# Patient Record
Sex: MALE | Race: ASIAN
[De-identification: names, ages, dates, MRNs, and addresses within clinical notes are randomized per-mention and may not be internally consistent; named-entity substitution may affect disease eponyms.]

---

## 2017-07-11 ENCOUNTER — HOSPITAL ENCOUNTER (INPATIENT)
Dept: HOSPITAL 72 - EMR | Age: 72
LOS: 3 days | Discharge: NURSING, REHAB OR LONG-TERM CARE | DRG: 689 | End: 2017-07-14
Payer: MEDICARE

## 2017-07-11 VITALS — SYSTOLIC BLOOD PRESSURE: 132 MMHG | DIASTOLIC BLOOD PRESSURE: 87 MMHG

## 2017-07-11 VITALS — SYSTOLIC BLOOD PRESSURE: 129 MMHG | DIASTOLIC BLOOD PRESSURE: 74 MMHG

## 2017-07-11 VITALS — DIASTOLIC BLOOD PRESSURE: 62 MMHG | SYSTOLIC BLOOD PRESSURE: 121 MMHG

## 2017-07-11 VITALS
BODY MASS INDEX: 23.05 KG/M2 | HEIGHT: 64 IN | DIASTOLIC BLOOD PRESSURE: 77 MMHG | WEIGHT: 135 LBS | SYSTOLIC BLOOD PRESSURE: 126 MMHG

## 2017-07-11 VITALS — SYSTOLIC BLOOD PRESSURE: 107 MMHG | DIASTOLIC BLOOD PRESSURE: 68 MMHG

## 2017-07-11 VITALS — SYSTOLIC BLOOD PRESSURE: 99 MMHG | DIASTOLIC BLOOD PRESSURE: 66 MMHG

## 2017-07-11 VITALS — DIASTOLIC BLOOD PRESSURE: 96 MMHG | SYSTOLIC BLOOD PRESSURE: 142 MMHG

## 2017-07-11 DIAGNOSIS — E87.0: ICD-10-CM

## 2017-07-11 DIAGNOSIS — G30.9: ICD-10-CM

## 2017-07-11 DIAGNOSIS — R62.7: ICD-10-CM

## 2017-07-11 DIAGNOSIS — E87.6: ICD-10-CM

## 2017-07-11 DIAGNOSIS — M81.0: ICD-10-CM

## 2017-07-11 DIAGNOSIS — I12.9: ICD-10-CM

## 2017-07-11 DIAGNOSIS — G93.89: ICD-10-CM

## 2017-07-11 DIAGNOSIS — I25.10: ICD-10-CM

## 2017-07-11 DIAGNOSIS — Z86.73: ICD-10-CM

## 2017-07-11 DIAGNOSIS — F02.80: ICD-10-CM

## 2017-07-11 DIAGNOSIS — N18.3: ICD-10-CM

## 2017-07-11 DIAGNOSIS — N39.0: Primary | ICD-10-CM

## 2017-07-11 DIAGNOSIS — N40.0: ICD-10-CM

## 2017-07-11 DIAGNOSIS — R13.10: ICD-10-CM

## 2017-07-11 DIAGNOSIS — G93.41: ICD-10-CM

## 2017-07-11 DIAGNOSIS — E83.52: ICD-10-CM

## 2017-07-11 LAB
ALBUMIN/GLOB SERPL: 0.7 {RATIO} (ref 1–2.7)
ALBUMIN/GLOB SERPL: 0.8 {RATIO} (ref 1–2.7)
ALT SERPL-CCNC: 21 U/L (ref 3–41)
ALT SERPL-CCNC: 24 U/L (ref 3–41)
ANION GAP SERPL CALC-SCNC: 13 MMOL/L (ref 5–15)
ANION GAP SERPL CALC-SCNC: 14 MMOL/L (ref 5–15)
APAP SERPL-MCNC: < 10 UG/ML (ref 10–30)
APPEARANCE UR: (no result)
APTT BLD: 25 SEC (ref 23–33)
AST SERPL-CCNC: 11 U/L (ref 5–40)
AST SERPL-CCNC: 18 U/L (ref 5–40)
BACTERIA #/AREA URNS HPF: (no result) /HPF
BASOPHILS NFR BLD AUTO: 0.9 % (ref 0–2)
CALCIUM SERPL-MCNC: 11 MG/DL (ref 8.6–10.2)
CALCIUM SERPL-MCNC: 11.1 MG/DL (ref 8.6–10.2)
CHLORIDE SERPL-SCNC: 121 MEQ/L (ref 98–107)
CHLORIDE SERPL-SCNC: 124 MEQ/L (ref 98–107)
CO2 SERPL-SCNC: 24 MEQ/L (ref 20–30)
CO2 SERPL-SCNC: 25 MEQ/L (ref 20–30)
CORTISOL: 25.7 UG/DL
CREAT SERPL-MCNC: 1.5 MG/DL (ref 0.7–1.2)
CREAT SERPL-MCNC: 1.6 MG/DL (ref 0.7–1.2)
EOSINOPHIL NFR BLD AUTO: 2.2 % (ref 0–3)
ERYTHROCYTE [DISTWIDTH] IN BLOOD BY AUTOMATED COUNT: 13.6 % (ref 11.6–14.8)
ETHANOL SERPL-MCNC: < 10 MG/DL
GLOBULIN SER-MCNC: 4.3 G/DL
GLOBULIN SER-MCNC: 4.7 G/DL
HEMOLYSIS: 5
HEMOLYSIS: 81
INR PPP: 1 (ref 0.9–1.1)
KETONES UR QL STRIP: NEGATIVE
LEUKOCYTE ESTERASE UR QL STRIP: (no result)
LIPASE SERPL-CCNC: 94 U/L (ref ?–60)
LYMPHOCYTES NFR BLD AUTO: 30.2 % (ref 20–45)
MAGNESIUM SERPL-MCNC: 2.1 MG/DL (ref 1.7–2.5)
MCH RBC QN AUTO: 29 PG (ref 27–31)
MCHC RBC AUTO-ENTMCNC: 30.4 G/DL (ref 32–36)
MCV RBC AUTO: 95 FL (ref 80–99)
MONOCYTES NFR BLD AUTO: 6.8 % (ref 1–10)
MUCOUS THREADS #/AREA URNS LPF: (no result) /LPF
NEUTROPHILS NFR BLD AUTO: 59.9 % (ref 45–75)
NITRITE UR QL STRIP: POSITIVE
PH UR STRIP: 5 [PH] (ref 4.5–8)
PHOSPHATE SERPL-MCNC: 3.9 MG/DL (ref 2.5–4.8)
PLATELET # BLD: 212 K/UL (ref 150–450)
PMV BLD AUTO: 10.7 FL (ref 6.5–10.1)
POTASSIUM SERPL-SCNC: 4.7 MEQ/L (ref 3.4–4.9)
POTASSIUM SERPL-SCNC: 5.5 MEQ/L (ref 3.4–4.9)
PROT SERPL-MCNC: 7.8 G/DL (ref 6.6–8.7)
PROT SERPL-MCNC: 8.3 G/DL (ref 6.6–8.7)
PROT UR QL STRIP: (no result)
PROTHROMBIN TIME: 10 SEC (ref 9.3–11.5)
RBC # BLD AUTO: 5.43 M/UL (ref 4.7–6.1)
RBC #/AREA URNS HPF: (no result) /HPF (ref 0–0)
SODIUM SERPL-SCNC: 159 MEQ/L (ref 135–145)
SODIUM SERPL-SCNC: 162 MEQ/L (ref 135–145)
SP GR UR STRIP: 1.02 (ref 1–1.03)
SQUAMOUS #/AREA URNS LPF: (no result) /LPF
TROPONIN I SERPL-MCNC: < 0.3 NG/ML (ref ?–0.3)
URATE SERPL-MCNC: 11.1 MG/DL (ref 3–7.5)
UROBILINOGEN UR-MCNC: NORMAL MG/DL (ref 0–1)
WBC # BLD AUTO: 11 K/UL (ref 4.8–10.8)
WBC #/AREA URNS HPF: (no result) /HPF (ref 0–0)

## 2017-07-11 PROCEDURE — 74000: CPT

## 2017-07-11 PROCEDURE — 93005 ELECTROCARDIOGRAM TRACING: CPT

## 2017-07-11 PROCEDURE — 84443 ASSAY THYROID STIM HORMONE: CPT

## 2017-07-11 PROCEDURE — 87181 SC STD AGAR DILUTION PER AGT: CPT

## 2017-07-11 PROCEDURE — 84484 ASSAY OF TROPONIN QUANT: CPT

## 2017-07-11 PROCEDURE — 83735 ASSAY OF MAGNESIUM: CPT

## 2017-07-11 PROCEDURE — 83036 HEMOGLOBIN GLYCOSYLATED A1C: CPT

## 2017-07-11 PROCEDURE — 71010: CPT

## 2017-07-11 PROCEDURE — 70450 CT HEAD/BRAIN W/O DYE: CPT

## 2017-07-11 PROCEDURE — 80061 LIPID PANEL: CPT

## 2017-07-11 PROCEDURE — 82550 ASSAY OF CK (CPK): CPT

## 2017-07-11 PROCEDURE — 82533 TOTAL CORTISOL: CPT

## 2017-07-11 PROCEDURE — 84550 ASSAY OF BLOOD/URIC ACID: CPT

## 2017-07-11 PROCEDURE — 85025 COMPLETE CBC W/AUTO DIFF WBC: CPT

## 2017-07-11 PROCEDURE — 83880 ASSAY OF NATRIURETIC PEPTIDE: CPT

## 2017-07-11 PROCEDURE — 84439 ASSAY OF FREE THYROXINE: CPT

## 2017-07-11 PROCEDURE — 87081 CULTURE SCREEN ONLY: CPT

## 2017-07-11 PROCEDURE — 76775 US EXAM ABDO BACK WALL LIM: CPT

## 2017-07-11 PROCEDURE — 81003 URINALYSIS AUTO W/O SCOPE: CPT

## 2017-07-11 PROCEDURE — 83690 ASSAY OF LIPASE: CPT

## 2017-07-11 PROCEDURE — 82977 ASSAY OF GGT: CPT

## 2017-07-11 PROCEDURE — 84100 ASSAY OF PHOSPHORUS: CPT

## 2017-07-11 PROCEDURE — 87040 BLOOD CULTURE FOR BACTERIA: CPT

## 2017-07-11 PROCEDURE — 80329 ANALGESICS NON-OPIOID 1 OR 2: CPT

## 2017-07-11 PROCEDURE — 87086 URINE CULTURE/COLONY COUNT: CPT

## 2017-07-11 PROCEDURE — 85730 THROMBOPLASTIN TIME PARTIAL: CPT

## 2017-07-11 PROCEDURE — 83930 ASSAY OF BLOOD OSMOLALITY: CPT

## 2017-07-11 PROCEDURE — 84481 FREE ASSAY (FT-3): CPT

## 2017-07-11 PROCEDURE — 74230 X-RAY XM SWLNG FUNCJ C+: CPT

## 2017-07-11 PROCEDURE — 83935 ASSAY OF URINE OSMOLALITY: CPT

## 2017-07-11 PROCEDURE — 82962 GLUCOSE BLOOD TEST: CPT

## 2017-07-11 PROCEDURE — 85610 PROTHROMBIN TIME: CPT

## 2017-07-11 PROCEDURE — 36415 COLL VENOUS BLD VENIPUNCTURE: CPT

## 2017-07-11 PROCEDURE — 86140 C-REACTIVE PROTEIN: CPT

## 2017-07-11 PROCEDURE — 80300: CPT

## 2017-07-11 PROCEDURE — 80053 COMPREHEN METABOLIC PANEL: CPT

## 2017-07-11 RX ADMIN — HEPARIN SODIUM SCH UNITS: 5000 INJECTION INTRAVENOUS; SUBCUTANEOUS at 20:35

## 2017-07-11 NOTE — CONSULTATION
History of Present Illness


General


Date patient seen:  Jul 11, 2017


Chief Complaint:  General Complaint


Referring physician:  dr Hearn


Reason for Consultation:  INpatient management





Present Illness


HPI


72 year old patient wtih hx of CAD, CVA, Dementia  presented with decreased 

oral intake.  He is unable to give any history . Pt was diagnosed to have 

sepsis and UTI and admitted for further evaluation.


Allergies:  


Coded Allergies:  


     No Known Allergies (Unverified , 8/20/12)





Medication History


Scheduled


Alendronate Sodium* (Fosamax*), 70 MG ORAL ONCE A WEEK, (Reported)


Amlodipine Besylate (Norvasc), 5 MG ORAL DAILY, (Reported)


Aspirin* (Aspir 81*), 81 MG ORAL DAILY, (Reported)


Atorvastatin Calcium* (Atorvastatin Calcium*), 10 MG ORAL BEDTIME, (Reported)


Cranberry Fruit Concentrate (Cranberry), 450 MG PO BID, (Reported)


Docusate Sodium* (Colace*), 200 MG ORAL DAILY, (Reported)


Donepezil Hcl* (Donepezil Hcl*), 5 MG ORAL DAILY, (Reported)


Lisinopril (Lisinopril*), 5 MG ORAL DAILY, (Reported)


Multivitamin (Multi Vitamin Daily), 1 TAB ORAL DAILY, (Reported)


Tamsulosin HCl (Flomax), 0.4 MG ORAL DAILY, (Reported)





Scheduled PRN


Acetaminophen (Tylenol), 650 MG ORAL Q6H PRN for Fever/Headache/Mild Pain, (

Reported)


Guaifenesin (Kesha-Tussin), 15 ML PO EVERY 8 HOURS PRN for For Cough, (Reported)


Nitroglycerin (Nitroglycerin), 0.4 MG SL AS NEEDED PRN for For Pain, (Reported)





Miscellaneous Medications


Calcium Carbonate/Vitamin D3 (Calcium 500+D Tablet Chew), 1 EACH PO, (Reported)





Discontinued Medications


Ascorbic Acid* (Ascorbic Acid*), 500 MG ORAL DAILY, (Reported)


   Discontinued Reason: MD discontinued med


Hydrocodone Bit/Acetaminophen 5-325* (Norco 5-325*), Unknown Dose ORAL Q4H PRN 

for For Pain, (Reported)


   Discontinued Reason: MD discontinued med





Patient History


Healthcare decision maker





Resuscitation status





Advanced Directive on File








Past Medical/Surgical History


Past Medical/Surgical History:  


(1) Alzheimer's dementia


(2) HTN (hypertension)





Review of Systems


Constitutional:  Reports: no symptoms


All Other Systems:  negative except mentioned in HPI





Physical Exam


General Appearance:  WD/WN, no apparent distress


Lines, tubes and drains:  peripheral


HEENT:  normocephalic, atraumatic


Neck:  non-tender, normal alignment


Respiratory/Chest:  chest wall non-tender, lungs clear


Cardiovascular/Chest:  normal peripheral pulses


Abdomen:  normal bowel sounds, non tender


Genitourinary/Rectal:  normal genital exam


Extremities:  normal range of motion


Skin Exam:  normal pigmentation





Last 24 Hour Vital Signs








  Date Time  Temp Pulse Resp B/P Pulse Ox O2 Delivery O2 Flow Rate FiO2


 


7/11/17 13:06 95.9 81 18 99/66 100 Room Air  


 


7/11/17 10:56 96.5 79 16 129/74 99 Room Air  


 


7/11/17 10:45  79 16 129/74 99 Room Air  


 


7/11/17 09:57 96.5 89 14 121/62 99 Room Air  


 


7/11/17 08:27 97.7 86 16 126/77 98 Room Air  


 


7/11/17 08:18 96.3 85 18 129/76 97 Room Air  











Laboratory Tests








Test


  7/11/17


08:45 7/11/17


09:50 7/11/17


10:46 7/11/17


12:45


 


White Blood Count


  11.0 K/UL


(4.8-10.8)  H 


  


  


 


 


Red Blood Count


  5.43 M/UL


(4.70-6.10) 


  


  


 


 


Hemoglobin


  15.7 G/DL


(14.2-18.0) 


  


  


 


 


Hematocrit


  51.7 %


(42.0-52.0) 


  


  


 


 


Mean Corpuscular Volume 95 FL (80-99)     


 


Mean Corpuscular Hemoglobin


  29.0 PG


(27.0-31.0) 


  


  


 


 


Mean Corpuscular Hemoglobin


Concent 30.4 G/DL


(32.0-36.0)  L 


  


  


 


 


Red Cell Distribution Width


  13.6 %


(11.6-14.8) 


  


  


 


 


Platelet Count


  212 K/UL


(150-450) 


  


  


 


 


Mean Platelet Volume


  10.7 FL


(6.5-10.1)  H 


  


  


 


 


Neutrophils (%) (Auto)


  59.9 %


(45.0-75.0) 


  


  


 


 


Lymphocytes (%) (Auto)


  30.2 %


(20.0-45.0) 


  


  


 


 


Monocytes (%) (Auto)


  6.8 %


(1.0-10.0) 


  


  


 


 


Eosinophils (%) (Auto)


  2.2 %


(0.0-3.0) 


  


  


 


 


Basophils (%) (Auto)


  0.9 %


(0.0-2.0) 


  


  


 


 


Prothrombin Time


  10.0 SEC


(9.30-11.50) 


  


  


 


 


Prothromb Time International


Ratio 1.0 (0.9-1.1)  


  


  


  


 


 


Activated Partial


Thromboplast Time 25 SEC (23-33)


  


  


  


 


 


Sodium Level


  159 mEQ/L


(135-145)  H 


  


  162 mEQ/L


(135-145)  *H


 


Potassium Level


  5.5 mEQ/L


(3.4-4.9)  H 


  


  4.7 mEQ/L


(3.4-4.9)


 


Chloride Level


  121 mEQ/L


()  H 


  


  124 mEQ/L


()  H


 


Carbon Dioxide Level


  25 mEQ/L


(20-30) 


  


  24 mEQ/L


(20-30)


 


Anion Gap 13 (5-15)     14 (5-15)  


 


Blood Urea Nitrogen


  53 mg/dL


(7-23)  H 


  


  50 mg/dL


(7-23)  H


 


Creatinine


  1.6 mg/dL


(0.7-1.2)  H 


  


  1.5 mg/dL


(0.7-1.2)  H


 


Estimat Glomerular Filtration


Rate  mL/min (>60)  


  


  


   mL/min (>60)  


 


 


Glucose Level


  120 mg/dL


()  H 


  


  124 mg/dL


()  H


 


Calcium Level


  11.0 mg/dL


(8.6-10.2)  H 


  


  11.1 mg/dL


(8.6-10.2)  H


 


Total Bilirubin


  0.4 mg/dL


(0.0-1.2) 


  


  0.2 mg/dL


(0.0-1.2)


 


Aspartate Amino Transf


(AST/SGOT) 18 U/L (5-40)  


  


  


  11 U/L (5-40)  


 


 


Alanine Aminotransferase


(ALT/SGPT) 24 U/L (3-41)  


  


  


  21 U/L (3-41)  


 


 


Alkaline Phosphatase


  124 U/L


() 


  


  120 U/L


()


 


Total Creatine Kinase


  66 U/L


() 


  


  57 U/L


()


 


Troponin I


  < 0.30 ng/mL


(<=0.30) 


  


  


 


 


Pro-B-Type Natriuretic Peptide


  39 pg/mL


(0-125) 


  


  


 


 


Total Protein


  8.3 g/dL


(6.6-8.7) 


  


  7.8 g/dL


(6.6-8.7)


 


Albumin


  3.6 g/dL


(3.5-5.2) 


  


  3.5 g/dL


(3.5-5.2)


 


Globulin 4.7 g/dL     4.3 g/dL  


 


Albumin/Globulin Ratio


  0.7 (1.0-2.7)


L 


  


  0.8 (1.0-2.7)


L


 


Lipase


  94 U/L (< 60)


H 


  


  


 


 


Salicylates Level


  < 1 mg/dL


(10-30)  L 


  


  


 


 


Acetaminophen Level


  < 10 ug/mL


(10-30)  L 


  


  


 


 


Serum Alcohol < 10 mg/dL     


 


Urine Color  Pale yellow    


 


Urine Appearance  Turbid    


 


Urine pH  5 (4.5-8.0)    


 


Urine Specific Gravity


  


  1.020


(1.005-1.035) 


  


 


 


Urine Protein


  


  3+ (NEGATIVE)


H 


  


 


 


Urine Glucose (UA)


  


  Negative


(NEGATIVE) 


  


 


 


Urine Ketones


  


  Negative


(NEGATIVE) 


  


 


 


Urine Occult Blood


  


  4+ (NEGATIVE)


H 


  


 


 


Urine Nitrite


  


  Positive


(NEGATIVE)  H 


  


 


 


Urine Bilirubin


  


  Negative


(NEGATIVE) 


  


 


 


Urine Urobilinogen


  


  Normal MG/DL


(0.0-1.0) 


  


 


 


Urine Leukocyte Esterase


  


  3+ (NEGATIVE)


H 


  


 


 


Urine RBC


  


  15-20 /HPF (0


- 0)  H 


  


 


 


Urine WBC


  


  Tntc /HPF (0 -


0)  H 


  


 


 


Urine Squamous Epithelial


Cells 


  Occasional


/LPF 


  


 


 


Urine Bacteria


  


  Moderate /HPF


(NONE)  H 


  


 


 


Urine Mucus


  


  Few /LPF


(NONE/OCC)  H 


  


 


 


Urine Opiates Screen


  


  Negative


(NEGATIVE) 


  


 


 


Urine Barbiturates Screen


  


  Negative


(NEGATIVE) 


  


 


 


Phencyclidine (PCP) Screen


  


  Negative


(NEGATIVE) 


  


 


 


Urine Amphetamines Screen


  


  Negative


(NEGATIVE) 


  


 


 


Urine Benzodiazepines Screen


  


  Negative


(NEGATIVE) 


  


 


 


Urine Cocaine Screen


  


  Negative


(NEGATIVE) 


  


 


 


Urine Marijuana (THC) Screen


  


  Negative


(NEGATIVE) 


  


 


 


Plasma/Serum Osmolality   Pending   


 


Free Triiodothyronine   Pending   


 


Uric Acid


  


  


  


  11.1 mg/dL


(3.0-7.5)  H


 


Phosphorus Level


  


  


  


  3.9 mg/dL


(2.5-4.8)


 


Magnesium Level


  


  


  


  2.1 mg/dL


(1.7-2.5)


 


Free Thyroxine


  


  


  


  1.40 ng/dL


(0.86-1.85)


 


Cortisol    25.7 ug/dL  








Height (Feet):  5


Height (Inches):  4.00


Weight (Pounds):  135


Medications





Current Medications








 Medications


  (Trade)  Dose


 Ordered  Sig/Miky


 Route


 PRN Reason  Start Time


 Stop Time Status Last Admin


Dose Admin


 


 Acetaminophen


  (Tylenol)  650 mg  Q4H  PRN


 ORAL


 fever>100.5  7/11/17 11:00


 8/10/17 10:59   


 


 


 Al Hydroxide/Mg


 Hydroxide


  (Mylanta II)  30 ml  Q6H  PRN


 ORAL


 dyspepsia  7/11/17 11:00


 8/10/17 10:59   


 


 


 Amlodipine


 Besylate


  (Norvasc)  5 mg  DAILY


 ORAL


   7/12/17 09:00


 8/11/17 08:59   


 


 


 Clonidine HCl


  (Catapres)  0.1 mg  Q4H  PRN


 ORAL


 SBP>160  7/11/17 11:00


 8/10/17 10:59   


 


 


 Dextrose


  (Dextrose 50%)    STAT  PRN


 IV


 Hypoglycemia  7/11/17 11:00


 8/10/17 10:59   


 


 


 Donepezil HCl


  (Aricept)  5 mg  DAILY


 ORAL


   7/12/17 09:00


 8/11/17 08:59   


 


 


 Heparin Sodium


  (Porcine)


  (Heparin 5000


 units/ml)  5,000 units  EVERY 12  HOURS


 SUBQ


   7/11/17 21:00


 8/10/17 20:59   


 


 


 Lorazepam


  (Ativan 2mg/ml


 1ml)  0.5 mg  Q4H  PRN


 IV


 For Anxiety  7/11/17 11:00


 7/18/17 10:59   


 


 


 Morphine Sulfate


  (Morphine


 Sulfate)  1 mg  Q4H  PRN


 IVP


 For Pain  7/11/17 11:00


 7/18/17 10:59   


 


 


 Ondansetron HCl


  (Zofran)  4 mg  Q6H  PRN


 IVP


 Nausea & Vomiting  7/11/17 11:00


 8/10/17 10:59   


 


 


 Polyethylene


 Glycol


  (Miralax)  17 gm  HSPRN  PRN


 ORAL


 Constipation  7/11/17 21:00


 8/10/17 20:59   


 


 


 Tamsulosin HCl


  (Flomax)  0.4 mg  DAILY


 ORAL


   7/12/17 09:00


 8/11/17 08:59   


 


 


 Zolpidem Tartrate


  (Ambien)  5 mg  HSPRN  PRN


 ORAL


 Insomnia  7/11/17 21:00


 8/10/17 20:59   


 











Assessment/Plan


Problem List:  


(1) Sepsis


ICD Codes:  A41.9 - Sepsis, unspecified organism


SNOMED:  79374370


(2) UTI (urinary tract infection)


ICD Codes:  N39.0 - Urinary tract infection, site not specified


SNOMED:  16099379


(3) Acute encephalopathy


ICD Codes:  G93.40 - Encephalopathy, unspecified


SNOMED:  3344150


(4) HTN (hypertension)


ICD Codes:  I10 - Essential (primary) hypertension


SNOMED:  72997469


(5) BPH (benign prostatic hyperplasia)


ICD Codes:  N40.0 - Benign prostatic hyperplasia without lower urinary tract 

symptoms


SNOMED:  139928719, 312074192


(6) Alzheimer's dementia


ICD Codes:  G30.9 - Alzheimer's disease, unspecified


SNOMED:  11040489


Assessment/Plan


IV fulids


pan culture


broad spectrum antibiotics


swallow study


check labs


renal studies


dvt prophylaxis











YA VALDERRAMA Jul 11, 2017 13:52

## 2017-07-11 NOTE — EMERGENCY ROOM REPORT
History of Present Illness


General


Chief Complaint:  General Complaint


Source:  Medical Record, EMS





Present Illness


HPI


Patient presents with decreased oral intake.  He is unable to give any history 

to us.  He stays at a skilled nursing facility.  Uncertain how long this has 

been going on.  EMS denies that there is any history of fevers.





The patient's had a stroke in the past.  According to the medical record he has 

dysphagia.





Admitted 5/21-5/25


DX


1.        Acute encephalopathy 2/2 to sepsis


2.        UTI (resolved)


3.        HAP vs bronchitis (resolved)


4.        Hypertension.


5.        Advanced Dementia.


6.        Benign prostatic hypertrophy.


7.        History of falls.


8.        Hx of Ventriculomegaly, possible hydrocephalus.


9.        Dehydration.


10.      Dyslipidemia


11.      Osteoporosis 


12.      Hypophosphatemia 


13.      Chronic kidney disease 2


Allergies:  


Coded Allergies:  


     No Known Allergies (Unverified , 8/20/12)





Patient History


Limited by:  medical condition


Past Medical History:  see triage record, old chart reviewed


Social History Narrative


snf


Reviewed Nursing Documentation:  PMH: Agreed, PSxH: Agreed





Nursing Documentation-PMH


Hx Cardiac Problems:  Yes


Hx Hypertension:  Yes


Hx Pacemaker:  No


Hx Asthma:  No


Hx COPD:  No


Hx Diabetes:  Yes


Hx Cancer:  No


Hx Gastrointestinal Problems:  Yes - DYSPHAGIA


Hx Dialysis:  No


Hx Neurological Problems:  Yes


Hx Cerebrovascular Accident:  Yes - dysphagia


Hx Dementia:  Yes


Hx Alzheimer's Disease:  Yes


Hx Seizures:  No


Hx Weakness:  Yes





Review of Systems


All Other Systems:  limited





Physical Exam





Vital Signs








  Date Time  Temp Pulse Resp B/P Pulse Ox O2 Delivery O2 Flow Rate FiO2


 


7/11/17 08:18 96.3 85 18 129/76 97 Room Air  








Sp02 EP Interpretation:  reviewed, normal


General Appearance:  no apparent distress, cachetic, thin, Chronically Ill


Head:  normocephalic


Eyes:  bilateral eye PERRL, bilateral eye normal inspection


ENT:  dry mucus membranes


Neck:  supple


Respiratory:  lungs clear, normal breath sounds


Cardiovascular #1:  regular rate, rhythm


Cardiovascular #2:  2+ radial (R)


Gastrointestinal:  normal inspection, normal bowel sounds, non tender, no mass, 

non-distended, other - slaps away examiner examining abdomen


Musculoskeletal:  back normal, gait/station normal, normal range of motion


Neurologic:  responsive, motor weakness


Psychiatric:  depressed affect


Skin:  normal inspection, warm/dry





Medical Decision Making


Diagnostic Impression:  


 Primary Impression:  


 Failure to thrive


 Qualified Codes:  R62.7 - Adult failure to thrive


 Additional Impressions:  


 Hypernatremia


 Hyperkalemia


 Renal insufficiency


ER Course


Patient presents with decreased by mouth intake.  There several different 

possible etiologies for this.  There is a history of dysphagia.  Also need to 

exclude cardiac cause including heart attack.  In addition there could be 

occult infection.  Finally needed to be psychiatric issues of depression.  

Evaluation will be with EKG, laboratory, CT of the head, chest x-ray and 

abdominal films.  The patient will receive IV hydration.





EKG - no acute changes.  CXR - chronic disease.  Labs with hypernatremia, 

hyperkalemia, renal insufficiency. 





Potassium treated.





Complicated patient.  Needs further treatment in hospital.





Admit med, Dr. Cancino.





Laboratory Tests








Test


  7/11/17


08:45 7/11/17


09:50 7/11/17


10:46 7/11/17


12:45


 


White Blood Count


  11.0 K/UL


(4.8-10.8)  H 


  


  


 


 


Red Blood Count


  5.43 M/UL


(4.70-6.10) 


  


  


 


 


Hemoglobin


  15.7 G/DL


(14.2-18.0) 


  


  


 


 


Hematocrit


  51.7 %


(42.0-52.0) 


  


  


 


 


Mean Corpuscular Volume 95 FL (80-99)     


 


Mean Corpuscular Hemoglobin


  29.0 PG


(27.0-31.0) 


  


  


 


 


Mean Corpuscular Hemoglobin


Concent 30.4 G/DL


(32.0-36.0)  L 


  


  


 


 


Red Cell Distribution Width


  13.6 %


(11.6-14.8) 


  


  


 


 


Platelet Count


  212 K/UL


(150-450) 


  


  


 


 


Mean Platelet Volume


  10.7 FL


(6.5-10.1)  H 


  


  


 


 


Neutrophils (%) (Auto)


  59.9 %


(45.0-75.0) 


  


  


 


 


Lymphocytes (%) (Auto)


  30.2 %


(20.0-45.0) 


  


  


 


 


Monocytes (%) (Auto)


  6.8 %


(1.0-10.0) 


  


  


 


 


Eosinophils (%) (Auto)


  2.2 %


(0.0-3.0) 


  


  


 


 


Basophils (%) (Auto)


  0.9 %


(0.0-2.0) 


  


  


 


 


Prothrombin Time


  10.0 SEC


(9.30-11.50) 


  


  


 


 


Prothrombin Time INR 1.0 (0.9-1.1)     


 


PTT


  25 SEC (23-33)


  


  


  


 


 


Sodium Level


  159 mEQ/L


(135-145)  H 


  


  162 mEQ/L


(135-145)  *H


 


Potassium Level


  5.5 mEQ/L


(3.4-4.9)  H 


  


  4.7 mEQ/L


(3.4-4.9)


 


Chloride Level


  121 mEQ/L


()  H 


  


  124 mEQ/L


()  H


 


Carbon Dioxide Level


  25 mEQ/L


(20-30) 


  


  24 mEQ/L


(20-30)


 


Anion Gap 13 (5-15)     14 (5-15)  


 


Blood Urea Nitrogen


  53 mg/dL


(7-23)  H 


  


  50 mg/dL


(7-23)  H


 


Creatinine


  1.6 mg/dL


(0.7-1.2)  H 


  


  1.5 mg/dL


(0.7-1.2)  H


 


Estimate Glomerular


Filtration Rate  mL/min (>60)  


  


  


   mL/min (>60)  


 


 


Glucose Level


  120 mg/dL


()  H 


  


  124 mg/dL


()  H


 


Calcium Level


  11.0 mg/dL


(8.6-10.2)  H 


  


  11.1 mg/dL


(8.6-10.2)  H


 


Total Bilirubin


  0.4 mg/dL


(0.0-1.2) 


  


  0.2 mg/dL


(0.0-1.2)


 


Aspartate Amino Transferase


(AST) 18 U/L (5-40)  


  


  


  11 U/L (5-40)  


 


 


Alanine Aminotransferase (ALT) 24 U/L (3-41)     21 U/L (3-41)  


 


Alkaline Phosphatase


  124 U/L


() 


  


  120 U/L


()


 


Total Creatine Kinase


  66 U/L


() 


  


  57 U/L


()


 


Troponin I


  < 0.30 ng/mL


(<=0.30) 


  


  


 


 


Pro-B-Type Natriuretic Peptide


  39 pg/mL


(0-125) 


  


  


 


 


Total Protein


  8.3 g/dL


(6.6-8.7) 


  


  7.8 g/dL


(6.6-8.7)


 


Albumin


  3.6 g/dL


(3.5-5.2) 


  


  3.5 g/dL


(3.5-5.2)


 


Globulin 4.7 g/dL     4.3 g/dL  


 


Albumin/Globulin Ratio


  0.7 (1.0-2.7)


L 


  


  0.8 (1.0-2.7)


L


 


Lipase


  94 U/L (< 60)


H 


  


  


 


 


Salicylates Level


  < 1 mg/dL


(10-30)  L 


  


  


 


 


Acetaminophen Level


  < 10 ug/mL


(10-30)  L 


  


  


 


 


Serum Alcohol < 10 mg/dL     


 


Urine Color  Pale yellow    


 


Urine Appearance  Turbid    


 


Urine pH  5 (4.5-8.0)    


 


Urine Specific Gravity


  


  1.020


(1.005-1.035) 


  


 


 


Urine Protein


  


  3+ (NEGATIVE)


H 


  


 


 


Urine Glucose (UA)


  


  Negative


(NEGATIVE) 


  


 


 


Urine Ketones


  


  Negative


(NEGATIVE) 


  


 


 


Urine Occult Blood


  


  4+ (NEGATIVE)


H 


  


 


 


Urine Nitrite


  


  Positive


(NEGATIVE)  H 


  


 


 


Urine Bilirubin


  


  Negative


(NEGATIVE) 


  


 


 


Urine Urobilinogen


  


  Normal MG/DL


(0.0-1.0) 


  


 


 


Urine Leukocyte Esterase


  


  3+ (NEGATIVE)


H 


  


 


 


Urine RBC


  


  15-20 /HPF (0


- 0)  H 


  


 


 


Urine WBC


  


  Tntc /HPF (0 -


0)  H 


  


 


 


Urine Squamous Epithelial


Cells 


  Occasional


/LPF 


  


 


 


Urine Bacteria


  


  Moderate /HPF


(NONE)  H 


  


 


 


Urine Mucus


  


  Few /LPF


(NONE/OCC)  H 


  


 


 


Urine Opiates Screen


  


  Negative


(NEGATIVE) 


  


 


 


Urine Barbiturates Screen


  


  Negative


(NEGATIVE) 


  


 


 


Phencyclidine (PCP) Screen


  


  Negative


(NEGATIVE) 


  


 


 


Urine Amphetamines Screen


  


  Negative


(NEGATIVE) 


  


 


 


Urine Benzodiazepines Screen


  


  Negative


(NEGATIVE) 


  


 


 


Urine Cocaine Screen


  


  Negative


(NEGATIVE) 


  


 


 


Urine Marijuana (THC) Screen


  


  Negative


(NEGATIVE) 


  


 


 


Urine Osmolality   Pending   


 


Plasma/Serum Osmolality   Pending   


 


Free Triiodothyronine   Pending   


 


Uric Acid


  


  


  


  11.1 mg/dL


(3.0-7.5)  H


 


Phosphorus Level


  


  


  


  3.9 mg/dL


(2.5-4.8)


 


Magnesium Level


  


  


  


  2.1 mg/dL


(1.7-2.5)


 


Free Thyroxine


  


  


  


  1.40 ng/dL


(0.86-1.85)


 


Cortisol    25.7 ug/dL  








EKG Diagnostic Results


Rate:  normal


Rhythm:  NSR


ST Segments:  no acute changes





Rhythm Strip Diag. Results


EP Interpretation:  yes


Rhythm:  NSR, no PVC's, no ectopy





Chest X-Ray Diagnostic Results


Chest X-Ray Diagnostic Results :  


   Chest X-Ray Ordered:  Yes


   # of Views/Limited/Complete:  1 View


   Indication:  Other


   EP Interpretation:  Yes


   Interpretation:  no consolidation, no effusion, no pneumothorax, other - L 

shoulder and old scarring


   Impression:  No acute disease


   Interpreting ER Provider:  Electronically signed by Heath Toure MD





Other X-Ray Diagnostic Results


Other X-Ray Diagnostic Results :  


   X-Ray ordered:  abd


   # of Views/Limited Vs Complete:  1 View


   Indication:  Other


   EP Interpretation:  Yes


   Interpretation:  nonspecific bowel gas, no sbo, other - no masses


   Interpreting ER Provider:  Electronically signed by Heath Toure MD





CT/MRI/US Diagnostic Results


CT/MRI/US Diagnostic Results :  


   Imaging Test Ordered:  head


   Impression


large ventricles





Last Vital Signs








  Date Time  Temp Pulse Resp B/P Pulse Ox O2 Delivery O2 Flow Rate FiO2


 


7/11/17 16:01 97.2 58 18 142/96 96 Room Air  








Status:  improved


Disposition:  ADMITTED AS INPATIENT


Condition:  Serious











Heath Toure M.D. Jul 11, 2017 08:33

## 2017-07-11 NOTE — HISTORY AND PHYSICAL REPORT
DATE OF ADMISSION:  07/11/2017



CHIEF COMPLAINT:  The patient is a 72-year-old  male, who

presents with chief complaint of altered mental status.



HISTORY OF PRESENT ILLNESS:  The patient is a resident of [facility].  According to staff at [facility], the patient has had decreased oral intake.  The patient himself

is unable to contribute much of the history and physical.  Much of the

history and physical is obtained from the patient's chart.



The patient presented to [facility].  He was found to have

urinary tract infection.  The patient is admitted for altered mental

status, failure to thrive, and urinary tract infection.



PAST MEDICAL HISTORY:  Significant for,



1. Hypertension.

2. Benign prostatic hypertrophy.

3. Osteoporosis.

4. Ventriculomegaly.

5. Alzheimer's dementia.



PAST SURGICAL HISTORY:  Significant for right shoulder surgery.



CURRENT MEDICATIONS:  From [facility],



1. Aspirin 81 mg one tablet p.o. daily.

2. Norvasc 5 mg one tablet p.o. daily.

3. Aricept 5 mg one tablet p.o. at bedtime.

4. Lisinopril 5 mg one tablet p.o. daily.

5. Nitroglycerin 0.4 mg sublingual p.r.n.

6. Flomax 0.4 mg p.o. daily.



ALLERGIES:  No known drug allergies.



SOCIAL HISTORY:  The patient is resident of [facility].  The patient has a grown child, Dianelys Rodriguez.



REVIEW OF SYSTEMS:  Unable to assess, secondary to patient's mental

status.



PHYSICAL EXAMINATION:

VITAL SIGNS:  Temperature 97.7 degrees, respirations 16, pulse 86,

and blood pressure is 126/77.

GENERAL:  The patient is well-developed, well-nourished, thin

appearing,  male, in no apparent distress.

HEENT:  Eyes, pupils are equal and responsive to light and

accommodation.  Extraocular movements are intact.

NECK:  Supple without lymphadenopathy.

CHEST:  Lungs are clear to auscultation bilaterally without wheezes

or rales.

CARDIOVASCULAR:  Regular rhythm and rate.  S1 and S2 are normal

without murmurs, rubs, or gallops.

ABDOMEN:  Soft, nontender, and nondistended.  Positive bowel sounds.

No hepatosplenomegaly.  Currently, no rebound or guarding noted.

EXTREMITIES:  No clubbing, cyanosis, or edema.

RECTAL:  Refused.

GENITAL:  Refused.

NEUROLOGIC:  Cranial nerves II through XII are grossly intact without

focal deficits.



LABORATORY STUDIES:  WBC 11.0, hemoglobin 15.5, hematocrit 51.7, and

platelets 212,000.  Sodium 159, potassium 5.5, chloride 121, CO2 25, BUN

53, creatinine 1.6, and glucose 120.  Urinalysis showed 3+ protein, 4+

occult blood, positive nitrites, and WBC too numerous to count.



ASSESSMENT:  This is a 72-year-old  male.



1. Failure to thrive.

2. Altered mental status.

3. Urinary tract infection.

4. Hypernatremia.

5. Hypertension.

6. Benign prostatic hypertrophy.

7. Osteoporosis.

8. Alzheimer's dementia.

9. Ventriculomegaly.



TREATMENT:

1. Failure to thrive.  This may be secondary to urinary tract infection.

The patient is currently receiving intravenous fluids.

2. Altered mental status.  This is probably secondary to urinary tract

infection below.

3. Urinary tract infection.  A urine culture is pending.  The patient

has been started empirically on ceftriaxone.  The urine culture and

sensitivity is pending at this time.

4. Hypernatremia.  This may be secondary to dehydration.  The patient is

currently receiving D5 W.  We will follow BMP carefully daily.

5. Hypertension.  The patient is currently hypotensive.  We will hold

antihypertensive medications as above.

6. Benign prostatic hypertrophy.  Continue Flomax as above.

7. Osteoporosis.

8. Alzheimer's dementia.  Continue Namenda as above.

9. Ventriculomegaly.  This may be secondary to normal pressure

hydrocephalus.









  ______________________________________________

  Benito Hearn M.D.





DR:  TONIA

D:  07/11/2017 18:18

T:  07/11/2017 19:52

JOB#:  [id number]

CC:

## 2017-07-11 NOTE — DIAGNOSTIC IMAGING REPORT
Indication: Altered level of consciousness



Technique: Contiguous 5 mm thick transaxial imaging of the head obtained in a

[employer] Sensation 64 slice CT scanner.  Soft tissue and bone windows generated.



Total Dose length Product (DLP):  1376 mGycm



CT Dose Index Volume (CTDIvol):   70.38 mGy



Comparison: 4/8/14



Findings: The ventricles are disproportionately large compared to the degree of

atrophy present which is mild. Findings appear unchanged from the last study. There

is no mass effect or edema identified. There is no midline shift or evidence of

acute intracranial hemorrhage. Osseous structures appear unremarkable. The 

paranasal

sinuses and mastoids are clear.



Impression:



Disproportionate ventriculomegaly. Possibility of normal pressure hydrocephalus may

be considered. Recommend clinical workup.



No interval change







The CT scanner at [facility] is accredited by the [employer] College 

of

Radiology and the scans are performed using dose optimization techniques as

appropriate to a performed exam including Automatic Exposure control.

## 2017-07-11 NOTE — DIAGNOSTIC IMAGING REPORT
Indication: Abdominal pain



Comparison:  None



Single view of the abdomen obtained



Findings: Bowel gas pattern is nonspecific. No mass, ectopic calcifications, or

abnormal gas collections are identified. The bones are osteopenic. There is

narrowing of intervertebral discs and accompanying endplate osteophyte 

formation.

Hypertrophied facet joints also demonstrated.



Impression: No acute findings

## 2017-07-11 NOTE — DIAGNOSTIC IMAGING REPORT
Indication: Dyspnea



Comparison:  5/20/15



A single view chest radiograph was obtained.



Findings:



Lungs are clear. Bones are osteopenic. Heart size is normal. There is a right

shoulder prosthesis.



Impression:



No acute cardiopulmonary disease

## 2017-07-12 VITALS — DIASTOLIC BLOOD PRESSURE: 68 MMHG | SYSTOLIC BLOOD PRESSURE: 109 MMHG

## 2017-07-12 VITALS — SYSTOLIC BLOOD PRESSURE: 144 MMHG | DIASTOLIC BLOOD PRESSURE: 86 MMHG

## 2017-07-12 VITALS — DIASTOLIC BLOOD PRESSURE: 82 MMHG | SYSTOLIC BLOOD PRESSURE: 142 MMHG

## 2017-07-12 VITALS — DIASTOLIC BLOOD PRESSURE: 98 MMHG | SYSTOLIC BLOOD PRESSURE: 147 MMHG

## 2017-07-12 VITALS — DIASTOLIC BLOOD PRESSURE: 74 MMHG | SYSTOLIC BLOOD PRESSURE: 103 MMHG

## 2017-07-12 LAB
ALBUMIN/GLOB SERPL: 0.8 {RATIO} (ref 1–2.7)
ALT SERPL-CCNC: 17 U/L (ref 3–41)
ANION GAP SERPL CALC-SCNC: 11 MMOL/L (ref 5–15)
AST SERPL-CCNC: 12 U/L (ref 5–40)
BASOPHILS NFR BLD AUTO: 0.9 % (ref 0–2)
CALCIUM SERPL-MCNC: 10.2 MG/DL (ref 8.6–10.2)
CHLORIDE SERPL-SCNC: 117 MEQ/L (ref 98–107)
CHOLEST SERPL-MCNC: 169 MG/DL (ref ?–200)
CHOLEST/HDLC SERPL: 3.8 {RATIO} (ref 3.3–4.4)
CO2 SERPL-SCNC: 27 MEQ/L (ref 20–30)
CREAT SERPL-MCNC: 1.4 MG/DL (ref 0.7–1.2)
EOSINOPHIL NFR BLD AUTO: 2.6 % (ref 0–3)
ERYTHROCYTE [DISTWIDTH] IN BLOOD BY AUTOMATED COUNT: 13.3 % (ref 11.6–14.8)
FREE TRIIODOTHYRONINE: 2.1 PG/ML (ref 2–4.4)
GLOBULIN SER-MCNC: 4 G/DL
HBA1C MFR BLD: 6.2 % (ref ?–6)
HEMOLYSIS: 3
LDLC SERPL ELPH-MCNC: 80 MG/DL (ref 60–99)
LYMPHOCYTES NFR BLD AUTO: 34.4 % (ref 20–45)
MCH RBC QN AUTO: 29.9 PG (ref 27–31)
MCHC RBC AUTO-ENTMCNC: 31.6 G/DL (ref 32–36)
MCV RBC AUTO: 95 FL (ref 80–99)
MONOCYTES NFR BLD AUTO: 7.9 % (ref 1–10)
NEUTROPHILS NFR BLD AUTO: 54.2 % (ref 45–75)
PLATELET # BLD: 185 K/UL (ref 150–450)
PMV BLD AUTO: 10.5 FL (ref 6.5–10.1)
POTASSIUM SERPL-SCNC: 4.2 MEQ/L (ref 3.4–4.9)
PROT SERPL-MCNC: 7.4 G/DL (ref 6.6–8.7)
RBC # BLD AUTO: 4.79 M/UL (ref 4.7–6.1)
SODIUM SERPL-SCNC: 155 MEQ/L (ref 135–145)
TSH SERPL-ACNC: 1.7 UIU/ML (ref 0.3–4.5)
WBC # BLD AUTO: 9.6 K/UL (ref 4.8–10.8)

## 2017-07-12 RX ADMIN — SODIUM CHLORIDE SCH MLS/HR: 0.9 INJECTION INTRAVENOUS at 11:50

## 2017-07-12 RX ADMIN — PANTOPRAZOLE SODIUM SCH MG: 40 INJECTION, POWDER, FOR SOLUTION INTRAVENOUS at 21:01

## 2017-07-12 RX ADMIN — TAMSULOSIN HYDROCHLORIDE SCH MG: 0.4 CAPSULE ORAL at 10:04

## 2017-07-12 RX ADMIN — HEPARIN SODIUM SCH UNITS: 5000 INJECTION INTRAVENOUS; SUBCUTANEOUS at 10:06

## 2017-07-12 RX ADMIN — HEPARIN SODIUM SCH UNITS: 5000 INJECTION INTRAVENOUS; SUBCUTANEOUS at 21:02

## 2017-07-12 NOTE — DIAGNOSTIC IMAGING REPORT
Indication: Abnormal renal function tests



Technique: Grayscale and duplex images of the kidneys, retroperitoneum, and bladder

were obtained.



Comparison:None



Findings: Right kidney measures 8.9 cm in length. Left kidney measures 9.4 cm in

length. Both kidneys demonstrate normal echogenicity. No hydronephrosis.  There 

are

bilateral renal cysts.. Normal inferior vena cava. Bladder is slightly 

distended,

contains debris. Prostate volume calculated at 20 mL



Impression: Somewhat distended bladder containing debris. This is nonspecific, 

could

indicate cystitis, among other possibilities



Negative for hydronephrosis



Incidental finding bilateral renal cysts

## 2017-07-12 NOTE — INTERNAL MED PROGRESS NOTE
Subjective


Date of Service:  Jul 12, 2017


Physician Name


Benito Webster


Attending Physician


Andrei Cancino MD





Current Medications








 Medications


  (Trade)  Dose


 Ordered  Sig/Miky


 Route


 PRN Reason  Start Time


 Stop Time Status Last Admin


Dose Admin


 


 Acetaminophen


  (Tylenol)  650 mg  Q4H  PRN


 ORAL


 fever>100.5  7/11/17 11:00


 8/10/17 10:59   


 


 


 Amlodipine


 Besylate


  (Norvasc)  5 mg  DAILY


 ORAL


   7/12/17 09:00


 8/11/17 08:59  7/12/17 10:05


 


 


 Aspirin


  (Ecotrin)  81 mg  DAILY


 ORAL


   7/13/17 09:00


 8/12/17 08:59 UNV  


 


 


 Atorvastatin


 Calcium


  (Lipitor)  10 mg  BEDTIME


 ORAL


   7/12/17 21:00


 8/11/17 20:59 UNV  


 


 


 Ceftriaxone


 Sodium/Dextrose


  (Rocephin/D5W)  55 ml @ 


 110 mls/hr  Q24H


 IVPB


   7/12/17 11:00


 7/19/17 10:59  7/12/17 11:50


 


 


 Clonidine HCl 0.1


 mg  0.1 mg  Q4H  PRN


 ORAL


 SBP>160  7/11/17 11:00


 8/10/17 10:59   


 


 


 Dextrose  1,000 ml @ 


 100 mls/hr  Q10H


 IV


   7/11/17 15:00


 8/10/17 14:59  7/12/17 11:49


 


 


 Dextrose


  (Dextrose 50%)    STAT  PRN


 IV


 Hypoglycemia  7/11/17 11:00


 8/10/17 10:59   


 


 


 Docusate Sodium


  (Colace)  200 mg  DAILY


 ORAL


   7/13/17 09:00


 8/12/17 08:59 UNV  


 


 


 Donepezil HCl


  (Aricept)  5 mg  QHS


 ORAL


   7/13/17 21:00


 8/12/17 20:59 UNV  


 


 


 Heparin Sodium


  (Porcine)


  (Heparin 5000


 units/ml)  5,000 units  EVERY 12  HOURS


 SUBQ


   7/11/17 21:00


 8/10/17 20:59  7/12/17 10:06


 


 


 Lorazepam


  (Ativan 2mg/ml


 1ml)  0.5 mg  Q4H  PRN


 IV


 For Anxiety  7/11/17 11:00


 7/18/17 10:59   


 


 


 Morphine Sulfate


  (Morphine


 Sulfate)  1 mg  Q4H  PRN


 IVP


 For Pain  7/11/17 11:00


 7/18/17 10:59   


 


 


 Ondansetron HCl


  (Zofran)  4 mg  Q6H  PRN


 IVP


 Nausea & Vomiting  7/11/17 11:00


 8/10/17 10:59   


 


 


 Pantoprazole


  (Protonix)  40 mg  EVERY 12  HOURS


 IVP


   7/12/17 21:00


 8/11/17 20:59 UNV  


 


 


 Polyethylene


 Glycol


  (Miralax)  17 gm  HSPRN  PRN


 ORAL


 Constipation  7/11/17 21:00


 8/10/17 20:59   


 


 


 Tamsulosin HCl


  (Flomax)  0.4 mg  DAILY


 ORAL


   7/12/17 09:00


 8/11/17 08:59  7/12/17 10:04


 


 


 Zolpidem Tartrate


  (Ambien)  5 mg  HSPRN  PRN


 ORAL


 Insomnia  7/11/17 21:00


 8/10/17 20:59   


 








Allergies:  


Coded Allergies:  


     No Known Allergies (Unverified , 8/20/12)


ROS Limited/Unobtainable:  Yes


Subjective


71 YO  M admitted with Failure to thrive and altered mental status.  Now UTI.  

Cover for Int Med-Dr Cancino.





Objective





Last Vital Signs








  Date Time  Temp Pulse Resp B/P Pulse Ox O2 Delivery O2 Flow Rate FiO2


 


7/12/17 12:00 96.4 63 18 147/98 98 Room Air  








General Appearance:  WD/WN, no apparent distress, alert


EENT:  PERRL/EOMI, normal ENT inspection


Neck:  non-tender, normal alignment, supple


Cardiovascular:  normal peripheral pulses, normal rate, regular rhythm, no 

gallop/murmur, no JVD


Respiratory/Chest:  chest wall non-tender, lungs clear, normal breath sounds, 

no respiratory distress, no accessory muscle use


Abdomen:  normal bowel sounds, non tender, soft, no organomegaly, no mass


Extremities:  normal range of motion


Neurologic:  CNs II-XII grossly normal


Skin:  normal pigmentation, warm/dry





Laboratory Tests








Test


  7/12/17


05:10


 


White Blood Count


  9.6 K/UL


(4.8-10.8)


 


Red Blood Count


  4.79 M/UL


(4.70-6.10)


 


Hemoglobin


  14.3 G/DL


(14.2-18.0)


 


Hematocrit


  45.3 %


(42.0-52.0)


 


Mean Corpuscular Volume 95 FL (80-99)  


 


Mean Corpuscular Hemoglobin


  29.9 PG


(27.0-31.0)


 


Mean Corpuscular Hemoglobin


Concent 31.6 G/DL


(32.0-36.0)  L


 


Red Cell Distribution Width


  13.3 %


(11.6-14.8)


 


Platelet Count


  185 K/UL


(150-450)


 


Mean Platelet Volume


  10.5 FL


(6.5-10.1)  H


 


Neutrophils (%) (Auto)


  54.2 %


(45.0-75.0)


 


Lymphocytes (%) (Auto)


  34.4 %


(20.0-45.0)


 


Monocytes (%) (Auto)


  7.9 %


(1.0-10.0)


 


Eosinophils (%) (Auto)


  2.6 %


(0.0-3.0)


 


Basophils (%) (Auto)


  0.9 %


(0.0-2.0)


 


Sodium Level


  155 mEQ/L


(135-145)  H


 


Potassium Level


  4.2 mEQ/L


(3.4-4.9)


 


Chloride Level


  117 mEQ/L


()  H


 


Carbon Dioxide Level


  27 mEQ/L


(20-30)


 


Anion Gap 11 (5-15)  


 


Blood Urea Nitrogen


  40 mg/dL


(7-23)  H


 


Creatinine


  1.4 mg/dL


(0.7-1.2)  H


 


Estimat Glomerular Filtration


Rate  mL/min (>60)  


 


 


Glucose Level


  140 mg/dL


()  H


 


Hemoglobin A1c


  6.2 % (< 6.0)


H


 


Uric Acid


  10.7 mg/dL


(3.0-7.5)  H


 


Calcium Level


  10.2 mg/dL


(8.6-10.2)


 


Total Bilirubin


  0.3 mg/dL


(0.0-1.2)


 


Aspartate Amino Transf


(AST/SGOT) 12 U/L (5-40)  


 


 


Alanine Aminotransferase


(ALT/SGPT) 17 U/L (3-41)  


 


 


Alkaline Phosphatase


  110 U/L


()


 


Total Protein


  7.4 g/dL


(6.6-8.7)


 


Albumin


  3.4 g/dL


(3.5-5.2)  L


 


Globulin 4.0 g/dL  


 


Albumin/Globulin Ratio


  0.8 (1.0-2.7)


L


 


Triglycerides Level


  221 mg/dL (<


150)  H


 


Cholesterol Level


  169 mg/dL (<


200)


 


LDL Cholesterol


  80 mg/dL


(60-99)


 


HDL Cholesterol


  45 mg/dL (>


60)


 


Cholesterol/HDL Ratio 3.8 (3.3-4.4)  


 


Thyroid Stimulating Hormone


(TSH) 1.700 uIU/mL


(0.300-4.500)











Microbiology








 Date/Time


Source Procedure


Growth Status


 


 


 7/11/17 09:50


Urine,Clean Catch Urine Culture - Preliminary


Gram Positive Cocci Resulted


 


 7/11/17 09:52


Rectum VRE Culture - Preliminary Resulted

















Intake and Output  


 


 7/11/17 7/12/17





 19:00 07:00


 


Intake Total 1055 ml 1200 ml


 


Output Total 20 ml 


 


Balance 1035 ml 1200 ml


 


  


 


Intake IV Total 1055 ml 1200 ml


 


Output Urine Total 20 ml 


 


# Voids 1 3











Assessment/Plan


Problem List:  


(1) HTN (hypertension)


Assessment & Plan:  Hold antihypertensive meds due to hypotension





(2) BPH (benign prostatic hyperplasia)


Assessment & Plan:  Continue flomax





(3) Osteoporosis


(4) Cerebral ventriculomegaly


(5) Alzheimer's dementia


(6) Failure to thrive


(7) Altered mental state


(8) UTI (urinary tract infection)


Assessment & Plan:  Gram pos cocci.  Await ID and Sensitivity.  Continue 

ceftriaxone for now.





(9) Hypernatremia


Assessment & Plan:  see nephrology note.  Cont IV fluids per nephrology





Status:  unchanged











BENITO WEBSTER Jul 12, 2017 13:57

## 2017-07-12 NOTE — PULMONOLOGY PROGRESS NOTE
Assessment/Plan


Problems:  


(1) Sepsis


(2) Acute encephalopathy


(3) Alzheimer's dementia


(4) HTN (hypertension)


(5) Renal insufficiency


(6) Severe sepsis


Assessment/Plan


IV fluids


IV antibiotics


swallow study


check cultures


dvt prophylaxis





Subjective


ROS Limited/Unobtainable:  Yes


Interval Events:  confused


Allergies:  


Coded Allergies:  


     No Known Allergies (Unverified , 8/20/12)





Objective





Last 24 Hour Vital Signs








  Date Time  Temp Pulse Resp B/P Pulse Ox O2 Delivery O2 Flow Rate FiO2


 


7/12/17 16:00 97.0 74 18 109/68 97 Room Air  


 


7/12/17 12:00 96.4 63 18 147/98 98 Room Air  


 


7/12/17 10:05  62  143/87    


 


7/12/17 08:22 97.0 68 18 142/82 96 Room Air  


 


7/12/17 04:00 98.2 74 18 144/86 99 Room Air  


 


7/11/17 23:29 96.8 69 18 132/87 94 Room Air  


 


7/11/17 20:00  76 16 107/68 97 Room Air  

















Intake and Output  


 


 7/11/17 7/12/17





 19:00 07:00


 


Intake Total 1055 ml 1200 ml


 


Output Total 20 ml 


 


Balance 1035 ml 1200 ml


 


  


 


Intake IV Total 1055 ml 1200 ml


 


Output Urine Total 20 ml 


 


# Voids 1 3








General Appearance:  cachetic


HEENT:  normocephalic, atraumatic


Respiratory/Chest:  chest wall non-tender, lungs clear


Cardiovascular:  normal peripheral pulses, normal rate


Abdomen:  normal bowel sounds, soft, non tender


Genitourinary:  normal external genitalia


Skin:  no rash


Neurologic/Psychiatric:  CNs II-XII grossly normal





Microbiology








 Date/Time


Source Procedure


Growth Status


 


 


 7/11/17 09:50


Urine,Clean Catch Urine Culture - Preliminary


Gram Positive Cocci Resulted


 


 7/11/17 09:52


Rectum VRE Culture - Preliminary Resulted








Laboratory Tests


7/12/17 05:10: 


White Blood Count 9.6, Red Blood Count 4.79, Hemoglobin 14.3, Hematocrit 45.3, 

Mean Corpuscular Volume 95, Mean Corpuscular Hemoglobin 29.9, Mean Corpuscular 

Hemoglobin Concent 31.6L, Red Cell Distribution Width 13.3, Platelet Count 185, 

Mean Platelet Volume 10.5H, Neutrophils (%) (Auto) 54.2, Lymphocytes (%) (Auto) 

34.4, Monocytes (%) (Auto) 7.9, Eosinophils (%) (Auto) 2.6, Basophils (%) (Auto

) 0.9, Sodium Level 155H, Potassium Level 4.2, Chloride Level 117H, Carbon 

Dioxide Level 27, Anion Gap 11, Blood Urea Nitrogen 40H, Creatinine 1.4H, 

Estimat Glomerular Filtration Rate , Glucose Level 140H, Hemoglobin A1c 6.2H, 

Uric Acid 10.7H, Calcium Level 10.2, Total Bilirubin 0.3, Aspartate Amino 

Transf (AST/SGOT) 12, Alanine Aminotransferase (ALT/SGPT) 17, Alkaline 

Phosphatase 110, Total Protein 7.4, Albumin 3.4L, Globulin 4.0, Albumin/

Globulin Ratio 0.8L, Triglycerides Level 221H, Cholesterol Level 169, LDL 

Cholesterol 80, HDL Cholesterol 45, Cholesterol/HDL Ratio 3.8, Thyroid 

Stimulating Hormone (TSH) 1.700





Current Medications








 Medications


  (Trade)  Dose


 Ordered  Sig/Miky


 Route


 PRN Reason  Start Time


 Stop Time Status Last Admin


Dose Admin


 


 Acetaminophen


  (Tylenol)  650 mg  Q4H  PRN


 ORAL


 fever>100.5  7/11/17 11:00


 8/10/17 10:59   


 


 


 Amlodipine


 Besylate


  (Norvasc)  5 mg  DAILY


 ORAL


   7/12/17 09:00


 8/11/17 08:59  7/12/17 10:05


 


 


 Aspirin


  (Ecotrin)  81 mg  DAILY


 ORAL


   7/13/17 09:00


 8/12/17 08:59   


 


 


 Atorvastatin


 Calcium


  (Lipitor)  10 mg  BEDTIME


 ORAL


   7/12/17 21:00


 8/11/17 20:59   


 


 


 Ceftriaxone


 Sodium/Dextrose


  (Rocephin/D5W)  55 ml @ 


 110 mls/hr  Q24H


 IVPB


   7/12/17 11:00


 7/19/17 10:59  7/12/17 11:50


 


 


 Clonidine HCl 0.1


 mg  0.1 mg  Q4H  PRN


 ORAL


 SBP>160  7/11/17 11:00


 8/10/17 10:59   


 


 


 Dextrose  1,000 ml @ 


 100 mls/hr  Q10H


 IV


   7/11/17 15:00


 8/10/17 14:59  7/12/17 11:49


 


 


 Dextrose


  (Dextrose 50%)    STAT  PRN


 IV


 Hypoglycemia  7/11/17 11:00


 8/10/17 10:59   


 


 


 Docusate Sodium


  (Colace)  200 mg  DAILY


 ORAL


   7/13/17 09:00


 8/12/17 08:59   


 


 


 Donepezil HCl


  (Aricept)  5 mg  QHS


 ORAL


   7/13/17 21:00


 8/12/17 20:59   


 


 


 Heparin Sodium


  (Porcine)


  (Heparin 5000


 units/ml)  5,000 units  EVERY 12  HOURS


 SUBQ


   7/11/17 21:00


 8/10/17 20:59  7/12/17 10:06


 


 


 Lorazepam


  (Ativan 2mg/ml


 1ml)  0.5 mg  Q4H  PRN


 IV


 For Anxiety  7/11/17 11:00


 7/18/17 10:59   


 


 


 Morphine Sulfate


  (Morphine


 Sulfate)  1 mg  Q4H  PRN


 IVP


 For Pain  7/11/17 11:00


 7/18/17 10:59   


 


 


 Ondansetron HCl


  (Zofran)  4 mg  Q6H  PRN


 IVP


 Nausea & Vomiting  7/11/17 11:00


 8/10/17 10:59   


 


 


 Pantoprazole


  (Protonix)  40 mg  EVERY 12  HOURS


 IVP


   7/12/17 21:00


 8/11/17 20:59   


 


 


 Polyethylene


 Glycol


  (Miralax)  17 gm  HSPRN  PRN


 ORAL


 Constipation  7/11/17 21:00


 8/10/17 20:59   


 


 


 Tamsulosin HCl


  (Flomax)  0.4 mg  DAILY


 ORAL


   7/12/17 09:00


 8/11/17 08:59  7/12/17 10:04


 


 


 Zolpidem Tartrate


  (Ambien)  5 mg  HSPRN  PRN


 ORAL


 Insomnia  7/11/17 21:00


 8/10/17 20:59   


 

















YA VALDERRAMA Jul 12, 2017 18:55

## 2017-07-12 NOTE — CONSULTATION
Consult Note


Consult Note





asked to evaluate for renal failure and electrolyte imbalances-





Patient presents with decreased oral intake.  He is unable to give any history 

to us.  He says skilled nursing facility.  Uncertain how long this has been 

going on.  EMS denies that there is any history of fevers.


The patient's had a stroke in the past.  According to the medical record he has 

dysphagia.


snf resident








Hx Cardiac Problems:  Yes


Hx Hypertension:  Yes


Hx Diabetes:  Yes


Hx Gastrointestinal Problems:  Yes - DYSPHAGIA


Hx Neurological Problems:  Yes


Hx Cerebrovascular Accident:  Yes - dysphagia


Hx Dementia:  Yes


Hx Alzheimer's Disease:  Yes


Hx Weakness:  Yes





Patient examined 


data reviewed


Assessment/Plan





- DEHYDRATION : Leading to:





Renal Failure-


HyperCalcemia


HyperNatremia


HyperUrecemia





- UTI


-CVA old


- DEMENTIA / OBS


-DM


-HTN





Plan;





Hydrate-


monitor lytes and renal parameters


Per consultants


Per orders











LASHANDA RODRIGUES Jul 12, 2017 12:59

## 2017-07-12 NOTE — CARDIOLOGY REPORT
--------------- APPROVED REPORT --------------





EKG Measurement

Heart [id number]

CA [id number]

[id number]

[id number]

[id number]





Normal sinus rhythm

Normal ECG

## 2017-07-13 VITALS — DIASTOLIC BLOOD PRESSURE: 75 MMHG | SYSTOLIC BLOOD PRESSURE: 116 MMHG

## 2017-07-13 VITALS — DIASTOLIC BLOOD PRESSURE: 69 MMHG | SYSTOLIC BLOOD PRESSURE: 121 MMHG

## 2017-07-13 VITALS — DIASTOLIC BLOOD PRESSURE: 77 MMHG | SYSTOLIC BLOOD PRESSURE: 112 MMHG

## 2017-07-13 VITALS — SYSTOLIC BLOOD PRESSURE: 126 MMHG | DIASTOLIC BLOOD PRESSURE: 75 MMHG

## 2017-07-13 VITALS — DIASTOLIC BLOOD PRESSURE: 60 MMHG | SYSTOLIC BLOOD PRESSURE: 100 MMHG

## 2017-07-13 VITALS — SYSTOLIC BLOOD PRESSURE: 115 MMHG | DIASTOLIC BLOOD PRESSURE: 76 MMHG

## 2017-07-13 LAB
ALBUMIN/GLOB SERPL: 0.8 {RATIO} (ref 1–2.7)
ALT SERPL-CCNC: 13 U/L (ref 3–41)
ANION GAP SERPL CALC-SCNC: 11 MMOL/L (ref 5–15)
AST SERPL-CCNC: 10 U/L (ref 5–40)
BASOPHILS NFR BLD AUTO: 0.7 % (ref 0–2)
CALCIUM SERPL-MCNC: 8.7 MG/DL (ref 8.6–10.2)
CHLORIDE SERPL-SCNC: 107 MEQ/L (ref 98–107)
CO2 SERPL-SCNC: 24 MEQ/L (ref 20–30)
CREAT SERPL-MCNC: 1.3 MG/DL (ref 0.7–1.2)
CRP SERPL-MCNC: < 0.3 MG/DL (ref ?–0.5)
EOSINOPHIL NFR BLD AUTO: 2.8 % (ref 0–3)
ERYTHROCYTE [DISTWIDTH] IN BLOOD BY AUTOMATED COUNT: 13.1 % (ref 11.6–14.8)
GLOBULIN SER-MCNC: 3.4 G/DL
HEMOLYSIS: 4
LYMPHOCYTES NFR BLD AUTO: 33.9 % (ref 20–45)
MAGNESIUM SERPL-MCNC: 1.7 MG/DL (ref 1.7–2.5)
MCH RBC QN AUTO: 29.3 PG (ref 27–31)
MCHC RBC AUTO-ENTMCNC: 31.5 G/DL (ref 32–36)
MCV RBC AUTO: 93 FL (ref 80–99)
MONOCYTES NFR BLD AUTO: 6.6 % (ref 1–10)
NEUTROPHILS NFR BLD AUTO: 55.9 % (ref 45–75)
OSMOLALITY SERUM LC: 344 MOSMOL/KG (ref 280–301)
PHOSPHATE SERPL-MCNC: 3.2 MG/DL (ref 2.5–4.8)
PLATELET # BLD: 169 K/UL (ref 150–450)
PMV BLD AUTO: 11.5 FL (ref 6.5–10.1)
POTASSIUM SERPL-SCNC: 3.5 MEQ/L (ref 3.4–4.9)
PROT SERPL-MCNC: 6.4 G/DL (ref 6.6–8.7)
RBC # BLD AUTO: 4.25 M/UL (ref 4.7–6.1)
SODIUM SERPL-SCNC: 142 MEQ/L (ref 135–145)
URATE SERPL-MCNC: 9.1 MG/DL (ref 3–7.5)
WBC # BLD AUTO: 9.5 K/UL (ref 4.8–10.8)

## 2017-07-13 RX ADMIN — ASPIRIN SCH MG: 81 TABLET, DELAYED RELEASE ORAL at 08:22

## 2017-07-13 RX ADMIN — HEPARIN SODIUM SCH UNITS: 5000 INJECTION INTRAVENOUS; SUBCUTANEOUS at 20:13

## 2017-07-13 RX ADMIN — DOCUSATE SODIUM SCH MG: 100 CAPSULE, LIQUID FILLED ORAL at 08:23

## 2017-07-13 RX ADMIN — TAMSULOSIN HYDROCHLORIDE SCH MG: 0.4 CAPSULE ORAL at 08:22

## 2017-07-13 RX ADMIN — CEPHALEXIN SCH MG: 500 TABLET ORAL at 20:12

## 2017-07-13 RX ADMIN — PANTOPRAZOLE SODIUM SCH MG: 40 INJECTION, POWDER, FOR SOLUTION INTRAVENOUS at 20:11

## 2017-07-13 RX ADMIN — HEPARIN SODIUM SCH UNITS: 5000 INJECTION INTRAVENOUS; SUBCUTANEOUS at 08:28

## 2017-07-13 RX ADMIN — PANTOPRAZOLE SODIUM SCH MG: 40 INJECTION, POWDER, FOR SOLUTION INTRAVENOUS at 08:23

## 2017-07-13 RX ADMIN — SODIUM CHLORIDE SCH MLS/HR: 0.9 INJECTION INTRAVENOUS at 10:47

## 2017-07-13 NOTE — DIAGNOSTIC IMAGING REPORT
Indications: Abnormal renal function tests



Technique: Transabdominal real-time grayscale and duplex Doppler imaging of the

kidneys, retroperitoneum, and urinary bladder was performed



Findings:



Comparison: 7/11/17



Right kidney measures 9.4 cm in length. Normal contour, echotexture, cortical

thickness. Contains 1 cm circumscribed anechoic focus interpolar cortex, small

linear echogenic focus in lower pole cortical that sinus junction. No additional

focal lesions, hydronephrosis, or obvious perinephric abnormalities.



Left kidney measures 10.9 cm in length. Normal contour, echotexture, cortical

thickness. Contains several circumscribed anechoic cortical foci up to 1.5 cm. No

stones, other focal lesions, hydronephrosis, or obvious perinephric abnormalities.



The intrahepatic portion of inferior vena cava is patent and normal caliber.



The urinary bladder is mildly distended without obvious abnormality. Prostate volume

33 cc..



IMPRESSION:



Bilateral renal cortical cysts



Small non-shadowing stone, vascular calcification, or other nonspecific 

specular

reflector lower pole right kidney



Otherwise sonographically unremarkable kidneys



No significant change from 2 days prior

## 2017-07-13 NOTE — INFECTIOUS DISEASES PROG NOTE
Assessment/Plan


Assessment/Plan


A:





The patient is a 72-year-old male w





UTI 


  UCx : CoNS 











AP  ALOC


HTN


Dementia








PLAN:








change Ab Rx to Keflex d # 1 / 7 


Monitor blood culture


Monitor BMP


Monitor CBC


Nephro is following





Subjective


Constitutional:  Denies: anorexia, chills, drenching sweats, fatigue, fever, no 

symptoms, other


Allergies:  


Coded Allergies:  


     No Known Allergies (Unverified , 8/20/12)





Objective


Vital Signs





Last 24 Hour Vital Signs








  Date Time  Temp Pulse Resp B/P Pulse Ox O2 Delivery O2 Flow Rate FiO2


 


7/13/17 15:33 98.0 60 20 115/76 97 Room Air  


 


7/13/17 11:16 98.4 63 20 100/60 99 Room Air  


 


7/13/17 08:23  66  126/75    


 


7/13/17 08:20 98.0 66 20 126/75 96 Room Air  


 


7/13/17 04:29 97.0 59 21 112/77 99 Room Air  


 


7/13/17 00:07 97.1 70 20 121/69 97 Room Air  


 


7/12/17 20:00 96.1 72 21 103/74 97 Room Air  








Height (Feet):  5


Height (Inches):  4.00


Weight (Pounds):  135


HEENT:  mucous membranes moist


Respiratory/Chest:  no respiratory distress


Cardiovascular:  normal rate


Abdomen:  normal bowel sounds





Microbiology








 Date/Time


Source Procedure


Growth Status


 


 


 7/11/17 09:52


Nasal Nares MRSA Culture - Final


NO METHICILLIN RESISTANT STAPH AUREUS... Complete


 


 7/11/17 09:50


Urine,Clean Catch Urine Culture - Final


Staphylococcus Sp Coag Neg Complete


 


 7/11/17 09:52


Rectum VRE Culture - Final


NO VANCOMYCIN RESISTANT ENTEROCOCCUS ... Complete











Laboratory Tests








Test


  7/13/17


05:35


 


White Blood Count


  9.5 K/UL


(4.8-10.8)


 


Red Blood Count


  4.25 M/UL


(4.70-6.10)  L


 


Hemoglobin


  12.5 G/DL


(14.2-18.0)  L


 


Hematocrit


  39.5 %


(42.0-52.0)  L


 


Mean Corpuscular Volume 93 FL (80-99)  


 


Mean Corpuscular Hemoglobin


  29.3 PG


(27.0-31.0)


 


Mean Corpuscular Hemoglobin


Concent 31.5 G/DL


(32.0-36.0)  L


 


Red Cell Distribution Width


  13.1 %


(11.6-14.8)


 


Platelet Count


  169 K/UL


(150-450)


 


Mean Platelet Volume


  11.5 FL


(6.5-10.1)  H


 


Neutrophils (%) (Auto)


  55.9 %


(45.0-75.0)


 


Lymphocytes (%) (Auto)


  33.9 %


(20.0-45.0)


 


Monocytes (%) (Auto)


  6.6 %


(1.0-10.0)


 


Eosinophils (%) (Auto)


  2.8 %


(0.0-3.0)


 


Basophils (%) (Auto)


  0.7 %


(0.0-2.0)


 


Sodium Level


  142 mEQ/L


(135-145)


 


Potassium Level


  3.5 mEQ/L


(3.4-4.9)


 


Chloride Level


  107 mEQ/L


()


 


Carbon Dioxide Level


  24 mEQ/L


(20-30)


 


Anion Gap 11 (5-15)  


 


Blood Urea Nitrogen


  25 mg/dL


(7-23)  H


 


Creatinine


  1.3 mg/dL


(0.7-1.2)  H


 


Estimat Glomerular Filtration


Rate  mL/min (>60)  


 


 


Glucose Level


  121 mg/dL


()  H


 


Uric Acid


  9.1 mg/dL


(3.0-7.5)  H


 


Calcium Level


  8.7 mg/dL


(8.6-10.2)


 


Phosphorus Level


  3.2 mg/dL


(2.5-4.8)


 


Magnesium Level


  1.7 mg/dL


(1.7-2.5)


 


Total Bilirubin


  0.3 mg/dL


(0.0-1.2)


 


Gamma Glutamyl Transpeptidase 37 U/L (8-61)  


 


Aspartate Amino Transf


(AST/SGOT) 10 U/L (5-40)  


 


 


Alanine Aminotransferase


(ALT/SGPT) 13 U/L (3-41)  


 


 


Alkaline Phosphatase


  89 U/L


()


 


Total Creatine Kinase


  62 U/L


()


 


C-Reactive Protein,


Quantitative < 0.3 mg/dL (<


0.5)


 


Pro-B-Type Natriuretic Peptide


  25 pg/mL


(0-125)


 


Total Protein


  6.4 g/dL


(6.6-8.7)  L


 


Albumin


  3.0 g/dL


(3.5-5.2)  L


 


Globulin 3.4 g/dL  


 


Albumin/Globulin Ratio


  0.8 (1.0-2.7)


L











Current Medications








 Medications


  (Trade)  Dose


 Ordered  Sig/Miky


 Route


 PRN Reason  Start Time


 Stop Time Status Last Admin


Dose Admin


 


 Acetaminophen


  (Tylenol)  650 mg  Q4H  PRN


 ORAL


 fever>100.5  7/11/17 11:00


 8/10/17 10:59   


 


 


 Amlodipine


 Besylate


  (Norvasc)  5 mg  DAILY


 ORAL


   7/12/17 09:00


 8/11/17 08:59  7/13/17 08:23


 


 


 Aspirin


  (Ecotrin)  81 mg  DAILY


 ORAL


   7/13/17 09:00


 8/12/17 08:59  7/13/17 08:22


 


 


 Atorvastatin


 Calcium


  (Lipitor)  10 mg  BEDTIME


 ORAL


   7/12/17 21:00


 8/11/17 20:59  7/12/17 21:01


 


 


 Ceftriaxone


 Sodium/Dextrose


  (Rocephin/D5W)  55 ml @ 


 110 mls/hr  Q24H


 IVPB


   7/12/17 11:00


 7/19/17 10:59  7/13/17 10:47


 


 


 Clonidine HCl 0.1


 mg  0.1 mg  Q4H  PRN


 ORAL


 SBP>160  7/11/17 11:00


 8/10/17 10:59   


 


 


 Dextrose  1,000 ml @ 


 100 mls/hr  Q10H


 IV


   7/11/17 15:00


 8/10/17 14:59  7/13/17 16:43


 


 


 Dextrose


  (Dextrose 50%)    STAT  PRN


 IV


 Hypoglycemia  7/11/17 11:00


 8/10/17 10:59   


 


 


 Docusate Sodium


 200 mg  200 mg  DAILY


 ORAL


   7/13/17 09:00


 8/12/17 08:59  7/13/17 08:23


 


 


 Donepezil HCl


  (Aricept)  5 mg  QHS


 ORAL


   7/13/17 21:00


 8/12/17 20:59   


 


 


 Heparin Sodium


  (Porcine)


  (Heparin 5000


 units/ml)  5,000 units  EVERY 12  HOURS


 SUBQ


   7/11/17 21:00


 8/10/17 20:59  7/13/17 08:28


 


 


 Linezolid


  (Zyvox)  300 ml @ 


 300 mls/hr  Q12HR


 IVPB


   7/13/17 09:00


 7/20/17 08:59  7/13/17 08:22


 


 


 Lorazepam


  (Ativan 2mg/ml


 1ml)  0.5 mg  Q4H  PRN


 IV


 For Anxiety  7/11/17 11:00


 7/18/17 10:59   


 


 


 Morphine Sulfate


  (Morphine


 Sulfate)  1 mg  Q4H  PRN


 IVP


 For Pain  7/11/17 11:00


 7/18/17 10:59   


 


 


 Ondansetron HCl


  (Zofran)  4 mg  Q6H  PRN


 IVP


 Nausea & Vomiting  7/11/17 11:00


 8/10/17 10:59   


 


 


 Pantoprazole


  (Protonix)  40 mg  EVERY 12  HOURS


 IVP


   7/12/17 21:00


 8/11/17 20:59  7/13/17 08:23


 


 


 Polyethylene


 Glycol


  (Miralax)  17 gm  HSPRN  PRN


 ORAL


 Constipation  7/11/17 21:00


 8/10/17 20:59   


 


 


 Tamsulosin HCl


  (Flomax)  0.4 mg  DAILY


 ORAL


   7/12/17 09:00


 8/11/17 08:59  7/13/17 08:22


 


 


 Zolpidem Tartrate


  (Ambien)  5 mg  HSPRN  PRN


 ORAL


 Insomnia  7/11/17 21:00


 8/10/17 20:59   


 

















PHANI BHATIA M.D. Jul 13, 2017 18:25

## 2017-07-13 NOTE — PHYSICIAN QUERY
*****PLEASE COMPLETE DOCUMENT BEFORE SIGNING*****





Dear Dr. Schroeder_________________________   Date: __7/13/2017______________



/CDS Name: __Nadir Fairbanks, MD_____    / CDS Phone #__[phone]_______
____



Exercise your independent professional judgment when responding to query. 
Question asked do not imply a particular answer is desired/expected.





Clinical Documentation States:



"Altered Mental Status. This is probably secondary to urinary tract infection 
below." documented in Dr. Hearn's History & Physical



Please indicate the nature and chronicity of the condition below:



   [X] Metabolic Encephalopathy

   [] Toxic Encephalopathy

   [] Toxic - Metabolic Encephalopathy

   [] Progressive Encephalopathy

   [] Encephalopathy, Other ______________________

   [] Other: ____________________________________

   [] Not Applicable



Severity

   [] Acute

            [] Chronic

            [X] Acute on Chronic

            [] Unable to determine





Condition Present on Admission:       [X] Yes                      [] No       
      []Clinically Undeterminable



Please also document in your Progress Notes and/or Discharge Summary and 
indicate if the condition was present on admission.



______________________________

Benito Hearn M.D. Date & Time
[facility]

## 2017-07-13 NOTE — GENERAL PROGRESS NOTE
Assessment/Plan


Status:  stable - from renal stand


Assessment/Plan





- DEHYDRATION : Leading to:





Renal Failure-


HyperCalcemia


HyperNatremia


HyperUrecemia





- UTI


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


- DEMENTIA / OBS


-DM


-HTN





Plan;





Hydrate-


monitor lytes and renal parameters


Per consultants


Per orders





Subjective


ROS Limited/Unobtainable:  No


Allergies:  


Coded Allergies:  


     No Known Allergies (Unverified , 8/20/12)





Objective





Last 24 Hour Vital Signs








  Date Time  Temp Pulse Resp B/P Pulse Ox O2 Delivery O2 Flow Rate FiO2


 


7/13/17 08:23  66  126/75    


 


7/13/17 08:20 98.0 66 20 126/75 96 Room Air  


 


7/13/17 04:29 97.0 59 21 112/77 99 Room Air  


 


7/13/17 00:07 97.1 70 20 121/69 97 Room Air  


 


7/12/17 20:00 96.1 72 21 103/74 97 Room Air  


 


7/12/17 16:00 97.0 74 18 109/68 97 Room Air  


 


7/12/17 12:00 96.4 63 18 147/98 98 Room Air  


 


7/12/17 10:05  62  143/87    

















Intake and Output  


 


 7/12/17 7/13/17





 19:00 07:00


 


Intake Total 1005 ml 1100 ml


 


Output Total  600 ml


 


Balance 1005 ml 500 ml


 


  


 


Intake IV Total 1005 ml 1100 ml


 


Output Urine Total  600 ml


 


# Voids  2








Laboratory Tests


7/13/17 05:35: 


White Blood Count 9.5, Red Blood Count 4.25L, Hemoglobin 12.5L, Hematocrit 39.5L

, Mean Corpuscular Volume 93, Mean Corpuscular Hemoglobin 29.3, Mean 

Corpuscular Hemoglobin Concent 31.5L, Red Cell Distribution Width 13.1, 

Platelet Count 169, Mean Platelet Volume 11.5H, Neutrophils (%) (Auto) 55.9, 

Lymphocytes (%) (Auto) 33.9, Monocytes (%) (Auto) 6.6, Eosinophils (%) (Auto) 

2.8, Basophils (%) (Auto) 0.7, Sodium Level 142, Potassium Level 3.5, Chloride 

Level 107, Carbon Dioxide Level 24, Anion Gap 11, Blood Urea Nitrogen 25H, 

Creatinine 1.3H, Estimat Glomerular Filtration Rate , Glucose Level 121H, Uric 

Acid 9.1H, Calcium Level 8.7, Phosphorus Level 3.2, Magnesium Level 1.7, Total 

Bilirubin 0.3, Gamma Glutamyl Transpeptidase 37, Aspartate Amino Transf (AST/

SGOT) 10, Alanine Aminotransferase (ALT/SGPT) 13, Alkaline Phosphatase 89, 

Total Creatine Kinase 62, C-Reactive Protein, Quantitative < 0.3, Pro-B-Type 

Natriuretic Peptide 25, Total Protein 6.4L, Albumin 3.0L, Globulin 3.4, Albumin/

Globulin Ratio 0.8L


Height (Feet):  5


Height (Inches):  4.00


Weight (Pounds):  135


General Appearance:  no apparent distress


Cardiovascular:  normal rate


Abdomen:  soft


Objective


PE not changed











LASHANDA RODRIGUES Jul 13, 2017 09:18

## 2017-07-13 NOTE — PULMONOLOGY PROGRESS NOTE
Assessment/Plan


Problems:  


(1) Sepsis


(2) Acute encephalopathy


(3) Alzheimer's dementia


(4) HTN (hypertension)


(5) Renal insufficiency


(6) Severe sepsis


Assessment/Plan


IV fluids


IV antibiotics


swallow study


check cultures


dvt prophylaxis


swallow study noted


check labs in am





Subjective


Constitutional:  Reports: no symptoms


HEENT:  Repors: no symptoms


Respiratory:  Reports: no symptoms


Allergies:  


Coded Allergies:  


     No Known Allergies (Unverified , 8/20/12)





Objective





Last 24 Hour Vital Signs








  Date Time  Temp Pulse Resp B/P Pulse Ox O2 Delivery O2 Flow Rate FiO2


 


7/13/17 15:33 98.0 60 20 115/76 97 Room Air  


 


7/13/17 11:16 98.4 63 20 100/60 99 Room Air  


 


7/13/17 08:23  66  126/75    


 


7/13/17 08:20 98.0 66 20 126/75 96 Room Air  


 


7/13/17 04:29 97.0 59 21 112/77 99 Room Air  


 


7/13/17 00:07 97.1 70 20 121/69 97 Room Air  


 


7/12/17 20:00 96.1 72 21 103/74 97 Room Air  

















Intake and Output  


 


 7/12/17 7/13/17





 19:00 07:00


 


Intake Total 1005 ml 1100 ml


 


Output Total  600 ml


 


Balance 1005 ml 500 ml


 


  


 


IV Total 1005 ml 1100 ml


 


Output Urine Total  600 ml


 


# Voids  2








General Appearance:  cachetic


HEENT:  normocephalic, atraumatic


Respiratory/Chest:  chest wall non-tender, lungs clear


Cardiovascular:  normal peripheral pulses, normal rate


Abdomen:  normal bowel sounds, soft, non tender


Genitourinary:  normal external genitalia


Extremities:  no cyanosis


Skin:  no rash


Neurologic/Psychiatric:  CNs II-XII grossly normal, no motor/sensory deficits





Microbiology








 Date/Time


Source Procedure


Growth Status


 


 


 7/11/17 09:52


Nasal Nares MRSA Culture - Final


NO METHICILLIN RESISTANT STAPH AUREUS... Complete


 


 7/11/17 09:50


Urine,Clean Catch Urine Culture - Final


Staphylococcus Sp Coag Neg Complete


 


 7/11/17 09:52


Rectum VRE Culture - Final


NO VANCOMYCIN RESISTANT ENTEROCOCCUS ... Complete








Laboratory Tests


7/13/17 05:35: 


White Blood Count 9.5, Red Blood Count 4.25L, Hemoglobin 12.5L, Hematocrit 39.5L

, Mean Corpuscular Volume 93, Mean Corpuscular Hemoglobin 29.3, Mean 

Corpuscular Hemoglobin Concent 31.5L, Red Cell Distribution Width 13.1, 

Platelet Count 169, Mean Platelet Volume 11.5H, Neutrophils (%) (Auto) 55.9, 

Lymphocytes (%) (Auto) 33.9, Monocytes (%) (Auto) 6.6, Eosinophils (%) (Auto) 

2.8, Basophils (%) (Auto) 0.7, Sodium Level 142, Potassium Level 3.5, Chloride 

Level 107, Carbon Dioxide Level 24, Anion Gap 11, Blood Urea Nitrogen 25H, 

Creatinine 1.3H, Estimat Glomerular Filtration Rate , Glucose Level 121H, Uric 

Acid 9.1H, Calcium Level 8.7, Phosphorus Level 3.2, Magnesium Level 1.7, Total 

Bilirubin 0.3, Gamma Glutamyl Transpeptidase 37, Aspartate Amino Transf (AST/

SGOT) 10, Alanine Aminotransferase (ALT/SGPT) 13, Alkaline Phosphatase 89, 

Total Creatine Kinase 62, C-Reactive Protein, Quantitative < 0.3, Pro-B-Type 

Natriuretic Peptide 25, Total Protein 6.4L, Albumin 3.0L, Globulin 3.4, Albumin/

Globulin Ratio 0.8L





Current Medications








 Medications


  (Trade)  Dose


 Ordered  Sig/Miky


 Route


 PRN Reason  Start Time


 Stop Time Status Last Admin


Dose Admin


 


 Acetaminophen


  (Tylenol)  650 mg  Q4H  PRN


 ORAL


 fever>100.5  7/11/17 11:00


 8/10/17 10:59   


 


 


 Amlodipine


 Besylate


  (Norvasc)  5 mg  DAILY


 ORAL


   7/12/17 09:00


 8/11/17 08:59  7/13/17 08:23


 


 


 Aspirin


  (Ecotrin)  81 mg  DAILY


 ORAL


   7/13/17 09:00


 8/12/17 08:59  7/13/17 08:22


 


 


 Atorvastatin


 Calcium


  (Lipitor)  10 mg  BEDTIME


 ORAL


   7/12/17 21:00


 8/11/17 20:59  7/12/17 21:01


 


 


 Cephalexin


  (Keflex)  500 mg  FOUR TIMES A  DAY


 ORAL


   7/13/17 21:00


 7/20/17 20:59 UNV  


 


 


 Clonidine HCl 0.1


 mg  0.1 mg  Q4H  PRN


 ORAL


 SBP>160  7/11/17 11:00


 8/10/17 10:59   


 


 


 Dextrose


  (D5W 1000ml)  1,000 ml @ 


 100 mls/hr  Q10H


 IV


   7/11/17 15:00


 8/10/17 14:59  7/13/17 16:43


 


 


 Dextrose


  (Dextrose 50%)    STAT  PRN


 IV


 Hypoglycemia  7/11/17 11:00


 8/10/17 10:59   


 


 


 Docusate Sodium


  (Colace)  200 mg  DAILY


 ORAL


   7/13/17 09:00


 8/12/17 08:59  7/13/17 08:23


 


 


 Donepezil HCl


  (Aricept)  5 mg  QHS


 ORAL


   7/13/17 21:00


 8/12/17 20:59   


 


 


 Heparin Sodium


  (Porcine)


  (Heparin 5000


 units/ml)  5,000 units  EVERY 12  HOURS


 SUBQ


   7/11/17 21:00


 8/10/17 20:59  7/13/17 08:28


 


 


 Lorazepam


  (Ativan 2mg/ml


 1ml)  0.5 mg  Q4H  PRN


 IV


 For Anxiety  7/11/17 11:00


 7/18/17 10:59   


 


 


 Morphine Sulfate


  (Morphine


 Sulfate)  1 mg  Q4H  PRN


 IVP


 For Pain  7/11/17 11:00


 7/18/17 10:59   


 


 


 Ondansetron HCl


  (Zofran)  4 mg  Q6H  PRN


 IVP


 Nausea & Vomiting  7/11/17 11:00


 8/10/17 10:59   


 


 


 Pantoprazole


  (Protonix)  40 mg  EVERY 12  HOURS


 IVP


   7/12/17 21:00


 8/11/17 20:59  7/13/17 08:23


 


 


 Polyethylene


 Glycol


  (Miralax)  17 gm  HSPRN  PRN


 ORAL


 Constipation  7/11/17 21:00


 8/10/17 20:59   


 


 


 Tamsulosin HCl


  (Flomax)  0.4 mg  DAILY


 ORAL


   7/12/17 09:00


 8/11/17 08:59  7/13/17 08:22


 


 


 Zolpidem Tartrate


  (Ambien)  5 mg  HSPRN  PRN


 ORAL


 Insomnia  7/11/17 21:00


 8/10/17 20:59   


 

















YA VALDERRAMA Jul 13, 2017 18:29

## 2017-07-13 NOTE — INTERNAL MED PROGRESS NOTE
Subjective


Date of Service:  Jul 13, 2017


Physician Name


Benito Webster


Attending Physician


Andrei Cancino MD





Current Medications








 Medications


  (Trade)  Dose


 Ordered  Sig/Miky


 Route


 PRN Reason  Start Time


 Stop Time Status Last Admin


Dose Admin


 


 Acetaminophen


  (Tylenol)  650 mg  Q4H  PRN


 ORAL


 fever>100.5  7/11/17 11:00


 8/10/17 10:59   


 


 


 Amlodipine


 Besylate


  (Norvasc)  5 mg  DAILY


 ORAL


   7/12/17 09:00


 8/11/17 08:59  7/13/17 08:23


 


 


 Aspirin


  (Ecotrin)  81 mg  DAILY


 ORAL


   7/13/17 09:00


 8/12/17 08:59  7/13/17 08:22


 


 


 Atorvastatin


 Calcium


  (Lipitor)  10 mg  BEDTIME


 ORAL


   7/12/17 21:00


 8/11/17 20:59  7/13/17 20:12


 


 


 Cephalexin


  (Keflex)  500 mg  FOUR TIMES A  DAY


 ORAL


   7/13/17 21:00


 7/20/17 20:59  7/13/17 20:12


 


 


 Clonidine HCl 0.1


 mg  0.1 mg  Q4H  PRN


 ORAL


 SBP>160  7/11/17 11:00


 8/10/17 10:59   


 


 


 Dextrose


  (D5W 1000ml)  1,000 ml @ 


 100 mls/hr  Q10H


 IV


   7/11/17 15:00


 8/10/17 14:59  7/13/17 16:43


 


 


 Dextrose


  (Dextrose 50%)    STAT  PRN


 IV


 Hypoglycemia  7/11/17 11:00


 8/10/17 10:59   


 


 


 Docusate Sodium


  (Colace)  200 mg  DAILY


 ORAL


   7/13/17 09:00


 8/12/17 08:59  7/13/17 08:23


 


 


 Donepezil HCl


  (Aricept)  5 mg  QHS


 ORAL


   7/13/17 21:00


 8/12/17 20:59  7/13/17 20:12


 


 


 Heparin Sodium


  (Porcine)


  (Heparin 5000


 units/ml)  5,000 units  EVERY 12  HOURS


 SUBQ


   7/11/17 21:00


 8/10/17 20:59  7/13/17 20:13


 


 


 Lorazepam


  (Ativan 2mg/ml


 1ml)  0.5 mg  Q4H  PRN


 IV


 For Anxiety  7/11/17 11:00


 7/18/17 10:59   


 


 


 Morphine Sulfate


  (Morphine


 Sulfate)  1 mg  Q4H  PRN


 IVP


 For Pain  7/11/17 11:00


 7/18/17 10:59   


 


 


 Ondansetron HCl


  (Zofran)  4 mg  Q6H  PRN


 IVP


 Nausea & Vomiting  7/11/17 11:00


 8/10/17 10:59   


 


 


 Pantoprazole


  (Protonix)  40 mg  EVERY 12  HOURS


 IVP


   7/12/17 21:00


 8/11/17 20:59  7/13/17 20:11


 


 


 Polyethylene


 Glycol


  (Miralax)  17 gm  HSPRN  PRN


 ORAL


 Constipation  7/11/17 21:00


 8/10/17 20:59   


 


 


 Tamsulosin HCl


  (Flomax)  0.4 mg  DAILY


 ORAL


   7/12/17 09:00


 8/11/17 08:59  7/13/17 08:22


 


 


 Zolpidem Tartrate


  (Ambien)  5 mg  HSPRN  PRN


 ORAL


 Insomnia  7/11/17 21:00


 8/10/17 20:59   


 








Allergies:  


Coded Allergies:  


     No Known Allergies (Unverified , 8/20/12)


ROS Limited/Unobtainable:  Yes


Subjective


71 YO  M admitted with Failure to thrive and altered mental status.  Now UTI.  

Cover for Int Med-Dr Cancino.





Objective





Last Vital Signs








  Date Time  Temp Pulse Resp B/P Pulse Ox O2 Delivery O2 Flow Rate FiO2


 


7/13/17 20:00 97.9 84 18 116/75 96 Room Air  











Laboratory Tests








Test


  7/13/17


05:35


 


White Blood Count


  9.5 K/UL


(4.8-10.8)


 


Red Blood Count


  4.25 M/UL


(4.70-6.10)  L


 


Hemoglobin


  12.5 G/DL


(14.2-18.0)  L


 


Hematocrit


  39.5 %


(42.0-52.0)  L


 


Mean Corpuscular Volume 93 FL (80-99)  


 


Mean Corpuscular Hemoglobin


  29.3 PG


(27.0-31.0)


 


Mean Corpuscular Hemoglobin


Concent 31.5 G/DL


(32.0-36.0)  L


 


Red Cell Distribution Width


  13.1 %


(11.6-14.8)


 


Platelet Count


  169 K/UL


(150-450)


 


Mean Platelet Volume


  11.5 FL


(6.5-10.1)  H


 


Neutrophils (%) (Auto)


  55.9 %


(45.0-75.0)


 


Lymphocytes (%) (Auto)


  33.9 %


(20.0-45.0)


 


Monocytes (%) (Auto)


  6.6 %


(1.0-10.0)


 


Eosinophils (%) (Auto)


  2.8 %


(0.0-3.0)


 


Basophils (%) (Auto)


  0.7 %


(0.0-2.0)


 


Sodium Level


  142 mEQ/L


(135-145)


 


Potassium Level


  3.5 mEQ/L


(3.4-4.9)


 


Chloride Level


  107 mEQ/L


()


 


Carbon Dioxide Level


  24 mEQ/L


(20-30)


 


Anion Gap 11 (5-15)  


 


Blood Urea Nitrogen


  25 mg/dL


(7-23)  H


 


Creatinine


  1.3 mg/dL


(0.7-1.2)  H


 


Estimat Glomerular Filtration


Rate  mL/min (>60)  


 


 


Glucose Level


  121 mg/dL


()  H


 


Uric Acid


  9.1 mg/dL


(3.0-7.5)  H


 


Calcium Level


  8.7 mg/dL


(8.6-10.2)


 


Phosphorus Level


  3.2 mg/dL


(2.5-4.8)


 


Magnesium Level


  1.7 mg/dL


(1.7-2.5)


 


Total Bilirubin


  0.3 mg/dL


(0.0-1.2)


 


Gamma Glutamyl Transpeptidase 37 U/L (8-61)  


 


Aspartate Amino Transf


(AST/SGOT) 10 U/L (5-40)  


 


 


Alanine Aminotransferase


(ALT/SGPT) 13 U/L (3-41)  


 


 


Alkaline Phosphatase


  89 U/L


()


 


Total Creatine Kinase


  62 U/L


()


 


C-Reactive Protein,


Quantitative < 0.3 mg/dL (<


0.5)


 


Pro-B-Type Natriuretic Peptide


  25 pg/mL


(0-125)


 


Total Protein


  6.4 g/dL


(6.6-8.7)  L


 


Albumin


  3.0 g/dL


(3.5-5.2)  L


 


Globulin 3.4 g/dL  


 


Albumin/Globulin Ratio


  0.8 (1.0-2.7)


L











Microbiology








 Date/Time


Source Procedure


Growth Status


 


 


 7/11/17 09:52


Nasal Nares MRSA Culture - Final


NO METHICILLIN RESISTANT STAPH AUREUS... Complete


 


 7/11/17 09:50


Urine,Clean Catch Urine Culture - Final


Staphylococcus Sp Coag Neg Complete


 


 7/11/17 09:52


Rectum VRE Culture - Final


NO VANCOMYCIN RESISTANT ENTEROCOCCUS ... Complete

















Intake and Output  


 


 7/12/17 7/13/17





 19:00 07:00


 


Intake Total 1005 ml 1100 ml


 


Output Total  600 ml


 


Balance 1005 ml 500 ml


 


  


 


IV Total 1005 ml 1100 ml


 


Output Urine Total  600 ml


 


# Voids  2








Objective


General Appearance:  WD/WN, no apparent distress, alert


EENT:  PERRL/EOMI, normal ENT inspection


Neck:  non-tender, normal alignment, supple


Cardiovascular:  normal peripheral pulses, normal rate, regular rhythm, no 

gallop/murmur, no JVD


Respiratory/Chest:  chest wall non-tender, lungs clear, normal breath sounds, 

no respiratory distress, no accessory muscle use


Abdomen:  normal bowel sounds, non tender, soft, no organomegaly, no mass


Extremities:  normal range of motion


Neurologic:  CNs II-XII grossly normal


Skin:  normal pigmentation, warm/dry





Assessment/Plan


Problem List:  


(1) HTN (hypertension)


Assessment & Plan:  Hold antihypertensive meds due to hypotension





(2) BPH (benign prostatic hyperplasia)


Assessment & Plan:  Continue flomax





(3) Osteoporosis


(4) Cerebral ventriculomegaly


(5) Alzheimer's dementia


(6) Failure to thrive


(7) Altered mental state


(8) UTI (urinary tract infection)


Assessment & Plan:  Coag neg staph.    D/C ceftriaxone; start keflex per ID.





(9) Hypernatremia


Assessment & Plan:  see nephrology note.  Cont IV fluids per nephrology





Status:  progressing


Assessment/Plan


Discharge planning.











BENITO WEBSTER Jul 13, 2017 20:21

## 2017-07-13 NOTE — CONSULTATION
DATE OF CONSULTATION:



NOTE:  POOR AUDIO QUALITY



REFERRING PHYSICIAN:  Michael Sandoval M.D.



REASON FOR CONSULTATION:  Evaluation of the patient for _____ urinary

tract infection and antibiotic management.



HISTORY OF PRESENT ILLNESS:  The patient is a 72-year-old male who is

_____ worsening mental status, _____ urinary tract infection.  Infectious

Disease consultation has been requested for further evaluation of the

patient's antibiotic management.



PAST MEDICAL HISTORY:

1. Hypertension.

2. _____ .

3. Dementia.

4. _____ .



MEDICATIONS:  IV Rocephin.



ALLERGIES:  No known drug allergies.



SOCIAL HISTORY:  The patient lives in nursing home.



FAMILY HISTORY:  Unavailable.



REVIEW OF SYSTEMS:  Unobtainable.



PHYSICAL EXAMINATION:

VITAL SIGNS:  Temperature 96 degrees, blood pressure 102/74, pulse

86, and respiratory rate 18.

HEENT:  Mild pale conjunctivae.  No icterus.

NECK:  No lymphadenopathy.

CHEST:  Clear.

HEART:  S1 and S2.

ABDOMEN:  Soft.

EXTREMITIES:  No cyanosis at this time.

NEUROLOGIC:  Awake.



LABORATORY AND DIAGNOSTIC DATA:  White blood cells _____ 11,

hemoglobin 9.3, hematocrit 40, and platelets 185,000.  UA too numerous to

count white blood cells.  BUN 40 and creatinine 1.4.  ALT, AST, and

alkaline phosphatase are unremarkable.  Urine culture is growing

gram-positive cocci more than [id number] colonies.



ASSESSMENT:  The patient is a 72-year-old male with multiple medical

problems, who has been admitted to _____ [facility].  The patient was

found to have pyuria with urine cultures growing enterococcus possible

VRE.  Sepsis _____ mental status.



PLAN:

1. We will continue the patient on IV Rocephin for now.

2. We will start the patient on IV Zyvox.

3. Monitor urine culture.

4. Monitor blood culture.

5. Based on the patient's clinical course and labs.

6. Based on those, we will do further recommendations.

7. I will order ultrasound of the kidneys.



Thank you Dr. Sandoval for allowing me to participate in the care of this

patient.  I will follow the patient with you during this

hospitalization.









  ______________________________________________

  Tanner Lorenzana M.D.





DR:  AA/PM

D:  07/12/2017 22:55

T:  07/13/2017 07:33

JOB#:  [id number]

CC:

## 2017-07-14 VITALS — DIASTOLIC BLOOD PRESSURE: 91 MMHG | SYSTOLIC BLOOD PRESSURE: 137 MMHG

## 2017-07-14 VITALS — DIASTOLIC BLOOD PRESSURE: 68 MMHG | SYSTOLIC BLOOD PRESSURE: 124 MMHG

## 2017-07-14 VITALS — DIASTOLIC BLOOD PRESSURE: 77 MMHG | SYSTOLIC BLOOD PRESSURE: 109 MMHG

## 2017-07-14 VITALS — DIASTOLIC BLOOD PRESSURE: 70 MMHG | SYSTOLIC BLOOD PRESSURE: 115 MMHG

## 2017-07-14 VITALS — DIASTOLIC BLOOD PRESSURE: 74 MMHG | SYSTOLIC BLOOD PRESSURE: 115 MMHG

## 2017-07-14 LAB
ALBUMIN/GLOB SERPL: 0.8 {RATIO} (ref 1–2.7)
ALT SERPL-CCNC: 11 U/L (ref 3–41)
ANION GAP SERPL CALC-SCNC: 9 MMOL/L (ref 5–15)
AST SERPL-CCNC: 11 U/L (ref 5–40)
BASOPHILS NFR BLD AUTO: 0.9 % (ref 0–2)
CALCIUM SERPL-MCNC: 9.1 MG/DL (ref 8.6–10.2)
CHLORIDE SERPL-SCNC: 108 MEQ/L (ref 98–107)
CO2 SERPL-SCNC: 27 MEQ/L (ref 20–30)
CREAT SERPL-MCNC: 1.3 MG/DL (ref 0.7–1.2)
EOSINOPHIL NFR BLD AUTO: 2.5 % (ref 0–3)
ERYTHROCYTE [DISTWIDTH] IN BLOOD BY AUTOMATED COUNT: 13 % (ref 11.6–14.8)
GLOBULIN SER-MCNC: 3.6 G/DL
HEMOLYSIS: 4
LYMPHOCYTES NFR BLD AUTO: 38.1 % (ref 20–45)
MCH RBC QN AUTO: 29.9 PG (ref 27–31)
MCHC RBC AUTO-ENTMCNC: 32.3 G/DL (ref 32–36)
MCV RBC AUTO: 93 FL (ref 80–99)
MONOCYTES NFR BLD AUTO: 5.9 % (ref 1–10)
NEUTROPHILS NFR BLD AUTO: 52.7 % (ref 45–75)
PLATELET # BLD: 152 K/UL (ref 150–450)
PMV BLD AUTO: 10 FL (ref 6.5–10.1)
POTASSIUM SERPL-SCNC: 3.3 MEQ/L (ref 3.4–4.9)
PROT SERPL-MCNC: 6.6 G/DL (ref 6.6–8.7)
RBC # BLD AUTO: 4.42 M/UL (ref 4.7–6.1)
SODIUM SERPL-SCNC: 144 MEQ/L (ref 135–145)
WBC # BLD AUTO: 7.9 K/UL (ref 4.8–10.8)

## 2017-07-14 RX ADMIN — CEPHALEXIN SCH MG: 500 TABLET ORAL at 12:29

## 2017-07-14 RX ADMIN — TAMSULOSIN HYDROCHLORIDE SCH MG: 0.4 CAPSULE ORAL at 09:20

## 2017-07-14 RX ADMIN — HEPARIN SODIUM SCH UNITS: 5000 INJECTION INTRAVENOUS; SUBCUTANEOUS at 09:17

## 2017-07-14 RX ADMIN — CEPHALEXIN SCH MG: 500 TABLET ORAL at 09:13

## 2017-07-14 RX ADMIN — ASPIRIN SCH MG: 81 TABLET, DELAYED RELEASE ORAL at 09:13

## 2017-07-14 RX ADMIN — DOCUSATE SODIUM SCH MG: 100 CAPSULE, LIQUID FILLED ORAL at 09:13

## 2017-07-14 RX ADMIN — CEPHALEXIN SCH MG: 500 TABLET ORAL at 17:58

## 2017-07-14 NOTE — PULMONOLOGY PROGRESS NOTE
Assessment/Plan


Assessment/Plan


ASSESSMENT


sepsis 


UTI with SCON 


acute encephalopathy 


HTN 


electrolyte imbalance : hyper Na, hyper Ca, hypo K 


renal failure 


Alzheimer dementia 


hx of CVA  


dysphagia 


aspiration risk 


BPH 


HTN   








PLAN OF CARE 


MS floor  


s/p IVF  


abx ID follows  


urine cx +SCON  , blood cx preliminary negative


O2 HHN  prn 


CXR no acute cardiopulmonary disease  


nephro follows 


renal US negative: no hydro, normal echogenicity bilateral kidneys 


Ca and Na down to normal 


replace K , check K and Mg in am 


creat trending down 


CT head with disproportional ventriculomegaly, possible NPH 


continue ASA, statin 


on Aricept 


swallow eval  with evidence of dysphagia and risk for silent aspiration 


diet per quality of life with strict aspiration precautions 


VSS pending 


PT/OT  


BP management with CCB and optimize as needed 


DVT, GI  prophylaxis 


continue Flomax  


bowel regimen   





case discussed and evaluated by supervising physician





Subjective


Allergies:  


Coded Allergies:  


     No Known Allergies (Unverified , 8/20/12)


Subjective


afebrile, leukocytosis resolved 


no signs of respiratory distress 


creat trending down 


K-3.3





Objective





Last 24 Hour Vital Signs








  Date Time  Temp Pulse Resp B/P Pulse Ox O2 Delivery O2 Flow Rate FiO2


 


7/14/17 04:00 97.9 67 18 115/70 95 Room Air  


 


7/14/17 00:00 97.2 70 18 124/68  Room Air  


 


7/13/17 20:00 97.9 84 18 116/75 96 Room Air  


 


7/13/17 15:33 98.0 60 20 115/76 97 Room Air  


 


7/13/17 11:16 98.4 63 20 100/60 99 Room Air  


 


7/13/17 08:23  66  126/75    

















Intake and Output  


 


 7/13/17 7/14/17





 19:00 07:00


 


Intake Total 1810 ml 1100 ml


 


Balance 1810 ml 1100 ml


 


  


 


Intake Oral 600 ml 


 


IV Total 1210 ml 1100 ml


 


# Voids 2 3








General Appearance:  other - awake, confused Setswana speaking amle in NAD


HEENT:  normocephalic, atraumatic, anicteric


Respiratory/Chest:  lungs clear, no respiratory distress, no accessory muscle 

use


Cardiovascular:  normal rate, regular rhythm, no JVD


Abdomen:  normal bowel sounds, soft, non tender, non distended


Extremities:  no edema, pedal pulses normal


Neurologic/Psychiatric:  abnormal gait, alert


Musculoskeletal:  atrophy - BLE





Microbiology








 Date/Time


Source Procedure


Growth Status


 


 


 7/12/17 23:50


Blood Blood Culture - Preliminary


NO GROWTH AFTER 24 HOURS Resulted


 


 7/12/17 23:45


Blood Blood Culture - Preliminary


NO GROWTH AFTER 24 HOURS Resulted


 


 7/11/17 09:52


Nasal Nares MRSA Culture - Final


NO METHICILLIN RESISTANT STAPH AUREUS... Complete


 


 7/11/17 09:50


Urine,Clean Catch Urine Culture - Final


Staphylococcus Sp Coag Neg Complete


 


 7/11/17 09:52


Rectum VRE Culture - Final


NO VANCOMYCIN RESISTANT ENTEROCOCCUS ... Complete








Laboratory Tests


7/14/17 05:15: 


White Blood Count 7.9, Red Blood Count 4.42L, Hemoglobin 13.2L, Hematocrit 40.9L

, Mean Corpuscular Volume 93, Mean Corpuscular Hemoglobin 29.9, Mean 

Corpuscular Hemoglobin Concent 32.3, Red Cell Distribution Width 13.0, Platelet 

Count 152, Mean Platelet Volume 10.0, Neutrophils (%) (Auto) 52.7, Lymphocytes (

%) (Auto) 38.1, Monocytes (%) (Auto) 5.9, Eosinophils (%) (Auto) 2.5, Basophils 

(%) (Auto) 0.9, Sodium Level 144, Potassium Level 3.3L, Chloride Level 108H, 

Carbon Dioxide Level 27, Anion Gap 9, Blood Urea Nitrogen 16, Creatinine 1.3H, 

Estimat Glomerular Filtration Rate , Glucose Level 125H, Calcium Level 9.1, 

Total Bilirubin 0.3, Aspartate Amino Transf (AST/SGOT) 11, Alanine 

Aminotransferase (ALT/SGPT) 11, Alkaline Phosphatase 94, Pro-B-Type Natriuretic 

Peptide 50, Total Protein 6.6, Albumin 3.0L, Globulin 3.6, Albumin/Globulin 

Ratio 0.8L





Current Medications








 Medications


  (Trade)  Dose


 Ordered  Sig/Miky


 Route


 PRN Reason  Start Time


 Stop Time Status Last Admin


Dose Admin


 


 Acetaminophen


  (Tylenol)  650 mg  Q4H  PRN


 ORAL


 fever>100.5  7/11/17 11:00


 8/10/17 10:59   


 


 


 Allopurinol


  (Allopurinol)  300 mg  DAILY


 ORAL


   7/14/17 09:00


 8/13/17 08:59 UNV  


 


 


 Amlodipine


 Besylate


  (Norvasc)  5 mg  DAILY


 ORAL


   7/12/17 09:00


 8/11/17 08:59  7/13/17 08:23


 


 


 Aspirin


  (Ecotrin)  81 mg  DAILY


 ORAL


   7/13/17 09:00


 8/12/17 08:59  7/13/17 08:22


 


 


 Atorvastatin


 Calcium


  (Lipitor)  10 mg  BEDTIME


 ORAL


   7/12/17 21:00


 8/11/17 20:59  7/13/17 20:12


 


 


 Cephalexin


  (Keflex)  500 mg  FOUR TIMES A  DAY


 ORAL


   7/13/17 21:00


 7/20/17 20:59  7/13/17 20:12


 


 


 Clonidine HCl


  (Catapres)  0.1 mg  Q4H  PRN


 ORAL


 SBP>160  7/11/17 11:00


 8/10/17 10:59   


 


 


 Dextrose


  (Dextrose 50%)    STAT  PRN


 IV


 Hypoglycemia  7/11/17 11:00


 8/10/17 10:59   


 


 


 Docusate Sodium


  (Colace)  200 mg  DAILY


 ORAL


   7/13/17 09:00


 8/12/17 08:59  7/13/17 08:23


 


 


 Donepezil HCl


  (Aricept)  5 mg  QHS


 ORAL


   7/13/17 21:00


 8/12/17 20:59  7/13/17 20:12


 


 


 Heparin Sodium


  (Porcine)


  (Heparin 5000


 units/ml)  5,000 units  EVERY 12  HOURS


 SUBQ


   7/11/17 21:00


 8/10/17 20:59  7/13/17 20:13


 


 


 Ondansetron HCl


  (Zofran)  4 mg  Q6H  PRN


 IVP


 Nausea & Vomiting  7/11/17 11:00


 8/10/17 10:59   


 


 


 Pantoprazole


  (Protonix)  40 mg  EVERY 12  HOURS


 ORAL


   7/14/17 09:00


 8/13/17 08:59 UNV  


 


 


 Polyethylene


 Glycol


  (Miralax)  17 gm  HSPRN  PRN


 ORAL


 Constipation  7/11/17 21:00


 8/10/17 20:59   


 


 


 Potassium Chloride


  (KCl 10% 40mEq


 Oral solution)  40 meq  ONCE  ONCE


 NG


   7/14/17 08:15


 7/14/17 08:16 UNV  


 


 


 Tamsulosin HCl


  (Flomax)  0.4 mg  DAILY


 ORAL


   7/12/17 09:00


 8/11/17 08:59  7/13/17 08:22


 


 


 Zolpidem Tartrate


  (Ambien)  5 mg  HSPRN  PRN


 ORAL


 Insomnia  7/11/17 21:00


 8/10/17 20:59  7/14/17 01:28


 

















Ely Ni (Vanchtein) NP Jul 14, 2017 08:22

## 2017-07-14 NOTE — DISCHARGE SUMMARY
Discharge Summary


Hospital Course


Date of Admission


Jul 11, 2017 at 09:30


Date of Discharge





Admitting Diagnosis


failure to thrive


CAYDEN Chatman is a 72 year old male who was admitted on Jul 11, 2017 at 09:30 for 

Failure To Thrive


Hospital Course





Last 24 Hour Vital Signs








  Date Time  Temp Pulse Resp B/P Pulse Ox O2 Delivery O2 Flow Rate FiO2


 


7/14/17 12:00 97.2 61 18 137/91 98 Room Air  


 


7/14/17 09:13  61  115/74    


 


7/14/17 08:00 97.0 61 19 115/74 97 Room Air  


 


7/14/17 04:00 97.9 67 18 115/70 95 Room Air  


 


7/14/17 00:00 97.2 70 18 124/68  Room Air  


 


7/13/17 20:00 97.9 84 18 116/75 96 Room Air  





Physical Exam


Daughter at bedside


General: No acute distress, awake and demented.


HEENT: NCAT, sclera anicteric, PERRL, EOMI.


Neck: Supple, no significant jugular venous distention, 


Lungs: Good inspiratory effort, clear to auscultation bilaterally, no Wheeze or 

Rales.


Heart: Regular rate and rhythm, normal S1/S2, no murmurs/gallops


Abdomen: soft, nontender, nondistended. Normoactive bowel sounds.


 / Rectal: Refused and deferred.


Extremities: No Cyanosis , clubbing or edema. 


Neuro: A&O x 3, Able to move all extremities


Skin: warm, no rashes or lesions


Psych: Normal mood and affect





Plan : DC to SNF


Job # [id number]





Discharge


Discharge Disposition


Patient was discharged to SNF/Subacute Facility([id number])


Discharge Diagnoses:  











Andrei Cancino MD Jul 14, 2017 16:11

## 2017-07-14 NOTE — INFECTIOUS DISEASES PROG NOTE
Assessment/Plan


Assessment/Plan





ASSESSMENT:   72-year-old male with:





UTI - UCx : CoNS 


Leukocytosis, mild - resolved, afebrile








SP  ALOC


HTN


Dementia


CKD3


NKDA


Full Code








PLAN:





continue Keflex d # 2 / 7 


Monitor blood culture


Monitor BMP


Monitor CBC





Subjective


Allergies:  


Coded Allergies:  


     No Known Allergies (Unverified , 8/20/12)


Subjective





remains afebrile without leukocytosis





Objective


Vital Signs





Last 24 Hour Vital Signs








  Date Time  Temp Pulse Resp B/P Pulse Ox O2 Delivery O2 Flow Rate FiO2


 


7/14/17 08:00 97.0 61 19 115/74 97 Room Air  


 


7/14/17 04:00 97.9 67 18 115/70 95 Room Air  


 


7/14/17 00:00 97.2 70 18 124/68  Room Air  


 


7/13/17 20:00 97.9 84 18 116/75 96 Room Air  


 


7/13/17 15:33 98.0 60 20 115/76 97 Room Air  


 


7/13/17 11:16 98.4 63 20 100/60 99 Room Air  








Height (Feet):  5


Height (Inches):  4.00


Weight (Pounds):  135


General Appearance:  no acute distress


Respiratory/Chest:  no respiratory distress


Cardiovascular:  normal rate, regular rhythm


Abdomen:  normal bowel sounds, soft, non tender, non distended





Microbiology








 Date/Time


Source Procedure


Growth Status


 


 


 7/12/17 23:50


Blood Blood Culture - Preliminary


NO GROWTH AFTER 24 HOURS Resulted


 


 7/12/17 23:45


Blood Blood Culture - Preliminary


NO GROWTH AFTER 24 HOURS Resulted


 


 7/11/17 09:52


Nasal Nares MRSA Culture - Final


NO METHICILLIN RESISTANT STAPH AUREUS... Complete


 


 7/11/17 09:50


Urine,Clean Catch Urine Culture - Final


Staphylococcus Sp Coag Neg Complete


 


 7/11/17 09:52


Rectum VRE Culture - Final


NO VANCOMYCIN RESISTANT ENTEROCOCCUS ... Complete











Laboratory Tests








Test


  7/14/17


05:15


 


White Blood Count


  7.9 K/UL


(4.8-10.8)


 


Red Blood Count


  4.42 M/UL


(4.70-6.10)  L


 


Hemoglobin


  13.2 G/DL


(14.2-18.0)  L


 


Hematocrit


  40.9 %


(42.0-52.0)  L


 


Mean Corpuscular Volume 93 FL (80-99)  


 


Mean Corpuscular Hemoglobin


  29.9 PG


(27.0-31.0)


 


Mean Corpuscular Hemoglobin


Concent 32.3 G/DL


(32.0-36.0)


 


Red Cell Distribution Width


  13.0 %


(11.6-14.8)


 


Platelet Count


  152 K/UL


(150-450)


 


Mean Platelet Volume


  10.0 FL


(6.5-10.1)


 


Neutrophils (%) (Auto)


  52.7 %


(45.0-75.0)


 


Lymphocytes (%) (Auto)


  38.1 %


(20.0-45.0)


 


Monocytes (%) (Auto)


  5.9 %


(1.0-10.0)


 


Eosinophils (%) (Auto)


  2.5 %


(0.0-3.0)


 


Basophils (%) (Auto)


  0.9 %


(0.0-2.0)


 


Sodium Level


  144 mEQ/L


(135-145)


 


Potassium Level


  3.3 mEQ/L


(3.4-4.9)  L


 


Chloride Level


  108 mEQ/L


()  H


 


Carbon Dioxide Level


  27 mEQ/L


(20-30)


 


Anion Gap 9 (5-15)  


 


Blood Urea Nitrogen


  16 mg/dL


(7-23)


 


Creatinine


  1.3 mg/dL


(0.7-1.2)  H


 


Estimat Glomerular Filtration


Rate  mL/min (>60)  


 


 


Glucose Level


  125 mg/dL


()  H


 


Calcium Level


  9.1 mg/dL


(8.6-10.2)


 


Total Bilirubin


  0.3 mg/dL


(0.0-1.2)


 


Aspartate Amino Transf


(AST/SGOT) 11 U/L (5-40)  


 


 


Alanine Aminotransferase


(ALT/SGPT) 11 U/L (3-41)  


 


 


Alkaline Phosphatase


  94 U/L


()


 


Pro-B-Type Natriuretic Peptide


  50 pg/mL


(0-125)


 


Total Protein


  6.6 g/dL


(6.6-8.7)


 


Albumin


  3.0 g/dL


(3.5-5.2)  L


 


Globulin 3.6 g/dL  


 


Albumin/Globulin Ratio


  0.8 (1.0-2.7)


L











Current Medications








 Medications


  (Trade)  Dose


 Ordered  Sig/Miky


 Route


 PRN Reason  Start Time


 Stop Time Status Last Admin


Dose Admin


 


 Acetaminophen


  (Tylenol)  650 mg  Q4H  PRN


 ORAL


 fever>100.5  7/11/17 11:00


 8/10/17 10:59   


 


 


 Allopurinol


  (Allopurinol)  300 mg  DAILY


 ORAL


   7/14/17 09:00


 8/13/17 08:59 UNV  


 


 


 Amlodipine


 Besylate


  (Norvasc)  5 mg  DAILY


 ORAL


   7/12/17 09:00


 8/11/17 08:59  7/13/17 08:23


 


 


 Aspirin


  (Ecotrin)  81 mg  DAILY


 ORAL


   7/13/17 09:00


 8/12/17 08:59  7/13/17 08:22


 


 


 Atorvastatin


 Calcium


  (Lipitor)  10 mg  BEDTIME


 ORAL


   7/12/17 21:00


 8/11/17 20:59  7/13/17 20:12


 


 


 Cephalexin


  (Keflex)  500 mg  FOUR TIMES A  DAY


 ORAL


   7/13/17 21:00


 7/20/17 20:59  7/13/17 20:12


 


 


 Clonidine HCl


  (Catapres)  0.1 mg  Q4H  PRN


 ORAL


 SBP>160  7/11/17 11:00


 8/10/17 10:59   


 


 


 Dextrose


  (Dextrose 50%)    STAT  PRN


 IV


 Hypoglycemia  7/11/17 11:00


 8/10/17 10:59   


 


 


 Docusate Sodium


  (Colace)  200 mg  DAILY


 ORAL


   7/13/17 09:00


 8/12/17 08:59  7/13/17 08:23


 


 


 Donepezil HCl


  (Aricept)  5 mg  QHS


 ORAL


   7/13/17 21:00


 8/12/17 20:59  7/13/17 20:12


 


 


 Heparin Sodium


  (Porcine)


  (Heparin 5000


 units/ml)  5,000 units  EVERY 12  HOURS


 SUBQ


   7/11/17 21:00


 8/10/17 20:59  7/13/17 20:13


 


 


 Ondansetron HCl


  (Zofran)  4 mg  Q6H  PRN


 IVP


 Nausea & Vomiting  7/11/17 11:00


 8/10/17 10:59   


 


 


 Pantoprazole


  (Protonix)  40 mg  EVERY 12  HOURS


 ORAL


   7/14/17 09:00


 8/13/17 08:59 UNV  


 


 


 Polyethylene


 Glycol


  (Miralax)  17 gm  HSPRN  PRN


 ORAL


 Constipation  7/11/17 21:00


 8/10/17 20:59   


 


 


 Potassium Chloride


  (KCl 10% 40mEq


 Oral solution)  40 meq  ONCE  ONCE


 NG


   7/14/17 08:15


 7/14/17 08:16 UNV  


 


 


 Tamsulosin HCl


  (Flomax)  0.4 mg  DAILY


 ORAL


   7/12/17 09:00


 8/11/17 08:59  7/13/17 08:22


 


 


 Zolpidem Tartrate


  (Ambien)  5 mg  HSPRN  PRN


 ORAL


 Insomnia  7/11/17 21:00


 8/10/17 20:59  7/14/17 01:28


 

















EFREN DAVIDSON Jul 14, 2017 08:51

## 2017-07-14 NOTE — DIAGNOSTIC IMAGING REPORT
Indication: DYSPNEA



Technique: One view of the chest



Comparison: 7/11/2017



Findings: Right shoulder prosthesis is again demonstrated. The lungs and pleural

spaces remain clear. The heart size is normal. Aorta is tortuous. Degenerative

changes of the left shoulder are again noted. No significant change



Impression: No acute process

## 2017-07-14 NOTE — GENERAL PROGRESS NOTE
Assessment/Plan


Status:  stable - stable from renal stand


Assessment/Plan





- DEHYDRATION : Leading to:





Renal Failure-


HyperCalcemia


HyperNatremia


HyperUrecemia





- UTI


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


- DEMENTIA / OBS


-DM


-HTN





Plan;





Hydrate-


K supplement


monitor lytes and renal parameters


Per consultants


Per orders


? DC





Subjective


ROS Limited/Unobtainable:  No


Constitutional:  Reports: weakness


Allergies:  


Coded Allergies:  


     No Known Allergies (Unverified , 8/20/12)





Objective





Last 24 Hour Vital Signs








  Date Time  Temp Pulse Resp B/P Pulse Ox O2 Delivery O2 Flow Rate FiO2


 


7/14/17 04:00 97.9 67 18 115/70 95 Room Air  


 


7/14/17 00:00 97.2 70 18 124/68  Room Air  


 


7/13/17 20:00 97.9 84 18 116/75 96 Room Air  


 


7/13/17 15:33 98.0 60 20 115/76 97 Room Air  


 


7/13/17 11:16 98.4 63 20 100/60 99 Room Air  


 


7/13/17 08:23  66  126/75    


 


7/13/17 08:20 98.0 66 20 126/75 96 Room Air  

















Intake and Output  


 


 7/13/17 7/14/17





 19:00 07:00


 


Intake Total 1810 ml 1100 ml


 


Balance 1810 ml 1100 ml


 


  


 


Intake Oral 600 ml 


 


IV Total 1210 ml 1100 ml


 


# Voids 2 3








Laboratory Tests


7/14/17 05:15: 


White Blood Count 7.9, Red Blood Count 4.42L, Hemoglobin 13.2L, Hematocrit 40.9L

, Mean Corpuscular Volume 93, Mean Corpuscular Hemoglobin 29.9, Mean 

Corpuscular Hemoglobin Concent 32.3, Red Cell Distribution Width 13.0, Platelet 

Count 152, Mean Platelet Volume 10.0, Neutrophils (%) (Auto) 52.7, Lymphocytes (

%) (Auto) 38.1, Monocytes (%) (Auto) 5.9, Eosinophils (%) (Auto) 2.5, Basophils 

(%) (Auto) 0.9, Sodium Level 144, Potassium Level 3.3L, Chloride Level 108H, 

Carbon Dioxide Level 27, Anion Gap 9, Blood Urea Nitrogen 16, Creatinine 1.3H, 

Estimat Glomerular Filtration Rate , Glucose Level 125H, Calcium Level 9.1, 

Total Bilirubin 0.3, Aspartate Amino Transf (AST/SGOT) 11, Alanine 

Aminotransferase (ALT/SGPT) 11, Alkaline Phosphatase 94, Pro-B-Type Natriuretic 

Peptide 50, Total Protein 6.6, Albumin 3.0L, Globulin 3.6, Albumin/Globulin 

Ratio 0.8L


Height (Feet):  5


Height (Inches):  4.00


Weight (Pounds):  135


General Appearance:  no apparent distress


Objective


PE not changed











LASHANDA RODRIGUES Jul 14, 2017 08:16

## 2017-07-15 NOTE — DISCHARGE SUMMARY
DATE OF ADMISSION:  07/11/2017



DATE OF DISCHARGE:  07/14/2017



BRIEF HISTORY AND HOSPITAL COURSE:  This is a 72-year-old very

delightful  gentleman with past medical history significant for

hypertension, BPH, and severe Alzheimer's dementia with ventriculomegaly,

and osteoporosis, who presented to the hospital from _____ after was noted

to have fever and altered mental status more than usual.  Shortly after

initial evaluation, the patient was admitted to the hospital with failure

to thrive as well as sepsis, possible urinary tract infection as well as

dehydration.  Throughout the hospital course, the patient was followed by

Dr. Lorenzana from Infectious Disease, Dr. Seven Tobar from Nephrology,

and Dr. Sandoval from Pulmonary and Critical Care.  The patient had a

septic workup done.  Broad-spectrum antibiotics were started.  Urinalysis

was noted for a Streptococcus-coagulase negative and antibiotic was

switched to Keflex and the patient's status improved and discharged back

to the nursing facility to be followed as an outpatient.



FINAL DIAGNOSES:

1. Altered mental status with metabolic encephalopathy, most likely

secondary to infection and dehydration.

2. Hypertension.

3. Alzheimer's dementia.

4. Chronic kidney disease, stage 3.

5. Benign prostatic hypertrophy.

6. Osteoporosis.

7. Cerebral ventriculomegaly.

8. Hypernatremia.



MEDICATIONS ON DISCHARGE:  Continue discharge medication list.



ACTIVITY:  As tolerated.



DIET:  Cardiac diet.



FOLLOWUP:  The patient will be transferred to the nursing facility

via ambulance.









  ______________________________________________

  Andrei Cancino M.D. DR:  PS/as

D:  07/14/2017 16:10

T:  07/15/2017 15:29

JOB#:  [id number]

CC:

## 2020-09-09 ENCOUNTER — HOSPITAL ENCOUNTER (INPATIENT)
Dept: HOSPITAL 72 - EMR | Age: 75
LOS: 11 days | Discharge: NURSING, REHAB OR LONG-TERM CARE | DRG: 177 | End: 2020-09-20
Payer: MEDICARE

## 2020-09-09 VITALS — SYSTOLIC BLOOD PRESSURE: 141 MMHG | DIASTOLIC BLOOD PRESSURE: 81 MMHG

## 2020-09-09 VITALS — BODY MASS INDEX: 25.12 KG/M2 | HEIGHT: 65 IN | WEIGHT: 150.8 LBS

## 2020-09-09 VITALS — SYSTOLIC BLOOD PRESSURE: 158 MMHG | DIASTOLIC BLOOD PRESSURE: 95 MMHG

## 2020-09-09 VITALS — SYSTOLIC BLOOD PRESSURE: 153 MMHG | DIASTOLIC BLOOD PRESSURE: 85 MMHG

## 2020-09-09 DIAGNOSIS — I12.9: ICD-10-CM

## 2020-09-09 DIAGNOSIS — J12.89: ICD-10-CM

## 2020-09-09 DIAGNOSIS — G93.89: ICD-10-CM

## 2020-09-09 DIAGNOSIS — E78.00: ICD-10-CM

## 2020-09-09 DIAGNOSIS — K21.9: ICD-10-CM

## 2020-09-09 DIAGNOSIS — M81.0: ICD-10-CM

## 2020-09-09 DIAGNOSIS — R13.10: ICD-10-CM

## 2020-09-09 DIAGNOSIS — N50.89: ICD-10-CM

## 2020-09-09 DIAGNOSIS — I69.320: ICD-10-CM

## 2020-09-09 DIAGNOSIS — U07.1: Primary | ICD-10-CM

## 2020-09-09 DIAGNOSIS — N49.2: ICD-10-CM

## 2020-09-09 DIAGNOSIS — G30.9: ICD-10-CM

## 2020-09-09 DIAGNOSIS — N40.0: ICD-10-CM

## 2020-09-09 DIAGNOSIS — Z79.82: ICD-10-CM

## 2020-09-09 DIAGNOSIS — I50.9: ICD-10-CM

## 2020-09-09 DIAGNOSIS — N18.2: ICD-10-CM

## 2020-09-09 DIAGNOSIS — L89.43: ICD-10-CM

## 2020-09-09 DIAGNOSIS — J98.11: ICD-10-CM

## 2020-09-09 DIAGNOSIS — F02.80: ICD-10-CM

## 2020-09-09 LAB
ADD MANUAL DIFF: NO
ALBUMIN SERPL-MCNC: 2.7 G/DL (ref 3.4–5)
ALBUMIN/GLOB SERPL: 0.6 {RATIO} (ref 1–2.7)
ALP SERPL-CCNC: 108 U/L (ref 46–116)
ALT SERPL-CCNC: 27 U/L (ref 12–78)
ANION GAP SERPL CALC-SCNC: 10 MMOL/L (ref 5–15)
APPEARANCE UR: CLEAR
APTT BLD: 31 SEC (ref 23–33)
APTT PPP: YELLOW S
AST SERPL-CCNC: 31 U/L (ref 15–37)
BASOPHILS NFR BLD AUTO: 1.3 % (ref 0–2)
BILIRUB SERPL-MCNC: 0.3 MG/DL (ref 0.2–1)
BUN SERPL-MCNC: 20 MG/DL (ref 7–18)
CALCIUM SERPL-MCNC: 8.5 MG/DL (ref 8.5–10.1)
CHLORIDE SERPL-SCNC: 104 MMOL/L (ref 98–107)
CO2 SERPL-SCNC: 25 MMOL/L (ref 21–32)
CREAT SERPL-MCNC: 1.2 MG/DL (ref 0.55–1.3)
EOSINOPHIL NFR BLD AUTO: 0.1 % (ref 0–3)
ERYTHROCYTE [DISTWIDTH] IN BLOOD BY AUTOMATED COUNT: 13.3 % (ref 11.6–14.8)
GLOBULIN SER-MCNC: 4.9 G/DL
GLUCOSE UR STRIP-MCNC: NEGATIVE MG/DL
HCT VFR BLD CALC: 44.2 % (ref 42–52)
HGB BLD-MCNC: 14.9 G/DL (ref 14.2–18)
INR PPP: 1 (ref 0.9–1.1)
KETONES UR QL STRIP: NEGATIVE
LEUKOCYTE ESTERASE UR QL STRIP: NEGATIVE
LYMPHOCYTES NFR BLD AUTO: 32.7 % (ref 20–45)
MCV RBC AUTO: 90 FL (ref 80–99)
MONOCYTES NFR BLD AUTO: 13.6 % (ref 1–10)
NEUTROPHILS NFR BLD AUTO: 52.3 % (ref 45–75)
NITRITE UR QL STRIP: NEGATIVE
PH UR STRIP: 5 [PH] (ref 4.5–8)
PHOSPHATE SERPL-MCNC: 3.2 MG/DL (ref 2.5–4.9)
PLATELET # BLD: 150 K/UL (ref 150–450)
POTASSIUM SERPL-SCNC: 4 MMOL/L (ref 3.5–5.1)
PROT UR QL STRIP: (no result)
RBC # BLD AUTO: 4.93 M/UL (ref 4.7–6.1)
SODIUM SERPL-SCNC: 139 MMOL/L (ref 136–145)
SP GR UR STRIP: 1.02 (ref 1–1.03)
UROBILINOGEN UR-MCNC: NORMAL MG/DL (ref 0–1)
WBC # BLD AUTO: 7 K/UL (ref 4.8–10.8)

## 2020-09-09 PROCEDURE — 85025 COMPLETE CBC W/AUTO DIFF WBC: CPT

## 2020-09-09 PROCEDURE — 85610 PROTHROMBIN TIME: CPT

## 2020-09-09 PROCEDURE — 85730 THROMBOPLASTIN TIME PARTIAL: CPT

## 2020-09-09 PROCEDURE — 86140 C-REACTIVE PROTEIN: CPT

## 2020-09-09 PROCEDURE — 80069 RENAL FUNCTION PANEL: CPT

## 2020-09-09 PROCEDURE — 87081 CULTURE SCREEN ONLY: CPT

## 2020-09-09 PROCEDURE — 92610 EVALUATE SWALLOWING FUNCTION: CPT

## 2020-09-09 PROCEDURE — 82728 ASSAY OF FERRITIN: CPT

## 2020-09-09 PROCEDURE — 96365 THER/PROPH/DIAG IV INF INIT: CPT

## 2020-09-09 PROCEDURE — 93005 ELECTROCARDIOGRAM TRACING: CPT

## 2020-09-09 PROCEDURE — 87181 SC STD AGAR DILUTION PER AGT: CPT

## 2020-09-09 PROCEDURE — 85651 RBC SED RATE NONAUTOMATED: CPT

## 2020-09-09 PROCEDURE — 99291 CRITICAL CARE FIRST HOUR: CPT

## 2020-09-09 PROCEDURE — 83615 LACTATE (LD) (LDH) ENZYME: CPT

## 2020-09-09 PROCEDURE — 93306 TTE W/DOPPLER COMPLETE: CPT

## 2020-09-09 PROCEDURE — 85007 BL SMEAR W/DIFF WBC COUNT: CPT

## 2020-09-09 PROCEDURE — 83605 ASSAY OF LACTIC ACID: CPT

## 2020-09-09 PROCEDURE — 96375 TX/PRO/DX INJ NEW DRUG ADDON: CPT

## 2020-09-09 PROCEDURE — 82248 BILIRUBIN DIRECT: CPT

## 2020-09-09 PROCEDURE — 94664 DEMO&/EVAL PT USE INHALER: CPT

## 2020-09-09 PROCEDURE — 36415 COLL VENOUS BLD VENIPUNCTURE: CPT

## 2020-09-09 PROCEDURE — 87040 BLOOD CULTURE FOR BACTERIA: CPT

## 2020-09-09 PROCEDURE — 96368 THER/DIAG CONCURRENT INF: CPT

## 2020-09-09 PROCEDURE — 71045 X-RAY EXAM CHEST 1 VIEW: CPT

## 2020-09-09 PROCEDURE — 82803 BLOOD GASES ANY COMBINATION: CPT

## 2020-09-09 PROCEDURE — 85384 FIBRINOGEN ACTIVITY: CPT

## 2020-09-09 PROCEDURE — 87086 URINE CULTURE/COLONY COUNT: CPT

## 2020-09-09 PROCEDURE — 36600 WITHDRAWAL OF ARTERIAL BLOOD: CPT

## 2020-09-09 PROCEDURE — 80053 COMPREHEN METABOLIC PANEL: CPT

## 2020-09-09 PROCEDURE — 84484 ASSAY OF TROPONIN QUANT: CPT

## 2020-09-09 PROCEDURE — 81003 URINALYSIS AUTO W/O SCOPE: CPT

## 2020-09-09 PROCEDURE — 85379 FIBRIN DEGRADATION QUANT: CPT

## 2020-09-09 PROCEDURE — 83735 ASSAY OF MAGNESIUM: CPT

## 2020-09-09 PROCEDURE — 83880 ASSAY OF NATRIURETIC PEPTIDE: CPT

## 2020-09-09 PROCEDURE — 84100 ASSAY OF PHOSPHORUS: CPT

## 2020-09-09 RX ADMIN — HEPARIN SODIUM SCH UNITS: 5000 INJECTION INTRAVENOUS; SUBCUTANEOUS at 21:00

## 2020-09-09 NOTE — EMERGENCY ROOM REPORT
History of Present Illness


General


Chief Complaint:  Fever


Source:  Patient





Present Illness


HPI


75-year-old [place] male with past medical history of COPD not on home O2 

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 

hypertension, dementia, dyslipidemia, BPH brought in from [facility] secondary to complaint of fever and cough for the past 

2 days.


History is limited secondary to patient's clinical status


According to EMS, SNF nurses told them that patient has been febrile for the 

past 2 days.  T-max 99.  He is also been having a dry cough.  Last Tylenol 

given was at 10 AM.  Accu-Chek was within normal limits.  Patient is full code.


PMD is Dr. Burleson


The patient's symptoms were gradual onset, severity was moderate, duration 

since 2 days.  


Quality: Short of breath





Past medical history: COPD, CVA, aphasia, hypertension, also his disease, 

dyslipidemia, BPH


Past surgical history: Unable to obtain





Smoking:  Denies


Alcohol use:  Denies


Drug use:  Denies





Review of systems:


CONST: ++ fevers or chills, No night sweats


PULMONARY: ++ cough,  ++ shortness of breath 


CARDIAC: No chest pain, No palpitations 


GI: No vomiting, No diarrhea , No melena_or_BRBPR 


: No dysuria, No hematuria, No discharge 


NEURO: No new_focal_weakness_or_numbness, No confusion, No vision changes


14 point Review of Systems is otherwise negative except per HPI





Physical Exam:


GENERAL: Awake, chronically ill-appearing,  no acute distress.  Hypoxic on room 

air


EYES: Pupils reactive.  Conjunctiva clear.  Aphasic


ENT: External nose and ear appear normal. Oropharynx clear. Head atraumatic.


NECK:  No thyromegaly.  No midline tenderness. 


LUNGS: Normal respiratory effort.  Coarse breath sounds bilaterally.  No 

subcostal retractions.  No wheezing.  No rales


CARDIAC: Regular rate and rhythm.  Normal radial pulses bilaterally. No 

significant pedal edema.


ABDOMEN: Soft, nontender, and nondistended.  No rebound/guarding.  No 

hepatosplenomegaly.


In soiled diaper


MSK:  Poor muscle tone, contractures with rigidity in extremities.  Extremities 

without asymmetric deformity or swelling.  


NEUROLOGIC: Awake.  Protecting airway.  Withdraws to pain in extremities, 

groans and opens eyes to sternal rub.


SKIN: Warm and dry.  No cyanosis or urticaria present.











- COORDINATION OF CARE


Case was discussed with: Patient  , Patient's Physician





Any labs and imaging that were ordered were interpreted as part of the medical 

decision making:








Medical Decision Making/Plan:





Differential includes pneumonia, bronchitis, CHF, pulmonary edema, pulmonary 

embolism, pleural effusion among others.   





Symptoms are  not likely to be  pulmonary embolism, patient  no significant PE 

risk factors, and has more likely alternate cause of symptoms.  





CXR shows multifocal pneumonia. Seems to be consistent with pneumonia, rather 

than CHF.  


Labs show positive COVID.  ABG does not show severe hypoxemia, however patient 

is hypoxic on room air at the bedside.  He has been placed on supplemental 

oxygen


Presentation not consistent with ischemia / ACS.  EKG shows normal sinus 

rhythm.  There are T wave inversions in V2, V3, V4, V5, and V6..


Based on the patients PSI/PORT score, has high enough mortality risk that 

inpatient admission for IV antibiotics and clinical observation is most 

appropriate.  


Patient given Ceftriaxone / Azithromycin / decadron.


Due to concern for COVID, patient was kept euvolemic.








- CRITICAL CARE TIME -


I spent 60minutes of critical care time. 


This time excludes any separately billable procedures. 


Organ systems at risk include:  Pulmonary / respiratory


Treatments/Evaluations: Emergent and rapid respiratory assessment and 

management with continuous monitoring. 


Advanced airway equipment at the ready, while the patient's respiratory 

symptoms were stabilized.





Given the patients presentation with pneumonia with hypoxic respiratory failure

, there existed the potential for imminent deterioration in the patient's 

condition due to respiratory compromise.  Organ systems at risk for failure 

without immediate intervention include pulmonary / respiratory.   This time was 

spent reviewing the patients records, reviewing vital signs, reassessing the 

patients clinical status, discussing the case and care with staff and 

consultants, and performing high-complexity medical decision making. 





I  considered the possibility of  Bipap vs  intubation , but at this time the 

patient is protecting their airway and maintaining their saturation on 

supplemental oxygen so will defer intubation at this time, although they will 

be closely monitored for any further deterioration.








I spoke with Dr. Deutsch, and reviewed the patients presentation, workup, 

results, and treatment.  


They will admit the patient for further care and evaluation, and assume care of 

the patient at this time.


Allergies:  


Coded Allergies:  


     No Known Allergies (Unverified , 8/20/12)





COVID-19 Screening


Contact w/high risk pt:  No


Experienced COVID-19 symptoms?:  Yes


COVID-19 Testing performed PTA:  No


COVID-19 Screening:  Negative COVID-19





Nursing Documentation-PMH


Past Medical History:  No History, Except For


Hx Cardiac Problems:  Yes - ckd, gerd, bph, pna, bronchitis,


Hx Hypertension:  Yes


Hx Pacemaker:  No


Hx Asthma:  No


Hx COPD:  No


Hx Diabetes:  Yes - type 2


Hx Cancer:  No


Hx Gastrointestinal Problems:  Yes - DYSPHAGIA


Hx Dialysis:  No


Hx Neurological Problems:  Yes


Hx Cerebrovascular Accident:  Yes - dysphagia


Hx Dementia:  Yes


Hx Alzheimer's Disease:  Yes


Hx Seizures:  No


Hx Weakness:  Yes





Physical Exam





Vital Signs








  Date Time  Temp Pulse Resp B/P (MAP) Pulse Ox O2 Delivery O2 Flow Rate FiO2


 


9/9/20 13:55 99.9 83 18 128/73 (91) 93 Nasal Cannula 2.0 








Sp02 EP Interpretation:  reviewed, abnormal





Medical Decision Making


Diagnostic Impression:  


 Primary Impression:  


 Hypoxia


 Additional Impressions:  


 Pneumonia due to COVID-19 virus


 Multifocal pneumonia


 Aphasia


 History of CVA (cerebrovascular accident)


 BPH (benign prostatic hyperplasia)


 HTN (hypertension)





EKG Diagnostic Results


PA Scribe Marie


12-lead EKG (interpreted by me) 


Time: 1427


Indication: Rhythm analysis


Tracing visualized and Interpreted by me. Rhythm: Normal sinus rhythm


Rate: 73 bpm


QTc: 431


Morphology: No_significant_ST_elevations_or_depressions, No STEMI


Impression:  Normal_sinus_rhythm_without_significant_abnormality


T wave inversions in V2 through V6.  No acute ST elevation MI.





Rhythm Strip Diag. Results


Rhythm Strip Time:  15:14


EP Interpretation:  yes


Rate:  84


Rhythm:  NSR, no PVC's, no ectopy





Chest X-Ray Diagnostic Results


Chest X-Ray Diagnostic Results :  


   PA Scribe Text


Chest X-Ray: 


Views: 1 view(s)


Indication:  SOB


Findings: Normal heart size.  Mediastinum normal. 


Impression: atelectasis, no effusion, no ptx  


The X-ray(s) were independently viewed and interpreted contemporaneously


Electronically signed by me, Smiley Rodriguez DO


Reevaluation Time:  15:14





Last Vital Signs








  Date Time  Temp Pulse Resp B/P (MAP) Pulse Ox O2 Delivery O2 Flow Rate FiO2


 


9/9/20 13:55 99.9 83 18 128/73 (91) 93 Nasal Cannula 2.0 








Status:  improved


Disposition:  ADMITTED AS INPATIENT


Admit Decision Time:  15:14


Condition:  Stable


Referrals:  


Andrei Cancino MD (PCP)











Smiley Rodriguez D.O. Sep 9, 2020 15:14

## 2020-09-09 NOTE — NUR
NURSE HAND-OFF REPORT: 



Important Events on Shift:[]

Patient Status: []

Diet: []



Pending Orders: []

Pending Results/Labs:[]

Pending MD notification:[]



Latest Vital Signs: Temperature 99.8 , Pulse 82 , B/P 121 /89 , Respiratory Rate 18 , O2 SAT 
100 , Nasal Cannula, O2 Flow Rate 2.0 .  

Vital Sign Comment: []



EKG Rhythm: Sinus Rhythm

Rhythm change?: 

MD Notified?: -

MD Response: 



Latest Mejia Fall Score: 50  

Fall Risk: High Risk 

Safety Measures: Call light Within Reach, Bed Alarm Zone 2, Side Rails Side Rails x3, Bed 
position Low and Locked.

Fall Precautions: 

Yellow Gown



Report given to [LITA Jang].

-------------------------------------------------------------------------------

Addendum: 09/09/20 at 1919 by Olimpia Murphy RN

-------------------------------------------------------------------------------

Report given to LITA Villasenor

## 2020-09-09 NOTE — NUR
NURSE NOTES:

Received report from Olimpia RODRÍGUEZ RN. Pt in stable condition, fall precautions in place. 
Will continue to monitor closely.

## 2020-09-09 NOTE — NUR
ED Nurse Note:



pt BIBA from [facility] for a fever onset today. per [facility] facility, they 
medicated him at 0100 today, presents to [facility] afebrile. pt is on 2L O2 NC, non 
respnsive to his name, responsive to touch and pain. alert but not oriented. pt 
has a cough, it is unk if the cough is chronic or acute. pt appears to have a 
pressure ulcer to the R and L buttocks. no acute distress is noted at this time

-------------------------------------------------------------------------------

Addendum: 09/09/20 at 1529 by QLE

-------------------------------------------------------------------------------

pt has a low grade fever of 99.9 on triage

## 2020-09-09 NOTE — NUR
ED Nurse Note:



RT suctioned about 7 mL of sputum from pt's mouth, blood work and COVID 
specimen sent to lab

## 2020-09-09 NOTE — NUR
ED Nurse Note:



[id number] graciela SONG, in regards to pt COVID result. Spoke with Swati 
from admissions.

-------------------------------------------------------------------------------

Addendum: 09/09/20 at 1536 by PDEJEFFREY

-------------------------------------------------------------------------------

ED Nurse Note:



called graciela SONG, in regards to pt COVID result. Spoke with Swati from 
admissions.

## 2020-09-09 NOTE — DIAGNOSTIC IMAGING REPORT
Indication: Cough

 

Technique: One view of the chest

 

Comparison: 7/14/2017

 

Findings: Some atelectatic changes are seen at both lung bases. The heart size is

upper limits of normal. There is a right shoulder prosthesis.

 

Impression: Bibasilar atelectasis. No acute process otherwise

## 2020-09-09 NOTE — NUR
NURSE NOTES:

Received report from LITA Prajapati. Pt Awake, Alert, non verbal at the moment. No s/s of 
acute respiratory and cardiac distress. On O2 2L NC. Sl on right and left wrist 22 G, 
patent, asymptomatic and intact. Pt on droplet and contact isolation. Pressure ulcer stage 2 
noted on sacral buttocks area, bilateral ankle, redness on scrotal area and pictures taken. 
Bed in low position, side rails up x 3 and call light within reach. Will continue with 
admission assessment.

## 2020-09-09 NOTE — NUR
ED Nurse Note:



pt's entire R hand appears to br bruised, unk source. skin is intact but purple 
and yellow. pt still able to move hand and fingers, cap refill <3 seconds

## 2020-09-10 VITALS — SYSTOLIC BLOOD PRESSURE: 130 MMHG | DIASTOLIC BLOOD PRESSURE: 80 MMHG

## 2020-09-10 VITALS — SYSTOLIC BLOOD PRESSURE: 131 MMHG | DIASTOLIC BLOOD PRESSURE: 85 MMHG

## 2020-09-10 VITALS — SYSTOLIC BLOOD PRESSURE: 126 MMHG | DIASTOLIC BLOOD PRESSURE: 69 MMHG

## 2020-09-10 VITALS — SYSTOLIC BLOOD PRESSURE: 122 MMHG | DIASTOLIC BLOOD PRESSURE: 82 MMHG

## 2020-09-10 VITALS — SYSTOLIC BLOOD PRESSURE: 160 MMHG | DIASTOLIC BLOOD PRESSURE: 80 MMHG

## 2020-09-10 VITALS — SYSTOLIC BLOOD PRESSURE: 121 MMHG | DIASTOLIC BLOOD PRESSURE: 96 MMHG

## 2020-09-10 VITALS — SYSTOLIC BLOOD PRESSURE: 102 MMHG | DIASTOLIC BLOOD PRESSURE: 57 MMHG

## 2020-09-10 VITALS — SYSTOLIC BLOOD PRESSURE: 118 MMHG | DIASTOLIC BLOOD PRESSURE: 84 MMHG

## 2020-09-10 VITALS — SYSTOLIC BLOOD PRESSURE: 155 MMHG | DIASTOLIC BLOOD PRESSURE: 79 MMHG

## 2020-09-10 LAB
ADD MANUAL DIFF: NO
ALBUMIN SERPL-MCNC: 2.6 G/DL (ref 3.4–5)
ANION GAP SERPL CALC-SCNC: 10 MMOL/L (ref 5–15)
BASOPHILS NFR BLD AUTO: 1.2 % (ref 0–2)
BUN SERPL-MCNC: 19 MG/DL (ref 7–18)
CALCIUM SERPL-MCNC: 8.5 MG/DL (ref 8.5–10.1)
CHLORIDE SERPL-SCNC: 105 MMOL/L (ref 98–107)
CO2 SERPL-SCNC: 23 MMOL/L (ref 21–32)
CREAT SERPL-MCNC: 1.1 MG/DL (ref 0.55–1.3)
EOSINOPHIL NFR BLD AUTO: 0.1 % (ref 0–3)
ERYTHROCYTE [DISTWIDTH] IN BLOOD BY AUTOMATED COUNT: 12.8 % (ref 11.6–14.8)
HCT VFR BLD CALC: 49.1 % (ref 42–52)
HGB BLD-MCNC: 15.6 G/DL (ref 14.2–18)
LYMPHOCYTES NFR BLD AUTO: 25.1 % (ref 20–45)
MCV RBC AUTO: 89 FL (ref 80–99)
MONOCYTES NFR BLD AUTO: 13.5 % (ref 1–10)
NEUTROPHILS NFR BLD AUTO: 60.1 % (ref 45–75)
PHOSPHATE SERPL-MCNC: 3.4 MG/DL (ref 2.5–4.9)
PLATELET # BLD: 176 K/UL (ref 150–450)
POTASSIUM SERPL-SCNC: 4.4 MMOL/L (ref 3.5–5.1)
RBC # BLD AUTO: 5.51 M/UL (ref 4.7–6.1)
SODIUM SERPL-SCNC: 138 MMOL/L (ref 136–145)
WBC # BLD AUTO: 5.9 K/UL (ref 4.8–10.8)

## 2020-09-10 RX ADMIN — HEPARIN SODIUM SCH UNITS: 5000 INJECTION INTRAVENOUS; SUBCUTANEOUS at 09:22

## 2020-09-10 RX ADMIN — SODIUM CHLORIDE SCH MLS/HR: 0.9 INJECTION INTRAVENOUS at 13:53

## 2020-09-10 RX ADMIN — TAMSULOSIN HYDROCHLORIDE SCH MG: 0.4 CAPSULE ORAL at 09:21

## 2020-09-10 RX ADMIN — HEPARIN SODIUM SCH UNITS: 5000 INJECTION INTRAVENOUS; SUBCUTANEOUS at 21:39

## 2020-09-10 RX ADMIN — AZITHROMYCIN DIHYDRATE SCH MLS/HR: 500 INJECTION, POWDER, LYOPHILIZED, FOR SOLUTION INTRAVENOUS at 13:09

## 2020-09-10 RX ADMIN — LISINOPRIL SCH MG: 2.5 TABLET ORAL at 09:21

## 2020-09-10 RX ADMIN — DONEPEZIL HYDROCHLORIDE SCH MG: 5 TABLET, FILM COATED ORAL at 09:21

## 2020-09-10 NOTE — NUR
NURSE HAND-OFF REPORT: 



Important Events on Shift: None 

Patient Status: None

Diet: KERI mech soft



Pending Orders: none 

Pending Results/Labs: none 

Pending MD notification: none 



Latest Vital Signs: Temperature 98.8 , Pulse 97 , B/P 160 /80 , Respiratory Rate 18 , O2 SAT 
96 , Nasal Cannula, O2 Flow Rate 2.0 .  

Vital Sign Comment: 



EKG Rhythm: Sinus Rhythm

Rhythm change?: N 

MD Notified?: -

MD Response: 



Latest Mejia Fall Score: 50  

Fall Risk: High Risk 

Safety Measures: Call light Within Reach, Bed Alarm Zone 1, Side Rails Side Rails x3, Bed 
position Low and Locked.

Fall Precautions: 

Yellow Socks YES 

Yellow Gown YES 

Door Sign YES 

Patient Fall Education YES 



Report given to Nba VALENCIA RN.

## 2020-09-10 NOTE — NUR
NURSE HAND-OFF REPORT: 



Important Events on Shift:[ST eval, wounds eval]

Patient Status: [FULL CODE]

Diet: [MS ground, NTL]



Pending Orders: []

Pending Results/Labs:[]

Pending MD notification:[]



Latest Vital Signs: Temperature 96.8 , Pulse 57 , B/P 122 /82 , Respiratory Rate 19 , O2 SAT 
96 , Nasal Cannula, O2 Flow Rate 2.0 .  

Vital Sign Comment: []



EKG Rhythm: Sinus Bradycardia

Rhythm change?: N 

MD Notified?: -

MD Response: 



Latest Mejia Fall Score: 50  

Fall Risk: High Risk 

Safety Measures: Call light Within Reach, Bed Alarm Zone 1, Side Rails Side Rails x3, Bed 
position Low and Locked.

Fall Precautions: 

Yellow Socks

Yellow Gown

Door Sign

Patient Fall Education



Report given to [Fabricio LONDON].

## 2020-09-10 NOTE — HISTORY & PHYSICAL
History and Physical


History & Physicial


Dictated for Int Med-DR Cancino no. [id number].











Benito Hearn MD Sep 10, 2020 17:55

## 2020-09-10 NOTE — NUR
SLP SWALLOW EVALUATION



PATIENT REFERRED TO SLP BY DR. VALDERRAMA.  DYSPHAGIA RISK FACTORS FOR THIS 75 Y.O. [place] 
SPEAKING MALE:



ACUTE: Hypoxia, multifocal PNA 2/2 + COVID-19 infection, acute bronchitis, h/o CVA, Aphasia.



H/O: Heart failure, congenital malformation of brain, DM2, h/o TIA and CVA without 
residuals, CKD stage 2, BPH, adult FTT, Alzheimer disease, HTN, sepsis, encephalopathy, 
cognitive communication deficits, PNA, bronchitis, UTI, dysphagia, AMS, 
Hypercholesterolemia, GERD, osteoporosis, h/o falling.



POLST: Full code, ok with long term alternative nutrition/hydration. 



VITALS ON 2 L NASAL CANNULA: HR: 85; RR: 20; SP02 98%



RAD: 9/9/20 CXR Findings: Some atelectatic changes are seen at both lung bases. The heart 
size is upper limits of normal. There is a right shoulder prosthesis. Impression: Bibasilar 
atelectasis. No acute process otherwise



PER RN: Concern for Patients safety with mechanical soft ground diet texture due to Patient 
demonstrating slow and prolonged mastication of hard solids in AM. Also Patient appeared to 
be swallowing slowly during breakfast in AM per RN, therefore, SLP order was placed to 
further evaluate safety with current diet. 



Patient seen at bedside, has nasal cannula placed, re-positioned upright for swallow 
evaluation. Oral care completed, missing natural dentition noted. Patients oral motor ROM 
and coordination appears reduced. Patient did not verbally communicate during the 
evaluation, did nod head yes/no to simple egocentric questions. 



INITIAL IMPRESSIONS:

Moderate oropharyngeal dysphagia compounded by COVID-19 multifocal PNA, h/o CVA, h/o 
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 
oral motor ROM and coordination, moderately delayed A-P bolus transit to BOT, swallow 
initiation appears moderately delayed with 1 instance of bolus holding with thin liquids. 

Laryngeal elevation present upon palpation, appears moderately delayed and incomplete in 
feel. 



Patient presenting with oral defensiveness during SLP swallow evaluation which posed as a 
barrier to a comprehensive evaluation. Patient was agreeable to PO trials of thin liquids 
and nectar thick liquids. No significant aspiration signs or symptoms with any PO trials. 
Patient noted with bolus holding with thin liquids, given delayed swallow initiation at 
bedside, h/o dysphagia/CVA/Dementia, now with multifocal PNA, Patient is a high risk for 
aspiration. 



Patient demonstrating a questionable ability to clear airway in instances of aspiration 
and/or penetration given no vocal production during evaluation and per MBSS results on 
7/14/17, Patient has oral apraxia and sensorimotor deficits. 



RECOMMENDATIONS:

1. Continue Mechanical soft-ground solids, downgrade Patient to nectar thick liquids via 1 
to 1 careful handfeeding, Patient has difficulty with straws. Monitor for aspiration signs 
and symptoms. 

2. SLP plans to f/u with Patient 3-5x per week for dysphagia tx/management. 

3. Hope to complete MBSS while in house, will monitor when able to be transferred to 
radiology. 



SLP discussed result and recommendations with LITA Arango, SLP will f/u tomorrow for diet 
tolerance, dysphagia tx and management, and to attempt additional PO trials to determine 
safest and least restrictive diet. 



Thank you for this referral!

## 2020-09-10 NOTE — HISTORY AND PHYSICAL REPORT
DATE OF ADMISSION:  09/09/2020

CHIEF COMPLAINT:  The patient is a 75-year-old  male who presents with

chief complaint of fever and cough.



HISTORY OF PRESENT ILLNESS:  The patient is a resident of [facility].  According to staff at [facility], the patient began to experience fevers of up to 101 degrees

Fahrenheit two days ago.  The patient was also noted to have nonproductive

cough.



The patient was evaluated at [facility] Emergency Room.  The

patient is admitted with fever and cough to rule out COVID-19.



REVIEW OF SYSTEMS:  Unable to assess secondary to the patient's mental

status.



PAST MEDICAL HISTORY:

1. Hypertension.

2. Benign prostatic hypertrophy.

3. Osteoporosis.

4. Ventriculomegaly.

5. Alzheimer dementia.

6. Congestive heart failure.

7. Diabetes type 2.

8. Cerebrovascular disease, status post cerebrovascular accident.

9. Chronic renal disease, stage 2.

10. Dysphagia.

11. Hypercholesterolemia.

12. Encephalopathy.

13. Gastroesophageal reflux disease.



PAST SURGICAL HISTORY:  Significant for right shoulder surgery.



CURRENT MEDICATIONS:

1. Tylenol 650 mg p.o. q.4h. p.r.n.

2. Fosamax 70 mg p.o. weekly.

3. Aricept 10 mg p.o. daily.

4. Aspirin 81 mg p.o. daily.

5. Atorvastatin 10 mg p.o. at bedtime.

6. Baclofen 5 mg p.o. p.r.n.

7. Calcium carbonate/vitamin D 1 tablet p.o. daily.

8. Lasix 20 mg p.o. daily.

9. Nitroglycerin 0.4 mg sublingual p.r.n.

10. Norvasc 5 mg p.o. daily.

11. Potassium chloride 10 mEq p.o. daily.

12. Flomax 0.4 mg p.o. daily.

13. Vitamin C 500 mg p.o. daily.

14. Zinc sulfate 220 mg p.o. daily.



ALLERGIES:  No known drug allergies.



SOCIAL HISTORY:  The patient is .  The patient is a resident of

[facility] as above.  The

patient denies tobacco or alcohol use.



PHYSICAL EXAMINATION:

VITAL SIGNS:  Temperature 99.9, respirations 18, pulse 83, blood pressure

128/73.

GENERAL:  The patient is a well-developed, well-nourished, thin-appearing

 male, in no apparent distress.

HEENT:  Eyes, pupils are equal and responsive to light and accommodation.

Extraocular movements are intact.

NECK:  Supple without lymphadenopathy.

CHEST:  Lungs are clear to auscultation bilaterally without wheezes,

rales.

CARDIOVASCULAR:  Regular rate.  S1, S2 are normal without murmurs, rubs, or

gallops.

ABDOMEN:  Soft, nontender, and nondistended.  Positive bowel sounds.  No

evidence of hepatosplenomegaly.  Currently no rebound or guarding noted.

EXTREMITIES:  Negative for clubbing, cyanosis, or edema.

RECTAL/GENITAL:  Not performed.

NEUROLOGIC:  Cranial nerves II through XII are grossly intact without focal

deficits.  Motor strength is 5/5 bilaterally.  Deep tendon reflexes are 2+

plantar.



A chest x-ray was reported as bibasilar atelectasis with no acute

disease.



LABORATORY STUDIES:  COVID-19 positive.  CBC, WBC is 7.0, hemoglobin 14.9,

hematocrit 44.2, platelets 150,000.  Sodium 139, potassium 4.0, chloride

104, CO2 25, BUN 20, creatinine 1.2, glucose 149.



ASSESSMENT:  This is a 75-year-old  male.



1. Fever.

2. Dyspnea.

3. COVID-19 positive.

4. Congestive heart failure.

5. Hypertension.

6. Benign prostatic hypertrophy.

7. Osteoporosis.

8. Alzheimer dementia.

9. Ventriculomegaly.

10. Diabetes type 2.

11. Cerebrovascular disease.

12. Chronic renal failure.

13. Dysphagia.

14. Hypercholesterolemia.

15. Gastroesophageal reflux disease.



TREATMENT:

1. Fever/dyspnea/COVID-19 positive.  An infectious disease consultation

has been obtained with Dr. Garcia.  A pulmonary consultation has been

obtained with Dr. Michael Sandoavl.  The patient has been started

empirically on azithromycin and ceftriaxone for probable underlying

bacterial pneumonia.  The patient has been started on Decadron.  We will

follow recommendation of Infectious Disease and Pulmonary.

2. Hypertension.  Continue amlodipine as above.

3. Benign prostatic hypertrophy.  Continue Flomax as above.

4. Osteoporosis.  Continue Fosamax as above.

5. Alzheimer dementia.  Continue Aricept as above.

6. Ventriculomegaly.

7. Diabetes type 2.

8. Cerebrovascular disease, status post cerebrovascular accident.

9. Chronic renal failure.

10. Dysphagia.

11. Hypercholesterolemia.  Continue atorvastatin as above.

12. Gastroesophageal reflux disease.









  ______________________________________________

  Benito Hearn M.D.





DR:  TONIA

D:  09/10/2020 17:56

T:  09/10/2020 19:05

JOB#:  [id number]

CC:

## 2020-09-10 NOTE — CONSULTATION
History of Present Illness


General


Date patient seen:  Sep 10, 2020


Chief Complaint:  Fever





Present Illness


HPI


72 year old patient with hx of CAD, CVA, Dementia, nursing home resident  

presented to ER with CC of cough and fever .  He is unable to give any history 

. Pt was diagnosed to have COVID-19 and is admitted to telemetry for further 

management.


Allergies:  


Coded Allergies:  


     No Known Allergies (Unverified , 8/20/12)





Medication History


Scheduled


Alendronate Sodium* (Fosamax*), 70 MG ORAL ONCE A WEEK, (Reported)


Amlodipine Besylate (Norvasc), 5 MG ORAL DAILY, (Reported)


Aspirin* (Aspir 81*), 81 MG ORAL DAILY, (Reported)


Atorvastatin Calcium* (Atorvastatin Calcium*), 10 MG ORAL BEDTIME, (Reported)


Cephalexin (Cephalexin), 500 MG ORAL FOUR TIMES A DAY


Cranberry Fruit Concentrate (Cranberry), 450 MG PO BID, (Reported)


Docusate Sodium* (Colace*), 200 MG ORAL DAILY, (Reported)


Donepezil Hcl* (Donepezil Hcl*), 5 MG ORAL DAILY, (Reported)


Donepezil Hcl* (Aricept*), 10 MG ORAL DAILY, (Reported)


Furosemide* (Lasix*), 20 MG ORAL DAILY, (Reported)


Lisinopril (Lisinopril*), 5 MG ORAL DAILY, (Reported)


Multivitamin (Multi Vitamin Daily), 1 TAB ORAL DAILY, (Reported)


Tamsulosin HCl (Flomax), 0.4 MG ORAL DAILY, (Reported)





Scheduled PRN


Acetaminophen (Tylenol), 650 MG ORAL Q6H PRN for Fever/Headache/Mild Pain, (

Reported)


Guaifenesin (Kesha-Tussin), 15 ML PO EVERY 8 HOURS PRN for For Cough, (Reported)


Nitroglycerin (Nitroglycerin), 0.4 MG SL AS NEEDED PRN for For Pain, (Reported)





Miscellaneous Medications


Calcium Carbonate/Vitamin D3 (Calcium 500+D Tablet Chew), 1 EACH PO, (Reported)





Patient History


Healthcare decision maker


N


Resuscitation status





Advanced Directive on File








Past Medical/Surgical History


Past Medical/Surgical History:  


(1) History of CVA (cerebrovascular accident)


(2) BPH (benign prostatic hyperplasia)


(3) HTN (hypertension)


(4) Alzheimer's dementia


(5) Osteoporosis





Review of Systems


All Other Systems:  negative except mentioned in HPI





Physical Exam


General Appearance:  cachetic


Lines, tubes and drains:  peripheral


HEENT:  normocephalic, atraumatic


Neck:  non-tender, normal alignment


Respiratory/Chest:  chest wall non-tender, lungs clear


Breasts:  no masses


Cardiovascular/Chest:  normal peripheral pulses


Abdomen:  normal bowel sounds, non tender


Genitourinary/Rectal:  normal genital exam, normal rectal exam


Extremities:  normal range of motion, non-tender


Skin Exam:  normal pigmentation


Neurologic:  CNs II-XII grossly normal





Last 24 Hour Vital Signs








  Date Time  Temp Pulse Resp B/P (MAP) Pulse Ox O2 Delivery O2 Flow Rate FiO2


 


9/10/20 12:34 96.6 85 20 121/96 (104) 98   


 


9/10/20 12:00  71      


 


9/10/20 09:22  58  126/69    


 


9/10/20 09:21    126/69    


 


9/10/20 09:00      Nasal Cannula 2.0 


 


9/10/20 08:00  55      


 


9/10/20 08:00 97.9 58 19 126/69 (88) 100   


 


9/10/20 04:00  63      


 


9/10/20 04:00 98.8 97 18 160/80 (106) 96   


 


9/10/20 00:00  65      


 


9/10/20 00:00 98.4 92 18 155/79 (104) 96   


 


9/9/20 21:00      Nasal Cannula 2.0 


 


9/9/20 20:00  74      


 


9/9/20 20:00 96.5 74 18 141/81 (101) 95   


 


9/9/20 17:58      Nasal Cannula 2.0 


 


9/9/20 17:05  82 18 121/89 100 Nasal Cannula 2.0 


 


9/9/20 15:28  78 18 153/85 99 Nasal Cannula 2.0 


 


9/9/20 14:35  89 17 158/95 100 Nasal Cannula 2.0 


 


9/9/20 14:35  83 18   Nasal Cannula 2.0 


 


9/9/20 13:55 99.9 83 18 128/73 (91) 93 Nasal Cannula 2.0 

















Intake and Output  


 


 9/9/20 9/10/20





 19:00 07:00


 


Intake Total 0 ml 


 


Balance 0 ml 


 


  


 


Intake Oral 0 ml 


 


# Voids  2











Laboratory Tests








Test


  9/9/20


14:30 9/9/20


14:46 9/9/20


15:02 9/10/20


04:45


 


Sodium Level


  139 MMOL/L


(136-145) 


  


  138 MMOL/L


(136-145)


 


Potassium Level


  4.0 MMOL/L


(3.5-5.1) 


  


  4.4 MMOL/L


(3.5-5.1)


 


Chloride Level


  104 MMOL/L


() 


  


  105 MMOL/L


()


 


Carbon Dioxide Level


  25 MMOL/L


(21-32) 


  


  23 MMOL/L


(21-32)


 


Anion Gap


  10 mmol/L


(5-15) 


  


  10 mmol/L


(5-15)


 


Blood Urea Nitrogen


  20 mg/dL


(7-18)  H 


  


  19 mg/dL


(7-18)  H


 


Creatinine


  1.2 MG/DL


(0.55-1.30) 


  


  1.1 MG/DL


(0.55-1.30)


 


Estimat Glomerular Filtration


Rate 58.7 mL/min


(>60) 


  


  > 60 mL/min


(>60)


 


Glucose Level


  149 MG/DL


()  H 


  


  144 MG/DL


()  H


 


Lactic Acid Level


  0.90 mmol/L


(0.4-2.0) 


  


  


 


 


Calcium Level


  8.5 MG/DL


(8.5-10.1) 


  


  8.5 MG/DL


(8.5-10.1)


 


Phosphorus Level


  3.2 MG/DL


(2.5-4.9) 


  


  3.4 MG/DL


(2.5-4.9)


 


Magnesium Level


  2.1 MG/DL


(1.8-2.4) 


  


  


 


 


Total Bilirubin


  0.3 MG/DL


(0.2-1.0) 


  


  


 


 


Aspartate Amino Transf


(AST/SGOT) 31 U/L (15-37)


  


  


  


 


 


Alanine Aminotransferase


(ALT/SGPT) 27 U/L (12-78)


  


  


  


 


 


Alkaline Phosphatase


  108 U/L


() 


  


  


 


 


Troponin I


  0.000 ng/mL


(0.000-0.056) 


  


  


 


 


Pro-B-Type Natriuretic Peptide


  110 pg/mL


(0-125) 


  


  


 


 


Total Protein


  7.6 G/DL


(6.4-8.2) 


  


  


 


 


Albumin


  2.7 G/DL


(3.4-5.0)  L 


  


  2.6 G/DL


(3.4-5.0)  L


 


Globulin 4.9 g/dL     


 


Albumin/Globulin Ratio


  0.6 (1.0-2.7)


L 


  


  


 


 


Arterial Blood pH


  


  7.419


(7.350-7.450) 


  


 


 


Arterial Blood Partial


Pressure CO2 


  39.4 mmHg


(35.0-45.0) 


  


 


 


Arterial Blood Partial


Pressure O2 


  110.7 mmHg


(75.0-100.0)  H 


  


 


 


Arterial Blood HCO3


  


  24.9 mmol/L


(22.0-26.0) 


  


 


 


Arterial Blood Oxygen


Saturation 


  97.8 %


() 


  


 


 


Arterial Blood Base Excess  0.5 (-2-2)    


 


Jerod Test  Positive    


 


White Blood Count


  


  


  7.0 K/UL


(4.8-10.8) 5.9 K/UL


(4.8-10.8)


 


Red Blood Count


  


  


  4.93 M/UL


(4.70-6.10) 5.51 M/UL


(4.70-6.10)


 


Hemoglobin


  


  


  14.9 G/DL


(14.2-18.0) 15.6 G/DL


(14.2-18.0)


 


Hematocrit


  


  


  44.2 %


(42.0-52.0) 49.1 %


(42.0-52.0)


 


Mean Corpuscular Volume   90 FL (80-99)   89 FL (80-99)  


 


Mean Corpuscular Hemoglobin


  


  


  30.3 PG


(27.0-31.0) 28.3 PG


(27.0-31.0)


 


Mean Corpuscular Hemoglobin


Concent 


  


  33.8 G/DL


(32.0-36.0) 31.8 G/DL


(32.0-36.0)  L


 


Red Cell Distribution Width


  


  


  13.3 %


(11.6-14.8) 12.8 %


(11.6-14.8)


 


Platelet Count


  


  


  150 K/UL


(150-450) 176 K/UL


(150-450)


 


Mean Platelet Volume


  


  


  8.3 FL


(6.5-10.1) 7.3 FL


(6.5-10.1)


 


Neutrophils (%) (Auto)


  


  


  52.3 %


(45.0-75.0) 60.1 %


(45.0-75.0)


 


Lymphocytes (%) (Auto)


  


  


  32.7 %


(20.0-45.0) 25.1 %


(20.0-45.0)


 


Monocytes (%) (Auto)


  


  


  13.6 %


(1.0-10.0)  H 13.5 %


(1.0-10.0)  H


 


Eosinophils (%) (Auto)


  


  


  0.1 %


(0.0-3.0) 0.1 %


(0.0-3.0)


 


Basophils (%) (Auto)


  


  


  1.3 %


(0.0-2.0) 1.2 %


(0.0-2.0)


 


Prothrombin Time


  


  


  11.4 SEC


(9.30-11.50) 


 


 


Prothromb Time International


Ratio 


  


  1.0 (0.9-1.1)  


  


 


 


Activated Partial


Thromboplast Time 


  


  31 SEC (23-33)


  


 


 


Urine Color   Yellow   


 


Urine Appearance   Clear   


 


Urine pH   5 (4.5-8.0)   


 


Urine Specific Gravity


  


  


  1.025


(1.005-1.035) 


 


 


Urine Protein


  


  


  2+ (NEGATIVE)


H 


 


 


Urine Glucose (UA)


  


  


  Negative


(NEGATIVE) 


 


 


Urine Ketones


  


  


  Negative


(NEGATIVE) 


 


 


Urine Blood


  


  


  2+ (NEGATIVE)


H 


 


 


Urine Nitrite


  


  


  Negative


(NEGATIVE) 


 


 


Urine Bilirubin


  


  


  Negative


(NEGATIVE) 


 


 


Urine Urobilinogen


  


  


  Normal MG/DL


(0.0-1.0) 


 


 


Urine Leukocyte Esterase


  


  


  Negative


(NEGATIVE) 


 


 


Urine RBC


  


  


  2-4 /HPF (0 -


0)  H 


 


 


Urine WBC


  


  


  0-2 /HPF (0 -


0) 


 


 


Urine Squamous Epithelial


Cells 


  


  None /LPF


(NONE/OCC) 


 


 


Urine Bacteria


  


  


  Moderate /HPF


(NONE)  H 


 











Microbiology








 Date/Time


Source Procedure


Growth Status


 


 


 9/9/20 14:30


Nasopharynx SARS-CoV-2 RdRp Gene Assay - Final Complete


 


 9/9/20 15:02


Urine,Clean Catch Urine Culture - Preliminary


NO GROWTH Resulted


 


 9/9/20 15:10


Rectum  Received








Height (Feet):  5


Height (Inches):  5.00


Weight (Pounds):  159


Medications





Current Medications








 Medications


  (Trade)  Dose


 Ordered  Sig/Miky


 Route


 PRN Reason  Start Time


 Stop Time Status Last Admin


Dose Admin


 


 Acetaminophen


  (Tylenol)  650 mg  Q4H  PRN


 ORAL


 FEVER  9/9/20 17:00


 10/9/20 16:59   


 


 


 Albuterol/


 Ipratropium


  (Combivent


 Respimat)  1 puff  Q4H  PRN


 INH


 Shortness of Breath  9/9/20 17:15


 10/9/20 17:14   


 


 


 Amlodipine


 Besylate


  (Norvasc)  5 mg  DAILY


 ORAL


   9/10/20 09:00


 10/10/20 08:59  9/10/20 09:22


 


 


 Azithromycin 250


 mg/Dextrose  275 ml @ 


 275 mls/hr  Q24HRS


 IV


   9/10/20 14:00


 9/15/20 13:59  9/10/20 13:09


 


 


 Barium Sulfate


  (Varibar Honey)  250 ml  NOW  PRN


 MC


 RAD  9/10/20 12:45


 9/13/20 12:38   


 


 


 Barium Sulfate


  (Varibar Nectar)  240 ml  NOW  PRN


 


 RAD  9/10/20 12:45


 9/13/20 12:38   


 


 


 Barium Sulfate


  (Varibar Pudding)  230 ml  NOW  PRN


 


 RAD  9/10/20 12:45


 9/13/20 12:38   


 


 


 Barium Sulfate


  (Varibar Thin


 Liquid powder)  148 gm  NOW  PRN


 


 RAD  9/10/20 12:45


 9/13/20 12:38   


 


 


 Ceftriaxone


 Sodium 1 gm/


 Dextrose  55 ml @ 


 110 mls/hr  Q24H


 IVPB


   9/10/20 14:00


 9/17/20 13:59   


 


 


 Dexamethasone


  (Decadron)  4 mg  DAILY


 ORAL


   9/10/20 09:00


 10/10/20 08:59  9/10/20 09:21


 


 


 Dextrose


  (Dextrose 50%)  25 ml  Q30M  PRN


 IV


 Hypoglycemia  9/9/20 17:00


 12/8/20 16:59   


 


 


 Dextrose


  (Dextrose 50%)  50 ml  Q30M  PRN


 IV


 Hypoglycemia  9/9/20 17:00


 12/8/20 16:59   


 


 


 Donepezil HCl


  (Aricept)  5 mg  DAILY


 ORAL


   9/10/20 09:00


 10/10/20 08:59  9/10/20 09:21


 


 


 Heparin Sodium


  (Porcine)


  (Heparin 5000


 units/ml)  5,000 units  EVERY 12  HOURS


 SUBQ


   9/9/20 21:00


 10/24/20 20:59  9/10/20 09:22


 


 


 Lisinopril


  (ZestriL)  5 mg  DAILY


 ORAL


   9/10/20 09:00


 10/10/20 08:59  9/10/20 09:21


 


 


 Ondansetron HCl


  (Zofran)  4 mg  Q6H  PRN


 IVP


 Nausea & Vomiting  9/9/20 17:00


 10/9/20 16:59   


 


 


 Polyethylene


 Glycol


  (Miralax)  17 gm  DAILYPRN  PRN


 ORAL


 Constipation  9/9/20 17:00


 10/9/20 16:59   


 


 


 Promethazine HCl/


 Codeine


  (Phenergan with


 Codeine)  5 ml  Q6H  PRN


 ORAL


 cough  9/9/20 17:00


 10/9/20 16:59   


 


 


 Tamsulosin HCl


  (Flomax)  0.4 mg  DAILY


 ORAL


   9/10/20 09:00


 10/10/20 08:59  9/10/20 09:21


 











Assessment/Plan


Problem List:  


(1) 2019 novel coronavirus disease (COVID-19)


ICD Codes:  U07.1 - COVID-19


SNOMED:  038116068


(2) Acute bronchitis


ICD Codes:  J20.9 - Acute bronchitis, unspecified


SNOMED:  41559131


(3) History of CVA (cerebrovascular accident)


ICD Codes:  Z86.73 - Personal history of transient ischemic attack (TIA), and 

cerebral infarction without residual deficits; J12.89 - Other viral pneumonia


SNOMED:  389718156, 243780011


(4) BPH (benign prostatic hyperplasia)


ICD Codes:  N40.0 - Benign prostatic hyperplasia without lower urinary tract 

symptoms


SNOMED:  475287364, 694921953


(5) HTN (hypertension)


ICD Codes:  I10 - Essential (primary) hypertension


SNOMED:  93467275


(6) Alzheimer's dementia


ICD Codes:  G30.9 - Alzheimer's disease, unspecified


SNOMED:  80177680


Assessment/Plan:


respiratory isolation


check sputum


iv abx


ID to see


f/u electrolytes


titrate fio2 to sat of 92%


f/u inflammatory markers


monitor BP


dvt prophylaxis











Michael Sandoval MD Sep 10, 2020 13:22

## 2020-09-10 NOTE — NUR
NURSE NOTES:

Received patient in bed. Awake, nonverbal. On 2 l via NC. No signs of pain at this time. IV 
in the Right wrist, site intact. Patient is a high fall risk d/t Mejia fall score of 50. 
Patient placed in a room close to the nurse's station for safety and close monitoring. 
Yellow socks on, yellow gown on, bed at the lowest position, bed locked, bed alarm placed on 
high sensitivity, call light placed within reach - unable to return demonstration. CN and 
CNA made aware.

## 2020-09-10 NOTE — NUR
NURSE NOTES: 



Important Events on Shift: Received report from Nba VALENCIA RN. Pt in stable condition, no 
signs or symptoms of distress or pain noted at this time. FLACC 0.

Patient Status: Stable

Diet: KERI mech soft



Pending Orders: CXR,  

Pending Results/Labs: CMP, CBC, Phos, Mag, CRP, sed rate

Pending MD notification: None 



Latest Vital Signs: Temperature 96.8 , Pulse 57 , B/P 122 /82 , Respiratory Rate 19 , O2 SAT 
96 , Nasal Cannula, O2 Flow Rate 2.0 .  

Vital Sign Comment: Stable 



EKG Rhythm: Sinus Bradycardia

Rhythm change?: N 

MD Notified?: -

MD Response: -



Latest Mejia Fall Score: 50  

Fall Risk: High Risk 

Safety Measures: Call light Within Reach, Bed Alarm Zone 1, Side Rails Side Rails x3, Bed 
position Low and Locked.

Fall Precautions: YES 

Yellow Socks YES 

Yellow Gown YES 

Door Sign YES 

Patient Fall Education YES

## 2020-09-11 VITALS — SYSTOLIC BLOOD PRESSURE: 100 MMHG | DIASTOLIC BLOOD PRESSURE: 59 MMHG

## 2020-09-11 VITALS — SYSTOLIC BLOOD PRESSURE: 117 MMHG | DIASTOLIC BLOOD PRESSURE: 67 MMHG

## 2020-09-11 VITALS — DIASTOLIC BLOOD PRESSURE: 76 MMHG | SYSTOLIC BLOOD PRESSURE: 118 MMHG

## 2020-09-11 VITALS — SYSTOLIC BLOOD PRESSURE: 114 MMHG | DIASTOLIC BLOOD PRESSURE: 73 MMHG

## 2020-09-11 VITALS — SYSTOLIC BLOOD PRESSURE: 110 MMHG | DIASTOLIC BLOOD PRESSURE: 64 MMHG

## 2020-09-11 VITALS — SYSTOLIC BLOOD PRESSURE: 102 MMHG | DIASTOLIC BLOOD PRESSURE: 63 MMHG

## 2020-09-11 LAB
ADD MANUAL DIFF: NO
ALBUMIN SERPL-MCNC: 2.8 G/DL (ref 3.4–5)
ALBUMIN/GLOB SERPL: 0.5 {RATIO} (ref 1–2.7)
ALP SERPL-CCNC: 108 U/L (ref 46–116)
ALT SERPL-CCNC: 40 U/L (ref 12–78)
ANION GAP SERPL CALC-SCNC: 10 MMOL/L (ref 5–15)
AST SERPL-CCNC: 50 U/L (ref 15–37)
BASOPHILS NFR BLD AUTO: 1.4 % (ref 0–2)
BILIRUB SERPL-MCNC: 0.4 MG/DL (ref 0.2–1)
BUN SERPL-MCNC: 26 MG/DL (ref 7–18)
CALCIUM SERPL-MCNC: 9 MG/DL (ref 8.5–10.1)
CHLORIDE SERPL-SCNC: 105 MMOL/L (ref 98–107)
CO2 SERPL-SCNC: 27 MMOL/L (ref 21–32)
CREAT SERPL-MCNC: 1.2 MG/DL (ref 0.55–1.3)
EOSINOPHIL NFR BLD AUTO: 0.1 % (ref 0–3)
ERYTHROCYTE [DISTWIDTH] IN BLOOD BY AUTOMATED COUNT: 12.5 % (ref 11.6–14.8)
GLOBULIN SER-MCNC: 5.1 G/DL
HCT VFR BLD CALC: 47.8 % (ref 42–52)
HGB BLD-MCNC: 15.8 G/DL (ref 14.2–18)
LYMPHOCYTES NFR BLD AUTO: 24.4 % (ref 20–45)
MCV RBC AUTO: 88 FL (ref 80–99)
MONOCYTES NFR BLD AUTO: 9.8 % (ref 1–10)
NEUTROPHILS NFR BLD AUTO: 64.4 % (ref 45–75)
PHOSPHATE SERPL-MCNC: 2.9 MG/DL (ref 2.5–4.9)
PLATELET # BLD: 159 K/UL (ref 150–450)
POTASSIUM SERPL-SCNC: 3.8 MMOL/L (ref 3.5–5.1)
RBC # BLD AUTO: 5.42 M/UL (ref 4.7–6.1)
SODIUM SERPL-SCNC: 142 MMOL/L (ref 136–145)
WBC # BLD AUTO: 8.4 K/UL (ref 4.8–10.8)

## 2020-09-11 RX ADMIN — LISINOPRIL SCH MG: 2.5 TABLET ORAL at 09:12

## 2020-09-11 RX ADMIN — SODIUM CHLORIDE SCH MLS/HR: 0.9 INJECTION INTRAVENOUS at 15:05

## 2020-09-11 RX ADMIN — TAMSULOSIN HYDROCHLORIDE SCH MG: 0.4 CAPSULE ORAL at 09:12

## 2020-09-11 RX ADMIN — HEPARIN SODIUM SCH UNITS: 5000 INJECTION INTRAVENOUS; SUBCUTANEOUS at 09:13

## 2020-09-11 RX ADMIN — HEPARIN SODIUM SCH UNITS: 5000 INJECTION INTRAVENOUS; SUBCUTANEOUS at 21:23

## 2020-09-11 RX ADMIN — DONEPEZIL HYDROCHLORIDE SCH MG: 5 TABLET, FILM COATED ORAL at 09:12

## 2020-09-11 RX ADMIN — AZITHROMYCIN DIHYDRATE SCH MLS/HR: 500 INJECTION, POWDER, LYOPHILIZED, FOR SOLUTION INTRAVENOUS at 15:04

## 2020-09-11 NOTE — INTERNAL MED PROGRESS NOTE
Subjective


Physician Name


Andrei Cancino


Attending Physician


Andrei Cancino MD





Current Medications








 Medications


  (Trade)  Dose


 Ordered  Sig/Miky


 Route


 PRN Reason  Start Time


 Stop Time Status Last Admin


Dose Admin


 


 Acetaminophen


  (Tylenol)  650 mg  Q4H  PRN


 ORAL


 FEVER  9/9/20 17:00


 10/9/20 16:59   


 


 


 Albuterol/


 Ipratropium


  (Combivent


 Respimat)  1 puff  Q4H  PRN


 INH


 Shortness of Breath  9/9/20 17:15


 10/9/20 17:14   


 


 


 Amlodipine


 Besylate


  (Norvasc)  5 mg  DAILY


 ORAL


   9/10/20 09:00


 10/10/20 08:59  9/11/20 09:12


 


 


 Ascorbic Acid


  (Vitamin C)  500 mg  DAILY


 ORAL


   9/12/20 09:00


 10/12/20 08:59   


 


 


 Azithromycin 250


 mg/Dextrose  275 ml @ 


 275 mls/hr  Q24HRS


 IV


   9/10/20 14:00


 9/15/20 13:59  9/11/20 15:04


 


 


 Barium Sulfate


  (Varibar Honey)  250 ml  NOW  PRN


 MC


 RAD  9/10/20 12:45


 9/13/20 12:38   


 


 


 Barium Sulfate


  (Varibar Nectar)  240 ml  NOW  PRN


 MC


 RAD  9/10/20 12:45


 9/13/20 12:38   


 


 


 Barium Sulfate


  (Varibar Pudding)  230 ml  NOW  PRN


 MC


 RAD  9/10/20 12:45


 9/13/20 12:38   


 


 


 Barium Sulfate


  (Varibar Thin


 Liquid powder)  148 gm  NOW  PRN


 MC


 RAD  9/10/20 12:45


 9/13/20 12:38   


 


 


 Ceftriaxone


 Sodium 1 gm/


 Dextrose  55 ml @ 


 110 mls/hr  Q24H


 IVPB


   9/10/20 14:00


 9/17/20 13:59  9/11/20 15:05


 


 


 Dexamethasone


  (Decadron)  4 mg  DAILY


 ORAL


   9/10/20 09:00


 10/10/20 08:59  9/11/20 09:12


 


 


 Dextrose


  (Dextrose 50%)  25 ml  Q30M  PRN


 IV


 Hypoglycemia  9/9/20 17:00


 12/8/20 16:59   


 


 


 Dextrose


  (Dextrose 50%)  50 ml  Q30M  PRN


 IV


 Hypoglycemia  9/9/20 17:00


 12/8/20 16:59   


 


 


 Donepezil HCl


  (Aricept)  5 mg  DAILY


 ORAL


   9/10/20 09:00


 10/10/20 08:59  9/11/20 09:12


 


 


 Heparin Sodium


  (Porcine)


  (Heparin 5000


 units/ml)  5,000 units  EVERY 12  HOURS


 SUBQ


   9/9/20 21:00


 10/24/20 20:59  9/11/20 09:13


 


 


 Lisinopril


  (ZestriL)  5 mg  DAILY


 ORAL


   9/10/20 09:00


 10/10/20 08:59  9/11/20 09:12


 


 


 Multivitamins


  (Multivitamins)  1 tab  DAILY


 ORAL


   9/12/20 09:00


 10/12/20 08:59   


 


 


 Ondansetron HCl


  (Zofran)  4 mg  Q6H  PRN


 IVP


 Nausea & Vomiting  9/9/20 17:00


 10/9/20 16:59   


 


 


 Polyethylene


 Glycol


  (Miralax)  17 gm  DAILYPRN  PRN


 ORAL


 Constipation  9/9/20 17:00


 10/9/20 16:59   


 


 


 Promethazine HCl/


 Codeine


  (Phenergan with


 Codeine)  5 ml  Q6H  PRN


 ORAL


 cough  9/9/20 17:00


 10/9/20 16:59   


 


 


 Tamsulosin HCl


  (Flomax)  0.4 mg  DAILY


 ORAL


   9/10/20 09:00


 10/10/20 08:59  9/11/20 09:12


 








Allergies:  


Coded Allergies:  


     No Known Allergies (Unverified , 8/20/12)


Subjective


awake, responsive, mild shortness of breath, in [address] room.





Objective





Last Vital Signs








  Date Time  Temp Pulse Resp B/P (MAP) Pulse Ox O2 Delivery O2 Flow Rate FiO2


 


9/11/20 16:00 96.7 76 19 118/76 (90) 96   


 


9/11/20 14:01      Nasal Cannula 2.0 28











Laboratory Tests








Test


  9/11/20


08:15 9/11/20


10:10


 


White Blood Count


  8.4 K/UL


(4.8-10.8) 


 


 


Red Blood Count


  5.42 M/UL


(4.70-6.10) 


 


 


Hemoglobin


  15.8 G/DL


(14.2-18.0) 


 


 


Hematocrit


  47.8 %


(42.0-52.0) 


 


 


Mean Corpuscular Volume 88 FL (80-99)   


 


Mean Corpuscular Hemoglobin


  29.2 PG


(27.0-31.0) 


 


 


Mean Corpuscular Hemoglobin


Concent 33.1 G/DL


(32.0-36.0) 


 


 


Red Cell Distribution Width


  12.5 %


(11.6-14.8) 


 


 


Platelet Count


  159 K/UL


(150-450) 


 


 


Mean Platelet Volume


  7.4 FL


(6.5-10.1) 


 


 


Neutrophils (%) (Auto)


  64.4 %


(45.0-75.0) 


 


 


Lymphocytes (%) (Auto)


  24.4 %


(20.0-45.0) 


 


 


Monocytes (%) (Auto)


  9.8 %


(1.0-10.0) 


 


 


Eosinophils (%) (Auto)


  0.1 %


(0.0-3.0) 


 


 


Basophils (%) (Auto)


  1.4 %


(0.0-2.0) 


 


 


Sodium Level


  142 MMOL/L


(136-145) 


 


 


Potassium Level


  3.8 MMOL/L


(3.5-5.1) 


 


 


Chloride Level


  105 MMOL/L


() 


 


 


Carbon Dioxide Level


  27 MMOL/L


(21-32) 


 


 


Anion Gap


  10 mmol/L


(5-15) 


 


 


Blood Urea Nitrogen


  26 mg/dL


(7-18)  H 


 


 


Creatinine


  1.2 MG/DL


(0.55-1.30) 


 


 


Estimat Glomerular Filtration


Rate 58.7 mL/min


(>60) 


 


 


Glucose Level


  118 MG/DL


()  H 


 


 


Calcium Level


  9.0 MG/DL


(8.5-10.1) 


 


 


Phosphorus Level


  2.9 MG/DL


(2.5-4.9) 


 


 


Magnesium Level


  2.4 MG/DL


(1.8-2.4) 


 


 


Total Bilirubin


  0.4 MG/DL


(0.2-1.0) 


 


 


Aspartate Amino Transf


(AST/SGOT) 50 U/L (15-37)


H 


 


 


Alanine Aminotransferase


(ALT/SGPT) 40 U/L (12-78)


  


 


 


Alkaline Phosphatase


  108 U/L


() 


 


 


C-Reactive Protein,


Quantitative 2.1 mg/dL


(0.00-0.90)  H 


 


 


Total Protein


  7.9 G/DL


(6.4-8.2) 


 


 


Albumin


  2.8 G/DL


(3.4-5.0)  L 


 


 


Globulin 5.1 g/dL   


 


Albumin/Globulin Ratio


  0.5 (1.0-2.7)


L 


 


 


Erythrocyte Sedimentation Rate


  


  68 MM/HR


(0-20)  H











Microbiology








 Date/Time


Source Procedure


Growth Status


 


 


 9/9/20 14:30


Blood Blood Culture - Preliminary


NO GROWTH AFTER 24 HOURS Resulted


 


 9/9/20 14:15


Blood Blood Culture - Preliminary


Gram Positive Cocci Resulted


 


 9/9/20 14:30


Nasopharynx SARS-CoV-2 RdRp Gene Assay - Final Complete


 


 9/9/20 15:02


Urine,Clean Catch Urine Culture - Final


NO GROWTH AFTER 48 HOURS Complete


 


 9/9/20 15:10


Rectum VRE Culture - Final


NO VANCOMYCIN RESISTANT ENTEROCOCCUS ... Complete

















Intake and Output  


 


 9/10/20 9/11/20





 19:00 07:00


 


  


 


# Voids  2








Objective


General: No acute distress, awake and responsive with open eyes, unable to 

follow command.


HEENT: NCAT, sclera anicteric, PERRL.


Neck: Supple, no significant jugular venous distention, 


Lungs: fair respiratory effort, coarse breath sound, no Wheeze or Rales.


Heart: Regular rate and rhythm, normal S1/S2, no murmurs, distant heart sounds.


Abdomen: soft, nontender, nondistended. Normoactive bowel sounds, obesity.


Extremities: No cyanosis , clubbing or edema. 


Neuro:  awake, responsive with open his eyes, cannot follow commands, 

contractures with rigidity in extremities.


Skin: warm, no rash.





Assessment/Plan


Assessment/Plan


ASSESSMENT:  This is a 75-year-old  male.





1. Fever.


2. Acute hypoxemic  respiratory failure.


3. COVID-19 Pneumonia.


4. Congestive heart failure.


5. Hypertension.


6. Benign prostatic hypertrophy.


7. Osteoporosis.


8. Alzheimer dementia.


9. Ventriculomegaly.


10. Diabetes type 2.


11. Cerebrovascular disease.


12. Chronic renal failure.


13. Dysphagia.


14. Hypercholesterolemia.


15. Gastroesophageal reflux disease.





TREATMENT:


1. Fever/dyspnea/COVID-19 positive.  An infectious disease consultation


has been obtained with Dr. Garcia.  A pulmonary consultation has been


obtained with Dr. Michael Sandoval.  The patient has been started


empirically on azithromycin and ceftriaxone for probable underlying


bacterial pneumonia.  The patient has been started on Decadron.  We will


follow recommendation of Infectious Disease and Pulmonary.


2. Hypertension.  Continue amlodipine as above.


3. Benign prostatic hypertrophy.  Continue Flomax as above.


4. Osteoporosis.  Continue Fosamax as above.


5. Alzheimer dementia.  Continue Aricept as above.


6. Ventriculomegaly.


7. Diabetes type 2.


8. Cerebrovascular disease, status post cerebrovascular accident.


9. Chronic renal failure.


10. Dysphagia.


11. Hypercholesterolemia.  Continue atorvastatin as above.


12. Gastroesophageal reflux disease.





CODE STATUS: Full code


DVT prophylaxis: Heparin subcu.


Decadron 6 mg IV daily.


Monitor blood glucose level closely











Andrei Cancino MD Sep 11, 2020 21:11

## 2020-09-11 NOTE — DIAGNOSTIC IMAGING REPORT
Indication: Shortness of breath

 

Technique: One view of the chest

 

Comparison: 9/9/2020

 

Findings: The heart is enlarged. Suboptimal inspiration. No acute infiltrates,

effusions, or congestion. Right shoulder prosthesis again demonstrated. No

significant interim change

 

Impression: Cardiomegaly. No definite acute process

## 2020-09-11 NOTE — NUR
NURSE HAND-OFF REPORT: 



Important Events on Shift:Patient's appetite is decreased.

Patient Status: Stable

Diet: No added salt, mechanical soft with nectar thick liquid.  Crush meds and give with 
apple sauce, and apple thickened drink.  



Pending Orders: N/A

Pending Results/Labs:am labs.

Pending MD notification:N/A



Latest Vital Signs: Temperature 96.7 , Pulse 56 , B/P 118 /76 , Respiratory Rate 19 , O2 SAT 
96 , Nasal Cannula, O2 Flow Rate 2.0 .  

Vital Sign Comment: N/A, patient removes nasal cannula, saturation is 96 on room air.



EKG Rhythm: Sinus Bradycardia

Rhythm change?: N 

MD Notified?: -

MD Response: 



Latest Mejia Fall Score: 50  

Fall Risk: High Risk 

Safety Measures: Call light Within Reach, Bed Alarm Zone 1, Side Rails Side Rails x3, Bed 
position Low and Locked.

Fall Precautions: 

Yellow Socks

Yellow Gown

Door Sign

Patient Fall Education



Report given to oncoming staff.

-------------------------------------------------------------------------------

Addendum: 09/11/20 at 1910 by BLANCHE CARTAGENA RN

-------------------------------------------------------------------------------

Report given to Yoana Santos RN.

## 2020-09-11 NOTE — NUR
ST NOTE

SWALLOW STATUS



Patient being seen for dysphagia tx and management. Current diet is mechanical soft ground 
with nectar thick liquids. 



Per RN Rosendo, in AM Patient was slightly orally defensive during medication management; 
medications with nectar thick liquids and breakfast meal. However, per charge RN Bryon, 
Patient did consume an entire tuna fish sandwich and nectar thick liquids instead of 
breakfast meal with no overt signs or symptoms of aspiration. 



Given Patient was consuming mechanical soft chopped with thin liquids at [facility] compounded by 
CXR on 9/09 not concerning for aspiration incident, Patient continues to be safe on 
mechanical soft ground solids with nectar thick liquids. 



Patients swallow deficits appear to be primarily exacerbated by preference and cognitive 
behavioral deficits (h/o congenital malformation of brain, h/o CVA, h/o Dementia) compounded 
by oral apraxia and sensorimotor deficits. SLP educated LITA Robbins on strategies to encourage 
Patient during PO, aspiration risks and precautions, and need for oral care/hygiene. 



RECOMMENDATIONS:

1. Continue Mechanical Soft- Ground with Nectar Thick Liquids via 1 to 1 careful hand 
feeding, Patient has difficulty with straws. Monitor for aspiration signs and symptoms. 

2. SLP plans to f/u with Patient 3-5x per week for dysphagia tx/management. 

3. Continue to monitor Patients ability to be transferred to radiology to complete MBSS 



SLP will continue to f/u to monitor Patients tolerance of recommended diet, train and 
educate Patient and caregivers, and to monitor for candidacy for MBSS. 



SLP [phone]

## 2020-09-11 NOTE — NUR
RD ASSESSMENT & RECOMMENDATIONS

SEE CARE ACTIVITY FOR COMPLETE ASSESSMENT



DAILY ESTIMATED NEEDS:

Needs based on Wound, DM, Cardiac 73kg 

25-30  kcals/kg 

0590-0263  total kcals

1.25-1.5  g protein/kg

  g total protein 

25-30  mL/kg

6009-7612  total fluid mLs



NUTRITION DIAGNOSIS:

* Swallowing difficulty R/T dysphagia as evidenced by SLP eval, SLP rec

for mech soft ground with nectar thick liquids

* Increased kcal/pro/micronutrients needs R/T wound healing as evidenced

by admitted w/ wounds @ sacrum and BL ankles, pending eval.





CURRENT DIET:KERI w/ mech soft ground w/ NTL    





 

PO DIET RECOMMENDATIONS:

KERI + CCHO Med (texture per SLP)   



 



ADDITIONAL RECOMMENDATIONS:

* Calibrated bedscale wt for accurate CBW 

* Monitor PO intake 

      -> add Glucerna BID for now, monitor acceptance 

* Check A1C for eval of glycemic control 

* Wound healing: add MVI x 1, Vit C 500mg QD 

   f/up w/ WC eval

## 2020-09-11 NOTE — NUR
CASE MANAGEMENT: INITIAL REVIEW



75[facility] OLD [facility] 



[facility]:FEVER

PMHX: CVA ; COPD , HTN , DEMENTIA 



SI:HYPOXIA . COVID-19 + PNA MULTIFORCAL .

99.8   83   18   128/73   93% 2L NC 



IS:IV ROCEPHIN X1 

IV ZITHROMAX 

IV DECADRON X1

CHEST X-RAY -Bibasilar atelectasis. No acute process otherwise



\**: 2E TELE UNIT 

DCP: HOME WHEN STABLE 

PLAN:

IV ABX 



CASE MANAGEMENT: INITIAL REVIEW



09/11/20



SI:HYPOXIA . COVID-19 + PNA MULTIFORCAL .

97.3   52   18   102/63   96% 2L NC 



IS:IV ROCEPHIN QD 

IV ZITHROMAX QD

IV DECADRON QD

NORVASC PO QD

HEPARIN SQ BID



\**: 2E TELE UNIT 

DCP: HOME WHEN STABLE 

PLAN:

CONT IV ABX

## 2020-09-11 NOTE — NUR
NURSE NOTES:

RECEIVED REPORT FROM LITA MEDEL. AOX1, AROUSABLE TO VOICE AND TACTILE STIMULI ONLY, NON-VERBAL, 
UNABLE TO FOLLOW COMMANDS. NO S/SX OF PAIN OR DISCOMFORT AT THIS TIME. BREATHING IS EVEN AND 
UNLABORED ON 2LPM VIA NASAL CANNULA, NO S/SX OF DISTRESS NOTED- PATIENT NOTED TO BE REMOVING 
IT DESPITE INSTRUCTION TO BE LEFT ON. IV SITE ON LEFT WRIST PATENT, INTACT, AND 
ASYMPTOMATIC. CONDOM CATHETER IN PLACE, DRAINING WELL TO GRAVITY WITH URINE CLEAR AND DARK 
YELLOW IN COLOR. FALL AND ASPIRATION PRECAUTIONS IN PLACE. BED LOCKED AND IN LOWEST 
POSITION, SIDERAILS UP X 3. CALL LIGHT WITHIN REACH. WILL CONTINUE TO MONITOR FOR ANY 
CHANGES.

## 2020-09-11 NOTE — CONSULTATION
History of Present Illness


General


Date patient seen:  Sep 11, 2020


Reason for Hospitalization:  Fever





Present Illness


HPI


75-year-old Telugu male with past medical history of COPD , previous CVA with 

residual deficit of aphasia without motor deficit, hypertension, dementia, 

dyslipidemia, BPH brought in from [facility] 

secondary to complaint of fever and cough.  Admitted for care and management.  

History is limited secondary to patient's clinical status.  According to EMS, 

SNF nurses told them that patient has been febrile for the past 2 days.  T-max 

99.  He is also been having a dry cough. on admission abnormal labs, ill 

appearing, pain.  surgery called to evaluate and assist with care.


Allergies:  


Coded Allergies:  


     No Known Allergies (Unverified , 8/20/12)





COVID-19 Screening


Contact w/high risk pt:  Yes


Experienced COVID-19 symptoms?:  Yes


COVID-19 symptoms experienced:  Fever (T>100.4F or >38C)





Medication History


Scheduled


Alendronate Sodium* (Fosamax*), 70 MG ORAL ONCE A WEEK, (Reported)


Amlodipine Besylate (Norvasc), 5 MG ORAL DAILY, (Reported)


Aspirin* (Aspir 81*), 81 MG ORAL DAILY, (Reported)


Atorvastatin Calcium* (Atorvastatin Calcium*), 10 MG ORAL BEDTIME, (Reported)


Cephalexin (Cephalexin), 500 MG ORAL FOUR TIMES A DAY


Cranberry Fruit Concentrate (Cranberry), 450 MG PO BID, (Reported)


Docusate Sodium* (Colace*), 200 MG ORAL DAILY, (Reported)


Donepezil Hcl* (Donepezil Hcl*), 5 MG ORAL DAILY, (Reported)


Donepezil Hcl* (Aricept*), 10 MG ORAL DAILY, (Reported)


Furosemide* (Lasix*), 20 MG ORAL DAILY, (Reported)


Lisinopril (Lisinopril*), 5 MG ORAL DAILY, (Reported)


Multivitamin (Multi Vitamin Daily), 1 TAB ORAL DAILY, (Reported)


Tamsulosin HCl (Flomax), 0.4 MG ORAL DAILY, (Reported)





Scheduled PRN


Acetaminophen (Tylenol), 650 MG ORAL Q6H PRN for Fever/Headache/Mild Pain, (

Reported)


Guaifenesin (Kesha-Tussin), 15 ML PO EVERY 8 HOURS PRN for For Cough, (Reported)


Nitroglycerin (Nitroglycerin), 0.4 MG SL AS NEEDED PRN for For Pain, (Reported)





Miscellaneous Medications


Calcium Carbonate/Vitamin D3 (Calcium 500+D Tablet Chew), 1 EACH PO, (Reported)





Patient History


Limited by:  language barrier, age, medical condition


History Provided By:  Medical Record, PMD


Healthcare decision maker


N


Resuscitation status





Advanced Directive on File








Past Medical/Surgical History


Past Medical/Surgical History:  


(1) Acute bronchitis


(2) 2019 novel coronavirus disease (COVID-19)


(3) Gram-negative bacteremia


(4) Osteoporosis


(5) UTI (urinary tract infection)


(6) Altered mental state


(7) Alzheimer's dementia


(8) Sepsis


(9) Acute encephalopathy


(10) Severe sepsis


(11) Aphasia


(12) HTN (hypertension)


(13) BPH (benign prostatic hyperplasia)


(14) History of CVA (cerebrovascular accident)


(15) Multifocal pneumonia





Review of Systems


Review of Symptoms





Review of systems:


CONST: ++ fevers or chills, No night sweats


PULMONARY: ++ cough,  ++ shortness of breath 


CARDIAC: No chest pain, No palpitations 


GI: No vomiting, No diarrhea , No melena_or_BRBPR 


: No dysuria, No hematuria, No discharge 


NEURO: No new_focal_weakness_or_numbness, No confusion, No vision changes


14 point Review of Systems is otherwise negative except per HPI





Physical Exam


Physical Exam








Physical Exam:


GENERAL: Awake, chronically ill-appearing,  no acute distress.  Hypoxic on room 

air


EYES: Pupils reactive.  Conjunctiva clear.  Aphasic


ENT: External nose and ear appear normal. Oropharynx clear. Head atraumatic.


NECK:  No thyromegaly.  No midline tenderness. 


LUNGS: Normal respiratory effort.  Coarse breath sounds bilaterally.  No 

subcostal retractions.  No wheezing.  No rales


CARDIAC: Regular rate and rhythm.  Normal radial pulses bilaterally. No 

significant pedal edema.


ABDOMEN: Soft, nontender, and nondistended.  No rebound/guarding.  No 

hepatosplenomegaly.


In soiled diaper


MSK:  Poor muscle tone, contractures with rigidity in extremities.  Extremities 

without asymmetric deformity or swelling.  


NEUROLOGIC: Awake.  Protecting airway.  Withdraws to pain in extremities, 

groans and opens eyes to sternal rub.


SKIN: Warm and dry.  No cyanosis or urticaria present.





Last 24 Hour Vital Signs








  Date Time  Temp Pulse Resp B/P (MAP) Pulse Ox O2 Delivery O2 Flow Rate FiO2


 


9/11/20 14:01  58 20  90 Nasal Cannula 2.0 28


 


9/11/20 12:00  59      


 


9/11/20 12:00 98.7 67 20 117/67 (84) 97   


 


9/11/20 09:12    114/73    


 


9/11/20 09:12  57  114/73    


 


9/11/20 09:00      Nasal Cannula 2.0 


 


9/11/20 08:00  57      


 


9/11/20 08:00 98.7 57 18 114/73 (87) 97   


 


9/11/20 04:00  52      


 


9/11/20 04:00 97.3 55 18 102/63 (76) 96   


 


9/11/20 00:00  59      


 


9/11/20 00:00 96.7 59 18 100/59 (73) 96   


 


9/10/20 21:00      Nasal Cannula 2.0 


 


9/10/20 20:00  66      


 


9/10/20 20:00 97.7 57 18 102/57 (72) 94   


 


9/10/20 16:00 96.8 61 19 122/82 (95) 96   


 


9/10/20 16:00  57      

















Intake and Output  


 


 9/10/20 9/11/20





 19:00 07:00


 


  


 


# Voids  2











Laboratory Tests








Test


  9/11/20


08:15 9/11/20


10:10


 


White Blood Count


  8.4 K/UL


(4.8-10.8) 


 


 


Red Blood Count


  5.42 M/UL


(4.70-6.10) 


 


 


Hemoglobin


  15.8 G/DL


(14.2-18.0) 


 


 


Hematocrit


  47.8 %


(42.0-52.0) 


 


 


Mean Corpuscular Volume 88 FL (80-99)   


 


Mean Corpuscular Hemoglobin


  29.2 PG


(27.0-31.0) 


 


 


Mean Corpuscular Hemoglobin


Concent 33.1 G/DL


(32.0-36.0) 


 


 


Red Cell Distribution Width


  12.5 %


(11.6-14.8) 


 


 


Platelet Count


  159 K/UL


(150-450) 


 


 


Mean Platelet Volume


  7.4 FL


(6.5-10.1) 


 


 


Neutrophils (%) (Auto)


  64.4 %


(45.0-75.0) 


 


 


Lymphocytes (%) (Auto)


  24.4 %


(20.0-45.0) 


 


 


Monocytes (%) (Auto)


  9.8 %


(1.0-10.0) 


 


 


Eosinophils (%) (Auto)


  0.1 %


(0.0-3.0) 


 


 


Basophils (%) (Auto)


  1.4 %


(0.0-2.0) 


 


 


Sodium Level


  142 MMOL/L


(136-145) 


 


 


Potassium Level


  3.8 MMOL/L


(3.5-5.1) 


 


 


Chloride Level


  105 MMOL/L


() 


 


 


Carbon Dioxide Level


  27 MMOL/L


(21-32) 


 


 


Anion Gap


  10 mmol/L


(5-15) 


 


 


Blood Urea Nitrogen


  26 mg/dL


(7-18)  H 


 


 


Creatinine


  1.2 MG/DL


(0.55-1.30) 


 


 


Estimat Glomerular Filtration


Rate 58.7 mL/min


(>60) 


 


 


Glucose Level


  118 MG/DL


()  H 


 


 


Calcium Level


  9.0 MG/DL


(8.5-10.1) 


 


 


Phosphorus Level


  2.9 MG/DL


(2.5-4.9) 


 


 


Magnesium Level


  2.4 MG/DL


(1.8-2.4) 


 


 


Total Bilirubin


  0.4 MG/DL


(0.2-1.0) 


 


 


Aspartate Amino Transf


(AST/SGOT) 50 U/L (15-37)


H 


 


 


Alanine Aminotransferase


(ALT/SGPT) 40 U/L (12-78)


  


 


 


Alkaline Phosphatase


  108 U/L


() 


 


 


C-Reactive Protein,


Quantitative 2.1 mg/dL


(0.00-0.90)  H 


 


 


Total Protein


  7.9 G/DL


(6.4-8.2) 


 


 


Albumin


  2.8 G/DL


(3.4-5.0)  L 


 


 


Globulin 5.1 g/dL   


 


Albumin/Globulin Ratio


  0.5 (1.0-2.7)


L 


 


 


Erythrocyte Sedimentation Rate


  


  68 MM/HR


(0-20)  H








Height (Feet):  5


Height (Inches):  5.00


Weight (Pounds):  159


Medications





Current Medications








 Medications


  (Trade)  Dose


 Ordered  Sig/Miky


 Route


 PRN Reason  Start Time


 Stop Time Status Last Admin


Dose Admin


 


 Acetaminophen


  (Tylenol)  650 mg  Q4H  PRN


 ORAL


 FEVER  9/9/20 17:00


 10/9/20 16:59   


 


 


 Albuterol/


 Ipratropium


  (Combivent


 Respimat)  1 puff  Q4H  PRN


 INH


 Shortness of Breath  9/9/20 17:15


 10/9/20 17:14   


 


 


 Amlodipine


 Besylate


  (Norvasc)  5 mg  DAILY


 ORAL


   9/10/20 09:00


 10/10/20 08:59  9/11/20 09:12


 


 


 Azithromycin 250


 mg/Dextrose  275 ml @ 


 275 mls/hr  Q24HRS


 IV


   9/10/20 14:00


 9/15/20 13:59  9/10/20 13:09


 


 


 Barium Sulfate


  (Varibar Honey)  250 ml  NOW  PRN


 MC


 RAD  9/10/20 12:45


 9/13/20 12:38   


 


 


 Barium Sulfate


  (Varibar Nectar)  240 ml  NOW  PRN


 MC


 RAD  9/10/20 12:45


 9/13/20 12:38   


 


 


 Barium Sulfate


  (Varibar Pudding)  230 ml  NOW  PRN


 MC


 RAD  9/10/20 12:45


 9/13/20 12:38   


 


 


 Barium Sulfate


  (Varibar Thin


 Liquid powder)  148 gm  NOW  PRN


 MC


 RAD  9/10/20 12:45


 9/13/20 12:38   


 


 


 Ceftriaxone


 Sodium 1 gm/


 Dextrose  55 ml @ 


 110 mls/hr  Q24H


 IVPB


   9/10/20 14:00


 9/17/20 13:59  9/10/20 13:53


 


 


 Dexamethasone


  (Decadron)  4 mg  DAILY


 ORAL


   9/10/20 09:00


 10/10/20 08:59  9/11/20 09:12


 


 


 Dextrose


  (Dextrose 50%)  25 ml  Q30M  PRN


 IV


 Hypoglycemia  9/9/20 17:00


 12/8/20 16:59   


 


 


 Dextrose


  (Dextrose 50%)  50 ml  Q30M  PRN


 IV


 Hypoglycemia  9/9/20 17:00


 12/8/20 16:59   


 


 


 Donepezil HCl


  (Aricept)  5 mg  DAILY


 ORAL


   9/10/20 09:00


 10/10/20 08:59  9/11/20 09:12


 


 


 Heparin Sodium


  (Porcine)


  (Heparin 5000


 units/ml)  5,000 units  EVERY 12  HOURS


 SUBQ


   9/9/20 21:00


 10/24/20 20:59  9/11/20 09:13


 


 


 Lisinopril


  (ZestriL)  5 mg  DAILY


 ORAL


   9/10/20 09:00


 10/10/20 08:59  9/11/20 09:12


 


 


 Ondansetron HCl


  (Zofran)  4 mg  Q6H  PRN


 IVP


 Nausea & Vomiting  9/9/20 17:00


 10/9/20 16:59   


 


 


 Polyethylene


 Glycol


  (Miralax)  17 gm  DAILYPRN  PRN


 ORAL


 Constipation  9/9/20 17:00


 10/9/20 16:59   


 


 


 Promethazine HCl/


 Codeine


  (Phenergan with


 Codeine)  5 ml  Q6H  PRN


 ORAL


 cough  9/9/20 17:00


 10/9/20 16:59   


 


 


 Tamsulosin HCl


  (Flomax)  0.4 mg  DAILY


 ORAL


   9/10/20 09:00


 10/10/20 08:59  9/11/20 09:12


 











Assessment/Plan


Problem List:  


(1) Acute bronchitis


ICD Codes:  J20.9 - Acute bronchitis, unspecified


SNOMED:  30537643


(2) 2019 novel coronavirus disease (COVID-19)


Assessment & Plan:  ++


as per ID


pulm input appreciated


ICD Codes:  U07.1 - COVID-19


SNOMED:  501310934


(3) Gram-negative bacteremia


ICD Codes:  R78.81 - Bacteremia


SNOMED:  251774289991


(4) Osteoporosis


ICD Codes:  M81.0 - Age-related osteoporosis without current pathological 

fracture


SNOMED:  51450557


(5) UTI (urinary tract infection)


ICD Codes:  N39.0 - Urinary tract infection, site not specified


SNOMED:  17464104


(6) Altered mental state


(7) Alzheimer's dementia


ICD Codes:  G30.9 - Alzheimer's disease, unspecified


SNOMED:  37210792


(8) Sepsis


ICD Codes:  A41.9 - Sepsis, unspecified organism


SNOMED:  20341838


(9) Acute encephalopathy


ICD Codes:  G93.40 - Encephalopathy, unspecified


SNOMED:  5617268


(10) Severe sepsis


ICD Codes:  A41.9 - Sepsis, unspecified organism; R65.20 - Severe sepsis 

without septicshock


SNOMED:  61912948


(11) Aphasia


ICD Codes:  R47.01 - Aphasia; J12.89 - Other viral pneumonia


SNOMED:  43940487, 207587514


(12) HTN (hypertension)


ICD Codes:  I10 - Essential (primary) hypertension


SNOMED:  96658079


(13) BPH (benign prostatic hyperplasia)


ICD Codes:  N40.0 - Benign prostatic hyperplasia without lower urinary tract 

symptoms


SNOMED:  179145517, 391033465


(14) History of CVA (cerebrovascular accident)


ICD Codes:  Z86.73 - Personal history of transient ischemic attack (TIA), and 

cerebral infarction without residual deficits; J12.89 - Other viral pneumonia


SNOMED:  545453375, 005449545


(15) Multifocal pneumonia


ICD Codes:  J18.9 - Pneumonia, unspecified organism; J12.89 - Other viral 

pneumonia


SNOMED:  941721290, 637720726


(16) Decubitus skin ulcer


Assessment & Plan:  75-year-old male presented to [facility] 

covered positive respiratory fevers admitted further care and management which 

time identified to have multiple decubitus skin ulcers and scrotal edema.  

Patient identified to have a stage III sacral buttock decubitus ulcer with 

periwound erythema and breakdown.  Incontinence associated dermatitis 

identified.  Care plan initiated after patient evaluation.  Will follow with 

recommendations.  Thank you





DAILY ESTIMATED NEEDS:


Needs based on Wound, DM, Cardiac 73kg 


25-30  kcals/kg 


9981-6597  total kcals


1.25-1.5  g protein/kg


  g total protein 


25-30  mL/kg


0078-5660  total fluid mLs





NUTRITION DIAGNOSIS:


* Swallowing difficulty R/T dysphagia as evidenced by SLP eval, SLP rec


for mech soft ground with nectar thick liquids


* Increased kcal/pro/micronutrients needs R/T wound healing as evidenced


by admitted w/ wounds @ sacrum and BL ankles, pending eval.








CURRENT DIET:KERI w/ mech soft ground w/ NTL    








 


PO DIET RECOMMENDATIONS:


KERI + CCHO Med (texture per SLP)   





 





ADDITIONAL RECOMMENDATIONS:


* Calibrated bedscale wt for accurate CBW 


* Monitor PO intake 


      -> add Glucerna BID for now, monitor acceptance 


* Check A1C for eval of glycemic control 


* Wound healing: add MVI x 1, Vit C 500mg QD 


   f/up w/ WC eval


ICD Codes:  L89.90 - Pressure ulcer of unspecified site, unspecified stage


SNOMED:  558688840


(17) Scrotal edema


Assessment & Plan:  Patient identified to have significant scrotal edema 

potential cellulitis on admission.  No abscess identified.  Tile place entering 

the scrotum.  We will monitor over the course of the next few days


ICD Codes:  N50.89 - Other specified disorders of the male genital organs


SNOMED:  25164082











Tunde Hammer Sep 11, 2020 14:37

## 2020-09-11 NOTE — NUR
NURSE NOTES:

Received report from Jeimy Regalado RN.  Patient sitting up in bed, on room air, IV in 
right wrist 22 gauge, IV in left wrist 22 guage, both saline locked, bed in lowest position, 
call light within reach, side rails up x 3, no c/o pain, no SOB, in no apparent distress.

## 2020-09-11 NOTE — NUR
NURSE NOTES:

S/W RICHIE FROM [facility] REGARDING PATIENT'S VACCINATION HISTORY; ACCORDING TO 
RICHIE, THEIR RECORDS SHOW PATIENT RECEIVED FLU VACCINE 10/2019, AND PNA VACCINE 12/2017

## 2020-09-11 NOTE — PULMONOLOGY PROGRESS NOTE
Subjective


ROS Limited/Unobtainable:  No


Constitutional:  Reports: no symptoms


HEENT:  Repors: no symptoms


Respiratory:  Reports: no symptoms


Allergies:  


Coded Allergies:  


     No Known Allergies (Unverified , 8/20/12)





Objective





Last 24 Hour Vital Signs








  Date Time  Temp Pulse Resp B/P (MAP) Pulse Ox O2 Delivery O2 Flow Rate FiO2


 


9/11/20 09:12    114/73    


 


9/11/20 09:12  57  114/73    


 


9/11/20 09:00      Nasal Cannula 2.0 


 


9/11/20 08:00  57      


 


9/11/20 08:00 98.7 57 18 114/73 (87) 97   


 


9/11/20 04:00  52      


 


9/11/20 04:00 97.3 55 18 102/63 (76) 96   


 


9/11/20 00:00  59      


 


9/11/20 00:00 96.7 59 18 100/59 (73) 96   


 


9/10/20 21:00      Nasal Cannula 2.0 


 


9/10/20 20:00  66      


 


9/10/20 20:00 97.7 57 18 102/57 (72) 94   


 


9/10/20 16:00 96.8 61 19 122/82 (95) 96   


 


9/10/20 16:00  57      

















Intake and Output  


 


 9/10/20 9/11/20





 19:00 07:00


 


  


 


# Voids  2








General Appearance:  WD/WN


HEENT:  normocephalic, atraumatic


Respiratory:  chest wall non-tender, lungs clear, respiratory distress


Cardiovascular:  normal peripheral pulses, normal rate


Abdomen:  normal bowel sounds, soft, non tender


Genitourinary:  normal external genitalia


Extremities:  no cyanosis





Microbiology








 Date/Time


Source Procedure


Growth Status


 


 


 9/9/20 14:30


Blood Blood Culture - Preliminary


NO GROWTH AFTER 24 HOURS Resulted


 


 9/9/20 14:15


Blood Blood Culture - Preliminary


Gram Positive Cocci Resulted


 


 9/9/20 14:30


Nasopharynx SARS-CoV-2 RdRp Gene Assay - Final Complete


 


 9/9/20 15:02


Urine,Clean Catch Urine Culture - Final


NO GROWTH AFTER 48 HOURS Complete


 


 9/9/20 15:10


Rectum VRE Culture - Final


NO VANCOMYCIN RESISTANT ENTEROCOCCUS ... Complete








Laboratory Tests


9/11/20 08:15: 


White Blood Count 8.4, Red Blood Count 5.42, Hemoglobin 15.8, Hematocrit 47.8, 

Mean Corpuscular Volume 88, Mean Corpuscular Hemoglobin 29.2, Mean Corpuscular 

Hemoglobin Concent 33.1, Red Cell Distribution Width 12.5, Platelet Count 159, 

Mean Platelet Volume 7.4, Neutrophils (%) (Auto) 64.4, Lymphocytes (%) (Auto) 

24.4, Monocytes (%) (Auto) 9.8, Eosinophils (%) (Auto) 0.1, Basophils (%) (Auto

) 1.4, Sodium Level 142, Potassium Level 3.8, Chloride Level 105, Carbon 

Dioxide Level 27, Anion Gap 10, Blood Urea Nitrogen 26H, Creatinine 1.2, 

Estimat Glomerular Filtration Rate 58.7, Glucose Level 118H, Calcium Level 9.0, 

Phosphorus Level 2.9, Magnesium Level 2.4, Total Bilirubin 0.4, Aspartate Amino 

Transf (AST/SGOT) 50H, Alanine Aminotransferase (ALT/SGPT) 40, Alkaline 

Phosphatase 108, C-Reactive Protein, Quantitative 2.1H, Total Protein 7.9, 

Albumin 2.8L, Globulin 5.1, Albumin/Globulin Ratio 0.5L


9/11/20 10:10: Erythrocyte Sedimentation Rate 68H





Current Medications








 Medications


  (Trade)  Dose


 Ordered  Sig/Miky


 Route


 PRN Reason  Start Time


 Stop Time Status Last Admin


Dose Admin


 


 Acetaminophen


  (Tylenol)  650 mg  Q4H  PRN


 ORAL


 FEVER  9/9/20 17:00


 10/9/20 16:59   


 


 


 Albuterol/


 Ipratropium


  (Combivent


 Respimat)  1 puff  Q4H  PRN


 INH


 Shortness of Breath  9/9/20 17:15


 10/9/20 17:14   


 


 


 Amlodipine


 Besylate


  (Norvasc)  5 mg  DAILY


 ORAL


   9/10/20 09:00


 10/10/20 08:59  9/11/20 09:12


 


 


 Azithromycin 250


 mg/Dextrose  275 ml @ 


 275 mls/hr  Q24HRS


 IV


   9/10/20 14:00


 9/15/20 13:59  9/10/20 13:09


 


 


 Barium Sulfate


  (Varibar Honey)  250 ml  NOW  PRN


 MC


 RAD  9/10/20 12:45


 9/13/20 12:38   


 


 


 Barium Sulfate


  (Varibar Nectar)  240 ml  NOW  PRN


 MC


 RAD  9/10/20 12:45


 9/13/20 12:38   


 


 


 Barium Sulfate


  (Varibar Pudding)  230 ml  NOW  PRN


 MC


 RAD  9/10/20 12:45


 9/13/20 12:38   


 


 


 Barium Sulfate


  (Varibar Thin


 Liquid powder)  148 gm  NOW  PRN


 MC


 RAD  9/10/20 12:45


 9/13/20 12:38   


 


 


 Ceftriaxone


 Sodium 1 gm/


 Dextrose  55 ml @ 


 110 mls/hr  Q24H


 IVPB


   9/10/20 14:00


 9/17/20 13:59  9/10/20 13:53


 


 


 Dexamethasone


  (Decadron)  4 mg  DAILY


 ORAL


   9/10/20 09:00


 10/10/20 08:59  9/11/20 09:12


 


 


 Dextrose


  (Dextrose 50%)  25 ml  Q30M  PRN


 IV


 Hypoglycemia  9/9/20 17:00


 12/8/20 16:59   


 


 


 Dextrose


  (Dextrose 50%)  50 ml  Q30M  PRN


 IV


 Hypoglycemia  9/9/20 17:00


 12/8/20 16:59   


 


 


 Donepezil HCl


  (Aricept)  5 mg  DAILY


 ORAL


   9/10/20 09:00


 10/10/20 08:59  9/11/20 09:12


 


 


 Heparin Sodium


  (Porcine)


  (Heparin 5000


 units/ml)  5,000 units  EVERY 12  HOURS


 SUBQ


   9/9/20 21:00


 10/24/20 20:59  9/11/20 09:13


 


 


 Lisinopril


  (ZestriL)  5 mg  DAILY


 ORAL


   9/10/20 09:00


 10/10/20 08:59  9/11/20 09:12


 


 


 Ondansetron HCl


  (Zofran)  4 mg  Q6H  PRN


 IVP


 Nausea & Vomiting  9/9/20 17:00


 10/9/20 16:59   


 


 


 Polyethylene


 Glycol


  (Miralax)  17 gm  DAILYPRN  PRN


 ORAL


 Constipation  9/9/20 17:00


 10/9/20 16:59   


 


 


 Promethazine HCl/


 Codeine


  (Phenergan with


 Codeine)  5 ml  Q6H  PRN


 ORAL


 cough  9/9/20 17:00


 10/9/20 16:59   


 


 


 Tamsulosin HCl


  (Flomax)  0.4 mg  DAILY


 ORAL


   9/10/20 09:00


 10/10/20 08:59  9/11/20 09:12


 











Assessment/Plan


Problems:  


(1) Gram-negative bacteremia


(2) 2019 novel coronavirus disease (COVID-19)


(3) Acute bronchitis


(4) History of CVA (cerebrovascular accident)


(5) BPH (benign prostatic hyperplasia)


(6) HTN (hypertension)


(7) Alzheimer's dementia


Assessment/Plan


respiratory isolation


check sputum


iv abx


ID to see


f/u electrolytes


titrate fio2 to sat of 92%


f/u inflammatory markers


monitor BP


dvt prophylaxis











Michael Sandoval MD Sep 11, 2020 13:04

## 2020-09-11 NOTE — NUR
NURSE HAND-OFF REPORT: 



Important Events on Shift: None 

Patient Status: Stable

Diet: KERI mech soft



Pending Orders: CXR, 

Pending Results/Labs: CMP, Mag,, Sed rate, CBC, Phos, CRP,  

Pending MD notification: None



Latest Vital Signs: Temperature 97.3 , Pulse 55 , B/P 102 /63 , Respiratory Rate 18 , O2 SAT 
96 , Nasal Cannula, O2 Flow Rate 2.0 .  

Vital Sign Comment: Stable



EKG Rhythm: Sinus Bradycardia

Rhythm change?: N 

MD Notified?: -

MD Response: -



Latest Mejia Fall Score: 50  

Fall Risk: High Risk 

Safety Measures: Call light Within Reach, Bed Alarm Zone 1, Side Rails Side Rails x3, Bed 
position Low and Locked.

Fall Precautions: 

Yellow Socks yes 

Yellow Gown yes 

Door Sign yes 

Patient Fall Education yes 



Report given to Rosendo CAMACHO RN

## 2020-09-11 NOTE — NUR
NURSE NOTES:

ATTEMPTED TO PROVIDE ORAL CARE, HOWEVER PATIENT REFUSED AND KEPT MOUTH CLOSED DESPITE 
EXPLAINING PURPOSE/SIGNIFICANCE.

## 2020-09-12 VITALS — DIASTOLIC BLOOD PRESSURE: 54 MMHG | SYSTOLIC BLOOD PRESSURE: 147 MMHG

## 2020-09-12 VITALS — SYSTOLIC BLOOD PRESSURE: 133 MMHG | DIASTOLIC BLOOD PRESSURE: 64 MMHG

## 2020-09-12 VITALS — SYSTOLIC BLOOD PRESSURE: 136 MMHG | DIASTOLIC BLOOD PRESSURE: 74 MMHG

## 2020-09-12 VITALS — DIASTOLIC BLOOD PRESSURE: 87 MMHG | SYSTOLIC BLOOD PRESSURE: 158 MMHG

## 2020-09-12 VITALS — SYSTOLIC BLOOD PRESSURE: 140 MMHG | DIASTOLIC BLOOD PRESSURE: 76 MMHG

## 2020-09-12 VITALS — SYSTOLIC BLOOD PRESSURE: 123 MMHG | DIASTOLIC BLOOD PRESSURE: 73 MMHG

## 2020-09-12 LAB
ADD MANUAL DIFF: NO
ALBUMIN SERPL-MCNC: 2.8 G/DL (ref 3.4–5)
ALBUMIN/GLOB SERPL: 0.5 {RATIO} (ref 1–2.7)
ALP SERPL-CCNC: 110 U/L (ref 46–116)
ALT SERPL-CCNC: 49 U/L (ref 12–78)
ANION GAP SERPL CALC-SCNC: 11 MMOL/L (ref 5–15)
AST SERPL-CCNC: 68 U/L (ref 15–37)
BASOPHILS NFR BLD AUTO: 1.7 % (ref 0–2)
BILIRUB SERPL-MCNC: 0.4 MG/DL (ref 0.2–1)
BUN SERPL-MCNC: 20 MG/DL (ref 7–18)
CALCIUM SERPL-MCNC: 8.5 MG/DL (ref 8.5–10.1)
CHLORIDE SERPL-SCNC: 105 MMOL/L (ref 98–107)
CO2 SERPL-SCNC: 24 MMOL/L (ref 21–32)
CREAT SERPL-MCNC: 1.3 MG/DL (ref 0.55–1.3)
EOSINOPHIL NFR BLD AUTO: 0.2 % (ref 0–3)
ERYTHROCYTE [DISTWIDTH] IN BLOOD BY AUTOMATED COUNT: 12.3 % (ref 11.6–14.8)
GLOBULIN SER-MCNC: 5.1 G/DL
HCT VFR BLD CALC: 48.5 % (ref 42–52)
HGB BLD-MCNC: 15.8 G/DL (ref 14.2–18)
LYMPHOCYTES NFR BLD AUTO: 14.8 % (ref 20–45)
MCV RBC AUTO: 88 FL (ref 80–99)
MONOCYTES NFR BLD AUTO: 12.5 % (ref 1–10)
NEUTROPHILS NFR BLD AUTO: 70.9 % (ref 45–75)
PHOSPHATE SERPL-MCNC: 2.5 MG/DL (ref 2.5–4.9)
PLATELET # BLD: 167 K/UL (ref 150–450)
POTASSIUM SERPL-SCNC: 3.5 MMOL/L (ref 3.5–5.1)
RBC # BLD AUTO: 5.54 M/UL (ref 4.7–6.1)
SODIUM SERPL-SCNC: 140 MMOL/L (ref 136–145)
WBC # BLD AUTO: 5.7 K/UL (ref 4.8–10.8)

## 2020-09-12 RX ADMIN — TAMSULOSIN HYDROCHLORIDE SCH MG: 0.4 CAPSULE ORAL at 09:05

## 2020-09-12 RX ADMIN — DEXTROSE AND SODIUM CHLORIDE SCH MLS/HR: 5; .45 INJECTION, SOLUTION INTRAVENOUS at 21:47

## 2020-09-12 RX ADMIN — SODIUM CHLORIDE SCH MLS/HR: 0.9 INJECTION INTRAVENOUS at 14:00

## 2020-09-12 RX ADMIN — HEPARIN SODIUM SCH UNITS: 5000 INJECTION INTRAVENOUS; SUBCUTANEOUS at 21:00

## 2020-09-12 RX ADMIN — LISINOPRIL SCH MG: 2.5 TABLET ORAL at 09:05

## 2020-09-12 RX ADMIN — HEPARIN SODIUM SCH UNITS: 5000 INJECTION INTRAVENOUS; SUBCUTANEOUS at 09:06

## 2020-09-12 RX ADMIN — AZITHROMYCIN DIHYDRATE SCH MLS/HR: 500 INJECTION, POWDER, LYOPHILIZED, FOR SOLUTION INTRAVENOUS at 14:00

## 2020-09-12 RX ADMIN — Medication SCH MG: at 09:05

## 2020-09-12 RX ADMIN — DONEPEZIL HYDROCHLORIDE SCH MG: 5 TABLET, FILM COATED ORAL at 09:04

## 2020-09-12 NOTE — NUR
NURSE NOTES:

Received report from Abdoul Dugan RN.  Patient sitting up in bed, on room air, IV on 
right wrist 22 gauge, IV on left wrist 22 g, started of D5 1/2 NS at 50 ml/h. Reported that 
pt had minimal intake throughout the day, just received orders for IVF. bed, locked in 
lowest position, call light within reach, side rails up x 3, no c/o pain, no SOB, in no 
apparent distress. Took pictures to be uploaded noting sacral stage 2, possible dti 
periwound, dark discoloration to R and left buttocks, R and L lateral malleolus slight 
redness, possibly resolving, scrotal edema, optifoam applied to bilateral feet, ankles, 
sacrum and buttocks, scrotum elevated on a pillow. Condom cath off and leaking, gown wet, 
placed new gown on patient and clean and dry at this time, reapplied condom cath and 
repositioned patient. will continue to monitor and reposition frequently, patient is not on 
a pressure relief mattress

## 2020-09-12 NOTE — NUR
NURSE HAND-OFF REPORT: 



Important Events on Shift: RESISTIVE TO CARE, FOR CXR TODAY

Patient Status: STABLE

Diet: KERI, MECHANICAL SOFT WITH NECTAR THICK LIQUIDS



Pending Orders: N/A

Pending Results/Labs:9/12 AM LABS

Pending MD notification: N/A



Latest Vital Signs: Temperature 99.0 , Pulse 76 , B/P 133 /64 , Respiratory Rate 20 , O2 SAT 
95 , Room Air, O2 Flow Rate 2.0 .  

Vital Sign Comment: STABLE



EKG Rhythm: Sinus Rhythm

Rhythm change?: N 

MD Notified?: -

MD Response: 



Latest Mejia Fall Score: 35  

Fall Risk: Medium Risk 

Safety Measures: Call light Within Reach, Bed Alarm Zone 1, Side Rails Side Rails x3, Bed 
position Low and Locked.

Fall Precautions: 

Yellow Socks

Yellow Gown

Door Sign

Patient Fall Education



Report given to LITA NUR.

## 2020-09-12 NOTE — SURGERY PROGRESS NOTE
Surgery Progress Note


Subjective


Additional Comments


afebrile


HD stable


labs improved


comfortable no complaints





Objective





Last 24 Hour Vital Signs








  Date Time  Temp Pulse Resp B/P (MAP) Pulse Ox O2 Delivery O2 Flow Rate FiO2


 


9/12/20 09:05    147/54    


 


9/12/20 09:05  70  147/54    


 


9/12/20 08:00 97.9 70 19 147/54 (85) 97   


 


9/12/20 04:00  76      


 


9/12/20 04:00 99.0 80 20 133/64 (87) 95   


 


9/12/20 00:00 98.6 71 20 140/76 (97) 96   


 


9/12/20 00:00  65      


 


9/11/20 21:00 98.6 64 21 110/64 (79) 96   


 


9/11/20 21:00      Room Air  


 


9/11/20 21:00  66      


 


9/11/20 16:00 96.7 76 19 118/76 (90) 96   


 


9/11/20 16:00  56      


 


9/11/20 14:01  58 20  90 Nasal Cannula 2.0 28








I&O











Intake and Output  


 


 9/11/20 9/12/20





 19:00 07:00


 


Intake Total 330 ml 


 


Output Total  550 ml


 


Balance 330 ml -550 ml


 


  


 


IV Total 330 ml 


 


Output Urine Total  550 ml


 


# Voids  1








Dressing:  saturated


Cardiovascular:  RSR


Respiratory:  decreased breath sounds


Abdomen:  soft, non-tender, absent bowel sounds


Extremities:  no edema, no tenderness, no cyanosis, pulses, other





Laboratory Tests








Test


  9/12/20


07:10


 


White Blood Count


  5.7 K/UL


(4.8-10.8)


 


Red Blood Count


  5.54 M/UL


(4.70-6.10)


 


Hemoglobin


  15.8 G/DL


(14.2-18.0)


 


Hematocrit


  48.5 %


(42.0-52.0)


 


Mean Corpuscular Volume 88 FL (80-99)  


 


Mean Corpuscular Hemoglobin


  28.6 PG


(27.0-31.0)


 


Mean Corpuscular Hemoglobin


Concent 32.6 G/DL


(32.0-36.0)


 


Red Cell Distribution Width


  12.3 %


(11.6-14.8)


 


Platelet Count


  167 K/UL


(150-450)


 


Mean Platelet Volume


  8.2 FL


(6.5-10.1)


 


Neutrophils (%) (Auto)


  70.9 %


(45.0-75.0)


 


Lymphocytes (%) (Auto)


  14.8 %


(20.0-45.0)  L


 


Monocytes (%) (Auto)


  12.5 %


(1.0-10.0)  H


 


Eosinophils (%) (Auto)


  0.2 %


(0.0-3.0)


 


Basophils (%) (Auto)


  1.7 %


(0.0-2.0)


 


Erythrocyte Sedimentation Rate


  66 MM/HR


(0-20)  H


 


Sodium Level


  140 MMOL/L


(136-145)


 


Potassium Level


  3.5 MMOL/L


(3.5-5.1)


 


Chloride Level


  105 MMOL/L


()


 


Carbon Dioxide Level


  24 MMOL/L


(21-32)


 


Anion Gap


  11 mmol/L


(5-15)


 


Blood Urea Nitrogen


  20 mg/dL


(7-18)  H


 


Creatinine


  1.3 MG/DL


(0.55-1.30)


 


Estimat Glomerular Filtration


Rate 53.5 mL/min


(>60)


 


Glucose Level


  113 MG/DL


()  H


 


Calcium Level


  8.5 MG/DL


(8.5-10.1)


 


Phosphorus Level


  2.5 MG/DL


(2.5-4.9)


 


Magnesium Level


  2.1 MG/DL


(1.8-2.4)


 


Total Bilirubin


  0.4 MG/DL


(0.2-1.0)


 


Aspartate Amino Transf


(AST/SGOT) 68 U/L (15-37)


H


 


Alanine Aminotransferase


(ALT/SGPT) 49 U/L (12-78)


 


 


Alkaline Phosphatase


  110 U/L


()


 


C-Reactive Protein,


Quantitative 2.1 mg/dL


(0.00-0.90)  H


 


Total Protein


  7.9 G/DL


(6.4-8.2)


 


Albumin


  2.8 G/DL


(3.4-5.0)  L


 


Globulin 5.1 g/dL  


 


Albumin/Globulin Ratio


  0.5 (1.0-2.7)


L











Plan


Problems:  


(1) Acute bronchitis


(2) 2019 novel coronavirus disease (COVID-19)


Assessment & Plan:  ++


as per ID


pulm input appreciated 





(3) Gram-negative bacteremia


(4) Osteoporosis


(5) UTI (urinary tract infection)


(6) Altered mental state


(7) Alzheimer's dementia


(8) Sepsis


(9) Acute encephalopathy


(10) Severe sepsis


(11) Aphasia


(12) HTN (hypertension)


(13) BPH (benign prostatic hyperplasia)


(14) History of CVA (cerebrovascular accident)


(15) Multifocal pneumonia


(16) Decubitus skin ulcer


Assessment & Plan:  75-year-old male presented to [facility] 

covered positive respiratory fevers admitted further care and management which 

time identified to have multiple decubitus skin ulcers and scrotal edema.  

Patient identified to have a stage III sacral buttock decubitus ulcer with 

periwound erythema and breakdown.  Incontinence associated dermatitis 

identified.  Care plan initiated after patient evaluation.  Will follow with 

recommendations.  Thank you





DAILY ESTIMATED NEEDS:


Needs based on Wound, DM, Cardiac 73kg 


25-30  kcals/kg 


8743-0137  total kcals


1.25-1.5  g protein/kg


  g total protein 


25-30  mL/kg


9394-5069  total fluid mLs





NUTRITION DIAGNOSIS:


* Swallowing difficulty R/T dysphagia as evidenced by SLP eval, SLP rec


for mech soft ground with nectar thick liquids


* Increased kcal/pro/micronutrients needs R/T wound healing as evidenced


by admitted w/ wounds @ sacrum and BL ankles, pending eval.








CURRENT DIET:KERI w/ mech soft ground w/ NTL    








 


PO DIET RECOMMENDATIONS:


KERI + CCHO Med (texture per SLP)   





 





ADDITIONAL RECOMMENDATIONS:


* Calibrated bedscale wt for accurate CBW 


* Monitor PO intake 


      -> add Glucerna BID for now, monitor acceptance 


* Check A1C for eval of glycemic control 


* Wound healing: add MVI x 1, Vit C 500mg QD 


   f/up w/ WC eval 





(17) Scrotal edema


Assessment & Plan:  Patient identified to have significant scrotal edema 

potential cellulitis on admission.  No abscess identified.  Towel placed under 

the scrotum.  We will monitor over the course of the next few days














Tunde Hammer Sep 12, 2020 12:02

## 2020-09-12 NOTE — DIAGNOSTIC IMAGING REPORT
EXAM:

  XR Chest, 1 View

 

CLINICAL HISTORY:

  DYSPNEA

 

TECHNIQUE:

  Frontal view of the chest.

 

COMPARISON:

Chest radiograph September 11, 2020

 

FINDINGS/IMPRESSION:

Left lower lung/retrocardiac opacity which is similar in appearance to 

the previous study from September 11, 2020.  Consider correlation with 

lateral view.  Left base atelectasis.

 

Chronically increased interstitial markings.  No pleural effusion or 

pneumothorax.

 

Cardiomegaly.  Calcified aorta.

 

Left total shoulder arthroplasty.

## 2020-09-12 NOTE — CONSULTATION
History of Present Illness


General


Date patient seen:  Sep 12, 2020


Chief Complaint:  Fever





Present Illness


HPI





76 y/o M with hx of CAD, CVA, COPD, osteoporosis, R shoulder surgery, CKD, DM2, 

dysphagia, GERD, aphasia, HTN, HLD, BPH, Dementia, NH resident ([facility]

) presented to ED on 9/9/20 with dry cough, fever and SOB. T up to 101 PTA.


Allergies:  


Coded Allergies:  


     No Known Allergies (Unverified , 8/20/12)





Medication History


Scheduled


Alendronate Sodium* (Fosamax*), 70 MG ORAL ONCE A WEEK, (Reported)


Amlodipine Besylate* (Amlodipine Besylate*), 5 MG ORAL DAILY, (Reported)


Ascorbic Acid* (Vitamin C*), 500 MG ORAL DAILY, (Reported)


Aspirin* (Aspirin*), 81 MG ORAL DAILY, (Reported)


Atorvastatin Calcium* (Lipitor*), 10 MG ORAL BEDTIME, (Reported)


Baclofen (Baclofen), 5 MG PO DAILY, (Reported)


Calcium Carbonate/Vitamin D3 (Calcium 500+D Tablet Chew), 1 EACH PO DAILY, (

Reported)


Cranberry Fruit Concentrate (Cranberry), 450 MG PO BID, (Reported)


Docusate Sodium* (Colace*), 200 MG ORAL DAILY, (Reported)


Donepezil Hcl* (Donepezil Hcl*), 10 MG ORAL DAILY, (Reported)


Furosemide* (Lasix*), 20 MG ORAL DAILY, (Reported)


Multivitamin With Minerals (Multivitamins With Minerals*), 1 TAB ORAL DAILY, (

Reported)


Potassium Chloride* (K-Dur*), 10 MEQ ORAL DAILY, (Reported)


Tamsulosin HCl (Flomax), 0.4 MG ORAL DAILY, (Reported)


Zinc Sulfate (Zinc Sulfate*), 220 MG ORAL DAILY, (Reported)





Scheduled PRN


Acetaminophen (Tylenol), 650 MG ORAL Q6H PRN for Fever/Headache/Mild Pain, (

Reported)


Nitroglycerin (Nitroglycerin), 0.4 MG SL AS NEEDED PRN for CHEST PAIN, (Reported

)





Discontinued Medications


Amlodipine Besylate (Norvasc), 5 MG ORAL DAILY, (Reported)


   Discontinued Reason: Prescription changed


Atorvastatin Calcium* (Atorvastatin Calcium*), 10 MG ORAL BEDTIME, (Reported)


   Discontinued Reason: Prescription changed


Cephalexin (Cephalexin), 500 MG ORAL FOUR TIMES A DAY


   Discontinued Reason: Therapy completed


Donepezil Hcl* (Donepezil Hcl*), 5 MG ORAL DAILY, (Reported)


   Discontinued Reason: Prescription changed


Guaifenesin (Kesha-Tussin), 15 ML PO EVERY 8 HOURS PRN for For Cough, (Reported)


   Discontinued Reason: Therapy completed


Lisinopril (Lisinopril*), 5 MG ORAL DAILY, (Reported)


   Discontinued Reason: Therapy completed


Multivitamin (Multi Vitamin Daily), 1 TAB ORAL DAILY, (Reported)


   Discontinued Reason: Prescription changed





Patient History


Healthcare decision maker


N


Resuscitation status





Advanced Directive on File





Patient History Narrative








Pmhx: as above





Shx: The patient is .  The patient is a resident of


[facility] as above.  The


patient denies tobacco or alcohol use





Fhmx: non contributory





Review of Systems


All Other Systems:  negative except mentioned in HPI





Physical Exam


Physical Exam Narrative


GENERAL:  The patient is a well-developed, well-nourished, thin-appearing


 male, in no apparent distress.


HEENT:  Eyes, pupils are equal and responsive to light and accommodation.


Extraocular movements are intact.


NECK:  Supple without lymphadenopathy.


CHEST:  Lungs are clear to auscultation bilaterally without wheezes,


rales.


CARDIOVASCULAR:  Regular rate.  S1, S2 are normal without murmurs, rubs, or


gallops.


ABDOMEN:  Soft, nontender, and nondistended.  Positive bowel sounds.  No


evidence of hepatosplenomegaly.  Currently no rebound or guarding noted.


EXTREMITIES:  Negative for clubbing, cyanosis, or edema.





Last 24 Hour Vital Signs








  Date Time  Temp Pulse Resp B/P (MAP) Pulse Ox O2 Delivery O2 Flow Rate FiO2


 


9/12/20 12:00  68      


 


9/12/20 09:05    147/54    


 


9/12/20 09:05  70  147/54    


 


9/12/20 08:00  72      


 


9/12/20 08:00 97.9 70 19 147/54 (85) 97   


 


9/12/20 04:00  76      


 


9/12/20 04:00 99.0 80 20 133/64 (87) 95   


 


9/12/20 00:00 98.6 71 20 140/76 (97) 96   


 


9/12/20 00:00  65      


 


9/11/20 21:00 98.6 64 21 110/64 (79) 96   


 


9/11/20 21:00      Room Air  


 


9/11/20 21:00  66      

















Intake and Output  


 


 9/11/20 9/12/20





 19:00 07:00


 


Intake Total 330 ml 


 


Output Total  550 ml


 


Balance 330 ml -550 ml


 


  


 


IV Total 330 ml 


 


Output Urine Total  550 ml


 


# Voids  1











Laboratory Tests








Test


  9/12/20


07:10


 


White Blood Count


  5.7 K/UL


(4.8-10.8)


 


Red Blood Count


  5.54 M/UL


(4.70-6.10)


 


Hemoglobin


  15.8 G/DL


(14.2-18.0)


 


Hematocrit


  48.5 %


(42.0-52.0)


 


Mean Corpuscular Volume 88 FL (80-99)  


 


Mean Corpuscular Hemoglobin


  28.6 PG


(27.0-31.0)


 


Mean Corpuscular Hemoglobin


Concent 32.6 G/DL


(32.0-36.0)


 


Red Cell Distribution Width


  12.3 %


(11.6-14.8)


 


Platelet Count


  167 K/UL


(150-450)


 


Mean Platelet Volume


  8.2 FL


(6.5-10.1)


 


Neutrophils (%) (Auto)


  70.9 %


(45.0-75.0)


 


Lymphocytes (%) (Auto)


  14.8 %


(20.0-45.0)  L


 


Monocytes (%) (Auto)


  12.5 %


(1.0-10.0)  H


 


Eosinophils (%) (Auto)


  0.2 %


(0.0-3.0)


 


Basophils (%) (Auto)


  1.7 %


(0.0-2.0)


 


Erythrocyte Sedimentation Rate


  66 MM/HR


(0-20)  H


 


Sodium Level


  140 MMOL/L


(136-145)


 


Potassium Level


  3.5 MMOL/L


(3.5-5.1)


 


Chloride Level


  105 MMOL/L


()


 


Carbon Dioxide Level


  24 MMOL/L


(21-32)


 


Anion Gap


  11 mmol/L


(5-15)


 


Blood Urea Nitrogen


  20 mg/dL


(7-18)  H


 


Creatinine


  1.3 MG/DL


(0.55-1.30)


 


Estimat Glomerular Filtration


Rate 53.5 mL/min


(>60)


 


Glucose Level


  113 MG/DL


()  H


 


Calcium Level


  8.5 MG/DL


(8.5-10.1)


 


Phosphorus Level


  2.5 MG/DL


(2.5-4.9)


 


Magnesium Level


  2.1 MG/DL


(1.8-2.4)


 


Total Bilirubin


  0.4 MG/DL


(0.2-1.0)


 


Aspartate Amino Transf


(AST/SGOT) 68 U/L (15-37)


H


 


Alanine Aminotransferase


(ALT/SGPT) 49 U/L (12-78)


 


 


Alkaline Phosphatase


  110 U/L


()


 


C-Reactive Protein,


Quantitative 2.1 mg/dL


(0.00-0.90)  H


 


Total Protein


  7.9 G/DL


(6.4-8.2)


 


Albumin


  2.8 G/DL


(3.4-5.0)  L


 


Globulin 5.1 g/dL  


 


Albumin/Globulin Ratio


  0.5 (1.0-2.7)


L








Height (Feet):  5


Height (Inches):  5.00


Weight (Pounds):  159


Medications





Current Medications








 Medications


  (Trade)  Dose


 Ordered  Sig/Miky


 Route


 PRN Reason  Start Time


 Stop Time Status Last Admin


Dose Admin


 


 Acetaminophen


  (Tylenol)  650 mg  Q4H  PRN


 ORAL


 FEVER  9/9/20 17:00


 10/9/20 16:59   


 


 


 Albuterol/


 Ipratropium


  (Combivent


 Respimat)  1 puff  Q4H  PRN


 INH


 Shortness of Breath  9/9/20 17:15


 10/9/20 17:14   


 


 


 Amlodipine


 Besylate


  (Norvasc)  5 mg  DAILY


 ORAL


   9/10/20 09:00


 10/10/20 08:59  9/12/20 09:05


 


 


 Ascorbic Acid


  (Vitamin C)  500 mg  DAILY


 ORAL


   9/12/20 09:00


 10/12/20 08:59  9/12/20 09:05


 


 


 Azithromycin 250


 mg/Dextrose  275 ml @ 


 275 mls/hr  Q24HRS


 IV


   9/10/20 14:00


 9/15/20 13:59  9/11/20 15:04


 


 


 Barium Sulfate


  (Varibar Honey)  250 ml  NOW  PRN


 MC


 RAD  9/10/20 12:45


 9/13/20 12:38   


 


 


 Barium Sulfate


  (Varibar Nectar)  240 ml  NOW  PRN


 MC


 RAD  9/10/20 12:45


 9/13/20 12:38   


 


 


 Barium Sulfate


  (Varibar Pudding)  230 ml  NOW  PRN


 MC


 RAD  9/10/20 12:45


 9/13/20 12:38   


 


 


 Barium Sulfate


  (Varibar Thin


 Liquid powder)  148 gm  NOW  PRN


 MC


 RAD  9/10/20 12:45


 9/13/20 12:38   


 


 


 Ceftriaxone


 Sodium 1 gm/


 Dextrose  55 ml @ 


 110 mls/hr  Q24H


 IVPB


   9/10/20 14:00


 9/17/20 13:59  9/12/20 14:00


 


 


 Dexamethasone


  (Decadron)  6 mg  DAILY


 ORAL


   9/12/20 09:00


 9/21/20 08:59  9/12/20 09:05


 


 


 Dextrose


  (Dextrose 50%)  25 ml  Q30M  PRN


 IV


 Hypoglycemia  9/9/20 17:00


 12/8/20 16:59   


 


 


 Dextrose


  (Dextrose 50%)  50 ml  Q30M  PRN


 IV


 Hypoglycemia  9/9/20 17:00


 12/8/20 16:59   


 


 


 Donepezil HCl


  (Aricept)  5 mg  DAILY


 ORAL


   9/10/20 09:00


 10/10/20 08:59  9/12/20 09:04


 


 


 Heparin Sodium


  (Porcine)


  (Heparin 5000


 units/ml)  5,000 units  EVERY 12  HOURS


 SUBQ


   9/9/20 21:00


 10/24/20 20:59  9/12/20 09:06


 


 


 Lisinopril


  (ZestriL)  5 mg  DAILY


 ORAL


   9/10/20 09:00


 10/10/20 08:59  9/12/20 09:05


 


 


 Multivitamins


  (Multivitamins)  1 tab  DAILY


 ORAL


   9/12/20 09:00


 10/12/20 08:59  9/12/20 09:05


 


 


 Ondansetron HCl


  (Zofran)  4 mg  Q6H  PRN


 IVP


 Nausea & Vomiting  9/9/20 17:00


 10/9/20 16:59   


 


 


 Polyethylene


 Glycol


  (Miralax)  17 gm  DAILYPRN  PRN


 ORAL


 Constipation  9/9/20 17:00


 10/9/20 16:59   


 


 


 Promethazine HCl/


 Codeine


  (Phenergan with


 Codeine)  5 ml  Q6H  PRN


 ORAL


 cough  9/9/20 17:00


 10/9/20 16:59   


 


 


 Tamsulosin HCl


  (Flomax)  0.4 mg  DAILY


 ORAL


   9/10/20 09:00


 10/10/20 08:59  9/12/20 09:05


 











Assessment/Plan


Assessment/Plan:


Abx:


Ceftriaxone 9/9-


Azithromycin 9/9-





Assessment:


COVID19 PNeumonia -SP 2l NC > now at 


  -9/12 CXR: Left lower lung/retrocardiac opacity which is similar in 

appearance to the previous study from September 11, 2020.  


  -9/9 CXR: Bibasilar atelectasis. No acute process otherwise


       rapid COVID PCR +





Afebrile


No leukocytosis


   -u/a neg, ucx neg





CONS bacteremia- likely contaminant


  -9/9 Bcx 1/4 CONS





CAD


CVA


COPD


osteoporosis


 R shoulder surgery


 CKD


DM2


 dysphagia


GERD


aphasia


HTN


 HLD


 BPH


 Dementia


[facility] resident ([facility]) 





Plan:


-COntinue Ceftriaxone and Azithromycin #4/5


-Decadron #3


-Patient currently at - will consider Remdesivir if decompensates


-f/u cx


-Monitor CBC/CMP, temperatures


-COVID19 isolation 


-Bcx x2





Thank you for consulting [facility]. Will continue to follow along with 

you. 


Discussed with RN and pharmacy staff.











Slime Garcia M.D. Sep 12, 2020 16:33

## 2020-09-12 NOTE — NUR
CARDIOLOGY ECHO  REPORT:

Technically difficult study due to poor acoustic windows and breating problem. Apical views 
are taken at Subcostal area.

M-mode measurements of left ventricle not obtainable due to cardiac position (angle).

Study quality precludes accurate assessment of regional wall motion to extent visualized.

Normal left ventricular chamber size, systolic function and wall motion.

Left ventricular ejection fraction estimated to be 55 %.

Anterior Echo-free space, may be due to pericardial fat or effusion.

Mild focal aortic valve sclerosis with adequate cusp excursion.

Mildly thickened mitral valve leaflets with normal excursion.

Mild mitral annulus and aortic root calcification.

Pulmonic valve not well visualized.

Normal tricuspid valve structure.  

Trace tricuspid regurgitation.

Tricuspid systolic velocities suggests peak right ventricular systolic pressure of 14 mmHg.

## 2020-09-12 NOTE — INTERNAL MED PROGRESS NOTE
Subjective


Date of Service:  Sep 12, 2020


Physician Name


Benito Hearn


Attending Physician


Andrei Cancino MD





Current Medications








 Medications


  (Trade)  Dose


 Ordered  Sig/Miky


 Route


 PRN Reason  Start Time


 Stop Time Status Last Admin


Dose Admin


 


 Acetaminophen


  (Tylenol)  650 mg  Q4H  PRN


 ORAL


 FEVER  9/9/20 17:00


 10/9/20 16:59   


 


 


 Albuterol/


 Ipratropium


  (Combivent


 Respimat)  1 puff  Q4H  PRN


 INH


 Shortness of Breath  9/9/20 17:15


 10/9/20 17:14   


 


 


 Amlodipine


 Besylate


  (Norvasc)  5 mg  DAILY


 ORAL


   9/10/20 09:00


 10/10/20 08:59  9/12/20 09:05


 


 


 Ascorbic Acid


  (Vitamin C)  500 mg  DAILY


 ORAL


   9/12/20 09:00


 10/12/20 08:59  9/12/20 09:05


 


 


 Azithromycin 250


 mg/Dextrose  275 ml @ 


 275 mls/hr  Q24HRS


 IV


   9/10/20 14:00


 9/15/20 13:59  9/11/20 15:04


 


 


 Barium Sulfate


  (Varibar Honey)  250 ml  NOW  PRN


 MC


 RAD  9/10/20 12:45


 9/13/20 12:38   


 


 


 Barium Sulfate


  (Varibar Nectar)  240 ml  NOW  PRN


 MC


 RAD  9/10/20 12:45


 9/13/20 12:38   


 


 


 Barium Sulfate


  (Varibar Pudding)  230 ml  NOW  PRN


 [facility]


 RAD  9/10/20 12:45


 9/13/20 12:38   


 


 


 Barium Sulfate


  (Varibar Thin


 Liquid powder)  148 gm  NOW  PRN


 MC


 RAD  9/10/20 12:45


 9/13/20 12:38   


 


 


 Ceftriaxone


 Sodium 1 gm/


 Dextrose  55 ml @ 


 110 mls/hr  Q24H


 IVPB


   9/10/20 14:00


 9/17/20 13:59  9/12/20 14:00


 


 


 Dexamethasone


  (Decadron)  6 mg  DAILY


 ORAL


   9/12/20 09:00


 9/21/20 08:59  9/12/20 09:05


 


 


 Dextrose


  (Dextrose 50%)  25 ml  Q30M  PRN


 IV


 Hypoglycemia  9/9/20 17:00


 12/8/20 16:59   


 


 


 Dextrose


  (Dextrose 50%)  50 ml  Q30M  PRN


 IV


 Hypoglycemia  9/9/20 17:00


 12/8/20 16:59   


 


 


 Donepezil HCl


  (Aricept)  5 mg  DAILY


 ORAL


   9/10/20 09:00


 10/10/20 08:59  9/12/20 09:04


 


 


 Heparin Sodium


  (Porcine)


  (Heparin 5000


 units/ml)  5,000 units  EVERY 12  HOURS


 SUBQ


   9/9/20 21:00


 10/24/20 20:59  9/12/20 09:06


 


 


 Lisinopril


  (ZestriL)  5 mg  DAILY


 ORAL


   9/10/20 09:00


 10/10/20 08:59  9/12/20 09:05


 


 


 Multivitamins


  (Multivitamins)  1 tab  DAILY


 ORAL


   9/12/20 09:00


 10/12/20 08:59  9/12/20 09:05


 


 


 Ondansetron HCl


  (Zofran)  4 mg  Q6H  PRN


 IVP


 Nausea & Vomiting  9/9/20 17:00


 10/9/20 16:59   


 


 


 Polyethylene


 Glycol


  (Miralax)  17 gm  DAILYPRN  PRN


 ORAL


 Constipation  9/9/20 17:00


 10/9/20 16:59   


 


 


 Promethazine HCl/


 Codeine


  (Phenergan with


 Codeine)  5 ml  Q6H  PRN


 ORAL


 cough  9/9/20 17:00


 10/9/20 16:59   


 


 


 Tamsulosin HCl


  (Flomax)  0.4 mg  DAILY


 ORAL


   9/10/20 09:00


 10/10/20 08:59  9/12/20 09:05


 








Allergies:  


Coded Allergies:  


     No Known Allergies (Unverified , 8/20/12)


ROS Limited/Unobtainable:  Yes


Subjective


76 YO M admitted with fever and dyspnea.  Now COVID 19 pos.  Cover for Int [facility]-

Dr Cancino





Objective





Last Vital Signs








  Date Time  Temp Pulse Resp B/P (MAP) Pulse Ox O2 Delivery O2 Flow Rate FiO2


 


9/12/20 09:05    147/54    


 


9/12/20 09:05  70      


 


9/12/20 08:00 97.9  19  97   


 


9/11/20 21:00      Room Air  


 


9/11/20 14:01       2.0 28











Laboratory Tests








Test


  9/12/20


07:10


 


White Blood Count


  5.7 K/UL


(4.8-10.8)


 


Red Blood Count


  5.54 M/UL


(4.70-6.10)


 


Hemoglobin


  15.8 G/DL


(14.2-18.0)


 


Hematocrit


  48.5 %


(42.0-52.0)


 


Mean Corpuscular Volume 88 FL (80-99)  


 


Mean Corpuscular Hemoglobin


  28.6 PG


(27.0-31.0)


 


Mean Corpuscular Hemoglobin


Concent 32.6 G/DL


(32.0-36.0)


 


Red Cell Distribution Width


  12.3 %


(11.6-14.8)


 


Platelet Count


  167 K/UL


(150-450)


 


Mean Platelet Volume


  8.2 FL


(6.5-10.1)


 


Neutrophils (%) (Auto)


  70.9 %


(45.0-75.0)


 


Lymphocytes (%) (Auto)


  14.8 %


(20.0-45.0)  L


 


Monocytes (%) (Auto)


  12.5 %


(1.0-10.0)  H


 


Eosinophils (%) (Auto)


  0.2 %


(0.0-3.0)


 


Basophils (%) (Auto)


  1.7 %


(0.0-2.0)


 


Erythrocyte Sedimentation Rate


  66 MM/HR


(0-20)  H


 


Sodium Level


  140 MMOL/L


(136-145)


 


Potassium Level


  3.5 MMOL/L


(3.5-5.1)


 


Chloride Level


  105 MMOL/L


()


 


Carbon Dioxide Level


  24 MMOL/L


(21-32)


 


Anion Gap


  11 mmol/L


(5-15)


 


Blood Urea Nitrogen


  20 mg/dL


(7-18)  H


 


Creatinine


  1.3 MG/DL


(0.55-1.30)


 


Estimat Glomerular Filtration


Rate 53.5 mL/min


(>60)


 


Glucose Level


  113 MG/DL


()  H


 


Calcium Level


  8.5 MG/DL


(8.5-10.1)


 


Phosphorus Level


  2.5 MG/DL


(2.5-4.9)


 


Magnesium Level


  2.1 MG/DL


(1.8-2.4)


 


Total Bilirubin


  0.4 MG/DL


(0.2-1.0)


 


Aspartate Amino Transf


(AST/SGOT) 68 U/L (15-37)


H


 


Alanine Aminotransferase


(ALT/SGPT) 49 U/L (12-78)


 


 


Alkaline Phosphatase


  110 U/L


()


 


C-Reactive Protein,


Quantitative 2.1 mg/dL


(0.00-0.90)  H


 


Total Protein


  7.9 G/DL


(6.4-8.2)


 


Albumin


  2.8 G/DL


(3.4-5.0)  L


 


Globulin 5.1 g/dL  


 


Albumin/Globulin Ratio


  0.5 (1.0-2.7)


L











Microbiology








 Date/Time


Source Procedure


Growth Status


 


 


 9/9/20 15:10


Nasal Nares MRSA Culture - Final


NO METHICILLIN RESISTANT STAPH AUREUS... Complete


 


 9/9/20 15:02


Urine,Clean Catch Urine Culture - Final


NO GROWTH AFTER 48 HOURS Complete


 


 9/9/20 15:10


Rectum - Final


NO CARBAPENEM-RESISTANT ENTEROBACTERI... Complete


 


 9/9/20 15:10


Rectum VRE Culture - Final


NO VANCOMYCIN RESISTANT ENTEROCOCCUS ... Complete

















Intake and Output  


 


 9/11/20 9/12/20





 19:00 07:00


 


Intake Total 330 ml 


 


Output Total  550 ml


 


Balance 330 ml -550 ml


 


  


 


IV Total 330 ml 


 


Output Urine Total  550 ml


 


# Voids  1








Objective


PHYSICAL EXAMINATION:


GENERAL:  The patient is a well-developed, well-nourished, thin-appearing


 male, in no apparent distress.


HEENT:  Eyes, pupils are equal and responsive to light and accommodation.


Extraocular movements are intact.


NECK:  Supple without lymphadenopathy.


CHEST:  Lungs are clear to auscultation bilaterally without wheezes,


rales.


CARDIOVASCULAR:  Regular rate.  S1, S2 are normal without murmurs, rubs, or


gallops.


ABDOMEN:  Soft, nontender, and nondistended.  Positive bowel sounds.  No


evidence of hepatosplenomegaly.  Currently no rebound or guarding noted.


EXTREMITIES:  Negative for clubbing, cyanosis, or edema.


RECTAL/GENITAL:  Not performed.


NEUROLOGIC:  Cranial nerves II through XII are grossly intact without focal


deficits.  Motor strength is 5/5 bilaterally.  Deep tendon reflexes are 2+


plantar.





Assessment/Plan


Assessment/Plan


ASSESSMENT:  This is a 75-year-old  male.





1. Fever.


2. Dyspnea.


3. COVID-19 positive.


4. Congestive heart failure.


5. Hypertension.


6. Benign prostatic hypertrophy.


7. Osteoporosis.


8. Alzheimer dementia.


9. Ventriculomegaly.


10. Diabetes type 2.


11. Cerebrovascular disease.


12. Chronic renal failure.


13. Dysphagia.


14. Hypercholesterolemia.


15. Gastroesophageal reflux disease.





TREATMENT:


1. Fever/dyspnea/COVID-19 positive.  An infectious disease consultation


has been obtained with Dr. Garcia.  A pulmonary consultation has been


obtained with Dr. Mirali Zarrabi.  The patient has been started


empirically on azithromycin and ceftriaxone for probable underlying


bacterial pneumonia.  The patient has been started on Decadron.  We will


follow recommendation of Infectious Disease and Pulmonary.


2. Hypertension.  Continue amlodipine as above.


3. Benign prostatic hypertrophy.  Continue Flomax as above.


4. Osteoporosis.  Continue Fosamax as above.


5. Alzheimer dementia.  Continue Aricept as above.


6. Ventriculomegaly.


7. Diabetes type 2.


8. Cerebrovascular disease, status post cerebrovascular accident.


9. Chronic renal failure.


10. Dysphagia.


11. Hypercholesterolemia.  Continue atorvastatin as above.


12. Gastroesophageal reflux disease.











Benito Hearn MD Sep 12, 2020 14:32

## 2020-09-12 NOTE — PULMONOLOGY PROGRESS NOTE
Subjective


ROS Limited/Unobtainable:  No


Constitutional:  Reports: no symptoms


HEENT:  Repors: no symptoms


Respiratory:  Reports: no symptoms


Allergies:  


Coded Allergies:  


     No Known Allergies (Unverified , 8/20/12)





Objective





Last 24 Hour Vital Signs








  Date Time  Temp Pulse Resp B/P (MAP) Pulse Ox O2 Delivery O2 Flow Rate FiO2


 


9/12/20 08:00 97.9 70 19 147/54 (85) 97   


 


9/12/20 04:00  76      


 


9/12/20 04:00 99.0 80 20 133/64 (87) 95   


 


9/12/20 00:00 98.6 71 20 140/76 (97) 96   


 


9/12/20 00:00  65      


 


9/11/20 21:00 98.6 64 21 110/64 (79) 96   


 


9/11/20 21:00      Room Air  


 


9/11/20 21:00  66      


 


9/11/20 16:00 96.7 76 19 118/76 (90) 96   


 


9/11/20 16:00  56      


 


9/11/20 14:01  58 20  90 Nasal Cannula 2.0 28


 


9/11/20 12:00  59      


 


9/11/20 12:00 98.7 67 20 117/67 (84) 97   


 


9/11/20 09:12    114/73    


 


9/11/20 09:12  57  114/73    


 


9/11/20 09:00      Nasal Cannula 2.0 

















Intake and Output  


 


 9/11/20 9/12/20





 19:00 07:00


 


Intake Total 330 ml 


 


Output Total  550 ml


 


Balance 330 ml -550 ml


 


  


 


IV Total 330 ml 


 


Output Urine Total  550 ml


 


# Voids  1








General Appearance:  WD/WN


HEENT:  normocephalic, atraumatic


Respiratory:  chest wall non-tender, lungs clear, respiratory distress


Cardiovascular:  normal peripheral pulses, normal rate


Abdomen:  normal bowel sounds, soft, non tender


Genitourinary:  normal external genitalia


Extremities:  no cyanosis


Neurologic:  CNs II-XII grossly normal





Microbiology








 Date/Time


Source Procedure


Growth Status


 


 


 9/9/20 14:30


Blood Blood Culture - Preliminary


NO GROWTH AFTER 48 HOURS Resulted


 


 9/9/20 14:15


Blood Blood Culture - Preliminary


Staphylococcus Sp Coag Neg Resulted


 


 9/9/20 15:10


Nasal Nares MRSA Culture - Final


NO METHICILLIN RESISTANT STAPH AUREUS... Complete


 


 9/9/20 14:30


Nasopharynx SARS-CoV-2 RdRp Gene Assay - Final Complete


 


 9/9/20 15:02


Urine,Clean Catch Urine Culture - Final


NO GROWTH AFTER 48 HOURS Complete


 


 9/9/20 15:10


Rectum VRE Culture - Final


NO VANCOMYCIN RESISTANT ENTEROCOCCUS ... Complete








Laboratory Tests


9/11/20 10:10: Erythrocyte Sedimentation Rate 68H


9/12/20 07:10: 


Erythrocyte Sedimentation Rate [Pending], White Blood Count 5.7, Red Blood 

Count 5.54, Hemoglobin 15.8, Hematocrit 48.5, Mean Corpuscular Volume 88, Mean 

Corpuscular Hemoglobin 28.6, Mean Corpuscular Hemoglobin Concent 32.6, Red Cell 

Distribution Width 12.3, Platelet Count 167, Mean Platelet Volume 8.2, 

Neutrophils (%) (Auto) 70.9, Lymphocytes (%) (Auto) 14.8L, Monocytes (%) (Auto) 

12.5H, Eosinophils (%) (Auto) 0.2, Basophils (%) (Auto) 1.7, Sodium Level 140, 

Potassium Level 3.5, Chloride Level 105, Carbon Dioxide Level 24, Anion Gap 11, 

Blood Urea Nitrogen 20H, Creatinine 1.3, Estimat Glomerular Filtration Rate 53.5

, Glucose Level 113H, Calcium Level 8.5, Phosphorus Level 2.5, Magnesium Level 

2.1, Total Bilirubin 0.4, Aspartate Amino Transf (AST/SGOT) 68H, Alanine 

Aminotransferase (ALT/SGPT) 49, Alkaline Phosphatase 110, C-Reactive Protein, 

Quantitative 2.1H, Total Protein 7.9, Albumin 2.8L, Globulin 5.1, Albumin/

Globulin Ratio 0.5L





Current Medications








 Medications


  (Trade)  Dose


 Ordered  Sig/Miky


 Route


 PRN Reason  Start Time


 Stop Time Status Last Admin


Dose Admin


 


 Acetaminophen


  (Tylenol)  650 mg  Q4H  PRN


 ORAL


 FEVER  9/9/20 17:00


 10/9/20 16:59   


 


 


 Albuterol/


 Ipratropium


  (Combivent


 Respimat)  1 puff  Q4H  PRN


 INH


 Shortness of Breath  9/9/20 17:15


 10/9/20 17:14   


 


 


 Amlodipine


 Besylate


  (Norvasc)  5 mg  DAILY


 ORAL


   9/10/20 09:00


 10/10/20 08:59  9/11/20 09:12


 


 


 Ascorbic Acid


  (Vitamin C)  500 mg  DAILY


 ORAL


   9/12/20 09:00


 10/12/20 08:59   


 


 


 Azithromycin 250


 mg/Dextrose  275 ml @ 


 275 mls/hr  Q24HRS


 IV


   9/10/20 14:00


 9/15/20 13:59  9/11/20 15:04


 


 


 Barium Sulfate


  (Varibar Honey)  250 ml  NOW  PRN


 


 RAD  9/10/20 12:45


 9/13/20 12:38   


 


 


 Barium Sulfate


  (Varibar Nectar)  240 ml  NOW  PRN


 


 RAD  9/10/20 12:45


 9/13/20 12:38   


 


 


 Barium Sulfate


  (Varibar Pudding)  230 ml  NOW  PRN


 


 RAD  9/10/20 12:45


 9/13/20 12:38   


 


 


 Barium Sulfate


  (Varibar Thin


 Liquid powder)  148 gm  NOW  PRN


 


 RAD  9/10/20 12:45


 9/13/20 12:38   


 


 


 Ceftriaxone


 Sodium 1 gm/


 Dextrose  55 ml @ 


 110 mls/hr  Q24H


 IVPB


   9/10/20 14:00


 9/17/20 13:59  9/11/20 15:05


 


 


 Dexamethasone


  (Decadron)  6 mg  DAILY


 ORAL


   9/12/20 09:00


 9/21/20 08:59   


 


 


 Dextrose


  (Dextrose 50%)  25 ml  Q30M  PRN


 IV


 Hypoglycemia  9/9/20 17:00


 12/8/20 16:59   


 


 


 Dextrose


  (Dextrose 50%)  50 ml  Q30M  PRN


 IV


 Hypoglycemia  9/9/20 17:00


 12/8/20 16:59   


 


 


 Donepezil HCl


  (Aricept)  5 mg  DAILY


 ORAL


   9/10/20 09:00


 10/10/20 08:59  9/11/20 09:12


 


 


 Heparin Sodium


  (Porcine)


  (Heparin 5000


 units/ml)  5,000 units  EVERY 12  HOURS


 SUBQ


   9/9/20 21:00


 10/24/20 20:59  9/11/20 21:23


 


 


 Lisinopril


  (ZestriL)  5 mg  DAILY


 ORAL


   9/10/20 09:00


 10/10/20 08:59  9/11/20 09:12


 


 


 Multivitamins


  (Multivitamins)  1 tab  DAILY


 ORAL


   9/12/20 09:00


 10/12/20 08:59   


 


 


 Ondansetron HCl


  (Zofran)  4 mg  Q6H  PRN


 IVP


 Nausea & Vomiting  9/9/20 17:00


 10/9/20 16:59   


 


 


 Polyethylene


 Glycol


  (Miralax)  17 gm  DAILYPRN  PRN


 ORAL


 Constipation  9/9/20 17:00


 10/9/20 16:59   


 


 


 Promethazine HCl/


 Codeine


  (Phenergan with


 Codeine)  5 ml  Q6H  PRN


 ORAL


 cough  9/9/20 17:00


 10/9/20 16:59   


 


 


 Tamsulosin HCl


  (Flomax)  0.4 mg  DAILY


 ORAL


   9/10/20 09:00


 10/10/20 08:59  9/11/20 09:12


 











Assessment/Plan


Problems:  


(1) Gram-negative bacteremia


(2) 2019 novel coronavirus disease (COVID-19)


(3) Acute bronchitis


(4) History of CVA (cerebrovascular accident)


(5) BPH (benign prostatic hyperplasia)


(6) HTN (hypertension)


(7) Alzheimer's dementia


Assessment/Plan


respiratory isolation


check sputum


iv abx


ID to see


f/u electrolytes


titrate fio2 to sat of 92%


f/u inflammatory markers


monitor BP


dvt prophylaxis











Michael Sandoval MD Sep 12, 2020 09:00

## 2020-09-13 VITALS — SYSTOLIC BLOOD PRESSURE: 156 MMHG | DIASTOLIC BLOOD PRESSURE: 85 MMHG

## 2020-09-13 VITALS — SYSTOLIC BLOOD PRESSURE: 127 MMHG | DIASTOLIC BLOOD PRESSURE: 76 MMHG

## 2020-09-13 VITALS — DIASTOLIC BLOOD PRESSURE: 84 MMHG | SYSTOLIC BLOOD PRESSURE: 144 MMHG

## 2020-09-13 VITALS — DIASTOLIC BLOOD PRESSURE: 76 MMHG | SYSTOLIC BLOOD PRESSURE: 138 MMHG

## 2020-09-13 VITALS — SYSTOLIC BLOOD PRESSURE: 121 MMHG | DIASTOLIC BLOOD PRESSURE: 80 MMHG

## 2020-09-13 VITALS — DIASTOLIC BLOOD PRESSURE: 51 MMHG | SYSTOLIC BLOOD PRESSURE: 121 MMHG

## 2020-09-13 LAB
ADD MANUAL DIFF: YES
ALBUMIN SERPL-MCNC: 2.6 G/DL (ref 3.4–5)
ALBUMIN/GLOB SERPL: 0.6 {RATIO} (ref 1–2.7)
ALP SERPL-CCNC: 51 U/L (ref 46–116)
ALT SERPL-CCNC: 15 U/L (ref 12–78)
ANION GAP SERPL CALC-SCNC: 13 MMOL/L (ref 5–15)
AST SERPL-CCNC: 31 U/L (ref 15–37)
BILIRUB SERPL-MCNC: 0.3 MG/DL (ref 0.2–1)
BUN SERPL-MCNC: 23 MG/DL (ref 7–18)
CALCIUM SERPL-MCNC: 7.9 MG/DL (ref 8.5–10.1)
CHLORIDE SERPL-SCNC: 118 MMOL/L (ref 98–107)
CO2 SERPL-SCNC: 25 MMOL/L (ref 21–32)
CREAT SERPL-MCNC: 1.1 MG/DL (ref 0.55–1.3)
ERYTHROCYTE [DISTWIDTH] IN BLOOD BY AUTOMATED COUNT: 13.9 % (ref 11.6–14.8)
GLOBULIN SER-MCNC: 4.3 G/DL
HCT VFR BLD CALC: 37.5 % (ref 42–52)
HGB BLD-MCNC: 12.2 G/DL (ref 14.2–18)
MCV RBC AUTO: 93 FL (ref 80–99)
PHOSPHATE SERPL-MCNC: 2.5 MG/DL (ref 2.5–4.9)
PLATELET # BLD: 90 K/UL (ref 150–450)
POTASSIUM SERPL-SCNC: 4.1 MMOL/L (ref 3.5–5.1)
RBC # BLD AUTO: 4.02 M/UL (ref 4.7–6.1)
SODIUM SERPL-SCNC: 156 MMOL/L (ref 136–145)
WBC # BLD AUTO: 6.7 K/UL (ref 4.8–10.8)

## 2020-09-13 RX ADMIN — AZITHROMYCIN DIHYDRATE SCH MLS/HR: 500 INJECTION, POWDER, LYOPHILIZED, FOR SOLUTION INTRAVENOUS at 14:53

## 2020-09-13 RX ADMIN — TAMSULOSIN HYDROCHLORIDE SCH MG: 0.4 CAPSULE ORAL at 10:39

## 2020-09-13 RX ADMIN — LISINOPRIL SCH MG: 2.5 TABLET ORAL at 10:40

## 2020-09-13 RX ADMIN — SODIUM CHLORIDE SCH MLS/HR: 0.9 INJECTION INTRAVENOUS at 13:33

## 2020-09-13 RX ADMIN — DONEPEZIL HYDROCHLORIDE SCH MG: 5 TABLET, FILM COATED ORAL at 10:38

## 2020-09-13 RX ADMIN — DEXTROSE AND SODIUM CHLORIDE SCH MLS/HR: 5; .45 INJECTION, SOLUTION INTRAVENOUS at 12:46

## 2020-09-13 RX ADMIN — DONEPEZIL HYDROCHLORIDE SCH MG: 5 TABLET, FILM COATED ORAL at 09:00

## 2020-09-13 RX ADMIN — Medication SCH MG: at 10:39

## 2020-09-13 RX ADMIN — HEPARIN SODIUM SCH UNITS: 5000 INJECTION INTRAVENOUS; SUBCUTANEOUS at 08:11

## 2020-09-13 RX ADMIN — LISINOPRIL SCH MG: 2.5 TABLET ORAL at 09:00

## 2020-09-13 RX ADMIN — TAMSULOSIN HYDROCHLORIDE SCH MG: 0.4 CAPSULE ORAL at 09:00

## 2020-09-13 RX ADMIN — HEPARIN SODIUM SCH UNITS: 5000 INJECTION INTRAVENOUS; SUBCUTANEOUS at 20:17

## 2020-09-13 RX ADMIN — Medication SCH MG: at 09:00

## 2020-09-13 NOTE — NUR
NURSE HAND-OFF REPORT: 



Important Events on Shift: -slightly febrile at 99.3, but not taking po meds, unabel to 
admin tylenol, monitor temp, cooling measures applied

Patient Status: STABLE

Diet: KERI, MECHANICAL SOFT WITH NECTAR THICK LIQUIDS, 1:1 feed



Pending Orders: N/A

Pending Results/Labs:9/12 AM LABS

Pending MD notification: N/A



Latest Vital Signs: Temperature 99.0 , Pulse 76 , B/P 133 /64 , Respiratory Rate 20 , O2 SAT 
95 , Room Air, O2 Flow Rate 2.0 .  

Vital Sign Comment: STABLE



EKG Rhythm: Sinus Rhythm

Rhythm change?: N 

MD Notified?: -

MD Response: 



Latest Mejia Fall Score: 35  

Fall Risk: Medium Risk 

Safety Measures: Call light Within Reach, Bed Alarm Zone 1, Side Rails Side Rails x3, Bed 
position Low and Locked.

Fall Precautions: 

Yellow Socks

Yellow Gown

Door Sign

Patient Fall Education



Report given to Lakeshia LONDON

## 2020-09-13 NOTE — INTERNAL MED PROGRESS NOTE
Subjective


Date of Service:  Sep 13, 2020


Physician Name


Benito Hearn


Attending Physician


Andrei Cancino MD





Current Medications








 Medications


  (Trade)  Dose


 Ordered  Sig/Miky


 Route


 PRN Reason  Start Time


 Stop Time Status Last Admin


Dose Admin


 


 Acetaminophen


  (Tylenol)  650 mg  Q4H  PRN


 ORAL


 FEVER  9/9/20 17:00


 10/9/20 16:59   


 


 


 Acetaminophen


  (Tylenol)  650 mg  Q4H  PRN


 RECTAL


 Mild Pain (Pain Scale 1-3)  9/13/20 11:30


 10/13/20 11:29  9/13/20 11:33


 


 


 Albuterol/


 Ipratropium


  (Combivent


 Respimat)  1 puff  Q4H  PRN


 INH


 Shortness of Breath  9/9/20 17:15


 10/9/20 17:14   


 


 


 Amlodipine


 Besylate


  (Norvasc)  5 mg  DAILY


 ORAL


   9/10/20 09:00


 10/10/20 08:59  9/12/20 09:05


 


 


 Ascorbic Acid


  (Vitamin C)  500 mg  DAILY


 ORAL


   9/12/20 09:00


 10/12/20 08:59  9/12/20 09:05


 


 


 Azithromycin 250


 mg/Dextrose  275 ml @ 


 275 mls/hr  Q24HRS


 IV


   9/10/20 14:00


 9/15/20 13:59  9/12/20 14:00


 


 


 Ceftriaxone


 Sodium 1 gm/


 Dextrose  55 ml @ 


 110 mls/hr  Q24H


 IVPB


   9/10/20 14:00


 9/17/20 13:59  9/13/20 13:33


 


 


 Dexamethasone


  (Decadron)  6 mg  DAILY


 ORAL


   9/12/20 09:00


 9/21/20 08:59  9/12/20 09:05


 


 


 Dextrose


  (Dextrose 50%)  25 ml  Q30M  PRN


 IV


 Hypoglycemia  9/9/20 17:00


 12/8/20 16:59   


 


 


 Dextrose


  (Dextrose 50%)  50 ml  Q30M  PRN


 IV


 Hypoglycemia  9/9/20 17:00


 12/8/20 16:59   


 


 


 Dextrose/Sodium


 Chloride  1,000 ml @ 


 50 mls/hr  Q20H


 IV


   9/12/20 19:45


 10/12/20 19:44  9/13/20 12:46


 


 


 Donepezil HCl


  (Aricept)  5 mg  DAILY


 ORAL


   9/10/20 09:00


 10/10/20 08:59  9/12/20 09:04


 


 


 Heparin Sodium


  (Porcine)


  (Heparin 5000


 units/ml)  5,000 units  EVERY 12  HOURS


 SUBQ


   9/9/20 21:00


 10/24/20 20:59  9/12/20 09:06


 


 


 Lisinopril


  (ZestriL)  5 mg  DAILY


 ORAL


   9/10/20 09:00


 10/10/20 08:59  9/12/20 09:05


 


 


 Multivitamins


  (Multivitamins)  1 tab  DAILY


 ORAL


   9/12/20 09:00


 10/12/20 08:59  9/12/20 09:05


 


 


 Non-Formulary


 Medication


  (Non-Formulary


 Med)  1 ea  DAILY


 ORAL


   9/12/20 16:30


 10/12/20 16:29 UNV  


 


 


 Ondansetron HCl


  (Zofran)  4 mg  Q6H  PRN


 IVP


 Nausea & Vomiting  9/9/20 17:00


 10/9/20 16:59   


 


 


 Polyethylene


 Glycol


  (Miralax)  17 gm  DAILYPRN  PRN


 ORAL


 Constipation  9/9/20 17:00


 10/9/20 16:59   


 


 


 Promethazine HCl/


 Codeine


  (Phenergan with


 Codeine)  5 ml  Q6H  PRN


 ORAL


 cough  9/9/20 17:00


 10/9/20 16:59   


 


 


 Tamsulosin HCl


  (Flomax)  0.4 mg  DAILY


 ORAL


   9/10/20 09:00


 10/10/20 08:59  9/12/20 09:05


 








Allergies:  


Coded Allergies:  


     No Known Allergies (Unverified , 8/20/12)


ROS Limited/Unobtainable:  Yes


Subjective


74 YO M admitted with fever and dyspnea.  Now COVID 19 pos.  Cover for Int Med-

Dr Cancino.  Continues to spike fever to 102.2F





Objective





Last Vital Signs








  Date Time  Temp Pulse Resp B/P (MAP) Pulse Ox O2 Delivery O2 Flow Rate FiO2


 


9/13/20 12:03 101.3       


 


9/13/20 12:00  73 20 127/76 (93) 93   


 


9/13/20 09:00      Nasal Cannula 2.0 





      Nasal Cannula 2.0 


 


9/11/20 14:01        28











Laboratory Tests








Test


  9/13/20


03:00


 


White Blood Count


  6.7 K/UL


(4.8-10.8)


 


Red Blood Count


  4.02 M/UL


(4.70-6.10)  L


 


Hemoglobin


  12.2 G/DL


(14.2-18.0)  L


 


Hematocrit


  37.5 %


(42.0-52.0)  L


 


Mean Corpuscular Volume 93 FL (80-99)  


 


Mean Corpuscular Hemoglobin


  30.4 PG


(27.0-31.0)


 


Mean Corpuscular Hemoglobin


Concent 32.6 G/DL


(32.0-36.0)


 


Red Cell Distribution Width


  13.9 %


(11.6-14.8)


 


Platelet Count


  90 K/UL


(150-450)  L


 


Mean Platelet Volume


  9.0 FL


(6.5-10.1)


 


Neutrophils (%) (Auto)


  % (45.0-75.0)


 


 


Lymphocytes (%) (Auto)


  % (20.0-45.0)


 


 


Monocytes (%) (Auto)  % (1.0-10.0)  


 


Eosinophils (%) (Auto)  % (0.0-3.0)  


 


Basophils (%) (Auto)  % (0.0-2.0)  


 


Differential Total Cells


Counted 100  


 


 


Neutrophils % (Manual) 68 % (45-75)  


 


Lymphocytes % (Manual) 28 % (20-45)  


 


Monocytes % (Manual) 4 % (1-10)  


 


Eosinophils % (Manual) 0 % (0-3)  


 


Basophils % (Manual) 0 % (0-2)  


 


Band Neutrophils 0 % (0-8)  


 


Platelet Estimate Decreased  L


 


Platelet Morphology Normal  


 


Red Blood Cell Morphology Normal  


 


Erythrocyte Sedimentation Rate


  35 MM/HR


(0-20)  H


 


Sodium Level


  156 MMOL/L


(136-145)  #H


 


Potassium Level


  4.1 MMOL/L


(3.5-5.1)


 


Chloride Level


  118 MMOL/L


()  H


 


Carbon Dioxide Level


  25 MMOL/L


(21-32)


 


Anion Gap


  13 mmol/L


(5-15)


 


Blood Urea Nitrogen


  23 mg/dL


(7-18)  H


 


Creatinine


  1.1 MG/DL


(0.55-1.30)


 


Estimat Glomerular Filtration


Rate > 60 mL/min


(>60)


 


Glucose Level


  96 MG/DL


()


 


Calcium Level


  7.9 MG/DL


(8.5-10.1)  L


 


Phosphorus Level


  2.5 MG/DL


(2.5-4.9)


 


Magnesium Level


  1.9 MG/DL


(1.8-2.4)


 


Total Bilirubin


  0.3 MG/DL


(0.2-1.0)


 


Aspartate Amino Transf


(AST/SGOT) 31 U/L (15-37)


 


 


Alanine Aminotransferase


(ALT/SGPT) 15 U/L (12-78)


 


 


Alkaline Phosphatase


  51 U/L


()


 


C-Reactive Protein,


Quantitative 5.4 mg/dL


(0.00-0.90)  H


 


Total Protein


  6.9 G/DL


(6.4-8.2)


 


Albumin


  2.6 G/DL


(3.4-5.0)  L


 


Globulin 4.3 g/dL  


 


Albumin/Globulin Ratio


  0.6 (1.0-2.7)


L

















Intake and Output  


 


 9/12/20 9/13/20





 19:00 07:00


 


Intake Total 120 ml 


 


Output Total  400 ml


 


Balance 120 ml -400 ml


 


  


 


Intake Oral 120 ml 


 


Output Urine Total  400 ml


 


# Bowel Movements 1 1








Objective


PHYSICAL EXAMINATION:


GENERAL:  The patient is a well-developed, well-nourished, thin-appearing


 male, in no apparent distress.


HEENT:  Eyes, pupils are equal and responsive to light and accommodation.


Extraocular movements are intact.


NECK:  Supple without lymphadenopathy.


CHEST:  Lungs are clear to auscultation bilaterally without wheezes,


rales.


CARDIOVASCULAR:  Regular rate.  S1, S2 are normal without murmurs, rubs, or


gallops.


ABDOMEN:  Soft, nontender, and nondistended.  Positive bowel sounds.  No


evidence of hepatosplenomegaly.  Currently no rebound or guarding noted.


EXTREMITIES:  Negative for clubbing, cyanosis, or edema.


RECTAL/GENITAL:  Not performed.


NEUROLOGIC:  Cranial nerves II through XII are grossly intact without focal


deficits.  Motor strength is 5/5 bilaterally.  Deep tendon reflexes are 2+


plantar.





Assessment/Plan


Assessment/Plan


ASSESSMENT:  This is a 75-year-old  male.





1. Fever.


2. Dyspnea.


3. COVID-19 positive.


4. Congestive heart failure.


5. Hypertension.


6. Benign prostatic hypertrophy.


7. Osteoporosis.


8. Alzheimer dementia.


9. Ventriculomegaly.


10. Diabetes type 2.


11. Cerebrovascular disease.


12. Chronic renal failure.


13. Dysphagia.


14. Hypercholesterolemia.


15. Gastroesophageal reflux disease.





TREATMENT:


1. Fever/dyspnea/COVID-19 positive.


Infectious disease consultation = Dr. Garcia.  Pulmonary consultation =


Dr. Michael Sandoval.  Continue azithromycin and ceftriaxone for probable 

underlying


bacterial pneumonia.  The patient has been started on Decadron.  We will


follow recommendation of Infectious Disease and Pulmonary.


2. Hypertension.  Continue amlodipine as above.


3. Benign prostatic hypertrophy.  Continue Flomax as above.


4. Osteoporosis.  Continue Fosamax as above.


5. Alzheimer dementia.  Continue Aricept as above.


6. Ventriculomegaly.


7. Diabetes type 2.


8. Cerebrovascular disease, status post cerebrovascular accident.


9. Chronic renal failure.


10. Dysphagia.


11. Hypercholesterolemia.  Continue atorvastatin as above.


12. Gastroesophageal reflux disease.











Benito Hearn MD Sep 13, 2020 14:50

## 2020-09-13 NOTE — NUR
NURSE NOTES:

Pt refused all oral medication including acetaminophen for fever. Contacted Dr Garcia and 
got order for rectal Tylenol. Suppository given and Dr. Cancino informed about refusal to take 
Meds, temp, and major changes in blood work from this morning. Most notably Plt 90, Na 156, 
and Creactive P 5.4. Cooling measures applied to neck, armpit, blanket taken off to reduce 
temp. Will monitor closely.

## 2020-09-13 NOTE — NUR
NURSE NOTES:

Pt received from Thuy JUAREZ RN. Pt in bed sleeping. Bed low and locked. No sign of SOB or 
distress,. Whiteboard updated. Fall precautions in place.

## 2020-09-13 NOTE — NUR
NURSE NOTES:

Temp now 99.7

-------------------------------------------------------------------------------

Addendum: 09/13/20 at 1502 by Lakeshia Ambriz RN

-------------------------------------------------------------------------------

ICe bags removed from neck and left arm pit, parkinsonian tremors noted. Seen as artifact on 
tele monitor,

## 2020-09-13 NOTE — NUR
NURSE NOTES:

Patient received from LITA Asher. Patient was observed sleeping but was aroused when his name 
was yelled out loud. Patient is currently on room air satting at 96%. IV site is located on 
her left forearm 24 gauge and is patent with a D5 half NS running. No signs of distress has 
been currently noted. Bed is in the lowest position, call light within reach. Will continue 
to monitor.

## 2020-09-13 NOTE — PULMONOLOGY PROGRESS NOTE
Subjective


ROS Limited/Unobtainable:  Yes


Constitutional:  Reports: no symptoms


HEENT:  Repors: no symptoms


Respiratory:  Reports: no symptoms


Allergies:  


Coded Allergies:  


     No Known Allergies (Unverified , 8/20/12)





Objective





Last 24 Hour Vital Signs








  Date Time  Temp Pulse Resp B/P (MAP) Pulse Ox O2 Delivery O2 Flow Rate FiO2


 


9/13/20 16:00  80      


 


9/13/20 15:57 98.6 71 19 121/51 (74) 97   


 


9/13/20 12:03 101.3       


 


9/13/20 12:00 102.2 73 20 127/76 (93) 93   


 


9/13/20 12:00  75      


 


9/13/20 09:00      Nasal Cannula 2.0 





      Nasal Cannula 2.0 


 


9/13/20 08:00  77      


 


9/13/20 08:00 101.8 78 20 138/76 (96) 93   


 


9/13/20 04:00  76      


 


9/13/20 04:00 99.1 81 24 144/84 (104) 96   


 


9/13/20 00:00 98.2 72 24 156/85 (108) 93   


 


9/13/20 00:00  72      


 


9/12/20 22:14     93   


 


9/12/20 21:28      Room Air  


 


9/12/20 20:00 97.5 72 22 158/87 (110) 92   


 


9/12/20 20:00  71      

















Intake and Output  


 


 9/12/20 9/13/20





 19:00 07:00


 


Intake Total 120 ml 


 


Output Total  400 ml


 


Balance 120 ml -400 ml


 


  


 


Intake Oral 120 ml 


 


Output Urine Total  400 ml


 


# Bowel Movements 1 1








General Appearance:  WD/WN


HEENT:  normocephalic, atraumatic


Respiratory:  chest wall non-tender, lungs clear, respiratory distress


Cardiovascular:  normal peripheral pulses, normal rate


Abdomen:  normal bowel sounds, soft, non tender


Genitourinary:  normal external genitalia


Extremities:  no cyanosis


Neurologic:  CNs II-XII grossly normal


Laboratory Tests


9/13/20 03:00: 


White Blood Count 6.7, Red Blood Count 4.02L, Hemoglobin 12.2L, Hematocrit 37.5L

, Mean Corpuscular Volume 93, Mean Corpuscular Hemoglobin 30.4, Mean 

Corpuscular Hemoglobin Concent 32.6, Red Cell Distribution Width 13.9, Platelet 

Count 90L, Mean Platelet Volume 9.0, Neutrophils (%) (Auto) , Lymphocytes (%) (

Auto) , Monocytes (%) (Auto) , Eosinophils (%) (Auto) , Basophils (%) (Auto) , 

Differential Total Cells Counted 100, Neutrophils % (Manual) 68, Lymphocytes % (

Manual) 28, Monocytes % (Manual) 4, Eosinophils % (Manual) 0, Basophils % (

Manual) 0, Band Neutrophils 0, Platelet Estimate DecreasedL, Platelet 

Morphology Normal, Red Blood Cell Morphology Normal, Erythrocyte Sedimentation 

Rate 35H, Sodium Level 156#H, Potassium Level 4.1, Chloride Level 118H, Carbon 

Dioxide Level 25, Anion Gap 13, Blood Urea Nitrogen 23H, Creatinine 1.1, 

Estimat Glomerular Filtration Rate > 60, Glucose Level 96, Calcium Level 7.9L, 

Phosphorus Level 2.5, Magnesium Level 1.9, Total Bilirubin 0.3, Aspartate Amino 

Transf (AST/SGOT) 31, Alanine Aminotransferase (ALT/SGPT) 15, Alkaline 

Phosphatase 51, C-Reactive Protein, Quantitative 5.4H, Total Protein 6.9, 

Albumin 2.6L, Globulin 4.3, Albumin/Globulin Ratio 0.6L





Current Medications








 Medications


  (Trade)  Dose


 Ordered  Sig/Miky


 Route


 PRN Reason  Start Time


 Stop Time Status Last Admin


Dose Admin


 


 Acetaminophen


  (Tylenol)  650 mg  Q4H  PRN


 ORAL


 FEVER  9/9/20 17:00


 10/9/20 16:59   


 


 


 Acetaminophen


  (Tylenol)  650 mg  Q4H  PRN


 RECTAL


 Mild Pain (Pain Scale 1-3)  9/13/20 11:30


 10/13/20 11:29  9/13/20 11:33


 


 


 Albuterol/


 Ipratropium


  (Combivent


 Respimat)  1 puff  Q4H  PRN


 INH


 Shortness of Breath  9/9/20 17:15


 10/9/20 17:14   


 


 


 Amlodipine


 Besylate


  (Norvasc)  5 mg  DAILY


 ORAL


   9/10/20 09:00


 10/10/20 08:59  9/12/20 09:05


 


 


 Ascorbic Acid


  (Vitamin C)  500 mg  DAILY


 ORAL


   9/12/20 09:00


 10/12/20 08:59  9/12/20 09:05


 


 


 Azithromycin 250


 mg/Dextrose  275 ml @ 


 275 mls/hr  Q24HRS


 IV


   9/10/20 14:00


 9/15/20 13:59  9/13/20 14:53


 


 


 Ceftriaxone


 Sodium 1 gm/


 Dextrose  55 ml @ 


 110 mls/hr  Q24H


 IVPB


   9/10/20 14:00


 9/17/20 13:59  9/13/20 13:33


 


 


 Dexamethasone


  (Decadron)  6 mg  DAILY


 ORAL


   9/12/20 09:00


 9/21/20 08:59  9/12/20 09:05


 


 


 Dextrose


  (Dextrose 50%)  25 ml  Q30M  PRN


 IV


 Hypoglycemia  9/9/20 17:00


 12/8/20 16:59   


 


 


 Dextrose


  (Dextrose 50%)  50 ml  Q30M  PRN


 IV


 Hypoglycemia  9/9/20 17:00


 12/8/20 16:59   


 


 


 Dextrose/Sodium


 Chloride  1,000 ml @ 


 50 mls/hr  Q20H


 IV


   9/12/20 19:45


 10/12/20 19:44  9/13/20 12:46


 


 


 Donepezil HCl


  (Aricept)  5 mg  DAILY


 ORAL


   9/10/20 09:00


 10/10/20 08:59  9/12/20 09:04


 


 


 Heparin Sodium


  (Porcine)


  (Heparin 5000


 units/ml)  5,000 units  EVERY 12  HOURS


 SUBQ


   9/9/20 21:00


 10/24/20 20:59  9/12/20 09:06


 


 


 Lisinopril


  (ZestriL)  5 mg  DAILY


 ORAL


   9/10/20 09:00


 10/10/20 08:59  9/12/20 09:05


 


 


 Multivitamins


  (Multivitamins)  1 tab  DAILY


 ORAL


   9/12/20 09:00


 10/12/20 08:59  9/12/20 09:05


 


 


 Non-Formulary


 Medication


  (Non-Formulary


 Med)  1 ea  DAILY


 ORAL


   9/12/20 16:30


 10/12/20 16:29 UNV  


 


 


 Ondansetron HCl


  (Zofran)  4 mg  Q6H  PRN


 IVP


 Nausea & Vomiting  9/9/20 17:00


 10/9/20 16:59   


 


 


 Polyethylene


 Glycol


  (Miralax)  17 gm  DAILYPRN  PRN


 ORAL


 Constipation  9/9/20 17:00


 10/9/20 16:59   


 


 


 Promethazine HCl/


 Codeine


  (Phenergan with


 Codeine)  5 ml  Q6H  PRN


 ORAL


 cough  9/9/20 17:00


 10/9/20 16:59   


 


 


 Tamsulosin HCl


  (Flomax)  0.4 mg  DAILY


 ORAL


   9/10/20 09:00


 10/10/20 08:59  9/12/20 09:05


 











Assessment/Plan


Problems:  


(1) Gram-negative bacteremia


(2) 2019 novel coronavirus disease (COVID-19)


(3) Acute bronchitis


(4) History of CVA (cerebrovascular accident)


(5) BPH (benign prostatic hyperplasia)


(6) HTN (hypertension)


(7) Alzheimer's dementia


Assessment/Plan


all reviewed


respiratory isolation


check sputum


iv abx


ID to see


f/u electrolytes


titrate fio2 to sat of 92%


f/u inflammatory markers


monitor BP


dvt prophylaxis











Michael Sandoval MD Sep 13, 2020 19:25

## 2020-09-13 NOTE — SURGERY PROGRESS NOTE
Surgery Progress Note


Subjective


Symptoms:  improved, passing flatus, BM, other





Objective





Last 24 Hour Vital Signs








  Date Time  Temp Pulse Resp B/P (MAP) Pulse Ox O2 Delivery O2 Flow Rate FiO2


 


9/13/20 16:00  80      


 


9/13/20 15:57 98.6 71 19 121/51 (74) 97   


 


9/13/20 12:03 101.3       


 


9/13/20 12:00 102.2 73 20 127/76 (93) 93   


 


9/13/20 12:00  75      


 


9/13/20 09:00      Nasal Cannula 2.0 





      Nasal Cannula 2.0 


 


9/13/20 08:00  77      


 


9/13/20 08:00 101.8 78 20 138/76 (96) 93   


 


9/13/20 04:00  76      


 


9/13/20 04:00 99.1 81 24 144/84 (104) 96   


 


9/13/20 00:00 98.2 72 24 156/85 (108) 93   


 


9/13/20 00:00  72      


 


9/12/20 22:14     93   


 


9/12/20 21:28      Room Air  


 


9/12/20 20:00 97.5 72 22 158/87 (110) 92   


 


9/12/20 20:00  71      








I&O











Intake and Output  


 


 9/12/20 9/13/20





 19:00 07:00


 


Intake Total 120 ml 


 


Output Total  400 ml


 


Balance 120 ml -400 ml


 


  


 


Intake Oral 120 ml 


 


Output Urine Total  400 ml


 


# Bowel Movements 1 1








Dressing:  saturated


Cardiovascular:  RSR


Respiratory:  decreased breath sounds


Abdomen:  soft, non-tender, present bowel sounds


Extremities:  no cyanosis





Laboratory Tests








Test


  9/13/20


03:00


 


White Blood Count


  6.7 K/UL


(4.8-10.8)


 


Red Blood Count


  4.02 M/UL


(4.70-6.10)  L


 


Hemoglobin


  12.2 G/DL


(14.2-18.0)  L


 


Hematocrit


  37.5 %


(42.0-52.0)  L


 


Mean Corpuscular Volume 93 FL (80-99)  


 


Mean Corpuscular Hemoglobin


  30.4 PG


(27.0-31.0)


 


Mean Corpuscular Hemoglobin


Concent 32.6 G/DL


(32.0-36.0)


 


Red Cell Distribution Width


  13.9 %


(11.6-14.8)


 


Platelet Count


  90 K/UL


(150-450)  L


 


Mean Platelet Volume


  9.0 FL


(6.5-10.1)


 


Neutrophils (%) (Auto)


  % (45.0-75.0)


 


 


Lymphocytes (%) (Auto)


  % (20.0-45.0)


 


 


Monocytes (%) (Auto)  % (1.0-10.0)  


 


Eosinophils (%) (Auto)  % (0.0-3.0)  


 


Basophils (%) (Auto)  % (0.0-2.0)  


 


Differential Total Cells


Counted 100  


 


 


Neutrophils % (Manual) 68 % (45-75)  


 


Lymphocytes % (Manual) 28 % (20-45)  


 


Monocytes % (Manual) 4 % (1-10)  


 


Eosinophils % (Manual) 0 % (0-3)  


 


Basophils % (Manual) 0 % (0-2)  


 


Band Neutrophils 0 % (0-8)  


 


Platelet Estimate Decreased  L


 


Platelet Morphology Normal  


 


Red Blood Cell Morphology Normal  


 


Erythrocyte Sedimentation Rate


  35 MM/HR


(0-20)  H


 


Sodium Level


  156 MMOL/L


(136-145)  #H


 


Potassium Level


  4.1 MMOL/L


(3.5-5.1)


 


Chloride Level


  118 MMOL/L


()  H


 


Carbon Dioxide Level


  25 MMOL/L


(21-32)


 


Anion Gap


  13 mmol/L


(5-15)


 


Blood Urea Nitrogen


  23 mg/dL


(7-18)  H


 


Creatinine


  1.1 MG/DL


(0.55-1.30)


 


Estimat Glomerular Filtration


Rate > 60 mL/min


(>60)


 


Glucose Level


  96 MG/DL


()


 


Calcium Level


  7.9 MG/DL


(8.5-10.1)  L


 


Phosphorus Level


  2.5 MG/DL


(2.5-4.9)


 


Magnesium Level


  1.9 MG/DL


(1.8-2.4)


 


Total Bilirubin


  0.3 MG/DL


(0.2-1.0)


 


Aspartate Amino Transf


(AST/SGOT) 31 U/L (15-37)


 


 


Alanine Aminotransferase


(ALT/SGPT) 15 U/L (12-78)


 


 


Alkaline Phosphatase


  51 U/L


()


 


C-Reactive Protein,


Quantitative 5.4 mg/dL


(0.00-0.90)  H


 


Total Protein


  6.9 G/DL


(6.4-8.2)


 


Albumin


  2.6 G/DL


(3.4-5.0)  L


 


Globulin 4.3 g/dL  


 


Albumin/Globulin Ratio


  0.6 (1.0-2.7)


L











Plan


Problems:  


(1) Acute bronchitis


(2) 2019 novel coronavirus disease (COVID-19)


Assessment & Plan:  ++


as per ID


pulm input appreciated 





(3) Gram-negative bacteremia


(4) Osteoporosis


(5) UTI (urinary tract infection)


(6) Altered mental state


(7) Alzheimer's dementia


(8) Sepsis


(9) Acute encephalopathy


(10) Severe sepsis


(11) Aphasia


(12) HTN (hypertension)


(13) BPH (benign prostatic hyperplasia)


(14) History of CVA (cerebrovascular accident)


(15) Multifocal pneumonia


(16) Decubitus skin ulcer


Assessment & Plan:  75-year-old male presented to [facility] 

covered positive respiratory fevers admitted further care and management which 

time identified to have multiple decubitus skin ulcers and scrotal edema.  

Patient identified to have a stage III sacral buttock decubitus ulcer with 

periwound erythema and breakdown.  Incontinence associated dermatitis 

identified.  Care plan initiated after patient evaluation.  Will follow with 

recommendations.  Thank you





DAILY ESTIMATED NEEDS:


Needs based on Wound, DM, Cardiac 73kg 


25-30  kcals/kg 


5752-5823  total kcals


1.25-1.5  g protein/kg


  g total protein 


25-30  mL/kg


4342-8711  total fluid mLs





NUTRITION DIAGNOSIS:


* Swallowing difficulty R/T dysphagia as evidenced by SLP eval, SLP rec


for mech soft ground with nectar thick liquids


* Increased kcal/pro/micronutrients needs R/T wound healing as evidenced


by admitted w/ wounds @ sacrum and BL ankles, pending eval.








CURRENT DIET:KERI w/ mech soft ground w/ NTL    








 


PO DIET RECOMMENDATIONS:


KERI + CCHO Med (texture per SLP)   





 





ADDITIONAL RECOMMENDATIONS:


* Calibrated bedscale wt for accurate CBW 


* Monitor PO intake 


      -> add Glucerna BID for now, monitor acceptance 


* Check A1C for eval of glycemic control 


* Wound healing: add MVI x 1, Vit C 500mg QD 


   f/up w/ WC eval 





(17) Scrotal edema


Assessment & Plan:  Patient identified to have significant scrotal edema 

potential cellulitis on admission.  No abscess identified.  Towel placed under 

the scrotum.  We will monitor over the course of the next few days














Tunde Hammer Sep 13, 2020 16:44

## 2020-09-13 NOTE — NUR
NURSE NOTES:

Patient temperature was noted to be elevated. Suppository tylenol and cooling measures has 
been initiated. Will continue to monitor.

## 2020-09-14 VITALS — SYSTOLIC BLOOD PRESSURE: 159 MMHG | DIASTOLIC BLOOD PRESSURE: 64 MMHG

## 2020-09-14 VITALS — DIASTOLIC BLOOD PRESSURE: 74 MMHG | SYSTOLIC BLOOD PRESSURE: 135 MMHG

## 2020-09-14 VITALS — SYSTOLIC BLOOD PRESSURE: 137 MMHG | DIASTOLIC BLOOD PRESSURE: 72 MMHG

## 2020-09-14 VITALS — DIASTOLIC BLOOD PRESSURE: 75 MMHG | SYSTOLIC BLOOD PRESSURE: 141 MMHG

## 2020-09-14 VITALS — SYSTOLIC BLOOD PRESSURE: 97 MMHG | DIASTOLIC BLOOD PRESSURE: 52 MMHG

## 2020-09-14 VITALS — SYSTOLIC BLOOD PRESSURE: 135 MMHG | DIASTOLIC BLOOD PRESSURE: 77 MMHG

## 2020-09-14 LAB
ADD MANUAL DIFF: NO
ALBUMIN SERPL-MCNC: 2.4 G/DL (ref 3.4–5)
ALBUMIN/GLOB SERPL: 0.5 {RATIO} (ref 1–2.7)
ALP SERPL-CCNC: 95 U/L (ref 46–116)
ALT SERPL-CCNC: 58 U/L (ref 12–78)
ANION GAP SERPL CALC-SCNC: 11 MMOL/L (ref 5–15)
AST SERPL-CCNC: 70 U/L (ref 15–37)
BASOPHILS NFR BLD AUTO: 1.6 % (ref 0–2)
BILIRUB SERPL-MCNC: 0.4 MG/DL (ref 0.2–1)
BUN SERPL-MCNC: 18 MG/DL (ref 7–18)
CALCIUM SERPL-MCNC: 8.4 MG/DL (ref 8.5–10.1)
CHLORIDE SERPL-SCNC: 103 MMOL/L (ref 98–107)
CO2 SERPL-SCNC: 26 MMOL/L (ref 21–32)
CREAT SERPL-MCNC: 1.1 MG/DL (ref 0.55–1.3)
EOSINOPHIL NFR BLD AUTO: 0.1 % (ref 0–3)
ERYTHROCYTE [DISTWIDTH] IN BLOOD BY AUTOMATED COUNT: 14 % (ref 11.6–14.8)
GLOBULIN SER-MCNC: 5 G/DL
HCT VFR BLD CALC: 45.7 % (ref 42–52)
HGB BLD-MCNC: 14.8 G/DL (ref 14.2–18)
LYMPHOCYTES NFR BLD AUTO: 26.2 % (ref 20–45)
MCV RBC AUTO: 91 FL (ref 80–99)
MONOCYTES NFR BLD AUTO: 8.9 % (ref 1–10)
NEUTROPHILS NFR BLD AUTO: 63.3 % (ref 45–75)
PHOSPHATE SERPL-MCNC: 2.5 MG/DL (ref 2.5–4.9)
PLATELET # BLD: 102 K/UL (ref 150–450)
POTASSIUM SERPL-SCNC: 3.9 MMOL/L (ref 3.5–5.1)
RBC # BLD AUTO: 5 M/UL (ref 4.7–6.1)
SODIUM SERPL-SCNC: 140 MMOL/L (ref 136–145)
WBC # BLD AUTO: 4.7 K/UL (ref 4.8–10.8)

## 2020-09-14 RX ADMIN — TAMSULOSIN HYDROCHLORIDE SCH MG: 0.4 CAPSULE ORAL at 09:13

## 2020-09-14 RX ADMIN — HEPARIN SODIUM SCH UNITS: 5000 INJECTION INTRAVENOUS; SUBCUTANEOUS at 09:00

## 2020-09-14 RX ADMIN — SODIUM CHLORIDE SCH MLS/HR: 0.9 INJECTION INTRAVENOUS at 13:04

## 2020-09-14 RX ADMIN — Medication SCH MG: at 09:15

## 2020-09-14 RX ADMIN — DONEPEZIL HYDROCHLORIDE SCH MG: 5 TABLET, FILM COATED ORAL at 09:12

## 2020-09-14 RX ADMIN — DEXTROSE AND SODIUM CHLORIDE SCH MLS/HR: 5; .45 INJECTION, SOLUTION INTRAVENOUS at 11:45

## 2020-09-14 RX ADMIN — HEPARIN SODIUM SCH UNITS: 5000 INJECTION INTRAVENOUS; SUBCUTANEOUS at 21:00

## 2020-09-14 RX ADMIN — LISINOPRIL SCH MG: 2.5 TABLET ORAL at 09:15

## 2020-09-14 RX ADMIN — AZITHROMYCIN DIHYDRATE SCH MLS/HR: 500 INJECTION, POWDER, LYOPHILIZED, FOR SOLUTION INTRAVENOUS at 13:04

## 2020-09-14 NOTE — INTERNAL MED PROGRESS NOTE
Subjective


Date of Service:  Sep 14, 2020


Physician Name


NadiyaBenito


Attending Physician


Andrei Cancino MD





Current Medications








 Medications


  (Trade)  Dose


 Ordered  Sig/Miky


 Route


 PRN Reason  Start Time


 Stop Time Status Last Admin


Dose Admin


 


 Acetaminophen


  (Tylenol)  650 mg  Q4H  PRN


 ORAL


 FEVER  9/9/20 17:00


 10/9/20 16:59   


 


 


 Acetaminophen


  (Tylenol)  650 mg  Q4H  PRN


 RECTAL


 Mild Pain (Pain Scale 1-3)  9/13/20 11:30


 10/13/20 11:29  9/13/20 23:51


 


 


 Albuterol/


 Ipratropium


  (Combivent


 Respimat)  1 puff  Q4H  PRN


 INH


 Shortness of Breath  9/9/20 17:15


 10/9/20 17:14   


 


 


 Amlodipine


 Besylate


  (Norvasc)  5 mg  DAILY


 ORAL


   9/10/20 09:00


 10/10/20 08:59  9/14/20 09:15


 


 


 Ascorbic Acid


  (Vitamin C)  500 mg  DAILY


 ORAL


   9/12/20 09:00


 10/12/20 08:59  9/14/20 09:15


 


 


 Ceftriaxone


 Sodium 1 gm/


 Dextrose  55 ml @ 


 110 mls/hr  Q24H


 IVPB


   9/10/20 14:00


 9/17/20 13:59  9/14/20 13:04


 


 


 Dexamethasone


  (Decadron)  6 mg  DAILY


 ORAL


   9/12/20 09:00


 9/21/20 08:59  9/14/20 09:12


 


 


 Dextrose


  (Dextrose 50%)  25 ml  Q30M  PRN


 IV


 Hypoglycemia  9/9/20 17:00


 12/8/20 16:59   


 


 


 Dextrose


  (Dextrose 50%)  50 ml  Q30M  PRN


 IV


 Hypoglycemia  9/9/20 17:00


 12/8/20 16:59   


 


 


 Dextrose/Sodium


 Chloride  1,000 ml @ 


 50 mls/hr  Q20H


 IV


   9/12/20 19:45


 10/12/20 19:44  9/14/20 11:45


 


 


 Donepezil HCl


  (Aricept)  5 mg  DAILY


 ORAL


   9/10/20 09:00


 10/10/20 08:59  9/14/20 09:12


 


 


 Heparin Sodium


  (Porcine)


  (Heparin 5000


 units/ml)  5,000 units  EVERY 12  HOURS


 SUBQ


   9/9/20 21:00


 10/24/20 20:59  9/12/20 09:06


 


 


 Lisinopril


  (ZestriL)  5 mg  DAILY


 ORAL


   9/10/20 09:00


 10/10/20 08:59  9/14/20 09:15


 


 


 Multivitamins


  (Multivitamins)  1 tab  DAILY


 ORAL


   9/12/20 09:00


 10/12/20 08:59  9/14/20 09:13


 


 


 Ondansetron HCl


  (Zofran)  4 mg  Q6H  PRN


 IVP


 Nausea & Vomiting  9/9/20 17:00


 10/9/20 16:59   


 


 


 Polyethylene


 Glycol


  (Miralax)  17 gm  DAILYPRN  PRN


 ORAL


 Constipation  9/9/20 17:00


 10/9/20 16:59   


 


 


 Promethazine HCl/


 Codeine


  (Phenergan with


 Codeine)  5 ml  Q6H  PRN


 ORAL


 cough  9/9/20 17:00


 10/9/20 16:59   


 


 


 Remdesivir 100 mg/


 Sodium Chloride  250 ml @ 


 250 mls/hr  Q24H


 IV


   9/15/20 18:00


 9/18/20 18:59   


 


 


 Remdesivir 200 mg/


 Sodium Chloride  250 ml @ 


 125 mls/hr  ONCE


 IV


   9/14/20 18:00


 9/14/20 19:00  9/14/20 18:14


 


 


 Tamsulosin HCl


  (Flomax)  0.4 mg  DAILY


 ORAL


   9/10/20 09:00


 10/10/20 08:59  9/14/20 09:13


 








Allergies:  


Coded Allergies:  


     No Known Allergies (Unverified , 8/20/12)


ROS Limited/Unobtainable:  Yes


Subjective


76 YO M admitted with fever and dyspnea.  Now COVID 19 pos.  Cover for Int Med-

Dr Cancino.  Continues to spike fever to 102.2F





Objective





Last Vital Signs








  Date Time  Temp Pulse Resp B/P (MAP) Pulse Ox O2 Delivery O2 Flow Rate FiO2


 


9/14/20 16:00 97.9 69 20 135/74 (94) 95   


 


9/14/20 09:00      Nasal Cannula 4.0 


 


9/13/20 20:09        28











Laboratory Tests








Test


  9/14/20


08:45


 


White Blood Count


  4.7 K/UL


(4.8-10.8)  L


 


Red Blood Count


  5.00 M/UL


(4.70-6.10)


 


Hemoglobin


  14.8 G/DL


(14.2-18.0)


 


Hematocrit


  45.7 %


(42.0-52.0)


 


Mean Corpuscular Volume 91 FL (80-99)  


 


Mean Corpuscular Hemoglobin


  29.7 PG


(27.0-31.0)


 


Mean Corpuscular Hemoglobin


Concent 32.5 G/DL


(32.0-36.0)


 


Red Cell Distribution Width


  14.0 %


(11.6-14.8)


 


Platelet Count


  102 K/UL


(150-450)  L


 


Mean Platelet Volume


  11.1 FL


(6.5-10.1)  H


 


Neutrophils (%) (Auto)


  63.3 %


(45.0-75.0)


 


Lymphocytes (%) (Auto)


  26.2 %


(20.0-45.0)


 


Monocytes (%) (Auto)


  8.9 %


(1.0-10.0)


 


Eosinophils (%) (Auto)


  0.1 %


(0.0-3.0)


 


Basophils (%) (Auto)


  1.6 %


(0.0-2.0)


 


Sodium Level


  140 MMOL/L


(136-145)


 


Potassium Level


  3.9 MMOL/L


(3.5-5.1)


 


Chloride Level


  103 MMOL/L


()


 


Carbon Dioxide Level


  26 MMOL/L


(21-32)


 


Anion Gap


  11 mmol/L


(5-15)


 


Blood Urea Nitrogen


  18 mg/dL


(7-18)


 


Creatinine


  1.1 MG/DL


(0.55-1.30)


 


Estimat Glomerular Filtration


Rate > 60 mL/min


(>60)


 


Glucose Level


  121 MG/DL


()  H


 


Calcium Level


  8.4 MG/DL


(8.5-10.1)  L


 


Phosphorus Level


  2.5 MG/DL


(2.5-4.9)


 


Magnesium Level


  2.3 MG/DL


(1.8-2.4)


 


Total Bilirubin


  0.4 MG/DL


(0.2-1.0)


 


Aspartate Amino Transf


(AST/SGOT) 70 U/L (15-37)


H


 


Alanine Aminotransferase


(ALT/SGPT) 58 U/L (12-78)


 


 


Alkaline Phosphatase


  95 U/L


()


 


Total Protein


  7.4 G/DL


(6.4-8.2)


 


Albumin


  2.4 G/DL


(3.4-5.0)  L


 


Globulin 5.0 g/dL  


 


Albumin/Globulin Ratio


  0.5 (1.0-2.7)


L











Microbiology








 Date/Time


Source Procedure


Growth Status


 


 


 9/12/20 17:00


Blood Blood Culture - Preliminary


NO GROWTH AFTER 24 HOURS Resulted


 


 9/12/20 16:50


Blood Blood Culture - Preliminary


NO GROWTH AFTER 24 HOURS Resulted

















Intake and Output  


 


 9/13/20 9/14/20





 19:00 07:00


 


Intake Total 50 ml 600 ml


 


Balance 50 ml 600 ml


 


  


 


IV Total 50 ml 600 ml


 


# Voids  3


 


# Bowel Movements  1








Objective


PHYSICAL EXAMINATION:


GENERAL:  The patient is a well-developed, well-nourished, thin-appearing


 male, in no apparent distress.


HEENT:  Eyes, pupils are equal and responsive to light and accommodation.


Extraocular movements are intact.


NECK:  Supple without lymphadenopathy.


CHEST:  Lungs are clear to auscultation bilaterally without wheezes,


rales.


CARDIOVASCULAR:  Regular rate.  S1, S2 are normal without murmurs, rubs, or


gallops.


ABDOMEN:  Soft, nontender, and nondistended.  Positive bowel sounds.  No


evidence of hepatosplenomegaly.  Currently no rebound or guarding noted.


EXTREMITIES:  Negative for clubbing, cyanosis, or edema.


RECTAL/GENITAL:  Not performed.


NEUROLOGIC:  Cranial nerves II through XII are grossly intact without focal


deficits.  Motor strength is 5/5 bilaterally.  Deep tendon reflexes are 2+


plantar.





Assessment/Plan


Assessment/Plan


ASSESSMENT:  This is a 75-year-old  male.





1. Fever.


2. Dyspnea.


3. COVID-19 positive.


4. Congestive heart failure.


5. Hypertension.


6. Benign prostatic hypertrophy.


7. Osteoporosis.


8. Alzheimer dementia.


9. Ventriculomegaly.


10. Diabetes type 2.


11. Cerebrovascular disease.


12. Chronic renal failure.


13. Dysphagia.


14. Hypercholesterolemia.


15. Gastroesophageal reflux disease.





TREATMENT:


1. Fever/dyspnea/COVID-19 positive.


Infectious disease consultation = Dr. Garcia.  Pulmonary consultation =


Dr. Michael Sandoval.  Continue azithromycin and ceftriaxone for probable 

underlying


bacterial pneumonia.  The patient has been started on Decadron.  We will


follow recommendation of Infectious Disease and Pulmonary.


2. Hypertension.  Continue amlodipine as above.


3. Benign prostatic hypertrophy.  Continue Flomax as above.


4. Osteoporosis.  Continue Fosamax as above.


5. Alzheimer dementia.  Continue Aricept as above.


6. Ventriculomegaly.


7. Diabetes type 2.


8. Cerebrovascular disease, status post cerebrovascular accident.


9. Chronic renal failure.


10. Dysphagia.


11. Hypercholesterolemia.  Continue atorvastatin as above.


12. Gastroesophageal reflux disease.











Benito Hearn MD Sep 14, 2020 18:19

## 2020-09-14 NOTE — INFECTIOUS DISEASES PROG NOTE
Assessment/Plan








Assessment:


COVID19 PNeumonia -SP 2l NC > now at RA > 2l NC


  -9/12 CXR: Left lower lung/retrocardiac opacity which is similar in 

appearance to the previous study from September 11, 2020.  


  -9/9 CXR: Bibasilar atelectasis. No acute process otherwise


       rapid COVID PCR +





Afebrile


No leukocytosis


   -u/a neg, ucx neg





CONS bacteremia- likely contaminant


  -9/9 Bcx 1/4 CONS; 9/12 Bcx NTD





CAD


CVA


COPD


osteoporosis


 R shoulder surgery


 CKD


DM2


 dysphagia


GERD


aphasia


HTN


 HLD


 BPH


 Dementia


[facility] resident ([facility]) 





Plan:


-COntinue Ceftriaxone #6/7


-dc Azithromycin #6/5


-Decadron #5/10


-Requested Remdesivir


-f/u cx


-Monitor CBC/CMP, temperatures


-COVID19 isolation 


-f/u repeat Bcx x2





Thank you for consulting [facility]. Will continue to follow along with 

you. 


Discussed with RN and pharmacy staff.





Subjective


Allergies:  


Coded Allergies:  


     No Known Allergies (Unverified , 8/20/12)


Tm 100.6


now at 2l NC





Objective





Last 24 Hour Vital Signs








  Date Time  Temp Pulse Resp B/P (MAP) Pulse Ox O2 Delivery O2 Flow Rate FiO2


 


9/14/20 12:00 97.5 68 18 137/72 (93) 95   


 


9/14/20 12:00  70      


 


9/14/20 09:15    141/75    


 


9/14/20 09:15  72  141/75    


 


9/14/20 09:00      Nasal Cannula 4.0 


 


9/14/20 08:00 97.7 91 20 141/75 (97) 95   


 


9/14/20 08:00  69      


 


9/14/20 04:00  88      


 


9/14/20 04:00 99.4 78 24 159/64 (95) 94   


 


9/14/20 01:00 99.1       


 


9/14/20 00:00  64      


 


9/14/20 00:00 99.1 65 24 135/77 (96) 98   


 


9/13/20 21:00      Nasal Cannula 2.0 





      Nasal Cannula 2.0 


 


9/13/20 20:09  94 20  90 Nasal Cannula 2.0 28


 


9/13/20 20:00  64      


 


9/13/20 20:00 100.6 67 24 121/80 (94) 96   


 


9/13/20 16:00  80      


 


9/13/20 15:57 98.6 71 19 121/51 (74) 97   








Height (Feet):  5


Height (Inches):  5.00


Weight (Pounds):  159


Cardiovascular:  RSR


Respiratory:  decreased breath sounds


Abdomen:  soft, non-tender, present bowel sounds


Extremities:  no cyanosis





Microbiology








 Date/Time


Source Procedure


Growth Status


 


 


 9/12/20 17:00


Blood Blood Culture - Preliminary


NO GROWTH AFTER 24 HOURS Resulted


 


 9/12/20 16:50


Blood Blood Culture - Preliminary


NO GROWTH AFTER 24 HOURS Resulted











Laboratory Tests








Test


  9/14/20


08:45


 


White Blood Count


  4.7 K/UL


(4.8-10.8)  L


 


Red Blood Count


  5.00 M/UL


(4.70-6.10)


 


Hemoglobin


  14.8 G/DL


(14.2-18.0)


 


Hematocrit


  45.7 %


(42.0-52.0)


 


Mean Corpuscular Volume 91 FL (80-99)  


 


Mean Corpuscular Hemoglobin


  29.7 PG


(27.0-31.0)


 


Mean Corpuscular Hemoglobin


Concent 32.5 G/DL


(32.0-36.0)


 


Red Cell Distribution Width


  14.0 %


(11.6-14.8)


 


Platelet Count


  102 K/UL


(150-450)  L


 


Mean Platelet Volume


  11.1 FL


(6.5-10.1)  H


 


Neutrophils (%) (Auto)


  63.3 %


(45.0-75.0)


 


Lymphocytes (%) (Auto)


  26.2 %


(20.0-45.0)


 


Monocytes (%) (Auto)


  8.9 %


(1.0-10.0)


 


Eosinophils (%) (Auto)


  0.1 %


(0.0-3.0)


 


Basophils (%) (Auto)


  1.6 %


(0.0-2.0)


 


Sodium Level


  140 MMOL/L


(136-145)


 


Potassium Level


  3.9 MMOL/L


(3.5-5.1)


 


Chloride Level


  103 MMOL/L


()


 


Carbon Dioxide Level


  26 MMOL/L


(21-32)


 


Anion Gap


  11 mmol/L


(5-15)


 


Blood Urea Nitrogen


  18 mg/dL


(7-18)


 


Creatinine


  1.1 MG/DL


(0.55-1.30)


 


Estimat Glomerular Filtration


Rate > 60 mL/min


(>60)


 


Glucose Level


  121 MG/DL


()  H


 


Calcium Level


  8.4 MG/DL


(8.5-10.1)  L


 


Phosphorus Level


  2.5 MG/DL


(2.5-4.9)


 


Magnesium Level


  2.3 MG/DL


(1.8-2.4)


 


Total Bilirubin


  0.4 MG/DL


(0.2-1.0)


 


Aspartate Amino Transf


(AST/SGOT) 70 U/L (15-37)


H


 


Alanine Aminotransferase


(ALT/SGPT) 58 U/L (12-78)


 


 


Alkaline Phosphatase


  95 U/L


()


 


Total Protein


  7.4 G/DL


(6.4-8.2)


 


Albumin


  2.4 G/DL


(3.4-5.0)  L


 


Globulin 5.0 g/dL  


 


Albumin/Globulin Ratio


  0.5 (1.0-2.7)


L











Current Medications








 Medications


  (Trade)  Dose


 Ordered  Sig/Miky


 Route


 PRN Reason  Start Time


 Stop Time Status Last Admin


Dose Admin


 


 Acetaminophen


  (Tylenol)  650 mg  Q4H  PRN


 ORAL


 FEVER  9/9/20 17:00


 10/9/20 16:59   


 


 


 Acetaminophen


  (Tylenol)  650 mg  Q4H  PRN


 RECTAL


 Mild Pain (Pain Scale 1-3)  9/13/20 11:30


 10/13/20 11:29  9/13/20 23:51


 


 


 Albuterol/


 Ipratropium


  (Combivent


 Respimat)  1 puff  Q4H  PRN


 INH


 Shortness of Breath  9/9/20 17:15


 10/9/20 17:14   


 


 


 Amlodipine


 Besylate


  (Norvasc)  5 mg  DAILY


 ORAL


   9/10/20 09:00


 10/10/20 08:59  9/14/20 09:15


 


 


 Ascorbic Acid


  (Vitamin C)  500 mg  DAILY


 ORAL


   9/12/20 09:00


 10/12/20 08:59  9/14/20 09:15


 


 


 Azithromycin 250


 mg/Dextrose  275 ml @ 


 275 mls/hr  Q24HRS


 IV


   9/10/20 14:00


 9/15/20 13:59  9/14/20 13:04


 


 


 Ceftriaxone


 Sodium 1 gm/


 Dextrose  55 ml @ 


 110 mls/hr  Q24H


 IVPB


   9/10/20 14:00


 9/17/20 13:59  9/14/20 13:04


 


 


 Dexamethasone


  (Decadron)  6 mg  DAILY


 ORAL


   9/12/20 09:00


 9/21/20 08:59  9/14/20 09:12


 


 


 Dextrose


  (Dextrose 50%)  25 ml  Q30M  PRN


 IV


 Hypoglycemia  9/9/20 17:00


 12/8/20 16:59   


 


 


 Dextrose


  (Dextrose 50%)  50 ml  Q30M  PRN


 IV


 Hypoglycemia  9/9/20 17:00


 12/8/20 16:59   


 


 


 Dextrose/Sodium


 Chloride  1,000 ml @ 


 50 mls/hr  Q20H


 IV


   9/12/20 19:45


 10/12/20 19:44  9/14/20 11:45


 


 


 Donepezil HCl


  (Aricept)  5 mg  DAILY


 ORAL


   9/10/20 09:00


 10/10/20 08:59  9/14/20 09:12


 


 


 Heparin Sodium


  (Porcine)


  (Heparin 5000


 units/ml)  5,000 units  EVERY 12  HOURS


 SUBQ


   9/9/20 21:00


 10/24/20 20:59  9/12/20 09:06


 


 


 Lisinopril


  (ZestriL)  5 mg  DAILY


 ORAL


   9/10/20 09:00


 10/10/20 08:59  9/14/20 09:15


 


 


 Multivitamins


  (Multivitamins)  1 tab  DAILY


 ORAL


   9/12/20 09:00


 10/12/20 08:59  9/14/20 09:13


 


 


 Non-Formulary


 Medication


  (Non-Formulary


 Med)  1 ea  DAILY


 ORAL


   9/12/20 16:30


 10/12/20 16:29 UNV  


 


 


 Ondansetron HCl


  (Zofran)  4 mg  Q6H  PRN


 IVP


 Nausea & Vomiting  9/9/20 17:00


 10/9/20 16:59   


 


 


 Polyethylene


 Glycol


  (Miralax)  17 gm  DAILYPRN  PRN


 ORAL


 Constipation  9/9/20 17:00


 10/9/20 16:59   


 


 


 Promethazine HCl/


 Codeine


  (Phenergan with


 Codeine)  5 ml  Q6H  PRN


 ORAL


 cough  9/9/20 17:00


 10/9/20 16:59   


 


 


 Tamsulosin HCl


  (Flomax)  0.4 mg  DAILY


 ORAL


   9/10/20 09:00


 10/10/20 08:59  9/14/20 09:13


 

















Slime Garcia M.D. Sep 14, 2020 15:19

## 2020-09-14 NOTE — NUR
NURSE HAND-OFF REPORT: 



Important Events on Shift:[Patient had an elevated temperature. Gave medication and cooling 
measures to lower temperature]

Patient Status: []

Diet: [No added salt, mechanical soft diet, nectar thick liquids]



Pending Orders: []

Pending Results/Labs:[]

Pending MD notification:[]



Latest Vital Signs: Temperature 99.4 , Pulse 78 , B/P 159 /64 , Respiratory Rate 24 , O2 SAT 
94 , Nasal Cannula, O2 Flow Rate 2.0 .  

Vital Sign Comment: []



EKG Rhythm: Sinus Rhythm

Rhythm change?: N 

MD Notified?: -

MD Response: 



Latest Mejia Fall Score: 50  

Fall Risk: High Risk 

Safety Measures: Call light Within Reach, Bed Alarm Zone 1, Side Rails Side Rails x3, Bed 
position Low and Locked.

Fall Precautions: 

Yellow Socks

Yellow Gown

Door Sign

Patient Fall Education



Report given to [LITA Asher].

## 2020-09-14 NOTE — NUR
NURSE NOTES:

Pt received from Latisha. RN. Pt in bed sleeping. Bed low and locked. Head of bed elevated. 
No sign of SOB or distress. Ice bags seen on body. Fall precautions in place. Will monitor 
temp.

## 2020-09-14 NOTE — NUR
NURSE HAND-OFF REPORT: 



Important Events on Shift: Start on Rendesivir ATB

Patient Status: Stable Full Code

Diet: Regular Finely Chopped Mech



Pending Orders: 

Pending Results/Labs:

Pending MD notification:



Latest Vital Signs: Temperature 97.9 , Pulse 69 , B/P 135 /74 , Respiratory Rate 20 , O2 SAT 
95 , Nasal Cannula, O2 Flow Rate 4.0 .  

Vital Sign Comment: 



EKG Rhythm: Sinus Rhythm

Rhythm change?: N 

MD Notified?: -

MD Response: 



Latest Mejia Fall Score: 50  

Fall Risk: High Risk 

Safety Measures: Call light Within Reach, Bed Alarm Zone 1, Side Rails Side Rails x3, Bed 
position Low and Locked.

Fall Precautions: 

Yellow Socks

Yellow Gown

Door Sign

Patient Fall Education



Report given to Walker

## 2020-09-14 NOTE — NUR
NURSE NOTES:



Patient received from LITA Gupta. Patient is A/O x 1-2.  Patient is resting comfortably in 
efe-fowlers p Patient was running Rendesivir ATB. No acute distress noted. Patient is on 2L 
NC sating at 94%. No pain reported. IV site is Left 20G on his hand. No bleeding noted.  
There is bruising on his left hand. Patient's bed is in the lowest position, side rails up 
time 3. Call light within reach. Patient educated to use call light to ask for assistance. 
Will continue plan of care.

## 2020-09-14 NOTE — PULMONOLOGY PROGRESS NOTE
Subjective


ROS Limited/Unobtainable:  Yes


Constitutional:  Reports: no symptoms


HEENT:  Repors: no symptoms


Respiratory:  Reports: no symptoms


Allergies:  


Coded Allergies:  


     No Known Allergies (Unverified , 8/20/12)





Objective





Last 24 Hour Vital Signs








  Date Time  Temp Pulse Resp B/P (MAP) Pulse Ox O2 Delivery O2 Flow Rate FiO2


 


9/14/20 09:15    141/75    


 


9/14/20 09:15  72  141/75    


 


9/14/20 09:00      Nasal Cannula 4.0 


 


9/14/20 08:00 97.7 91 20 141/75 (97) 95   


 


9/14/20 08:00  69      


 


9/14/20 04:00  88      


 


9/14/20 04:00 99.4 78 24 159/64 (95) 94   


 


9/14/20 01:00 99.1       


 


9/14/20 00:00  64      


 


9/14/20 00:00 99.1 65 24 135/77 (96) 98   


 


9/13/20 21:00      Nasal Cannula 2.0 





      Nasal Cannula 2.0 


 


9/13/20 20:09  94 20  90 Nasal Cannula 2.0 28


 


9/13/20 20:00  64      


 


9/13/20 20:00 100.6 67 24 121/80 (94) 96   


 


9/13/20 16:00  80      


 


9/13/20 15:57 98.6 71 19 121/51 (74) 97   

















Intake and Output  


 


 9/13/20 9/14/20





 19:00 07:00


 


Intake Total 50 ml 600 ml


 


Balance 50 ml 600 ml


 


  


 


IV Total 50 ml 600 ml


 


# Voids  3


 


# Bowel Movements  1








General Appearance:  WD/WN


HEENT:  normocephalic, atraumatic


Respiratory:  chest wall non-tender, lungs clear, respiratory distress, 

decreased breath sounds


Cardiovascular:  normal peripheral pulses, normal rate


Abdomen:  normal bowel sounds, soft, non tender


Genitourinary:  normal external genitalia


Extremities:  no cyanosis


Neurologic:  CNs II-XII grossly normal





Microbiology








 Date/Time


Source Procedure


Growth Status


 


 


 9/12/20 17:00


Blood Blood Culture - Preliminary


NO GROWTH AFTER 24 HOURS Resulted


 


 9/12/20 16:50


Blood Blood Culture - Preliminary


NO GROWTH AFTER 24 HOURS Resulted








Laboratory Tests


9/14/20 08:45: 


White Blood Count 4.7L, Red Blood Count 5.00, Hemoglobin 14.8, Hematocrit 45.7, 

Mean Corpuscular Volume 91, Mean Corpuscular Hemoglobin 29.7, Mean Corpuscular 

Hemoglobin Concent 32.5, Red Cell Distribution Width 14.0, Platelet Count 102L, 

Mean Platelet Volume 11.1H, Neutrophils (%) (Auto) 63.3, Lymphocytes (%) (Auto) 

26.2, Monocytes (%) (Auto) 8.9, Eosinophils (%) (Auto) 0.1, Basophils (%) (Auto

) 1.6, Sodium Level 140, Potassium Level 3.9, Chloride Level 103, Carbon 

Dioxide Level 26, Anion Gap 11, Blood Urea Nitrogen 18, Creatinine 1.1, Estimat 

Glomerular Filtration Rate > 60, Glucose Level 121H, Calcium Level 8.4L, 

Phosphorus Level 2.5, Magnesium Level 2.3, Total Bilirubin 0.4, Aspartate Amino 

Transf (AST/SGOT) 70H, Alanine Aminotransferase (ALT/SGPT) 58, Alkaline 

Phosphatase 95, Total Protein 7.4, Albumin 2.4L, Globulin 5.0, Albumin/Globulin 

Ratio 0.5L





Current Medications








 Medications


  (Trade)  Dose


 Ordered  Sig/Miky


 Route


 PRN Reason  Start Time


 Stop Time Status Last Admin


Dose Admin


 


 Acetaminophen


  (Tylenol)  650 mg  Q4H  PRN


 ORAL


 FEVER  9/9/20 17:00


 10/9/20 16:59   


 


 


 Acetaminophen


  (Tylenol)  650 mg  Q4H  PRN


 RECTAL


 Mild Pain (Pain Scale 1-3)  9/13/20 11:30


 10/13/20 11:29  9/13/20 23:51


 


 


 Albuterol/


 Ipratropium


  (Combivent


 Respimat)  1 puff  Q4H  PRN


 INH


 Shortness of Breath  9/9/20 17:15


 10/9/20 17:14   


 


 


 Amlodipine


 Besylate


  (Norvasc)  5 mg  DAILY


 ORAL


   9/10/20 09:00


 10/10/20 08:59  9/14/20 09:15


 


 


 Ascorbic Acid


  (Vitamin C)  500 mg  DAILY


 ORAL


   9/12/20 09:00


 10/12/20 08:59  9/14/20 09:15


 


 


 Azithromycin 250


 mg/Dextrose  275 ml @ 


 275 mls/hr  Q24HRS


 IV


   9/10/20 14:00


 9/15/20 13:59  9/13/20 14:53


 


 


 Ceftriaxone


 Sodium 1 gm/


 Dextrose  55 ml @ 


 110 mls/hr  Q24H


 IVPB


   9/10/20 14:00


 9/17/20 13:59  9/13/20 13:33


 


 


 Dexamethasone


  (Decadron)  6 mg  DAILY


 ORAL


   9/12/20 09:00


 9/21/20 08:59  9/14/20 09:12


 


 


 Dextrose


  (Dextrose 50%)  25 ml  Q30M  PRN


 IV


 Hypoglycemia  9/9/20 17:00


 12/8/20 16:59   


 


 


 Dextrose


  (Dextrose 50%)  50 ml  Q30M  PRN


 IV


 Hypoglycemia  9/9/20 17:00


 12/8/20 16:59   


 


 


 Dextrose/Sodium


 Chloride  1,000 ml @ 


 50 mls/hr  Q20H


 IV


   9/12/20 19:45


 10/12/20 19:44  9/13/20 12:46


 


 


 Donepezil HCl


  (Aricept)  5 mg  DAILY


 ORAL


   9/10/20 09:00


 10/10/20 08:59  9/14/20 09:12


 


 


 Heparin Sodium


  (Porcine)


  (Heparin 5000


 units/ml)  5,000 units  EVERY 12  HOURS


 SUBQ


   9/9/20 21:00


 10/24/20 20:59  9/12/20 09:06


 


 


 Lisinopril


  (ZestriL)  5 mg  DAILY


 ORAL


   9/10/20 09:00


 10/10/20 08:59  9/14/20 09:15


 


 


 Multivitamins


  (Multivitamins)  1 tab  DAILY


 ORAL


   9/12/20 09:00


 10/12/20 08:59  9/14/20 09:13


 


 


 Non-Formulary


 Medication


  (Non-Formulary


 Med)  1 ea  DAILY


 ORAL


   9/12/20 16:30


 10/12/20 16:29 UNV  


 


 


 Ondansetron HCl


  (Zofran)  4 mg  Q6H  PRN


 IVP


 Nausea & Vomiting  9/9/20 17:00


 10/9/20 16:59   


 


 


 Polyethylene


 Glycol


  (Miralax)  17 gm  DAILYPRN  PRN


 ORAL


 Constipation  9/9/20 17:00


 10/9/20 16:59   


 


 


 Promethazine HCl/


 Codeine


  (Phenergan with


 Codeine)  5 ml  Q6H  PRN


 ORAL


 cough  9/9/20 17:00


 10/9/20 16:59   


 


 


 Tamsulosin HCl


  (Flomax)  0.4 mg  DAILY


 ORAL


   9/10/20 09:00


 10/10/20 08:59  9/14/20 09:13


 











Assessment/Plan


Problems:  


(1) 2019 novel coronavirus disease (COVID-19)


(2) Acute bronchitis


(3) History of CVA (cerebrovascular accident)


(4) BPH (benign prostatic hyperplasia)


(5) HTN (hypertension)


(6) Alzheimer's dementia


Assessment/Plan


all reviewed


respiratory isolation


check sputum, still pending


iv abx


ID recommendations appreciated


f/u electrolytes


titrate fio2 to sat of 92%


f/u inflammatory markers


monitor BP


dvt prophylaxis











Michael Sandoval MD Sep 14, 2020 12:13

## 2020-09-14 NOTE — SURGERY PROGRESS NOTE
Surgery Progress Note


Subjective


Symptoms:  improved, tolerating diet, passing flatus





Objective





Last 24 Hour Vital Signs








  Date Time  Temp Pulse Resp B/P (MAP) Pulse Ox O2 Delivery O2 Flow Rate FiO2


 


9/14/20 12:00 97.5 68 18 137/72 (93) 95   


 


9/14/20 12:00  70      


 


9/14/20 09:15    141/75    


 


9/14/20 09:15  72  141/75    


 


9/14/20 09:00      Nasal Cannula 4.0 


 


9/14/20 08:00 97.7 91 20 141/75 (97) 95   


 


9/14/20 08:00  69      


 


9/14/20 04:00  88      


 


9/14/20 04:00 99.4 78 24 159/64 (95) 94   


 


9/14/20 01:00 99.1       


 


9/14/20 00:00  64      


 


9/14/20 00:00 99.1 65 24 135/77 (96) 98   


 


9/13/20 21:00      Nasal Cannula 2.0 





      Nasal Cannula 2.0 


 


9/13/20 20:09  94 20  90 Nasal Cannula 2.0 28


 


9/13/20 20:00  64      


 


9/13/20 20:00 100.6 67 24 121/80 (94) 96   








I&O











Intake and Output  


 


 9/13/20 9/14/20





 19:00 07:00


 


Intake Total 50 ml 600 ml


 


Balance 50 ml 600 ml


 


  


 


IV Total 50 ml 600 ml


 


# Voids  3


 


# Bowel Movements  1








Dressing:  saturated


Cardiovascular:  RSR


Respiratory:  decreased breath sounds


Abdomen:  soft, non-tender, present bowel sounds


Extremities:  no tenderness, no cyanosis





Laboratory Tests








Test


  9/14/20


08:45


 


White Blood Count


  4.7 K/UL


(4.8-10.8)  L


 


Red Blood Count


  5.00 M/UL


(4.70-6.10)


 


Hemoglobin


  14.8 G/DL


(14.2-18.0)


 


Hematocrit


  45.7 %


(42.0-52.0)


 


Mean Corpuscular Volume 91 FL (80-99)  


 


Mean Corpuscular Hemoglobin


  29.7 PG


(27.0-31.0)


 


Mean Corpuscular Hemoglobin


Concent 32.5 G/DL


(32.0-36.0)


 


Red Cell Distribution Width


  14.0 %


(11.6-14.8)


 


Platelet Count


  102 K/UL


(150-450)  L


 


Mean Platelet Volume


  11.1 FL


(6.5-10.1)  H


 


Neutrophils (%) (Auto)


  63.3 %


(45.0-75.0)


 


Lymphocytes (%) (Auto)


  26.2 %


(20.0-45.0)


 


Monocytes (%) (Auto)


  8.9 %


(1.0-10.0)


 


Eosinophils (%) (Auto)


  0.1 %


(0.0-3.0)


 


Basophils (%) (Auto)


  1.6 %


(0.0-2.0)


 


Sodium Level


  140 MMOL/L


(136-145)


 


Potassium Level


  3.9 MMOL/L


(3.5-5.1)


 


Chloride Level


  103 MMOL/L


()


 


Carbon Dioxide Level


  26 MMOL/L


(21-32)


 


Anion Gap


  11 mmol/L


(5-15)


 


Blood Urea Nitrogen


  18 mg/dL


(7-18)


 


Creatinine


  1.1 MG/DL


(0.55-1.30)


 


Estimat Glomerular Filtration


Rate > 60 mL/min


(>60)


 


Glucose Level


  121 MG/DL


()  H


 


Calcium Level


  8.4 MG/DL


(8.5-10.1)  L


 


Phosphorus Level


  2.5 MG/DL


(2.5-4.9)


 


Magnesium Level


  2.3 MG/DL


(1.8-2.4)


 


Total Bilirubin


  0.4 MG/DL


(0.2-1.0)


 


Aspartate Amino Transf


(AST/SGOT) 70 U/L (15-37)


H


 


Alanine Aminotransferase


(ALT/SGPT) 58 U/L (12-78)


 


 


Alkaline Phosphatase


  95 U/L


()


 


Total Protein


  7.4 G/DL


(6.4-8.2)


 


Albumin


  2.4 G/DL


(3.4-5.0)  L


 


Globulin 5.0 g/dL  


 


Albumin/Globulin Ratio


  0.5 (1.0-2.7)


L











Plan


Problems:  


(1) Acute bronchitis


(2) 2019 novel coronavirus disease (COVID-19)


Assessment & Plan:  ++


as per ID


pulm input appreciated 





(3) Gram-negative bacteremia


(4) Osteoporosis


(5) UTI (urinary tract infection)


(6) Altered mental state


(7) Alzheimer's dementia


(8) Sepsis


(9) Acute encephalopathy


(10) Severe sepsis


(11) Aphasia


(12) HTN (hypertension)


(13) BPH (benign prostatic hyperplasia)


(14) History of CVA (cerebrovascular accident)


(15) Multifocal pneumonia


(16) Decubitus skin ulcer


Assessment & Plan:  75-year-old male presented to [facility] 

covered positive respiratory fevers admitted further care and management which 

time identified to have multiple decubitus skin ulcers and scrotal edema.  

Patient identified to have a stage III sacral buttock decubitus ulcer with 

periwound erythema and breakdown.  Incontinence associated dermatitis 

identified.  Care plan initiated after patient evaluation.  Will follow with 

recommendations.  Thank you





DAILY ESTIMATED NEEDS:


Needs based on Wound, DM, Cardiac 73kg 


25-30  kcals/kg 


[id number]  total kcals


1.25-1.5  g protein/kg


  g total protein 


25-30  mL/kg


[id number]  total fluid mLs





NUTRITION DIAGNOSIS:


* Swallowing difficulty R/T dysphagia as evidenced by SLP eval, SLP rec


for mech soft ground with nectar thick liquids


* Increased kcal/pro/micronutrients needs R/T wound healing as evidenced


by admitted w/ wounds @ sacrum and BL ankles, pending eval.








CURRENT DIET:KERI w/ mech soft ground w/ NTL    








 


PO DIET RECOMMENDATIONS:


KERI + CCHO Med (texture per SLP)   





 





ADDITIONAL RECOMMENDATIONS:


* Calibrated bedscale wt for accurate CBW 


* Monitor PO intake 


      -> add Glucerna BID for now, monitor acceptance 


* Check A1C for eval of glycemic control 


* Wound healing: add MVI x 1, Vit C 500mg QD 


   f/up w/ WC eval 





(17) Scrotal edema


Assessment & Plan:  Patient identified to have significant scrotal edema 

potential cellulitis on admission.  No abscess identified.  Towel placed under 

the scrotum.  We will monitor over the course of the next few days














Tunde Hammer Sep 14, 2020 16:20

## 2020-09-14 NOTE — NUR
NURSE NOTES:

Received report from LITA Asher. Pt is A/O x1 when addressed personally and loudly. Patient is 
currently on 2L SATing @ 97%. Pt has IV site on L Hand which is patent and is running 
D51/2NS running @ 50cc/hr. NoSOB or acute distress noted.. Bed is in the lowest position, 
locked and call light within reach. Will continue to monitor.

## 2020-09-15 VITALS — DIASTOLIC BLOOD PRESSURE: 75 MMHG | SYSTOLIC BLOOD PRESSURE: 115 MMHG

## 2020-09-15 VITALS — SYSTOLIC BLOOD PRESSURE: 104 MMHG | DIASTOLIC BLOOD PRESSURE: 62 MMHG

## 2020-09-15 VITALS — SYSTOLIC BLOOD PRESSURE: 103 MMHG | DIASTOLIC BLOOD PRESSURE: 58 MMHG

## 2020-09-15 VITALS — DIASTOLIC BLOOD PRESSURE: 64 MMHG | SYSTOLIC BLOOD PRESSURE: 101 MMHG

## 2020-09-15 VITALS — DIASTOLIC BLOOD PRESSURE: 62 MMHG | SYSTOLIC BLOOD PRESSURE: 123 MMHG

## 2020-09-15 VITALS — DIASTOLIC BLOOD PRESSURE: 61 MMHG | SYSTOLIC BLOOD PRESSURE: 104 MMHG

## 2020-09-15 LAB
ADD MANUAL DIFF: NO
ALBUMIN SERPL-MCNC: 2 G/DL (ref 3.4–5)
ALBUMIN/GLOB SERPL: 0.5 {RATIO} (ref 1–2.7)
ALP SERPL-CCNC: 87 U/L (ref 46–116)
ALT SERPL-CCNC: 54 U/L (ref 12–78)
ANION GAP SERPL CALC-SCNC: 11 MMOL/L (ref 5–15)
AST SERPL-CCNC: 60 U/L (ref 15–37)
BASOPHILS NFR BLD AUTO: 0.6 % (ref 0–2)
BILIRUB SERPL-MCNC: 0.3 MG/DL (ref 0.2–1)
BUN SERPL-MCNC: 23 MG/DL (ref 7–18)
CALCIUM SERPL-MCNC: 7.6 MG/DL (ref 8.5–10.1)
CHLORIDE SERPL-SCNC: 107 MMOL/L (ref 98–107)
CO2 SERPL-SCNC: 24 MMOL/L (ref 21–32)
CREAT SERPL-MCNC: 1.2 MG/DL (ref 0.55–1.3)
EOSINOPHIL NFR BLD AUTO: 0 % (ref 0–3)
ERYTHROCYTE [DISTWIDTH] IN BLOOD BY AUTOMATED COUNT: 12.1 % (ref 11.6–14.8)
FERRITIN SERPL-MCNC: 1147 NG/ML (ref 8–388)
GLOBULIN SER-MCNC: 4.4 G/DL
HCT VFR BLD CALC: 39.9 % (ref 42–52)
HGB BLD-MCNC: 13.3 G/DL (ref 14.2–18)
LYMPHOCYTES NFR BLD AUTO: 23.1 % (ref 20–45)
MCV RBC AUTO: 87 FL (ref 80–99)
MONOCYTES NFR BLD AUTO: 10.7 % (ref 1–10)
NEUTROPHILS NFR BLD AUTO: 65.6 % (ref 45–75)
PHOSPHATE SERPL-MCNC: 3 MG/DL (ref 2.5–4.9)
PLATELET # BLD: 116 K/UL (ref 150–450)
POTASSIUM SERPL-SCNC: 3.6 MMOL/L (ref 3.5–5.1)
RBC # BLD AUTO: 4.61 M/UL (ref 4.7–6.1)
SODIUM SERPL-SCNC: 142 MMOL/L (ref 136–145)
WBC # BLD AUTO: 4.4 K/UL (ref 4.8–10.8)

## 2020-09-15 RX ADMIN — HEPARIN SODIUM SCH UNITS: 5000 INJECTION INTRAVENOUS; SUBCUTANEOUS at 08:51

## 2020-09-15 RX ADMIN — HEPARIN SODIUM SCH UNITS: 5000 INJECTION INTRAVENOUS; SUBCUTANEOUS at 21:00

## 2020-09-15 RX ADMIN — DEXTROSE AND SODIUM CHLORIDE SCH MLS/HR: 5; .45 INJECTION, SOLUTION INTRAVENOUS at 07:22

## 2020-09-15 RX ADMIN — LISINOPRIL SCH MG: 2.5 TABLET ORAL at 08:50

## 2020-09-15 RX ADMIN — TAMSULOSIN HYDROCHLORIDE SCH MG: 0.4 CAPSULE ORAL at 08:48

## 2020-09-15 RX ADMIN — SODIUM CHLORIDE SCH MLS/HR: 0.9 INJECTION INTRAVENOUS at 14:10

## 2020-09-15 RX ADMIN — Medication SCH MG: at 08:49

## 2020-09-15 RX ADMIN — DONEPEZIL HYDROCHLORIDE SCH MG: 5 TABLET, FILM COATED ORAL at 08:48

## 2020-09-15 NOTE — PULMONOLOGY PROGRESS NOTE
Subjective


ROS Limited/Unobtainable:  Yes


Constitutional:  Reports: no symptoms


HEENT:  Repors: no symptoms


Respiratory:  Reports: no symptoms


Allergies:  


Coded Allergies:  


     No Known Allergies (Unverified , 8/20/12)





Objective





Last 24 Hour Vital Signs








  Date Time  Temp Pulse Resp B/P (MAP) Pulse Ox O2 Delivery O2 Flow Rate FiO2


 


9/15/20 09:00      Nasal Cannula 2.0 


 


9/15/20 08:51  67  123/62    


 


9/15/20 08:50    123/62    


 


9/15/20 08:00  62      


 


9/15/20 08:00 98.4 62 20 123/62 (82) 97   


 


9/15/20 04:00  62      


 


9/15/20 04:00 98.5 61 20 104/62 (76) 94   


 


9/15/20 00:00  62      


 


9/15/20 00:00 98.7 74 21 101/64 (76) 94   


 


9/14/20 21:00      Nasal Cannula 2.0 


 


9/14/20 20:00  77      


 


9/14/20 20:00 98.9 66 20 97/52 (67) 94   


 


9/14/20 16:00 97.9 69 20 135/74 (94) 95   


 


9/14/20 16:00  77      

















Intake and Output  


 


 9/14/20 9/15/20





 19:00 07:00


 


Intake Total 170 ml 250 ml


 


Output Total 450 ml 250 ml


 


Balance -280 ml 0 ml


 


  


 


Intake Oral 120 ml 


 


IV Total 50 ml 250 ml


 


Output Urine Total 450 ml 250 ml


 


# Voids  1








General Appearance:  WD/WN


HEENT:  normocephalic, atraumatic


Respiratory:  chest wall non-tender, lungs clear, respiratory distress, 

decreased breath sounds


Cardiovascular:  normal peripheral pulses, normal rate


Abdomen:  normal bowel sounds, soft, non tender


Genitourinary:  normal external genitalia


Extremities:  no cyanosis


Neurologic:  CNs II-XII grossly normal





Microbiology








 Date/Time


Source Procedure


Growth Status





 


 9/12/20 17:00


Blood Blood Culture - Preliminary


NO GROWTH AFTER 48 HOURS Resulted


 


 9/12/20 16:50


Blood Blood Culture - Preliminary


NO GROWTH AFTER 48 HOURS Resulted








Laboratory Tests


9/15/20 05:49: 


White Blood Count 4.4L, Red Blood Count 4.61L, Hemoglobin 13.3L, Hematocrit 

39.9L, Mean Corpuscular Volume 87, Mean Corpuscular Hemoglobin 28.8, Mean 

Corpuscular Hemoglobin Concent 33.2, Red Cell Distribution Width 12.1, Platelet 

Count 116L, Mean Platelet Volume 8.0, Neutrophils (%) (Auto) 65.6, Lymphocytes (

%) (Auto) 23.1, Monocytes (%) (Auto) 10.7H, Eosinophils (%) (Auto) 0.0, 

Basophils (%) (Auto) 0.6, Erythrocyte Sedimentation Rate 60H, Fibrinogen 479H, D

-Dimer 0.93H, Sodium Level 142, Potassium Level 3.6, Chloride Level 107, Carbon 

Dioxide Level 24, Anion Gap 11, Blood Urea Nitrogen 23H, Creatinine 1.2, 

Estimat Glomerular Filtration Rate 58.7, Glucose Level 170H, Calcium Level 7.6L

, Phosphorus Level 3.0, Magnesium Level 2.2, Ferritin 1147H, Total Bilirubin 0.3

, Direct Bilirubin < 0.1, Aspartate Amino Transf (AST/SGOT) 60H, Alanine 

Aminotransferase (ALT/SGPT) 54, Alkaline Phosphatase 87, Lactate Dehydrogenase 

305H, C-Reactive Protein, Quantitative 4.3H, Total Protein 6.4, Albumin 2.0L, 

Globulin 4.4, Albumin/Globulin Ratio 0.5L





Current Medications








 Medications


  (Trade)  Dose


 Ordered  Sig/Miky


 Route


 PRN Reason  Start Time


 Stop Time Status Last Admin


Dose Admin


 


 Acetaminophen


  (Tylenol)  650 mg  Q4H  PRN


 ORAL


 FEVER  9/9/20 17:00


 10/9/20 16:59   





 


 Acetaminophen


  (Tylenol)  650 mg  Q4H  PRN


 RECTAL


 Mild Pain (Pain Scale 1-3)  9/13/20 11:30


 10/13/20 11:29  9/13/20 23:51





 


 Albuterol/


 Ipratropium


  (Combivent


 Respimat)  1 puff  Q4H  PRN


 INH


 Shortness of Breath  9/9/20 17:15


 10/9/20 17:14   





 


 Amlodipine


 Besylate


  (Norvasc)  5 mg  DAILY


 ORAL


   9/10/20 09:00


 10/10/20 08:59  9/15/20 08:51





 


 Ascorbic Acid


  (Vitamin C)  500 mg  DAILY


 ORAL


   9/12/20 09:00


 10/12/20 08:59  9/15/20 08:49





 


 Ceftriaxone


 Sodium 1 gm/


 Dextrose  55 ml @ 


 110 mls/hr  Q24H


 IVPB


   9/10/20 14:00


 9/17/20 13:59  9/14/20 13:04





 


 Dexamethasone


  (Decadron)  6 mg  DAILY


 ORAL


   9/12/20 09:00


 9/21/20 08:59  9/15/20 08:49





 


 Dextrose


  (Dextrose 50%)  25 ml  Q30M  PRN


 IV


 Hypoglycemia  9/9/20 17:00


 12/8/20 16:59   





 


 Dextrose


  (Dextrose 50%)  50 ml  Q30M  PRN


 IV


 Hypoglycemia  9/9/20 17:00


 12/8/20 16:59   





 


 Dextrose/Sodium


 Chloride  1,000 ml @ 


 50 mls/hr  Q20H


 IV


   9/12/20 19:45


 10/12/20 19:44  9/15/20 07:22





 


 Donepezil HCl


  (Aricept)  5 mg  DAILY


 ORAL


   9/10/20 09:00


 10/10/20 08:59  9/15/20 08:48





 


 Heparin Sodium


  (Porcine)


  (Heparin 5000


 units/ml)  5,000 units  EVERY 12  HOURS


 SUBQ


   9/9/20 21:00


 10/24/20 20:59  9/12/20 09:06





 


 Lisinopril


  (ZestriL)  5 mg  DAILY


 ORAL


   9/10/20 09:00


 10/10/20 08:59  9/15/20 08:50





 


 Multivitamins


  (Multivitamins)  1 tab  DAILY


 ORAL


   9/12/20 09:00


 10/12/20 08:59  9/15/20 08:48





 


 Ondansetron HCl


  (Zofran)  4 mg  Q6H  PRN


 IVP


 Nausea & Vomiting  9/9/20 17:00


 10/9/20 16:59   





 


 Polyethylene


 Glycol


  (Miralax)  17 gm  DAILYPRN  PRN


 ORAL


 Constipation  9/9/20 17:00


 10/9/20 16:59   





 


 Promethazine HCl/


 Codeine


  (Phenergan with


 Codeine)  5 ml  Q6H  PRN


 ORAL


 cough  9/9/20 17:00


 10/9/20 16:59   





 


 Remdesivir 100 mg/


 Sodium Chloride  250 ml @ 


 250 mls/hr  Q24H


 IV


   9/15/20 18:00


 9/18/20 18:59   





 


 Tamsulosin HCl


  (Flomax)  0.4 mg  DAILY


 ORAL


   9/10/20 09:00


 10/10/20 08:59  9/15/20 08:48














Assessment/Plan


Problems:  


(1) 2019 novel coronavirus disease (COVID-19)


(2) Acute bronchitis


(3) History of CVA (cerebrovascular accident)


(4) BPH (benign prostatic hyperplasia)


(5) HTN (hypertension)


(6) Alzheimer's dementia


Assessment/Plan


all reviewed


looks comfortable


respiratory isolation


check sputum, still pending


iv abx


ID recommendations appreciated


f/u electrolytes


titrate fio2 to sat of 92%


f/u inflammatory markers, still elevated


monitor BP


dvt prophylaxis











Michael Sandoval MD Sep 15, 2020 13:16

## 2020-09-15 NOTE — INTERNAL MED PROGRESS NOTE
Subjective


Date of Service:  Sep 15, 2020


Physician Name


NadiyaBenito


Attending Physician


Andrei Cancino MD





Current Medications








 Medications


  (Trade)  Dose


 Ordered  Sig/Miky


 Route


 PRN Reason  Start Time


 Stop Time Status Last Admin


Dose Admin


 


 Acetaminophen


  (Tylenol)  650 mg  Q4H  PRN


 ORAL


 FEVER  9/9/20 17:00


 10/9/20 16:59   


 


 


 Acetaminophen


  (Tylenol)  650 mg  Q4H  PRN


 RECTAL


 Mild Pain (Pain Scale 1-3)  9/13/20 11:30


 10/13/20 11:29  9/13/20 23:51


 


 


 Albuterol/


 Ipratropium


  (Combivent


 Respimat)  1 puff  Q4H  PRN


 INH


 Shortness of Breath  9/9/20 17:15


 10/9/20 17:14   


 


 


 Amlodipine


 Besylate


  (Norvasc)  5 mg  DAILY


 ORAL


   9/10/20 09:00


 10/10/20 08:59  9/15/20 08:51


 


 


 Ascorbic Acid


  (Vitamin C)  500 mg  DAILY


 ORAL


   9/12/20 09:00


 10/12/20 08:59  9/15/20 08:49


 


 


 Dexamethasone


  (Decadron)  6 mg  DAILY


 ORAL


   9/12/20 09:00


 9/21/20 08:59  9/15/20 08:49


 


 


 Dextrose


  (Dextrose 50%)  25 ml  Q30M  PRN


 IV


 Hypoglycemia  9/9/20 17:00


 12/8/20 16:59   


 


 


 Dextrose


  (Dextrose 50%)  50 ml  Q30M  PRN


 IV


 Hypoglycemia  9/9/20 17:00


 12/8/20 16:59   


 


 


 Dextrose/Sodium


 Chloride  1,000 ml @ 


 50 mls/hr  Q20H


 IV


   9/12/20 19:45


 10/12/20 19:44  9/15/20 07:22


 


 


 Donepezil HCl


  (Aricept)  5 mg  DAILY


 ORAL


   9/10/20 09:00


 10/10/20 08:59  9/15/20 08:48


 


 


 Heparin Sodium


  (Porcine)


  (Heparin 5000


 units/ml)  5,000 units  EVERY 12  HOURS


 SUBQ


   9/9/20 21:00


 10/24/20 20:59  9/12/20 09:06


 


 


 Lisinopril


  (ZestriL)  5 mg  DAILY


 ORAL


   9/10/20 09:00


 10/10/20 08:59  9/15/20 08:50


 


 


 Multivitamins


  (Multivitamins)  1 tab  DAILY


 ORAL


   9/12/20 09:00


 10/12/20 08:59  9/15/20 08:48


 


 


 Ondansetron HCl


  (Zofran)  4 mg  Q6H  PRN


 IVP


 Nausea & Vomiting  9/9/20 17:00


 10/9/20 16:59   


 


 


 Polyethylene


 Glycol


  (Miralax)  17 gm  DAILYPRN  PRN


 ORAL


 Constipation  9/9/20 17:00


 10/9/20 16:59   


 


 


 Promethazine HCl/


 Codeine


  (Phenergan with


 Codeine)  5 ml  Q6H  PRN


 ORAL


 cough  9/9/20 17:00


 10/9/20 16:59   


 


 


 Remdesivir 100 mg/


 Sodium Chloride  250 ml @ 


 250 mls/hr  Q24H


 IV


   9/15/20 18:00


 9/18/20 18:59   


 


 


 Tamsulosin HCl


  (Flomax)  0.4 mg  DAILY


 ORAL


   9/10/20 09:00


 10/10/20 08:59  9/15/20 08:48


 








Allergies:  


Coded Allergies:  


     No Known Allergies (Unverified , 8/20/12)


ROS Limited/Unobtainable:  Yes


Subjective


76 YO M admitted with fever and dyspnea.  Now COVID 19 pos.  Cover for Int Med-

Dr Cancino.  Continues to spike fever to 102.2F





Objective





Last Vital Signs








  Date Time  Temp Pulse Resp B/P (MAP) Pulse Ox O2 Delivery O2 Flow Rate FiO2


 


9/15/20 16:00  75      


 


9/15/20 16:00 96.4  20 104/61 (75) 97   


 


9/15/20 09:00      Nasal Cannula 2.0 


 


9/13/20 20:09        28











Laboratory Tests








Test


  9/15/20


05:49


 


White Blood Count


  4.4 K/UL


(4.8-10.8)  L


 


Red Blood Count


  4.61 M/UL


(4.70-6.10)  L


 


Hemoglobin


  13.3 G/DL


(14.2-18.0)  L


 


Hematocrit


  39.9 %


(42.0-52.0)  L


 


Mean Corpuscular Volume 87 FL (80-99)  


 


Mean Corpuscular Hemoglobin


  28.8 PG


(27.0-31.0)


 


Mean Corpuscular Hemoglobin


Concent 33.2 G/DL


(32.0-36.0)


 


Red Cell Distribution Width


  12.1 %


(11.6-14.8)


 


Platelet Count


  116 K/UL


(150-450)  L


 


Mean Platelet Volume


  8.0 FL


(6.5-10.1)


 


Neutrophils (%) (Auto)


  65.6 %


(45.0-75.0)


 


Lymphocytes (%) (Auto)


  23.1 %


(20.0-45.0)


 


Monocytes (%) (Auto)


  10.7 %


(1.0-10.0)  H


 


Eosinophils (%) (Auto)


  0.0 %


(0.0-3.0)


 


Basophils (%) (Auto)


  0.6 %


(0.0-2.0)


 


Erythrocyte Sedimentation Rate


  60 MM/HR


(0-20)  H


 


Fibrinogen


  479 mg/dL


(200-400)  H


 


D-Dimer


  0.93 mg/L FEU


(0.00-0.49)  H


 


Sodium Level


  142 MMOL/L


(136-145)


 


Potassium Level


  3.6 MMOL/L


(3.5-5.1)


 


Chloride Level


  107 MMOL/L


()


 


Carbon Dioxide Level


  24 MMOL/L


(21-32)


 


Anion Gap


  11 mmol/L


(5-15)


 


Blood Urea Nitrogen


  23 mg/dL


(7-18)  H


 


Creatinine


  1.2 MG/DL


(0.55-1.30)


 


Estimat Glomerular Filtration


Rate 58.7 mL/min


(>60)


 


Glucose Level


  170 MG/DL


()  H


 


Calcium Level


  7.6 MG/DL


(8.5-10.1)  L


 


Phosphorus Level


  3.0 MG/DL


(2.5-4.9)


 


Magnesium Level


  2.2 MG/DL


(1.8-2.4)


 


Ferritin


  1147 NG/ML


(8-388)  H


 


Total Bilirubin


  0.3 MG/DL


(0.2-1.0)


 


Direct Bilirubin


  < 0.1 MG/DL


(0.0-0.3)


 


Aspartate Amino Transf


(AST/SGOT) 60 U/L (15-37)


H


 


Alanine Aminotransferase


(ALT/SGPT) 54 U/L (12-78)


 


 


Alkaline Phosphatase


  87 U/L


()


 


Lactate Dehydrogenase


  305 U/L


()  H


 


C-Reactive Protein,


Quantitative 4.3 mg/dL


(0.00-0.90)  H


 


Total Protein


  6.4 G/DL


(6.4-8.2)


 


Albumin


  2.0 G/DL


(3.4-5.0)  L


 


Globulin 4.4 g/dL  


 


Albumin/Globulin Ratio


  0.5 (1.0-2.7)


L

















Intake and Output  


 


 9/14/20 9/15/20





 19:00 07:00


 


Intake Total 170 ml 250 ml


 


Output Total 450 ml 250 ml


 


Balance -280 ml 0 ml


 


  


 


Intake Oral 120 ml 


 


IV Total 50 ml 250 ml


 


Output Urine Total 450 ml 250 ml


 


# Voids  1








Objective


PHYSICAL EXAMINATION:


GENERAL:  The patient is a well-developed, well-nourished, thin-appearing


 male, in no apparent distress.


HEENT:  Eyes, pupils are equal and responsive to light and accommodation.


Extraocular movements are intact.


NECK:  Supple without lymphadenopathy.


CHEST:  Lungs are clear to auscultation bilaterally without wheezes,


rales.


CARDIOVASCULAR:  Regular rate.  S1, S2 are normal without murmurs, rubs, or


gallops.


ABDOMEN:  Soft, nontender, and nondistended.  Positive bowel sounds.  No


evidence of hepatosplenomegaly.  Currently no rebound or guarding noted.


EXTREMITIES:  Negative for clubbing, cyanosis, or edema.


RECTAL/GENITAL:  Not performed.


NEUROLOGIC:  Cranial nerves II through XII are grossly intact without focal


deficits.  Motor strength is 5/5 bilaterally.  Deep tendon reflexes are 2+


plantar.





Assessment/Plan


Assessment/Plan


ASSESSMENT:  This is a 75-year-old  male.





1. Fever.


2. Dyspnea.


3. COVID-19 positive.


4. Congestive heart failure.


5. Hypertension.


6. Benign prostatic hypertrophy.


7. Osteoporosis.


8. Alzheimer dementia.


9. Ventriculomegaly.


10. Diabetes type 2.


11. Cerebrovascular disease.


12. Chronic renal failure.


13. Dysphagia.


14. Hypercholesterolemia.


15. Gastroesophageal reflux disease.





TREATMENT:


1. Fever/dyspnea/COVID-19 positive.


Infectious disease consultation = Dr. Garcia.  Pulmonary consultation =


Dr. Michael Sandoval.  Continue azithromycin and ceftriaxone for probable 

underlying


bacterial pneumonia.  Continue Remdesivir and Decadron.  We will


follow recommendation of Infectious Disease and Pulmonary.


2. Hypertension.  Continue amlodipine as above.


3. Benign prostatic hypertrophy.  Continue Flomax as above.


4. Osteoporosis.  Continue Fosamax as above.


5. Alzheimer dementia.  Continue Aricept as above.


6. Ventriculomegaly.


7. Diabetes type 2.


8. Cerebrovascular disease, status post cerebrovascular accident.


9. Chronic renal failure.


10. Dysphagia.


11. Hypercholesterolemia.  Continue atorvastatin as above.


12. Gastroesophageal reflux disease.











Benito Hearn MD Sep 15, 2020 18:06

## 2020-09-15 NOTE — NUR
NURSE HAND-OFF REPORT: 



Important Events on Shift:NA

Patient Status: Stable

Diet: No Na+ mechanical soft finely chopped nectar liquid 



Pending Orders: []

Pending Results/Labs:[]

Pending MD notification:[]



Latest Vital Signs: Temperature 98.5 , Pulse 62 , B/P 104 /62 , Respiratory Rate 20 , O2 SAT 
94 , Nasal Cannula, O2 Flow Rate 2.0 .  

Vital Sign Comment: []



EKG Rhythm: Sinus Rhythm

Rhythm change?: N 

MD Notified?: -

MD Response: 



Latest Mejia Fall Score: 50  

Fall Risk: High Risk 

Safety Measures: Call light Within Reach, Bed Alarm Zone 1, Side Rails Side Rails x3, Bed 
position Low and Locked.

Fall Precautions: 

Yellow Socks

Yellow Gown

Door Sign

Patient Fall Education



Report given to Leslie LONDON.

## 2020-09-15 NOTE — INFECTIOUS DISEASES PROG NOTE
Assessment/Plan








Assessment:


COVID19 PNeumonia -SP 2l NC > now at RA > 2l NC


  -9/12 CXR: Left lower lung/retrocardiac opacity which is similar in 

appearance to the previous study from September 11, 2020.  


  -9/9 CXR: Bibasilar atelectasis. No acute process otherwise


       rapid COVID PCR +





Low grade fever; improving


No leukocytosis


   -u/a neg, ucx neg





CONS bacteremia- likely contaminant


  -9/9 Bcx 1/4 CONS; 9/12 Bcx NTD





CAD


CVA


COPD


osteoporosis


 R shoulder surgery


 CKD


DM2


 dysphagia


GERD


aphasia


HTN


 HLD


 BPH


 Dementia


[facility] resident ([facility]) 





Plan:


-COntinue Ceftriaxone #7/7


-Decadron #6/10


-Requested Remdesivir #2/5


   -9/14 SP Azithromcyin #6





-f/u cx


-Monitor CBC/CMP, temperatures


-COVID19 isolation 


-f/u repeat Bcx x2





Thank you for consulting [facility]. Will continue to follow along with 

you. 


Discussed with RN and pharmacy staff.





Subjective


Allergies:  


Coded Allergies:  


     No Known Allergies (Unverified , 8/20/12)


afebrile >36hrs


at 2L NC


Bcx NTD





Objective





Last 24 Hour Vital Signs








  Date Time  Temp Pulse Resp B/P (MAP) Pulse Ox O2 Delivery O2 Flow Rate FiO2


 


9/15/20 12:00  80      


 


9/15/20 12:00 99.0 80 20 103/58 (73) 96   


 


9/15/20 09:00      Nasal Cannula 2.0 


 


9/15/20 08:51  67  123/62    


 


9/15/20 08:50    123/62    


 


9/15/20 08:00  62      


 


9/15/20 08:00 98.4 62 20 123/62 (82) 97   


 


9/15/20 04:00  62      


 


9/15/20 04:00 98.5 61 20 104/62 (76) 94   


 


9/15/20 00:00  62      


 


9/15/20 00:00 98.7 74 21 101/64 (76) 94   


 


9/14/20 21:00      Nasal Cannula 2.0 


 


9/14/20 20:00  77      


 


9/14/20 20:00 98.9 66 20 97/52 (67) 94   








Height (Feet):  5


Height (Inches):  5.00


Weight (Pounds):  159


Cardiovascular:  RSR


Respiratory:  decreased breath sounds


Abdomen:  soft, non-tender, present bowel sounds


Extremities:  no cyanosis





Laboratory Tests








Test


 9/15/20


05:49


 


White Blood Count


 4.4 K/UL


(4.8-10.8)  L


 


Red Blood Count


 4.61 M/UL


(4.70-6.10)  L


 


Hemoglobin


 13.3 G/DL


(14.2-18.0)  L


 


Hematocrit


 39.9 %


(42.0-52.0)  L


 


Mean Corpuscular Volume 87 FL (80-99)  


 


Mean Corpuscular Hemoglobin


 28.8 PG


(27.0-31.0)


 


Mean Corpuscular Hemoglobin


Concent 33.2 G/DL


(32.0-36.0)


 


Red Cell Distribution Width


 12.1 %


(11.6-14.8)


 


Platelet Count


 116 K/UL


(150-450)  L


 


Mean Platelet Volume


 8.0 FL


(6.5-10.1)


 


Neutrophils (%) (Auto)


 65.6 %


(45.0-75.0)


 


Lymphocytes (%) (Auto)


 23.1 %


(20.0-45.0)


 


Monocytes (%) (Auto)


 10.7 %


(1.0-10.0)  H


 


Eosinophils (%) (Auto)


 0.0 %


(0.0-3.0)


 


Basophils (%) (Auto)


 0.6 %


(0.0-2.0)


 


Erythrocyte Sedimentation Rate


 60 MM/HR


(0-20)  H


 


Fibrinogen


 479 mg/dL


(200-400)  H


 


D-Dimer


 0.93 mg/L FEU


(0.00-0.49)  H


 


Sodium Level


 142 MMOL/L


(136-145)


 


Potassium Level


 3.6 MMOL/L


(3.5-5.1)


 


Chloride Level


 107 MMOL/L


()


 


Carbon Dioxide Level


 24 MMOL/L


(21-32)


 


Anion Gap


 11 mmol/L


(5-15)


 


Blood Urea Nitrogen


 23 mg/dL


(7-18)  H


 


Creatinine


 1.2 MG/DL


(0.55-1.30)


 


Estimat Glomerular Filtration


Rate 58.7 mL/min


(>60)


 


Glucose Level


 170 MG/DL


()  H


 


Calcium Level


 7.6 MG/DL


(8.5-10.1)  L


 


Phosphorus Level


 3.0 MG/DL


(2.5-4.9)


 


Magnesium Level


 2.2 MG/DL


(1.8-2.4)


 


Ferritin


 1147 NG/ML


(8-388)  H


 


Total Bilirubin


 0.3 MG/DL


(0.2-1.0)


 


Direct Bilirubin


 < 0.1 MG/DL


(0.0-0.3)


 


Aspartate Amino Transf


(AST/SGOT) 60 U/L (15-37)


H


 


Alanine Aminotransferase


(ALT/SGPT) 54 U/L (12-78)





 


Alkaline Phosphatase


 87 U/L


()


 


Lactate Dehydrogenase


 305 U/L


()  H


 


C-Reactive Protein,


Quantitative 4.3 mg/dL


(0.00-0.90)  H


 


Total Protein


 6.4 G/DL


(6.4-8.2)


 


Albumin


 2.0 G/DL


(3.4-5.0)  L


 


Globulin 4.4 g/dL  


 


Albumin/Globulin Ratio


 0.5 (1.0-2.7)


L











Current Medications








 Medications


  (Trade)  Dose


 Ordered  Sig/Miky


 Route


 PRN Reason  Start Time


 Stop Time Status Last Admin


Dose Admin


 


 Acetaminophen


  (Tylenol)  650 mg  Q4H  PRN


 ORAL


 FEVER  9/9/20 17:00


 10/9/20 16:59   





 


 Acetaminophen


  (Tylenol)  650 mg  Q4H  PRN


 RECTAL


 Mild Pain (Pain Scale 1-3)  9/13/20 11:30


 10/13/20 11:29  9/13/20 23:51





 


 Albuterol/


 Ipratropium


  (Combivent


 Respimat)  1 puff  Q4H  PRN


 INH


 Shortness of Breath  9/9/20 17:15


 10/9/20 17:14   





 


 Amlodipine


 Besylate


  (Norvasc)  5 mg  DAILY


 ORAL


   9/10/20 09:00


 10/10/20 08:59  9/15/20 08:51





 


 Ascorbic Acid


  (Vitamin C)  500 mg  DAILY


 ORAL


   9/12/20 09:00


 10/12/20 08:59  9/15/20 08:49





 


 Ceftriaxone


 Sodium 1 gm/


 Dextrose  55 ml @ 


 110 mls/hr  Q24H


 IVPB


   9/10/20 14:00


 9/17/20 13:59  9/15/20 14:10





 


 Dexamethasone


  (Decadron)  6 mg  DAILY


 ORAL


   9/12/20 09:00


 9/21/20 08:59  9/15/20 08:49





 


 Dextrose


  (Dextrose 50%)  25 ml  Q30M  PRN


 IV


 Hypoglycemia  9/9/20 17:00


 12/8/20 16:59   





 


 Dextrose


  (Dextrose 50%)  50 ml  Q30M  PRN


 IV


 Hypoglycemia  9/9/20 17:00


 12/8/20 16:59   





 


 Dextrose/Sodium


 Chloride  1,000 ml @ 


 50 mls/hr  Q20H


 IV


   9/12/20 19:45


 10/12/20 19:44  9/15/20 07:22





 


 Donepezil HCl


  (Aricept)  5 mg  DAILY


 ORAL


   9/10/20 09:00


 10/10/20 08:59  9/15/20 08:48





 


 Heparin Sodium


  (Porcine)


  (Heparin 5000


 units/ml)  5,000 units  EVERY 12  HOURS


 SUBQ


   9/9/20 21:00


 10/24/20 20:59  9/12/20 09:06





 


 Lisinopril


  (ZestriL)  5 mg  DAILY


 ORAL


   9/10/20 09:00


 10/10/20 08:59  9/15/20 08:50





 


 Multivitamins


  (Multivitamins)  1 tab  DAILY


 ORAL


   9/12/20 09:00


 10/12/20 08:59  9/15/20 08:48





 


 Ondansetron HCl


  (Zofran)  4 mg  Q6H  PRN


 IVP


 Nausea & Vomiting  9/9/20 17:00


 10/9/20 16:59   





 


 Polyethylene


 Glycol


  (Miralax)  17 gm  DAILYPRN  PRN


 ORAL


 Constipation  9/9/20 17:00


 10/9/20 16:59   





 


 Promethazine HCl/


 Codeine


  (Phenergan with


 Codeine)  5 ml  Q6H  PRN


 ORAL


 cough  9/9/20 17:00


 10/9/20 16:59   





 


 Remdesivir 100 mg/


 Sodium Chloride  250 ml @ 


 250 mls/hr  Q24H


 IV


   9/15/20 18:00


 9/18/20 18:59   





 


 Tamsulosin HCl


  (Flomax)  0.4 mg  DAILY


 ORAL


   9/10/20 09:00


 10/10/20 08:59  9/15/20 08:48




















Slime Garcia M.D. Sep 15, 2020 17:19

## 2020-09-15 NOTE — NUR
NURSE NOTES:



Patient refused Combivent Respimat INH. Explained the benefits of the medication but patient 
still refused to take it. Patient SPo2 Satreation dropped down to 90% on 2L NC had to 
increase oxygen to 3L NC patient SPO2 Saturation is now at 94%. No acute distress noted at 
this time. Will continue to monitor.

## 2020-09-15 NOTE — NUR
NURSE HAND-OFF REPORT: 



Important Events on Shift:N

Patient Status: stable

Diet: No added salt diet, mechanical soft finely chopped, nectar liquid.



Pending Orders: y

Pending Results/Labs: n

Pending MD notification: n



Latest Vital Signs: Temperature 96.4 , Pulse 75 , B/P 104 /61 , Respiratory Rate 20 , O2 SAT 
97 , Nasal Cannula, O2 Flow Rate 2.0 .  

Vital Sign Comment: 



EKG Rhythm: Sinus Rhythm

Rhythm change?: N 

MD Notified?: -

MD Response: 



Latest Mejia Fall Score: 50  

Fall Risk: High Risk 

Safety Measures: Call light Within Reach, Bed Alarm Zone 1, Side Rails Side Rails x3, Bed 
position Low and Locked.

Fall Precautions: 

Yellow Socks

Yellow Gown

Door Sign

Patient Fall Education



Report given to .

-------------------------------------------------------------------------------

Addendum: 09/15/20 at 1923 by INNA MORAES RN

-------------------------------------------------------------------------------

REPORT GIVEN TO ALYSSIA LONDON

## 2020-09-15 NOTE — NUR
NURSE NOTES:

RECEIVED PATIENT AND REPORT FROM BERNY LONDON IN BED, NO S/S OF PAIN NOTED AT THIS TIME, PATIENT 
IS ON 3L NC.  EXPLAIN THE PLAN OF CARE TO PATIENT. IV IS INTACT AND PATENT WITH FLUIDS 
RUNNING. BED IS ON LOWEST POSITION, BEDSIDE  RAILS IS UP X3, BRAKES ENGAGED FOR SAFETY. CALL 
LIGHT IS WITHIN REACH. WILL CONTINUE WITH THE PLAN OF CARE.

## 2020-09-15 NOTE — NUR
NURSE NOTES:

Received pt and report from LITA Nelson. Observed pt resting in bed with both eyes open. Pt is 
A/Ox1. Cardiac monitor is in placed; pt is NSR. IV site is occluded. Will insert new IV 
shortly. Bed is in the lowest position, locked, and alarmed. No signs/symptoms of acute 
distress noted at this time. Will continue plan of care.

## 2020-09-15 NOTE — SURGERY PROGRESS NOTE
Surgery Progress Note


Subjective


Additional Comments


afebrile, HD stable


labs reviewed


micro noted exam stable





Objective





Last 24 Hour Vital Signs








  Date Time  Temp Pulse Resp B/P (MAP) Pulse Ox O2 Delivery O2 Flow Rate FiO2


 


9/15/20 16:00  75      


 


9/15/20 16:00 96.4 75 20 104/61 (75) 97   


 


9/15/20 12:00  80      


 


9/15/20 12:00 99.0 80 20 103/58 (73) 96   


 


9/15/20 09:00      Nasal Cannula 2.0 


 


9/15/20 08:51  67  123/62    


 


9/15/20 08:50    123/62    


 


9/15/20 08:00  62      


 


9/15/20 08:00 98.4 62 20 123/62 (82) 97   


 


9/15/20 04:00  62      


 


9/15/20 04:00 98.5 61 20 104/62 (76) 94   


 


9/15/20 00:00  62      


 


9/15/20 00:00 98.7 74 21 101/64 (76) 94   


 


9/14/20 21:00      Nasal Cannula 2.0 


 


9/14/20 20:00  77      


 


9/14/20 20:00 98.9 66 20 97/52 (67) 94   








I&O











Intake and Output  


 


 9/14/20 9/15/20





 19:00 07:00


 


Intake Total 170 ml 250 ml


 


Output Total 450 ml 250 ml


 


Balance -280 ml 0 ml


 


  


 


Intake Oral 120 ml 


 


IV Total 50 ml 250 ml


 


Output Urine Total 450 ml 250 ml


 


# Voids  1








Dressing:  other


Cardiovascular:  RSR


Respiratory:  decreased breath sounds


Abdomen:  soft, non-tender, present bowel sounds


Extremities:  no tenderness, no cyanosis





Laboratory Tests








Test


 9/15/20


05:49


 


White Blood Count


 4.4 K/UL


(4.8-10.8)  L


 


Red Blood Count


 4.61 M/UL


(4.70-6.10)  L


 


Hemoglobin


 13.3 G/DL


(14.2-18.0)  L


 


Hematocrit


 39.9 %


(42.0-52.0)  L


 


Mean Corpuscular Volume 87 FL (80-99)  


 


Mean Corpuscular Hemoglobin


 28.8 PG


(27.0-31.0)


 


Mean Corpuscular Hemoglobin


Concent 33.2 G/DL


(32.0-36.0)


 


Red Cell Distribution Width


 12.1 %


(11.6-14.8)


 


Platelet Count


 116 K/UL


(150-450)  L


 


Mean Platelet Volume


 8.0 FL


(6.5-10.1)


 


Neutrophils (%) (Auto)


 65.6 %


(45.0-75.0)


 


Lymphocytes (%) (Auto)


 23.1 %


(20.0-45.0)


 


Monocytes (%) (Auto)


 10.7 %


(1.0-10.0)  H


 


Eosinophils (%) (Auto)


 0.0 %


(0.0-3.0)


 


Basophils (%) (Auto)


 0.6 %


(0.0-2.0)


 


Erythrocyte Sedimentation Rate


 60 MM/HR


(0-20)  H


 


Fibrinogen


 479 mg/dL


(200-400)  H


 


D-Dimer


 0.93 mg/L FEU


(0.00-0.49)  H


 


Sodium Level


 142 MMOL/L


(136-145)


 


Potassium Level


 3.6 MMOL/L


(3.5-5.1)


 


Chloride Level


 107 MMOL/L


()


 


Carbon Dioxide Level


 24 MMOL/L


(21-32)


 


Anion Gap


 11 mmol/L


(5-15)


 


Blood Urea Nitrogen


 23 mg/dL


(7-18)  H


 


Creatinine


 1.2 MG/DL


(0.55-1.30)


 


Estimat Glomerular Filtration


Rate 58.7 mL/min


(>60)


 


Glucose Level


 170 MG/DL


()  H


 


Calcium Level


 7.6 MG/DL


(8.5-10.1)  L


 


Phosphorus Level


 3.0 MG/DL


(2.5-4.9)


 


Magnesium Level


 2.2 MG/DL


(1.8-2.4)


 


Ferritin


 1147 NG/ML


(8-388)  H


 


Total Bilirubin


 0.3 MG/DL


(0.2-1.0)


 


Direct Bilirubin


 < 0.1 MG/DL


(0.0-0.3)


 


Aspartate Amino Transf


(AST/SGOT) 60 U/L (15-37)


H


 


Alanine Aminotransferase


(ALT/SGPT) 54 U/L (12-78)





 


Alkaline Phosphatase


 87 U/L


()


 


Lactate Dehydrogenase


 305 U/L


()  H


 


C-Reactive Protein,


Quantitative 4.3 mg/dL


(0.00-0.90)  H


 


Total Protein


 6.4 G/DL


(6.4-8.2)


 


Albumin


 2.0 G/DL


(3.4-5.0)  L


 


Globulin 4.4 g/dL  


 


Albumin/Globulin Ratio


 0.5 (1.0-2.7)


L











Plan


Problems:  


(1) Acute bronchitis


(2) 2019 novel coronavirus disease (COVID-19)


Assessment & Plan:  ++


as per ID


pulm input appreciated 





(3) Gram-negative bacteremia


(4) Osteoporosis


(5) UTI (urinary tract infection)


(6) Altered mental state


(7) Alzheimer's dementia


(8) Sepsis


(9) Acute encephalopathy


(10) Severe sepsis


(11) Aphasia


(12) HTN (hypertension)


(13) BPH (benign prostatic hyperplasia)


(14) History of CVA (cerebrovascular accident)


(15) Multifocal pneumonia


(16) Decubitus skin ulcer


Assessment & Plan:  75-year-old male presented to [facility] 

covered positive respiratory fevers admitted further care and management which 

time identified to have multiple decubitus skin ulcers and scrotal edema.  

Patient identified to have a stage III sacral buttock decubitus ulcer with 

periwound erythema and breakdown.  Incontinence associated dermatitis 

identified.  Care plan initiated after patient evaluation.  Will follow with 

recommendations.  Thank you





DAILY ESTIMATED NEEDS:


Needs based on Wound, DM, Cardiac 73kg 


25-30  kcals/kg 


4465-4194  total kcals


1.25-1.5  g protein/kg


  g total protein 


25-30  mL/kg


5113-9182  total fluid mLs





NUTRITION DIAGNOSIS:


* Swallowing difficulty R/T dysphagia as evidenced by SLP eval, SLP rec


for mech soft ground with nectar thick liquids


* Increased kcal/pro/micronutrients needs R/T wound healing as evidenced


by admitted w/ wounds @ sacrum and BL ankles, pending eval.








CURRENT DIET:KERI w/ mech soft ground w/ NTL    








 


PO DIET RECOMMENDATIONS:


KERI + CCHO Med (texture per SLP)   





 





ADDITIONAL RECOMMENDATIONS:


* Calibrated bedscale wt for accurate CBW 


* Monitor PO intake 


      -> add Glucerna BID for now, monitor acceptance 


* Check A1C for eval of glycemic control 


* Wound healing: add MVI x 1, Vit C 500mg QD 


   f/up w/ WC eval 





(17) Scrotal edema


Assessment & Plan:  Patient identified to have significant scrotal edema 

potential cellulitis on admission.  No abscess identified.  Towel placed under 

the scrotum.  We will monitor over the course of the next few days














Tunde Hammer Sep 15, 2020 18:04

## 2020-09-16 VITALS — SYSTOLIC BLOOD PRESSURE: 102 MMHG | DIASTOLIC BLOOD PRESSURE: 64 MMHG

## 2020-09-16 VITALS — DIASTOLIC BLOOD PRESSURE: 59 MMHG | SYSTOLIC BLOOD PRESSURE: 98 MMHG

## 2020-09-16 VITALS — DIASTOLIC BLOOD PRESSURE: 70 MMHG | SYSTOLIC BLOOD PRESSURE: 121 MMHG

## 2020-09-16 VITALS — DIASTOLIC BLOOD PRESSURE: 66 MMHG | SYSTOLIC BLOOD PRESSURE: 115 MMHG

## 2020-09-16 VITALS — SYSTOLIC BLOOD PRESSURE: 140 MMHG | DIASTOLIC BLOOD PRESSURE: 83 MMHG

## 2020-09-16 VITALS — DIASTOLIC BLOOD PRESSURE: 71 MMHG | SYSTOLIC BLOOD PRESSURE: 104 MMHG

## 2020-09-16 LAB
ADD MANUAL DIFF: NO
ALBUMIN SERPL-MCNC: 2.2 G/DL (ref 3.4–5)
ALBUMIN/GLOB SERPL: 0.5 {RATIO} (ref 1–2.7)
ALP SERPL-CCNC: 93 U/L (ref 46–116)
ALT SERPL-CCNC: 54 U/L (ref 12–78)
ANION GAP SERPL CALC-SCNC: 11 MMOL/L (ref 5–15)
AST SERPL-CCNC: 48 U/L (ref 15–37)
BASOPHILS NFR BLD AUTO: 0.5 % (ref 0–2)
BILIRUB SERPL-MCNC: 0.4 MG/DL (ref 0.2–1)
BUN SERPL-MCNC: 22 MG/DL (ref 7–18)
CALCIUM SERPL-MCNC: 7.7 MG/DL (ref 8.5–10.1)
CHLORIDE SERPL-SCNC: 108 MMOL/L (ref 98–107)
CO2 SERPL-SCNC: 25 MMOL/L (ref 21–32)
CREAT SERPL-MCNC: 1.1 MG/DL (ref 0.55–1.3)
EOSINOPHIL NFR BLD AUTO: 0 % (ref 0–3)
ERYTHROCYTE [DISTWIDTH] IN BLOOD BY AUTOMATED COUNT: 12.2 % (ref 11.6–14.8)
GLOBULIN SER-MCNC: 4.6 G/DL
HCT VFR BLD CALC: 45 % (ref 42–52)
HGB BLD-MCNC: 14.9 G/DL (ref 14.2–18)
LYMPHOCYTES NFR BLD AUTO: 20.5 % (ref 20–45)
MCV RBC AUTO: 87 FL (ref 80–99)
MONOCYTES NFR BLD AUTO: 9.8 % (ref 1–10)
NEUTROPHILS NFR BLD AUTO: 69.1 % (ref 45–75)
PLATELET # BLD: 145 K/UL (ref 150–450)
POTASSIUM SERPL-SCNC: 3.9 MMOL/L (ref 3.5–5.1)
RBC # BLD AUTO: 5.18 M/UL (ref 4.7–6.1)
SODIUM SERPL-SCNC: 143 MMOL/L (ref 136–145)
WBC # BLD AUTO: 5.5 K/UL (ref 4.8–10.8)

## 2020-09-16 RX ADMIN — DEXTROSE AND SODIUM CHLORIDE SCH MLS/HR: 5; .45 INJECTION, SOLUTION INTRAVENOUS at 23:45

## 2020-09-16 RX ADMIN — HEPARIN SODIUM SCH UNITS: 5000 INJECTION INTRAVENOUS; SUBCUTANEOUS at 20:28

## 2020-09-16 RX ADMIN — DONEPEZIL HYDROCHLORIDE SCH MG: 5 TABLET, FILM COATED ORAL at 08:31

## 2020-09-16 RX ADMIN — DEXTROSE AND SODIUM CHLORIDE SCH MLS/HR: 5; .45 INJECTION, SOLUTION INTRAVENOUS at 02:59

## 2020-09-16 RX ADMIN — Medication SCH MG: at 08:31

## 2020-09-16 RX ADMIN — TAMSULOSIN HYDROCHLORIDE SCH MG: 0.4 CAPSULE ORAL at 08:30

## 2020-09-16 RX ADMIN — HEPARIN SODIUM SCH UNITS: 5000 INJECTION INTRAVENOUS; SUBCUTANEOUS at 08:32

## 2020-09-16 RX ADMIN — LISINOPRIL SCH MG: 2.5 TABLET ORAL at 08:30

## 2020-09-16 NOTE — PULMONOLOGY PROGRESS NOTE
Subjective


ROS Limited/Unobtainable:  Yes


Constitutional:  Reports: no symptoms


HEENT:  Repors: no symptoms


Respiratory:  Reports: no symptoms


Allergies:  


Coded Allergies:  


     No Known Allergies (Unverified , 8/20/12)





Objective





Last 24 Hour Vital Signs








  Date Time  Temp Pulse Resp B/P (MAP) Pulse Ox O2 Delivery O2 Flow Rate FiO2


 


9/16/20 16:00  60      


 


9/16/20 16:00 97.3 60 18 102/64 (77) 96   


 


9/16/20 12:00 97.9 60 18 98/59 (72) 95   


 


9/16/20 12:00  60      


 


9/16/20 09:00      Nasal Cannula 2.0 


 


9/16/20 08:31  58  104/71    


 


9/16/20 08:30    104/71    


 


9/16/20 08:00  54      


 


9/16/20 08:00 97.7 54 18 104/71 (82) 96   


 


9/16/20 04:00 96.5 54 17 115/66 (82) 98   


 


9/16/20 04:00  54      


 


9/16/20 00:00  56      


 


9/16/20 00:00 96.7 57 17 140/83 (102) 97   


 


9/15/20 21:00      Nasal Cannula 2.0 


 


9/15/20 20:00 97.9 82 20 115/75 (88) 96   


 


9/15/20 20:00  82      

















Intake and Output  


 


 9/15/20 9/16/20





 19:00 07:00


 


Intake Total 240 ml 114 ml


 


Output Total 200 ml 


 


Balance 40 ml 114 ml


 


  


 


Intake Oral 240 ml 114 ml


 


Output Urine Total 200 ml 


 


# Voids  2








General Appearance:  WD/WN


HEENT:  normocephalic, atraumatic


Respiratory:  chest wall non-tender, lungs clear, respiratory distress, 

decreased breath sounds


Cardiovascular:  normal peripheral pulses, normal rate


Abdomen:  normal bowel sounds, soft, non tender


Genitourinary:  normal external genitalia


Extremities:  no cyanosis


Neurologic:  CNs II-XII grossly normal


Laboratory Tests


9/16/20 05:08: 


White Blood Count 5.5, Red Blood Count 5.18, Hemoglobin 14.9, Hematocrit 45.0, 

Mean Corpuscular Volume 87, Mean Corpuscular Hemoglobin 28.8, Mean Corpuscular 

Hemoglobin Concent 33.2, Red Cell Distribution Width 12.2, Platelet Count 145L, 

Mean Platelet Volume 7.9, Neutrophils (%) (Auto) 69.1, Lymphocytes (%) (Auto) 

20.5, Monocytes (%) (Auto) 9.8, Eosinophils (%) (Auto) 0.0, Basophils (%) (Auto)

0.5, Sodium Level 143, Potassium Level 3.9, Chloride Level 108H, Carbon Dioxide 

Level 25, Anion Gap 11, Blood Urea Nitrogen 22H, Creatinine 1.1, Estimat 

Glomerular Filtration Rate > 60, Glucose Level 158H, Calcium Level 7.7L, Total 

Bilirubin 0.4, Direct Bilirubin 0.2, Aspartate Amino Transf (AST/SGOT) 48H, 

Alanine Aminotransferase (ALT/SGPT) 54, Alkaline Phosphatase 93, Total Protein 

6.8, Albumin 2.2L, Globulin 4.6, Albumin/Globulin Ratio 0.5L





Current Medications








 Medications


  (Trade)  Dose


 Ordered  Sig/Miky


 Route


 PRN Reason  Start Time


 Stop Time Status Last Admin


Dose Admin


 


 Acetaminophen


  (Tylenol)  650 mg  Q4H  PRN


 ORAL


 FEVER  9/9/20 17:00


 10/9/20 16:59   





 


 Acetaminophen


  (Tylenol)  650 mg  Q4H  PRN


 RECTAL


 Mild Pain (Pain Scale 1-3)  9/13/20 11:30


 10/13/20 11:29  9/13/20 23:51





 


 Albuterol/


 Ipratropium


  (Combivent


 Respimat)  1 puff  Q4H  PRN


 INH


 Shortness of Breath  9/9/20 17:15


 10/9/20 17:14   





 


 Amlodipine


 Besylate


  (Norvasc)  5 mg  DAILY


 ORAL


   9/10/20 09:00


 10/10/20 08:59  9/16/20 08:31





 


 Ascorbic Acid


  (Vitamin C)  500 mg  DAILY


 ORAL


   9/12/20 09:00


 10/12/20 08:59  9/16/20 08:31





 


 Dexamethasone


  (Decadron)  6 mg  DAILY


 ORAL


   9/12/20 09:00


 9/21/20 08:59  9/16/20 08:31





 


 Dextrose


  (Dextrose 50%)  25 ml  Q30M  PRN


 IV


 Hypoglycemia  9/9/20 17:00


 12/8/20 16:59   





 


 Dextrose


  (Dextrose 50%)  50 ml  Q30M  PRN


 IV


 Hypoglycemia  9/9/20 17:00


 12/8/20 16:59   





 


 Dextrose/Sodium


 Chloride  1,000 ml @ 


 50 mls/hr  Q20H


 IV


   9/12/20 19:45


 10/12/20 19:44  9/16/20 02:59





 


 Donepezil HCl


  (Aricept)  5 mg  DAILY


 ORAL


   9/10/20 09:00


 10/10/20 08:59  9/16/20 08:31





 


 Heparin Sodium


  (Porcine)


  (Heparin 5000


 units/ml)  5,000 units  EVERY 12  HOURS


 SUBQ


   9/9/20 21:00


 10/24/20 20:59  9/12/20 09:06





 


 Lisinopril


  (ZestriL)  5 mg  DAILY


 ORAL


   9/10/20 09:00


 10/10/20 08:59  9/16/20 08:30





 


 Multivitamins


  (Multivitamins)  1 tab  DAILY


 ORAL


   9/12/20 09:00


 10/12/20 08:59  9/16/20 08:30





 


 Ondansetron HCl


  (Zofran)  4 mg  Q6H  PRN


 IVP


 Nausea & Vomiting  9/9/20 17:00


 10/9/20 16:59   





 


 Polyethylene


 Glycol


  (Miralax)  17 gm  DAILYPRN  PRN


 ORAL


 Constipation  9/9/20 17:00


 10/9/20 16:59   





 


 Promethazine HCl/


 Codeine


  (Phenergan with


 Codeine)  5 ml  Q6H  PRN


 ORAL


 cough  9/9/20 17:00


 10/9/20 16:59   





 


 Remdesivir 100 mg/


 Sodium Chloride  250 ml @ 


 250 mls/hr  Q24H


 IV


   9/15/20 18:00


 9/18/20 18:59  9/15/20 18:10





 


 Tamsulosin HCl


  (Flomax)  0.4 mg  DAILY


 ORAL


   9/10/20 09:00


 10/10/20 08:59  9/16/20 08:30














Assessment/Plan


Problems:  


(1) 2019 novel coronavirus disease (COVID-19)


(2) Acute bronchitis


(3) History of CVA (cerebrovascular accident)


(4) BPH (benign prostatic hyperplasia)


(5) HTN (hypertension)


(6) Alzheimer's dementia


Assessment/Plan





looks comfortable


respiratory isolation


check sputum, still pending





ID recommendations appreciated


f/u electrolytes


titrate fio2 to sat of 92%


f/u inflammatory markers, still elevated


monitor BP


dvt prophylaxis











Michael Sandoval MD              Sep 16, 2020 17:21

## 2020-09-16 NOTE — NUR
NURSE HAND-OFF REPORT: 



Important Events on Shift: Inserted new IV on pt. Endorsed to dayshift RN that dietician 
called this morning to recommend Glucerna supplement drink TID. Also, noticed that pt's 
glucose lab results this morning came back at 158. Endorsed to dayshift RN to ask MD for 
insulin sliding scale.

Patient Status: Stable

Diet: No added salt



Pending Orders: Glucerna supplement drink TID

Pending Results/Labs: AM Labs

Pending MD notification: insulin sliding scale?



Latest Vital Signs: Temperature 96.5 , Pulse 54 , B/P 115 /66 , Respiratory Rate 17 , O2 SAT 
98 , Nasal Cannula, O2 Flow Rate 2.0 .  



EKG Rhythm: Sinus Bradycardia

Rhythm change?: N 



Latest Mejia Fall Score: 50  

Fall Risk: High Risk 

Safety Measures: Call light Within Reach, Bed Alarm Zone 1, Side Rails Side Rails x3, Bed 
position Low and Locked.

Fall Precautions: 

Yellow Socks

Yellow Gown

Door Sign

Patient Fall Education



Report given to LITA Nelson.

## 2020-09-16 NOTE — NUR
NURSE NOTES:

Patient received from Leslie L[facility]. Patient in stable condition. A&O x 1-2 Serbian speaking. 
Saturating well on 3L of oxygen via Nasal cannula. IV site patent and intact on the Right 
hand 22G running D5 1/2NS @ 50cc/hr. No s/s of acute distress. Bed in lowest position and 
locked. Call light wand bedside table within reach. Will continue plan of care.

## 2020-09-16 NOTE — NUR
CASE MANAGEMENT: REVIEW





SI: COVID-19 . PNA . BACTEREMIA . UTI 

T 96.6 HR 54 RR 17 BP 98/59 SAT 95% NC/2L

PLT  GLUCOSE 158 AST 48 







IS: REMDESIVIR IV Q24HR

DECADRON PO QD

D5 1/2 NS IVF @ 50ML/HR 







***TELEMETRY UNIT STATUS***

DCP: PATIENT IS FROM [facility]

## 2020-09-16 NOTE — SURGERY PROGRESS NOTE
Surgery Progress Note


Subjective


Symptoms:  improved, tolerating diet, passing flatus





Objective





Last 24 Hour Vital Signs








  Date Time  Temp Pulse Resp B/P (MAP) Pulse Ox O2 Delivery O2 Flow Rate FiO2


 


9/16/20 12:00 97.9 60 18 98/59 (72) 95   


 


9/16/20 12:00  60      


 


9/16/20 09:00      Nasal Cannula 2.0 


 


9/16/20 08:31  58  104/71    


 


9/16/20 08:30    104/71    


 


9/16/20 08:00  54      


 


9/16/20 08:00 97.7 54 18 104/71 (82) 96   


 


9/16/20 04:00 96.5 54 17 115/66 (82) 98   


 


9/16/20 04:00  54      


 


9/16/20 00:00  56      


 


9/16/20 00:00 96.7 57 17 140/83 (102) 97   


 


9/15/20 21:00      Nasal Cannula 2.0 


 


9/15/20 20:00 97.9 82 20 115/75 (88) 96   


 


9/15/20 20:00  82      


 


9/15/20 16:00  75      


 


9/15/20 16:00 96.4 75 20 104/61 (75) 97   








I&O











Intake and Output  


 


 9/15/20 9/16/20





 19:00 07:00


 


Intake Total 240 ml 114 ml


 


Output Total 200 ml 


 


Balance 40 ml 114 ml


 


  


 


Intake Oral 240 ml 114 ml


 


Output Urine Total 200 ml 


 


# Voids  2








Dressing:  other


Wound:  other


Cardiovascular:  RSR


Respiratory:  decreased breath sounds


Abdomen:  soft, non-tender, present bowel sounds


Extremities:  no tenderness, no cyanosis





Laboratory Tests








Test


 9/16/20


05:08


 


White Blood Count


 5.5 K/UL


(4.8-10.8)


 


Red Blood Count


 5.18 M/UL


(4.70-6.10)


 


Hemoglobin


 14.9 G/DL


(14.2-18.0)


 


Hematocrit


 45.0 %


(42.0-52.0)


 


Mean Corpuscular Volume 87 FL (80-99)  


 


Mean Corpuscular Hemoglobin


 28.8 PG


(27.0-31.0)


 


Mean Corpuscular Hemoglobin


Concent 33.2 G/DL


(32.0-36.0)


 


Red Cell Distribution Width


 12.2 %


(11.6-14.8)


 


Platelet Count


 145 K/UL


(150-450)  L


 


Mean Platelet Volume


 7.9 FL


(6.5-10.1)


 


Neutrophils (%) (Auto)


 69.1 %


(45.0-75.0)


 


Lymphocytes (%) (Auto)


 20.5 %


(20.0-45.0)


 


Monocytes (%) (Auto)


 9.8 %


(1.0-10.0)


 


Eosinophils (%) (Auto)


 0.0 %


(0.0-3.0)


 


Basophils (%) (Auto)


 0.5 %


(0.0-2.0)


 


Sodium Level


 143 MMOL/L


(136-145)


 


Potassium Level


 3.9 MMOL/L


(3.5-5.1)


 


Chloride Level


 108 MMOL/L


()  H


 


Carbon Dioxide Level


 25 MMOL/L


(21-32)


 


Anion Gap


 11 mmol/L


(5-15)


 


Blood Urea Nitrogen


 22 mg/dL


(7-18)  H


 


Creatinine


 1.1 MG/DL


(0.55-1.30)


 


Estimat Glomerular Filtration


Rate > 60 mL/min


(>60)


 


Glucose Level


 158 MG/DL


()  H


 


Calcium Level


 7.7 MG/DL


(8.5-10.1)  L


 


Total Bilirubin


 0.4 MG/DL


(0.2-1.0)


 


Direct Bilirubin


 0.2 MG/DL


(0.0-0.3)


 


Aspartate Amino Transf


(AST/SGOT) 48 U/L (15-37)


H


 


Alanine Aminotransferase


(ALT/SGPT) 54 U/L (12-78)





 


Alkaline Phosphatase


 93 U/L


()


 


Total Protein


 6.8 G/DL


(6.4-8.2)


 


Albumin


 2.2 G/DL


(3.4-5.0)  L


 


Globulin 4.6 g/dL  


 


Albumin/Globulin Ratio


 0.5 (1.0-2.7)


L











Plan


Problems:  


(1) Acute bronchitis


(2) 2019 novel coronavirus disease (COVID-19)


Assessment & Plan:  ++


as per ID


pulm input appreciated 





(3) Gram-negative bacteremia


(4) Osteoporosis


(5) UTI (urinary tract infection)


(6) Altered mental state


(7) Alzheimer's dementia


(8) Sepsis


(9) Acute encephalopathy


(10) Severe sepsis


(11) Aphasia


(12) HTN (hypertension)


(13) BPH (benign prostatic hyperplasia)


(14) History of CVA (cerebrovascular accident)


(15) Multifocal pneumonia


(16) Decubitus skin ulcer


Assessment & Plan:  75-year-old male presented to [facility] covered

positive respiratory fevers admitted further care and management which time 

identified to have multiple decubitus skin ulcers and scrotal edema.  Patient 

identified to have a stage III sacral buttock decubitus ulcer with periwound 

erythema and breakdown.  Incontinence associated dermatitis identified.  Care 

plan initiated after patient evaluation.  Will follow with recommendations.  Th

ank you





DAILY ESTIMATED NEEDS:


Needs based on Wound, DM, Cardiac 73kg 


25-30  kcals/kg 


4500-5589  total kcals


1.25-1.5  g protein/kg


  g total protein 


25-30  mL/kg


9785-2620  total fluid mLs





NUTRITION DIAGNOSIS:


* Swallowing difficulty R/T dysphagia as evidenced by SLP eval, SLP rec


for mech soft ground with nectar thick liquids


* Increased kcal/pro/micronutrients needs R/T wound healing as evidenced


by admitted w/ wounds @ sacrum and BL ankles, pending eval.








CURRENT DIET:KERI w/ mech soft ground w/ NTL    








 


PO DIET RECOMMENDATIONS:


KERI + CCHO Med (texture per SLP)   





 





ADDITIONAL RECOMMENDATIONS:


* Calibrated bedscale wt for accurate CBW 


* Monitor PO intake 


      -> add Glucerna BID for now, monitor acceptance 


* Check A1C for eval of glycemic control 


* Wound healing: add MVI x 1, Vit C 500mg QD 


   f/up w/ WC eval 





(17) Scrotal edema


Assessment & Plan:  Patient identified to have significant scrotal edema 

potential cellulitis on admission.  No abscess identified.  Towel placed under 

the scrotum.  We will monitor over the course of the next few days














Tunde Hammer                Sep 16, 2020 13:58

## 2020-09-16 NOTE — NUR
NURSE NOTES:

RECEIVED PATIENT AND REPORT FROM BERNY CADE IN BED, NO S/S OF PAIN NOTED AT THIS TIME, PATIENT 
IS ON 3L NC.  EXPLAIN THE PLAN OF CARE TO PATIENT. IV IS RIGHT HAND 22G, INTACT AND PATENT 
WITH FLUIDS RUNNING. BED IS ON LOWEST POSITION, BEDSIDE  RAILS IS UP X3, BRAKES ENGAGED FOR 
SAFETY. CALL LIGHT IS WITHIN REACH. WILL CONTINUE WITH THE PLAN OF CARE.

## 2020-09-16 NOTE — CARDIOLOGY REPORT
--------------- APPROVED REPORT --------------





EXAM: Two-dimensional and M-mode echocardiogram with Doppler and color Doppler.



INDICATION

Congestive Heart Failure 



M-Mode DIMENSIONS 

IVSd1.3 (0.7-1.1cm)

LVDd4.1 (3.5-5.6cm)

PWd1.2 (0.7-1.1cm)

IVSs1.8 cm

LVDs2.2 (2.5-4.0cm)

PWs1.7 cm



 Other Information 

Quality : Poor



<Conclusion>

Technically difficult study due to poor acoustic windows and breating problem. Apical views are 

taken at Subcostal area.

M-mode measurements of left ventricle not obtainable due to cardiac position (angle).

Study quality precludes accurate assessment of regional wall motion to extent visualized.

Normal left ventricular chamber size, systolic function and wall motion.

Left ventricular ejection fraction estimated to be 55 %.

Anterior Echo-free space, may be due to pericardial fat or effusion.

Mild focal aortic valve sclerosis with adequate cusp excursion.

Mildly thickened mitral valve leaflets with normal excursion.

Mild mitral annulus and aortic root calcification.

Pulmonic valve not well visualized.

Normal tricuspid valve structure.  

Trace tricuspid regurgitation.

Tricuspid systolic velocities suggests peak right ventricular systolic pressure of 14 mmHg.

## 2020-09-16 NOTE — NUR
RD ASSESSMENT & RECOMMENDATIONS

SEE CARE ACTIVITY FOR COMPLETE ASSESSMENT



DAILY ESTIMATED NEEDS:

Needs based on Wound, DM, Cardiac 73kg 

25-30  kcals/kg 

8874-5443  total kcals

1.25-1.5  g protein/kg

  g total protein 

25-30  mL/kg

8694-6404  total fluid mLs



NUTRITION DIAGNOSIS:

* Swallowing difficulty R/T dysphagia as evidenced by SLP eval, SLP rec

for mech soft ground with nectar thick liquids

* Increased kcal/pro/micronutrients needs R/T wound healing as evidenced

by admitted w/ wounds @ sacrum (stage 3 per MD) and BL ankles.



CURRENT DIET:KERI w/ mech soft ground w/ NTL    



PO DIET RECOMMENDATIONS:

KERI (texture per SLP) -> add CCHO MED w/ >50% po intake   





ADDITIONAL RECOMMENDATIONS:

* Calibrated bedscale wt for accurate CBW 

* Monitor PO intake- add Glucerna TID w/ meal s 

    Snacks in b/w meals as tolerated  

* Check A1C for eval of glycemic control 

* Wound healing: add MVI x 1, Vit C 500mg QD, MARIELA BID  

  f/up w/ WC eval-> stage 3 sacral wound per MD

## 2020-09-16 NOTE — NUR
NURSE NOTES:

NOTIFIED DR. VALDERRAMA OF PATIENT'S BLOOD SUGAR . NO NEW ORDER. WILL CONTINUE TO MONITOR 
PATIENT.

## 2020-09-16 NOTE — INFECTIOUS DISEASES PROG NOTE
Assessment/Plan








Assessment:


COVID19 PNeumonia -SP 2l NC > now at RA > 2l NC


  -9/12 CXR: Left lower lung/retrocardiac opacity which is similar in appearance

to the previous study from September 11, 2020.  


  -9/9 CXR: Bibasilar atelectasis. No acute process otherwise


       rapid COVID PCR +





Low grade fever; SP


No leukocytosis> Leukopenia, SP


   -u/a neg, ucx neg





CONS bacteremia- likely contaminant


  -9/9 Bcx 1/4 CONS; 9/12 Bcx NTD





CAD


CVA


COPD


osteoporosis


 R shoulder surgery


 CKD


DM2


 dysphagia


GERD


aphasia


HTN


 HLD


 BPH


 Dementia


[facility] resident ([facility]) 





Plan:


-Decadron #7/10


-Requested Remdesivir #3/5


   -9/15 SP Ceftriaxone #7


   -9/14 SP Azithromcyin #6





-f/u cx


-Monitor CBC/CMP, temperatures


-COVID19 isolation 


-f/u repeat Bcx x2





Thank you for consulting [facility]. Will continue to follow along with 

you. 


Discussed with RN and pharmacy staff.





Subjective


Allergies:  


Coded Allergies:  


     No Known Allergies (Unverified , 8/20/12)


afebrile >48hrs


at 2L NC


Bcx NTD


leukopenia resolved





Objective





Last 24 Hour Vital Signs








  Date Time  Temp Pulse Resp B/P (MAP) Pulse Ox O2 Delivery O2 Flow Rate FiO2


 


9/16/20 12:00 97.9 60 18 98/59 (72) 95   


 


9/16/20 12:00  60      


 


9/16/20 09:00      Nasal Cannula 2.0 


 


9/16/20 08:31  58  104/71    


 


9/16/20 08:30    104/71    


 


9/16/20 08:00  54      


 


9/16/20 08:00 97.7 54 18 104/71 (82) 96   


 


9/16/20 04:00 96.5 54 17 115/66 (82) 98   


 


9/16/20 04:00  54      


 


9/16/20 00:00  56      


 


9/16/20 00:00 96.7 57 17 140/83 (102) 97   


 


9/15/20 21:00      Nasal Cannula 2.0 


 


9/15/20 20:00 97.9 82 20 115/75 (88) 96   


 


9/15/20 20:00  82      


 


9/15/20 16:00  75      


 


9/15/20 16:00 96.4 75 20 104/61 (75) 97   








Height (Feet):  5


Height (Inches):  5.00


Weight (Pounds):  159


Cardiovascular:  RSR


Respiratory:  decreased breath sounds


Abdomen:  soft, non-tender, present bowel sounds


Extremities:  no cyanosis





Laboratory Tests








Test


 9/16/20


05:08


 


White Blood Count


 5.5 K/UL


(4.8-10.8)


 


Red Blood Count


 5.18 M/UL


(4.70-6.10)


 


Hemoglobin


 14.9 G/DL


(14.2-18.0)


 


Hematocrit


 45.0 %


(42.0-52.0)


 


Mean Corpuscular Volume 87 FL (80-99)  


 


Mean Corpuscular Hemoglobin


 28.8 PG


(27.0-31.0)


 


Mean Corpuscular Hemoglobin


Concent 33.2 G/DL


(32.0-36.0)


 


Red Cell Distribution Width


 12.2 %


(11.6-14.8)


 


Platelet Count


 145 K/UL


(150-450)  L


 


Mean Platelet Volume


 7.9 FL


(6.5-10.1)


 


Neutrophils (%) (Auto)


 69.1 %


(45.0-75.0)


 


Lymphocytes (%) (Auto)


 20.5 %


(20.0-45.0)


 


Monocytes (%) (Auto)


 9.8 %


(1.0-10.0)


 


Eosinophils (%) (Auto)


 0.0 %


(0.0-3.0)


 


Basophils (%) (Auto)


 0.5 %


(0.0-2.0)


 


Sodium Level


 143 MMOL/L


(136-145)


 


Potassium Level


 3.9 MMOL/L


(3.5-5.1)


 


Chloride Level


 108 MMOL/L


()  H


 


Carbon Dioxide Level


 25 MMOL/L


(21-32)


 


Anion Gap


 11 mmol/L


(5-15)


 


Blood Urea Nitrogen


 22 mg/dL


(7-18)  H


 


Creatinine


 1.1 MG/DL


(0.55-1.30)


 


Estimat Glomerular Filtration


Rate > 60 mL/min


(>60)


 


Glucose Level


 158 MG/DL


()  H


 


Calcium Level


 7.7 MG/DL


(8.5-10.1)  L


 


Total Bilirubin


 0.4 MG/DL


(0.2-1.0)


 


Direct Bilirubin


 0.2 MG/DL


(0.0-0.3)


 


Aspartate Amino Transf


(AST/SGOT) 48 U/L (15-37)


H


 


Alanine Aminotransferase


(ALT/SGPT) 54 U/L (12-78)





 


Alkaline Phosphatase


 93 U/L


()


 


Total Protein


 6.8 G/DL


(6.4-8.2)


 


Albumin


 2.2 G/DL


(3.4-5.0)  L


 


Globulin 4.6 g/dL  


 


Albumin/Globulin Ratio


 0.5 (1.0-2.7)


L











Current Medications








 Medications


  (Trade)  Dose


 Ordered  Sig/Miky


 Route


 PRN Reason  Start Time


 Stop Time Status Last Admin


Dose Admin


 


 Acetaminophen


  (Tylenol)  650 mg  Q4H  PRN


 ORAL


 FEVER  9/9/20 17:00


 10/9/20 16:59   





 


 Acetaminophen


  (Tylenol)  650 mg  Q4H  PRN


 RECTAL


 Mild Pain (Pain Scale 1-3)  9/13/20 11:30


 10/13/20 11:29  9/13/20 23:51





 


 Albuterol/


 Ipratropium


  (Combivent


 Respimat)  1 puff  Q4H  PRN


 INH


 Shortness of Breath  9/9/20 17:15


 10/9/20 17:14   





 


 Amlodipine


 Besylate


  (Norvasc)  5 mg  DAILY


 ORAL


   9/10/20 09:00


 10/10/20 08:59  9/16/20 08:31





 


 Ascorbic Acid


  (Vitamin C)  500 mg  DAILY


 ORAL


   9/12/20 09:00


 10/12/20 08:59  9/16/20 08:31





 


 Dexamethasone


  (Decadron)  6 mg  DAILY


 ORAL


   9/12/20 09:00


 9/21/20 08:59  9/16/20 08:31





 


 Dextrose


  (Dextrose 50%)  25 ml  Q30M  PRN


 IV


 Hypoglycemia  9/9/20 17:00


 12/8/20 16:59   





 


 Dextrose


  (Dextrose 50%)  50 ml  Q30M  PRN


 IV


 Hypoglycemia  9/9/20 17:00


 12/8/20 16:59   





 


 Dextrose/Sodium


 Chloride  1,000 ml @ 


 50 mls/hr  Q20H


 IV


   9/12/20 19:45


 10/12/20 19:44  9/16/20 02:59





 


 Donepezil HCl


  (Aricept)  5 mg  DAILY


 ORAL


   9/10/20 09:00


 10/10/20 08:59  9/16/20 08:31





 


 Heparin Sodium


  (Porcine)


  (Heparin 5000


 units/ml)  5,000 units  EVERY 12  HOURS


 SUBQ


   9/9/20 21:00


 10/24/20 20:59  9/12/20 09:06





 


 Lisinopril


  (ZestriL)  5 mg  DAILY


 ORAL


   9/10/20 09:00


 10/10/20 08:59  9/16/20 08:30





 


 Multivitamins


  (Multivitamins)  1 tab  DAILY


 ORAL


   9/12/20 09:00


 10/12/20 08:59  9/16/20 08:30





 


 Ondansetron HCl


  (Zofran)  4 mg  Q6H  PRN


 IVP


 Nausea & Vomiting  9/9/20 17:00


 10/9/20 16:59   





 


 Polyethylene


 Glycol


  (Miralax)  17 gm  DAILYPRN  PRN


 ORAL


 Constipation  9/9/20 17:00


 10/9/20 16:59   





 


 Promethazine HCl/


 Codeine


  (Phenergan with


 Codeine)  5 ml  Q6H  PRN


 ORAL


 cough  9/9/20 17:00


 10/9/20 16:59   





 


 Remdesivir 100 mg/


 Sodium Chloride  250 ml @ 


 250 mls/hr  Q24H


 IV


   9/15/20 18:00


 9/18/20 18:59  9/15/20 18:10





 


 Tamsulosin HCl


  (Flomax)  0.4 mg  DAILY


 ORAL


   9/10/20 09:00


 10/10/20 08:59  9/16/20 08:30




















Slime Garcia M.D.            Sep 16, 2020 14:10

## 2020-09-16 NOTE — INTERNAL MED PROGRESS NOTE
Subjective


Date of Service:  Sep 16, 2020


Physician Name


NadiyaBenito


Attending Physician


Andrei Cancino MD





Current Medications








 Medications


  (Trade)  Dose


 Ordered  Sig/Miky


 Route


 PRN Reason  Start Time


 Stop Time Status Last Admin


Dose Admin


 


 Acetaminophen


  (Tylenol)  650 mg  Q4H  PRN


 ORAL


 FEVER  9/9/20 17:00


 10/9/20 16:59   





 


 Acetaminophen


  (Tylenol)  650 mg  Q4H  PRN


 RECTAL


 Mild Pain (Pain Scale 1-3)  9/13/20 11:30


 10/13/20 11:29  9/13/20 23:51





 


 Albuterol/


 Ipratropium


  (Combivent


 Respimat)  1 puff  Q4H  PRN


 INH


 Shortness of Breath  9/9/20 17:15


 10/9/20 17:14   





 


 Amlodipine


 Besylate


  (Norvasc)  5 mg  DAILY


 ORAL


   9/10/20 09:00


 10/10/20 08:59  9/16/20 08:31





 


 Ascorbic Acid


  (Vitamin C)  500 mg  DAILY


 ORAL


   9/12/20 09:00


 10/12/20 08:59  9/16/20 08:31





 


 Dexamethasone


  (Decadron)  6 mg  DAILY


 ORAL


   9/12/20 09:00


 9/21/20 08:59  9/16/20 08:31





 


 Dextrose


  (Dextrose 50%)  25 ml  Q30M  PRN


 IV


 Hypoglycemia  9/9/20 17:00


 12/8/20 16:59   





 


 Dextrose


  (Dextrose 50%)  50 ml  Q30M  PRN


 IV


 Hypoglycemia  9/9/20 17:00


 12/8/20 16:59   





 


 Dextrose/Sodium


 Chloride  1,000 ml @ 


 50 mls/hr  Q20H


 IV


   9/12/20 19:45


 10/12/20 19:44  9/16/20 02:59





 


 Donepezil HCl


  (Aricept)  5 mg  DAILY


 ORAL


   9/10/20 09:00


 10/10/20 08:59  9/16/20 08:31





 


 Heparin Sodium


  (Porcine)


  (Heparin 5000


 units/ml)  5,000 units  EVERY 12  HOURS


 SUBQ


   9/9/20 21:00


 10/24/20 20:59  9/12/20 09:06





 


 Lisinopril


  (ZestriL)  5 mg  DAILY


 ORAL


   9/10/20 09:00


 10/10/20 08:59  9/16/20 08:30





 


 Multivitamins


  (Multivitamins)  1 tab  DAILY


 ORAL


   9/12/20 09:00


 10/12/20 08:59  9/16/20 08:30





 


 Ondansetron HCl


  (Zofran)  4 mg  Q6H  PRN


 IVP


 Nausea & Vomiting  9/9/20 17:00


 10/9/20 16:59   





 


 Polyethylene


 Glycol


  (Miralax)  17 gm  DAILYPRN  PRN


 ORAL


 Constipation  9/9/20 17:00


 10/9/20 16:59   





 


 Promethazine HCl/


 Codeine


  (Phenergan with


 Codeine)  5 ml  Q6H  PRN


 ORAL


 cough  9/9/20 17:00


 10/9/20 16:59   





 


 Remdesivir 100 mg/


 Sodium Chloride  250 ml @ 


 250 mls/hr  Q24H


 IV


   9/15/20 18:00


 9/18/20 18:59  9/15/20 18:10





 


 Tamsulosin HCl


  (Flomax)  0.4 mg  DAILY


 ORAL


   9/10/20 09:00


 10/10/20 08:59  9/16/20 08:30











Allergies:  


Coded Allergies:  


     No Known Allergies (Unverified , 8/20/12)


ROS Limited/Unobtainable:  Yes


Subjective


76 YO M admitted with fever and dyspnea.  Now COVID 19 pos.  Cover for Int 

Med-Dr Cancino.





Objective





Last Vital Signs








  Date Time  Temp Pulse Resp B/P (MAP) Pulse Ox O2 Delivery O2 Flow Rate FiO2


 


9/16/20 09:00      Nasal Cannula 2.0 


 


9/16/20 08:31  58  104/71    


 


9/16/20 08:00 97.7  18  96   


 


9/13/20 20:09        28











Laboratory Tests








Test


 9/16/20


05:08


 


White Blood Count


 5.5 K/UL


(4.8-10.8)


 


Red Blood Count


 5.18 M/UL


(4.70-6.10)


 


Hemoglobin


 14.9 G/DL


(14.2-18.0)


 


Hematocrit


 45.0 %


(42.0-52.0)


 


Mean Corpuscular Volume 87 FL (80-99)  


 


Mean Corpuscular Hemoglobin


 28.8 PG


(27.0-31.0)


 


Mean Corpuscular Hemoglobin


Concent 33.2 G/DL


(32.0-36.0)


 


Red Cell Distribution Width


 12.2 %


(11.6-14.8)


 


Platelet Count


 145 K/UL


(150-450)  L


 


Mean Platelet Volume


 7.9 FL


(6.5-10.1)


 


Neutrophils (%) (Auto)


 69.1 %


(45.0-75.0)


 


Lymphocytes (%) (Auto)


 20.5 %


(20.0-45.0)


 


Monocytes (%) (Auto)


 9.8 %


(1.0-10.0)


 


Eosinophils (%) (Auto)


 0.0 %


(0.0-3.0)


 


Basophils (%) (Auto)


 0.5 %


(0.0-2.0)


 


Sodium Level


 143 MMOL/L


(136-145)


 


Potassium Level


 3.9 MMOL/L


(3.5-5.1)


 


Chloride Level


 108 MMOL/L


()  H


 


Carbon Dioxide Level


 25 MMOL/L


(21-32)


 


Anion Gap


 11 mmol/L


(5-15)


 


Blood Urea Nitrogen


 22 mg/dL


(7-18)  H


 


Creatinine


 1.1 MG/DL


(0.55-1.30)


 


Estimat Glomerular Filtration


Rate > 60 mL/min


(>60)


 


Glucose Level


 158 MG/DL


()  H


 


Calcium Level


 7.7 MG/DL


(8.5-10.1)  L


 


Total Bilirubin


 0.4 MG/DL


(0.2-1.0)


 


Direct Bilirubin


 0.2 MG/DL


(0.0-0.3)


 


Aspartate Amino Transf


(AST/SGOT) 48 U/L (15-37)


H


 


Alanine Aminotransferase


(ALT/SGPT) 54 U/L (12-78)





 


Alkaline Phosphatase


 93 U/L


()


 


Total Protein


 6.8 G/DL


(6.4-8.2)


 


Albumin


 2.2 G/DL


(3.4-5.0)  L


 


Globulin 4.6 g/dL  


 


Albumin/Globulin Ratio


 0.5 (1.0-2.7)


L

















Intake and Output  


 


 9/15/20 9/16/20





 19:00 07:00


 


Intake Total 240 ml 114 ml


 


Output Total 200 ml 


 


Balance 40 ml 114 ml


 


  


 


Intake Oral 240 ml 114 ml


 


Output Urine Total 200 ml 


 


# Voids  2








Objective


PHYSICAL EXAMINATION:


GENERAL:  The patient is a well-developed, well-nourished, thin-appearing


 male, in no apparent distress.


HEENT:  Eyes, pupils are equal and responsive to light and accommodation.


Extraocular movements are intact.


NECK:  Supple without lymphadenopathy.


CHEST:  Lungs are clear to auscultation bilaterally without wheezes,


rales.


CARDIOVASCULAR:  Regular rate.  S1, S2 are normal without murmurs, rubs, or


gallops.


ABDOMEN:  Soft, nontender, and nondistended.  Positive bowel sounds.  No


evidence of hepatosplenomegaly.  Currently no rebound or guarding noted.


EXTREMITIES:  Negative for clubbing, cyanosis, or edema.


RECTAL/GENITAL:  Not performed.


NEUROLOGIC:  Cranial nerves II through XII are grossly intact without focal


deficits.  Motor strength is 5/5 bilaterally.  Deep tendon reflexes are 2+


plantar.





Assessment/Plan


Assessment/Plan


ASSESSMENT:  This is a 75-year-old  male.





1. Fever.


2. Dyspnea.


3. COVID-19 positive.


4. Congestive heart failure.


5. Hypertension.


6. Benign prostatic hypertrophy.


7. Osteoporosis.


8. Alzheimer dementia.


9. Ventriculomegaly.


10. Diabetes type 2.


11. Cerebrovascular disease.


12. Chronic renal failure.


13. Dysphagia.


14. Hypercholesterolemia.


15. Gastroesophageal reflux disease.





TREATMENT:


1. Fever/dyspnea/COVID-19 positive.


Infectious disease consultation = Dr. Garcia.  Pulmonary consultation =


Dr. Michael Sandoval.  Continue azithromycin and ceftriaxone for probable 

underlying


bacterial pneumonia.  Continue Remdesivir and Decadron.  We will


follow recommendation of Infectious Disease and Pulmonary.


2. Hypertension.  Continue amlodipine as above.


3. Benign prostatic hypertrophy.  Continue Flomax as above.


4. Osteoporosis.  Continue Fosamax as above.


5. Alzheimer dementia.  Continue Aricept as above.


6. Ventriculomegaly.


7. Diabetes type 2.


8. Cerebrovascular disease, status post cerebrovascular accident.


9. Chronic renal failure.


10. Dysphagia.


11. Hypercholesterolemia.  Continue atorvastatin as above.


12. Gastroesophageal reflux disease.











Benito Hearn MD               Sep 16, 2020 12:39

## 2020-09-17 VITALS — SYSTOLIC BLOOD PRESSURE: 114 MMHG | DIASTOLIC BLOOD PRESSURE: 68 MMHG

## 2020-09-17 VITALS — DIASTOLIC BLOOD PRESSURE: 63 MMHG | SYSTOLIC BLOOD PRESSURE: 123 MMHG

## 2020-09-17 VITALS — SYSTOLIC BLOOD PRESSURE: 99 MMHG | DIASTOLIC BLOOD PRESSURE: 53 MMHG

## 2020-09-17 VITALS — SYSTOLIC BLOOD PRESSURE: 109 MMHG | DIASTOLIC BLOOD PRESSURE: 65 MMHG

## 2020-09-17 VITALS — DIASTOLIC BLOOD PRESSURE: 66 MMHG | SYSTOLIC BLOOD PRESSURE: 123 MMHG

## 2020-09-17 VITALS — DIASTOLIC BLOOD PRESSURE: 67 MMHG | SYSTOLIC BLOOD PRESSURE: 124 MMHG

## 2020-09-17 LAB
ADD MANUAL DIFF: NO
ALBUMIN SERPL-MCNC: 2 G/DL (ref 3.4–5)
ALBUMIN/GLOB SERPL: 0.5 {RATIO} (ref 1–2.7)
ALP SERPL-CCNC: 84 U/L (ref 46–116)
ALT SERPL-CCNC: 51 U/L (ref 12–78)
ANION GAP SERPL CALC-SCNC: 9 MMOL/L (ref 5–15)
AST SERPL-CCNC: 42 U/L (ref 15–37)
BASOPHILS NFR BLD AUTO: 0.4 % (ref 0–2)
BILIRUB DIRECT SERPL-MCNC: 0.1 MG/DL (ref 0–0.3)
BILIRUB SERPL-MCNC: 0.4 MG/DL (ref 0.2–1)
BUN SERPL-MCNC: 21 MG/DL (ref 7–18)
CALCIUM SERPL-MCNC: 7.7 MG/DL (ref 8.5–10.1)
CHLORIDE SERPL-SCNC: 107 MMOL/L (ref 98–107)
CO2 SERPL-SCNC: 26 MMOL/L (ref 21–32)
CREAT SERPL-MCNC: 0.9 MG/DL (ref 0.55–1.3)
EOSINOPHIL NFR BLD AUTO: 0 % (ref 0–3)
ERYTHROCYTE [DISTWIDTH] IN BLOOD BY AUTOMATED COUNT: 12 % (ref 11.6–14.8)
FERRITIN SERPL-MCNC: 1048 NG/ML (ref 8–388)
GLOBULIN SER-MCNC: 4.3 G/DL
HCT VFR BLD CALC: 41.1 % (ref 42–52)
HGB BLD-MCNC: 13.7 G/DL (ref 14.2–18)
LDH SERPL L TO P-CCNC: 302 U/L (ref 81–234)
LYMPHOCYTES NFR BLD AUTO: 22.7 % (ref 20–45)
MCV RBC AUTO: 86 FL (ref 80–99)
MONOCYTES NFR BLD AUTO: 10.6 % (ref 1–10)
NEUTROPHILS NFR BLD AUTO: 66.3 % (ref 45–75)
PLATELET # BLD: 152 K/UL (ref 150–450)
POTASSIUM SERPL-SCNC: 3.8 MMOL/L (ref 3.5–5.1)
RBC # BLD AUTO: 4.78 M/UL (ref 4.7–6.1)
SODIUM SERPL-SCNC: 142 MMOL/L (ref 136–145)
WBC # BLD AUTO: 4.2 K/UL (ref 4.8–10.8)

## 2020-09-17 RX ADMIN — Medication SCH MG: at 08:32

## 2020-09-17 RX ADMIN — HEPARIN SODIUM SCH UNITS: 5000 INJECTION INTRAVENOUS; SUBCUTANEOUS at 20:47

## 2020-09-17 RX ADMIN — LISINOPRIL SCH MG: 2.5 TABLET ORAL at 08:32

## 2020-09-17 RX ADMIN — DEXTROSE AND SODIUM CHLORIDE SCH MLS/HR: 5; .45 INJECTION, SOLUTION INTRAVENOUS at 20:46

## 2020-09-17 RX ADMIN — HEPARIN SODIUM SCH UNITS: 5000 INJECTION INTRAVENOUS; SUBCUTANEOUS at 08:54

## 2020-09-17 RX ADMIN — DONEPEZIL HYDROCHLORIDE SCH MG: 5 TABLET, FILM COATED ORAL at 08:33

## 2020-09-17 RX ADMIN — TAMSULOSIN HYDROCHLORIDE SCH MG: 0.4 CAPSULE ORAL at 08:33

## 2020-09-17 NOTE — INTERNAL MED PROGRESS NOTE
Subjective


Date of Service:  Sep 17, 2020


Physician Name


NadiyaBenito


Attending Physician


Andrei Cancino MD





Current Medications








 Medications


  (Trade)  Dose


 Ordered  Sig/Miky


 Route


 PRN Reason  Start Time


 Stop Time Status Last Admin


Dose Admin


 


 Acetaminophen


  (Tylenol)  650 mg  Q4H  PRN


 ORAL


 FEVER  9/9/20 17:00


 10/9/20 16:59   





 


 Acetaminophen


  (Tylenol)  650 mg  Q4H  PRN


 RECTAL


 Mild Pain (Pain Scale 1-3)  9/13/20 11:30


 10/13/20 11:29  9/13/20 23:51





 


 Albuterol/


 Ipratropium


  (Combivent


 Respimat)  1 puff  Q4H  PRN


 INH


 Shortness of Breath  9/9/20 17:15


 10/9/20 17:14   





 


 Amlodipine


 Besylate


  (Norvasc)  5 mg  DAILY


 ORAL


   9/10/20 09:00


 10/10/20 08:59  9/17/20 08:33





 


 Ascorbic Acid


  (Vitamin C)  500 mg  DAILY


 ORAL


   9/12/20 09:00


 10/12/20 08:59  9/17/20 08:32





 


 Dexamethasone


  (Decadron)  6 mg  DAILY


 ORAL


   9/12/20 09:00


 9/21/20 08:59  9/17/20 08:33





 


 Dextrose


  (Dextrose 50%)  25 ml  Q30M  PRN


 IV


 Hypoglycemia  9/9/20 17:00


 12/8/20 16:59   





 


 Dextrose


  (Dextrose 50%)  50 ml  Q30M  PRN


 IV


 Hypoglycemia  9/9/20 17:00


 12/8/20 16:59   





 


 Dextrose/Sodium


 Chloride  1,000 ml @ 


 50 mls/hr  Q20H


 IV


   9/12/20 19:45


 10/12/20 19:44  9/16/20 23:45





 


 Donepezil HCl


  (Aricept)  5 mg  DAILY


 ORAL


   9/10/20 09:00


 10/10/20 08:59  9/17/20 08:33





 


 Heparin Sodium


  (Porcine)


  (Heparin 5000


 units/ml)  5,000 units  EVERY 12  HOURS


 SUBQ


   9/9/20 21:00


 10/24/20 20:59  9/16/20 20:28





 


 Lisinopril


  (ZestriL)  5 mg  DAILY


 ORAL


   9/10/20 09:00


 10/10/20 08:59  9/17/20 08:32





 


 Multivitamins


  (Multivitamins)  1 tab  DAILY


 ORAL


   9/12/20 09:00


 10/12/20 08:59  9/17/20 08:33





 


 Ondansetron HCl


  (Zofran)  4 mg  Q6H  PRN


 IVP


 Nausea & Vomiting  9/9/20 17:00


 10/9/20 16:59   





 


 Polyethylene


 Glycol


  (Miralax)  17 gm  DAILYPRN  PRN


 ORAL


 Constipation  9/9/20 17:00


 10/9/20 16:59   





 


 Promethazine HCl/


 Codeine


  (Phenergan with


 Codeine)  5 ml  Q6H  PRN


 ORAL


 cough  9/9/20 17:00


 10/9/20 16:59   





 


 Remdesivir 100 mg/


 Sodium Chloride  250 ml @ 


 250 mls/hr  Q24H


 IV


   9/15/20 18:00


 9/18/20 18:59  9/17/20 17:56





 


 Tamsulosin HCl


  (Flomax)  0.4 mg  DAILY


 ORAL


   9/10/20 09:00


 10/10/20 08:59  9/17/20 08:33











Allergies:  


Coded Allergies:  


     No Known Allergies (Unverified , 8/20/12)


ROS Limited/Unobtainable:  Yes


Subjective


74 YO M admitted with fever and dyspnea.  Now COVID 19 pos.  Cover for Int 

Med-Dr Cancino.





Objective





Last Vital Signs








  Date Time  Temp Pulse Resp B/P (MAP) Pulse Ox O2 Delivery O2 Flow Rate FiO2


 


9/17/20 16:00  61      


 


9/17/20 15:43 97.9  20 99/53 (68) 95   


 


9/17/20 09:00      Nasal Cannula 3.0 


 


9/13/20 20:09        28











Laboratory Tests








Test


 9/17/20


05:05


 


White Blood Count


 4.2 K/UL


(4.8-10.8)  L


 


Red Blood Count


 4.78 M/UL


(4.70-6.10)


 


Hemoglobin


 13.7 G/DL


(14.2-18.0)  L


 


Hematocrit


 41.1 %


(42.0-52.0)  L


 


Mean Corpuscular Volume 86 FL (80-99)  


 


Mean Corpuscular Hemoglobin


 28.6 PG


(27.0-31.0)


 


Mean Corpuscular Hemoglobin


Concent 33.3 G/DL


(32.0-36.0)


 


Red Cell Distribution Width


 12.0 %


(11.6-14.8)


 


Platelet Count


 152 K/UL


(150-450)


 


Mean Platelet Volume


 9.1 FL


(6.5-10.1)


 


Neutrophils (%) (Auto)


 66.3 %


(45.0-75.0)


 


Lymphocytes (%) (Auto)


 22.7 %


(20.0-45.0)


 


Monocytes (%) (Auto)


 10.6 %


(1.0-10.0)  H


 


Eosinophils (%) (Auto)


 0.0 %


(0.0-3.0)


 


Basophils (%) (Auto)


 0.4 %


(0.0-2.0)


 


Fibrinogen


 441 mg/dL


(200-400)  H


 


D-Dimer


 0.88 mg/L FEU


(0.00-0.49)  H


 


Sodium Level


 142 MMOL/L


(136-145)


 


Potassium Level


 3.8 MMOL/L


(3.5-5.1)


 


Chloride Level


 107 MMOL/L


()


 


Carbon Dioxide Level


 26 MMOL/L


(21-32)


 


Anion Gap


 9 mmol/L


(5-15)


 


Blood Urea Nitrogen


 21 mg/dL


(7-18)  H


 


Creatinine


 0.9 MG/DL


(0.55-1.30)


 


Estimat Glomerular Filtration


Rate > 60 mL/min


(>60)


 


Glucose Level


 174 MG/DL


()  H


 


Calcium Level


 7.7 MG/DL


(8.5-10.1)  L


 


Ferritin


 1048 NG/ML


(8-388)  H


 


Total Bilirubin


 0.4 MG/DL


(0.2-1.0)


 


Direct Bilirubin


 0.1 MG/DL


(0.0-0.3)


 


Aspartate Amino Transf


(AST/SGOT) 42 U/L (15-37)


H


 


Alanine Aminotransferase


(ALT/SGPT) 51 U/L (12-78)





 


Alkaline Phosphatase


 84 U/L


()


 


Lactate Dehydrogenase


 302 U/L


()  H


 


C-Reactive Protein,


Quantitative 1.5 mg/dL


(0.00-0.90)  H


 


Total Protein


 6.3 G/DL


(6.4-8.2)  L


 


Albumin


 2.0 G/DL


(3.4-5.0)  L


 


Globulin 4.3 g/dL  


 


Albumin/Globulin Ratio


 0.5 (1.0-2.7)


L

















Intake and Output  


 


 9/16/20 9/17/20





 19:00 07:00


 


Intake Total 240 ml 


 


Output Total 1650 ml 650 ml


 


Balance -1410 ml -650 ml


 


  


 


Intake Oral 240 ml 


 


Output Urine Total 1650 ml 650 ml








Objective


PHYSICAL EXAMINATION:


GENERAL:  The patient is a well-developed, well-nourished, thin-appearing


 male, in no apparent distress.


HEENT:  Eyes, pupils are equal and responsive to light and accommodation.


Extraocular movements are intact.


NECK:  Supple without lymphadenopathy.


CHEST:  Lungs are clear to auscultation bilaterally without wheezes,


rales.


CARDIOVASCULAR:  Regular rate.  S1, S2 are normal without murmurs, rubs, or


gallops.


ABDOMEN:  Soft, nontender, and nondistended.  Positive bowel sounds.  No


evidence of hepatosplenomegaly.  Currently no rebound or guarding noted.


EXTREMITIES:  Negative for clubbing, cyanosis, or edema.


RECTAL/GENITAL:  Not performed.


NEUROLOGIC:  Cranial nerves II through XII are grossly intact without focal


deficits.  Motor strength is 5/5 bilaterally.  Deep tendon reflexes are 2+


plantar.





Assessment/Plan


Assessment/Plan


ASSESSMENT:  This is a 75-year-old  male.





1. Fever.


2. Dyspnea.


3. COVID-19 positive.


4. Congestive heart failure.


5. Hypertension.


6. Benign prostatic hypertrophy.


7. Osteoporosis.


8. Alzheimer dementia.


9. Ventriculomegaly.


10. Diabetes type 2.


11. Cerebrovascular disease.


12. Chronic renal failure.


13. Dysphagia.


14. Hypercholesterolemia.


15. Gastroesophageal reflux disease.





TREATMENT:


1. Fever/dyspnea/COVID-19 positive.


Infectious disease consultation = Dr. Garcia.  Pulmonary consultation =


Dr. Michael Sandoval.  Continue azithromycin and ceftriaxone for probable 

underlying


bacterial pneumonia.  Continue Remdesivir and Decadron.  We will


follow recommendation of Infectious Disease and Pulmonary.


2. Hypertension.  Continue amlodipine as above.


3. Benign prostatic hypertrophy.  Continue Flomax as above.


4. Osteoporosis.  Continue Fosamax as above.


5. Alzheimer dementia.  Continue Aricept as above.


6. Ventriculomegaly.


7. Diabetes type 2.


8. Cerebrovascular disease, status post cerebrovascular accident.


9. Chronic renal failure.


10. Dysphagia.


11. Hypercholesterolemia.  Continue atorvastatin as above.


12. Gastroesophageal reflux disease.











Benito Hearn MD               Sep 17, 2020 19:58

## 2020-09-17 NOTE — SURGERY PROGRESS NOTE
Surgery Progress Note


Subjective


Additional Comments


comfortable


stable


no n/v/f/c





Objective





Last 24 Hour Vital Signs








  Date Time  Temp Pulse Resp B/P (MAP) Pulse Ox O2 Delivery O2 Flow Rate FiO2


 


9/17/20 08:33  53  124/67    


 


9/17/20 08:32    124/67    


 


9/17/20 08:00 97.5 53 20 124/67 (86) 95   


 


9/17/20 04:00  51      


 


9/17/20 04:00 96.6 50 18 123/66 (85) 94   


 


9/17/20 00:00  66      


 


9/17/20 00:00 96.3 66 18 114/68 (83) 94   


 


9/16/20 21:00      Nasal Cannula 2.0 


 


9/16/20 20:00 97.5 60 18 121/70 (87) 100   


 


9/16/20 20:00  60      


 


9/16/20 16:00  60      


 


9/16/20 16:00 97.3 60 18 102/64 (77) 96   


 


9/16/20 12:00 97.9 60 18 98/59 (72) 95   


 


9/16/20 12:00  60      








I&O











Intake and Output  


 


 9/16/20 9/17/20





 19:00 07:00


 


Intake Total 240 ml 


 


Output Total 1650 ml 650 ml


 


Balance -1410 ml -650 ml


 


  


 


Intake Oral 240 ml 


 


Output Urine Total 1650 ml 650 ml








Dressing:  other


Wound:  other


Cardiovascular:  RSR


Respiratory:  decreased breath sounds


Abdomen:  soft, non-tender, present bowel sounds


Extremities:  no tenderness, no cyanosis





Laboratory Tests








Test


 9/17/20


05:05


 


White Blood Count


 4.2 K/UL


(4.8-10.8)  L


 


Red Blood Count


 4.78 M/UL


(4.70-6.10)


 


Hemoglobin


 13.7 G/DL


(14.2-18.0)  L


 


Hematocrit


 41.1 %


(42.0-52.0)  L


 


Mean Corpuscular Volume 86 FL (80-99)  


 


Mean Corpuscular Hemoglobin


 28.6 PG


(27.0-31.0)


 


Mean Corpuscular Hemoglobin


Concent 33.3 G/DL


(32.0-36.0)


 


Red Cell Distribution Width


 12.0 %


(11.6-14.8)


 


Platelet Count


 152 K/UL


(150-450)


 


Mean Platelet Volume


 9.1 FL


(6.5-10.1)


 


Neutrophils (%) (Auto)


 66.3 %


(45.0-75.0)


 


Lymphocytes (%) (Auto)


 22.7 %


(20.0-45.0)


 


Monocytes (%) (Auto)


 10.6 %


(1.0-10.0)  H


 


Eosinophils (%) (Auto)


 0.0 %


(0.0-3.0)


 


Basophils (%) (Auto)


 0.4 %


(0.0-2.0)


 


Fibrinogen Pending  


 


D-Dimer Pending  


 


Sodium Level


 142 MMOL/L


(136-145)


 


Potassium Level


 3.8 MMOL/L


(3.5-5.1)


 


Chloride Level


 107 MMOL/L


()


 


Carbon Dioxide Level


 26 MMOL/L


(21-32)


 


Anion Gap


 9 mmol/L


(5-15)


 


Blood Urea Nitrogen


 21 mg/dL


(7-18)  H


 


Creatinine


 0.9 MG/DL


(0.55-1.30)


 


Estimat Glomerular Filtration


Rate > 60 mL/min


(>60)


 


Glucose Level


 174 MG/DL


()  H


 


Calcium Level


 7.7 MG/DL


(8.5-10.1)  L


 


Ferritin


 1048 NG/ML


(8-388)  H


 


Total Bilirubin


 0.4 MG/DL


(0.2-1.0)


 


Direct Bilirubin


 0.1 MG/DL


(0.0-0.3)


 


Aspartate Amino Transf


(AST/SGOT) 42 U/L (15-37)


H


 


Alanine Aminotransferase


(ALT/SGPT) 51 U/L (12-78)





 


Alkaline Phosphatase


 84 U/L


()


 


Lactate Dehydrogenase


 302 U/L


()  H


 


C-Reactive Protein,


Quantitative 1.5 mg/dL


(0.00-0.90)  H


 


Total Protein


 6.3 G/DL


(6.4-8.2)  L


 


Albumin


 2.0 G/DL


(3.4-5.0)  L


 


Globulin 4.3 g/dL  


 


Albumin/Globulin Ratio


 0.5 (1.0-2.7)


L











Plan


Problems:  


(1) Acute bronchitis


(2) 2019 novel coronavirus disease (COVID-19)


Assessment & Plan:  ++


as per ID


pulm input appreciated 





(3) Gram-negative bacteremia


(4) Osteoporosis


(5) UTI (urinary tract infection)


(6) Altered mental state


(7) Alzheimer's dementia


(8) Sepsis


(9) Acute encephalopathy


(10) Severe sepsis


(11) Aphasia


(12) HTN (hypertension)


(13) BPH (benign prostatic hyperplasia)


(14) History of CVA (cerebrovascular accident)


(15) Multifocal pneumonia


(16) Decubitus skin ulcer


Assessment & Plan:  75-year-old male presented to [place] [facility] covered

positive respiratory fevers admitted further care and management which time 

identified to have multiple decubitus skin ulcers and scrotal edema.  Patient 

identified to have a stage III sacral buttock decubitus ulcer with periwound 

erythema and breakdown.  Incontinence associated dermatitis identified.  Care 

plan initiated after patient evaluation.  Will follow with recommendations.  

Thank you





DAILY ESTIMATED NEEDS:


Needs based on Wound, DM, Cardiac 73kg 


25-30  kcals/kg 


7005-7524  total kcals


1.25-1.5  g protein/kg


  g total protein 


25-30  mL/kg


5181-3287  total fluid mLs





NUTRITION DIAGNOSIS:


* Swallowing difficulty R/T dysphagia as evidenced by SLP eval, SLP rec


for mech soft ground with nectar thick liquids


* Increased kcal/pro/micronutrients needs R/T wound healing as evidenced


by admitted w/ wounds @ sacrum and BL ankles, pending eval.








CURRENT DIET:KERI w/ mech soft ground w/ NTL    








 


PO DIET RECOMMENDATIONS:


KERI + CCHO Med (texture per SLP)   





 





ADDITIONAL RECOMMENDATIONS:


* Calibrated bedscale wt for accurate CBW 


* Monitor PO intake 


      -> add Glucerna BID for now, monitor acceptance 


* Check A1C for eval of glycemic control 


* Wound healing: add MVI x 1, Vit C 500mg QD 


   f/up w/ WC eval 





(17) Scrotal edema


Assessment & Plan:  Patient identified to have significant scrotal edema 

potential cellulitis on admission.  No abscess identified.  Towel placed under 

the scrotum.  We will monitor over the course of the next few days














Tunde Hammer                Sep 17, 2020 10:14

## 2020-09-17 NOTE — NUR
NURSE HAND-OFF REPORT: 



Important Events on Shift:[None]

Patient Status: [Stable]

Diet: [No added salty mech soft finely chopped thick liquids]



Pending Orders: []

Pending Results/Labs:[]

Pending MD notification:[]



Latest Vital Signs: Temperature 96.6 , Pulse 50 , B/P 123 /66 , Respiratory Rate 18 , O2 SAT 
94 , Nasal Cannula, O2 Flow Rate 2.0 .  

Vital Sign Comment: []



EKG Rhythm: Sinus Bradycardia

Rhythm change?: N 

MD Notified?: N -

MD Response: 



Latest Mejia Fall Score: 50  

Fall Risk: High Risk 

Safety Measures: Call light Within Reach, Bed Alarm Zone 1, Side Rails Side Rails x3, Bed 
position Low and Locked.

Fall Precautions: 

Yellow Socks

Yellow Gown

Door Sign

Patient Fall Education



Report given to [Jordana RN].

## 2020-09-17 NOTE — NUR
NURSE NOTES:

Received patient in bed asleep. O2 via NC intact, no SOB or acute distress. IV line intact. 
Condom catheter in place, draining yellow colored urine. HOB elevated. Bed locked in low 
position. Call light within reach. Will continue plan of care.

## 2020-09-17 NOTE — INFECTIOUS DISEASES PROG NOTE
Assessment/Plan








Assessment:


COVID19 PNeumonia -SP 2l NC > now at RA > 2l NC


  -9/12 CXR: Left lower lung/retrocardiac opacity which is similar in appearance

to the previous study from September 11, 2020.  


  -9/9 CXR: Bibasilar atelectasis. No acute process otherwise


       rapid COVID PCR +





Low grade fever; SP


No leukocytosis> Leukopenia, SP


   -u/a neg, ucx neg





CONS bacteremia- likely contaminant


  -9/9 Bcx 1/4 CONS; 9/12 Bcx NTD





CAD


CVA


COPD


osteoporosis


 R shoulder surgery


 CKD


DM2


 dysphagia


GERD


aphasia


HTN


 HLD


 BPH


 Dementia


[facility] resident ([facility]) 





Plan:


-Decadron #8/10


-Requested Remdesivir #4/5


   -9/15 SP Ceftriaxone #7


   -9/14 SP Azithromcyin #6





-f/u cx


-Monitor CBC/CMP, temperatures


-COVID19 isolation 


-f/u repeat Bcx x2





Thank you for consulting [facility]. Will continue to follow along with 

you. 


Discussed with RN and pharmacy staff.





Subjective


Allergies:  


Coded Allergies:  


     No Known Allergies (Unverified , 8/20/12)


afebrile >72hrs


at 2L NC


Bcx NTD





Objective





Last 24 Hour Vital Signs








  Date Time  Temp Pulse Resp B/P (MAP) Pulse Ox O2 Delivery O2 Flow Rate FiO2


 


9/17/20 11:48 97.2 57 20 109/65 (80) 96   


 


9/17/20 09:00      Nasal Cannula 3.0 


 


9/17/20 08:33  53  124/67    


 


9/17/20 08:32    124/67    


 


9/17/20 08:00 97.5 53 20 124/67 (86) 95   


 


9/17/20 08:00  50      


 


9/17/20 04:00  51      


 


9/17/20 04:00 96.6 50 18 123/66 (85) 94   


 


9/17/20 00:00  66      


 


9/17/20 00:00 96.3 66 18 114/68 (83) 94   


 


9/16/20 21:00      Nasal Cannula 2.0 


 


9/16/20 20:00 97.5 60 18 121/70 (87) 100   


 


9/16/20 20:00  60      


 


9/16/20 16:00  60      


 


9/16/20 16:00 97.3 60 18 102/64 (77) 96   








Height (Feet):  5


Height (Inches):  5.00


Weight (Pounds):  159


Cardiovascular:  RSR


Respiratory:  decreased breath sounds


Abdomen:  soft, non-tender, present bowel sounds


Extremities:  no cyanosis





Laboratory Tests








Test


 9/17/20


05:05


 


White Blood Count


 4.2 K/UL


(4.8-10.8)  L


 


Red Blood Count


 4.78 M/UL


(4.70-6.10)


 


Hemoglobin


 13.7 G/DL


(14.2-18.0)  L


 


Hematocrit


 41.1 %


(42.0-52.0)  L


 


Mean Corpuscular Volume 86 FL (80-99)  


 


Mean Corpuscular Hemoglobin


 28.6 PG


(27.0-31.0)


 


Mean Corpuscular Hemoglobin


Concent 33.3 G/DL


(32.0-36.0)


 


Red Cell Distribution Width


 12.0 %


(11.6-14.8)


 


Platelet Count


 152 K/UL


(150-450)


 


Mean Platelet Volume


 9.1 FL


(6.5-10.1)


 


Neutrophils (%) (Auto)


 66.3 %


(45.0-75.0)


 


Lymphocytes (%) (Auto)


 22.7 %


(20.0-45.0)


 


Monocytes (%) (Auto)


 10.6 %


(1.0-10.0)  H


 


Eosinophils (%) (Auto)


 0.0 %


(0.0-3.0)


 


Basophils (%) (Auto)


 0.4 %


(0.0-2.0)


 


Fibrinogen


 441 mg/dL


(200-400)  H


 


D-Dimer


 0.88 mg/L FEU


(0.00-0.49)  H


 


Sodium Level


 142 MMOL/L


(136-145)


 


Potassium Level


 3.8 MMOL/L


(3.5-5.1)


 


Chloride Level


 107 MMOL/L


()


 


Carbon Dioxide Level


 26 MMOL/L


(21-32)


 


Anion Gap


 9 mmol/L


(5-15)


 


Blood Urea Nitrogen


 21 mg/dL


(7-18)  H


 


Creatinine


 0.9 MG/DL


(0.55-1.30)


 


Estimat Glomerular Filtration


Rate > 60 mL/min


(>60)


 


Glucose Level


 174 MG/DL


()  H


 


Calcium Level


 7.7 MG/DL


(8.5-10.1)  L


 


Ferritin


 1048 NG/ML


(8-388)  H


 


Total Bilirubin


 0.4 MG/DL


(0.2-1.0)


 


Direct Bilirubin


 0.1 MG/DL


(0.0-0.3)


 


Aspartate Amino Transf


(AST/SGOT) 42 U/L (15-37)


H


 


Alanine Aminotransferase


(ALT/SGPT) 51 U/L (12-78)





 


Alkaline Phosphatase


 84 U/L


()


 


Lactate Dehydrogenase


 302 U/L


()  H


 


C-Reactive Protein,


Quantitative 1.5 mg/dL


(0.00-0.90)  H


 


Total Protein


 6.3 G/DL


(6.4-8.2)  L


 


Albumin


 2.0 G/DL


(3.4-5.0)  L


 


Globulin 4.3 g/dL  


 


Albumin/Globulin Ratio


 0.5 (1.0-2.7)


L











Current Medications








 Medications


  (Trade)  Dose


 Ordered  Sig/Miky


 Route


 PRN Reason  Start Time


 Stop Time Status Last Admin


Dose Admin


 


 Acetaminophen


  (Tylenol)  650 mg  Q4H  PRN


 ORAL


 FEVER  9/9/20 17:00


 10/9/20 16:59   





 


 Acetaminophen


  (Tylenol)  650 mg  Q4H  PRN


 RECTAL


 Mild Pain (Pain Scale 1-3)  9/13/20 11:30


 10/13/20 11:29  9/13/20 23:51





 


 Albuterol/


 Ipratropium


  (Combivent


 Respimat)  1 puff  Q4H  PRN


 INH


 Shortness of Breath  9/9/20 17:15


 10/9/20 17:14   





 


 Amlodipine


 Besylate


  (Norvasc)  5 mg  DAILY


 ORAL


   9/10/20 09:00


 10/10/20 08:59  9/17/20 08:33





 


 Ascorbic Acid


  (Vitamin C)  500 mg  DAILY


 ORAL


   9/12/20 09:00


 10/12/20 08:59  9/17/20 08:32





 


 Dexamethasone


  (Decadron)  6 mg  DAILY


 ORAL


   9/12/20 09:00


 9/21/20 08:59  9/17/20 08:33





 


 Dextrose


  (Dextrose 50%)  25 ml  Q30M  PRN


 IV


 Hypoglycemia  9/9/20 17:00


 12/8/20 16:59   





 


 Dextrose


  (Dextrose 50%)  50 ml  Q30M  PRN


 IV


 Hypoglycemia  9/9/20 17:00


 12/8/20 16:59   





 


 Dextrose/Sodium


 Chloride  1,000 ml @ 


 50 mls/hr  Q20H


 IV


   9/12/20 19:45


 10/12/20 19:44  9/16/20 23:45





 


 Donepezil HCl


  (Aricept)  5 mg  DAILY


 ORAL


   9/10/20 09:00


 10/10/20 08:59  9/17/20 08:33





 


 Heparin Sodium


  (Porcine)


  (Heparin 5000


 units/ml)  5,000 units  EVERY 12  HOURS


 SUBQ


   9/9/20 21:00


 10/24/20 20:59  9/16/20 20:28





 


 Lisinopril


  (ZestriL)  5 mg  DAILY


 ORAL


   9/10/20 09:00


 10/10/20 08:59  9/17/20 08:32





 


 Multivitamins


  (Multivitamins)  1 tab  DAILY


 ORAL


   9/12/20 09:00


 10/12/20 08:59  9/17/20 08:33





 


 Ondansetron HCl


  (Zofran)  4 mg  Q6H  PRN


 IVP


 Nausea & Vomiting  9/9/20 17:00


 10/9/20 16:59   





 


 Polyethylene


 Glycol


  (Miralax)  17 gm  DAILYPRN  PRN


 ORAL


 Constipation  9/9/20 17:00


 10/9/20 16:59   





 


 Promethazine HCl/


 Codeine


  (Phenergan with


 Codeine)  5 ml  Q6H  PRN


 ORAL


 cough  9/9/20 17:00


 10/9/20 16:59   





 


 Remdesivir 100 mg/


 Sodium Chloride  250 ml @ 


 250 mls/hr  Q24H


 IV


   9/15/20 18:00


 9/18/20 18:59  9/16/20 18:06





 


 Tamsulosin HCl


  (Flomax)  0.4 mg  DAILY


 ORAL


   9/10/20 09:00


 10/10/20 08:59  9/17/20 08:33




















Slime Garcia M.D.            Sep 17, 2020 13:49

## 2020-09-17 NOTE — NUR
CASE MANAGEMENT: REVIEW 



9/17/2020



SI:COVID. PRESSURE ULCERS.

VS: T 97.2 HR 57 RR 20 B/P 109/65 SATS 96% ON 3L/NC 

LABS: WBC 4.2 BUN 21  CA 7.7 AST 42 



IS:DEXTROSE/NS @ 50 ML/HR 

NORVASC PO QD

ARICEPT PO QD

LISINOPRIL PO QD

DECADRON PO QD 

REMDESIVIR IV Q24H 



***TELE*** 



DCP: SCAR MARCANO 



PLAN OF CARE: 

titrate fio2 to sat of 92%

f/u inflammatory markers, still elevated

Scrotal edema>> monitor over the course of the next few days

PER GEN SURGERY >> Wound healing: add MVI x 1, Vit C 500mg QD

## 2020-09-17 NOTE — NUR
NURSE NOTES:

Received patient report from LITA Silveira. Patient shows no signs of distress or pain at the 
time. He is AO x0 did not respond to any of my questions but eyes are open. Patient is on 2 
L nasal canula saturating at 95% with no signs of respiratory distress. Patient is running 
D5 1/2 NS @ 50 cc/hr. IV patent and intact. There are no signs of erythema, infiltration, or 
bleeding. Bed is in the lowest position, call light is within reach, side rails up x3. Will 
continue to monitor.

## 2020-09-17 NOTE — PULMONOLOGY PROGRESS NOTE
Subjective


ROS Limited/Unobtainable:  Yes


Constitutional:  Reports: no symptoms


HEENT:  Repors: no symptoms


Respiratory:  Reports: no symptoms


Allergies:  


Coded Allergies:  


     No Known Allergies (Unverified , 8/20/12)





Objective





Last 24 Hour Vital Signs








  Date Time  Temp Pulse Resp B/P (MAP) Pulse Ox O2 Delivery O2 Flow Rate FiO2


 


9/17/20 11:48 97.2 57 20 109/65 (80) 96   


 


9/17/20 09:00      Nasal Cannula 3.0 


 


9/17/20 08:33  53  124/67    


 


9/17/20 08:32    124/67    


 


9/17/20 08:00 97.5 53 20 124/67 (86) 95   


 


9/17/20 08:00  50      


 


9/17/20 04:00  51      


 


9/17/20 04:00 96.6 50 18 123/66 (85) 94   


 


9/17/20 00:00  66      


 


9/17/20 00:00 96.3 66 18 114/68 (83) 94   


 


9/16/20 21:00      Nasal Cannula 2.0 


 


9/16/20 20:00 97.5 60 18 121/70 (87) 100   


 


9/16/20 20:00  60      


 


9/16/20 16:00  60      


 


9/16/20 16:00 97.3 60 18 102/64 (77) 96   

















Intake and Output  


 


 9/16/20 9/17/20





 19:00 07:00


 


Intake Total 240 ml 


 


Output Total 1650 ml 650 ml


 


Balance -1410 ml -650 ml


 


  


 


Intake Oral 240 ml 


 


Output Urine Total 1650 ml 650 ml








General Appearance:  WD/WN


HEENT:  normocephalic, atraumatic


Respiratory:  chest wall non-tender, lungs clear, respiratory distress, 

decreased breath sounds


Cardiovascular:  normal peripheral pulses, normal rate


Abdomen:  normal bowel sounds, soft, non tender


Genitourinary:  normal external genitalia


Extremities:  no cyanosis


Neurologic:  CNs II-XII grossly normal


Lymphatic:  no neck adenopathy


Laboratory Tests


9/17/20 05:05: 


White Blood Count 4.2L, Red Blood Count 4.78, Hemoglobin 13.7L, Hematocrit 41.1L

, Mean Corpuscular Volume 86, Mean Corpuscular Hemoglobin 28.6, Mean Corpuscular

Hemoglobin Concent 33.3, Red Cell Distribution Width 12.0, Platelet Count 152, 

Mean Platelet Volume 9.1, Neutrophils (%) (Auto) 66.3, Lymphocytes (%) (Auto) 

22.7, Monocytes (%) (Auto) 10.6H, Eosinophils (%) (Auto) 0.0, Basophils (%) 

(Auto) 0.4, Fibrinogen 441H, D-Dimer 0.88H, Sodium Level 142, Potassium Level 

3.8, Chloride Level 107, Carbon Dioxide Level 26, Anion Gap 9, Blood Urea 

Nitrogen 21H, Creatinine 0.9, Estimat Glomerular Filtration Rate > 60, Glucose 

Level 174H, Calcium Level 7.7L, Ferritin 1048H, Total Bilirubin 0.4, Direct 

Bilirubin 0.1, Aspartate Amino Transf (AST/SGOT) 42H, Alanine Aminotransferase 

(ALT/SGPT) 51, Alkaline Phosphatase 84, Lactate Dehydrogenase 302H, C-Reactive 

Protein, Quantitative 1.5H, Total Protein 6.3L, Albumin 2.0L, Globulin 4.3, 

Albumin/Globulin Ratio 0.5L





Current Medications








 Medications


  (Trade)  Dose


 Ordered  Sig/Miky


 Route


 PRN Reason  Start Time


 Stop Time Status Last Admin


Dose Admin


 


 Acetaminophen


  (Tylenol)  650 mg  Q4H  PRN


 ORAL


 FEVER  9/9/20 17:00


 10/9/20 16:59   





 


 Acetaminophen


  (Tylenol)  650 mg  Q4H  PRN


 RECTAL


 Mild Pain (Pain Scale 1-3)  9/13/20 11:30


 10/13/20 11:29  9/13/20 23:51





 


 Albuterol/


 Ipratropium


  (Combivent


 Respimat)  1 puff  Q4H  PRN


 INH


 Shortness of Breath  9/9/20 17:15


 10/9/20 17:14   





 


 Amlodipine


 Besylate


  (Norvasc)  5 mg  DAILY


 ORAL


   9/10/20 09:00


 10/10/20 08:59  9/17/20 08:33





 


 Ascorbic Acid


  (Vitamin C)  500 mg  DAILY


 ORAL


   9/12/20 09:00


 10/12/20 08:59  9/17/20 08:32





 


 Dexamethasone


  (Decadron)  6 mg  DAILY


 ORAL


   9/12/20 09:00


 9/21/20 08:59  9/17/20 08:33





 


 Dextrose


  (Dextrose 50%)  25 ml  Q30M  PRN


 IV


 Hypoglycemia  9/9/20 17:00


 12/8/20 16:59   





 


 Dextrose


  (Dextrose 50%)  50 ml  Q30M  PRN


 IV


 Hypoglycemia  9/9/20 17:00


 12/8/20 16:59   





 


 Dextrose/Sodium


 Chloride  1,000 ml @ 


 50 mls/hr  Q20H


 IV


   9/12/20 19:45


 10/12/20 19:44  9/16/20 23:45





 


 Donepezil HCl


  (Aricept)  5 mg  DAILY


 ORAL


   9/10/20 09:00


 10/10/20 08:59  9/17/20 08:33





 


 Heparin Sodium


  (Porcine)


  (Heparin 5000


 units/ml)  5,000 units  EVERY 12  HOURS


 SUBQ


   9/9/20 21:00


 10/24/20 20:59  9/16/20 20:28





 


 Lisinopril


  (ZestriL)  5 mg  DAILY


 ORAL


   9/10/20 09:00


 10/10/20 08:59  9/17/20 08:32





 


 Multivitamins


  (Multivitamins)  1 tab  DAILY


 ORAL


   9/12/20 09:00


 10/12/20 08:59  9/17/20 08:33





 


 Ondansetron HCl


  (Zofran)  4 mg  Q6H  PRN


 IVP


 Nausea & Vomiting  9/9/20 17:00


 10/9/20 16:59   





 


 Polyethylene


 Glycol


  (Miralax)  17 gm  DAILYPRN  PRN


 ORAL


 Constipation  9/9/20 17:00


 10/9/20 16:59   





 


 Promethazine HCl/


 Codeine


  (Phenergan with


 Codeine)  5 ml  Q6H  PRN


 ORAL


 cough  9/9/20 17:00


 10/9/20 16:59   





 


 Remdesivir 100 mg/


 Sodium Chloride  250 ml @ 


 250 mls/hr  Q24H


 IV


   9/15/20 18:00


 9/18/20 18:59  9/16/20 18:06





 


 Tamsulosin HCl


  (Flomax)  0.4 mg  DAILY


 ORAL


   9/10/20 09:00


 10/10/20 08:59  9/17/20 08:33














Assessment/Plan


Problems:  


(1) 2019 novel coronavirus disease (COVID-19)


(2) Acute bronchitis


(3) History of CVA (cerebrovascular accident)


(4) BPH (benign prostatic hyperplasia)


(5) HTN (hypertension)


(6) Alzheimer's dementia


Assessment/Plan


afebrile


looks comfortable


respiratory isolation


check sputum, still pending





f/u electrolytes


titrate fio2 to sat of 92%


f/u inflammatory markers, still elevated


monitor BP


dvt prophylaxis











Michael Sandoval MD              Sep 17, 2020 12:12

## 2020-09-18 VITALS — SYSTOLIC BLOOD PRESSURE: 137 MMHG | DIASTOLIC BLOOD PRESSURE: 79 MMHG

## 2020-09-18 VITALS — DIASTOLIC BLOOD PRESSURE: 73 MMHG | SYSTOLIC BLOOD PRESSURE: 132 MMHG

## 2020-09-18 VITALS — SYSTOLIC BLOOD PRESSURE: 134 MMHG | DIASTOLIC BLOOD PRESSURE: 76 MMHG

## 2020-09-18 VITALS — SYSTOLIC BLOOD PRESSURE: 117 MMHG | DIASTOLIC BLOOD PRESSURE: 64 MMHG

## 2020-09-18 VITALS — SYSTOLIC BLOOD PRESSURE: 131 MMHG | DIASTOLIC BLOOD PRESSURE: 80 MMHG

## 2020-09-18 VITALS — DIASTOLIC BLOOD PRESSURE: 76 MMHG | SYSTOLIC BLOOD PRESSURE: 134 MMHG

## 2020-09-18 LAB
ADD MANUAL DIFF: NO
ALBUMIN SERPL-MCNC: 2 G/DL (ref 3.4–5)
ALBUMIN/GLOB SERPL: 0.5 {RATIO} (ref 1–2.7)
ALP SERPL-CCNC: 86 U/L (ref 46–116)
ALT SERPL-CCNC: 58 U/L (ref 12–78)
ANION GAP SERPL CALC-SCNC: 9 MMOL/L (ref 5–15)
AST SERPL-CCNC: 40 U/L (ref 15–37)
BASOPHILS NFR BLD AUTO: 2.7 % (ref 0–2)
BILIRUB SERPL-MCNC: 0.5 MG/DL (ref 0.2–1)
BUN SERPL-MCNC: 20 MG/DL (ref 7–18)
CALCIUM SERPL-MCNC: 8 MG/DL (ref 8.5–10.1)
CHLORIDE SERPL-SCNC: 105 MMOL/L (ref 98–107)
CO2 SERPL-SCNC: 25 MMOL/L (ref 21–32)
CREAT SERPL-MCNC: 0.8 MG/DL (ref 0.55–1.3)
EOSINOPHIL NFR BLD AUTO: 0 % (ref 0–3)
ERYTHROCYTE [DISTWIDTH] IN BLOOD BY AUTOMATED COUNT: 12.2 % (ref 11.6–14.8)
GLOBULIN SER-MCNC: 4.1 G/DL
HCT VFR BLD CALC: 42.6 % (ref 42–52)
HGB BLD-MCNC: 14.5 G/DL (ref 14.2–18)
LYMPHOCYTES NFR BLD AUTO: 22.2 % (ref 20–45)
MCV RBC AUTO: 85 FL (ref 80–99)
MONOCYTES NFR BLD AUTO: 8.5 % (ref 1–10)
NEUTROPHILS NFR BLD AUTO: 66.7 % (ref 45–75)
PLATELET # BLD: 167 K/UL (ref 150–450)
POTASSIUM SERPL-SCNC: 3.8 MMOL/L (ref 3.5–5.1)
RBC # BLD AUTO: 5.03 M/UL (ref 4.7–6.1)
SODIUM SERPL-SCNC: 139 MMOL/L (ref 136–145)
WBC # BLD AUTO: 4.2 K/UL (ref 4.8–10.8)

## 2020-09-18 RX ADMIN — Medication SCH MG: at 09:17

## 2020-09-18 RX ADMIN — LISINOPRIL SCH MG: 2.5 TABLET ORAL at 09:17

## 2020-09-18 RX ADMIN — HEPARIN SODIUM SCH UNITS: 5000 INJECTION INTRAVENOUS; SUBCUTANEOUS at 09:18

## 2020-09-18 RX ADMIN — TAMSULOSIN HYDROCHLORIDE SCH MG: 0.4 CAPSULE ORAL at 09:17

## 2020-09-18 RX ADMIN — DONEPEZIL HYDROCHLORIDE SCH MG: 5 TABLET, FILM COATED ORAL at 09:17

## 2020-09-18 RX ADMIN — HEPARIN SODIUM SCH UNITS: 5000 INJECTION INTRAVENOUS; SUBCUTANEOUS at 20:59

## 2020-09-18 RX ADMIN — DEXTROSE AND SODIUM CHLORIDE SCH MLS/HR: 5; .45 INJECTION, SOLUTION INTRAVENOUS at 15:12

## 2020-09-18 NOTE — PULMONOLOGY PROGRESS NOTE
Subjective


ROS Limited/Unobtainable:  Yes


Constitutional:  Reports: no symptoms


HEENT:  Repors: no symptoms


Respiratory:  Reports: no symptoms


Allergies:  


Coded Allergies:  


     No Known Allergies (Unverified , 8/20/12)





Objective





Last 24 Hour Vital Signs








  Date Time  Temp Pulse Resp B/P (MAP) Pulse Ox O2 Delivery O2 Flow Rate FiO2


 


9/18/20 12:00 97.8 64 20 137/79 (98) 96   


 


9/18/20 09:17    132/73    


 


9/18/20 09:17  60  132/73    


 


9/18/20 09:00      Nasal Cannula 3.0 


 


9/18/20 08:00  53      


 


9/18/20 08:00 97.4 60 20 132/73 (92) 95   


 


9/18/20 04:00 97.1 60 21 134/76 (95) 94   


 


9/18/20 04:00  54      


 


9/18/20 00:00 98.2 77 21 117/64 (81) 96   


 


9/18/20 00:00  56      


 


9/17/20 21:00      Nasal Cannula 3.0 


 


9/17/20 20:00  60      


 


9/17/20 20:00 97.9 62 19 123/63 (83) 95   


 


9/17/20 16:00  61      


 


9/17/20 15:43 97.9 61 20 99/53 (68) 95   

















Intake and Output  


 


 9/17/20 9/18/20





 19:00 07:00


 


Intake Total 120 ml 311.6 ml


 


Output Total 900 ml 600 ml


 


Balance -780 ml -288.4 ml


 


  


 


Intake Oral 120 ml 


 


IV Total  311.6 ml


 


Output Urine Total 900 ml 600 ml








General Appearance:  WD/WN


HEENT:  normocephalic, atraumatic


Respiratory:  chest wall non-tender, lungs clear, respiratory distress, 

decreased breath sounds


Cardiovascular:  normal peripheral pulses, normal rate


Abdomen:  normal bowel sounds, soft, non tender


Genitourinary:  normal external genitalia


Extremities:  no cyanosis


Neurologic:  CNs II-XII grossly normal


Lymphatic:  no neck adenopathy


Laboratory Tests


9/18/20 04:00: 


White Blood Count 4.2L, Red Blood Count 5.03, Hemoglobin 14.5, Hematocrit 42.6, 

Mean Corpuscular Volume 85, Mean Corpuscular Hemoglobin 28.9, Mean Corpuscular 

Hemoglobin Concent 34.1, Red Cell Distribution Width 12.2, Platelet Count 167, 

Mean Platelet Volume 7.5, Neutrophils (%) (Auto) 66.7, Lymphocytes (%) (Auto) 

22.2, Monocytes (%) (Auto) 8.5, Eosinophils (%) (Auto) 0.0, Basophils (%) (Auto)

2.7H, Sodium Level 139, Potassium Level 3.8, Chloride Level 105, Carbon Dioxide 

Level 25, Anion Gap 9, Blood Urea Nitrogen 20H, Creatinine 0.8, Estimat 

Glomerular Filtration Rate > 60, Glucose Level 184H, Calcium Level 8.0L, Total 

Bilirubin 0.5, Direct Bilirubin 0.1, Aspartate Amino Transf (AST/SGOT) 40H, 

Alanine Aminotransferase (ALT/SGPT) 58, Alkaline Phosphatase 86, Total Protein 

6.1L, Albumin 2.0L, Globulin 4.1, Albumin/Globulin Ratio 0.5L





Current Medications








 Medications


  (Trade)  Dose


 Ordered  Sig/Miky


 Route


 PRN Reason  Start Time


 Stop Time Status Last Admin


Dose Admin


 


 Acetaminophen


  (Tylenol)  650 mg  Q4H  PRN


 ORAL


 FEVER  9/9/20 17:00


 10/9/20 16:59   





 


 Acetaminophen


  (Tylenol)  650 mg  Q4H  PRN


 RECTAL


 Mild Pain (Pain Scale 1-3)  9/13/20 11:30


 10/13/20 11:29  9/13/20 23:51





 


 Albuterol/


 Ipratropium


  (Combivent


 Respimat)  1 puff  Q4H  PRN


 INH


 Shortness of Breath  9/9/20 17:15


 10/9/20 17:14   





 


 Amlodipine


 Besylate


  (Norvasc)  5 mg  DAILY


 ORAL


   9/10/20 09:00


 10/10/20 08:59  9/18/20 09:17





 


 Ascorbic Acid


  (Vitamin C)  500 mg  DAILY


 ORAL


   9/12/20 09:00


 10/12/20 08:59  9/18/20 09:17





 


 Dexamethasone


  (Decadron)  6 mg  DAILY


 ORAL


   9/12/20 09:00


 9/21/20 08:59  9/18/20 09:17





 


 Dextrose


  (Dextrose 50%)  25 ml  Q30M  PRN


 IV


 Hypoglycemia  9/9/20 17:00


 12/8/20 16:59   





 


 Dextrose


  (Dextrose 50%)  50 ml  Q30M  PRN


 IV


 Hypoglycemia  9/9/20 17:00


 12/8/20 16:59   





 


 Dextrose/Sodium


 Chloride  1,000 ml @ 


 50 mls/hr  Q20H


 IV


   9/12/20 19:45


 10/12/20 19:44  9/17/20 20:46





 


 Donepezil HCl


  (Aricept)  5 mg  DAILY


 ORAL


   9/10/20 09:00


 10/10/20 08:59  9/18/20 09:17





 


 Heparin Sodium


  (Porcine)


  (Heparin 5000


 units/ml)  5,000 units  EVERY 12  HOURS


 SUBQ


   9/9/20 21:00


 10/24/20 20:59  9/18/20 09:18





 


 Lisinopril


  (ZestriL)  5 mg  DAILY


 ORAL


   9/10/20 09:00


 10/10/20 08:59  9/18/20 09:17





 


 Multivitamins


  (Multivitamins)  1 tab  DAILY


 ORAL


   9/12/20 09:00


 10/12/20 08:59  9/18/20 09:16





 


 Ondansetron HCl


  (Zofran)  4 mg  Q6H  PRN


 IVP


 Nausea & Vomiting  9/9/20 17:00


 10/9/20 16:59   





 


 Polyethylene


 Glycol


  (Miralax)  17 gm  DAILYPRN  PRN


 ORAL


 Constipation  9/9/20 17:00


 10/9/20 16:59   





 


 Promethazine HCl/


 Codeine


  (Phenergan with


 Codeine)  5 ml  Q6H  PRN


 ORAL


 cough  9/9/20 17:00


 10/9/20 16:59   





 


 Remdesivir 100 mg/


 Sodium Chloride  250 ml @ 


 250 mls/hr  Q24H


 IV


   9/15/20 18:00


 9/18/20 18:59  9/17/20 17:56





 


 Tamsulosin HCl


  (Flomax)  0.4 mg  DAILY


 ORAL


   9/10/20 09:00


 10/10/20 08:59  9/18/20 09:17














Assessment/Plan


Problems:  


(1) 2019 novel coronavirus disease (COVID-19)


(2) Acute bronchitis


(3) History of CVA (cerebrovascular accident)


(4) BPH (benign prostatic hyperplasia)


(5) HTN (hypertension)


(6) Alzheimer's dementia


Assessment/Plan


afebrile


looks comfortable


respiratory isolation


check sputum, still pending





f/u electrolytes


titrate fio2 to sat of 92%


f/u inflammatory markers, still elevated


monitor BP


dvt prophylaxis











Michael Sandoval MD              Sep 18, 2020 13:41

## 2020-09-18 NOTE — INFECTIOUS DISEASES PROG NOTE
Assessment/Plan








Assessment:


COVID19 PNeumonia -SP 2l NC > now at RA > 2l NC> 3L


  -9/12 CXR: Left lower lung/retrocardiac opacity which is similar in appearance

to the previous study from September 11, 2020.  


  -9/9 CXR: Bibasilar atelectasis. No acute process otherwise


       rapid COVID PCR +





Low grade fever; SP


No leukocytosis> Leukopenia, SP


   -u/a neg, ucx neg





CONS bacteremia- likely contaminant


  -9/9 Bcx 1/4 CONS; 9/12 Bcx Neg





CAD


CVA


COPD


osteoporosis


 R shoulder surgery


 CKD


DM2


 dysphagia


GERD


aphasia


HTN


 HLD


 BPH


 Dementia


[facility] ([facility]) 





Plan:


-Decadron #9/10


-Requested Remdesivir #5/5


   -9/15 SP Ceftriaxone #7


   -9/14 SP Azithromcyin #6





-f/u cx


-Monitor CBC/CMP, temperatures


-COVID19 isolation 


-discharge planning





Thank you for consulting [facility]. Will continue to follow along with 

you. 


Discussed with RN and Dr Sandoval





Subjective


Allergies:  


Coded Allergies:  


     No Known Allergies (Unverified , 8/20/12)


afebrile


at 3L NC


Bcx Neg





Objective





Last 24 Hour Vital Signs








  Date Time  Temp Pulse Resp B/P (MAP) Pulse Ox O2 Delivery O2 Flow Rate FiO2


 


9/18/20 12:00 97.8 64 20 137/79 (98) 96   


 


9/18/20 09:17    132/73    


 


9/18/20 09:17  60  132/73    


 


9/18/20 09:00      Nasal Cannula 3.0 


 


9/18/20 08:00  53      


 


9/18/20 08:00 97.4 60 20 132/73 (92) 95   


 


9/18/20 04:00 97.1 60 21 134/76 (95) 94   


 


9/18/20 04:00  54      


 


9/18/20 00:00 98.2 77 21 117/64 (81) 96   


 


9/18/20 00:00  56      


 


9/17/20 21:00      Nasal Cannula 3.0 


 


9/17/20 20:00  60      


 


9/17/20 20:00 97.9 62 19 123/63 (83) 95   


 


9/17/20 16:00  61      


 


9/17/20 15:43 97.9 61 20 99/53 (68) 95   








Height (Feet):  5


Height (Inches):  5.00


Weight (Pounds):  159


Cardiovascular:  RSR


Respiratory:  decreased breath sounds


Abdomen:  soft, non-tender, present bowel sounds


Extremities:  no cyanosis





Laboratory Tests








Test


 9/18/20


04:00


 


White Blood Count


 4.2 K/UL


(4.8-10.8)  L


 


Red Blood Count


 5.03 M/UL


(4.70-6.10)


 


Hemoglobin


 14.5 G/DL


(14.2-18.0)


 


Hematocrit


 42.6 %


(42.0-52.0)


 


Mean Corpuscular Volume 85 FL (80-99)  


 


Mean Corpuscular Hemoglobin


 28.9 PG


(27.0-31.0)


 


Mean Corpuscular Hemoglobin


Concent 34.1 G/DL


(32.0-36.0)


 


Red Cell Distribution Width


 12.2 %


(11.6-14.8)


 


Platelet Count


 167 K/UL


(150-450)


 


Mean Platelet Volume


 7.5 FL


(6.5-10.1)


 


Neutrophils (%) (Auto)


 66.7 %


(45.0-75.0)


 


Lymphocytes (%) (Auto)


 22.2 %


(20.0-45.0)


 


Monocytes (%) (Auto)


 8.5 %


(1.0-10.0)


 


Eosinophils (%) (Auto)


 0.0 %


(0.0-3.0)


 


Basophils (%) (Auto)


 2.7 %


(0.0-2.0)  H


 


Sodium Level


 139 MMOL/L


(136-145)


 


Potassium Level


 3.8 MMOL/L


(3.5-5.1)


 


Chloride Level


 105 MMOL/L


()


 


Carbon Dioxide Level


 25 MMOL/L


(21-32)


 


Anion Gap


 9 mmol/L


(5-15)


 


Blood Urea Nitrogen


 20 mg/dL


(7-18)  H


 


Creatinine


 0.8 MG/DL


(0.55-1.30)


 


Estimat Glomerular Filtration


Rate > 60 mL/min


(>60)


 


Glucose Level


 184 MG/DL


()  H


 


Calcium Level


 8.0 MG/DL


(8.5-10.1)  L


 


Total Bilirubin


 0.5 MG/DL


(0.2-1.0)


 


Direct Bilirubin


 0.1 MG/DL


(0.0-0.3)


 


Aspartate Amino Transf


(AST/SGOT) 40 U/L (15-37)


H


 


Alanine Aminotransferase


(ALT/SGPT) 58 U/L (12-78)





 


Alkaline Phosphatase


 86 U/L


()


 


Total Protein


 6.1 G/DL


(6.4-8.2)  L


 


Albumin


 2.0 G/DL


(3.4-5.0)  L


 


Globulin 4.1 g/dL  


 


Albumin/Globulin Ratio


 0.5 (1.0-2.7)


L











Current Medications








 Medications


  (Trade)  Dose


 Ordered  Sig/Miky


 Route


 PRN Reason  Start Time


 Stop Time Status Last Admin


Dose Admin


 


 Acetaminophen


  (Tylenol)  650 mg  Q4H  PRN


 ORAL


 FEVER  9/9/20 17:00


 10/9/20 16:59   





 


 Acetaminophen


  (Tylenol)  650 mg  Q4H  PRN


 RECTAL


 Mild Pain (Pain Scale 1-3)  9/13/20 11:30


 10/13/20 11:29  9/13/20 23:51





 


 Albuterol/


 Ipratropium


  (Combivent


 Respimat)  1 puff  Q4H  PRN


 INH


 Shortness of Breath  9/9/20 17:15


 10/9/20 17:14   





 


 Amlodipine


 Besylate


  (Norvasc)  5 mg  DAILY


 ORAL


   9/10/20 09:00


 10/10/20 08:59  9/18/20 09:17





 


 Ascorbic Acid


  (Vitamin C)  500 mg  DAILY


 ORAL


   9/12/20 09:00


 10/12/20 08:59  9/18/20 09:17





 


 Dexamethasone


  (Decadron)  6 mg  DAILY


 ORAL


   9/12/20 09:00


 9/21/20 08:59  9/18/20 09:17





 


 Dextrose


  (Dextrose 50%)  25 ml  Q30M  PRN


 IV


 Hypoglycemia  9/9/20 17:00


 12/8/20 16:59   





 


 Dextrose


  (Dextrose 50%)  50 ml  Q30M  PRN


 IV


 Hypoglycemia  9/9/20 17:00


 12/8/20 16:59   





 


 Dextrose/Sodium


 Chloride  1,000 ml @ 


 50 mls/hr  Q20H


 IV


   9/12/20 19:45


 10/12/20 19:44  9/17/20 20:46





 


 Donepezil HCl


  (Aricept)  5 mg  DAILY


 ORAL


   9/10/20 09:00


 10/10/20 08:59  9/18/20 09:17





 


 Heparin Sodium


  (Porcine)


  (Heparin 5000


 units/ml)  5,000 units  EVERY 12  HOURS


 SUBQ


   9/9/20 21:00


 10/24/20 20:59  9/18/20 09:18





 


 Lisinopril


  (ZestriL)  5 mg  DAILY


 ORAL


   9/10/20 09:00


 10/10/20 08:59  9/18/20 09:17





 


 Multivitamins


  (Multivitamins)  1 tab  DAILY


 ORAL


   9/12/20 09:00


 10/12/20 08:59  9/18/20 09:16





 


 Ondansetron HCl


  (Zofran)  4 mg  Q6H  PRN


 IVP


 Nausea & Vomiting  9/9/20 17:00


 10/9/20 16:59   





 


 Polyethylene


 Glycol


  (Miralax)  17 gm  DAILYPRN  PRN


 ORAL


 Constipation  9/9/20 17:00


 10/9/20 16:59   





 


 Promethazine HCl/


 Codeine


  (Phenergan with


 Codeine)  5 ml  Q6H  PRN


 ORAL


 cough  9/9/20 17:00


 10/9/20 16:59   





 


 Remdesivir 100 mg/


 Sodium Chloride  250 ml @ 


 250 mls/hr  Q24H


 IV


   9/15/20 18:00


 9/18/20 18:59  9/17/20 17:56





 


 Tamsulosin HCl


  (Flomax)  0.4 mg  DAILY


 ORAL


   9/10/20 09:00


 10/10/20 08:59  9/18/20 09:17




















Slime Garcia M.D.            Sep 18, 2020 13:52

## 2020-09-18 NOTE — NUR
NURSE HAND-OFF REPORT: 



Important Events on Shift:for transfer to [facility]

Patient Status: stable

Diet: KERI mech soft finely chopped NTL, glucerna TID, Harvinder BID



Pending Orders: 

Pending Results/Labs:

Pending MD notification:



Latest Vital Signs: Temperature 97.6 , Pulse 64 , B/P 131 /80 , Respiratory Rate 20 , O2 SAT 
97 , Nasal Cannula, O2 Flow Rate 3.0 .  

Vital Sign Comment: 



EKG Rhythm: Sinus Rhythm

Rhythm change?: Y 

MD Notified?: N -

MD Response: 



Latest Mejai Fall Score: 50  

Fall Risk: High Risk 

Safety Measures: Call light Within Reach, Bed Alarm Zone 1, Side Rails Side Rails x3, Bed 
position Low and Locked.

Fall Precautions: 

Yellow Socks

Yellow Gown

Door Sign

Patient Fall Education



.

-------------------------------------------------------------------------------

Addendum: 09/18/20 at 1957 by Jordana Forrest RN

-------------------------------------------------------------------------------

HAND-OFF: 

Report given to Brandon LONDON.

## 2020-09-18 NOTE — CARDIOLOGY REPORT
--------------- APPROVED REPORT --------------





EKG Measurement

Heart [id number]

GA [id number]

[id number]

[id number]

[id number]



<Conclusion>

Normal sinus rhythm

Left axis deviation

Cannot rule out Anterior infarct, age undetermined

ST & T wave abnormality, consider lateral ischemia

Abnormal ECG

## 2020-09-18 NOTE — NUR
CASE MANAGEMENT: NOTE 



REFERRAL SENT TO [facility] FOR REVIEW 

-------------------------------------------------------------------------------

Addendum: 09/18/20 at 1509 by Nasra Reid CM

-------------------------------------------------------------------------------

room 33 c provided by galo at [facility]

## 2020-09-18 NOTE — NUR
TRANSFER TO FLOOR:

Patient transferred to [facility], per Dr. Sandoval. Report given to LITA Mendoza on the fourth 
floor.  Belongings and medications given to Kelly including inhaler bedside, wound care 
materials, patient materials that were found in the patient room in tele floor, patient 
belongings list signed. Cardiac monitor box and leads removed, cleaned and given to Mushtaq 
monitor technician. Patient was transferred safely without incident via bed with 3L oxygen 
via nasal cannula, respirations even and unlabored, alert and oriented to baseline (aox1), 
responsive to tactile and verbal stimuli, spO2 92%, respiration rate is 18. Family and or 
S/O informed of transfer.

## 2020-09-18 NOTE — SURGERY PROGRESS NOTE
Surgery Progress Note


Subjective


Additional Comments


labs improved


no n/v/f/c





Objective





Last 24 Hour Vital Signs








  Date Time  Temp Pulse Resp B/P (MAP) Pulse Ox O2 Delivery O2 Flow Rate FiO2


 


9/18/20 09:17    132/73    


 


9/18/20 09:17  60  132/73    


 


9/18/20 09:00      Nasal Cannula 3.0 


 


9/18/20 08:00  53      


 


9/18/20 08:00 97.4 60 20 132/73 (92) 95   


 


9/18/20 04:00 97.1 60 21 134/76 (95) 94   


 


9/18/20 04:00  54      


 


9/18/20 00:00 98.2 77 21 117/64 (81) 96   


 


9/18/20 00:00  56      


 


9/17/20 21:00      Nasal Cannula 3.0 


 


9/17/20 20:00  60      


 


9/17/20 20:00 97.9 62 19 123/63 (83) 95   


 


9/17/20 16:00  61      


 


9/17/20 15:43 97.9 61 20 99/53 (68) 95   


 


9/17/20 12:00  57      


 


9/17/20 11:48 97.2 57 20 109/65 (80) 96   








I&O











Intake and Output  


 


 9/17/20 9/18/20





 19:00 07:00


 


Intake Total 120 ml 311.6 ml


 


Output Total 900 ml 600 ml


 


Balance -780 ml -288.4 ml


 


  


 


Intake Oral 120 ml 


 


IV Total  311.6 ml


 


Output Urine Total 900 ml 600 ml








Dressing:  other


Wound:  other


Cardiovascular:  RSR


Respiratory:  decreased breath sounds


Abdomen:  soft, non-tender, present bowel sounds


Extremities:  no cyanosis





Laboratory Tests








Test


 9/18/20


04:00


 


White Blood Count


 4.2 K/UL


(4.8-10.8)  L


 


Red Blood Count


 5.03 M/UL


(4.70-6.10)


 


Hemoglobin


 14.5 G/DL


(14.2-18.0)


 


Hematocrit


 42.6 %


(42.0-52.0)


 


Mean Corpuscular Volume 85 FL (80-99)  


 


Mean Corpuscular Hemoglobin


 28.9 PG


(27.0-31.0)


 


Mean Corpuscular Hemoglobin


Concent 34.1 G/DL


(32.0-36.0)


 


Red Cell Distribution Width


 12.2 %


(11.6-14.8)


 


Platelet Count


 167 K/UL


(150-450)


 


Mean Platelet Volume


 7.5 FL


(6.5-10.1)


 


Neutrophils (%) (Auto)


 66.7 %


(45.0-75.0)


 


Lymphocytes (%) (Auto)


 22.2 %


(20.0-45.0)


 


Monocytes (%) (Auto)


 8.5 %


(1.0-10.0)


 


Eosinophils (%) (Auto)


 0.0 %


(0.0-3.0)


 


Basophils (%) (Auto)


 2.7 %


(0.0-2.0)  H


 


Sodium Level


 139 MMOL/L


(136-145)


 


Potassium Level


 3.8 MMOL/L


(3.5-5.1)


 


Chloride Level


 105 MMOL/L


()


 


Carbon Dioxide Level


 25 MMOL/L


(21-32)


 


Anion Gap


 9 mmol/L


(5-15)


 


Blood Urea Nitrogen


 20 mg/dL


(7-18)  H


 


Creatinine


 0.8 MG/DL


(0.55-1.30)


 


Estimat Glomerular Filtration


Rate > 60 mL/min


(>60)


 


Glucose Level


 184 MG/DL


()  H


 


Calcium Level


 8.0 MG/DL


(8.5-10.1)  L


 


Total Bilirubin


 0.5 MG/DL


(0.2-1.0)


 


Direct Bilirubin


 0.1 MG/DL


(0.0-0.3)


 


Aspartate Amino Transf


(AST/SGOT) 40 U/L (15-37)


H


 


Alanine Aminotransferase


(ALT/SGPT) 58 U/L (12-78)





 


Alkaline Phosphatase


 86 U/L


()


 


Total Protein


 6.1 G/DL


(6.4-8.2)  L


 


Albumin


 2.0 G/DL


(3.4-5.0)  L


 


Globulin 4.1 g/dL  


 


Albumin/Globulin Ratio


 0.5 (1.0-2.7)


L











Plan


Problems:  


(1) Acute bronchitis


(2) 2019 novel coronavirus disease (COVID-19)


Assessment & Plan:  ++


as per ID


pulm input appreciated 





(3) Gram-negative bacteremia


(4) Osteoporosis


(5) UTI (urinary tract infection)


(6) Altered mental state


(7) Alzheimer's dementia


(8) Sepsis


(9) Acute encephalopathy


(10) Severe sepsis


(11) Aphasia


(12) HTN (hypertension)


(13) BPH (benign prostatic hyperplasia)


(14) History of CVA (cerebrovascular accident)


(15) Multifocal pneumonia


(16) Decubitus skin ulcer


Assessment & Plan:  75-year-old male presented to [facility] covered

positive respiratory fevers admitted further care and management which time 

identified to have multiple decubitus skin ulcers and scrotal edema.  Patient 

identified to have a stage III sacral buttock decubitus ulcer with periwound 

erythema and breakdown.  Incontinence associated dermatitis identified.  Care 

plan initiated after patient evaluation.  Will follow with recommendations.  Kiko

nk you





DAILY ESTIMATED NEEDS:


Needs based on Wound, DM, Cardiac 73kg 


25-30  kcals/kg 


1866-1542  total kcals


1.25-1.5  g protein/kg


  g total protein 


25-30  mL/kg


7010-7559  total fluid mLs





NUTRITION DIAGNOSIS:


* Swallowing difficulty R/T dysphagia as evidenced by SLP eval, SLP rec


for mech soft ground with nectar thick liquids


* Increased kcal/pro/micronutrients needs R/T wound healing as evidenced


by admitted w/ wounds @ sacrum and BL ankles, pending eval.








CURRENT DIET:KERI w/ mech soft ground w/ NTL    








 


PO DIET RECOMMENDATIONS:


KERI + CCHO Med (texture per SLP)   





 





ADDITIONAL RECOMMENDATIONS:


* Calibrated bedscale wt for accurate CBW 


* Monitor PO intake 


      -> add Glucerna BID for now, monitor acceptance 


* Check A1C for eval of glycemic control 


* Wound healing: add MVI x 1, Vit C 500mg QD 


   f/up w/ WC eval 





(17) Scrotal edema


Assessment & Plan:  Patient identified to have significant scrotal edema 

potential cellulitis on admission.  No abscess identified.  Towel placed under 

the scrotum.  We will monitor over the course of the next few days














Tunde Hammer                Sep 18, 2020 11:34

## 2020-09-18 NOTE — NUR
NURSE HAND-OFF REPORT: 



Important Events on Shift:[NA]

Patient Status: [NA]

Diet: [No added salt Mechanical soft fine chop, nectar thick]



Pending Orders: [NA]

Pending Results/Labs:[NA]

Pending MD notification:[NA]



Latest Vital Signs: Temperature 97.1 , Pulse 54 , B/P 134 /76 , Respiratory Rate 21 , O2 SAT 
94 , Nasal Cannula, O2 Flow Rate 3.0 .  

Vital Sign Comment: [NA]



EKG Rhythm: Sinus Bradycardia

Rhythm change?: Y 

MD Notified?: N -

MD Response: 



Latest Mejia Fall Score: 50  

Fall Risk: High Risk 

Safety Measures: Call light Within Reach, Bed Alarm Zone 1, Side Rails Side Rails x3, Bed 
position Low and Locked.

Fall Precautions: 

Yellow Socks

Yellow Gown

Door Sign

Patient Fall Education



Report given to [NA].

## 2020-09-18 NOTE — INTERNAL MED PROGRESS NOTE
Subjective


Physician Name


Andrei Cancino


Attending Physician


Andrei Cancino MD





Current Medications








 Medications


  (Trade)  Dose


 Ordered  Sig/Miky


 Route


 PRN Reason  Start Time


 Stop Time Status Last Admin


Dose Admin


 


 Acetaminophen


  (Tylenol)  650 mg  Q4H  PRN


 ORAL


 FEVER  9/9/20 17:00


 10/9/20 16:59   





 


 Acetaminophen


  (Tylenol)  650 mg  Q4H  PRN


 RECTAL


 Mild Pain (Pain Scale 1-3)  9/13/20 11:30


 10/13/20 11:29  9/13/20 23:51





 


 Albuterol/


 Ipratropium


  (Combivent


 Respimat)  1 puff  Q4H  PRN


 INH


 Shortness of Breath  9/9/20 17:15


 10/9/20 17:14   





 


 Amlodipine


 Besylate


  (Norvasc)  5 mg  DAILY


 ORAL


   9/10/20 09:00


 10/10/20 08:59  9/18/20 09:17





 


 Ascorbic Acid


  (Vitamin C)  500 mg  DAILY


 ORAL


   9/12/20 09:00


 10/12/20 08:59  9/18/20 09:17





 


 Dexamethasone


  (Decadron)  6 mg  DAILY


 ORAL


   9/12/20 09:00


 9/21/20 08:59  9/18/20 09:17





 


 Dextrose


  (Dextrose 50%)  25 ml  Q30M  PRN


 IV


 Hypoglycemia  9/9/20 17:00


 12/8/20 16:59   





 


 Dextrose


  (Dextrose 50%)  50 ml  Q30M  PRN


 IV


 Hypoglycemia  9/9/20 17:00


 12/8/20 16:59   





 


 Dextrose/Sodium


 Chloride  1,000 ml @ 


 50 mls/hr  Q20H


 IV


   9/12/20 19:45


 10/12/20 19:44  9/18/20 15:12





 


 Donepezil HCl


  (Aricept)  5 mg  DAILY


 ORAL


   9/10/20 09:00


 10/10/20 08:59  9/18/20 09:17





 


 Heparin Sodium


  (Porcine)


  (Heparin 5000


 units/ml)  5,000 units  EVERY 12  HOURS


 SUBQ


   9/9/20 21:00


 10/24/20 20:59  9/18/20 09:18





 


 Lisinopril


  (ZestriL)  5 mg  DAILY


 ORAL


   9/10/20 09:00


 10/10/20 08:59  9/18/20 09:17





 


 Multivitamins


  (Multivitamins)  1 tab  DAILY


 ORAL


   9/12/20 09:00


 10/12/20 08:59  9/18/20 09:16





 


 Ondansetron HCl


  (Zofran)  4 mg  Q6H  PRN


 IVP


 Nausea & Vomiting  9/9/20 17:00


 10/9/20 16:59   





 


 Polyethylene


 Glycol


  (Miralax)  17 gm  DAILYPRN  PRN


 ORAL


 Constipation  9/9/20 17:00


 10/9/20 16:59   





 


 Promethazine HCl/


 Codeine


  (Phenergan with


 Codeine)  5 ml  Q6H  PRN


 ORAL


 cough  9/9/20 17:00


 10/9/20 16:59   





 


 Remdesivir 100 mg/


 Sodium Chloride  250 ml @ 


 250 mls/hr  Q24H


 IV


   9/15/20 18:00


 9/18/20 18:59  9/17/20 17:56





 


 Tamsulosin HCl


  (Flomax)  0.4 mg  DAILY


 ORAL


   9/10/20 09:00


 10/10/20 08:59  9/18/20 09:17











Allergies:  


Coded Allergies:  


     No Known Allergies (Unverified , 8/20/12)


Subjective


In Telemetry Unit, awake,more responsive, less shortness of breath, poor oral 

intake, in [place] [id number] Isolation room. WBC: 4.2.





Objective





Last Vital Signs








  Date Time  Temp Pulse Resp B/P (MAP) Pulse Ox O2 Delivery O2 Flow Rate FiO2


 


9/18/20 16:00  64      


 


9/18/20 16:00 97.6  20 131/80 (97) 97   


 


9/18/20 09:00      Nasal Cannula 3.0 


 


9/13/20 20:09        28











Laboratory Tests








Test


 9/18/20


04:00


 


White Blood Count


 4.2 K/UL


(4.8-10.8)  L


 


Red Blood Count


 5.03 M/UL


(4.70-6.10)


 


Hemoglobin


 14.5 G/DL


(14.2-18.0)


 


Hematocrit


 42.6 %


(42.0-52.0)


 


Mean Corpuscular Volume 85 FL (80-99)  


 


Mean Corpuscular Hemoglobin


 28.9 PG


(27.0-31.0)


 


Mean Corpuscular Hemoglobin


Concent 34.1 G/DL


(32.0-36.0)


 


Red Cell Distribution Width


 12.2 %


(11.6-14.8)


 


Platelet Count


 167 K/UL


(150-450)


 


Mean Platelet Volume


 7.5 FL


(6.5-10.1)


 


Neutrophils (%) (Auto)


 66.7 %


(45.0-75.0)


 


Lymphocytes (%) (Auto)


 22.2 %


(20.0-45.0)


 


Monocytes (%) (Auto)


 8.5 %


(1.0-10.0)


 


Eosinophils (%) (Auto)


 0.0 %


(0.0-3.0)


 


Basophils (%) (Auto)


 2.7 %


(0.0-2.0)  H


 


Sodium Level


 139 MMOL/L


(136-145)


 


Potassium Level


 3.8 MMOL/L


(3.5-5.1)


 


Chloride Level


 105 MMOL/L


()


 


Carbon Dioxide Level


 25 MMOL/L


(21-32)


 


Anion Gap


 9 mmol/L


(5-15)


 


Blood Urea Nitrogen


 20 mg/dL


(7-18)  H


 


Creatinine


 0.8 MG/DL


(0.55-1.30)


 


Estimat Glomerular Filtration


Rate > 60 mL/min


(>60)


 


Glucose Level


 184 MG/DL


()  H


 


Calcium Level


 8.0 MG/DL


(8.5-10.1)  L


 


Total Bilirubin


 0.5 MG/DL


(0.2-1.0)


 


Direct Bilirubin


 0.1 MG/DL


(0.0-0.3)


 


Aspartate Amino Transf


(AST/SGOT) 40 U/L (15-37)


H


 


Alanine Aminotransferase


(ALT/SGPT) 58 U/L (12-78)





 


Alkaline Phosphatase


 86 U/L


()


 


Total Protein


 6.1 G/DL


(6.4-8.2)  L


 


Albumin


 2.0 G/DL


(3.4-5.0)  L


 


Globulin 4.1 g/dL  


 


Albumin/Globulin Ratio


 0.5 (1.0-2.7)


L

















Intake and Output  


 


 9/17/20 9/18/20





 19:00 07:00


 


Intake Total 120 ml 311.6 ml


 


Output Total 900 ml 600 ml


 


Balance -780 ml -288.4 ml


 


  


 


Intake Oral 120 ml 


 


IV Total  311.6 ml


 


Output Urine Total 900 ml 600 ml








Objective


General: No acute distress, awake and responsive with open eyes, unable to 

follow command.


HEENT: NCAT, sclera anicteric, PERRL.


Neck: Supple, no significant jugular venous distention, 


Lungs: fair respiratory effort, decrease breath sound at bases, no coarse breath

sound, no Wheeze or Rales.


Heart: Regular rate and rhythm, normal S1/S2, no murmurs, distant heart sounds.


Abdomen: soft, nontender, nondistended. Normoactive bowel sounds, obesity.


: Cummings cath


Extremities: No cyanosis , clubbing or edema. 


Neuro:  awake, responsive with open his eyes, cannot follow commands, 

contractures with rigidity in extremities.


Skin: warm, no rash.





Assessment/Plan


Assessment/Plan


ASSESSMENT:  This is a 75-year-old  male.





1. Fever.


2. Acute hypoxemic  respiratory failure.


3. COVID-19 Pneumonia.


4. Congestive heart failure.


5. Hypertension.


6. Benign prostatic hypertrophy.


7. Osteoporosis.


8. Alzheimer dementia.


9. Ventriculomegaly.


10. Diabetes type 2.


11. Cerebrovascular disease.


12. Chronic renal failure.


13. Dysphagia.


14. Hypercholesterolemia.


15. Gastroesophageal reflux disease.





TREATMENT:


1. Fever/dyspnea/COVID-19 positive.  An infectious disease consultation


has been obtained with Dr. Garcia.  A pulmonary consultation has been


obtained with Dr. Michael Sandoval.  


bacterial pneumonia.  The patient has been started on Decadron.  We will


follow recommendation of Infectious Disease and Pulmonary.


2. Hypertension.  Continue amlodipine as above.


3. Benign prostatic hypertrophy.  Continue Flomax as above.


4. Osteoporosis.  


5. Alzheimer dementia.  Continue Aricept as above.


6. Ventriculomegaly.


7. Diabetes type 2.


8. Cerebrovascular disease, status post cerebrovascular accident.


9. Chronic renal failure.


10. Dysphagia.


11. Hypercholesterolemia.  Continue atorvastatin as above.


12. Gastroesophageal reflux disease.





CODE STATUS: Full code


DVT prophylaxis: Heparin subcu.


DC Telemetry, transfer to medical Unit


Monitor blood glucose level closely


-Decadron #9/10


-Requested Remdesivir #5/5


   -9/15 SP Ceftriaxone #7


   -9/14 SP Azithromcyin #6











Andrei Cancino MD                 Sep 18, 2020 18:12

## 2020-09-18 NOTE — NUR
NURSE NOTES:

Received patient in bed asleep. O2 via NC intact. No SOB or acute distress. IV line intact. 
Wound dressing intact. SCD's in place. HOB elevated. Bed locked in low position. Call light 
within reach. Will continue plan of care.

## 2020-09-18 NOTE — NUR
CASE MANAGEMENT: REVIEW 



9/18/2020



SI:COVID. PRESSURE ULCERS.

VS: T 97.4 HR 60 RR 20 B/P 132/73 SATS 95% ON 3L/NC 

LABS: WBC 4.2 BUN 20  CA 8 AST 40 



IS:DEXTROSE/NS @ 50 ML/HR 

NORVASC PO QD

ARICEPT PO QD

LISINOPRIL PO QD

DECADRON PO QD 

REMDESIVIR IV Q24H 



***TELE*** 



DCP: SCAR MARCANO 



PLAN OF CARE: 

titrate fio2 to sat of 92%

Scrotal edema>> monitor over the course of the next few days

Continue azithromycin and ceftriaxone for probable underlying

bacterial pneumonia.  Continue Remdesivir and Decadron.

## 2020-09-19 VITALS — SYSTOLIC BLOOD PRESSURE: 120 MMHG | DIASTOLIC BLOOD PRESSURE: 84 MMHG

## 2020-09-19 VITALS — SYSTOLIC BLOOD PRESSURE: 125 MMHG | DIASTOLIC BLOOD PRESSURE: 68 MMHG

## 2020-09-19 VITALS — DIASTOLIC BLOOD PRESSURE: 81 MMHG | SYSTOLIC BLOOD PRESSURE: 120 MMHG

## 2020-09-19 VITALS — DIASTOLIC BLOOD PRESSURE: 69 MMHG | SYSTOLIC BLOOD PRESSURE: 131 MMHG

## 2020-09-19 VITALS — SYSTOLIC BLOOD PRESSURE: 137 MMHG | DIASTOLIC BLOOD PRESSURE: 79 MMHG

## 2020-09-19 VITALS — SYSTOLIC BLOOD PRESSURE: 119 MMHG | DIASTOLIC BLOOD PRESSURE: 83 MMHG

## 2020-09-19 LAB
ADD MANUAL DIFF: NO
ALBUMIN SERPL-MCNC: 2.2 G/DL (ref 3.4–5)
ALBUMIN/GLOB SERPL: 0.5 {RATIO} (ref 1–2.7)
ALP SERPL-CCNC: 40 U/L (ref 46–116)
ALT SERPL-CCNC: 63 U/L (ref 12–78)
ANION GAP SERPL CALC-SCNC: 10 MMOL/L (ref 5–15)
AST SERPL-CCNC: 36 U/L (ref 15–37)
BASOPHILS NFR BLD AUTO: 1 % (ref 0–2)
BILIRUB SERPL-MCNC: 0.6 MG/DL (ref 0.2–1)
BUN SERPL-MCNC: 19 MG/DL (ref 7–18)
CALCIUM SERPL-MCNC: 8.3 MG/DL (ref 8.5–10.1)
CHLORIDE SERPL-SCNC: 105 MMOL/L (ref 98–107)
CO2 SERPL-SCNC: 26 MMOL/L (ref 21–32)
CREAT SERPL-MCNC: 0.9 MG/DL (ref 0.55–1.3)
EOSINOPHIL NFR BLD AUTO: 0.1 % (ref 0–3)
ERYTHROCYTE [DISTWIDTH] IN BLOOD BY AUTOMATED COUNT: 12 % (ref 11.6–14.8)
GLOBULIN SER-MCNC: 4.2 G/DL
HCT VFR BLD CALC: 45.3 % (ref 42–52)
HGB BLD-MCNC: 15.2 G/DL (ref 14.2–18)
LYMPHOCYTES NFR BLD AUTO: 14.8 % (ref 20–45)
MCV RBC AUTO: 86 FL (ref 80–99)
MONOCYTES NFR BLD AUTO: 9.4 % (ref 1–10)
NEUTROPHILS NFR BLD AUTO: 74.7 % (ref 45–75)
PLATELET # BLD: 206 K/UL (ref 150–450)
POTASSIUM SERPL-SCNC: 4.1 MMOL/L (ref 3.5–5.1)
RBC # BLD AUTO: 5.28 M/UL (ref 4.7–6.1)
SODIUM SERPL-SCNC: 141 MMOL/L (ref 136–145)
WBC # BLD AUTO: 6.2 K/UL (ref 4.8–10.8)

## 2020-09-19 RX ADMIN — HEPARIN SODIUM SCH UNITS: 5000 INJECTION INTRAVENOUS; SUBCUTANEOUS at 22:47

## 2020-09-19 RX ADMIN — TAMSULOSIN HYDROCHLORIDE SCH MG: 0.4 CAPSULE ORAL at 09:06

## 2020-09-19 RX ADMIN — Medication SCH MG: at 09:06

## 2020-09-19 RX ADMIN — DEXTROSE AND SODIUM CHLORIDE SCH MLS/HR: 5; .45 INJECTION, SOLUTION INTRAVENOUS at 11:47

## 2020-09-19 RX ADMIN — HEPARIN SODIUM SCH UNITS: 5000 INJECTION INTRAVENOUS; SUBCUTANEOUS at 09:09

## 2020-09-19 RX ADMIN — DONEPEZIL HYDROCHLORIDE SCH MG: 5 TABLET, FILM COATED ORAL at 09:06

## 2020-09-19 RX ADMIN — LISINOPRIL SCH MG: 2.5 TABLET ORAL at 09:06

## 2020-09-19 RX ADMIN — HEPARIN SODIUM SCH UNITS: 5000 INJECTION INTRAVENOUS; SUBCUTANEOUS at 16:24

## 2020-09-19 NOTE — SURGERY PROGRESS NOTE
Surgery Progress Note


Subjective


Additional Comments


afebrile


HD stable


labs okay


no n/v





Objective





Last 24 Hour Vital Signs








  Date Time  Temp Pulse Resp B/P (MAP) Pulse Ox O2 Delivery O2 Flow Rate FiO2


 


9/19/20 12:00 98.9 83 19 119/83 (95) 96   


 


9/19/20 09:06    120/84    


 


9/19/20 09:06  66  120/84    


 


9/19/20 09:00      Nasal Cannula 3.0 


 


9/19/20 08:00 98.5 66 20 120/84 (96) 94   


 


9/19/20 04:00 97.3 66 20 131/69 (89) 99   


 


9/19/20 00:00 97.6 62 20 125/68 (87) 96   


 


9/18/20 21:00      Nasal Cannula 3.0 


 


9/18/20 20:20 97.3 66 20 134/76 (95) 97   


 


9/18/20 16:00  64      


 


9/18/20 16:00 97.6 68 20 131/80 (97) 97   








I&O











Intake and Output  


 


 9/18/20 9/19/20





 19:00 07:00


 


Intake Total 600 ml 670 ml


 


Output Total 800 ml 700 ml


 


Balance -200 ml -30 ml


 


  


 


Intake Oral 600 ml 120 ml


 


IV Total  550 ml


 


Output Urine Total 800 ml 700 ml


 


# Bowel Movements 1 








Dressing:  other


Wound:  other


Cardiovascular:  RSR


Respiratory:  decreased breath sounds


Abdomen:  soft, present bowel sounds


Extremities:  no tenderness, no cyanosis





Laboratory Tests








Test


 9/19/20


04:40


 


White Blood Count


 6.2 K/UL


(4.8-10.8)


 


Red Blood Count


 5.28 M/UL


(4.70-6.10)


 


Hemoglobin


 15.2 G/DL


(14.2-18.0)


 


Hematocrit


 45.3 %


(42.0-52.0)


 


Mean Corpuscular Volume 86 FL (80-99)  


 


Mean Corpuscular Hemoglobin


 28.7 PG


(27.0-31.0)


 


Mean Corpuscular Hemoglobin


Concent 33.5 G/DL


(32.0-36.0)


 


Red Cell Distribution Width


 12.0 %


(11.6-14.8)


 


Platelet Count


 206 K/UL


(150-450)


 


Mean Platelet Volume


 7.8 FL


(6.5-10.1)


 


Neutrophils (%) (Auto)


 74.7 %


(45.0-75.0)


 


Lymphocytes (%) (Auto)


 14.8 %


(20.0-45.0)  L


 


Monocytes (%) (Auto)


 9.4 %


(1.0-10.0)


 


Eosinophils (%) (Auto)


 0.1 %


(0.0-3.0)


 


Basophils (%) (Auto)


 1.0 %


(0.0-2.0)


 


Sodium Level


 141 MMOL/L


(136-145)


 


Potassium Level


 4.1 MMOL/L


(3.5-5.1)


 


Chloride Level


 105 MMOL/L


()


 


Carbon Dioxide Level


 26 MMOL/L


(21-32)


 


Anion Gap


 10 mmol/L


(5-15)


 


Blood Urea Nitrogen


 19 mg/dL


(7-18)  H


 


Creatinine


 0.9 MG/DL


(0.55-1.30)


 


Estimat Glomerular Filtration


Rate > 60 mL/min


(>60)


 


Glucose Level


 170 MG/DL


()  H


 


Calcium Level


 8.3 MG/DL


(8.5-10.1)  L


 


Total Bilirubin


 0.6 MG/DL


(0.2-1.0)


 


Direct Bilirubin


 0.2 MG/DL


(0.0-0.3)


 


Aspartate Amino Transf


(AST/SGOT) 36 U/L (15-37)





 


Alanine Aminotransferase


(ALT/SGPT) 63 U/L (12-78)





 


Alkaline Phosphatase


 40 U/L


()  L


 


Total Protein


 6.4 G/DL


(6.4-8.2)


 


Albumin


 2.2 G/DL


(3.4-5.0)  L


 


Globulin 4.2 g/dL  


 


Albumin/Globulin Ratio


 0.5 (1.0-2.7)


L











Plan


Problems:  


(1) Acute bronchitis


(2) 2019 novel coronavirus disease (COVID-19)


Assessment & Plan:  ++


as per ID


pulm input appreciated 





(3) Gram-negative bacteremia


(4) Osteoporosis


(5) UTI (urinary tract infection)


(6) Altered mental state


(7) Alzheimer's dementia


(8) Sepsis


(9) Acute encephalopathy


(10) Severe sepsis


(11) Aphasia


(12) HTN (hypertension)


(13) BPH (benign prostatic hyperplasia)


(14) History of CVA (cerebrovascular accident)


(15) Multifocal pneumonia


(16) Decubitus skin ulcer


Assessment & Plan:  75-year-old male presented to [facility] covered

positive respiratory fevers admitted further care and management which time 

identified to have multiple decubitus skin ulcers and scrotal edema.  Patient 

identified to have a stage III sacral buttock decubitus ulcer with periwound 

erythema and breakdown.  Incontinence associated dermatitis identified.  Care 

plan initiated after patient evaluation.  Will follow with recommendations.  

Thank you





DAILY ESTIMATED NEEDS:


Needs based on Wound, DM, Cardiac 73kg 


25-30  kcals/kg 


0652-4400  total kcals


1.25-1.5  g protein/kg


  g total protein 


25-30  mL/kg


5353-6048  total fluid mLs





NUTRITION DIAGNOSIS:


* Swallowing difficulty R/T dysphagia as evidenced by SLP eval, SLP rec


for mech soft ground with nectar thick liquids


* Increased kcal/pro/micronutrients needs R/T wound healing as evidenced


by admitted w/ wounds @ sacrum and BL ankles, pending eval.








CURRENT DIET:KERI w/ mech soft ground w/ NTL    








 


PO DIET RECOMMENDATIONS:


KERI + CCHO Med (texture per SLP)   





 





ADDITIONAL RECOMMENDATIONS:


* Calibrated bedscale wt for accurate CBW 


* Monitor PO intake 


      -> add Glucerna BID for now, monitor acceptance 


* Check A1C for eval of glycemic control 


* Wound healing: add MVI x 1, Vit C 500mg QD 


   f/up w/ WC eval 





(17) Scrotal edema


Assessment & Plan:  Patient identified to have significant scrotal edema 

potential cellulitis on admission.  No abscess identified.  Towel placed under 

the scrotum.  We will monitor over the course of the next few days














Tunde Hammer                Sep 19, 2020 14:58

## 2020-09-19 NOTE — NUR
NURSE HAND-OFF: 



Important Events on Shift:[DISCHARGE PLAN [facility]]

Patient Status: [STABLE]

Diet: [KERI, MS GROUND, NECTAR, THICK LIQUID, GLUCERNA TID]



Pending Orders: [AML]

Pending Results/Labs:[]

Pending MD notification:[N/A]



Latest Vital Signs: Temperature 97.3 , Pulse 66 , B/P 131 /69 , Respiratory Rate 20 , O2 SAT 
99 , Nasal Cannula, O2 Flow Rate 3.0 .  

Vital Sign Comment: [AFEBRILE]



Latest Mejia Fall Score: 50  

Fall Risk: High Risk 

Safety Measures: Call light Within Reach, Bed Alarm Zone 1, Side Rails Side Rails x3, Bed 
position Low and Locked.

Fall Precautions: 

Yellow Socks

Yellow Gown

Door Sign

Patient Fall Education



Report given to [LITA AYALA].

## 2020-09-19 NOTE — NUR
RD ASSESSMENT & RECOMMENDATIONS

SEE CARE ACTIVITY FOR COMPLETE ASSESSMENT



DAILY ESTIMATED NEEDS:

Needs based on Wound, DM, Cardiac 73kg 

25-30  kcals/kg 

3239-0494  total kcals

1.25-1.5  g protein/kg

  g total protein 

25-30  mL/kg

6659-3943  total fluid mLs



NUTRITION DIAGNOSIS:

* Swallowing difficulty R/T dysphagia as evidenced by SLP eval, SLP rec

for mech soft ground with nectar thick liquids

* Increased kcal/pro/micronutrients needs R/T wound healing as evidenced

by admitted w/ wounds @ sacrum (stage 3 per MD) and BL ankles.





CURRENT DIET:KERI w/ mech soft ground w/ NTL    



 



PO DIET RECOMMENDATIONS:

Liberalized REGULAR w/ poor PO -> add CCHO MED +LOW NA w/ >50% po intake   



 





ADDITIONAL RECOMMENDATIONS:

* Calibrated bedscale wt for accurate CBW 

* Cont w/ Glucerna TID w/ meals 

* Consider appetite stimulant w/ continued poor PO 

* Check A1C for eval of glycemic control 

-> monitor BGs, need for NISS: elev BGs, on Decadron 

* Wound healing: continue MVI x 1, Vit C 500mg QD, MARIELA BID  

  f/up w/ WC eval-> stage 3 sacral wound per MD

## 2020-09-19 NOTE — NUR
*-*DISCHARGE PLANNED*-*



PATIENT HAS BEEN ACCEPTED AND WILL BE DISCHARGE BACK TO:



[facility]

 P: [phone] FOR NURSE TO NURSE REPORT

ROOM# 33.C SKILLED

[facility] SET FOR 1400PM/ 1PM 9/20/2020 [facility] 

PLACED A CALL TO PATIENT DAUGHTER NAV NIELSON, WHO IS IN AGREEMENT WITH DISCHARGE

## 2020-09-19 NOTE — NUR
NURSE NOTES:

Patient's right hand intravenous access noted removed. Inserted new 24g IV on patient's left 
hand. Asymptomatic, patent, and intact. Patient explained procedure prior and during the 
process. Patient tolerated well.

## 2020-09-19 NOTE — NUR
NURSE NOTES:

Patient awake and aphasic. Primary language noted as Y[place]ut. Breathing unlabored on 3L O2 
via nasal cannula. No signs of pain or discomfort noted at this time. Intravenous access 
noted on right hand. Condom cath in place for incontinency and as a prevention of further 
skin breakdown. Bed placed at the lowest with alarm, brake, and siderails up x3 for patient 
safety. Call light placed within reach. Will continue to monitor.

## 2020-09-19 NOTE — NUR
*-*DISCHARGE PLANNED*-*



PATIENT HAS BEEN ACCEPTED AND WILL BE DISCHARGE BACK TO:



[facility]

 P: [phone] FOR NURSE TO NURSE REPORT

ROOM# 33.C SKILLED

[facility] SET FOR 8:30PM S/[facility] 

PLACED A CALL TO PATIENT DAUGHTER NAV NIELSON, WHO IS IN AGREEMENT WITH DISCHARGE PLAN.

## 2020-09-19 NOTE — PULMONOLOGY PROGRESS NOTE
Subjective


ROS Limited/Unobtainable:  Yes


Constitutional:  Reports: no symptoms


HEENT:  Repors: no symptoms


Respiratory:  Reports: no symptoms


Allergies:  


Coded Allergies:  


     No Known Allergies (Unverified , 8/20/12)





Objective





Last 24 Hour Vital Signs








  Date Time  Temp Pulse Resp B/P (MAP) Pulse Ox O2 Delivery O2 Flow Rate FiO2


 


9/19/20 09:06    120/84    


 


9/19/20 09:06  66  120/84    


 


9/19/20 04:00 97.3 66 20 131/69 (89) 99   


 


9/19/20 00:00 97.6 62 20 125/68 (87) 96   


 


9/18/20 21:00      Nasal Cannula 3.0 


 


9/18/20 20:20 97.3 66 20 134/76 (95) 97   


 


9/18/20 16:00  64      


 


9/18/20 16:00 97.6 68 20 131/80 (97) 97   


 


9/18/20 12:00 97.8 64 20 137/79 (98) 96   


 


9/18/20 12:00  59      

















Intake and Output  


 


 9/18/20 9/19/20





 19:00 07:00


 


Intake Total 600 ml 670 ml


 


Output Total 800 ml 700 ml


 


Balance -200 ml -30 ml


 


  


 


Intake Oral 600 ml 120 ml


 


IV Total  550 ml


 


Output Urine Total 800 ml 700 ml


 


# Bowel Movements 1 








General Appearance:  WD/WN


HEENT:  normocephalic, atraumatic


Respiratory:  chest wall non-tender, lungs clear, respiratory distress, 

decreased breath sounds


Cardiovascular:  normal peripheral pulses, normal rate


Abdomen:  normal bowel sounds, soft, non tender


Genitourinary:  normal external genitalia


Extremities:  no cyanosis


Neurologic:  CNs II-XII grossly normal


Lymphatic:  no neck adenopathy


Laboratory Tests


9/19/20 04:40: 


White Blood Count 6.2, Red Blood Count 5.28, Hemoglobin 15.2, Hematocrit 45.3, 

Mean Corpuscular Volume 86, Mean Corpuscular Hemoglobin 28.7, Mean Corpuscular 

Hemoglobin Concent 33.5, Red Cell Distribution Width 12.0, Platelet Count 206, 

Mean Platelet Volume 7.8, Neutrophils (%) (Auto) 74.7, Lymphocytes (%) (Auto) 

14.8L, Monocytes (%) (Auto) 9.4, Eosinophils (%) (Auto) 0.1, Basophils (%) 

(Auto) 1.0, Sodium Level 141, Potassium Level 4.1, Chloride Level 105, Carbon 

Dioxide Level 26, Anion Gap 10, Blood Urea Nitrogen 19H, Creatinine 0.9, Estimat

Glomerular Filtration Rate > 60, Glucose Level 170H, Calcium Level 8.3L, Total 

Bilirubin 0.6, Direct Bilirubin 0.2, Aspartate Amino Transf (AST/SGOT) 36, 

Alanine Aminotransferase (ALT/SGPT) 63, Alkaline Phosphatase 40L, Total Protein 

6.4, Albumin 2.2L, Globulin 4.2, Albumin/Globulin Ratio 0.5L





Current Medications








 Medications


  (Trade)  Dose


 Ordered  Sig/Miky


 Route


 PRN Reason  Start Time


 Stop Time Status Last Admin


Dose Admin


 


 Acetaminophen


  (Tylenol)  650 mg  Q4H  PRN


 ORAL


 FEVER  9/9/20 17:00


 10/9/20 16:59   





 


 Acetaminophen


  (Tylenol)  650 mg  Q4H  PRN


 RECTAL


 Mild Pain (Pain Scale 1-3)  9/13/20 11:30


 10/13/20 11:29  9/13/20 23:51





 


 Albuterol/


 Ipratropium


  (Combivent


 Respimat)  1 puff  Q4H  PRN


 INH


 Shortness of Breath  9/9/20 17:15


 10/9/20 17:14   





 


 Amlodipine


 Besylate


  (Norvasc)  5 mg  DAILY


 ORAL


   9/10/20 09:00


 10/10/20 08:59  9/19/20 09:06





 


 Ascorbic Acid


  (Vitamin C)  500 mg  DAILY


 ORAL


   9/12/20 09:00


 10/12/20 08:59  9/19/20 09:06





 


 Dexamethasone


  (Decadron)  6 mg  DAILY


 ORAL


   9/12/20 09:00


 9/21/20 08:59  9/19/20 09:06





 


 Dextrose


  (Dextrose 50%)  25 ml  Q30M  PRN


 IV


 Hypoglycemia  9/9/20 17:00


 12/8/20 16:59   





 


 Dextrose


  (Dextrose 50%)  50 ml  Q30M  PRN


 IV


 Hypoglycemia  9/9/20 17:00


 12/8/20 16:59   





 


 Dextrose/Sodium


 Chloride  1,000 ml @ 


 50 mls/hr  Q20H


 IV


   9/12/20 19:45


 10/12/20 19:44  9/18/20 15:12





 


 Donepezil HCl


  (Aricept)  5 mg  DAILY


 ORAL


   9/10/20 09:00


 10/10/20 08:59  9/19/20 09:06





 


 Heparin Sodium


  (Porcine)


  (Heparin 5000


 units/ml)  5,000 units  EVERY 8  HOURS


 SUBQ


   9/19/20 09:00


 11/3/20 08:59  9/19/20 09:09





 


 Lisinopril


  (ZestriL)  5 mg  DAILY


 ORAL


   9/10/20 09:00


 10/10/20 08:59  9/19/20 09:06





 


 Multivitamins


  (Multivitamins)  1 tab  DAILY


 ORAL


   9/12/20 09:00


 10/12/20 08:59  9/19/20 09:06





 


 Ondansetron HCl


  (Zofran)  4 mg  Q6H  PRN


 IVP


 Nausea & Vomiting  9/9/20 17:00


 10/9/20 16:59   





 


 Polyethylene


 Glycol


  (Miralax)  17 gm  DAILYPRN  PRN


 ORAL


 Constipation  9/9/20 17:00


 10/9/20 16:59   





 


 Promethazine HCl/


 Codeine


  (Phenergan with


 Codeine)  5 ml  Q6H  PRN


 ORAL


 cough  9/9/20 17:00


 10/9/20 16:59   





 


 Tamsulosin HCl


  (Flomax)  0.4 mg  DAILY


 ORAL


   9/10/20 09:00


 10/10/20 08:59  9/19/20 09:06














Assessment/Plan


Problems:  


(1) 2019 novel coronavirus disease (COVID-19)


(2) Acute bronchitis


(3) History of CVA (cerebrovascular accident)


(4) BPH (benign prostatic hyperplasia)


(5) HTN (hypertension)


(6) Alzheimer's dementia


Assessment/Plan


afebrile


looks comfortable


respiratory isolation


check sputum, still pending





f/u electrolytes


titrate fio2 to sat of 92%


f/u inflammatory markers, still elevated


monitor BP


dvt prophylaxis











Michael Sandoval MD              Sep 19, 2020 09:37

## 2020-09-19 NOTE — NUR
NURSE HAND-OFF: 



Important Events on Shift:for discharge back to [facility] 9/20/20

Patient Status: stable

Diet: No added NA, MECH SOFT, nectar thick liquids



Pending Orders: n/a

Pending Results/Labs:n/a

Pending MD notification:n/a



Latest Vital Signs: Temperature 98.0 , Pulse 89 , B/P 120 /81 , Respiratory Rate 19 , O2 SAT 
97 , Nasal Cannula, O2 Flow Rate 3.0 .  

Vital Sign Comment: stable



Latest Mejia Fall Score: 50  

Fall Risk: High Risk 

Safety Measures: Call light Within Reach, Bed Alarm Zone 1, Side Rails Side Rails x3, Bed 
position Low and Locked.

Fall Precautions: 

Yellow Socks

Yellow Gown

Door Sign

Patient Fall Education



Report given to LITA Chilel.

## 2020-09-19 NOTE — NUR
*-*DISCHARGE PLANNED*-*



PATIENT HAS BEEN ACCEPTED AND WILL BE DISCHARGE BACK TO:



[facility]

 P: [phone] FOR NURSE TO NURSE REPORT

ROOM# 33.C SKILLED

[facility] SET FOR 1400PM/ 1PM 9/20/2020 [facility]  ~~ DUE TO 
STAFFING CHALLENGES AT [facility]

## 2020-09-19 NOTE — INFECTIOUS DISEASES PROG NOTE
Assessment/Plan


Assessment:





COVID19 PNeumonia -SP 2l NC > now at RA > 2l NC> 3L


  -9/12 CXR: Left lower lung/retrocardiac opacity which is similar in appearance

to the previous study from September 11, 2020.  


  -9/9 CXR: Bibasilar atelectasis. No acute process otherwise


       rapid COVID PCR +





Low grade fever; SP


No leukocytosis> Leukopenia, SP


   -u/a neg, ucx neg





CONS bacteremia- likely contaminant


  -9/9 Bcx 1/4 CONS; 9/12 Bcx Neg





CAD


CVA


COPD


osteoporosis


 R shoulder surgery


 CKD


DM2


 dysphagia


GERD


aphasia


HTN


 HLD


 BPH


 Dementia


[facility] resident ([facility]) 





Plan:


-Decadron # 1-/10


-   9/18Remdesivir #5/5


   -9/15 SP Ceftriaxone #7


   -9/14 SP Azithromcyin #6





-f/u cx


-Monitor CBC/CMP, temperatures


-COVID19 isolation 


-discharge planning





Thank you for consulting [facility]. Will continue to follow along with 

you. 


Discussed with RN and Dr Sandoval





Subjective


Allergies:  


Coded Allergies:  


     No Known Allergies (Unverified , 8/20/12)


comfortable





Objective





Last 24 Hour Vital Signs








  Date Time  Temp Pulse Resp B/P (MAP) Pulse Ox O2 Delivery O2 Flow Rate FiO2


 


9/19/20 12:00 98.9 83 19 119/83 (95) 96   


 


9/19/20 09:06    120/84    


 


9/19/20 09:06  66  120/84    


 


9/19/20 09:00      Nasal Cannula 3.0 


 


9/19/20 08:00 98.5 66 20 120/84 (96) 94   


 


9/19/20 04:00 97.3 66 20 131/69 (89) 99   


 


9/19/20 00:00 97.6 62 20 125/68 (87) 96   


 


9/18/20 21:00      Nasal Cannula 3.0 


 


9/18/20 20:20 97.3 66 20 134/76 (95) 97   


 


9/18/20 16:00  64      


 


9/18/20 16:00 97.6 68 20 131/80 (97) 97   








Height (Feet):  5


Height (Inches):  5.00


Weight (Pounds):  159


HEENT:  atraumatic


Respiratory/Chest:  normal breath sounds


Cardiovascular:  regularly irregular


Abdomen:  soft, non tender





Laboratory Tests








Test


 9/19/20


04:40


 


White Blood Count


 6.2 K/UL


(4.8-10.8)


 


Red Blood Count


 5.28 M/UL


(4.70-6.10)


 


Hemoglobin


 15.2 G/DL


(14.2-18.0)


 


Hematocrit


 45.3 %


(42.0-52.0)


 


Mean Corpuscular Volume 86 FL (80-99)  


 


Mean Corpuscular Hemoglobin


 28.7 PG


(27.0-31.0)


 


Mean Corpuscular Hemoglobin


Concent 33.5 G/DL


(32.0-36.0)


 


Red Cell Distribution Width


 12.0 %


(11.6-14.8)


 


Platelet Count


 206 K/UL


(150-450)


 


Mean Platelet Volume


 7.8 FL


(6.5-10.1)


 


Neutrophils (%) (Auto)


 74.7 %


(45.0-75.0)


 


Lymphocytes (%) (Auto)


 14.8 %


(20.0-45.0)  L


 


Monocytes (%) (Auto)


 9.4 %


(1.0-10.0)


 


Eosinophils (%) (Auto)


 0.1 %


(0.0-3.0)


 


Basophils (%) (Auto)


 1.0 %


(0.0-2.0)


 


Sodium Level


 141 MMOL/L


(136-145)


 


Potassium Level


 4.1 MMOL/L


(3.5-5.1)


 


Chloride Level


 105 MMOL/L


()


 


Carbon Dioxide Level


 26 MMOL/L


(21-32)


 


Anion Gap


 10 mmol/L


(5-15)


 


Blood Urea Nitrogen


 19 mg/dL


(7-18)  H


 


Creatinine


 0.9 MG/DL


(0.55-1.30)


 


Estimat Glomerular Filtration


Rate > 60 mL/min


(>60)


 


Glucose Level


 170 MG/DL


()  H


 


Calcium Level


 8.3 MG/DL


(8.5-10.1)  L


 


Total Bilirubin


 0.6 MG/DL


(0.2-1.0)


 


Direct Bilirubin


 0.2 MG/DL


(0.0-0.3)


 


Aspartate Amino Transf


(AST/SGOT) 36 U/L (15-37)





 


Alanine Aminotransferase


(ALT/SGPT) 63 U/L (12-78)





 


Alkaline Phosphatase


 40 U/L


()  L


 


Total Protein


 6.4 G/DL


(6.4-8.2)


 


Albumin


 2.2 G/DL


(3.4-5.0)  L


 


Globulin 4.2 g/dL  


 


Albumin/Globulin Ratio


 0.5 (1.0-2.7)


L











Current Medications








 Medications


  (Trade)  Dose


 Ordered  Sig/Miky


 Route


 PRN Reason  Start Time


 Stop Time Status Last Admin


Dose Admin


 


 Acetaminophen


  (Tylenol)  650 mg  Q4H  PRN


 ORAL


 FEVER  9/9/20 17:00


 10/9/20 16:59   





 


 Acetaminophen


  (Tylenol)  650 mg  Q4H  PRN


 RECTAL


 Mild Pain (Pain Scale 1-3)  9/13/20 11:30


 10/13/20 11:29  9/13/20 23:51





 


 Albuterol/


 Ipratropium


  (Combivent


 Respimat)  1 puff  Q4H  PRN


 INH


 Shortness of Breath  9/9/20 17:15


 10/9/20 17:14   





 


 Amlodipine


 Besylate


  (Norvasc)  5 mg  DAILY


 ORAL


   9/10/20 09:00


 10/10/20 08:59  9/19/20 09:06





 


 Ascorbic Acid


  (Vitamin C)  500 mg  DAILY


 ORAL


   9/12/20 09:00


 10/12/20 08:59  9/19/20 09:06





 


 Dexamethasone


  (Decadron)  6 mg  DAILY


 ORAL


   9/12/20 09:00


 9/21/20 08:59  9/19/20 09:06





 


 Dextrose


  (Dextrose 50%)  25 ml  Q30M  PRN


 IV


 Hypoglycemia  9/9/20 17:00


 12/8/20 16:59   





 


 Dextrose


  (Dextrose 50%)  50 ml  Q30M  PRN


 IV


 Hypoglycemia  9/9/20 17:00


 12/8/20 16:59   





 


 Dextrose/Sodium


 Chloride  1,000 ml @ 


 50 mls/hr  Q20H


 IV


   9/12/20 19:45


 10/12/20 19:44  9/19/20 11:47





 


 Donepezil HCl


  (Aricept)  5 mg  DAILY


 ORAL


   9/10/20 09:00


 10/10/20 08:59  9/19/20 09:06





 


 Heparin Sodium


  (Porcine)


  (Heparin 5000


 units/ml)  5,000 units  EVERY 8  HOURS


 SUBQ


   9/19/20 09:00


 11/3/20 08:59  9/19/20 09:09





 


 Lisinopril


  (ZestriL)  5 mg  DAILY


 ORAL


   9/10/20 09:00


 10/10/20 08:59  9/19/20 09:06





 


 Multivitamins


  (Multivitamins)  1 tab  DAILY


 ORAL


   9/12/20 09:00


 10/12/20 08:59  9/19/20 09:06





 


 Ondansetron HCl


  (Zofran)  4 mg  Q6H  PRN


 IVP


 Nausea & Vomiting  9/9/20 17:00


 10/9/20 16:59   





 


 Polyethylene


 Glycol


  (Miralax)  17 gm  DAILYPRN  PRN


 ORAL


 Constipation  9/9/20 17:00


 10/9/20 16:59   





 


 Promethazine HCl/


 Codeine


  (Phenergan with


 Codeine)  5 ml  Q6H  PRN


 ORAL


 cough  9/9/20 17:00


 10/9/20 16:59   





 


 Tamsulosin HCl


  (Flomax)  0.4 mg  DAILY


 ORAL


   9/10/20 09:00


 10/10/20 08:59  9/19/20 09:06




















Tanner Lorenzana MD               Sep 19, 2020 14:15

## 2020-09-19 NOTE — NUR
NURSE NOTES:

Received report from MANNY Swain. Patient observed to be asleep. Currently on 3/L NC, no 
s/sx of SOB/Distress, no s/sx of any pain observed. Patient currently on contact and droplet 
isolation for covid (+). Condom catheter present, intact and draining well. IV site located 
on Right Hand gauge 22 asymptomatic, inplace and intact. Bed placed on lowest and locked 
position, call light placed within reach and will continue to monitor.

## 2020-09-19 NOTE — INTERNAL MED PROGRESS NOTE
Subjective


Date of Service:  Sep 19, 2020


Physician Name


HearnBenito


Attending Physician


Andrei Cancino MD





Current Medications








 Medications


  (Trade)  Dose


 Ordered  Sig/Miky


 Route


 PRN Reason  Start Time


 Stop Time Status Last Admin


Dose Admin


 


 Acetaminophen


  (Tylenol)  650 mg  Q4H  PRN


 ORAL


 FEVER  9/9/20 17:00


 10/9/20 16:59   





 


 Acetaminophen


  (Tylenol)  650 mg  Q4H  PRN


 RECTAL


 Mild Pain (Pain Scale 1-3)  9/13/20 11:30


 10/13/20 11:29  9/13/20 23:51





 


 Albuterol/


 Ipratropium


  (Combivent


 Respimat)  1 puff  Q4H  PRN


 INH


 Shortness of Breath  9/9/20 17:15


 10/9/20 17:14   





 


 Amlodipine


 Besylate


  (Norvasc)  5 mg  DAILY


 ORAL


   9/10/20 09:00


 10/10/20 08:59  9/19/20 09:06





 


 Ascorbic Acid


  (Vitamin C)  500 mg  DAILY


 ORAL


   9/12/20 09:00


 10/12/20 08:59  9/19/20 09:06





 


 Dexamethasone


  (Decadron)  6 mg  DAILY


 ORAL


   9/12/20 09:00


 9/21/20 08:59  9/19/20 09:06





 


 Dextrose


  (Dextrose 50%)  25 ml  Q30M  PRN


 IV


 Hypoglycemia  9/9/20 17:00


 12/8/20 16:59   





 


 Dextrose


  (Dextrose 50%)  50 ml  Q30M  PRN


 IV


 Hypoglycemia  9/9/20 17:00


 12/8/20 16:59   





 


 Dextrose/Sodium


 Chloride  1,000 ml @ 


 50 mls/hr  Q20H


 IV


   9/12/20 19:45


 10/12/20 19:44  9/19/20 11:47





 


 Donepezil HCl


  (Aricept)  5 mg  DAILY


 ORAL


   9/10/20 09:00


 10/10/20 08:59  9/19/20 09:06





 


 Heparin Sodium


  (Porcine)


  (Heparin 5000


 units/ml)  5,000 units  EVERY 8  HOURS


 SUBQ


   9/19/20 09:00


 11/3/20 08:59  9/19/20 09:09





 


 Lisinopril


  (ZestriL)  5 mg  DAILY


 ORAL


   9/10/20 09:00


 10/10/20 08:59  9/19/20 09:06





 


 Multivitamins


  (Multivitamins)  1 tab  DAILY


 ORAL


   9/12/20 09:00


 10/12/20 08:59  9/19/20 09:06





 


 Ondansetron HCl


  (Zofran)  4 mg  Q6H  PRN


 IVP


 Nausea & Vomiting  9/9/20 17:00


 10/9/20 16:59   





 


 Polyethylene


 Glycol


  (Miralax)  17 gm  DAILYPRN  PRN


 ORAL


 Constipation  9/9/20 17:00


 10/9/20 16:59   





 


 Promethazine HCl/


 Codeine


  (Phenergan with


 Codeine)  5 ml  Q6H  PRN


 ORAL


 cough  9/9/20 17:00


 10/9/20 16:59   





 


 Tamsulosin HCl


  (Flomax)  0.4 mg  DAILY


 ORAL


   9/10/20 09:00


 10/10/20 08:59  9/19/20 09:06











Allergies:  


Coded Allergies:  


     No Known Allergies (Unverified , 8/20/12)


ROS Limited/Unobtainable:  Yes


Subjective


74 YO M admitted with fever and dyspnea.  Now COVID 19 pos.  Cover for Int Med-

Dr Cancino.





Objective





Last Vital Signs








  Date Time  Temp Pulse Resp B/P (MAP) Pulse Ox O2 Delivery O2 Flow Rate FiO2


 


9/19/20 12:00 98.9 83 19 119/83 (95) 96   


 


9/19/20 09:00      Nasal Cannula 3.0 


 


9/13/20 20:09        28











Laboratory Tests








Test


 9/19/20


04:40


 


White Blood Count


 6.2 K/UL


(4.8-10.8)


 


Red Blood Count


 5.28 M/UL


(4.70-6.10)


 


Hemoglobin


 15.2 G/DL


(14.2-18.0)


 


Hematocrit


 45.3 %


(42.0-52.0)


 


Mean Corpuscular Volume 86 FL (80-99)  


 


Mean Corpuscular Hemoglobin


 28.7 PG


(27.0-31.0)


 


Mean Corpuscular Hemoglobin


Concent 33.5 G/DL


(32.0-36.0)


 


Red Cell Distribution Width


 12.0 %


(11.6-14.8)


 


Platelet Count


 206 K/UL


(150-450)


 


Mean Platelet Volume


 7.8 FL


(6.5-10.1)


 


Neutrophils (%) (Auto)


 74.7 %


(45.0-75.0)


 


Lymphocytes (%) (Auto)


 14.8 %


(20.0-45.0)  L


 


Monocytes (%) (Auto)


 9.4 %


(1.0-10.0)


 


Eosinophils (%) (Auto)


 0.1 %


(0.0-3.0)


 


Basophils (%) (Auto)


 1.0 %


(0.0-2.0)


 


Sodium Level


 141 MMOL/L


(136-145)


 


Potassium Level


 4.1 MMOL/L


(3.5-5.1)


 


Chloride Level


 105 MMOL/L


()


 


Carbon Dioxide Level


 26 MMOL/L


(21-32)


 


Anion Gap


 10 mmol/L


(5-15)


 


Blood Urea Nitrogen


 19 mg/dL


(7-18)  H


 


Creatinine


 0.9 MG/DL


(0.55-1.30)


 


Estimat Glomerular Filtration


Rate > 60 mL/min


(>60)


 


Glucose Level


 170 MG/DL


()  H


 


Calcium Level


 8.3 MG/DL


(8.5-10.1)  L


 


Total Bilirubin


 0.6 MG/DL


(0.2-1.0)


 


Direct Bilirubin


 0.2 MG/DL


(0.0-0.3)


 


Aspartate Amino Transf


(AST/SGOT) 36 U/L (15-37)





 


Alanine Aminotransferase


(ALT/SGPT) 63 U/L (12-78)





 


Alkaline Phosphatase


 40 U/L


()  L


 


Total Protein


 6.4 G/DL


(6.4-8.2)


 


Albumin


 2.2 G/DL


(3.4-5.0)  L


 


Globulin 4.2 g/dL  


 


Albumin/Globulin Ratio


 0.5 (1.0-2.7)


L

















Intake and Output  


 


 9/18/20 9/19/20





 19:00 07:00


 


Intake Total 600 ml 720 ml


 


Output Total 800 ml 700 ml


 


Balance -200 ml 20 ml


 


  


 


Intake Oral 600 ml 120 ml


 


IV Total  600 ml


 


Output Urine Total 800 ml 700 ml


 


# Bowel Movements 1 








Objective


PHYSICAL EXAMINATION:


GENERAL:  The patient is a well-developed, well-nourished, thin-appearing


 male, in no apparent distress.


HEENT:  Eyes, pupils are equal and responsive to light and accommodation.


Extraocular movements are intact.


NECK:  Supple without lymphadenopathy.


CHEST:  Lungs are clear to auscultation bilaterally without wheezes,


rales.


CARDIOVASCULAR:  Regular rate.  S1, S2 are normal without murmurs, rubs, or


gallops.


ABDOMEN:  Soft, nontender, and nondistended.  Positive bowel sounds.  No


evidence of hepatosplenomegaly.  Currently no rebound or guarding noted.


EXTREMITIES:  Negative for clubbing, cyanosis, or edema.


RECTAL/GENITAL:  Not performed.


NEUROLOGIC:  Cranial nerves II through XII are grossly intact without focal


deficits.  Motor strength is 5/5 bilaterally.  Deep tendon reflexes are 2+


plantar.





Assessment/Plan


Assessment/Plan


ASSESSMENT:  This is a 75-year-old  male.





1. Fever.


2. Dyspnea.


3. COVID-19 positive.


4. Congestive heart failure.


5. Hypertension.


6. Benign prostatic hypertrophy.


7. Osteoporosis.


8. Alzheimer dementia.


9. Ventriculomegaly.


10. Diabetes type 2.


11. Cerebrovascular disease.


12. Chronic renal failure.


13. Dysphagia.


14. Hypercholesterolemia.


15. Gastroesophageal reflux disease.





TREATMENT:


1. Fever/dyspnea/COVID-19 positive.


Infectious disease consultation = Dr. Garcia.  Pulmonary consultation =


Dr. Michael Sandoval.  Continue azithromycin and ceftriaxone for probable 

underlying


bacterial pneumonia.  Continue Remdesivir and Decadron.  We will


follow recommendation of Infectious Disease and Pulmonary.


2. Hypertension.  Continue amlodipine as above.


3. Benign prostatic hypertrophy.  Continue Flomax as above.


4. Osteoporosis.  Continue Fosamax as above.


5. Alzheimer dementia.  Continue Aricept as above.


6. Ventriculomegaly.


7. Diabetes type 2.


8. Cerebrovascular disease, status post cerebrovascular accident.


9. Chronic renal failure.


10. Dysphagia.


11. Hypercholesterolemia.  Continue atorvastatin as above.


12. Gastroesophageal reflux disease.











Benito Hearn MD               Sep 19, 2020 16:25

## 2020-09-20 VITALS — DIASTOLIC BLOOD PRESSURE: 73 MMHG | SYSTOLIC BLOOD PRESSURE: 103 MMHG

## 2020-09-20 VITALS — DIASTOLIC BLOOD PRESSURE: 68 MMHG | SYSTOLIC BLOOD PRESSURE: 113 MMHG

## 2020-09-20 VITALS — SYSTOLIC BLOOD PRESSURE: 116 MMHG | DIASTOLIC BLOOD PRESSURE: 65 MMHG

## 2020-09-20 VITALS — DIASTOLIC BLOOD PRESSURE: 70 MMHG | SYSTOLIC BLOOD PRESSURE: 112 MMHG

## 2020-09-20 LAB
ADD MANUAL DIFF: NO
BASOPHILS NFR BLD AUTO: 1.3 % (ref 0–2)
EOSINOPHIL NFR BLD AUTO: 0 % (ref 0–3)
ERYTHROCYTE [DISTWIDTH] IN BLOOD BY AUTOMATED COUNT: 11.9 % (ref 11.6–14.8)
HCT VFR BLD CALC: 45 % (ref 42–52)
HGB BLD-MCNC: 15.5 G/DL (ref 14.2–18)
LYMPHOCYTES NFR BLD AUTO: 9 % (ref 20–45)
MCV RBC AUTO: 85 FL (ref 80–99)
MONOCYTES NFR BLD AUTO: 5.8 % (ref 1–10)
NEUTROPHILS NFR BLD AUTO: 83.9 % (ref 45–75)
PLATELET # BLD: 226 K/UL (ref 150–450)
RBC # BLD AUTO: 5.29 M/UL (ref 4.7–6.1)
WBC # BLD AUTO: 9.4 K/UL (ref 4.8–10.8)

## 2020-09-20 RX ADMIN — HEPARIN SODIUM SCH UNITS: 5000 INJECTION INTRAVENOUS; SUBCUTANEOUS at 13:47

## 2020-09-20 RX ADMIN — TAMSULOSIN HYDROCHLORIDE SCH MG: 0.4 CAPSULE ORAL at 08:57

## 2020-09-20 RX ADMIN — Medication SCH MG: at 08:57

## 2020-09-20 RX ADMIN — DEXTROSE AND SODIUM CHLORIDE SCH MLS/HR: 5; .45 INJECTION, SOLUTION INTRAVENOUS at 06:58

## 2020-09-20 RX ADMIN — LISINOPRIL SCH MG: 2.5 TABLET ORAL at 08:57

## 2020-09-20 RX ADMIN — DONEPEZIL HYDROCHLORIDE SCH MG: 5 TABLET, FILM COATED ORAL at 08:57

## 2020-09-20 RX ADMIN — HEPARIN SODIUM SCH UNITS: 5000 INJECTION INTRAVENOUS; SUBCUTANEOUS at 05:59

## 2020-09-20 NOTE — NUR
NURSE NOTES:

[facility] CALLED AND SPOKE WITH CASEY TODD TO INFORM ONCE AGAIN THAT THEY WILL BE LATE TO 
TRANSPORT PATIENT FOR 45 MIN. WILL CONT THE PLAN OF CARE.

## 2020-09-20 NOTE — SURGERY PROGRESS NOTE
Surgery Progress Note


Subjective


Symptoms:  improved, tolerating diet, passing flatus





Objective





Last 24 Hour Vital Signs








  Date Time  Temp Pulse Resp B/P (MAP) Pulse Ox O2 Delivery O2 Flow Rate FiO2


 


9/20/20 12:00 97.5 54 18 112/70 (84) 96   


 


9/20/20 09:46      Nasal Cannula 3.0 


 


9/20/20 08:57    116/56    


 


9/20/20 08:57  86  116/65    


 


9/20/20 08:00 97.7 86 18 116/65 (82) 95   


 


9/20/20 04:00 98.6 72 18 103/73 (83) 94   


 


9/20/20 00:00 98.3 60 18 113/68 (83) 95   


 


9/19/20 21:00      Nasal Cannula 3.0 


 


9/19/20 20:00 98.0 69 18 137/79 (98) 95   


 


9/19/20 16:00 98.0 89 19 120/81 (94) 97   








I&O











Intake and Output  


 


 9/19/20 9/20/20





 19:00 07:00


 


Intake Total 450 ml 550 ml


 


Output Total 600 ml 300 ml


 


Balance -150 ml 250 ml


 


  


 


Intake Oral 100 ml 


 


IV Total 350 ml 550 ml


 


Output Urine Total 600 ml 300 ml


 


# Voids  1








Cardiovascular:  RSR


Respiratory:  clear


Abdomen:  soft, non-tender, present bowel sounds


Extremities:  no edema, no tenderness, no cyanosis





Laboratory Tests








Test


 9/20/20


04:25


 


White Blood Count


 9.4 K/UL


(4.8-10.8)  #


 


Red Blood Count


 5.29 M/UL


(4.70-6.10)


 


Hemoglobin


 15.5 G/DL


(14.2-18.0)


 


Hematocrit


 45.0 %


(42.0-52.0)


 


Mean Corpuscular Volume 85 FL (80-99)  


 


Mean Corpuscular Hemoglobin


 29.3 PG


(27.0-31.0)


 


Mean Corpuscular Hemoglobin


Concent 34.5 G/DL


(32.0-36.0)


 


Red Cell Distribution Width


 11.9 %


(11.6-14.8)


 


Platelet Count


 226 K/UL


(150-450)


 


Mean Platelet Volume


 7.0 FL


(6.5-10.1)


 


Neutrophils (%) (Auto)


 83.9 %


(45.0-75.0)  H


 


Lymphocytes (%) (Auto)


 9.0 %


(20.0-45.0)  L


 


Monocytes (%) (Auto)


 5.8 %


(1.0-10.0)


 


Eosinophils (%) (Auto)


 0.0 %


(0.0-3.0)


 


Basophils (%) (Auto)


 1.3 %


(0.0-2.0)


 


Direct Bilirubin


 0.2 MG/DL


(0.0-0.3)











Plan


Problems:  


(1) Acute bronchitis


(2) 2019 novel coronavirus disease (COVID-19)


Assessment & Plan:  ++


as per ID


pulm input appreciated 





(3) Gram-negative bacteremia


(4) Osteoporosis


(5) UTI (urinary tract infection)


(6) Altered mental state


(7) Alzheimer's dementia


(8) Sepsis


(9) Acute encephalopathy


(10) Severe sepsis


(11) Aphasia


(12) HTN (hypertension)


(13) BPH (benign prostatic hyperplasia)


(14) History of CVA (cerebrovascular accident)


(15) Multifocal pneumonia


(16) Decubitus skin ulcer


Assessment & Plan:  75-year-old male presented to [facility] covered

positive respiratory fevers admitted further care and management which time 

identified to have multiple decubitus skin ulcers and scrotal edema.  Patient 

identified to have a stage III sacral buttock decubitus ulcer with periwound 

erythema and breakdown.  Incontinence associated dermatitis identified.  Care 

plan initiated after patient evaluation.  Will follow with recommendations.  

Thank you





DAILY ESTIMATED NEEDS:


Needs based on Wound, DM, Cardiac 73kg 


25-30  kcals/kg 


3788-2059  total kcals


1.25-1.5  g protein/kg


  g total protein 


25-30  mL/kg


4370-4462  total fluid mLs





NUTRITION DIAGNOSIS:


* Swallowing difficulty R/T dysphagia as evidenced by SLP eval, SLP rec


for mech soft ground with nectar thick liquids


* Increased kcal/pro/micronutrients needs R/T wound healing as evidenced


by admitted w/ wounds @ sacrum and BL ankles, pending eval.








CURRENT DIET:KERI w/ mech soft ground w/ NTL    








 


PO DIET RECOMMENDATIONS:


KERI + CCHO Med (texture per SLP)   





 





ADDITIONAL RECOMMENDATIONS:


* Calibrated bedscale wt for accurate CBW 


* Monitor PO intake 


      -> add Glucerna BID for now, monitor acceptance 


* Check A1C for eval of glycemic control 


* Wound healing: add MVI x 1, Vit C 500mg QD 


   f/up w/ WC eval 





(17) Scrotal edema


Assessment & Plan:  Patient identified to have significant scrotal edema 

potential cellulitis on admission.  No abscess identified.  Towel placed under 

the scrotum.  We will monitor over the course of the next few days














Tunde Hammer                Sep 20, 2020 13:18

## 2020-09-20 NOTE — NUR
NURSE NOTES:

RECEIVED PATIENT A/A/OX1, NONVERBAL RESPONSE TO TACTILE AND PAINFUL STIMULI. OPEN EYES. NO 
ACUTE RESP DISTRESS NOTED. KEPT HOB ELEVATED FOR ASPIRATION PRECAUTION. POOR APPETITE. 1;1 
FEEDER AND CRUSHED MEDS. PIV PATENT AND INTACT. ORAL CARE RENDERED. WOUND DRSG DRY AND 
INTACT. REPOSITION Q2HRS. SCD'S APPLIED ON BLE. KEPT BED IN THE LOWEST POSITION. SIDERAILS 
ARE UPX3 FOR SAFETY. CALL LIGHT IS WITHIN REACH. BED BRAKES AND LOCK @ ALL TIMES. WILL CONT 
TO MONITOR.

## 2020-09-20 NOTE — NUR
NURSE NOTES:

[facility] PICKED UP PATIENT AND REPORT GIVEN. REMOVED IV HEPLOCK. NO ACUTE RESP 
DISTRESS NOTED. NO PERSONAL BELONGINGS NOTED.

## 2020-09-20 NOTE — NUR
NURSE NOTES:

Blood lab drawn and sent down to lab. Patient tolerated well. Will continue to monitor.

## 2020-09-20 NOTE — NUR
NURSE HAND-OFF: 



Important Events on Shift: No adverse event during the shift.

Patient Status: Stable

Diet: No Added Salt - Mechanical Soft Grounded with Nectar Thick Liquid



Pending Orders: None

Pending Results/Labs: None

Pending MD notification: None



Latest Vital Signs: Temperature 98.6 , Pulse 72 , B/P 103 /73 , Respiratory Rate 18 , O2 SAT 
94 , Nasal Cannula, O2 Flow Rate 3.0 .  

Vital Sign Comment: Stable



Latest Mejia Fall Score: 50  

Fall Risk: High Risk 

Safety Measures: Call light Within Reach, Bed Alarm Zone 1, Side Rails Side Rails x3, Bed 
position Low and Locked.

Fall Precautions: 

Yellow Socks

Yellow Gown

Door Sign

Patient Fall Education



Report given to MANNY Man.

## 2020-09-20 NOTE — NUR
NURSE NOTES:

CALLED [facility] FOR REPORT AND SPOKE WITH ROBB. NOTIFIED NAOM, DAUGHTER UNABLE TO 
LEAVE MESSAGE DUE TO VOICEMAIL IS FULL. CALLED [facility] AND LEFT VOICEMESSAGE TO INFORM 
RE: DISCHARGE. WILL CONT TO MONITOR.

## 2020-09-20 NOTE — PULMONOLOGY PROGRESS NOTE
Subjective


ROS Limited/Unobtainable:  Yes


Constitutional:  Reports: no symptoms


HEENT:  Repors: no symptoms


Respiratory:  Reports: no symptoms


Allergies:  


Coded Allergies:  


     No Known Allergies (Unverified , 8/20/12)





Objective





Last 24 Hour Vital Signs








  Date Time  Temp Pulse Resp B/P (MAP) Pulse Ox O2 Delivery O2 Flow Rate FiO2


 


9/20/20 09:46      Nasal Cannula 3.0 


 


9/20/20 08:57    116/56    


 


9/20/20 08:57  86  116/65    


 


9/20/20 08:00 97.7 86 18 116/65 (82) 95   


 


9/20/20 04:00 98.6 72 18 103/73 (83) 94   


 


9/20/20 00:00 98.3 60 18 113/68 (83) 95   


 


9/19/20 21:00      Nasal Cannula 3.0 


 


9/19/20 20:00 98.0 69 18 137/79 (98) 95   


 


9/19/20 16:00 98.0 89 19 120/81 (94) 97   


 


9/19/20 12:00 98.9 83 19 119/83 (95) 96   

















Intake and Output  


 


 9/19/20 9/20/20





 19:00 07:00


 


Intake Total 450 ml 550 ml


 


Output Total 600 ml 300 ml


 


Balance -150 ml 250 ml


 


  


 


Intake Oral 100 ml 


 


IV Total 350 ml 550 ml


 


Output Urine Total 600 ml 300 ml


 


# Voids  1








General Appearance:  WD/WN


HEENT:  normocephalic, atraumatic


Respiratory:  chest wall non-tender, lungs clear, respiratory distress, 

decreased breath sounds


Cardiovascular:  normal peripheral pulses, normal rate


Abdomen:  normal bowel sounds, soft, non tender


Genitourinary:  normal external genitalia


Extremities:  no cyanosis


Neurologic:  CNs II-XII grossly normal


Lymphatic:  no neck adenopathy


Laboratory Tests


9/20/20 04:25: 


White Blood Count 9.4#, Red Blood Count 5.29, Hemoglobin 15.5, Hematocrit 45.0, 

Mean Corpuscular Volume 85, Mean Corpuscular Hemoglobin 29.3, Mean Corpuscular 

Hemoglobin Concent 34.5, Red Cell Distribution Width 11.9, Platelet Count 226, 

Mean Platelet Volume 7.0, Neutrophils (%) (Auto) 83.9H, Lymphocytes (%) (Auto) 

9.0L, Monocytes (%) (Auto) 5.8, Eosinophils (%) (Auto) 0.0, Basophils (%) (Auto)

1.3, Direct Bilirubin 0.2





Current Medications








 Medications


  (Trade)  Dose


 Ordered  Sig/Miky


 Route


 PRN Reason  Start Time


 Stop Time Status Last Admin


Dose Admin


 


 Acetaminophen


  (Tylenol)  650 mg  Q4H  PRN


 ORAL


 FEVER  9/9/20 17:00


 10/9/20 16:59   





 


 Acetaminophen


  (Tylenol)  650 mg  Q4H  PRN


 RECTAL


 Mild Pain (Pain Scale 1-3)  9/13/20 11:30


 10/13/20 11:29  9/13/20 23:51





 


 Albuterol/


 Ipratropium


  (Combivent


 Respimat)  1 puff  Q4H  PRN


 INH


 Shortness of Breath  9/9/20 17:15


 10/9/20 17:14   





 


 Amlodipine


 Besylate


  (Norvasc)  5 mg  DAILY


 ORAL


   9/10/20 09:00


 10/10/20 08:59  9/20/20 08:57





 


 Ascorbic Acid


  (Vitamin C)  500 mg  DAILY


 ORAL


   9/12/20 09:00


 10/12/20 08:59  9/20/20 08:57





 


 Dexamethasone


  (Decadron)  6 mg  DAILY


 ORAL


   9/12/20 09:00


 9/21/20 08:59  9/20/20 08:58





 


 Dextrose


  (Dextrose 50%)  25 ml  Q30M  PRN


 IV


 Hypoglycemia  9/9/20 17:00


 12/8/20 16:59   





 


 Dextrose


  (Dextrose 50%)  50 ml  Q30M  PRN


 IV


 Hypoglycemia  9/9/20 17:00


 12/8/20 16:59   





 


 Dextrose/Sodium


 Chloride  1,000 ml @ 


 50 mls/hr  Q20H


 IV


   9/12/20 19:45


 10/12/20 19:44  9/20/20 06:58





 


 Donepezil HCl


  (Aricept)  5 mg  DAILY


 ORAL


   9/10/20 09:00


 10/10/20 08:59  9/20/20 08:57





 


 Heparin Sodium


  (Porcine)


  (Heparin 5000


 units/ml)  5,000 units  EVERY 8  HOURS


 SUBQ


   9/19/20 09:00


 11/3/20 08:59  9/20/20 05:59





 


 Lisinopril


  (ZestriL)  5 mg  DAILY


 ORAL


   9/10/20 09:00


 10/10/20 08:59  9/20/20 08:57





 


 Multivitamins


  (Multivitamins)  1 tab  DAILY


 ORAL


   9/12/20 09:00


 10/12/20 08:59  9/20/20 08:58





 


 Ondansetron HCl


  (Zofran)  4 mg  Q6H  PRN


 IVP


 Nausea & Vomiting  9/9/20 17:00


 10/9/20 16:59   





 


 Polyethylene


 Glycol


  (Miralax)  17 gm  DAILYPRN  PRN


 ORAL


 Constipation  9/9/20 17:00


 10/9/20 16:59   





 


 Promethazine HCl/


 Codeine


  (Phenergan with


 Codeine)  5 ml  Q6H  PRN


 ORAL


 cough  9/9/20 17:00


 10/9/20 16:59   





 


 Tamsulosin HCl


  (Flomax)  0.4 mg  DAILY


 ORAL


   9/10/20 09:00


 10/10/20 08:59  9/20/20 08:57














Assessment/Plan


Problems:  


(1) 2019 novel coronavirus disease (COVID-19)


(2) Acute bronchitis


(3) History of CVA (cerebrovascular accident)


(4) BPH (benign prostatic hyperplasia)


(5) HTN (hypertension)


(6) Alzheimer's dementia


Assessment/Plan


repeat cxr and crp in am


afebrile


looks comfortable


respiratory isolation


check sputum, still pending





f/u electrolytes


titrate fio2 to sat of 92%


f/u inflammatory markers, still elevated


monitor BP


dvt prophylaxis











Michael Sandoval MD              Sep 20, 2020 11:34

## 2020-09-20 NOTE — NUR
NURSE NOTES:

[facility] CALLED TO NOTIFY THERE WILL BE DELAYED AND RESCHED THE TRANSPORT BETWEEN 
1230H AND 1300H. WILL CONT TO MONITOR.

## 2020-09-21 NOTE — DISCHARGE SUMMARY
Discharge Summary


Discharge Summary


_


DATE OF ADMISSION: 9/9/2020





DATE OF DISCHARGE:  9/20/2020 








DISCHARGED BY: Dr. Cancino





REASON FOR ADMISSION: 


75 years old male with past medical history of COPD, CVA with residual aphagia 

no motor deficit, hypertension, dyslipidemia, BPH, dementia, not O2 dependent, 

was brought from the skilled nursing facility due to fever and cough for the 

past 2 days.  Cough reported as  dry.  Patient received Tylenol at the facility.

 


Upon evaluation patient had   low-grade fever 99.9.  Pulse oximetry on 2 L of 

oxygen via  nasal cannula was 93%.  


EKG revealed sinus rhythm no acute ischemic changes.  Troponin was negative.  

pro 


Chest x-ray demonstrated bibasilar atelectasis .


Laboratory work-up revealed stable electrolytes.  


Glucose 149.  


BUN 20, creatinine 1.2.  


Lactic acid 0.9.  


Albumin 2.6.


Urinalysis revealed +2 protein,  no evidence of urinary tract infection.  


ABG on 2 L of oxygen via nasal cannula was stable.  


No leukocytosis stable hemoglobin,  hematocrit .


Rapid COVID-19 in emergency department  was positive


Patient subsequently admitted for further management.  


 


CONSULTANTS:


pulmonary Dr. Sandoval


ID specialist Dr. Garcia


surgery Dr. Hammer


 


 


[facility] COURSE: 


Patient admitted to isolation room .


Supplemental oxygen provided and titrated to keep pulse oximetry above 92%.  


Patient received steroids and 5 doses of Remdesivir. 


DVT prophylaxis provided.  


Patient was followed -up with   chest x-ray.  


Patient received  empiric antibiotic for possible superimposed pneumonia.


Antitussive provided as needed.  


MDI Albuterol provided as needed.


Inflammatory markers to assess the risk for cytokine storm were followed.


CRP prior to discharge 1.5 , ferritin 1048 , d-dimer 0.93.





Blood culture 1 out of 2 revealed Staph epidermidis- contaminant as per ID 

specialist.  


Repeated blood culture 9/12 were negative.  


Fevers resolved. 


 


Echocardiogram demonstrated was a technically difficult study.  Normal left 

ventricular chamber size, systolic function and wall motion to the extent 

visualized.  Left ventricular ejection fraction estimated to be 55%.


SNF medication continued.  


Blood pressure was managed with calcium channel blocker and ACE inhibitor.  


Flomax continued.  


Strict aspiration  and reflux precaution maintained. 


Diet texture provided as per speech therapist recommendation after bedside 

swallow evaluation was completed.   


Wound care for present on admission stage III sacral decub with periwound 

erythema and breakdown and incontinence associated dermatitis , provided as per 

surgeon recommendation.  


Continue wound care at the facility.


Bowel regimen instituted .


Supportive care provided.   





Patient clinically stabilized and was ready for transfer back to skilled nursing

facility for continuation of care.





FINAL DIAGNOSES: 


COVID-19 pneumonia


Hypertension


Cerebrovascular disease with history of CVA with aphasia


BPH


Alzheimer dementia 


Dysphagia


Decubitus skin ukcer, present on admission 





DISCHARGE MEDICATIONS:


See Medication Reconciliation list.





DISCHARGE INSTRUCTIONS:


Patient was discharged to the skilled nursing facility. 


Follow up with medical doctor at the facility.





I have been assigned to dictate discharge summary for this account. 


I was not involved in the patient's management.











Ely Ni NP                Sep 21, 2020 11:24

## 2020-10-23 ENCOUNTER — HOSPITAL ENCOUNTER (INPATIENT)
Dept: HOSPITAL 72 - EMR | Age: 75
LOS: 45 days | Discharge: NURSING, REHAB OR LONG-TERM CARE | DRG: 870 | End: 2020-12-07
Payer: MEDICARE

## 2020-10-23 VITALS
HEIGHT: 64 IN | SYSTOLIC BLOOD PRESSURE: 113 MMHG | DIASTOLIC BLOOD PRESSURE: 74 MMHG | BODY MASS INDEX: 22.2 KG/M2 | WEIGHT: 130 LBS

## 2020-10-23 VITALS — DIASTOLIC BLOOD PRESSURE: 94 MMHG | SYSTOLIC BLOOD PRESSURE: 125 MMHG

## 2020-10-23 VITALS — DIASTOLIC BLOOD PRESSURE: 89 MMHG | SYSTOLIC BLOOD PRESSURE: 125 MMHG

## 2020-10-23 DIAGNOSIS — E87.0: ICD-10-CM

## 2020-10-23 DIAGNOSIS — I50.9: ICD-10-CM

## 2020-10-23 DIAGNOSIS — E11.22: ICD-10-CM

## 2020-10-23 DIAGNOSIS — N17.9: ICD-10-CM

## 2020-10-23 DIAGNOSIS — I69.320: ICD-10-CM

## 2020-10-23 DIAGNOSIS — G30.9: ICD-10-CM

## 2020-10-23 DIAGNOSIS — I13.0: ICD-10-CM

## 2020-10-23 DIAGNOSIS — N39.0: ICD-10-CM

## 2020-10-23 DIAGNOSIS — A41.02: Primary | ICD-10-CM

## 2020-10-23 DIAGNOSIS — F02.80: ICD-10-CM

## 2020-10-23 DIAGNOSIS — T17.990A: ICD-10-CM

## 2020-10-23 DIAGNOSIS — J96.01: ICD-10-CM

## 2020-10-23 DIAGNOSIS — R13.10: ICD-10-CM

## 2020-10-23 DIAGNOSIS — N18.2: ICD-10-CM

## 2020-10-23 DIAGNOSIS — G93.40: ICD-10-CM
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
## 2020-10-23 DIAGNOSIS — E86.0: ICD-10-CM

## 2020-10-23 DIAGNOSIS — R65.21: ICD-10-CM

## 2020-10-23 DIAGNOSIS — Z86.19: ICD-10-CM

## 2020-10-23 DIAGNOSIS — J15.212: ICD-10-CM

## 2020-10-23 DIAGNOSIS — N40.0: ICD-10-CM

## 2020-10-23 LAB
ADD MANUAL DIFF: NO
ALBUMIN SERPL-MCNC: 2.6 G/DL (ref 3.4–5)
ALBUMIN/GLOB SERPL: 0.5 {RATIO} (ref 1–2.7)
ALP SERPL-CCNC: 126 U/L (ref 46–116)
ALT SERPL-CCNC: 19 U/L (ref 12–78)
APPEARANCE UR: (no result)
APTT PPP: YELLOW S
AST SERPL-CCNC: 20 U/L (ref 15–37)
BASOPHILS NFR BLD AUTO: 0.7 % (ref 0–2)
BILIRUB SERPL-MCNC: 0.8 MG/DL (ref 0.2–1)
BUN SERPL-MCNC: 53 MG/DL (ref 7–18)
CALCIUM SERPL-MCNC: 9.5 MG/DL (ref 8.5–10.1)
CHLORIDE SERPL-SCNC: 116 MMOL/L (ref 98–107)
CK MB SERPL-MCNC: 0.8 NG/ML (ref 0–3.6)
CK SERPL-CCNC: 101 U/L (ref 26–308)
CO2 SERPL-SCNC: 30 MMOL/L (ref 21–32)
CREAT SERPL-MCNC: 2.4 MG/DL (ref 0.55–1.3)
EOSINOPHIL NFR BLD AUTO: 0 % (ref 0–3)
ERYTHROCYTE [DISTWIDTH] IN BLOOD BY AUTOMATED COUNT: 15.6 % (ref 11.6–14.8)
GLOBULIN SER-MCNC: 5 G/DL
GLUCOSE UR STRIP-MCNC: NEGATIVE MG/DL
HCT VFR BLD CALC: 51.8 % (ref 42–52)
HGB BLD-MCNC: 16.4 G/DL (ref 14.2–18)
KETONES UR QL STRIP: (no result)
LEUKOCYTE ESTERASE UR QL STRIP: (no result)
LYMPHOCYTES NFR BLD AUTO: 18 % (ref 20–45)
MCV RBC AUTO: 94 FL (ref 80–99)
MONOCYTES NFR BLD AUTO: 6.1 % (ref 1–10)
NEUTROPHILS NFR BLD AUTO: 75.2 % (ref 45–75)
NITRITE UR QL STRIP: NEGATIVE
PH UR STRIP: 5 [PH] (ref 4.5–8)
PHOSPHATE SERPL-MCNC: 4.7 MG/DL (ref 2.5–4.9)
PLATELET # BLD: 246 K/UL (ref 150–450)
POTASSIUM SERPL-SCNC: 4.2 MMOL/L (ref 3.5–5.1)
PROT UR QL STRIP: (no result)
RBC # BLD AUTO: 5.49 M/UL (ref 4.7–6.1)
SODIUM SERPL-SCNC: 155 MMOL/L (ref 136–145)
SP GR UR STRIP: 1.02 (ref 1–1.03)
UROBILINOGEN UR-MCNC: 1 MG/DL (ref 0–1)
WBC # BLD AUTO: 12.3 K/UL (ref 4.8–10.8)

## 2020-10-23 PROCEDURE — 84443 ASSAY THYROID STIM HORMONE: CPT

## 2020-10-23 PROCEDURE — 94664 DEMO&/EVAL PT USE INHALER: CPT

## 2020-10-23 PROCEDURE — 81003 URINALYSIS AUTO W/O SCOPE: CPT

## 2020-10-23 PROCEDURE — 85730 THROMBOPLASTIN TIME PARTIAL: CPT

## 2020-10-23 PROCEDURE — 86920 COMPATIBILITY TEST SPIN: CPT

## 2020-10-23 PROCEDURE — 80061 LIPID PANEL: CPT

## 2020-10-23 PROCEDURE — 80069 RENAL FUNCTION PANEL: CPT

## 2020-10-23 PROCEDURE — 85007 BL SMEAR W/DIFF WBC COUNT: CPT

## 2020-10-23 PROCEDURE — 96367 TX/PROPH/DG ADDL SEQ IV INF: CPT

## 2020-10-23 PROCEDURE — 86901 BLOOD TYPING SEROLOGIC RH(D): CPT

## 2020-10-23 PROCEDURE — 86850 RBC ANTIBODY SCREEN: CPT

## 2020-10-23 PROCEDURE — 36415 COLL VENOUS BLD VENIPUNCTURE: CPT

## 2020-10-23 PROCEDURE — 83735 ASSAY OF MAGNESIUM: CPT

## 2020-10-23 PROCEDURE — 82962 GLUCOSE BLOOD TEST: CPT

## 2020-10-23 PROCEDURE — 87181 SC STD AGAR DILUTION PER AGT: CPT

## 2020-10-23 PROCEDURE — 87081 CULTURE SCREEN ONLY: CPT

## 2020-10-23 PROCEDURE — 82550 ASSAY OF CK (CPK): CPT

## 2020-10-23 PROCEDURE — 87070 CULTURE OTHR SPECIMN AEROBIC: CPT

## 2020-10-23 PROCEDURE — 83605 ASSAY OF LACTIC ACID: CPT

## 2020-10-23 PROCEDURE — 99285 EMERGENCY DEPT VISIT HI MDM: CPT

## 2020-10-23 PROCEDURE — 87205 SMEAR GRAM STAIN: CPT

## 2020-10-23 PROCEDURE — 80053 COMPREHEN METABOLIC PANEL: CPT

## 2020-10-23 PROCEDURE — 76937 US GUIDE VASCULAR ACCESS: CPT

## 2020-10-23 PROCEDURE — 94003 VENT MGMT INPAT SUBQ DAY: CPT

## 2020-10-23 PROCEDURE — 82553 CREATINE MB FRACTION: CPT

## 2020-10-23 PROCEDURE — 94002 VENT MGMT INPAT INIT DAY: CPT

## 2020-10-23 PROCEDURE — 83615 LACTATE (LD) (LDH) ENZYME: CPT

## 2020-10-23 PROCEDURE — 80048 BASIC METABOLIC PNL TOTAL CA: CPT

## 2020-10-23 PROCEDURE — 85610 PROTHROMBIN TIME: CPT

## 2020-10-23 PROCEDURE — 83880 ASSAY OF NATRIURETIC PEPTIDE: CPT

## 2020-10-23 PROCEDURE — 86140 C-REACTIVE PROTEIN: CPT

## 2020-10-23 PROCEDURE — 83036 HEMOGLOBIN GLYCOSYLATED A1C: CPT

## 2020-10-23 PROCEDURE — 85651 RBC SED RATE NONAUTOMATED: CPT

## 2020-10-23 PROCEDURE — 84484 ASSAY OF TROPONIN QUANT: CPT

## 2020-10-23 PROCEDURE — 80076 HEPATIC FUNCTION PANEL: CPT

## 2020-10-23 PROCEDURE — 82607 VITAMIN B-12: CPT

## 2020-10-23 PROCEDURE — 87086 URINE CULTURE/COLONY COUNT: CPT

## 2020-10-23 PROCEDURE — 80202 ASSAY OF VANCOMYCIN: CPT

## 2020-10-23 PROCEDURE — 36569 INSJ PICC 5 YR+ W/O IMAGING: CPT

## 2020-10-23 PROCEDURE — 82977 ASSAY OF GGT: CPT

## 2020-10-23 PROCEDURE — 36573 INSJ PICC RS&I 5 YR+: CPT

## 2020-10-23 PROCEDURE — 85025 COMPLETE CBC W/AUTO DIFF WBC: CPT

## 2020-10-23 PROCEDURE — 87040 BLOOD CULTURE FOR BACTERIA: CPT

## 2020-10-23 PROCEDURE — 96365 THER/PROPH/DIAG IV INF INIT: CPT

## 2020-10-23 PROCEDURE — 86900 BLOOD TYPING SEROLOGIC ABO: CPT

## 2020-10-23 PROCEDURE — 86710 INFLUENZA VIRUS ANTIBODY: CPT

## 2020-10-23 PROCEDURE — 96361 HYDRATE IV INFUSION ADD-ON: CPT

## 2020-10-23 PROCEDURE — 84550 ASSAY OF BLOOD/URIC ACID: CPT

## 2020-10-23 PROCEDURE — 93306 TTE W/DOPPLER COMPLETE: CPT

## 2020-10-23 PROCEDURE — 74018 RADEX ABDOMEN 1 VIEW: CPT

## 2020-10-23 PROCEDURE — 84100 ASSAY OF PHOSPHORUS: CPT

## 2020-10-23 PROCEDURE — 94150 VITAL CAPACITY TEST: CPT

## 2020-10-23 PROCEDURE — 93005 ELECTROCARDIOGRAM TRACING: CPT

## 2020-10-23 PROCEDURE — 71045 X-RAY EXAM CHEST 1 VIEW: CPT

## 2020-10-23 PROCEDURE — 82803 BLOOD GASES ANY COMBINATION: CPT

## 2020-10-23 RX ADMIN — HEPARIN SODIUM SCH UNITS: 5000 INJECTION INTRAVENOUS; SUBCUTANEOUS at 21:54

## 2020-10-23 NOTE — EMERGENCY ROOM REPORT
History of Present Illness


General


Source:  Patient, EMS





Present Illness


HPI


Patient is a 75-year-old male sent in from nursing facility for increased 

congestion and difficulty with breathing.  He was noted to have diminished 

oxygen saturation.  Had prior history of chronic debilitation.  Had previous 

history of dementia.  Was noted to have fever up to 100.9 at his facility.  He 

had been noted to be short of breath and was started on nonrebreather.  Patient 

is known to be full code.  Patient is nonverbal at baseline.


Allergies:  


Coded Allergies:  


     No Known Allergies (Unverified , 8/20/12)





COVID-19 Screening


Contact w/high risk pt:  Yes


Experienced COVID-19 symptoms?:  Yes


COVID-19 symptoms experienced:  Fever (T>100.4F or >38C)





Patient History


Past Medical History:  see triage record


Reviewed Nursing Documentation:  PMH: Agreed; PSxH: Agreed





Nursing Documentation-PMH


Hx Cardiac Problems:  Yes - ckd, gerd, bph, pna, bronchitis,


Hx Hypertension:  Yes


Hx Pacemaker:  No


Hx Asthma:  No


Hx COPD:  No


Hx Diabetes:  Yes - type 2


Hx Cancer:  No


Hx Gastrointestinal Problems:  Yes - DYSPHAGIA


Hx Dialysis:  No


Hx Neurological Problems:  Yes


Hx Cerebrovascular Accident:  Yes - dysphagia


Hx Dementia:  Yes


Hx Alzheimer's Disease:  Yes


Hx Seizures:  No


Hx Weakness:  Yes





Review of Systems


All Other Systems:  limited - Limited by poor historian





Physical Exam


General Appearance:  alert, moderate distress, Chronically Ill


Neck:  limited range of motion


Respiratory:  no respiratory distress, rhonchi


Cardiovascular #1:  normal peripheral pulses, no edema


Gastrointestinal:  normal inspection, normal bowel sounds, non tender, soft


Musculoskeletal:  other - Right lower extremity in external rotation and 

somewhat contracted.


Neurologic:  alert, responsive, aphasia, other - Nonverbal, moves his upper ex

tremity and is able to grasp objects.


Psychiatric:  depressed affect


Skin:  no rash





Medical Decision Making


Diagnostic Impression:  


   Primary Impression:  


   Aphasia


   Additional Impressions:  


   UTI (urinary tract infection)


   Severe sepsis


   Multifocal pneumonia


ER Course


Patient presented for shortness of breath.  Differential diagnosis include was 

not limited to pneumonia, coronavirus infection, congestive heart failure among 

others.  Because of complexity of patient's case laboratory tests and imaging 

studies were ordered.  Chest x-ray 1 view showed normal cardiac size with left-

sided increased interstitial markings and possible pneumonia. as well as right-

sided hemiarthroplasty to the shoulder.  Patient started on IV fluids as well as

IV antibiotics.  He is known to be febrile.  Coronavirus testing was sent.  EKG 

interpreted by me showed sinus tachycardia with a left anterior fascicular block

was markedly limited by motion artifact from tremor.  Patient was maintained on 

supplemental oxygen.  patient was  given Lovenox.  CT imaging was deferred given

patient's abnormal creatinine. Dr. Andrei Cancino was contacted for inpatient 

management





Labs








Test


 10/23/20


17:30 10/23/20


17:37 10/23/20


17:47 10/23/20


18:25


 


White Blood Count


 12.3 K/UL


(4.8-10.8) 


 


 





 


Red Blood Count


 5.49 M/UL


(4.70-6.10) 


 


 





 


Hemoglobin


 16.4 G/DL


(14.2-18.0) 


 


 





 


Hematocrit


 51.8 %


(42.0-52.0) 


 


 





 


Mean Corpuscular Volume 94 FL (80-99)    


 


Mean Corpuscular Hemoglobin


 29.9 PG


(27.0-31.0) 


 


 





 


Mean Corpuscular Hemoglobin


Concent 31.7 G/DL


(32.0-36.0) 


 


 





 


Red Cell Distribution Width


 15.6 %


(11.6-14.8) 


 


 





 


Platelet Count


 246 K/UL


(150-450) 


 


 





 


Mean Platelet Volume


 9.2 FL


(6.5-10.1) 


 


 





 


Neutrophils (%) (Auto)


 75.2 %


(45.0-75.0) 


 


 





 


Lymphocytes (%) (Auto)


 18.0 %


(20.0-45.0) 


 


 





 


Monocytes (%) (Auto)


 6.1 %


(1.0-10.0) 


 


 





 


Eosinophils (%) (Auto)


 0.0 %


(0.0-3.0) 


 


 





 


Basophils (%) (Auto)


 0.7 %


(0.0-2.0) 


 


 





 


Sodium Level


 155 MMOL/L


(136-145) 


 


 





 


Potassium Level


 4.2 MMOL/L


(3.5-5.1) 


 


 





 


Chloride Level


 116 MMOL/L


() 


 


 





 


Carbon Dioxide Level


 30 MMOL/L


(21-32) 


 


 





 


Blood Urea Nitrogen


 53 mg/dL


(7-18) 


 


 





 


Creatinine


 2.4 MG/DL


(0.55-1.30) 


 


 





 


Estimat Glomerular Filtration


Rate 26.4 mL/min


(>60) 


 


 





 


Glucose Level


 178 MG/DL


() 


 


 





 


Lactic Acid Level


 3.90 mmol/L


(0.4-2.0) 


 


 





 


Calcium Level


 9.5 MG/DL


(8.5-10.1) 


 


 





 


Phosphorus Level


 4.7 MG/DL


(2.5-4.9) 


 


 





 


Magnesium Level


 2.8 MG/DL


(1.8-2.4) 


 


 





 


Total Bilirubin


 0.8 MG/DL


(0.2-1.0) 


 


 





 


Aspartate Amino Transf


(AST/SGOT) 20 U/L (15-37) 


 


 


 





 


Alanine Aminotransferase


(ALT/SGPT) 19 U/L (12-78) 


 


 


 





 


Alkaline Phosphatase


 126 U/L


() 


 


 





 


Total Creatine Kinase


 101 U/L


() 


 


 





 


Creatine Kinase MB


 0.8 NG/ML


(0.0-3.6) 


 


 





 


Creatine Kinase MB Relative


Index 0.7 


 


 


 





 


Troponin I


 0.032 ng/mL


(0.000-0.056) 


 


 





 


Pro-B-Type Natriuretic Peptide


 1253 pg/mL


(0-125) 


 


 





 


Total Protein


 7.6 G/DL


(6.4-8.2) 


 


 





 


Albumin


 2.6 G/DL


(3.4-5.0) 


 


 





 


Globulin 5.0 g/dL    


 


Albumin/Globulin Ratio 0.5 (1.0-2.7)    


 


Urine Color  Yellow   


 


Urine Appearance


 


 Slightly


cloudy 


 





 


Urine pH  5 (4.5-8.0)   


 


Urine Specific Gravity


 


 1.020


(1.005-1.035) 


 





 


Urine Protein  2+ (NEGATIVE)   


 


Urine Glucose (UA)


 


 Negative


(NEGATIVE) 


 





 


Urine Ketones  1+ (NEGATIVE)   


 


Urine Blood  1+ (NEGATIVE)   


 


Urine Nitrite


 


 Negative


(NEGATIVE) 


 





 


Urine Bilirubin


 


 Negative


(NEGATIVE) 


 





 


Urine Urobilinogen


 


 1 MG/DL


(0.0-1.0) 


 





 


Urine Leukocyte Esterase  1+ (NEGATIVE)   


 


Urine RBC


 


 5-10 /HPF (0 -


0) 


 





 


Urine WBC


 


 15-20 /HPF (0


- 0) 


 





 


Urine Squamous Epithelial


Cells 


 None /LPF


(NONE/OCC) 


 





 


Urine Transitional Epithelial


Cells 


  /LPF (NONE) 


 


 





 


Urine Bacteria


 


 Moderate /HPF


(NONE) 


 





 


Urine Hyaline Casts


 


 0-2 /LPF


(NONE) 


 





 


Urine Mucus


 


 Moderate /LPF


(NONE/OCC) 


 





 


POC Whole Blood Glucose


 


 


 176 MG/DL


() 





 


Arterial Blood pH


 


 


 


 7.387


(7.350-7.450)


 


Arterial Blood Partial


Pressure CO2 


 


 


 31.1 mmHg


(35.0-45.0)


 


Arterial Blood Partial


Pressure O2 


 


 


 74.1 mmHg


(75.0-100.0)


 


Arterial Blood HCO3


 


 


 


 18.3 mmol/L


(22.0-26.0)


 


Arterial Blood Oxygen


Saturation 


 


 


 93.8 %


()


 


Arterial Blood Base Excess    -5.5 (-2-2) 


 


Jerod Test    Positive 








EKG Diagnostic Results


Rate:  tachycardiac


Rhythm:  NSR


ST Segments:  no acute changes


Status:  unchanged


Disposition:  ADMITTED AS INPATIENT


Condition:  Serious











Abe Harper MD               Oct 23, 2020 17:13

## 2020-10-23 NOTE — NUR
ED Nurse Note:



Patient is in bed with eyes closed. No signs of acute distress. HR is WNL at 
this time and temperature has decreased. His breathing appears to be unlabored 
at this time with o2 sat of 100% on 15L NRB. Will cont. to monitor. No changes 
in mental status.

## 2020-10-23 NOTE — NUR
ED Nurse Note:



Patient taken to SDU unit at this time as ordered. He is awake and alert, no 
change in mental status. Patient is breathing normal on 15L oxygen via NRB. 
Oxygen saturation is stable along with other vital signs. He does not appear to 
be in any distress at this time. IV is patent on forearm. Patient transferred 
to unit without complication and transferred care to receiving nurse.

## 2020-10-23 NOTE — NUR
ED Nurse Note:



VSS. Pt repositioned and provided with blanket for comfort. No change in 
conditon. Safety measures met.

## 2020-10-23 NOTE — HISTORY & PHYSICAL
History and Physical


History & Physicial


Job #











Andrei Cancino MD                 Oct 23, 2020 21:19

## 2020-10-23 NOTE — DIAGNOSTIC IMAGING REPORT
EXAM:

  XR Chest, 1 View

 

CLINICAL HISTORY:

  SOB

 

TECHNIQUE:

  Frontal view of the chest.

 

COMPARISON:

  Chest radiograph on 9/12/2020

 

FINDINGS:

  Hardware: None.

 

  Lungs/pleura: Patchy opacities in the left greater than right lower 

lungs.  Emphysematous changes.  No pleural effusion or pneumothorax.

 

  Heart/mediastinum: Normal. No cardiomegaly.

 

  Soft tissues: Unremarkable.

 

  Bones: No acute fracture.  Right shoulder prosthesis partially 

visualized.  Degenerative changes of the acromioclavicular joints.

 

  Upper abdomen: Normal.

 

IMPRESSION:   

  Patchy opacities in left greater than right lower lungs which may 

represent atelectasis on the right and atelectasis versus pneumonia on 

the left.

## 2020-10-23 NOTE — NUR
NURSE NOTES:

Dr. Cancino rounded in bedside, made aware of recent labs: Na of 155 and lactic acid of 4.4. 
Dr. Cancino started on D5 E @ 75cc/hr. Will continue to monitor pt.

## 2020-10-23 NOTE — NUR
NURSE NOTES:

Received report from LITA Villarreal. Pt is transported via gurney, on rebreather mask @ 15 L 
O2 and saturating 100%. Pt opens eyes spontaneously but does not track, nonverbal. Sinus 
Rhythm on the cardiac monitor. IV on L wrist 24G and R forearm 22G both patent and intact. 
Skin issues noted and dressing changed. Cummings catheter is patent and intact and draining 
light rosa maria colored urine. Bed is in locked and in lowest position, bed alarm on, call light 
within reach, and head of bed is elevated. Will continue to monitor pt. Will continue with 
the plan of care.

## 2020-10-23 NOTE — NUR
ED Nurse Note:



Patient brought into ED by [facility] unit [id number] from [facility] 
for c/o fever and SOB. Upon ED arrival patient is on NRB at 15L oxygen with 90% 
o2 sat per EMS. Patient placed on cardiac monitor and oxygen saturation is 
noted at 96% on NRB 15L oxygen. He is coughing and has thick sputum production. 
Patient is awake and opens eyes spontaneously, does not follow commands and is 
nonverbal besides moaning at this time. Labored breathing noted. HR is also 
noted to be tachy at 106. ERMD is bedside. Per EMS, pt had COVID19 in September 
and sending facility stated he tested negative last week. Patient has rectal 
temp of 100.2F. All safety measures met; will continue to closely monitor for 
change in condition.

## 2020-10-24 VITALS — SYSTOLIC BLOOD PRESSURE: 98 MMHG | DIASTOLIC BLOOD PRESSURE: 63 MMHG

## 2020-10-24 VITALS — DIASTOLIC BLOOD PRESSURE: 69 MMHG | SYSTOLIC BLOOD PRESSURE: 118 MMHG

## 2020-10-24 VITALS — SYSTOLIC BLOOD PRESSURE: 136 MMHG | DIASTOLIC BLOOD PRESSURE: 76 MMHG

## 2020-10-24 VITALS — SYSTOLIC BLOOD PRESSURE: 98 MMHG | DIASTOLIC BLOOD PRESSURE: 62 MMHG

## 2020-10-24 VITALS — DIASTOLIC BLOOD PRESSURE: 72 MMHG | SYSTOLIC BLOOD PRESSURE: 104 MMHG

## 2020-10-24 VITALS — SYSTOLIC BLOOD PRESSURE: 114 MMHG | DIASTOLIC BLOOD PRESSURE: 81 MMHG

## 2020-10-24 VITALS — DIASTOLIC BLOOD PRESSURE: 73 MMHG | SYSTOLIC BLOOD PRESSURE: 108 MMHG

## 2020-10-24 LAB
ADD MANUAL DIFF: NO
ALBUMIN SERPL-MCNC: 2.1 G/DL (ref 3.4–5)
ALBUMIN SERPL-MCNC: 2.2 G/DL (ref 3.4–5)
ALP SERPL-CCNC: 99 U/L (ref 46–116)
ALT SERPL-CCNC: 19 U/L (ref 12–78)
ANION GAP SERPL CALC-SCNC: 9 MMOL/L (ref 5–15)
ANION GAP SERPL CALC-SCNC: 9 MMOL/L (ref 5–15)
AST SERPL-CCNC: 24 U/L (ref 15–37)
BASOPHILS NFR BLD AUTO: 1 % (ref 0–2)
BILIRUB DIRECT SERPL-MCNC: 0.1 MG/DL (ref 0–0.3)
BILIRUB SERPL-MCNC: 0.5 MG/DL (ref 0.2–1)
BUN SERPL-MCNC: 44 MG/DL (ref 7–18)
BUN SERPL-MCNC: 44 MG/DL (ref 7–18)
CALCIUM SERPL-MCNC: 8.1 MG/DL (ref 8.5–10.1)
CALCIUM SERPL-MCNC: 8.3 MG/DL (ref 8.5–10.1)
CHLORIDE SERPL-SCNC: 121 MMOL/L (ref 98–107)
CHLORIDE SERPL-SCNC: 122 MMOL/L (ref 98–107)
CO2 SERPL-SCNC: 24 MMOL/L (ref 21–32)
CO2 SERPL-SCNC: 25 MMOL/L (ref 21–32)
CREAT SERPL-MCNC: 1.6 MG/DL (ref 0.55–1.3)
CREAT SERPL-MCNC: 1.7 MG/DL (ref 0.55–1.3)
EOSINOPHIL NFR BLD AUTO: 0.2 % (ref 0–3)
ERYTHROCYTE [DISTWIDTH] IN BLOOD BY AUTOMATED COUNT: 14.5 % (ref 11.6–14.8)
HCT VFR BLD CALC: 42.3 % (ref 42–52)
HGB BLD-MCNC: 13.4 G/DL (ref 14.2–18)
LYMPHOCYTES NFR BLD AUTO: 23.1 % (ref 20–45)
MCV RBC AUTO: 93 FL (ref 80–99)
MONOCYTES NFR BLD AUTO: 6.6 % (ref 1–10)
NEUTROPHILS NFR BLD AUTO: 69.1 % (ref 45–75)
PHOSPHATE SERPL-MCNC: 3 MG/DL (ref 2.5–4.9)
PLATELET # BLD: 209 K/UL (ref 150–450)
POTASSIUM SERPL-SCNC: 3.5 MMOL/L (ref 3.5–5.1)
POTASSIUM SERPL-SCNC: 3.6 MMOL/L (ref 3.5–5.1)
RBC # BLD AUTO: 4.55 M/UL (ref 4.7–6.1)
SODIUM SERPL-SCNC: 154 MMOL/L (ref 136–145)
SODIUM SERPL-SCNC: 156 MMOL/L (ref 136–145)
WBC # BLD AUTO: 9.7 K/UL (ref 4.8–10.8)

## 2020-10-24 RX ADMIN — HEPARIN SODIUM SCH UNITS: 5000 INJECTION INTRAVENOUS; SUBCUTANEOUS at 08:43

## 2020-10-24 RX ADMIN — INSULIN ASPART SCH UNITS: 100 INJECTION, SOLUTION INTRAVENOUS; SUBCUTANEOUS at 11:21

## 2020-10-24 RX ADMIN — INSULIN ASPART SCH UNITS: 100 INJECTION, SOLUTION INTRAVENOUS; SUBCUTANEOUS at 06:23

## 2020-10-24 RX ADMIN — HEPARIN SODIUM SCH UNITS: 5000 INJECTION INTRAVENOUS; SUBCUTANEOUS at 20:18

## 2020-10-24 RX ADMIN — INSULIN ASPART SCH UNITS: 100 INJECTION, SOLUTION INTRAVENOUS; SUBCUTANEOUS at 20:15

## 2020-10-24 RX ADMIN — INSULIN ASPART SCH UNITS: 100 INJECTION, SOLUTION INTRAVENOUS; SUBCUTANEOUS at 16:30

## 2020-10-24 RX ADMIN — TAMSULOSIN HYDROCHLORIDE SCH MG: 0.4 CAPSULE ORAL at 08:39

## 2020-10-24 RX ADMIN — SODIUM CHLORIDE SCH MLS/HR: 0.9 INJECTION INTRAVENOUS at 17:51

## 2020-10-24 NOTE — NUR
NURSE NOTES:

Received report from Therese LONDON, patient in bed, awake/opens eyes spontaneously, noted 
mumbling words, SR on the cardiac monitor. On venturi @10L saturating well.  IV on L wrist 
24G running D5W @ 75 cc/hr , no signs of infiltration noted and R forearm 22G intact, patent 
and flushed well. With buck catheter is patent and intact and draining light rosa maria colored 
urine via gravity . Bed is in locked and in lowest position, bed alarm on, call light within 
reach, and head of bed is elevated. Will continue to monitor

## 2020-10-24 NOTE — CONSULTATION
Consult Note


Consult Note


I am asked to evaluate the patient at the request of Dr. Cancino for renal failure


Patient seen in [facility] this morning.  Patient examined.  Discussed with RN Luciano.





Emergency room note:





Patient is a 75-year-old male sent in from nursing facility for increased 

congestion and difficulty with breathing.  He was noted to have diminished 

oxygen saturation.  Had prior history of chronic debilitation.  Had previous h

istory of dementia.  Was noted to have fever up to 100.9 at his facility.  He 

had been noted to be short of breath and was started on nonrebreather.  Patient 

is known to be full code.  Patient is nonverbal at baseline.





Allergies:   No Known Allergies (Unverified , 8/20/12)





COVID-19 Screening


Contact w/high risk pt:  Yes


Experienced COVID-19 symptoms?:  Yes


COVID-19 symptoms experienced:  Fever (T>100.4F or >38C)








Hx Cardiac Problems:  Yes - ckd, gerd, bph, pna, bronchitis,


Hx Hypertension:  Yes


Hx Diabetes:  Yes - type 2


Hx Gastrointestinal Problems:  Yes - DYSPHAGIA


Hx Neurological Problems:  Yes


Hx Cerebrovascular Accident:  Yes - dysphagia


Hx Dementia:  Yes


Hx Alzheimer's Disease:  Yes


Hx Weakness:  Yes





.


General Appearance:  no apparent distress, confused, other - awake, poorly 

responsoive


Lines, tubes and drains:  peripheral


HEENT:  normocephalic, atraumatic, anicteric, other - NGT ,  VM 50%


Respiratory/Chest:  other - scattered rhonchi


Cardiovascular/Chest:  normal peripheral pulses, regular rhythm, occasional 

irregular beats


Abdomen:  normal bowel sounds, non tender, soft


Extremities:  no calf tenderness, normal capillary refill


Neurologic:  abnormal gait - bedridden


Musculoskeletal:  atrophy - BLE








LABORATORY AND DIAGNOSTIC DATA:  Telemetry strip showed sinus rhythm with


episodes of accelerated junctional rhythm that goes in and out of sinus


rhythm.  The rate of the junctional rhythm is around 90.  His labs show


white count of 7.5, hemoglobin of 13, hematocrit of 41, and platelet count


is 179,000.  His sodium 148, potassium is 3.6, BUN of 22, creatinine 1.6,


and glucose of 123.  His initial troponin was 0.032.





.


Assessment/Plan





Plan:


75-year-old male is admitted with acute hypoxic respiratory failure most likely 

secondary to pneumonia and sepsis, UTI.


Acute on chronic renal failure


Dehydration


Electrolyte imbalances, hypernatremia


Hypoalbuminemia


Diabetes type 2


History of congestive heart failure


Hypertension


Hyperlipemia


Alzheimer's


Previous COVID-19 infection in September 2020











Suggestions:


Patient is n.p.o., will continue on IV fluid of D5W 75 cc an hour


We will monitor electrolytes and renal parameters


Avoid nephrotoxic's


Start p.o. when he clears by speech therapist, meanwhile aspiration precautions


Keep the blood pressure and blood sugar in check


Per orders























Seven Tobar MD            Oct 24, 2020 19:31

## 2020-10-24 NOTE — NUR
NURSE NOTES:

Noted pt gurgling, deep suction done. On Non-rebreather mask at 15, saturating at 100%. No 
acute distress noted at this time. Will continue to monitor.

## 2020-10-24 NOTE — NUR
NURSE NOTES:

Pt gurgling noted, deep suction done, sputum sent for culture. On Non-rebreather mask at 15, 
saturating at 100%. No acute distress noted at this time. Will continue to monitor.

## 2020-10-24 NOTE — NUR
NURSE HAND-OFF REPORT: 



Important Events on Shift: new admission

Patient Status: stable, full code

Diet: NPO



Pending Orders: none

Pending Results/Labs: isolation swab

Pending MD notification:none



Latest Vital Signs: Temperature 97.2 , Pulse 78 , B/P 104 /72 , Respiratory Rate 20 , O2 SAT 
100 , Non-Rebreather, O2 Flow Rate 15.0 .  

Vital Sign Comment: stable



EKG Rhythm: Sinus Rhythm

Rhythm change?: N 

MD Notified?: -

MD Response: 



Latest Mejia Fall Score: 70  

Fall Risk: High Risk 

Safety Measures: Call light Within Reach, Bed Alarm Zone 1, Side Rails Side Rails x3, Bed 
position Low and Locked.

Fall Precautions: 

Yellow Socks

Yellow Gown

Door Sign

Patient Fall Education



Report given to LITA Wolf

## 2020-10-24 NOTE — NUR
NURSE NOTES:

RT AT BEDSIDE, DEEP SUCTIONING PERFORMED DUE TO CONGESTION/AUDIBLE GURGLING. O2 INCREASED TO 
4LPM VIA NC, SAO2 96%. WILL CONTINUE TO MONITOR.

## 2020-10-24 NOTE — INTERNAL MED PROGRESS NOTE
Subjective


Date of Service:  Oct 24, 2020


Physician Name


Benito Hearn


Attending Physician


Andrei Cancino MD





Current Medications








 Medications


  (Trade)  Dose


 Ordered  Sig/Miky


 Route


 PRN Reason  Start Time


 Stop Time Status Last Admin


Dose Admin


 


 Acetaminophen


  (Tylenol)  650 mg  Q4H  PRN


 ORAL


 Temp >100.5  10/23/20 20:30


 11/22/20 20:29   





 


 Albuterol/


 Ipratropium


  (Albuterol/


 Ipratropium)  3 ml  Q4H  PRN


 HHN


 Shortness of Breath  10/23/20 20:30


 10/28/20 20:29   





 


 Barium Sulfate


  (Varibar Honey)  250 ml  NOW  PRN


 MC


 RAD  10/23/20 21:00


 10/26/20 20:45   





 


 Barium Sulfate


  (Varibar Nectar)  240 ml  NOW  PRN


 MC


 RAD  10/23/20 21:00


 10/26/20 20:45   





 


 Barium Sulfate


  (Varibar Pudding)  230 ml  NOW  PRN


 MC


 RAD  10/23/20 21:00


 10/26/20 20:45   





 


 Barium Sulfate


  (Varibar Thin


 Liquid powder)  148 gm  NOW  PRN


 MC


 RAD  10/23/20 21:00


 10/26/20 20:45   





 


 Cefepime HCl 1 gm/


 Sodium Chloride  55 ml @ 


 110 mls/hr  Q24H


 IV


   10/24/20 18:00


 10/31/20 17:59   





 


 Dextrose  1,000 ml @ 


 75 mls/hr  [id number]


 IV


   10/23/20 22:45


 11/22/20 22:44  10/24/20 12:11





 


 Dextrose


  (Dextrose 50%)  50 ml  Q30M  PRN


 IV


 Hypoglycemia  10/23/20 22:45


 1/21/21 22:44   





 


 Heparin Sodium


  (Porcine)


  (Heparin 5000


 units/ml)  5,000 units  EVERY 12  HOURS


 SUBQ


   10/23/20 21:00


 12/7/20 20:59  10/24/20 08:43





 


 Insulin Aspart


  (NovoLOG)    BEFORE MEALS AND  HS


 SUBQ


   10/24/20 06:30


 1/22/21 06:29  10/24/20 06:23





 


 Nitroglycerin


  (Ntg)  0.4 mg  Q5M  PRN


 SL


 Prn Chest Pain  10/23/20 20:30


 11/22/20 20:29   





 


 Ondansetron HCl


  (Zofran)  4 mg  Q6H  PRN


 IVP


 Nausea & Vomiting  10/23/20 20:30


 11/22/20 20:29   





 


 Polyethylene


 Glycol


  (Miralax)  17 gm  DAILYPRN  PRN


 ORAL


 Constipation  10/23/20 20:30


 11/22/20 20:29   





 


 Promethazine HCl/


 Codeine


  (Phenergan with


 Codeine)  5 ml  Q4H  PRN


 ORAL


 For Cough  10/23/20 20:30


 11/22/20 20:29   





 


 Tamsulosin HCl


  (Flomax)  0.4 mg  DAILY


 ORAL


   10/24/20 09:00


 11/23/20 08:59   





 


 Temazepam


  (Restoril)  15 mg  HSPRN  PRN


 ORAL


 Insomnia  10/23/20 20:30


 10/30/20 20:29   





 


 Vancomycin HCl


  ([facility]


 to dose)  1 ea  DAILY  PRN


 MISC


 Per rx protocol  10/23/20 20:30


 11/22/20 20:29   











Allergies:  


Coded Allergies:  


     No Known Allergies (Unverified , 8/20/12)


ROS Limited/Unobtainable:  Yes


Subjective


74 YO M admitted with shortness of breath.  Now pneumonia.  Cover for Int med-Dr Cancino





Objective





Last Vital Signs








  Date Time  Temp Pulse Resp B/P (MAP) Pulse Ox O2 Delivery O2 Flow Rate FiO2


 


10/24/20 16:39  85      


 


10/24/20 16:35    136/76 (96)    


 


10/24/20 16:00 97.9  20  98   


 


10/24/20 16:00      Nasal Cannula 3.0 











Laboratory Tests








Test


 10/23/20


17:30 10/23/20


17:37 10/23/20


17:47 10/23/20


18:25


 


White Blood Count


 12.3 K/UL


(4.8-10.8)  H 


 


 





 


Red Blood Count


 5.49 M/UL


(4.70-6.10) 


 


 





 


Hemoglobin


 16.4 G/DL


(14.2-18.0) 


 


 





 


Hematocrit


 51.8 %


(42.0-52.0) 


 


 





 


Mean Corpuscular Volume 94 FL (80-99)     


 


Mean Corpuscular Hemoglobin


 29.9 PG


(27.0-31.0) 


 


 





 


Mean Corpuscular Hemoglobin


Concent 31.7 G/DL


(32.0-36.0)  L 


 


 





 


Red Cell Distribution Width


 15.6 %


(11.6-14.8)  H 


 


 





 


Platelet Count


 246 K/UL


(150-450) 


 


 





 


Mean Platelet Volume


 9.2 FL


(6.5-10.1) 


 


 





 


Neutrophils (%) (Auto)


 75.2 %


(45.0-75.0)  H 


 


 





 


Lymphocytes (%) (Auto)


 18.0 %


(20.0-45.0)  L 


 


 





 


Monocytes (%) (Auto)


 6.1 %


(1.0-10.0) 


 


 





 


Eosinophils (%) (Auto)


 0.0 %


(0.0-3.0) 


 


 





 


Basophils (%) (Auto)


 0.7 %


(0.0-2.0) 


 


 





 


Sodium Level


 155 MMOL/L


(136-145)  H 


 


 





 


Potassium Level


 4.2 MMOL/L


(3.5-5.1) 


 


 





 


Chloride Level


 116 MMOL/L


()  H 


 


 





 


Carbon Dioxide Level


 30 MMOL/L


(21-32) 


 


 





 


Blood Urea Nitrogen


 53 mg/dL


(7-18)  H 


 


 





 


Creatinine


 2.4 MG/DL


(0.55-1.30)  H 


 


 





 


Estimat Glomerular Filtration


Rate 26.4 mL/min


(>60) 


 


 





 


Glucose Level


 178 MG/DL


()  H 


 


 





 


Lactic Acid Level


 3.90 mmol/L


(0.4-2.0)  H 


 


 





 


Calcium Level


 9.5 MG/DL


(8.5-10.1) 


 


 





 


Phosphorus Level


 4.7 MG/DL


(2.5-4.9) 


 


 





 


Magnesium Level


 2.8 MG/DL


(1.8-2.4)  H 


 


 





 


Total Bilirubin


 0.8 MG/DL


(0.2-1.0) 


 


 





 


Aspartate Amino Transf


(AST/SGOT) 20 U/L (15-37)


 


 


 





 


Alanine Aminotransferase


(ALT/SGPT) 19 U/L (12-78)


 


 


 





 


Alkaline Phosphatase


 126 U/L


()  H 


 


 





 


Total Creatine Kinase


 101 U/L


() 


 


 





 


Creatine Kinase MB


 0.8 NG/ML


(0.0-3.6) 


 


 





 


Creatine Kinase MB Relative


Index 0.7  


 


 


 





 


Troponin I


 0.032 ng/mL


(0.000-0.056) 


 


 





 


Pro-B-Type Natriuretic Peptide


 1253 pg/mL


(0-125)  H 


 


 





 


Total Protein


 7.6 G/DL


(6.4-8.2) 


 


 





 


Albumin


 2.6 G/DL


(3.4-5.0)  L 


 


 





 


Globulin 5.0 g/dL     


 


Albumin/Globulin Ratio


 0.5 (1.0-2.7)


L 


 


 





 


Urine Color  Yellow    


 


Urine Appearance


 


 Slightly


cloudy 


 





 


Urine pH  5 (4.5-8.0)    


 


Urine Specific Gravity


 


 1.020


(1.005-1.035) 


 





 


Urine Protein


 


 2+ (NEGATIVE)


H 


 





 


Urine Glucose (UA)


 


 Negative


(NEGATIVE) 


 





 


Urine Ketones


 


 1+ (NEGATIVE)


H 


 





 


Urine Blood


 


 1+ (NEGATIVE)


H 


 





 


Urine Nitrite


 


 Negative


(NEGATIVE) 


 





 


Urine Bilirubin


 


 Negative


(NEGATIVE) 


 





 


Urine Urobilinogen


 


 1 MG/DL


(0.0-1.0)  H 


 





 


Urine Leukocyte Esterase


 


 1+ (NEGATIVE)


H 


 





 


Urine RBC


 


 5-10 /HPF (0 -


0)  H 


 





 


Urine WBC


 


 15-20 /HPF (0


- 0)  H 


 





 


Urine Squamous Epithelial


Cells 


 None /LPF


(NONE/OCC) 


 





 


Urine Transitional Epithelial


Cells 


  /LPF (NONE)  


 


 





 


Urine Bacteria


 


 Moderate /HPF


(NONE)  H 


 





 


Urine Hyaline Casts


 


 0-2 /LPF


(NONE)  H 


 





 


Urine Mucus


 


 Moderate /LPF


(NONE/OCC)  H 


 





 


POC Whole Blood Glucose


 


 


 176 MG/DL


()  H 





 


Arterial Blood pH


 


 


 


 7.387


(7.350-7.450)


 


Arterial Blood Partial


Pressure CO2 


 


 


 31.1 mmHg


(35.0-45.0)  L


 


Arterial Blood Partial


Pressure O2 


 


 


 74.1 mmHg


(75.0-100.0)  L


 


Arterial Blood HCO3


 


 


 


 18.3 mmol/L


(22.0-26.0)  L


 


Arterial Blood Oxygen


Saturation 


 


 


 93.8 %


()  L


 


Arterial Blood Base Excess    -5.5 (-2-2)  L


 


Jerod Test    Positive  


 


Test


 10/23/20


19:40 10/24/20


03:20 10/24/20


03:25 10/24/20


06:20


 


Lactic Acid Level


 4.40 mmol/L


(0.66-2.22)  H 


 1.60 mmol/L


(0.4-2.0) 





 


Total Bilirubin


 


 0.5 MG/DL


(0.2-1.0) 


 





 


Direct Bilirubin


 


 0.1 MG/DL


(0.0-0.3) 


 





 


Aspartate Amino Transf


(AST/SGOT) 


 24 U/L (15-37)


 


 





 


Alanine Aminotransferase


(ALT/SGPT) 


 19 U/L (12-78)


 


 





 


Alkaline Phosphatase


 


 99 U/L


() 


 





 


Total Protein


 


 5.6 G/DL


(6.4-8.2)  L 


 





 


Albumin


 


 2.2 G/DL


(3.4-5.0)  L 2.1 G/DL


(3.4-5.0)  L 





 


White Blood Count


 


 


 9.7 K/UL


(4.8-10.8) 





 


Red Blood Count


 


 


 4.55 M/UL


(4.70-6.10)  L 





 


Hemoglobin


 


 


 13.4 G/DL


(14.2-18.0)  L 





 


Hematocrit


 


 


 42.3 %


(42.0-52.0) 





 


Mean Corpuscular Volume   93 FL (80-99)   


 


Mean Corpuscular Hemoglobin


 


 


 29.6 PG


(27.0-31.0) 





 


Mean Corpuscular Hemoglobin


Concent 


 


 31.8 G/DL


(32.0-36.0)  L 





 


Red Cell Distribution Width


 


 


 14.5 %


(11.6-14.8) 





 


Platelet Count


 


 


 209 K/UL


(150-450) 





 


Mean Platelet Volume


 


 


 9.9 FL


(6.5-10.1) 





 


Neutrophils (%) (Auto)


 


 


 69.1 %


(45.0-75.0) 





 


Lymphocytes (%) (Auto)


 


 


 23.1 %


(20.0-45.0) 





 


Monocytes (%) (Auto)


 


 


 6.6 %


(1.0-10.0) 





 


Eosinophils (%) (Auto)


 


 


 0.2 %


(0.0-3.0) 





 


Basophils (%) (Auto)


 


 


 1.0 %


(0.0-2.0) 





 


Sodium Level


 


 


 156 MMOL/L


(136-145)  H 





 


Potassium Level


 


 


 3.5 MMOL/L


(3.5-5.1) 





 


Chloride Level


 


 


 122 MMOL/L


()  H 





 


Carbon Dioxide Level


 


 


 25 MMOL/L


(21-32) 





 


Anion Gap


 


 


 9 mmol/L


(5-15) 





 


Blood Urea Nitrogen


 


 


 44 mg/dL


(7-18)  H 





 


Creatinine


 


 


 1.7 MG/DL


(0.55-1.30)  H 





 


Estimat Glomerular Filtration


Rate 


 


 39.3 mL/min


(>60) 





 


Glucose Level


 


 


 154 MG/DL


()  H 





 


Calcium Level


 


 


 8.3 MG/DL


(8.5-10.1)  L 





 


Phosphorus Level


 


 


 3.0 MG/DL


(2.5-4.9) 





 


Random Vancomycin Level   11.5 ug/mL   


 


POC Whole Blood Glucose


 


 


 


 141 MG/DL


()  H


 


Test


 10/24/20


11:20 10/24/20


16:43 


 





 


POC Whole Blood Glucose


 123 MG/DL


()  H 129 MG/DL


()  H 


 














Microbiology








 Date/Time


Source Procedure


Growth Status





 


 10/24/20 04:00


Sputum Gram Stain - Final Resulted


 


 10/24/20 04:00


Sputum Sputum Culture


Pending Resulted


 


 10/23/20 17:37


Urine,Clean Catch Urine Culture - Preliminary


NO GROWTH Resulted


 


 10/23/20 17:30


Nasopharynx SARS-CoV-2 RdRp Gene Assay - Final Complete





 10/23/20 17:30


Nasal Nares - Final Complete


 


 10/23/20 17:30


Nasal Nares - Final Complete

















Intake and Output  


 


 10/23/20 10/24/20





 18:59 06:59


 


Intake Total  377.5 ml


 


Output Total  300 ml


 


Balance  77.5 ml


 


  


 


Intake Oral  0 ml


 


IV Total  377.5 ml


 


Output Urine Total  300 ml


 


# Bowel Movements  3








Objective


PHYSICAL EXAMINATION:


GENERAL:  The patient awake with deep stimuli, open his eyes, however,


cannot follow commands.  The patient is on a Ventimask at this time,


chronically ill-appearing.


HEAD AND NECK:  Pupils are equal and reactive to light.  Anicteric.


NECK:  Supple.  No JVD.


LUNGS:  Good air entry.  No wheezing or rhonchi, however the patient has a


coarse breath sounds.  Decreased air in bases.


HEART:  S1, S2.  Regular rhythm.  Distant heart sounds.  No murmur or


gallop.


ABDOMEN:  Soft, nondistended, nontender.  Positive bowel sounds.


EXTREMITIES:  No cyanosis, clubbing, or edema.


NEUROLOGIC:  Very limited secondary to the patient's status, cannot follow


commands.  Opens his eyes with deep stimuli and moving extremities


spontaneously.





Assessment/Plan


Assessment/Plan


ASSESSMENT:


1. Acute hypoxemic respiratory failure, most likely secondary to


pneumonia and sepsis.


2. Sepsis secondary to urinary tract infection and pneumonia.


3. Possible aspiration pneumonia.


4. Acute kidney injury on chronic renal insufficiency.


5. Dehydration.


6. History of chronic congestive heart failure.


7. Diabetes type 2.


8. Dyslipidemia.


9. Hypertension.


10. Alzheimer's disease.


11. History of COVID-19 infection in September 2020.





PLAN:


1.  Admit the patient to step-down.


2.  Dr. Sandoval,=Pulmonary Critical Care


3.  Dr. Garcia = Inf Dis.


4.  IV= D5W due to the hypernatremia and dehydration.


5.  antibiotics = vancomycin, cefepime and Flagyl.


6.  Code status is full code.


7.    DVT prophylaxis is heparin subcutaneous.











Benito Hearn MD               Oct 24, 2020 17:00

## 2020-10-24 NOTE — CONSULTATION
MRN:[id number]                      After Visit Summary   1/11/2017    Adiel Rosa Jr    MRN: [id number]           Thank you!     Thank you for choosing [facility] for your care. Our goal is always to provide you with excellent care. Hearing back from our patients is one way we can continue to improve our services. Please take a few minutes to complete the written survey that you may receive in the mail after you visit with us. Thank you!        Patient Information     Date Of Birth          1939        About your hospital stay     You were admitted on:  January 11, 2017 [facility]    You were discharged on:  January 16, 2017        Reason for your hospital stay       Adiel Rosa Jr is a 77 year old man who presented to the ED with hypoglycemia and fall at home. He reports difficulty with his insulin supply and enough food at home. In hospital, evaluation of a right foot ulcer demonstrated evidence of osteomyelitis. He adamantly declinced amputation which was recommended; He agreed to continued antibiotic treatment and dressing changes, with discussion indicating a low probability of these being effective. Insulin regimen was decreased from his previous because of relatively low glucose levels; further adjustment likely will be needed.                  Who to Call     For medical emergencies, please call 911.  For non-urgent questions about your medical care, please call your primary care provider or clinic, [phone]          Attending Provider     Provider    Justin Lovell MD Lundquist, Salena R, Juan Malagon MD Rahman, Hina, MD McKibben, Andrew W, MD       Primary Care Provider Office Phone # Fax #    R Hao Grijalva [phone]       [facility] [place] [id number]        After Care Instructions     Activity       Your activity upon discharge: activity as tolerated using  History of Present Illness


General


Date patient seen:  Oct 24, 2020


Time patient seen:  11:00


Chief Complaint:  Generalized Weakness


Referring physician:  Dr. Cancino


Reason for Consultation:  Sepsis





Present Illness


HPI





75-year-old male sent in from nursing facility for increased congestion and 

difficulty with breathing.  He was noted to have diminished oxygen saturation.  

Had prior history of chronic debilitation.  Had previous history of dementia.  

Was noted to have fever up to 100.9 at his facility.  He had been noted to be 

short of breath and was started on nonrebreather.  Patient is known to be full 

code.  Patient is nonverbal at baseline.





As pt nonverbal, history obtained via chart review.


Allergies:  


Coded Allergies:  


     No Known Allergies (Unverified , 8/20/12)





Medication History


Scheduled


Alendronate Sodium* (Fosamax*), 70 MG ORAL ONCE A WEEK, (Reported)


Amlodipine Besylate (Norvasc), 5 MG ORAL DAILY, (Reported)


Amlodipine Besylate* (Amlodipine Besylate*), 5 MG ORAL DAILY, (Reported)


Ascorbic Acid* (Vitamin C*), 500 MG ORAL DAILY, (Reported)


Aspirin* (Aspirin*), 81 MG ORAL DAILY, (Reported)


Atorvastatin Calcium* (Lipitor*), 10 MG ORAL BEDTIME, (Reported)


Baclofen (Baclofen), 5 MG PO DAILY, (Reported)


Calcium Carbonate/Vitamin D3 (Calcium 500+D Tablet Chew), 1 EACH PO DAILY, 

(Reported)


Cranberry Fruit Concentrate (Cranberry), 450 MG PO BID, (Reported)


Docusate Sodium* (Colace*), 200 MG ORAL DAILY, (Reported)


Donepezil Hcl* (Donepezil Hcl*), 10 MG ORAL DAILY, (Reported)


Furosemide* (Lasix*), 20 MG ORAL DAILY, (Reported)


Multivitamin With Minerals (Multivitamins With Minerals*), 1 TAB ORAL DAILY, 

(Reported)


Potassium Chloride* (K-Dur*), 10 MEQ ORAL DAILY, (Reported)


Tamsulosin HCl (Flomax), 0.4 MG ORAL DAILY, (Reported)


Zinc Sulfate (Zinc Sulfate*), 220 MG ORAL DAILY, (Reported)





Scheduled PRN


Acetaminophen (Tylenol), 650 MG ORAL Q6H PRN for Fever/Headache/Mild Pain, 

(Reported)


Nitroglycerin (Nitroglycerin), 0.4 MG SL AS NEEDED PRN for CHEST PAIN, 

(Reported)





Patient History


Healthcare decision maker


N


Resuscitation status





Advanced Directive on File








Review of Systems


ROS Narrative


Unable to assess 2/2 pt condition





Physical Exam


Physical Exam Narrative


Gen: NAD


HEENT: NCAT


CV: RRR


Pulm: Rhonchi anteriorly, lots of oral secretions


Abd: Soft, NTND


Ext: No c/c/e


Neuro: Awake





Last 24 Hour Vital Signs








  Date Time  Temp Pulse Resp B/P (MAP) Pulse Ox O2 Delivery O2 Flow Rate FiO2


 


10/24/20 04:00  78      


 


10/24/20 04:00       15.0 


 


10/24/20 04:00 97.2 82 20 104/72 (83) 100   





  82      


 


10/24/20 04:00      Non-Rebreather 15.0 


 


10/24/20 00:00       15.0 


 


10/24/20 00:00      Non-Rebreather 15.0 


 


10/24/20 00:00 97.0 73 16 114/81 (92) 97   


 


10/23/20 23:28  90      


 


10/23/20 22:05  95      


 


10/23/20 21:10 98.8 78 20 115/80 100 Non-Rebreather 15.0 


 


10/23/20 20:56      Non-Rebreather 15.0 


 


10/23/20 20:30 97.9 80 22 125/89 100 Non-Rebreather 15.0 


 


10/23/20 19:15 97.9 95 22 125/94 100 Non-Rebreather 15.0 


 


10/23/20 18:26 97.9       


 


10/23/20 17:05  106 24   Non-Rebreather 15.0 


 


10/23/20 17:05 100.2 106 24 113/74 96 Non-Rebreather 15.0 


 


10/23/20 17:04 98.8 108 25 108/58 (75) 90 Non-Rebreather  

















Intake and Output  


 


 10/23/20 10/24/20





 19:00 07:00


 


Intake Total  377.5 ml


 


Output Total  300 ml


 


Balance  77.5 ml


 


  


 


Intake Oral  0 ml


 


IV Total  377.5 ml


 


Output Urine Total  300 ml


 


# Bowel Movements  3











Laboratory Tests








Test


 10/23/20


17:30 10/23/20


17:37 10/23/20


17:47 10/23/20


18:25


 


White Blood Count


 12.3 K/UL


(4.8-10.8)  H 


 


 





 


Red Blood Count


 5.49 M/UL


(4.70-6.10) 


 


 





 


Hemoglobin


 16.4 G/DL


(14.2-18.0) 


 


 





 


Hematocrit


 51.8 %


(42.0-52.0) 


 


 





 


Mean Corpuscular Volume 94 FL (80-99)     


 


Mean Corpuscular Hemoglobin


 29.9 PG


(27.0-31.0) 


 


 





 


Mean Corpuscular Hemoglobin


Concent 31.7 G/DL


(32.0-36.0)  L 


 


 





 


Red Cell Distribution Width


 15.6 %


(11.6-14.8)  H 


 


 





 


Platelet Count


 246 K/UL


(150-450) 


 


 





 


Mean Platelet Volume


 9.2 FL


(6.5-10.1) 


 


 





 


Neutrophils (%) (Auto)


 75.2 %


(45.0-75.0)  H 


 


 





 


Lymphocytes (%) (Auto)


 18.0 %


(20.0-45.0)  L 


 


 





 


Monocytes (%) (Auto)


 6.1 %


(1.0-10.0) 


 


 





 


Eosinophils (%) (Auto)


 0.0 %


(0.0-3.0) 


 


 





 


Basophils (%) (Auto)


 0.7 %


(0.0-2.0) 


 


 





 


Sodium Level


 155 MMOL/L


(136-145)  H 


 


 





 


Potassium Level


 4.2 MMOL/L


(3.5-5.1) 


 


 





 


Chloride Level


 116 MMOL/L


()  H 


 


 





 


Carbon Dioxide Level


 30 MMOL/L


(21-32) 


 


 





 


Blood Urea Nitrogen


 53 mg/dL


(7-18)  H 


 


 





 


Creatinine


 2.4 MG/DL


(0.55-1.30)  H 


 


 





 


Estimat Glomerular Filtration


Rate 26.4 mL/min


(>60) 


 


 





 


Glucose Level


 178 MG/DL


()  H 


 


 





 


Lactic Acid Level


 3.90 mmol/L


(0.4-2.0)  H 


 


 





 


Calcium Level


 9.5 MG/DL


(8.5-10.1) 


 


 





 


Phosphorus Level


 4.7 MG/DL


(2.5-4.9) 


 


 





 


Magnesium Level


 2.8 MG/DL


(1.8-2.4)  H 


 


 





 


Total Bilirubin


 0.8 MG/DL


(0.2-1.0) 


 


 





 


Aspartate Amino Transf


(AST/SGOT) 20 U/L (15-37)


 


 


 





 


Alanine Aminotransferase


(ALT/SGPT) 19 U/L (12-78)


 


 


 





 


Alkaline Phosphatase


 126 U/L


()  H 


 


 





 


Total Creatine Kinase


 101 U/L


() 


 


 





 


Creatine Kinase MB


 0.8 NG/ML


(0.0-3.6) 


 


 





 


Creatine Kinase MB Relative


Index 0.7  


 


 


 





 


Troponin I


 0.032 ng/mL


(0.000-0.056) 


 


 





 


Pro-B-Type Natriuretic Peptide


 1253 pg/mL


(0-125)  H 


 


 





 


Total Protein


 7.6 G/DL


(6.4-8.2) 


 


 





 


Albumin


 2.6 G/DL


(3.4-5.0)  L 


 


 





 


Globulin 5.0 g/dL     


 


Albumin/Globulin Ratio


 0.5 (1.0-2.7)


L 


 


 





 


Urine Color  Yellow    


 


Urine Appearance


 


 Slightly


cloudy 


 





 


Urine pH  5 (4.5-8.0)    


 


Urine Specific Gravity


 


 1.020


(1.005-1.035) 


 





 


Urine Protein


 


 2+ (NEGATIVE)


H 


 





 


Urine Glucose (UA)


 


 Negative


(NEGATIVE) 


 





 


Urine Ketones


 


 1+ (NEGATIVE)


H 


 





 


Urine Blood


 


 1+ (NEGATIVE)


H 


 





 


Urine Nitrite


 


 Negative


(NEGATIVE) 


 





 


Urine Bilirubin


 


 Negative


(NEGATIVE) 


 





 


Urine Urobilinogen


 


 1 MG/DL


(0.0-1.0)  H 


 





 


Urine Leukocyte Esterase


 


 1+ (NEGATIVE)


H 


 





 


Urine RBC


 


 5-10 /HPF (0 -


0)  H 


 





 


Urine WBC


 


 15-20 /HPF (0


- 0)  H 


 





 


Urine Squamous Epithelial


Cells 


 None /LPF


(NONE/OCC) 


 





 


Urine Transitional Epithelial


Cells 


  /LPF (NONE)  


 


 





 


Urine Bacteria


 


 Moderate /HPF


(NONE)  H 


 





 


Urine Hyaline Casts


 


 0-2 /LPF


(NONE)  H 


 





 


Urine Mucus


 


 Moderate /LPF


(NONE/OCC)  H 


 





 


POC Whole Blood Glucose


 


 


 176 MG/DL


()  H 





 


Arterial Blood pH


 


 


 


 7.387


(7.350-7.450)


 


Arterial Blood Partial


Pressure CO2 


 


 


 31.1 mmHg


(35.0-45.0)  L


 


Arterial Blood Partial


Pressure O2 


 


 


 74.1 mmHg


(75.0-100.0)  L


 


Arterial Blood HCO3


 


 


 


 18.3 mmol/L


(22.0-26.0)  L


 


Arterial Blood Oxygen


Saturation 


 


 


 93.8 %


()  L


 


Arterial Blood Base Excess    -5.5 (-2-2)  L


 


Jerod Test    Positive  


 


Test


 10/23/20


19:40 10/24/20


03:25 10/24/20


06:20 





 


Lactic Acid Level


 4.40 mmol/L


(0.66-2.22)  H 1.60 mmol/L


(0.4-2.0) 


 





 


White Blood Count


 


 9.7 K/UL


(4.8-10.8) 


 





 


Red Blood Count


 


 4.55 M/UL


(4.70-6.10)  L 


 





 


Hemoglobin


 


 13.4 G/DL


(14.2-18.0)  L 


 





 


Hematocrit


 


 42.3 %


(42.0-52.0) 


 





 


Mean Corpuscular Volume  93 FL (80-99)    


 


Mean Corpuscular Hemoglobin


 


 29.6 PG


(27.0-31.0) 


 





 


Mean Corpuscular Hemoglobin


Concent 


 31.8 G/DL


(32.0-36.0)  L 


 





 


Red Cell Distribution Width


 


 14.5 %


(11.6-14.8) 


 





 


Platelet Count


 


 209 K/UL


(150-450) 


 





 


Mean Platelet Volume


 


 9.9 FL


(6.5-10.1) 


 





 


Neutrophils (%) (Auto)


 


 69.1 %


(45.0-75.0) 


 





 


Lymphocytes (%) (Auto)


 


 23.1 %


(20.0-45.0) 


 





 


Monocytes (%) (Auto)


 


 6.6 %


(1.0-10.0) 


 





 


Eosinophils (%) (Auto)


 


 0.2 %


(0.0-3.0) 


 





 


Basophils (%) (Auto)


 


 1.0 %


(0.0-2.0) 


 





 


Sodium Level


 


 156 MMOL/L


(136-145)  H 


 





 


Potassium Level


 


 3.5 MMOL/L


(3.5-5.1) 


 





 


Chloride Level


 


 122 MMOL/L


()  H 


 





 


Carbon Dioxide Level


 


 25 MMOL/L


(21-32) 


 





 


Anion Gap


 


 9 mmol/L


(5-15) 


 





 


Blood Urea Nitrogen


 


 44 mg/dL


(7-18)  H 


 





 


Creatinine


 


 1.7 MG/DL


(0.55-1.30)  H 


 





 


Estimat Glomerular Filtration


Rate 


 39.3 mL/min


(>60) 


 





 


Glucose Level


 


 154 MG/DL


()  H 


 





 


Calcium Level


 


 8.3 MG/DL


(8.5-10.1)  L 


 





 


Phosphorus Level


 


 3.0 MG/DL


(2.5-4.9) 


 





 


Albumin


 


 2.1 G/DL


(3.4-5.0)  L 


 





 


Random Vancomycin Level  11.5 ug/mL    


 


POC Whole Blood Glucose


 


 


 141 MG/DL


()  H 














Microbiology








 Date/Time


Source Procedure


Growth Status





 


 10/23/20 17:37


Urine,Clean Catch Urine Culture - Preliminary


NO GROWTH Resulted


 


 10/23/20 17:30


Nasopharynx SARS-CoV-2 RdRp Gene Assay - Final Complete





 10/23/20 17:30


Nasal Nares - Final Complete


 


 10/23/20 17:30


Nasal Nares - Final Complete








Height (Feet):  5


Height (Inches):  4.00


Weight (Pounds):  130


Medications





Current Medications








 Medications


  (Trade)  Dose


 Ordered  Sig/Miky


 Route


 PRN Reason  Start Time


 Stop Time Status Last Admin


Dose Admin


 


 Acetaminophen


  (Tylenol)  650 mg  Q4H  PRN


 ORAL


 Temp >100.5  10/23/20 20:30


 11/22/20 20:29   





 


 Albuterol/


 Ipratropium


  (Albuterol/


 Ipratropium)  3 ml  Q4H  PRN


 HHN


 Shortness of Breath  10/23/20 20:30


 10/28/20 20:29   





 


 Barium Sulfate


  (Varibar Honey)  250 ml  NOW  PRN


 MC


 RAD  10/23/20 21:00


 10/26/20 20:45   





 


 Barium Sulfate


  (Varibar Nectar)  240 ml  NOW  PRN


 MC


 RAD  10/23/20 21:00


 10/26/20 20:45   





 


 Barium Sulfate


  (Varibar Pudding)  230 ml  NOW  PRN


 MC


 RAD  10/23/20 21:00


 10/26/20 20:45   





 


 Barium Sulfate


  (Varibar Thin


 Liquid powder)  148 gm  NOW  PRN


 MC


 RAD  10/23/20 21:00


 10/26/20 20:45   





 


 Cefepime HCl 1 gm/


 Sodium Chloride  55 ml @ 


 110 mls/hr  Q24H


 IV


   10/24/20 18:00


 10/31/20 17:59   





 


 Dextrose  1,000 ml @ 


 75 mls/hr  [id number]


 IV


   10/23/20 22:45


 11/22/20 22:44  10/23/20 22:58





 


 Dextrose


  (Dextrose 50%)  50 ml  Q30M  PRN


 IV


 Hypoglycemia  10/23/20 22:45


 1/21/21 22:44   





 


 Heparin Sodium


  (Porcine)


  (Heparin 5000


 units/ml)  5,000 units  EVERY 12  HOURS


 SUBQ


   10/23/20 21:00


 12/7/20 20:59  10/23/20 21:54





 


 Insulin Aspart


  (NovoLOG)    BEFORE MEALS AND  HS


 SUBQ


   10/24/20 06:30


 1/22/21 06:29  10/24/20 06:23





 


 Metronidazole  100 ml @ 


 100 mls/hr  Q12HR


 IVPB


   10/24/20 09:00


 10/31/20 08:59   





 


 Nitroglycerin


  (Ntg)  0.4 mg  Q5M  PRN


 SL


 Prn Chest Pain  10/23/20 20:30


 11/22/20 20:29   





 


 Ondansetron HCl


  (Zofran)  4 mg  Q6H  PRN


 IVP


 Nausea & Vomiting  10/23/20 20:30


 11/22/20 20:29   





 


 Polyethylene


 Glycol


  (Miralax)  17 gm  DAILYPRN  PRN


 ORAL


 Constipation  10/23/20 20:30


 11/22/20 20:29   





 


 Promethazine HCl/


 Codeine


  (Phenergan with


 Codeine)  5 ml  Q4H  PRN


 ORAL


 For Cough  10/23/20 20:30


 11/22/20 20:29   





 


 Tamsulosin HCl


  (Flomax)  0.4 mg  DAILY


 ORAL


   10/24/20 09:00


 11/23/20 08:59   





 


 Temazepam


  (Restoril)  15 mg  HSPRN  PRN


 ORAL


 Insomnia  10/23/20 20:30


 10/30/20 20:29   





 


 Vancomycin HCl


  (Vanco pharmacy


 to dose)  1 ea  DAILY  PRN


 MISC


 Per rx protocol  10/23/20 20:30


 11/22/20 20:29   





 


 Vancomycin HCl 1


 gm/Dextrose  275 ml @ 


 183.708


 mls/hr  NOW  ONCE


 IVPB


   10/24/20 08:00


 10/24/20 09:29   














Assessment/Plan


Assessment/Plan:





76yo M with:





Fever at SNF, 100.2 max here


Leukocytosis to 12


Acute hypoxic resp failure, on NRB mask


Pneumonia


R/o UTI


   10/23 BCx p


   UA 15-20 WBC, UCx NTD


   COVID rapid neg


   Flu A/B neg


   CXR: L pna


   Resp cx p





CKD, Cr 1.7





[facility] resident


Non-verbal





Plan:


Cont cefepime & vanco #2 empiric


Stop metronidazole, cefepime is sufficient for aspiration pna


Check LFTs


F/u BCx, UCx, resp cx





Monitor CBC/CMP


Monitor resp status


Monitor temp curve and hemodynamics





D/w RN





Thank you for this consult. [facility] will continue to follow.











Bianca Casillas M.D.               Oct 24, 2020 08:10 walker            Diet       Follow this diet upon discharge: Orders Placed This Encounter  Moderate Consistent CHO Diet                  Additional Services     Home care nursing referral       RN skilled nursing visit. RN to assess vital signs and weight, diabetic managment.  RN to provide complex wound managment.    Your provider has ordered home care nursing services. If you have not been contacted within 2 days of your discharge please call the inpatient department phone number at [phone] .            INR CLINIC REFERRAL       Your provider has referred you to INR Services.    Please be aware that coverage of these services is subject to the terms and limitations of your health insurance plan.  Call member services at your health plan with any benefit or coverage questions.    Indication for Anticoagulation: Atrial Fibrillation  If nonstandard INR is desired, indicate goal range and explanation: 2-3  Expected Duration of Therapy: Lifetime                  Future tests that were ordered for you     INR                 Further instructions from your care team                    Warfarin Instruction     You have started taking a medicine called warfarin. This is a blood-thinning medicine (anticoagulant). It helps prevent and treat blood clots.      Before leaving the hospital, make sure you know how much to take and how long to take it.      You will need regular blood tests to make sure your blood is clotting safely. It is very important to see your doctor for regular blood tests.    Talk to your doctor before taking any new medicine (this includes over-the-counter drugs and herbal products). Many medicines can interact with warfarin. This may cause more bleeding or too much clotting.     Eating a lot of vitamin K--found in green, leafy vegetables--can change the way warfarin works in your body. Do NOT avoid these foods. Instead, try to eat the same amount each day.     Bleeding is the most common  "side-effect of warfarin. You may notice bleeding gums, a bloody nose, bruises and bleeding longer when you cut yourself. See a doctor at once if:   o You cough up blood  o You find blood in your stool (poop)  o You have a deep cut, or a cut that bleeds longer than 10 minutes   o You have a bad cut, hard fall, accident or hit your head (go to urgent care or the emergency room).    For women who can get pregnant: This medicine can harm an unborn baby. Be very careful not to get pregnant while taking this medicine. If you think you might be pregnant, call your doctor right away.    For more information, read \"Guide to Warfarin Therapy,  the booklet you received in the hospital.        Pending Results     Date and Time Order Name Status Description    1/14/2017 0000 25 Hydroxyvitamin D2 and D3 In process     1/12/2017 0755 Wound Culture Aerobic Bacterial Preliminary             Statement of Approval     Ordered          01/16/17 1430  I have reviewed and agree with all the recommendations and orders detailed in this document.   EFFECTIVE NOW     Approved and electronically signed by:  Alejandro Retana MD             Admission Information        Provider Department Dept Phone    1/11/2017 Alejandro Retana MD [facility] [phone]      Your Vitals Were     Blood Pressure Pulse Temperature    112/44 mmHg 74 99.2  F (37.3  C) (Tympanic)    Respirations Height Weight    16 1.829 m (6') 84.3 kg (185 lb 13.6 oz)    BMI (Body Mass Index) Pulse Oximetry       18.51 kg/m2 95%       MyChart Information     [facility] lets you send messages to your doctor, view your test results, renew your prescriptions, schedule appointments and more. To sign up, go to [email or web address] . Click on \"Log in\" on the left side of the screen, which will take you to the Welcome page. Then click on \"Sign up Now\" on the right side of the page.     You will be asked to enter the access code listed below, as well as some personal " information. Please follow the directions to create your username and password.     Your access code is: [email or web address]  Expires: 2017  2:01 PM     Your access code will  in 90 days. If you need help or a new code, please call your [facility] clinic or [phone].        Care EveryWhere ID     This is your Care EveryWhere ID. This could be used by other organizations to access your [facility] medical records  [id number]           Review of your medicines      START taking        Dose / Directions    amoxicillin-clavulanate 875-125 MG per tablet   Commonly known as:  AUGMENTIN   Indication:  Infection of Bone and Bone Marrow, Skin and Soft Tissue Infection   Used for:  Osteomyelitis of foot, right, acute (H)        Dose:  1 tablet   Take 1 tablet by mouth every 12 hours   Quantity:  28 tablet   Refills:  0       benzocaine-menthol 15-3.6 MG lozenge   Commonly known as:  CEPACOL   Used for:  Simple chronic bronchitis (H)        Dose:  1 lozenge   Place 1 lozenge inside cheek every hour as needed for sore throat   Quantity:  30 lozenge   Refills:  3       candesartan 16 MG tablet   Commonly known as:  ATACAND   Used for:  Essential hypertension        Dose:  16 mg   Take 1 tablet (16 mg) by mouth daily   Quantity:  90 tablet   Refills:  0       metoprolol 50 MG 24 hr tablet   Commonly known as:  TOPROL XL   Used for:  Essential hypertension        Dose:  25 mg   Take 0.5 tablets (25 mg) by mouth daily   Quantity:  90 tablet   Refills:  0       order for DME   Used for:  Osteomyelitis of foot, right, acute (H), Osteoarthritis, unspecified osteoarthritis type, unspecified site        Equipment being ordered: Hospital Bed   Quantity:  1 Device   Refills:  0         CONTINUE these medicines which may have CHANGED, or have new prescriptions. If we are uncertain of the size of tablets/capsules you have at home, strength may be listed as something that might have changed.        Dose / Directions    CENTRUM SILVER per  tablet   This may have changed:  when to take this   Used for:  Simple chronic bronchitis (H)        Dose:  1 tablet   Take 1 tablet by mouth daily   Quantity:  90 tablet   Refills:  3       Cranberry 500 MG Caps   This may have changed:  when to take this   Used for:  Hypertrophy of prostate without urinary obstruction        Dose:  1 capsule   Take 1 capsule (500 mg) by mouth daily   Quantity:  90 capsule   Refills:  3       digoxin 125 MCG tablet   Commonly known as:  LANOXIN   This may have changed:  See the new instructions.   Used for:  Permanent atrial fibrillation (H)        Dose:  125 mcg   Take 1 tablet (125 mcg) by mouth daily   Quantity:  90 tablet   Refills:  0       fluticasone-salmeterol 250-50 MCG/DOSE diskus inhaler   Commonly known as:  ADVAIR DISKUS   This may have changed:  See the new instructions.   Used for:  Simple chronic bronchitis (H)        USE 1 INHALATION TWO TIMES A DAY IN THE MORNING AND IN THE EVENING APPROXIMATELY 12 HOURS APART   Quantity:  3 Inhaler   Refills:  1       furosemide 40 MG tablet   Commonly known as:  LASIX   This may have changed:  See the new instructions.   Used for:  Benign essential hypertension        Dose:  40 mg   Take 1 tablet (40 mg) by mouth daily   Quantity:  90 tablet   Refills:  1       insulin aspart 100 UNIT/ML injection   Commonly known as:  NovoLOG FLEXPEN   This may have changed:  See the new instructions.   Used for:  Type 2 diabetes mellitus without complication, with long-term current use of insulin (H)        Dose:  1-7 Units   Inject 1-7 Units Subcutaneous 3 times daily (with meals) If glucose less than or equal to 150 mg/dL do not give insulin If glucose 151-200 give 2 Units subcutaneously If glucose 201-250 give 3 Units subcutaneously If glucose 251-300 give 4 Units subcutaneously If glucose 301-350 give 5 Units subcutaneously If glucose over 350 give 7 Units subcutaneously and call physician   Quantity:  100 mL   Refills:  3       * insulin  glargine U-300 300 UNIT/ML injection   Commonly known as:  TOUJEO   This may have changed:  how much to take   Used for:  Type 2 diabetes mellitus without complication, with long-term current use of insulin (H)        Dose:  10 Units   Inject 10 Units Subcutaneous At Bedtime   Quantity:  12 mL   Refills:  3       * insulin glargine U-300 300 UNIT/ML injection   Commonly known as:  TOUJEO   This may have changed:  You were already taking a medication with the same name, and this prescription was added. Make sure you understand how and when to take each.   Used for:  Type 2 diabetes mellitus without complication, with long-term current use of insulin (H)        Dose:  35 Units   Inject 35 Units Subcutaneous every morning   Quantity:  20 mL   Refills:  3       ranitidine 150 MG tablet   Commonly known as:  ZANTAC   This may have changed:  See the new instructions.   Used for:  Gastroesophageal reflux disease without esophagitis        Dose:  150 mg   Take 1 tablet (150 mg) by mouth 2 times daily   Quantity:  180 tablet   Refills:  0       simvastatin 80 MG tablet   Commonly known as:  ZOCOR   This may have changed:  See the new instructions.   Used for:  Pure hypercholesterolemia        TAKE 1 TABLET DAILY IN THE EVENING   Quantity:  90 tablet   Refills:  3       warfarin 2 MG tablet   Commonly known as:  COUMADIN   This may have changed:  See the new instructions.   Used for:  Permanent atrial fibrillation (H)        TAKE 4 TABLETS ON MONDAY, WEDNESDAY, AND FRIDAY AND 3 TABLETS ALL OTHER DAYS   Quantity:  312 tablet   Refills:  3       * Notice:  This list has 2 medication(s) that are the same as other medications prescribed for you. Read the directions carefully, and ask your doctor or other care provider to review them with you.      CONTINUE these medicines which have NOT CHANGED        Dose / Directions    acetaminophen 650 MG CR tablet   Commonly known as:  TYLENOL   Used for:  Osteoarthritis, unspecified  osteoarthritis type, unspecified site        Dose:  650 mg   Take 1 tablet (650 mg) by mouth every 8 hours as needed   Quantity:  60 tablet   Refills:  0       aspirin 325 MG tablet   Used for:  Coronary artery disease without angina pectoris, unspecified vessel or lesion type, unspecified whether native or transplanted heart        Dose:  325 mg   Take 1 tablet (325 mg) by mouth daily   Quantity:  120 tablet   Refills:  3       B-D U/F 31G X 8 MM   Used for:  Diabetes mellitus, type 2 (H)   Generic drug:  insulin pen needle        USE 5 TO 6 PEN NEEDLES DAILY OR AS DIRECTED   Quantity:  6 each   Refills:  3       cholecalciferol 1000 UNIT tablet   Commonly known as:  vitamin D   Used for:  Simple chronic bronchitis (H)        Dose:  1000 Units   Take 1 tablet (1,000 Units) by mouth daily   Quantity:  90 tablet   Refills:  3       Doxylamine-DM 6.25-15 MG/15ML Liqd   Used for:  Osteomyelitis of foot, right, acute (H), Simple chronic bronchitis (H)        Taking as pkg directions at night   Quantity:  236 mL   Refills:  3       HYDROcodone-acetaminophen 5-325 MG per tablet   Commonly known as:  NORCO   Used for:  Back contusion, right, initial encounter        Dose:  1 tablet   Take 1 tablet by mouth every 6 hours as needed for moderate to severe pain   Quantity:  30 tablet   Refills:  0       magnesium 250 MG tablet   Used for:  Permanent atrial fibrillation (H)        Dose:  1 tablet   Take 1 tablet by mouth daily   Quantity:  30 tablet   Refills:  3       ONE TOUCH ULTRA test strip   Used for:  Diabetes mellitus (H)   Generic drug:  blood glucose monitoring        USE UP TO 5 STRIPS DAILY TO TEST BLOOD GLUCOSE   Quantity:  300 each   Refills:  1            Where to get your medicines      These medications were sent to [address] [address]     Phone:  [phone]    - aspirin 325 MG tablet  - benzocaine-menthol 15-3.6 MG lozenge  - candesartan 16 MG  tablet  - CENTRUM SILVER per tablet  - cholecalciferol 1000 UNIT tablet  - Cranberry 500 MG Caps  - digoxin 125 MCG tablet  - Doxylamine-DM 6.25-15 MG/15ML Liqd  - fluticasone-salmeterol 250-50 MCG/DOSE diskus inhaler  - furosemide 40 MG tablet  - insulin aspart 100 UNIT/ML injection  - insulin glargine U-300 300 UNIT/ML injection  - insulin glargine U-300 300 UNIT/ML injection  - magnesium 250 MG tablet  - metoprolol 50 MG 24 hr tablet  - ranitidine 150 MG tablet  - simvastatin 80 MG tablet  - warfarin 2 MG tablet      Some of these will need a paper prescription and others can be bought over the counter. Ask your nurse if you have questions.     Bring a paper prescription for each of these medications    - amoxicillin-clavulanate 875-125 MG per tablet  - HYDROcodone-acetaminophen 5-325 MG per tablet  - order for DME    You don't need a prescription for these medications    - acetaminophen 650 MG CR tablet             Protect others around you: Learn how to safely use, store and throw away your medicines at www.disposemymeds.org.             Medication List: This is a list of all your medications and when to take them. Check marks below indicate your daily home schedule. Keep this list as a reference.      Medications           Morning Afternoon Evening Bedtime As Needed    acetaminophen 650 MG CR tablet   Commonly known as:  TYLENOL   Take 1 tablet (650 mg) by mouth every 8 hours as needed                                amoxicillin-clavulanate 875-125 MG per tablet   Commonly known as:  AUGMENTIN   Take 1 tablet by mouth every 12 hours   Last time this was given:  1 tablet on 1/16/2017  9:46 AM                                aspirin 325 MG tablet   Take 1 tablet (325 mg) by mouth daily   Last time this was given:  325 mg on 1/16/2017  8:32 AM                                B-D U/F 31G X 8 MM   USE 5 TO 6 PEN NEEDLES DAILY OR AS DIRECTED   Generic drug:  insulin pen needle                                 benzocaine-menthol 15-3.6 MG lozenge   Commonly known as:  CEPACOL   Place 1 lozenge inside cheek every hour as needed for sore throat                                candesartan 16 MG tablet   Commonly known as:  ATACAND   Take 1 tablet (16 mg) by mouth daily   Last time this was given:  16 mg on 1/12/2017  9:21 AM                                CENTRUM SILVER per tablet   Take 1 tablet by mouth daily                                cholecalciferol 1000 UNIT tablet   Commonly known as:  vitamin D   Take 1 tablet (1,000 Units) by mouth daily                                Cranberry 500 MG Caps   Take 1 capsule (500 mg) by mouth daily                                digoxin 125 MCG tablet   Commonly known as:  LANOXIN   Take 1 tablet (125 mcg) by mouth daily   Last time this was given:  125 mcg on 1/16/2017  8:32 AM                                Doxylamine-DM 6.25-15 MG/15ML Liqd   Taking as pkg directions at night                                fluticasone-salmeterol 250-50 MCG/DOSE diskus inhaler   Commonly known as:  ADVAIR DISKUS   USE 1 INHALATION TWO TIMES A DAY IN THE MORNING AND IN THE EVENING APPROXIMATELY 12 HOURS APART                                furosemide 40 MG tablet   Commonly known as:  LASIX   Take 1 tablet (40 mg) by mouth daily                                HYDROcodone-acetaminophen 5-325 MG per tablet   Commonly known as:  NORCO   Take 1 tablet by mouth every 6 hours as needed for moderate to severe pain                                insulin aspart 100 UNIT/ML injection   Commonly known as:  NovoLOG FLEXPEN   Inject 1-7 Units Subcutaneous 3 times daily (with meals) If glucose less than or equal to 150 mg/dL do not give insulin If glucose 151-200 give 2 Units subcutaneously If glucose 201-250 give 3 Units subcutaneously If glucose 251-300 give 4 Units subcutaneously If glucose 301-350 give 5 Units subcutaneously If glucose over 350 give 7 Units subcutaneously and call physician   Last time this  was given:  1 Units on 1/16/2017 11:35 AM                                * insulin glargine U-300 300 UNIT/ML injection   Commonly known as:  TOUJEO   Inject 10 Units Subcutaneous At Bedtime   Last time this was given:  35 Units on 1/16/2017  8:32 AM                                * insulin glargine U-300 300 UNIT/ML injection   Commonly known as:  TOUJEO   Inject 35 Units Subcutaneous every morning   Last time this was given:  35 Units on 1/16/2017  8:32 AM                                magnesium 250 MG tablet   Take 1 tablet by mouth daily                                metoprolol 50 MG 24 hr tablet   Commonly known as:  TOPROL XL   Take 0.5 tablets (25 mg) by mouth daily   Last time this was given:  25 mg on 1/16/2017  8:32 AM                                ONE TOUCH ULTRA test strip   USE UP TO 5 STRIPS DAILY TO TEST BLOOD GLUCOSE   Generic drug:  blood glucose monitoring                                order for DME   Equipment being ordered: Hospital Bed                                ranitidine 150 MG tablet   Commonly known as:  ZANTAC   Take 1 tablet (150 mg) by mouth 2 times daily   Last time this was given:  150 mg on 1/15/2017  8:27 PM                                simvastatin 80 MG tablet   Commonly known as:  ZOCOR   TAKE 1 TABLET DAILY IN THE EVENING                                warfarin 2 MG tablet   Commonly known as:  COUMADIN   TAKE 4 TABLETS ON MONDAY, WEDNESDAY, AND FRIDAY AND 3 TABLETS ALL OTHER DAYS   Last time this was given:  6 mg on 1/12/2017  5:40 PM                                * Notice:  This list has 2 medication(s) that are the same as other medications prescribed for you. Read the directions carefully, and ask your doctor or other care provider to review them with you.

## 2020-10-24 NOTE — NUR
NURSE NOTES:

RECEIVED REPORT FROM LITA ARIAS. PATIENT ASLEEP IN BED, VERBALLY RESPONSIVE MOSTLY IN [place] 
ALTHOUGH GARBLED, OPENS EYES TO VOICE AND TACTILE STIMULI ONLY. NO S/SX OF PAIN OR 
DISCOMFORT NOTED AT THIS TIME. ON 2LPM VIA NC- TOLERATING WELL, SAO2 96%. PATIENT NOTED TO 
BE CONGESTED WITH AUDIBLE GURGLING NOTED- PATIENT ORALLY SUCTIONED AND DEEP SUCTIONED- 
TOLERATED WELL. NPO STATUS PENDING ST EVALUATION. IV SITE ON LFA AND RFA PATENT, INTACT, AND 
ASYMPTOMATIC- IVF RUNNING AS PRESCRIBED. GALLEGOS CATHETER DRAINING WELL TO GRAVITY- URINE 
CLEAR AND YELLOW IN COLOR. FALL AND ASPIRATION PRECAUTIONS IN PLACE. CONTACT ISOLATION IN 
PLACE. BED LOCKED AND IN LOWEST POSITION, SIDERAILS UP X 3. CALL LIGHT WITHIN REACH, WILL 
CONTINUE TO MONITOR. 

-------------------------------------------------------------------------------

Addendum: 10/24/20 at 2221 by Yoana Santos RN

-------------------------------------------------------------------------------

***IV SITES RFA, LEFT WRIST***

## 2020-10-24 NOTE — CONSULTATION
History of Present Illness


General


Date patient seen:  Oct 24, 2020


Time patient seen:  08:00


Chief Complaint:  Generalized Weakness


Referring physician:  Dr. Cancino


Reason for Consultation:  Sepsis, pneumonia, resp failure





Present Illness


HPI


75 years old male, resident of skilled nursing facility, with PMH of CVA, 

dysphagia, hypertension, diabetes mellitus,  BPH, CKD, Alzheimer dementia, was 

sent for evaluation due to congestion and difficulty breathing.  


In ER patient was found to be febrile, congested, tachycardic, and hypoxic.  


Patient required 100% nonrebreather mask.  


Laboratory work-up revealed elevated lactic acid 3.9, leukocytosis WBC 12.3 .


Chest x-ray demonstrated patchy opacity in the left greater than right lower 

lung , may represent atelectasis on the right side and atelectasis versus 

pneumonia on the left.  


Laboratory work-up in addition showed evidence of renal failure with   BUN 53, 

creatinine 2.4.  


Sodium 155.  


Glucose 178.  


Troponin was negative, pro BNP 1253.  


Albumin 2.6.  


Urinalysis revealed pyuria,  moderate bacteria.  


EKG revealed sinus tachycardia with left anterior fascicular block,  possibly 

motion artifact from tremors .


Septic work-up initiated in emergency department .


Patient subsequently admitted to direct observational unit for further 

management.  


Pulmonary consult was requested to assist in management of this patient


Allergies:  


Coded Allergies:  


     No Known Allergies (Unverified , 8/20/12)





Medication History


Scheduled


Alendronate Sodium* (Fosamax*), 70 MG ORAL ONCE A WEEK, (Reported)


Amlodipine Besylate (Norvasc), 5 MG ORAL DAILY, (Reported)


Amlodipine Besylate* (Amlodipine Besylate*), 5 MG ORAL DAILY, (Reported)


Ascorbic Acid* (Vitamin C*), 500 MG ORAL DAILY, (Reported)


Aspirin* (Aspirin*), 81 MG ORAL DAILY, (Reported)


Atorvastatin Calcium* (Lipitor*), 10 MG ORAL BEDTIME, (Reported)


Baclofen (Baclofen), 5 MG PO DAILY, (Reported)


Calcium Carbonate/Vitamin D3 (Calcium 500+D Tablet Chew), 1 EACH PO DAILY, 

(Reported)


Cranberry Fruit Concentrate (Cranberry), 450 MG PO BID, (Reported)


Docusate Sodium* (Colace*), 200 MG ORAL DAILY, (Reported)


Donepezil Hcl* (Donepezil Hcl*), 10 MG ORAL DAILY, (Reported)


Furosemide* (Lasix*), 20 MG ORAL DAILY, (Reported)


Multivitamin With Minerals (Multivitamins With Minerals*), 1 TAB ORAL DAILY, 

(Reported)


Potassium Chloride* (K-Dur*), 10 MEQ ORAL DAILY, (Reported)


Tamsulosin HCl (Flomax), 0.4 MG ORAL DAILY, (Reported)


Zinc Sulfate (Zinc Sulfate*), 220 MG ORAL DAILY, (Reported)





Scheduled PRN


Acetaminophen (Tylenol), 650 MG ORAL Q6H PRN for Fever/Headache/Mild Pain, 

(Reported)


Nitroglycerin (Nitroglycerin), 0.4 MG SL AS NEEDED PRN for CHEST PAIN, 

(Reported)





Patient History


Healthcare decision maker


N


Resuscitation status


full code


Advanced Directive on File








Review of Systems


ROS Narrative


unable to obtain given patietn's medical condition and ALOC





Physical Exam


General Appearance:  no apparent distress, confused, other - awake, poorly 

responsoive


Lines, tubes and drains:  peripheral


HEENT:  normocephalic, atraumatic, anicteric, other - NGT ,  VM 50%


Respiratory/Chest:  other - scattered rhonchi


Cardiovascular/Chest:  normal peripheral pulses, regular rhythm


Abdomen:  normal bowel sounds, non tender, soft


Extremities:  no calf tenderness, normal capillary refill


Neurologic:  abnormal gait - bedridden


Musculoskeletal:  atrophy - BLE





Last 24 Hour Vital Signs








  Date Time  Temp Pulse Resp B/P (MAP) Pulse Ox O2 Delivery O2 Flow Rate FiO2


 


10/24/20 08:39  72      


 


10/24/20 08:00 98.8 75 19 98/62 (74) 99   





  82      


 


10/24/20 04:00  78      


 


10/24/20 04:00       15.0 


 


10/24/20 04:00 97.2 82 20 104/72 (83) 100   





  82      


 


10/24/20 04:00      Non-Rebreather 15.0 


 


10/24/20 00:00       15.0 


 


10/24/20 00:00      Non-Rebreather 15.0 


 


10/24/20 00:00 97.0 73 16 114/81 (92) 97   


 


10/23/20 23:28  90      


 


10/23/20 22:05  95      


 


10/23/20 21:10 98.8 78 20 115/80 100 Non-Rebreather 15.0 


 


10/23/20 20:56      Non-Rebreather 15.0 


 


10/23/20 20:30 97.9 80 22 125/89 100 Non-Rebreather 15.0 


 


10/23/20 19:15 97.9 95 22 125/94 100 Non-Rebreather 15.0 


 


10/23/20 18:26 97.9       


 


10/23/20 17:05  106 24   Non-Rebreather 15.0 


 


10/23/20 17:05 100.2 106 24 113/74 96 Non-Rebreather 15.0 


 


10/23/20 17:04 98.8 108 25 108/58 (75) 90 Non-Rebreather  

















Intake and Output  


 


 10/23/20 10/24/20





 19:00 07:00


 


Intake Total  377.5 ml


 


Output Total  300 ml


 


Balance  77.5 ml


 


  


 


Intake Oral  0 ml


 


IV Total  377.5 ml


 


Output Urine Total  300 ml


 


# Bowel Movements  3











Laboratory Tests








Test


 10/23/20


17:30 10/23/20


17:37 10/23/20


17:47 10/23/20


18:25


 


White Blood Count


 12.3 K/UL


(4.8-10.8)  H 


 


 





 


Red Blood Count


 5.49 M/UL


(4.70-6.10) 


 


 





 


Hemoglobin


 16.4 G/DL


(14.2-18.0) 


 


 





 


Hematocrit


 51.8 %


(42.0-52.0) 


 


 





 


Mean Corpuscular Volume 94 FL (80-99)     


 


Mean Corpuscular Hemoglobin


 29.9 PG


(27.0-31.0) 


 


 





 


Mean Corpuscular Hemoglobin


Concent 31.7 G/DL


(32.0-36.0)  L 


 


 





 


Red Cell Distribution Width


 15.6 %


(11.6-14.8)  H 


 


 





 


Platelet Count


 246 K/UL


(150-450) 


 


 





 


Mean Platelet Volume


 9.2 FL


(6.5-10.1) 


 


 





 


Neutrophils (%) (Auto)


 75.2 %


(45.0-75.0)  H 


 


 





 


Lymphocytes (%) (Auto)


 18.0 %


(20.0-45.0)  L 


 


 





 


Monocytes (%) (Auto)


 6.1 %


(1.0-10.0) 


 


 





 


Eosinophils (%) (Auto)


 0.0 %


(0.0-3.0) 


 


 





 


Basophils (%) (Auto)


 0.7 %


(0.0-2.0) 


 


 





 


Sodium Level


 155 MMOL/L


(136-145)  H 


 


 





 


Potassium Level


 4.2 MMOL/L


(3.5-5.1) 


 


 





 


Chloride Level


 116 MMOL/L


()  H 


 


 





 


Carbon Dioxide Level


 30 MMOL/L


(21-32) 


 


 





 


Blood Urea Nitrogen


 53 mg/dL


(7-18)  H 


 


 





 


Creatinine


 2.4 MG/DL


(0.55-1.30)  H 


 


 





 


Estimat Glomerular Filtration


Rate 26.4 mL/min


(>60) 


 


 





 


Glucose Level


 178 MG/DL


()  H 


 


 





 


Lactic Acid Level


 3.90 mmol/L


(0.4-2.0)  H 


 


 





 


Calcium Level


 9.5 MG/DL


(8.5-10.1) 


 


 





 


Phosphorus Level


 4.7 MG/DL


(2.5-4.9) 


 


 





 


Magnesium Level


 2.8 MG/DL


(1.8-2.4)  H 


 


 





 


Total Bilirubin


 0.8 MG/DL


(0.2-1.0) 


 


 





 


Aspartate Amino Transf


(AST/SGOT) 20 U/L (15-37)


 


 


 





 


Alanine Aminotransferase


(ALT/SGPT) 19 U/L (12-78)


 


 


 





 


Alkaline Phosphatase


 126 U/L


()  H 


 


 





 


Total Creatine Kinase


 101 U/L


() 


 


 





 


Creatine Kinase MB


 0.8 NG/ML


(0.0-3.6) 


 


 





 


Creatine Kinase MB Relative


Index 0.7  


 


 


 





 


Troponin I


 0.032 ng/mL


(0.000-0.056) 


 


 





 


Pro-B-Type Natriuretic Peptide


 1253 pg/mL


(0-125)  H 


 


 





 


Total Protein


 7.6 G/DL


(6.4-8.2) 


 


 





 


Albumin


 2.6 G/DL


(3.4-5.0)  L 


 


 





 


Globulin 5.0 g/dL     


 


Albumin/Globulin Ratio


 0.5 (1.0-2.7)


L 


 


 





 


Urine Color  Yellow    


 


Urine Appearance


 


 Slightly


cloudy 


 





 


Urine pH  5 (4.5-8.0)    


 


Urine Specific Gravity


 


 1.020


(1.005-1.035) 


 





 


Urine Protein


 


 2+ (NEGATIVE)


H 


 





 


Urine Glucose (UA)


 


 Negative


(NEGATIVE) 


 





 


Urine Ketones


 


 1+ (NEGATIVE)


H 


 





 


Urine Blood


 


 1+ (NEGATIVE)


H 


 





 


Urine Nitrite


 


 Negative


(NEGATIVE) 


 





 


Urine Bilirubin


 


 Negative


(NEGATIVE) 


 





 


Urine Urobilinogen


 


 1 MG/DL


(0.0-1.0)  H 


 





 


Urine Leukocyte Esterase


 


 1+ (NEGATIVE)


H 


 





 


Urine RBC


 


 5-10 /HPF (0 -


0)  H 


 





 


Urine WBC


 


 15-20 /HPF (0


- 0)  H 


 





 


Urine Squamous Epithelial


Cells 


 None /LPF


(NONE/OCC) 


 





 


Urine Transitional Epithelial


Cells 


  /LPF (NONE)  


 


 





 


Urine Bacteria


 


 Moderate /HPF


(NONE)  H 


 





 


Urine Hyaline Casts


 


 0-2 /LPF


(NONE)  H 


 





 


Urine Mucus


 


 Moderate /LPF


(NONE/OCC)  H 


 





 


POC Whole Blood Glucose


 


 


 176 MG/DL


()  H 





 


Arterial Blood pH


 


 


 


 7.387


(7.350-7.450)


 


Arterial Blood Partial


Pressure CO2 


 


 


 31.1 mmHg


(35.0-45.0)  L


 


Arterial Blood Partial


Pressure O2 


 


 


 74.1 mmHg


(75.0-100.0)  L


 


Arterial Blood HCO3


 


 


 


 18.3 mmol/L


(22.0-26.0)  L


 


Arterial Blood Oxygen


Saturation 


 


 


 93.8 %


()  L


 


Arterial Blood Base Excess    -5.5 (-2-2)  L


 


Jerod Test    Positive  


 


Test


 10/23/20


19:40 10/24/20


03:20 10/24/20


03:25 10/24/20


06:20


 


Lactic Acid Level


 4.40 mmol/L


(0.66-2.22)  H 


 1.60 mmol/L


(0.4-2.0) 





 


Total Bilirubin


 


 0.5 MG/DL


(0.2-1.0) 


 





 


Direct Bilirubin


 


 0.1 MG/DL


(0.0-0.3) 


 





 


Aspartate Amino Transf


(AST/SGOT) 


 24 U/L (15-37)


 


 





 


Alanine Aminotransferase


(ALT/SGPT) 


 19 U/L (12-78)


 


 





 


Alkaline Phosphatase


 


 99 U/L


() 


 





 


Total Protein


 


 5.6 G/DL


(6.4-8.2)  L 


 





 


Albumin


 


 2.2 G/DL


(3.4-5.0)  L 2.1 G/DL


(3.4-5.0)  L 





 


White Blood Count


 


 


 9.7 K/UL


(4.8-10.8) 





 


Red Blood Count


 


 


 4.55 M/UL


(4.70-6.10)  L 





 


Hemoglobin


 


 


 13.4 G/DL


(14.2-18.0)  L 





 


Hematocrit


 


 


 42.3 %


(42.0-52.0) 





 


Mean Corpuscular Volume   93 FL (80-99)   


 


Mean Corpuscular Hemoglobin


 


 


 29.6 PG


(27.0-31.0) 





 


Mean Corpuscular Hemoglobin


Concent 


 


 31.8 G/DL


(32.0-36.0)  L 





 


Red Cell Distribution Width


 


 


 14.5 %


(11.6-14.8) 





 


Platelet Count


 


 


 209 K/UL


(150-450) 





 


Mean Platelet Volume


 


 


 9.9 FL


(6.5-10.1) 





 


Neutrophils (%) (Auto)


 


 


 69.1 %


(45.0-75.0) 





 


Lymphocytes (%) (Auto)


 


 


 23.1 %


(20.0-45.0) 





 


Monocytes (%) (Auto)


 


 


 6.6 %


(1.0-10.0) 





 


Eosinophils (%) (Auto)


 


 


 0.2 %


(0.0-3.0) 





 


Basophils (%) (Auto)


 


 


 1.0 %


(0.0-2.0) 





 


Sodium Level


 


 


 156 MMOL/L


(136-145)  H 





 


Potassium Level


 


 


 3.5 MMOL/L


(3.5-5.1) 





 


Chloride Level


 


 


 122 MMOL/L


()  H 





 


Carbon Dioxide Level


 


 


 25 MMOL/L


(21-32) 





 


Anion Gap


 


 


 9 mmol/L


(5-15) 





 


Blood Urea Nitrogen


 


 


 44 mg/dL


(7-18)  H 





 


Creatinine


 


 


 1.7 MG/DL


(0.55-1.30)  H 





 


Estimat Glomerular Filtration


Rate 


 


 39.3 mL/min


(>60) 





 


Glucose Level


 


 


 154 MG/DL


()  H 





 


Calcium Level


 


 


 8.3 MG/DL


(8.5-10.1)  L 





 


Phosphorus Level


 


 


 3.0 MG/DL


(2.5-4.9) 





 


Random Vancomycin Level   11.5 ug/mL   


 


POC Whole Blood Glucose


 


 


 


 141 MG/DL


()  H











Microbiology








 Date/Time


Source Procedure


Growth Status





 


 10/23/20 17:37


Urine,Clean Catch Urine Culture - Preliminary


NO GROWTH Resulted


 


 10/23/20 17:30


Nasopharynx SARS-CoV-2 RdRp Gene Assay - Final Complete





 10/23/20 17:30


Nasal Nares - Final Complete


 


 10/23/20 17:30


Nasal Nares - Final Complete








Height (Feet):  5


Height (Inches):  4.00


Weight (Pounds):  130


Medications





Current Medications








 Medications


  (Trade)  Dose


 Ordered  Sig/Miky


 Route


 PRN Reason  Start Time


 Stop Time Status Last Admin


Dose Admin


 


 Acetaminophen


  (Tylenol)  650 mg  Q4H  PRN


 ORAL


 Temp >100.5  10/23/20 20:30


 11/22/20 20:29   





 


 Albuterol/


 Ipratropium


  (Albuterol/


 Ipratropium)  3 ml  Q4H  PRN


 HHN


 Shortness of Breath  10/23/20 20:30


 10/28/20 20:29   





 


 Barium Sulfate


  (Varibar Honey)  250 ml  NOW  PRN


 MC


 RAD  10/23/20 21:00


 10/26/20 20:45   





 


 Barium Sulfate


  (Varibar Nectar)  240 ml  NOW  PRN


 MC


 RAD  10/23/20 21:00


 10/26/20 20:45   





 


 Barium Sulfate


  (Varibar Pudding)  230 ml  NOW  PRN


 MC


 RAD  10/23/20 21:00


 10/26/20 20:45   





 


 Barium Sulfate


  (Varibar Thin


 Liquid powder)  148 gm  NOW  PRN


 MC


 RAD  10/23/20 21:00


 10/26/20 20:45   





 


 Cefepime HCl 1 gm/


 Sodium Chloride  55 ml @ 


 110 mls/hr  Q24H


 IV


   10/24/20 18:00


 10/31/20 17:59   





 


 Dextrose  1,000 ml @ 


 75 mls/hr  [id number]


 IV


   10/23/20 22:45


 11/22/20 22:44  10/23/20 22:58





 


 Dextrose


  (Dextrose 50%)  50 ml  Q30M  PRN


 IV


 Hypoglycemia  10/23/20 22:45


 1/21/21 22:44   





 


 Heparin Sodium


  (Porcine)


  (Heparin 5000


 units/ml)  5,000 units  EVERY 12  HOURS


 SUBQ


   10/23/20 21:00


 12/7/20 20:59  10/24/20 08:43





 


 Insulin Aspart


  (NovoLOG)    BEFORE MEALS AND  HS


 SUBQ


   10/24/20 06:30


 1/22/21 06:29  10/24/20 06:23





 


 Nitroglycerin


  (Ntg)  0.4 mg  Q5M  PRN


 SL


 Prn Chest Pain  10/23/20 20:30


 11/22/20 20:29   





 


 Ondansetron HCl


  (Zofran)  4 mg  Q6H  PRN


 IVP


 Nausea & Vomiting  10/23/20 20:30


 11/22/20 20:29   





 


 Polyethylene


 Glycol


  (Miralax)  17 gm  DAILYPRN  PRN


 ORAL


 Constipation  10/23/20 20:30


 11/22/20 20:29   





 


 Promethazine HCl/


 Codeine


  (Phenergan with


 Codeine)  5 ml  Q4H  PRN


 ORAL


 For Cough  10/23/20 20:30


 11/22/20 20:29   





 


 Tamsulosin HCl


  (Flomax)  0.4 mg  DAILY


 ORAL


   10/24/20 09:00


 11/23/20 08:59   





 


 Temazepam


  (Restoril)  15 mg  HSPRN  PRN


 ORAL


 Insomnia  10/23/20 20:30


 10/30/20 20:29   





 


 Vancomycin HCl


  (Vanco pharmacy


 to dose)  1 ea  DAILY  PRN


 MISC


 Per rx protocol  10/23/20 20:30


 11/22/20 20:29   











Objective Narrative








Assessment/Plan


Assessment/Plan:


ASSESSMENT


Acute hypoxemic respiratory failure , requiring  100% nonrebreathing mask


Sepsis


Pneumonia possible aspiration


Probable UTI


Dysphagia


ARF on CKD


Hypernatremia


History of CVA


Diabetes mellitus


History of hypertension


Alzheimer dementia





PLAN OF CARE


DELICIA


supplemental  O2  titrate to keep pulse oximetry above 92%, now off NRM and on 

50% VM, 


pulmonary toilet


fup CXR on Monday 


rapid COVID-19 and influenza swab both NGT


abx  as per ID recs 


fup with cx  


DVT prophylaxis 


a/tussive prn 


NPO


NG tube for meds


strict aspiration precaution


swallow eval pending


IVF


monitor renal parameters,  lytes , correct electrolytes as needed ,avoid 

nephrotoxic


creatinine trending down 


BP low, ? impending shock, monitor closely, 


will not increase IVF rate  until check ECHO ( at home on Lasix) , pro BNP 

elevated 


BS management with SSI


supportive care   





case discussed and evaluated by supervising physician











Ely Ni NP                Oct 24, 2020 09:47
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
## 2020-10-25 VITALS — DIASTOLIC BLOOD PRESSURE: 57 MMHG | SYSTOLIC BLOOD PRESSURE: 108 MMHG

## 2020-10-25 VITALS — SYSTOLIC BLOOD PRESSURE: 118 MMHG | DIASTOLIC BLOOD PRESSURE: 62 MMHG

## 2020-10-25 VITALS — DIASTOLIC BLOOD PRESSURE: 72 MMHG | SYSTOLIC BLOOD PRESSURE: 110 MMHG

## 2020-10-25 VITALS — SYSTOLIC BLOOD PRESSURE: 102 MMHG | DIASTOLIC BLOOD PRESSURE: 68 MMHG

## 2020-10-25 VITALS — DIASTOLIC BLOOD PRESSURE: 55 MMHG | SYSTOLIC BLOOD PRESSURE: 124 MMHG

## 2020-10-25 VITALS — DIASTOLIC BLOOD PRESSURE: 64 MMHG | SYSTOLIC BLOOD PRESSURE: 106 MMHG

## 2020-10-25 LAB
ADD MANUAL DIFF: NO
ALBUMIN SERPL-MCNC: 2.1 G/DL (ref 3.4–5)
ALBUMIN/GLOB SERPL: 0.5 {RATIO} (ref 1–2.7)
ALP SERPL-CCNC: 106 U/L (ref 46–116)
ALT SERPL-CCNC: 13 U/L (ref 12–78)
ANION GAP SERPL CALC-SCNC: 9 MMOL/L (ref 5–15)
AST SERPL-CCNC: 20 U/L (ref 15–37)
BASOPHILS NFR BLD AUTO: 1 % (ref 0–2)
BILIRUB SERPL-MCNC: 0.6 MG/DL (ref 0.2–1)
BUN SERPL-MCNC: 23 MG/DL (ref 7–18)
CALCIUM SERPL-MCNC: 8.2 MG/DL (ref 8.5–10.1)
CHLORIDE SERPL-SCNC: 113 MMOL/L (ref 98–107)
CHOLEST SERPL-MCNC: 149 MG/DL (ref ?–200)
CK SERPL-CCNC: 139 U/L (ref 26–308)
CO2 SERPL-SCNC: 26 MMOL/L (ref 21–32)
CREAT SERPL-MCNC: 1.6 MG/DL (ref 0.55–1.3)
EOSINOPHIL NFR BLD AUTO: 0.9 % (ref 0–3)
ERYTHROCYTE [DISTWIDTH] IN BLOOD BY AUTOMATED COUNT: 14.6 % (ref 11.6–14.8)
GAMMA GLUTAMYL TRANSPEPTIDASE: 45 U/L (ref 5–85)
GLOBULIN SER-MCNC: 4.3 G/DL
HCT VFR BLD CALC: 41.7 % (ref 42–52)
HDLC SERPL-MCNC: 35 MG/DL (ref 40–60)
HGB BLD-MCNC: 13.1 G/DL (ref 14.2–18)
LDH SERPL L TO P-CCNC: 214 U/L (ref 81–234)
LYMPHOCYTES NFR BLD AUTO: 23.7 % (ref 20–45)
MCV RBC AUTO: 93 FL (ref 80–99)
MONOCYTES NFR BLD AUTO: 7.3 % (ref 1–10)
NEUTROPHILS NFR BLD AUTO: 67.2 % (ref 45–75)
PHOSPHATE SERPL-MCNC: 2.2 MG/DL (ref 2.5–4.9)
PLATELET # BLD: 179 K/UL (ref 150–450)
POTASSIUM SERPL-SCNC: 3.6 MMOL/L (ref 3.5–5.1)
RBC # BLD AUTO: 4.47 M/UL (ref 4.7–6.1)
SODIUM SERPL-SCNC: 148 MMOL/L (ref 136–145)
TRIGL SERPL-MCNC: 204 MG/DL (ref 30–150)
WBC # BLD AUTO: 7.5 K/UL (ref 4.8–10.8)

## 2020-10-25 RX ADMIN — SODIUM CHLORIDE SCH MLS/HR: 0.9 INJECTION INTRAVENOUS at 17:51

## 2020-10-25 RX ADMIN — INSULIN ASPART SCH UNITS: 100 INJECTION, SOLUTION INTRAVENOUS; SUBCUTANEOUS at 06:30

## 2020-10-25 RX ADMIN — HEPARIN SODIUM SCH UNITS: 5000 INJECTION INTRAVENOUS; SUBCUTANEOUS at 08:41

## 2020-10-25 RX ADMIN — INSULIN ASPART SCH UNITS: 100 INJECTION, SOLUTION INTRAVENOUS; SUBCUTANEOUS at 11:30

## 2020-10-25 RX ADMIN — HEPARIN SODIUM SCH UNITS: 5000 INJECTION INTRAVENOUS; SUBCUTANEOUS at 20:53

## 2020-10-25 RX ADMIN — INSULIN ASPART SCH UNITS: 100 INJECTION, SOLUTION INTRAVENOUS; SUBCUTANEOUS at 20:51

## 2020-10-25 RX ADMIN — TAMSULOSIN HYDROCHLORIDE SCH MG: 0.4 CAPSULE ORAL at 08:38

## 2020-10-25 RX ADMIN — INSULIN ASPART SCH UNITS: 100 INJECTION, SOLUTION INTRAVENOUS; SUBCUTANEOUS at 16:30

## 2020-10-25 NOTE — PULMONOLOGY PROGRESS NOTE
Subjective


ROS Limited/Unobtainable:  Yes


Allergies:  


Coded Allergies:  


     No Known Allergies (Unverified , 8/20/12)


Subjective


leukocytosis resolved, afebrile 


weaned down to O 2 via NC


creat down to 1.6


Na down to 148





Objective





Last 24 Hour Vital Signs








  Date Time  Temp Pulse Resp B/P (MAP) Pulse Ox O2 Delivery O2 Flow Rate FiO2


 


10/25/20 08:00 98.1 77 21 118/62 (80) 97   


 


10/25/20 08:00       4.0 


 


10/25/20 08:00      Nasal Cannula 4.0 


 


10/25/20 07:45  79      


 


10/25/20 04:00      Nasal Cannula 4.0 


 


10/25/20 04:00       4.0 


 


10/25/20 04:00  69      


 


10/25/20 04:00 98.6 88 22 124/55 (78) 99   





  88      


 


10/25/20 00:00      Nasal Cannula 4.0 


 


10/25/20 00:00  78      


 


10/25/20 00:00 98.0 76 22 110/72 (85) 100   





  76      


 


10/24/20 20:00  87      


 


10/24/20 20:00 98.2 91 22 108/73 (85) 96   





  91      


 


10/24/20 20:00      Nasal Cannula 4.0 


 


10/24/20 20:00       4.0 


 


10/24/20 16:39  85      


 


10/24/20 16:35  88  136/76 (96)    


 


10/24/20 16:00 97.9 79 20 98/63 (75) 98   





  78      


 


10/24/20 16:00      Nasal Cannula 3.0 


 


10/24/20 16:00       3.0 


 


10/24/20 12:31  76      


 


10/24/20 12:00       3.0 


 


10/24/20 12:00      Nasal Cannula 3.0 


 


10/24/20 12:00 97.7 75 22 118/69 (85) 99   





  86      

















Intake and Output  


 


 10/24/20 10/25/20





 19:00 07:00


 


Intake Total 1030 ml 825 ml


 


Output Total 300 ml 400 ml


 


Balance 730 ml 425 ml


 


  


 


IV Total 1030 ml 825 ml


 


Output Urine Total 300 ml 400 ml


 


# Bowel Movements 2 








Objective


General Appearance:  no apparent distress, confused,   awake, poorly responsive


Lines, tubes and drains:  peripheral


HEENT:  normocephalic, atraumatic, anicteric, other - NGT ,   O2 via NC


Respiratory/Chest:  scattered rhonchi


Cardiovascular/Chest:  normal peripheral pulses, regular rhythm


Abdomen:  normal bowel sounds, non tender, soft


Extremities:  no calf tenderness, normal capillary refill


Neurologic:  abnormal gait /bedridden


Musculoskeletal:  atrophy - BLE





Microbiology








 Date/Time


Source Procedure


Growth Status





 


 10/24/20 04:00


Sputum Gram Stain - Final Resulted


 


 10/24/20 04:00 Sputum Culture - Preliminary


Staphylococcus Aureus


Usual Respiratory Sondra Resulted


 


 10/23/20 17:45


Blood Blood Culture - Preliminary


NO GROWTH AFTER 24 HOURS Resulted


 


 10/23/20 17:37


Urine,Clean Catch Urine Culture - Preliminary


NO GROWTH AFTER 24 HOURS Resulted


 


 10/23/20 17:30


Nasopharynx SARS-CoV-2 RdRp Gene Assay - Final Complete





 10/23/20 17:30


Nasal Nares - Final Complete


 


 10/23/20 17:30


Nasal Nares - Final Complete


 


 10/23/20 17:30


Blood Blood Culture - Preliminary


NO GROWTH AFTER 24 HOURS Resulted








Laboratory Tests


10/24/20 11:20: POC Whole Blood Glucose 123H


10/24/20 16:43: POC Whole Blood Glucose 129H


10/24/20 20:07: POC Whole Blood Glucose 119H


10/25/20 03:50: 


White Blood Count 7.5, Red Blood Count 4.47L, Hemoglobin 13.1L, Hematocrit 41.7L

, Mean Corpuscular Volume 93, Mean Corpuscular Hemoglobin 29.2, Mean Corpuscular

Hemoglobin Concent 31.3L, Red Cell Distribution Width 14.6, Platelet Count 179, 

Mean Platelet Volume 10.2H, Neutrophils (%) (Auto) 67.2, Lymphocytes (%) (Auto) 

23.7, Monocytes (%) (Auto) 7.3, Eosinophils (%) (Auto) 0.9, Basophils (%) (Auto)

1.0, Sodium Level 148H, Potassium Level 3.6, Chloride Level 113H, Carbon Dioxide

Level 26, Anion Gap 9, Blood Urea Nitrogen 23H, Creatinine 1.6H, Estimat 

Glomerular Filtration Rate 42.1, Glucose Level 123H, Hemoglobin A1c 6.7H, Uric 

Acid 9.6H, Calcium Level 8.2L, Phosphorus Level 2.2L, Magnesium Level 2.3, Total

Bilirubin 0.6, Gamma Glutamyl Transpeptidase 45, Aspartate Amino Transf 

(AST/SGOT) 20, Alanine Aminotransferase (ALT/SGPT) 13, Alkaline Phosphatase 106,

Lactate Dehydrogenase 214, Total Creatine Kinase 139, C-Reactive Protein, Q

uantitative 22.8H, Pro-B-Type Natriuretic Peptide 351H, Total Protein 6.4, 

Albumin 2.1L, Globulin 4.3, Albumin/Globulin Ratio 0.5L, Triglycerides Level 

204H, Cholesterol Level 149, LDL Cholesterol 77, HDL Cholesterol 35L, 

Cholesterol/HDL Ratio 4.3, Vitamin B12 Level 1029H, Thyroid Stimulating Hormone 

(TSH) 1.797, Random Vancomycin Level 14.6


10/25/20 05:58: POC Whole Blood Glucose 125H





Current Medications








 Medications


  (Trade)  Dose


 Ordered  Sig/Miky


 Route


 PRN Reason  Start Time


 Stop Time Status Last Admin


Dose Admin


 


 Acetaminophen


  (Tylenol)  650 mg  Q4H  PRN


 ORAL


 Temp >100.5  10/23/20 20:30


 11/22/20 20:29   





 


 Albuterol/


 Ipratropium


  (Albuterol/


 Ipratropium)  3 ml  Q4H  PRN


 HHN


 Shortness of Breath  10/23/20 20:30


 10/28/20 20:29   





 


 Barium Sulfate


  (Varibar Honey)  250 ml  NOW  PRN


 MC


 RAD  10/23/20 21:00


 10/26/20 20:45   





 


 Barium Sulfate


  (Varibar Nectar)  240 ml  NOW  PRN


 MC


 RAD  10/23/20 21:00


 10/26/20 20:45   





 


 Barium Sulfate


  (Varibar Pudding)  230 ml  NOW  PRN


 MC


 RAD  10/23/20 21:00


 10/26/20 20:45   





 


 Barium Sulfate


  (Varibar Thin


 Liquid powder)  148 gm  NOW  PRN


 MC


 RAD  10/23/20 21:00


 10/26/20 20:45   





 


 Cefepime HCl 1 gm/


 Sodium Chloride  55 ml @ 


 110 mls/hr  Q24H


 IV


   10/24/20 18:00


 10/31/20 17:59  10/24/20 17:51





 


 Dextrose  1,000 ml @ 


 75 mls/hr  [id number]


 IV


   10/23/20 22:45


 11/22/20 22:44  10/25/20 01:06





 


 Dextrose


  (Dextrose 50%)  50 ml  Q30M  PRN


 IV


 Hypoglycemia  10/23/20 22:45


 1/21/21 22:44   





 


 Heparin Sodium


  (Porcine)


  (Heparin 5000


 units/ml)  5,000 units  EVERY 12  HOURS


 SUBQ


   10/23/20 21:00


 12/7/20 20:59  10/25/20 08:41





 


 Insulin Aspart


  (NovoLOG)    BEFORE MEALS AND  HS


 SUBQ


   10/24/20 06:30


 1/22/21 06:29  10/24/20 06:23





 


 Nitroglycerin


  (Ntg)  0.4 mg  Q5M  PRN


 SL


 Prn Chest Pain  10/23/20 20:30


 11/22/20 20:29   





 


 Ondansetron HCl


  (Zofran)  4 mg  Q6H  PRN


 IVP


 Nausea & Vomiting  10/23/20 20:30


 11/22/20 20:29   





 


 Polyethylene


 Glycol


  (Miralax)  17 gm  DAILYPRN  PRN


 ORAL


 Constipation  10/23/20 20:30


 11/22/20 20:29   





 


 Promethazine HCl/


 Codeine


  (Phenergan with


 Codeine)  5 ml  Q4H  PRN


 ORAL


 For Cough  10/23/20 20:30


 11/22/20 20:29   





 


 Tamsulosin HCl


  (Flomax)  0.4 mg  DAILY


 ORAL


   10/24/20 09:00


 11/23/20 08:59  10/25/20 08:38





 


 Temazepam


  (Restoril)  15 mg  HSPRN  PRN


 ORAL


 Insomnia  10/23/20 20:30


 10/30/20 20:29   





 


 Vancomycin HCl


  ([facility] pharmacy


 to dose)  1 ea  DAILY  PRN


 MISC


 Per rx protocol  10/23/20 20:30


 11/22/20 20:29   














Assessment/Plan


Assessment/Plan


ASSESSMENT


Acute hypoxemic respiratory failure , requiring  100% nonrebreathing mask-

resolving 


Sepsis


Pneumonia , possible aspiration


Probable UTI


Dysphagia


ARF on CKD


Hypernatremia


History of CVA


Diabetes mellitus


History of hypertension


Alzheimer dementia





PLAN OF CARE


DELICIA


supplemental  O2  titrate to keep pulse oximetry above 92%, now on o2 via NC 


pulmonary toilet


fup CXR on Monday 


rapid COVID-19 and influenza swab both NGT


abx  as per ID recs 


fup with cx   


SCX + Staph aureus, fup woth final cx 


DVT prophylaxis 


a/tussive prn 


NPO


NG tube for meds


strict aspiration precaution


swallow eval pending


IVF


monitor renal parameters,  lytes , correct electrolytes as needed ,avoid 

nephrotoxic


creatinine  and Na trending down 


BP better


ECHO with pEF 55%, 


monitor volumes 


BS management with SSI


supportive care   





case discussed and evaluated by supervising physician











Ely Ni NP                Oct 25, 2020 10:15

## 2020-10-25 NOTE — NEPHROLOGY PROGRESS NOTE
Assessment/Plan


Problem List:  


(1) Dehydration


(2) JANES (acute kidney injury)


(3) Renal failure (ARF), acute on chronic


(4) Hypoxia


(5) Acute encephalopathy


(6) Electrolyte imbalance


Assessment





75-year-old male is admitted with acute hypoxic respiratory failure most likely 

secondary to pneumonia and sepsis, UTI.


Acute on chronic renal failure


Dehydration


Electrolyte imbalances, hypernatremia


Hypoalbuminemia


Diabetes type 2


History of congestive heart failure


Hypertension


Hyperlipemia


Alzheimer's


Previous COVID-19 infection in September 2020


Plan





Patient is n.p.o., will continue on IV fluid of D5W 75 cc an hour


We will monitor electrolytes and renal parameters


Avoid nephrotoxic's


Start p.o. when he clears by speech therapist, meanwhile aspiration precautions


Keep the blood pressure and blood sugar in check


Per orders





Subjective


ROS Limited/Unobtainable:  No


Constitutional:  Reports: malaise, weakness





Objective


Objective





Last 24 Hour Vital Signs








  Date Time  Temp Pulse Resp B/P (MAP) Pulse Ox O2 Delivery O2 Flow Rate FiO2


 


10/25/20 12:00 96.8 62 21 102/68 (79) 100   


 


10/25/20 12:00       4.0 


 


10/25/20 12:00      Nasal Cannula 4.0 


 


10/25/20 11:50  64      


 


10/25/20 08:00 98.1 77 21 118/62 (80) 97   


 


10/25/20 08:00       4.0 


 


10/25/20 08:00      Nasal Cannula 4.0 


 


10/25/20 07:45  79      


 


10/25/20 04:00      Nasal Cannula 4.0 


 


10/25/20 04:00       4.0 


 


10/25/20 04:00  69      


 


10/25/20 04:00 98.6 88 22 124/55 (78) 99   





  88      


 


10/25/20 00:00      Nasal Cannula 4.0 


 


10/25/20 00:00  78      


 


10/25/20 00:00 98.0 76 22 110/72 (85) 100   





  76      


 


10/24/20 20:00  87      


 


10/24/20 20:00 98.2 91 22 108/73 (85) 96   





  91      


 


10/24/20 20:00      Nasal Cannula 4.0 


 


10/24/20 20:00       4.0 


 


10/24/20 16:39  85      


 


10/24/20 16:35  88  136/76 (96)    


 


10/24/20 16:00 97.9 79 20 98/63 (75) 98   





  78      


 


10/24/20 16:00      Nasal Cannula 3.0 


 


10/24/20 16:00       3.0 

















Intake and Output  


 


 10/24/20 10/25/20





 19:00 07:00


 


Intake Total 1030 ml 825 ml


 


Output Total 300 ml 400 ml


 


Balance 730 ml 425 ml


 


  


 


IV Total 1030 ml 825 ml


 


Output Urine Total 300 ml 400 ml


 


# Bowel Movements 2 











Current Medications








 Medications


  (Trade)  Dose


 Ordered  Sig/Miky


 Route


 PRN Reason  Start Time


 Stop Time Status Last Admin


Dose Admin


 


 Acetaminophen


  (Tylenol)  650 mg  Q4H  PRN


 ORAL


 Temp >100.5  10/23/20 20:30


 11/22/20 20:29   





 


 Albuterol/


 Ipratropium


  (Albuterol/


 Ipratropium)  3 ml  Q4H  PRN


 HHN


 Shortness of Breath  10/23/20 20:30


 10/28/20 20:29   





 


 Barium Sulfate


  (Varibar Honey)  250 ml  NOW  PRN


 MC


 RAD  10/23/20 21:00


 10/26/20 20:45   





 


 Barium Sulfate


  (Varibar Nectar)  240 ml  NOW  PRN


 MC


 RAD  10/23/20 21:00


 10/26/20 20:45   





 


 Barium Sulfate


  (Varibar Pudding)  230 ml  NOW  PRN


 MC


 RAD  10/23/20 21:00


 10/26/20 20:45   





 


 Barium Sulfate


  (Varibar Thin


 Liquid powder)  148 gm  NOW  PRN


 MC


 RAD  10/23/20 21:00


 10/26/20 20:45   





 


 Cefepime HCl 1 gm/


 Sodium Chloride  55 ml @ 


 110 mls/hr  Q24H


 IV


   10/24/20 18:00


 10/31/20 17:59  10/24/20 17:51





 


 Dextrose  1,000 ml @ 


 75 mls/hr  [id number]


 IV


   10/23/20 22:45


 11/22/20 22:44  10/25/20 01:06





 


 Dextrose


  (Dextrose 50%)  50 ml  Q30M  PRN


 IV


 Hypoglycemia  10/23/20 22:45


 1/21/21 22:44   





 


 Heparin Sodium


  (Porcine)


  (Heparin 5000


 units/ml)  5,000 units  EVERY 12  HOURS


 SUBQ


   10/23/20 21:00


 12/7/20 20:59  10/25/20 08:41





 


 Insulin Aspart


  (NovoLOG)    BEFORE MEALS AND  HS


 SUBQ


   10/24/20 06:30


 1/22/21 06:29  10/24/20 06:23





 


 Nitroglycerin


  (Ntg)  0.4 mg  Q5M  PRN


 SL


 Prn Chest Pain  10/23/20 20:30


 11/22/20 20:29   





 


 Ondansetron HCl


  (Zofran)  4 mg  Q6H  PRN


 IVP


 Nausea & Vomiting  10/23/20 20:30


 11/22/20 20:29   





 


 Polyethylene


 Glycol


  (Miralax)  17 gm  DAILYPRN  PRN


 ORAL


 Constipation  10/23/20 20:30


 11/22/20 20:29   





 


 Promethazine HCl/


 Codeine


  (Phenergan with


 Codeine)  5 ml  Q4H  PRN


 ORAL


 For Cough  10/23/20 20:30


 11/22/20 20:29   





 


 Tamsulosin HCl


  (Flomax)  0.4 mg  DAILY


 ORAL


   10/24/20 09:00


 11/23/20 08:59  10/25/20 08:38





 


 Temazepam


  (Restoril)  15 mg  HSPRN  PRN


 ORAL


 Insomnia  10/23/20 20:30


 10/30/20 20:29   





 


 Vancomycin HCl


  ([facility] pharmacy


 to dose)  1 ea  DAILY  PRN


 MISC


 Per rx protocol  10/23/20 20:30


 11/22/20 20:29   








Laboratory Tests


10/24/20 16:43: POC Whole Blood Glucose 129H


10/24/20 20:07: POC Whole Blood Glucose 119H


10/25/20 03:50: 


White Blood Count 7.5, Red Blood Count 4.47L, Hemoglobin 13.1L, Hematocrit 41.7L

, Mean Corpuscular Volume 93, Mean Corpuscular Hemoglobin 29.2, Mean Corpuscular

Hemoglobin Concent 31.3L, Red Cell Distribution Width 14.6, Platelet Count 179, 

Mean Platelet Volume 10.2H, Neutrophils (%) (Auto) 67.2, Lymphocytes (%) (Auto) 

23.7, Monocytes (%) (Auto) 7.3, Eosinophils (%) (Auto) 0.9, Basophils (%) (Auto)

1.0, Sodium Level 148H, Potassium Level 3.6, Chloride Level 113H, Carbon Dioxide

Level 26, Anion Gap 9, Blood Urea Nitrogen 23H, Creatinine 1.6H, Estimat 

Glomerular Filtration Rate 42.1, Glucose Level 123H, Hemoglobin A1c 6.7H, Uric 

Acid 9.6H, Calcium Level 8.2L, Phosphorus Level 2.2L, Magnesium Level 2.3, Total

Bilirubin 0.6, Gamma Glutamyl Transpeptidase 45, Aspartate Amino Transf 

(AST/SGOT) 20, Alanine Aminotransferase (ALT/SGPT) 13, Alkaline Phosphatase 106,

Lactate Dehydrogenase 214, Total Creatine Kinase 139, C-Reactive Protein, 

Quantitative 22.8H, Pro-B-Type Natriuretic Peptide 351H, Total Protein 6.4, 

Albumin 2.1L, Globulin 4.3, Albumin/Globulin Ratio 0.5L, Triglycerides Level 

204H, Cholesterol Level 149, LDL Cholesterol 77, HDL Cholesterol 35L, 

Cholesterol/HDL Ratio 4.3, Vitamin B12 Level 1029H, Thyroid Stimulating Hormone 

(TSH) 1.797, Random Vancomycin Level 14.6


10/25/20 05:58: POC Whole Blood Glucose 125H


10/25/20 11:19: POC Whole Blood Glucose 134H


Height (Feet):  5


Height (Inches):  4.00


Weight (Pounds):  130


General Appearance:  no apparent distress, lethargic, confused


Cardiovascular:  normal rate


Respiratory/Chest:  decreased breath sounds


Abdomen:  distended











Seven Tobar MD            Oct 25, 2020 12:40

## 2020-10-25 NOTE — CONSULTATION
DATE OF CONSULTATION:  10/25/2020

CARDIOLOGY CONSULTATION



CONSULTING PHYSICIAN:  Kvng Edmond MD



REFERRING PHYSICIAN:  Andrei Cancino MD



REASON FOR CONSULTATION:  Accelerated junctional rhythm.



HISTORY OF PRESENT ILLNESS:  Patient is a 75-year-old gentleman who was

sent from nursing home for increased congestion and difficulty breathing.

Patient was noticed to have diminished oxygen saturation.  Patient has

history of dementia and also had a temperature of 100.9 at the facility.

Patient was started on nonrebreather and was transferred to [facility].  Patient is nonverbal at baseline.  Patient had episode of

accelerated junctional rhythm and a cardiac electrophysiology consultation

was obtained for further evaluation.



REVIEW OF SYSTEMS:  Cannot be obtained.



PAST MEDICAL HISTORY:  As mentioned above.



FAMILY HISTORY:  Noncontributory.



SOCIAL HISTORY:  Nursing home resident.  Does not smoke or drink alcohol.



PHYSICAL EXAMINATION:

VITAL SIGNS:  Show blood pressure of 108/57, pulse is 60, respirations 18,

and he is afebrile.  He is on 4 L nasal cannula.

HEAD AND NECK:  Showed no JVD.

LUNGS:  Coarse rhonchi.

CARDIOVASCULAR:  Shows irregular S1 and S2 with no gallop.

ABDOMEN:  Soft.

EXTREMITIES:  No pitting edema.



LABORATORY AND DIAGNOSTIC DATA:  Telemetry strip showed sinus rhythm with

episodes of accelerated junctional rhythm that goes in and out of sinus

rhythm.  The rate of the junctional rhythm is around 90.  His labs show

white count of 7.5, hemoglobin of 13, hematocrit of 41, and platelet count

is 179,000.  His sodium 148, potassium is 3.6, BUN of 22, creatinine 1.6,

and glucose of 123.  His initial troponin was 0.032.



ASSESSMENT AND PLAN:

1. Accelerated junctional rhythm.  Patient is not bradycardic.  We will

completely rule out MI protocol.  His echocardiogram showed ejection

fraction of 55%.

2. Sepsis, on broad-spectrum IV antibiotic.

3. Respiratory failure, on antibiotic.

4. Dehydration.

5. Acute renal failure.

6. Electrolyte imbalance.

7. History of congestive heart failure, but echocardiogram showed normal

left ventricular systolic function.

8. History of previous COVID infection in September 2020.

9. Dementia.



Thank you very much for allowing me to participate in the care of this

patient.  Please do not hesitate to contact me for any questions regarding

my evaluation.









  ______________________________________________

  Kvng Edmond M.D.





DR:  DONAL

D:  10/25/2020 19:13

T:  10/25/2020 19:44

JOB#:  [id number]

CC:

## 2020-10-25 NOTE — NUR
NURSE NOTES:

Received order from Dr. Cancino to add physician consult for cardio consult. Order carried 
out.

## 2020-10-25 NOTE — NUR
NURSE NOTES:

Received report from Danielle RN. Pt observed patient awake, lying semi-fountain's, resting 
comfortably. No s/s of distress or discomfort noted. Pt AAO x1. Pt on 4L nasal canula, 
congestion noted. Cummings cath draining well to gravity. NPO at this time, ST eval pending. 
Bed in low position and locked, call light within reach, encouraged to use call light when 
needed. Will continue plan of care.

## 2020-10-25 NOTE — NUR
NURSE NOTES:

PATIENT CHANGED, LINEN CHANGED, ORAL CARE PROVIDED, WOUND CARE DONE. DEEP SUCTIONING 
PERFORMED DUE TO CONGESTION/AUDIBLE GURGLING- PATIENT TOLERATED WELL, SAO2 98%.

## 2020-10-25 NOTE — NUR
NURSE HAND-OFF REPORT: 



Important Events on Shift: Patient had an episode of accelerated junctional rhythm, Dr. Edmond aware

Patient Status: Stable

Diet: NPO since admission



Pending Orders: 2D Echo

Pending Results/Labs:morning labs

Pending MD notification:NA



Latest Vital Signs: Temperature 97.0 , Pulse 56 , B/P 108 /57 , Respiratory Rate 19 , O2 SAT 
97 , Nasal Cannula, O2 Flow Rate 4.0 .  

Vital Sign Comment: Stable



EKG Rhythm: Sinus Rhythm

Rhythm change?: N 

MD Notified?: Y -Left Message for Dr. Cancino, No cardio on the case. 

MD Response: 



Latest Mejia Fall Score: 70  

Fall Risk: High Risk 

Safety Measures: Call light Within Reach, Bed Alarm Zone 1, Side Rails Side Rails x3, Bed 
position Low and Locked.

Fall Precautions: 

Yellow Socks

Yellow Gown

Door Sign

Patient Fall Education



Report given to Wally/LITA.

## 2020-10-25 NOTE — NUR
NURSE HAND-OFF REPORT: 



Important Events on Shift: CONGESTION/AUDIBLE GURGLING

Patient Status: STABLE

Diet: NPO PENDING ST EVAL



Pending Orders: ST EVAL

Pending Results/Labs:AM LABS

Pending MD notification:N/A



Latest Vital Signs: Temperature 98.6 , Pulse 69 , B/P 124 /55 , Respiratory Rate 22 , O2 SAT 
99 , Nasal Cannula, O2 Flow Rate 4.0 .  

Vital Sign Comment: STABLE



EKG Rhythm: Sinus Rhythm

Rhythm change?: N 

MD Notified?: -

MD Response: 



Latest Mejia Fall Score: 70  

Fall Risk: High Risk 

Safety Measures: Call light Within Reach, Bed Alarm Zone 1, Side Rails Side Rails x3, Bed 
position Low and Locked.

Fall Precautions: 

Yellow Socks

Yellow Gown

Door Sign

Patient Fall Education



Report given to LITA ESCAMILLA.

## 2020-10-25 NOTE — NUR
NURSE NOTES:

Patient had an episode of accelerated junctional rhythm. No cardio consult at this moment. 
Left message for dr. Cancino. Awaiting for call back.

## 2020-10-25 NOTE — CARDIAC ELECTROPHYSIOLOGY PN
Subjective


Subjective


[id number]





Objective





Last 24 Hour Vital Signs








  Date Time  Temp Pulse Resp B/P (MAP) Pulse Ox O2 Delivery O2 Flow Rate FiO2


 


10/25/20 16:00       4.0 


 


10/25/20 16:00 97.0 56 19 108/57 (74) 97   


 


10/25/20 16:00      Nasal Cannula 4.0 


 


10/25/20 15:31  60      


 


10/25/20 12:00 96.8 62 21 102/68 (79) 100   


 


10/25/20 12:00       4.0 


 


10/25/20 12:00      Nasal Cannula 4.0 


 


10/25/20 11:50  64      


 


10/25/20 08:00 98.1 77 21 118/62 (80) 97   


 


10/25/20 08:00       4.0 


 


10/25/20 08:00      Nasal Cannula 4.0 


 


10/25/20 07:45  79      


 


10/25/20 04:00      Nasal Cannula 4.0 


 


10/25/20 04:00       4.0 


 


10/25/20 04:00  69      


 


10/25/20 04:00 98.6 88 22 124/55 (78) 99   





  88      


 


10/25/20 00:00      Nasal Cannula 4.0 


 


10/25/20 00:00  78      


 


10/25/20 00:00 98.0 76 22 110/72 (85) 100   





  76      


 


10/24/20 20:00  87      


 


10/24/20 20:00 98.2 91 22 108/73 (85) 96   





  91      


 


10/24/20 20:00      Nasal Cannula 4.0 


 


10/24/20 20:00       4.0 

















Intake and Output  


 


 10/24/20 10/25/20





 19:00 07:00


 


Intake Total 1030 ml 825 ml


 


Output Total 300 ml 400 ml


 


Balance 730 ml 425 ml


 


  


 


IV Total 1030 ml 825 ml


 


Output Urine Total 300 ml 400 ml


 


# Bowel Movements 2 











Laboratory Tests








Test


 10/24/20


20:07 10/25/20


03:50 10/25/20


05:58 10/25/20


11:19


 


POC Whole Blood Glucose


 119 MG/DL


()  H 


 125 MG/DL


()  H 134 MG/DL


()  H


 


White Blood Count


 


 7.5 K/UL


(4.8-10.8) 


 





 


Red Blood Count


 


 4.47 M/UL


(4.70-6.10)  L 


 





 


Hemoglobin


 


 13.1 G/DL


(14.2-18.0)  L 


 





 


Hematocrit


 


 41.7 %


(42.0-52.0)  L 


 





 


Mean Corpuscular Volume  93 FL (80-99)    


 


Mean Corpuscular Hemoglobin


 


 29.2 PG


(27.0-31.0) 


 





 


Mean Corpuscular Hemoglobin


Concent 


 31.3 G/DL


(32.0-36.0)  L 


 





 


Red Cell Distribution Width


 


 14.6 %


(11.6-14.8) 


 





 


Platelet Count


 


 179 K/UL


(150-450) 


 





 


Mean Platelet Volume


 


 10.2 FL


(6.5-10.1)  H 


 





 


Neutrophils (%) (Auto)


 


 67.2 %


(45.0-75.0) 


 





 


Lymphocytes (%) (Auto)


 


 23.7 %


(20.0-45.0) 


 





 


Monocytes (%) (Auto)


 


 7.3 %


(1.0-10.0) 


 





 


Eosinophils (%) (Auto)


 


 0.9 %


(0.0-3.0) 


 





 


Basophils (%) (Auto)


 


 1.0 %


(0.0-2.0) 


 





 


Sodium Level


 


 148 MMOL/L


(136-145)  H 


 





 


Potassium Level


 


 3.6 MMOL/L


(3.5-5.1) 


 





 


Chloride Level


 


 113 MMOL/L


()  H 


 





 


Carbon Dioxide Level


 


 26 MMOL/L


(21-32) 


 





 


Anion Gap


 


 9 mmol/L


(5-15) 


 





 


Blood Urea Nitrogen


 


 23 mg/dL


(7-18)  H 


 





 


Creatinine


 


 1.6 MG/DL


(0.55-1.30)  H 


 





 


Estimat Glomerular Filtration


Rate 


 42.1 mL/min


(>60) 


 





 


Glucose Level


 


 123 MG/DL


()  H 


 





 


Hemoglobin A1c


 


 6.7 %


(4.3-6.0)  H 


 





 


Uric Acid


 


 9.6 MG/DL


(2.6-7.2)  H 


 





 


Calcium Level


 


 8.2 MG/DL


(8.5-10.1)  L 


 





 


Phosphorus Level


 


 2.2 MG/DL


(2.5-4.9)  L 


 





 


Magnesium Level


 


 2.3 MG/DL


(1.8-2.4) 


 





 


Total Bilirubin


 


 0.6 MG/DL


(0.2-1.0) 


 





 


Gamma Glutamyl Transpeptidase  45 U/L (5-85)    


 


Aspartate Amino Transf


(AST/SGOT) 


 20 U/L (15-37)


 


 





 


Alanine Aminotransferase


(ALT/SGPT) 


 13 U/L (12-78)


 


 





 


Alkaline Phosphatase


 


 106 U/L


() 


 





 


Lactate Dehydrogenase


 


 214 U/L


() 


 





 


Total Creatine Kinase


 


 139 U/L


() 


 





 


C-Reactive Protein,


Quantitative 


 22.8 mg/dL


(0.00-0.90)  H 


 





 


Pro-B-Type Natriuretic Peptide


 


 351 pg/mL


(0-125)  H 


 





 


Total Protein


 


 6.4 G/DL


(6.4-8.2) 


 





 


Albumin


 


 2.1 G/DL


(3.4-5.0)  L 


 





 


Globulin  4.3 g/dL    


 


Albumin/Globulin Ratio


 


 0.5 (1.0-2.7)


L 


 





 


Triglycerides Level


 


 204 MG/DL


()  H 


 





 


Cholesterol Level


 


 149 MG/DL (<


200) 


 





 


LDL Cholesterol


 


 77 mg/dL


(<100) 


 





 


HDL Cholesterol


 


 35 MG/DL


(40-60)  L 


 





 


Cholesterol/HDL Ratio  4.3 (3.3-4.4)    


 


Vitamin B12 Level


 


 1029 PG/ML


(193-986)  H 


 





 


Thyroid Stimulating Hormone


(TSH) 


 1.797 uiU/mL


(0.358-3.740) 


 





 


Random Vancomycin Level  14.6 ug/mL    


 


Test


 10/25/20


17:38 


 


 





 


POC Whole Blood Glucose Pending     











Microbiology








 Date/Time


Source Procedure


Growth Status





 


 10/24/20 04:00


Sputum Gram Stain - Final Resulted


 


 10/24/20 04:00 Sputum Culture - Preliminary


Staphylococcus Aureus


Usual Respiratory Sondra Resulted


 


 10/23/20 17:45


Blood Blood Culture - Preliminary


NO GROWTH AFTER 24 HOURS Resulted


 


 10/23/20 17:37


Urine,Clean Catch Urine Culture - Preliminary


NO GROWTH AFTER 24 HOURS Resulted


 


 10/23/20 17:30


Nasopharynx SARS-CoV-2 RdRp Gene Assay - Final Complete





 10/23/20 17:30


Nasal Nares - Final Complete


 


 10/23/20 17:30


Nasal Nares - Final Complete


 


 10/23/20 17:30


Blood Blood Culture - Preliminary


NO GROWTH AFTER 24 HOURS Resulted

















Kvng Edmond MD               Oct 25, 2020 19:11

## 2020-10-25 NOTE — INTERNAL MED PROGRESS NOTE
Subjective


Date of Service:  Oct 25, 2020


Physician Name


NadiyaBenito


Attending Physician


Andrei Cancino MD





Current Medications








 Medications


  (Trade)  Dose


 Ordered  Sig/Miky


 Route


 PRN Reason  Start Time


 Stop Time Status Last Admin


Dose Admin


 


 Acetaminophen


  (Tylenol)  650 mg  Q4H  PRN


 ORAL


 Temp >100.5  10/23/20 20:30


 11/22/20 20:29   





 


 Albuterol/


 Ipratropium


  (Albuterol/


 Ipratropium)  3 ml  Q4H  PRN


 HHN


 Shortness of Breath  10/23/20 20:30


 10/28/20 20:29   





 


 Barium Sulfate


  (Varibar Honey)  250 ml  NOW  PRN


 MC


 RAD  10/23/20 21:00


 10/26/20 20:45   





 


 Barium Sulfate


  (Varibar Nectar)  240 ml  NOW  PRN


 MC


 RAD  10/23/20 21:00


 10/26/20 20:45   





 


 Barium Sulfate


  (Varibar Pudding)  230 ml  NOW  PRN


 MC


 RAD  10/23/20 21:00


 10/26/20 20:45   





 


 Barium Sulfate


  (Varibar Thin


 Liquid powder)  148 gm  NOW  PRN


 MC


 RAD  10/23/20 21:00


 10/26/20 20:45   





 


 Cefepime HCl 1 gm/


 Sodium Chloride  55 ml @ 


 110 mls/hr  Q24H


 IV


   10/24/20 18:00


 10/31/20 17:59  10/24/20 17:51





 


 Dextrose  1,000 ml @ 


 75 mls/hr  [id number]


 IV


   10/23/20 22:45


 11/22/20 22:44  10/25/20 15:02





 


 Dextrose


  (Dextrose 50%)  50 ml  Q30M  PRN


 IV


 Hypoglycemia  10/23/20 22:45


 1/21/21 22:44   





 


 Heparin Sodium


  (Porcine)


  (Heparin 5000


 units/ml)  5,000 units  EVERY 12  HOURS


 SUBQ


   10/23/20 21:00


 12/7/20 20:59  10/25/20 08:41





 


 Insulin Aspart


  (NovoLOG)    BEFORE MEALS AND  HS


 SUBQ


   10/24/20 06:30


 1/22/21 06:29  10/24/20 06:23





 


 Nitroglycerin


  (Ntg)  0.4 mg  Q5M  PRN


 SL


 Prn Chest Pain  10/23/20 20:30


 11/22/20 20:29   





 


 Ondansetron HCl


  (Zofran)  4 mg  Q6H  PRN


 IVP


 Nausea & Vomiting  10/23/20 20:30


 11/22/20 20:29   





 


 Polyethylene


 Glycol


  (Miralax)  17 gm  DAILYPRN  PRN


 ORAL


 Constipation  10/23/20 20:30


 11/22/20 20:29   





 


 Promethazine HCl/


 Codeine


  (Phenergan with


 Codeine)  5 ml  Q4H  PRN


 ORAL


 For Cough  10/23/20 20:30


 11/22/20 20:29   





 


 Tamsulosin HCl


  (Flomax)  0.4 mg  DAILY


 ORAL


   10/24/20 09:00


 11/23/20 08:59  10/25/20 08:38





 


 Temazepam


  (Restoril)  15 mg  HSPRN  PRN


 ORAL


 Insomnia  10/23/20 20:30


 10/30/20 20:29   





 


 Vancomycin HCl


  ([facility] pharmacy


 to dose)  1 ea  DAILY  PRN


 MISC


 Per rx protocol  10/23/20 20:30


 11/22/20 20:29   











Allergies:  


Coded Allergies:  


     No Known Allergies (Unverified , 8/20/12)


ROS Limited/Unobtainable:  Yes


Subjective


74 YO M admitted with shortness of breath.  Now pneumonia.  Cover for Int med-Dr Cancino.  Step down unit





Objective





Last Vital Signs








  Date Time  Temp Pulse Resp B/P (MAP) Pulse Ox O2 Delivery O2 Flow Rate FiO2


 


10/25/20 12:00 96.8 62 21 102/68 (79) 100   


 


10/25/20 12:00       4.0 


 


10/25/20 12:00      Nasal Cannula  











Laboratory Tests








Test


 10/24/20


16:43 10/24/20


20:07 10/25/20


03:50 10/25/20


05:58


 


POC Whole Blood Glucose


 129 MG/DL


()  H 119 MG/DL


()  H 


 125 MG/DL


()  H


 


White Blood Count


 


 


 7.5 K/UL


(4.8-10.8) 





 


Red Blood Count


 


 


 4.47 M/UL


(4.70-6.10)  L 





 


Hemoglobin


 


 


 13.1 G/DL


(14.2-18.0)  L 





 


Hematocrit


 


 


 41.7 %


(42.0-52.0)  L 





 


Mean Corpuscular Volume   93 FL (80-99)   


 


Mean Corpuscular Hemoglobin


 


 


 29.2 PG


(27.0-31.0) 





 


Mean Corpuscular Hemoglobin


Concent 


 


 31.3 G/DL


(32.0-36.0)  L 





 


Red Cell Distribution Width


 


 


 14.6 %


(11.6-14.8) 





 


Platelet Count


 


 


 179 K/UL


(150-450) 





 


Mean Platelet Volume


 


 


 10.2 FL


(6.5-10.1)  H 





 


Neutrophils (%) (Auto)


 


 


 67.2 %


(45.0-75.0) 





 


Lymphocytes (%) (Auto)


 


 


 23.7 %


(20.0-45.0) 





 


Monocytes (%) (Auto)


 


 


 7.3 %


(1.0-10.0) 





 


Eosinophils (%) (Auto)


 


 


 0.9 %


(0.0-3.0) 





 


Basophils (%) (Auto)


 


 


 1.0 %


(0.0-2.0) 





 


Sodium Level


 


 


 148 MMOL/L


(136-145)  H 





 


Potassium Level


 


 


 3.6 MMOL/L


(3.5-5.1) 





 


Chloride Level


 


 


 113 MMOL/L


()  H 





 


Carbon Dioxide Level


 


 


 26 MMOL/L


(21-32) 





 


Anion Gap


 


 


 9 mmol/L


(5-15) 





 


Blood Urea Nitrogen


 


 


 23 mg/dL


(7-18)  H 





 


Creatinine


 


 


 1.6 MG/DL


(0.55-1.30)  H 





 


Estimat Glomerular Filtration


Rate 


 


 42.1 mL/min


(>60) 





 


Glucose Level


 


 


 123 MG/DL


()  H 





 


Hemoglobin A1c


 


 


 6.7 %


(4.3-6.0)  H 





 


Uric Acid


 


 


 9.6 MG/DL


(2.6-7.2)  H 





 


Calcium Level


 


 


 8.2 MG/DL


(8.5-10.1)  L 





 


Phosphorus Level


 


 


 2.2 MG/DL


(2.5-4.9)  L 





 


Magnesium Level


 


 


 2.3 MG/DL


(1.8-2.4) 





 


Total Bilirubin


 


 


 0.6 MG/DL


(0.2-1.0) 





 


Gamma Glutamyl Transpeptidase   45 U/L (5-85)   


 


Aspartate Amino Transf


(AST/SGOT) 


 


 20 U/L (15-37)


 





 


Alanine Aminotransferase


(ALT/SGPT) 


 


 13 U/L (12-78)


 





 


Alkaline Phosphatase


 


 


 106 U/L


() 





 


Lactate Dehydrogenase


 


 


 214 U/L


() 





 


Total Creatine Kinase


 


 


 139 U/L


() 





 


C-Reactive Protein,


Quantitative 


 


 22.8 mg/dL


(0.00-0.90)  H 





 


Pro-B-Type Natriuretic Peptide


 


 


 351 pg/mL


(0-125)  H 





 


Total Protein


 


 


 6.4 G/DL


(6.4-8.2) 





 


Albumin


 


 


 2.1 G/DL


(3.4-5.0)  L 





 


Globulin   4.3 g/dL   


 


Albumin/Globulin Ratio


 


 


 0.5 (1.0-2.7)


L 





 


Triglycerides Level


 


 


 204 MG/DL


()  H 





 


Cholesterol Level


 


 


 149 MG/DL (<


200) 





 


LDL Cholesterol


 


 


 77 mg/dL


(<100) 





 


HDL Cholesterol


 


 


 35 MG/DL


(40-60)  L 





 


Cholesterol/HDL Ratio   4.3 (3.3-4.4)   


 


Vitamin B12 Level


 


 


 1029 PG/ML


(193-986)  H 





 


Thyroid Stimulating Hormone


(TSH) 


 


 1.797 uiU/mL


(0.358-3.740) 





 


Random Vancomycin Level   14.6 ug/mL   


 


Test


 10/25/20


11:19 


 


 





 


POC Whole Blood Glucose


 134 MG/DL


()  H 


 


 














Microbiology








 Date/Time


Source Procedure


Growth Status





 


 10/24/20 04:00


Sputum Gram Stain - Final Resulted


 


 10/24/20 04:00 Sputum Culture - Preliminary


Staphylococcus Aureus


Usual Respiratory Sondra Resulted


 


 10/23/20 17:45


Blood Blood Culture - Preliminary


NO GROWTH AFTER 24 HOURS Resulted


 


 10/23/20 17:37


Urine,Clean Catch Urine Culture - Preliminary


NO GROWTH AFTER 24 HOURS Resulted


 


 10/23/20 17:30


Nasopharynx SARS-CoV-2 RdRp Gene Assay - Final Complete





 10/23/20 17:30


Nasal Nares - Final Complete


 


 10/23/20 17:30


Nasal Nares - Final Complete


 


 10/23/20 17:30


Blood Blood Culture - Preliminary


NO GROWTH AFTER 24 HOURS Resulted

















Intake and Output  


 


 10/24/20 10/25/20





 19:00 07:00


 


Intake Total 1030 ml 825 ml


 


Output Total 300 ml 400 ml


 


Balance 730 ml 425 ml


 


  


 


IV Total 1030 ml 825 ml


 


Output Urine Total 300 ml 400 ml


 


# Bowel Movements 2 








Objective


PHYSICAL EXAMINATION:


GENERAL:  The patient awake with deep stimuli, open his eyes, however,


cannot follow commands.  The patient is on a Ventimask at this time,


chronically ill-appearing.


HEAD AND NECK:  Pupils are equal and reactive to light.  Anicteric.


NECK:  Supple.  No JVD.


LUNGS:  Good air entry.  No wheezing or rhonchi, however the patient has a


coarse breath sounds.  Decreased air in bases.


HEART:  S1, S2.  Regular rhythm.  Distant heart sounds.  No murmur or


gallop.


ABDOMEN:  Soft, nondistended, nontender.  Positive bowel sounds.


EXTREMITIES:  No cyanosis, clubbing, or edema.


NEUROLOGIC:  Very limited secondary to the patient's status, cannot follow


commands.  Opens his eyes with deep stimuli and moving extremities


spontaneously.





Assessment/Plan


Assessment/Plan


ASSESSMENT:


1. Acute hypoxemic respiratory failure, most likely secondary to


pneumonia and sepsis.


2. Sepsis secondary to urinary tract infection and pneumonia.


3. pneumonia=staph aureus


4. Acute kidney injury on chronic renal insufficiency.


5. Dehydration.


6. History of chronic congestive heart failure.


7. Diabetes type 2.


8. Dyslipidemia.


9. Hypertension.


10. Alzheimer's disease.


11. History of COVID-19 infection in September 2020.





PLAN:


1.  Admit the patient to step-down.


2.  Dr. Sandoval,=Pulmonary Critical Care


3.  Dr. Garcia = Inf Dis.


4.  IV= D5W due to the hypernatremia and dehydration.


5.  antibiotics = vancomycin and cefepime


6.  Code status is full code.


7.    DVT prophylaxis is heparin subcutaneous.











Benito Hearn MD               Oct 25, 2020 15:09

## 2020-10-25 NOTE — NUR
NURSE NOTES:

Received report from Yoana/RN, Observed patient awake, lying semi-fountain's, resting 
comfortably. AAO x1. On 4L nasal canula, Congested. Cummings cath draining well to gravity. NPO 
at this time, ST eval pending. Bed in low position and locked, Call light within reach, 
Encouraged to use call light when needed. Will continue plan of care.

## 2020-10-26 VITALS — SYSTOLIC BLOOD PRESSURE: 107 MMHG | DIASTOLIC BLOOD PRESSURE: 59 MMHG

## 2020-10-26 VITALS — SYSTOLIC BLOOD PRESSURE: 91 MMHG | DIASTOLIC BLOOD PRESSURE: 57 MMHG

## 2020-10-26 VITALS — SYSTOLIC BLOOD PRESSURE: 111 MMHG | DIASTOLIC BLOOD PRESSURE: 83 MMHG

## 2020-10-26 VITALS — DIASTOLIC BLOOD PRESSURE: 56 MMHG | SYSTOLIC BLOOD PRESSURE: 112 MMHG

## 2020-10-26 VITALS — DIASTOLIC BLOOD PRESSURE: 59 MMHG | SYSTOLIC BLOOD PRESSURE: 106 MMHG

## 2020-10-26 VITALS — DIASTOLIC BLOOD PRESSURE: 53 MMHG | SYSTOLIC BLOOD PRESSURE: 105 MMHG

## 2020-10-26 LAB
ADD MANUAL DIFF: NO
ALBUMIN SERPL-MCNC: 1.8 G/DL (ref 3.4–5)
ALBUMIN/GLOB SERPL: 0.4 {RATIO} (ref 1–2.7)
ALP SERPL-CCNC: 142 U/L (ref 46–116)
ALT SERPL-CCNC: 10 U/L (ref 12–78)
ANION GAP SERPL CALC-SCNC: 9 MMOL/L (ref 5–15)
AST SERPL-CCNC: 26 U/L (ref 15–37)
BASOPHILS NFR BLD AUTO: 0.7 % (ref 0–2)
BILIRUB SERPL-MCNC: 0.6 MG/DL (ref 0.2–1)
BUN SERPL-MCNC: 12 MG/DL (ref 7–18)
CALCIUM SERPL-MCNC: 8.1 MG/DL (ref 8.5–10.1)
CHLORIDE SERPL-SCNC: 108 MMOL/L (ref 98–107)
CO2 SERPL-SCNC: 25 MMOL/L (ref 21–32)
CREAT SERPL-MCNC: 1.3 MG/DL (ref 0.55–1.3)
EOSINOPHIL NFR BLD AUTO: 1.4 % (ref 0–3)
ERYTHROCYTE [DISTWIDTH] IN BLOOD BY AUTOMATED COUNT: 13.7 % (ref 11.6–14.8)
GLOBULIN SER-MCNC: 4.2 G/DL
HCT VFR BLD CALC: 38.2 % (ref 42–52)
HGB BLD-MCNC: 12.7 G/DL (ref 14.2–18)
LYMPHOCYTES NFR BLD AUTO: 20.9 % (ref 20–45)
MCV RBC AUTO: 90 FL (ref 80–99)
MONOCYTES NFR BLD AUTO: 7.9 % (ref 1–10)
NEUTROPHILS NFR BLD AUTO: 69 % (ref 45–75)
PHOSPHATE SERPL-MCNC: 2.3 MG/DL (ref 2.5–4.9)
PLATELET # BLD: 192 K/UL (ref 150–450)
POTASSIUM SERPL-SCNC: 3.3 MMOL/L (ref 3.5–5.1)
RBC # BLD AUTO: 4.27 M/UL (ref 4.7–6.1)
SODIUM SERPL-SCNC: 142 MMOL/L (ref 136–145)
WBC # BLD AUTO: 7.6 K/UL (ref 4.8–10.8)

## 2020-10-26 RX ADMIN — INSULIN ASPART SCH UNITS: 100 INJECTION, SOLUTION INTRAVENOUS; SUBCUTANEOUS at 16:06

## 2020-10-26 RX ADMIN — HEPARIN SODIUM SCH UNITS: 5000 INJECTION INTRAVENOUS; SUBCUTANEOUS at 08:16

## 2020-10-26 RX ADMIN — INSULIN ASPART SCH UNITS: 100 INJECTION, SOLUTION INTRAVENOUS; SUBCUTANEOUS at 06:16

## 2020-10-26 RX ADMIN — INSULIN ASPART SCH UNITS: 100 INJECTION, SOLUTION INTRAVENOUS; SUBCUTANEOUS at 20:59

## 2020-10-26 RX ADMIN — SODIUM CHLORIDE SCH MLS/HR: 9 INJECTION, SOLUTION INTRAVENOUS at 09:08

## 2020-10-26 RX ADMIN — HEPARIN SODIUM SCH UNITS: 5000 INJECTION INTRAVENOUS; SUBCUTANEOUS at 20:58

## 2020-10-26 RX ADMIN — TAMSULOSIN HYDROCHLORIDE SCH MG: 0.4 CAPSULE ORAL at 08:14

## 2020-10-26 RX ADMIN — INSULIN ASPART SCH UNITS: 100 INJECTION, SOLUTION INTRAVENOUS; SUBCUTANEOUS at 11:30

## 2020-10-26 NOTE — INTERNAL MED PROGRESS NOTE
Subjective


Date of Service:  Oct 26, 2020


Physician Name


NadiyaBenito


Attending Physician


Andrei Cancino MD





Current Medications








 Medications


  (Trade)  Dose


 Ordered  Sig/Miky


 Route


 PRN Reason  Start Time


 Stop Time Status Last Admin


Dose Admin


 


 Acetaminophen


  (Tylenol)  650 mg  Q4H  PRN


 ORAL


 Temp >100.5  10/23/20 20:30


 11/22/20 20:29   





 


 Albuterol/


 Ipratropium


  (Albuterol/


 Ipratropium)  3 ml  Q4H  PRN


 HHN


 Shortness of Breath  10/23/20 20:30


 10/28/20 20:29   





 


 Barium Sulfate


  (Varibar Honey)  250 ml  NOW  PRN


 MC


 RAD  10/23/20 21:00


 10/26/20 20:45   





 


 Barium Sulfate


  (Varibar Nectar)  240 ml  NOW  PRN


 MC


 RAD  10/23/20 21:00


 10/26/20 20:45   





 


 Barium Sulfate


  (Varibar Pudding)  230 ml  NOW  PRN


 MC


 RAD  10/23/20 21:00


 10/26/20 20:45   





 


 Barium Sulfate


  (Varibar Thin


 Liquid powder)  148 gm  NOW  PRN


 MC


 RAD  10/23/20 21:00


 10/26/20 20:45   





 


 Dextrose  1,000 ml @ 


 50 mls/hr  Q20H


 IV


   10/26/20 12:38


 11/25/20 12:37  10/26/20 13:12





 


 Dextrose


  (Dextrose 50%)  50 ml  Q30M  PRN


 IV


 Hypoglycemia  10/23/20 22:45


 1/21/21 22:44   





 


 Heparin Sodium


  (Porcine)


  (Heparin 5000


 units/ml)  5,000 units  EVERY 12  HOURS


 SUBQ


   10/23/20 21:00


 12/7/20 20:59  10/26/20 08:16





 


 Insulin Aspart


  (NovoLOG)    BEFORE MEALS AND  HS


 SUBQ


   10/24/20 06:30


 1/22/21 06:29  10/24/20 06:23





 


 Nitroglycerin


  (Ntg)  0.4 mg  Q5M  PRN


 SL


 Prn Chest Pain  10/23/20 20:30


 11/22/20 20:29   





 


 Ondansetron HCl


  (Zofran)  4 mg  Q6H  PRN


 IVP


 Nausea & Vomiting  10/23/20 20:30


 11/22/20 20:29   





 


 Polyethylene


 Glycol


  (Miralax)  17 gm  DAILYPRN  PRN


 ORAL


 Constipation  10/23/20 20:30


 11/22/20 20:29   





 


 Promethazine HCl/


 Codeine


  (Phenergan with


 Codeine)  5 ml  Q4H  PRN


 ORAL


 For Cough  10/23/20 20:30


 11/22/20 20:29   





 


 Tamsulosin HCl


  (Flomax)  0.4 mg  DAILY


 ORAL


   10/24/20 09:00


 11/23/20 08:59  10/25/20 08:38





 


 Temazepam


  (Restoril)  15 mg  HSPRN  PRN


 ORAL


 Insomnia  10/23/20 20:30


 10/30/20 20:29   





 


 Vancomycin HCl


  (Vanco pharmacy


 to dose)  1 ea  DAILY  PRN


 MISC


 Per rx protocol  10/23/20 20:30


 11/22/20 20:29   





 


 Vancomycin HCl


 750 mg/Sodium


 Chloride  275 ml @ 


 183.333


 mls/hr  Q24H


 IVPB


   10/26/20 09:00


 10/31/20 08:59  10/26/20 09:08











Allergies:  


Coded Allergies:  


     No Known Allergies (Unverified , 8/20/12)


ROS Limited/Unobtainable:  Yes


Subjective


74 YO M admitted with shortness of breath.  Now pneumonia and COVID positive.  

Cover for Int med-Dr Cancino.  Step down unit





Objective





Last Vital Signs








  Date Time  Temp Pulse Resp B/P (MAP) Pulse Ox O2 Delivery O2 Flow Rate FiO2


 


10/26/20 16:00 97.9 74 18 111/61 (78) 100   


 


10/26/20 16:00       4.0 


 


10/26/20 16:00      Nasal Cannula  











Laboratory Tests








Test


 10/26/20


07:00 10/26/20


11:34 10/26/20


15:56


 


White Blood Count


 7.6 K/UL


(4.8-10.8) 


 





 


Red Blood Count


 4.27 M/UL


(4.70-6.10)  L 


 





 


Hemoglobin


 12.7 G/DL


(14.2-18.0)  L 


 





 


Hematocrit


 38.2 %


(42.0-52.0)  L 


 





 


Mean Corpuscular Volume 90 FL (80-99)    


 


Mean Corpuscular Hemoglobin


 29.8 PG


(27.0-31.0) 


 





 


Mean Corpuscular Hemoglobin


Concent 33.3 G/DL


(32.0-36.0) 


 





 


Red Cell Distribution Width


 13.7 %


(11.6-14.8) 


 





 


Platelet Count


 192 K/UL


(150-450) 


 





 


Mean Platelet Volume


 10.2 FL


(6.5-10.1)  H 


 





 


Neutrophils (%) (Auto)


 69.0 %


(45.0-75.0) 


 





 


Lymphocytes (%) (Auto)


 20.9 %


(20.0-45.0) 


 





 


Monocytes (%) (Auto)


 7.9 %


(1.0-10.0) 


 





 


Eosinophils (%) (Auto)


 1.4 %


(0.0-3.0) 


 





 


Basophils (%) (Auto)


 0.7 %


(0.0-2.0) 


 





 


Sodium Level


 142 MMOL/L


(136-145) 


 





 


Potassium Level


 3.3 MMOL/L


(3.5-5.1)  L 


 





 


Chloride Level


 108 MMOL/L


()  H 


 





 


Carbon Dioxide Level


 25 MMOL/L


(21-32) 


 





 


Anion Gap


 9 mmol/L


(5-15) 


 





 


Blood Urea Nitrogen


 12 mg/dL


(7-18) 


 





 


Creatinine


 1.3 MG/DL


(0.55-1.30) 


 





 


Estimat Glomerular Filtration


Rate 53.5 mL/min


(>60) 


 





 


Glucose Level


 129 MG/DL


()  H 


 





 


Uric Acid


 7.9 MG/DL


(2.6-7.2)  H 


 





 


Calcium Level


 8.1 MG/DL


(8.5-10.1)  L 


 





 


Phosphorus Level


 2.3 MG/DL


(2.5-4.9)  L 


 





 


Magnesium Level


 2.2 MG/DL


(1.8-2.4) 


 





 


Total Bilirubin


 0.6 MG/DL


(0.2-1.0) 


 





 


Aspartate Amino Transf


(AST/SGOT) 26 U/L (15-37)


 


 





 


Alanine Aminotransferase


(ALT/SGPT) 10 U/L (12-78)


L 


 





 


Alkaline Phosphatase


 142 U/L


()  H 


 





 


Troponin I


 0.014 ng/mL


(0.000-0.056) 


 





 


Total Protein


 6.0 G/DL


(6.4-8.2)  L 


 





 


Albumin


 1.8 G/DL


(3.4-5.0)  L 


 





 


Globulin 4.2 g/dL    


 


Albumin/Globulin Ratio


 0.4 (1.0-2.7)


L 


 





 


Vancomycin Level Trough


 14.7 ug/mL


(5.0-12.0)  H 


 





 


POC Whole Blood Glucose  Pending   Pending  











Microbiology








 Date/Time


Source Procedure


Growth Status





 


 10/26/20 12:39


Nasopharynx SARS-CoV-2 RdRp Gene Assay - Final Complete





 10/24/20 04:00


Sputum Gram Stain - Final Complete


 


 10/24/20 04:00 Sputum Culture - Final


Staphylococcus Aureus - Mrsa


Usual Respiratory Sondra Complete

















Intake and Output  


 


 10/25/20 10/26/20





 19:00 07:00


 


Intake Total 75 ml 825 ml


 


Output Total 370 ml 700 ml


 


Balance -295 ml 125 ml


 


  


 


IV Total 75 ml 825 ml


 


Output Urine Total 370 ml 700 ml








Objective


PHYSICAL EXAMINATION:


GENERAL:  The patient awake with deep stimuli, open his eyes, however,


cannot follow commands.  The patient is on a Ventimask at this time,


chronically ill-appearing.


HEAD AND NECK:  Pupils are equal and reactive to light.  Anicteric.


NECK:  Supple.  No JVD.


LUNGS:  Good air entry.  No wheezing or rhonchi, however the patient has a


coarse breath sounds.  Decreased air in bases.


HEART:  S1, S2.  Regular rhythm.  Distant heart sounds.  No murmur or


gallop.


ABDOMEN:  Soft, nondistended, nontender.  Positive bowel sounds.


EXTREMITIES:  No cyanosis, clubbing, or edema.


NEUROLOGIC:  Very limited secondary to the patient's status, cannot follow


commands.  Opens his eyes with deep stimuli and moving extremities


spontaneously.





Assessment/Plan


Assessment/Plan


ASSESSMENT:


1. Acute hypoxemic respiratory failure, most likely secondary to


pneumonia and sepsis.


2. Sepsis secondary to urinary tract infection and pneumonia.


3. pneumonia=MRSA


4. Acute kidney injury on chronic renal insufficiency.


5. Dehydration.


6. History of chronic congestive heart failure.


7. Diabetes type 2.


8. Dyslipidemia.


9. Hypertension.


10. Alzheimer's disease.


11. COVID 19 POSITIVE





PLAN:


1.  Admit the patient to step-down.


2.  Dr. Sandoval,=Pulmonary Critical Care


3.  Dr. Garcia = Inf Dis.


4.  IV= D5W due to the hypernatremia and dehydration.


5.  antibiotics = vancomycin and cefepime


6.  Code status is full code.


7.    DVT prophylaxis is heparin subcutaneous.











Benito Hearn MD               Oct 26, 2020 18:54

## 2020-10-26 NOTE — NUR
NURSE NOTES:

D5W rate changed from 75mL/hr to 50mL/hr per Dr. Tobar's order. Changed D5W IVF rate 
into 50mL/hr on IV pump. Due to [employer] issue was not able to scan until late. Spoke with 
Ford, the pharmacist regarding the issue. Put admin comment under the medication to be 
clear. Will continue plan of care.

## 2020-10-26 NOTE — NUR
NURSE NOTES:

Dr. Edmond at the bedside assessed the patient. Notified episode of Junctional rhythm on 
10/25/2020. EKG given to be reviewed. Notified downtrending Troponin level from 0.032 to 
0.014. No new order at this time. The patient is resting without acute distress or shortness 
of breath. Will continue plan of care.

## 2020-10-26 NOTE — DIAGNOSTIC IMAGING REPORT
Indication: Shortness of breath

 

Technique: One view of the chest

 

Comparison: 10/23/2020

 

Findings: There is increasing infiltrate at the right lung base and in the left

perihilar region. Normal heart size.

 

Impression: Increasing right basilar and left perihilar infiltrate

## 2020-10-26 NOTE — NUR
Speech pathology Note (Bedside Dysphagia Evaluation)



Indication of Evaluation:  concerning for aspiration 



Brief Note:  Mr. Chatman is a 75 year old male who is a resident of skilled nursing home 
([facility]) originally from 12/30/2011, current admission 09/20/2020 readmitted [facility] on 10/23/2020 for hypoxia c.w fever noted to lida leukocytosis 12.3k, gas 
7.387/31.1/74.1/18.3/93.8 with NBM on 15 liter.  BUN/Creatine 53/2.4 with underlying CKD. 

I was able to review video fluoroscopic swallow study from 07/13/2017.  The findings were 
noted to be positive for penetration to the laryngeal vestibule to the level of true vocal 
folds, with delay swallow triggering.  This findings are concerning for possible chronic 
micro aspiration with dementia.  Other underlying medical comorbid is CVA, Dysphagia, HTN, 
DMII, BPH CKD and Alzheimer's dementia.  

He has been NPO since 10/23/14156, current labs are improved and vitals signs are all stable 
and SPo2 improved to 97~100 on 4 liter via NC. 



Findings:  Mr. Julian is alert, disoriented.  He was not able to follow commands.  Given him 
PO trials, he exhibited s.s of aspiration on thick liquid, pureed 3/3 trials with high 
aspiration signs.  I discussed the findings and recommendation with RN.



Interpretation:

     1. oropharyngeal dysphagia with s.s of aspiration 

   

Plan:

     1. Continue NPO 

     2. NG tube placement 

SLP will follow for ongoing assessment.



Angella Parry

## 2020-10-26 NOTE — NUR
NURSE NOTES:

Dr. Casillas was notified regarding MRSA sputum result. No new order at this time. Contact 
isolation on place. Will continue plan of care.

## 2020-10-26 NOTE — NUR
NURSE NOTES:

The patient is resting on the bed comfortably without acute distress or shortness of breath. 
Still has moderate secretions that needs oral suction. The patient is on 4L NC and 
tolerating well. Will closely monitor the patient. Will continue plan of care.

## 2020-10-26 NOTE — INFECTIOUS DISEASES PROG NOTE
Assessment/Plan


74yo M with:





Fever at SNF, 100.2 max here


Leukocytosis to 12- SP


Acute hypoxic resp failure, on NRB mask> 4l NC


Pneumonia- r/o COVID19


R/o UTI


   10/23 BCx NTD


   UA 15-20 WBC, UCx NTD


   COVID rapid neg


   Flu A/B neg


   CXR: L pna


   Resp cx MRSA





CKD, Cr 1.7





SNF resident ([facility])


Non-verbal





Plan:


Cont vanco #4/7-10 for MRSA PNA


Dc empiric Cefepime #4


   -10/24 SP Flagyl #





Check LFTs


F/u BCx, UCx, resp cx





Monitor CBC/CMP


Monitor resp status


Monitor temp curve and hemodynamics


obtain 2nd covid





D/w RN





Thank you for this consult. [facility] will continue to follow.





Subjective


Allergies:  


Coded Allergies:  


     No Known Allergies (Unverified , 8/20/12)


afebrile >48hrs


cr improving


at 4l NC


no leukocytosis





Objective





Last 24 Hour Vital Signs








  Date Time  Temp Pulse Resp B/P (MAP) Pulse Ox O2 Delivery O2 Flow Rate FiO2


 


10/26/20 12:00       4.0 


 


10/26/20 12:00 97.9 71 18 112/56 (74) 99   


 


10/26/20 12:00      Nasal Cannula 4.0 


 


10/26/20 08:00      Nasal Cannula 4.0 


 


10/26/20 08:00 98.4 73 18 107/59 (75) 99   


 


10/26/20 08:00       4.0 


 


10/26/20 08:00  71      


 


10/26/20 04:00 97.5 75 18 105/53 (70) 99   


 


10/26/20 04:00      Nasal Cannula 4.0 


 


10/26/20 04:00  73      


 


10/26/20 04:00       4.0 


 


10/26/20 00:01      Nasal Cannula 4.0 


 


10/26/20 00:00  87      


 


10/26/20 00:00 97.9 69 18 106/59 (75) 98   


 


10/25/20 20:00  61      


 


10/25/20 20:00       4.0 


 


10/25/20 20:00      Nasal Cannula 4.0 


 


10/25/20 20:00 97.0 71 18 106/64 (78) 100   


 


10/25/20 16:00       4.0 


 


10/25/20 16:00 97.0 56 19 108/57 (74) 97   


 


10/25/20 16:00      Nasal Cannula 4.0 


 


10/25/20 15:31  60      








Height (Feet):  5


Height (Inches):  4.00


Weight (Pounds):  130


Gen: NAD


HEENT: NCAT


CV: RRR


Pulm: Rhonchi anteriorly, lots of oral secretions


Abd: Soft, NTND


Ext: No c/c/e


Neuro: Awake





Microbiology








 Date/Time


Source Procedure


Growth Status





 


 10/24/20 04:00


Sputum Gram Stain - Final Complete


 


 10/24/20 04:00 Sputum Culture - Final


Staphylococcus Aureus - Mrsa


Usual Respiratory Sondra Complete


 


 10/23/20 17:45


Blood Blood Culture - Preliminary


NO GROWTH AFTER 24 HOURS Resulted


 


 10/23/20 17:37


Urine,Clean Catch Urine Culture - Preliminary


NO GROWTH AFTER 24 HOURS Resulted


 


 10/23/20 17:30


Nasopharynx SARS-CoV-2 RdRp Gene Assay - Final Complete





 10/23/20 17:30


Nasal Nares - Final Complete


 


 10/23/20 17:30


Nasal Nares - Final Complete


 


 10/23/20 17:30


Blood Blood Culture - Preliminary


NO GROWTH AFTER 24 HOURS Resulted











Laboratory Tests








Test


 10/25/20


17:38 10/26/20


07:00 10/26/20


11:34


 


POC Whole Blood Glucose Pending    Pending  


 


White Blood Count


 


 7.6 K/UL


(4.8-10.8) 





 


Red Blood Count


 


 4.27 M/UL


(4.70-6.10)  L 





 


Hemoglobin


 


 12.7 G/DL


(14.2-18.0)  L 





 


Hematocrit


 


 38.2 %


(42.0-52.0)  L 





 


Mean Corpuscular Volume  90 FL (80-99)   


 


Mean Corpuscular Hemoglobin


 


 29.8 PG


(27.0-31.0) 





 


Mean Corpuscular Hemoglobin


Concent 


 33.3 G/DL


(32.0-36.0) 





 


Red Cell Distribution Width


 


 13.7 %


(11.6-14.8) 





 


Platelet Count


 


 192 K/UL


(150-450) 





 


Mean Platelet Volume


 


 10.2 FL


(6.5-10.1)  H 





 


Neutrophils (%) (Auto)


 


 69.0 %


(45.0-75.0) 





 


Lymphocytes (%) (Auto)


 


 20.9 %


(20.0-45.0) 





 


Monocytes (%) (Auto)


 


 7.9 %


(1.0-10.0) 





 


Eosinophils (%) (Auto)


 


 1.4 %


(0.0-3.0) 





 


Basophils (%) (Auto)


 


 0.7 %


(0.0-2.0) 





 


Sodium Level


 


 142 MMOL/L


(136-145) 





 


Potassium Level


 


 3.3 MMOL/L


(3.5-5.1)  L 





 


Chloride Level


 


 108 MMOL/L


()  H 





 


Carbon Dioxide Level


 


 25 MMOL/L


(21-32) 





 


Anion Gap


 


 9 mmol/L


(5-15) 





 


Blood Urea Nitrogen


 


 12 mg/dL


(7-18) 





 


Creatinine


 


 1.3 MG/DL


(0.55-1.30) 





 


Estimat Glomerular Filtration


Rate 


 53.5 mL/min


(>60) 





 


Glucose Level


 


 129 MG/DL


()  H 





 


Uric Acid


 


 7.9 MG/DL


(2.6-7.2)  H 





 


Calcium Level


 


 8.1 MG/DL


(8.5-10.1)  L 





 


Phosphorus Level


 


 2.3 MG/DL


(2.5-4.9)  L 





 


Magnesium Level


 


 2.2 MG/DL


(1.8-2.4) 





 


Total Bilirubin


 


 0.6 MG/DL


(0.2-1.0) 





 


Aspartate Amino Transf


(AST/SGOT) 


 26 U/L (15-37)


 





 


Alanine Aminotransferase


(ALT/SGPT) 


 10 U/L (12-78)


L 





 


Alkaline Phosphatase


 


 142 U/L


()  H 





 


Troponin I


 


 0.014 ng/mL


(0.000-0.056) 





 


Total Protein


 


 6.0 G/DL


(6.4-8.2)  L 





 


Albumin


 


 1.8 G/DL


(3.4-5.0)  L 





 


Globulin  4.2 g/dL   


 


Albumin/Globulin Ratio


 


 0.4 (1.0-2.7)


L 





 


Vancomycin Level Trough


 


 14.7 ug/mL


(5.0-12.0)  H 














Current Medications








 Medications


  (Trade)  Dose


 Ordered  Sig/Miky


 Route


 PRN Reason  Start Time


 Stop Time Status Last Admin


Dose Admin


 


 Acetaminophen


  (Tylenol)  650 mg  Q4H  PRN


 ORAL


 Temp >100.5  10/23/20 20:30


 11/22/20 20:29   





 


 Albuterol/


 Ipratropium


  (Albuterol/


 Ipratropium)  3 ml  Q4H  PRN


 HHN


 Shortness of Breath  10/23/20 20:30


 10/28/20 20:29   





 


 Barium Sulfate


  (Varibar Honey)  250 ml  NOW  PRN


 MC


 RAD  10/23/20 21:00


 10/26/20 20:45   





 


 Barium Sulfate


  (Varibar Nectar)  240 ml  NOW  PRN


 MC


 RAD  10/23/20 21:00


 10/26/20 20:45   





 


 Barium Sulfate


  (Varibar Pudding)  230 ml  NOW  PRN


 MC


 RAD  10/23/20 21:00


 10/26/20 20:45   





 


 Barium Sulfate


  (Varibar Thin


 Liquid powder)  148 gm  NOW  PRN


 MC


 RAD  10/23/20 21:00


 10/26/20 20:45   





 


 Cefepime HCl 2 gm/


 Dextrose  110 ml @ 


 220 mls/hr  Q24H


 IV


   10/26/20 18:00


 11/2/20 17:59   





 


 Dextrose  1,000 ml @ 


 75 mls/hr  [id number]


 IV


   10/23/20 22:45


 11/22/20 22:44  10/26/20 04:26





 


 Dextrose


  (Dextrose 50%)  50 ml  Q30M  PRN


 IV


 Hypoglycemia  10/23/20 22:45


 1/21/21 22:44   





 


 Heparin Sodium


  (Porcine)


  (Heparin 5000


 units/ml)  5,000 units  EVERY 12  HOURS


 SUBQ


   10/23/20 21:00


 12/7/20 20:59  10/26/20 08:16





 


 Insulin Aspart


  (NovoLOG)    BEFORE MEALS AND  HS


 SUBQ


   10/24/20 06:30


 1/22/21 06:29  10/24/20 06:23





 


 Nitroglycerin


  (Ntg)  0.4 mg  Q5M  PRN


 SL


 Prn Chest Pain  10/23/20 20:30


 11/22/20 20:29   





 


 Ondansetron HCl


  (Zofran)  4 mg  Q6H  PRN


 IVP


 Nausea & Vomiting  10/23/20 20:30


 11/22/20 20:29   





 


 Polyethylene


 Glycol


  (Miralax)  17 gm  DAILYPRN  PRN


 ORAL


 Constipation  10/23/20 20:30


 11/22/20 20:29   





 


 Promethazine HCl/


 Codeine


  (Phenergan with


 Codeine)  5 ml  Q4H  PRN


 ORAL


 For Cough  10/23/20 20:30


 11/22/20 20:29   





 


 Tamsulosin HCl


  (Flomax)  0.4 mg  DAILY


 ORAL


   10/24/20 09:00


 11/23/20 08:59  10/25/20 08:38





 


 Temazepam


  (Restoril)  15 mg  HSPRN  PRN


 ORAL


 Insomnia  10/23/20 20:30


 10/30/20 20:29   





 


 Vancomycin HCl


  (Vanco pharmacy


 to dose)  1 ea  DAILY  PRN


 MISC


 Per rx protocol  10/23/20 20:30


 11/22/20 20:29   





 


 Vancomycin HCl


 750 mg/Sodium


 Chloride  275 ml @ 


 183.333


 mls/hr  Q24H


 IVPB


   10/26/20 09:00


 10/31/20 08:59  10/26/20 09:08




















Slime Garcia M.D.            Oct 26, 2020 12:17

## 2020-10-26 NOTE — NUR
SI:     HYPOXIA, PNA

T. 97.5 HR 75 RR 18 B/P 105/53

4L NC O2 SAT @ 995

K 3.3





IS:    IVF D5@ 75ML/HR

VANCO IV

HEPARIN SUBC

*******STEP DOWN STATUS******

## 2020-10-26 NOTE — NUR
NURSE NOTES:

Noticed that Remdesivir got cancelled by the pharmacy. Spoke with Ford regarding the case. 
Per Ford, the pharmacist, the patient already received the dose of Remdevisir back in 
September 2020. Ford spoke with Dr. Garcia, and Dr. Garcia asked to cancel medication. 
Will closely monitor the patient. Will continue plan of care.

## 2020-10-26 NOTE — NUR
NURSE NOTES:

Dr. Tobar at the bedside assessed the patient. Notified abnormal lab including K 3.3 and 
other abnormal lab. Per Dr. Tobar, he will take care of it. Will closely monitor the 
patient. Will continue plan of care.

## 2020-10-26 NOTE — PULMONOLGY CRITICAL CARE NOTE
Critical Care - Asmt/Plan


Problems:  


(1) Acute encephalopathy


(2) Severe sepsis


(3) Multifocal pneumonia


(4) 2019 novel coronavirus disease (COVID-19)


(5) Alzheimer's dementia


(6) HTN (hypertension)


(7) History of CVA (cerebrovascular accident)


(8) BPH (benign prostatic hyperplasia)


Respiratory:  monitor respiratory rate, adjust FIO2, CXR


Cardiac:  continue to monitor HR/BP


Renal:  F/U I&O, decrease IV fluid, check electrolytes


Infectious Disease:  check cultures, continue antibiotics


Gastrointestinal:  continue feedings/current rate


Endocrine:  monitor blood sugar, continue sliding scale insulin


Hematologic:  monitor H/H, transfuse if hgb<8.5


Neurologic:  PRN Ativan, PRN Morphine, keep patient comfortable


Affect:  PRN ativan


Prophylaxis:  Protonix


Disposition:  keep in ICU


Time Spent (Minutes):  40


Notes Reviewed:  internist, cardio, renal


Discussed with:  nurses, consultants, 





Critical Care - Objective





Last 24 Hour Vital Signs








  Date Time  Temp Pulse Resp B/P (MAP) Pulse Ox O2 Delivery O2 Flow Rate FiO2


 


10/26/20 12:00       4.0 


 


10/26/20 12:00 97.9 71 18 112/56 (74) 99   


 


10/26/20 12:00      Nasal Cannula 4.0 


 


10/26/20 08:00      Nasal Cannula 4.0 


 


10/26/20 08:00 98.4 73 18 107/59 (75) 99   


 


10/26/20 08:00       4.0 


 


10/26/20 08:00  71      


 


10/26/20 04:00 97.5 75 18 105/53 (70) 99   


 


10/26/20 04:00      Nasal Cannula 4.0 


 


10/26/20 04:00  73      


 


10/26/20 04:00       4.0 


 


10/26/20 00:01      Nasal Cannula 4.0 


 


10/26/20 00:00  87      


 


10/26/20 00:00 97.9 69 18 106/59 (75) 98   


 


10/25/20 20:00  61      


 


10/25/20 20:00       4.0 


 


10/25/20 20:00      Nasal Cannula 4.0 


 


10/25/20 20:00 97.0 71 18 106/64 (78) 100   


 


10/25/20 16:00       4.0 


 


10/25/20 16:00 97.0 56 19 108/57 (74) 97   


 


10/25/20 16:00      Nasal Cannula 4.0 


 


10/25/20 15:31  60      








Status:  sedated


Condition:  critical


HEENT:  atraumatic, normocephalic


Neck:  trach


Lungs:  rales, rhonchi


Heart:  HR/BP stable


Abdomen:  soft, non-tender, active bowel sounds, feeding tube


Micro:





Microbiology








 Date/Time


Source Procedure


Growth Status





 


 10/24/20 04:00


Sputum Gram Stain - Final Complete


 


 10/24/20 04:00 Sputum Culture - Final


Staphylococcus Aureus - Mrsa


Usual Respiratory Sondra Complete


 


 10/23/20 17:45


Blood Blood Culture - Preliminary


NO GROWTH AFTER 24 HOURS Resulted


 


 10/23/20 17:37


Urine,Clean Catch Urine Culture - Preliminary


NO GROWTH AFTER 24 HOURS Resulted


 


 10/23/20 17:30


Nasopharynx SARS-CoV-2 RdRp Gene Assay - Final Complete





 10/23/20 17:30


Nasal Nares - Final Complete


 


 10/23/20 17:30


Nasal Nares - Final Complete


 


 10/23/20 17:30


Blood Blood Culture - Preliminary


NO GROWTH AFTER 24 HOURS Resulted








Accucheck:  133





Critical Care - Subjective


ROS Limited/Unobtainable:  Yes


Condition:  critical


EKG Rhythm:  Sinus Rhythm


Sputum Amount:  Small


I&O:











Intake and Output  


 


 10/25/20 10/26/20





 19:00 07:00


 


Intake Total 75 ml 825 ml


 


Output Total 370 ml 700 ml


 


Balance -295 ml 125 ml


 


  


 


IV Total 75 ml 825 ml


 


Output Urine Total 370 ml 700 ml








CXR:


pending


Labs:





Laboratory Tests








Test


 10/25/20


17:38 10/26/20


07:00 10/26/20


11:34


 


POC Whole Blood Glucose Pending    Pending  


 


White Blood Count


 


 7.6 K/UL


(4.8-10.8) 





 


Red Blood Count


 


 4.27 M/UL


(4.70-6.10)  L 





 


Hemoglobin


 


 12.7 G/DL


(14.2-18.0)  L 





 


Hematocrit


 


 38.2 %


(42.0-52.0)  L 





 


Mean Corpuscular Volume  90 FL (80-99)   


 


Mean Corpuscular Hemoglobin


 


 29.8 PG


(27.0-31.0) 





 


Mean Corpuscular Hemoglobin


Concent 


 33.3 G/DL


(32.0-36.0) 





 


Red Cell Distribution Width


 


 13.7 %


(11.6-14.8) 





 


Platelet Count


 


 192 K/UL


(150-450) 





 


Mean Platelet Volume


 


 10.2 FL


(6.5-10.1)  H 





 


Neutrophils (%) (Auto)


 


 69.0 %


(45.0-75.0) 





 


Lymphocytes (%) (Auto)


 


 20.9 %


(20.0-45.0) 





 


Monocytes (%) (Auto)


 


 7.9 %


(1.0-10.0) 





 


Eosinophils (%) (Auto)


 


 1.4 %


(0.0-3.0) 





 


Basophils (%) (Auto)


 


 0.7 %


(0.0-2.0) 





 


Sodium Level


 


 142 MMOL/L


(136-145) 





 


Potassium Level


 


 3.3 MMOL/L


(3.5-5.1)  L 





 


Chloride Level


 


 108 MMOL/L


()  H 





 


Carbon Dioxide Level


 


 25 MMOL/L


(21-32) 





 


Anion Gap


 


 9 mmol/L


(5-15) 





 


Blood Urea Nitrogen


 


 12 mg/dL


(7-18) 





 


Creatinine


 


 1.3 MG/DL


(0.55-1.30) 





 


Estimat Glomerular Filtration


Rate 


 53.5 mL/min


(>60) 





 


Glucose Level


 


 129 MG/DL


()  H 





 


Uric Acid


 


 7.9 MG/DL


(2.6-7.2)  H 





 


Calcium Level


 


 8.1 MG/DL


(8.5-10.1)  L 





 


Phosphorus Level


 


 2.3 MG/DL


(2.5-4.9)  L 





 


Magnesium Level


 


 2.2 MG/DL


(1.8-2.4) 





 


Total Bilirubin


 


 0.6 MG/DL


(0.2-1.0) 





 


Aspartate Amino Transf


(AST/SGOT) 


 26 U/L (15-37)


 





 


Alanine Aminotransferase


(ALT/SGPT) 


 10 U/L (12-78)


L 





 


Alkaline Phosphatase


 


 142 U/L


()  H 





 


Troponin I


 


 0.014 ng/mL


(0.000-0.056) 





 


Total Protein


 


 6.0 G/DL


(6.4-8.2)  L 





 


Albumin


 


 1.8 G/DL


(3.4-5.0)  L 





 


Globulin  4.2 g/dL   


 


Albumin/Globulin Ratio


 


 0.4 (1.0-2.7)


L 





 


Vancomycin Level Trough


 


 14.7 ug/mL


(5.0-12.0)  H 




















Michael Sandoval MD              Oct 26, 2020 12:30

## 2020-10-26 NOTE — NUR
NURSE NOTES:

COVID positive result reported to Dr. Sandoval and Dr. Garcia. Will closely monitor the 
patient. Will continue plan of care. 

-------------------------------------------------------------------------------

Addendum: 10/26/20 at 1321 by Alexsander Shaffer RN

-------------------------------------------------------------------------------

Isolation started per Dr. Garcia's order.

## 2020-10-26 NOTE — NUR
NURSE NOTES:

Initial nursing assessment done. Initial vital signs taken. The patient is stable at this 
time. Will continue plan of care.

## 2020-10-26 NOTE — CARDIAC ELECTROPHYSIOLOGY PN
Assessment/Plan


Assessment/Plan


1. Accelerated junctional rhythm.  Patient is not bradycardic. Ruled out for MI


    Echocardiogram showed ejection fraction of 55%.


2. Sepsis, on broad-spectrum IV antibiotic.


3. Respiratory failure, on antibiotic.


4. Dehydration.


5. Acute renal failure.


6. Electrolyte imbalance.


7. History of congestive heart failure, but echocardiogram showed normal left 

ventricular systolic function.


8. History of previous COVID infection in September 2020.


9. Dementia.





DW RN





Subjective


Subjective


In SR in NAD. Nonverbal.





Objective





Last 24 Hour Vital Signs








  Date Time  Temp Pulse Resp B/P (MAP) Pulse Ox O2 Delivery O2 Flow Rate FiO2


 


10/26/20 08:00      Nasal Cannula 4.0 


 


10/26/20 08:00 98.4 73 18 107/59 (75) 99   


 


10/26/20 08:00       4.0 


 


10/26/20 08:00  71      


 


10/26/20 04:00 97.5 75 18 105/53 (70) 99   


 


10/26/20 04:00      Nasal Cannula 4.0 


 


10/26/20 04:00  73      


 


10/26/20 04:00       4.0 


 


10/26/20 00:01      Nasal Cannula 4.0 


 


10/26/20 00:00  87      


 


10/26/20 00:00 97.9 69 18 106/59 (75) 98   


 


10/25/20 20:00  61      


 


10/25/20 20:00       4.0 


 


10/25/20 20:00      Nasal Cannula 4.0 


 


10/25/20 20:00 97.0 71 18 106/64 (78) 100   


 


10/25/20 16:00       4.0 


 


10/25/20 16:00 97.0 56 19 108/57 (74) 97   


 


10/25/20 16:00      Nasal Cannula 4.0 


 


10/25/20 15:31  60      


 


10/25/20 12:00 96.8 62 21 102/68 (79) 100   


 


10/25/20 12:00       4.0 


 


10/25/20 12:00      Nasal Cannula 4.0 


 


10/25/20 11:50  64      

















Intake and Output  


 


 10/25/20 10/26/20





 19:00 07:00


 


Intake Total 75 ml 825 ml


 


Output Total 370 ml 700 ml


 


Balance -295 ml 125 ml


 


  


 


IV Total 75 ml 825 ml


 


Output Urine Total 370 ml 700 ml











Laboratory Tests








Test


 10/25/20


11:19 10/25/20


17:38 10/26/20


07:00


 


POC Whole Blood Glucose


 134 MG/DL


()  H Pending  


 





 


White Blood Count


 


 


 7.6 K/UL


(4.8-10.8)


 


Red Blood Count


 


 


 4.27 M/UL


(4.70-6.10)  L


 


Hemoglobin


 


 


 12.7 G/DL


(14.2-18.0)  L


 


Hematocrit


 


 


 38.2 %


(42.0-52.0)  L


 


Mean Corpuscular Volume   90 FL (80-99)  


 


Mean Corpuscular Hemoglobin


 


 


 29.8 PG


(27.0-31.0)


 


Mean Corpuscular Hemoglobin


Concent 


 


 33.3 G/DL


(32.0-36.0)


 


Red Cell Distribution Width


 


 


 13.7 %


(11.6-14.8)


 


Platelet Count


 


 


 192 K/UL


(150-450)


 


Mean Platelet Volume


 


 


 10.2 FL


(6.5-10.1)  H


 


Neutrophils (%) (Auto)


 


 


 69.0 %


(45.0-75.0)


 


Lymphocytes (%) (Auto)


 


 


 20.9 %


(20.0-45.0)


 


Monocytes (%) (Auto)


 


 


 7.9 %


(1.0-10.0)


 


Eosinophils (%) (Auto)


 


 


 1.4 %


(0.0-3.0)


 


Basophils (%) (Auto)


 


 


 0.7 %


(0.0-2.0)


 


Sodium Level


 


 


 142 MMOL/L


(136-145)


 


Potassium Level


 


 


 3.3 MMOL/L


(3.5-5.1)  L


 


Chloride Level


 


 


 108 MMOL/L


()  H


 


Carbon Dioxide Level


 


 


 25 MMOL/L


(21-32)


 


Anion Gap


 


 


 9 mmol/L


(5-15)


 


Blood Urea Nitrogen


 


 


 12 mg/dL


(7-18)


 


Creatinine


 


 


 1.3 MG/DL


(0.55-1.30)


 


Estimat Glomerular Filtration


Rate 


 


 53.5 mL/min


(>60)


 


Glucose Level


 


 


 129 MG/DL


()  H


 


Uric Acid


 


 


 7.9 MG/DL


(2.6-7.2)  H


 


Calcium Level


 


 


 8.1 MG/DL


(8.5-10.1)  L


 


Phosphorus Level


 


 


 2.3 MG/DL


(2.5-4.9)  L


 


Magnesium Level


 


 


 2.2 MG/DL


(1.8-2.4)


 


Total Bilirubin


 


 


 0.6 MG/DL


(0.2-1.0)


 


Aspartate Amino Transf


(AST/SGOT) 


 


 26 U/L (15-37)





 


Alanine Aminotransferase


(ALT/SGPT) 


 


 10 U/L (12-78)


L


 


Alkaline Phosphatase


 


 


 142 U/L


()  H


 


Troponin I


 


 


 0.014 ng/mL


(0.000-0.056)


 


Total Protein


 


 


 6.0 G/DL


(6.4-8.2)  L


 


Albumin


 


 


 1.8 G/DL


(3.4-5.0)  L


 


Globulin   4.2 g/dL  


 


Albumin/Globulin Ratio


 


 


 0.4 (1.0-2.7)


L


 


Vancomycin Level Trough


 


 


 14.7 ug/mL


(5.0-12.0)  H











Microbiology








 Date/Time


Source Procedure


Growth Status





 


 10/24/20 04:00


Sputum Gram Stain - Final Complete


 


 10/24/20 04:00 Sputum Culture - Final


Staphylococcus Aureus - Mrsa


Usual Respiratory Sondra Complete


 


 10/23/20 17:45


Blood Blood Culture - Preliminary


NO GROWTH AFTER 24 HOURS Resulted


 


 10/23/20 17:37


Urine,Clean Catch Urine Culture - Preliminary


NO GROWTH AFTER 24 HOURS Resulted


 


 10/23/20 17:30


Nasopharynx SARS-CoV-2 RdRp Gene Assay - Final Complete





 10/23/20 17:30


Nasal Nares - Final Complete


 


 10/23/20 17:30


Nasal Nares - Final Complete


 


 10/23/20 17:30


Blood Blood Culture - Preliminary


NO GROWTH AFTER 24 HOURS Resulted








Objective





HEAD AND NECK:  no JVD.


LUNGS:  Coarse rhonchi.


CARDIOVASCULAR:   irregular S1 and S2 with no gallop.


ABDOMEN:  Soft.


EXTREMITIES:  No pitting edema.











Kvng Edmond MD               Oct 26, 2020 11:10

## 2020-10-26 NOTE — NUR
NURSE HAND-OFF REPORT: 



Important Events on Shift: Failed ST evaluation_ Dr. Sandoval notified, KCL 10mEq x2 total 
20mEq replacement per Dr. Tobar's order, COVID positive (10/26/2020) but no COVID 
isolation per Dr. Garcia's order (due to history of COVID positive on 9/2020 and treated 
with Remdesivir), Otherwise stable.

Patient Status: Stable, Full code

Diet: NPO



Pending Orders: N

Pending Results/Labs: N

Pending MD notification: N



Latest Vital Signs: Temperature 97.9 , Pulse 68 , B/P 111 /61 , Respiratory Rate 18 , O2 SAT 
100 , Nasal Cannula, O2 Flow Rate 4.0 .  

Vital Sign Comment: Stable



EKG Rhythm: Sinus Rhythm

Rhythm change?: N 

MD Notified?: Y -Left Message for Dr. Cancino, No cardio on the case. 

MD Response: 



Latest Mejia Fall Score: 50  

Fall Risk: High Risk 

Safety Measures: Call light Within Reach, Bed Alarm Zone 1, Side Rails Side Rails x3, Bed 
position Low and Locked.

Fall Precautions: 

Yellow Socks

Yellow Gown

Door Sign

Patient Fall Education



Report given to LITA Gomez. The patient is stable at this time. Endorsed plan of care.

## 2020-10-26 NOTE — NUR
NURSE NOTES:

Morning medications administered per order. Tolerated well. Unable to administer PO 
medication due to high aspiration risk and moderate amount secretion at this time. Will wait 
until ST evaluation to be done. Will continue plan of care.

## 2020-10-26 NOTE — NUR
NURSE NOTES:

 noted. No insulin coverage at this time per order. Will closely monitor the patient. 
Will continue plan of care.

## 2020-10-26 NOTE — NUR
NURSE NOTES:

Received report from LITA Fernandez. The patient is resting on the bed without acute distress or 
shortness of breath. The patient is confused, agitated, restless, and Maltese speaking and 
able to make needs known via verbal communication, facial expression, and body movement. SR 
with HR of 60-70s on the cardiac monitor but was told that the patient had episode of 
junctional rhythm on 10/25/2020 that was notified by Dr. Edmond. The patient is on 4L NC and 
oxygen saturation is 100%. The patient is kept on NPO for high aspiration precaution until 
ST evaluation. The patient has Cummings that is intact and patent and draining by gravity. Skin 
issue noted and dressing intact. The patient has R FA 22G that is intact and patent and 
running D5W @ 75mL/hr. IV on L wrist discontinued by the patient, and new IV inserted on L 
hand 20G that is intact and patent. The patient's bed in the lowest position, call light in 
reach, fall and aspiration precaution reinforced. IV site intact and patent. Will follow up 
the lab and order. Will closely monitor the patient. Will continue plan of care.

## 2020-10-26 NOTE — NUR
NURSE NOTES:

Seen pt in bed in semi-fountain's position. No signs of respiratory distress. Pt breathing 
even and unlabored. No any pain noted at this time. VS is within normal limits. O2 sat- 94% 
in 4L NC. Bed in lowest position. Call light within reach. Continue to plan of care.

## 2020-10-26 NOTE — NUR
NURSE NOTES:

Dr. Garcia at the bedside assessed the patient. Per Dr. Garcia, the patient does not need 
isolation other than contact isolation for MRSA sputum since the patient has history of 
positive COVID on 9/2020 and was treated with Remdisivir. Dr. Garcia was notified that 
COVID was positive on 10/26/2020 but was told that the patient does not need isolation at 
this time. CRN was notified. Nursing director was notified. Will carry out as ordered. Will 
closely monitor the patient. Will continue plan of care.

## 2020-10-26 NOTE — NUR
NURSE HAND-OFF REPORT: 



Important Events on Shift:[]

Patient Status: []

Diet: []



Pending Orders: []

Pending Results/Labs:[]

Pending MD notification:[]



Latest Vital Signs: Temperature 97.5 , Pulse 73 , B/P 105 /53 , Respiratory Rate 18 , O2 SAT 
99 , Nasal Cannula, O2 Flow Rate 4.0 .  

Vital Sign Comment: []



EKG Rhythm: Sinus Rhythm

Rhythm change?: N 

MD Notified?: Y -Left Message for Dr. Cancino, No cardio on the case. 

MD Response: 



Latest Mejia Fall Score: 70  

Fall Risk: High Risk 

Safety Measures: Call light Within Reach, Bed Alarm Zone 1, Side Rails Side Rails x3, Bed 
position Low and Locked.

Fall Precautions: 

Yellow Socks

Yellow Gown

Door Sign

Patient Fall Education



Report given to [Francine LONDON].

## 2020-10-26 NOTE — NEPHROLOGY PROGRESS NOTE
Assessment/Plan


Problem List:  


(1) Dehydration


(2) JANES (acute kidney injury)


(3) Renal failure (ARF), acute on chronic


(4) Hypoxia


(5) Acute encephalopathy


(6) Electrolyte imbalance


Assessment





75-year-old male is admitted with acute hypoxic respiratory failure most likely 

secondary to pneumonia and sepsis, UTI.


Acute on chronic renal failure


Dehydration


Electrolyte imbalances, hypernatremia


Hypoalbuminemia


Diabetes type 2


History of congestive heart failure


Hypertension


Hyperlipemia


Alzheimer's


Previous COVID-19 infection in September 2020


Plan


October 26: Patient remains n.p.o.  IV fluid down to 50 cc an hour.  Potassium 

supplement intravenously ordered.  Continue per consultants.





Previously:


Patient is n.p.o., will continue on IV fluid of D5W 75 cc an hour


We will monitor electrolytes and renal parameters


Avoid nephrotoxic's


Start p.o. when he clears by speech therapist, meanwhile aspiration precautions


Keep the blood pressure and blood sugar in check


Per orders





Subjective


ROS Limited/Unobtainable:  No





Objective


Objective





Last 24 Hour Vital Signs








  Date Time  Temp Pulse Resp B/P (MAP) Pulse Ox O2 Delivery O2 Flow Rate FiO2


 


10/26/20 12:00       4.0 


 


10/26/20 12:00  65      


 


10/26/20 12:00 97.9 71 18 112/56 (74) 99   


 


10/26/20 12:00      Nasal Cannula 4.0 


 


10/26/20 08:15     97 Nasal Cannula  


 


10/26/20 08:00      Nasal Cannula 4.0 


 


10/26/20 08:00 98.4 73 18 107/59 (75) 99   


 


10/26/20 08:00       4.0 


 


10/26/20 08:00  71      


 


10/26/20 04:00 97.5 75 18 105/53 (70) 99   


 


10/26/20 04:00      Nasal Cannula 4.0 


 


10/26/20 04:00  73      


 


10/26/20 04:00       4.0 


 


10/26/20 00:01      Nasal Cannula 4.0 


 


10/26/20 00:00  87      


 


10/26/20 00:00 97.9 69 18 106/59 (75) 98   


 


10/25/20 20:00  61      


 


10/25/20 20:00       4.0 


 


10/25/20 20:00      Nasal Cannula 4.0 


 


10/25/20 20:00 97.0 71 18 106/64 (78) 100   


 


10/25/20 16:00       4.0 


 


10/25/20 16:00 97.0 56 19 108/57 (74) 97   


 


10/25/20 16:00      Nasal Cannula 4.0 


 


10/25/20 15:31  60      

















Intake and Output  


 


 10/25/20 10/26/20





 19:00 07:00


 


Intake Total 75 ml 825 ml


 


Output Total 370 ml 700 ml


 


Balance -295 ml 125 ml


 


  


 


IV Total 75 ml 825 ml


 


Output Urine Total 370 ml 700 ml











Current Medications








 Medications


  (Trade)  Dose


 Ordered  Sig/Miky


 Route


 PRN Reason  Start Time


 Stop Time Status Last Admin


Dose Admin


 


 Acetaminophen


  (Tylenol)  650 mg  Q4H  PRN


 ORAL


 Temp >100.5  10/23/20 20:30


 11/22/20 20:29   





 


 Albuterol/


 Ipratropium


  (Albuterol/


 Ipratropium)  3 ml  Q4H  PRN


 HHN


 Shortness of Breath  10/23/20 20:30


 10/28/20 20:29   





 


 Barium Sulfate


  (Varibar Honey)  250 ml  NOW  PRN


 MC


 RAD  10/23/20 21:00


 10/26/20 20:45   





 


 Barium Sulfate


  (Varibar Nectar)  240 ml  NOW  PRN


 MC


 RAD  10/23/20 21:00


 10/26/20 20:45   





 


 Barium Sulfate


  (Varibar Pudding)  230 ml  NOW  PRN


 MC


 RAD  10/23/20 21:00


 10/26/20 20:45   





 


 Barium Sulfate


  (Varibar Thin


 Liquid powder)  148 gm  NOW  PRN


 MC


 RAD  10/23/20 21:00


 10/26/20 20:45   





 


 Dextrose  1,000 ml @ 


 75 mls/hr  [id number]


 IV


   10/26/20 12:38


 11/25/20 12:37  10/26/20 13:12





 


 Dextrose


  (Dextrose 50%)  50 ml  Q30M  PRN


 IV


 Hypoglycemia  10/23/20 22:45


 1/21/21 22:44   





 


 Heparin Sodium


  (Porcine)


  (Heparin 5000


 units/ml)  5,000 units  EVERY 12  HOURS


 SUBQ


   10/23/20 21:00


 12/7/20 20:59  10/26/20 08:16





 


 Insulin Aspart


  (NovoLOG)    BEFORE MEALS AND  HS


 SUBQ


   10/24/20 06:30


 1/22/21 06:29  10/24/20 06:23





 


 Nitroglycerin


  (Ntg)  0.4 mg  Q5M  PRN


 SL


 Prn Chest Pain  10/23/20 20:30


 11/22/20 20:29   





 


 Ondansetron HCl


  (Zofran)  4 mg  Q6H  PRN


 IVP


 Nausea & Vomiting  10/23/20 20:30


 11/22/20 20:29   





 


 Polyethylene


 Glycol


  (Miralax)  17 gm  DAILYPRN  PRN


 ORAL


 Constipation  10/23/20 20:30


 11/22/20 20:29   





 


 Promethazine HCl/


 Codeine


  (Phenergan with


 Codeine)  5 ml  Q4H  PRN


 ORAL


 For Cough  10/23/20 20:30


 11/22/20 20:29   





 


 Tamsulosin HCl


  (Flomax)  0.4 mg  DAILY


 ORAL


   10/24/20 09:00


 11/23/20 08:59  10/25/20 08:38





 


 Temazepam


  (Restoril)  15 mg  HSPRN  PRN


 ORAL


 Insomnia  10/23/20 20:30


 10/30/20 20:29   





 


 Vancomycin HCl


  (Vanco pharmacy


 to dose)  1 ea  DAILY  PRN


 MISC


 Per rx protocol  10/23/20 20:30


 11/22/20 20:29   





 


 Vancomycin HCl


 750 mg/Sodium


 Chloride  275 ml @ 


 183.333


 mls/hr  Q24H


 IVPB


   10/26/20 09:00


 10/31/20 08:59  10/26/20 09:08








Laboratory Tests


10/25/20 17:38: POC Whole Blood Glucose [Pending]


10/26/20 07:00: 


White Blood Count 7.6, Red Blood Count 4.27L, Hemoglobin 12.7L, Hematocrit 38.2L

, Mean Corpuscular Volume 90, Mean Corpuscular Hemoglobin 29.8, Mean Corpuscular

 Hemoglobin Concent 33.3, Red Cell Distribution Width 13.7, Platelet Count 192, 

Mean Platelet Volume 10.2H, Neutrophils (%) (Auto) 69.0, Lymphocytes (%) (Auto) 

20.9, Monocytes (%) (Auto) 7.9, Eosinophils (%) (Auto) 1.4, Basophils (%) (Auto)

 0.7, Sodium Level 142, Potassium Level 3.3L, Chloride Level 108H, Carbon 

Dioxide Level 25, Anion Gap 9, Blood Urea Nitrogen 12, Creatinine 1.3, Estimat 

Glomerular Filtration Rate 53.5, Glucose Level 129H, Uric Acid 7.9H, Calcium 

Level 8.1L, Phosphorus Level 2.3L, Magnesium Level 2.2, Total Bilirubin 0.6, 

Aspartate Amino Transf (AST/SGOT) 26, Alanine Aminotransferase (ALT/SGPT) 10L, 

Alkaline Phosphatase 142H, Troponin I 0.014, Total Protein 6.0L, Albumin 1.8L, 

Globulin 4.2, Albumin/Globulin Ratio 0.4L, Vancomycin Level Trough 14.7H


10/26/20 11:34: POC Whole Blood Glucose [Pending]


Height (Feet):  5


Height (Inches):  4.00


Weight (Pounds):  130


General Appearance:  no apparent distress


Cardiovascular:  normal rate


Respiratory/Chest:  decreased breath sounds


Abdomen:  distended











Seven Tobar MD            Oct 26, 2020 15:26

## 2020-10-26 NOTE — NUR
NURSE NOTES:

Received report from LITA Escamilla. Pt is seen lying in bed in semi-fountain's position. Pt is 
awake open eyes spontaneously but pt is aphasic. {t mumbles. Pt is in o2 4L via NC o2 sat- 
97%%. No signs of respiratory distress. No pain noted at this time. Pt with buck cath 
draiining yellow urine and intact and draining well. RFA g22 is intact and patent and LFA 
g20 still patent and intact. Pt has no isolation precaution for covid as per the Dr. pt 
positive result from residual dead virus. Pt is symptomatic and cough intermittently. No 
signs of labored breathing. No fever noted. Bed in lowest position. Call light within reach. 
Continue to plan of care.

## 2020-10-27 VITALS — DIASTOLIC BLOOD PRESSURE: 58 MMHG | SYSTOLIC BLOOD PRESSURE: 122 MMHG

## 2020-10-27 VITALS — DIASTOLIC BLOOD PRESSURE: 58 MMHG | SYSTOLIC BLOOD PRESSURE: 100 MMHG

## 2020-10-27 VITALS — SYSTOLIC BLOOD PRESSURE: 102 MMHG | DIASTOLIC BLOOD PRESSURE: 63 MMHG

## 2020-10-27 VITALS — DIASTOLIC BLOOD PRESSURE: 51 MMHG | SYSTOLIC BLOOD PRESSURE: 118 MMHG

## 2020-10-27 VITALS — SYSTOLIC BLOOD PRESSURE: 112 MMHG | DIASTOLIC BLOOD PRESSURE: 60 MMHG

## 2020-10-27 VITALS — DIASTOLIC BLOOD PRESSURE: 52 MMHG | SYSTOLIC BLOOD PRESSURE: 117 MMHG

## 2020-10-27 LAB
ADD MANUAL DIFF: NO
ALBUMIN SERPL-MCNC: 1.7 G/DL (ref 3.4–5)
ALBUMIN/GLOB SERPL: 0.4 {RATIO} (ref 1–2.7)
ALP SERPL-CCNC: 193 U/L (ref 46–116)
ALT SERPL-CCNC: 14 U/L (ref 12–78)
ANION GAP SERPL CALC-SCNC: 11 MMOL/L (ref 5–15)
AST SERPL-CCNC: 31 U/L (ref 15–37)
BASOPHILS NFR BLD AUTO: 0.7 % (ref 0–2)
BILIRUB SERPL-MCNC: 0.5 MG/DL (ref 0.2–1)
BUN SERPL-MCNC: 8 MG/DL (ref 7–18)
CALCIUM SERPL-MCNC: 7.9 MG/DL (ref 8.5–10.1)
CHLORIDE SERPL-SCNC: 110 MMOL/L (ref 98–107)
CO2 SERPL-SCNC: 23 MMOL/L (ref 21–32)
CREAT SERPL-MCNC: 1.2 MG/DL (ref 0.55–1.3)
EOSINOPHIL NFR BLD AUTO: 1 % (ref 0–3)
ERYTHROCYTE [DISTWIDTH] IN BLOOD BY AUTOMATED COUNT: 13.7 % (ref 11.6–14.8)
GLOBULIN SER-MCNC: 4.2 G/DL
HCT VFR BLD CALC: 39.6 % (ref 42–52)
HGB BLD-MCNC: 12.9 G/DL (ref 14.2–18)
LYMPHOCYTES NFR BLD AUTO: 20.3 % (ref 20–45)
MCV RBC AUTO: 90 FL (ref 80–99)
MONOCYTES NFR BLD AUTO: 8.1 % (ref 1–10)
NEUTROPHILS NFR BLD AUTO: 69.9 % (ref 45–75)
PHOSPHATE SERPL-MCNC: 2.1 MG/DL (ref 2.5–4.9)
PLATELET # BLD: 179 K/UL (ref 150–450)
POTASSIUM SERPL-SCNC: 3.2 MMOL/L (ref 3.5–5.1)
RBC # BLD AUTO: 4.38 M/UL (ref 4.7–6.1)
SODIUM SERPL-SCNC: 144 MMOL/L (ref 136–145)
WBC # BLD AUTO: 9.4 K/UL (ref 4.8–10.8)

## 2020-10-27 RX ADMIN — INSULIN ASPART SCH UNITS: 100 INJECTION, SOLUTION INTRAVENOUS; SUBCUTANEOUS at 06:17

## 2020-10-27 RX ADMIN — HEPARIN SODIUM SCH UNITS: 5000 INJECTION INTRAVENOUS; SUBCUTANEOUS at 21:58

## 2020-10-27 RX ADMIN — INSULIN ASPART SCH UNITS: 100 INJECTION, SOLUTION INTRAVENOUS; SUBCUTANEOUS at 16:30

## 2020-10-27 RX ADMIN — INSULIN ASPART SCH UNITS: 100 INJECTION, SOLUTION INTRAVENOUS; SUBCUTANEOUS at 11:20

## 2020-10-27 RX ADMIN — Medication SCH MLS/HR: at 14:31

## 2020-10-27 RX ADMIN — HEPARIN SODIUM SCH UNITS: 5000 INJECTION INTRAVENOUS; SUBCUTANEOUS at 09:33

## 2020-10-27 RX ADMIN — INSULIN ASPART SCH UNITS: 100 INJECTION, SOLUTION INTRAVENOUS; SUBCUTANEOUS at 21:00

## 2020-10-27 RX ADMIN — SODIUM CHLORIDE SCH MLS/HR: 9 INJECTION, SOLUTION INTRAVENOUS at 09:11

## 2020-10-27 RX ADMIN — Medication SCH MLS/HR: at 09:12

## 2020-10-27 RX ADMIN — TAMSULOSIN HYDROCHLORIDE SCH MG: 0.4 CAPSULE ORAL at 08:55

## 2020-10-27 NOTE — NUR
SI:  HPOXIA/PNA 

      /58 T 98.0 HR 64 RR 20 SPO2 98% ON 4L NC

      ESR 96 K+3.2 PHOS 2.1 





IS:  K-PHOS IV

      VANCO IV

      D5W @ 50CC/HR



******DELICIA STATUS****** 

-------------------------------------------------------------------------------

Addendum: 10/28/20 at 0722 by Tavia Arguello RN

-------------------------------------------------------------------------------

SI: COVID+

## 2020-10-27 NOTE — CARDIAC ELECTROPHYSIOLOGY PN
Assessment/Plan


Assessment/Plan


1. Accelerated junctional rhythm.  Patient is not bradycardic. Ruled out for MI


    Echocardiogram showed ejection fraction of 55%.


2. Sepsis, on broad-spectrum IV antibiotic.


3. Respiratory failure, on antibiotic.


4. Dehydration.


5. Acute renal failure.


6. Electrolyte imbalance.


7. History of congestive heart failure, but echocardiogram showed normal left 

ventricular systolic function.


8. History of previous COVID infection in September 2020 and Active Covid now 

again in isolation


9. Dementia.





DW RN





Subjective


Subjective


In SR in NAD. Nonverbal. Now in Covid isolation.





Objective





Last 24 Hour Vital Signs








  Date Time  Temp Pulse Resp B/P (MAP) Pulse Ox O2 Delivery O2 Flow Rate FiO2


 


10/27/20 11:37  62      


 


10/27/20 08:00      Nasal Cannula 4.0 


 


10/27/20 08:00 98.0 64 20 100/58 (72) 98   





  64      


 


10/27/20 08:00       4.0 


 


10/27/20 07:50  64      


 


10/27/20 04:00 97.9 62 20 118/51 (73) 95   





  64      


 


10/27/20 04:00      Nasal Cannula 4.0 


 


10/27/20 04:00       4.0 


 


10/27/20 04:00  70      


 


10/27/20 00:00  66      


 


10/27/20 00:00 97.7 84 21 122/58 (79) 94   





  80      


 


10/27/20 00:00      Nasal Cannula 4.0 


 


10/27/20 00:00       4.0 


 


10/26/20 20:00 97.5 62 16 91/57 (68) 97   





  64      


 


10/26/20 19:58  71      


 


10/26/20 19:58       4.0 


 


10/26/20 19:58      Nasal Cannula 4.0 


 


10/26/20 19:40     97 Nasal Cannula 4.0 36


 


10/26/20 16:00 97.9 74 18 111/61 (78) 100   


 


10/26/20 16:00       4.0 


 


10/26/20 16:00      Nasal Cannula 4.0 


 


10/26/20 16:00  68      

















Intake and Output  


 


 10/26/20 10/27/20





 19:00 07:00


 


Intake Total  550 ml


 


Output Total 1100 ml 450 ml


 


Balance -1100 ml 100 ml


 


  


 


IV Total  550 ml


 


Output Urine Total 1100 ml 450 ml


 


# Bowel Movements  2











Laboratory Tests








Test


 10/26/20


15:56 10/27/20


03:10 10/27/20


06:09 10/27/20


11:13


 


POC Whole Blood Glucose


 Pending  


 


 106 MG/DL


() Pending  





 


White Blood Count


 


 9.4 K/UL


(4.8-10.8) 


 





 


Red Blood Count


 


 4.38 M/UL


(4.70-6.10)  L 


 





 


Hemoglobin


 


 12.9 G/DL


(14.2-18.0)  L 


 





 


Hematocrit


 


 39.6 %


(42.0-52.0)  L 


 





 


Mean Corpuscular Volume  90 FL (80-99)    


 


Mean Corpuscular Hemoglobin


 


 29.5 PG


(27.0-31.0) 


 





 


Mean Corpuscular Hemoglobin


Concent 


 32.6 G/DL


(32.0-36.0) 


 





 


Red Cell Distribution Width


 


 13.7 %


(11.6-14.8) 


 





 


Platelet Count


 


 179 K/UL


(150-450) 


 





 


Mean Platelet Volume


 


 10.0 FL


(6.5-10.1) 


 





 


Neutrophils (%) (Auto)


 


 69.9 %


(45.0-75.0) 


 





 


Lymphocytes (%) (Auto)


 


 20.3 %


(20.0-45.0) 


 





 


Monocytes (%) (Auto)


 


 8.1 %


(1.0-10.0) 


 





 


Eosinophils (%) (Auto)


 


 1.0 %


(0.0-3.0) 


 





 


Basophils (%) (Auto)


 


 0.7 %


(0.0-2.0) 


 





 


Erythrocyte Sedimentation Rate


 


 96 MM/HR


(0-20)  H 


 





 


Sodium Level


 


 144 MMOL/L


(136-145) 


 





 


Potassium Level


 


 3.2 MMOL/L


(3.5-5.1)  L 


 





 


Chloride Level


 


 110 MMOL/L


()  H 


 





 


Carbon Dioxide Level


 


 23 MMOL/L


(21-32) 


 





 


Anion Gap


 


 11 mmol/L


(5-15) 


 





 


Blood Urea Nitrogen


 


 8 mg/dL (7-18)


 


 





 


Creatinine


 


 1.2 MG/DL


(0.55-1.30) 


 





 


Estimat Glomerular Filtration


Rate 


 58.7 mL/min


(>60) 


 





 


Glucose Level


 


 96 MG/DL


() 


 





 


Calcium Level


 


 7.9 MG/DL


(8.5-10.1)  L 


 





 


Phosphorus Level


 


 2.1 MG/DL


(2.5-4.9)  L 


 





 


Magnesium Level


 


 2.1 MG/DL


(1.8-2.4) 


 





 


Total Bilirubin


 


 0.5 MG/DL


(0.2-1.0) 


 





 


Aspartate Amino Transf


(AST/SGOT) 


 31 U/L (15-37)


 


 





 


Alanine Aminotransferase


(ALT/SGPT) 


 14 U/L (12-78)


 


 





 


Alkaline Phosphatase


 


 193 U/L


()  H 


 





 


C-Reactive Protein,


Quantitative 


 9.5 mg/dL


(0.00-0.90)  H 


 





 


Total Protein


 


 5.9 G/DL


(6.4-8.2)  L 


 





 


Albumin


 


 1.7 G/DL


(3.4-5.0)  L 


 





 


Globulin  4.2 g/dL    


 


Albumin/Globulin Ratio


 


 0.4 (1.0-2.7)


L 


 














Microbiology








 Date/Time


Source Procedure


Growth Status





 


 10/26/20 12:39


Nasopharynx SARS-CoV-2 RdRp Gene Assay - Final Complete


 


 10/24/20 18:00


Rectum VRE Culture - Final


Enterococcus Faecium - Vre Complete


 


 10/24/20 18:00


Rectal Mucosa - Final


NO CARBAPENEM-RESISTANT ENTEROBACTERI... Complete








Objective





HEAD AND NECK:  No JVD.


LUNGS:  Coarse rhonchi.


CARDIOVASCULAR:   Irregular S1 and S2 with no gallop.


ABDOMEN:  Soft.


EXTREMITIES:  No pitting edema.











Kvng Edmond MD               Oct 27, 2020 12:15

## 2020-10-27 NOTE — NUR
NURSE NOTES:

received pt from Maia LONDON., pt is awake and agitated and combative at this time. waiting for 
KUB result to come out for NGT. pt is [place]ish speaker, and now he is AO x0. NC at 4L O2sat is 
at 97%. NGT noted at 75cm, will wait for to be confirmed with KUB. buck cath noted and 
draining well with gravity, no active bleeding noted. right FA 22G and left hand 20G IV site 
intact, clean, and patent. D5W is running at 50ml/hr. bilateral soft wrist restrain noted, 
pulse noted, no edema noted. call light within reach. will continue to monitor pt with plan 
of care. bed at the lowest position, alarmed, and lock.

## 2020-10-27 NOTE — INFECTIOUS DISEASES PROG NOTE
Assessment/Plan


76yo M with:





Fever at SNF, 100.2 max here; imporving


Leukocytosis to 12- SP


Acute hypoxic resp failure, on NRB mask> 4l NC


Pneumonia- 


hx of COVID19 PNA 9/9/20


   10/23 BCx NTD


   UA 15-20 WBC, UCx Neg


   10/23 COVID rapid neg; 10/26 rapid COVID PCR + (from prior infection)- not 

new infection


   Flu A/B neg


   CXR: L pna


   Resp cx MRSA





CKD, Cr 1.7





[facility] resident ([facility])


Non-verbal


VRE and MRSA colonized





Plan:


Cont vanco #5/7-10 for MRSA PNA


   -10/26 SP Cefepime #4


   -10/24 SP Flagyl #





f/u cx





Monitor CBC/CMP


Monitor resp status


Monitor temp curve and hemodynamics


off COVID isolation- positive covid test represents residual dead virus from 

infection on 9/2020





D/w RN and pharmacy staff.





Thank you for this consult. [facility] will continue to follow.





Subjective


Allergies:  


Coded Allergies:  


     No Known Allergies (Unverified , 8/20/12)


afebrile >72hrs


cr improving


at 4l NC


no leukocytosis





Objective





Last 24 Hour Vital Signs








  Date Time  Temp Pulse Resp B/P (MAP) Pulse Ox O2 Delivery O2 Flow Rate FiO2


 


10/27/20 11:37  62      


 


10/27/20 08:00      Nasal Cannula 4.0 


 


10/27/20 08:00 98.0 64 20 100/58 (72) 98   





  64      


 


10/27/20 08:00       4.0 


 


10/27/20 07:50  64      


 


10/27/20 04:00 97.9 62 20 118/51 (73) 95   





  64      


 


10/27/20 04:00      Nasal Cannula 4.0 


 


10/27/20 04:00       4.0 


 


10/27/20 04:00  70      


 


10/27/20 00:00  66      


 


10/27/20 00:00 97.7 84 21 122/58 (79) 94   





  80      


 


10/27/20 00:00      Nasal Cannula 4.0 


 


10/27/20 00:00       4.0 


 


10/26/20 20:00 97.5 62 16 91/57 (68) 97   





  64      


 


10/26/20 19:58  71      


 


10/26/20 19:58       4.0 


 


10/26/20 19:58      Nasal Cannula 4.0 


 


10/26/20 19:40     97 Nasal Cannula 4.0 36


 


10/26/20 16:00 97.9 74 18 111/61 (78) 100   


 


10/26/20 16:00       4.0 


 


10/26/20 16:00      Nasal Cannula 4.0 


 


10/26/20 16:00  68      








Height (Feet):  5


Height (Inches):  4.00


Weight (Pounds):  130


Gen: NAD


HEENT: NCAT


CV: RRR


Pulm: Rhonchi anteriorly, lots of oral secretions


Abd: Soft, NTND


Ext: No c/c/e


Neuro: Awake





Microbiology








 Date/Time


Source Procedure


Growth Status





 


 10/26/20 12:39


Nasopharynx SARS-CoV-2 RdRp Gene Assay - Final Complete


 


 10/24/20 18:00


Rectum VRE Culture - Final


Enterococcus Faecium - Vre Complete


 


 10/24/20 18:00


Rectal Mucosa - Final


NO CARBAPENEM-RESISTANT ENTEROBACTERI... Complete


 


 10/24/20 18:00


Nasal Nares MRSA Culture - Final


Staphylococcus Aureus - Mrsa Complete











Laboratory Tests








Test


 10/26/20


15:56 10/27/20


03:10 10/27/20


06:09 10/27/20


11:13


 


POC Whole Blood Glucose


 Pending  


 


 106 MG/DL


() Pending  





 


White Blood Count


 


 9.4 K/UL


(4.8-10.8) 


 





 


Red Blood Count


 


 4.38 M/UL


(4.70-6.10)  L 


 





 


Hemoglobin


 


 12.9 G/DL


(14.2-18.0)  L 


 





 


Hematocrit


 


 39.6 %


(42.0-52.0)  L 


 





 


Mean Corpuscular Volume  90 FL (80-99)    


 


Mean Corpuscular Hemoglobin


 


 29.5 PG


(27.0-31.0) 


 





 


Mean Corpuscular Hemoglobin


Concent 


 32.6 G/DL


(32.0-36.0) 


 





 


Red Cell Distribution Width


 


 13.7 %


(11.6-14.8) 


 





 


Platelet Count


 


 179 K/UL


(150-450) 


 





 


Mean Platelet Volume


 


 10.0 FL


(6.5-10.1) 


 





 


Neutrophils (%) (Auto)


 


 69.9 %


(45.0-75.0) 


 





 


Lymphocytes (%) (Auto)


 


 20.3 %


(20.0-45.0) 


 





 


Monocytes (%) (Auto)


 


 8.1 %


(1.0-10.0) 


 





 


Eosinophils (%) (Auto)


 


 1.0 %


(0.0-3.0) 


 





 


Basophils (%) (Auto)


 


 0.7 %


(0.0-2.0) 


 





 


Erythrocyte Sedimentation Rate


 


 96 MM/HR


(0-20)  H 


 





 


Sodium Level


 


 144 MMOL/L


(136-145) 


 





 


Potassium Level


 


 3.2 MMOL/L


(3.5-5.1)  L 


 





 


Chloride Level


 


 110 MMOL/L


()  H 


 





 


Carbon Dioxide Level


 


 23 MMOL/L


(21-32) 


 





 


Anion Gap


 


 11 mmol/L


(5-15) 


 





 


Blood Urea Nitrogen


 


 8 mg/dL (7-18)


 


 





 


Creatinine


 


 1.2 MG/DL


(0.55-1.30) 


 





 


Estimat Glomerular Filtration


Rate 


 58.7 mL/min


(>60) 


 





 


Glucose Level


 


 96 MG/DL


() 


 





 


Calcium Level


 


 7.9 MG/DL


(8.5-10.1)  L 


 





 


Phosphorus Level


 


 2.1 MG/DL


(2.5-4.9)  L 


 





 


Magnesium Level


 


 2.1 MG/DL


(1.8-2.4) 


 





 


Total Bilirubin


 


 0.5 MG/DL


(0.2-1.0) 


 





 


Aspartate Amino Transf


(AST/SGOT) 


 31 U/L (15-37)


 


 





 


Alanine Aminotransferase


(ALT/SGPT) 


 14 U/L (12-78)


 


 





 


Alkaline Phosphatase


 


 193 U/L


()  H 


 





 


C-Reactive Protein,


Quantitative 


 9.5 mg/dL


(0.00-0.90)  H 


 





 


Total Protein


 


 5.9 G/DL


(6.4-8.2)  L 


 





 


Albumin


 


 1.7 G/DL


(3.4-5.0)  L 


 





 


Globulin  4.2 g/dL    


 


Albumin/Globulin Ratio


 


 0.4 (1.0-2.7)


L 


 














Current Medications








 Medications


  (Trade)  Dose


 Ordered  Sig/Miky


 Route


 PRN Reason  Start Time


 Stop Time Status Last Admin


Dose Admin


 


 Acetaminophen


  (Tylenol)  650 mg  Q4H  PRN


 ORAL


 Temp >100.5  10/23/20 20:30


 11/22/20 20:29   





 


 Albuterol/


 Ipratropium


  (Albuterol/


 Ipratropium)  3 ml  Q4H  PRN


 HHN


 Shortness of Breath  10/23/20 20:30


 10/28/20 20:29   





 


 Dextrose  1,000 ml @ 


 50 mls/hr  Q20H


 IV


   10/26/20 12:38


 11/25/20 12:37  10/26/20 20:08





 


 Dextrose


  (Dextrose 50%)  50 ml  Q30M  PRN


 IV


 Hypoglycemia  10/23/20 22:45


 1/21/21 22:44   





 


 Heparin Sodium


  (Porcine)


  (Heparin 5000


 units/ml)  5,000 units  EVERY 12  HOURS


 SUBQ


   10/23/20 21:00


 12/7/20 20:59  10/27/20 09:33





 


 Insulin Aspart


  (NovoLOG)    BEFORE MEALS AND  HS


 SUBQ


   10/24/20 06:30


 1/22/21 06:29  10/24/20 06:23





 


 Nitroglycerin


  (Ntg)  0.4 mg  Q5M  PRN


 SL


 Prn Chest Pain  10/23/20 20:30


 11/22/20 20:29   





 


 Ondansetron HCl


  (Zofran)  4 mg  Q6H  PRN


 IVP


 Nausea & Vomiting  10/23/20 20:30


 11/22/20 20:29   





 


 Polyethylene


 Glycol


  (Miralax)  17 gm  DAILYPRN  PRN


 ORAL


 Constipation  10/23/20 20:30


 11/22/20 20:29   





 


 Potassium


 Phosphate  250 ml @ 


 62.5 mls/hr  Q4H


 IVPB


   10/27/20 09:30


 10/27/20 17:29  10/27/20 09:12





 


 Promethazine HCl/


 Codeine


  (Phenergan with


 Codeine)  5 ml  Q4H  PRN


 ORAL


 For Cough  10/23/20 20:30


 11/22/20 20:29   





 


 Tamsulosin HCl


  (Flomax)  0.4 mg  DAILY


 ORAL


   10/24/20 09:00


 11/23/20 08:59  10/25/20 08:38





 


 Temazepam


  (Restoril)  15 mg  HSPRN  PRN


 ORAL


 Insomnia  10/23/20 20:30


 10/30/20 20:29   





 


 Vancomycin HCl


  (Vanco pharmacy


 to dose)  1 ea  DAILY  PRN


 MISC


 Per rx protocol  10/23/20 20:30


 11/22/20 20:29   





 


 Vancomycin HCl


 750 mg/Sodium


 Chloride  275 ml @ 


 183.333


 mls/hr  Q24H


 IVPB


   10/26/20 09:00


 10/31/20 08:59  10/27/20 09:11




















Slime Garcia M.D.            Oct 27, 2020 13:49

## 2020-10-27 NOTE — PULMONOLGY CRITICAL CARE NOTE
Critical Care - Asmt/Plan


Problems:  


(1) 2019 novel coronavirus disease (COVID-19)


(2) Multifocal pneumonia


(3) Acute encephalopathy


(4) Severe sepsis


(5) Alzheimer's dementia


(6) HTN (hypertension)


(7) History of CVA (cerebrovascular accident)


(8) BPH (benign prostatic hyperplasia)


Respiratory:  monitor respiratory rate, adjust FIO2, CXR


Cardiac:  continue to monitor HR/BP


Renal:  F/U I&O, keep IV fluid


Infectious Disease:  check cultures, continue antibiotics


Gastrointestinal:  start feedings, other - failed swallow study


Endocrine:  monitor blood sugar, continue sliding scale insulin


Hematologic:  transfuse if hgb<8.5


Neurologic:  PRN Ativan, PRN Morphine, keep patient comfortable


Affect:  PRN ativan


Prophylaxis:  Protonix


Notes Reviewed:  internist


Discussed with:  nurses, consultants, 





Critical Care - Objective





Last 24 Hour Vital Signs








  Date Time  Temp Pulse Resp B/P (MAP) Pulse Ox O2 Delivery O2 Flow Rate FiO2


 


10/27/20 08:00      Nasal Cannula 4.0 


 


10/27/20 08:00 98.0 64 20 100/58 (72) 98   





  64      


 


10/27/20 08:00       4.0 


 


10/27/20 07:50  64      


 


10/27/20 04:00 97.9 62 20 118/51 (73) 95   





  64      


 


10/27/20 04:00      Nasal Cannula 4.0 


 


10/27/20 04:00       4.0 


 


10/27/20 04:00  70      


 


10/27/20 00:00  66      


 


10/27/20 00:00 97.7 84 21 122/58 (79) 94   





  80      


 


10/27/20 00:00      Nasal Cannula 4.0 


 


10/27/20 00:00       4.0 


 


10/26/20 20:00 97.5 62 16 91/57 (68) 97   





  64      


 


10/26/20 19:58  71      


 


10/26/20 19:58       4.0 


 


10/26/20 19:58      Nasal Cannula 4.0 


 


10/26/20 19:40     97 Nasal Cannula 4.0 36


 


10/26/20 16:00 97.9 74 18 111/61 (78) 100   


 


10/26/20 16:00       4.0 


 


10/26/20 16:00      Nasal Cannula 4.0 


 


10/26/20 16:00  68      








Status:  obtunded


Condition:  critical


HEENT:  atraumatic


Neck:  full ROM


Lungs:  rales, rhonchi


Heart:  HR/BP stable, HR/BP unstable, regular


Abdomen:  soft, non-tender, active bowel sounds


Extremities:  no C/C/E


Micro:





Microbiology








 Date/Time


Source Procedure


Growth Status





 


 10/26/20 12:39


Nasopharynx SARS-CoV-2 RdRp Gene Assay - Final Complete


 


 10/24/20 18:00


Rectum VRE Culture - Final


Enterococcus Faecium - Vre Complete


 


 10/24/20 18:00


Rectal Mucosa - Final


NO CARBAPENEM-RESISTANT ENTEROBACTERI... Complete








Accucheck:  110





Critical Care - Subjective


ROS Limited/Unobtainable:  Yes


Condition:  critical


EKG Rhythm:  Sinus Rhythm


FI02:  36


Sputum Amount:  Small


I&O:











Intake and Output  


 


 10/26/20 10/27/20





 19:00 07:00


 


Intake Total  550 ml


 


Output Total 1100 ml 450 ml


 


Balance -1100 ml 100 ml


 


  


 


IV Total  550 ml


 


Output Urine Total 1100 ml 450 ml


 


# Bowel Movements  2








CXR:


no changes


Labs:





Laboratory Tests








Test


 10/26/20


15:56 10/27/20


03:10 10/27/20


06:09 10/27/20


11:13


 


POC Whole Blood Glucose


 Pending  


 


 106 MG/DL


() Pending  





 


White Blood Count


 


 9.4 K/UL


(4.8-10.8) 


 





 


Red Blood Count


 


 4.38 M/UL


(4.70-6.10)  L 


 





 


Hemoglobin


 


 12.9 G/DL


(14.2-18.0)  L 


 





 


Hematocrit


 


 39.6 %


(42.0-52.0)  L 


 





 


Mean Corpuscular Volume  90 FL (80-99)    


 


Mean Corpuscular Hemoglobin


 


 29.5 PG


(27.0-31.0) 


 





 


Mean Corpuscular Hemoglobin


Concent 


 32.6 G/DL


(32.0-36.0) 


 





 


Red Cell Distribution Width


 


 13.7 %


(11.6-14.8) 


 





 


Platelet Count


 


 179 K/UL


(150-450) 


 





 


Mean Platelet Volume


 


 10.0 FL


(6.5-10.1) 


 





 


Neutrophils (%) (Auto)


 


 69.9 %


(45.0-75.0) 


 





 


Lymphocytes (%) (Auto)


 


 20.3 %


(20.0-45.0) 


 





 


Monocytes (%) (Auto)


 


 8.1 %


(1.0-10.0) 


 





 


Eosinophils (%) (Auto)


 


 1.0 %


(0.0-3.0) 


 





 


Basophils (%) (Auto)


 


 0.7 %


(0.0-2.0) 


 





 


Erythrocyte Sedimentation Rate


 


 96 MM/HR


(0-20)  H 


 





 


Sodium Level


 


 144 MMOL/L


(136-145) 


 





 


Potassium Level


 


 3.2 MMOL/L


(3.5-5.1)  L 


 





 


Chloride Level


 


 110 MMOL/L


()  H 


 





 


Carbon Dioxide Level


 


 23 MMOL/L


(21-32) 


 





 


Anion Gap


 


 11 mmol/L


(5-15) 


 





 


Blood Urea Nitrogen


 


 8 mg/dL (7-18)


 


 





 


Creatinine


 


 1.2 MG/DL


(0.55-1.30) 


 





 


Estimat Glomerular Filtration


Rate 


 58.7 mL/min


(>60) 


 





 


Glucose Level


 


 96 MG/DL


() 


 





 


Calcium Level


 


 7.9 MG/DL


(8.5-10.1)  L 


 





 


Phosphorus Level


 


 2.1 MG/DL


(2.5-4.9)  L 


 





 


Magnesium Level


 


 2.1 MG/DL


(1.8-2.4) 


 





 


Total Bilirubin


 


 0.5 MG/DL


(0.2-1.0) 


 





 


Aspartate Amino Transf


(AST/SGOT) 


 31 U/L (15-37)


 


 





 


Alanine Aminotransferase


(ALT/SGPT) 


 14 U/L (12-78)


 


 





 


Alkaline Phosphatase


 


 193 U/L


()  H 


 





 


C-Reactive Protein,


Quantitative 


 9.5 mg/dL


(0.00-0.90)  H 


 





 


Total Protein


 


 5.9 G/DL


(6.4-8.2)  L 


 





 


Albumin


 


 1.7 G/DL


(3.4-5.0)  L 


 





 


Globulin  4.2 g/dL    


 


Albumin/Globulin Ratio


 


 0.4 (1.0-2.7)


L 


 




















Michael Sandoval MD              Oct 27, 2020 12:02

## 2020-10-27 NOTE — DIAGNOSTIC IMAGING REPORT
EXAM:

  XR Abdomen, 2 Views

 

CLINICAL HISTORY:

  NGT

 

TECHNIQUE:

  Frontal view of the abdomen/pelvis with upright view of the abdomen.

 

COMPARISON:

  10/27/2020

 

FINDINGS:

  Lower thorax:  Presumed subsegmental atelectasis at the lung bases.

  Intraperitoneal space:  No free air.

  Gastrointestinal tract:  Nonspecific bowel gas pattern.

  Bones/joints:  Osteopenia.

  Tubes, lines and devices:  The NG tube is coiled upon itself within a 

moderate sized hiatal hernia with the tip above the diaphragm.  NG tube 

should be removed and reinserted.

 

IMPRESSION:     

1.  The distal NG tube is called upon itself with its tip located within 

hiatal hernia above the diaphragm.

2.  NG tube should be removed and reinserted.

 

<MYCVCSECTION>

 

Communications:

 

10/27/20 19:44 Call Nurse LITA Martin in [facility] on 10/27 19:44 (-07:00)

## 2020-10-27 NOTE — NUR
NURSE NOTES:

NGT inserted, 55cm, patient pulled out x2, patient confused, does not understand, unable to 
educate.

## 2020-10-27 NOTE — INTERNAL MED PROGRESS NOTE
Subjective


Date of Service:  Oct 27, 2020


Physician Name


NadiyaBenito


Attending Physician


Andrei Cancino MD





Current Medications








 Medications


  (Trade)  Dose


 Ordered  Sig/Miky


 Route


 PRN Reason  Start Time


 Stop Time Status Last Admin


Dose Admin


 


 Acetaminophen


  (Tylenol)  650 mg  Q4H  PRN


 ORAL


 Temp >100.5  10/23/20 20:30


 11/22/20 20:29   





 


 Albuterol/


 Ipratropium


  (Albuterol/


 Ipratropium)  3 ml  Q4H  PRN


 HHN


 Shortness of Breath  10/23/20 20:30


 10/28/20 20:29   





 


 Dextrose  1,000 ml @ 


 50 mls/hr  Q20H


 IV


   10/26/20 12:38


 11/25/20 12:37  10/26/20 20:08





 


 Dextrose


  (Dextrose 50%)  50 ml  Q30M  PRN


 IV


 Hypoglycemia  10/23/20 22:45


 1/21/21 22:44   





 


 Heparin Sodium


  (Porcine)


  (Heparin 5000


 units/ml)  5,000 units  EVERY 12  HOURS


 SUBQ


   10/23/20 21:00


 12/7/20 20:59  10/27/20 09:33





 


 Insulin Aspart


  (NovoLOG)    BEFORE MEALS AND  HS


 SUBQ


   10/24/20 06:30


 1/22/21 06:29  10/24/20 06:23





 


 Nitroglycerin


  (Ntg)  0.4 mg  Q5M  PRN


 SL


 Prn Chest Pain  10/23/20 20:30


 11/22/20 20:29   





 


 Ondansetron HCl


  (Zofran)  4 mg  Q6H  PRN


 IVP


 Nausea & Vomiting  10/23/20 20:30


 11/22/20 20:29   





 


 Polyethylene


 Glycol


  (Miralax)  17 gm  DAILYPRN  PRN


 ORAL


 Constipation  10/23/20 20:30


 11/22/20 20:29   





 


 Promethazine HCl/


 Codeine


  (Phenergan with


 Codeine)  5 ml  Q4H  PRN


 ORAL


 For Cough  10/23/20 20:30


 11/22/20 20:29   





 


 Tamsulosin HCl


  (Flomax)  0.4 mg  DAILY


 ORAL


   10/24/20 09:00


 11/23/20 08:59  10/25/20 08:38





 


 Temazepam


  (Restoril)  15 mg  HSPRN  PRN


 ORAL


 Insomnia  10/23/20 20:30


 10/30/20 20:29   





 


 Vancomycin HCl


  (Vanco pharmacy


 to dose)  1 ea  DAILY  PRN


 MISC


 Per rx protocol  10/23/20 20:30


 11/22/20 20:29   





 


 Vancomycin HCl


 750 mg/Sodium


 Chloride  275 ml @ 


 183.333


 mls/hr  Q24H


 IVPB


   10/26/20 09:00


 10/31/20 08:59  10/27/20 09:11











Allergies:  


Coded Allergies:  


     No Known Allergies (Unverified , 8/20/12)


ROS Limited/Unobtainable:  Yes


Subjective


76 YO M admitted with shortness of breath.  Now pneumonia and COVID positive.  

Cover for Int med-Dr Cancino.  Step down unit





Objective





Last Vital Signs








  Date Time  Temp Pulse Resp B/P (MAP) Pulse Ox O2 Delivery O2 Flow Rate FiO2


 


10/27/20 16:00      Nasal Cannula 4.0 


 


10/27/20 12:00 97.9 60 19 102/63 (76) 98   





  60      


 


10/26/20 19:40        36











Laboratory Tests








Test


 10/27/20


03:10 10/27/20


06:09 10/27/20


11:13


 


White Blood Count


 9.4 K/UL


(4.8-10.8) 


 





 


Red Blood Count


 4.38 M/UL


(4.70-6.10)  L 


 





 


Hemoglobin


 12.9 G/DL


(14.2-18.0)  L 


 





 


Hematocrit


 39.6 %


(42.0-52.0)  L 


 





 


Mean Corpuscular Volume 90 FL (80-99)    


 


Mean Corpuscular Hemoglobin


 29.5 PG


(27.0-31.0) 


 





 


Mean Corpuscular Hemoglobin


Concent 32.6 G/DL


(32.0-36.0) 


 





 


Red Cell Distribution Width


 13.7 %


(11.6-14.8) 


 





 


Platelet Count


 179 K/UL


(150-450) 


 





 


Mean Platelet Volume


 10.0 FL


(6.5-10.1) 


 





 


Neutrophils (%) (Auto)


 69.9 %


(45.0-75.0) 


 





 


Lymphocytes (%) (Auto)


 20.3 %


(20.0-45.0) 


 





 


Monocytes (%) (Auto)


 8.1 %


(1.0-10.0) 


 





 


Eosinophils (%) (Auto)


 1.0 %


(0.0-3.0) 


 





 


Basophils (%) (Auto)


 0.7 %


(0.0-2.0) 


 





 


Erythrocyte Sedimentation Rate


 96 MM/HR


(0-20)  H 


 





 


Sodium Level


 144 MMOL/L


(136-145) 


 





 


Potassium Level


 3.2 MMOL/L


(3.5-5.1)  L 


 





 


Chloride Level


 110 MMOL/L


()  H 


 





 


Carbon Dioxide Level


 23 MMOL/L


(21-32) 


 





 


Anion Gap


 11 mmol/L


(5-15) 


 





 


Blood Urea Nitrogen


 8 mg/dL (7-18)


 


 





 


Creatinine


 1.2 MG/DL


(0.55-1.30) 


 





 


Estimat Glomerular Filtration


Rate 58.7 mL/min


(>60) 


 





 


Glucose Level


 96 MG/DL


() 


 





 


Calcium Level


 7.9 MG/DL


(8.5-10.1)  L 


 





 


Phosphorus Level


 2.1 MG/DL


(2.5-4.9)  L 


 





 


Magnesium Level


 2.1 MG/DL


(1.8-2.4) 


 





 


Total Bilirubin


 0.5 MG/DL


(0.2-1.0) 


 





 


Aspartate Amino Transf


(AST/SGOT) 31 U/L (15-37)


 


 





 


Alanine Aminotransferase


(ALT/SGPT) 14 U/L (12-78)


 


 





 


Alkaline Phosphatase


 193 U/L


()  H 


 





 


C-Reactive Protein,


Quantitative 9.5 mg/dL


(0.00-0.90)  H 


 





 


Total Protein


 5.9 G/DL


(6.4-8.2)  L 


 





 


Albumin


 1.7 G/DL


(3.4-5.0)  L 


 





 


Globulin 4.2 g/dL    


 


Albumin/Globulin Ratio


 0.4 (1.0-2.7)


L 


 





 


POC Whole Blood Glucose


 


 106 MG/DL


() 110 MG/DL


()  H











Microbiology








 Date/Time


Source Procedure


Growth Status





 


 10/26/20 12:39


Nasopharynx SARS-CoV-2 RdRp Gene Assay - Final Complete


 


 10/24/20 18:00


Rectum VRE Culture - Final


Enterococcus Faecium - Vre Complete


 


 10/24/20 18:00


Rectal Mucosa - Final


NO CARBAPENEM-RESISTANT ENTEROBACTERI... Complete


 


 10/24/20 18:00


Nasal Nares MRSA Culture - Final


Staphylococcus Aureus - Mrsa Complete

















Intake and Output  


 


 10/26/20 10/27/20





 19:00 07:00


 


Intake Total  550 ml


 


Output Total 1100 ml 450 ml


 


Balance -1100 ml 100 ml


 


  


 


IV Total  550 ml


 


Output Urine Total 1100 ml 450 ml


 


# Bowel Movements  2








Objective


PHYSICAL EXAMINATION:


GENERAL:  The patient awake with deep stimuli, open his eyes, however,


cannot follow commands.  The patient is on a Ventimask at this time,


chronically ill-appearing.


HEAD AND NECK:  Pupils are equal and reactive to light.  Anicteric.


NECK:  Supple.  No JVD.


LUNGS:  Good air entry.  No wheezing or rhonchi, however the patient has a


coarse breath sounds.  Decreased air in bases.


HEART:  S1, S2.  Regular rhythm.  Distant heart sounds.  No murmur or


gallop.


ABDOMEN:  Soft, nondistended, nontender.  Positive bowel sounds.


EXTREMITIES:  No cyanosis, clubbing, or edema.


NEUROLOGIC:  Very limited secondary to the patient's status, cannot follow


commands.  Opens his eyes with deep stimuli and moving extremities


spontaneously.





Assessment/Plan


Assessment/Plan


ASSESSMENT:


1. Acute hypoxemic respiratory failure, most likely secondary to


pneumonia and sepsis.


2. Sepsis secondary to urinary tract infection and pneumonia.


3. pneumonia=MRSA


4. Acute kidney injury on chronic renal insufficiency.


5. Dehydration.


6. History of chronic congestive heart failure.


7. Diabetes type 2.


8. Dyslipidemia.


9. Hypertension.


10. Alzheimer's disease.


11. COVID 19 previous positive





PLAN:


1.  Admit the patient to step-down.


2.  Dr. Sandoval,=Pulmonary Critical Care


3.  Dr. Garcia = Inf Dis.


4.  IV= D5W due to the hypernatremia and dehydration.


5.  antibiotics = vancomycin and cefepime


6.  Code status is full code.


7.    DVT prophylaxis is heparin subcutaneous.











Benito Hearn MD               Oct 27, 2020 17:42

## 2020-10-27 NOTE — NUR
NURSE HAND-OFF REPORT: 



Important Events on Shift: Pt on NC at 4L- o2 sat 93-98%. Covid positive- still coughing, PT 
on NPO

Patient Status: Stable

Diet: NPO



Pending Orders: None

Pending Results/Labs: Sputum collection

Pending MD notification: None



Latest Vital Signs: Temperature 97.9 , Pulse 64 , B/P 118 /51 , Respiratory Rate 20 , O2 SAT 
95 , Nasal Cannula, O2 Flow Rate 4.0 .  

Vital Sign Comment: WNL



EKG Rhythm: Sinus Rhythm

Rhythm change?: N 

MD Notified?: Y -Left Message for Dr. Cancino, No cardio on the case. 

MD Response: 



Latest Mejia Fall Score: 50  

Fall Risk: High Risk 

Safety Measures: Call light Within Reach, Bed Alarm Zone 1, Side Rails Side Rails x3, Bed 
position Low and Locked.

Fall Precautions: 

Yellow Socks

Yellow Gown

Door Sign

Patient Fall Education



Report given to [LITA Carvalho].

## 2020-10-27 NOTE — NUR
NURSE NOTES:

BS was checked. VS WNL. No signs of respiratory distress noted. No pain noted at this time. 
Repositioned pt. Continue to plan of care.

## 2020-10-27 NOTE — NUR
NURSE NOTES:

took out the NGT, pt tolerated well without difficulties. no active bleeding noted at this 
time. call light within reach. will continue to monitor pt.

## 2020-10-27 NOTE — DIAGNOSTIC IMAGING REPORT
. Indication: Dyspnea

 

Technique: One view of the chest

 

Comparison: 10/26/2020

 

Findings: Left mid and lower lung infiltrate persists. Patchy right infrahilar

infiltrate persists. The pleural spaces are clear. The heart size is normal. There is

a right shoulder prosthesis. Findings are unchanged

 

Impression: Unchanged bilateral infiltrates

## 2020-10-27 NOTE — NUR
NURSE HAND-OFF REPORT: 



Important Events on Shift: NGT inserted, initiation of soft wrist restraints 

Patient Status: stable

Diet: awaiting for KUB, Glucerna 1.2



Pending Orders: NA

Pending Results/Labs:NA

Pending MD notification:NA



Latest Vital Signs: Temperature 98.2 , Pulse 65 , B/P 117 /52 , Respiratory Rate 20 , O2 SAT 
95 , Nasal Cannula, O2 Flow Rate 4.0 .  

Vital Sign Comment: stable



EKG Rhythm: Sinus Rhythm

Rhythm change?: N 

MD Notified?: Y -Left Message for Dr. Cancino, No cardio on the case. 

MD Response: 



Latest Mejia Fall Score: 50  

Fall Risk: High Risk 

Safety Measures: Call light Within Reach, Bed Alarm Zone 1, Side Rails Side Rails x3, Bed 
position Low and Locked.

Fall Precautions: 

Yellow Socks

Yellow Gown

Door Sign

Patient Fall Education



Report given to LITA Palmer.

## 2020-10-27 NOTE — NUR
RD ASSESSMENT & RECOMMENDATIONS

SEE CARE ACTIVITY FOR COMPLETE ASSESSMENT



DAILY ESTIMATED NEEDS:

Needs based on DM, pulmonary, cardiac 59.5kg 

25-30  kcals/kg 

7576-6651  total kcals

1-1.5  g protein/kg

60-89  g total protein 

25-30  mL/kg

[id number]  total fluid mLs



NUTRITION DIAGNOSIS:

* Swallowing difficulty R/T dysphagia as evidenced by SLP eval, recs for

NGT feeds.



CURRENT DIET: NPO     



ENTERAL NUTRITION RECOMMENDATIONS:

Glucerna 1.2 goal of 60ml/hr x24 hrs   

to provide 1440ml, 1728 kcal, 86g pro, 1159ml free H2O  



- Obtain GI access, initiate Glucerna 1.2 @low rate 20ml/hr for 6 hrs.

- Advance 10ml/hr q4-6 hrs to goal.

- Flush per MD/ HOB over 30 degrees.





ADDITIONAL RECOMMENDATIONS:

* Calibrated bedscale wt for accurate CBW 

* TF recs as above if part of POC  

* Replete lytes as needed 

* Pt is NPO from adm, rec non oral feeds to meet est kcal and pro needs  

. 
## 2020-10-27 NOTE — NUR
NURSE NOTES:

Received report from LITA Gomez. Patient in bed resting, no active s/s cardiac, 
respiratory distress noticed at this time. Patient Open eyes, does not follow command, eyes 
tracking. Patient on NC 4L tolerating O2 sat 98% at this time. Patient on Cummings Catheter, 
draining well to gravity at this time. IV on right FA 22g, left hand 20G, asymptomatic, 
patent, intact. IVF D5W @ 50ml/h. Endorsed NPO at this time, will follow up with MD. Bed in 
lowest position, side rails upx3, call light within reach, bed alarm on, Will continue to 
monitor.

## 2020-10-27 NOTE — DIAGNOSTIC IMAGING REPORT
Indication: Post nasogastric tube placement

 

Technique: One view of the chest

 

Comparison: 7/11/2017

 

Findings:    There is a nasogastric tube in place, tip of which projects at the level

gastric fundus but proximal port at the level of the distal esophagus. The bowel gas

pattern is unremarkable. The visualized lungs demonstrate diffuse interstitial

disease

 

Impression: High position of nasogastric tube. Further advancement recommended.

Findings discussed by phone with DELICIA charge nurse Luciano at the time of

interpretation

## 2020-10-27 NOTE — NUR
NURSE NOTES:

Dr. Sandoval made aware patient pulled out NGT, per MD can initiate non-violent restraint. 
Order noted, entered carried out.

## 2020-10-27 NOTE — NUR
NURSE NOTES:Skin/wound assessment

Sacral pressure ulcer stage 2  2.0x1.0x0.2 pink wound bed small amount serosanguineous 
drainage franco wound intact Calazime and Optifoam applied.Bilateral heels skin intact 
Optifoam applied and pillows for offloading.Care plan discussed with RN taking care of the 
patient.

## 2020-10-27 NOTE — NEPHROLOGY PROGRESS NOTE
Assessment/Plan


Problem List:  


(1) Dehydration


(2) JANES (acute kidney injury)


(3) Renal failure (ARF), acute on chronic


(4) Hypoxia


(5) Acute encephalopathy


(6) Electrolyte imbalance


Assessment





75-year-old male is admitted with acute hypoxic respiratory failure most likely 

secondary to pneumonia and sepsis, UTI.


Acute on chronic renal failure


Dehydration


Electrolyte imbalances, hypernatremia


Hypoalbuminemia


Diabetes type 2


History of congestive heart failure


Hypertension


Hyperlipemia


Alzheimer's


Previous COVID-19 infection in September 2020


Plan


October 27: Labs reviewed.  Low potassium and low phosphorus replaced.  Continue

per consultants.  Remains stable from renal standpoint of view.


October 26: Patient remains n.p.o.  IV fluid down to 50 cc an hour.  Potassium 

supplement intravenously ordered.  Continue per consultants.





Previously:


Patient is n.p.o., will continue on IV fluid of D5W 75 cc an hour


We will monitor electrolytes and renal parameters


Avoid nephrotoxic's


Start p.o. when he clears by speech therapist, meanwhile aspiration precautions


Keep the blood pressure and blood sugar in check


Per orders





Subjective


ROS Limited/Unobtainable:  No


Constitutional:  Reports: malaise, weakness





Objective


Objective





Last 24 Hour Vital Signs








  Date Time  Temp Pulse Resp B/P (MAP) Pulse Ox O2 Delivery O2 Flow Rate FiO2


 


10/27/20 08:00      Nasal Cannula 4.0 


 


10/27/20 08:00 98.0 64 20 100/58 (72) 98   





  64      


 


10/27/20 08:00       4.0 


 


10/27/20 07:50  64      


 


10/27/20 04:00 97.9 62 20 118/51 (73) 95   





  64      


 


10/27/20 04:00      Nasal Cannula 4.0 


 


10/27/20 04:00       4.0 


 


10/27/20 04:00  70      


 


10/27/20 00:00  66      


 


10/27/20 00:00 97.7 84 21 122/58 (79) 94   





  80      


 


10/27/20 00:00      Nasal Cannula 4.0 


 


10/27/20 00:00       4.0 


 


10/26/20 20:00 97.5 62 16 91/57 (68) 97   





  64      


 


10/26/20 19:58  71      


 


10/26/20 19:58       4.0 


 


10/26/20 19:58      Nasal Cannula 4.0 


 


10/26/20 19:40     97 Nasal Cannula 4.0 36


 


10/26/20 16:00 97.9 74 18 111/61 (78) 100   


 


10/26/20 16:00       4.0 


 


10/26/20 16:00      Nasal Cannula 4.0 


 


10/26/20 16:00  68      


 


10/26/20 12:00       4.0 


 


10/26/20 12:00  65      


 


10/26/20 12:00 97.9 71 18 112/56 (74) 99   


 


10/26/20 12:00      Nasal Cannula 4.0 

















Intake and Output  


 


 10/26/20 10/27/20





 19:00 07:00


 


Intake Total  500 ml


 


Output Total 1100 ml 450 ml


 


Balance -1100 ml 50 ml


 


  


 


IV Total  500 ml


 


Output Urine Total 1100 ml 450 ml


 


# Bowel Movements  2











Current Medications








 Medications


  (Trade)  Dose


 Ordered  Sig/Miky


 Route


 PRN Reason  Start Time


 Stop Time Status Last Admin


Dose Admin


 


 Acetaminophen


  (Tylenol)  650 mg  Q4H  PRN


 ORAL


 Temp >100.5  10/23/20 20:30


 11/22/20 20:29   





 


 Albuterol/


 Ipratropium


  (Albuterol/


 Ipratropium)  3 ml  Q4H  PRN


 HHN


 Shortness of Breath  10/23/20 20:30


 10/28/20 20:29   





 


 Dextrose  1,000 ml @ 


 50 mls/hr  Q20H


 IV


   10/26/20 12:38


 11/25/20 12:37  10/26/20 20:08





 


 Dextrose


  (Dextrose 50%)  50 ml  Q30M  PRN


 IV


 Hypoglycemia  10/23/20 22:45


 1/21/21 22:44   





 


 Heparin Sodium


  (Porcine)


  (Heparin 5000


 units/ml)  5,000 units  EVERY 12  HOURS


 SUBQ


   10/23/20 21:00


 12/7/20 20:59  10/27/20 09:33





 


 Insulin Aspart


  (NovoLOG)    BEFORE MEALS AND  HS


 SUBQ


   10/24/20 06:30


 1/22/21 06:29  10/24/20 06:23





 


 Nitroglycerin


  (Ntg)  0.4 mg  Q5M  PRN


 SL


 Prn Chest Pain  10/23/20 20:30


 11/22/20 20:29   





 


 Ondansetron HCl


  (Zofran)  4 mg  Q6H  PRN


 IVP


 Nausea & Vomiting  10/23/20 20:30


 11/22/20 20:29   





 


 Polyethylene


 Glycol


  (Miralax)  17 gm  DAILYPRN  PRN


 ORAL


 Constipation  10/23/20 20:30


 11/22/20 20:29   





 


 Potassium


 Phosphate  250 ml @ 


 62.5 mls/hr  Q4H


 IVPB


   10/27/20 09:30


 10/27/20 17:29  10/27/20 09:12





 


 Promethazine HCl/


 Codeine


  (Phenergan with


 Codeine)  5 ml  Q4H  PRN


 ORAL


 For Cough  10/23/20 20:30


 11/22/20 20:29   





 


 Tamsulosin HCl


  (Flomax)  0.4 mg  DAILY


 ORAL


   10/24/20 09:00


 11/23/20 08:59  10/25/20 08:38





 


 Temazepam


  (Restoril)  15 mg  HSPRN  PRN


 ORAL


 Insomnia  10/23/20 20:30


 10/30/20 20:29   





 


 Vancomycin HCl


  (Vanco pharmacy


 to dose)  1 ea  DAILY  PRN


 MISC


 Per rx protocol  10/23/20 20:30


 11/22/20 20:29   





 


 Vancomycin HCl


 750 mg/Sodium


 Chloride  275 ml @ 


 183.333


 mls/hr  Q24H


 IVPB


   10/26/20 09:00


 10/31/20 08:59  10/27/20 09:11








Laboratory Tests


10/26/20 11:34: POC Whole Blood Glucose [Pending]


10/26/20 15:56: POC Whole Blood Glucose [Pending]


10/27/20 03:10: 


White Blood Count 9.4, Red Blood Count 4.38L, Hemoglobin 12.9L, Hematocrit 39.6L

, Mean Corpuscular Volume 90, Mean Corpuscular Hemoglobin 29.5, Mean Corpuscular

 Hemoglobin Concent 32.6, Red Cell Distribution Width 13.7, Platelet Count 179, 

Mean Platelet Volume 10.0, Neutrophils (%) (Auto) 69.9, Lymphocytes (%) (Auto) 

20.3, Monocytes (%) (Auto) 8.1, Eosinophils (%) (Auto) 1.0, Basophils (%) (Auto)

 0.7, Erythrocyte Sedimentation Rate 96H, Sodium Level 144, Potassium Level 3.2L

, Chloride Level 110H, Carbon Dioxide Level 23, Anion Gap 11, Blood Urea 

Nitrogen 8, Creatinine 1.2, Estimat Glomerular Filtration Rate 58.7, Glucose 

Level 96, Calcium Level 7.9L, Phosphorus Level 2.1L, Magnesium Level 2.1, Total 

Bilirubin 0.5, Aspartate Amino Transf (AST/SGOT) 31, Alanine Aminotransferase 

(ALT/SGPT) 14, Alkaline Phosphatase 193H, C-Reactive Protein, Quantitative 9.5H,

 Total Protein 5.9L, Albumin 1.7L, Globulin 4.2, Albumin/Globulin Ratio 0.4L


10/27/20 06:09: POC Whole Blood Glucose 106


Height (Feet):  5


Height (Inches):  4.00


Weight (Pounds):  130


General Appearance:  no apparent distress


Cardiovascular:  normal rate


Respiratory/Chest:  decreased breath sounds


Abdomen:  distended











Seven Tobar MD            Oct 27, 2020 10:14

## 2020-10-27 NOTE — NUR
NURSE NOTES:

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 
continue to monitor.

## 2020-10-28 VITALS — DIASTOLIC BLOOD PRESSURE: 71 MMHG | SYSTOLIC BLOOD PRESSURE: 133 MMHG

## 2020-10-28 VITALS — SYSTOLIC BLOOD PRESSURE: 128 MMHG | DIASTOLIC BLOOD PRESSURE: 73 MMHG

## 2020-10-28 VITALS — SYSTOLIC BLOOD PRESSURE: 120 MMHG | DIASTOLIC BLOOD PRESSURE: 66 MMHG

## 2020-10-28 VITALS — DIASTOLIC BLOOD PRESSURE: 78 MMHG | SYSTOLIC BLOOD PRESSURE: 150 MMHG

## 2020-10-28 VITALS — SYSTOLIC BLOOD PRESSURE: 107 MMHG | DIASTOLIC BLOOD PRESSURE: 76 MMHG

## 2020-10-28 VITALS — DIASTOLIC BLOOD PRESSURE: 73 MMHG | SYSTOLIC BLOOD PRESSURE: 124 MMHG

## 2020-10-28 LAB
ADD MANUAL DIFF: NO
ALBUMIN SERPL-MCNC: 1.9 G/DL (ref 3.4–5)
ALP SERPL-CCNC: 226 U/L (ref 46–116)
ALT SERPL-CCNC: 37 U/L (ref 12–78)
ANION GAP SERPL CALC-SCNC: 9 MMOL/L (ref 5–15)
AST SERPL-CCNC: 49 U/L (ref 15–37)
BASOPHILS NFR BLD AUTO: 0.9 % (ref 0–2)
BILIRUB DIRECT SERPL-MCNC: 0.2 MG/DL (ref 0–0.3)
BILIRUB SERPL-MCNC: 0.5 MG/DL (ref 0.2–1)
BUN SERPL-MCNC: 5 MG/DL (ref 7–18)
CALCIUM SERPL-MCNC: 8.6 MG/DL (ref 8.5–10.1)
CHLORIDE SERPL-SCNC: 109 MMOL/L (ref 98–107)
CO2 SERPL-SCNC: 26 MMOL/L (ref 21–32)
CREAT SERPL-MCNC: 1.2 MG/DL (ref 0.55–1.3)
EOSINOPHIL NFR BLD AUTO: 0.8 % (ref 0–3)
ERYTHROCYTE [DISTWIDTH] IN BLOOD BY AUTOMATED COUNT: 14.5 % (ref 11.6–14.8)
HCT VFR BLD CALC: 40.3 % (ref 42–52)
HGB BLD-MCNC: 13.1 G/DL (ref 14.2–18)
LYMPHOCYTES NFR BLD AUTO: 16.9 % (ref 20–45)
MCV RBC AUTO: 90 FL (ref 80–99)
MONOCYTES NFR BLD AUTO: 7.1 % (ref 1–10)
NEUTROPHILS NFR BLD AUTO: 74.3 % (ref 45–75)
PHOSPHATE SERPL-MCNC: 3.2 MG/DL (ref 2.5–4.9)
PLATELET # BLD: 204 K/UL (ref 150–450)
POTASSIUM SERPL-SCNC: 3.3 MMOL/L (ref 3.5–5.1)
RBC # BLD AUTO: 4.49 M/UL (ref 4.7–6.1)
SODIUM SERPL-SCNC: 144 MMOL/L (ref 136–145)
WBC # BLD AUTO: 9.4 K/UL (ref 4.8–10.8)

## 2020-10-28 RX ADMIN — INSULIN ASPART SCH UNITS: 100 INJECTION, SOLUTION INTRAVENOUS; SUBCUTANEOUS at 05:31

## 2020-10-28 RX ADMIN — HEPARIN SODIUM SCH UNITS: 5000 INJECTION INTRAVENOUS; SUBCUTANEOUS at 09:51

## 2020-10-28 RX ADMIN — INSULIN ASPART SCH UNITS: 100 INJECTION, SOLUTION INTRAVENOUS; SUBCUTANEOUS at 17:39

## 2020-10-28 RX ADMIN — INSULIN ASPART SCH UNITS: 100 INJECTION, SOLUTION INTRAVENOUS; SUBCUTANEOUS at 11:30

## 2020-10-28 RX ADMIN — SODIUM CHLORIDE SCH MLS/HR: 9 INJECTION, SOLUTION INTRAVENOUS at 09:55

## 2020-10-28 RX ADMIN — INSULIN ASPART SCH UNITS: 100 INJECTION, SOLUTION INTRAVENOUS; SUBCUTANEOUS at 20:27

## 2020-10-28 RX ADMIN — HEPARIN SODIUM SCH UNITS: 5000 INJECTION INTRAVENOUS; SUBCUTANEOUS at 20:36

## 2020-10-28 RX ADMIN — TAMSULOSIN HYDROCHLORIDE SCH MG: 0.4 CAPSULE ORAL at 09:50

## 2020-10-28 NOTE — NUR
NURSE NOTES:

pt repositioned, and turned. assessed NG tube is able to flush, flushed 20cc of water with 
no complications,. no residue noted.

## 2020-10-28 NOTE — CARDIAC ELECTROPHYSIOLOGY PN
Assessment/Plan


Assessment/Plan


1. Accelerated junctional rhythm. Not bradycardic. Ruled out for MI


    Echocardiogram showed ejection fraction of 55%.


2. Sepsis, on broad-spectrum IV antibiotic.


3. Respiratory failure, on antibiotic.


4. Dehydration.


5. Acute renal failure.


6. Electrolyte imbalance.


7. History of congestive heart failure, but echocardiogram showed normal left 

ventricular systolic function.


8. History of previous COVID infection in September 2020 and Active Covid now 

again in isolation


9. Dementia.





DW RN





Subjective


Subjective


In SR in NAD. Nonverbal in Covid isolation.





Objective





Last 24 Hour Vital Signs








  Date Time  Temp Pulse Resp B/P (MAP) Pulse Ox O2 Delivery O2 Flow Rate FiO2


 


10/28/20 08:00 98.2 84 20 150/78 (102) 96   





  84      


 


10/28/20 04:00      Nasal Cannula 4.0 


 


10/28/20 04:00       4.0 


 


10/28/20 04:00  85      


 


10/28/20 04:00 97.6 65 18 120/66 (84) 98   





  65      


 


10/28/20 00:00 98.1 78 20 124/73 (90) 97   





  78      


 


10/28/20 00:00      Nasal Cannula 4.0 


 


10/27/20 23:36  69      


 


10/27/20 20:00 98.0 70 20 112/60 (77) 97   





  70      


 


10/27/20 20:00      Nasal Cannula 4.0 


 


10/27/20 20:00       4.0 


 


10/27/20 19:24  73      


 


10/27/20 19:02     95 Nasal Cannula 4.0 36


 


10/27/20 16:00      Nasal Cannula 4.0 


 


10/27/20 16:00  65      


 


10/27/20 16:00       4.0 


 


10/27/20 16:00 98.2 62 20 117/52 (73) 94   





  62      


 


10/27/20 12:00       4.0 


 


10/27/20 12:00      Nasal Cannula 4.0 


 


10/27/20 12:00 97.9 60 19 102/63 (76) 98   





  60      


 


10/27/20 11:37  62      

















Intake and Output  


 


 10/27/20 10/28/20





 19:00 07:00


 


Intake Total 941.666 ml 185 ml


 


Output Total 930 ml 780 ml


 


Balance 11.666 ml -595 ml


 


  


 


IV Total 941.666 ml 185 ml


 


Output Urine Total 930 ml 780 ml


 


# Bowel Movements 1 











Laboratory Tests








Test


 10/27/20


11:13 10/27/20


17:29 10/27/20


21:56 10/28/20


05:22


 


POC Whole Blood Glucose


 110 MG/DL


()  H Pending  


 Pending  


 105 MG/DL


()


 


Test


 10/28/20


07:05 


 


 





 


White Blood Count


 9.4 K/UL


(4.8-10.8) 


 


 





 


Red Blood Count


 4.49 M/UL


(4.70-6.10)  L 


 


 





 


Hemoglobin


 13.1 G/DL


(14.2-18.0)  L 


 


 





 


Hematocrit


 40.3 %


(42.0-52.0)  L 


 


 





 


Mean Corpuscular Volume 90 FL (80-99)     


 


Mean Corpuscular Hemoglobin


 29.2 PG


(27.0-31.0) 


 


 





 


Mean Corpuscular Hemoglobin


Concent 32.5 G/DL


(32.0-36.0) 


 


 





 


Red Cell Distribution Width


 14.5 %


(11.6-14.8) 


 


 





 


Platelet Count


 204 K/UL


(150-450) 


 


 





 


Mean Platelet Volume


 10.3 FL


(6.5-10.1)  H 


 


 





 


Neutrophils (%) (Auto)


 74.3 %


(45.0-75.0) 


 


 





 


Lymphocytes (%) (Auto)


 16.9 %


(20.0-45.0)  L 


 


 





 


Monocytes (%) (Auto)


 7.1 %


(1.0-10.0) 


 


 





 


Eosinophils (%) (Auto)


 0.8 %


(0.0-3.0) 


 


 





 


Basophils (%) (Auto)


 0.9 %


(0.0-2.0) 


 


 





 


Sodium Level


 144 MMOL/L


(136-145) 


 


 





 


Potassium Level


 3.3 MMOL/L


(3.5-5.1)  L 


 


 





 


Chloride Level


 109 MMOL/L


()  H 


 


 





 


Carbon Dioxide Level


 26 MMOL/L


(21-32) 


 


 





 


Anion Gap


 9 mmol/L


(5-15) 


 


 





 


Blood Urea Nitrogen


 5 mg/dL (7-18)


L 


 


 





 


Creatinine


 1.2 MG/DL


(0.55-1.30) 


 


 





 


Estimat Glomerular Filtration


Rate 58.7 mL/min


(>60) 


 


 





 


Glucose Level


 115 MG/DL


()  H 


 


 





 


Calcium Level


 8.6 MG/DL


(8.5-10.1) 


 


 





 


Phosphorus Level


 3.2 MG/DL


(2.5-4.9) 


 


 





 


Magnesium Level


 2.1 MG/DL


(1.8-2.4) 


 


 





 


Total Bilirubin


 0.5 MG/DL


(0.2-1.0) 


 


 





 


Direct Bilirubin


 0.2 MG/DL


(0.0-0.3) 


 


 





 


Aspartate Amino Transf


(AST/SGOT) 49 U/L (15-37)


H 


 


 





 


Alanine Aminotransferase


(ALT/SGPT) 37 U/L (12-78)


 


 


 





 


Alkaline Phosphatase


 226 U/L


()  H 


 


 





 


Total Protein


 7.1 G/DL


(6.4-8.2) 


 


 





 


Albumin


 1.9 G/DL


(3.4-5.0)  L 


 


 





 


Vancomycin Level Trough


 12.8 ug/mL


(5.0-12.0)  H 


 


 














Microbiology








 Date/Time


Source Procedure


Growth Status





 


 10/26/20 12:39


Nasopharynx SARS-CoV-2 RdRp Gene Assay - Final Complete








Objective





HEAD AND NECK:  No JVD.


LUNGS:  Coarse rhonchi.


CARDIOVASCULAR:   Irregular S1 and S2 with no gallop.


ABDOMEN:  Soft.


EXTREMITIES:  No pitting edema.











Kvng Edmond MD               Oct 28, 2020 09:42

## 2020-10-28 NOTE — NUR
NURSE NOTES:REC,D REPORT FROM LINDSEY RN STAFF OF NIGHT SHIFT. RECEIVED PT ON DROPLET 
PRECAUTION ROOM POSITIVE FOR COVID 19 SINCE 10/26/20. PT VERY CONFUSED ON BILAT SOFT WRIST 
RESTRAINTS TO PREVENT PT FROM PULLING MEDICAL DEVICES AND TO PREVENT FALL AND INJURIES. FULL 
BODY ASSESSMENT DONE.PT REPOSITIONED IN BED AND MADE COMFORTABLE AS POSSIBLE .PT WITH NGT 
PATENT AND INTACT STARTED ON GLUCERNA 1.2 @ 30CC/HRS AS PER M.D ORDERS. NO ACUTE DISTRESS 
NOTED AT THIS TIME. WILL CONT TO MONITOR.

## 2020-10-28 NOTE — NUR
RD ASSESSMENT & RECOMMENDATIONS

SEE CARE ACTIVITY FOR COMPLETE ASSESSMENT



DAILY ESTIMATED NEEDS:

Needs based on DM, wound, pulmonary, cardiac 59.5kg 

25-35  kcals/kg 

6128-4938  total kcals

1.25-1.5  g protein/kg

74-89  g total protein 

25-30  mL/kg

2234-3074  total fluid mLs



NUTRITION DIAGNOSIS:

* Swallowing difficulty R/T dysphagia as evidenced by SLP eval, recs for

NGT feeds.

* Increased kcal and pro needs r/t wound healing as evidenced by sacral

pressure injury stage 2.



CURRENT DIET: NPO     



ENTERAL NUTRITION RECOMMENDATIONS:

Glucerna 1.2 goal of 60ml/hr x24 hrs   

to provide 1440ml, 1728 kcal, 86g pro, 1159ml free H2O  



- Obtain GI access, initiate Glucerna 1.2 @low rate 20ml/hr for 6 hrs.

- Advance 10ml/hr q4-6 hrs to goal.

- Flush per MD/ HOB over 30 degrees.





ADDITIONAL RECOMMENDATIONS:

* Calibrated bedscale wt for accurate CBW 

* TF recs as above if part of POC  

* Replete lytes as needed 

* Pt is NPO from adm, rec non oral feeds to meet est kcal and pro needs  

* Wound care: when tolerating TF at goal add MARIELA BID  

. 

.

## 2020-10-28 NOTE — PULMONOLGY CRITICAL CARE NOTE
Critical Care - Asmt/Plan


Problems:  


(1) 2019 novel coronavirus disease (COVID-19)


(2) Multifocal pneumonia


(3) Acute encephalopathy


(4) Severe sepsis


(5) Alzheimer's dementia


(6) HTN (hypertension)


(7) History of CVA (cerebrovascular accident)


(8) BPH (benign prostatic hyperplasia)


Respiratory:  monitor respiratory rate, adjust FIO2, CXR


Cardiac:  continue to monitor HR/BP


Renal:  F/U I&O, check electrolytes, other - K supplement


Infectious Disease:  check cultures, continue antibiotics


Gastrointestinal:  continue feedings/current rate


Endocrine:  monitor blood sugar


Hematologic:  monitor H/H, transfuse if hgb<8.5


Neurologic:  PRN Ativan, PRN Morphine, keep patient comfortable


Prophylaxis:  Protonix, Heparin


Notes Reviewed:  internist, cardio, renal


Discussed with:  nurses, consultants, 





Critical Care - Objective





Last 24 Hour Vital Signs








  Date Time  Temp Pulse Resp B/P (MAP) Pulse Ox O2 Delivery O2 Flow Rate FiO2


 


10/28/20 08:00 98.2 84 20 150/78 (102) 96   





  84      


 


10/28/20 04:00      Nasal Cannula 4.0 


 


10/28/20 04:00       4.0 


 


10/28/20 04:00  85      


 


10/28/20 04:00 97.6 65 18 120/66 (84) 98   





  65      


 


10/28/20 00:00 98.1 78 20 124/73 (90) 97   





  78      


 


10/28/20 00:00      Nasal Cannula 4.0 


 


10/27/20 23:36  69      


 


10/27/20 20:00 98.0 70 20 112/60 (77) 97   





  70      


 


10/27/20 20:00      Nasal Cannula 4.0 


 


10/27/20 20:00       4.0 


 


10/27/20 19:24  73      


 


10/27/20 19:02     95 Nasal Cannula 4.0 36


 


10/27/20 16:00      Nasal Cannula 4.0 


 


10/27/20 16:00  65      


 


10/27/20 16:00       4.0 


 


10/27/20 16:00 98.2 62 20 117/52 (73) 94   





  62      


 


10/27/20 12:00       4.0 


 


10/27/20 12:00      Nasal Cannula 4.0 


 


10/27/20 12:00 97.9 60 19 102/63 (76) 98   





  60      


 


10/27/20 11:37  62      








Status:  sedated


Condition:  critical


HEENT:  atraumatic


Neck:  full ROM


Heart:  HR/BP stable


Abdomen:  soft, non-tender, active bowel sounds


Extremities:  no C/C/E


Micro:





Microbiology








 Date/Time


Source Procedure


Growth Status





 


 10/26/20 12:39


Nasopharynx SARS-CoV-2 RdRp Gene Assay - Final Complete








Accucheck:  105





Critical Care - Subjective


ROS Limited/Unobtainable:  Yes


Condition:  critical


EKG Rhythm:  Sinus Rhythm


FI02:  36


Sputum Amount:  Small


I&O:











Intake and Output  


 


 10/27/20 10/28/20





 19:00 07:00


 


Intake Total 941.666 ml 185 ml


 


Output Total 930 ml 780 ml


 


Balance 11.666 ml -595 ml


 


  


 


IV Total 941.666 ml 185 ml


 


Output Urine Total 930 ml 780 ml


 


# Bowel Movements 1 








CXR:


LLL infiltrate,


Labs:





Laboratory Tests








Test


 10/27/20


11:13 10/27/20


17:29 10/27/20


21:56 10/28/20


05:22


 


POC Whole Blood Glucose


 110 MG/DL


()  H Pending  


 Pending  


 105 MG/DL


()


 


Test


 10/28/20


07:05 


 


 





 


White Blood Count


 9.4 K/UL


(4.8-10.8) 


 


 





 


Red Blood Count


 4.49 M/UL


(4.70-6.10)  L 


 


 





 


Hemoglobin


 13.1 G/DL


(14.2-18.0)  L 


 


 





 


Hematocrit


 40.3 %


(42.0-52.0)  L 


 


 





 


Mean Corpuscular Volume 90 FL (80-99)     


 


Mean Corpuscular Hemoglobin


 29.2 PG


(27.0-31.0) 


 


 





 


Mean Corpuscular Hemoglobin


Concent 32.5 G/DL


(32.0-36.0) 


 


 





 


Red Cell Distribution Width


 14.5 %


(11.6-14.8) 


 


 





 


Platelet Count


 204 K/UL


(150-450) 


 


 





 


Mean Platelet Volume


 10.3 FL


(6.5-10.1)  H 


 


 





 


Neutrophils (%) (Auto)


 74.3 %


(45.0-75.0) 


 


 





 


Lymphocytes (%) (Auto)


 16.9 %


(20.0-45.0)  L 


 


 





 


Monocytes (%) (Auto)


 7.1 %


(1.0-10.0) 


 


 





 


Eosinophils (%) (Auto)


 0.8 %


(0.0-3.0) 


 


 





 


Basophils (%) (Auto)


 0.9 %


(0.0-2.0) 


 


 





 


Sodium Level


 144 MMOL/L


(136-145) 


 


 





 


Potassium Level


 3.3 MMOL/L


(3.5-5.1)  L 


 


 





 


Chloride Level


 109 MMOL/L


()  H 


 


 





 


Carbon Dioxide Level


 26 MMOL/L


(21-32) 


 


 





 


Anion Gap


 9 mmol/L


(5-15) 


 


 





 


Blood Urea Nitrogen


 5 mg/dL (7-18)


L 


 


 





 


Creatinine


 1.2 MG/DL


(0.55-1.30) 


 


 





 


Estimat Glomerular Filtration


Rate 58.7 mL/min


(>60) 


 


 





 


Glucose Level


 115 MG/DL


()  H 


 


 





 


Calcium Level


 8.6 MG/DL


(8.5-10.1) 


 


 





 


Phosphorus Level


 3.2 MG/DL


(2.5-4.9) 


 


 





 


Magnesium Level


 2.1 MG/DL


(1.8-2.4) 


 


 





 


Total Bilirubin


 0.5 MG/DL


(0.2-1.0) 


 


 





 


Direct Bilirubin


 0.2 MG/DL


(0.0-0.3) 


 


 





 


Aspartate Amino Transf


(AST/SGOT) 49 U/L (15-37)


H 


 


 





 


Alanine Aminotransferase


(ALT/SGPT) 37 U/L (12-78)


 


 


 





 


Alkaline Phosphatase


 226 U/L


()  H 


 


 





 


Total Protein


 7.1 G/DL


(6.4-8.2) 


 


 





 


Albumin


 1.9 G/DL


(3.4-5.0)  L 


 


 





 


Vancomycin Level Trough


 12.8 ug/mL


(5.0-12.0)  H 


 


 




















Michael Sandoval MD              Oct 28, 2020 09:12

## 2020-10-28 NOTE — NEPHROLOGY PROGRESS NOTE
Assessment/Plan


Problem List:  


(1) Dehydration


(2) JANES (acute kidney injury)


(3) Renal failure (ARF), acute on chronic


(4) Hypoxia


(5) Acute encephalopathy


(6) Electrolyte imbalance


Assessment





75-year-old male is admitted with acute hypoxic respiratory failure most likely 

secondary to pneumonia and sepsis, UTI.


Acute on chronic renal failure


Dehydration


Electrolyte imbalances, hypernatremia


Hypoalbuminemia


Diabetes type 2


History of congestive heart failure


Hypertension


Hyperlipemia


Alzheimer's


Previous COVID-19 infection in September 2020


Plan


October 28: Labs reviewed.  Potassium via NG tube ordered.  IV fluids stopped.  

Continue per consultants.


October 27: Labs reviewed.  Low potassium and low phosphorus replaced.  Continue

per consultants.  Remains stable from renal standpoint of view.


October 26: Patient remains n.p.o.  IV fluid down to 50 cc an hour.  Potassium 

supplement intravenously ordered.  Continue per consultants.





Previously:


Patient is n.p.o., will continue on IV fluid of D5W 75 cc an hour


We will monitor electrolytes and renal parameters


Avoid nephrotoxic's


Start p.o. when he clears by speech therapist, meanwhile aspiration precautions


Keep the blood pressure and blood sugar in check


Per orders





Subjective


ROS Limited/Unobtainable:  Yes


Constitutional:  Reports: malaise





Objective


Objective





Last 24 Hour Vital Signs








  Date Time  Temp Pulse Resp B/P (MAP) Pulse Ox O2 Delivery O2 Flow Rate FiO2


 


10/28/20 08:00 98.2 84 20 150/78 (102) 96   





  84      


 


10/28/20 04:00      Nasal Cannula 4.0 


 


10/28/20 04:00       4.0 


 


10/28/20 04:00  85      


 


10/28/20 04:00 97.6 65 18 120/66 (84) 98   





  65      


 


10/28/20 00:00 98.1 78 20 124/73 (90) 97   





  78      


 


10/28/20 00:00      Nasal Cannula 4.0 


 


10/27/20 23:36  69      


 


10/27/20 20:00 98.0 70 20 112/60 (77) 97   





  70      


 


10/27/20 20:00      Nasal Cannula 4.0 


 


10/27/20 20:00       4.0 


 


10/27/20 19:24  73      


 


10/27/20 19:02     95 Nasal Cannula 4.0 36


 


10/27/20 16:00      Nasal Cannula 4.0 


 


10/27/20 16:00  65      


 


10/27/20 16:00       4.0 


 


10/27/20 16:00 98.2 62 20 117/52 (73) 94   





  62      


 


10/27/20 12:00       4.0 


 


10/27/20 12:00      Nasal Cannula 4.0 


 


10/27/20 12:00 97.9 60 19 102/63 (76) 98   





  60      

















Intake and Output  


 


 10/27/20 10/28/20





 19:00 07:00


 


Intake Total 941.666 ml 185 ml


 


Output Total 930 ml 780 ml


 


Balance 11.666 ml -595 ml


 


  


 


IV Total 941.666 ml 185 ml


 


Output Urine Total 930 ml 780 ml


 


# Bowel Movements 1 











Current Medications








 Medications


  (Trade)  Dose


 Ordered  Sig/Miky


 Route


 PRN Reason  Start Time


 Stop Time Status Last Admin


Dose Admin


 


 Acetaminophen


  (Tylenol)  650 mg  Q4H  PRN


 ORAL


 Temp >100.5  10/23/20 20:30


 11/22/20 20:29   





 


 Albuterol/


 Ipratropium


  (Albuterol/


 Ipratropium)  3 ml  Q4H  PRN


 HHN


 Shortness of Breath  10/23/20 20:30


 10/28/20 20:29   





 


 Dextrose  1,000 ml @ 


 50 mls/hr  Q20H


 IV


   10/26/20 12:38


 11/25/20 12:37  10/28/20 03:18





 


 Dextrose


  (Dextrose 50%)  50 ml  Q30M  PRN


 IV


 Hypoglycemia  10/23/20 22:45


 1/21/21 22:44   





 


 Heparin Sodium


  (Porcine)


  (Heparin 5000


 units/ml)  5,000 units  EVERY 12  HOURS


 SUBQ


   10/23/20 21:00


 12/7/20 20:59  10/28/20 09:51





 


 Insulin Aspart


  (NovoLOG)    BEFORE MEALS AND  HS


 SUBQ


   10/24/20 06:30


 1/22/21 06:29  10/24/20 06:23





 


 Nitroglycerin


  (Ntg)  0.4 mg  Q5M  PRN


 SL


 Prn Chest Pain  10/23/20 20:30


 11/22/20 20:29   





 


 Ondansetron HCl


  (Zofran)  4 mg  Q6H  PRN


 IVP


 Nausea & Vomiting  10/23/20 20:30


 11/22/20 20:29   





 


 Polyethylene


 Glycol


  (Miralax)  17 gm  DAILYPRN  PRN


 ORAL


 Constipation  10/23/20 20:30


 11/22/20 20:29   





 


 Promethazine HCl/


 Codeine


  (Phenergan with


 Codeine)  5 ml  Q4H  PRN


 ORAL


 For Cough  10/23/20 20:30


 11/22/20 20:29   





 


 Tamsulosin HCl


  (Flomax)  0.4 mg  DAILY


 ORAL


   10/24/20 09:00


 11/23/20 08:59  10/28/20 09:50





 


 Temazepam


  (Restoril)  15 mg  HSPRN  PRN


 ORAL


 Insomnia  10/23/20 20:30


 10/30/20 20:29   





 


 Vancomycin HCl


  (Vanco pharmacy


 to dose)  1 ea  DAILY  PRN


 MISC


 Per rx protocol  10/23/20 20:30


 11/22/20 20:29   





 


 Vancomycin HCl 1


 gm/Sodium Chloride  275 ml @ 


 183.708


 mls/hr  Q24H


 IVPB


   10/28/20 10:00


 11/2/20 09:59  10/28/20 09:55








Laboratory Tests


10/27/20 17:29: POC Whole Blood Glucose [Pending]


10/27/20 21:56: POC Whole Blood Glucose [Pending]


10/28/20 05:22: POC Whole Blood Glucose 105


10/28/20 07:05: 


White Blood Count 9.4, Red Blood Count 4.49L, Hemoglobin 13.1L, Hematocrit 40.3L

, Mean Corpuscular Volume 90, Mean Corpuscular Hemoglobin 29.2, Mean Corpuscular

 Hemoglobin Concent 32.5, Red Cell Distribution Width 14.5, Platelet Count 204, 

Mean Platelet Volume 10.3H, Neutrophils (%) (Auto) 74.3, Lymphocytes (%) (Auto) 

16.9L, Monocytes (%) (Auto) 7.1, Eosinophils (%) (Auto) 0.8, Basophils (%) 

(Auto) 0.9, Sodium Level 144, Potassium Level 3.3L, Chloride Level 109H, Carbon 

Dioxide Level 26, Anion Gap 9, Blood Urea Nitrogen 5L, Creatinine 1.2, Estimat 

Glomerular Filtration Rate 58.7, Glucose Level 115H, Calcium Level 8.6, 

Phosphorus Level 3.2, Magnesium Level 2.1, Total Bilirubin 0.5, Direct Bilirubin

 0.2, Aspartate Amino Transf (AST/SGOT) 49H, Alanine Aminotransferase (ALT/SGPT)

 37, Alkaline Phosphatase 226H, Total Protein 7.1, Albumin 1.9L, Vancomycin 

Level Trough 12.8H


Height (Feet):  5


Height (Inches):  4.00


Weight (Pounds):  130


General Appearance:  no apparent distress, lethargic


EENT:  other - NGT in place


Respiratory/Chest:  decreased breath sounds


Abdomen:  soft











Seven Tobar MD            Oct 28, 2020 11:44

## 2020-10-28 NOTE — NUR
NURSE NOTES:

pt report received from BAUTISTA SADLER RN. pt remains stable. pt is alert and oriented times 1, pt is 
confused. pt is on cardiac monitor showing NSR, controlled. doctors are aware. pt is on 4L 
NC, sating 98% O2, no acute signs symptoms of resp distress. pt bed is low, locked, armed, 
call light within reach, will follow plan of care.

## 2020-10-28 NOTE — NUR
CASE MANAGEMENT:REVIEW



SI;  HYPOXIA. PNA.

98.2   84   20   150/78   96% 4L NC

K+ 3.3   AST 49      ALB 1.9



IS;K PHOSPHATE IV

IVF D5

K-DUR NG BID

VANCOMYCIN IV

HEPARIN SUBQ



*****DELICIA STATUS*****

DCP;FROM SC[facility] MOORE

## 2020-10-28 NOTE — INTERNAL MED PROGRESS NOTE
Subjective


Date of Service:  Oct 28, 2020


Physician Name


Benito Hearn


Attending Physician


Andrei Cancino MD





Current Medications








 Medications


  (Trade)  Dose


 Ordered  Sig/Miky


 Route


 PRN Reason  Start Time


 Stop Time Status Last Admin


Dose Admin


 


 Acetaminophen


  (Tylenol)  650 mg  Q4H  PRN


 ORAL


 Temp >100.5  10/23/20 20:30


 11/22/20 20:29   





 


 Albuterol/


 Ipratropium


  (Albuterol/


 Ipratropium)  3 ml  Q4H  PRN


 HHN


 Shortness of Breath  10/23/20 20:30


 10/28/20 20:29   





 


 Dextrose


  (Dextrose 50%)  50 ml  Q30M  PRN


 IV


 Hypoglycemia  10/23/20 22:45


 1/21/21 22:44   





 


 Heparin Sodium


  (Porcine)


  (Heparin 5000


 units/ml)  5,000 units  EVERY 12  HOURS


 SUBQ


   10/23/20 21:00


 12/7/20 20:59  10/28/20 09:51





 


 Insulin Aspart


  (NovoLOG)    BEFORE MEALS AND  HS


 SUBQ


   10/24/20 06:30


 1/22/21 06:29  10/24/20 06:23





 


 Nitroglycerin


  (Ntg)  0.4 mg  Q5M  PRN


 SL


 Prn Chest Pain  10/23/20 20:30


 11/22/20 20:29   





 


 Ondansetron HCl


  (Zofran)  4 mg  Q6H  PRN


 IVP


 Nausea & Vomiting  10/23/20 20:30


 11/22/20 20:29   





 


 Polyethylene


 Glycol


  (Miralax)  17 gm  DAILYPRN  PRN


 ORAL


 Constipation  10/23/20 20:30


 11/22/20 20:29   





 


 Potassium Chloride


  (K-Dur)  20 meq  TWICE A  DAY


 NG


   10/28/20 12:00


 1/26/21 11:59   





 


 Promethazine HCl/


 Codeine


  (Phenergan with


 Codeine)  5 ml  Q4H  PRN


 ORAL


 For Cough  10/23/20 20:30


 11/22/20 20:29   





 


 Tamsulosin HCl


  (Flomax)  0.4 mg  DAILY


 ORAL


   10/24/20 09:00


 11/23/20 08:59  10/28/20 09:50





 


 Temazepam


  (Restoril)  15 mg  HSPRN  PRN


 ORAL


 Insomnia  10/23/20 20:30


 10/30/20 20:29   





 


 Vancomycin HCl


  (Vanco pharmacy


 to dose)  1 ea  DAILY  PRN


 MISC


 Per rx protocol  10/23/20 20:30


 11/22/20 20:29   





 


 Vancomycin HCl 1


 gm/Sodium Chloride  275 ml @ 


 183.708


 mls/hr  Q24H


 IVPB


   10/28/20 10:00


 11/2/20 09:59  10/28/20 09:55











Allergies:  


Coded Allergies:  


     No Known Allergies (Unverified , 8/20/12)


ROS Limited/Unobtainable:  Yes


Subjective


76 YO M admitted with shortness of breath.  Now pneumonia and COVID positive.  

Cover for Int med-Dr Cancino.  Step down unit





Objective





Last Vital Signs








  Date Time  Temp Pulse Resp B/P (MAP) Pulse Ox O2 Delivery O2 Flow Rate FiO2


 


10/28/20 12:00 97.9 79 20 107/76 (86) 97   





  79      


 


10/28/20 04:00      Nasal Cannula 4.0 


 


10/27/20 19:02        36











Laboratory Tests








Test


 10/27/20


17:29 10/27/20


21:56 10/28/20


05:22 10/28/20


07:05


 


POC Whole Blood Glucose


 Pending  


 Pending  


 105 MG/DL


() 





 


White Blood Count


 


 


 


 9.4 K/UL


(4.8-10.8)


 


Red Blood Count


 


 


 


 4.49 M/UL


(4.70-6.10)  L


 


Hemoglobin


 


 


 


 13.1 G/DL


(14.2-18.0)  L


 


Hematocrit


 


 


 


 40.3 %


(42.0-52.0)  L


 


Mean Corpuscular Volume    90 FL (80-99)  


 


Mean Corpuscular Hemoglobin


 


 


 


 29.2 PG


(27.0-31.0)


 


Mean Corpuscular Hemoglobin


Concent 


 


 


 32.5 G/DL


(32.0-36.0)


 


Red Cell Distribution Width


 


 


 


 14.5 %


(11.6-14.8)


 


Platelet Count


 


 


 


 204 K/UL


(150-450)


 


Mean Platelet Volume


 


 


 


 10.3 FL


(6.5-10.1)  H


 


Neutrophils (%) (Auto)


 


 


 


 74.3 %


(45.0-75.0)


 


Lymphocytes (%) (Auto)


 


 


 


 16.9 %


(20.0-45.0)  L


 


Monocytes (%) (Auto)


 


 


 


 7.1 %


(1.0-10.0)


 


Eosinophils (%) (Auto)


 


 


 


 0.8 %


(0.0-3.0)


 


Basophils (%) (Auto)


 


 


 


 0.9 %


(0.0-2.0)


 


Sodium Level


 


 


 


 144 MMOL/L


(136-145)


 


Potassium Level


 


 


 


 3.3 MMOL/L


(3.5-5.1)  L


 


Chloride Level


 


 


 


 109 MMOL/L


()  H


 


Carbon Dioxide Level


 


 


 


 26 MMOL/L


(21-32)


 


Anion Gap


 


 


 


 9 mmol/L


(5-15)


 


Blood Urea Nitrogen


 


 


 


 5 mg/dL (7-18)


L


 


Creatinine


 


 


 


 1.2 MG/DL


(0.55-1.30)


 


Estimat Glomerular Filtration


Rate 


 


 


 58.7 mL/min


(>60)


 


Glucose Level


 


 


 


 115 MG/DL


()  H


 


Calcium Level


 


 


 


 8.6 MG/DL


(8.5-10.1)


 


Phosphorus Level


 


 


 


 3.2 MG/DL


(2.5-4.9)


 


Magnesium Level


 


 


 


 2.1 MG/DL


(1.8-2.4)


 


Total Bilirubin


 


 


 


 0.5 MG/DL


(0.2-1.0)


 


Direct Bilirubin


 


 


 


 0.2 MG/DL


(0.0-0.3)


 


Aspartate Amino Transf


(AST/SGOT) 


 


 


 49 U/L (15-37)


H


 


Alanine Aminotransferase


(ALT/SGPT) 


 


 


 37 U/L (12-78)





 


Alkaline Phosphatase


 


 


 


 226 U/L


()  H


 


Total Protein


 


 


 


 7.1 G/DL


(6.4-8.2)


 


Albumin


 


 


 


 1.9 G/DL


(3.4-5.0)  L


 


Vancomycin Level Trough


 


 


 


 12.8 ug/mL


(5.0-12.0)  H


 


Test


 10/28/20


12:18 


 


 





 


POC Whole Blood Glucose Pending     











Microbiology








 Date/Time


Source Procedure


Growth Status





 


 10/26/20 12:39


Nasopharynx SARS-CoV-2 RdRp Gene Assay - Final Complete

















Intake and Output  


 


 10/27/20 10/28/20





 19:00 07:00


 


Intake Total 941.666 ml 185 ml


 


Output Total 930 ml 780 ml


 


Balance 11.666 ml -595 ml


 


  


 


IV Total 941.666 ml 185 ml


 


Output Urine Total 930 ml 780 ml


 


# Bowel Movements 1 








Objective


PHYSICAL EXAMINATION:


GENERAL:  The patient awake with deep stimuli, open his eyes, however,


cannot follow commands.  The patient is on a Ventimask at this time,


chronically ill-appearing.


HEAD AND NECK:  Pupils are equal and reactive to light.  Anicteric.


NECK:  Supple.  No JVD.


LUNGS:  Good air entry.  No wheezing or rhonchi, however the patient has a


coarse breath sounds.  Decreased air in bases.


HEART:  S1, S2.  Regular rhythm.  Distant heart sounds.  No murmur or


gallop.


ABDOMEN:  Soft, nondistended, nontender.  Positive bowel sounds.


EXTREMITIES:  No cyanosis, clubbing, or edema.


NEUROLOGIC:  Very limited secondary to the patient's status, cannot follow


commands.  Opens his eyes with deep stimuli and moving extremities


spontaneously.





Assessment/Plan


Assessment/Plan


ASSESSMENT:


1. Acute hypoxemic respiratory failure, most likely secondary to


pneumonia and sepsis.


2. Sepsis secondary to urinary tract infection and pneumonia.


3. pneumonia=MRSA


4. Acute kidney injury on chronic renal insufficiency.


5. Dehydration.


6. History of chronic congestive heart failure.


7. Diabetes type 2.


8. Dyslipidemia.


9. Hypertension.


10. Alzheimer's disease.


11. COVID 19 previous positive





PLAN:


1.  Admit the patient to step-down.


2.  Dr. Sandoval,=Pulmonary Critical Care


3.  Dr. Garcia = Inf Dis.


4.  IV= D5W due to the hypernatremia and dehydration.


5.  antibiotics = vancomycin and cefepime


6.  Code status is full code.


7.    DVT prophylaxis is heparin subcutaneous.











Benito Hearn MD               Oct 28, 2020 12:35

## 2020-10-28 NOTE — NUR
NURSE NOTES:

cleaned pt, oral care given, provided new gown, and new blanket. pt is still combative at 
this time. call light within reach. will continue to monitor pt.

## 2020-10-28 NOTE — DIAGNOSTIC IMAGING REPORT
EXAM:

  XR Abdomen, 2 Views

 

CLINICAL HISTORY:

  NGT

 

TECHNIQUE:

  Frontal view of the abdomen/pelvis with upright view of the abdomen.

 

COMPARISON:

  No relevant prior studies available.

 

FINDINGS/IMPRESSION:

Enteric feeding tube terminates in the stomach.

 

Nonobstructed bowel gas pattern.  Mild fecal retention.  No large volume 

free intraperitoneal air.

 

Small bilateral pleural effusions with mild vascular congestion.

 

Cardiomegaly.

## 2020-10-28 NOTE — INFECTIOUS DISEASES PROG NOTE
Assessment/Plan


76yo M with:





Fever at SNF, 100.2 max here; SP


Leukocytosis to 12- SP


Acute hypoxic resp failure, on NRB mask> 4l NC


Pneumonia- 


hx of COVID19 PNA 9/9/20


   10/23 BCx NTD


   UA 15-20 WBC, UCx Neg


   10/23 COVID rapid neg; 10/26 rapid COVID PCR + (from prior infection)- not 

new infection


   Flu A/B neg


   CXR: L pna


   Resp cx MRSA





CKD, Cr 1.7





[facility] resident ([facility])


Non-verbal


VRE and MRSA colonized





Plan:


Cont vanco #6/7-10 for MRSA PNA


   -10/26 SP Cefepime #4


   -10/24 SP Flagyl #





f/u cx





Monitor CBC/CMP


Monitor resp status


Monitor temp curve and hemodynamics


off COVID isolation- positive covid test represents residual dead virus from 

infection on 9/2020





D/w RN and pharmacy staff.





Thank you for this consult. [facility] will continue to follow.





Subjective


Allergies:  


Coded Allergies:  


     No Known Allergies (Unverified , 8/20/12)


afebrile 


at 4l NC


no leukocytosis


Bcx NTD





Objective





Last 24 Hour Vital Signs








  Date Time  Temp Pulse Resp B/P (MAP) Pulse Ox O2 Delivery O2 Flow Rate FiO2


 


10/28/20 12:00 97.9 79 20 107/76 (86) 97   





  79      


 


10/28/20 08:00 98.2 84 20 150/78 (102) 96   





  84      


 


10/28/20 04:00      Nasal Cannula 4.0 


 


10/28/20 04:00       4.0 


 


10/28/20 04:00  85      


 


10/28/20 04:00 97.6 65 18 120/66 (84) 98   





  65      


 


10/28/20 00:00 98.1 78 20 124/73 (90) 97   





  78      


 


10/28/20 00:00      Nasal Cannula 4.0 


 


10/27/20 23:36  69      


 


10/27/20 20:00 98.0 70 20 112/60 (77) 97   





  70      


 


10/27/20 20:00      Nasal Cannula 4.0 


 


10/27/20 20:00       4.0 


 


10/27/20 19:24  73      


 


10/27/20 19:02     95 Nasal Cannula 4.0 36


 


10/27/20 16:00      Nasal Cannula 4.0 


 


10/27/20 16:00  65      


 


10/27/20 16:00       4.0 


 


10/27/20 16:00 98.2 62 20 117/52 (87) 61   





  62      








Height (Feet):  5


Height (Inches):  4.00


Weight (Pounds):  130


Gen: NAD


HEENT: NCAT


CV: RRR


Pulm: Rhonchi anteriorly, lots of oral secretions


Abd: Soft, NTND


Ext: No c/c/e


Neuro: Awake





Microbiology








 Date/Time


Source Procedure


Growth Status





 


 10/26/20 12:39


Nasopharynx SARS-CoV-2 RdRp Gene Assay - Final Complete











Laboratory Tests








Test


 10/27/20


17:29 10/27/20


21:56 10/28/20


05:22 10/28/20


07:05


 


POC Whole Blood Glucose


 Pending  


 Pending  


 105 MG/DL


() 





 


White Blood Count


 


 


 


 9.4 K/UL


(4.8-10.8)


 


Red Blood Count


 


 


 


 4.49 M/UL


(4.70-6.10)  L


 


Hemoglobin


 


 


 


 13.1 G/DL


(14.2-18.0)  L


 


Hematocrit


 


 


 


 40.3 %


(42.0-52.0)  L


 


Mean Corpuscular Volume    90 FL (80-99)  


 


Mean Corpuscular Hemoglobin


 


 


 


 29.2 PG


(27.0-31.0)


 


Mean Corpuscular Hemoglobin


Concent 


 


 


 32.5 G/DL


(32.0-36.0)


 


Red Cell Distribution Width


 


 


 


 14.5 %


(11.6-14.8)


 


Platelet Count


 


 


 


 204 K/UL


(150-450)


 


Mean Platelet Volume


 


 


 


 10.3 FL


(6.5-10.1)  H


 


Neutrophils (%) (Auto)


 


 


 


 74.3 %


(45.0-75.0)


 


Lymphocytes (%) (Auto)


 


 


 


 16.9 %


(20.0-45.0)  L


 


Monocytes (%) (Auto)


 


 


 


 7.1 %


(1.0-10.0)


 


Eosinophils (%) (Auto)


 


 


 


 0.8 %


(0.0-3.0)


 


Basophils (%) (Auto)


 


 


 


 0.9 %


(0.0-2.0)


 


Sodium Level


 


 


 


 144 MMOL/L


(136-145)


 


Potassium Level


 


 


 


 3.3 MMOL/L


(3.5-5.1)  L


 


Chloride Level


 


 


 


 109 MMOL/L


()  H


 


Carbon Dioxide Level


 


 


 


 26 MMOL/L


(21-32)


 


Anion Gap


 


 


 


 9 mmol/L


(5-15)


 


Blood Urea Nitrogen


 


 


 


 5 mg/dL (7-18)


L


 


Creatinine


 


 


 


 1.2 MG/DL


(0.55-1.30)


 


Estimat Glomerular Filtration


Rate 


 


 


 58.7 mL/min


(>60)


 


Glucose Level


 


 


 


 115 MG/DL


()  H


 


Calcium Level


 


 


 


 8.6 MG/DL


(8.5-10.1)


 


Phosphorus Level


 


 


 


 3.2 MG/DL


(2.5-4.9)


 


Magnesium Level


 


 


 


 2.1 MG/DL


(1.8-2.4)


 


Total Bilirubin


 


 


 


 0.5 MG/DL


(0.2-1.0)


 


Direct Bilirubin


 


 


 


 0.2 MG/DL


(0.0-0.3)


 


Aspartate Amino Transf


(AST/SGOT) 


 


 


 49 U/L (15-37)


H


 


Alanine Aminotransferase


(ALT/SGPT) 


 


 


 37 U/L (12-78)





 


Alkaline Phosphatase


 


 


 


 226 U/L


()  H


 


Total Protein


 


 


 


 7.1 G/DL


(6.4-8.2)


 


Albumin


 


 


 


 1.9 G/DL


(3.4-5.0)  L


 


Vancomycin Level Trough


 


 


 


 12.8 ug/mL


(5.0-12.0)  H


 


Test


 10/28/20


12:18 


 


 





 


POC Whole Blood Glucose Pending     











Current Medications








 Medications


  (Trade)  Dose


 Ordered  Sig/Miky


 Route


 PRN Reason  Start Time


 Stop Time Status Last Admin


Dose Admin


 


 Acetaminophen


  (Tylenol)  650 mg  Q4H  PRN


 ORAL


 Temp >100.5  10/23/20 20:30


 11/22/20 20:29   





 


 Albuterol/


 Ipratropium


  (Albuterol/


 Ipratropium)  3 ml  Q4H  PRN


 HHN


 Shortness of Breath  10/23/20 20:30


 10/28/20 20:29   





 


 Dextrose


  (Dextrose 50%)  50 ml  Q30M  PRN


 IV


 Hypoglycemia  10/23/20 22:45


 1/21/21 22:44   





 


 Heparin Sodium


  (Porcine)


  (Heparin 5000


 units/ml)  5,000 units  EVERY 12  HOURS


 SUBQ


   10/23/20 21:00


 12/7/20 20:59  10/28/20 09:51





 


 Insulin Aspart


  (NovoLOG)    BEFORE MEALS AND  HS


 SUBQ


   10/24/20 06:30


 1/22/21 06:29  10/24/20 06:23





 


 Nitroglycerin


  (Ntg)  0.4 mg  Q5M  PRN


 SL


 Prn Chest Pain  10/23/20 20:30


 11/22/20 20:29   





 


 Ondansetron HCl


  (Zofran)  4 mg  Q6H  PRN


 IVP


 Nausea & Vomiting  10/23/20 20:30


 11/22/20 20:29   





 


 Polyethylene


 Glycol


  (Miralax)  17 gm  DAILYPRN  PRN


 ORAL


 Constipation  10/23/20 20:30


 11/22/20 20:29   





 


 Potassium Chloride


  (K-Dur)  20 meq  TWICE A  DAY


 NG


   10/28/20 13:00


 1/26/21 12:59  10/28/20 13:08





 


 Promethazine HCl/


 Codeine


  (Phenergan with


 Codeine)  5 ml  Q4H  PRN


 ORAL


 For Cough  10/23/20 20:30


 11/22/20 20:29   





 


 Tamsulosin HCl


  (Flomax)  0.4 mg  DAILY


 ORAL


   10/24/20 09:00


 11/23/20 08:59  10/28/20 09:50





 


 Temazepam


  (Restoril)  15 mg  HSPRN  PRN


 ORAL


 Insomnia  10/23/20 20:30


 10/30/20 20:29   





 


 Vancomycin HCl


  ([facility] pharmacy


 to dose)  1 ea  DAILY  PRN


 MISC


 Per rx protocol  10/23/20 20:30


 11/22/20 20:29   





 


 Vancomycin HCl 1


 gm/Sodium Chloride  275 ml @ 


 183.708


 mls/hr  Q24H


 IVPB


   10/28/20 10:00


 11/2/20 09:59  10/28/20 09:55




















Slime Garcia M.D.            Oct 28, 2020 14:04

## 2020-10-28 NOTE — NUR
NURSE HAND-OFF REPORT: 



Important Events on Shift:NGT replacement waiting for KUB result

Patient Status: stable

Diet: NPO for now 



Pending Orders: n/a

Pending Results/Labs:KUB and morning labs (CBC, BMP)

Pending MD notification:n/a



Latest Vital Signs: Temperature 97.6 , Pulse 85 , B/P 120 /66 , Respiratory Rate 18 , O2 SAT 
98 , Nasal Cannula, O2 Flow Rate 4.0 .  

Vital Sign Comment: stable



EKG Rhythm: Sinus Rhythm

Rhythm change?: N 

MD Notified?: N

MD Response: 



Latest Mejia Fall Score: 50  

Fall Risk: High Risk 

Safety Measures: Call light Within Reach, Bed Alarm Zone 1, Side Rails Side Rails x3, Bed 
position Low and Locked.

Fall Precautions: 

Yellow Socks

Yellow Gown

Door Sign

Patient Fall Education



Report given to Jase LONDON

## 2020-10-29 VITALS — SYSTOLIC BLOOD PRESSURE: 115 MMHG | DIASTOLIC BLOOD PRESSURE: 63 MMHG

## 2020-10-29 VITALS — DIASTOLIC BLOOD PRESSURE: 64 MMHG | SYSTOLIC BLOOD PRESSURE: 112 MMHG

## 2020-10-29 VITALS — SYSTOLIC BLOOD PRESSURE: 110 MMHG | DIASTOLIC BLOOD PRESSURE: 70 MMHG

## 2020-10-29 VITALS — SYSTOLIC BLOOD PRESSURE: 126 MMHG | DIASTOLIC BLOOD PRESSURE: 82 MMHG

## 2020-10-29 VITALS — SYSTOLIC BLOOD PRESSURE: 129 MMHG | DIASTOLIC BLOOD PRESSURE: 73 MMHG

## 2020-10-29 VITALS — DIASTOLIC BLOOD PRESSURE: 70 MMHG | SYSTOLIC BLOOD PRESSURE: 110 MMHG

## 2020-10-29 LAB
ADD MANUAL DIFF: NO
ALBUMIN SERPL-MCNC: 1.8 G/DL (ref 3.4–5)
ALBUMIN/GLOB SERPL: 0.3 {RATIO} (ref 1–2.7)
ALP SERPL-CCNC: 242 U/L (ref 46–116)
ALT SERPL-CCNC: 41 U/L (ref 12–78)
ANION GAP SERPL CALC-SCNC: 9 MMOL/L (ref 5–15)
AST SERPL-CCNC: 52 U/L (ref 15–37)
BASOPHILS NFR BLD AUTO: 0.9 % (ref 0–2)
BILIRUB SERPL-MCNC: 0.4 MG/DL (ref 0.2–1)
BUN SERPL-MCNC: 6 MG/DL (ref 7–18)
CALCIUM SERPL-MCNC: 9.2 MG/DL (ref 8.5–10.1)
CHLORIDE SERPL-SCNC: 111 MMOL/L (ref 98–107)
CO2 SERPL-SCNC: 25 MMOL/L (ref 21–32)
CREAT SERPL-MCNC: 1.1 MG/DL (ref 0.55–1.3)
EOSINOPHIL NFR BLD AUTO: 0.8 % (ref 0–3)
ERYTHROCYTE [DISTWIDTH] IN BLOOD BY AUTOMATED COUNT: 14.5 % (ref 11.6–14.8)
GLOBULIN SER-MCNC: 5.2 G/DL
HCT VFR BLD CALC: 37.1 % (ref 42–52)
HGB BLD-MCNC: 12.5 G/DL (ref 14.2–18)
LYMPHOCYTES NFR BLD AUTO: 27.6 % (ref 20–45)
MCV RBC AUTO: 90 FL (ref 80–99)
MONOCYTES NFR BLD AUTO: 7.6 % (ref 1–10)
NEUTROPHILS NFR BLD AUTO: 63.1 % (ref 45–75)
PHOSPHATE SERPL-MCNC: 3.3 MG/DL (ref 2.5–4.9)
PLATELET # BLD: 238 K/UL (ref 150–450)
POTASSIUM SERPL-SCNC: 3.9 MMOL/L (ref 3.5–5.1)
RBC # BLD AUTO: 4.12 M/UL (ref 4.7–6.1)
SODIUM SERPL-SCNC: 144 MMOL/L (ref 136–145)
WBC # BLD AUTO: 10.8 K/UL (ref 4.8–10.8)

## 2020-10-29 RX ADMIN — INSULIN ASPART SCH UNITS: 100 INJECTION, SOLUTION INTRAVENOUS; SUBCUTANEOUS at 21:00

## 2020-10-29 RX ADMIN — TAMSULOSIN HYDROCHLORIDE SCH MG: 0.4 CAPSULE ORAL at 09:26

## 2020-10-29 RX ADMIN — INSULIN ASPART SCH UNITS: 100 INJECTION, SOLUTION INTRAVENOUS; SUBCUTANEOUS at 11:50

## 2020-10-29 RX ADMIN — INSULIN ASPART SCH UNITS: 100 INJECTION, SOLUTION INTRAVENOUS; SUBCUTANEOUS at 06:30

## 2020-10-29 RX ADMIN — SODIUM CHLORIDE SCH MLS/HR: 9 INJECTION, SOLUTION INTRAVENOUS at 09:29

## 2020-10-29 RX ADMIN — HEPARIN SODIUM SCH UNITS: 5000 INJECTION INTRAVENOUS; SUBCUTANEOUS at 21:13

## 2020-10-29 RX ADMIN — HEPARIN SODIUM SCH UNITS: 5000 INJECTION INTRAVENOUS; SUBCUTANEOUS at 09:28

## 2020-10-29 RX ADMIN — INSULIN ASPART SCH UNITS: 100 INJECTION, SOLUTION INTRAVENOUS; SUBCUTANEOUS at 16:42

## 2020-10-29 NOTE — PULMONOLGY CRITICAL CARE NOTE
Critical Care - Asmt/Plan


Problems:  


(1) Multifocal pneumonia


(2) 2019 novel coronavirus disease (COVID-19)


(3) Acute encephalopathy


(4) Severe sepsis


(5) Alzheimer's dementia


(6) HTN (hypertension)


(7) History of CVA (cerebrovascular accident)


(8) BPH (benign prostatic hyperplasia)


Respiratory:  monitor respiratory rate, adjust FIO2


Cardiac:  continue to monitor HR/BP


Renal:  F/U I&O, check electrolytes


Infectious Disease:  check cultures, continue antibiotics


Gastrointestinal:  continue feedings/current rate


Endocrine:  monitor blood sugar


Hematologic:  transfuse if hgb<8.5


Neurologic:  PRN Ativan, PRN Morphine, keep patient comfortable


Affect:  PRN ativan


Notes Reviewed:  internist, cardio


Discussed with:  nurses, consultants, 





Critical Care - Objective





Last 24 Hour Vital Signs








  Date Time  Temp Pulse Resp B/P (MAP) Pulse Ox O2 Delivery O2 Flow Rate FiO2


 


10/29/20 04:00 98.0 122 20 129/73 (91) 99   


 


10/29/20 04:00       4.0 


 


10/29/20 04:00  111      


 


10/29/20 04:00      Nasal Cannula 4.0 


 


10/29/20 00:00      Nasal Cannula 4.0 


 


10/29/20 00:00 98.4 87 19 115/63 (80) 98   


 


10/29/20 00:00  76      


 


10/29/20 00:00       4.0 


 


10/28/20 20:05     96 Nasal Cannula 4.0 36


 


10/28/20 20:00 98.1 87 19 128/73 (91) 97   





  87      


 


10/28/20 20:00      Nasal Cannula 4.0 


 


10/28/20 20:00       4.0 


 


10/28/20 20:00  71      


 


10/28/20 16:00 98.2 89 19 133/71 (91) 95   





  89      


 


10/28/20 16:00      Nasal Cannula 4.0 


 


10/28/20 16:00       4.0 


 


10/28/20 15:40  114      


 


10/28/20 12:00  71      


 


10/28/20 12:00       4.0 


 


10/28/20 12:00      Nasal Cannula 4.0 


 


10/28/20 12:00 97.9 79 20 107/76 (86) 97   





  79      








Status:  sedated


Condition:  critical


HEENT:  atraumatic, normocephalic


Neck:  full ROM


Lungs:  rales, rhonchi


Heart:  HR/BP stable


Abdomen:  soft, non-tender


Extremities:  no C/C/E


Micro:





Microbiology








 Date/Time


Source Procedure


Growth Status





 


 10/26/20 12:39


Nasopharynx SARS-CoV-2 RdRp Gene Assay - Final Complete








Accucheck:  133





Critical Care - Subjective


ROS Limited/Unobtainable:  Yes


Condition:  critical


EKG Rhythm:  Sinus Rhythm


FI02:  36


Sputum Amount:  Small


Tube Feeding Amount:  30


I&O:











Intake and Output  


 


 10/28/20 10/29/20





 19:00 07:00


 


Intake Total 630 ml 330 ml


 


Output Total 751 ml 800 ml


 


Balance -121 ml -470 ml


 


  


 


Free Water 300 ml 


 


Tube Feeding 330 ml 330 ml


 


Output Urine Total 750 ml 800 ml


 


Stool Total 1 ml 


 


# Bowel Movements  1








CXR:


Left mid and lower lung infiltrate, right basilar infiltrate persists, 

unchanged.


Labs:





Laboratory Tests








Test


 10/28/20


12:18 10/28/20


17:22 10/28/20


20:26 10/29/20


03:56


 


POC Whole Blood Glucose


 Pending  


 Pending  


 103 MG/DL


() 





 


White Blood Count


 


 


 


 10.8 K/UL


(4.8-10.8)


 


Red Blood Count


 


 


 


 4.12 M/UL


(4.70-6.10)  L


 


Hemoglobin


 


 


 


 12.5 G/DL


(14.2-18.0)  L


 


Hematocrit


 


 


 


 37.1 %


(42.0-52.0)  L


 


Mean Corpuscular Volume    90 FL (80-99)  


 


Mean Corpuscular Hemoglobin


 


 


 


 30.4 PG


(27.0-31.0)


 


Mean Corpuscular Hemoglobin


Concent 


 


 


 33.7 G/DL


(32.0-36.0)


 


Red Cell Distribution Width


 


 


 


 14.5 %


(11.6-14.8)


 


Platelet Count


 


 


 


 238 K/UL


(150-450)


 


Mean Platelet Volume


 


 


 


 10.5 FL


(6.5-10.1)  H


 


Neutrophils (%) (Auto)


 


 


 


 63.1 %


(45.0-75.0)


 


Lymphocytes (%) (Auto)


 


 


 


 27.6 %


(20.0-45.0)


 


Monocytes (%) (Auto)


 


 


 


 7.6 %


(1.0-10.0)


 


Eosinophils (%) (Auto)


 


 


 


 0.8 %


(0.0-3.0)


 


Basophils (%) (Auto)


 


 


 


 0.9 %


(0.0-2.0)


 


Erythrocyte Sedimentation Rate


 


 


 


 88 MM/HR


(0-20)  H


 


Sodium Level


 


 


 


 144 MMOL/L


(136-145)


 


Potassium Level


 


 


 


 3.9 MMOL/L


(3.5-5.1)


 


Chloride Level


 


 


 


 111 MMOL/L


()  H


 


Carbon Dioxide Level


 


 


 


 25 MMOL/L


(21-32)


 


Anion Gap


 


 


 


 9 mmol/L


(5-15)


 


Blood Urea Nitrogen


 


 


 


 6 mg/dL (7-18)


L


 


Creatinine


 


 


 


 1.1 MG/DL


(0.55-1.30)


 


Estimat Glomerular Filtration


Rate 


 


 


 > 60 mL/min


(>60)


 


Glucose Level


 


 


 


 150 MG/DL


()  H


 


Calcium Level


 


 


 


 9.2 MG/DL


(8.5-10.1)


 


Phosphorus Level


 


 


 


 3.3 MG/DL


(2.5-4.9)


 


Magnesium Level


 


 


 


 2.1 MG/DL


(1.8-2.4)


 


Total Bilirubin


 


 


 


 0.4 MG/DL


(0.2-1.0)


 


Aspartate Amino Transf


(AST/SGOT) 


 


 


 52 U/L (15-37)


H


 


Alanine Aminotransferase


(ALT/SGPT) 


 


 


 41 U/L (12-78)





 


Alkaline Phosphatase


 


 


 


 242 U/L


()  H


 


C-Reactive Protein,


Quantitative 


 


 


 6.4 mg/dL


(0.00-0.90)  H


 


Total Protein


 


 


 


 7.0 G/DL


(6.4-8.2)


 


Albumin


 


 


 


 1.8 G/DL


(3.4-5.0)  L


 


Globulin    5.2 g/dL  


 


Albumin/Globulin Ratio


 


 


 


 0.3 (1.0-2.7)


L


 


Test


 10/29/20


06:29 


 


 





 


POC Whole Blood Glucose


 133 MG/DL


()  H 


 


 




















Michael Sandoval MD              Oct 29, 2020 10:33

## 2020-10-29 NOTE — NUR
NURSE NOTES:

pt resting in bed asleep no resp distress presented,oral secretions suctioned PRN,remains on 
soft wrist restraints bilat.

## 2020-10-29 NOTE — NUR
NURSE NOTES:

deep suctioned pt. thick white sputum noted. repositioned Nasal canua. pt sating at 96% O2.

## 2020-10-29 NOTE — NUR
NURSE NOTES:

deep suctioned pt. thick white sputum noted, pink tinged. pt is satign 98% O2 post deep 
suction. vital signs stable. 

-------------------------------------------------------------------------------

Addendum: 10/30/20 at 0109 by AARON BLAND RN

-------------------------------------------------------------------------------

WRONG TIME. 

-------------------------------------------------------------------------------

Addendum: 10/30/20 at 0110 by AARON BLAND RN

-------------------------------------------------------------------------------

WRONG TIME AND DATE.

## 2020-10-29 NOTE — NUR
RD ASSESSMENT & RECOMMENDATIONS

SEE CARE ACTIVITY FOR COMPLETE ASSESSMENT



DAILY ESTIMATED NEEDS:

Needs based on DM, wound, pulmonary, cardiac 59.5kg 

25-35  kcals/kg 

5242-2661  total kcals

1.25-1.5  g protein/kg

74-89  g total protein 

25-30  mL/kg

7793-9438  total fluid mLs



NUTRITION DIAGNOSIS:

* Swallowing difficulty R/T dysphagia as evidenced by SLP eval, recs for

NGT feeds.

* Increased kcal and pro needs r/t wound healing as evidenced by sacral

pressure injury stage 2.







ENTERAL NUTRITION RECOMMENDATIONS:

Glucerna 1.2 goal of 60ml/hr x24 hrs   to provide 1440ml, 1728 kcal, 86g pro, 1159ml free 
H2O  



- Obtain GI access, initiate Glucerna 1.2 @low rate 20ml/hr for 6 hrs.

- Advance 10ml/hr q4-6 hrs to goal.

- Flush per MD/ HOB over 30 degrees.





ADDITIONAL RECOMMENDATIONS:

* Calibrated bedscale wt for accurate CBW 

* TF recs as above if part of POC  

* Replete lytes as needed 

* Pt is NPO from adm, rec non oral feeds to meet est kcal and pro needs  

* Wound care: when tolerating TF at goal add MARIELA BID  

. 

.

## 2020-10-29 NOTE — NUR
NURSE NOTES:

readjusted pts nasal canula, replaced pts tube feeding, and TKO NS bags. vital signs stable 
at the moment, pt does not appear to be in distress.

## 2020-10-29 NOTE — NEPHROLOGY PROGRESS NOTE
Assessment/Plan


Problem List:  


(1) Dehydration


(2) JANES (acute kidney injury)


(3) Renal failure (ARF), acute on chronic


(4) Hypoxia


(5) Acute encephalopathy


(6) Electrolyte imbalance


Assessment





75-year-old male is admitted with acute hypoxic respiratory failure most likely 

secondary to pneumonia and sepsis, UTI.


Acute on chronic renal failure


Dehydration


Electrolyte imbalances, hypernatremia


Hypoalbuminemia


Diabetes type 2


History of congestive heart failure


Hypertension


Hyperlipemia


Alzheimer's


Previous COVID-19 infection in September 2020


Plan


October 29: Labs reviewed.  Renal parameters stable.  Continue per consultants.


October 28: Labs reviewed.  Potassium via NG tube ordered.  IV fluids stopped.  

Continue per consultants.


October 27: Labs reviewed.  Low potassium and low phosphorus replaced.  Continue

per consultants.  Remains stable from renal standpoint of view.


October 26: Patient remains n.p.o.  IV fluid down to 50 cc an hour.  Potassium 

supplement intravenously ordered.  Continue per consultants.





Previously:


Patient is n.p.o., will continue on IV fluid of D5W 75 cc an hour


We will monitor electrolytes and renal parameters


Avoid nephrotoxic's


Start p.o. when he clears by speech therapist, meanwhile aspiration precautions


Keep the blood pressure and blood sugar in check


Per orders





Subjective


ROS Limited/Unobtainable:  No


Constitutional:  Reports: malaise, weakness





Objective


Objective





Last 24 Hour Vital Signs








  Date Time  Temp Pulse Resp B/P (MAP) Pulse Ox O2 Delivery O2 Flow Rate FiO2


 


10/29/20 10:30       10.0 50


 


10/29/20 08:00      Nasal Cannula 4.0 


 


10/29/20 08:00       15.0 100


 


10/29/20 08:00  118      


 


10/29/20 08:00 99.0 103 20 110/70 (83) 97   


 


10/29/20 04:00 98.0 122 20 129/73 (91) 99   


 


10/29/20 04:00       4.0 


 


10/29/20 04:00  111      


 


10/29/20 04:00      Nasal Cannula 4.0 


 


10/29/20 00:00      Nasal Cannula 4.0 


 


10/29/20 00:00 98.4 87 19 115/63 (80) 98   


 


10/29/20 00:00  76      


 


10/29/20 00:00       4.0 


 


10/28/20 20:05     96 Nasal Cannula 4.0 36


 


10/28/20 20:00 98.1 87 19 128/73 (91) 97   





  87      


 


10/28/20 20:00      Nasal Cannula 4.0 


 


10/28/20 20:00       4.0 


 


10/28/20 20:00  71      


 


10/28/20 16:00 98.2 89 19 133/71 (91) 95   





  89      


 


10/28/20 16:00      Nasal Cannula 4.0 


 


10/28/20 16:00       4.0 


 


10/28/20 15:40  114      


 


10/28/20 12:00  71      


 


10/28/20 12:00       4.0 


 


10/28/20 12:00      Nasal Cannula 4.0 


 


10/28/20 12:00 97.9 79 20 107/76 (86) 97   





  79      

















Intake and Output  


 


 10/28/20 10/29/20





 19:00 07:00


 


Intake Total 630 ml 330 ml


 


Output Total 751 ml 800 ml


 


Balance -121 ml -470 ml


 


  


 


Free Water 300 ml 


 


Tube Feeding 330 ml 330 ml


 


Output Urine Total 750 ml 800 ml


 


Stool Total 1 ml 


 


# Bowel Movements  1











Current Medications








 Medications


  (Trade)  Dose


 Ordered  Sig/Miky


 Route


 PRN Reason  Start Time


 Stop Time Status Last Admin


Dose Admin


 


 Acetaminophen


  (Tylenol)  650 mg  Q4H  PRN


 ORAL


 Temp >100.5  10/23/20 20:30


 11/22/20 20:29   





 


 Dextrose


  (Dextrose 50%)  50 ml  Q30M  PRN


 IV


 Hypoglycemia  10/23/20 22:45


 1/21/21 22:44   





 


 Heparin Sodium


  (Porcine)


  (Heparin 5000


 units/ml)  5,000 units  EVERY 12  HOURS


 SUBQ


   10/23/20 21:00


 12/7/20 20:59  10/29/20 09:28





 


 Insulin Aspart


  (NovoLOG)    BEFORE MEALS AND  HS


 SUBQ


   10/24/20 06:30


 1/22/21 06:29  10/29/20 11:50





 


 Nitroglycerin


  (Ntg)  0.4 mg  Q5M  PRN


 SL


 Prn Chest Pain  10/23/20 20:30


 11/22/20 20:29   





 


 Ondansetron HCl


  (Zofran)  4 mg  Q6H  PRN


 IVP


 Nausea & Vomiting  10/23/20 20:30


 11/22/20 20:29   





 


 Polyethylene


 Glycol


  (Miralax)  17 gm  DAILYPRN  PRN


 ORAL


 Constipation  10/23/20 20:30


 11/22/20 20:29   





 


 Potassium Chloride


  (K-Dur)  20 meq  TWICE A  DAY


 NG


   10/28/20 13:00


 1/26/21 12:59  10/29/20 09:26





 


 Promethazine HCl/


 Codeine


  (Phenergan with


 Codeine)  5 ml  Q4H  PRN


 ORAL


 For Cough  10/23/20 20:30


 11/22/20 20:29   





 


 Tamsulosin HCl


  (Flomax)  0.4 mg  DAILY


 ORAL


   10/24/20 09:00


 11/23/20 08:59  10/29/20 09:26





 


 Temazepam


  (Restoril)  15 mg  HSPRN  PRN


 ORAL


 Insomnia  10/23/20 20:30


 10/30/20 20:29   





 


 Vancomycin HCl


  (Vanco pharmacy


 to dose)  1 ea  DAILY  PRN


 MISC


 Per rx protocol  10/23/20 20:30


 11/22/20 20:29   





 


 Vancomycin HCl 1


 gm/Sodium Chloride  275 ml @ 


 183.708


 mls/hr  Q24H


 IVPB


   10/28/20 10:00


 11/2/20 09:59  10/29/20 09:29








Laboratory Tests


10/28/20 12:18: POC Whole Blood Glucose [Pending]


10/28/20 17:22: POC Whole Blood Glucose [Pending]


10/28/20 20:26: POC Whole Blood Glucose 103


10/29/20 03:56: 


White Blood Count 10.8, Red Blood Count 4.12L, Hemoglobin 12.5L, Hematocrit 

37.1L, Mean Corpuscular Volume 90, Mean Corpuscular Hemoglobin 30.4, Mean 

Corpuscular Hemoglobin Concent 33.7, Red Cell Distribution Width 14.5, Platelet 

Count 238, Mean Platelet Volume 10.5H, Neutrophils (%) (Auto) 63.1, Lymphocytes 

(%) (Auto) 27.6, Monocytes (%) (Auto) 7.6, Eosinophils (%) (Auto) 0.8, Basophils

 (%) (Auto) 0.9, Erythrocyte Sedimentation Rate 88H, Sodium Level 144, Potassium

 Level 3.9, Chloride Level 111H, Carbon Dioxide Level 25, Anion Gap 9, Blood 

Urea Nitrogen 6L, Creatinine 1.1, Estimat Glomerular Filtration Rate > 60, 

Glucose Level 150H, Calcium Level 9.2, Phosphorus Level 3.3, Magnesium Level 

2.1, Total Bilirubin 0.4, Aspartate Amino Transf (AST/SGOT) 52H, Alanine 

Aminotransferase (ALT/SGPT) 41, Alkaline Phosphatase 242H, C-Reactive Protein, 

Quantitative 6.4H, Total Protein 7.0, Albumin 1.8L, Globulin 5.2, 

Albumin/Globulin Ratio 0.3L


10/29/20 06:29: POC Whole Blood Glucose 133H


10/29/20 11:39: POC Whole Blood Glucose [Pending]


Height (Feet):  5


Height (Inches):  4.00


Weight (Pounds):  130


EENT:  other - On nasal cannula


Cardiovascular:  tachycardia


Respiratory/Chest:  decreased breath sounds


Abdomen:  distended











Seven Tobar MD            Oct 29, 2020 11:56

## 2020-10-29 NOTE — NUR
NURSE NOTES:

assessed pt. vital signs stable. adjusted/ repositioned pts venti mask. pt is sating 98% O2. 
assessed pts Blood sugar, 125.

## 2020-10-29 NOTE — NUR
NURSE NOTES:

pt resting in bed. oral temp reads 99.3F. blanket off for cooling measurers. vital signs 
stable.   

-------------------------------------------------------------------------------

Addendum: 10/30/20 at 0107 by AARON BLAND RN

-------------------------------------------------------------------------------

WRONG PT.

## 2020-10-29 NOTE — INFECTIOUS DISEASES PROG NOTE
Assessment/Plan


74yo M with:





Fever at SNF, 100.2 max here; SP


Leukocytosis to 12- SP


Acute hypoxic resp failure, on NRB mask> 4l NC


Pneumonia- 


hx of COVID19 PNA 9/9/20


   10/23 BCx NTD


   UA 15-20 WBC, UCx Neg


   10/23 COVID rapid neg; 10/26 rapid COVID PCR + (from prior infection)- not 

new infection


   Flu A/B neg


   CXR: L pna


   Resp cx MRSA





CKD, Cr 1.7





[facility] resident ([facility])


Non-verbal


VRE and MRSA colonized





Plan:


Cont vanco #7/10 for MRSA PNA


   -10/26 SP Cefepime #4


   -10/24 SP Flagyl #








Monitor CBC/CMP


Monitor resp status


Monitor temp curve and hemodynamics


off COVID isolation- positive covid test represents residual dead virus from 

infection on 9/2020





D/w RN and pharmacy staff.





Thank you for this consult. [facility] will continue to follow.





Subjective


Allergies:  


Coded Allergies:  


     No Known Allergies (Unverified , 8/20/12)


afebrile 


no leukocytosis


Bcx Neg





Objective





Last 24 Hour Vital Signs








  Date Time  Temp Pulse Resp B/P (MAP) Pulse Ox O2 Delivery O2 Flow Rate FiO2


 


10/29/20 10:30       10.0 50


 


10/29/20 08:00      Nasal Cannula 4.0 


 


10/29/20 08:00       15.0 100


 


10/29/20 08:00  118      


 


10/29/20 08:00 99.0 103 20 110/70 (83) 97   


 


10/29/20 04:00 98.0 122 20 129/73 (91) 99   


 


10/29/20 04:00       4.0 


 


10/29/20 04:00  111      


 


10/29/20 04:00      Nasal Cannula 4.0 


 


10/29/20 00:00      Nasal Cannula 4.0 


 


10/29/20 00:00 98.4 87 19 115/63 (80) 98   


 


10/29/20 00:00  76      


 


10/29/20 00:00       4.0 


 


10/28/20 20:05     96 Nasal Cannula 4.0 36


 


10/28/20 20:00 98.1 87 19 128/73 (91) 97   





  87      


 


10/28/20 20:00      Nasal Cannula 4.0 


 


10/28/20 20:00       4.0 


 


10/28/20 20:00  71      


 


10/28/20 16:00 98.2 89 19 133/71 (91) 95   





  89      


 


10/28/20 16:00      Nasal Cannula 4.0 


 


10/28/20 16:00       4.0 


 


10/28/20 15:40  114      








Height (Feet):  5


Height (Inches):  4.00


Weight (Pounds):  130


Gen: NAD


HEENT: NCAT


CV: RRR


Pulm: Rhonchi anteriorly, lots of oral secretions


Abd: Soft, NTND


Ext: No c/c/e


Neuro: Awake





Laboratory Tests








Test


 10/28/20


17:22 10/28/20


20:26 10/29/20


03:56 10/29/20


06:29


 


POC Whole Blood Glucose


 Pending  


 103 MG/DL


() 


 133 MG/DL


()  H


 


White Blood Count


 


 


 10.8 K/UL


(4.8-10.8) 





 


Red Blood Count


 


 


 4.12 M/UL


(4.70-6.10)  L 





 


Hemoglobin


 


 


 12.5 G/DL


(14.2-18.0)  L 





 


Hematocrit


 


 


 37.1 %


(42.0-52.0)  L 





 


Mean Corpuscular Volume   90 FL (80-99)   


 


Mean Corpuscular Hemoglobin


 


 


 30.4 PG


(27.0-31.0) 





 


Mean Corpuscular Hemoglobin


Concent 


 


 33.7 G/DL


(32.0-36.0) 





 


Red Cell Distribution Width


 


 


 14.5 %


(11.6-14.8) 





 


Platelet Count


 


 


 238 K/UL


(150-450) 





 


Mean Platelet Volume


 


 


 10.5 FL


(6.5-10.1)  H 





 


Neutrophils (%) (Auto)


 


 


 63.1 %


(45.0-75.0) 





 


Lymphocytes (%) (Auto)


 


 


 27.6 %


(20.0-45.0) 





 


Monocytes (%) (Auto)


 


 


 7.6 %


(1.0-10.0) 





 


Eosinophils (%) (Auto)


 


 


 0.8 %


(0.0-3.0) 





 


Basophils (%) (Auto)


 


 


 0.9 %


(0.0-2.0) 





 


Erythrocyte Sedimentation Rate


 


 


 88 MM/HR


(0-20)  H 





 


Sodium Level


 


 


 144 MMOL/L


(136-145) 





 


Potassium Level


 


 


 3.9 MMOL/L


(3.5-5.1) 





 


Chloride Level


 


 


 111 MMOL/L


()  H 





 


Carbon Dioxide Level


 


 


 25 MMOL/L


(21-32) 





 


Anion Gap


 


 


 9 mmol/L


(5-15) 





 


Blood Urea Nitrogen


 


 


 6 mg/dL (7-18)


L 





 


Creatinine


 


 


 1.1 MG/DL


(0.55-1.30) 





 


Estimat Glomerular Filtration


Rate 


 


 > 60 mL/min


(>60) 





 


Glucose Level


 


 


 150 MG/DL


()  H 





 


Calcium Level


 


 


 9.2 MG/DL


(8.5-10.1) 





 


Phosphorus Level


 


 


 3.3 MG/DL


(2.5-4.9) 





 


Magnesium Level


 


 


 2.1 MG/DL


(1.8-2.4) 





 


Total Bilirubin


 


 


 0.4 MG/DL


(0.2-1.0) 





 


Aspartate Amino Transf


(AST/SGOT) 


 


 52 U/L (15-37)


H 





 


Alanine Aminotransferase


(ALT/SGPT) 


 


 41 U/L (12-78)


 





 


Alkaline Phosphatase


 


 


 242 U/L


()  H 





 


C-Reactive Protein,


Quantitative 


 


 6.4 mg/dL


(0.00-0.90)  H 





 


Total Protein


 


 


 7.0 G/DL


(6.4-8.2) 





 


Albumin


 


 


 1.8 G/DL


(3.4-5.0)  L 





 


Globulin   5.2 g/dL   


 


Albumin/Globulin Ratio


 


 


 0.3 (1.0-2.7)


L 





 


Test


 10/29/20


11:39 


 


 





 


POC Whole Blood Glucose Pending     











Current Medications








 Medications


  (Trade)  Dose


 Ordered  Sig/Miky


 Route


 PRN Reason  Start Time


 Stop Time Status Last Admin


Dose Admin


 


 Acetaminophen


  (Tylenol)  650 mg  Q4H  PRN


 ORAL


 Temp >100.5  10/23/20 20:30


 11/22/20 20:29   





 


 Dextrose


  (Dextrose 50%)  50 ml  Q30M  PRN


 IV


 Hypoglycemia  10/23/20 22:45


 1/21/21 22:44   





 


 Heparin Sodium


  (Porcine)


  (Heparin 5000


 units/ml)  5,000 units  EVERY 12  HOURS


 SUBQ


   10/23/20 21:00


 12/7/20 20:59  10/29/20 09:28





 


 Insulin Aspart


  (NovoLOG)    BEFORE MEALS AND  HS


 SUBQ


   10/24/20 06:30


 1/22/21 06:29  10/29/20 11:50





 


 Nitroglycerin


  (Ntg)  0.4 mg  Q5M  PRN


 SL


 Prn Chest Pain  10/23/20 20:30


 11/22/20 20:29   





 


 Ondansetron HCl


  (Zofran)  4 mg  Q6H  PRN


 IVP


 Nausea & Vomiting  10/23/20 20:30


 11/22/20 20:29   





 


 Polyethylene


 Glycol


  (Miralax)  17 gm  DAILYPRN  PRN


 ORAL


 Constipation  10/23/20 20:30


 11/22/20 20:29   





 


 Potassium Chloride


  (K-Dur)  20 meq  TWICE A  DAY


 NG


   10/28/20 13:00


 1/26/21 12:59  10/29/20 09:26





 


 Promethazine HCl/


 Codeine


  (Phenergan with


 Codeine)  5 ml  Q4H  PRN


 ORAL


 For Cough  10/23/20 20:30


 11/22/20 20:29   





 


 Tamsulosin HCl


  (Flomax)  0.4 mg  DAILY


 ORAL


   10/24/20 09:00


 11/23/20 08:59  10/29/20 09:26





 


 Temazepam


  (Restoril)  15 mg  HSPRN  PRN


 ORAL


 Insomnia  10/23/20 20:30


 10/30/20 20:29   





 


 Vancomycin HCl


  (Vanco pharmacy


 to dose)  1 ea  DAILY  PRN


 MISC


 Per rx protocol  10/23/20 20:30


 11/22/20 20:29   





 


 Vancomycin HCl 1


 gm/Sodium Chloride  275 ml @ 


 183.708


 mls/hr  Q24H


 IVPB


   10/28/20 10:00


 11/2/20 09:59  10/29/20 09:29




















Slime Garcia M.D.            Oct 29, 2020 13:13

## 2020-10-29 NOTE — INTERNAL MED PROGRESS NOTE
Subjective


Physician Name


Andrei Cancino


Attending Physician


Andrei Cancino MD





Current Medications








 Medications


  (Trade)  Dose


 Ordered  Sig/Miky


 Route


 PRN Reason  Start Time


 Stop Time Status Last Admin


Dose Admin


 


 Acetaminophen


  (Tylenol)  650 mg  Q4H  PRN


 ORAL


 Temp >100.5  10/23/20 20:30


 11/22/20 20:29   





 


 Dextrose


  (Dextrose 50%)  50 ml  Q30M  PRN


 IV


 Hypoglycemia  10/23/20 22:45


 1/21/21 22:44   





 


 Heparin Sodium


  (Porcine)


  (Heparin 5000


 units/ml)  5,000 units  EVERY 12  HOURS


 SUBQ


   10/23/20 21:00


 12/7/20 20:59  10/29/20 09:28





 


 Insulin Aspart


  (NovoLOG)    BEFORE MEALS AND  HS


 SUBQ


   10/24/20 06:30


 1/22/21 06:29  10/29/20 16:42





 


 Nitroglycerin


  (Ntg)  0.4 mg  Q5M  PRN


 SL


 Prn Chest Pain  10/23/20 20:30


 11/22/20 20:29   





 


 Ondansetron HCl


  (Zofran)  4 mg  Q6H  PRN


 IVP


 Nausea & Vomiting  10/23/20 20:30


 11/22/20 20:29   





 


 Polyethylene


 Glycol


  (Miralax)  17 gm  DAILYPRN  PRN


 ORAL


 Constipation  10/23/20 20:30


 11/22/20 20:29   





 


 Potassium Chloride


  (K-Dur)  20 meq  TWICE A  DAY


 NG


   10/28/20 13:00


 1/26/21 12:59  10/29/20 17:23





 


 Promethazine HCl/


 Codeine


  (Phenergan with


 Codeine)  5 ml  Q4H  PRN


 ORAL


 For Cough  10/23/20 20:30


 11/22/20 20:29   





 


 Tamsulosin HCl


  (Flomax)  0.4 mg  DAILY


 ORAL


   10/24/20 09:00


 11/23/20 08:59  10/29/20 09:26





 


 Temazepam


  (Restoril)  15 mg  HSPRN  PRN


 ORAL


 Insomnia  10/23/20 20:30


 10/30/20 20:29   





 


 Vancomycin HCl


  (Vanco pharmacy


 to dose)  1 ea  DAILY  PRN


 MISC


 Per rx protocol  10/23/20 20:30


 11/22/20 20:29   





 


 Vancomycin HCl 1


 gm/Sodium Chloride  275 ml @ 


 183.708


 mls/hr  Q24H


 IVPB


   10/28/20 10:00


 11/2/20 09:59  10/29/20 09:29











Allergies:  


Coded Allergies:  


     No Known Allergies (Unverified , 8/20/12)


Subjective


unresponsive, cannot follow command, on Ventimask,





Objective





Last Vital Signs








  Date Time  Temp Pulse Resp B/P (MAP) Pulse Ox O2 Delivery O2 Flow Rate FiO2


 


10/29/20 16:00  102      


 


10/29/20 16:00      Nasal Cannula 4.0 


 


10/29/20 16:00        50


 


10/29/20 16:00 99.3  18 112/64 (80) 95   











Laboratory Tests








Test


 10/28/20


20:26 10/29/20


03:56 10/29/20


06:29 10/29/20


11:39


 


POC Whole Blood Glucose


 103 MG/DL


() 


 133 MG/DL


()  H Pending  





 


White Blood Count


 


 10.8 K/UL


(4.8-10.8) 


 





 


Red Blood Count


 


 4.12 M/UL


(4.70-6.10)  L 


 





 


Hemoglobin


 


 12.5 G/DL


(14.2-18.0)  L 


 





 


Hematocrit


 


 37.1 %


(42.0-52.0)  L 


 





 


Mean Corpuscular Volume  90 FL (80-99)    


 


Mean Corpuscular Hemoglobin


 


 30.4 PG


(27.0-31.0) 


 





 


Mean Corpuscular Hemoglobin


Concent 


 33.7 G/DL


(32.0-36.0) 


 





 


Red Cell Distribution Width


 


 14.5 %


(11.6-14.8) 


 





 


Platelet Count


 


 238 K/UL


(150-450) 


 





 


Mean Platelet Volume


 


 10.5 FL


(6.5-10.1)  H 


 





 


Neutrophils (%) (Auto)


 


 63.1 %


(45.0-75.0) 


 





 


Lymphocytes (%) (Auto)


 


 27.6 %


(20.0-45.0) 


 





 


Monocytes (%) (Auto)


 


 7.6 %


(1.0-10.0) 


 





 


Eosinophils (%) (Auto)


 


 0.8 %


(0.0-3.0) 


 





 


Basophils (%) (Auto)


 


 0.9 %


(0.0-2.0) 


 





 


Erythrocyte Sedimentation Rate


 


 88 MM/HR


(0-20)  H 


 





 


Sodium Level


 


 144 MMOL/L


(136-145) 


 





 


Potassium Level


 


 3.9 MMOL/L


(3.5-5.1) 


 





 


Chloride Level


 


 111 MMOL/L


()  H 


 





 


Carbon Dioxide Level


 


 25 MMOL/L


(21-32) 


 





 


Anion Gap


 


 9 mmol/L


(5-15) 


 





 


Blood Urea Nitrogen


 


 6 mg/dL (7-18)


L 


 





 


Creatinine


 


 1.1 MG/DL


(0.55-1.30) 


 





 


Estimat Glomerular Filtration


Rate 


 > 60 mL/min


(>60) 


 





 


Glucose Level


 


 150 MG/DL


()  H 


 





 


Calcium Level


 


 9.2 MG/DL


(8.5-10.1) 


 





 


Phosphorus Level


 


 3.3 MG/DL


(2.5-4.9) 


 





 


Magnesium Level


 


 2.1 MG/DL


(1.8-2.4) 


 





 


Total Bilirubin


 


 0.4 MG/DL


(0.2-1.0) 


 





 


Aspartate Amino Transf


(AST/SGOT) 


 52 U/L (15-37)


H 


 





 


Alanine Aminotransferase


(ALT/SGPT) 


 41 U/L (12-78)


 


 





 


Alkaline Phosphatase


 


 242 U/L


()  H 


 





 


C-Reactive Protein,


Quantitative 


 6.4 mg/dL


(0.00-0.90)  H 


 





 


Total Protein


 


 7.0 G/DL


(6.4-8.2) 


 





 


Albumin


 


 1.8 G/DL


(3.4-5.0)  L 


 





 


Globulin  5.2 g/dL    


 


Albumin/Globulin Ratio


 


 0.3 (1.0-2.7)


L 


 





 


Test


 10/29/20


16:23 


 


 





 


POC Whole Blood Glucose


 148 MG/DL


()  H 


 


 




















Intake and Output  


 


 10/28/20 10/29/20





 19:00 07:00


 


Intake Total 630 ml 360 ml


 


Output Total 751 ml 800 ml


 


Balance -121 ml -440 ml


 


  


 


Free Water 300 ml 


 


Tube Feeding 330 ml 360 ml


 


Output Urine Total 750 ml 800 ml


 


Stool Total 1 ml 


 


# Bowel Movements  1








Objective


GENERAL:  unresponsive, unable to follow command, on a Ventimask at this time,


chronically ill-appearing.


HEAD AND NECK:  Pupils are equal and reactive to light.  Anicteric. NGT.


NECK:  Supple.  No JVD.


LUNGS:  coarse breath sounds.  Decreased air in bases.


HEART:  S1, S2.  Regular rhythm.  Distant heart sounds.  No murmur or gallop.


ABDOMEN:  Soft, nondistended, nontender.  Positive bowel sounds.


EXTREMITIES:  No cyanosis, clubbing, or edema.


NEUROLOGIC:  Very limited secondary to the patient's status, cannot follow 

commands.





Assessment/Plan


Assessment/Plan


1. Acute hypoxemic respiratory failure, most likely secondary to pneumonia and 

sepsis.


2. Sepsis secondary to urinary tract infection and pneumonia.


3. pneumonia=MRSA


4. Acute kidney injury on chronic renal insufficiency.


5. Dehydration.


6. History of chronic congestive heart failure.


7. Diabetes type 2.


8. Dyslipidemia.


9. Hypertension.


10. Alzheimer's disease.


11. COVID 19 previous positive





PLAN:


1.  in telemetry


2.  Dr. Sandoval,=Pulmonary Critical Care


3.  Dr. Garcia = infection disease


4.  IV= D5W due to the hypernatremia and dehydration.


5.  antibiotics = vancomycin and cefepime


6.  Code status is full code.


7.  DVT prophylaxis is heparin subcutaneous.


8.  Tube feeding @ 50 cc/hr











Andrei Cancino MD                 Oct 29, 2020 19:18

## 2020-10-29 NOTE — NUR
NURSE NOTES:

pt turned cleaned and repositioned. times 1 medium stool noted. stool appears to be brown, 
and formed.

## 2020-10-29 NOTE — DIAGNOSTIC IMAGING REPORT
Indication: Dyspnea

 

Technique: One view of the chest

 

Comparison: 10/27/2020

 

Findings: Left mid and lower lung infiltrate, right basilar infiltrate persists,

unchanged. Normal heart size. Interim placement of a nasogastric tube, tip projected

at the level gastric antrum. Right shoulder prosthesis is again demonstrated.

 

Impression: Satisfactory nasogastric intubation

 

Otherwise unchanged over 2 days

## 2020-10-29 NOTE — CARDIAC ELECTROPHYSIOLOGY PN
Assessment/Plan


Assessment/Plan


1. Accelerated junctional rhythm. Not bradycardic. Ruled out for MI


    Echocardiogram showed ejection fraction of 55%.


2. Sepsis, on broad-spectrum IV antibiotic.


3. Respiratory failure, on antibiotic.


4. Dehydration.


5. Acute renal failure.


6. Electrolyte imbalance.


7. History of congestive heart failure, but echocardiogram showed normal left 

ventricular systolic function.


8. History of previous COVID infection in September 2020 and Active Covid now in

isolation


9. Dementia.





DW RN





Subjective


Subjective


In SR in NAD in Covid isolation.





Objective





Last 24 Hour Vital Signs








  Date Time  Temp Pulse Resp B/P (MAP) Pulse Ox O2 Delivery O2 Flow Rate FiO2


 


10/29/20 12:00 99.3 103 23 110/70 (83) 95   


 


10/29/20 12:00      Nasal Cannula 4.0 


 


10/29/20 10:30       10.0 50


 


10/29/20 08:00      Nasal Cannula 4.0 


 


10/29/20 08:00       15.0 100


 


10/29/20 08:00  118      


 


10/29/20 08:00 99.0 103 20 110/70 (83) 97   


 


10/29/20 04:00 98.0 122 20 129/73 (91) 99   


 


10/29/20 04:00       4.0 


 


10/29/20 04:00  111      


 


10/29/20 04:00      Nasal Cannula 4.0 


 


10/29/20 00:00      Nasal Cannula 4.0 


 


10/29/20 00:00 98.4 87 19 115/63 (80) 98   


 


10/29/20 00:00  76      


 


10/29/20 00:00       4.0 


 


10/28/20 20:05     96 Nasal Cannula 4.0 36


 


10/28/20 20:00 98.1 87 19 128/73 (91) 97   





  87      


 


10/28/20 20:00      Nasal Cannula 4.0 


 


10/28/20 20:00       4.0 


 


10/28/20 20:00  71      


 


10/28/20 16:00 98.2 89 19 133/71 (91) 95   





  89      


 


10/28/20 16:00      Nasal Cannula 4.0 


 


10/28/20 16:00       4.0 


 


10/28/20 15:40  114      

















Intake and Output  


 


 10/28/20 10/29/20





 19:00 07:00


 


Intake Total 630 ml 330 ml


 


Output Total 751 ml 800 ml


 


Balance -121 ml -470 ml


 


  


 


Free Water 300 ml 


 


Tube Feeding 330 ml 330 ml


 


Output Urine Total 750 ml 800 ml


 


Stool Total 1 ml 


 


# Bowel Movements  1











Laboratory Tests








Test


 10/28/20


17:22 10/28/20


20:26 10/29/20


03:56 10/29/20


06:29


 


POC Whole Blood Glucose


 Pending  


 103 MG/DL


() 


 133 MG/DL


()  H


 


White Blood Count


 


 


 10.8 K/UL


(4.8-10.8) 





 


Red Blood Count


 


 


 4.12 M/UL


(4.70-6.10)  L 





 


Hemoglobin


 


 


 12.5 G/DL


(14.2-18.0)  L 





 


Hematocrit


 


 


 37.1 %


(42.0-52.0)  L 





 


Mean Corpuscular Volume   90 FL (80-99)   


 


Mean Corpuscular Hemoglobin


 


 


 30.4 PG


(27.0-31.0) 





 


Mean Corpuscular Hemoglobin


Concent 


 


 33.7 G/DL


(32.0-36.0) 





 


Red Cell Distribution Width


 


 


 14.5 %


(11.6-14.8) 





 


Platelet Count


 


 


 238 K/UL


(150-450) 





 


Mean Platelet Volume


 


 


 10.5 FL


(6.5-10.1)  H 





 


Neutrophils (%) (Auto)


 


 


 63.1 %


(45.0-75.0) 





 


Lymphocytes (%) (Auto)


 


 


 27.6 %


(20.0-45.0) 





 


Monocytes (%) (Auto)


 


 


 7.6 %


(1.0-10.0) 





 


Eosinophils (%) (Auto)


 


 


 0.8 %


(0.0-3.0) 





 


Basophils (%) (Auto)


 


 


 0.9 %


(0.0-2.0) 





 


Erythrocyte Sedimentation Rate


 


 


 88 MM/HR


(0-20)  H 





 


Sodium Level


 


 


 144 MMOL/L


(136-145) 





 


Potassium Level


 


 


 3.9 MMOL/L


(3.5-5.1) 





 


Chloride Level


 


 


 111 MMOL/L


()  H 





 


Carbon Dioxide Level


 


 


 25 MMOL/L


(21-32) 





 


Anion Gap


 


 


 9 mmol/L


(5-15) 





 


Blood Urea Nitrogen


 


 


 6 mg/dL (7-18)


L 





 


Creatinine


 


 


 1.1 MG/DL


(0.55-1.30) 





 


Estimat Glomerular Filtration


Rate 


 


 > 60 mL/min


(>60) 





 


Glucose Level


 


 


 150 MG/DL


()  H 





 


Calcium Level


 


 


 9.2 MG/DL


(8.5-10.1) 





 


Phosphorus Level


 


 


 3.3 MG/DL


(2.5-4.9) 





 


Magnesium Level


 


 


 2.1 MG/DL


(1.8-2.4) 





 


Total Bilirubin


 


 


 0.4 MG/DL


(0.2-1.0) 





 


Aspartate Amino Transf


(AST/SGOT) 


 


 52 U/L (15-37)


H 





 


Alanine Aminotransferase


(ALT/SGPT) 


 


 41 U/L (12-78)


 





 


Alkaline Phosphatase


 


 


 242 U/L


()  H 





 


C-Reactive Protein,


Quantitative 


 


 6.4 mg/dL


(0.00-0.90)  H 





 


Total Protein


 


 


 7.0 G/DL


(6.4-8.2) 





 


Albumin


 


 


 1.8 G/DL


(3.4-5.0)  L 





 


Globulin   5.2 g/dL   


 


Albumin/Globulin Ratio


 


 


 0.3 (1.0-2.7)


L 





 


Test


 10/29/20


11:39 


 


 





 


POC Whole Blood Glucose Pending     








Objective





HEAD AND NECK:  No JVD.


LUNGS:  Coarse rhonchi.


CARDIOVASCULAR:   Irregular S1 and S2 with no gallop.


ABDOMEN:  Soft.


EXTREMITIES:  No pitting edema.











Kvng Edmond MD               Oct 29, 2020 14:53

## 2020-10-29 NOTE — NUR
NURSE NOTES:

Bed bath given ,pt with BM to small amount of formed brown stools,kept dry and clean turned 
and repositioned to sides.

## 2020-10-29 NOTE — NUR
NURSE NOTES:

Report received from Luciano Ashley RN.Pt asleep noted no resp distress,on 100% NRB Mask ,O2 
sat 98%,no signs of pain or discomfort S-Tach Glucerna 1.2 at 30 ml/hr,no residual 
noted,Cummings cath draining yellow urine,skin very warm ,pt with low grade fever T99 AX,cold 
compress applied to forehead,SR up x2 HOB elevated ,bed lock in lowest position,will 
continue with POC.

## 2020-10-29 NOTE — NUR
NURSE HAND-OFF REPORT: 



Important Events on Shift:N/A

Patient Status: unstable with low grade fever

Diet: Glucerna 1.2 at 50 ml/hr NGT



Pending Orders: N/A

Pending Results/Labs:N/A

Pending MD notification:N/A



Latest Vital Signs: Temperature 99.3 , Pulse 94 , B/P 112 /64 , Respiratory Rate 18 , O2 SAT 
95 , Nasal Cannula, O2 Flow Rate 10.0 .  

Vital Sign Comment: 



EKG Rhythm: Sinus Rhythm

Rhythm change?: N 

MD Notified?: Y -Left Message for Dr. Cancino, No cardio on the case. 

MD Response: 



Latest Mejia Fall Score: 50  

Fall Risk: High Risk 

Safety Measures: Call light Within Reach, Bed Alarm Zone 1, Side Rails Side Rails x3, Bed 
position Low and Locked.

Fall Precautions: 

Yellow Socks

Yellow Gown

Door Sign

Patient Fall Education



Report given to Luciano Callaway RN..

## 2020-10-29 NOTE — NUR
NURSE NOTES:

Oral care done,pt with on and off productive cough,with audible rales heard,,suctioned 
orally to large amount of thick tan secretions.

## 2020-10-29 NOTE — NUR
NURSE HAND-OFF REPORT: 



Important Events on Shift:[STABLE]

Patient Status: [STABLE]

Diet: [STABLE]



Pending Orders: [NA]

Pending Results/Labs:[NA]

Pending MD notification:[NA]



Latest Vital Signs: Temperature 98.0 , Pulse 111 , B/P 129 /73 , Respiratory Rate 20 , O2 
SAT 99 , Nasal Cannula, O2 Flow Rate 4.0 .  

Vital Sign Comment: [Stable]



EKG Rhythm: Sinus Tachycardia

Rhythm change?: N 

MD Notified?: Y -Left Message for Dr. Cancino, No cardio on the case. 

MD Response: 



Latest Mejia Fall Score: 50  

Fall Risk: High Risk 

Safety Measures: Call light Within Reach, Bed Alarm Zone 1, Side Rails Side Rails x3, Bed 
position Low and Locked.

Fall Precautions: 

Yellow Socks

Yellow Gown

Door Sign

Patient Fall Education



Report given to [NIKKIE RN].

## 2020-10-29 NOTE — NUR
NURSE NOTES:

pt report received from LISA Wells RN. pt remains stable resting in bed. pt is alert and 
oriented time 1, responsive to his own name. pt is on cardiac monitor showing ST, no other 
apparent distress. pt is is on venturi mask, sating 100%, no acute signs of resp distress. 
pt bed is low, locked, armed, call light within reach, bed rails up times 3. will follow 
plan of care.

## 2020-10-30 VITALS — DIASTOLIC BLOOD PRESSURE: 71 MMHG | SYSTOLIC BLOOD PRESSURE: 120 MMHG

## 2020-10-30 VITALS — SYSTOLIC BLOOD PRESSURE: 113 MMHG | DIASTOLIC BLOOD PRESSURE: 79 MMHG

## 2020-10-30 VITALS — SYSTOLIC BLOOD PRESSURE: 111 MMHG | DIASTOLIC BLOOD PRESSURE: 73 MMHG

## 2020-10-30 VITALS — DIASTOLIC BLOOD PRESSURE: 77 MMHG | SYSTOLIC BLOOD PRESSURE: 137 MMHG

## 2020-10-30 VITALS — SYSTOLIC BLOOD PRESSURE: 106 MMHG | DIASTOLIC BLOOD PRESSURE: 61 MMHG

## 2020-10-30 VITALS — DIASTOLIC BLOOD PRESSURE: 74 MMHG | SYSTOLIC BLOOD PRESSURE: 132 MMHG

## 2020-10-30 LAB
ADD MANUAL DIFF: NO
ALBUMIN SERPL-MCNC: 1.8 G/DL (ref 3.4–5)
ALBUMIN/GLOB SERPL: 0.4 {RATIO} (ref 1–2.7)
ALP SERPL-CCNC: 240 U/L (ref 46–116)
ALT SERPL-CCNC: 41 U/L (ref 12–78)
AST SERPL-CCNC: 47 U/L (ref 15–37)
BASOPHILS NFR BLD AUTO: 0.5 % (ref 0–2)
BILIRUB SERPL-MCNC: 0.5 MG/DL (ref 0.2–1)
BUN SERPL-MCNC: 10 MG/DL (ref 7–18)
CALCIUM SERPL-MCNC: 9.1 MG/DL (ref 8.5–10.1)
CHLORIDE SERPL-SCNC: 109 MMOL/L (ref 98–107)
CO2 SERPL-SCNC: 34 MMOL/L (ref 21–32)
CREAT SERPL-MCNC: 1.2 MG/DL (ref 0.55–1.3)
EOSINOPHIL NFR BLD AUTO: 0.1 % (ref 0–3)
ERYTHROCYTE [DISTWIDTH] IN BLOOD BY AUTOMATED COUNT: 15.1 % (ref 11.6–14.8)
GLOBULIN SER-MCNC: 4.8 G/DL
HCT VFR BLD CALC: 39.6 % (ref 42–52)
HGB BLD-MCNC: 12.7 G/DL (ref 14.2–18)
LYMPHOCYTES NFR BLD AUTO: 10.6 % (ref 20–45)
MCV RBC AUTO: 93 FL (ref 80–99)
MONOCYTES NFR BLD AUTO: 4.5 % (ref 1–10)
NEUTROPHILS NFR BLD AUTO: 84.2 % (ref 45–75)
PHOSPHATE SERPL-MCNC: 3.8 MG/DL (ref 2.5–4.9)
PLATELET # BLD: 253 K/UL (ref 150–450)
POTASSIUM SERPL-SCNC: 4.9 MMOL/L (ref 3.5–5.1)
RBC # BLD AUTO: 4.25 M/UL (ref 4.7–6.1)
SODIUM SERPL-SCNC: 145 MMOL/L (ref 136–145)
WBC # BLD AUTO: 14.1 K/UL (ref 4.8–10.8)

## 2020-10-30 RX ADMIN — HEPARIN SODIUM SCH UNITS: 5000 INJECTION INTRAVENOUS; SUBCUTANEOUS at 08:56

## 2020-10-30 RX ADMIN — INSULIN ASPART SCH UNITS: 100 INJECTION, SOLUTION INTRAVENOUS; SUBCUTANEOUS at 11:30

## 2020-10-30 RX ADMIN — DEXTROSE MONOHYDRATE SCH MLS/HR: 50 INJECTION, SOLUTION INTRAVENOUS at 14:32

## 2020-10-30 RX ADMIN — INSULIN ASPART SCH UNITS: 100 INJECTION, SOLUTION INTRAVENOUS; SUBCUTANEOUS at 16:30

## 2020-10-30 RX ADMIN — INSULIN ASPART SCH UNITS: 100 INJECTION, SOLUTION INTRAVENOUS; SUBCUTANEOUS at 05:47

## 2020-10-30 RX ADMIN — DEXTROSE MONOHYDRATE SCH MLS/HR: 50 INJECTION, SOLUTION INTRAVENOUS at 21:07

## 2020-10-30 RX ADMIN — INSULIN ASPART SCH UNITS: 100 INJECTION, SOLUTION INTRAVENOUS; SUBCUTANEOUS at 21:00

## 2020-10-30 RX ADMIN — SODIUM CHLORIDE SCH MLS/HR: 9 INJECTION, SOLUTION INTRAVENOUS at 08:58

## 2020-10-30 RX ADMIN — TAMSULOSIN HYDROCHLORIDE SCH MG: 0.4 CAPSULE ORAL at 08:54

## 2020-10-30 RX ADMIN — HEPARIN SODIUM SCH UNITS: 5000 INJECTION INTRAVENOUS; SUBCUTANEOUS at 21:06

## 2020-10-30 NOTE — DIAGNOSTIC IMAGING REPORT
Indication: Dyspnea

 

Technique: One view of the chest

 

Comparison: 10/29/2020

 

Findings: Bilateral infiltrates are unchanged. Stable satisfactory position of

nasogastric tube. Right shoulder prosthesis again demonstrated.

 

Impression:  Unchanged, over one day, findings as above.

## 2020-10-30 NOTE — INFECTIOUS DISEASES PROG NOTE
Assessment/Plan


74yo M with:





Fever at SNF, 100.2 max here; SP


Leukocytosis to 12- SP


Acute hypoxic resp failure, on NRB mask> 4l NC; back on NRB, desaturation 10/29


Pneumonia- 


hx of COVID19 PNA 9/9/20


   10/23 BCx NTD


   UA 15-20 WBC, UCx Neg


   10/23 COVID rapid neg; 10/26 rapid COVID PCR + (from prior infection)- not 

new infection


   Flu A/B neg


   CXR: L pna


   Resp cx MRSA





CKD, Cr 1.7





SNF resident ([facility])


Non-verbal


VRE and MRSA colonized





Plan:


Cont vanco #8/10 for MRSA PNA


-add empiric Zosyn given resp decompensation


   -10/26 SP Cefepime #4


   -10/24 SP Flagyl #








Monitor CBC/CMP


Monitor resp status


Monitor temp curve and hemodynamics


off COVID isolation- positive covid test represents residual dead virus from 

infection on 9/2020


sp cx


CXR





D/w RN





Thank you for this consult. [facility] will continue to follow.





Subjective


Allergies:  


Coded Allergies:  


     No Known Allergies (Unverified , 8/20/12)


afebrile 


tachypneic and desaturated; placed on NRB


mild leukocytosis


now in telemetry





Objective





Last 24 Hour Vital Signs








  Date Time  Temp Pulse Resp B/P (MAP) Pulse Ox O2 Delivery O2 Flow Rate FiO2


 


10/30/20 11:59       10.0 50


 


10/30/20 11:58 96.8 101 21 137/77 (97) 84   


 


10/30/20 09:45     94 Non-Rebreather 15.0 100


 


10/30/20 08:00      Venturi Mask 10.0 


 


10/30/20 08:00 96.7 145 30 106/61 (76) 84   


 


10/30/20 08:00       10.0 50


 


10/30/20 08:00  142      


 


10/30/20 04:00      Venturi Mask 10.0 


 


10/30/20 04:00 98.5 123 18 132/74 (93) 99   


 


10/30/20 04:00  118      


 


10/30/20 04:00       10.0 50


 


10/30/20 00:00       10.0 50


 


10/30/20 00:00      Venturi Mask 10.0 


 


10/30/20 00:00 98.9 102 18 113/79 (90) 100   


 


10/30/20 00:00  105      


 


10/29/20 20:00       10.0 50


 


10/29/20 20:00      Venturi Mask 10.0 


 


10/29/20 20:00 99.0 94 18 126/82 (97) 100   


 


10/29/20 20:00  108      


 


10/29/20 16:00  102      


 


10/29/20 16:00      Nasal Cannula 4.0 


 


10/29/20 16:00       10.0 50


 


10/29/20 16:00 99.3 94 18 112/64 (80) 95   








Height (Feet):  5


Height (Inches):  4.00


Weight (Pounds):  130


Gen: NAD


CV: RRR


Pulm: Rhonchi anteriorly, lots of oral secretions


Abd: Soft, NTND


Ext: No c/c/e


Neuro: Awake





Laboratory Tests








Test


 10/29/20


16:23 10/29/20


21:10 10/30/20


05:41 10/30/20


09:00


 


POC Whole Blood Glucose


 148 MG/DL


()  H Pending  


 Pending  


 





 


Random Vancomycin Level    13.8 ug/mL  


 


Test


 10/30/20


09:46 10/30/20


11:00 10/30/20


12:09 





 


Arterial Blood pH


 7.467


(7.350-7.450) 


 


 





 


Arterial Blood Partial


Pressure CO2 38.5 mmHg


(35.0-45.0) 


 


 





 


Arterial Blood Partial


Pressure O2 64.8 mmHg


(75.0-100.0)  L 


 


 





 


Arterial Blood HCO3


 27.2 mmol/L


(22.0-26.0)  H 


 


 





 


Arterial Blood Oxygen


Saturation 93.5 %


()  L 


 


 





 


Arterial Blood Base Excess 3.4 (-2-2)  H   


 


Jerod Test Positive     


 


White Blood Count


 


 14.1 K/UL


(4.8-10.8)  H 


 





 


Red Blood Count


 


 4.25 M/UL


(4.70-6.10)  L 


 





 


Hemoglobin


 


 12.7 G/DL


(14.2-18.0)  L 


 





 


Hematocrit


 


 39.6 %


(42.0-52.0)  L 


 





 


Mean Corpuscular Volume  93 FL (80-99)    


 


Mean Corpuscular Hemoglobin


 


 29.9 PG


(27.0-31.0) 


 





 


Mean Corpuscular Hemoglobin


Concent 


 32.1 G/DL


(32.0-36.0) 


 





 


Red Cell Distribution Width


 


 15.1 %


(11.6-14.8)  H 


 





 


Platelet Count


 


 253 K/UL


(150-450) 


 





 


Mean Platelet Volume


 


 9.8 FL


(6.5-10.1) 


 





 


Neutrophils (%) (Auto)


 


 84.2 %


(45.0-75.0)  H 


 





 


Lymphocytes (%) (Auto)


 


 10.6 %


(20.0-45.0)  L 


 





 


Monocytes (%) (Auto)


 


 4.5 %


(1.0-10.0) 


 





 


Eosinophils (%) (Auto)


 


 0.1 %


(0.0-3.0) 


 





 


Basophils (%) (Auto)


 


 0.5 %


(0.0-2.0) 


 





 


Sodium Level


 


 145 MMOL/L


(136-145) 


 





 


Potassium Level


 


 4.9 MMOL/L


(3.5-5.1) 


 





 


Chloride Level


 


 109 MMOL/L


()  H 


 





 


Carbon Dioxide Level


 


 34 MMOL/L


(21-32)  H 


 





 


Blood Urea Nitrogen


 


 10 mg/dL


(7-18) 


 





 


Creatinine


 


 1.2 MG/DL


(0.55-1.30) 


 





 


Estimat Glomerular Filtration


Rate 


 58.7 mL/min


(>60) 


 





 


Glucose Level


 


 148 MG/DL


()  H 


 





 


Calcium Level


 


 9.1 MG/DL


(8.5-10.1) 


 





 


Phosphorus Level


 


 3.8 MG/DL


(2.5-4.9) 


 





 


Magnesium Level


 


 2.2 MG/DL


(1.8-2.4) 


 





 


Total Bilirubin


 


 0.5 MG/DL


(0.2-1.0) 


 





 


Aspartate Amino Transf


(AST/SGOT) 


 47 U/L (15-37)


H 


 





 


Alanine Aminotransferase


(ALT/SGPT) 


 41 U/L (12-78)


 


 





 


Alkaline Phosphatase


 


 240 U/L


()  H 


 





 


C-Reactive Protein,


Quantitative 


 15.0 mg/dL


(0.00-0.90)  H 


 





 


Total Protein


 


 6.6 G/DL


(6.4-8.2) 


 





 


Albumin


 


 1.8 G/DL


(3.4-5.0)  L 


 





 


Globulin  4.8 g/dL    


 


Albumin/Globulin Ratio


 


 0.4 (1.0-2.7)


L 


 





 


POC Whole Blood Glucose


 


 


 148 MG/DL


()  H 














Current Medications








 Medications


  (Trade)  Dose


 Ordered  Sig/Miky


 Route


 PRN Reason  Start Time


 Stop Time Status Last Admin


Dose Admin


 


 Acetaminophen


  (Tylenol)  650 mg  Q4H  PRN


 ORAL


 Temp >100.5  10/23/20 20:30


 11/22/20 20:29   





 


 Dextrose


  (Dextrose 50%)  50 ml  Q30M  PRN


 IV


 Hypoglycemia  10/23/20 22:45


 1/21/21 22:44   





 


 Heparin Sodium


  (Porcine)


  (Heparin 5000


 units/ml)  5,000 units  EVERY 12  HOURS


 SUBQ


   10/23/20 21:00


 12/7/20 20:59  10/30/20 08:56





 


 Insulin Aspart


  (NovoLOG)    BEFORE MEALS AND  HS


 SUBQ


   10/24/20 06:30


 1/22/21 06:29  10/30/20 05:47





 


 Nitroglycerin


  (Ntg)  0.4 mg  Q5M  PRN


 SL


 Prn Chest Pain  10/23/20 20:30


 11/22/20 20:29   





 


 Ondansetron HCl


  (Zofran)  4 mg  Q6H  PRN


 IVP


 Nausea & Vomiting  10/23/20 20:30


 11/22/20 20:29   





 


 Polyethylene


 Glycol


  (Miralax)  17 gm  DAILYPRN  PRN


 ORAL


 Constipation  10/23/20 20:30


 11/22/20 20:29   





 


 Potassium Chloride


  (K-Dur)  20 meq  TWICE A  DAY


 NG


   10/28/20 13:00


 1/26/21 12:59  10/30/20 08:54





 


 Promethazine HCl/


 Codeine


  (Phenergan with


 Codeine)  5 ml  Q4H  PRN


 ORAL


 For Cough  10/23/20 20:30


 11/22/20 20:29   





 


 Tamsulosin HCl


  (Flomax)  0.4 mg  DAILY


 ORAL


   10/24/20 09:00


 11/23/20 08:59  10/30/20 08:54





 


 Temazepam


  (Restoril)  15 mg  HSPRN  PRN


 ORAL


 Insomnia  10/23/20 20:30


 10/30/20 20:29   





 


 Vancomycin HCl


  (Vanco pharmacy


 to dose)  1 ea  DAILY  PRN


 MISC


 Per rx protocol  10/23/20 20:30


 11/22/20 20:29   





 


 Vancomycin HCl 1


 gm/Sodium Chloride  275 ml @ 


 183.708


 mls/hr  Q24H


 IVPB


   10/28/20 10:00


 11/2/20 09:59  10/30/20 08:58




















Slime Garcia M.D.            Oct 30, 2020 13:00

## 2020-10-30 NOTE — NUR
NURSE NOTES:

Patient received from LITA Wolf. Patient has been transferred without distress. Patient is 
A/O x 1. Patient is on GT running glucerna 1.2. Patient is on venturi mask 50% at 10 L 
satting at 96%. Patient is on restraints, no injuries has been noted in both hands, pulse 
are palpable. Patient has a sacral stage 1, will take pictures. No other skin issue has been 
noted. Patient belongings has been discussed with LITA Wolf. Patient has a right forearm 22 
gauge, patent and flushed. Cummings, is patent and well draining. Bed is in the lowest 
position, call light within reach. Will continue to monitor.

## 2020-10-30 NOTE — CARDIAC ELECTROPHYSIOLOGY PN
Assessment/Plan


Assessment/Plan


1. Accelerated junctional rhythm. Not bradycardic. Ruled out for MI


     Echo showed ejection fraction of 55%.


2. Sepsis, on broad-spectrum IV antibiotic.


3. Respiratory failure, on antibiotic and NRBFM.


4. Dehydration.


5. Acute renal failure.


6. Electrolyte imbalance.


7. History of CHF, but echocardiogram showed normal left ventricular systolic 

function.


8. History of previous COVID infection in September 2020 and Active Covid now in

isolation


9. Dementia.





DW RN





Subjective


Subjective


In SR in NAD in Covid isolation.On NRB FM as Sat dropped to 84%. In sinus tach





Objective





Last 24 Hour Vital Signs








  Date Time  Temp Pulse Resp B/P (MAP) Pulse Ox O2 Delivery O2 Flow Rate FiO2


 


10/30/20 11:59       10.0 50


 


10/30/20 11:58 96.8 101 21 137/77 (97) 84   


 


10/30/20 09:45     94 Non-Rebreather 15.0 100


 


10/30/20 08:00      Venturi Mask 10.0 


 


10/30/20 08:00 96.7 145 30 106/61 (76) 84   


 


10/30/20 08:00       10.0 50


 


10/30/20 08:00  142      


 


10/30/20 04:00      Venturi Mask 10.0 


 


10/30/20 04:00 98.5 123 18 132/74 (93) 99   


 


10/30/20 04:00  118      


 


10/30/20 04:00       10.0 50


 


10/30/20 00:00       10.0 50


 


10/30/20 00:00      Venturi Mask 10.0 


 


10/30/20 00:00 98.9 102 18 113/79 (90) 100   


 


10/30/20 00:00  105      


 


10/29/20 20:00       10.0 50


 


10/29/20 20:00      Venturi Mask 10.0 


 


10/29/20 20:00 99.0 94 18 126/82 (97) 100   


 


10/29/20 20:00  108      


 


10/29/20 16:00  102      


 


10/29/20 16:00      Nasal Cannula 4.0 


 


10/29/20 16:00       10.0 50


 


10/29/20 16:00 99.3 94 18 112/64 (80) 95   

















Intake and Output  


 


 10/29/20 10/30/20





 19:00 07:00


 


Intake Total 870 ml 530 ml


 


Output Total 1001 ml 700 ml


 


Balance -131 ml -170 ml


 


  


 


Free Water 200 ml 


 


Tube Feeding 430 ml 530 ml


 


Other 240 ml 


 


Output Urine Total 1000 ml 700 ml


 


Stool Total 1 ml 


 


# Bowel Movements 3 











Laboratory Tests








Test


 10/29/20


16:23 10/29/20


21:10 10/30/20


05:41 10/30/20


09:00


 


POC Whole Blood Glucose


 148 MG/DL


()  H Pending  


 Pending  


 





 


Random Vancomycin Level    13.8 ug/mL  


 


Test


 10/30/20


09:46 10/30/20


11:00 


 





 


Arterial Blood pH


 7.467


(7.350-7.450) 


 


 





 


Arterial Blood Partial


Pressure CO2 38.5 mmHg


(35.0-45.0) 


 


 





 


Arterial Blood Partial


Pressure O2 64.8 mmHg


(75.0-100.0)  L 


 


 





 


Arterial Blood HCO3


 27.2 mmol/L


(22.0-26.0)  H 


 


 





 


Arterial Blood Oxygen


Saturation 93.5 %


()  L 


 


 





 


Arterial Blood Base Excess 3.4 (-2-2)  H   


 


Jerod Test Positive     


 


White Blood Count


 


 14.1 K/UL


(4.8-10.8)  H 


 





 


Red Blood Count


 


 4.25 M/UL


(4.70-6.10)  L 


 





 


Hemoglobin


 


 12.7 G/DL


(14.2-18.0)  L 


 





 


Hematocrit


 


 39.6 %


(42.0-52.0)  L 


 





 


Mean Corpuscular Volume  93 FL (80-99)    


 


Mean Corpuscular Hemoglobin


 


 29.9 PG


(27.0-31.0) 


 





 


Mean Corpuscular Hemoglobin


Concent 


 32.1 G/DL


(32.0-36.0) 


 





 


Red Cell Distribution Width


 


 15.1 %


(11.6-14.8)  H 


 





 


Platelet Count


 


 253 K/UL


(150-450) 


 





 


Mean Platelet Volume


 


 9.8 FL


(6.5-10.1) 


 





 


Neutrophils (%) (Auto)


 


 84.2 %


(45.0-75.0)  H 


 





 


Lymphocytes (%) (Auto)


 


 10.6 %


(20.0-45.0)  L 


 





 


Monocytes (%) (Auto)


 


 4.5 %


(1.0-10.0) 


 





 


Eosinophils (%) (Auto)


 


 0.1 %


(0.0-3.0) 


 





 


Basophils (%) (Auto)


 


 0.5 %


(0.0-2.0) 


 





 


Sodium Level


 


 145 MMOL/L


(136-145) 


 





 


Potassium Level


 


 4.9 MMOL/L


(3.5-5.1) 


 





 


Chloride Level


 


 109 MMOL/L


()  H 


 





 


Carbon Dioxide Level


 


 34 MMOL/L


(21-32)  H 


 





 


Blood Urea Nitrogen


 


 10 mg/dL


(7-18) 


 





 


Creatinine


 


 1.2 MG/DL


(0.55-1.30) 


 





 


Estimat Glomerular Filtration


Rate 


 58.7 mL/min


(>60) 


 





 


Glucose Level


 


 148 MG/DL


()  H 


 





 


Calcium Level


 


 9.1 MG/DL


(8.5-10.1) 


 





 


Phosphorus Level


 


 3.8 MG/DL


(2.5-4.9) 


 





 


Magnesium Level


 


 2.2 MG/DL


(1.8-2.4) 


 





 


Total Bilirubin


 


 0.5 MG/DL


(0.2-1.0) 


 





 


Aspartate Amino Transf


(AST/SGOT) 


 47 U/L (15-37)


H 


 





 


Alanine Aminotransferase


(ALT/SGPT) 


 41 U/L (12-78)


 


 





 


Alkaline Phosphatase


 


 240 U/L


()  H 


 





 


C-Reactive Protein,


Quantitative 


 15.0 mg/dL


(0.00-0.90)  H 


 





 


Total Protein


 


 6.6 G/DL


(6.4-8.2) 


 





 


Albumin


 


 1.8 G/DL


(3.4-5.0)  L 


 





 


Globulin  4.8 g/dL    


 


Albumin/Globulin Ratio


 


 0.4 (1.0-2.7)


L 


 











Objective





HEAD AND NECK:  No JVD. NRB FM


LUNGS:  Coarse rhonchi.


CARDIOVASCULAR:   Irregular S1 and S2 with no gallop.


ABDOMEN:  Soft.


EXTREMITIES:  No pitting edema.











Kvng Edmond MD               Oct 30, 2020 12:12

## 2020-10-30 NOTE — NUR
NURSE NOTES:

Found pt tahypnic at RR 30, pt using accessory muscles with O2 sat 84% on venturi mask 10L @ 
FiO2 50%,  sustaining.  Pt sounds terribly congested, NGT was turned off for the time 
being. RT was called to assist. Dr Tobar aware and he will be ordering ABGs. Dr Sandoval 
was also notified. Pt was transferred on restraints. Restraint order cancelled by DELICIA. recvd 
order from Dr Sandoval to continue restraints.

## 2020-10-30 NOTE — INTERNAL MED PROGRESS NOTE
Subjective


Physician Name


Andrei Cancino


Attending Physician


Andrei Cancino MD





Current Medications








 Medications


  (Trade)  Dose


 Ordered  Sig/Miky


 Route


 PRN Reason  Start Time


 Stop Time Status Last Admin


Dose Admin


 


 Acetaminophen


  (Tylenol)  650 mg  Q4H  PRN


 ORAL


 Temp >100.5  10/23/20 20:30


 11/22/20 20:29   





 


 Dextrose


  (Dextrose 50%)  50 ml  Q30M  PRN


 IV


 Hypoglycemia  10/23/20 22:45


 1/21/21 22:44   





 


 Heparin Sodium


  (Porcine)


  (Heparin 5000


 units/ml)  5,000 units  EVERY 12  HOURS


 SUBQ


   10/23/20 21:00


 12/7/20 20:59  10/30/20 08:56





 


 Insulin Aspart


  (NovoLOG)    BEFORE MEALS AND  HS


 SUBQ


   10/24/20 06:30


 1/22/21 06:29  10/30/20 05:47





 


 Nitroglycerin


  (Ntg)  0.4 mg  Q5M  PRN


 SL


 Prn Chest Pain  10/23/20 20:30


 11/22/20 20:29   





 


 Ondansetron HCl


  (Zofran)  4 mg  Q6H  PRN


 IVP


 Nausea & Vomiting  10/23/20 20:30


 11/22/20 20:29   





 


 Piperacillin Sod/


 Tazobactam Sod


 3.375 gm/Sodium


 Chloride  110 ml @ 


 27.5 mls/hr  EVERY 8  HOURS


 IVPB


   10/30/20 14:00


 11/4/20 13:59  10/30/20 14:32





 


 Polyethylene


 Glycol


  (Miralax)  17 gm  DAILYPRN  PRN


 ORAL


 Constipation  10/23/20 20:30


 11/22/20 20:29   





 


 Promethazine HCl/


 Codeine


  (Phenergan with


 Codeine)  5 ml  Q4H  PRN


 ORAL


 For Cough  10/23/20 20:30


 11/22/20 20:29   





 


 Tamsulosin HCl


  (Flomax)  0.4 mg  DAILY


 ORAL


   10/24/20 09:00


 11/23/20 08:59  10/30/20 08:54





 


 Temazepam


  (Restoril)  15 mg  HSPRN  PRN


 ORAL


 Insomnia  10/23/20 20:30


 10/30/20 20:29   





 


 Vancomycin HCl


  (Vanco pharmacy


 to dose)  1 ea  DAILY  PRN


 MISC


 Per rx protocol  10/23/20 20:30


 11/22/20 20:29   





 


 Vancomycin HCl 1


 gm/Sodium Chloride  275 ml @ 


 183.708


 mls/hr  Q24H


 IVPB


   10/28/20 10:00


 11/2/20 09:59  10/30/20 08:58











Allergies:  


Coded Allergies:  


     No Known Allergies (Unverified , 8/20/12)


Subjective


unresponsive, cannot follow command, on Ventimask, more shortness of breath.





Objective





Last Vital Signs








  Date Time  Temp Pulse Resp B/P (MAP) Pulse Ox O2 Delivery O2 Flow Rate FiO2


 


10/30/20 18:50     95 Non-Rebreather 15.0 100


 


10/30/20 16:00 98.7 115 20 120/71 (87)    











Laboratory Tests








Test


 10/29/20


21:10 10/30/20


05:41 10/30/20


09:00 10/30/20


09:46


 


POC Whole Blood Glucose Pending   Pending    


 


Random Vancomycin Level   13.8 ug/mL   


 


Arterial Blood pH


 


 


 


 7.467


(7.350-7.450)


 


Arterial Blood Partial


Pressure CO2 


 


 


 38.5 mmHg


(35.0-45.0)


 


Arterial Blood Partial


Pressure O2 


 


 


 64.8 mmHg


(75.0-100.0)  L


 


Arterial Blood HCO3


 


 


 


 27.2 mmol/L


(22.0-26.0)  H


 


Arterial Blood Oxygen


Saturation 


 


 


 93.5 %


()  L


 


Arterial Blood Base Excess    3.4 (-2-2)  H


 


Jerod Test    Positive  


 


Test


 10/30/20


11:00 10/30/20


12:09 10/30/20


13:05 10/30/20


18:13


 


White Blood Count


 14.1 K/UL


(4.8-10.8)  H 


 


 





 


Red Blood Count


 4.25 M/UL


(4.70-6.10)  L 


 


 





 


Hemoglobin


 12.7 G/DL


(14.2-18.0)  L 


 


 





 


Hematocrit


 39.6 %


(42.0-52.0)  L 


 


 





 


Mean Corpuscular Volume 93 FL (80-99)     


 


Mean Corpuscular Hemoglobin


 29.9 PG


(27.0-31.0) 


 


 





 


Mean Corpuscular Hemoglobin


Concent 32.1 G/DL


(32.0-36.0) 


 


 





 


Red Cell Distribution Width


 15.1 %


(11.6-14.8)  H 


 


 





 


Platelet Count


 253 K/UL


(150-450) 


 


 





 


Mean Platelet Volume


 9.8 FL


(6.5-10.1) 


 


 





 


Neutrophils (%) (Auto)


 84.2 %


(45.0-75.0)  H 


 


 





 


Lymphocytes (%) (Auto)


 10.6 %


(20.0-45.0)  L 


 


 





 


Monocytes (%) (Auto)


 4.5 %


(1.0-10.0) 


 


 





 


Eosinophils (%) (Auto)


 0.1 %


(0.0-3.0) 


 


 





 


Basophils (%) (Auto)


 0.5 %


(0.0-2.0) 


 


 





 


Sodium Level


 145 MMOL/L


(136-145) 


 


 





 


Potassium Level


 4.9 MMOL/L


(3.5-5.1) 


 


 





 


Chloride Level


 109 MMOL/L


()  H 


 


 





 


Carbon Dioxide Level


 34 MMOL/L


(21-32)  H 


 


 





 


Blood Urea Nitrogen


 10 mg/dL


(7-18) 


 


 





 


Creatinine


 1.2 MG/DL


(0.55-1.30) 


 


 





 


Estimat Glomerular Filtration


Rate 58.7 mL/min


(>60) 


 


 





 


Glucose Level


 148 MG/DL


()  H 


 


 





 


Calcium Level


 9.1 MG/DL


(8.5-10.1) 


 


 





 


Phosphorus Level


 3.8 MG/DL


(2.5-4.9) 


 


 





 


Magnesium Level


 2.2 MG/DL


(1.8-2.4) 


 


 





 


Total Bilirubin


 0.5 MG/DL


(0.2-1.0) 


 


 





 


Aspartate Amino Transf


(AST/SGOT) 47 U/L (15-37)


H 


 


 





 


Alanine Aminotransferase


(ALT/SGPT) 41 U/L (12-78)


 


 


 





 


Alkaline Phosphatase


 240 U/L


()  H 


 


 





 


C-Reactive Protein,


Quantitative 15.0 mg/dL


(0.00-0.90)  H 


 


 





 


Total Protein


 6.6 G/DL


(6.4-8.2) 


 


 





 


Albumin


 1.8 G/DL


(3.4-5.0)  L 


 


 





 


Globulin 4.8 g/dL     


 


Albumin/Globulin Ratio


 0.4 (1.0-2.7)


L 


 


 





 


POC Whole Blood Glucose


 


 148 MG/DL


()  H 


 151 MG/DL


()  H


 


Arterial Blood pH


 


 


 7.462


(7.350-7.450) 





 


Arterial Blood Partial


Pressure CO2 


 


 41.3 mmHg


(35.0-45.0) 





 


Arterial Blood Partial


Pressure O2 


 


 140.4 mmHg


(75.0-100.0)  H 





 


Arterial Blood HCO3


 


 


 28.8 mmol/L


(22.0-26.0)  H 





 


Arterial Blood Oxygen


Saturation 


 


 99.0 %


() 





 


Arterial Blood Base Excess   4.6 (-2-2)  H 


 


Jerod Test   Positive   

















Intake and Output  


 


 10/29/20 10/30/20





 19:00 07:00


 


Intake Total 870 ml 530 ml


 


Output Total 1001 ml 700 ml


 


Balance -131 ml -170 ml


 


  


 


Free Water 200 ml 


 


Tube Feeding 430 ml 530 ml


 


Other 240 ml 


 


Output Urine Total 1000 ml 700 ml


 


Stool Total 1 ml 


 


# Bowel Movements 3 








Objective


GENERAL:  unresponsive, unable to follow command, on a Ventimask at this time,


chronically ill-appearing.


HEAD AND NECK:  Pupils are equal and reactive to light.  Anicteric. NGT.


NECK:  Supple.  No JVD.


LUNGS:  coarse breath sounds.  Decreased air in bases.


HEART:  S1, S2.  Regular rhythm.  Distant heart sounds.  No murmur or gallop.


ABDOMEN:  Soft, nondistended, nontender.  Positive bowel sounds.


EXTREMITIES:  No cyanosis, clubbing, or edema.


NEUROLOGIC:  Very limited secondary to the patient's status, cannot follow 

commands.





Assessment/Plan


Assessment/Plan


1. Acute hypoxemic respiratory failure, most likely secondary to pneumonia and 

sepsis.


2. Sepsis secondary to urinary tract infection and pneumonia.


3. pneumonia=MRSA


4. Acute kidney injury on chronic renal insufficiency.


5. Dehydration.


6. History of chronic congestive heart failure.


7. Diabetes type 2.


8. Dyslipidemia.


9. Hypertension.


10. Alzheimer's disease.


11. COVID 19 previous positive





PLAN:


1.  in telemetry


2.  Dr. Sandoval,=Pulmonary Critical Care


3.  Dr. Garcia = infection disease


4.  IV= D5W due to the hypernatremia and dehydration.


5.  antibiotics = vancomycin and cefepime


6.  Code status is full code.


7.  DVT prophylaxis is heparin subcutaneous.


8.  Tube feeding @ 50 cc/hr








CXR: Findings: Bilateral infiltrates are unchanged. Stable satisfactory position

of


nasogastric tube. Right shoulder prosthesis again demonstrated.





Lasix 20mg IV X 1 dose


follow-up with laboratory and cultures in the morning.











Andrei Cancino MD                 Oct 30, 2020 19:42

## 2020-10-30 NOTE — NUR
NURSE HAND-OFF REPORT: 



Important Events on Shift:[]

Patient Status: []

Diet: [GT]



Pending Orders: []

Pending Results/Labs:[]

Pending MD notification:[]



Latest Vital Signs: Temperature 98.5 , Pulse 123 , B/P 132 /74 , Respiratory Rate 18 , O2 
SAT 99 , Venturi Mask, O2 Flow Rate 10.0 .  

Vital Sign Comment: []



EKG Rhythm: Sinus Rhythm

Rhythm change?: N 

MD Notified?: Y -Left Message for Dr. Cancino, No cardio on the case. 

MD Response: 



Latest Mejia Fall Score: 50  

Fall Risk: High Risk 

Safety Measures: Call light Within Reach, Bed Alarm Zone 1, Side Rails Side Rails x3, Bed 
position Low and Locked.

Fall Precautions: 

Yellow Socks

Yellow Gown

Door Sign

Patient Fall Education



Report given to [LITA Garcia].

## 2020-10-30 NOTE — NUR
MODERATE DRY EYES: PRESCRIBED DISAPPEARING PRESERVATIVE OR PRESERVATIVE FREE ARTIFICIAL TEARS BID ? QID4-6X A DAY, OU AND THE DAILY INTAKE OF OMEGA-3 DHA/EPA FATTY ACIDS TO HELP RELIEVE SYMPTOMS. ADD NIGHTLY LUBRICATING OINTMENT OR GEL. WILL CONSIDER RESTASIS OR PUNCTAL PLUGS AND/OR LIPIFLOW TREATMENT NEXT VISIT IF NOT RESPONSIVE OR IF SYMPTOMS PERSIST. RETURN FOR FOLLOW-UP AS SCHEDULED OR SOONER IF SYMPTOMS WORSEN. NURSE NOTES:

deep suctioned pt. thick white sputum noted, pink tinged. pt is sating 98% O2 post deep 
suction. vital signs stable.

## 2020-10-30 NOTE — NUR
NURSE NOTES:

pt cleaned and repositioned with LITA Puentes. venturi mask repositioned. pt sating 95% O2. 
other vital signs stable.

## 2020-10-30 NOTE — NUR
NURSE HAND-OFF REPORT: 



Important Events on Shift:[NA]

Patient Status: [Stable]

Diet: [Tube feeding as per MD order]



Pending Orders: [NA]

Pending Results/Labs:[NA]

Pending MD notification:[NA]



Latest Vital Signs: Temperature 98.5 , Pulse 123 , B/P 132 /74 , Respiratory Rate 18 , O2 
SAT 99 , Venturi Mask, O2 Flow Rate 10.0 .  

Vital Sign Comment: [stable]



EKG Rhythm: Sinus Rhythm

Rhythm change?: N 

MD Notified?: Y -Left Message for Dr. Cancino, No cardio on the case. 

MD Response: 



Latest Mejia Fall Score: 50  

Fall Risk: High Risk 

Safety Measures: Call light Within Reach, Bed Alarm Zone 1, Side Rails Side Rails x3, Bed 
position Low and Locked.

Fall Precautions: 

Yellow Socks

Yellow Gown

Door Sign

Patient Fall Education



Report given to [Rhianna LONDON Tele.].

## 2020-10-30 NOTE — NUR
NURSE NOTES:

Receive patient report from LITA Garcia. PAtient is AO x0 sleeping. Patient shows no signs 
of distress or pain at the time. He is on a non rebreather mask 100%/ 15L. Patient shows no 
signs of respiratory distress. He is on NG tube feeding running Glucerna 1.2 @ 60 cc/hr. IV 
is intact and patent. There are no signs of erythema, infiltration, or bleeding. Cummings 
catheter is patent and draining. Bilateral soft wrist restraints on. There are no skin 
issues noted.Bed is in the lowest position, call light is within reach, side rails up x3, 
and bed alarm on. Will continue to monitor.

## 2020-10-30 NOTE — PULMONOLOGY PROGRESS NOTE
Subjective


ROS Limited/Unobtainable:  Yes


Interval Events:  on 100"% NRM


Allergies:  


Coded Allergies:  


     No Known Allergies (Unverified , 8/20/12)





Objective





Last 24 Hour Vital Signs








  Date Time  Temp Pulse Resp B/P (MAP) Pulse Ox O2 Delivery O2 Flow Rate FiO2


 


10/30/20 11:59       10.0 50


 


10/30/20 11:58 96.8 101 21 137/77 (97) 84   


 


10/30/20 09:45     94 Non-Rebreather 15.0 100


 


10/30/20 08:00      Venturi Mask 10.0 


 


10/30/20 08:00 96.7 145 30 106/61 (76) 84   


 


10/30/20 08:00       10.0 50


 


10/30/20 08:00  142      


 


10/30/20 04:00      Venturi Mask 10.0 


 


10/30/20 04:00 98.5 123 18 132/74 (93) 99   


 


10/30/20 04:00  118      


 


10/30/20 04:00       10.0 50


 


10/30/20 00:00       10.0 50


 


10/30/20 00:00      Venturi Mask 10.0 


 


10/30/20 00:00 98.9 102 18 113/79 (90) 100   


 


10/30/20 00:00  105      


 


10/29/20 20:00       10.0 50


 


10/29/20 20:00      Venturi Mask 10.0 


 


10/29/20 20:00 99.0 94 18 126/82 (97) 100   


 


10/29/20 20:00  108      


 


10/29/20 16:00  102      


 


10/29/20 16:00      Nasal Cannula 4.0 


 


10/29/20 16:00       10.0 50


 


10/29/20 16:00 99.3 94 18 112/64 (80) 95   

















Intake and Output  


 


 10/29/20 10/30/20





 19:00 07:00


 


Intake Total 870 ml 530 ml


 


Output Total 1001 ml 700 ml


 


Balance -131 ml -170 ml


 


  


 


Free Water 200 ml 


 


Tube Feeding 430 ml 530 ml


 


Other 240 ml 


 


Output Urine Total 1000 ml 700 ml


 


Stool Total 1 ml 


 


# Bowel Movements 3 








General Appearance:  WD/WN, no acute distress


HEENT:  normocephalic, atraumatic


Respiratory:  chest wall non-tender, respiratory distress, rhonchi - left, rh

onchi - right


Cardiovascular:  normal rate


Abdomen:  normal bowel sounds, no organomegaly


Genitourinary:  normal external genitalia


Laboratory Tests


10/29/20 16:23: POC Whole Blood Glucose 148H


10/29/20 21:10: POC Whole Blood Glucose [Pending]


10/30/20 05:41: POC Whole Blood Glucose [Pending]


10/30/20 09:00: Random Vancomycin Level 13.8


10/30/20 09:46: 


Arterial Blood pH 7.467H, Arterial Blood Partial Pressure CO2 38.5, Arterial 

Blood Partial Pressure O2 64.8L, Arterial Blood HCO3 27.2H, Arterial Blood 

Oxygen Saturation 93.5L, Arterial Blood Base Excess 3.4H, Jerod Test Positive


10/30/20 11:00: 


White Blood Count 14.1H, Red Blood Count 4.25L, Hemoglobin 12.7L, Hematocrit 

39.6L, Mean Corpuscular Volume 93, Mean Corpuscular Hemoglobin 29.9, Mean 

Corpuscular Hemoglobin Concent 32.1, Red Cell Distribution Width 15.1H, Platelet

 Count 253, Mean Platelet Volume 9.8, Neutrophils (%) (Auto) 84.2H, Lymphocytes 

(%) (Auto) 10.6L, Monocytes (%) (Auto) 4.5, Eosinophils (%) (Auto) 0.1, 

Basophils (%) (Auto) 0.5, Sodium Level 145, Potassium Level 4.9, Chloride Level 

109H, Carbon Dioxide Level 34H, Blood Urea Nitrogen 10, Creatinine 1.2, Estimat 

Glomerular Filtration Rate 58.7, Glucose Level 148H, Calcium Level 9.1, Phosp

horus Level 3.8, Magnesium Level 2.2, Total Bilirubin 0.5, Aspartate Amino 

Transf (AST/SGOT) 47H, Alanine Aminotransferase (ALT/SGPT) 41, Alkaline 

Phosphatase 240H, C-Reactive Protein, Quantitative 15.0H, Total Protein 6.6, 

Albumin 1.8L, Globulin 4.8, Albumin/Globulin Ratio 0.4L





Current Medications








 Medications


  (Trade)  Dose


 Ordered  Sig/Miky


 Route


 PRN Reason  Start Time


 Stop Time Status Last Admin


Dose Admin


 


 Acetaminophen


  (Tylenol)  650 mg  Q4H  PRN


 ORAL


 Temp >100.5  10/23/20 20:30


 11/22/20 20:29   





 


 Dextrose


  (Dextrose 50%)  50 ml  Q30M  PRN


 IV


 Hypoglycemia  10/23/20 22:45


 1/21/21 22:44   





 


 Heparin Sodium


  (Porcine)


  (Heparin 5000


 units/ml)  5,000 units  EVERY 12  HOURS


 SUBQ


   10/23/20 21:00


 12/7/20 20:59  10/30/20 08:56





 


 Insulin Aspart


  (NovoLOG)    BEFORE MEALS AND  HS


 SUBQ


   10/24/20 06:30


 1/22/21 06:29  10/30/20 05:47





 


 Nitroglycerin


  (Ntg)  0.4 mg  Q5M  PRN


 SL


 Prn Chest Pain  10/23/20 20:30


 11/22/20 20:29   





 


 Ondansetron HCl


  (Zofran)  4 mg  Q6H  PRN


 IVP


 Nausea & Vomiting  10/23/20 20:30


 11/22/20 20:29   





 


 Polyethylene


 Glycol


  (Miralax)  17 gm  DAILYPRN  PRN


 ORAL


 Constipation  10/23/20 20:30


 11/22/20 20:29   





 


 Potassium Chloride


  (K-Dur)  20 meq  TWICE A  DAY


 NG


   10/28/20 13:00


 1/26/21 12:59  10/30/20 08:54





 


 Promethazine HCl/


 Codeine


  (Phenergan with


 Codeine)  5 ml  Q4H  PRN


 ORAL


 For Cough  10/23/20 20:30


 11/22/20 20:29   





 


 Tamsulosin HCl


  (Flomax)  0.4 mg  DAILY


 ORAL


   10/24/20 09:00


 11/23/20 08:59  10/30/20 08:54





 


 Temazepam


  (Restoril)  15 mg  HSPRN  PRN


 ORAL


 Insomnia  10/23/20 20:30


 10/30/20 20:29   





 


 Vancomycin HCl


  (Vanco pharmacy


 to dose)  1 ea  DAILY  PRN


 MISC


 Per rx protocol  10/23/20 20:30


 11/22/20 20:29   





 


 Vancomycin HCl 1


 gm/Sodium Chloride  275 ml @ 


 183.708


 mls/hr  Q24H


 IVPB


   10/28/20 10:00


 11/2/20 09:59  10/30/20 08:58














Assessment/Plan


Problems:  


(1) 2019 novel coronavirus disease (COVID-19)


(2) Severe sepsis


(3) Acute encephalopathy


(4) HTN (hypertension)


(5) Aphasia


(6) Alzheimer's dementia


(7) History of CVA (cerebrovascular accident)


(8) BPH (benign prostatic hyperplasia)


Assessment/Plan


titrate fio2 to sat of 92%


frequent suctioning


cxr from today reviewed: no change to previous ones'


continue abx


check electrolytes


tolerating feeding


aspiration precaution


dvt prophylaxis.











Michael Sandoval MD              Oct 30, 2020 12:12

## 2020-10-30 NOTE — NUR
CASE MANAGEMENT:REVIEW



10/30/20

SI: COVID PNA

96.7   142  30  106/61  SAT 84% ON VENTURI MASK

WBC+14.1     ALB-1.8



IS: PLACED ON NON REBREATHER 15L 100% FIO2

IV LASIX X1

IV ZOSYN Q8HRS

IV VANCOMYCIN Q24

HEPARIN SQ Q12

**: TELEMETRY STATUS

DCP: FROM [facility]





PLAN:

TITRATE OXYGEN

## 2020-10-30 NOTE — NEPHROLOGY PROGRESS NOTE
Assessment/Plan


Problem List:  


(1) Dehydration


(2) JANES (acute kidney injury)


(3) Renal failure (ARF), acute on chronic


(4) Hypoxia


(5) Acute encephalopathy


(6) Electrolyte imbalance


Assessment





75-year-old male is admitted with acute hypoxic respiratory failure most likely 

secondary to pneumonia and sepsis, UTI.


Acute on chronic renal failure


Dehydration


Electrolyte imbalances, hypernatremia


Hypoalbuminemia


Diabetes type 2


History of congestive heart failure


Hypertension


Hyperlipemia


Alzheimer's


Previous COVID-19 infection in September 2020


Plan


October 30: Patient is not doing well clinically.  Mild respiratory distress.  

ABG noted.  Somewhat hypoxic.  CBC and chemistry panel ordered.  Discussed with 

LITA Garcia.  Will defer to pulmonary management to specialist.  Continue per ID.

 Renal parameters remained stable as of October 29.


October 29: Labs reviewed.  Renal parameters stable.  Continue per consultants.


October 28: Labs reviewed.  Potassium via NG tube ordered.  IV fluids stopped.  

Continue per consultants.


October 27: Labs reviewed.  Low potassium and low phosphorus replaced.  Continue

per consultants.  Remains stable from renal standpoint of view.


October 26: Patient remains n.p.o.  IV fluid down to 50 cc an hour.  Potassium 

supplement intravenously ordered.  Continue per consultants.





Previously:


Patient is n.p.o., will continue on IV fluid of D5W 75 cc an hour


We will monitor electrolytes and renal parameters


Avoid nephrotoxic's


Start p.o. when he clears by speech therapist, meanwhile aspiration precautions


Keep the blood pressure and blood sugar in check


Per orders





Subjective


ROS Limited/Unobtainable:  Yes





Objective


Objective





Last 24 Hour Vital Signs








  Date Time  Temp Pulse Resp B/P (MAP) Pulse Ox O2 Delivery O2 Flow Rate FiO2


 


10/30/20 09:45     94 Non-Rebreather 15.0 100


 


10/30/20 08:00      Venturi Mask 10.0 


 


10/30/20 08:00 96.7 145 30 106/61 (76) 84   


 


10/30/20 08:00       10.0 50


 


10/30/20 08:00  142      


 


10/30/20 04:00      Venturi Mask 10.0 


 


10/30/20 04:00 98.5 123 18 132/74 (93) 99   


 


10/30/20 04:00  118      


 


10/30/20 04:00       10.0 50


 


10/30/20 00:00       10.0 50


 


10/30/20 00:00      Venturi Mask 10.0 


 


10/30/20 00:00 98.9 102 18 113/79 (90) 100   


 


10/30/20 00:00  105      


 


10/29/20 20:00       10.0 50


 


10/29/20 20:00      Venturi Mask 10.0 


 


10/29/20 20:00 99.0 94 18 126/82 (97) 100   


 


10/29/20 20:00  108      


 


10/29/20 16:00  102      


 


10/29/20 16:00      Nasal Cannula 4.0 


 


10/29/20 16:00       10.0 50


 


10/29/20 16:00 99.3 94 18 112/64 (80) 95   


 


10/29/20 12:00 99.3 103 23 110/70 (83) 95   


 


10/29/20 12:00  113      


 


10/29/20 12:00      Nasal Cannula 4.0 

















Intake and Output  


 


 10/29/20 10/30/20





 19:00 07:00


 


Intake Total 870 ml 530 ml


 


Output Total 1001 ml 700 ml


 


Balance -131 ml -170 ml


 


  


 


Free Water 200 ml 


 


Tube Feeding 430 ml 530 ml


 


Other 240 ml 


 


Output Urine Total 1000 ml 700 ml


 


Stool Total 1 ml 


 


# Bowel Movements 3 








No blood drawn today


Laboratory Tests


10/29/20 11:39: POC Whole Blood Glucose [Pending]


10/29/20 16:23: POC Whole Blood Glucose 148H


10/29/20 21:10: POC Whole Blood Glucose [Pending]


10/30/20 05:41: POC Whole Blood Glucose [Pending]


10/30/20 09:00: Random Vancomycin Level 13.8


10/30/20 09:46: 


Arterial Blood pH 7.467H, Arterial Blood Partial Pressure CO2 38.5, Arterial 

Blood Partial Pressure O2 64.8L, Arterial Blood HCO3 27.2H, Arterial Blood 

Oxygen Saturation 93.5L, Arterial Blood Base Excess 3.4H, Jerod Test Positive


Height (Feet):  5


Height (Inches):  4.00


Weight (Pounds):  130


General Appearance:  mild distress


Cardiovascular:  tachycardia


Respiratory/Chest:  decreased breath sounds, rhonchi - bilaterally


Abdomen:  distended











Seven Tobar MD            Oct 30, 2020 10:34

## 2020-10-31 VITALS — DIASTOLIC BLOOD PRESSURE: 59 MMHG | SYSTOLIC BLOOD PRESSURE: 115 MMHG

## 2020-10-31 VITALS — DIASTOLIC BLOOD PRESSURE: 67 MMHG | SYSTOLIC BLOOD PRESSURE: 114 MMHG

## 2020-10-31 VITALS — SYSTOLIC BLOOD PRESSURE: 94 MMHG | DIASTOLIC BLOOD PRESSURE: 58 MMHG

## 2020-10-31 VITALS — DIASTOLIC BLOOD PRESSURE: 61 MMHG | SYSTOLIC BLOOD PRESSURE: 115 MMHG

## 2020-10-31 VITALS — DIASTOLIC BLOOD PRESSURE: 86 MMHG | SYSTOLIC BLOOD PRESSURE: 108 MMHG

## 2020-10-31 VITALS — SYSTOLIC BLOOD PRESSURE: 117 MMHG | DIASTOLIC BLOOD PRESSURE: 76 MMHG

## 2020-10-31 LAB
ADD MANUAL DIFF: NO
ALBUMIN SERPL-MCNC: 1.7 G/DL (ref 3.4–5)
ALBUMIN/GLOB SERPL: 0.3 {RATIO} (ref 1–2.7)
ALP SERPL-CCNC: 215 U/L (ref 46–116)
ALT SERPL-CCNC: 35 U/L (ref 12–78)
AST SERPL-CCNC: 30 U/L (ref 15–37)
BASOPHILS NFR BLD AUTO: 0.5 % (ref 0–2)
BILIRUB SERPL-MCNC: 0.6 MG/DL (ref 0.2–1)
BUN SERPL-MCNC: 18 MG/DL (ref 7–18)
CALCIUM SERPL-MCNC: 9.1 MG/DL (ref 8.5–10.1)
CHLORIDE SERPL-SCNC: 107 MMOL/L (ref 98–107)
CO2 SERPL-SCNC: 31 MMOL/L (ref 21–32)
CREAT SERPL-MCNC: 1.7 MG/DL (ref 0.55–1.3)
EOSINOPHIL NFR BLD AUTO: 0.1 % (ref 0–3)
ERYTHROCYTE [DISTWIDTH] IN BLOOD BY AUTOMATED COUNT: 15.4 % (ref 11.6–14.8)
GLOBULIN SER-MCNC: 4.9 G/DL
HCT VFR BLD CALC: 38.4 % (ref 42–52)
HGB BLD-MCNC: 12.7 G/DL (ref 14.2–18)
LYMPHOCYTES NFR BLD AUTO: 13.8 % (ref 20–45)
MCV RBC AUTO: 92 FL (ref 80–99)
MONOCYTES NFR BLD AUTO: 4.1 % (ref 1–10)
NEUTROPHILS NFR BLD AUTO: 81.6 % (ref 45–75)
PLATELET # BLD: 235 K/UL (ref 150–450)
POTASSIUM SERPL-SCNC: 4.4 MMOL/L (ref 3.5–5.1)
RBC # BLD AUTO: 4.18 M/UL (ref 4.7–6.1)
SODIUM SERPL-SCNC: 145 MMOL/L (ref 136–145)
WBC # BLD AUTO: 15.1 K/UL (ref 4.8–10.8)

## 2020-10-31 RX ADMIN — DEXTROSE MONOHYDRATE SCH MLS/HR: 50 INJECTION, SOLUTION INTRAVENOUS at 05:31

## 2020-10-31 RX ADMIN — SODIUM CHLORIDE SCH MLS/HR: 9 INJECTION, SOLUTION INTRAVENOUS at 09:32

## 2020-10-31 RX ADMIN — HEPARIN SODIUM SCH UNITS: 5000 INJECTION INTRAVENOUS; SUBCUTANEOUS at 09:35

## 2020-10-31 RX ADMIN — INSULIN ASPART SCH UNITS: 100 INJECTION, SOLUTION INTRAVENOUS; SUBCUTANEOUS at 17:06

## 2020-10-31 RX ADMIN — INSULIN ASPART SCH UNITS: 100 INJECTION, SOLUTION INTRAVENOUS; SUBCUTANEOUS at 20:59

## 2020-10-31 RX ADMIN — TAMSULOSIN HYDROCHLORIDE SCH MG: 0.4 CAPSULE ORAL at 09:35

## 2020-10-31 RX ADMIN — DEXTROSE MONOHYDRATE SCH MLS/HR: 50 INJECTION, SOLUTION INTRAVENOUS at 21:41

## 2020-10-31 RX ADMIN — INSULIN ASPART SCH UNITS: 100 INJECTION, SOLUTION INTRAVENOUS; SUBCUTANEOUS at 11:30

## 2020-10-31 RX ADMIN — TAMSULOSIN HYDROCHLORIDE SCH MG: 0.4 CAPSULE ORAL at 20:33

## 2020-10-31 RX ADMIN — DEXTROSE MONOHYDRATE SCH MLS/HR: 50 INJECTION, SOLUTION INTRAVENOUS at 15:20

## 2020-10-31 RX ADMIN — INSULIN ASPART SCH UNITS: 100 INJECTION, SOLUTION INTRAVENOUS; SUBCUTANEOUS at 05:39

## 2020-10-31 RX ADMIN — HEPARIN SODIUM SCH UNITS: 5000 INJECTION INTRAVENOUS; SUBCUTANEOUS at 20:35

## 2020-10-31 NOTE — NUR
NURSE NOTES:

suctioned pt multiple times but he is still very congested.  RT will deep suctioned pt.

## 2020-10-31 NOTE — INTERNAL MED PROGRESS NOTE
Subjective


Date of Service:  Oct 31, 2020


Physician Name


Benito Hearn


Attending Physician


nAdrei Cancino MD





Current Medications








 Medications


  (Trade)  Dose


 Ordered  Sig/Miky


 Route


 PRN Reason  Start Time


 Stop Time Status Last Admin


Dose Admin


 


 Acetaminophen


  (Tylenol)  650 mg  Q4H  PRN


 ORAL


 Temp >100.5  10/23/20 20:30


 11/22/20 20:29  10/30/20 23:53





 


 Dextrose


  (Dextrose 50%)  50 ml  Q30M  PRN


 IV


 Hypoglycemia  10/23/20 22:45


 1/21/21 22:44   





 


 Heparin Sodium


  (Porcine)


  (Heparin 5000


 units/ml)  5,000 units  EVERY 12  HOURS


 SUBQ


   10/23/20 21:00


 12/7/20 20:59  10/31/20 09:35





 


 Insulin Aspart


  (NovoLOG)    BEFORE MEALS AND  HS


 SUBQ


   10/24/20 06:30


 1/22/21 06:29  10/31/20 11:30





 


 Nitroglycerin


  (Ntg)  0.4 mg  Q5M  PRN


 SL


 Prn Chest Pain  10/23/20 20:30


 11/22/20 20:29   





 


 Ondansetron HCl


  (Zofran)  4 mg  Q6H  PRN


 IVP


 Nausea & Vomiting  10/23/20 20:30


 11/22/20 20:29   





 


 Piperacillin Sod/


 Tazobactam Sod


 3.375 gm/Sodium


 Chloride  110 ml @ 


 27.5 mls/hr  EVERY 8  HOURS


 IVPB


   10/30/20 14:00


 11/4/20 13:59  10/31/20 15:20





 


 Polyethylene


 Glycol


  (Miralax)  17 gm  DAILYPRN  PRN


 ORAL


 Constipation  10/23/20 20:30


 11/22/20 20:29   





 


 Promethazine HCl/


 Codeine


  (Phenergan with


 Codeine)  5 ml  Q4H  PRN


 ORAL


 For Cough  10/23/20 20:30


 11/22/20 20:29   





 


 Tamsulosin HCl


  (Flomax)  0.4 mg  DAILY


 ORAL


   10/24/20 09:00


 11/23/20 08:59  10/31/20 09:35





 


 Vancomycin HCl


  (Vanco pharmacy


 to dose)  1 ea  DAILY  PRN


 MISC


 Per rx protocol  10/23/20 20:30


 11/22/20 20:29   





 


 Vancomycin HCl 1


 gm/Sodium Chloride  275 ml @ 


 183.708


 mls/hr  Q24H


 IVPB


   10/28/20 10:00


 11/2/20 09:59  10/31/20 09:32











Allergies:  


Coded Allergies:  


     No Known Allergies (Unverified , 8/20/12)


ROS Limited/Unobtainable:  Yes


Subjective


74 YO M admitted with shortness of breath.  Now pneumonia; previously COVID 

positive.  Cover for Int kierra-Dr Cancino.





Objective





Last Vital Signs








  Date Time  Temp Pulse Resp B/P (MAP) Pulse Ox O2 Delivery O2 Flow Rate FiO2


 


10/31/20 15:50  90      


 


10/31/20 12:00       15.0 100


 


10/31/20 12:00 97.0  22 115/59 (77) 95   


 


10/31/20 12:00      Non-Rebreather  











Laboratory Tests








Test


 10/30/20


18:13 10/30/20


20:36 10/31/20


00:27 10/31/20


04:00


 


POC Whole Blood Glucose


 151 MG/DL


()  H Pending  


 169 MG/DL


()  H 





 


White Blood Count


 


 


 


 15.1 K/UL


(4.8-10.8)  H


 


Red Blood Count


 


 


 


 4.18 M/UL


(4.70-6.10)  L


 


Hemoglobin


 


 


 


 12.7 G/DL


(14.2-18.0)  L


 


Hematocrit


 


 


 


 38.4 %


(42.0-52.0)  L


 


Mean Corpuscular Volume    92 FL (80-99)  


 


Mean Corpuscular Hemoglobin


 


 


 


 30.3 PG


(27.0-31.0)


 


Mean Corpuscular Hemoglobin


Concent 


 


 


 32.9 G/DL


(32.0-36.0)


 


Red Cell Distribution Width


 


 


 


 15.4 %


(11.6-14.8)  H


 


Platelet Count


 


 


 


 235 K/UL


(150-450)


 


Mean Platelet Volume


 


 


 


 9.7 FL


(6.5-10.1)


 


Neutrophils (%) (Auto)


 


 


 


 81.6 %


(45.0-75.0)  H


 


Lymphocytes (%) (Auto)


 


 


 


 13.8 %


(20.0-45.0)  L


 


Monocytes (%) (Auto)


 


 


 


 4.1 %


(1.0-10.0)


 


Eosinophils (%) (Auto)


 


 


 


 0.1 %


(0.0-3.0)


 


Basophils (%) (Auto)


 


 


 


 0.5 %


(0.0-2.0)


 


Sodium Level


 


 


 


 145 MMOL/L


(136-145)


 


Potassium Level


 


 


 


 4.4 MMOL/L


(3.5-5.1)


 


Chloride Level


 


 


 


 107 MMOL/L


()


 


Carbon Dioxide Level


 


 


 


 31 MMOL/L


(21-32)


 


Blood Urea Nitrogen


 


 


 


 18 mg/dL


(7-18)


 


Creatinine


 


 


 


 1.7 MG/DL


(0.55-1.30)  H


 


Estimat Glomerular Filtration


Rate 


 


 


 39.3 mL/min


(>60)


 


Glucose Level


 


 


 


 197 MG/DL


()  H


 


Calcium Level


 


 


 


 9.1 MG/DL


(8.5-10.1)


 


Total Bilirubin


 


 


 


 0.6 MG/DL


(0.2-1.0)


 


Aspartate Amino Transf


(AST/SGOT) 


 


 


 30 U/L (15-37)





 


Alanine Aminotransferase


(ALT/SGPT) 


 


 


 35 U/L (12-78)





 


Alkaline Phosphatase


 


 


 


 215 U/L


()  H


 


Total Protein


 


 


 


 6.6 G/DL


(6.4-8.2)


 


Albumin


 


 


 


 1.7 G/DL


(3.4-5.0)  L


 


Globulin    4.9 g/dL  


 


Albumin/Globulin Ratio


 


 


 


 0.3 (1.0-2.7)


L


 


Test


 10/31/20


05:32 


 


 





 


POC Whole Blood Glucose Pending     











Microbiology








 Date/Time


Source Procedure


Growth Status





 


 10/30/20 22:00


Sputum Gram Stain - Final Resulted


 


 10/30/20 22:00


Sputum Sputum Culture


Pending Resulted

















Intake and Output  


 


 10/30/20 10/31/20





 19:00 07:00


 


Intake Total 60 ml 160 ml


 


Output Total 650 ml 320 ml


 


Balance -590 ml -160 ml


 


  


 


Tube Feeding 60 ml 60 ml


 


Blood Product  100 ml


 


Output Urine Total 650 ml 320 ml








Objective


PHYSICAL EXAMINATION:


GENERAL:  The patient awake with deep stimuli, open his eyes, however,


cannot follow commands.  The patient is on a Ventimask at this time,


chronically ill-appearing.


HEAD AND NECK:  Pupils are equal and reactive to light.  Anicteric.


NECK:  Supple.  No JVD.


LUNGS:  Good air entry.  No wheezing or rhonchi, however the patient has a


coarse breath sounds.  Decreased air in bases.


HEART:  S1, S2.  Regular rhythm.  Distant heart sounds.  No murmur or


gallop.


ABDOMEN:  Soft, nondistended, nontender.  Positive bowel sounds.


EXTREMITIES:  No cyanosis, clubbing, or edema.


NEUROLOGIC:  Very limited secondary to the patient's status, cannot follow


commands.  Opens his eyes with deep stimuli and moving extremities


spontaneously.





Assessment/Plan


Assessment/Plan


ASSESSMENT:


1. Acute hypoxemic respiratory failure, most likely secondary to


pneumonia and sepsis.


2. Sepsis secondary to urinary tract infection and pneumonia.


3. pneumonia=MRSA


4. Acute kidney injury on chronic renal insufficiency.


5. Dehydration.


6. History of chronic congestive heart failure.


7. Diabetes type 2.


8. Dyslipidemia.


9. Hypertension.


10. Alzheimer's disease.


11. COVID 19 previous positive





PLAN:


1.  Telemetry status


2.  Dr. Sandoval,=Pulmonary Critical Care


3.  Dr. Garcia = Inf Dis.


4.  IV= D5W due to the hypernatremia and dehydration.


5.  antibiotics = vancomycin and zosyn


6.  Code status is full code.


7.    DVT prophylaxis is heparin subcutaneous.











Benito Hearn MD               Oct 31, 2020 16:41

## 2020-10-31 NOTE — NUR
NURSE HAND-OFF REPORT: 



Important Events on Shift:[NA]

Patient Status: [Full code]

Diet: [Glucerna 1.2]



Pending Orders: []

Pending Results/Labs:[]

Pending MD notification:[]



Latest Vital Signs: Temperature 98.1 , Pulse 103 , B/P 117 /76 , Respiratory Rate 30 , O2 
SAT 95 , Non-Rebreather, O2 Flow Rate 15.0 .  

Vital Sign Comment: []



EKG Rhythm: Sinus Tachycardia

Rhythm change?: N 

MD Notified?: Y -Left Message for Dr. Cancino, No cardio on the case. 

MD Response: 



Latest Mejia Fall Score: 50  

Fall Risk: High Risk 

Safety Measures: Call light Within Reach, Bed Alarm Zone 1, Side Rails Side Rails x3, Bed 
position Low and Locked.

Fall Precautions: 

Yellow Socks

Yellow Gown

Door Sign

Patient Fall Education



Report given to [LITA Olmedo].

## 2020-10-31 NOTE — NUR
NURSE HAND-OFF REPORT: 



Important Events on Shift: pt needs to be suctioned constantly, he will get tachycardic 
otherwise

Patient Status: full 

Diet: [place] tube feeding



Pending Orders: vanco trough

Pending Results/Labs:

Pending MD notification:



Latest Vital Signs: Temperature 98.1 , Pulse 91 , B/P 94 /58 , Respiratory Rate 20 , O2 SAT 
97 , Non-Rebreather, O2 Flow Rate 15.0 .  

Vital Sign Comment: 



EKG Rhythm: Sinus Rhythm

Rhythm change?: N 

MD Notified?: Y -Left Message for Dr. Cancino, No cardio on the case. 

MD Response: 



Latest Mejia Fall Score: 50  

Fall Risk: High Risk 

Safety Measures: Call light Within Reach, Bed Alarm Zone 1, Side Rails Side Rails x3, Bed 
position Low and Locked.

Fall Precautions:   y 

Yellow Socks   y

Yellow Gown    y 

Door Sign

Patient Fall Education 



Report given to Laine/rn.

## 2020-10-31 NOTE — NEPHROLOGY PROGRESS NOTE
Assessment/Plan


Problem List:  


(1) Dehydration


(2) JANES (acute kidney injury)


(3) Renal failure (ARF), acute on chronic


(4) Hypoxia


(5) Acute encephalopathy


(6) Electrolyte imbalance


Assessment





75-year-old male is admitted with acute hypoxic respiratory failure most likely 

secondary to pneumonia and sepsis, UTI.


Acute on chronic renal failure


Dehydration


Electrolyte imbalances, hypernatremia


Hypoalbuminemia


Diabetes type 2


History of congestive heart failure


Hypertension


Hyperlipemia


Alzheimer's


Previous COVID-19 infection in September 2020


Plan


October 31: Patient on nonrebreather mask.  Transfer to telemetry when seen this

morning.  Labs noted.  Continue per consultants.  Serum creatinine dylan to 1.7. 

Continue to monitor renal parameters.  Continue to monitor vancomycin level


October 30: Patient is not doing well clinically.  Mild respiratory distress.  

ABG noted.  Somewhat hypoxic.  CBC and chemistry panel ordered.  Discussed with 

ILTA Garcia.  Will defer to pulmonary management to specialist.  Continue per ID.

 Renal parameters remained stable as of October 29.


October 29: Labs reviewed.  Renal parameters stable.  Continue per consultants.


October 28: Labs reviewed.  Potassium via NG tube ordered.  IV fluids stopped.  

Continue per consultants.


October 27: Labs reviewed.  Low potassium and low phosphorus replaced.  Continue

per consultants.  Remains stable from renal standpoint of view.


October 26: Patient remains n.p.o.  IV fluid down to 50 cc an hour.  Potassium 

supplement intravenously ordered.  Continue per consultants.





Previously:


Patient is n.p.o., will continue on IV fluid of D5W 75 cc an hour


We will monitor electrolytes and renal parameters


Avoid nephrotoxic's


Start p.o. when he clears by speech therapist, meanwhile aspiration precautions


Keep the blood pressure and blood sugar in check


Per orders





Subjective


ROS Limited/Unobtainable:  Yes





Objective


Objective





Last 24 Hour Vital Signs








  Date Time  Temp Pulse Resp B/P (MAP) Pulse Ox O2 Delivery O2 Flow Rate FiO2


 


10/31/20 16:00       15.0 100


 


10/31/20 16:00 98.1 91 20 94/58 (70) 97   


 


10/31/20 16:00      Non-Rebreather 15.0 


 


10/31/20 15:50  90      


 


10/31/20 12:00       15.0 100


 


10/31/20 12:00 97.0 96 22 115/59 (77) 95   


 


10/31/20 12:00      Non-Rebreather 15.0 


 


10/31/20 12:00  102      


 


10/31/20 08:00      Non-Rebreather 15.0 


 


10/31/20 08:00 97.2 92 18 114/67 (83) 95   


 


10/31/20 08:00       15.0 100


 


10/31/20 08:00  95      


 


10/31/20 07:15     96 Non-Rebreather 15.0 100


 


10/31/20 04:00 98.1 98 30 117/76 (90) 95   


 


10/31/20 04:00  103      


 


10/31/20 04:00      Non-Rebreather 15.0 


 


10/31/20 04:00       15.0 100


 


10/31/20 00:23 100.3       


 


10/31/20 00:00 100.9 135 28 108/86 (93) 96   


 


10/31/20 00:00      Non-Rebreather 15.0 


 


10/31/20 00:00       15.0 100


 


10/31/20 00:00  124      


 


10/30/20 20:00  131      


 


10/30/20 20:00       15.0 100


 


10/30/20 20:00 99.3 137 26 111/73 (86) 96   

















Intake and Output  


 


 10/30/20 10/31/20





 19:00 07:00


 


Intake Total 60 ml 160 ml


 


Output Total 650 ml 320 ml


 


Balance -590 ml -160 ml


 


  


 


Tube Feeding 60 ml 60 ml


 


Blood Product  100 ml


 


Output Urine Total 650 ml 320 ml











Current Medications








 Medications


  (Trade)  Dose


 Ordered  Sig/Miky


 Route


 PRN Reason  Start Time


 Stop Time Status Last Admin


Dose Admin


 


 Acetaminophen


  (Tylenol)  650 mg  Q4H  PRN


 ORAL


 Temp >100.5  10/23/20 20:30


 11/22/20 20:29  10/30/20 23:53





 


 Dextrose


  (Dextrose 50%)  50 ml  Q30M  PRN


 IV


 Hypoglycemia  10/23/20 22:45


 1/21/21 22:44   





 


 Heparin Sodium


  (Porcine)


  (Heparin 5000


 units/ml)  5,000 units  EVERY 12  HOURS


 SUBQ


   10/23/20 21:00


 12/7/20 20:59  10/31/20 09:35





 


 Insulin Aspart


  (NovoLOG)    BEFORE MEALS AND  HS


 SUBQ


   10/24/20 06:30


 1/22/21 06:29  10/31/20 17:06





 


 Nitroglycerin


  (Ntg)  0.4 mg  Q5M  PRN


 SL


 Prn Chest Pain  10/23/20 20:30


 11/22/20 20:29   





 


 Ondansetron HCl


  (Zofran)  4 mg  Q6H  PRN


 IVP


 Nausea & Vomiting  10/23/20 20:30


 11/22/20 20:29   





 


 Piperacillin Sod/


 Tazobactam Sod


 3.375 gm/Sodium


 Chloride  110 ml @ 


 27.5 mls/hr  EVERY 8  HOURS


 IVPB


   10/30/20 14:00


 11/4/20 13:59  10/31/20 15:20





 


 Polyethylene


 Glycol


  (Miralax)  17 gm  DAILYPRN  PRN


 ORAL


 Constipation  10/23/20 20:30


 11/22/20 20:29   





 


 Promethazine HCl/


 Codeine


  (Phenergan with


 Codeine)  5 ml  Q4H  PRN


 ORAL


 For Cough  10/23/20 20:30


 11/22/20 20:29   





 


 Tamsulosin HCl


  (Flomax)  0.4 mg  DAILY


 ORAL


   10/24/20 09:00


 11/23/20 08:59  10/31/20 09:35





 


 Vancomycin HCl


  ([facility] pharmacy


 to dose)  1 ea  DAILY  PRN


 MISC


 Per rx protocol  10/23/20 20:30


 11/22/20 20:29   





 


 Vancomycin HCl 1


 gm/Sodium Chloride  275 ml @ 


 183.708


 mls/hr  Q24H


 IVPB


   10/28/20 10:00


 11/2/20 09:59  10/31/20 09:32








Laboratory Tests


10/30/20 20:36: POC Whole Blood Glucose [Pending]


10/31/20 00:27: POC Whole Blood Glucose 169H


10/31/20 04:00: 


White Blood Count 15.1H, Red Blood Count 4.18L, Hemoglobin 12.7L, Hematocrit 

38.4L, Mean Corpuscular Volume 92, Mean Corpuscular Hemoglobin 30.3, Mean 

Corpuscular Hemoglobin Concent 32.9, Red Cell Distribution Width 15.4H, Platelet

 Count 235, Mean Platelet Volume 9.7, Neutrophils (%) (Auto) 81.6H, Lymphocytes 

(%) (Auto) 13.8L, Monocytes (%) (Auto) 4.1, Eosinophils (%) (Auto) 0.1, 

Basophils (%) (Auto) 0.5, Sodium Level 145, Potassium Level 4.4, Chloride Level 

107, Carbon Dioxide Level 31, Blood Urea Nitrogen 18, Creatinine 1.7H, Estimat 

Glomerular Filtration Rate 39.3, Glucose Level 197H, Calcium Level 9.1, Total 

Bilirubin 0.6, Aspartate Amino Transf (AST/SGOT) 30, Alanine Aminotransferase 

(ALT/SGPT) 35, Alkaline Phosphatase 215H, Total Protein 6.6, Albumin 1.7L, 

Globulin 4.9, Albumin/Globulin Ratio 0.3L


10/31/20 05:32: POC Whole Blood Glucose [Pending]


Height (Feet):  5


Height (Inches):  4.00


Weight (Pounds):  130


General Appearance:  mild distress


EENT:  other - On nonrebreather mask


Cardiovascular:  tachycardia


Respiratory/Chest:  decreased breath sounds


Abdomen:  distended











Seven Tobar MD            Oct 31, 2020 19:38

## 2020-10-31 NOTE — NUR
NURSE NOTES:

RECEIVED REPORT FROM LITA AYALA. PT IN BED AWAKE, EYES OPEN, ALERT/ORIENTED X1. ON O2 VIA 
NON-REBREATHER AT 15L/MIN, SATING AT 97-99%. CARDIAC MONITOR IN PALACE. NGT IN PLACE & 
PATENT RUNNING GLUCERNA 1.2 AT 60CC/HR, HEAD OF BED ELEVATED, ASPIRATION PRECAUTION F/C IN 
PLACE & PATENT DRAINING TO GRAVITY YELLOW URINE. BILATERAL SOFT WRIST RESTAINTS APPLIED, NO 
SKIN BREAK DOWN, NO SWELLING, BILATERAL PULSES PALATABL. BED IN LOW POISTION & LOCKED, SIDE 
RAILS UP X2. BED ALARM ON,. CALL LIGHT WITH IN REACH

## 2020-10-31 NOTE — NUR
NURSE NOTES:

pt. in bed opens eyes but is non verbal.  pt has NJ tube feeding, NJ tube is very secured 
taped to his skin.  pt on  non debreather, and has restrains on.  pt on cardiac monitor no 
signs of cardiac or respiratory distress at this time. Bed locked and in lowest position, 
call light within reach. Side rails up and bed alarm on for safety. Will continue to monitor 
pt

## 2020-10-31 NOTE — CARDIAC ELECTROPHYSIOLOGY PN
Assessment/Plan


Assessment/Plan


1. Accelerated junctional rhythm. Not bradycardic. Ruled out for MI


     Echo showed ejection fraction of 55%.


2. Sepsis, on broad-spectrum IV antibiotic.


3. Respiratory failure, on antibiotic and NRBFM.


4. Dehydration.


5. Acute renal failure.


6. Electrolyte imbalance.


7. History of CHF, but echocardiogram showed normal left ventricular systolic 

function.


8. History of previous COVID infection in September 2020 and Active Covid now in

isolation


9. Dementia.





DW RN





Subjective


Subjective


In SR in NAD in Covid isolation.On NRB FM





Objective





Last 24 Hour Vital Signs








  Date Time  Temp Pulse Resp B/P (MAP) Pulse Ox O2 Delivery O2 Flow Rate FiO2


 


10/31/20 12:00       15.0 100


 


10/31/20 12:00 97.0 96 22 115/59 (77) 95   


 


10/31/20 08:00      Non-Rebreather 15.0 


 


10/31/20 08:00 97.2 92 18 114/67 (83) 95   


 


10/31/20 08:00       15.0 100


 


10/31/20 08:00  95      


 


10/31/20 07:15     96 Non-Rebreather 15.0 100


 


10/31/20 04:00 98.1 98 30 117/76 (90) 95   


 


10/31/20 04:00  103      


 


10/31/20 04:00      Non-Rebreather 15.0 


 


10/31/20 04:00       15.0 100


 


10/31/20 00:23 100.3       


 


10/31/20 00:00 100.9 135 28 108/86 (93) 96   


 


10/31/20 00:00      Non-Rebreather 15.0 


 


10/31/20 00:00       15.0 100


 


10/31/20 00:00  124      


 


10/30/20 20:00  131      


 


10/30/20 20:00       15.0 100


 


10/30/20 20:00 99.3 137 26 111/73 (86) 96   


 


10/30/20 18:50     95 Non-Rebreather 15.0 100


 


10/30/20 16:00       15.0 100


 


10/30/20 16:00 98.7 115 20 120/71 (87) 97   


 


10/30/20 16:00  110      

















Intake and Output  


 


 10/30/20 10/31/20





 19:00 07:00


 


Intake Total 60 ml 160 ml


 


Output Total 650 ml 320 ml


 


Balance -590 ml -160 ml


 


  


 


Tube Feeding 60 ml 60 ml


 


Blood Product  100 ml


 


Output Urine Total 650 ml 320 ml











Laboratory Tests








Test


 10/30/20


18:13 10/30/20


20:36 10/31/20


00:27 10/31/20


04:00


 


POC Whole Blood Glucose


 151 MG/DL


()  H Pending  


 169 MG/DL


()  H 





 


White Blood Count


 


 


 


 15.1 K/UL


(4.8-10.8)  H


 


Red Blood Count


 


 


 


 4.18 M/UL


(4.70-6.10)  L


 


Hemoglobin


 


 


 


 12.7 G/DL


(14.2-18.0)  L


 


Hematocrit


 


 


 


 38.4 %


(42.0-52.0)  L


 


Mean Corpuscular Volume    92 FL (80-99)  


 


Mean Corpuscular Hemoglobin


 


 


 


 30.3 PG


(27.0-31.0)


 


Mean Corpuscular Hemoglobin


Concent 


 


 


 32.9 G/DL


(32.0-36.0)


 


Red Cell Distribution Width


 


 


 


 15.4 %


(11.6-14.8)  H


 


Platelet Count


 


 


 


 235 K/UL


(150-450)


 


Mean Platelet Volume


 


 


 


 9.7 FL


(6.5-10.1)


 


Neutrophils (%) (Auto)


 


 


 


 81.6 %


(45.0-75.0)  H


 


Lymphocytes (%) (Auto)


 


 


 


 13.8 %


(20.0-45.0)  L


 


Monocytes (%) (Auto)


 


 


 


 4.1 %


(1.0-10.0)


 


Eosinophils (%) (Auto)


 


 


 


 0.1 %


(0.0-3.0)


 


Basophils (%) (Auto)


 


 


 


 0.5 %


(0.0-2.0)


 


Sodium Level


 


 


 


 145 MMOL/L


(136-145)


 


Potassium Level


 


 


 


 4.4 MMOL/L


(3.5-5.1)


 


Chloride Level


 


 


 


 107 MMOL/L


()


 


Carbon Dioxide Level


 


 


 


 31 MMOL/L


(21-32)


 


Blood Urea Nitrogen


 


 


 


 18 mg/dL


(7-18)


 


Creatinine


 


 


 


 1.7 MG/DL


(0.55-1.30)  H


 


Estimat Glomerular Filtration


Rate 


 


 


 39.3 mL/min


(>60)


 


Glucose Level


 


 


 


 197 MG/DL


()  H


 


Calcium Level


 


 


 


 9.1 MG/DL


(8.5-10.1)


 


Total Bilirubin


 


 


 


 0.6 MG/DL


(0.2-1.0)


 


Aspartate Amino Transf


(AST/SGOT) 


 


 


 30 U/L (15-37)





 


Alanine Aminotransferase


(ALT/SGPT) 


 


 


 35 U/L (12-78)





 


Alkaline Phosphatase


 


 


 


 215 U/L


()  H


 


Total Protein


 


 


 


 6.6 G/DL


(6.4-8.2)


 


Albumin


 


 


 


 1.7 G/DL


(3.4-5.0)  L


 


Globulin    4.9 g/dL  


 


Albumin/Globulin Ratio


 


 


 


 0.3 (1.0-2.7)


L


 


Test


 10/31/20


05:32 


 


 





 


POC Whole Blood Glucose Pending     











Microbiology








 Date/Time


Source Procedure


Growth Status





 


 10/30/20 22:00


Sputum Gram Stain - Final Resulted


 


 10/30/20 22:00


Sputum Sputum Culture


Pending Resulted








Objective





HEAD AND NECK:  No JVD. NRB FM


LUNGS:  Coarse rhonchi.


CARDIOVASCULAR:   Irregular S1 and S2 with no gallop.


ABDOMEN:  Soft.


EXTREMITIES:  No pitting edema.











Kvng Edmond MD               Oct 31, 2020 15:16

## 2020-10-31 NOTE — INFECTIOUS DISEASES PROG NOTE
Assessment/Plan


76yo M with:





Fever at SNF, 100.2 max here; SP


Leukocytosis to 12- SP


Acute hypoxic resp failure, on NRB mask> 4l NC; back on NRB, desaturation 10/29


Pneumonia- 


hx of COVID19 PNA 9/9/20


   10/23 BCx NTD


   UA 15-20 WBC, UCx Neg


   10/23 COVID rapid neg; 10/26 rapid COVID PCR + (from prior infection)- not 

new infection


   Flu A/B neg


   CXR: L pna


   Resp cx MRSA





CKD, Cr 1.7





[facility] resident ([facility])


Non-verbal


VRE and MRSA colonized





Plan:


Cont vanco #9/10 for MRSA PNA and Zosyn #2 given resp decompensation


   -10/26 SP Cefepime #4


   -10/24 SP Flagyl #








Monitor CBC/CMP


Monitor resp status


Monitor temp curve and hemodynamics


off COVID isolation- positive covid test represents residual dead virus from 

infection on 9/2020


sp cx


CXR





D/w RN





Thank you for this consult. [facility] will continue to follow.





Subjective


Allergies:  


Coded Allergies:  


     No Known Allergies (Unverified , 8/20/12)


Low grade fevers last night


WBCs up to 15


On 15L O2


Sputum Cx still pending





Objective





Last 24 Hour Vital Signs








  Date Time  Temp Pulse Resp B/P (MAP) Pulse Ox O2 Delivery O2 Flow Rate FiO2


 


10/31/20 08:00 97.2 92 18 114/67 (83) 95   


 


10/31/20 07:15     96 Non-Rebreather 15.0 100


 


10/31/20 04:00 98.1 98 30 117/76 (90) 95   


 


10/31/20 04:00  103      


 


10/31/20 04:00      Non-Rebreather 15.0 


 


10/31/20 04:00       15.0 100


 


10/31/20 00:23 100.3       


 


10/31/20 00:00 100.9 135 28 108/86 (93) 96   


 


10/31/20 00:00      Non-Rebreather 15.0 


 


10/31/20 00:00       15.0 100


 


10/31/20 00:00  124      


 


10/30/20 20:00  131      


 


10/30/20 20:00       15.0 100


 


10/30/20 20:00 99.3 137 26 111/73 (86) 96   


 


10/30/20 18:50     95 Non-Rebreather 15.0 100


 


10/30/20 16:00       15.0 100


 


10/30/20 16:00 98.7 115 20 120/71 (87) 97   


 


10/30/20 16:00  110      


 


10/30/20 12:00  113      


 


10/30/20 11:59       10.0 50


 


10/30/20 11:58 96.8 101 21 137/77 (97) 84   


 


10/30/20 09:45     94 Non-Rebreather 15.0 100








Height (Feet):  5


Height (Inches):  4.00


Weight (Pounds):  130


Gen: NAD


CV: RRR


Pulm: Coarse B/L, lots of oral secretions


Abd: Soft, NTND


Neuro: Awake





Microbiology








 Date/Time


Source Procedure


Growth Status





 


 10/30/20 22:00


Sputum Gram Stain - Final Resulted


 


 10/30/20 22:00


Sputum Sputum Culture


Pending Resulted











Laboratory Tests








Test


 10/30/20


09:46 10/30/20


11:00 10/30/20


12:09 10/30/20


13:05


 


Arterial Blood pH


 7.467


(7.350-7.450) 


 


 7.462


(7.350-7.450)


 


Arterial Blood Partial


Pressure CO2 38.5 mmHg


(35.0-45.0) 


 


 41.3 mmHg


(35.0-45.0)


 


Arterial Blood Partial


Pressure O2 64.8 mmHg


(75.0-100.0)  L 


 


 140.4 mmHg


(75.0-100.0)  H


 


Arterial Blood HCO3


 27.2 mmol/L


(22.0-26.0)  H 


 


 28.8 mmol/L


(22.0-26.0)  H


 


Arterial Blood Oxygen


Saturation 93.5 %


()  L 


 


 99.0 %


()


 


Arterial Blood Base Excess 3.4 (-2-2)  H   4.6 (-2-2)  H


 


Jerod Test Positive     Positive  


 


White Blood Count


 


 14.1 K/UL


(4.8-10.8)  H 


 





 


Red Blood Count


 


 4.25 M/UL


(4.70-6.10)  L 


 





 


Hemoglobin


 


 12.7 G/DL


(14.2-18.0)  L 


 





 


Hematocrit


 


 39.6 %


(42.0-52.0)  L 


 





 


Mean Corpuscular Volume  93 FL (80-99)    


 


Mean Corpuscular Hemoglobin


 


 29.9 PG


(27.0-31.0) 


 





 


Mean Corpuscular Hemoglobin


Concent 


 32.1 G/DL


(32.0-36.0) 


 





 


Red Cell Distribution Width


 


 15.1 %


(11.6-14.8)  H 


 





 


Platelet Count


 


 253 K/UL


(150-450) 


 





 


Mean Platelet Volume


 


 9.8 FL


(6.5-10.1) 


 





 


Neutrophils (%) (Auto)


 


 84.2 %


(45.0-75.0)  H 


 





 


Lymphocytes (%) (Auto)


 


 10.6 %


(20.0-45.0)  L 


 





 


Monocytes (%) (Auto)


 


 4.5 %


(1.0-10.0) 


 





 


Eosinophils (%) (Auto)


 


 0.1 %


(0.0-3.0) 


 





 


Basophils (%) (Auto)


 


 0.5 %


(0.0-2.0) 


 





 


Sodium Level


 


 145 MMOL/L


(136-145) 


 





 


Potassium Level


 


 4.9 MMOL/L


(3.5-5.1) 


 





 


Chloride Level


 


 109 MMOL/L


()  H 


 





 


Carbon Dioxide Level


 


 34 MMOL/L


(21-32)  H 


 





 


Blood Urea Nitrogen


 


 10 mg/dL


(7-18) 


 





 


Creatinine


 


 1.2 MG/DL


(0.55-1.30) 


 





 


Estimat Glomerular Filtration


Rate 


 58.7 mL/min


(>60) 


 





 


Glucose Level


 


 148 MG/DL


()  H 


 





 


Calcium Level


 


 9.1 MG/DL


(8.5-10.1) 


 





 


Phosphorus Level


 


 3.8 MG/DL


(2.5-4.9) 


 





 


Magnesium Level


 


 2.2 MG/DL


(1.8-2.4) 


 





 


Total Bilirubin


 


 0.5 MG/DL


(0.2-1.0) 


 





 


Aspartate Amino Transf


(AST/SGOT) 


 47 U/L (15-37)


H 


 





 


Alanine Aminotransferase


(ALT/SGPT) 


 41 U/L (12-78)


 


 





 


Alkaline Phosphatase


 


 240 U/L


()  H 


 





 


C-Reactive Protein,


Quantitative 


 15.0 mg/dL


(0.00-0.90)  H 


 





 


Total Protein


 


 6.6 G/DL


(6.4-8.2) 


 





 


Albumin


 


 1.8 G/DL


(3.4-5.0)  L 


 





 


Globulin  4.8 g/dL    


 


Albumin/Globulin Ratio


 


 0.4 (1.0-2.7)


L 


 





 


POC Whole Blood Glucose


 


 


 148 MG/DL


()  H 





 


Test


 10/30/20


18:13 10/30/20


20:36 10/31/20


00:27 10/31/20


04:00


 


POC Whole Blood Glucose


 151 MG/DL


()  H Pending  


 169 MG/DL


()  H 





 


White Blood Count


 


 


 


 15.1 K/UL


(4.8-10.8)  H


 


Red Blood Count


 


 


 


 4.18 M/UL


(4.70-6.10)  L


 


Hemoglobin


 


 


 


 12.7 G/DL


(14.2-18.0)  L


 


Hematocrit


 


 


 


 38.4 %


(42.0-52.0)  L


 


Mean Corpuscular Volume    92 FL (80-99)  


 


Mean Corpuscular Hemoglobin


 


 


 


 30.3 PG


(27.0-31.0)


 


Mean Corpuscular Hemoglobin


Concent 


 


 


 32.9 G/DL


(32.0-36.0)


 


Red Cell Distribution Width


 


 


 


 15.4 %


(11.6-14.8)  H


 


Platelet Count


 


 


 


 235 K/UL


(150-450)


 


Mean Platelet Volume


 


 


 


 9.7 FL


(6.5-10.1)


 


Neutrophils (%) (Auto)


 


 


 


 81.6 %


(45.0-75.0)  H


 


Lymphocytes (%) (Auto)


 


 


 


 13.8 %


(20.0-45.0)  L


 


Monocytes (%) (Auto)


 


 


 


 4.1 %


(1.0-10.0)


 


Eosinophils (%) (Auto)


 


 


 


 0.1 %


(0.0-3.0)


 


Basophils (%) (Auto)


 


 


 


 0.5 %


(0.0-2.0)


 


Sodium Level


 


 


 


 145 MMOL/L


(136-145)


 


Potassium Level


 


 


 


 4.4 MMOL/L


(3.5-5.1)


 


Chloride Level


 


 


 


 107 MMOL/L


()


 


Carbon Dioxide Level


 


 


 


 31 MMOL/L


(21-32)


 


Blood Urea Nitrogen


 


 


 


 18 mg/dL


(7-18)


 


Creatinine


 


 


 


 1.7 MG/DL


(0.55-1.30)  H


 


Estimat Glomerular Filtration


Rate 


 


 


 39.3 mL/min


(>60)


 


Glucose Level


 


 


 


 197 MG/DL


()  H


 


Calcium Level


 


 


 


 9.1 MG/DL


(8.5-10.1)


 


Total Bilirubin


 


 


 


 0.6 MG/DL


(0.2-1.0)


 


Aspartate Amino Transf


(AST/SGOT) 


 


 


 30 U/L (15-37)





 


Alanine Aminotransferase


(ALT/SGPT) 


 


 


 35 U/L (12-78)





 


Alkaline Phosphatase


 


 


 


 215 U/L


()  H


 


Total Protein


 


 


 


 6.6 G/DL


(6.4-8.2)


 


Albumin


 


 


 


 1.7 G/DL


(3.4-5.0)  L


 


Globulin    4.9 g/dL  


 


Albumin/Globulin Ratio


 


 


 


 0.3 (1.0-2.7)


L


 


Test


 10/31/20


05:32 


 


 





 


POC Whole Blood Glucose Pending     











Current Medications








 Medications


  (Trade)  Dose


 Ordered  Sig/Miky


 Route


 PRN Reason  Start Time


 Stop Time Status Last Admin


Dose Admin


 


 Acetaminophen


  (Tylenol)  650 mg  Q4H  PRN


 ORAL


 Temp >100.5  10/23/20 20:30


 11/22/20 20:29  10/30/20 23:53





 


 Dextrose


  (Dextrose 50%)  50 ml  Q30M  PRN


 IV


 Hypoglycemia  10/23/20 22:45


 1/21/21 22:44   





 


 Heparin Sodium


  (Porcine)


  (Heparin 5000


 units/ml)  5,000 units  EVERY 12  HOURS


 SUBQ


   10/23/20 21:00


 12/7/20 20:59  10/30/20 21:06





 


 Insulin Aspart


  (NovoLOG)    BEFORE MEALS AND  HS


 SUBQ


   10/24/20 06:30


 1/22/21 06:29  10/31/20 05:39





 


 Nitroglycerin


  (Ntg)  0.4 mg  Q5M  PRN


 SL


 Prn Chest Pain  10/23/20 20:30


 11/22/20 20:29   





 


 Ondansetron HCl


  (Zofran)  4 mg  Q6H  PRN


 IVP


 Nausea & Vomiting  10/23/20 20:30


 11/22/20 20:29   





 


 Piperacillin Sod/


 Tazobactam Sod


 3.375 gm/Sodium


 Chloride  110 ml @ 


 27.5 mls/hr  EVERY 8  HOURS


 IVPB


   10/30/20 14:00


 11/4/20 13:59  10/31/20 05:31





 


 Polyethylene


 Glycol


  (Miralax)  17 gm  DAILYPRN  PRN


 ORAL


 Constipation  10/23/20 20:30


 11/22/20 20:29   





 


 Promethazine HCl/


 Codeine


  (Phenergan with


 Codeine)  5 ml  Q4H  PRN


 ORAL


 For Cough  10/23/20 20:30


 11/22/20 20:29   





 


 Tamsulosin HCl


  (Flomax)  0.4 mg  DAILY


 ORAL


   10/24/20 09:00


 11/23/20 08:59  10/30/20 08:54





 


 Vancomycin HCl


  (Vanco pharmacy


 to dose)  1 ea  DAILY  PRN


 MISC


 Per rx protocol  10/23/20 20:30


 11/22/20 20:29   





 


 Vancomycin HCl 1


 gm/Sodium Chloride  275 ml @ 


 183.708


 mls/hr  Q24H


 IVPB


   10/28/20 10:00


 11/2/20 09:59  10/30/20 08:58




















Heath Merrill MD            Oct 31, 2020 09:28

## 2020-11-01 VITALS — DIASTOLIC BLOOD PRESSURE: 80 MMHG | SYSTOLIC BLOOD PRESSURE: 103 MMHG

## 2020-11-01 VITALS — DIASTOLIC BLOOD PRESSURE: 87 MMHG | SYSTOLIC BLOOD PRESSURE: 93 MMHG

## 2020-11-01 VITALS — SYSTOLIC BLOOD PRESSURE: 91 MMHG | DIASTOLIC BLOOD PRESSURE: 56 MMHG

## 2020-11-01 VITALS — SYSTOLIC BLOOD PRESSURE: 92 MMHG | DIASTOLIC BLOOD PRESSURE: 56 MMHG

## 2020-11-01 VITALS — DIASTOLIC BLOOD PRESSURE: 87 MMHG | SYSTOLIC BLOOD PRESSURE: 110 MMHG

## 2020-11-01 VITALS — DIASTOLIC BLOOD PRESSURE: 57 MMHG | SYSTOLIC BLOOD PRESSURE: 105 MMHG

## 2020-11-01 LAB
ADD MANUAL DIFF: YES
ALBUMIN SERPL-MCNC: 1.4 G/DL (ref 3.4–5)
ALBUMIN/GLOB SERPL: 0.3 {RATIO} (ref 1–2.7)
ALP SERPL-CCNC: 194 U/L (ref 46–116)
ALT SERPL-CCNC: 29 U/L (ref 12–78)
ANION GAP SERPL CALC-SCNC: 7 MMOL/L (ref 5–15)
AST SERPL-CCNC: 24 U/L (ref 15–37)
BILIRUB SERPL-MCNC: 0.5 MG/DL (ref 0.2–1)
BUN SERPL-MCNC: 24 MG/DL (ref 7–18)
CALCIUM SERPL-MCNC: 8.6 MG/DL (ref 8.5–10.1)
CHLORIDE SERPL-SCNC: 111 MMOL/L (ref 98–107)
CO2 SERPL-SCNC: 31 MMOL/L (ref 21–32)
CREAT SERPL-MCNC: 1.3 MG/DL (ref 0.55–1.3)
ERYTHROCYTE [DISTWIDTH] IN BLOOD BY AUTOMATED COUNT: 14.7 % (ref 11.6–14.8)
GLOBULIN SER-MCNC: 5.2 G/DL
HCT VFR BLD CALC: 32.2 % (ref 42–52)
HGB BLD-MCNC: 10.4 G/DL (ref 14.2–18)
MCV RBC AUTO: 95 FL (ref 80–99)
PHOSPHATE SERPL-MCNC: 3.2 MG/DL (ref 2.5–4.9)
PLATELET # BLD: 224 K/UL (ref 150–450)
POTASSIUM SERPL-SCNC: 3.9 MMOL/L (ref 3.5–5.1)
RBC # BLD AUTO: 3.41 M/UL (ref 4.7–6.1)
SODIUM SERPL-SCNC: 149 MMOL/L (ref 136–145)
WBC # BLD AUTO: 14.5 K/UL (ref 4.8–10.8)

## 2020-11-01 RX ADMIN — DEXTROSE MONOHYDRATE SCH MLS/HR: 50 INJECTION, SOLUTION INTRAVENOUS at 05:30

## 2020-11-01 RX ADMIN — DEXTROSE MONOHYDRATE SCH MLS/HR: 50 INJECTION, SOLUTION INTRAVENOUS at 21:41

## 2020-11-01 RX ADMIN — TAMSULOSIN HYDROCHLORIDE SCH MG: 0.4 CAPSULE ORAL at 09:45

## 2020-11-01 RX ADMIN — HEPARIN SODIUM SCH UNITS: 5000 INJECTION INTRAVENOUS; SUBCUTANEOUS at 09:42

## 2020-11-01 RX ADMIN — SODIUM CHLORIDE SCH MLS/HR: 9 INJECTION, SOLUTION INTRAVENOUS at 09:40

## 2020-11-01 RX ADMIN — HEPARIN SODIUM SCH UNITS: 5000 INJECTION INTRAVENOUS; SUBCUTANEOUS at 20:44

## 2020-11-01 RX ADMIN — INSULIN ASPART SCH UNITS: 100 INJECTION, SOLUTION INTRAVENOUS; SUBCUTANEOUS at 20:47

## 2020-11-01 RX ADMIN — INSULIN ASPART SCH UNITS: 100 INJECTION, SOLUTION INTRAVENOUS; SUBCUTANEOUS at 06:30

## 2020-11-01 RX ADMIN — TAMSULOSIN HYDROCHLORIDE SCH MG: 0.4 CAPSULE ORAL at 18:22

## 2020-11-01 RX ADMIN — DEXTROSE MONOHYDRATE SCH MLS/HR: 50 INJECTION, SOLUTION INTRAVENOUS at 13:11

## 2020-11-01 RX ADMIN — INSULIN ASPART SCH UNITS: 100 INJECTION, SOLUTION INTRAVENOUS; SUBCUTANEOUS at 11:30

## 2020-11-01 RX ADMIN — INSULIN ASPART SCH UNITS: 100 INJECTION, SOLUTION INTRAVENOUS; SUBCUTANEOUS at 17:21

## 2020-11-01 NOTE — NUR
NURSE NOTES:

Report received from LITA Olmedo. Patient is asleep, alert and oriented x 0. Cardiac monitor 
is in place, shows sinus rhythm. On oxygen via non-rebreather mask @ 15 Lpm. With NGT, on 
Glucerna 1.2 @ 60cc/hour. With Cummings catheter, drained via gravity. With bilateral soft 
wrist restraints on, and renewed this morning 11/1/20 @ 1029H. On fall and aspiration 
precaution, bed alarm is on. Safety measures are in place, bed in  lowest and locked 
position, side rails up x 2, will continue plan of care.

## 2020-11-01 NOTE — INTERNAL MED PROGRESS NOTE
Subjective


Date of Service:  Nov 1, 2020


Physician Name


NadiyaBenito


Attending Physician


Andrei Cancino MD





Current Medications








 Medications


  (Trade)  Dose


 Ordered  Sig/Miky


 Route


 PRN Reason  Start Time


 Stop Time Status Last Admin


Dose Admin


 


 Acetaminophen


  (Tylenol)  650 mg  Q4H  PRN


 ORAL


 Temp >100.5  10/23/20 20:30


 11/22/20 20:29  10/30/20 23:53





 


 Dextrose


  (Dextrose 50%)  50 ml  Q30M  PRN


 IV


 Hypoglycemia  10/23/20 22:45


 1/21/21 22:44   





 


 Heparin Sodium


  (Porcine)


  (Heparin 5000


 units/ml)  5,000 units  EVERY 12  HOURS


 SUBQ


   10/23/20 21:00


 12/7/20 20:59  11/1/20 09:42





 


 Insulin Aspart


  (NovoLOG)    BEFORE MEALS AND  HS


 SUBQ


   10/24/20 06:30


 1/22/21 06:29  11/1/20 11:30





 


 Nitroglycerin


  (Ntg)  0.4 mg  Q5M  PRN


 SL


 Prn Chest Pain  10/23/20 20:30


 11/22/20 20:29   





 


 Ondansetron HCl


  (Zofran)  4 mg  Q6H  PRN


 IVP


 Nausea & Vomiting  10/23/20 20:30


 11/22/20 20:29   





 


 Piperacillin Sod/


 Tazobactam Sod


 3.375 gm/Sodium


 Chloride  110 ml @ 


 27.5 mls/hr  EVERY 8  HOURS


 IVPB


   10/30/20 14:00


 11/4/20 13:59  11/1/20 13:11





 


 Polyethylene


 Glycol


  (Miralax)  17 gm  DAILYPRN  PRN


 ORAL


 Constipation  10/23/20 20:30


 11/22/20 20:29   





 


 Promethazine HCl/


 Codeine


  (Phenergan with


 Codeine)  5 ml  Q4H  PRN


 ORAL


 For Cough  10/23/20 20:30


 11/22/20 20:29   





 


 Tamsulosin HCl


  (Flomax)  0.4 mg  BID


 ORAL


   10/31/20 19:45


 11/23/20 08:59  11/1/20 09:45





 


 Vancomycin HCl  250 ml @ 


 166.667


 mls/hr  Q24H


 IVPB


   11/2/20 06:00


 11/7/20 05:59   





 


 Vancomycin HCl


  (Vanco pharmacy


 to dose)  1 ea  DAILY  PRN


 MISC


 Per rx protocol  10/23/20 20:30


 11/22/20 20:29   











Allergies:  


Coded Allergies:  


     No Known Allergies (Unverified , 8/20/12)


ROS Limited/Unobtainable:  Yes


Subjective


76 YO M admitted with shortness of breath.  Now pneumonia; previously COVID 

positive.  Cover for Int med-Dr Cancino.  On 100% non-rebreather mask





Objective





Last Vital Signs








  Date Time  Temp Pulse Resp B/P (MAP) Pulse Ox O2 Delivery O2 Flow Rate FiO2


 


11/1/20 09:00      Non-Rebreather 15.0 


 


11/1/20 08:00  91      


 


11/1/20 08:00 97.9  18 110/87 (95) 98   


 


11/1/20 08:00        100











Laboratory Tests








Test


 10/31/20


20:49 11/1/20


00:12 11/1/20


05:39 11/1/20


07:30


 


POC Whole Blood Glucose


 152 MG/DL


()  H 126 MG/DL


()  H 133 MG/DL


()  H 





 


White Blood Count


 


 


 


 14.5 K/UL


(4.8-10.8)  H


 


Red Blood Count


 


 


 


 3.41 M/UL


(4.70-6.10)  L


 


Hemoglobin


 


 


 


 10.4 G/DL


(14.2-18.0)  L


 


Hematocrit


 


 


 


 32.2 %


(42.0-52.0)  L


 


Mean Corpuscular Volume    95 FL (80-99)  


 


Mean Corpuscular Hemoglobin


 


 


 


 30.5 PG


(27.0-31.0)


 


Mean Corpuscular Hemoglobin


Concent 


 


 


 32.3 G/DL


(32.0-36.0)


 


Red Cell Distribution Width


 


 


 


 14.7 %


(11.6-14.8)


 


Platelet Count


 


 


 


 224 K/UL


(150-450)


 


Mean Platelet Volume


 


 


 


 9.5 FL


(6.5-10.1)


 


Neutrophils (%) (Auto)


 


 


 


 % (45.0-75.0)





 


Lymphocytes (%) (Auto)


 


 


 


 % (20.0-45.0)





 


Monocytes (%) (Auto)     % (1.0-10.0)  


 


Eosinophils (%) (Auto)     % (0.0-3.0)  


 


Basophils (%) (Auto)     % (0.0-2.0)  


 


Differential Total Cells


Counted 


 


 


 100  





 


Neutrophils % (Manual)    85 % (45-75)  H


 


Lymphocytes % (Manual)    14 % (20-45)  L


 


Monocytes % (Manual)    1 % (1-10)  


 


Eosinophils % (Manual)    0 % (0-3)  


 


Basophils % (Manual)    0 % (0-2)  


 


Band Neutrophils    0 % (0-8)  


 


Platelet Estimate    Adequate  


 


Platelet Morphology    Normal  


 


Polychromasia    1+  


 


Anisocytosis    1+  


 


Sodium Level


 


 


 


 149 MMOL/L


(136-145)  H


 


Potassium Level


 


 


 


 3.9 MMOL/L


(3.5-5.1)


 


Chloride Level


 


 


 


 111 MMOL/L


()  H


 


Carbon Dioxide Level


 


 


 


 31 MMOL/L


(21-32)


 


Anion Gap


 


 


 


 7 mmol/L


(5-15)


 


Blood Urea Nitrogen


 


 


 


 24 mg/dL


(7-18)  H


 


Creatinine


 


 


 


 1.3 MG/DL


(0.55-1.30)


 


Estimat Glomerular Filtration


Rate 


 


 


 53.5 mL/min


(>60)


 


Glucose Level


 


 


 


 179 MG/DL


()  H


 


Uric Acid


 


 


 


 4.8 MG/DL


(2.6-7.2)


 


Calcium Level


 


 


 


 8.6 MG/DL


(8.5-10.1)


 


Phosphorus Level


 


 


 


 3.2 MG/DL


(2.5-4.9)


 


Magnesium Level


 


 


 


 2.5 MG/DL


(1.8-2.4)  H


 


Total Bilirubin


 


 


 


 0.5 MG/DL


(0.2-1.0)


 


Aspartate Amino Transf


(AST/SGOT) 


 


 


 24 U/L (15-37)





 


Alanine Aminotransferase


(ALT/SGPT) 


 


 


 29 U/L (12-78)





 


Alkaline Phosphatase


 


 


 


 194 U/L


()  H


 


C-Reactive Protein,


Quantitative 


 


 


 24.2 mg/dL


(0.00-0.90)  H


 


Pro-B-Type Natriuretic Peptide


 


 


 


 355 pg/mL


(0-125)  H


 


Total Protein


 


 


 


 6.6 G/DL


(6.4-8.2)


 


Albumin


 


 


 


 1.4 G/DL


(3.4-5.0)  L


 


Globulin    5.2 g/dL  


 


Albumin/Globulin Ratio


 


 


 


 0.3 (1.0-2.7)


L


 


Random Vancomycin Level    11.9 ug/mL  


 


Test


 11/1/20


13:26 


 


 





 


POC Whole Blood Glucose


 160 MG/DL


()  H 


 


 














Microbiology








 Date/Time


Source Procedure


Growth Status





 


 10/30/20 22:00


Sputum Gram Stain - Final Resulted


 


 10/30/20 22:00 Sputum Culture - Preliminary


Staphylococcus Aureus


Gram Negative Raman Resulted

















Intake and Output  


 


 10/31/20 11/1/20





 19:00 07:00


 


Intake Total  480 ml


 


Output Total 300 ml 


 


Balance -300 ml 480 ml


 


  


 


Tube Feeding  480 ml


 


Output Urine Total 300 ml 


 


# Bowel Movements 1 








Objective


PHYSICAL EXAMINATION:


GENERAL:  The patient awake with deep stimuli, open his eyes, however,


cannot follow commands.  The patient is on a Ventimask at this time,


chronically ill-appearing.


HEAD AND NECK:  Pupils are equal and reactive to light.  Anicteric.


NECK:  Supple.  No JVD.


LUNGS:  100 % non-rebreather mask; Good air entry.  No wheezing or rhonchi, 

however the patient has a


coarse breath sounds.  Decreased air in bases.


HEART:  S1, S2.  Regular rhythm.  Distant heart sounds.  No murmur or


gallop.


ABDOMEN:  Soft, nondistended, nontender.  Positive bowel sounds.


EXTREMITIES:  No cyanosis, clubbing, or edema.


NEUROLOGIC:  Very limited secondary to the patient's status, cannot follow


commands.  Opens his eyes with deep stimuli and moving extremities


spontaneously.





Assessment/Plan


Assessment/Plan


ASSESSMENT:


1. Acute hypoxemic respiratory failure, most likely secondary to


pneumonia and sepsis.


2. Sepsis secondary to urinary tract infection and pneumonia.


3. pneumonia=MRSA


4. Acute kidney injury on chronic renal insufficiency.


5. Dehydration.


6. History of chronic congestive heart failure.


7. Diabetes type 2.


8. Dyslipidemia.


9. Hypertension.


10. Alzheimer's disease.


11. COVID 19 previous positive





PLAN:


1.  Telemetry status


2.  Dr. Sandoval,=Pulmonary Critical Care


3.  Dr. Garcia = Inf Dis.


4.  IV= D5W due to the hypernatremia and dehydration.


5.  antibiotics = vancomycin and zosyn


6.  Code status is full code.


7.    DVT prophylaxis is heparin subcutaneous.











Benito Hearn MD                Nov 1, 2020 14:29

## 2020-11-01 NOTE — CARDIAC ELECTROPHYSIOLOGY PN
Assessment/Plan


Assessment/Plan


1. Accelerated junctional rhythm. Not bradycardic. Ruled out for MI


     Echo showed ejection fraction of 55%.


2. Sepsis, on broad-spectrum IV antibiotic.


3. Respiratory failure, on antibiotic and NRBFM.


4. Dehydration.


5. Acute renal failure.


6. Electrolyte imbalance.


7. History of CHF, but echocardiogram showed normal left ventricular systolic 

function.


8. History of previous COVID infection in September 2020 and Active Covid now in

isolation


9. Dementia.





DW RN





Subjective


Subjective


In SR in NAD in Covid isolation.





Objective





Last 24 Hour Vital Signs








  Date Time  Temp Pulse Resp B/P (MAP) Pulse Ox O2 Delivery O2 Flow Rate FiO2


 


11/1/20 16:00 101.1 89 20 103/57 (72) 99   


 


11/1/20 16:00 97.9 100 18 93/80 (84) 98   


 


11/1/20 12:00  99      


 


11/1/20 12:00 98.2 102 18 93/80 (84) 98   


 


11/1/20 12:00 98.2 102 18 110/87 (95) 98   


 


11/1/20 09:00      Non-Rebreather 15.0 


 


11/1/20 08:00  91      


 


11/1/20 08:00 97.9 100 18 110/87 (95) 98   


 


11/1/20 08:00       15.0 100


 


11/1/20 07:44     100 Non-Rebreather 15.0 100


 


11/1/20 04:00 98.7 87 24 105/57 (73) 98   


 


11/1/20 04:00  83      


 


11/1/20 04:00       15.0 100


 


11/1/20 00:00 99.6 86 24 92/56 (68) 99   


 


11/1/20 00:00       15.0 100


 


11/1/20 00:00  85      


 


10/31/20 21:00      Non-Rebreather 15.0 


 


10/31/20 20:00     94 Non-Rebreather 15.0 100


 


10/31/20 20:00       15.0 100


 


10/31/20 20:00 99.8 84 24 115/61 (79) 98   


 


10/31/20 20:00  83      

















Intake and Output  


 


 10/31/20 11/1/20





 19:00 07:00


 


Intake Total  480 ml


 


Output Total 300 ml 


 


Balance -300 ml 480 ml


 


  


 


Tube Feeding  480 ml


 


Output Urine Total 300 ml 


 


# Bowel Movements 1 











Laboratory Tests








Test


 10/31/20


20:49 11/1/20


00:12 11/1/20


05:39 11/1/20


07:30


 


POC Whole Blood Glucose


 152 MG/DL


()  H 126 MG/DL


()  H 133 MG/DL


()  H 





 


White Blood Count


 


 


 


 14.5 K/UL


(4.8-10.8)  H


 


Red Blood Count


 


 


 


 3.41 M/UL


(4.70-6.10)  L


 


Hemoglobin


 


 


 


 10.4 G/DL


(14.2-18.0)  L


 


Hematocrit


 


 


 


 32.2 %


(42.0-52.0)  L


 


Mean Corpuscular Volume    95 FL (80-99)  


 


Mean Corpuscular Hemoglobin


 


 


 


 30.5 PG


(27.0-31.0)


 


Mean Corpuscular Hemoglobin


Concent 


 


 


 32.3 G/DL


(32.0-36.0)


 


Red Cell Distribution Width


 


 


 


 14.7 %


(11.6-14.8)


 


Platelet Count


 


 


 


 224 K/UL


(150-450)


 


Mean Platelet Volume


 


 


 


 9.5 FL


(6.5-10.1)


 


Neutrophils (%) (Auto)


 


 


 


 % (45.0-75.0)





 


Lymphocytes (%) (Auto)


 


 


 


 % (20.0-45.0)





 


Monocytes (%) (Auto)     % (1.0-10.0)  


 


Eosinophils (%) (Auto)     % (0.0-3.0)  


 


Basophils (%) (Auto)     % (0.0-2.0)  


 


Differential Total Cells


Counted 


 


 


 100  





 


Neutrophils % (Manual)    85 % (45-75)  H


 


Lymphocytes % (Manual)    14 % (20-45)  L


 


Monocytes % (Manual)    1 % (1-10)  


 


Eosinophils % (Manual)    0 % (0-3)  


 


Basophils % (Manual)    0 % (0-2)  


 


Band Neutrophils    0 % (0-8)  


 


Platelet Estimate    Adequate  


 


Platelet Morphology    Normal  


 


Polychromasia    1+  


 


Anisocytosis    1+  


 


Sodium Level


 


 


 


 149 MMOL/L


(136-145)  H


 


Potassium Level


 


 


 


 3.9 MMOL/L


(3.5-5.1)


 


Chloride Level


 


 


 


 111 MMOL/L


()  H


 


Carbon Dioxide Level


 


 


 


 31 MMOL/L


(21-32)


 


Anion Gap


 


 


 


 7 mmol/L


(5-15)


 


Blood Urea Nitrogen


 


 


 


 24 mg/dL


(7-18)  H


 


Creatinine


 


 


 


 1.3 MG/DL


(0.55-1.30)


 


Estimat Glomerular Filtration


Rate 


 


 


 53.5 mL/min


(>60)


 


Glucose Level


 


 


 


 179 MG/DL


()  H


 


Uric Acid


 


 


 


 4.8 MG/DL


(2.6-7.2)


 


Calcium Level


 


 


 


 8.6 MG/DL


(8.5-10.1)


 


Phosphorus Level


 


 


 


 3.2 MG/DL


(2.5-4.9)


 


Magnesium Level


 


 


 


 2.5 MG/DL


(1.8-2.4)  H


 


Total Bilirubin


 


 


 


 0.5 MG/DL


(0.2-1.0)


 


Aspartate Amino Transf


(AST/SGOT) 


 


 


 24 U/L (15-37)





 


Alanine Aminotransferase


(ALT/SGPT) 


 


 


 29 U/L (12-78)





 


Alkaline Phosphatase


 


 


 


 194 U/L


()  H


 


C-Reactive Protein,


Quantitative 


 


 


 24.2 mg/dL


(0.00-0.90)  H


 


Pro-B-Type Natriuretic Peptide


 


 


 


 355 pg/mL


(0-125)  H


 


Total Protein


 


 


 


 6.6 G/DL


(6.4-8.2)


 


Albumin


 


 


 


 1.4 G/DL


(3.4-5.0)  L


 


Globulin    5.2 g/dL  


 


Albumin/Globulin Ratio


 


 


 


 0.3 (1.0-2.7)


L


 


Random Vancomycin Level    11.9 ug/mL  


 


Test


 11/1/20


13:26 11/1/20


17:05 


 





 


POC Whole Blood Glucose


 160 MG/DL


()  H 145 MG/DL


()  H 


 














Microbiology








 Date/Time


Source Procedure


Growth Status





 


 10/30/20 22:00


Sputum Gram Stain - Final Resulted


 


 10/30/20 22:00 Sputum Culture - Preliminary


Staphylococcus Aureus


Gram Negative Raman Resulted








Objective





HEAD AND NECK:  No JVD. NRB FM


LUNGS:  Coarse rhonchi.


CARDIOVASCULAR:   Irregular S1 and S2 with no gallop.


ABDOMEN:  Soft.


EXTREMITIES:  No pitting edema.











Kvng Edmond MD                Nov 1, 2020 18:15

## 2020-11-01 NOTE — PULMONOLOGY PROGRESS NOTE
Subjective


ROS Limited/Unobtainable:  Yes


Interval Events:  late note for 10/31


Allergies:  


Coded Allergies:  


     No Known Allergies (Unverified , 8/20/12)





Objective





Last 24 Hour Vital Signs








  Date Time  Temp Pulse Resp B/P (MAP) Pulse Ox O2 Delivery O2 Flow Rate FiO2


 


11/1/20 07:44     100 Non-Rebreather 15.0 100


 


11/1/20 04:00 98.7 87 24 105/57 (73) 98   


 


11/1/20 04:00  83      


 


11/1/20 04:00       15.0 100


 


11/1/20 00:00 99.6 86 24 92/56 (68) 99   


 


11/1/20 00:00       15.0 100


 


11/1/20 00:00  85      


 


10/31/20 21:00      Non-Rebreather 15.0 


 


10/31/20 20:00     94 Non-Rebreather 15.0 100


 


10/31/20 20:00       15.0 100


 


10/31/20 20:00 99.8 84 24 115/61 (79) 98   


 


10/31/20 20:00  83      


 


10/31/20 16:00       15.0 100


 


10/31/20 16:00 98.1 91 20 94/58 (70) 97   


 


10/31/20 16:00      Non-Rebreather 15.0 


 


10/31/20 15:50  90      


 


10/31/20 12:00       15.0 100


 


10/31/20 12:00 97.0 96 22 115/59 (77) 95   


 


10/31/20 12:00      Non-Rebreather 15.0 


 


10/31/20 12:00  102      

















Intake and Output  


 


 10/31/20 11/1/20





 19:00 07:00


 


Intake Total  480 ml


 


Output Total 300 ml 


 


Balance -300 ml 480 ml


 


  


 


Tube Feeding  480 ml


 


Output Urine Total 300 ml 


 


# Bowel Movements 1 








General Appearance:  WD/WN, no acute distress


HEENT:  normocephalic, atraumatic


Respiratory:  chest wall non-tender, respiratory distress, rhonchi - left, 

rhonchi - right


Cardiovascular:  normal rate


Abdomen:  normal bowel sounds, no organomegaly


Genitourinary:  normal external genitalia





Microbiology








 Date/Time


Source Procedure


Growth Status





 


 10/30/20 22:00


Sputum Gram Stain - Final Resulted


 


 10/30/20 22:00 Sputum Culture - Preliminary


Staphylococcus Aureus


Gram Negative Raman Resulted








Laboratory Tests


10/31/20 20:49: POC Whole Blood Glucose 152H


11/1/20 00:12: POC Whole Blood Glucose 126H


11/1/20 05:39: POC Whole Blood Glucose 133H


11/1/20 07:30: 


White Blood Count 14.5H, Red Blood Count 3.41L, Hemoglobin 10.4L, Hematocrit 

32.2L, Mean Corpuscular Volume 95, Mean Corpuscular Hemoglobin 30.5, Mean 

Corpuscular Hemoglobin Concent 32.3, Red Cell Distribution Width 14.7, Platelet 

Count 224, Mean Platelet Volume 9.5, Neutrophils (%) (Auto) , Lymphocytes (%) 

(Auto) , Monocytes (%) (Auto) , Eosinophils (%) (Auto) , Basophils (%) (Auto) , 

Neutrophils % (Manual) [Pending], Lymphocytes % (Manual) [Pending], Platelet 

Estimate [Pending], Platelet Morphology [Pending], Sodium Level 149H, Potassium 

Level 3.9, Chloride Level 111H, Carbon Dioxide Level 31, Anion Gap 7, Blood Urea

 Nitrogen 24H, Creatinine 1.3, Estimat Glomerular Filtration Rate 53.5, Glucose 

Level 179H, Uric Acid 4.8, Calcium Level 8.6, Phosphorus Level 3.2, Magnesium 

Level 2.5H, Total Bilirubin 0.5, Aspartate Amino Transf (AST/SGOT) 24, Alanine 

Aminotransferase (ALT/SGPT) 29, Alkaline Phosphatase 194H, C-Reactive Protein, 

Quantitative 24.2H, Pro-B-Type Natriuretic Peptide 355H, Total Protein 6.6, 

Albumin 1.4L, Globulin 5.2, Albumin/Globulin Ratio 0.3L, Random Vancomycin Level

 11.9





Current Medications








 Medications


  (Trade)  Dose


 Ordered  Sig/Miky


 Route


 PRN Reason  Start Time


 Stop Time Status Last Admin


Dose Admin


 


 Acetaminophen


  (Tylenol)  650 mg  Q4H  PRN


 ORAL


 Temp >100.5  10/23/20 20:30


 11/22/20 20:29  10/30/20 23:53





 


 Dextrose


  (Dextrose 50%)  50 ml  Q30M  PRN


 IV


 Hypoglycemia  10/23/20 22:45


 1/21/21 22:44   





 


 Heparin Sodium


  (Porcine)


  (Heparin 5000


 units/ml)  5,000 units  EVERY 12  HOURS


 SUBQ


   10/23/20 21:00


 12/7/20 20:59  11/1/20 09:42





 


 Insulin Aspart


  (NovoLOG)    BEFORE MEALS AND  HS


 SUBQ


   10/24/20 06:30


 1/22/21 06:29  10/31/20 20:59





 


 Nitroglycerin


  (Ntg)  0.4 mg  Q5M  PRN


 SL


 Prn Chest Pain  10/23/20 20:30


 11/22/20 20:29   





 


 Ondansetron HCl


  (Zofran)  4 mg  Q6H  PRN


 IVP


 Nausea & Vomiting  10/23/20 20:30


 11/22/20 20:29   





 


 Piperacillin Sod/


 Tazobactam Sod


 3.375 gm/Sodium


 Chloride  110 ml @ 


 27.5 mls/hr  EVERY 8  HOURS


 IVPB


   10/30/20 14:00


 11/4/20 13:59  11/1/20 05:30





 


 Polyethylene


 Glycol


  (Miralax)  17 gm  DAILYPRN  PRN


 ORAL


 Constipation  10/23/20 20:30


 11/22/20 20:29   





 


 Promethazine HCl/


 Codeine


  (Phenergan with


 Codeine)  5 ml  Q4H  PRN


 ORAL


 For Cough  10/23/20 20:30


 11/22/20 20:29   





 


 Tamsulosin HCl


  (Flomax)  0.4 mg  BID


 ORAL


   10/31/20 19:45


 11/23/20 08:59  11/1/20 09:45





 


 Vancomycin HCl


  (Vanco pharmacy


 to dose)  1 ea  DAILY  PRN


 MISC


 Per rx protocol  10/23/20 20:30


 11/22/20 20:29   





 


 Vancomycin HCl 1


 gm/Sodium Chloride  275 ml @ 


 183.708


 mls/hr  Q24H


 IVPB


   10/28/20 10:00


 11/2/20 09:59  11/1/20 09:40














Assessment/Plan


Problems:  


(1) 2019 novel coronavirus disease (COVID-19)


(2) Severe sepsis


(3) Acute encephalopathy


(4) HTN (hypertension)


(5) Aphasia


(6) Alzheimer's dementia


(7) History of CVA (cerebrovascular accident)


(8) BPH (benign prostatic hyperplasia)


Assessment/Plan


titrate fio2 to sat of 92%


frequent suctioning


continue abx


check electrolytes


tolerating feeding


aspiration precaution


dvt prophylaxis.











Michael Sandoval MD               Nov 1, 2020 09:56

## 2020-11-01 NOTE — NUR
NURSE HAND-OFF REPORT: 



Important Events on Shift:Must suction pt at leat q 2hrs.  pt is very congested, pt gets 
tachy when suctioning is needed.  monitor temp earlier he had tem 101.1 tylenol was given 
and cooling measures were applied



Pt was suctioned by RT at least 5 times and by nurse at least every 2 hr. during morning 
shift.

Patient Status: full 

Diet: Gtube



Pending Orders: 

Pending Results/Labs:

Pending MD notification:



Latest Vital Signs: Temperature 99.3 , Pulse 88 , B/P 93 /80 , Respiratory Rate 18 , O2 SAT 
98 , Non-Rebreather, O2 Flow Rate 15.0 .  

Vital Sign Comment: 



EKG Rhythm: Sinus Rhythm

Rhythm change?: N 

MD Notified?: Y -Left Message for Dr. Cancino, No cardio on the case. 

MD Response: 



Latest Mejia Fall Score: 50  

Fall Risk: High Risk 

Safety Measures: Call light Within Reach, Bed Alarm Zone 1, Side Rails Side Rails x3, Bed 
position Low and Locked.

Fall Precautions:   y  

Yellow Socks    y 

Yellow Gown   y 

Door Sign

Patient Fall Education



Report given to Ernestina/ Rn.

## 2020-11-01 NOTE — NEPHROLOGY PROGRESS NOTE
Assessment/Plan


Problem List:  


(1) Dehydration


(2) JANES (acute kidney injury)


(3) Renal failure (ARF), acute on chronic


(4) Hypoxia


(5) Acute encephalopathy


(6) Electrolyte imbalance


Assessment





75-year-old male is admitted with acute hypoxic respiratory failure most likely 

secondary to pneumonia and sepsis, UTI.


Acute on chronic renal failure


Dehydration


Electrolyte imbalances, hypernatremia


Hypoalbuminemia


Diabetes type 2


History of congestive heart failure


Hypertension


Hyperlipemia


Alzheimer's


Previous COVID-19 infection in September 2020


Plan


November 1: Patient on nonrebreather mask.  Labs noted.  Serum creatinine down 

to 1.3.  Continue per consultants.


October 31: Patient on nonrebreather mask.  Transfer to telemetry when seen this

morning.  Labs noted.  Continue per consultants.  Serum creatinine dylan to 1.7. 

Continue to monitor renal parameters.  Continue to monitor vancomycin level


October 30: Patient is not doing well clinically.  Mild respiratory distress.  

ABG noted.  Somewhat hypoxic.  CBC and chemistry panel ordered.  Discussed with 

LITA Garcia.  Will defer to pulmonary management to specialist.  Continue per ID.

 Renal parameters remained stable as of October 29.


October 29: Labs reviewed.  Renal parameters stable.  Continue per consultants.


October 28: Labs reviewed.  Potassium via NG tube ordered.  IV fluids stopped.  

Continue per consultants.


October 27: Labs reviewed.  Low potassium and low phosphorus replaced.  Continue

per consultants.  Remains stable from renal standpoint of view.


October 26: Patient remains n.p.o.  IV fluid down to 50 cc an hour.  Potassium 

supplement intravenously ordered.  Continue per consultants.





Previously:


Patient is n.p.o., will continue on IV fluid of D5W 75 cc an hour


We will monitor electrolytes and renal parameters


Avoid nephrotoxic's


Start p.o. when he clears by speech therapist, meanwhile aspiration precautions


Keep the blood pressure and blood sugar in check


Per orders





Subjective


ROS Limited/Unobtainable:  Yes





Objective


Objective





Last 24 Hour Vital Signs








  Date Time  Temp Pulse Resp B/P (MAP) Pulse Ox O2 Delivery O2 Flow Rate FiO2


 


11/1/20 09:00      Non-Rebreather 15.0 


 


11/1/20 08:00 97.9 100 18 110/87 (95) 98   


 


11/1/20 08:00       15.0 100


 


11/1/20 07:44     100 Non-Rebreather 15.0 100


 


11/1/20 04:00 98.7 87 24 105/57 (73) 98   


 


11/1/20 04:00  83      


 


11/1/20 04:00       15.0 100


 


11/1/20 00:00 99.6 86 24 92/56 (68) 99   


 


11/1/20 00:00       15.0 100


 


11/1/20 00:00  85      


 


10/31/20 21:00      Non-Rebreather 15.0 


 


10/31/20 20:00     94 Non-Rebreather 15.0 100


 


10/31/20 20:00       15.0 100


 


10/31/20 20:00 99.8 84 24 115/61 (79) 98   


 


10/31/20 20:00  83      


 


10/31/20 16:00       15.0 100


 


10/31/20 16:00 98.1 91 20 94/58 (70) 97   


 


10/31/20 16:00      Non-Rebreather 15.0 


 


10/31/20 15:50  90      

















Intake and Output  


 


 10/31/20 11/1/20





 19:00 07:00


 


Intake Total  480 ml


 


Output Total 300 ml 


 


Balance -300 ml 480 ml


 


  


 


Tube Feeding  480 ml


 


Output Urine Total 300 ml 


 


# Bowel Movements 1 











Current Medications








 Medications


  (Trade)  Dose


 Ordered  Sig/Miky


 Route


 PRN Reason  Start Time


 Stop Time Status Last Admin


Dose Admin


 


 Acetaminophen


  (Tylenol)  650 mg  Q4H  PRN


 ORAL


 Temp >100.5  10/23/20 20:30


 11/22/20 20:29  10/30/20 23:53





 


 Dextrose


  (Dextrose 50%)  50 ml  Q30M  PRN


 IV


 Hypoglycemia  10/23/20 22:45


 1/21/21 22:44   





 


 Heparin Sodium


  (Porcine)


  (Heparin 5000


 units/ml)  5,000 units  EVERY 12  HOURS


 SUBQ


   10/23/20 21:00


 12/7/20 20:59  11/1/20 09:42





 


 Insulin Aspart


  (NovoLOG)    BEFORE MEALS AND  HS


 SUBQ


   10/24/20 06:30


 1/22/21 06:29  10/31/20 20:59





 


 Nitroglycerin


  (Ntg)  0.4 mg  Q5M  PRN


 SL


 Prn Chest Pain  10/23/20 20:30


 11/22/20 20:29   





 


 Ondansetron HCl


  (Zofran)  4 mg  Q6H  PRN


 IVP


 Nausea & Vomiting  10/23/20 20:30


 11/22/20 20:29   





 


 Piperacillin Sod/


 Tazobactam Sod


 3.375 gm/Sodium


 Chloride  110 ml @ 


 27.5 mls/hr  EVERY 8  HOURS


 IVPB


   10/30/20 14:00


 11/4/20 13:59  11/1/20 05:30





 


 Polyethylene


 Glycol


  (Miralax)  17 gm  DAILYPRN  PRN


 ORAL


 Constipation  10/23/20 20:30


 11/22/20 20:29   





 


 Promethazine HCl/


 Codeine


  (Phenergan with


 Codeine)  5 ml  Q4H  PRN


 ORAL


 For Cough  10/23/20 20:30


 11/22/20 20:29   





 


 Tamsulosin HCl


  (Flomax)  0.4 mg  BID


 ORAL


   10/31/20 19:45


 11/23/20 08:59  11/1/20 09:45





 


 Vancomycin HCl  250 ml @ 


 166.667


 mls/hr  Q24H


 IVPB


   11/2/20 06:00


 11/7/20 05:59   





 


 Vancomycin HCl


  (Vanco pharmacy


 to dose)  1 ea  DAILY  PRN


 MISC


 Per rx protocol  10/23/20 20:30


 11/22/20 20:29   








Laboratory Tests


10/31/20 20:49: POC Whole Blood Glucose 152H


11/1/20 00:12: POC Whole Blood Glucose 126H


11/1/20 05:39: POC Whole Blood Glucose 133H


11/1/20 07:30: 


White Blood Count 14.5H, Red Blood Count 3.41L, Hemoglobin 10.4L, Hematocrit 

32.2L, Mean Corpuscular Volume 95, Mean Corpuscular Hemoglobin 30.5, Mean 

Corpuscular Hemoglobin Concent 32.3, Red Cell Distribution Width 14.7, Platelet 

Count 224, Mean Platelet Volume 9.5, Neutrophils (%) (Auto) , Lymphocytes (%) 

(Auto) , Monocytes (%) (Auto) , Eosinophils (%) (Auto) , Basophils (%) (Auto) , 

Differential Total Cells Counted 100, Neutrophils % (Manual) 85H, Lymphocytes % 

(Manual) 14L, Monocytes % (Manual) 1, Eosinophils % (Manual) 0, Basophils % 

(Manual) 0, Band Neutrophils 0, Platelet Estimate Adequate, Platelet Morphology 

Normal, Polychromasia 1+, Anisocytosis 1+, Sodium Level 149H, Potassium Level 

3.9, Chloride Level 111H, Carbon Dioxide Level 31, Anion Gap 7, Blood Urea 

Nitrogen 24H, Creatinine 1.3, Estimat Glomerular Filtration Rate 53.5, Glucose 

Level 179H, Uric Acid 4.8, Calcium Level 8.6, Phosphorus Level 3.2, Magnesium 

Level 2.5H, Total Bilirubin 0.5, Aspartate Amino Transf (AST/SGOT) 24, Alanine 

Aminotransferase (ALT/SGPT) 29, Alkaline Phosphatase 194H, C-Reactive Protein, 

Quantitative 24.2H, Pro-B-Type Natriuretic Peptide 355H, Total Protein 6.6, 

Albumin 1.4L, Globulin 5.2, Albumin/Globulin Ratio 0.3L, Random Vancomycin Level

11.9


Height (Feet):  5


Height (Inches):  4.00


Weight (Pounds):  130


General Appearance:  mild distress


EENT:  other - On nonrebreather mask


Cardiovascular:  tachycardia


Respiratory/Chest:  decreased breath sounds


Abdomen:  distended











Seven Tobar MD             Nov 1, 2020 12:45

## 2020-11-01 NOTE — NUR
NURSE NOTES:

pt in bed AOx1 non verbal.  Pt on cardiac monitor no signs of cardiac or respiratory 
distress at this time.  Pt sounds less congested this morning, he was just suctioned by RT.  
NJ tube is still safely taped to pt skin.  Pt on restrains.  Bed locked and in lowest 
position.  Call light within reach.  will continue to monitor pt.

## 2020-11-01 NOTE — NUR
NURSE HAND-OFF REPORT: 



Important: n/a

Patient Status: STABLE

Diet:  NGT GLUCERNA 1.2 AT 60CC/HR



Pending Orders: []

Pending Results/Labs:[]

Pending MD notification:[]



Latest Vital Signs: Temperature 98.7 , Pulse 83 , B/P 105 /57 , Respiratory Rate 24 , O2 SAT 
98 , Non-Rebreather, O2 Flow Rate 15.0 .  

Vital Sign Comment: []



EKG Rhythm: Sinus Rhythm

Rhythm change?: N 

MD Notified?: Y -Left Message for Dr. Cancino, No cardio on the case. 

MD Response: 



Latest Mejia Fall Score: 50  

Fall Risk: High Risk 

Safety Measures: Call light Within Reach, Bed Alarm Zone 1, Side Rails Side Rails x3, Bed 
position Low and Locked.

Fall Precautions: 

Yellow Socks

Yellow Gown

Door Sign

Patient Fall Education



Report given to LITA MEDEL.

## 2020-11-01 NOTE — PULMONOLOGY PROGRESS NOTE
Subjective


ROS Limited/Unobtainable:  Yes


Interval Events:  unchagned


Allergies:  


Coded Allergies:  


     No Known Allergies (Unverified , 8/20/12)





Objective





Last 24 Hour Vital Signs








  Date Time  Temp Pulse Resp B/P (MAP) Pulse Ox O2 Delivery O2 Flow Rate FiO2


 


11/1/20 07:44     100 Non-Rebreather 15.0 100


 


11/1/20 04:00 98.7 87 24 105/57 (73) 98   


 


11/1/20 04:00  83      


 


11/1/20 04:00       15.0 100


 


11/1/20 00:00 99.6 86 24 92/56 (68) 99   


 


11/1/20 00:00       15.0 100


 


11/1/20 00:00  85      


 


10/31/20 21:00      Non-Rebreather 15.0 


 


10/31/20 20:00     94 Non-Rebreather 15.0 100


 


10/31/20 20:00       15.0 100


 


10/31/20 20:00 99.8 84 24 115/61 (79) 98   


 


10/31/20 20:00  83      


 


10/31/20 16:00       15.0 100


 


10/31/20 16:00 98.1 91 20 94/58 (70) 97   


 


10/31/20 16:00      Non-Rebreather 15.0 


 


10/31/20 15:50  90      


 


10/31/20 12:00       15.0 100


 


10/31/20 12:00 97.0 96 22 115/59 (77) 95   


 


10/31/20 12:00      Non-Rebreather 15.0 


 


10/31/20 12:00  102      

















Intake and Output  


 


 10/31/20 11/1/20





 19:00 07:00


 


Intake Total  480 ml


 


Output Total 300 ml 


 


Balance -300 ml 480 ml


 


  


 


Tube Feeding  480 ml


 


Output Urine Total 300 ml 


 


# Bowel Movements 1 








General Appearance:  WD/WN, no acute distress


HEENT:  normocephalic, atraumatic


Respiratory:  chest wall non-tender, respiratory distress, rhonchi - left, 

rhonchi - right


Cardiovascular:  normal rate


Abdomen:  normal bowel sounds, no organomegaly


Genitourinary:  normal external genitalia





Microbiology








 Date/Time


Source Procedure


Growth Status





 


 10/30/20 22:00


Sputum Gram Stain - Final Resulted


 


 10/30/20 22:00 Sputum Culture - Preliminary


Staphylococcus Aureus


Gram Negative Raman Resulted








Laboratory Tests


10/31/20 20:49: POC Whole Blood Glucose 152H


11/1/20 00:12: POC Whole Blood Glucose 126H


11/1/20 05:39: POC Whole Blood Glucose 133H


11/1/20 07:30: 


White Blood Count 14.5H, Red Blood Count 3.41L, Hemoglobin 10.4L, Hematocrit 

32.2L, Mean Corpuscular Volume 95, Mean Corpuscular Hemoglobin 30.5, Mean 

Corpuscular Hemoglobin Concent 32.3, Red Cell Distribution Width 14.7, Platelet 

Count 224, Mean Platelet Volume 9.5, Neutrophils (%) (Auto) , Lymphocytes (%) 

(Auto) , Monocytes (%) (Auto) , Eosinophils (%) (Auto) , Basophils (%) (Auto) , 

Neutrophils % (Manual) [Pending], Lymphocytes % (Manual) [Pending], Platelet 

Estimate [Pending], Platelet Morphology [Pending], Sodium Level 149H, Potassium 

Level 3.9, Chloride Level 111H, Carbon Dioxide Level 31, Anion Gap 7, Blood Urea

 Nitrogen 24H, Creatinine 1.3, Estimat Glomerular Filtration Rate 53.5, Glucose 

Level 179H, Uric Acid 4.8, Calcium Level 8.6, Phosphorus Level 3.2, Magnesium 

Level 2.5H, Total Bilirubin 0.5, Aspartate Amino Transf (AST/SGOT) 24, Alanine 

Aminotransferase (ALT/SGPT) 29, Alkaline Phosphatase 194H, C-Reactive Protein, 

Quantitative 24.2H, Pro-B-Type Natriuretic Peptide 355H, Total Protein 6.6, 

Albumin 1.4L, Globulin 5.2, Albumin/Globulin Ratio 0.3L, Random Vancomycin Level

 11.9





Current Medications








 Medications


  (Trade)  Dose


 Ordered  Sig/Miky


 Route


 PRN Reason  Start Time


 Stop Time Status Last Admin


Dose Admin


 


 Acetaminophen


  (Tylenol)  650 mg  Q4H  PRN


 ORAL


 Temp >100.5  10/23/20 20:30


 11/22/20 20:29  10/30/20 23:53





 


 Dextrose


  (Dextrose 50%)  50 ml  Q30M  PRN


 IV


 Hypoglycemia  10/23/20 22:45


 1/21/21 22:44   





 


 Heparin Sodium


  (Porcine)


  (Heparin 5000


 units/ml)  5,000 units  EVERY 12  HOURS


 SUBQ


   10/23/20 21:00


 12/7/20 20:59  11/1/20 09:42





 


 Insulin Aspart


  (NovoLOG)    BEFORE MEALS AND  HS


 SUBQ


   10/24/20 06:30


 1/22/21 06:29  10/31/20 20:59





 


 Nitroglycerin


  (Ntg)  0.4 mg  Q5M  PRN


 SL


 Prn Chest Pain  10/23/20 20:30


 11/22/20 20:29   





 


 Ondansetron HCl


  (Zofran)  4 mg  Q6H  PRN


 IVP


 Nausea & Vomiting  10/23/20 20:30


 11/22/20 20:29   





 


 Piperacillin Sod/


 Tazobactam Sod


 3.375 gm/Sodium


 Chloride  110 ml @ 


 27.5 mls/hr  EVERY 8  HOURS


 IVPB


   10/30/20 14:00


 11/4/20 13:59  11/1/20 05:30





 


 Polyethylene


 Glycol


  (Miralax)  17 gm  DAILYPRN  PRN


 ORAL


 Constipation  10/23/20 20:30


 11/22/20 20:29   





 


 Promethazine HCl/


 Codeine


  (Phenergan with


 Codeine)  5 ml  Q4H  PRN


 ORAL


 For Cough  10/23/20 20:30


 11/22/20 20:29   





 


 Tamsulosin HCl


  (Flomax)  0.4 mg  BID


 ORAL


   10/31/20 19:45


 11/23/20 08:59  11/1/20 09:45





 


 Vancomycin HCl


  (Vanco pharmacy


 to dose)  1 ea  DAILY  PRN


 MISC


 Per rx protocol  10/23/20 20:30


 11/22/20 20:29   





 


 Vancomycin HCl 1


 gm/Sodium Chloride  275 ml @ 


 183.708


 mls/hr  Q24H


 IVPB


   10/28/20 10:00


 11/2/20 09:59  11/1/20 09:40














Assessment/Plan


Problems:  


(1) 2019 novel coronavirus disease (COVID-19)


(2) Severe sepsis


(3) Acute encephalopathy


(4) HTN (hypertension)


(5) Aphasia


(6) Alzheimer's dementia


(7) History of CVA (cerebrovascular accident)


(8) BPH (benign prostatic hyperplasia)


Assessment/Plan


cxr 10/30: unchanged


CRP rising


titrate fio2 to sat of 92%


frequent suctioning


continue abx


check electrolytes


tolerating feeding


aspiration precaution


dvt prophylaxis.











Michael Sandoval MD               Nov 1, 2020 09:57

## 2020-11-02 VITALS — SYSTOLIC BLOOD PRESSURE: 112 MMHG | DIASTOLIC BLOOD PRESSURE: 62 MMHG

## 2020-11-02 VITALS — SYSTOLIC BLOOD PRESSURE: 97 MMHG | DIASTOLIC BLOOD PRESSURE: 78 MMHG

## 2020-11-02 VITALS — DIASTOLIC BLOOD PRESSURE: 76 MMHG | SYSTOLIC BLOOD PRESSURE: 118 MMHG

## 2020-11-02 VITALS — DIASTOLIC BLOOD PRESSURE: 69 MMHG | SYSTOLIC BLOOD PRESSURE: 124 MMHG

## 2020-11-02 VITALS — SYSTOLIC BLOOD PRESSURE: 110 MMHG | DIASTOLIC BLOOD PRESSURE: 56 MMHG

## 2020-11-02 VITALS — SYSTOLIC BLOOD PRESSURE: 129 MMHG | DIASTOLIC BLOOD PRESSURE: 63 MMHG

## 2020-11-02 LAB
ADD MANUAL DIFF: YES
ALBUMIN SERPL-MCNC: 1.4 G/DL (ref 3.4–5)
ALBUMIN/GLOB SERPL: 0.2 {RATIO} (ref 1–2.7)
ALP SERPL-CCNC: 283 U/L (ref 46–116)
ALT SERPL-CCNC: 34 U/L (ref 12–78)
ANION GAP SERPL CALC-SCNC: 9 MMOL/L (ref 5–15)
APPEARANCE UR: (no result)
APTT PPP: YELLOW S
AST SERPL-CCNC: 40 U/L (ref 15–37)
BILIRUB SERPL-MCNC: 0.5 MG/DL (ref 0.2–1)
BUN SERPL-MCNC: 24 MG/DL (ref 7–18)
CALCIUM SERPL-MCNC: 9 MG/DL (ref 8.5–10.1)
CHLORIDE SERPL-SCNC: 112 MMOL/L (ref 98–107)
CO2 SERPL-SCNC: 30 MMOL/L (ref 21–32)
CREAT SERPL-MCNC: 1.3 MG/DL (ref 0.55–1.3)
ERYTHROCYTE [DISTWIDTH] IN BLOOD BY AUTOMATED COUNT: 15.3 % (ref 11.6–14.8)
GLOBULIN SER-MCNC: 5.8 G/DL
GLUCOSE UR STRIP-MCNC: NEGATIVE MG/DL
HCT VFR BLD CALC: 32.7 % (ref 42–52)
HGB BLD-MCNC: 10.3 G/DL (ref 14.2–18)
KETONES UR QL STRIP: NEGATIVE
LEUKOCYTE ESTERASE UR QL STRIP: NEGATIVE
MCV RBC AUTO: 95 FL (ref 80–99)
NITRITE UR QL STRIP: NEGATIVE
PH UR STRIP: 7 [PH] (ref 4.5–8)
PHOSPHATE SERPL-MCNC: 4.4 MG/DL (ref 2.5–4.9)
PLATELET # BLD: 248 K/UL (ref 150–450)
POTASSIUM SERPL-SCNC: 4.3 MMOL/L (ref 3.5–5.1)
PROT UR QL STRIP: (no result)
RBC # BLD AUTO: 3.46 M/UL (ref 4.7–6.1)
SODIUM SERPL-SCNC: 151 MMOL/L (ref 136–145)
SP GR UR STRIP: 1 (ref 1–1.03)
UROBILINOGEN UR-MCNC: 1 MG/DL (ref 0–1)
WBC # BLD AUTO: 18.5 K/UL (ref 4.8–10.8)

## 2020-11-02 RX ADMIN — INSULIN ASPART SCH UNITS: 100 INJECTION, SOLUTION INTRAVENOUS; SUBCUTANEOUS at 06:00

## 2020-11-02 RX ADMIN — INSULIN ASPART SCH UNITS: 100 INJECTION, SOLUTION INTRAVENOUS; SUBCUTANEOUS at 18:16

## 2020-11-02 RX ADMIN — MEROPENEM SCH MLS/HR: 1 INJECTION INTRAVENOUS at 16:08

## 2020-11-02 RX ADMIN — HEPARIN SODIUM SCH UNITS: 5000 INJECTION INTRAVENOUS; SUBCUTANEOUS at 09:02

## 2020-11-02 RX ADMIN — MEROPENEM SCH MLS/HR: 1 INJECTION INTRAVENOUS at 15:51

## 2020-11-02 RX ADMIN — INSULIN ASPART SCH UNITS: 100 INJECTION, SOLUTION INTRAVENOUS; SUBCUTANEOUS at 23:18

## 2020-11-02 RX ADMIN — TAMSULOSIN HYDROCHLORIDE SCH MG: 0.4 CAPSULE ORAL at 08:58

## 2020-11-02 RX ADMIN — TAMSULOSIN HYDROCHLORIDE SCH MG: 0.4 CAPSULE ORAL at 18:16

## 2020-11-02 RX ADMIN — HEPARIN SODIUM SCH UNITS: 5000 INJECTION INTRAVENOUS; SUBCUTANEOUS at 20:42

## 2020-11-02 RX ADMIN — DEXTROSE MONOHYDRATE SCH MLS/HR: 50 INJECTION, SOLUTION INTRAVENOUS at 05:53

## 2020-11-02 RX ADMIN — INSULIN ASPART SCH UNITS: 100 INJECTION, SOLUTION INTRAVENOUS; SUBCUTANEOUS at 13:52

## 2020-11-02 RX ADMIN — DEXTROSE MONOHYDRATE SCH MLS/HR: 50 INJECTION, SOLUTION INTRAVENOUS at 13:20

## 2020-11-02 NOTE — NEPHROLOGY PROGRESS NOTE
Assessment/Plan


Problem List:  


(1) Dehydration


(2) JANES (acute kidney injury)


(3) Renal failure (ARF), acute on chronic


(4) Hypoxia


(5) Acute encephalopathy


(6) Electrolyte imbalance


Assessment





75-year-old male is admitted with acute hypoxic respiratory failure most likely 

secondary to pneumonia and sepsis, UTI.


Acute on chronic renal failure


Dehydration


Electrolyte imbalances, hypernatremia


Hypoalbuminemia


Diabetes type 2


History of congestive heart failure


Hypertension


Hyperlipemia


Alzheimer's


Previous COVID-19 infection in September 2020


Plan


November 2: Remains on nonrebreather mask.  Inflammatory markers remain 

elevated.  Renal parameters somewhat stable.  Continue per pulmonary and ID.  

Remains full code.


November 1: Patient on nonrebreather mask.  Labs noted.  Serum creatinine down 

to 1.3.  Continue per consultants.


October 31: Patient on nonrebreather mask.  Transfer to telemetry when seen this

morning.  Labs noted.  Continue per consultants.  Serum creatinine dylan to 1.7. 

Continue to monitor renal parameters.  Continue to monitor vancomycin level


October 30: Patient is not doing well clinically.  Mild respiratory distress.  

ABG noted.  Somewhat hypoxic.  CBC and chemistry panel ordered.  Discussed with 

LITA Garcia.  Will defer to pulmonary management to specialist.  Continue per ID.

 Renal parameters remained stable as of October 29.


October 29: Labs reviewed.  Renal parameters stable.  Continue per consultants.


October 28: Labs reviewed.  Potassium via NG tube ordered.  IV fluids stopped.  

Continue per consultants.


October 27: Labs reviewed.  Low potassium and low phosphorus replaced.  Continue

per consultants.  Remains stable from renal standpoint of view.


October 26: Patient remains n.p.o.  IV fluid down to 50 cc an hour.  Potassium 

supplement intravenously ordered.  Continue per consultants.





Previously:


Patient is n.p.o., will continue on IV fluid of D5W 75 cc an hour


We will monitor electrolytes and renal parameters


Avoid nephrotoxic's


Start p.o. when he clears by speech therapist, meanwhile aspiration precautions


Keep the blood pressure and blood sugar in check


Per orders





Subjective


ROS Limited/Unobtainable:  Yes





Objective


Objective





Last 24 Hour Vital Signs








  Date Time  Temp Pulse Resp B/P (MAP) Pulse Ox O2 Delivery O2 Flow Rate FiO2


 


11/2/20 09:00      Non-Rebreather 15.0 


 


11/2/20 08:00 97.7 92 22 97/78 (84) 98   


 


11/2/20 07:45       15.0 100


 


11/2/20 04:00  82      


 


11/2/20 04:00       15.0 100


 


11/2/20 04:00 99.6 79 26 112/62 (79) 100   


 


11/2/20 00:00 99.4 79 28 110/56 (74) 100   


 


11/2/20 00:00  95      


 


11/1/20 21:00      Non-Rebreather 15.0 


 


11/1/20 20:31     99 Non-Rebreather 15.0 100


 


11/1/20 20:00 100.1 76 28 91/56 (68) 99   


 


11/1/20 20:00       15.0 100


 


11/1/20 20:00  86      


 


11/1/20 17:25 99.3       


 


11/1/20 16:00 101.1 89 20 103/57 (72) 99   


 


11/1/20 16:00 97.9 100 18 93/80 (84) 98   


 


11/1/20 16:00       15.0 100


 


11/1/20 16:00  88      

















Intake and Output  


 


 11/1/20 11/2/20





 19:00 07:00


 


Intake Total 120 ml 810 ml


 


Output Total  850 ml


 


Balance 120 ml -40 ml


 


  


 


Free Water  150 ml


 


Tube Feeding 120 ml 660 ml


 


Output Urine Total  850 ml


 


# Bowel Movements 1 











Current Medications








 Medications


  (Trade)  Dose


 Ordered  Sig/Miky


 Route


 PRN Reason  Start Time


 Stop Time Status Last Admin


Dose Admin


 


 Acetaminophen


  (Tylenol)  650 mg  Q4H  PRN


 ORAL


 Temp >100.5  10/23/20 20:30


 11/22/20 20:29  11/1/20 16:55





 


 Dextrose  1,000 ml @ 


 100 mls/hr  Q10H ONCE


 IV


   11/2/20 11:30


 11/2/20 21:29   





 


 Dextrose


  (Dextrose 50%)  50 ml  Q30M  PRN


 IV


 Hypoglycemia  10/23/20 22:45


 1/21/21 22:44   





 


 Heparin Sodium


  (Porcine)


  (Heparin 5000


 units/ml)  5,000 units  EVERY 12  HOURS


 SUBQ


   10/23/20 21:00


 12/7/20 20:59  11/2/20 09:02





 


 Insulin Aspart


  (NovoLOG)    EVERY 6  HOURS


 SUBQ


   11/2/20 06:00


 1/22/21 06:29   





 


 Nitroglycerin


  (Ntg)  0.4 mg  Q5M  PRN


 SL


 Prn Chest Pain  10/23/20 20:30


 11/22/20 20:29   





 


 Ondansetron HCl


  (Zofran)  4 mg  Q6H  PRN


 IVP


 Nausea & Vomiting  10/23/20 20:30


 11/22/20 20:29   





 


 Piperacillin Sod/


 Tazobactam Sod


 3.375 gm/Sodium


 Chloride  110 ml @ 


 27.5 mls/hr  EVERY 8  HOURS


 IVPB


   10/30/20 14:00


 11/4/20 13:59  11/2/20 05:53





 


 Polyethylene


 Glycol


  (Miralax)  17 gm  DAILYPRN  PRN


 ORAL


 Constipation  10/23/20 20:30


 11/22/20 20:29   





 


 Promethazine HCl/


 Codeine


  (Phenergan with


 Codeine)  5 ml  Q4H  PRN


 ORAL


 For Cough  10/23/20 20:30


 11/22/20 20:29   





 


 Tamsulosin HCl


  (Flomax)  0.4 mg  BID


 ORAL


   10/31/20 19:45


 11/23/20 08:59  11/2/20 08:58





 


 Vancomycin HCl  250 ml @ 


 166.667


 mls/hr  Q24H


 IVPB


   11/2/20 06:00


 11/7/20 05:59  11/2/20 05:53





 


 Vancomycin HCl


  (Vanco pharmacy


 to dose)  1 ea  DAILY  PRN


 MISC


 Per rx protocol  10/23/20 20:30


 11/22/20 20:29   








Laboratory Tests


11/1/20 13:26: POC Whole Blood Glucose 160H


11/1/20 17:05: POC Whole Blood Glucose 145H


11/1/20 20:45: POC Whole Blood Glucose 156H


11/2/20 05:54: POC Whole Blood Glucose [Pending]


11/2/20 08:10: 


White Blood Count 18.5H, Red Blood Count 3.46L, Hemoglobin 10.3L, Hematocrit 

32.7L, Mean Corpuscular Volume 95, Mean Corpuscular Hemoglobin 29.9, Mean 

Corpuscular Hemoglobin Concent 31.6L, Red Cell Distribution Width 15.3H, 

Platelet Count 248, Mean Platelet Volume 9.8, Neutrophils (%) (Auto) , 

Lymphocytes (%) (Auto) , Monocytes (%) (Auto) , Eosinophils (%) (Auto) , 

Basophils (%) (Auto) , Differential Total Cells Counted 100, Neutrophils % 

(Manual) 82H, Lymphocytes % (Manual) 15L, Monocytes % (Manual) 3, Eosinophils % 

(Manual) 0, Basophils % (Manual) 0, Band Neutrophils 0, Platelet Estimate 

Adequate, Platelet Morphology Normal, Hypochromasia 1+, Anisocytosis 1+, 

Erythrocyte Sedimentation Rate 115H, Sodium Level 151H, Potassium Level 4.3, 

Chloride Level 112H, Carbon Dioxide Level 30, Anion Gap 9, Blood Urea Nitrogen 

24H, Creatinine 1.3, Estimat Glomerular Filtration Rate 53.5, Glucose Level 160H

, Calcium Level 9.0, Phosphorus Level 4.4, Magnesium Level 2.6H, Total Bilirubin

0.5, Aspartate Amino Transf (AST/SGOT) 40H, Alanine Aminotransferase (ALT/SGPT) 

34, Alkaline Phosphatase 283H, C-Reactive Protein, Quantitative 20.8H, Total 

Protein 7.2, Albumin 1.4L, Globulin 5.8, Albumin/Globulin Ratio 0.2L


Height (Feet):  5


Height (Inches):  4.00


Weight (Pounds):  130


General Appearance:  mild distress


EENT:  other - On nonrebreather mask


Cardiovascular:  tachycardia


Respiratory/Chest:  decreased breath sounds


Abdomen:  distended











Seven Tobar MD             Nov 2, 2020 13:05

## 2020-11-02 NOTE — INFECTIOUS DISEASES PROG NOTE
Assessment/Plan


74yo M with:





Fever,r ecurrent


Leukocytosis ;recurrent ; increase


Acute hypoxic resp failure, on NRB mask> 4l NC; back on NRB, desaturation 10/29


Pneumonia- >HAP


hx of COVID19 PNA 9/9/20


          10/30 Sp cx MRSA (Vancomycin ADRIANA 1), ESBL E.coli


   10/23 BCx NTD


   UA 15-20 WBC, UCx Neg


   10/23 COVID rapid neg; 10/26 rapid COVID PCR + (from prior infection)- not 

new infection


   Flu A/B neg


   CXR: L pna


   Resp cx MRSA





Neftali on CKD, improving





SNF resident ([facility])


Non-verbal


VRE and MRSA colonized





Plan:


Cont vanco #11/14 for MRSA PNA 


Switch Zosyn #4 to Meropenem for ESBL PNA


   -10/26 SP Cefepime #4


   -10/24 SP Flagyl #








Monitor CBC/CMP


Monitor resp status


Monitor temp curve and hemodynamics


off COVID isolation- positive covid test represents residual dead virus from 

infection on 9/2020


bcx x2, ua. w. reflex





D/w RN





Thank you for this consult. [facility] will continue to follow.





Subjective


Allergies:  


Coded Allergies:  


     No Known Allergies (Unverified , 8/20/12)


Tm 101.1


on NRB


wbc increased





Objective





Last 24 Hour Vital Signs








  Date Time  Temp Pulse Resp B/P (MAP) Pulse Ox O2 Delivery O2 Flow Rate FiO2


 


11/2/20 09:00      Non-Rebreather 15.0 


 


11/2/20 08:00 97.7 92 22 97/78 (84) 98   


 


11/2/20 07:45       15.0 100


 


11/2/20 04:00  82      


 


11/2/20 04:00       15.0 100


 


11/2/20 04:00 99.6 79 26 112/62 (79) 100   


 


11/2/20 00:00 99.4 79 28 110/56 (74) 100   


 


11/2/20 00:00  95      


 


11/1/20 21:00      Non-Rebreather 15.0 


 


11/1/20 20:31     99 Non-Rebreather 15.0 100


 


11/1/20 20:00 100.1 76 28 91/56 (68) 99   


 


11/1/20 20:00       15.0 100


 


11/1/20 20:00  86      


 


11/1/20 17:25 99.3       


 


11/1/20 16:00 101.1 89 20 103/57 (72) 99   


 


11/1/20 16:00 97.9 100 18 93/80 (84) 98   


 


11/1/20 16:00       15.0 100


 


11/1/20 16:00  88      








Height (Feet):  5


Height (Inches):  4.00


Weight (Pounds):  130


Gen: NAD


CV: RRR


Pulm: Rhonchi anteriorly, lots of oral secretions


Abd: Soft, NTND


Ext: No c/c/e


Neuro: Awake





Microbiology








 Date/Time


Source Procedure


Growth Status





 


 10/30/20 22:00


Sputum Gram Stain - Final Resulted


 


 10/30/20 22:00 Sputum Culture - Preliminary


Staphylococcus Aureus - Mrsa


Escherichia Coli Resulted











Laboratory Tests








Test


 11/1/20


17:05 11/1/20


20:45 11/2/20


05:54 11/2/20


08:10


 


POC Whole Blood Glucose


 145 MG/DL


()  H 156 MG/DL


()  H Pending  


 





 


White Blood Count


 


 


 


 18.5 K/UL


(4.8-10.8)  H


 


Red Blood Count


 


 


 


 3.46 M/UL


(4.70-6.10)  L


 


Hemoglobin


 


 


 


 10.3 G/DL


(14.2-18.0)  L


 


Hematocrit


 


 


 


 32.7 %


(42.0-52.0)  L


 


Mean Corpuscular Volume    95 FL (80-99)  


 


Mean Corpuscular Hemoglobin


 


 


 


 29.9 PG


(27.0-31.0)


 


Mean Corpuscular Hemoglobin


Concent 


 


 


 31.6 G/DL


(32.0-36.0)  L


 


Red Cell Distribution Width


 


 


 


 15.3 %


(11.6-14.8)  H


 


Platelet Count


 


 


 


 248 K/UL


(150-450)


 


Mean Platelet Volume


 


 


 


 9.8 FL


(6.5-10.1)


 


Neutrophils (%) (Auto)


 


 


 


 % (45.0-75.0)





 


Lymphocytes (%) (Auto)


 


 


 


 % (20.0-45.0)





 


Monocytes (%) (Auto)     % (1.0-10.0)  


 


Eosinophils (%) (Auto)     % (0.0-3.0)  


 


Basophils (%) (Auto)     % (0.0-2.0)  


 


Differential Total Cells


Counted 


 


 


 100  





 


Neutrophils % (Manual)    82 % (45-75)  H


 


Lymphocytes % (Manual)    15 % (20-45)  L


 


Monocytes % (Manual)    3 % (1-10)  


 


Eosinophils % (Manual)    0 % (0-3)  


 


Basophils % (Manual)    0 % (0-2)  


 


Band Neutrophils    0 % (0-8)  


 


Platelet Estimate    Adequate  


 


Platelet Morphology    Normal  


 


Hypochromasia    1+  


 


Anisocytosis    1+  


 


Erythrocyte Sedimentation Rate


 


 


 


 115 MM/HR


(0-20)  H


 


Sodium Level


 


 


 


 151 MMOL/L


(136-145)  H


 


Potassium Level


 


 


 


 4.3 MMOL/L


(3.5-5.1)


 


Chloride Level


 


 


 


 112 MMOL/L


()  H


 


Carbon Dioxide Level


 


 


 


 30 MMOL/L


(21-32)


 


Anion Gap


 


 


 


 9 mmol/L


(5-15)


 


Blood Urea Nitrogen


 


 


 


 24 mg/dL


(7-18)  H


 


Creatinine


 


 


 


 1.3 MG/DL


(0.55-1.30)


 


Estimat Glomerular Filtration


Rate 


 


 


 53.5 mL/min


(>60)


 


Glucose Level


 


 


 


 160 MG/DL


()  H


 


Calcium Level


 


 


 


 9.0 MG/DL


(8.5-10.1)


 


Phosphorus Level


 


 


 


 4.4 MG/DL


(2.5-4.9)


 


Magnesium Level


 


 


 


 2.6 MG/DL


(1.8-2.4)  H


 


Total Bilirubin


 


 


 


 0.5 MG/DL


(0.2-1.0)


 


Aspartate Amino Transf


(AST/SGOT) 


 


 


 40 U/L (15-37)


H


 


Alanine Aminotransferase


(ALT/SGPT) 


 


 


 34 U/L (12-78)





 


Alkaline Phosphatase


 


 


 


 283 U/L


()  H


 


C-Reactive Protein,


Quantitative 


 


 


 20.8 mg/dL


(0.00-0.90)  H


 


Total Protein


 


 


 


 7.2 G/DL


(6.4-8.2)


 


Albumin


 


 


 


 1.4 G/DL


(3.4-5.0)  L


 


Globulin    5.8 g/dL  


 


Albumin/Globulin Ratio


 


 


 


 0.2 (1.0-2.7)


L


 


Test


 11/2/20


13:33 


 


 





 


POC Whole Blood Glucose


 149 MG/DL


()  H 


 


 














Current Medications








 Medications


  (Trade)  Dose


 Ordered  Sig/Miky


 Route


 PRN Reason  Start Time


 Stop Time Status Last Admin


Dose Admin


 


 Acetaminophen


  (Tylenol)  650 mg  Q4H  PRN


 ORAL


 Temp >100.5  10/23/20 20:30


 11/22/20 20:29  11/1/20 16:55





 


 Dextrose  1,000 ml @ 


 100 mls/hr  Q10H ONCE


 IV


   11/2/20 11:30


 11/2/20 21:29  11/2/20 11:30





 


 Dextrose


  (Dextrose 50%)  50 ml  Q30M  PRN


 IV


 Hypoglycemia  10/23/20 22:45


 1/21/21 22:44   





 


 Heparin Sodium


  (Porcine)


  (Heparin 5000


 units/ml)  5,000 units  EVERY 12  HOURS


 SUBQ


   10/23/20 21:00


 12/7/20 20:59  11/2/20 09:02





 


 Insulin Aspart


  (NovoLOG)    EVERY 6  HOURS


 SUBQ


   11/2/20 06:00


 1/22/21 06:29  11/2/20 13:52





 


 Nitroglycerin


  (Ntg)  0.4 mg  Q5M  PRN


 SL


 Prn Chest Pain  10/23/20 20:30


 11/22/20 20:29   





 


 Ondansetron HCl


  (Zofran)  4 mg  Q6H  PRN


 IVP


 Nausea & Vomiting  10/23/20 20:30


 11/22/20 20:29   





 


 Piperacillin Sod/


 Tazobactam Sod


 3.375 gm/Sodium


 Chloride  110 ml @ 


 27.5 mls/hr  EVERY 8  HOURS


 IVPB


   10/30/20 14:00


 11/4/20 13:59  11/2/20 13:20





 


 Polyethylene


 Glycol


  (Miralax)  17 gm  DAILYPRN  PRN


 ORAL


 Constipation  10/23/20 20:30


 11/22/20 20:29   





 


 Promethazine HCl/


 Codeine


  (Phenergan with


 Codeine)  5 ml  Q4H  PRN


 ORAL


 For Cough  10/23/20 20:30


 11/22/20 20:29   





 


 Tamsulosin HCl


  (Flomax)  0.4 mg  BID


 ORAL


   10/31/20 19:45


 11/23/20 08:59  11/2/20 08:58





 


 Vancomycin HCl  250 ml @ 


 166.667


 mls/hr  Q24H


 IVPB


   11/2/20 06:00


 11/7/20 05:59  11/2/20 05:53





 


 Vancomycin HCl


  (Vanco pharmacy


 to dose)  1 ea  DAILY  PRN


 MISC


 Per rx protocol  10/23/20 20:30


 11/22/20 20:29   




















Slime Garcia M.D.             Nov 2, 2020 14:46

## 2020-11-02 NOTE — CARDIAC ELECTROPHYSIOLOGY PN
Assessment/Plan


Assessment/Plan


1. Accelerated junctional rhythm. Not bradycardic. Ruled out for MI


     Echo showed ejection fraction of 55%.


2. Sepsis, on broad-spectrum IV antibiotic.


3. Respiratory failure, on antibiotic and FM


4. Dehydration.


5. Acute renal failure.


6. Electrolyte imbalance.


7. History of CHF, but echocardiogram showed normal left ventricular systolic 

function.


8. History of previous COVID infection in September 2020 and Active Covid now in

isolation


9. Dementia.





DW RN





Subjective


Subjective


In SR in NAD in Covid isolation.No events





Objective





Last 24 Hour Vital Signs








  Date Time  Temp Pulse Resp B/P (MAP) Pulse Ox O2 Delivery O2 Flow Rate FiO2


 


11/2/20 09:00      Non-Rebreather 15.0 


 


11/2/20 08:00 97.7 92 22 97/78 (84) 98   


 


11/2/20 07:45       15.0 100


 


11/2/20 04:00  82      


 


11/2/20 04:00       15.0 100


 


11/2/20 04:00 99.6 79 26 112/62 (79) 100   


 


11/2/20 00:00 99.4 79 28 110/56 (74) 100   


 


11/2/20 00:00  95      


 


11/1/20 21:00      Non-Rebreather 15.0 


 


11/1/20 20:31     99 Non-Rebreather 15.0 100


 


11/1/20 20:00 100.1 76 28 91/56 (68) 99   


 


11/1/20 20:00       15.0 100


 


11/1/20 20:00  86      


 


11/1/20 17:25 99.3       


 


11/1/20 16:00 101.1 89 20 103/57 (72) 99   


 


11/1/20 16:00 97.9 100 18 93/80 (84) 98   


 


11/1/20 16:00       15.0 100


 


11/1/20 16:00  88      

















Intake and Output  


 


 11/1/20 11/2/20





 19:00 07:00


 


Intake Total 120 ml 810 ml


 


Output Total  850 ml


 


Balance 120 ml -40 ml


 


  


 


Free Water  150 ml


 


Tube Feeding 120 ml 660 ml


 


Output Urine Total  850 ml


 


# Bowel Movements 1 











Laboratory Tests








Test


 11/1/20


17:05 11/1/20


20:45 11/2/20


05:54 11/2/20


08:10


 


POC Whole Blood Glucose


 145 MG/DL


()  H 156 MG/DL


()  H Pending  


 





 


White Blood Count


 


 


 


 18.5 K/UL


(4.8-10.8)  H


 


Red Blood Count


 


 


 


 3.46 M/UL


(4.70-6.10)  L


 


Hemoglobin


 


 


 


 10.3 G/DL


(14.2-18.0)  L


 


Hematocrit


 


 


 


 32.7 %


(42.0-52.0)  L


 


Mean Corpuscular Volume    95 FL (80-99)  


 


Mean Corpuscular Hemoglobin


 


 


 


 29.9 PG


(27.0-31.0)


 


Mean Corpuscular Hemoglobin


Concent 


 


 


 31.6 G/DL


(32.0-36.0)  L


 


Red Cell Distribution Width


 


 


 


 15.3 %


(11.6-14.8)  H


 


Platelet Count


 


 


 


 248 K/UL


(150-450)


 


Mean Platelet Volume


 


 


 


 9.8 FL


(6.5-10.1)


 


Neutrophils (%) (Auto)


 


 


 


 % (45.0-75.0)





 


Lymphocytes (%) (Auto)


 


 


 


 % (20.0-45.0)





 


Monocytes (%) (Auto)     % (1.0-10.0)  


 


Eosinophils (%) (Auto)     % (0.0-3.0)  


 


Basophils (%) (Auto)     % (0.0-2.0)  


 


Differential Total Cells


Counted 


 


 


 100  





 


Neutrophils % (Manual)    82 % (45-75)  H


 


Lymphocytes % (Manual)    15 % (20-45)  L


 


Monocytes % (Manual)    3 % (1-10)  


 


Eosinophils % (Manual)    0 % (0-3)  


 


Basophils % (Manual)    0 % (0-2)  


 


Band Neutrophils    0 % (0-8)  


 


Platelet Estimate    Adequate  


 


Platelet Morphology    Normal  


 


Hypochromasia    1+  


 


Anisocytosis    1+  


 


Erythrocyte Sedimentation Rate


 


 


 


 115 MM/HR


(0-20)  H


 


Sodium Level


 


 


 


 151 MMOL/L


(136-145)  H


 


Potassium Level


 


 


 


 4.3 MMOL/L


(3.5-5.1)


 


Chloride Level


 


 


 


 112 MMOL/L


()  H


 


Carbon Dioxide Level


 


 


 


 30 MMOL/L


(21-32)


 


Anion Gap


 


 


 


 9 mmol/L


(5-15)


 


Blood Urea Nitrogen


 


 


 


 24 mg/dL


(7-18)  H


 


Creatinine


 


 


 


 1.3 MG/DL


(0.55-1.30)


 


Estimat Glomerular Filtration


Rate 


 


 


 53.5 mL/min


(>60)


 


Glucose Level


 


 


 


 160 MG/DL


()  H


 


Calcium Level


 


 


 


 9.0 MG/DL


(8.5-10.1)


 


Phosphorus Level


 


 


 


 4.4 MG/DL


(2.5-4.9)


 


Magnesium Level


 


 


 


 2.6 MG/DL


(1.8-2.4)  H


 


Total Bilirubin


 


 


 


 0.5 MG/DL


(0.2-1.0)


 


Aspartate Amino Transf


(AST/SGOT) 


 


 


 40 U/L (15-37)


H


 


Alanine Aminotransferase


(ALT/SGPT) 


 


 


 34 U/L (12-78)





 


Alkaline Phosphatase


 


 


 


 283 U/L


()  H


 


C-Reactive Protein,


Quantitative 


 


 


 20.8 mg/dL


(0.00-0.90)  H


 


Total Protein


 


 


 


 7.2 G/DL


(6.4-8.2)


 


Albumin


 


 


 


 1.4 G/DL


(3.4-5.0)  L


 


Globulin    5.8 g/dL  


 


Albumin/Globulin Ratio


 


 


 


 0.2 (1.0-2.7)


L


 


Test


 11/2/20


13:33 


 


 





 


POC Whole Blood Glucose


 149 MG/DL


()  H 


 


 














Microbiology








 Date/Time


Source Procedure


Growth Status





 


 10/30/20 22:00


Sputum Gram Stain - Final Resulted


 


 10/30/20 22:00 Sputum Culture - Preliminary


Staphylococcus Aureus - Mrsa


Escherichia Coli Resulted








Objective





HEAD AND NECK:  No JVD. NRB FM


LUNGS:  Coarse rhonchi.


CARDIOVASCULAR:   Irregular S1 and S2 with no gallop.


ABDOMEN:  Soft.


EXTREMITIES:  No pitting edema.











Kvng Edmond MD                Nov 2, 2020 13:38

## 2020-11-02 NOTE — PULMONOLOGY PROGRESS NOTE
Subjective


ROS Limited/Unobtainable:  Yes


Interval Events:  unchagned


Allergies:  


Coded Allergies:  


     No Known Allergies (Unverified , 8/20/12)





Objective





Last 24 Hour Vital Signs








  Date Time  Temp Pulse Resp B/P (MAP) Pulse Ox O2 Delivery O2 Flow Rate FiO2


 


11/2/20 09:00      Non-Rebreather 15.0 


 


11/2/20 08:00 97.7 92 22 97/78 (84) 98   


 


11/2/20 07:45       15.0 100


 


11/2/20 04:00  82      


 


11/2/20 04:00       15.0 100


 


11/2/20 04:00 99.6 79 26 112/62 (79) 100   


 


11/2/20 00:00 99.4 79 28 110/56 (74) 100   


 


11/2/20 00:00  95      


 


11/1/20 21:00      Non-Rebreather 15.0 


 


11/1/20 20:31     99 Non-Rebreather 15.0 100


 


11/1/20 20:00 100.1 76 28 91/56 (68) 99   


 


11/1/20 20:00       15.0 100


 


11/1/20 20:00  86      


 


11/1/20 17:25 99.3       


 


11/1/20 16:00 101.1 89 20 103/57 (72) 99   


 


11/1/20 16:00 97.9 100 18 93/80 (84) 98   


 


11/1/20 16:00       15.0 100


 


11/1/20 16:00  88      


 


11/1/20 12:00  99      


 


11/1/20 12:00 98.2 102 18 93/80 (84) 98   


 


11/1/20 12:00       15.0 100


 


11/1/20 12:00 98.2 102 18 110/87 (95) 98   

















Intake and Output  


 


 11/1/20 11/2/20





 19:00 07:00


 


Intake Total 120 ml 810 ml


 


Output Total  850 ml


 


Balance 120 ml -40 ml


 


  


 


Free Water  150 ml


 


Tube Feeding 120 ml 660 ml


 


Output Urine Total  850 ml


 


# Bowel Movements 1 








General Appearance:  WD/WN, no acute distress


HEENT:  normocephalic, atraumatic


Respiratory:  chest wall non-tender, respiratory distress, rhonchi - left, 

rhonchi - right


Cardiovascular:  normal rate


Abdomen:  normal bowel sounds, no organomegaly


Genitourinary:  normal external genitalia


Skin:  no rash





Microbiology








 Date/Time


Source Procedure


Growth Status





 


 10/30/20 22:00


Sputum Gram Stain - Final Resulted


 


 10/30/20 22:00 Sputum Culture - Preliminary


Staphylococcus Aureus - Mrsa


Escherichia Coli Resulted








Laboratory Tests


11/1/20 13:26: POC Whole Blood Glucose 160H


11/1/20 17:05: POC Whole Blood Glucose 145H


11/1/20 20:45: POC Whole Blood Glucose 156H


11/2/20 05:54: POC Whole Blood Glucose [Pending]


11/2/20 08:10: 


White Blood Count 18.5H, Red Blood Count 3.46L, Hemoglobin 10.3L, Hematocrit 

32.7L, Mean Corpuscular Volume 95, Mean Corpuscular Hemoglobin 29.9, Mean 

Corpuscular Hemoglobin Concent 31.6L, Red Cell Distribution Width 15.3H, 

Platelet Count 248, Mean Platelet Volume 9.8, Neutrophils (%) (Auto) , 

Lymphocytes (%) (Auto) , Monocytes (%) (Auto) , Eosinophils (%) (Auto) , 

Basophils (%) (Auto) , Differential Total Cells Counted 100, Neutrophils % 

(Manual) 82H, Lymphocytes % (Manual) 15L, Monocytes % (Manual) 3, Eosinophils % 

(Manual) 0, Basophils % (Manual) 0, Band Neutrophils 0, Platelet Estimate 

Adequate, Platelet Morphology Normal, Hypochromasia 1+, Anisocytosis 1+, 

Erythrocyte Sedimentation Rate 115H, Sodium Level 151H, Potassium Level 4.3, 

Chloride Level 112H, Carbon Dioxide Level 30, Anion Gap 9, Blood Urea Nitrogen 

24H, Creatinine 1.3, Estimat Glomerular Filtration Rate 53.5, Glucose Level 160H

, Calcium Level 9.0, Phosphorus Level 4.4, Magnesium Level 2.6H, Total Bilirubin

 0.5, Aspartate Amino Transf (AST/SGOT) 40H, Alanine Aminotransferase (ALT/SGPT)

 34, Alkaline Phosphatase 283H, C-Reactive Protein, Quantitative 20.8H, Total 

Protein 7.2, Albumin 1.4L, Globulin 5.8, Albumin/Globulin Ratio 0.2L





Current Medications








 Medications


  (Trade)  Dose


 Ordered  Sig/Miky


 Route


 PRN Reason  Start Time


 Stop Time Status Last Admin


Dose Admin


 


 Acetaminophen


  (Tylenol)  650 mg  Q4H  PRN


 ORAL


 Temp >100.5  10/23/20 20:30


 11/22/20 20:29  11/1/20 16:55





 


 Dextrose  1,000 ml @ 


 100 mls/hr  Q10H ONCE


 IV


   11/2/20 11:30


 11/2/20 21:29   





 


 Dextrose


  (Dextrose 50%)  50 ml  Q30M  PRN


 IV


 Hypoglycemia  10/23/20 22:45


 1/21/21 22:44   





 


 Heparin Sodium


  (Porcine)


  (Heparin 5000


 units/ml)  5,000 units  EVERY 12  HOURS


 SUBQ


   10/23/20 21:00


 12/7/20 20:59  11/2/20 09:02





 


 Insulin Aspart


  (NovoLOG)    EVERY 6  HOURS


 SUBQ


   11/2/20 06:00


 1/22/21 06:29   





 


 Nitroglycerin


  (Ntg)  0.4 mg  Q5M  PRN


 SL


 Prn Chest Pain  10/23/20 20:30


 11/22/20 20:29   





 


 Ondansetron HCl


  (Zofran)  4 mg  Q6H  PRN


 IVP


 Nausea & Vomiting  10/23/20 20:30


 11/22/20 20:29   





 


 Piperacillin Sod/


 Tazobactam Sod


 3.375 gm/Sodium


 Chloride  110 ml @ 


 27.5 mls/hr  EVERY 8  HOURS


 IVPB


   10/30/20 14:00


 11/4/20 13:59  11/2/20 05:53





 


 Polyethylene


 Glycol


  (Miralax)  17 gm  DAILYPRN  PRN


 ORAL


 Constipation  10/23/20 20:30


 11/22/20 20:29   





 


 Promethazine HCl/


 Codeine


  (Phenergan with


 Codeine)  5 ml  Q4H  PRN


 ORAL


 For Cough  10/23/20 20:30


 11/22/20 20:29   





 


 Tamsulosin HCl


  (Flomax)  0.4 mg  BID


 ORAL


   10/31/20 19:45


 11/23/20 08:59  11/2/20 08:58





 


 Vancomycin HCl  250 ml @ 


 166.667


 mls/hr  Q24H


 IVPB


   11/2/20 06:00


 11/7/20 05:59  11/2/20 05:53





 


 Vancomycin HCl


  (Vanco pharmacy


 to dose)  1 ea  DAILY  PRN


 MISC


 Per rx protocol  10/23/20 20:30


 11/22/20 20:29   














Assessment/Plan


Problems:  


(1) 2019 novel coronavirus disease (COVID-19)


(2) Severe sepsis


(3) Acute encephalopathy


(4) HTN (hypertension)


(5) Aphasia


(6) Alzheimer's dementia


(7) History of CVA (cerebrovascular accident)


(8) BPH (benign prostatic hyperplasia)


Assessment/Plan


wbc rising, pt is febrile


repeat cxr in am


CRP rising


titrate fio2 to sat of 92%


frequent suctioning


continue abx


check electrolytes


tolerating feeding


aspiration precaution


dvt prophylaxis.











Michael Sandoval MD               Nov 2, 2020 11:33

## 2020-11-02 NOTE — NUR
NURSE NOTES:

reported latest labs to yadira Sandoval.  ok to add Ryann to di

-------------------------------------------------------------------------------

Addendum: 11/02/20 at 1129 by Shara Edwards RN

-------------------------------------------------------------------------------

NURSE NOTES:

reported latest labs to yadira Sandoval, he will put new orders.   ok to add Eyal to diet bid.

## 2020-11-02 NOTE — INTERNAL MED PROGRESS NOTE
Subjective


Date of Service:  Nov 2, 2020


Physician Name


NadiyaBenito


Attending Physician


Andrei Cancino MD





Current Medications








 Medications


  (Trade)  Dose


 Ordered  Sig/Miky


 Route


 PRN Reason  Start Time


 Stop Time Status Last Admin


Dose Admin


 


 Acetaminophen


  (Tylenol)  650 mg  Q4H  PRN


 ORAL


 Temp >100.5  10/23/20 20:30


 11/22/20 20:29  11/2/20 15:52





 


 Dextrose  1,000 ml @ 


 100 mls/hr  Q10H ONCE


 IV


   11/2/20 11:30


 11/2/20 21:29  11/2/20 11:30





 


 Dextrose


  (Dextrose 50%)  50 ml  Q30M  PRN


 IV


 Hypoglycemia  10/23/20 22:45


 1/21/21 22:44   





 


 Heparin Sodium


  (Porcine)


  (Heparin 5000


 units/ml)  5,000 units  EVERY 12  HOURS


 SUBQ


   10/23/20 21:00


 12/7/20 20:59  11/2/20 09:02





 


 Insulin Aspart


  (NovoLOG)    EVERY 6  HOURS


 SUBQ


   11/2/20 06:00


 1/22/21 06:29  11/2/20 18:16





 


 Meropenem 1 gm/


 Sodium Chloride  55 ml @ 


 110 mls/hr  Q12HR@0400,1600


 IVPB


   11/2/20 16:00


 11/7/20 15:59  11/2/20 16:08





 


 Nitroglycerin


  (Ntg)  0.4 mg  Q5M  PRN


 SL


 Prn Chest Pain  10/23/20 20:30


 11/22/20 20:29   





 


 Ondansetron HCl


  (Zofran)  4 mg  Q6H  PRN


 IVP


 Nausea & Vomiting  10/23/20 20:30


 11/22/20 20:29   





 


 Polyethylene


 Glycol


  (Miralax)  17 gm  DAILYPRN  PRN


 ORAL


 Constipation  10/23/20 20:30


 11/22/20 20:29   





 


 Promethazine HCl/


 Codeine


  (Phenergan with


 Codeine)  5 ml  Q4H  PRN


 ORAL


 For Cough  10/23/20 20:30


 11/22/20 20:29   





 


 Tamsulosin HCl


  (Flomax)  0.4 mg  BID


 ORAL


   10/31/20 19:45


 11/23/20 08:59  11/2/20 18:16





 


 Vancomycin HCl  250 ml @ 


 166.667


 mls/hr  Q24H


 IVPB


   11/2/20 06:00


 11/7/20 05:59  11/2/20 05:53





 


 Vancomycin HCl


  ([facility] pharmacy


 to dose)  1 ea  DAILY  PRN


 MISC


 Per rx protocol  10/23/20 20:30


 11/22/20 20:29   











Allergies:  


Coded Allergies:  


     No Known Allergies (Unverified , 8/20/12)


ROS Limited/Unobtainable:  Yes


Subjective


74 YO M admitted with shortness of breath.  Now pneumonia; previously COVID 

positive.  Cover for Int med-Dr Cancino.  On 100% non-rebreather mask





Objective





Last Vital Signs








  Date Time  Temp Pulse Resp B/P (MAP) Pulse Ox O2 Delivery O2 Flow Rate FiO2


 


11/2/20 16:22 99.8       


 


11/2/20 16:00       15.0 100


 


11/2/20 16:00  102 20 129/63 (85) 99   


 


11/2/20 09:00      Non-Rebreather  











Laboratory Tests








Test


 11/1/20


20:45 11/2/20


05:54 11/2/20


08:10 11/2/20


13:33


 


POC Whole Blood Glucose


 156 MG/DL


()  H Pending  


 


 149 MG/DL


()  H


 


White Blood Count


 


 


 18.5 K/UL


(4.8-10.8)  H 





 


Red Blood Count


 


 


 3.46 M/UL


(4.70-6.10)  L 





 


Hemoglobin


 


 


 10.3 G/DL


(14.2-18.0)  L 





 


Hematocrit


 


 


 32.7 %


(42.0-52.0)  L 





 


Mean Corpuscular Volume   95 FL (80-99)   


 


Mean Corpuscular Hemoglobin


 


 


 29.9 PG


(27.0-31.0) 





 


Mean Corpuscular Hemoglobin


Concent 


 


 31.6 G/DL


(32.0-36.0)  L 





 


Red Cell Distribution Width


 


 


 15.3 %


(11.6-14.8)  H 





 


Platelet Count


 


 


 248 K/UL


(150-450) 





 


Mean Platelet Volume


 


 


 9.8 FL


(6.5-10.1) 





 


Neutrophils (%) (Auto)


 


 


 % (45.0-75.0)


 





 


Lymphocytes (%) (Auto)


 


 


 % (20.0-45.0)


 





 


Monocytes (%) (Auto)    % (1.0-10.0)   


 


Eosinophils (%) (Auto)    % (0.0-3.0)   


 


Basophils (%) (Auto)    % (0.0-2.0)   


 


Differential Total Cells


Counted 


 


 100  


 





 


Neutrophils % (Manual)   82 % (45-75)  H 


 


Lymphocytes % (Manual)   15 % (20-45)  L 


 


Monocytes % (Manual)   3 % (1-10)   


 


Eosinophils % (Manual)   0 % (0-3)   


 


Basophils % (Manual)   0 % (0-2)   


 


Band Neutrophils   0 % (0-8)   


 


Platelet Estimate   Adequate   


 


Platelet Morphology   Normal   


 


Hypochromasia   1+   


 


Anisocytosis   1+   


 


Erythrocyte Sedimentation Rate


 


 


 115 MM/HR


(0-20)  H 





 


Sodium Level


 


 


 151 MMOL/L


(136-145)  H 





 


Potassium Level


 


 


 4.3 MMOL/L


(3.5-5.1) 





 


Chloride Level


 


 


 112 MMOL/L


()  H 





 


Carbon Dioxide Level


 


 


 30 MMOL/L


(21-32) 





 


Anion Gap


 


 


 9 mmol/L


(5-15) 





 


Blood Urea Nitrogen


 


 


 24 mg/dL


(7-18)  H 





 


Creatinine


 


 


 1.3 MG/DL


(0.55-1.30) 





 


Estimat Glomerular Filtration


Rate 


 


 53.5 mL/min


(>60) 





 


Glucose Level


 


 


 160 MG/DL


()  H 





 


Calcium Level


 


 


 9.0 MG/DL


(8.5-10.1) 





 


Phosphorus Level


 


 


 4.4 MG/DL


(2.5-4.9) 





 


Magnesium Level


 


 


 2.6 MG/DL


(1.8-2.4)  H 





 


Total Bilirubin


 


 


 0.5 MG/DL


(0.2-1.0) 





 


Aspartate Amino Transf


(AST/SGOT) 


 


 40 U/L (15-37)


H 





 


Alanine Aminotransferase


(ALT/SGPT) 


 


 34 U/L (12-78)


 





 


Alkaline Phosphatase


 


 


 283 U/L


()  H 





 


C-Reactive Protein,


Quantitative 


 


 20.8 mg/dL


(0.00-0.90)  H 





 


Total Protein


 


 


 7.2 G/DL


(6.4-8.2) 





 


Albumin


 


 


 1.4 G/DL


(3.4-5.0)  L 





 


Globulin   5.8 g/dL   


 


Albumin/Globulin Ratio


 


 


 0.2 (1.0-2.7)


L 





 


Test


 11/2/20


16:10 


 


 





 


Urine Color Yellow     


 


Urine Appearance


 Slightly


cloudy 


 


 





 


Urine pH 7 (4.5-8.0)     


 


Urine Specific Gravity


 1.005


(1.005-1.035) 


 


 





 


Urine Protein


 2+ (NEGATIVE)


H 


 


 





 


Urine Glucose (UA)


 Negative


(NEGATIVE) 


 


 





 


Urine Ketones


 Negative


(NEGATIVE) 


 


 





 


Urine Blood


 2+ (NEGATIVE)


H 


 


 





 


Urine Nitrite


 Negative


(NEGATIVE) 


 


 





 


Urine Bilirubin


 Negative


(NEGATIVE) 


 


 





 


Urine Urobilinogen


 1 MG/DL


(0.0-1.0)  H 


 


 





 


Urine Leukocyte Esterase


 Negative


(NEGATIVE) 


 


 





 


Urine RBC


 2-4 /HPF (0 -


0)  H 


 


 





 


Urine WBC


 0-2 /HPF (0 -


0) 


 


 





 


Urine Squamous Epithelial


Cells None /LPF


(NONE/OCC) 


 


 





 


Urine Bacteria


 Few /HPF


(NONE) 


 


 














Microbiology








 Date/Time


Source Procedure


Growth Status





 


 10/30/20 22:00


Sputum Gram Stain - Final Resulted


 


 10/30/20 22:00 Sputum Culture - Preliminary


Staphylococcus Aureus - Mrsa


Escherichia Coli Resulted

















Intake and Output  


 


 11/1/20 11/2/20





 19:00 07:00


 


Intake Total 120 ml 810 ml


 


Output Total  850 ml


 


Balance 120 ml -40 ml


 


  


 


Free Water  150 ml


 


Tube Feeding 120 ml 660 ml


 


Output Urine Total  850 ml


 


# Bowel Movements 1 








Objective


PHYSICAL EXAMINATION:


GENERAL:  The patient awake with deep stimuli, open his eyes, however,


cannot follow commands.  The patient is on a Ventimask at this time,


chronically ill-appearing.


HEAD AND NECK:  Pupils are equal and reactive to light.  Anicteric.


NECK:  Supple.  No JVD.


LUNGS:  100 % non-rebreather mask; Good air entry.  No wheezing or rhonchi, 

however the patient has a


coarse breath sounds.  Decreased air in bases.


HEART:  S1, S2.  Regular rhythm.  Distant heart sounds.  No murmur or


gallop.


ABDOMEN:  Soft, nondistended, nontender.  Positive bowel sounds.


EXTREMITIES:  No cyanosis, clubbing, or edema.


NEUROLOGIC:  Very limited secondary to the patient's status, cannot follow


commands.  Opens his eyes with deep stimuli and moving extremities


spontaneously.





Assessment/Plan


Assessment/Plan


ASSESSMENT:


1. Acute hypoxemic respiratory failure, most likely secondary to


pneumonia and sepsis.


2. Sepsis secondary to urinary tract infection and pneumonia.


3. pneumonia=MRSA


4. Acute kidney injury on chronic renal insufficiency.


5. Dehydration.


6. History of chronic congestive heart failure.


7. Diabetes type 2.


8. Dyslipidemia.


9. Hypertension.


10. Alzheimer's disease.


11. COVID 19 previous positive





PLAN:


1.  Telemetry status


2.  Dr. Sandoval,=Pulmonary Critical Care


3.  Dr. Garcia = Inf Dis.


4.  IV= D5W due to the hypernatremia and dehydration.


5.  antibiotics = vancomycin, zosyn and meropenem


6.  Code status is full code.


7.    DVT prophylaxis is heparin subcutaneous.











Benito Hearn MD                Nov 2, 2020 18:52

## 2020-11-02 NOTE — NUR
NURSE NOTES:

pt. AOx1 non verbal.  Pt on cardiac monitor no signs of cardiac distress.  Bed in lowest 
position and locked call light within reach. pt on [place] tube safely taped to skin. continue 
non debreather, RT was called in for suctioning pt is very congested.  Cummings cath in place 
and draining. Will continue to monitor pt.

## 2020-11-02 NOTE — NUR
RD ASSESSMENT & RECOMMENDATIONS

SEE CARE ACTIVITY FOR COMPLETE ASSESSMENT



DAILY ESTIMATED NEEDS:

Needs based on DM, wound, pulmonary, cardiac 59.5kg 

25-35  kcals/kg 

[id number]  total kcals

1.25-1.5  g protein/kg

74-89  g total protein 

25-30  mL/kg

0120-2103  total fluid mLs



NUTRITION DIAGNOSIS:

* Swallowing difficulty R/T dysphagia as evidenced by SLP eval, recs for

NGT feeds.

* Increased kcal and pro needs r/t wound healing as evidenced by sacral

pressure injury stage 2.





 

CURRENT TF:Glucerna 1.2 @ 60ml/hr x24 hrs  



 



ENTERAL NUTRITION RECOMMENDATIONS:

Glucerna 1.2 @ 60ml/hr x24 hrs   to provide 1440ml, 1728 kcal, 86g pro, 1159ml free H2O  



- Maintain current TF

- Flush per MD/ HOB over 30 degrees.



 



ADDITIONAL RECOMMENDATIONS:

* Calibrated bedscale wt for accurate CBW 

* TF recs as above if part of POC  

* Replete lytes as needed 

* Wound care: add Harvinder BID via PEG 

                     add Vit C 250mg QD 

. 

.

## 2020-11-02 NOTE — NUR
NURSE HAND-OFF REPORT: 



Important Events on Shift: suction pt q 2hrs, monitor Temp. he had fever 101.8. gave 
medication and applied cooling measures both Carlyn and Doctor Radha devine.  pt still on 
restraint 

Patient Status:  full 

Diet:  [place]



Pending Orders: 

Pending Results/Labs:

Pending MD notification:



Latest Vital Signs: Temperature 99.8 , Pulse 97 , B/P 129 /63 , Respiratory Rate 20 , O2 SAT 
99 , Non-Rebreather, O2 Flow Rate 15.0 .  

Vital Sign Comment: 



EKG Rhythm: Sinus Rhythm

Rhythm change?: N 

MD Notified?: Y -Left Message for Dr. Cancino, No cardio on the case. 

MD Response: 



Latest Mejia Fall Score: 70  

Fall Risk: High Risk 

Safety Measures: Call light Within Reach, Bed Alarm Zone 1, Side Rails Side Rails x3, Bed 
position Low and Locked.

Fall Precautions:   y  

Yellow Socks  y 

Yellow Gown    y 

Door Sign

Patient Fall Education



Report given to Nery/LITA.

## 2020-11-02 NOTE — NUR
NURSE HAND-OFF REPORT: 



Important Events on Shift: Patient has been resting well comfortably, no desaturation nor 
fever noted during my shift. Still with copious secretions and needs to be suctioned as 
often.

Patient Status: Patient is asleep, open his eyes with deep stimuli, non-verbal. Plan of care 
endorsed. RN made aware that the blood test for this morning is not done yet, and Yris 
from Laboratory was aware.

Diet: Glucerna 1.2 @ 60 cc/hour.



Pending Orders: C-reactive, CBC, CMP, Mg, Phos, Sed. Rate

Pending Results/Labs: none

Pending MD notification:none



Latest Vital Signs: Temperature 99.6 , Pulse 79 , B/P 112 /62 , Respiratory Rate 26 , O2 SAT 
100 , Non-Rebreather, O2 Flow Rate 15.0 .  

Vital Sign Comment: Stable



EKG Rhythm: Sinus Rhythm

Rhythm change?: N 

MD Notified?: Y -Left Message for Dr. Cancino, No cardio on the case. 

MD Response: 



Latest Mejia Fall Score: 70  

Fall Risk: High Risk 

Safety Measures: Call light Within Reach, Bed Alarm Zone 1, Side Rails Side Rails x3, Bed 
position Low and Locked.

Fall Precautions: 

Yellow Socks

Yellow Gown

Door Sign

Patient Fall Education



Report given to LITA Olmedo.

## 2020-11-02 NOTE — NUR
CASE MANAGEMENT:REVIEW





11/2/20

SI: COVID PNA

100.4  106  20  124/69  98% ON NON REBREATHER 15L 100% FIO2

WBC+18.5     NA+151



IS: IV MEROPENEM Q12

IV VANCOMYCIN Q24

IVF@100/HR

HEPARIN SQ Q12

**: TELEMETRY STATUS

DCP; FROM [facility]



PLAN:

BLOOD CX

## 2020-11-02 NOTE — NUR
NURSE NOTES:

Patient received from Shara LONDON. Patient A&Ox1. Patient able to open eyes but non verbal. 
Arousable to name and shaking. Saturating well on 15L of oxygen via Non Rebreather mask 
saturating well @ 100%. No facial grimacing noted for pain and no s/s of distress. Cummings 
catheter in place draining well to gravity for retention. IV site on Right wrist 22G running 
D5W @ 100mls/hr. NGTube patent and intact running Glucerna 1.2 @ 60mls/hr. Bed in lowest 
position and locked. Bed alarm on. WIll continue plan of care.

## 2020-11-03 VITALS — DIASTOLIC BLOOD PRESSURE: 73 MMHG | SYSTOLIC BLOOD PRESSURE: 122 MMHG

## 2020-11-03 VITALS — SYSTOLIC BLOOD PRESSURE: 114 MMHG | DIASTOLIC BLOOD PRESSURE: 72 MMHG

## 2020-11-03 VITALS — SYSTOLIC BLOOD PRESSURE: 116 MMHG | DIASTOLIC BLOOD PRESSURE: 74 MMHG

## 2020-11-03 VITALS — SYSTOLIC BLOOD PRESSURE: 99 MMHG | DIASTOLIC BLOOD PRESSURE: 58 MMHG

## 2020-11-03 VITALS — DIASTOLIC BLOOD PRESSURE: 71 MMHG | SYSTOLIC BLOOD PRESSURE: 133 MMHG

## 2020-11-03 VITALS — DIASTOLIC BLOOD PRESSURE: 55 MMHG | SYSTOLIC BLOOD PRESSURE: 112 MMHG

## 2020-11-03 LAB
ADD MANUAL DIFF: NO
ALBUMIN SERPL-MCNC: 1.3 G/DL (ref 3.4–5)
ALBUMIN/GLOB SERPL: 0.2 {RATIO} (ref 1–2.7)
ALP SERPL-CCNC: 331 U/L (ref 46–116)
ALT SERPL-CCNC: 36 U/L (ref 12–78)
ANION GAP SERPL CALC-SCNC: 4 MMOL/L (ref 5–15)
AST SERPL-CCNC: 42 U/L (ref 15–37)
BASOPHILS NFR BLD AUTO: 0.9 % (ref 0–2)
BILIRUB SERPL-MCNC: 0.4 MG/DL (ref 0.2–1)
BUN SERPL-MCNC: 21 MG/DL (ref 7–18)
CALCIUM SERPL-MCNC: 8.7 MG/DL (ref 8.5–10.1)
CHLORIDE SERPL-SCNC: 109 MMOL/L (ref 98–107)
CO2 SERPL-SCNC: 32 MMOL/L (ref 21–32)
CREAT SERPL-MCNC: 1.1 MG/DL (ref 0.55–1.3)
EOSINOPHIL NFR BLD AUTO: 0.9 % (ref 0–3)
ERYTHROCYTE [DISTWIDTH] IN BLOOD BY AUTOMATED COUNT: 14.4 % (ref 11.6–14.8)
GLOBULIN SER-MCNC: 5.7 G/DL
HCT VFR BLD CALC: 32.6 % (ref 42–52)
HGB BLD-MCNC: 10.1 G/DL (ref 14.2–18)
LYMPHOCYTES NFR BLD AUTO: 8.2 % (ref 20–45)
MCV RBC AUTO: 97 FL (ref 80–99)
MONOCYTES NFR BLD AUTO: 6 % (ref 1–10)
NEUTROPHILS NFR BLD AUTO: 83.9 % (ref 45–75)
PHOSPHATE SERPL-MCNC: 3.4 MG/DL (ref 2.5–4.9)
PLATELET # BLD: 297 K/UL (ref 150–450)
POTASSIUM SERPL-SCNC: 4.4 MMOL/L (ref 3.5–5.1)
RBC # BLD AUTO: 3.35 M/UL (ref 4.7–6.1)
SODIUM SERPL-SCNC: 145 MMOL/L (ref 136–145)
WBC # BLD AUTO: 15.7 K/UL (ref 4.8–10.8)

## 2020-11-03 RX ADMIN — MEROPENEM SCH MLS/HR: 1 INJECTION INTRAVENOUS at 03:46

## 2020-11-03 RX ADMIN — TAMSULOSIN HYDROCHLORIDE SCH MG: 0.4 CAPSULE ORAL at 17:32

## 2020-11-03 RX ADMIN — HEPARIN SODIUM SCH UNITS: 5000 INJECTION INTRAVENOUS; SUBCUTANEOUS at 20:46

## 2020-11-03 RX ADMIN — MEROPENEM SCH MLS/HR: 1 INJECTION INTRAVENOUS at 15:28

## 2020-11-03 RX ADMIN — INSULIN ASPART SCH UNITS: 100 INJECTION, SOLUTION INTRAVENOUS; SUBCUTANEOUS at 17:14

## 2020-11-03 RX ADMIN — INSULIN ASPART SCH UNITS: 100 INJECTION, SOLUTION INTRAVENOUS; SUBCUTANEOUS at 11:56

## 2020-11-03 RX ADMIN — HEPARIN SODIUM SCH UNITS: 5000 INJECTION INTRAVENOUS; SUBCUTANEOUS at 08:05

## 2020-11-03 RX ADMIN — TAMSULOSIN HYDROCHLORIDE SCH MG: 0.4 CAPSULE ORAL at 08:05

## 2020-11-03 RX ADMIN — INSULIN ASPART SCH UNITS: 100 INJECTION, SOLUTION INTRAVENOUS; SUBCUTANEOUS at 05:52

## 2020-11-03 NOTE — NUR
NURSE NOTES:

Received report from LITA Gabriel. Patient is resting in bed, in stable condition. No s/sx of 
SOB, breathing is even and unlabored, patient on non-rebreather 15L, SpO2 95%. NGT tube 
feeding right nares, patent and draining. Observed no presence of pain or discomfort at this 
time. Patient ntoed on bilateral soft wrist restraints for attempting to pull out medical 
devices, maintained for patient safety, will monitor. Bed is in lowest position, brakes 
engaged. Call light is kept within easy reach. Will continue to monitor patient.

## 2020-11-03 NOTE — DIAGNOSTIC IMAGING REPORT
Indication: Dyspnea

 

Technique: One view of the chest

 

Comparison: 10/30/2020

 

Findings: There is increasing infiltrate at the left lung base. There is increasing

atelectasis and possibly some consolidation at the right lung base as well. Left

perihilar infiltrate appears similar to the previous exam. Right shoulder prosthesis

is again demonstrated. Nasogastric tube again demonstrated

 

Impression: Increasing left basilar infiltrate and right basilar atelectasis

## 2020-11-03 NOTE — CARDIAC ELECTROPHYSIOLOGY PN
Assessment/Plan


Assessment/Plan


1. Accelerated junctional rhythm. Not bradycardic. Ruled out for MI


      Echo showed ejection fraction of 55%.


2. Sepsis, on broad-spectrum IV antibiotic.


3. Respiratory failure, on antibiotic and venturi mask 10 liters


4. Dehydration.


5. Acute renal failure.


6. Electrolyte imbalance.


7. History of CHF, but echocardiogram showed normal left ventricular systolic 

function.


8. History of previous COVID infection in September 2020 and Active Covid now in

isolation


9. Dementia.





DW RN





Subjective


Subjective


In SR in NAD in Covid isolation.No events on Abx





Objective





Last 24 Hour Vital Signs








  Date Time  Temp Pulse Resp B/P (MAP) Pulse Ox O2 Delivery O2 Flow Rate FiO2


 


11/3/20 12:00       10.0 50


 


11/3/20 11:48 99.3 81 20 99/58 (72) 100   


 


11/3/20 09:00      Non-Rebreather 15.0 


 


11/3/20 09:00 99.5       


 


11/3/20 08:08     100 Non-Rebreather 15.0 100


 


11/3/20 08:00 100.2 83 20 112/55 (74) 100   


 


11/3/20 08:00  81      


 


11/3/20 08:00       15.0 100


 


11/3/20 04:00 98.4 78 20 133/71 (91) 100   


 


11/3/20 04:00       15.0 100


 


11/3/20 04:00  88      


 


11/3/20 02:00       15.0 100


 


11/3/20 00:00 98.4 84 20 122/73 (89) 100   


 


11/3/20 00:00  101      


 


11/3/20 00:00       15.0 100


 


11/2/20 21:00      Non-Rebreather 15.0 


 


11/2/20 20:00       15.0 100


 


11/2/20 20:00  67      


 


11/2/20 20:00 97.5 67 20 118/76 (90) 99   


 


11/2/20 19:00     99 Non-Rebreather 15.0 100


 


11/2/20 16:22 99.8       


 


11/2/20 16:00  97      


 


11/2/20 16:00       15.0 100


 


11/2/20 16:00 101.8 102 20 129/63 (85) 99   

















Intake and Output  


 


 11/2/20 11/3/20





 19:00 07:00


 


Output Total 500 ml 500 ml


 


Balance -500 ml -500 ml


 


  


 


Output Urine Total 500 ml 500 ml


 


# Bowel Movements  1











Laboratory Tests








Test


 11/2/20


13:33 11/2/20


16:10 11/2/20


18:09 11/2/20


23:16


 


POC Whole Blood Glucose


 149 MG/DL


()  H 


 Pending  


 Pending  





 


Urine Color  Yellow    


 


Urine Appearance


 


 Slightly


cloudy 


 





 


Urine pH  7 (4.5-8.0)    


 


Urine Specific Gravity


 


 1.005


(1.005-1.035) 


 





 


Urine Protein


 


 2+ (NEGATIVE)


H 


 





 


Urine Glucose (UA)


 


 Negative


(NEGATIVE) 


 





 


Urine Ketones


 


 Negative


(NEGATIVE) 


 





 


Urine Blood


 


 2+ (NEGATIVE)


H 


 





 


Urine Nitrite


 


 Negative


(NEGATIVE) 


 





 


Urine Bilirubin


 


 Negative


(NEGATIVE) 


 





 


Urine Urobilinogen


 


 1 MG/DL


(0.0-1.0)  H 


 





 


Urine Leukocyte Esterase


 


 Negative


(NEGATIVE) 


 





 


Urine RBC


 


 2-4 /HPF (0 -


0)  H 


 





 


Urine WBC


 


 0-2 /HPF (0 -


0) 


 





 


Urine Squamous Epithelial


Cells 


 None /LPF


(NONE/OCC) 


 





 


Urine Bacteria


 


 Few /HPF


(NONE) 


 





 


Test


 11/3/20


05:18 11/3/20


08:20 11/3/20


08:29 11/3/20


11:46


 


POC Whole Blood Glucose


 140 MG/DL


()  H 


 


 141 MG/DL


()  H


 


White Blood Count


 


 15.7 K/UL


(4.8-10.8)  H 


 





 


Red Blood Count


 


 3.35 M/UL


(4.70-6.10)  L 


 





 


Hemoglobin


 


 10.1 G/DL


(14.2-18.0)  L 


 





 


Hematocrit


 


 32.6 %


(42.0-52.0)  L 


 





 


Mean Corpuscular Volume  97 FL (80-99)    


 


Mean Corpuscular Hemoglobin


 


 30.1 PG


(27.0-31.0) 


 





 


Mean Corpuscular Hemoglobin


Concent 


 30.9 G/DL


(32.0-36.0)  L 


 





 


Red Cell Distribution Width


 


 14.4 %


(11.6-14.8) 


 





 


Platelet Count


 


 297 K/UL


(150-450) 


 





 


Mean Platelet Volume


 


 9.7 FL


(6.5-10.1) 


 





 


Neutrophils (%) (Auto)


 


 83.9 %


(45.0-75.0)  H 


 





 


Lymphocytes (%) (Auto)


 


 8.2 %


(20.0-45.0)  L 


 





 


Monocytes (%) (Auto)


 


 6.0 %


(1.0-10.0) 


 





 


Eosinophils (%) (Auto)


 


 0.9 %


(0.0-3.0) 


 





 


Basophils (%) (Auto)


 


 0.9 %


(0.0-2.0) 


 





 


Erythrocyte Sedimentation Rate


 


 116 MM/HR


(0-20)  H 


 





 


Sodium Level


 


 145 MMOL/L


(136-145) 


 





 


Potassium Level


 


 4.4 MMOL/L


(3.5-5.1) 


 





 


Chloride Level


 


 109 MMOL/L


()  H 


 





 


Carbon Dioxide Level


 


 32 MMOL/L


(21-32) 


 





 


Anion Gap


 


 4 mmol/L


(5-15)  L 


 





 


Blood Urea Nitrogen


 


 21 mg/dL


(7-18)  H 


 





 


Creatinine


 


 1.1 MG/DL


(0.55-1.30) 


 





 


Estimat Glomerular Filtration


Rate 


 > 60 mL/min


(>60) 


 





 


Glucose Level


 


 116 MG/DL


()  H 


 





 


Lactic Acid Level


 


 1.30 mmol/L


(0.4-2.0) 


 





 


Calcium Level


 


 8.7 MG/DL


(8.5-10.1) 


 





 


Phosphorus Level


 


 3.4 MG/DL


(2.5-4.9) 


 





 


Magnesium Level


 


 2.5 MG/DL


(1.8-2.4)  H 


 





 


Total Bilirubin


 


 0.4 MG/DL


(0.2-1.0) 


 





 


Aspartate Amino Transf


(AST/SGOT) 


 42 U/L (15-37)


H 


 





 


Alanine Aminotransferase


(ALT/SGPT) 


 36 U/L (12-78)


 


 





 


Alkaline Phosphatase


 


 331 U/L


()  H 


 





 


C-Reactive Protein,


Quantitative 


 14.9 mg/dL


(0.00-0.90)  H 


 





 


Total Protein


 


 7.0 G/DL


(6.4-8.2) 


 





 


Albumin


 


 1.3 G/DL


(3.4-5.0)  L 


 





 


Globulin  5.7 g/dL    


 


Albumin/Globulin Ratio


 


 0.2 (1.0-2.7)


L 


 





 


Arterial Blood pH


 


 


 7.439


(7.350-7.450) 





 


Arterial Blood Partial


Pressure CO2 


 


 48.9 mmHg


(35.0-45.0)  H 





 


Arterial Blood Partial


Pressure O2 


 


 157.6 mmHg


(75.0-100.0)  H 





 


Arterial Blood HCO3


 


 


 32.4 mmol/L


(22.0-26.0)  H 





 


Arterial Blood Oxygen


Saturation 


 


 98.8 %


() 





 


Arterial Blood Base Excess   7.2 (-2-2)  H 


 


Jerod Test   Positive   








Objective





HEAD AND NECK:  No JVD. NRB FM


LUNGS:  Coarse rhonchi.


CARDIOVASCULAR:   Irregular S1 and S2 with no gallop.


ABDOMEN:  Soft.


EXTREMITIES:  No pitting edema.











Kvng Edmond MD                Nov 3, 2020 12:19

## 2020-11-03 NOTE — INTERNAL MED PROGRESS NOTE
Subjective


Date of Service:  Nov 3, 2020


Physician Name


Benito Hearn


Attending Physician


Adnrei Cancino MD





Current Medications








 Medications


  (Trade)  Dose


 Ordered  Sig/Miky


 Route


 PRN Reason  Start Time


 Stop Time Status Last Admin


Dose Admin


 


 Acetaminophen


  (Tylenol)  650 mg  Q4H  PRN


 ORAL


 Temp >100.5  10/23/20 20:30


 11/22/20 20:29  11/2/20 15:52





 


 Dextrose


  (Dextrose 50%)  50 ml  Q30M  PRN


 IV


 Hypoglycemia  10/23/20 22:45


 1/21/21 22:44   





 


 Heparin Sodium


  (Porcine)


  (Heparin 5000


 units/ml)  5,000 units  EVERY 12  HOURS


 SUBQ


   10/23/20 21:00


 12/7/20 20:59  11/3/20 08:05





 


 Insulin Aspart


  (NovoLOG)    EVERY 6  HOURS


 SUBQ


   11/2/20 06:00


 1/22/21 06:29  11/3/20 11:56





 


 Meropenem 1 gm/


 Sodium Chloride  55 ml @ 


 110 mls/hr  Q12HR@0400,1600


 IVPB


   11/2/20 16:00


 11/7/20 15:59  11/3/20 15:28





 


 Nitroglycerin


  (Ntg)  0.4 mg  Q5M  PRN


 SL


 Prn Chest Pain  10/23/20 20:30


 11/22/20 20:29   





 


 Ondansetron HCl


  (Zofran)  4 mg  Q6H  PRN


 IVP


 Nausea & Vomiting  10/23/20 20:30


 11/22/20 20:29   





 


 Polyethylene


 Glycol


  (Miralax)  17 gm  DAILYPRN  PRN


 ORAL


 Constipation  10/23/20 20:30


 11/22/20 20:29   





 


 Promethazine HCl/


 Codeine


  (Phenergan with


 Codeine)  5 ml  Q4H  PRN


 ORAL


 For Cough  10/23/20 20:30


 11/22/20 20:29   





 


 Tamsulosin HCl


  (Flomax)  0.4 mg  BID


 ORAL


   10/31/20 19:45


 11/23/20 08:59  11/3/20 17:32





 


 Vancomycin HCl  250 ml @ 


 166.667


 mls/hr  Q24H


 IVPB


   11/2/20 06:00


 11/7/20 05:59  11/3/20 05:51





 


 Vancomycin HCl


  (Vanco pharmacy


 to dose)  1 ea  DAILY  PRN


 MISC


 Per rx protocol  10/23/20 20:30


 11/22/20 20:29   











Allergies:  


Coded Allergies:  


     No Known Allergies (Unverified , 8/20/12)


ROS Limited/Unobtainable:  Yes


Subjective


76 YO M admitted with shortness of breath.  Now pneumonia; previously COVID 

positive.  Cover for Int kierra-Dr aCncino.  On 100% non-rebreather mask.  Low grade 

fever to 100.2 F





Objective





Last Vital Signs








  Date Time  Temp Pulse Resp B/P (MAP) Pulse Ox O2 Delivery O2 Flow Rate FiO2


 


11/3/20 16:00       15.0 100


 


11/3/20 12:00  69      


 


11/3/20 11:48 99.3  20 99/58 (72) 100   


 


11/3/20 09:00      Non-Rebreather  











Laboratory Tests








Test


 11/2/20


18:09 11/2/20


23:16 11/3/20


05:18 11/3/20


08:20


 


POC Whole Blood Glucose


 Pending  


 Pending  


 140 MG/DL


()  H 





 


White Blood Count


 


 


 


 15.7 K/UL


(4.8-10.8)  H


 


Red Blood Count


 


 


 


 3.35 M/UL


(4.70-6.10)  L


 


Hemoglobin


 


 


 


 10.1 G/DL


(14.2-18.0)  L


 


Hematocrit


 


 


 


 32.6 %


(42.0-52.0)  L


 


Mean Corpuscular Volume    97 FL (80-99)  


 


Mean Corpuscular Hemoglobin


 


 


 


 30.1 PG


(27.0-31.0)


 


Mean Corpuscular Hemoglobin


Concent 


 


 


 30.9 G/DL


(32.0-36.0)  L


 


Red Cell Distribution Width


 


 


 


 14.4 %


(11.6-14.8)


 


Platelet Count


 


 


 


 297 K/UL


(150-450)


 


Mean Platelet Volume


 


 


 


 9.7 FL


(6.5-10.1)


 


Neutrophils (%) (Auto)


 


 


 


 83.9 %


(45.0-75.0)  H


 


Lymphocytes (%) (Auto)


 


 


 


 8.2 %


(20.0-45.0)  L


 


Monocytes (%) (Auto)


 


 


 


 6.0 %


(1.0-10.0)


 


Eosinophils (%) (Auto)


 


 


 


 0.9 %


(0.0-3.0)


 


Basophils (%) (Auto)


 


 


 


 0.9 %


(0.0-2.0)


 


Erythrocyte Sedimentation Rate


 


 


 


 116 MM/HR


(0-20)  H


 


Sodium Level


 


 


 


 145 MMOL/L


(136-145)


 


Potassium Level


 


 


 


 4.4 MMOL/L


(3.5-5.1)


 


Chloride Level


 


 


 


 109 MMOL/L


()  H


 


Carbon Dioxide Level


 


 


 


 32 MMOL/L


(21-32)


 


Anion Gap


 


 


 


 4 mmol/L


(5-15)  L


 


Blood Urea Nitrogen


 


 


 


 21 mg/dL


(7-18)  H


 


Creatinine


 


 


 


 1.1 MG/DL


(0.55-1.30)


 


Estimat Glomerular Filtration


Rate 


 


 


 > 60 mL/min


(>60)


 


Glucose Level


 


 


 


 116 MG/DL


()  H


 


Lactic Acid Level


 


 


 


 1.30 mmol/L


(0.4-2.0)


 


Calcium Level


 


 


 


 8.7 MG/DL


(8.5-10.1)


 


Phosphorus Level


 


 


 


 3.4 MG/DL


(2.5-4.9)


 


Magnesium Level


 


 


 


 2.5 MG/DL


(1.8-2.4)  H


 


Total Bilirubin


 


 


 


 0.4 MG/DL


(0.2-1.0)


 


Aspartate Amino Transf


(AST/SGOT) 


 


 


 42 U/L (15-37)


H


 


Alanine Aminotransferase


(ALT/SGPT) 


 


 


 36 U/L (12-78)





 


Alkaline Phosphatase


 


 


 


 331 U/L


()  H


 


C-Reactive Protein,


Quantitative 


 


 


 14.9 mg/dL


(0.00-0.90)  H


 


Total Protein


 


 


 


 7.0 G/DL


(6.4-8.2)


 


Albumin


 


 


 


 1.3 G/DL


(3.4-5.0)  L


 


Globulin    5.7 g/dL  


 


Albumin/Globulin Ratio


 


 


 


 0.2 (1.0-2.7)


L


 


Test


 11/3/20


08:29 11/3/20


11:46 11/3/20


17:08 





 


Arterial Blood pH


 7.439


(7.350-7.450) 


 


 





 


Arterial Blood Partial


Pressure CO2 48.9 mmHg


(35.0-45.0)  H 


 


 





 


Arterial Blood Partial


Pressure O2 157.6 mmHg


(75.0-100.0)  H 


 


 





 


Arterial Blood HCO3


 32.4 mmol/L


(22.0-26.0)  H 


 


 





 


Arterial Blood Oxygen


Saturation 98.8 %


() 


 


 





 


Arterial Blood Base Excess 7.2 (-2-2)  H   


 


Jerod Test Positive     


 


POC Whole Blood Glucose


 


 141 MG/DL


()  H 111 MG/DL


()  H 




















Intake and Output  


 


 11/2/20 11/3/20





 19:00 07:00


 


Output Total 500 ml 500 ml


 


Balance -500 ml -500 ml


 


  


 


Output Urine Total 500 ml 500 ml


 


# Bowel Movements  1








Objective


PHYSICAL EXAMINATION:


GENERAL:  The patient awake with deep stimuli, open his eyes, however,


cannot follow commands.  The patient is on a Ventimask at this time,


chronically ill-appearing.


HEAD AND NECK:  Pupils are equal and reactive to light.  Anicteric.


NECK:  Supple.  No JVD.


LUNGS:  100 % non-rebreather mask;  wheezing, rhonchi, and decreased air in 

bases.


HEART:  S1, S2.  Regular rhythm.  Distant heart sounds.  No murmur or


gallop.


ABDOMEN:  Soft, nondistended, nontender.  Positive bowel sounds.


EXTREMITIES:  No cyanosis, clubbing, or edema.


NEUROLOGIC:  Very limited secondary to the patient's status, cannot follow


commands.  Opens his eyes with deep stimuli and moving extremities


spontaneously.





Assessment/Plan


Assessment/Plan


ASSESSMENT:


1. Acute hypoxemic respiratory failure, most likely secondary to


pneumonia and sepsis.


2. Sepsis secondary to urinary tract infection and pneumonia.


3. pneumonia=MRSA; ESBL E. coli


4. Acute kidney injury on chronic renal insufficiency.


5. Dehydration.


6. History of chronic congestive heart failure.


7. Diabetes type 2.


8. Dyslipidemia.


9. Hypertension.


10. Alzheimer's disease.


11. COVID 19 previous positive





PLAN:


1.  Telemetry status


2.  Dr. Sandoval,=Pulmonary Critical Care


3.  Dr. Garcia = Inf Dis.


4.  IV= D5W due to the hypernatremia and dehydration.


5.  antibiotics = vancomycin and meropenem


6.  Code status is full code.


7.    DVT prophylaxis is heparin subcutaneous.











Benito Hearn MD                Nov 3, 2020 17:56

## 2020-11-03 NOTE — INFECTIOUS DISEASES PROG NOTE
Assessment/Plan


74yo M with:





Fever,r ecurrent


Leukocytosis ;recurrent ; increased; now improving


Acute hypoxic resp failure, on NRB mask> 4l NC; back on NRB, desaturation 10/29


Pneumonia- >HAP


hx of COVID19 PNA 9/9/20


          10/30 Sp cx MRSA (Vancomycin ADRIANA 1), ESBL E.coli


   10/23 BCx NTD


   UA 15-20 WBC, UCx Neg


   10/23 COVID rapid neg; 10/26 rapid COVID PCR + (from prior infection)- not 

new infection


   Flu A/B neg


   CXR: L pna


   Resp cx MRSA





Neftali on CKD, improving





SNF resident ([facility])


Non-verbal


VRE and MRSA colonized





Plan:


Cont vanco #12/14 for MRSA PNA 


Meropenem #2/7 for ESBL PNA


   -11/2 SP Zosyn #4


   -10/26 SP Cefepime #4


   -10/24 SP Flagyl #








Monitor CBC/CMP


Monitor resp status


Monitor temp curve and hemodynamics


off COVID isolation- positive covid test represents residual dead virus from 

infection on 9/2020


f/u bcx x2, ua. w. reflex





D/w RN





Thank you for this consult. [facility] will continue to follow.





Subjective


Allergies:  


Coded Allergies:  


     No Known Allergies (Unverified , 8/20/12)


Tm 101.8


on nRB


wbc improving





Objective





Last 24 Hour Vital Signs








  Date Time  Temp Pulse Resp B/P (MAP) Pulse Ox O2 Delivery O2 Flow Rate FiO2


 


11/3/20 12:00       10.0 50


 


11/3/20 11:48 99.3 81 20 99/58 (72) 100   


 


11/3/20 09:00      Non-Rebreather 15.0 


 


11/3/20 09:00 99.5       


 


11/3/20 08:08     100 Non-Rebreather 15.0 100


 


11/3/20 08:00 100.2 83 20 112/55 (74) 100   


 


11/3/20 08:00  81      


 


11/3/20 08:00       15.0 100


 


11/3/20 04:00 98.4 78 20 133/71 (91) 100   


 


11/3/20 04:00       15.0 100


 


11/3/20 04:00  88      


 


11/3/20 02:00       15.0 100


 


11/3/20 00:00 98.4 84 20 122/73 (89) 100   


 


11/3/20 00:00  101      


 


11/3/20 00:00       15.0 100


 


11/2/20 21:00      Non-Rebreather 15.0 


 


11/2/20 20:00       15.0 100


 


11/2/20 20:00  67      


 


11/2/20 20:00 97.5 67 20 118/76 (90) 99   


 


11/2/20 19:00     99 Non-Rebreather 15.0 100


 


11/2/20 16:22 99.8       


 


11/2/20 16:00  97      


 


11/2/20 16:00       15.0 100


 


11/2/20 16:00 101.8 102 20 129/63 (85) 99   








Height (Feet):  5


Height (Inches):  4.00


Weight (Pounds):  130


Gen: NAD


CV: RRR


Pulm: Rhonchi anteriorly, lots of oral secretions


Abd: Soft, NTND


Ext: No c/c/e


Neuro: Awake





Laboratory Tests








Test


 11/2/20


13:33 11/2/20


16:10 11/2/20


18:09 11/2/20


23:16


 


POC Whole Blood Glucose


 149 MG/DL


()  H 


 Pending  


 Pending  





 


Urine Color  Yellow    


 


Urine Appearance


 


 Slightly


cloudy 


 





 


Urine pH  7 (4.5-8.0)    


 


Urine Specific Gravity


 


 1.005


(1.005-1.035) 


 





 


Urine Protein


 


 2+ (NEGATIVE)


H 


 





 


Urine Glucose (UA)


 


 Negative


(NEGATIVE) 


 





 


Urine Ketones


 


 Negative


(NEGATIVE) 


 





 


Urine Blood


 


 2+ (NEGATIVE)


H 


 





 


Urine Nitrite


 


 Negative


(NEGATIVE) 


 





 


Urine Bilirubin


 


 Negative


(NEGATIVE) 


 





 


Urine Urobilinogen


 


 1 MG/DL


(0.0-1.0)  H 


 





 


Urine Leukocyte Esterase


 


 Negative


(NEGATIVE) 


 





 


Urine RBC


 


 2-4 /HPF (0 -


0)  H 


 





 


Urine WBC


 


 0-2 /HPF (0 -


0) 


 





 


Urine Squamous Epithelial


Cells 


 None /LPF


(NONE/OCC) 


 





 


Urine Bacteria


 


 Few /HPF


(NONE) 


 





 


Test


 11/3/20


05:18 11/3/20


08:20 11/3/20


08:29 11/3/20


11:46


 


POC Whole Blood Glucose


 140 MG/DL


()  H 


 


 141 MG/DL


()  H


 


White Blood Count


 


 15.7 K/UL


(4.8-10.8)  H 


 





 


Red Blood Count


 


 3.35 M/UL


(4.70-6.10)  L 


 





 


Hemoglobin


 


 10.1 G/DL


(14.2-18.0)  L 


 





 


Hematocrit


 


 32.6 %


(42.0-52.0)  L 


 





 


Mean Corpuscular Volume  97 FL (80-99)    


 


Mean Corpuscular Hemoglobin


 


 30.1 PG


(27.0-31.0) 


 





 


Mean Corpuscular Hemoglobin


Concent 


 30.9 G/DL


(32.0-36.0)  L 


 





 


Red Cell Distribution Width


 


 14.4 %


(11.6-14.8) 


 





 


Platelet Count


 


 297 K/UL


(150-450) 


 





 


Mean Platelet Volume


 


 9.7 FL


(6.5-10.1) 


 





 


Neutrophils (%) (Auto)


 


 83.9 %


(45.0-75.0)  H 


 





 


Lymphocytes (%) (Auto)


 


 8.2 %


(20.0-45.0)  L 


 





 


Monocytes (%) (Auto)


 


 6.0 %


(1.0-10.0) 


 





 


Eosinophils (%) (Auto)


 


 0.9 %


(0.0-3.0) 


 





 


Basophils (%) (Auto)


 


 0.9 %


(0.0-2.0) 


 





 


Erythrocyte Sedimentation Rate


 


 116 MM/HR


(0-20)  H 


 





 


Sodium Level


 


 145 MMOL/L


(136-145) 


 





 


Potassium Level


 


 4.4 MMOL/L


(3.5-5.1) 


 





 


Chloride Level


 


 109 MMOL/L


()  H 


 





 


Carbon Dioxide Level


 


 32 MMOL/L


(21-32) 


 





 


Anion Gap


 


 4 mmol/L


(5-15)  L 


 





 


Blood Urea Nitrogen


 


 21 mg/dL


(7-18)  H 


 





 


Creatinine


 


 1.1 MG/DL


(0.55-1.30) 


 





 


Estimat Glomerular Filtration


Rate 


 > 60 mL/min


(>60) 


 





 


Glucose Level


 


 116 MG/DL


()  H 


 





 


Lactic Acid Level


 


 1.30 mmol/L


(0.4-2.0) 


 





 


Calcium Level


 


 8.7 MG/DL


(8.5-10.1) 


 





 


Phosphorus Level


 


 3.4 MG/DL


(2.5-4.9) 


 





 


Magnesium Level


 


 2.5 MG/DL


(1.8-2.4)  H 


 





 


Total Bilirubin


 


 0.4 MG/DL


(0.2-1.0) 


 





 


Aspartate Amino Transf


(AST/SGOT) 


 42 U/L (15-37)


H 


 





 


Alanine Aminotransferase


(ALT/SGPT) 


 36 U/L (12-78)


 


 





 


Alkaline Phosphatase


 


 331 U/L


()  H 


 





 


C-Reactive Protein,


Quantitative 


 14.9 mg/dL


(0.00-0.90)  H 


 





 


Total Protein


 


 7.0 G/DL


(6.4-8.2) 


 





 


Albumin


 


 1.3 G/DL


(3.4-5.0)  L 


 





 


Globulin  5.7 g/dL    


 


Albumin/Globulin Ratio


 


 0.2 (1.0-2.7)


L 


 





 


Arterial Blood pH


 


 


 7.439


(7.350-7.450) 





 


Arterial Blood Partial


Pressure CO2 


 


 48.9 mmHg


(35.0-45.0)  H 





 


Arterial Blood Partial


Pressure O2 


 


 157.6 mmHg


(75.0-100.0)  H 





 


Arterial Blood HCO3


 


 


 32.4 mmol/L


(22.0-26.0)  H 





 


Arterial Blood Oxygen


Saturation 


 


 98.8 %


() 





 


Arterial Blood Base Excess   7.2 (-2-2)  H 


 


Jerod Test   Positive   











Current Medications








 Medications


  (Trade)  Dose


 Ordered  Sig/Miky


 Route


 PRN Reason  Start Time


 Stop Time Status Last Admin


Dose Admin


 


 Acetaminophen


  (Tylenol)  650 mg  Q4H  PRN


 ORAL


 Temp >100.5  10/23/20 20:30


 11/22/20 20:29  11/2/20 15:52





 


 Dextrose


  (Dextrose 50%)  50 ml  Q30M  PRN


 IV


 Hypoglycemia  10/23/20 22:45


 1/21/21 22:44   





 


 Heparin Sodium


  (Porcine)


  (Heparin 5000


 units/ml)  5,000 units  EVERY 12  HOURS


 SUBQ


   10/23/20 21:00


 12/7/20 20:59  11/3/20 08:05





 


 Insulin Aspart


  (NovoLOG)    EVERY 6  HOURS


 SUBQ


   11/2/20 06:00


 1/22/21 06:29  11/3/20 11:56





 


 Meropenem 1 gm/


 Sodium Chloride  55 ml @ 


 110 mls/hr  Q12HR@0400,1600


 IVPB


   11/2/20 16:00


 11/7/20 15:59  11/3/20 03:46





 


 Nitroglycerin


  (Ntg)  0.4 mg  Q5M  PRN


 SL


 Prn Chest Pain  10/23/20 20:30


 11/22/20 20:29   





 


 Ondansetron HCl


  (Zofran)  4 mg  Q6H  PRN


 IVP


 Nausea & Vomiting  10/23/20 20:30


 11/22/20 20:29   





 


 Polyethylene


 Glycol


  (Miralax)  17 gm  DAILYPRN  PRN


 ORAL


 Constipation  10/23/20 20:30


 11/22/20 20:29   





 


 Promethazine HCl/


 Codeine


  (Phenergan with


 Codeine)  5 ml  Q4H  PRN


 ORAL


 For Cough  10/23/20 20:30


 11/22/20 20:29   





 


 Tamsulosin HCl


  (Flomax)  0.4 mg  BID


 ORAL


   10/31/20 19:45


 11/23/20 08:59  11/3/20 08:05





 


 Vancomycin HCl  250 ml @ 


 166.667


 mls/hr  Q24H


 IVPB


   11/2/20 06:00


 11/7/20 05:59  11/3/20 05:51





 


 Vancomycin HCl


  (Vanco pharmacy


 to dose)  1 ea  DAILY  PRN


 MISC


 Per rx protocol  10/23/20 20:30


 11/22/20 20:29   




















Slime Garcia M.D.             Nov 3, 2020 12:55

## 2020-11-03 NOTE — NUR
NURSE HAND-OFF REPORT: 



Important Events on Shift:

Patient Status: Stable

Diet: Glucerna 1.2 60 ml/hr



Pending Orders: None

Pending Results/Labs:None 

Pending MD notification: None.



Latest Vital Signs: Temperature 99.0 , Pulse 97 , B/P 116 /74 , Respiratory Rate 20 , O2 SAT 
96 , Non-Rebreather, O2 Flow Rate 15.0 .  

Vital Sign Comment: Stable



EKG Rhythm: Sinus Rhythm

Rhythm change?: N 

MD Notified?: Y -Left Message for Dr. Cancino, No cardio on the case. 

MD Response: 



Latest Mejia Fall Score: 70  

Fall Risk: High Risk 

Safety Measures: Call light Within Reach, Bed Alarm Zone 1, Side Rails Side Rails x3, Bed 
position Low and Locked.

Fall Precautions: 

Yellow Socks

Yellow Gown

Door Sign

Patient Fall Education



Report given to LITA Davis.

## 2020-11-03 NOTE — NUR
NURSE NOTES:

Patient noted with positive for ESBL sputum per microbiology, patient noted with Merrem 1 gm 
IVPB Q12HR, Vancomycin per pharmacy, patient had temperature of 100.2 F this morning, now 
temperature of 99.5 F. Contacted and informed Dr. Garcia of assessments. Dr. Garcia 
acknowledged and gave no new orders at this time. Will continue to monitor patient.

## 2020-11-03 NOTE — PULMONOLOGY PROGRESS NOTE
Subjective


ROS Limited/Unobtainable:  Yes


Interval Events:  unchagned


Allergies:  


Coded Allergies:  


     No Known Allergies (Unverified , 8/20/12)





Objective





Last 24 Hour Vital Signs








  Date Time  Temp Pulse Resp B/P (MAP) Pulse Ox O2 Delivery O2 Flow Rate FiO2


 


11/3/20 09:00      Non-Rebreather 15.0 


 


11/3/20 09:00 99.5       


 


11/3/20 08:08     100 Non-Rebreather 15.0 100


 


11/3/20 08:00 100.2 83 20 112/55 (74) 100   


 


11/3/20 08:00  81      


 


11/3/20 08:00       15.0 100


 


11/3/20 04:00 98.4 78 20 133/71 (91) 100   


 


11/3/20 04:00       15.0 100


 


11/3/20 04:00  88      


 


11/3/20 02:00       15.0 100


 


11/3/20 00:00 98.4 84 20 122/73 (89) 100   


 


11/3/20 00:00  101      


 


11/3/20 00:00       15.0 100


 


11/2/20 21:00      Non-Rebreather 15.0 


 


11/2/20 20:00       15.0 100


 


11/2/20 20:00  67      


 


11/2/20 20:00 97.5 67 20 118/76 (90) 99   


 


11/2/20 19:00     99 Non-Rebreather 15.0 100


 


11/2/20 16:22 99.8       


 


11/2/20 16:00  97      


 


11/2/20 16:00       15.0 100


 


11/2/20 16:00 101.8 102 20 129/63 (85) 99   


 


11/2/20 12:00       15.0 100


 


11/2/20 12:00 100.4 98 20 124/69 (87) 98   


 


11/2/20 12:00  106      

















Intake and Output  


 


 11/2/20 11/3/20





 19:00 07:00


 


Output Total 500 ml 500 ml


 


Balance -500 ml -500 ml


 


  


 


Output Urine Total 500 ml 500 ml


 


# Bowel Movements  1








General Appearance:  WD/WN, no acute distress


HEENT:  normocephalic, atraumatic


Respiratory:  chest wall non-tender, respiratory distress, rhonchi - left, 

rhonchi - right


Cardiovascular:  normal rate


Abdomen:  normal bowel sounds, no organomegaly


Genitourinary:  normal external genitalia


Skin:  no rash


Laboratory Tests


11/2/20 13:33: POC Whole Blood Glucose 149H


11/2/20 16:10: 


Urine Color Yellow, Urine Appearance Slightly cloudy, Urine pH 7, Urine Specific

Gravity 1.005, Urine Protein 2+H, Urine Glucose (UA) Negative, Urine Ketones 

Negative, Urine Blood 2+H, Urine Nitrite Negative, Urine Bilirubin Negative, 

Urine Urobilinogen 1H, Urine Leukocyte Esterase Negative, Urine RBC 2-4H, Urine 

WBC 0-2, Urine Squamous Epithelial Cells None, Urine Bacteria Few


11/2/20 18:09: POC Whole Blood Glucose [Pending]


11/2/20 23:16: POC Whole Blood Glucose [Pending]


11/3/20 05:18: POC Whole Blood Glucose 140H


11/3/20 08:20: 


White Blood Count 15.7H, Red Blood Count 3.35L, Hemoglobin 10.1L, Hematocrit 

32.6L, Mean Corpuscular Volume 97, Mean Corpuscular Hemoglobin 30.1, Mean 

Corpuscular Hemoglobin Concent 30.9L, Red Cell Distribution Width 14.4, Platelet

 Count 297, Mean Platelet Volume 9.7, Neutrophils (%) (Auto) 83.9H, Lymphocytes 

(%) (Auto) 8.2L, Monocytes (%) (Auto) 6.0, Eosinophils (%) (Auto) 0.9, Basophils

 (%) (Auto) 0.9, Erythrocyte Sedimentation Rate 116H, Sodium Level 145, Potassi

um Level 4.4, Chloride Level 109H, Carbon Dioxide Level 32, Anion Gap 4L, Blood 

Urea Nitrogen 21H, Creatinine 1.1, Estimat Glomerular Filtration Rate > 60, 

Glucose Level 116H, Lactic Acid Level 1.30, Calcium Level 8.7, Phosphorus Level 

3.4, Magnesium Level 2.5H, Total Bilirubin 0.4, Aspartate Amino Transf 

(AST/SGOT) 42H, Alanine Aminotransferase (ALT/SGPT) 36, Alkaline Phosphatase 

331H, C-Reactive Protein, Quantitative 14.9H, Total Protein 7.0, Albumin 1.3L, 

Globulin 5.7, Albumin/Globulin Ratio 0.2L


11/3/20 08:29: 


Arterial Blood pH 7.439, Arterial Blood Partial Pressure CO2 48.9H, Arterial 

Blood Partial Pressure O2 157.6H, Arterial Blood HCO3 32.4H, Arterial Blood 

Oxygen Saturation 98.8, Arterial Blood Base Excess 7.2H, Jerod Test Positive





Current Medications








 Medications


  (Trade)  Dose


 Ordered  Sig/Miky


 Route


 PRN Reason  Start Time


 Stop Time Status Last Admin


Dose Admin


 


 Acetaminophen


  (Tylenol)  650 mg  Q4H  PRN


 ORAL


 Temp >100.5  10/23/20 20:30


 11/22/20 20:29  11/2/20 15:52





 


 Dextrose


  (Dextrose 50%)  50 ml  Q30M  PRN


 IV


 Hypoglycemia  10/23/20 22:45


 1/21/21 22:44   





 


 Heparin Sodium


  (Porcine)


  (Heparin 5000


 units/ml)  5,000 units  EVERY 12  HOURS


 SUBQ


   10/23/20 21:00


 12/7/20 20:59  11/3/20 08:05





 


 Insulin Aspart


  (NovoLOG)    EVERY 6  HOURS


 SUBQ


   11/2/20 06:00


 1/22/21 06:29  11/3/20 05:52





 


 Meropenem 1 gm/


 Sodium Chloride  55 ml @ 


 110 mls/hr  Q12HR@0400,1600


 IVPB


   11/2/20 16:00


 11/7/20 15:59  11/3/20 03:46





 


 Nitroglycerin


  (Ntg)  0.4 mg  Q5M  PRN


 SL


 Prn Chest Pain  10/23/20 20:30


 11/22/20 20:29   





 


 Ondansetron HCl


  (Zofran)  4 mg  Q6H  PRN


 IVP


 Nausea & Vomiting  10/23/20 20:30


 11/22/20 20:29   





 


 Polyethylene


 Glycol


  (Miralax)  17 gm  DAILYPRN  PRN


 ORAL


 Constipation  10/23/20 20:30


 11/22/20 20:29   





 


 Promethazine HCl/


 Codeine


  (Phenergan with


 Codeine)  5 ml  Q4H  PRN


 ORAL


 For Cough  10/23/20 20:30


 11/22/20 20:29   





 


 Tamsulosin HCl


  (Flomax)  0.4 mg  BID


 ORAL


   10/31/20 19:45


 11/23/20 08:59  11/3/20 08:05





 


 Vancomycin HCl  250 ml @ 


 166.667


 mls/hr  Q24H


 IVPB


   11/2/20 06:00


 11/7/20 05:59  11/3/20 05:51





 


 Vancomycin HCl


  (Vanco pharmacy


 to dose)  1 ea  DAILY  PRN


 MISC


 Per rx protocol  10/23/20 20:30


 11/22/20 20:29   














Assessment/Plan


Problems:  


(1) 2019 novel coronavirus disease (COVID-19)


(2) Severe sepsis


(3) Acute encephalopathy


(4) HTN (hypertension)


(5) Aphasia


(6) Alzheimer's dementia


(7) History of CVA (cerebrovascular accident)


(8) BPH (benign prostatic hyperplasia)


Assessment/Plan


wbc still high, pt is febrile


repeat cxr showing increasing infiltrate


CRP rising


titrate fio2 to sat of 92%


frequent suctioning


continue abx


check electrolytes


tolerating feeding


aspiration precaution


dvt prophylaxis.











Michael Sandoval MD               Nov 3, 2020 11:30

## 2020-11-03 NOTE — NUR
****DISCHARGE UPDATE

SPOKE WITH JULIO AT [place]

PER JULIO THEY CANNOT RECEIVE PATIENT % FIO2

PATIENT NEEDS TO BE WEANED DOWN TO AT LEAST 90%

## 2020-11-03 NOTE — NUR
NURSE NOTES:

Received report from LITA Wolf. On nonbreather mask, 15lpm, 50% FiO2. IV site on rfa #[id number], 
saline lock. flushed and patent. No IVF running at this time. On NGT on right nares, tube 
flushed and patent. Feeding running at a prescribed rate, no residual. On bilateral soft 
restraints. skin assessment done. skin, sensation, and ROM intact. Bed in lowest position, 
brakes engaged and bed alarm on. Call light placed within reach. Will continue to monitor.

## 2020-11-03 NOTE — NUR
NURSE NOTES:

Patient noted with ten seconds of Vtach. Patient currently sinus tachycardia , BP 
128/91. Dr. Edmond made aware of assessments, Dr. Edmond acknowledged and ordered BMP, 
calcium which is included in BMP, and magnesium labs for tomorrow morning. Orders entered, 
noted, and carried out. Will endorse accordingly. Charge nurse made aware.

## 2020-11-03 NOTE — NEPHROLOGY PROGRESS NOTE
Assessment/Plan


Problem List:  


(1) Dehydration


(2) JANES (acute kidney injury)


(3) Renal failure (ARF), acute on chronic


(4) Hypoxia


(5) Acute encephalopathy


(6) Electrolyte imbalance


Assessment





75-year-old male is admitted with acute hypoxic respiratory failure most likely 

secondary to pneumonia and sepsis, UTI.


Acute on chronic renal failure


Dehydration


Electrolyte imbalances, hypernatremia


Hypoalbuminemia


Diabetes type 2


History of congestive heart failure


Hypertension


Hyperlipemia


Alzheimer's


Previous COVID-19 infection in September 2020


Plan


November 3: On nonrebreather mask.  Inflammatory markers gradually declining.  

Renal parameters stable.  Continue per pulmonary.


November 2: Remains on nonrebreather mask.  Inflammatory markers remain 

elevated.  Renal parameters somewhat stable.  Continue per pulmonary and ID.  

Remains full code.


November 1: Patient on nonrebreather mask.  Labs noted.  Serum creatinine down 

to 1.3.  Continue per consultants.


October 31: Patient on nonrebreather mask.  Transfer to telemetry when seen this

morning.  Labs noted.  Continue per consultants.  Serum creatinine dylan to 1.7. 

Continue to monitor renal parameters.  Continue to monitor vancomycin level


October 30: Patient is not doing well clinically.  Mild respiratory distress.  

ABG noted.  Somewhat hypoxic.  CBC and chemistry panel ordered.  Discussed with 

LITA Garcia.  Will defer to pulmonary management to specialist.  Continue per ID.

 Renal parameters remained stable as of October 29.


October 29: Labs reviewed.  Renal parameters stable.  Continue per consultants.


October 28: Labs reviewed.  Potassium via NG tube ordered.  IV fluids stopped.  

Continue per consultants.


October 27: Labs reviewed.  Low potassium and low phosphorus replaced.  Continue

per consultants.  Remains stable from renal standpoint of view.


October 26: Patient remains n.p.o.  IV fluid down to 50 cc an hour.  Potassium 

supplement intravenously ordered.  Continue per consultants.





Previously:


Patient is n.p.o., will continue on IV fluid of D5W 75 cc an hour


We will monitor electrolytes and renal parameters


Avoid nephrotoxic's


Start p.o. when he clears by speech therapist, meanwhile aspiration precautions


Keep the blood pressure and blood sugar in check


Per orders





Subjective


ROS Limited/Unobtainable:  No


Constitutional:  Reports: malaise, weakness





Objective


Objective





Last 24 Hour Vital Signs








  Date Time  Temp Pulse Resp B/P (MAP) Pulse Ox O2 Delivery O2 Flow Rate FiO2


 


11/3/20 12:00       10.0 50


 


11/3/20 12:00  69      


 


11/3/20 11:48 99.3 81 20 99/58 (72) 100   


 


11/3/20 09:00      Non-Rebreather 15.0 


 


11/3/20 09:00 99.5       


 


11/3/20 08:08     100 Non-Rebreather 15.0 100


 


11/3/20 08:00 100.2 83 20 112/55 (74) 100   


 


11/3/20 08:00  81      


 


11/3/20 08:00       15.0 100


 


11/3/20 04:00 98.4 78 20 133/71 (91) 100   


 


11/3/20 04:00       15.0 100


 


11/3/20 04:00  88      


 


11/3/20 02:00       15.0 100


 


11/3/20 00:00 98.4 84 20 122/73 (89) 100   


 


11/3/20 00:00  101      


 


11/3/20 00:00       15.0 100


 


11/2/20 21:00      Non-Rebreather 15.0 


 


11/2/20 20:00       15.0 100


 


11/2/20 20:00  67      


 


11/2/20 20:00 97.5 67 20 118/76 (90) 99   


 


11/2/20 19:00     99 Non-Rebreather 15.0 100


 


11/2/20 16:22 99.8       


 


11/2/20 16:00  97      


 


11/2/20 16:00       15.0 100


 


11/2/20 16:00 101.8 102 20 129/63 (85) 99   

















Intake and Output  


 


 11/2/20 11/3/20





 19:00 07:00


 


Output Total 500 ml 500 ml


 


Balance -500 ml -500 ml


 


  


 


Output Urine Total 500 ml 500 ml


 


# Bowel Movements  1








Laboratory Tests


11/2/20 16:10: 


Urine Color Yellow, Urine Appearance Slightly cloudy, Urine pH 7, Urine Specific

Gravity 1.005, Urine Protein 2+H, Urine Glucose (UA) Negative, Urine Ketones 

Negative, Urine Blood 2+H, Urine Nitrite Negative, Urine Bilirubin Negative, 

Urine Urobilinogen 1H, Urine Leukocyte Esterase Negative, Urine RBC 2-4H, Urine 

WBC 0-2, Urine Squamous Epithelial Cells None, Urine Bacteria Few


11/2/20 18:09: POC Whole Blood Glucose [Pending]


11/2/20 23:16: POC Whole Blood Glucose [Pending]


11/3/20 05:18: POC Whole Blood Glucose 140H


11/3/20 08:20: 


White Blood Count 15.7H, Red Blood Count 3.35L, Hemoglobin 10.1L, Hematocrit 

32.6L, Mean Corpuscular Volume 97, Mean Corpuscular Hemoglobin 30.1, Mean 

Corpuscular Hemoglobin Concent 30.9L, Red Cell Distribution Width 14.4, Platelet

 Count 297, Mean Platelet Volume 9.7, Neutrophils (%) (Auto) 83.9H, Lymphocytes 

(%) (Auto) 8.2L, Monocytes (%) (Auto) 6.0, Eosinophils (%) (Auto) 0.9, Basophils

 (%) (Auto) 0.9, Erythrocyte Sedimentation Rate 116H, Sodium Level 145, 

Potassium Level 4.4, Chloride Level 109H, Carbon Dioxide Level 32, Anion Gap 4L,

 Blood Urea Nitrogen 21H, Creatinine 1.1, Estimat Glomerular Filtration Rate > 

60, Glucose Level 116H, Lactic Acid Level 1.30, Calcium Level 8.7, Phosphorus 

Level 3.4, Magnesium Level 2.5H, Total Bilirubin 0.4, Aspartate Amino Transf 

(AST/SGOT) 42H, Alanine Aminotransferase (ALT/SGPT) 36, Alkaline Phosphatase 

331H, C-Reactive Protein, Quantitative 14.9H, Total Protein 7.0, Albumin 1.3L, 

Globulin 5.7, Albumin/Globulin Ratio 0.2L


11/3/20 08:29: 


Arterial Blood pH 7.439, Arterial Blood Partial Pressure CO2 48.9H, Arterial 

Blood Partial Pressure O2 157.6H, Arterial Blood HCO3 32.4H, Arterial Blood 

Oxygen Saturation 98.8, Arterial Blood Base Excess 7.2H, Jerod Test Positive


11/3/20 11:46: POC Whole Blood Glucose 141H


Height (Feet):  5


Height (Inches):  4.00


Weight (Pounds):  130


General Appearance:  no apparent distress, lethargic, confused


EENT:  other - On nonrebreather mask


Cardiovascular:  normal rate


Respiratory/Chest:  decreased breath sounds


Abdomen:  soft, distended











Seven Tobar MD             Nov 3, 2020 14:44

## 2020-11-03 NOTE — NUR
NURSE HAND-OFF REPORT: 



Important Events on Shift:[Still congested]

Patient Status: []

Diet: [Glucerna 1.2 @ 60mls/hr]



Pending Orders: []

Pending Results/Labs:[]

Pending MD notification:[]



Latest Vital Signs: Temperature 98.4 , Pulse 78 , B/P 133 /71 , Respiratory Rate 20 , O2 SAT 
100 , Non-Rebreather, O2 Flow Rate 15.0 .  

Vital Sign Comment: []



EKG Rhythm: Sinus Rhythm

Rhythm change?: N 

MD Notified?: Y -Left Message for Dr. Cancino, No cardio on the case. 

MD Response: 



Latest Mejia Fall Score: 70  

Fall Risk: High Risk 

Safety Measures: Call light Within Reach, Bed Alarm Zone 1, Side Rails Side Rails x3, Bed 
position Low and Locked.

Fall Precautions: 

Yellow Socks

Yellow Gown

Door Sign

Patient Fall Education



Report given to [Luciano RN].

## 2020-11-04 VITALS — SYSTOLIC BLOOD PRESSURE: 157 MMHG | DIASTOLIC BLOOD PRESSURE: 74 MMHG

## 2020-11-04 VITALS — DIASTOLIC BLOOD PRESSURE: 74 MMHG | SYSTOLIC BLOOD PRESSURE: 138 MMHG

## 2020-11-04 VITALS — DIASTOLIC BLOOD PRESSURE: 73 MMHG | SYSTOLIC BLOOD PRESSURE: 125 MMHG

## 2020-11-04 VITALS — SYSTOLIC BLOOD PRESSURE: 122 MMHG | DIASTOLIC BLOOD PRESSURE: 84 MMHG

## 2020-11-04 VITALS — SYSTOLIC BLOOD PRESSURE: 131 MMHG | DIASTOLIC BLOOD PRESSURE: 76 MMHG

## 2020-11-04 VITALS — SYSTOLIC BLOOD PRESSURE: 119 MMHG | DIASTOLIC BLOOD PRESSURE: 76 MMHG

## 2020-11-04 LAB
ADD MANUAL DIFF: NO
ALBUMIN SERPL-MCNC: 1.3 G/DL (ref 3.4–5)
ALP SERPL-CCNC: 371 U/L (ref 46–116)
ALT SERPL-CCNC: 56 U/L (ref 12–78)
ANION GAP SERPL CALC-SCNC: 9 MMOL/L (ref 5–15)
AST SERPL-CCNC: 74 U/L (ref 15–37)
BASOPHILS NFR BLD AUTO: 0.7 % (ref 0–2)
BILIRUB DIRECT SERPL-MCNC: < 0.1 MG/DL (ref 0–0.3)
BILIRUB SERPL-MCNC: 0.4 MG/DL (ref 0.2–1)
BUN SERPL-MCNC: 22 MG/DL (ref 7–18)
CALCIUM SERPL-MCNC: 8.3 MG/DL (ref 8.5–10.1)
CHLORIDE SERPL-SCNC: 108 MMOL/L (ref 98–107)
CO2 SERPL-SCNC: 29 MMOL/L (ref 21–32)
CREAT SERPL-MCNC: 1.2 MG/DL (ref 0.55–1.3)
EOSINOPHIL NFR BLD AUTO: 1.8 % (ref 0–3)
ERYTHROCYTE [DISTWIDTH] IN BLOOD BY AUTOMATED COUNT: 14.7 % (ref 11.6–14.8)
HCT VFR BLD CALC: 33.4 % (ref 42–52)
HGB BLD-MCNC: 10.2 G/DL (ref 14.2–18)
LYMPHOCYTES NFR BLD AUTO: 16.2 % (ref 20–45)
MCV RBC AUTO: 99 FL (ref 80–99)
MONOCYTES NFR BLD AUTO: 6.1 % (ref 1–10)
NEUTROPHILS NFR BLD AUTO: 75.2 % (ref 45–75)
PLATELET # BLD: 272 K/UL (ref 150–450)
POTASSIUM SERPL-SCNC: 4.8 MMOL/L (ref 3.5–5.1)
RBC # BLD AUTO: 3.38 M/UL (ref 4.7–6.1)
SODIUM SERPL-SCNC: 146 MMOL/L (ref 136–145)
WBC # BLD AUTO: 11.2 K/UL (ref 4.8–10.8)

## 2020-11-04 RX ADMIN — INSULIN ASPART SCH UNITS: 100 INJECTION, SOLUTION INTRAVENOUS; SUBCUTANEOUS at 05:15

## 2020-11-04 RX ADMIN — MEROPENEM SCH MLS/HR: 1 INJECTION INTRAVENOUS at 17:20

## 2020-11-04 RX ADMIN — TAMSULOSIN HYDROCHLORIDE SCH MG: 0.4 CAPSULE ORAL at 17:20

## 2020-11-04 RX ADMIN — MEROPENEM SCH MLS/HR: 1 INJECTION INTRAVENOUS at 03:34

## 2020-11-04 RX ADMIN — INSULIN ASPART SCH UNITS: 100 INJECTION, SOLUTION INTRAVENOUS; SUBCUTANEOUS at 18:02

## 2020-11-04 RX ADMIN — HEPARIN SODIUM SCH UNITS: 5000 INJECTION INTRAVENOUS; SUBCUTANEOUS at 20:40

## 2020-11-04 RX ADMIN — INSULIN ASPART SCH UNITS: 100 INJECTION, SOLUTION INTRAVENOUS; SUBCUTANEOUS at 01:37

## 2020-11-04 RX ADMIN — INSULIN ASPART SCH UNITS: 100 INJECTION, SOLUTION INTRAVENOUS; SUBCUTANEOUS at 12:00

## 2020-11-04 RX ADMIN — HEPARIN SODIUM SCH UNITS: 5000 INJECTION INTRAVENOUS; SUBCUTANEOUS at 09:56

## 2020-11-04 RX ADMIN — TAMSULOSIN HYDROCHLORIDE SCH MG: 0.4 CAPSULE ORAL at 09:55

## 2020-11-04 NOTE — NUR
NURSE HAND-OFF REPORT: 



Important Events on Shift:[Still on nonrebreather mask, 15L]

Patient Status: [FC]

Diet: [Glucerna 1.2 @60]



Pending Orders: []

Pending Results/Labs:[]

Pending MD notification:[]



Latest Vital Signs: Temperature 100.0 , Pulse 89 , B/P 138 /74 , Respiratory Rate 24 , O2 
 , Non-Rebreather, O2 Flow Rate 15.0 .  

Vital Sign Comment: []



EKG Rhythm: Sinus Rhythm

Rhythm change?: N 

MD Notified?: Y -Left Message for Dr. Cancino, No cardio on the case. 

MD Response: 



Latest Mejia Fall Score: 70  

Fall Risk: High Risk 

Safety Measures: Call light Within Reach, Bed Alarm Zone 1, Side Rails Side Rails x3, Bed 
position Low and Locked.

Fall Precautions: 

Yellow Socks

Yellow Gown

Door Sign

Patient Fall Education



Report given to [LITA Santos].

## 2020-11-04 NOTE — CARDIOLOGY REPORT
--------------- APPROVED REPORT --------------





EXAM: Two-dimensional and M-mode echocardiogram with Doppler and color Doppler.



INDICATION

S.O.B



M-Mode DIMENSIONS 

IVSd1.3 (0.7-1.1cm)

LVDd4.3 (3.5-5.6cm)

PWd1.0 (0.7-1.1cm)

IVSs1.7 cm

LVDs3.0 (2.5-4.0cm)

PWs1.3 cm



 Other Information 

Quality : PoorLimited



<Conclusion>

Technically limited& difficult study due to poor acoustical windows & combative patient,all images 

obtained from subcostal.

Normal left ventricular chamber size, systolic function and wall motion to extent visualized.

Left ventricular ejection fraction estimated to be 55%.

No evidence of left ventricular hypertrophy.

All other cardiac chamber sizes are within normal limits. 

Calcification of aortic valve with adequate cusp excursion.

Thickened mitral valve leaflets with normal excursion.

Mitral annulus and aortic root calcification.

Pulmonic valve not well visualized.

Normal tricuspid valve structure. 

Subcostal not obtainable.

Trace mitral regurgitation.

Trace tricuspid regurgitation.

## 2020-11-04 NOTE — NUR
NURSE NOTES:





Per RT, pt tolerating venturi mask @ 10L FiO2 50%; in no acute distress at this time. Will 
continue to monitor.

## 2020-11-04 NOTE — CARDIOLOGY REPORT
--------------- APPROVED REPORT --------------





EKG Measurement

Heart [id number]

MA [id number]

[id number]

[id number]

[id number]



<Conclusion>

Sinus tachycardia with premature supraventricular complexes

ST & T wave abnormality, consider lateral ischemia

Abnormal ECG

## 2020-11-04 NOTE — INFECTIOUS DISEASES PROG NOTE
Assessment/Plan


76yo M with:





Fever,r ecurrent


Leukocytosis ;recurrent ; increased; now improving


Acute hypoxic resp failure, on NRB mask> 4l NC; back on NRB, desaturation 10/29


Pneumonia- >HAP


hx of COVID19 PNA 9/9/20


          10/30 Sp cx MRSA (Vancomycin ADRIANA 1), ESBL E.coli


   10/23 BCx NTD


   UA 15-20 WBC, UCx Neg


   10/23 COVID rapid neg; 10/26 rapid COVID PCR + (from prior infection)- not 

new infection


   Flu A/B neg


   CXR: L pna


   Resp cx MRSA





Neftali on CKD, improving





SNF resident ([facility])


Non-verbal


VRE and MRSA colonized





Plan:


Cont vanco #13/14 for MRSA PNA 


Meropenem #3/7 for ESBL PNA


   -11/2 SP Zosyn #4


   -10/26 SP Cefepime #4


   -10/24 SP Flagyl #








Monitor CBC/CMP


Monitor resp status


Monitor temp curve and hemodynamics


off COVID isolation- positive covid test represents residual dead virus from 

infection on 9/2020


f/u bcx x2, ua. w. reflex





D/w RN





Thank you for this consult. [facility] will continue to follow.





Subjective


Allergies:  


Coded Allergies:  


     No Known Allergies (Unverified , 8/20/12)


Tm 100


remains on NRB


wbc improving





Objective





Last 24 Hour Vital Signs








  Date Time  Temp Pulse Resp B/P (MAP) Pulse Ox O2 Delivery O2 Flow Rate FiO2


 


11/4/20 08:00 98.1 95 19 125/73 (90) 100   


 


11/4/20 04:00  85      


 


11/4/20 04:00 100.0 89 24 138/74 (95) 100   


 


11/4/20 04:00       15.0 100


 


11/4/20 00:00       15.0 100


 


11/4/20 00:00  96      


 


11/4/20 00:00 99.7 93 24 122/84 (97) 100   


 


11/3/20 21:00      Non-Rebreather 15.0 


 


11/3/20 20:00  88      


 


11/3/20 20:00       15.0 100


 


11/3/20 20:00 97.8 84 22 114/72 (86) 100   


 


11/3/20 19:20     99 Non-Rebreather 15.0 100


 


11/3/20 16:00       15.0 100


 


11/3/20 16:00 99.0 87 20 116/74 (88) 96   


 


11/3/20 16:00  97      


 


11/3/20 12:00       10.0 50


 


11/3/20 12:00  69      


 


11/3/20 11:48 99.3 81 20 99/58 (72) 100   








Height (Feet):  5


Height (Inches):  4.00


Weight (Pounds):  130


Gen: NAD


CV: RRR


Pulm: Rhonchi anteriorly, lots of oral secretions


Abd: Soft, NTND


Ext: No c/c/e


Neuro: Awake





Laboratory Tests








Test


 11/3/20


11:46 11/3/20


17:08 11/4/20


01:26 11/4/20


05:11


 


POC Whole Blood Glucose


 141 MG/DL


()  H 111 MG/DL


()  H 141 MG/DL


()  H 143 MG/DL


()  H


 


Test


 11/4/20


06:42 


 


 





 


White Blood Count


 11.2 K/UL


(4.8-10.8)  H 


 


 





 


Red Blood Count


 3.38 M/UL


(4.70-6.10)  L 


 


 





 


Hemoglobin


 10.2 G/DL


(14.2-18.0)  L 


 


 





 


Hematocrit


 33.4 %


(42.0-52.0)  L 


 


 





 


Mean Corpuscular Volume 99 FL (80-99)     


 


Mean Corpuscular Hemoglobin


 30.2 PG


(27.0-31.0) 


 


 





 


Mean Corpuscular Hemoglobin


Concent 30.5 G/DL


(32.0-36.0)  L 


 


 





 


Red Cell Distribution Width


 14.7 %


(11.6-14.8) 


 


 





 


Platelet Count


 272 K/UL


(150-450) 


 


 





 


Mean Platelet Volume


 9.8 FL


(6.5-10.1) 


 


 





 


Neutrophils (%) (Auto)


 75.2 %


(45.0-75.0)  H 


 


 





 


Lymphocytes (%) (Auto)


 16.2 %


(20.0-45.0)  L 


 


 





 


Monocytes (%) (Auto)


 6.1 %


(1.0-10.0) 


 


 





 


Eosinophils (%) (Auto)


 1.8 %


(0.0-3.0) 


 


 





 


Basophils (%) (Auto)


 0.7 %


(0.0-2.0) 


 


 





 


Sodium Level


 146 MMOL/L


(136-145)  H 


 


 





 


Potassium Level


 4.8 MMOL/L


(3.5-5.1) 


 


 





 


Chloride Level


 108 MMOL/L


()  H 


 


 





 


Carbon Dioxide Level


 29 MMOL/L


(21-32) 


 


 





 


Anion Gap


 9 mmol/L


(5-15) 


 


 





 


Blood Urea Nitrogen


 22 mg/dL


(7-18)  H 


 


 





 


Creatinine


 1.2 MG/DL


(0.55-1.30) 


 


 





 


Estimat Glomerular Filtration


Rate 58.7 mL/min


(>60) 


 


 





 


Glucose Level


 120 MG/DL


()  H 


 


 





 


Calcium Level


 8.3 MG/DL


(8.5-10.1)  L 


 


 





 


Phosphorus Level


 4.3 MG/DL


(2.5-4.9) 


 


 





 


Magnesium Level


 2.6 MG/DL


(1.8-2.4)  H 


 


 





 


Total Bilirubin


 0.4 MG/DL


(0.2-1.0) 


 


 





 


Direct Bilirubin


 < 0.1 MG/DL


(0.0-0.3) 


 


 





 


Aspartate Amino Transf


(AST/SGOT) 74 U/L (15-37)


H 


 


 





 


Alanine Aminotransferase


(ALT/SGPT) 56 U/L (12-78)


 


 


 





 


Alkaline Phosphatase


 371 U/L


()  H 


 


 





 


Total Protein


 6.3 G/DL


(6.4-8.2)  L 


 


 





 


Albumin


 1.3 G/DL


(3.4-5.0)  L 


 


 














Current Medications








 Medications


  (Trade)  Dose


 Ordered  Sig/Miky


 Route


 PRN Reason  Start Time


 Stop Time Status Last Admin


Dose Admin


 


 Acetaminophen


  (Tylenol)  650 mg  Q4H  PRN


 ORAL


 Temp >100.5  10/23/20 20:30


 11/22/20 20:29  11/2/20 15:52





 


 Dextrose


  (Dextrose 50%)  50 ml  Q30M  PRN


 IV


 Hypoglycemia  10/23/20 22:45


 1/21/21 22:44   





 


 Heparin Sodium


  (Porcine)


  (Heparin 5000


 units/ml)  5,000 units  EVERY 12  HOURS


 SUBQ


   10/23/20 21:00


 12/7/20 20:59  11/4/20 09:56





 


 Insulin Aspart


  (NovoLOG)    EVERY 6  HOURS


 SUBQ


   11/2/20 06:00


 1/22/21 06:29  11/4/20 05:15





 


 Meropenem 1 gm/


 Sodium Chloride  55 ml @ 


 110 mls/hr  Q12HR@0400,1600


 IVPB


   11/2/20 16:00


 11/7/20 15:59  11/4/20 03:34





 


 Nitroglycerin


  (Ntg)  0.4 mg  Q5M  PRN


 SL


 Prn Chest Pain  10/23/20 20:30


 11/22/20 20:29   





 


 Ondansetron HCl


  (Zofran)  4 mg  Q6H  PRN


 IVP


 Nausea & Vomiting  10/23/20 20:30


 11/22/20 20:29   





 


 Polyethylene


 Glycol


  (Miralax)  17 gm  DAILYPRN  PRN


 ORAL


 Constipation  10/23/20 20:30


 11/22/20 20:29   





 


 Promethazine HCl/


 Codeine


  (Phenergan with


 Codeine)  5 ml  Q4H  PRN


 ORAL


 For Cough  10/23/20 20:30


 11/22/20 20:29   





 


 Tamsulosin HCl


  (Flomax)  0.4 mg  BID


 ORAL


   10/31/20 19:45


 11/23/20 08:59  11/4/20 09:55





 


 Vancomycin HCl  250 ml @ 


 166.667


 mls/hr  Q24H


 IVPB


   11/2/20 06:00


 11/7/20 05:59  11/4/20 05:15





 


 Vancomycin HCl


  (Vanco pharmacy


 to dose)  1 ea  DAILY  PRN


 MISC


 Per rx protocol  10/23/20 20:30


 11/22/20 20:29   




















Slime Garcia M.D.             Nov 4, 2020 11:42

## 2020-11-04 NOTE — NUR
NURSE NOTES:

Respiratory therapy informed Rashad LONDON that they switched patient back to non rebreather 
mask at 15 liters of oxygen due to patient's oxygen saturation of 92 %. Patient is noted to 
now have an oxygen saturation of 98 % with the non rebreather mask on.

## 2020-11-04 NOTE — INTERNAL MED PROGRESS NOTE
Subjective


Date of Service:  Nov 4, 2020


Physician Name


Benito Hearn


Attending Physician


Andrei Cancino MD





Current Medications








 Medications


  (Trade)  Dose


 Ordered  Sig/Miky


 Route


 PRN Reason  Start Time


 Stop Time Status Last Admin


Dose Admin


 


 Acetaminophen


  (Tylenol)  650 mg  Q4H  PRN


 ORAL


 Temp >100.5  10/23/20 20:30


 11/22/20 20:29  11/2/20 15:52





 


 Dextrose


  (Dextrose 50%)  50 ml  Q30M  PRN


 IV


 Hypoglycemia  10/23/20 22:45


 1/21/21 22:44   





 


 Heparin Sodium


  (Porcine)


  (Heparin 5000


 units/ml)  5,000 units  EVERY 12  HOURS


 SUBQ


   10/23/20 21:00


 12/7/20 20:59  11/4/20 09:56





 


 Insulin Aspart


  (NovoLOG)    EVERY 6  HOURS


 SUBQ


   11/2/20 06:00


 1/22/21 06:29  11/4/20 05:15





 


 Meropenem 1 gm/


 Sodium Chloride  55 ml @ 


 110 mls/hr  Q12HR@0400,1600


 IVPB


   11/2/20 16:00


 11/7/20 15:59  11/4/20 03:34





 


 Nitroglycerin


  (Ntg)  0.4 mg  Q5M  PRN


 SL


 Prn Chest Pain  10/23/20 20:30


 11/22/20 20:29   





 


 Ondansetron HCl


  (Zofran)  4 mg  Q6H  PRN


 IVP


 Nausea & Vomiting  10/23/20 20:30


 11/22/20 20:29   





 


 Polyethylene


 Glycol


  (Miralax)  17 gm  DAILYPRN  PRN


 ORAL


 Constipation  10/23/20 20:30


 11/22/20 20:29   





 


 Promethazine HCl/


 Codeine


  (Phenergan with


 Codeine)  5 ml  Q4H  PRN


 ORAL


 For Cough  10/23/20 20:30


 11/22/20 20:29   





 


 Tamsulosin HCl


  (Flomax)  0.4 mg  BID


 ORAL


   10/31/20 19:45


 11/23/20 08:59  11/4/20 09:55





 


 Vancomycin HCl  250 ml @ 


 166.667


 mls/hr  Q24H


 IVPB


   11/2/20 06:00


 11/7/20 05:59  11/4/20 05:15





 


 Vancomycin HCl


  (Vanco pharmacy


 to dose)  1 ea  DAILY  PRN


 MISC


 Per rx protocol  10/23/20 20:30


 11/22/20 20:29   











Allergies:  


Coded Allergies:  


     No Known Allergies (Unverified , 8/20/12)


ROS Limited/Unobtainable:  Yes


Subjective


76 YO M admitted with shortness of breath.  Now pneumonia; previously COVID 

positive.  Cover for Int med-Dr Cancino.  On 100% non-rebreather mask.  Low grade 

fever to 100.0 F





Objective





Last Vital Signs








  Date Time  Temp Pulse Resp B/P (MAP) Pulse Ox O2 Delivery O2 Flow Rate FiO2


 


11/4/20 08:00 98.1 95 19 125/73 (90) 100   


 


11/4/20 04:00       15.0 100


 


11/3/20 21:00      Non-Rebreather  











Laboratory Tests








Test


 11/3/20


11:46 11/3/20


17:08 11/4/20


01:26 11/4/20


05:11


 


POC Whole Blood Glucose


 141 MG/DL


()  H 111 MG/DL


()  H 141 MG/DL


()  H 143 MG/DL


()  H


 


Test


 11/4/20


06:42 


 


 





 


White Blood Count


 11.2 K/UL


(4.8-10.8)  H 


 


 





 


Red Blood Count


 3.38 M/UL


(4.70-6.10)  L 


 


 





 


Hemoglobin


 10.2 G/DL


(14.2-18.0)  L 


 


 





 


Hematocrit


 33.4 %


(42.0-52.0)  L 


 


 





 


Mean Corpuscular Volume 99 FL (80-99)     


 


Mean Corpuscular Hemoglobin


 30.2 PG


(27.0-31.0) 


 


 





 


Mean Corpuscular Hemoglobin


Concent 30.5 G/DL


(32.0-36.0)  L 


 


 





 


Red Cell Distribution Width


 14.7 %


(11.6-14.8) 


 


 





 


Platelet Count


 272 K/UL


(150-450) 


 


 





 


Mean Platelet Volume


 9.8 FL


(6.5-10.1) 


 


 





 


Neutrophils (%) (Auto)


 75.2 %


(45.0-75.0)  H 


 


 





 


Lymphocytes (%) (Auto)


 16.2 %


(20.0-45.0)  L 


 


 





 


Monocytes (%) (Auto)


 6.1 %


(1.0-10.0) 


 


 





 


Eosinophils (%) (Auto)


 1.8 %


(0.0-3.0) 


 


 





 


Basophils (%) (Auto)


 0.7 %


(0.0-2.0) 


 


 





 


Sodium Level


 146 MMOL/L


(136-145)  H 


 


 





 


Potassium Level


 4.8 MMOL/L


(3.5-5.1) 


 


 





 


Chloride Level


 108 MMOL/L


()  H 


 


 





 


Carbon Dioxide Level


 29 MMOL/L


(21-32) 


 


 





 


Anion Gap


 9 mmol/L


(5-15) 


 


 





 


Blood Urea Nitrogen


 22 mg/dL


(7-18)  H 


 


 





 


Creatinine


 1.2 MG/DL


(0.55-1.30) 


 


 





 


Estimat Glomerular Filtration


Rate 58.7 mL/min


(>60) 


 


 





 


Glucose Level


 120 MG/DL


()  H 


 


 





 


Calcium Level


 8.3 MG/DL


(8.5-10.1)  L 


 


 





 


Phosphorus Level


 4.3 MG/DL


(2.5-4.9) 


 


 





 


Magnesium Level


 2.6 MG/DL


(1.8-2.4)  H 


 


 





 


Total Bilirubin


 0.4 MG/DL


(0.2-1.0) 


 


 





 


Direct Bilirubin


 < 0.1 MG/DL


(0.0-0.3) 


 


 





 


Aspartate Amino Transf


(AST/SGOT) 74 U/L (15-37)


H 


 


 





 


Alanine Aminotransferase


(ALT/SGPT) 56 U/L (12-78)


 


 


 





 


Alkaline Phosphatase


 371 U/L


()  H 


 


 





 


Total Protein


 6.3 G/DL


(6.4-8.2)  L 


 


 





 


Albumin


 1.3 G/DL


(3.4-5.0)  L 


 


 




















Intake and Output  


 


 11/3/20 11/4/20





 19:00 07:00


 


Intake Total  60 ml


 


Output Total 700 ml 200 ml


 


Balance -700 ml -140 ml


 


  


 


Tube Feeding  60 ml


 


Output Urine Total 700 ml 200 ml


 


# Bowel Movements  1








Objective


PHYSICAL EXAMINATION:


GENERAL:  The patient awake with deep stimuli, open his eyes, however,


cannot follow commands.  The patient is on a Ventimask at this time,


chronically ill-appearing.


HEAD AND NECK:  Pupils are equal and reactive to light.  Anicteric.


NECK:  Supple.  No JVD.


LUNGS:  100 % non-rebreather mask;  wheezing, rhonchi, and decreased air in 

bases.


HEART:  S1, S2.  Regular rhythm.  Distant heart sounds.  No murmur or


gallop.


ABDOMEN:  Soft, nondistended, nontender.  Positive bowel sounds.


EXTREMITIES:  No cyanosis, clubbing, or edema.


NEUROLOGIC:  Very limited secondary to the patient's status, cannot follow


commands.  Opens his eyes with deep stimuli and moving extremities


spontaneously.





Assessment/Plan


Assessment/Plan


ASSESSMENT:


1. Acute hypoxemic respiratory failure, most likely secondary to


pneumonia and sepsis.


2. Sepsis secondary to urinary tract infection and pneumonia.


3. pneumonia=MRSA; ESBL E. coli


4. Acute kidney injury on chronic renal insufficiency.


5. Dehydration.


6. History of chronic congestive heart failure.


7. Diabetes type 2.


8. Dyslipidemia.


9. Hypertension.


10. Alzheimer's disease.


11. COVID 19 previous positive





PLAN:


1.  Telemetry status


2.  Dr. Sandoval,=Pulmonary Critical Care


3.  Dr. Garcia = Inf Dis.


4.  IV= D5W due to the hypernatremia and dehydration.


5.  antibiotics = vancomycin and meropenem


6.  Code status is full code.


7.    DVT prophylaxis is heparin subcutaneous.











Benito Hearn MD                Nov 4, 2020 11:15

## 2020-11-04 NOTE — NUR
NURSE NOTES:



Received report from LITA Villar; pt noted asleep but arousable to voice; noted comfortable in 
bed; Noted on O2 therapy via NC @ 4L/min; in no acute distress; able to make needs known; 
call light within reach; bed in low position and locked; encouraged to use call light when 
needed; side rails up x 3; will continue current plan of care.


-------------------------------------------------------------------------------

Addendum: 11/04/20 at 0755 by Omaira Lockett RN

-------------------------------------------------------------------------------

Wrong patient/medical record

## 2020-11-04 NOTE — CARDIOLOGY REPORT
--------------- APPROVED REPORT --------------





EKG Measurement

Heart [id number]

NC [id number]

[id number]

[id number]

[id number]



<Conclusion>

Sinus rhythm 

motion artifact 

complexes

Anterior infarct, age undetermined

Abnormal ECG

## 2020-11-04 NOTE — NUR
NURSE NOTES:

RT changed Non-rebreather mask to venturi mask @ 14L, FIO2 55%. Patient tolerating well. No 
acute distress noted. sating 98%. will continue to monitor.

## 2020-11-04 NOTE — NUR
NURSE NOTES:



Received report from LITA Munroe; pt AAOX1; noted comfortable in bed; with congestion on O2 
therapy via non-rebreather mask@ 15L/min; no SOB; on bilateral soft wrists restraints for 
safety; skin intact; Cummings cath draining well to gravity; call light within reach; bed in 
low position and locked; encouraged to use call light when needed; side rails up x 3; will 
continue current plan of care.

## 2020-11-04 NOTE — CARDIOLOGY REPORT
--------------- APPROVED REPORT --------------





EKG Measurement

Heart [id number]

IA 124P20

[id number]

[id number]

[id number]



<Conclusion>

Sinus tachycardia

Left anterior fascicular block

Nonspecific ST and T wave abnormality

Abnormal ECG

## 2020-11-04 NOTE — CARDIAC ELECTROPHYSIOLOGY PN
Assessment/Plan


Assessment/Plan


1. Accelerated junctional rhythm. Not bradycardic. Ruled out for MI


      Echo showed ejection fraction of 55%.


2. Sepsis, on broad-spectrum IV antibiotic.


3. Respiratory failure, on antibiotic and NRB FM 15 liters


4. Dehydration.


5. Acute renal failure.


6. Electrolyte imbalance.


7. History of CHF, but echocardiogram showed normal left ventricular systolic 

function.


8. History of previous COVID infection in September 2020 and Active Covid now in

isolation


9. Dementia.





DW RN


Hubbard eval pending





Subjective


Subjective


In SR in NAD in Covid isolation.No events on Abx. On 15 liter NRB. Hubbard eval 

pending





Objective





Last 24 Hour Vital Signs








  Date Time  Temp Pulse Resp B/P (MAP) Pulse Ox O2 Delivery O2 Flow Rate FiO2


 


11/4/20 12:00  86      


 


11/4/20 12:00       15.0 100


 


11/4/20 12:00 97.1 96 20 119/76 (90) 99   


 


11/4/20 09:00      Non-Rebreather 15.0 


 


11/4/20 08:00  84      


 


11/4/20 08:00 98.1 95 19 125/73 (90) 100   


 


11/4/20 08:00       15.0 100


 


11/4/20 07:00     99 Non-Rebreather 15.0 100


 


11/4/20 04:00  85      


 


11/4/20 04:00 100.0 89 24 138/74 (95) 100   


 


11/4/20 04:00       15.0 100


 


11/4/20 00:00       15.0 100


 


11/4/20 00:00  96      


 


11/4/20 00:00 99.7 93 24 122/84 (97) 100   


 


11/3/20 21:00      Non-Rebreather 15.0 


 


11/3/20 20:00  88      


 


11/3/20 20:00       15.0 100


 


11/3/20 20:00 97.8 84 22 114/72 (86) 100   


 


11/3/20 19:20     99 Non-Rebreather 15.0 100


 


11/3/20 16:00       15.0 100


 


11/3/20 16:00 99.0 87 20 116/74 (88) 96   


 


11/3/20 16:00  97      

















Intake and Output  


 


 11/3/20 11/4/20





 19:00 07:00


 


Intake Total  60 ml


 


Output Total 700 ml 200 ml


 


Balance -700 ml -140 ml


 


  


 


Tube Feeding  60 ml


 


Output Urine Total 700 ml 200 ml


 


# Bowel Movements  1











Laboratory Tests








Test


 11/3/20


17:08 11/4/20


01:26 11/4/20


05:11 11/4/20


06:42


 


POC Whole Blood Glucose


 111 MG/DL


()  H 141 MG/DL


()  H 143 MG/DL


()  H 





 


White Blood Count


 


 


 


 11.2 K/UL


(4.8-10.8)  H


 


Red Blood Count


 


 


 


 3.38 M/UL


(4.70-6.10)  L


 


Hemoglobin


 


 


 


 10.2 G/DL


(14.2-18.0)  L


 


Hematocrit


 


 


 


 33.4 %


(42.0-52.0)  L


 


Mean Corpuscular Volume    99 FL (80-99)  


 


Mean Corpuscular Hemoglobin


 


 


 


 30.2 PG


(27.0-31.0)


 


Mean Corpuscular Hemoglobin


Concent 


 


 


 30.5 G/DL


(32.0-36.0)  L


 


Red Cell Distribution Width


 


 


 


 14.7 %


(11.6-14.8)


 


Platelet Count


 


 


 


 272 K/UL


(150-450)


 


Mean Platelet Volume


 


 


 


 9.8 FL


(6.5-10.1)


 


Neutrophils (%) (Auto)


 


 


 


 75.2 %


(45.0-75.0)  H


 


Lymphocytes (%) (Auto)


 


 


 


 16.2 %


(20.0-45.0)  L


 


Monocytes (%) (Auto)


 


 


 


 6.1 %


(1.0-10.0)


 


Eosinophils (%) (Auto)


 


 


 


 1.8 %


(0.0-3.0)


 


Basophils (%) (Auto)


 


 


 


 0.7 %


(0.0-2.0)


 


Sodium Level


 


 


 


 146 MMOL/L


(136-145)  H


 


Potassium Level


 


 


 


 4.8 MMOL/L


(3.5-5.1)


 


Chloride Level


 


 


 


 108 MMOL/L


()  H


 


Carbon Dioxide Level


 


 


 


 29 MMOL/L


(21-32)


 


Anion Gap


 


 


 


 9 mmol/L


(5-15)


 


Blood Urea Nitrogen


 


 


 


 22 mg/dL


(7-18)  H


 


Creatinine


 


 


 


 1.2 MG/DL


(0.55-1.30)


 


Estimat Glomerular Filtration


Rate 


 


 


 58.7 mL/min


(>60)


 


Glucose Level


 


 


 


 120 MG/DL


()  H


 


Calcium Level


 


 


 


 8.3 MG/DL


(8.5-10.1)  L


 


Phosphorus Level


 


 


 


 4.3 MG/DL


(2.5-4.9)


 


Magnesium Level


 


 


 


 2.6 MG/DL


(1.8-2.4)  H


 


Total Bilirubin


 


 


 


 0.4 MG/DL


(0.2-1.0)


 


Direct Bilirubin


 


 


 


 < 0.1 MG/DL


(0.0-0.3)


 


Aspartate Amino Transf


(AST/SGOT) 


 


 


 74 U/L (15-37)


H


 


Alanine Aminotransferase


(ALT/SGPT) 


 


 


 56 U/L (12-78)





 


Alkaline Phosphatase


 


 


 


 371 U/L


()  H


 


Total Protein


 


 


 


 6.3 G/DL


(6.4-8.2)  L


 


Albumin


 


 


 


 1.3 G/DL


(3.4-5.0)  L


 


Test


 11/4/20


12:06 11/4/20


12:13 


 





 


Arterial Blood pH


 7.446


(7.350-7.450) 


 


 





 


Arterial Blood Partial


Pressure CO2 49.0 mmHg


(35.0-45.0)  H 


 


 





 


Arterial Blood Partial


Pressure O2 221.5 mmHg


(75.0-100.0)  H 


 


 





 


Arterial Blood HCO3


 33.0 mmol/L


(22.0-26.0)  H 


 


 





 


Arterial Blood Oxygen


Saturation 98.8 %


() 


 


 





 


Arterial Blood Base Excess 7.9 (-2-2)  H   


 


Jerod Test Positive     


 


POC Whole Blood Glucose


 


 175 MG/DL


()  H 


 











Objective





HEAD AND NECK:  No JVD. NRB FM


LUNGS:  Coarse rhonchi.


CARDIOVASCULAR:   Irregular S1 and S2 with no gallop.


ABDOMEN:  Soft.


EXTREMITIES:  No pitting edema.











Kvng Edmond MD                Nov 4, 2020 15:45

## 2020-11-04 NOTE — NUR
NURSE HAND-OFF REPORT: 



Important Events on Shift: PT CURRENTLY ON vENTURI MASK 14 L FI02 55%

Patient Status: congested

Diet: NGT/tube feeding



Pending Orders: n/a

Pending Results/Labs: morning labs

Pending MD notification:n/a



Latest Vital Signs: Temperature 97.3 , Pulse 106 , B/P 157 /74 , Respiratory Rate 21 , O2 
SAT 98 , Venturi Mask, O2 Flow Rate 15.0 .  

Vital Sign Comment: stable



EKG Rhythm: Sinus Tachycardia

Rhythm change?: N 

MD Notified?: Y -Left Message for Dr. Cancino, No cardio on the case. 

MD Response: 



Latest Mejia Fall Score: 70  

Fall Risk: High Risk 

Safety Measures: Call light Within Reach, Bed Alarm Zone 1, Side Rails Side Rails x3, Bed 
position Low and Locked.

Fall Precautions: 

Yellow Socks

Yellow Gown

Door Sign

Patient Fall Education



Report given to LITA Solorzano.

## 2020-11-04 NOTE — NEPHROLOGY PROGRESS NOTE
Assessment/Plan


Problem List:  


(1) Dehydration


(2) JANES (acute kidney injury)


(3) Renal failure (ARF), acute on chronic


(4) Hypoxia


(5) Acute encephalopathy


(6) Electrolyte imbalance


Assessment





75-year-old male is admitted with acute hypoxic respiratory failure most likely 

secondary to pneumonia and sepsis, UTI.


Acute on chronic renal failure


Dehydration


Electrolyte imbalances, hypernatremia


Hypoalbuminemia


Diabetes type 2


History of congestive heart failure


Hypertension


Hyperlipemia


Alzheimer's


Previous COVID-19 infection in September 2020


Plan


November 4: On nonrebreather mask.  Labs reviewed.  Renal parameters 

stable.Continue per pulmonologist.  Clinically unchanged.


November 3: On nonrebreather mask.  Inflammatory markers gradually declining.  

Renal parameters stable.  Continue per pulmonary.


November 2: Remains on nonrebreather mask.  Inflammatory markers remain 

elevated.  Renal parameters somewhat stable.  Continue per pulmonary and ID.  

Remains full code.


November 1: Patient on nonrebreather mask.  Labs noted.  Serum creatinine down 

to 1.3.  Continue per consultants.


October 31: Patient on nonrebreather mask.  Transfer to telemetry when seen this

morning.  Labs noted.  Continue per consultants.  Serum creatinine dylan to 1.7. 

Continue to monitor renal parameters.  Continue to monitor vancomycin level


October 30: Patient is not doing well clinically.  Mild respiratory distress.  

ABG noted.  Somewhat hypoxic.  CBC and chemistry panel ordered.  Discussed with 

LITA Garcia.  Will defer to pulmonary management to specialist.  Continue per ID.

 Renal parameters remained stable as of October 29.


October 29: Labs reviewed.  Renal parameters stable.  Continue per consultants.


October 28: Labs reviewed.  Potassium via NG tube ordered.  IV fluids stopped.  

Continue per consultants.


October 27: Labs reviewed.  Low potassium and low phosphorus replaced.  Continue

per consultants.  Remains stable from renal standpoint of view.


October 26: Patient remains n.p.o.  IV fluid down to 50 cc an hour.  Potassium 

supplement intravenously ordered.  Continue per consultants.





Previously:


Patient is n.p.o., will continue on IV fluid of D5W 75 cc an hour


We will monitor electrolytes and renal parameters


Avoid nephrotoxic's


Start p.o. when he clears by speech therapist, meanwhile aspiration precautions


Keep the blood pressure and blood sugar in check


Per orders





Subjective


ROS Limited/Unobtainable:  Yes





Objective


Objective





Last 24 Hour Vital Signs








  Date Time  Temp Pulse Resp B/P (MAP) Pulse Ox O2 Delivery O2 Flow Rate FiO2


 


11/4/20 16:00 97.3 86 21 157/74 (101) 97   


 


11/4/20 16:00      Venturi Mask 14.0 


 


11/4/20 16:00       14.0 55


 


11/4/20 12:00  86      


 


11/4/20 12:00       15.0 100


 


11/4/20 12:00 97.1 96 20 119/76 (90) 99   


 


11/4/20 09:00      Non-Rebreather 15.0 


 


11/4/20 08:00  84      


 


11/4/20 08:00 98.1 95 19 125/73 (90) 100   


 


11/4/20 08:00       15.0 100


 


11/4/20 07:00     99 Non-Rebreather 15.0 100


 


11/4/20 04:00  85      


 


11/4/20 04:00 100.0 89 24 138/74 (95) 100   


 


11/4/20 04:00       15.0 100


 


11/4/20 00:00       15.0 100


 


11/4/20 00:00  96      


 


11/4/20 00:00 99.7 93 24 122/84 (97) 100   


 


11/3/20 21:00      Non-Rebreather 15.0 


 


11/3/20 20:00  88      


 


11/3/20 20:00       15.0 100


 


11/3/20 20:00 97.8 84 22 114/72 (86) 100   


 


11/3/20 19:20     99 Non-Rebreather 15.0 100

















Intake and Output 0 


 


 11/3/20 11/4/20





 19:00 07:00


 


Intake Total  60 ml


 


Output Total 700 ml 200 ml


 


Balance -700 ml -140 ml


 


  


 


Tube Feeding  60 ml


 


Output Urine Total 700 ml 200 ml


 


# Bowel Movements  1











Current Medications








 Medications


  (Trade)  Dose


 Ordered  Sig/Miky


 Route


 PRN Reason  Start Time


 Stop Time Status Last Admin


Dose Admin


 


 Acetaminophen


  (Tylenol)  650 mg  Q4H  PRN


 ORAL


 Temp >100.5  10/23/20 20:30


 11/22/20 20:29  11/2/20 15:52





 


 Dextrose


  (Dextrose 50%)  50 ml  Q30M  PRN


 IV


 Hypoglycemia  10/23/20 22:45


 1/21/21 22:44   





 


 Heparin Sodium


  (Porcine)


  (Heparin 5000


 units/ml)  5,000 units  EVERY 12  HOURS


 SUBQ


   10/23/20 21:00


 12/7/20 20:59  11/4/20 09:56





 


 Insulin Aspart


  (NovoLOG)    EVERY 6  HOURS


 SUBQ


   11/2/20 06:00


 1/22/21 06:29  11/4/20 12:00





 


 Meropenem 1 gm/


 Sodium Chloride  55 ml @ 


 110 mls/hr  Q12HR@0400,1600


 IVPB


   11/2/20 16:00


 11/7/20 15:59  11/4/20 03:34





 


 Nitroglycerin


  (Ntg)  0.4 mg  Q5M  PRN


 SL


 Prn Chest Pain  10/23/20 20:30


 11/22/20 20:29   





 


 Ondansetron HCl


  (Zofran)  4 mg  Q6H  PRN


 IVP


 Nausea & Vomiting  10/23/20 20:30


 11/22/20 20:29   





 


 Polyethylene


 Glycol


  (Miralax)  17 gm  DAILYPRN  PRN


 ORAL


 Constipation  10/23/20 20:30


 11/22/20 20:29   





 


 Promethazine HCl/


 Codeine


  (Phenergan with


 Codeine)  5 ml  Q4H  PRN


 ORAL


 For Cough  10/23/20 20:30


 11/22/20 20:29   





 


 Tamsulosin HCl


  (Flomax)  0.4 mg  BID


 ORAL


   10/31/20 19:45


 11/23/20 08:59  11/4/20 09:55





 


 Vancomycin HCl  250 ml @ 


 166.667


 mls/hr  Q24H


 IVPB


   11/2/20 06:00


 11/7/20 05:59  11/4/20 05:15





 


 Vancomycin HCl


  ([facility] pharmacy


 to dose)  1 ea  DAILY  PRN


 MISC


 Per rx protocol  10/23/20 20:30


 11/22/20 20:29   








Laboratory Tests


11/3/20 17:08: POC Whole Blood Glucose 111H


11/4/20 01:26: POC Whole Blood Glucose 141H


11/4/20 05:11: POC Whole Blood Glucose 143H


11/4/20 06:42: 


White Blood Count 11.2H, Red Blood Count 3.38L, Hemoglobin 10.2L, Hematocrit 

33.4L, Mean Corpuscular Volume 99, Mean Corpuscular Hemoglobin 30.2, Mean 

Corpuscular Hemoglobin Concent 30.5L, Red Cell Distribution Width 14.7, Platelet

Count 272, Mean Platelet Volume 9.8, Neutrophils (%) (Auto) 75.2H, Lymphocytes 

(%) (Auto) 16.2L, Monocytes (%) (Auto) 6.1, Eosinophils (%) (Auto) 1.8, Baso

phils (%) (Auto) 0.7, Sodium Level 146H, Potassium Level 4.8, Chloride Level 

108H, Carbon Dioxide Level 29, Anion Gap 9, Blood Urea Nitrogen 22H, Creatinine 

1.2, Estimat Glomerular Filtration Rate 58.7, Glucose Level 120H, Calcium Level 

8.3L, Phosphorus Level 4.3, Magnesium Level 2.6H, Total Bilirubin 0.4, Direct 

Bilirubin < 0.1, Aspartate Amino Transf (AST/SGOT) 74H, Alanine Aminotransferase

(ALT/SGPT) 56, Alkaline Phosphatase 371H, Total Protein 6.3L, Albumin 1.3L


11/4/20 12:06: 


Arterial Blood pH 7.446, Arterial Blood Partial Pressure CO2 49.0H, Arterial 

Blood Partial Pressure O2 221.5H, Arterial Blood HCO3 33.0H, Arterial Blood 

Oxygen Saturation 98.8, Arterial Blood Base Excess 7.9H, Jerod Test Positive


11/4/20 12:13: POC Whole Blood Glucose 175H


Height (Feet):  5


Height (Inches):  4.00


Weight (Pounds):  130


General Appearance:  moderate distress


Cardiovascular:  tachycardia


Respiratory/Chest:  decreased breath sounds, rhonchi - bilaterally


Abdomen:  distended











Seven Tobar MD             Nov 4, 2020 16:34

## 2020-11-04 NOTE — NUR
NURSE NOTES:



Pt remains stable; in no acute distress; remains on venturi mask 14L with FIO2 55%; with 
congestion and suctioned by RT PRN; normal sinus rhythm but with episode of sinus 
tachycardia (FM=300); remains with bilateral soft wrist restraints for safety; intact skin; 
f/c intact and patent draining clear yellow urine; sacral dressing c/d/i; NGT intact and in 
place; no residual noted; tolerating current enteral feeding; bed locked and in lowest 
position; call light within reach. Will continue to monitor.

## 2020-11-04 NOTE — NUR
NURSE NOTES:

Report received from Danielle LONDON. Patient is noted to be positive for Covid 19 and is on 
droplet and contact isolation. Patient is noted to be awake and alert x 1. Patient opens 
eyes to name. Patient is noted to be on venturi mask at 14 liters of oxygen with 55 % FiO2. 
Was endorsed to Rashad LONDON to continue to monitor oxygen saturation and to call respiratory 
therapy if oxygen drops below 90 % as patient does have order of non rebreather at 15 
liters. Patient is noted to currently have oxygen saturation of 93 %. Patient is note to 
have right nare nasogastric tube with Glucerna 1.2 running at 60 cc / hr. This is the goal 
rate. Patient is note to be in bilateral soft wrist restraints. No order renewal is needed 
at this time. Patient is noted to have right forearm 24 sarthak IV access. Bed is locked, 
alarmed, and in lowest position. Call light in reach. Will continue to follow plan of care.

## 2020-11-05 VITALS — DIASTOLIC BLOOD PRESSURE: 86 MMHG | SYSTOLIC BLOOD PRESSURE: 135 MMHG

## 2020-11-05 VITALS — DIASTOLIC BLOOD PRESSURE: 83 MMHG | SYSTOLIC BLOOD PRESSURE: 137 MMHG

## 2020-11-05 VITALS — DIASTOLIC BLOOD PRESSURE: 70 MMHG | SYSTOLIC BLOOD PRESSURE: 125 MMHG

## 2020-11-05 VITALS — SYSTOLIC BLOOD PRESSURE: 137 MMHG | DIASTOLIC BLOOD PRESSURE: 83 MMHG

## 2020-11-05 VITALS — DIASTOLIC BLOOD PRESSURE: 62 MMHG | SYSTOLIC BLOOD PRESSURE: 134 MMHG

## 2020-11-05 LAB
ADD MANUAL DIFF: NO
ANION GAP SERPL CALC-SCNC: 5 MMOL/L (ref 5–15)
BASOPHILS NFR BLD AUTO: 0.8 % (ref 0–2)
BUN SERPL-MCNC: 22 MG/DL (ref 7–18)
CALCIUM SERPL-MCNC: 9.1 MG/DL (ref 8.5–10.1)
CHLORIDE SERPL-SCNC: 106 MMOL/L (ref 98–107)
CO2 SERPL-SCNC: 35 MMOL/L (ref 21–32)
CREAT SERPL-MCNC: 1.2 MG/DL (ref 0.55–1.3)
EOSINOPHIL NFR BLD AUTO: 0.9 % (ref 0–3)
ERYTHROCYTE [DISTWIDTH] IN BLOOD BY AUTOMATED COUNT: 15.1 % (ref 11.6–14.8)
HCT VFR BLD CALC: 35.6 % (ref 42–52)
HGB BLD-MCNC: 10.7 G/DL (ref 14.2–18)
LYMPHOCYTES NFR BLD AUTO: 11.3 % (ref 20–45)
MCV RBC AUTO: 99 FL (ref 80–99)
MONOCYTES NFR BLD AUTO: 5.1 % (ref 1–10)
NEUTROPHILS NFR BLD AUTO: 81.9 % (ref 45–75)
PLATELET # BLD: 340 K/UL (ref 150–450)
POTASSIUM SERPL-SCNC: 4.3 MMOL/L (ref 3.5–5.1)
RBC # BLD AUTO: 3.6 M/UL (ref 4.7–6.1)
SODIUM SERPL-SCNC: 145 MMOL/L (ref 136–145)
WBC # BLD AUTO: 11.7 K/UL (ref 4.8–10.8)

## 2020-11-05 RX ADMIN — HEPARIN SODIUM SCH UNITS: 5000 INJECTION INTRAVENOUS; SUBCUTANEOUS at 09:01

## 2020-11-05 RX ADMIN — MEROPENEM SCH MLS/HR: 1 INJECTION INTRAVENOUS at 16:23

## 2020-11-05 RX ADMIN — HEPARIN SODIUM SCH UNITS: 5000 INJECTION INTRAVENOUS; SUBCUTANEOUS at 22:20

## 2020-11-05 RX ADMIN — INSULIN ASPART SCH UNITS: 100 INJECTION, SOLUTION INTRAVENOUS; SUBCUTANEOUS at 12:37

## 2020-11-05 RX ADMIN — INSULIN ASPART SCH UNITS: 100 INJECTION, SOLUTION INTRAVENOUS; SUBCUTANEOUS at 05:33

## 2020-11-05 RX ADMIN — INSULIN ASPART SCH UNITS: 100 INJECTION, SOLUTION INTRAVENOUS; SUBCUTANEOUS at 00:04

## 2020-11-05 RX ADMIN — INSULIN ASPART SCH UNITS: 100 INJECTION, SOLUTION INTRAVENOUS; SUBCUTANEOUS at 17:26

## 2020-11-05 RX ADMIN — TAMSULOSIN HYDROCHLORIDE SCH MG: 0.4 CAPSULE ORAL at 09:00

## 2020-11-05 RX ADMIN — TAMSULOSIN HYDROCHLORIDE SCH MG: 0.4 CAPSULE ORAL at 17:14

## 2020-11-05 RX ADMIN — MEROPENEM SCH MLS/HR: 1 INJECTION INTRAVENOUS at 03:53

## 2020-11-05 NOTE — NUR
NURSE NOTES:

Report received from LITA Solorzano. Pt is A/O x1 and non verbal. Pt is on venturi mask at 14L 
with 55% of FiO2 .  Patient is noted to currently have oxygen saturation of 93%. Patient is 
note to have right nare nasogastric tube with Glucerna 1.2 running at 60 cc/hr. This is the 
goal rate. No residual noteded. Patient is noted to be in bilateral soft wrist restraints. 
No order renewal is needed at this time. Patient is noted to have RFA 24G IV access. Bed is 
locked, alarmed, and in lowest position. Call light in reach. Will continue plan of care.

## 2020-11-05 NOTE — INFECTIOUS DISEASES PROG NOTE
Assessment/Plan


76yo M with:





Fever,r ecurrent; improving


Leukocytosis ;recurrent ; increased; now improving


Acute hypoxic resp failure, on NRB mask> 4l NC; back on NRB, desaturation 10/29


Pneumonia- >HAP


hx of COVID19 PNA 9/9/20


          10/30 Sp cx MRSA (Vancomycin ADRIANA 1), ESBL E.coli


   10/23 BCx NTD


   UA 15-20 WBC, UCx Neg


   10/23 COVID rapid neg; 10/26 rapid COVID PCR + (from prior infection)- not 

new infection


   Flu A/B neg


   CXR: L pna


   Resp cx MRSA





Neftali on CKD, improving





SNF resident ([facility])


Non-verbal


VRE and MRSA colonized





Plan:


Cont vanco #14/14 for MRSA PNA 


Meropenem #4/7 for ESBL PNA


   -11/2 SP Zosyn #4


   -10/26 SP Cefepime #4


   -10/24 SP Flagyl #








Monitor CBC/CMP


Monitor resp status


Monitor temp curve and hemodynamics


off COVID isolation- positive covid test represents residual dead virus from 

infection on 9/2020


f/u bcx x2, ua. w. reflex





D/w RN





Thank you for this consult. [facility] will continue to follow.





Subjective


Allergies:  


Coded Allergies:  


     No Known Allergies (Unverified , 8/20/12)


afebrile >24hrs


remains on NRB


wbc improved





Objective





Last 24 Hour Vital Signs








  Date Time  Temp Pulse Resp B/P (MAP) Pulse Ox O2 Delivery O2 Flow Rate FiO2


 


11/5/20 12:00       15.0 55


 


11/5/20 12:00 97.8 101 20 137/83 (101) 98   


 


11/5/20 12:00  97      


 


11/5/20 08:16      Non-Rebreather 15.0 


 


11/5/20 08:00  113      


 


11/5/20 08:00       15.0 55


 


11/5/20 08:00 97.8 101 20 137/83 (101) 98   


 


11/5/20 07:55     98 Non-Rebreather 15.0 100


 


11/5/20 04:00       15.0 55


 


11/5/20 04:00 99.2 100 24 135/86 (102) 98   


 


11/5/20 04:00  115      


 


11/5/20 00:00  100      


 


11/5/20 00:00 98.1 97 24 125/70 (88) 99   


 


11/4/20 21:00      Non-Rebreather 15.0 


 


11/4/20 20:00       15.0 55


 


11/4/20 20:00  110      


 


11/4/20 20:00 99.1 101 22 131/76 (94) 99   


 


11/4/20 18:57     98 Non-Rebreather 15.0 100


 


11/4/20 16:00 97.3 86 21 157/74 (101) 97   


 


11/4/20 16:00  106      


 


11/4/20 16:00      Venturi Mask 14.0 


 


11/4/20 16:00       14.0 55








Height (Feet):  5


Height (Inches):  4.00


Weight (Pounds):  130


Gen: NAD


CV: RRR


Pulm: Rhonchi anteriorly, lots of oral secretions


Abd: Soft, NTND


Ext: No c/c/e


Neuro: Awake





Laboratory Tests








Test


 11/4/20


17:27 11/4/20


23:59 11/5/20


05:03 11/5/20


05:27


 


POC Whole Blood Glucose


 Pending  


 150 MG/DL


()  H 


 174 MG/DL


()  H


 


White Blood Count


 


 


 11.7 K/UL


(4.8-10.8)  H 





 


Red Blood Count


 


 


 3.60 M/UL


(4.70-6.10)  L 





 


Hemoglobin


 


 


 10.7 G/DL


(14.2-18.0)  L 





 


Hematocrit


 


 


 35.6 %


(42.0-52.0)  L 





 


Mean Corpuscular Volume   99 FL (80-99)   


 


Mean Corpuscular Hemoglobin


 


 


 29.6 PG


(27.0-31.0) 





 


Mean Corpuscular Hemoglobin


Concent 


 


 30.0 G/DL


(32.0-36.0)  L 





 


Red Cell Distribution Width


 


 


 15.1 %


(11.6-14.8)  H 





 


Platelet Count


 


 


 340 K/UL


(150-450) 





 


Mean Platelet Volume


 


 


 9.1 FL


(6.5-10.1) 





 


Neutrophils (%) (Auto)


 


 


 81.9 %


(45.0-75.0)  H 





 


Lymphocytes (%) (Auto)


 


 


 11.3 %


(20.0-45.0)  L 





 


Monocytes (%) (Auto)


 


 


 5.1 %


(1.0-10.0) 





 


Eosinophils (%) (Auto)


 


 


 0.9 %


(0.0-3.0) 





 


Basophils (%) (Auto)


 


 


 0.8 %


(0.0-2.0) 





 


Sodium Level


 


 


 145 MMOL/L


(136-145) 





 


Potassium Level


 


 


 4.3 MMOL/L


(3.5-5.1) 





 


Chloride Level


 


 


 106 MMOL/L


() 





 


Carbon Dioxide Level


 


 


 35 MMOL/L


(21-32)  H 





 


Anion Gap


 


 


 5 mmol/L


(5-15) 





 


Blood Urea Nitrogen


 


 


 22 mg/dL


(7-18)  H 





 


Creatinine


 


 


 1.2 MG/DL


(0.55-1.30) 





 


Estimat Glomerular Filtration


Rate 


 


 58.7 mL/min


(>60) 





 


Glucose Level


 


 


 175 MG/DL


()  H 





 


Calcium Level


 


 


 9.1 MG/DL


(8.5-10.1) 





 


Vancomycin Level Trough


 


 


 15.1 ug/mL


(5.0-12.0)  H 





 


Test


 11/5/20


12:29 


 


 





 


POC Whole Blood Glucose


 178 MG/DL


()  H 


 


 














Current Medications








 Medications


  (Trade)  Dose


 Ordered  Sig/Miky


 Route


 PRN Reason  Start Time


 Stop Time Status Last Admin


Dose Admin


 


 Acetaminophen


  (Tylenol)  650 mg  Q4H  PRN


 ORAL


 Temp >100.5  10/23/20 20:30


 11/22/20 20:29  11/2/20 15:52





 


 Dextrose


  (Dextrose 50%)  50 ml  Q30M  PRN


 IV


 Hypoglycemia  10/23/20 22:45


 1/21/21 22:44   





 


 Heparin Sodium


  (Porcine)


  (Heparin 5000


 units/ml)  5,000 units  EVERY 12  HOURS


 SUBQ


   10/23/20 21:00


 12/7/20 20:59  11/5/20 09:01





 


 Insulin Aspart


  (NovoLOG)    EVERY 6  HOURS


 SUBQ


   11/2/20 06:00


 1/22/21 06:29  11/5/20 12:37





 


 Meropenem 1 gm/


 Sodium Chloride  55 ml @ 


 110 mls/hr  Q12HR@0400,1600


 IVPB


   11/2/20 16:00


 11/7/20 23:59  11/5/20 03:53





 


 Nitroglycerin


  (Ntg)  0.4 mg  Q5M  PRN


 SL


 Prn Chest Pain  10/23/20 20:30


 11/22/20 20:29   





 


 Ondansetron HCl


  (Zofran)  4 mg  Q6H  PRN


 IVP


 Nausea & Vomiting  10/23/20 20:30


 11/22/20 20:29   





 


 Polyethylene


 Glycol


  (Miralax)  17 gm  DAILYPRN  PRN


 ORAL


 Constipation  10/23/20 20:30


 11/22/20 20:29   





 


 Promethazine HCl/


 Codeine


  (Phenergan with


 Codeine)  5 ml  Q4H  PRN


 ORAL


 For Cough  10/23/20 20:30


 11/22/20 20:29   





 


 Tamsulosin HCl


  (Flomax)  0.4 mg  BID


 ORAL


   10/31/20 19:45


 11/23/20 08:59  11/5/20 09:00





 


 Vancomycin HCl  250 ml @ 


 166.667


 mls/hr  Q24H


 IVPB


   11/2/20 06:00


 11/7/20 05:59  11/5/20 05:32





 


 Vancomycin HCl


  ([facility] pharmacy


 to dose)  1 ea  DAILY  PRN


 MISC


 Per rx protocol  10/23/20 20:30


 11/22/20 20:29   




















Slime Garcia M.D.             Nov 5, 2020 13:55

## 2020-11-05 NOTE — INTERNAL MED PROGRESS NOTE
Subjective


Physician Name


Andrei Cancino


Attending Physician


Andrei Cancino MD





Current Medications








 Medications


  (Trade)  Dose


 Ordered  Sig/Miky


 Route


 PRN Reason  Start Time


 Stop Time Status Last Admin


Dose Admin


 


 Acetaminophen


  (Tylenol)  650 mg  Q4H  PRN


 ORAL


 Temp >100.5  10/23/20 20:30


 11/22/20 20:29  11/2/20 15:52





 


 Dextrose


  (Dextrose 50%)  50 ml  Q30M  PRN


 IV


 Hypoglycemia  10/23/20 22:45


 1/21/21 22:44   





 


 Heparin Sodium


  (Porcine)


  (Heparin 5000


 units/ml)  5,000 units  EVERY 12  HOURS


 SUBQ


   10/23/20 21:00


 12/7/20 20:59  11/5/20 09:01





 


 Insulin Aspart


  (NovoLOG)    EVERY 6  HOURS


 SUBQ


   11/2/20 06:00


 1/22/21 06:29  11/5/20 12:37





 


 Meropenem 1 gm/


 Sodium Chloride  55 ml @ 


 110 mls/hr  Q12HR@0400,1600


 IVPB


   11/2/20 16:00


 11/7/20 23:59  11/5/20 03:53





 


 Nitroglycerin


  (Ntg)  0.4 mg  Q5M  PRN


 SL


 Prn Chest Pain  10/23/20 20:30


 11/22/20 20:29   





 


 Ondansetron HCl


  (Zofran)  4 mg  Q6H  PRN


 IVP


 Nausea & Vomiting  10/23/20 20:30


 11/22/20 20:29   





 


 Polyethylene


 Glycol


  (Miralax)  17 gm  DAILYPRN  PRN


 ORAL


 Constipation  10/23/20 20:30


 11/22/20 20:29   





 


 Promethazine HCl/


 Codeine


  (Phenergan with


 Codeine)  5 ml  Q4H  PRN


 ORAL


 For Cough  10/23/20 20:30


 11/22/20 20:29   





 


 Tamsulosin HCl


  (Flomax)  0.4 mg  BID


 ORAL


   10/31/20 19:45


 11/23/20 08:59  11/5/20 09:00





 


 Vancomycin HCl  250 ml @ 


 166.667


 mls/hr  Q24H


 IVPB


   11/2/20 06:00


 11/7/20 05:59  11/5/20 05:32





 


 Vancomycin HCl


  (Vanco pharmacy


 to dose)  1 ea  DAILY  PRN


 MISC


 Per rx protocol  10/23/20 20:30


 11/22/20 20:29   











Allergies:  


Coded Allergies:  


     No Known Allergies (Unverified , 8/20/12)


Subjective


unresponsive, cannot follow command, on Ventimask, + shortness of breath.





Objective





Last Vital Signs








  Date Time  Temp Pulse Resp B/P (MAP) Pulse Ox O2 Delivery O2 Flow Rate FiO2


 


11/5/20 12:00       15.0 55


 


11/5/20 12:00 97.8 101 20 137/83 (101) 98   


 


11/5/20 08:16      Non-Rebreather  











Laboratory Tests








Test


 11/4/20


17:27 11/4/20


23:59 11/5/20


05:03 11/5/20


05:27


 


POC Whole Blood Glucose


 Pending  


 150 MG/DL


()  H 


 174 MG/DL


()  H


 


White Blood Count


 


 


 11.7 K/UL


(4.8-10.8)  H 





 


Red Blood Count


 


 


 3.60 M/UL


(4.70-6.10)  L 





 


Hemoglobin


 


 


 10.7 G/DL


(14.2-18.0)  L 





 


Hematocrit


 


 


 35.6 %


(42.0-52.0)  L 





 


Mean Corpuscular Volume   99 FL (80-99)   


 


Mean Corpuscular Hemoglobin


 


 


 29.6 PG


(27.0-31.0) 





 


Mean Corpuscular Hemoglobin


Concent 


 


 30.0 G/DL


(32.0-36.0)  L 





 


Red Cell Distribution Width


 


 


 15.1 %


(11.6-14.8)  H 





 


Platelet Count


 


 


 340 K/UL


(150-450) 





 


Mean Platelet Volume


 


 


 9.1 FL


(6.5-10.1) 





 


Neutrophils (%) (Auto)


 


 


 81.9 %


(45.0-75.0)  H 





 


Lymphocytes (%) (Auto)


 


 


 11.3 %


(20.0-45.0)  L 





 


Monocytes (%) (Auto)


 


 


 5.1 %


(1.0-10.0) 





 


Eosinophils (%) (Auto)


 


 


 0.9 %


(0.0-3.0) 





 


Basophils (%) (Auto)


 


 


 0.8 %


(0.0-2.0) 





 


Sodium Level


 


 


 145 MMOL/L


(136-145) 





 


Potassium Level


 


 


 4.3 MMOL/L


(3.5-5.1) 





 


Chloride Level


 


 


 106 MMOL/L


() 





 


Carbon Dioxide Level


 


 


 35 MMOL/L


(21-32)  H 





 


Anion Gap


 


 


 5 mmol/L


(5-15) 





 


Blood Urea Nitrogen


 


 


 22 mg/dL


(7-18)  H 





 


Creatinine


 


 


 1.2 MG/DL


(0.55-1.30) 





 


Estimat Glomerular Filtration


Rate 


 


 58.7 mL/min


(>60) 





 


Glucose Level


 


 


 175 MG/DL


()  H 





 


Calcium Level


 


 


 9.1 MG/DL


(8.5-10.1) 





 


Vancomycin Level Trough


 


 


 15.1 ug/mL


(5.0-12.0)  H 





 


Test


 11/5/20


12:29 


 


 





 


POC Whole Blood Glucose


 178 MG/DL


()  H 


 


 




















Intake and Output  


 


 11/4/20 11/5/20





 19:00 07:00


 


Intake Total 300 ml 775 ml


 


Output Total 780 ml 750 ml


 


Balance -480 ml 25 ml


 


  


 


Free Water 180 ml 120 ml


 


IV Total  55 ml


 


Tube Feeding 120 ml 600 ml


 


Output Urine Total 780 ml 750 ml


 


# Bowel Movements 1 








Objective


GENERAL:  unresponsive, unable to follow command, on a Ventimask at this time,


chronically ill-appearing.


HEAD AND NECK:  Pupils are equal and reactive to light.  Anicteric. NGT.


NECK:  Supple.  No JVD.


LUNGS:  coarse breath sounds.  Decreased air in bases.


HEART:  S1, S2.  Regular rhythm.  Distant heart sounds.  No murmur or gallop.


ABDOMEN:  Soft, nondistended, nontender.  Positive bowel sounds.


EXTREMITIES:  No cyanosis, clubbing, or edema.


NEUROLOGIC:  Very limited secondary to the patient's status, cannot follow 

commands.





Assessment/Plan


Assessment/Plan


1. Acute hypoxemic respiratory failure, most likely secondary to pneumonia and 

sepsis.


2. Sepsis secondary to urinary tract infection and pneumonia.


3. pneumonia=MRSA


4. Acute kidney injury on chronic renal insufficiency.


5. Dehydration.


6. History of chronic congestive heart failure.


7. Diabetes type 2.


8. Dyslipidemia.


9. Hypertension.


10. Alzheimer's disease.


11. COVID 19 previous positive





PLAN:


1.  TRansfer to Step down


2.  Dr. Sandoval,=Pulmonary Critical Care


3.  Dr. Garcia = infection disease


4.  IV= D5W due to the hypernatremia and dehydration.


5.  antibiotics = vancomycin and Meropenem


6.  Code status is full code.


7.  DVT prophylaxis is heparin subcutaneous.


8.  Tube feeding @ 50 cc/hr








CXR: Findings: Bilateral infiltrates are unchanged. Stable satisfactory position

of


nasogastric tube. Right shoulder prosthesis again demonstrated.





DC planning to [facility].


follow-up with laboratory and cultures in the morning.











Andrei Cancino MD                  Nov 5, 2020 14:15

## 2020-11-05 NOTE — NEPHROLOGY PROGRESS NOTE
Assessment/Plan


Problem List:  


(1) Dehydration


(2) JANES (acute kidney injury)


(3) Renal failure (ARF), acute on chronic


(4) Hypoxia


(5) Acute encephalopathy


(6) Electrolyte imbalance


Assessment





75-year-old male is admitted with acute hypoxic respiratory failure most likely 

secondary to pneumonia and sepsis, UTI.


Acute on chronic renal failure


Dehydration


Electrolyte imbalances, hypernatremia


Hypoalbuminemia


Diabetes type 2


History of congestive heart failure


Hypertension


Hyperlipemia


Alzheimer's


Previous COVID-19 infection in September 2020


Plan


November 5: Status quo.  Labs reviewed.  Renal parameters stable.  Continue per 

consultants.


November 4: On nonrebreather mask.  Labs reviewed.  Renal parameters 

stable.Continue per pulmonologist.  Clinically unchanged.


November 3: On nonrebreather mask.  Inflammatory markers gradually declining.  

Renal parameters stable.  Continue per pulmonary.


November 2: Remains on nonrebreather mask.  Inflammatory markers remain 

elevated.  Renal parameters somewhat stable.  Continue per pulmonary and ID.  

Remains full code.


November 1: Patient on nonrebreather mask.  Labs noted.  Serum creatinine down 

to 1.3.  Continue per consultants.


October 31: Patient on nonrebreather mask.  Transfer to telemetry when seen this

morning.  Labs noted.  Continue per consultants.  Serum creatinine dylan to 1.7. 

Continue to monitor renal parameters.  Continue to monitor vancomycin level


October 30: Patient is not doing well clinically.  Mild respiratory distress.  

ABG noted.  Somewhat hypoxic.  CBC and chemistry panel ordered.  Discussed with 

LITA Garcia.  Will defer to pulmonary management to specialist.  Continue per ID.

 Renal parameters remained stable as of October 29.


October 29: Labs reviewed.  Renal parameters stable.  Continue per consultants.


October 28: Labs reviewed.  Potassium via NG tube ordered.  IV fluids stopped.  

Continue per consultants.


October 27: Labs reviewed.  Low potassium and low phosphorus replaced.  Continue

per consultants.  Remains stable from renal standpoint of view.


October 26: Patient remains n.p.o.  IV fluid down to 50 cc an hour.  Potassium 

supplement intravenously ordered.  Continue per consultants.





Previously:


Patient is n.p.o., will continue on IV fluid of D5W 75 cc an hour


We will monitor electrolytes and renal parameters


Avoid nephrotoxic's


Start p.o. when he clears by speech therapist, meanwhile aspiration precautions


Keep the blood pressure and blood sugar in check


Per orders





Subjective


ROS Limited/Unobtainable:  Yes


Constitutional:  Reports: malaise





Objective


Objective





Last 24 Hour Vital Signs








  Date Time  Temp Pulse Resp B/P (MAP) Pulse Ox O2 Delivery O2 Flow Rate FiO2


 


11/5/20 08:16      Non-Rebreather 15.0 


 


11/5/20 08:00  113      


 


11/5/20 08:00       15.0 55


 


11/5/20 08:00 97.8 101 20 137/83 (101) 98   


 


11/5/20 07:55     98 Non-Rebreather 15.0 100


 


11/5/20 04:00       15.0 55


 


11/5/20 04:00 99.2 100 24 135/86 (102) 98   


 


11/5/20 04:00  115      


 


11/5/20 00:00  100      


 


11/5/20 00:00 98.1 97 24 125/70 (88) 99   


 


11/4/20 21:00      Non-Rebreather 15.0 


 


11/4/20 20:00       15.0 55


 


11/4/20 20:00  110      


 


11/4/20 20:00 99.1 101 22 131/76 (94) 99   


 


11/4/20 18:57     98 Non-Rebreather 15.0 100


 


11/4/20 16:00 97.3 86 21 157/74 (101) 97   


 


11/4/20 16:00  106      


 


11/4/20 16:00      Venturi Mask 14.0 


 


11/4/20 16:00       14.0 55


 


11/4/20 12:00  86      


 


11/4/20 12:00       15.0 100


 


11/4/20 12:00 97.1 96 20 119/76 (90) 99   

















Intake and Output  


 


 11/4/20 11/5/20





 19:00 07:00


 


Intake Total 300 ml 775 ml


 


Output Total 780 ml 750 ml


 


Balance -480 ml 25 ml


 


  


 


Free Water 180 ml 120 ml


 


IV Total  55 ml


 


Tube Feeding 120 ml 600 ml


 


Output Urine Total 780 ml 750 ml


 


# Bowel Movements 1 











Current Medications








 Medications


  (Trade)  Dose


 Ordered  Sig/Miky


 Route


 PRN Reason  Start Time


 Stop Time Status Last Admin


Dose Admin


 


 Acetaminophen


  (Tylenol)  650 mg  Q4H  PRN


 ORAL


 Temp >100.5  10/23/20 20:30


 11/22/20 20:29  11/2/20 15:52





 


 Dextrose


  (Dextrose 50%)  50 ml  Q30M  PRN


 IV


 Hypoglycemia  10/23/20 22:45


 1/21/21 22:44   





 


 Heparin Sodium


  (Porcine)


  (Heparin 5000


 units/ml)  5,000 units  EVERY 12  HOURS


 SUBQ


   10/23/20 21:00


 12/7/20 20:59  11/5/20 09:01





 


 Insulin Aspart


  (NovoLOG)    EVERY 6  HOURS


 SUBQ


   11/2/20 06:00


 1/22/21 06:29  11/5/20 05:33





 


 Meropenem 1 gm/


 Sodium Chloride  55 ml @ 


 110 mls/hr  Q12HR@0400,1600


 IVPB


   11/2/20 16:00


 11/7/20 15:59  11/5/20 03:53





 


 Nitroglycerin


  (Ntg)  0.4 mg  Q5M  PRN


 SL


 Prn Chest Pain  10/23/20 20:30


 11/22/20 20:29   





 


 Ondansetron HCl


  (Zofran)  4 mg  Q6H  PRN


 IVP


 Nausea & Vomiting  10/23/20 20:30


 11/22/20 20:29   





 


 Polyethylene


 Glycol


  (Miralax)  17 gm  DAILYPRN  PRN


 ORAL


 Constipation  10/23/20 20:30


 11/22/20 20:29   





 


 Promethazine HCl/


 Codeine


  (Phenergan with


 Codeine)  5 ml  Q4H  PRN


 ORAL


 For Cough  10/23/20 20:30


 11/22/20 20:29   





 


 Tamsulosin HCl


  (Flomax)  0.4 mg  BID


 ORAL


   10/31/20 19:45


 11/23/20 08:59  11/5/20 09:00





 


 Vancomycin HCl  250 ml @ 


 166.667


 mls/hr  Q24H


 IVPB


   11/2/20 06:00


 11/7/20 05:59  11/5/20 05:32





 


 Vancomycin HCl


  (Vanco pharmacy


 to dose)  1 ea  DAILY  PRN


 MISC


 Per rx protocol  10/23/20 20:30


 11/22/20 20:29   








Laboratory Tests


11/4/20 12:06: 


Arterial Blood pH 7.446, Arterial Blood Partial Pressure CO2 49.0H, Arterial 

Blood Partial Pressure O2 221.5H, Arterial Blood HCO3 33.0H, Arterial Blood 

Oxygen Saturation 98.8, Arterial Blood Base Excess 7.9H, Jerod Test Positive


11/4/20 12:13: POC Whole Blood Glucose 175H


11/4/20 17:27: POC Whole Blood Glucose [Pending]


11/4/20 23:59: POC Whole Blood Glucose 150H


11/5/20 05:03: 


White Blood Count 11.7H, Red Blood Count 3.60L, Hemoglobin 10.7L, Hematocrit 

35.6L, Mean Corpuscular Volume 99, Mean Corpuscular Hemoglobin 29.6, Mean 

Corpuscular Hemoglobin Concent 30.0L, Red Cell Distribution Width 15.1H, 

Platelet Count 340, Mean Platelet Volume 9.1, Neutrophils (%) (Auto) 81.9H, 

Lymphocytes (%) (Auto) 11.3L, Monocytes (%) (Auto) 5.1, Eosinophils (%) (Auto) 

0.9, Basophils (%) (Auto) 0.8, Sodium Level 145, Potassium Level 4.3, Chloride 

Level 106, Carbon Dioxide Level 35H, Anion Gap 5, Blood Urea Nitrogen 22H, 

Creatinine 1.2, Estimat Glomerular Filtration Rate 58.7, Glucose Level 175H, 

Calcium Level 9.1, Vancomycin Level Trough 15.1H


11/5/20 05:27: POC Whole Blood Glucose 174H


Height (Feet):  5


Height (Inches):  4.00


Weight (Pounds):  130


General Appearance:  mild distress


Cardiovascular:  tachycardia


Respiratory/Chest:  decreased breath sounds


Abdomen:  distended











Seven Tobar MD             Nov 5, 2020 11:19

## 2020-11-05 NOTE — DIAGNOSTIC IMAGING REPORT
Indication: Shortness of breath

 

Technique: One view of the chest

 

Comparison: 11/3/2020

 

Findings: Bilateral basilar infiltrates are unchanged. Heart size is normal. Right

shoulder prosthesis is again demonstrated. Nasogastric tube is again demonstrated

 

Impression:  Unchanged, over 2 days, findings as above.

## 2020-11-05 NOTE — NUR
*****DISCHARGE PLAN



PATIENT IS DISCHARGING TO



[facility]

[address] [address]

T: [phone]

[facility] HAS BEEN ARRANGED FOR 1400 

CARDIAC MONITOR NRB 15L/55% FIO2



* SPOKE WITH DAUGHTER, NAV NIELSON T: [phone]

PROVIDED HER WITH NAME ADDRESS AND PHONE NUMBER OF ABOVE FACILITY

DAUGHTER IS IN AGREEMENT WITH DISCHARGE PLAN

-------------------------------------------------------------------------------

Addendum: 11/05/20 at 1241 by NOLAN RAYGOZA LVN LVN

-------------------------------------------------------------------------------

*****INCORRECT PHONE NUMBER FOR [facility]ISTED ABOVE

CORRECT NUMBER FOR REPORT T: [phone]

## 2020-11-05 NOTE — NUR
NURSE HAND-OFF REPORT: 



Important Events on Shift: Discharge canceled. Deep suctioning needed Q2H.

Patient Status: Stable

Diet: NGT Glucerna 1.2 @ 60



Pending Orders: 

Pending Results/Labs:

Pending MD notification:



Latest Vital Signs: Temperature 98.9 , Pulse 109 , B/P 135 /86 , Respiratory Rate 24 , O2 
SAT 98 , Non-Rebreather, O2 Flow Rate 15.0 .  

Vital Sign Comment: 



EKG Rhythm: Sinus Tachycardia

Rhythm change?: N 

MD Notified?: Y -Left Message for Dr. Cancino, No cardio on the case. 

MD Response: 



Latest Mejia Fall Score: 70  

Fall Risk: High Risk 

Safety Measures: Call light Within Reach, Bed Alarm Zone 1, Side Rails Side Rails x3, Bed 
position Low and Locked.

Fall Precautions: 

Yellow Socks

Yellow Gown

Door Sign

Patient Fall Education



Report given to Josh in [facility].

## 2020-11-05 NOTE — CARDIAC ELECTROPHYSIOLOGY PN
Assessment/Plan


Assessment/Plan


1. Accelerated junctional rhythm. Not bradycardic. Ruled out for MI


      Echo showed ejection fraction of 55%.





2. Long run of Nonsustained VT. Likely will need cardiac cath after stabili

zation.





3. Respiratory failure, on antibiotic and NRB FM 15 liters


4. Dehydration.


5. Acute renal failure.


6. Electrolyte imbalance.


7. History of CHF, but echocardiogram showed normal left ventricular systolic 

function.


8. History of previous COVID infection in September 2020 and Active Covid now in

isolation


9. Dementia.





DW RN


Aaron eval pending





Subjective


Subjective


In SR in NAD in Covid isolation. No events on Abx. On 15 liter NRB. [place] eval

pending. Had 20 beats of nonsustained VT





Objective





Last 24 Hour Vital Signs








  Date Time  Temp Pulse Resp B/P (MAP) Pulse Ox O2 Delivery O2 Flow Rate FiO2


 


11/5/20 12:00       15.0 55


 


11/5/20 12:00 97.8 101 20 137/83 (101) 98   


 


11/5/20 12:00  97      


 


11/5/20 08:16      Non-Rebreather 15.0 


 


11/5/20 08:00  113      


 


11/5/20 08:00       15.0 55


 


11/5/20 08:00 97.8 101 20 137/83 (101) 98   


 


11/5/20 07:55     98 Non-Rebreather 15.0 100


 


11/5/20 04:00       15.0 55


 


11/5/20 04:00 99.2 100 24 135/86 (102) 98   


 


11/5/20 04:00  115      


 


11/5/20 00:00  100      


 


11/5/20 00:00 98.1 97 24 125/70 (88) 99   


 


11/4/20 21:00      Non-Rebreather 15.0 


 


11/4/20 20:00       15.0 55


 


11/4/20 20:00  110      


 


11/4/20 20:00 99.1 101 22 131/76 (94) 99   


 


11/4/20 18:57     98 Non-Rebreather 15.0 100

















Intake and Output  


 


 11/4/20 11/5/20





 19:00 07:00


 


Intake Total 300 ml 775 ml


 


Output Total 780 ml 750 ml


 


Balance -480 ml 25 ml


 


  


 


Free Water 180 ml 120 ml


 


IV Total  55 ml


 


Tube Feeding 120 ml 600 ml


 


Output Urine Total 780 ml 750 ml


 


# Bowel Movements 1 











Laboratory Tests








Test


 11/4/20


23:59 11/5/20


05:03 11/5/20


05:27 11/5/20


12:29


 


POC Whole Blood Glucose


 150 MG/DL


()  H 


 174 MG/DL


()  H 178 MG/DL


()  H


 


White Blood Count


 


 11.7 K/UL


(4.8-10.8)  H 


 





 


Red Blood Count


 


 3.60 M/UL


(4.70-6.10)  L 


 





 


Hemoglobin


 


 10.7 G/DL


(14.2-18.0)  L 


 





 


Hematocrit


 


 35.6 %


(42.0-52.0)  L 


 





 


Mean Corpuscular Volume  99 FL (80-99)    


 


Mean Corpuscular Hemoglobin


 


 29.6 PG


(27.0-31.0) 


 





 


Mean Corpuscular Hemoglobin


Concent 


 30.0 G/DL


(32.0-36.0)  L 


 





 


Red Cell Distribution Width


 


 15.1 %


(11.6-14.8)  H 


 





 


Platelet Count


 


 340 K/UL


(150-450) 


 





 


Mean Platelet Volume


 


 9.1 FL


(6.5-10.1) 


 





 


Neutrophils (%) (Auto)


 


 81.9 %


(45.0-75.0)  H 


 





 


Lymphocytes (%) (Auto)


 


 11.3 %


(20.0-45.0)  L 


 





 


Monocytes (%) (Auto)


 


 5.1 %


(1.0-10.0) 


 





 


Eosinophils (%) (Auto)


 


 0.9 %


(0.0-3.0) 


 





 


Basophils (%) (Auto)


 


 0.8 %


(0.0-2.0) 


 





 


Sodium Level


 


 145 MMOL/L


(136-145) 


 





 


Potassium Level


 


 4.3 MMOL/L


(3.5-5.1) 


 





 


Chloride Level


 


 106 MMOL/L


() 


 





 


Carbon Dioxide Level


 


 35 MMOL/L


(21-32)  H 


 





 


Anion Gap


 


 5 mmol/L


(5-15) 


 





 


Blood Urea Nitrogen


 


 22 mg/dL


(7-18)  H 


 





 


Creatinine


 


 1.2 MG/DL


(0.55-1.30) 


 





 


Estimat Glomerular Filtration


Rate 


 58.7 mL/min


(>60) 


 





 


Glucose Level


 


 175 MG/DL


()  H 


 





 


Calcium Level


 


 9.1 MG/DL


(8.5-10.1) 


 





 


Vancomycin Level Trough


 


 15.1 ug/mL


(5.0-12.0)  H 


 





 


Test


 11/5/20


17:22 


 


 





 


POC Whole Blood Glucose


 155 MG/DL


()  H 


 


 











Objective





HEAD AND NECK:  No JVD. NRB FM


LUNGS:  Coarse rhonchi.


CARDIOVASCULAR:   Irregular S1 and S2 with no gallop.


ABDOMEN:  Soft.


EXTREMITIES:  No pitting edema.











Kvng Edmond MD                Nov 5, 2020 18:41

## 2020-11-05 NOTE — NUR
NURSE HAND-OFF REPORT: 



Important Events on Shift: patient required suctioning by respiratory throughout shift. 
patient was upgraded back to non rebreather by respiratory. patient had no residual and is 
tolerating feeding well. 

Patient Status: full code 

Diet: Glucerna 1.2 @ 60



Pending Orders: none 

Pending Results/Labs:none 

Pending MD notification:none 



Latest Vital Signs: Temperature 99.2 , Pulse 115 , B/P 135 /86 , Respiratory Rate 24 , O2 
SAT 98 , Non-Rebreather, O2 Flow Rate 15.0 .  

Vital Sign Comment: patient tachycardic throughout shift. requires non rebreather 



EKG Rhythm: Sinus Tachycardia

Rhythm change?: N 

MD Notified?:

MD Response: 



Latest Mejia Fall Score: 70  

Fall Risk: High Risk 

Safety Measures: Call light Within Reach, Bed Alarm Zone 1, Side Rails Side Rails x3, Bed 
position Low and Locked.

Fall Precautions: 

Yellow Socks

Yellow Gown

Door Sign

Patient Fall Education



Report given to Candy LONDON.

## 2020-11-05 NOTE — NUR
NURSE NOTES:

Received hand off report from chelo Guptaetry RN. Pt is awake and had no signs of 
respiratory distress. Pt is non-verbal. On 100% non-rebreather mask. NGT to R nares, 
receiving Glucerna 1.2 at 60ml/hr. No anxiety noted with bilateral wrist restraints to be 
renewed tomorrow. Stage 2 sacral ulcer present. IV site on R wrist 24g, patent. Bed is in 
the lowest position, locked, HOB elevated, with side rails x2. Will continue to follow plan 
of care.

## 2020-11-05 NOTE — NUR
NURSE NOTES:

Received report from LITA Barrios.

Pt a/ox0 unable to make needs known.

Observed on nonrebreather 15L 100% saturating 98%.

On NGT to the right nare running Glucerna 1.2 at 60 mL

[place] draining well to gravity.

Right wrist TKO patent and intact.

Bed kept in lowest and locked position. Bed alarm on.

Will monitor.

## 2020-11-05 NOTE — PULMONOLOGY PROGRESS NOTE
Subjective


ROS Limited/Unobtainable:  Yes


Interval Events:  unchagned


Allergies:  


Coded Allergies:  


     No Known Allergies (Unverified , 8/20/12)





Objective





Last 24 Hour Vital Signs








  Date Time  Temp Pulse Resp B/P (MAP) Pulse Ox O2 Delivery O2 Flow Rate FiO2


 


11/5/20 12:00       15.0 55


 


11/5/20 12:00 97.8 101 20 137/83 (101) 98   


 


11/5/20 12:00  97      


 


11/5/20 08:16      Non-Rebreather 15.0 


 


11/5/20 08:00  113      


 


11/5/20 08:00       15.0 55


 


11/5/20 08:00 97.8 101 20 137/83 (101) 98   


 


11/5/20 07:55     98 Non-Rebreather 15.0 100


 


11/5/20 04:00       15.0 55


 


11/5/20 04:00 99.2 100 24 135/86 (102) 98   


 


11/5/20 04:00  115      


 


11/5/20 00:00  100      


 


11/5/20 00:00 98.1 97 24 125/70 (88) 99   


 


11/4/20 21:00      Non-Rebreather 15.0 


 


11/4/20 20:00       15.0 55


 


11/4/20 20:00  110      


 


11/4/20 20:00 99.1 101 22 131/76 (94) 99   


 


11/4/20 18:57     98 Non-Rebreather 15.0 100


 


11/4/20 16:00 97.3 86 21 157/74 (101) 97   


 


11/4/20 16:00  106      


 


11/4/20 16:00      Venturi Mask 14.0 


 


11/4/20 16:00       14.0 55

















Intake and Output  


 


 11/4/20 11/5/20





 19:00 07:00


 


Intake Total 300 ml 775 ml


 


Output Total 780 ml 750 ml


 


Balance -480 ml 25 ml


 


  


 


Free Water 180 ml 120 ml


 


IV Total  55 ml


 


Tube Feeding 120 ml 600 ml


 


Output Urine Total 780 ml 750 ml


 


# Bowel Movements 1 








General Appearance:  WD/WN, no acute distress


HEENT:  normocephalic, atraumatic


Respiratory:  chest wall non-tender, respiratory distress, rhonchi - left, 

rhonchi - right


Cardiovascular:  normal rate


Abdomen:  normal bowel sounds, no organomegaly


Genitourinary:  normal external genitalia


Skin:  no rash


Laboratory Tests


11/4/20 17:27: POC Whole Blood Glucose [Pending]


11/4/20 23:59: POC Whole Blood Glucose 150H


11/5/20 05:03: 


White Blood Count 11.7H, Red Blood Count 3.60L, Hemoglobin 10.7L, Hematocrit 

35.6L, Mean Corpuscular Volume 99, Mean Corpuscular Hemoglobin 29.6, Mean 

Corpuscular Hemoglobin Concent 30.0L, Red Cell Distribution Width 15.1H, 

Platelet Count 340, Mean Platelet Volume 9.1, Neutrophils (%) (Auto) 81.9H, 

Lymphocytes (%) (Auto) 11.3L, Monocytes (%) (Auto) 5.1, Eosinophils (%) (Auto) 

0.9, Basophils (%) (Auto) 0.8, Sodium Level 145, Potassium Level 4.3, Chloride 

Level 106, Carbon Dioxide Level 35H, Anion Gap 5, Blood Urea Nitrogen 22H, 

Creatinine 1.2, Estimat Glomerular Filtration Rate 58.7, Glucose Level 175H, 

Calcium Level 9.1, Vancomycin Level Trough 15.1H


11/5/20 05:27: POC Whole Blood Glucose 174H


11/5/20 12:29: POC Whole Blood Glucose 178H





Current Medications








 Medications


  (Trade)  Dose


 Ordered  Sig/Miky


 Route


 PRN Reason  Start Time


 Stop Time Status Last Admin


Dose Admin


 


 Acetaminophen


  (Tylenol)  650 mg  Q4H  PRN


 ORAL


 Temp >100.5  10/23/20 20:30


 11/22/20 20:29  11/2/20 15:52





 


 Dextrose


  (Dextrose 50%)  50 ml  Q30M  PRN


 IV


 Hypoglycemia  10/23/20 22:45


 1/21/21 22:44   





 


 Heparin Sodium


  (Porcine)


  (Heparin 5000


 units/ml)  5,000 units  EVERY 12  HOURS


 SUBQ


   10/23/20 21:00


 12/7/20 20:59  11/5/20 09:01





 


 Insulin Aspart


  (NovoLOG)    EVERY 6  HOURS


 SUBQ


   11/2/20 06:00


 1/22/21 06:29  11/5/20 12:37





 


 Meropenem 1 gm/


 Sodium Chloride  55 ml @ 


 110 mls/hr  Q12HR@0400,1600


 IVPB


   11/2/20 16:00


 11/7/20 15:59  11/5/20 03:53





 


 Nitroglycerin


  (Ntg)  0.4 mg  Q5M  PRN


 SL


 Prn Chest Pain  10/23/20 20:30


 11/22/20 20:29   





 


 Ondansetron HCl


  (Zofran)  4 mg  Q6H  PRN


 IVP


 Nausea & Vomiting  10/23/20 20:30


 11/22/20 20:29   





 


 Polyethylene


 Glycol


  (Miralax)  17 gm  DAILYPRN  PRN


 ORAL


 Constipation  10/23/20 20:30


 11/22/20 20:29   





 


 Promethazine HCl/


 Codeine


  (Phenergan with


 Codeine)  5 ml  Q4H  PRN


 ORAL


 For Cough  10/23/20 20:30


 11/22/20 20:29   





 


 Tamsulosin HCl


  (Flomax)  0.4 mg  BID


 ORAL


   10/31/20 19:45


 11/23/20 08:59  11/5/20 09:00





 


 Vancomycin HCl  250 ml @ 


 166.667


 mls/hr  Q24H


 IVPB


   11/2/20 06:00


 11/7/20 05:59  11/5/20 05:32





 


 Vancomycin HCl


  (Vanco pharmacy


 to dose)  1 ea  DAILY  PRN


 MISC


 Per rx protocol  10/23/20 20:30


 11/22/20 20:29   














Assessment/Plan


Problems:  


(1) 2019 novel coronavirus disease (COVID-19)


(2) Severe sepsis


(3) Acute encephalopathy


(4) HTN (hypertension)


(5) Aphasia


(6) Alzheimer's dementia


(7) History of CVA (cerebrovascular accident)


(8) BPH (benign prostatic hyperplasia)


Assessment/Plan


all reviewed


wbc still high, 


repeat cxr showing increasing infiltrate


CRP rising


titrate fio2 to sat of 92%


frequent suctioning


continue abx


check electrolytes


tolerating feeding


aspiration precaution


dvt prophylaxis.











Michael Sandoval MD               Nov 5, 2020 13:05

## 2020-11-05 NOTE — PULMONOLOGY PROGRESS NOTE
Subjective


ROS Limited/Unobtainable:  Yes


Interval Events:  unchagned, late note for 11/4


Allergies:  


Coded Allergies:  


     No Known Allergies (Unverified , 8/20/12)





Objective





Last 24 Hour Vital Signs








  Date Time  Temp Pulse Resp B/P (MAP) Pulse Ox O2 Delivery O2 Flow Rate FiO2


 


11/5/20 12:00       15.0 55


 


11/5/20 12:00 97.8 101 20 137/83 (101) 98   


 


11/5/20 12:00  97      


 


11/5/20 08:16      Non-Rebreather 15.0 


 


11/5/20 08:00  113      


 


11/5/20 08:00       15.0 55


 


11/5/20 08:00 97.8 101 20 137/83 (101) 98   


 


11/5/20 07:55     98 Non-Rebreather 15.0 100


 


11/5/20 04:00       15.0 55


 


11/5/20 04:00 99.2 100 24 135/86 (102) 98   


 


11/5/20 04:00  115      


 


11/5/20 00:00  100      


 


11/5/20 00:00 98.1 97 24 125/70 (88) 99   


 


11/4/20 21:00      Non-Rebreather 15.0 


 


11/4/20 20:00       15.0 55


 


11/4/20 20:00  110      


 


11/4/20 20:00 99.1 101 22 131/76 (94) 99   


 


11/4/20 18:57     98 Non-Rebreather 15.0 100


 


11/4/20 16:00 97.3 86 21 157/74 (101) 97   


 


11/4/20 16:00  106      


 


11/4/20 16:00      Venturi Mask 14.0 


 


11/4/20 16:00       14.0 55

















Intake and Output  


 


 11/4/20 11/5/20





 19:00 07:00


 


Intake Total 300 ml 775 ml


 


Output Total 780 ml 750 ml


 


Balance -480 ml 25 ml


 


  


 


Free Water 180 ml 120 ml


 


IV Total  55 ml


 


Tube Feeding 120 ml 600 ml


 


Output Urine Total 780 ml 750 ml


 


# Bowel Movements 1 








General Appearance:  WD/WN, no acute distress


HEENT:  normocephalic, atraumatic


Respiratory:  chest wall non-tender, respiratory distress, rhonchi - left, 

rhonchi - right


Cardiovascular:  normal rate


Abdomen:  normal bowel sounds, no organomegaly


Genitourinary:  normal external genitalia


Skin:  no rash


Laboratory Tests


11/4/20 17:27: POC Whole Blood Glucose [Pending]


11/4/20 23:59: POC Whole Blood Glucose 150H


11/5/20 05:03: 


White Blood Count 11.7H, Red Blood Count 3.60L, Hemoglobin 10.7L, Hematocrit 

35.6L, Mean Corpuscular Volume 99, Mean Corpuscular Hemoglobin 29.6, Mean 

Corpuscular Hemoglobin Concent 30.0L, Red Cell Distribution Width 15.1H, 

Platelet Count 340, Mean Platelet Volume 9.1, Neutrophils (%) (Auto) 81.9H, 

Lymphocytes (%) (Auto) 11.3L, Monocytes (%) (Auto) 5.1, Eosinophils (%) (Auto) 

0.9, Basophils (%) (Auto) 0.8, Sodium Level 145, Potassium Level 4.3, Chloride 

Level 106, Carbon Dioxide Level 35H, Anion Gap 5, Blood Urea Nitrogen 22H, 

Creatinine 1.2, Estimat Glomerular Filtration Rate 58.7, Glucose Level 175H, 

Calcium Level 9.1, Vancomycin Level Trough 15.1H


11/5/20 05:27: POC Whole Blood Glucose 174H


11/5/20 12:29: POC Whole Blood Glucose 178H





Current Medications








 Medications


  (Trade)  Dose


 Ordered  Sig/Miky


 Route


 PRN Reason  Start Time


 Stop Time Status Last Admin


Dose Admin


 


 Acetaminophen


  (Tylenol)  650 mg  Q4H  PRN


 ORAL


 Temp >100.5  10/23/20 20:30


 11/22/20 20:29  11/2/20 15:52





 


 Dextrose


  (Dextrose 50%)  50 ml  Q30M  PRN


 IV


 Hypoglycemia  10/23/20 22:45


 1/21/21 22:44   





 


 Heparin Sodium


  (Porcine)


  (Heparin 5000


 units/ml)  5,000 units  EVERY 12  HOURS


 SUBQ


   10/23/20 21:00


 12/7/20 20:59  11/5/20 09:01





 


 Insulin Aspart


  (NovoLOG)    EVERY 6  HOURS


 SUBQ


   11/2/20 06:00


 1/22/21 06:29  11/5/20 12:37





 


 Meropenem 1 gm/


 Sodium Chloride  55 ml @ 


 110 mls/hr  Q12HR@0400,1600


 IVPB


   11/2/20 16:00


 11/7/20 15:59  11/5/20 03:53





 


 Nitroglycerin


  (Ntg)  0.4 mg  Q5M  PRN


 SL


 Prn Chest Pain  10/23/20 20:30


 11/22/20 20:29   





 


 Ondansetron HCl


  (Zofran)  4 mg  Q6H  PRN


 IVP


 Nausea & Vomiting  10/23/20 20:30


 11/22/20 20:29   





 


 Polyethylene


 Glycol


  (Miralax)  17 gm  DAILYPRN  PRN


 ORAL


 Constipation  10/23/20 20:30


 11/22/20 20:29   





 


 Promethazine HCl/


 Codeine


  (Phenergan with


 Codeine)  5 ml  Q4H  PRN


 ORAL


 For Cough  10/23/20 20:30


 11/22/20 20:29   





 


 Tamsulosin HCl


  (Flomax)  0.4 mg  BID


 ORAL


   10/31/20 19:45


 11/23/20 08:59  11/5/20 09:00





 


 Vancomycin HCl  250 ml @ 


 166.667


 mls/hr  Q24H


 IVPB


   11/2/20 06:00


 11/7/20 05:59  11/5/20 05:32





 


 Vancomycin HCl


  (Vanco pharmacy


 to dose)  1 ea  DAILY  PRN


 MISC


 Per rx protocol  10/23/20 20:30


 11/22/20 20:29   














Assessment/Plan


Problems:  


(1) 2019 novel coronavirus disease (COVID-19)


(2) Severe sepsis


(3) Acute encephalopathy


(4) HTN (hypertension)


(5) Aphasia


(6) Alzheimer's dementia


(7) History of CVA (cerebrovascular accident)


(8) BPH (benign prostatic hyperplasia)


Assessment/Plan


all reviewed


wbc still high, 


repeat cxr showing increasing infiltrate


CRP rising


titrate fio2 to sat of 92%


frequent suctioning


continue abx


check electrolytes


tolerating feeding


aspiration precaution


dvt prophylaxis.











Michael Sandoval MD               Nov 5, 2020 13:06

## 2020-11-05 NOTE — NUR
NURSE HAND-OFF REPORT: 



Important Events on Shift:[Transfer from tele to SDU]

Patient Status: [Stable]

Diet: [Glucerna 1.2 60cc/hr, NGT]



Pending Orders: [NA]

Pending Results/Labs:[NA]

Pending MD notification:[NA]



Latest Vital Signs: Temperature 98.9 , Pulse 109 , B/P 135 /86 , Respiratory Rate 24 , O2 
SAT 98 , Non-Rebreather, O2 Flow Rate 15.0 .  

Vital Sign Comment: [Stable]



EKG Rhythm: Sinus Tachycardia

Rhythm change?: N 

MD Notified?: Y -Left Message for Dr. Cancino, No cardio on the case. 

MD Response: 



Latest Mejia Fall Score: 70  

Fall Risk: High Risk 

Safety Measures: Call light Within Reach, Bed Alarm Zone 1, Side Rails Side Rails x3, Bed 
position Low and Locked.

Fall Precautions: 

Yellow Socks

Yellow Gown

Door Sign

Patient Fall Education



Report given to [LITA Meza].

## 2020-11-06 VITALS — SYSTOLIC BLOOD PRESSURE: 136 MMHG | DIASTOLIC BLOOD PRESSURE: 76 MMHG

## 2020-11-06 VITALS — DIASTOLIC BLOOD PRESSURE: 57 MMHG | SYSTOLIC BLOOD PRESSURE: 90 MMHG

## 2020-11-06 VITALS — SYSTOLIC BLOOD PRESSURE: 120 MMHG | DIASTOLIC BLOOD PRESSURE: 67 MMHG

## 2020-11-06 VITALS — DIASTOLIC BLOOD PRESSURE: 60 MMHG | SYSTOLIC BLOOD PRESSURE: 86 MMHG

## 2020-11-06 VITALS — DIASTOLIC BLOOD PRESSURE: 61 MMHG | SYSTOLIC BLOOD PRESSURE: 104 MMHG

## 2020-11-06 VITALS — DIASTOLIC BLOOD PRESSURE: 54 MMHG | SYSTOLIC BLOOD PRESSURE: 92 MMHG

## 2020-11-06 VITALS — SYSTOLIC BLOOD PRESSURE: 83 MMHG | DIASTOLIC BLOOD PRESSURE: 55 MMHG

## 2020-11-06 VITALS — SYSTOLIC BLOOD PRESSURE: 123 MMHG | DIASTOLIC BLOOD PRESSURE: 88 MMHG

## 2020-11-06 VITALS — DIASTOLIC BLOOD PRESSURE: 54 MMHG | SYSTOLIC BLOOD PRESSURE: 86 MMHG

## 2020-11-06 VITALS — SYSTOLIC BLOOD PRESSURE: 100 MMHG | DIASTOLIC BLOOD PRESSURE: 60 MMHG

## 2020-11-06 VITALS — DIASTOLIC BLOOD PRESSURE: 74 MMHG | SYSTOLIC BLOOD PRESSURE: 133 MMHG

## 2020-11-06 VITALS — DIASTOLIC BLOOD PRESSURE: 62 MMHG | SYSTOLIC BLOOD PRESSURE: 90 MMHG

## 2020-11-06 VITALS — SYSTOLIC BLOOD PRESSURE: 102 MMHG | DIASTOLIC BLOOD PRESSURE: 62 MMHG

## 2020-11-06 VITALS — SYSTOLIC BLOOD PRESSURE: 90 MMHG | DIASTOLIC BLOOD PRESSURE: 56 MMHG

## 2020-11-06 VITALS — DIASTOLIC BLOOD PRESSURE: 63 MMHG | SYSTOLIC BLOOD PRESSURE: 102 MMHG

## 2020-11-06 VITALS — DIASTOLIC BLOOD PRESSURE: 56 MMHG | SYSTOLIC BLOOD PRESSURE: 102 MMHG

## 2020-11-06 VITALS — DIASTOLIC BLOOD PRESSURE: 62 MMHG | SYSTOLIC BLOOD PRESSURE: 128 MMHG

## 2020-11-06 VITALS — SYSTOLIC BLOOD PRESSURE: 86 MMHG | DIASTOLIC BLOOD PRESSURE: 61 MMHG

## 2020-11-06 VITALS — DIASTOLIC BLOOD PRESSURE: 65 MMHG | SYSTOLIC BLOOD PRESSURE: 109 MMHG

## 2020-11-06 LAB
ADD MANUAL DIFF: NO
ALBUMIN SERPL-MCNC: 1.2 G/DL (ref 3.4–5)
ALBUMIN/GLOB SERPL: 0.2 {RATIO} (ref 1–2.7)
ALP SERPL-CCNC: 342 U/L (ref 46–116)
ALT SERPL-CCNC: 61 U/L (ref 12–78)
ANION GAP SERPL CALC-SCNC: 4 MMOL/L (ref 5–15)
AST SERPL-CCNC: 49 U/L (ref 15–37)
BASOPHILS NFR BLD AUTO: 0.6 % (ref 0–2)
BILIRUB SERPL-MCNC: 0.3 MG/DL (ref 0.2–1)
BUN SERPL-MCNC: 23 MG/DL (ref 7–18)
CALCIUM SERPL-MCNC: 9 MG/DL (ref 8.5–10.1)
CHLORIDE SERPL-SCNC: 111 MMOL/L (ref 98–107)
CO2 SERPL-SCNC: 33 MMOL/L (ref 21–32)
CREAT SERPL-MCNC: 1 MG/DL (ref 0.55–1.3)
EOSINOPHIL NFR BLD AUTO: 1.1 % (ref 0–3)
ERYTHROCYTE [DISTWIDTH] IN BLOOD BY AUTOMATED COUNT: 14.9 % (ref 11.6–14.8)
GLOBULIN SER-MCNC: 5.9 G/DL
HCT VFR BLD CALC: 30.4 % (ref 42–52)
HGB BLD-MCNC: 9.3 G/DL (ref 14.2–18)
LYMPHOCYTES NFR BLD AUTO: 14.4 % (ref 20–45)
MCV RBC AUTO: 99 FL (ref 80–99)
MONOCYTES NFR BLD AUTO: 6.5 % (ref 1–10)
NEUTROPHILS NFR BLD AUTO: 77.4 % (ref 45–75)
PHOSPHATE SERPL-MCNC: 3 MG/DL (ref 2.5–4.9)
PLATELET # BLD: 353 K/UL (ref 150–450)
POTASSIUM SERPL-SCNC: 3.9 MMOL/L (ref 3.5–5.1)
RBC # BLD AUTO: 3.09 M/UL (ref 4.7–6.1)
SODIUM SERPL-SCNC: 148 MMOL/L (ref 136–145)
WBC # BLD AUTO: 13.4 K/UL (ref 4.8–10.8)

## 2020-11-06 PROCEDURE — 5A1955Z RESPIRATORY VENTILATION, GREATER THAN 96 CONSECUTIVE HOURS: ICD-10-PCS

## 2020-11-06 PROCEDURE — 06HM33Z INSERTION OF INFUSION DEVICE INTO RIGHT FEMORAL VEIN, PERCUTANEOUS APPROACH: ICD-10-PCS

## 2020-11-06 PROCEDURE — 0BH17EZ INSERTION OF ENDOTRACHEAL AIRWAY INTO TRACHEA, VIA NATURAL OR ARTIFICIAL OPENING: ICD-10-PCS

## 2020-11-06 RX ADMIN — CHLORHEXIDINE GLUCONATE SCH APPLIC: 213 SOLUTION TOPICAL at 19:59

## 2020-11-06 RX ADMIN — Medication SCH MLS/HR: at 20:01

## 2020-11-06 RX ADMIN — INSULIN ASPART SCH UNITS: 100 INJECTION, SOLUTION INTRAVENOUS; SUBCUTANEOUS at 05:27

## 2020-11-06 RX ADMIN — MIDAZOLAM HYDROCHLORIDE PRN MLS/HR: 5 INJECTION INTRAMUSCULAR; INTRAVENOUS at 13:49

## 2020-11-06 RX ADMIN — Medication SCH MLS/HR: at 15:50

## 2020-11-06 RX ADMIN — HEPARIN SODIUM SCH UNITS: 5000 INJECTION INTRAVENOUS; SUBCUTANEOUS at 20:00

## 2020-11-06 RX ADMIN — HEPARIN SODIUM SCH UNITS: 5000 INJECTION INTRAVENOUS; SUBCUTANEOUS at 09:47

## 2020-11-06 RX ADMIN — INSULIN ASPART SCH UNITS: 100 INJECTION, SOLUTION INTRAVENOUS; SUBCUTANEOUS at 12:00

## 2020-11-06 RX ADMIN — MEROPENEM SCH MLS/HR: 1 INJECTION INTRAVENOUS at 17:36

## 2020-11-06 RX ADMIN — INSULIN ASPART SCH UNITS: 100 INJECTION, SOLUTION INTRAVENOUS; SUBCUTANEOUS at 01:18

## 2020-11-06 RX ADMIN — INSULIN ASPART SCH UNITS: 100 INJECTION, SOLUTION INTRAVENOUS; SUBCUTANEOUS at 17:44

## 2020-11-06 RX ADMIN — TAMSULOSIN HYDROCHLORIDE SCH MG: 0.4 CAPSULE ORAL at 09:10

## 2020-11-06 RX ADMIN — MEROPENEM SCH MLS/HR: 1 INJECTION INTRAVENOUS at 04:36

## 2020-11-06 RX ADMIN — TAMSULOSIN HYDROCHLORIDE SCH MG: 0.4 CAPSULE ORAL at 18:22

## 2020-11-06 NOTE — NUR
NURSE HAND-OFF REPORT: 



Important Events on Shift: DC orders cancelled by Dr Sandoval

Patient Status: stable

Diet: Glucerna 1.2



Pending Orders: N

Pending Results/Labs: N

Pending MD notification: N



Latest Vital Signs: Temperature 98.4 , Pulse 96 , B/P 136 /76 , Respiratory Rate 21 , O2 SAT 
98 , Non-Rebreather, O2 Flow Rate 15.0 .  

Vital Sign Comment: WNL



EKG Rhythm: Sinus Tachycardia

Rhythm change?: N 

MD Notified?: Y 

MD Response: 



Latest Mejia Fall Score: 70  

Fall Risk: High Risk 

Safety Measures: Call light Within Reach, Bed Alarm Zone 1, Side Rails Side Rails x3, Bed 
position Low and Locked.

Fall Precautions: 

Yellow Socks

Yellow Gown

Door Sign

Patient Fall Education



Report given to LITA Carvalho.

## 2020-11-06 NOTE — EMERGENCY ROOM REPORT
Physical Exam


Called to electively intubate patient.  


Patient initially in SDU.  COVID +.


Last 24 Hour Vital Signs








  Date Time  Temp Pulse Resp B/P (MAP) Pulse Ox O2 Delivery O2 Flow Rate FiO2


 


11/6/20 13:49   25   Mechanical Ventilator  


 


11/6/20 12:00       15.0 55


 


11/6/20 12:00 98.4 96 22 123/88 (100) 96   


 


11/6/20 12:00      Non-Rebreather 15.0 


 


11/6/20 08:28     98 Non-Rebreather 15.0 100


 


11/6/20 08:00      Non-Rebreather 15.0 


 


11/6/20 08:00       15.0 55


 


11/6/20 08:00 98.6 102 20 133/74 (93) 98   


 


11/6/20 07:55  109      


 


11/6/20 04:00       15.0 55


 


11/6/20 04:00 98.4 96 21 136/76 (96) 98   


 


11/6/20 03:36  78      


 


11/6/20 00:00  94      


 


11/6/20 00:00 98.4 81 30 102/56 (71) 98   


 


11/5/20 23:09 98.4       


 


11/5/20 22:46     98 Non-Rebreather 15.0 100


 


11/5/20 20:00 100.2 116 30 134/62 (86) 97   


 


11/5/20 20:00       15.0 55


 


11/5/20 19:42  110      


 


11/5/20 16:00       15.0 55


 


11/5/20 16:00  109      


 


11/5/20 16:00 98.9 104 24 135/86 (102) 98   








Sp02 EP Interpretation:  reviewed, abnormal - based on FIO2 and sat


General Appearance:  mild distress, other, Chronically Ill


Eyes:  bilateral eye normal inspection, bilateral eye PERRL, bilateral eye EOMI


ENT:  moist mucus membranes - secretions


Neck:  supple


Respiratory:  respiratory distress - mild, crackles, rales, rhonchi


Cardiovascular #1:  tachycardia, edema - Upper arms


Cardiovascular #2:  2+ femoral (R)


Gastrointestinal:  decreased bowel sounds


Genitourinary:  other - Cummings present


Musculoskeletal:  decreased range of motion - Left


Neurologic:  motor weakness


Psychiatric:  depressed affect


Skin:  other - Ecchymoses


Critical Care Time


Critical Care Time


Total Critical Care Time: 45 min bedside evaluation and treatment excludes 

procedures (intubation, central line).


Reason for critical care: Preparation and direction of care for intubation.  IV 

access with central line needed.


Possible complications: hypotension, hypertension, MI, shock, arrhythmias, 

metabolic acidosis, end organ damage, respiratory failure.


Interventions: Central line, intubation


Course: I was contacted to intubate this patient electively.  Patient testing 

positive for COVID-19 and Covid pneumonia.  Patient was in stepdown unit.  I 

directed transfer to intensive care.  Patient was not present in the intensive 

care unit when I arrived.  IV access was poor.  IVs were attempted.  I directed 

setting up for intubation.  An IV was established right antecubital and 

etomidate was given.  There was no response.  It was determined that this IV was

nonfunctional.  I directed that we set up for central line.  The ultrasound 

needed to be obtained from the emergency department.  See procedure note for 

central line.  Once this was established etomidate was given and the patient was

intubated.  I directed further sedation after intubation was performed.  Chest 

x-ray reveals bilateral infiltrates and endotracheal tube successfully placed.  

Discussion with referring physician.


Consultations: nursing staff, respiratory therapy, referring physician


Performed by: Dr. Toure


Tolerated well condition = critical





Central Line


Central Line :  


   Consent:  Emergent


   Central Line Lumen:  triple


   Maximal Sterile Barrier Tech:  yes cap, yes mask, yes sterile gown, yes 

sterile gloves, yes large sterile sheet, yes hand hygiene, yes chlorhexidine 

prep


   Central Line Postion:  femoral (R)


   Anesthesia:  none


   US Guided Line?:  Yes


   Vessel visualized with U/S:  Right Femoral Vein


   Ultrasound Findings:  Collapsible Vessel


   Complications:  arterial puncture X1, leg dark and difficult to differentiate

from venous.


   Central Line Post Position:  sutured, good blood return


   Attempts:  Other - 2


   Patient Tolerated:  Well


   Complications:  None


Progress


During the second cannulation vein found with ease.  Vein easily cannulated.  

Patient tolerated the procedure well.  Sutured in dressed by me.  Line then used

for etomidate.  Estimated blood loss 10 cc.





Intubation


Intubation :  


   Consent:  Emergent


   Intubation Method:  orotracheal


   Tube Size (cm):  7.5


   Medications:  Etomidate


   Breath Sounds after Intubation:  equal


   Intubation Complications:  no complications


   Post Intubation Xray:  Yes


   Attempts:  One


   Patient Tolerated:  Well


   Complications:  None


Progress


See critical care note.  Initial etomidate administered subcutaneously.  Central

line needed to be established in order to have adequate IV access.  Second 

administration of etomidate successful as well as intubation.





Medical Decision Making


Diagnostic Impression:  


   Primary Impression:  


   Acute respiratory failure


   Qualified Codes:  J96.01 - Acute respiratory failure with hypoxia


   Additional Impression:  


   Pneumonia due to COVID-19 virus


ER Course


COVID-19 pneumonia patient with hypoxia.  Requested for elective intubation.  

Patient transferred to ICU.


See critical care and procedure notes.  Central line needs to be established for

proper IV access and administration of etomidate.


Intubation performed successfully and chest x-ray with adequate placement.


Rhythm Strip Diag. Results


EP Interpretation:  yes


Rhythm:  no PVC's, no ectopy, other - ST





Chest X-Ray Diagnostic Results


Chest X-Ray Diagnostic Results :  


   Chest X-Ray Ordered:  Yes


   # of Views/Limited/Complete:  1 View


   Indication:  Other


   EP Interpretation:  Yes


   Interpretation:  no effusion, no pneumothorax, other - ET good and bilateral 

infiltrates


   Impression:  Other


   Electronically Signed by:  Electronically signed by Heath Toure MD





Last Vital Signs








  Date Time  Temp Pulse Resp B/P (MAP) Pulse Ox O2 Delivery O2 Flow Rate FiO2


 


11/6/20 13:49   25   Mechanical Ventilator  


 


11/6/20 12:00       15.0 55


 


11/6/20 12:00 98.4 96  123/88 (100) 96   








Status:  improved


Disposition:  ADMITTED AS INPATIENT


Condition:  Critical


Referrals:  


Andrei Cancino MD (PCP)











Heath Toure MD                 Nov 6, 2020 14:41

## 2020-11-06 NOTE — NUR
NURSE NOTES:

Called Family member, Vijay, regarding transfer, unable to get in touch, mail box 
full unable to leave message.

## 2020-11-06 NOTE — NUR
TRANSFER TO FLOOR:

Patient transferred to ICU, per Dr. Sandoval.   Report given to LITA Randolph.  No Belongings 
and medications given to LITA Randolph. Tried Family and or S/O  informed of transfer.

## 2020-11-06 NOTE — NUR
NURSE NOTES:

Received report from LITA Clark. Covid positive on 10/26 test,  test was negative

Pt is obtunded and intubated. Settin.5/25cm at the lip line, , FiO2 50%, and Peep 
5

Pt has central line on R femoral with TLC.  Needs to change dressing, no Biopatch noted.

Versed 3mg/hr and 0.45 NS 50mL/hr are running, CDI.

PT has NGT on R nares but NPO for now. 

Cummings Catheter draining well to gravity at this time. 

Patient is on bilateral soft wrist restraints, cap refill <3 sec. 

Skin; see assessment.

Safety measures observed and no acute distress noted. Will continue tp monitor. 




-------------------------------------------------------------------------------

Addendum: 20 at 0652 by Arelis Cummings RN

-------------------------------------------------------------------------------

Settin.5/25cm at the lip line, , FiO2 55%, and Peep 0

## 2020-11-06 NOTE — NUR
NURSE NOTES:



md luna


-------------------------------------------------------------------------------

Addendum: 11/06/20 at 2015 by Amber Morris RN

-------------------------------------------------------------------------------

HERE TO SEE PT. WAS INFORMED PT INTUBATED, , BP STABLE BUT TRENDING LOW, DOPAMINE 
ORDER CHANGED TO LEVOPHED. , ORDER FOR 1/2 NS AT 50ML/HR.

## 2020-11-06 NOTE — NUR
NURSE NOTES:

PT IN BED. LETHARGIC. AWAITING INTUBATION. NON REBREATHER WIDE OPEN. AX TEMP 98. PUPILS 3MM 
SLUGGISH. NGT CLAMPED. RT WRIST 24G. OCCLUDED. SEVERAL ATTEMPTS MADE TO GET IV ACCESS,. MD 
WILL PLACE CENTRAL. LINE.  PT THE INTUBATED. 7.5, 21AT LIP AC 12, , 100% FI02, PEEP 5. 
SECRETIONS BLOODY. SACRAL WOUND NOTED. PHOTO TAKEN. PT CLEANED.

## 2020-11-06 NOTE — NUR
CASE MANAGEMENT:REVIEW



11/6/20



SI: COVID PNA

VS: T 98.4 HR 96 RR 21 B/P 136/76 SATS 98% ON 15L/NRB FIO2 100 

LABS: WBC 13.4   BUN 23  MG 2.8 



IS: IV MEROPENEM Q12H

IV VANCOMYCIN Q24H

IVF@100/HR

HEPARIN SQ Q12H



** SDU  STATUS



DCP; FROM [facility]



PLAN:

ISOLATION PRECAUTIONS

CONTINUE vancomycin and Meropenem

MONITOR RESP STATUS 

PENDING INTUBATION

## 2020-11-06 NOTE — NUR
RD ASSESSMENT & RECOMMENDATIONS

SEE CARE ACTIVITY FOR COMPLETE ASSESSMENT



DAILY ESTIMATED NEEDS:

Needs based on DM, wound, pulmonary, cardiac 59.5kg 

25-35  kcals/kg 

5629-7348  total kcals

1.25-1.5  g protein/kg

74-89  g total protein 

25-30  mL/kg

2660-2580  total fluid mLs



NUTRITION DIAGNOSIS:

* Swallowing difficulty R/T dysphagia as evidenced by SLP eval, recs for NGT feeds.

* Increased kcal and pro needs r/t wound healing as evidenced by sacral pressure injury 
stage 2.



 



CURRENT TF:Glucerna 1.2 @ 60ml/hr x24 hrs  



 



ENTERAL NUTRITION RECOMMENDATIONS:

Glucerna 1.2 @ 60ml/hr x24 hrs   to provide 1440ml, 1728 kcal, 86g pro, 1159ml free H2O  



- Maintain current TF

- Water flush of 130ml q 6hrs

- HOB over 30 degrees.



 



ADDITIONAL RECOMMENDATIONS:

* Calibrated bedscale wt for accurate CBW 

* Rec adding H2O flushes 130ml q 6 hrs- Na and BUN both elevated 

      -> for additional 520ml free water 

* Wound care: add Harvinder BID via PEG 

                     add Vit C 250mg QD 

* Replete lytes as needed 

.

## 2020-11-06 NOTE — PULMONOLGY CRITICAL CARE NOTE
Critical Care - Asmt/Plan


Problems:  


(1) Acute respiratory failure


(2) Multifocal pneumonia


(3) 2019 novel coronavirus disease (COVID-19)


(4) Acute encephalopathy


(5) Severe sepsis


(6) Alzheimer's dementia


(7) HTN (hypertension)


(8) History of CVA (cerebrovascular accident)


(9) BPH (benign prostatic hyperplasia)


Assessment/Plan:


Pt has been on NRM with 100% O2 for many days without any improvement.  Will 

have him intubated so I can decrease his FiO2.  His saturation improves 

hopefully with improved suctioning after intubation.


Respiratory:  monitor respiratory rate, adjust FIO2, CXR


Cardiac:  continue to monitor HR/BP


Renal:  F/U I&O


Infectious Disease:  check cultures


Gastrointestinal:  continue feedings/current rate


Endocrine:  monitor blood sugar


Hematologic:  monitor H/H


Neurologic:  PRN Ativan


Affect:  PRN ativan


Prophylaxis:  Protonix


Disposition:  keep in ICU


Time Spent (Minutes):  40


Notes Reviewed:  internist


Discussed with:  nurses, consultants, 





Critical Care - Objective





Last 24 Hour Vital Signs








  Date Time  Temp Pulse Resp B/P (MAP) Pulse Ox O2 Delivery O2 Flow Rate FiO2


 


11/6/20 08:28     98 Non-Rebreather 15.0 100


 


11/6/20 08:00      Non-Rebreather 15.0 


 


11/6/20 08:00       15.0 55


 


11/6/20 08:00 98.6 102 20 133/74 (93) 98   


 


11/6/20 07:55  109      


 


11/6/20 04:00       15.0 55


 


11/6/20 04:00 98.4 96 21 136/76 (96) 98   


 


11/6/20 03:36  78      


 


11/6/20 00:00  94      


 


11/6/20 00:00 98.4 81 30 102/56 (71) 98   


 


11/5/20 23:09 98.4       


 


11/5/20 22:46     98 Non-Rebreather 15.0 100


 


11/5/20 20:00 100.2 116 30 134/62 (86) 97   


 


11/5/20 20:00       15.0 55


 


11/5/20 19:42  110      


 


11/5/20 16:00       15.0 55


 


11/5/20 16:00  109      


 


11/5/20 16:00 98.9 104 24 135/86 (102) 98   


 


11/5/20 12:00       15.0 55


 


11/5/20 12:00 97.8 101 20 137/83 (101) 98   


 


11/5/20 12:00  97      








Status:  obtunded


Condition:  critical


HEENT:  atraumatic


Neck:  full ROM


Heart:  HR/BP stable


Abdomen:  soft


Accucheck:  121





Critical Care - Subjective


ROS Limited/Unobtainable:  Yes


Condition:  critical


FI02:  100


Sputum Amount:  Moderate


Tube Feeding Amount:  60


I&O:











Intake and Output  


 


 11/5/20 11/6/20





 19:00 07:00


 


Intake Total 840 ml 856.3 ml


 


Output Total 800 ml 700 ml


 


Balance 40 ml 156.3 ml


 


  


 


Free Water 120 ml 


 


IV Total  196.3 ml


 


Tube Feeding 720 ml 660 ml


 


Output Urine Total 800 ml 700 ml








CXR:


cxr 11/5: no major changes.


Labs:





Laboratory Tests








Test


 11/5/20


12:29 11/5/20


17:22 11/5/20


23:58 11/6/20


00:07


 


POC Whole Blood Glucose


 178 MG/DL


()  H 155 MG/DL


()  H 196 MG/DL


()  H 158 MG/DL


()  H


 


Test


 11/6/20


03:40 11/6/20


05:18 11/6/20


11:21 





 


White Blood Count


 13.4 K/UL


(4.8-10.8)  H 


 


 





 


Red Blood Count


 3.09 M/UL


(4.70-6.10)  L 


 


 





 


Hemoglobin


 9.3 G/DL


(14.2-18.0)  L 


 


 





 


Hematocrit


 30.4 %


(42.0-52.0)  L 


 


 





 


Mean Corpuscular Volume 99 FL (80-99)     


 


Mean Corpuscular Hemoglobin


 30.2 PG


(27.0-31.0) 


 


 





 


Mean Corpuscular Hemoglobin


Concent 30.6 G/DL


(32.0-36.0)  L 


 


 





 


Red Cell Distribution Width


 14.9 %


(11.6-14.8)  H 


 


 





 


Platelet Count


 353 K/UL


(150-450) 


 


 





 


Mean Platelet Volume


 9.7 FL


(6.5-10.1) 


 


 





 


Neutrophils (%) (Auto)


 77.4 %


(45.0-75.0)  H 


 


 





 


Lymphocytes (%) (Auto)


 14.4 %


(20.0-45.0)  L 


 


 





 


Monocytes (%) (Auto)


 6.5 %


(1.0-10.0) 


 


 





 


Eosinophils (%) (Auto)


 1.1 %


(0.0-3.0) 


 


 





 


Basophils (%) (Auto)


 0.6 %


(0.0-2.0) 


 


 





 


Erythrocyte Sedimentation Rate


 114 MM/HR


(0-20)  H 


 


 





 


Sodium Level


 148 MMOL/L


(136-145)  H 


 


 





 


Potassium Level


 3.9 MMOL/L


(3.5-5.1) 


 


 





 


Chloride Level


 111 MMOL/L


()  H 


 


 





 


Carbon Dioxide Level


 33 MMOL/L


(21-32)  H 


 


 





 


Anion Gap


 4 mmol/L


(5-15)  L 


 


 





 


Blood Urea Nitrogen


 23 mg/dL


(7-18)  H 


 


 





 


Creatinine


 1.0 MG/DL


(0.55-1.30) 


 


 





 


Estimat Glomerular Filtration


Rate > 60 mL/min


(>60) 


 


 





 


Glucose Level


 120 MG/DL


()  H 


 


 





 


Calcium Level


 9.0 MG/DL


(8.5-10.1) 


 


 





 


Phosphorus Level


 3.0 MG/DL


(2.5-4.9) 


 


 





 


Magnesium Level


 2.8 MG/DL


(1.8-2.4)  H 


 


 





 


Total Bilirubin


 0.3 MG/DL


(0.2-1.0) 


 


 





 


Aspartate Amino Transf


(AST/SGOT) 49 U/L (15-37)


H 


 


 





 


Alanine Aminotransferase


(ALT/SGPT) 61 U/L (12-78)


 


 


 





 


Alkaline Phosphatase


 342 U/L


()  H 


 


 





 


C-Reactive Protein,


Quantitative 20.4 mg/dL


(0.00-0.90)  H 


 


 





 


Total Protein


 7.1 G/DL


(6.4-8.2) 


 


 





 


Albumin


 1.2 G/DL


(3.4-5.0)  L 


 


 





 


Globulin 5.9 g/dL     


 


Albumin/Globulin Ratio


 0.2 (1.0-2.7)


L 


 


 





 


POC Whole Blood Glucose


 


 121 MG/DL


()  H 159 MG/DL


()  H 




















Michael Sandoval MD               Nov 6, 2020 11:31

## 2020-11-06 NOTE — NUR
NURSE HAND-OFF REPORT: 



Latest Vital Signs: Temperature 97.5 , Pulse 109 , B/P 86 /60 , Respiratory Rate 16 , O2 SAT 
96 , Mechanical Ventilator, O2 Flow Rate  .  

Vital Sign Comment: 



EKG Rhythm: Sinus Tachycardia

Rhythm change?: N 

MD Notified?: Y -Left Message for Dr. Cancino, No cardio on the case. 

MD Response: 



Latest Mejia Fall Score: 70  

Fall Risk: High Risk 

Safety Measures: Call light Within Reach, Bed Alarm Zone 2, Side Rails Side Rails x3, Bed 
position Low and Locked.

Fall Precautions: 

Yellow Socks

Yellow Gown

Door Sign

Patient Fall Education



Report given to LITA ALLEN

## 2020-11-06 NOTE — DIAGNOSTIC IMAGING REPORT
Indication: Dyspnea

 

Technique: One view of the chest

 

Comparison: 11/5/2020

 

Findings: Interim endotracheal intubation, endotracheal tube tip projecting

approximately 3 cm above the sd. Bilateral infiltrates appear unchanged on the

left, slightly worse on the right. The heart size is normal. Nasogastric tube

remains. Right shoulder prosthesis is again demonstrated.

 

Impression: Satisfactory endotracheal intubation

 

Bilateral infiltrates, stable on the left and slightly worse on the right

## 2020-11-06 NOTE — NUR
Turned and repositioned

Oral care provided

R femoral TLC, CDI; Levophed 6mcgs/min, Versed 3mg/hr, and 1/2 NS 50mL/hr are running, 
intact.

## 2020-11-06 NOTE — NUR
NURSE NOTES:

Received report from LITA Meza. Pt is asleep, confused, open eyes spontaneously. No 
active s/s cardiac, respiratory distress noticed at this time. Patient SR with HR 96. 
Patient on non-rebreather 15L o2 sat 96%. NGT draining well, patent, 63cm, on right nares. 
Glucerna 1.2 running @ 60ml/h. IV right wrist 24G, asymptomatic, patent, intact. Cummings 
Catheter draining well to gravity at this time. Patient on bilateral soft wrist restraints, 
able to move, cap refill <3 sec. Bed in lowest position, side rails upx3, call light within 
reach, bed alarm on, Will continue to monitor.

## 2020-11-06 NOTE — NEPHROLOGY PROGRESS NOTE
Assessment/Plan


Problem List:  


(1) Dehydration


(2) JANES (acute kidney injury)


(3) Renal failure (ARF), acute on chronic


(4) Hypoxia


(5) Acute encephalopathy


(6) Electrolyte imbalance


Assessment





75-year-old male is admitted with acute hypoxic respiratory failure most likely 

secondary to pneumonia and sepsis, UTI.


Acute on chronic renal failure


Dehydration


Electrolyte imbalances, hypernatremia


Hypoalbuminemia


Diabetes type 2


History of congestive heart failure


Hypertension


Hyperlipemia


Alzheimer's


Previous COVID-19 infection in September 2020


Plan


November 6: Status unchanged.  Transfer to [facility] for seizure.  Stable from renal 

standpoint to view.  Continue per consultants.


November 5: Status quo.  Labs reviewed.  Renal parameters stable.  Continue per 

consultants.


November 4: On nonrebreather mask.  Labs reviewed.  Renal parameters 

stable.Continue per pulmonologist.  Clinically unchanged.


November 3: On nonrebreather mask.  Inflammatory markers gradually declining.  

Renal parameters stable.  Continue per pulmonary.


November 2: Remains on nonrebreather mask.  Inflammatory markers remain 

elevated.  Renal parameters somewhat stable.  Continue per pulmonary and ID.  

Remains full code.


November 1: Patient on nonrebreather mask.  Labs noted.  Serum creatinine down 

to 1.3.  Continue per consultants.


October 31: Patient on nonrebreather mask.  Transfer to telemetry when seen this

morning.  Labs noted.  Continue per consultants.  Serum creatinine dylan to 1.7. 

Continue to monitor renal parameters.  Continue to monitor vancomycin level


October 30: Patient is not doing well clinically.  Mild respiratory distress.  

ABG noted.  Somewhat hypoxic.  CBC and chemistry panel ordered.  Discussed with 

LITA Garcia.  Will defer to pulmonary management to specialist.  Continue per ID.

 Renal parameters remained stable as of October 29.


October 29: Labs reviewed.  Renal parameters stable.  Continue per consultants.


October 28: Labs reviewed.  Potassium via NG tube ordered.  IV fluids stopped.  

Continue per consultants.


October 27: Labs reviewed.  Low potassium and low phosphorus replaced.  Continue

per consultants.  Remains stable from renal standpoint of view.


October 26: Patient remains n.p.o.  IV fluid down to 50 cc an hour.  Potassium 

supplement intravenously ordered.  Continue per consultants.





Previously:


Patient is n.p.o., will continue on IV fluid of D5W 75 cc an hour


We will monitor electrolytes and renal parameters


Avoid nephrotoxic's


Start p.o. when he clears by speech therapist, meanwhile aspiration precautions


Keep the blood pressure and blood sugar in check


Per orders





Subjective


ROS Limited/Unobtainable:  Yes





Objective


Objective





Last 24 Hour Vital Signs








  Date Time  Temp Pulse Resp B/P (MAP) Pulse Ox O2 Delivery O2 Flow Rate FiO2


 


11/6/20 08:28     98 Non-Rebreather 15.0 100


 


11/6/20 08:00      Non-Rebreather 15.0 


 


11/6/20 08:00       15.0 55


 


11/6/20 08:00 98.6 102 20 133/74 (93) 98   


 


11/6/20 07:55  109      


 


11/6/20 04:00       15.0 55


 


11/6/20 04:00 98.4 96 21 136/76 (96) 98   


 


11/6/20 03:36  78      


 


11/6/20 00:00  94      


 


11/6/20 00:00 98.4 81 30 102/56 (71) 98   


 


11/5/20 23:09 98.4       


 


11/5/20 22:46     98 Non-Rebreather 15.0 100


 


11/5/20 20:00 100.2 116 30 134/62 (86) 97   


 


11/5/20 20:00       15.0 55


 


11/5/20 19:42  110      


 


11/5/20 16:00       15.0 55


 


11/5/20 16:00  109      


 


11/5/20 16:00 98.9 104 24 135/86 (102) 98   


 


11/5/20 12:00       15.0 55


 


11/5/20 12:00 97.8 101 20 137/83 (101) 98   


 


11/5/20 12:00  97      

















Intake and Output  


 


 11/5/20 11/6/20





 19:00 07:00


 


Intake Total 840 ml 856.3 ml


 


Output Total 800 ml 700 ml


 


Balance 40 ml 156.3 ml


 


  


 


Free Water 120 ml 


 


IV Total  196.3 ml


 


Tube Feeding 720 ml 660 ml


 


Output Urine Total 800 ml 700 ml








Laboratory Tests


11/5/20 12:29: POC Whole Blood Glucose 178H


11/5/20 17:22: POC Whole Blood Glucose 155H


11/5/20 23:58: POC Whole Blood Glucose 196H


11/6/20 00:07: POC Whole Blood Glucose 158H


11/6/20 03:40: 


White Blood Count 13.4H, Red Blood Count 3.09L, Hemoglobin 9.3L, Hematocrit 

30.4L, Mean Corpuscular Volume 99, Mean Corpuscular Hemoglobin 30.2, Mean 

Corpuscular Hemoglobin Concent 30.6L, Red Cell Distribution Width 14.9H, 

Platelet Count 353, Mean Platelet Volume 9.7, Neutrophils (%) (Auto) 77.4H, 

Lymphocytes (%) (Auto) 14.4L, Monocytes (%) (Auto) 6.5, Eosinophils (%) (Auto) 

1.1, Basophils (%) (Auto) 0.6, Erythrocyte Sedimentation Rate 114H, Sodium Level

148H, Potassium Level 3.9, Chloride Level 111H, Carbon Dioxide Level 33H, Anion 

Gap 4L, Blood Urea Nitrogen 23H, Creatinine 1.0, Estimat Glomerular Filtration 

Rate > 60, Glucose Level 120H, Calcium Level 9.0, Phosphorus Level 3.0, 

Magnesium Level 2.8H, Total Bilirubin 0.3, Aspartate Amino Transf (AST/SGOT) 49H

, Alanine Aminotransferase (ALT/SGPT) 61, Alkaline Phosphatase 342H, C-Reactive 

Protein, Quantitative 20.4H, Total Protein 7.1, Albumin 1.2L, Globulin 5.9, 

Albumin/Globulin Ratio 0.2L


11/6/20 05:18: POC Whole Blood Glucose 121H


Height (Feet):  5


Height (Inches):  4.00


Weight (Pounds):  130


General Appearance:  no apparent distress, other - On nonrebreather mask


Cardiovascular:  tachycardia


Respiratory/Chest:  decreased breath sounds


Abdomen:  distended











Seven Tobar MD             Nov 6, 2020 10:41

## 2020-11-06 NOTE — INTERNAL MED PROGRESS NOTE
Subjective


Physician Name


Andrei Cancino


Attending Physician


Andrei Cancino MD





Current Medications








 Medications


  (Trade)  Dose


 Ordered  Sig/Miky


 Route


 PRN Reason  Start Time


 Stop Time Status Last Admin


Dose Admin


 


 Acetaminophen


  (Tylenol)  650 mg  Q4H  PRN


 ORAL


 Temp >100.5  10/23/20 20:30


 11/22/20 20:29  11/5/20 22:39





 


 Chlorhexidine


 Gluconate


  (Jess-Hex 2%)  1 applic  DAILY@2000


 TOPIC


   11/6/20 20:00


 2/4/21 19:59   





 


 Dextrose


  (Dextrose 50%)  50 ml  Q30M  PRN


 IV


 Hypoglycemia  10/23/20 22:45


 1/21/21 22:44   





 


 Heparin Sodium


  (Porcine)


  (Heparin 5000


 units/ml)  5,000 units  EVERY 12  HOURS


 SUBQ


   10/23/20 21:00


 12/7/20 20:59  11/6/20 09:47





 


 Insulin Aspart


  (NovoLOG)    EVERY 6  HOURS


 SUBQ


   11/2/20 06:00


 1/22/21 06:29  11/6/20 01:18





 


 Lorazepam


  (Ativan 2mg/ml


 1ml)  2 mg  Q4H  PRN


 IV


 For Anxiety  11/6/20 12:45


 11/13/20 12:44   





 


 Meropenem 1 gm/


 Sodium Chloride  55 ml @ 


 110 mls/hr  Q12HR@0400,1600


 IVPB


   11/2/20 16:00


 11/11/20 23:59  11/6/20 04:36





 


 Midazolam HCl 50


 mg/Sodium Chloride  100 ml @ 0


 mls/hr  Q24H  PRN


 IV


 Restlessness  11/6/20 13:20


 11/8/20 13:19  11/6/20 13:49





 


 Morphine Sulfate


  (Morphine


 Sulfate)  4 mg  Q4H  PRN


 IVP


 For Pain  11/6/20 12:45


 11/13/20 12:44   





 


 Nitroglycerin


  (Ntg)  0.4 mg  Q5M  PRN


 SL


 Prn Chest Pain  10/23/20 20:30


 11/22/20 20:29   





 


 Ondansetron HCl


  (Zofran)  4 mg  Q6H  PRN


 IVP


 Nausea & Vomiting  10/23/20 20:30


 11/22/20 20:29   





 


 Pantoprazole


  (Protonix)  40 mg  DAILY


 IV


   11/7/20 09:00


 12/7/20 08:59   





 


 Polyethylene


 Glycol


  (Miralax)  17 gm  DAILYPRN  PRN


 ORAL


 Constipation  10/23/20 20:30


 11/22/20 20:29   





 


 Promethazine HCl/


 Codeine


  (Phenergan with


 Codeine)  5 ml  Q4H  PRN


 ORAL


 For Cough  10/23/20 20:30


 11/22/20 20:29   





 


 Tamsulosin HCl


  (Flomax)  0.4 mg  BID


 ORAL


   10/31/20 19:45


 11/23/20 08:59  11/6/20 09:10











Allergies:  


Coded Allergies:  


     No Known Allergies (Unverified , 8/20/12)


Subjective


unresponsive, cannot follow command, worsening shortness of breath transfer to 

ICU, Intubated and hypotensive now.





Objective





Last Vital Signs








  Date Time  Temp Pulse Resp B/P (MAP) Pulse Ox O2 Delivery O2 Flow Rate FiO2


 


11/6/20 13:49   25   Mechanical Ventilator  


 


11/6/20 12:00       15.0 55


 


11/6/20 12:00 98.4 96  123/88 (100) 96   











Laboratory Tests








Test


 11/5/20


17:22 11/5/20


23:58 11/6/20


00:07 11/6/20


03:40


 


POC Whole Blood Glucose


 155 MG/DL


()  H 196 MG/DL


()  H 158 MG/DL


()  H 





 


White Blood Count


 


 


 


 13.4 K/UL


(4.8-10.8)  H


 


Red Blood Count


 


 


 


 3.09 M/UL


(4.70-6.10)  L


 


Hemoglobin


 


 


 


 9.3 G/DL


(14.2-18.0)  L


 


Hematocrit


 


 


 


 30.4 %


(42.0-52.0)  L


 


Mean Corpuscular Volume    99 FL (80-99)  


 


Mean Corpuscular Hemoglobin


 


 


 


 30.2 PG


(27.0-31.0)


 


Mean Corpuscular Hemoglobin


Concent 


 


 


 30.6 G/DL


(32.0-36.0)  L


 


Red Cell Distribution Width


 


 


 


 14.9 %


(11.6-14.8)  H


 


Platelet Count


 


 


 


 353 K/UL


(150-450)


 


Mean Platelet Volume


 


 


 


 9.7 FL


(6.5-10.1)


 


Neutrophils (%) (Auto)


 


 


 


 77.4 %


(45.0-75.0)  H


 


Lymphocytes (%) (Auto)


 


 


 


 14.4 %


(20.0-45.0)  L


 


Monocytes (%) (Auto)


 


 


 


 6.5 %


(1.0-10.0)


 


Eosinophils (%) (Auto)


 


 


 


 1.1 %


(0.0-3.0)


 


Basophils (%) (Auto)


 


 


 


 0.6 %


(0.0-2.0)


 


Erythrocyte Sedimentation Rate


 


 


 


 114 MM/HR


(0-20)  H


 


Sodium Level


 


 


 


 148 MMOL/L


(136-145)  H


 


Potassium Level


 


 


 


 3.9 MMOL/L


(3.5-5.1)


 


Chloride Level


 


 


 


 111 MMOL/L


()  H


 


Carbon Dioxide Level


 


 


 


 33 MMOL/L


(21-32)  H


 


Anion Gap


 


 


 


 4 mmol/L


(5-15)  L


 


Blood Urea Nitrogen


 


 


 


 23 mg/dL


(7-18)  H


 


Creatinine


 


 


 


 1.0 MG/DL


(0.55-1.30)


 


Estimat Glomerular Filtration


Rate 


 


 


 > 60 mL/min


(>60)


 


Glucose Level


 


 


 


 120 MG/DL


()  H


 


Calcium Level


 


 


 


 9.0 MG/DL


(8.5-10.1)


 


Phosphorus Level


 


 


 


 3.0 MG/DL


(2.5-4.9)


 


Magnesium Level


 


 


 


 2.8 MG/DL


(1.8-2.4)  H


 


Total Bilirubin


 


 


 


 0.3 MG/DL


(0.2-1.0)


 


Aspartate Amino Transf


(AST/SGOT) 


 


 


 49 U/L (15-37)


H


 


Alanine Aminotransferase


(ALT/SGPT) 


 


 


 61 U/L (12-78)





 


Alkaline Phosphatase


 


 


 


 342 U/L


()  H


 


C-Reactive Protein,


Quantitative 


 


 


 20.4 mg/dL


(0.00-0.90)  H


 


Total Protein


 


 


 


 7.1 G/DL


(6.4-8.2)


 


Albumin


 


 


 


 1.2 G/DL


(3.4-5.0)  L


 


Globulin    5.9 g/dL  


 


Albumin/Globulin Ratio


 


 


 


 0.2 (1.0-2.7)


L


 


Test


 11/6/20


05:18 11/6/20


11:21 


 





 


POC Whole Blood Glucose


 121 MG/DL


()  H 159 MG/DL


()  H 


 




















Intake and Output  


 


 11/5/20 11/6/20





 19:00 07:00


 


Intake Total 840 ml 856.3 ml


 


Output Total 800 ml 700 ml


 


Balance 40 ml 156.3 ml


 


  


 


Free Water 120 ml 


 


IV Total  196.3 ml


 


Tube Feeding 720 ml 660 ml


 


Output Urine Total 800 ml 700 ml








Objective


GENERAL:  unresponsive, intubated, chronically ill-appearing.


HEAD AND NECK:  Pupils are equal and reactive to light.  Anicteric. ET tube, 

NGT.


NECK:  Supple.  No JVD.


LUNGS:  Mechanical breath sound,  Decreased air in bases, + Crackles.


HEART:  S1, S2.  Regular rhythm.  Distant heart sounds.  No murmur or gallop.


ABDOMEN:  Soft, nondistended, nontender.  Positive bowel sounds.


EXTREMITIES:  No cyanosis, clubbing, or edema.


NEUROLOGIC:  Very limited secondary to the patient's status, cannot follow 

commands.





Assessment/Plan


Assessment/Plan


1. Acute hypoxemic respiratory failure, most likely secondary to pneumonia and 

sepsis --> Intubated (11/6/2020).


2. Sepsis secondary to urinary tract infection and pneumonia.


3. pneumonia=MRSA


4. Acute kidney injury on chronic renal insufficiency.


5. Dehydration.


6. History of chronic congestive heart failure.


7. Diabetes type 2.


8. Dyslipidemia.


9. Hypertension.


10. Alzheimer's disease.


11. COVID 19 previous positive





PLAN:


1.  in ICU


2.  Dr. Sandoval,=Pulmonary Critical Care


3.  Dr. Garcia = infection disease


4.  start Levophed Drip


5.  antibiotics = Dc vancomycin, continue on Meropenem


6.  Code status is full code.


7.  DVT prophylaxis is heparin subcutaneous.


8.  Tube feeding @ 50 cc/hr





follow-up with laboratory and cultures


CXR: Satisfactory endotracheal intubation, Bilateral infiltrates, stable on the 

left and slightly worse on the right











Andrei Cancino MD                  Nov 6, 2020 14:27

## 2020-11-06 NOTE — NUR
NURSE NOTES:

LATE ENTRY:

VERSED DRIP AT 3MG/HR. PT RASS OBTAINED. MAINTENANCE FLUID RUNNING. PT STABLE.

## 2020-11-06 NOTE — INFECTIOUS DISEASES PROG NOTE
Assessment/Plan


76yo M with:





Fever,r ecurrent; improving


Leukocytosis ;recurrent ; increased; 


Acute hypoxic resp failure, on NRB mask> 4l NC; back on NRB, desaturation 10/29


Pneumonia- >HAP


hx of COVID19 PNA 9/9/20


          10/30 Sp cx MRSA (Vancomycin ADRIANA 1), ESBL E.coli


   10/23 BCx NTD


   UA 15-20 WBC, UCx Neg


   10/23 COVID rapid neg; 10/26 rapid COVID PCR + (from prior infection)- not 

new infection


   Flu A/B neg


   CXR: L pna


   Resp cx MRSA





Neftali on CKD, improving





SNF resident (gr[facility])


Non-verbal


VRE and MRSA colonized





Plan:


Dc vanco #15/14 for MRSA PNA 


Meropenem #5/10-14 for ESBL PNA


   -11/2 SP Zosyn #4


   -10/26 SP Cefepime #4


   -10/24 SP Flagyl #








Monitor CBC/CMP


Monitor resp status


Monitor temp curve and hemodynamics


f/u bcx x2, ua. w. reflex





D/w RN





Thank you for this consult. [facility] will continue to follow.





Subjective


Allergies:  


Coded Allergies:  


     No Known Allergies (Unverified , 8/20/12)


Tm 100.2


on %


was transferred to ICU


wbc increased





Objective





Last 24 Hour Vital Signs








  Date Time  Temp Pulse Resp B/P (MAP) Pulse Ox O2 Delivery O2 Flow Rate FiO2


 


11/6/20 08:28     98 Non-Rebreather 15.0 100


 


11/6/20 08:00      Non-Rebreather 15.0 


 


11/6/20 08:00       15.0 55


 


11/6/20 08:00 98.6 102 20 133/74 (93) 98   


 


11/6/20 07:55  109      


 


11/6/20 04:00       15.0 55


 


11/6/20 04:00 98.4 96 21 136/76 (96) 98   


 


11/6/20 03:36  78      


 


11/6/20 00:00  94      


 


11/6/20 00:00 98.4 81 30 102/56 (71) 98   


 


11/5/20 23:09 98.4       


 


11/5/20 22:46     98 Non-Rebreather 15.0 100


 


11/5/20 20:00 100.2 116 30 134/62 (86) 97   


 


11/5/20 20:00       15.0 55


 


11/5/20 19:42  110      


 


11/5/20 16:00       15.0 55


 


11/5/20 16:00  109      


 


11/5/20 16:00 98.9 104 24 135/86 (102) 98   








Height (Feet):  5


Height (Inches):  4.00


Weight (Pounds):  130


Gen: NAD


CV: RRR


Pulm: Rhonchi anteriorly, lots of oral secretions


Abd: Soft, NTND


Ext: No c/c/e


Neuro: Awake





Laboratory Tests








Test


 11/5/20


17:22 11/5/20


23:58 11/6/20


00:07 11/6/20


03:40


 


POC Whole Blood Glucose


 155 MG/DL


()  H 196 MG/DL


()  H 158 MG/DL


()  H 





 


White Blood Count


 


 


 


 13.4 K/UL


(4.8-10.8)  H


 


Red Blood Count


 


 


 


 3.09 M/UL


(4.70-6.10)  L


 


Hemoglobin


 


 


 


 9.3 G/DL


(14.2-18.0)  L


 


Hematocrit


 


 


 


 30.4 %


(42.0-52.0)  L


 


Mean Corpuscular Volume    99 FL (80-99)  


 


Mean Corpuscular Hemoglobin


 


 


 


 30.2 PG


(27.0-31.0)


 


Mean Corpuscular Hemoglobin


Concent 


 


 


 30.6 G/DL


(32.0-36.0)  L


 


Red Cell Distribution Width


 


 


 


 14.9 %


(11.6-14.8)  H


 


Platelet Count


 


 


 


 353 K/UL


(150-450)


 


Mean Platelet Volume


 


 


 


 9.7 FL


(6.5-10.1)


 


Neutrophils (%) (Auto)


 


 


 


 77.4 %


(45.0-75.0)  H


 


Lymphocytes (%) (Auto)


 


 


 


 14.4 %


(20.0-45.0)  L


 


Monocytes (%) (Auto)


 


 


 


 6.5 %


(1.0-10.0)


 


Eosinophils (%) (Auto)


 


 


 


 1.1 %


(0.0-3.0)


 


Basophils (%) (Auto)


 


 


 


 0.6 %


(0.0-2.0)


 


Erythrocyte Sedimentation Rate


 


 


 


 114 MM/HR


(0-20)  H


 


Sodium Level


 


 


 


 148 MMOL/L


(136-145)  H


 


Potassium Level


 


 


 


 3.9 MMOL/L


(3.5-5.1)


 


Chloride Level


 


 


 


 111 MMOL/L


()  H


 


Carbon Dioxide Level


 


 


 


 33 MMOL/L


(21-32)  H


 


Anion Gap


 


 


 


 4 mmol/L


(5-15)  L


 


Blood Urea Nitrogen


 


 


 


 23 mg/dL


(7-18)  H


 


Creatinine


 


 


 


 1.0 MG/DL


(0.55-1.30)


 


Estimat Glomerular Filtration


Rate 


 


 


 > 60 mL/min


(>60)


 


Glucose Level


 


 


 


 120 MG/DL


()  H


 


Calcium Level


 


 


 


 9.0 MG/DL


(8.5-10.1)


 


Phosphorus Level


 


 


 


 3.0 MG/DL


(2.5-4.9)


 


Magnesium Level


 


 


 


 2.8 MG/DL


(1.8-2.4)  H


 


Total Bilirubin


 


 


 


 0.3 MG/DL


(0.2-1.0)


 


Aspartate Amino Transf


(AST/SGOT) 


 


 


 49 U/L (15-37)


H


 


Alanine Aminotransferase


(ALT/SGPT) 


 


 


 61 U/L (12-78)





 


Alkaline Phosphatase


 


 


 


 342 U/L


()  H


 


C-Reactive Protein,


Quantitative 


 


 


 20.4 mg/dL


(0.00-0.90)  H


 


Total Protein


 


 


 


 7.1 G/DL


(6.4-8.2)


 


Albumin


 


 


 


 1.2 G/DL


(3.4-5.0)  L


 


Globulin    5.9 g/dL  


 


Albumin/Globulin Ratio


 


 


 


 0.2 (1.0-2.7)


L


 


Test


 11/6/20


05:18 11/6/20


11:21 


 





 


POC Whole Blood Glucose


 121 MG/DL


()  H 159 MG/DL


()  H 


 














Current Medications








 Medications


  (Trade)  Dose


 Ordered  Sig/Miky


 Route


 PRN Reason  Start Time


 Stop Time Status Last Admin


Dose Admin


 


 Acetaminophen


  (Tylenol)  650 mg  Q4H  PRN


 ORAL


 Temp >100.5  10/23/20 20:30


 11/22/20 20:29  11/5/20 22:39





 


 Dextrose


  (Dextrose 50%)  50 ml  Q30M  PRN


 IV


 Hypoglycemia  10/23/20 22:45


 1/21/21 22:44   





 


 Heparin Sodium


  (Porcine)


  (Heparin 5000


 units/ml)  5,000 units  EVERY 12  HOURS


 SUBQ


   10/23/20 21:00


 12/7/20 20:59  11/6/20 09:47





 


 Insulin Aspart


  (NovoLOG)    EVERY 6  HOURS


 SUBQ


   11/2/20 06:00


 1/22/21 06:29  11/6/20 01:18





 


 Lorazepam


  (Ativan 2mg/ml


 1ml)  2 mg  Q4H  PRN


 IV


 For Anxiety  11/6/20 12:45


 11/13/20 12:44   





 


 Meropenem 1 gm/


 Sodium Chloride  55 ml @ 


 110 mls/hr  Q12HR@0400,1600


 IVPB


   11/2/20 16:00


 11/7/20 23:59  11/6/20 04:36





 


 Morphine Sulfate


  (Morphine


 Sulfate)  4 mg  Q4H  PRN


 IVP


 For Pain  11/6/20 12:45


 11/13/20 12:44   





 


 Nitroglycerin


  (Ntg)  0.4 mg  Q5M  PRN


 SL


 Prn Chest Pain  10/23/20 20:30


 11/22/20 20:29   





 


 Ondansetron HCl


  (Zofran)  4 mg  Q6H  PRN


 IVP


 Nausea & Vomiting  10/23/20 20:30


 11/22/20 20:29   





 


 Pantoprazole


  (Protonix)  40 mg  DAILY


 IV


   11/7/20 09:00


 12/7/20 08:59   





 


 Polyethylene


 Glycol


  (Miralax)  17 gm  DAILYPRN  PRN


 ORAL


 Constipation  10/23/20 20:30


 11/22/20 20:29   





 


 Promethazine HCl/


 Codeine


  (Phenergan with


 Codeine)  5 ml  Q4H  PRN


 ORAL


 For Cough  10/23/20 20:30


 11/22/20 20:29   





 


 Tamsulosin HCl


  (Flomax)  0.4 mg  BID


 ORAL


   10/31/20 19:45


 11/23/20 08:59  11/6/20 09:10





 


 Vancomycin HCl  250 ml @ 


 166.667


 mls/hr  Q24H


 IVPB


   11/2/20 06:00


 11/7/20 05:59  11/6/20 05:29





 


 Vancomycin HCl


  (Vanco pharmacy


 to dose)  1 ea  DAILY  PRN


 MISC


 Per rx protocol  10/23/20 20:30


 11/22/20 20:29   




















Slime Garcia M.D.             Nov 6, 2020 13:19

## 2020-11-06 NOTE — CARDIAC ELECTROPHYSIOLOGY PN
Assessment/Plan


Assessment/Plan


1. Accelerated junctional rhythm. Not bradycardic. Ruled out for MI


      Echo showed ejection fraction of 55%.





2. Long run of 20 beats of Nonsustained VT. Will need cardiac cath after st

abilization.





3. Respiratory failure, on antibiotic and NRB FM 15 liters


Likely will be intubated


4. Dehydration.


5. Acute renal failure.


6. Electrolyte imbalance.


7. History of CHF, but echocardiogram showed normal left ventricular systolic fu

nction.


8. History of previous COVID infection in September 2020 and active Covid now in

isolation


9. Dementia.





JEANNINE RN





Subjective


Subjective


In SR in NAD in Covid isolation. Transferred to ICU for respiratory failure, 

despite 15 liter NRB.  Had 20 beats of nonsustained VT





Objective





Last 24 Hour Vital Signs








  Date Time  Temp Pulse Resp B/P (MAP) Pulse Ox O2 Delivery O2 Flow Rate FiO2


 


11/6/20 12:00       15.0 55


 


11/6/20 12:00 98.4 96 22 123/88 (100) 96   


 


11/6/20 12:00      Non-Rebreather 15.0 


 


11/6/20 08:28     98 Non-Rebreather 15.0 100


 


11/6/20 08:00      Non-Rebreather 15.0 


 


11/6/20 08:00       15.0 55


 


11/6/20 08:00 98.6 102 20 133/74 (93) 98   


 


11/6/20 07:55  109      


 


11/6/20 04:00       15.0 55


 


11/6/20 04:00 98.4 96 21 136/76 (96) 98   


 


11/6/20 03:36  78      


 


11/6/20 00:00  94      


 


11/6/20 00:00 98.4 81 30 102/56 (71) 98   


 


11/5/20 23:09 98.4       


 


11/5/20 22:46     98 Non-Rebreather 15.0 100


 


11/5/20 20:00 100.2 116 30 134/62 (86) 97   


 


11/5/20 20:00       15.0 55


 


11/5/20 19:42  110      


 


11/5/20 16:00       15.0 55


 


11/5/20 16:00  109      


 


11/5/20 16:00 98.9 104 24 135/86 (102) 98   

















Intake and Output  


 


 11/5/20 11/6/20





 19:00 07:00


 


Intake Total 840 ml 856.3 ml


 


Output Total 800 ml 700 ml


 


Balance 40 ml 156.3 ml


 


  


 


Free Water 120 ml 


 


IV Total  196.3 ml


 


Tube Feeding 720 ml 660 ml


 


Output Urine Total 800 ml 700 ml











Laboratory Tests








Test


 11/5/20


17:22 11/5/20


23:58 11/6/20


00:07 11/6/20


03:40


 


POC Whole Blood Glucose


 155 MG/DL


()  H 196 MG/DL


()  H 158 MG/DL


()  H 





 


White Blood Count


 


 


 


 13.4 K/UL


(4.8-10.8)  H


 


Red Blood Count


 


 


 


 3.09 M/UL


(4.70-6.10)  L


 


Hemoglobin


 


 


 


 9.3 G/DL


(14.2-18.0)  L


 


Hematocrit


 


 


 


 30.4 %


(42.0-52.0)  L


 


Mean Corpuscular Volume    99 FL (80-99)  


 


Mean Corpuscular Hemoglobin


 


 


 


 30.2 PG


(27.0-31.0)


 


Mean Corpuscular Hemoglobin


Concent 


 


 


 30.6 G/DL


(32.0-36.0)  L


 


Red Cell Distribution Width


 


 


 


 14.9 %


(11.6-14.8)  H


 


Platelet Count


 


 


 


 353 K/UL


(150-450)


 


Mean Platelet Volume


 


 


 


 9.7 FL


(6.5-10.1)


 


Neutrophils (%) (Auto)


 


 


 


 77.4 %


(45.0-75.0)  H


 


Lymphocytes (%) (Auto)


 


 


 


 14.4 %


(20.0-45.0)  L


 


Monocytes (%) (Auto)


 


 


 


 6.5 %


(1.0-10.0)


 


Eosinophils (%) (Auto)


 


 


 


 1.1 %


(0.0-3.0)


 


Basophils (%) (Auto)


 


 


 


 0.6 %


(0.0-2.0)


 


Erythrocyte Sedimentation Rate


 


 


 


 114 MM/HR


(0-20)  H


 


Sodium Level


 


 


 


 148 MMOL/L


(136-145)  H


 


Potassium Level


 


 


 


 3.9 MMOL/L


(3.5-5.1)


 


Chloride Level


 


 


 


 111 MMOL/L


()  H


 


Carbon Dioxide Level


 


 


 


 33 MMOL/L


(21-32)  H


 


Anion Gap


 


 


 


 4 mmol/L


(5-15)  L


 


Blood Urea Nitrogen


 


 


 


 23 mg/dL


(7-18)  H


 


Creatinine


 


 


 


 1.0 MG/DL


(0.55-1.30)


 


Estimat Glomerular Filtration


Rate 


 


 


 > 60 mL/min


(>60)


 


Glucose Level


 


 


 


 120 MG/DL


()  H


 


Calcium Level


 


 


 


 9.0 MG/DL


(8.5-10.1)


 


Phosphorus Level


 


 


 


 3.0 MG/DL


(2.5-4.9)


 


Magnesium Level


 


 


 


 2.8 MG/DL


(1.8-2.4)  H


 


Total Bilirubin


 


 


 


 0.3 MG/DL


(0.2-1.0)


 


Aspartate Amino Transf


(AST/SGOT) 


 


 


 49 U/L (15-37)


H


 


Alanine Aminotransferase


(ALT/SGPT) 


 


 


 61 U/L (12-78)





 


Alkaline Phosphatase


 


 


 


 342 U/L


()  H


 


C-Reactive Protein,


Quantitative 


 


 


 20.4 mg/dL


(0.00-0.90)  H


 


Total Protein


 


 


 


 7.1 G/DL


(6.4-8.2)


 


Albumin


 


 


 


 1.2 G/DL


(3.4-5.0)  L


 


Globulin    5.9 g/dL  


 


Albumin/Globulin Ratio


 


 


 


 0.2 (1.0-2.7)


L


 


Test


 11/6/20


05:18 11/6/20


11:21 


 





 


POC Whole Blood Glucose


 121 MG/DL


()  H 159 MG/DL


()  H 


 











Objective





HEAD AND NECK:  No JVD. NRB FM


LUNGS:  Coarse rhonchi.


CARDIOVASCULAR:   Irregular S1 and S2 with no gallop.


ABDOMEN:  Soft.


EXTREMITIES:  No pitting edema.











Kvng Edmond MD                Nov 6, 2020 13:38

## 2020-11-06 NOTE — NUR
NURSE NOTES:

PT ORAL CARE PROVIDED. HYGIENE COMPLETE. CENTRAL DRESSING CHANGED. SACRAL DRESSING DRY AND 
INTACT. PT NGT CLAMPED. GALLEGOS SECURE. EXTREMITIES ELEVATED ON PILLOWS.WILL CONTINUE TO 
MONITOR PT.

## 2020-11-07 VITALS — DIASTOLIC BLOOD PRESSURE: 68 MMHG | SYSTOLIC BLOOD PRESSURE: 119 MMHG

## 2020-11-07 VITALS — DIASTOLIC BLOOD PRESSURE: 63 MMHG | SYSTOLIC BLOOD PRESSURE: 110 MMHG

## 2020-11-07 VITALS — SYSTOLIC BLOOD PRESSURE: 112 MMHG | DIASTOLIC BLOOD PRESSURE: 52 MMHG

## 2020-11-07 VITALS — SYSTOLIC BLOOD PRESSURE: 108 MMHG | DIASTOLIC BLOOD PRESSURE: 68 MMHG

## 2020-11-07 VITALS — DIASTOLIC BLOOD PRESSURE: 50 MMHG | SYSTOLIC BLOOD PRESSURE: 118 MMHG

## 2020-11-07 VITALS — DIASTOLIC BLOOD PRESSURE: 65 MMHG | SYSTOLIC BLOOD PRESSURE: 116 MMHG

## 2020-11-07 VITALS — DIASTOLIC BLOOD PRESSURE: 69 MMHG | SYSTOLIC BLOOD PRESSURE: 132 MMHG

## 2020-11-07 VITALS — DIASTOLIC BLOOD PRESSURE: 77 MMHG | SYSTOLIC BLOOD PRESSURE: 103 MMHG

## 2020-11-07 VITALS — DIASTOLIC BLOOD PRESSURE: 60 MMHG | SYSTOLIC BLOOD PRESSURE: 100 MMHG

## 2020-11-07 VITALS — DIASTOLIC BLOOD PRESSURE: 68 MMHG | SYSTOLIC BLOOD PRESSURE: 120 MMHG

## 2020-11-07 VITALS — SYSTOLIC BLOOD PRESSURE: 115 MMHG | DIASTOLIC BLOOD PRESSURE: 54 MMHG

## 2020-11-07 VITALS — DIASTOLIC BLOOD PRESSURE: 60 MMHG | SYSTOLIC BLOOD PRESSURE: 112 MMHG

## 2020-11-07 VITALS — SYSTOLIC BLOOD PRESSURE: 106 MMHG | DIASTOLIC BLOOD PRESSURE: 61 MMHG

## 2020-11-07 VITALS — SYSTOLIC BLOOD PRESSURE: 108 MMHG | DIASTOLIC BLOOD PRESSURE: 65 MMHG

## 2020-11-07 VITALS — DIASTOLIC BLOOD PRESSURE: 86 MMHG | SYSTOLIC BLOOD PRESSURE: 104 MMHG

## 2020-11-07 VITALS — SYSTOLIC BLOOD PRESSURE: 111 MMHG | DIASTOLIC BLOOD PRESSURE: 70 MMHG

## 2020-11-07 VITALS — SYSTOLIC BLOOD PRESSURE: 122 MMHG | DIASTOLIC BLOOD PRESSURE: 76 MMHG

## 2020-11-07 VITALS — SYSTOLIC BLOOD PRESSURE: 114 MMHG | DIASTOLIC BLOOD PRESSURE: 65 MMHG

## 2020-11-07 VITALS — SYSTOLIC BLOOD PRESSURE: 100 MMHG | DIASTOLIC BLOOD PRESSURE: 52 MMHG

## 2020-11-07 VITALS — SYSTOLIC BLOOD PRESSURE: 105 MMHG | DIASTOLIC BLOOD PRESSURE: 64 MMHG

## 2020-11-07 VITALS — DIASTOLIC BLOOD PRESSURE: 79 MMHG | SYSTOLIC BLOOD PRESSURE: 159 MMHG

## 2020-11-07 VITALS — SYSTOLIC BLOOD PRESSURE: 117 MMHG | DIASTOLIC BLOOD PRESSURE: 67 MMHG

## 2020-11-07 VITALS — DIASTOLIC BLOOD PRESSURE: 62 MMHG | SYSTOLIC BLOOD PRESSURE: 121 MMHG

## 2020-11-07 VITALS — DIASTOLIC BLOOD PRESSURE: 63 MMHG | SYSTOLIC BLOOD PRESSURE: 134 MMHG

## 2020-11-07 VITALS — SYSTOLIC BLOOD PRESSURE: 124 MMHG | DIASTOLIC BLOOD PRESSURE: 62 MMHG

## 2020-11-07 VITALS — SYSTOLIC BLOOD PRESSURE: 92 MMHG | DIASTOLIC BLOOD PRESSURE: 57 MMHG

## 2020-11-07 VITALS — SYSTOLIC BLOOD PRESSURE: 101 MMHG | DIASTOLIC BLOOD PRESSURE: 60 MMHG

## 2020-11-07 VITALS — DIASTOLIC BLOOD PRESSURE: 61 MMHG | SYSTOLIC BLOOD PRESSURE: 97 MMHG

## 2020-11-07 VITALS — SYSTOLIC BLOOD PRESSURE: 114 MMHG | DIASTOLIC BLOOD PRESSURE: 66 MMHG

## 2020-11-07 VITALS — SYSTOLIC BLOOD PRESSURE: 110 MMHG | DIASTOLIC BLOOD PRESSURE: 66 MMHG

## 2020-11-07 VITALS — DIASTOLIC BLOOD PRESSURE: 63 MMHG | SYSTOLIC BLOOD PRESSURE: 109 MMHG

## 2020-11-07 VITALS — SYSTOLIC BLOOD PRESSURE: 109 MMHG | DIASTOLIC BLOOD PRESSURE: 62 MMHG

## 2020-11-07 VITALS — DIASTOLIC BLOOD PRESSURE: 63 MMHG | SYSTOLIC BLOOD PRESSURE: 113 MMHG

## 2020-11-07 VITALS — DIASTOLIC BLOOD PRESSURE: 57 MMHG | SYSTOLIC BLOOD PRESSURE: 123 MMHG

## 2020-11-07 VITALS — DIASTOLIC BLOOD PRESSURE: 65 MMHG | SYSTOLIC BLOOD PRESSURE: 97 MMHG

## 2020-11-07 VITALS — DIASTOLIC BLOOD PRESSURE: 73 MMHG | SYSTOLIC BLOOD PRESSURE: 116 MMHG

## 2020-11-07 VITALS — DIASTOLIC BLOOD PRESSURE: 72 MMHG | SYSTOLIC BLOOD PRESSURE: 98 MMHG

## 2020-11-07 VITALS — DIASTOLIC BLOOD PRESSURE: 64 MMHG | SYSTOLIC BLOOD PRESSURE: 102 MMHG

## 2020-11-07 VITALS — SYSTOLIC BLOOD PRESSURE: 132 MMHG | DIASTOLIC BLOOD PRESSURE: 78 MMHG

## 2020-11-07 VITALS — DIASTOLIC BLOOD PRESSURE: 68 MMHG | SYSTOLIC BLOOD PRESSURE: 140 MMHG

## 2020-11-07 VITALS — SYSTOLIC BLOOD PRESSURE: 112 MMHG | DIASTOLIC BLOOD PRESSURE: 62 MMHG

## 2020-11-07 VITALS — DIASTOLIC BLOOD PRESSURE: 53 MMHG | SYSTOLIC BLOOD PRESSURE: 99 MMHG

## 2020-11-07 VITALS — SYSTOLIC BLOOD PRESSURE: 111 MMHG | DIASTOLIC BLOOD PRESSURE: 65 MMHG

## 2020-11-07 VITALS — SYSTOLIC BLOOD PRESSURE: 129 MMHG | DIASTOLIC BLOOD PRESSURE: 73 MMHG

## 2020-11-07 VITALS — DIASTOLIC BLOOD PRESSURE: 53 MMHG | SYSTOLIC BLOOD PRESSURE: 84 MMHG

## 2020-11-07 VITALS — DIASTOLIC BLOOD PRESSURE: 74 MMHG | SYSTOLIC BLOOD PRESSURE: 137 MMHG

## 2020-11-07 VITALS — DIASTOLIC BLOOD PRESSURE: 53 MMHG | SYSTOLIC BLOOD PRESSURE: 90 MMHG

## 2020-11-07 VITALS — DIASTOLIC BLOOD PRESSURE: 60 MMHG | SYSTOLIC BLOOD PRESSURE: 101 MMHG

## 2020-11-07 VITALS — SYSTOLIC BLOOD PRESSURE: 107 MMHG | DIASTOLIC BLOOD PRESSURE: 49 MMHG

## 2020-11-07 LAB
ADD MANUAL DIFF: NO
ALBUMIN SERPL-MCNC: 1.2 G/DL (ref 3.4–5)
ALBUMIN/GLOB SERPL: 0.2 {RATIO} (ref 1–2.7)
ALP SERPL-CCNC: 285 U/L (ref 46–116)
ALT SERPL-CCNC: 42 U/L (ref 12–78)
ANION GAP SERPL CALC-SCNC: 11 MMOL/L (ref 5–15)
APPEARANCE UR: CLEAR
APTT PPP: (no result) S
AST SERPL-CCNC: 30 U/L (ref 15–37)
BASOPHILS NFR BLD AUTO: 0.6 % (ref 0–2)
BILIRUB SERPL-MCNC: 0.4 MG/DL (ref 0.2–1)
BUN SERPL-MCNC: 24 MG/DL (ref 7–18)
CALCIUM SERPL-MCNC: 7.9 MG/DL (ref 8.5–10.1)
CHLORIDE SERPL-SCNC: 112 MMOL/L (ref 98–107)
CO2 SERPL-SCNC: 26 MMOL/L (ref 21–32)
CREAT SERPL-MCNC: 1.2 MG/DL (ref 0.55–1.3)
EOSINOPHIL NFR BLD AUTO: 1 % (ref 0–3)
ERYTHROCYTE [DISTWIDTH] IN BLOOD BY AUTOMATED COUNT: 15 % (ref 11.6–14.8)
GLOBULIN SER-MCNC: 5.7 G/DL
GLUCOSE UR STRIP-MCNC: NEGATIVE MG/DL
HCT VFR BLD CALC: 28.4 % (ref 42–52)
HGB BLD-MCNC: 8.7 G/DL (ref 14.2–18)
KETONES UR QL STRIP: (no result)
LEUKOCYTE ESTERASE UR QL STRIP: NEGATIVE
LYMPHOCYTES NFR BLD AUTO: 16.7 % (ref 20–45)
MCV RBC AUTO: 97 FL (ref 80–99)
MONOCYTES NFR BLD AUTO: 5.3 % (ref 1–10)
NEUTROPHILS NFR BLD AUTO: 76.4 % (ref 45–75)
NITRITE UR QL STRIP: NEGATIVE
PH UR STRIP: 8 [PH] (ref 4.5–8)
PHOSPHATE SERPL-MCNC: 1.2 MG/DL (ref 2.5–4.9)
PLATELET # BLD: 398 K/UL (ref 150–450)
POTASSIUM SERPL-SCNC: 3.3 MMOL/L (ref 3.5–5.1)
PROT UR QL STRIP: (no result)
RBC # BLD AUTO: 2.94 M/UL (ref 4.7–6.1)
SODIUM SERPL-SCNC: 149 MMOL/L (ref 136–145)
SP GR UR STRIP: 1.01 (ref 1–1.03)
UROBILINOGEN UR-MCNC: NORMAL MG/DL (ref 0–1)
WBC # BLD AUTO: 14.9 K/UL (ref 4.8–10.8)

## 2020-11-07 RX ADMIN — HEPARIN SODIUM SCH UNITS: 5000 INJECTION INTRAVENOUS; SUBCUTANEOUS at 20:46

## 2020-11-07 RX ADMIN — MEROPENEM SCH MLS/HR: 1 INJECTION INTRAVENOUS at 16:40

## 2020-11-07 RX ADMIN — Medication SCH MLS/HR: at 16:48

## 2020-11-07 RX ADMIN — HEPARIN SODIUM SCH UNITS: 5000 INJECTION INTRAVENOUS; SUBCUTANEOUS at 08:40

## 2020-11-07 RX ADMIN — INSULIN ASPART SCH UNITS: 100 INJECTION, SOLUTION INTRAVENOUS; SUBCUTANEOUS at 00:00

## 2020-11-07 RX ADMIN — TAMSULOSIN HYDROCHLORIDE SCH MG: 0.4 CAPSULE ORAL at 08:38

## 2020-11-07 RX ADMIN — Medication SCH MLS/HR: at 17:38

## 2020-11-07 RX ADMIN — DEXTROSE MONOHYDRATE SCH MLS/HR: 50 INJECTION, SOLUTION INTRAVENOUS at 16:40

## 2020-11-07 RX ADMIN — INSULIN ASPART SCH UNITS: 100 INJECTION, SOLUTION INTRAVENOUS; SUBCUTANEOUS at 17:38

## 2020-11-07 RX ADMIN — INSULIN ASPART SCH UNITS: 100 INJECTION, SOLUTION INTRAVENOUS; SUBCUTANEOUS at 06:00

## 2020-11-07 RX ADMIN — INSULIN ASPART SCH UNITS: 100 INJECTION, SOLUTION INTRAVENOUS; SUBCUTANEOUS at 12:00

## 2020-11-07 RX ADMIN — Medication SCH MLS/HR: at 13:26

## 2020-11-07 RX ADMIN — Medication SCH MLS/HR: at 06:34

## 2020-11-07 RX ADMIN — PANTOPRAZOLE SODIUM SCH MG: 40 INJECTION, POWDER, FOR SOLUTION INTRAVENOUS at 08:38

## 2020-11-07 RX ADMIN — MIDAZOLAM HYDROCHLORIDE PRN MLS/HR: 5 INJECTION INTRAMUSCULAR; INTRAVENOUS at 07:36

## 2020-11-07 RX ADMIN — CHLORHEXIDINE GLUCONATE SCH APPLIC: 213 SOLUTION TOPICAL at 19:55

## 2020-11-07 RX ADMIN — TAMSULOSIN HYDROCHLORIDE SCH MG: 0.4 CAPSULE ORAL at 17:37

## 2020-11-07 RX ADMIN — MEROPENEM SCH MLS/HR: 1 INJECTION INTRAVENOUS at 03:12

## 2020-11-07 NOTE — NUR
RESPIRATORY THERAPY NOTES:

Attempted to wean Patient at 0730. Patient tolerated weaning for 3 minutes then became 
tachypneic. Respiratory Rate increased to >35, RSBI >120, and the Patient started becoming 
SOB. Placed back onto previous settings on ACVC 16, 600, 40%, +5.

## 2020-11-07 NOTE — DIAGNOSTIC IMAGING REPORT
EXAM:

  XR Chest, 1 View

 

CLINICAL HISTORY:

  DYSPNEA

 

TECHNIQUE:

  Frontal view of the chest.

 

COMPARISON:

  Chest radiograph on 11/6/2020

 

FINDINGS:

  Hardware: Endotracheal tube terminates in the mid thoracic trachea, 

approximately 3 cm above the sd.  An enteric tube terminates in the 

region of the distal stomach.

 

  Lungs/pleura: Decreased bibasilar opacities.  Small bilateral pleural 

effusions.  Emphysematous changes.

 

  Heart/mediastinum: Normal. No cardiomegaly.

 

  Soft tissues: Unremarkable.

 

  Bones: No acute fracture.  Right shoulder replacement again noted

 

  Upper abdomen: Normal.

 

IMPRESSION:   

  Decreased bibasilar opacities.  Small bilateral pleural effusions.

 

  Endotracheal tube terminates in the mid thoracic trachea, approximately 

3 cm above the sd.  An enteric tube terminates in the region of the 

distal stomach.

## 2020-11-07 NOTE — INFECTIOUS DISEASES PROG NOTE
Assessment/Plan


76yo M with:


SEptic Shock


Fever,r ecurrent; 


Leukocytosis ;recurrent ; increased; 


Acute hypoxic resp failure, on NRB mask> 4l NC; back on NRB, desaturation 10/29 

> intubated 11/6


Pneumonia- >HAP


hx of COVID19 PNA 9/9/20


          10/30 Sp cx MRSA (Vancomycin ADRIANA 1), ESBL E.coli


   10/23 BCx NTD


   UA 15-20 WBC, UCx Neg


   10/23 COVID rapid neg; 10/26 rapid COVID PCR + (from prior infection)- not 

new infection


   Flu A/B neg


   CXR: L pna


   Resp cx MRSA





Neftali on CKD, improving





SNF resident ([facility])


Non-verbal


VRE and MRSA colonized





Plan:


Meropenem #6/10-14 for ESBL PNA


add empiric Micafungin


   -11/6 SP IV Vancomycin #15


   -11/2 SP Zosyn #4


   -10/26 SP Cefepime #4


   -10/24 SP Flagyl #








Monitor CBC/CMP


Monitor resp status


Monitor temp curve and hemodynamics


repeat cultures





D/w RN





Thank you for this consult. [facility] will continue to follow.





Subjective


Allergies:  


Coded Allergies:  


     No Known Allergies (Unverified , 8/20/12)


Tm 102.6


intubated yesterday; Fio2 50%


on levophed at 6 


wbc increased





Objective





Last 24 Hour Vital Signs








  Date Time  Temp Pulse Resp B/P (MAP) Pulse Ox O2 Delivery O2 Flow Rate FiO2


 


11/7/20 13:27  86 16     50


 


11/7/20 12:30  83 16 90/53 (65) 98   


 


11/7/20 12:15  78 16 103/77 (86) 100   


 


11/7/20 12:00      Mechanical Ventilator  


 


11/7/20 12:00  83 16 101/60 (74) 97   


 


11/7/20 12:00        45


 


11/7/20 11:45 99.0 93 16 84/53 (63) 98   


 


11/7/20 11:30  85 17 97/65 (76) 100   


 


11/7/20 11:28  82 16     50


 


11/7/20 11:15  82 16 101/60 (74) 98   


 


11/7/20 11:00  82 18 102/64 (77) 98   


 


11/7/20 10:45  83 17 97/61 (73) 98   


 


11/7/20 10:30  83 18 100/60 (73) 97   


 


11/7/20 10:15  86 20 110/66 (81) 99   


 


11/7/20 10:00 98.7 85 19 111/70 (84) 99   


 


11/7/20 09:45  89 16 114/65 (81) 100   


 


11/7/20 09:30  92 21 112/60 (77) 100   


 


11/7/20 09:15 99.9 95 27 122/76 (91) 100   


 


11/7/20 09:00  100 20 116/73 (87) 100   


 


11/7/20 08:58  97 20     50


 


11/7/20 08:45  100 21 124/62 (82) 100   


 


11/7/20 08:30  103 23 121/62 (81) 99   


 


11/7/20 08:15 99.3 105 26 104/86 (92) 98   


 


11/7/20 08:00  103 26 98/72 (81) 98   


 


11/7/20 08:00      Mechanical Ventilator  


 


11/7/20 08:00  112      


 


11/7/20 08:00        45


 


11/7/20 07:43        50


 


11/7/20 07:36   16   Mechanical Ventilator  55


 


11/7/20 07:30 100.1 106 24 137/74 (95) 99   


 


11/7/20 07:11  100 17     50


 


11/7/20 07:00  97 18 129/73 (91) 92   


 


11/7/20 07:00   16   Mechanical Ventilator  55


 


11/7/20 06:34    112/62    


 


11/7/20 06:30  88 16     


 


11/7/20 06:30  88 20 112/62 (79) 99   


 


11/7/20 06:00 101.2 88 16 120/68 (85) 99   


 


11/7/20 06:00   16   Mechanical Ventilator  50


 


11/7/20 05:30  86 16 116/65 (82) 99   


 


11/7/20 05:07  81 16     55


 


11/7/20 05:00   16   Mechanical Ventilator  50


 


11/7/20 05:00 101.4 85 16 109/62 (78) 99   


 


11/7/20 04:30  93 16 109/63 (78) 99   


 


11/7/20 04:00        55


 


11/7/20 04:00 102.6 93 16 111/65 (80) 98   


 


11/7/20 04:00   16   Mechanical Ventilator  50


 


11/7/20 04:00  93      


 


11/7/20 04:00      Mechanical Ventilator  


 


11/7/20 03:30  97 16 106/61 (76) 98   


 


11/7/20 03:00   16   Mechanical Ventilator  50


 


11/7/20 03:00 101.5 97 16 114/66 (82) 98   


 


11/7/20 02:50  97 16     55


 


11/7/20 02:30  97 16 108/65 (79) 98   


 


11/7/20 02:00  98 16 110/63 (79) 98   


 


11/7/20 02:00   16   Mechanical Ventilator  50


 


11/7/20 01:30  97 16 105/64 (78) 98   


 


11/7/20 01:05  98 16     55


 


11/7/20 01:00  97 16 113/63 (80) 98   


 


11/7/20 01:00   16   Mechanical Ventilator  50


 


11/7/20 00:30  96 16 108/65 (79) 98   


 


11/7/20 00:00   16   Mechanical Ventilator  50


 


11/7/20 00:00 99.9 98 16 108/68 (81) 98   


 


11/7/20 00:00      Mechanical Ventilator  


 


11/6/20 23:30  98 16 104/61 (75) 98   


 


11/6/20 23:00  98 16 109/65 (80) 98   


 


11/6/20 23:00   16   Mechanical Ventilator  50


 


11/6/20 22:42  96 16     55


 


11/6/20 22:30  96 16 102/63 (76) 98   


 


11/6/20 22:00   16   Mechanical Ventilator  50


 


11/6/20 22:00  94 16 102/62 (75) 98   


 


11/6/20 21:30  94 16 100/60 (73) 97   


 


11/6/20 21:00  98 16 120/67 (84) 97   


 


11/6/20 21:00   16   Mechanical Ventilator  50


 


11/6/20 20:45  100 16 92/54 (67) 95   


 


11/6/20 20:35  96 16     55


 


11/6/20 20:30  91 18 128/62 (84) 98   


 


11/6/20 20:15  108 16 90/57 (68) 95   


 


11/6/20 20:01    86/60    


 


11/6/20 20:01   16   Mechanical Ventilator  50


 


11/6/20 20:00      Mechanical Ventilator  


 


11/6/20 20:00  108      


 


11/6/20 20:00        55


 


11/6/20 20:00 99.7 108 16 86/60 (69) 95   


 


11/6/20 19:30  109 16 90/56 (67) 96   


 


11/6/20 19:05  107 16     55


 


11/6/20 19:00  107 16 90/62 (71) 97   


 


11/6/20 19:00   16   Mechanical Ventilator  50


 


11/6/20 18:36   16   Mechanical Ventilator  


 


11/6/20 17:36   16   Mechanical Ventilator  


 


11/6/20 17:26  107 16     55


 


11/6/20 16:39   13   Mechanical Ventilator  


 


11/6/20 16:30  111 19 86/61 (69) 98   


 


11/6/20 16:15  107 16 86/54 (65) 99   


 


11/6/20 16:00 97.5 109 17 83/55 (64) 98   


 


11/6/20 16:00  112      


 


11/6/20 16:00      Mechanical Ventilator  


 


11/6/20 15:39   18   Mechanical Ventilator  








Height (Feet):  5


Height (Inches):  4.00


Weight (Pounds):  130


Gen: sedated, intubated


CV: RRR


Pulm: Rhonchi anteriorly


Abd: Soft, NTND


Ext: No c/c/e





Laboratory Tests








Test


 11/7/20


03:45 11/7/20


07:39


 


White Blood Count


 14.9 K/UL


(4.8-10.8)  H 





 


Red Blood Count


 2.94 M/UL


(4.70-6.10)  L 





 


Hemoglobin


 8.7 G/DL


(14.2-18.0)  L 





 


Hematocrit


 28.4 %


(42.0-52.0)  L 





 


Mean Corpuscular Volume 97 FL (80-99)   


 


Mean Corpuscular Hemoglobin


 29.6 PG


(27.0-31.0) 





 


Mean Corpuscular Hemoglobin


Concent 30.6 G/DL


(32.0-36.0)  L 





 


Red Cell Distribution Width


 15.0 %


(11.6-14.8)  H 





 


Platelet Count


 398 K/UL


(150-450) 





 


Mean Platelet Volume


 8.6 FL


(6.5-10.1) 





 


Neutrophils (%) (Auto)


 76.4 %


(45.0-75.0)  H 





 


Lymphocytes (%) (Auto)


 16.7 %


(20.0-45.0)  L 





 


Monocytes (%) (Auto)


 5.3 %


(1.0-10.0) 





 


Eosinophils (%) (Auto)


 1.0 %


(0.0-3.0) 





 


Basophils (%) (Auto)


 0.6 %


(0.0-2.0) 





 


Sodium Level


 149 MMOL/L


(136-145)  H 





 


Potassium Level


 3.3 MMOL/L


(3.5-5.1)  L 





 


Chloride Level


 112 MMOL/L


()  H 





 


Carbon Dioxide Level


 26 MMOL/L


(21-32) 





 


Anion Gap


 11 mmol/L


(5-15) 





 


Blood Urea Nitrogen


 24 mg/dL


(7-18)  H 





 


Creatinine


 1.2 MG/DL


(0.55-1.30) 





 


Estimat Glomerular Filtration


Rate 58.7 mL/min


(>60) 





 


Glucose Level


 123 MG/DL


()  H 





 


Calcium Level


 7.9 MG/DL


(8.5-10.1)  L 





 


Phosphorus Level


 1.2 MG/DL


(2.5-4.9)  L 





 


Magnesium Level


 2.5 MG/DL


(1.8-2.4)  H 





 


Total Bilirubin


 0.4 MG/DL


(0.2-1.0) 





 


Aspartate Amino Transf


(AST/SGOT) 30 U/L (15-37)


 





 


Alanine Aminotransferase


(ALT/SGPT) 42 U/L (12-78)


 





 


Alkaline Phosphatase


 285 U/L


()  H 





 


Total Protein


 6.9 G/DL


(6.4-8.2) 





 


Albumin


 1.2 G/DL


(3.4-5.0)  L 





 


Globulin 5.7 g/dL   


 


Albumin/Globulin Ratio


 0.2 (1.0-2.7)


L 





 


Arterial Blood pH


 


 7.471


(7.350-7.450)


 


Arterial Blood Partial


Pressure CO2 


 35.0 mmHg


(35.0-45.0)


 


Arterial Blood Partial


Pressure O2 


 52.9 mmHg


(75.0-100.0)  L


 


Arterial Blood HCO3


 


 25.0 mmol/L


(22.0-26.0)


 


Arterial Blood Oxygen


Saturation 


 86.8 %


()  *L


 


Arterial Blood Base Excess  1.5 (-2-2)  


 


Jerod Test  N/a  











Current Medications








 Medications


  (Trade)  Dose


 Ordered  Sig/Miky


 Route


 PRN Reason  Start Time


 Stop Time Status Last Admin


Dose Admin


 


 Acetaminophen


  (Tylenol)  650 mg  Q4H  PRN


 ORAL


 Temp >100.5  10/23/20 20:30


 11/22/20 20:29  11/5/20 22:39





 


 Chlorhexidine


 Gluconate


  (Jess-Hex 2%)  1 applic  DAILY@2000


 TOPIC


   11/6/20 20:00


 2/4/21 19:59  11/6/20 19:59





 


 Dextrose


  (Dextrose 50%)  50 ml  Q30M  PRN


 IV


 Hypoglycemia  10/23/20 22:45


 1/21/21 22:44   





 


 Heparin Sodium


  (Porcine)


  (Heparin 5000


 units/ml)  5,000 units  EVERY 12  HOURS


 SUBQ


   10/23/20 21:00


 12/7/20 20:59  11/7/20 08:40





 


 Insulin Aspart


  (NovoLOG)    EVERY 6  HOURS


 SUBQ


   11/2/20 06:00


 1/22/21 06:29  11/6/20 01:18





 


 Lorazepam


  (Ativan 2mg/ml


 1ml)  2 mg  Q4H  PRN


 IV


 For Anxiety  11/6/20 12:45


 11/13/20 12:44   





 


 Meropenem 1 gm/


 Sodium Chloride  55 ml @ 


 110 mls/hr  Q12HR@0400,1600


 IVPB


   11/2/20 16:00


 11/11/20 23:59  11/7/20 03:12





 


 Midazolam HCl 50


 mg/Sodium Chloride  100 ml @ 0


 mls/hr  Q24H  PRN


 IV


 Restlessness  11/6/20 13:20


 11/8/20 13:19  11/7/20 07:36





 


 Morphine Sulfate


  (Morphine


 Sulfate)  4 mg  Q4H  PRN


 IVP


 For Pain  11/6/20 12:45


 11/13/20 12:44   





 


 Nitroglycerin


  (Ntg)  0.4 mg  Q5M  PRN


 SL


 Prn Chest Pain  10/23/20 20:30


 11/22/20 20:29   





 


 Norepinephrine


 Bitartrate  250 ml @ 0


 mls/hr  Q24H


 IV


   11/6/20 15:50


 11/9/20 15:49  11/7/20 06:34





 


 Ondansetron HCl


  (Zofran)  4 mg  Q6H  PRN


 IVP


 Nausea & Vomiting  10/23/20 20:30


 11/22/20 20:29   





 


 Pantoprazole


  (Protonix)  40 mg  DAILY


 IV


   11/7/20 09:00


 12/7/20 08:59  11/7/20 08:38





 


 Polyethylene


 Glycol


  (Miralax)  17 gm  DAILYPRN  PRN


 ORAL


 Constipation  10/23/20 20:30


 11/22/20 20:29   





 


 Potassium


 Phosphate  250 ml @ 


 62.5 mls/hr  Q4H


 IVPB


   11/7/20 13:00


 11/7/20 20:59  11/7/20 13:26





 


 Promethazine HCl/


 Codeine


  (Phenergan with


 Codeine)  5 ml  Q4H  PRN


 ORAL


 For Cough  10/23/20 20:30


 11/22/20 20:29   





 


 Sodium Chloride  1,000 ml @ 


 50 mls/hr  Q20H


 IV


   11/6/20 16:00


 12/6/20 15:59  11/7/20 12:17





 


 Tamsulosin HCl


  (Flomax)  0.4 mg  BID


 ORAL


   10/31/20 19:45


 11/23/20 08:59  11/7/20 08:38




















Slime Garcia M.D.             Nov 7, 2020 15:06

## 2020-11-07 NOTE — NUR
NURSE NOTES:

123

-------------------------------------------------------------------------------

Addendum: 11/07/20 at 1941 by Amber Morris RN

-------------------------------------------------------------------------------

CALLED MD VENEGAS, REGARDING PT DIET.  ORDER TO RESUME FEEDING. GLUCERNA 1.2 GOAL RATE 60

## 2020-11-07 NOTE — CARDIAC ELECTROPHYSIOLOGY PN
Assessment/Plan


Assessment/Plan


1. Accelerated junctional rhythm. Not bradycardic. Ruled out for MI


      Echo showed ejection fraction of 55%.





2. Long run of 30 beats of Nonsustained VT on 11/3/2020. Will need cardiac cath 

after stabilization.





3. Respiratory failure, on antibiotic and intubated


4. Septic shock. on LEvophed.


5. Acute renal failure.


6. Electrolyte imbalance.


7. History of CHF, but echocardiogram showed normal left ventricular systolic 

function.


8. History of previous COVID infection in September 2020 and active Covid now in

isolation


9. Dementia.





DW RN





Subjective


Subjective


In SR in NAD in Covid isolation. Transferred to ICU for respiratory failure, 

despite 15 liter NRB.  Had 30 beats of VT  on 11/3/20 at 16:46





Objective





Last 24 Hour Vital Signs








  Date Time  Temp Pulse Resp B/P (MAP) Pulse Ox O2 Delivery O2 Flow Rate FiO2


 


11/7/20 13:27  86 16     50


 


11/7/20 11:28  82 16     50


 


11/7/20 08:58  97 20     50


 


11/7/20 08:00  112      


 


11/7/20 07:36   16   Mechanical Ventilator  55


 


11/7/20 07:30 100.1 106 24 137/74 (95) 99   


 


11/7/20 07:11  100 17     50


 


11/7/20 07:00  97 18 129/73 (91) 92   


 


11/7/20 07:00   16   Mechanical Ventilator  55


 


11/7/20 06:34    112/62    


 


11/7/20 06:30  88 16     


 


11/7/20 06:30  88 20 112/62 (79) 99   


 


11/7/20 06:00 101.2 88 16 120/68 (85) 99   


 


11/7/20 06:00   16   Mechanical Ventilator  50


 


11/7/20 05:30  86 16 116/65 (82) 99   


 


11/7/20 05:07  81 16     55


 


11/7/20 05:00   16   Mechanical Ventilator  50


 


11/7/20 05:00 101.4 85 16 109/62 (78) 99   


 


11/7/20 04:30  93 16 109/63 (78) 99   


 


11/7/20 04:00        55


 


11/7/20 04:00 102.6 93 16 111/65 (80) 98   


 


11/7/20 04:00   16   Mechanical Ventilator  50


 


11/7/20 04:00  93      


 


11/7/20 04:00      Mechanical Ventilator  


 


11/7/20 03:30  97 16 106/61 (76) 98   


 


11/7/20 03:00   16   Mechanical Ventilator  50


 


11/7/20 03:00 101.5 97 16 114/66 (82) 98   


 


11/7/20 02:50  97 16     55


 


11/7/20 02:30  97 16 108/65 (79) 98   


 


11/7/20 02:00  98 16 110/63 (79) 98   


 


11/7/20 02:00   16   Mechanical Ventilator  50


 


11/7/20 01:30  97 16 105/64 (78) 98   


 


11/7/20 01:05  98 16     55


 


11/7/20 01:00  97 16 113/63 (80) 98   


 


11/7/20 01:00   16   Mechanical Ventilator  50


 


11/7/20 00:30  96 16 108/65 (79) 98   


 


11/7/20 00:00   16   Mechanical Ventilator  50


 


11/7/20 00:00 99.9 98 16 108/68 (81) 98   


 


11/7/20 00:00      Mechanical Ventilator  


 


11/6/20 23:30  98 16 104/61 (75) 98   


 


11/6/20 23:00  98 16 109/65 (80) 98   


 


11/6/20 23:00   16   Mechanical Ventilator  50


 


11/6/20 22:42  96 16     55


 


11/6/20 22:30  96 16 102/63 (76) 98   


 


11/6/20 22:00   16   Mechanical Ventilator  50


 


11/6/20 22:00  94 16 102/62 (75) 98   


 


11/6/20 21:30  94 16 100/60 (73) 97   


 


11/6/20 21:00  98 16 120/67 (84) 97   


 


11/6/20 21:00   16   Mechanical Ventilator  50


 


11/6/20 20:45  100 16 92/54 (67) 95   


 


11/6/20 20:35  96 16     55


 


11/6/20 20:30  91 18 128/62 (84) 98   


 


11/6/20 20:15  108 16 90/57 (68) 95   


 


11/6/20 20:01    86/60    


 


11/6/20 20:01   16   Mechanical Ventilator  50


 


11/6/20 20:00      Mechanical Ventilator  


 


11/6/20 20:00  108      


 


11/6/20 20:00        55


 


11/6/20 20:00 99.7 108 16 86/60 (69) 95   


 


11/6/20 19:30  109 16 90/56 (67) 96   


 


11/6/20 19:05  107 16     55


 


11/6/20 19:00  107 16 90/62 (71) 97   


 


11/6/20 19:00   16   Mechanical Ventilator  50


 


11/6/20 18:36   16   Mechanical Ventilator  


 


11/6/20 17:36   16   Mechanical Ventilator  


 


11/6/20 17:26  107 16     55


 


11/6/20 16:39   13   Mechanical Ventilator  


 


11/6/20 16:30  111 19 86/61 (69) 98   


 


11/6/20 16:15  107 16 86/54 (65) 99   


 


11/6/20 16:00 97.5 109 17 83/55 (64) 98   


 


11/6/20 16:00  112      


 


11/6/20 16:00      Mechanical Ventilator  


 


11/6/20 15:39   18   Mechanical Ventilator  


 


11/6/20 14:39   21   Mechanical Ventilator  


 


11/6/20 14:30  110 17     60

















Intake and Output  


 


 11/6/20 11/7/20





 19:00 07:00


 


Intake Total 206.4 ml 851.000 ml


 


Output Total 165 ml 330 ml


 


Balance 41.4 ml 521.000 ml


 


  


 


IV Total 206.4 ml 851.000 ml


 


Output Urine Total 165 ml 330 ml


 


# Bowel Movements 3 











Laboratory Tests








Test


 11/6/20


14:44 11/7/20


03:45 11/7/20


07:39


 


Arterial Blood pH


 7.472


(7.350-7.450) 


 7.471


(7.350-7.450)


 


Arterial Blood Partial


Pressure CO2 40.9 mmHg


(35.0-45.0) 


 35.0 mmHg


(35.0-45.0)


 


Arterial Blood Partial


Pressure O2 224.0 mmHg


(75.0-100.0)  H 


 52.9 mmHg


(75.0-100.0)  L


 


Arterial Blood HCO3


 29.2 mmol/L


(22.0-26.0)  H 


 25.0 mmol/L


(22.0-26.0)


 


Arterial Blood Oxygen


Saturation 98.8 %


() 


 86.8 %


()  *L


 


Arterial Blood Base Excess 5.2 (-2-2)  H  1.5 (-2-2)  


 


Jerod Test Positive    N/a  


 


White Blood Count


 


 14.9 K/UL


(4.8-10.8)  H 





 


Red Blood Count


 


 2.94 M/UL


(4.70-6.10)  L 





 


Hemoglobin


 


 8.7 G/DL


(14.2-18.0)  L 





 


Hematocrit


 


 28.4 %


(42.0-52.0)  L 





 


Mean Corpuscular Volume  97 FL (80-99)   


 


Mean Corpuscular Hemoglobin


 


 29.6 PG


(27.0-31.0) 





 


Mean Corpuscular Hemoglobin


Concent 


 30.6 G/DL


(32.0-36.0)  L 





 


Red Cell Distribution Width


 


 15.0 %


(11.6-14.8)  H 





 


Platelet Count


 


 398 K/UL


(150-450) 





 


Mean Platelet Volume


 


 8.6 FL


(6.5-10.1) 





 


Neutrophils (%) (Auto)


 


 76.4 %


(45.0-75.0)  H 





 


Lymphocytes (%) (Auto)


 


 16.7 %


(20.0-45.0)  L 





 


Monocytes (%) (Auto)


 


 5.3 %


(1.0-10.0) 





 


Eosinophils (%) (Auto)


 


 1.0 %


(0.0-3.0) 





 


Basophils (%) (Auto)


 


 0.6 %


(0.0-2.0) 





 


Sodium Level


 


 149 MMOL/L


(136-145)  H 





 


Potassium Level


 


 3.3 MMOL/L


(3.5-5.1)  L 





 


Chloride Level


 


 112 MMOL/L


()  H 





 


Carbon Dioxide Level


 


 26 MMOL/L


(21-32) 





 


Anion Gap


 


 11 mmol/L


(5-15) 





 


Blood Urea Nitrogen


 


 24 mg/dL


(7-18)  H 





 


Creatinine


 


 1.2 MG/DL


(0.55-1.30) 





 


Estimat Glomerular Filtration


Rate 


 58.7 mL/min


(>60) 





 


Glucose Level


 


 123 MG/DL


()  H 





 


Calcium Level


 


 7.9 MG/DL


(8.5-10.1)  L 





 


Phosphorus Level


 


 1.2 MG/DL


(2.5-4.9)  L 





 


Magnesium Level


 


 2.5 MG/DL


(1.8-2.4)  H 





 


Total Bilirubin


 


 0.4 MG/DL


(0.2-1.0) 





 


Aspartate Amino Transf


(AST/SGOT) 


 30 U/L (15-37)


 





 


Alanine Aminotransferase


(ALT/SGPT) 


 42 U/L (12-78)


 





 


Alkaline Phosphatase


 


 285 U/L


()  H 





 


Total Protein


 


 6.9 G/DL


(6.4-8.2) 





 


Albumin


 


 1.2 G/DL


(3.4-5.0)  L 





 


Globulin  5.7 g/dL   


 


Albumin/Globulin Ratio


 


 0.2 (1.0-2.7)


L 











Objective





HEAD AND NECK:  No JVD. NRB FM


LUNGS:  Coarse rhonchi.


CARDIOVASCULAR:   Irregular S1 and S2 with no gallop.


ABDOMEN:  Soft.


EXTREMITIES:  No pitting edema.











Kvng Edmond MD                Nov 7, 2020 14:21

## 2020-11-07 NOTE — NUR
NURSE NOTES:

LATE ENTRY:

UA COLLECTED, CULTURES COLLECTED. COOLING BLANKET OFF. PT AFEBRILE AT RECTAL TEMP 97. PT 
TOLERATING VERSED AT 3MG, RASS -2. LEVOPHED AT 6MCG/MIN. WILL CONTINUE TO MONITOR.

## 2020-11-07 NOTE — INTERNAL MED PROGRESS NOTE
Subjective


Date of Service:  Nov 7, 2020


Physician Name


Benito Hearn


Attending Physician


Andrei Cancino MD





Current Medications








 Medications


  (Trade)  Dose


 Ordered  Sig/Miky


 Route


 PRN Reason  Start Time


 Stop Time Status Last Admin


Dose Admin


 


 Acetaminophen


  (Tylenol)  650 mg  Q4H  PRN


 ORAL


 Temp >100.5  10/23/20 20:30


 11/22/20 20:29  11/5/20 22:39





 


 Chlorhexidine


 Gluconate


  (Jess-Hex 2%)  1 applic  DAILY@2000


 TOPIC


   11/6/20 20:00


 2/4/21 19:59  11/6/20 19:59





 


 Dextrose


  (Dextrose 50%)  50 ml  Q30M  PRN


 IV


 Hypoglycemia  10/23/20 22:45


 1/21/21 22:44   





 


 Heparin Sodium


  (Porcine)


  (Heparin 5000


 units/ml)  5,000 units  EVERY 12  HOURS


 SUBQ


   10/23/20 21:00


 12/7/20 20:59  11/7/20 08:40





 


 Insulin Aspart


  (NovoLOG)    EVERY 6  HOURS


 SUBQ


   11/2/20 06:00


 1/22/21 06:29  11/6/20 01:18





 


 Lorazepam


  (Ativan 2mg/ml


 1ml)  2 mg  Q4H  PRN


 IV


 For Anxiety  11/6/20 12:45


 11/13/20 12:44   





 


 Meropenem 1 gm/


 Sodium Chloride  55 ml @ 


 110 mls/hr  Q12HR@0400,1600


 IVPB


   11/2/20 16:00


 11/11/20 23:59  11/7/20 03:12





 


 Micafungin Sodium


 100 mg/Sodium


 Chloride  110 ml @ 


 110 mls/hr  Q24H


 IVPB


   11/7/20 16:00


 11/14/20 15:59   





 


 Midazolam HCl 50


 mg/Sodium Chloride  100 ml @ 0


 mls/hr  Q24H  PRN


 IV


 Restlessness  11/6/20 13:20


 11/8/20 13:19  11/7/20 07:36





 


 Morphine Sulfate


  (Morphine


 Sulfate)  4 mg  Q4H  PRN


 IVP


 For Pain  11/6/20 12:45


 11/13/20 12:44   





 


 Nitroglycerin


  (Ntg)  0.4 mg  Q5M  PRN


 SL


 Prn Chest Pain  10/23/20 20:30


 11/22/20 20:29   





 


 Norepinephrine


 Bitartrate  250 ml @ 0


 mls/hr  Q24H


 IV


   11/6/20 15:50


 11/9/20 15:49  11/7/20 06:34





 


 Ondansetron HCl


  (Zofran)  4 mg  Q6H  PRN


 IVP


 Nausea & Vomiting  10/23/20 20:30


 11/22/20 20:29   





 


 Pantoprazole


  (Protonix)  40 mg  DAILY


 IV


   11/7/20 09:00


 12/7/20 08:59  11/7/20 08:38





 


 Polyethylene


 Glycol


  (Miralax)  17 gm  DAILYPRN  PRN


 ORAL


 Constipation  10/23/20 20:30


 11/22/20 20:29   





 


 Potassium


 Phosphate  250 ml @ 


 62.5 mls/hr  Q4H


 IVPB


   11/7/20 13:00


 11/7/20 20:59  11/7/20 13:26





 


 Promethazine HCl/


 Codeine


  (Phenergan with


 Codeine)  5 ml  Q4H  PRN


 ORAL


 For Cough  10/23/20 20:30


 11/22/20 20:29   





 


 Sodium Chloride  1,000 ml @ 


 50 mls/hr  Q20H


 IV


   11/6/20 16:00


 12/6/20 15:59  11/7/20 12:17





 


 Tamsulosin HCl


  (Flomax)  0.4 mg  BID


 ORAL


   10/31/20 19:45


 11/23/20 08:59  11/7/20 08:38











Allergies:  


Coded Allergies:  


     No Known Allergies (Unverified , 8/20/12)


ROS Limited/Unobtainable:  Yes


Subjective


76 YO M admitted with shortness of breath.  Now pneumonia; previously COVID 

positive.  Cover for Int med-Dr Cancino.  ICU.  Intubated and sedated





Objective





Last Vital Signs








  Date Time  Temp Pulse Resp B/P (MAP) Pulse Ox O2 Delivery O2 Flow Rate FiO2


 


11/7/20 15:51  78      


 


11/7/20 13:27   16     50


 


11/7/20 12:30    90/53 (65) 98   


 


11/7/20 12:00      Mechanical Ventilator  


 


11/7/20 11:45 99.0       


 


11/6/20 12:00       15.0 











Laboratory Tests








Test


 11/7/20


03:45 11/7/20


07:39


 


White Blood Count


 14.9 K/UL


(4.8-10.8)  H 





 


Red Blood Count


 2.94 M/UL


(4.70-6.10)  L 





 


Hemoglobin


 8.7 G/DL


(14.2-18.0)  L 





 


Hematocrit


 28.4 %


(42.0-52.0)  L 





 


Mean Corpuscular Volume 97 FL (80-99)   


 


Mean Corpuscular Hemoglobin


 29.6 PG


(27.0-31.0) 





 


Mean Corpuscular Hemoglobin


Concent 30.6 G/DL


(32.0-36.0)  L 





 


Red Cell Distribution Width


 15.0 %


(11.6-14.8)  H 





 


Platelet Count


 398 K/UL


(150-450) 





 


Mean Platelet Volume


 8.6 FL


(6.5-10.1) 





 


Neutrophils (%) (Auto)


 76.4 %


(45.0-75.0)  H 





 


Lymphocytes (%) (Auto)


 16.7 %


(20.0-45.0)  L 





 


Monocytes (%) (Auto)


 5.3 %


(1.0-10.0) 





 


Eosinophils (%) (Auto)


 1.0 %


(0.0-3.0) 





 


Basophils (%) (Auto)


 0.6 %


(0.0-2.0) 





 


Sodium Level


 149 MMOL/L


(136-145)  H 





 


Potassium Level


 3.3 MMOL/L


(3.5-5.1)  L 





 


Chloride Level


 112 MMOL/L


()  H 





 


Carbon Dioxide Level


 26 MMOL/L


(21-32) 





 


Anion Gap


 11 mmol/L


(5-15) 





 


Blood Urea Nitrogen


 24 mg/dL


(7-18)  H 





 


Creatinine


 1.2 MG/DL


(0.55-1.30) 





 


Estimat Glomerular Filtration


Rate 58.7 mL/min


(>60) 





 


Glucose Level


 123 MG/DL


()  H 





 


Calcium Level


 7.9 MG/DL


(8.5-10.1)  L 





 


Phosphorus Level


 1.2 MG/DL


(2.5-4.9)  L 





 


Magnesium Level


 2.5 MG/DL


(1.8-2.4)  H 





 


Total Bilirubin


 0.4 MG/DL


(0.2-1.0) 





 


Aspartate Amino Transf


(AST/SGOT) 30 U/L (15-37)


 





 


Alanine Aminotransferase


(ALT/SGPT) 42 U/L (12-78)


 





 


Alkaline Phosphatase


 285 U/L


()  H 





 


Total Protein


 6.9 G/DL


(6.4-8.2) 





 


Albumin


 1.2 G/DL


(3.4-5.0)  L 





 


Globulin 5.7 g/dL   


 


Albumin/Globulin Ratio


 0.2 (1.0-2.7)


L 





 


Arterial Blood pH


 


 7.471


(7.350-7.450)


 


Arterial Blood Partial


Pressure CO2 


 35.0 mmHg


(35.0-45.0)


 


Arterial Blood Partial


Pressure O2 


 52.9 mmHg


(75.0-100.0)  L


 


Arterial Blood HCO3


 


 25.0 mmol/L


(22.0-26.0)


 


Arterial Blood Oxygen


Saturation 


 86.8 %


()  *L


 


Arterial Blood Base Excess  1.5 (-2-2)  


 


Jerod Test  N/a  

















Intake and Output  


 


 11/6/20 11/7/20





 19:00 07:00


 


Intake Total 206.4 ml 901.000 ml


 


Output Total 165 ml 330 ml


 


Balance 41.4 ml 571.000 ml


 


  


 


IV Total 206.4 ml 901.000 ml


 


Output Urine Total 165 ml 330 ml


 


# Bowel Movements 3 








Objective


PHYSICAL EXAMINATION:


GENERAL:  The patient awake with deep stimuli, open his eyes, however,


cannot follow commands.  The patient is on a Ventimask at this time,


chronically ill-appearing.


HEAD AND NECK:  Pupils are equal and reactive to light.  Anicteric.


NECK:  Supple.  No JVD.


LUNGS:  Mech vent;  wheezing, rhonchi, and decreased air in bases.


HEART:  S1, S2.  Regular rhythm.  Distant heart sounds.  No murmur or


gallop.


ABDOMEN:  Soft, nondistended, nontender.  Positive bowel sounds.


EXTREMITIES:  No cyanosis, clubbing, or edema.


NEUROLOGIC:  Very limited secondary to the patient's status, cannot follow


commands.  Opens his eyes with deep stimuli and moving extremities


spontaneously.





Assessment/Plan


Assessment/Plan


ASSESSMENT:


1. Acute hypoxemic respiratory failure, most likely secondary to


pneumonia and sepsis.


2. Sepsis secondary to urinary tract infection and pneumonia.


3. pneumonia=MRSA; ESBL E. coli


4. Acute kidney injury on chronic renal insufficiency.


5. Dehydration.


6. History of chronic congestive heart failure.


7. Diabetes type 2.


8. Dyslipidemia.


9. Hypertension.


10. Alzheimer's disease.


11. COVID 19 previous positive





PLAN:


1.  ICU


2.  Dr. Sandoval,=Pulmonary Critical Care; follow mech vent recs


3.  Dr. Garcia = Inf Dis.


4.  IV= D5W due to the hypernatremia and dehydration.


5.  antibiotics = micafungin and meropenem


6.  Code status is full code.


7.    DVT prophylaxis is heparin subcutaneous.











Benito Hearn MD                Nov 7, 2020 16:36

## 2020-11-07 NOTE — NUR
NURSE NOTES:

MD. TRIPLETT HERE TO SEE PT. WAS INFORMED OF LABS. K3.3. NP KISHORE WANTS K.PHOS [id number], TO 
D/C KCL 2 BAGS.

## 2020-11-07 NOTE — NUR
NURSE NOTES:

Pt is sedated on the bed. SaO2 100% with Current Vent setting. Tolerated well NGT feeding 
and no residual noted. On cardiac monitor with SR. Noted BP :100/52mmHg. On running with 
Levophed drip @ 3mcg/min and Versed @ 3mg/hr(6cc/hr) and RASS score -2. Changed position. 
Will continue to monitor any change of condition.

## 2020-11-07 NOTE — PULMONOLGY CRITICAL CARE NOTE
Critical Care - Asmt/Plan


Assessment/Plan:


ASSESSMENT


Acute hypoxemic respiratory failure , requiring  intubation ( initially 100% 

NRM) 


Sepsis


Pneumonia , possible aspiration


Probable UTI


Hx of COVID 19 9/9/20Dysphagia


JANES-resolved 


Hypernatremia


History of CVA


Diabetes mellitus


History of hypertension


Alzheimer dementia





PLAN OF CARE


ICU


vent support pulm toilet


fup with CXR and ABG


sedation 


pressor, titrate to keep mean arterial BP > 65





rapid COVID-19 and influenza swab both NGT 10/23


rapid COVID 10/26 +, likely old infectiona sepr ID


SCX  10/30-> MRSA, E coli ESBL 


UCX  10/23 -> NGT


BCX 11/2 NGTD


abx  as per ID recs 


  


DVT and GI  prophylaxis 


a/tussive prn 


NG tube for meds


strict aspiration precaution


gentle IVF 


monitor renal parameters,  lytes , correct electrolytes as needed ,avoid 

nephrotoxic


replace K this am 


creatinine down to normal   





ECHO with pEF 55%, 


monitor volumes 


BS management with SSI


supportive care   


remains FC








case discussed and evaluated by supervising physician





Critical Care - Objective





Last 24 Hour Vital Signs








  Date Time  Temp Pulse Resp B/P (MAP) Pulse Ox O2 Delivery O2 Flow Rate FiO2


 


11/7/20 08:58  97 20     50


 


11/7/20 07:36   16   Mechanical Ventilator  55


 


11/7/20 07:30 100.1 106 24 137/74 (95) 99   


 


11/7/20 07:11  100 17     50


 


11/7/20 07:00  97 18 129/73 (91) 92   


 


11/7/20 07:00   16   Mechanical Ventilator  55


 


11/7/20 06:34    112/62    


 


11/7/20 06:30  88 16     


 


11/7/20 06:30  88 20 112/62 (79) 99   


 


11/7/20 06:00 101.2 88 16 120/68 (85) 99   


 


11/7/20 06:00   16   Mechanical Ventilator  50


 


11/7/20 05:30  86 16 116/65 (82) 99   


 


11/7/20 05:07  81 16     55


 


11/7/20 05:00   16   Mechanical Ventilator  50


 


11/7/20 05:00 101.4 85 16 109/62 (78) 99   


 


11/7/20 04:30  93 16 109/63 (78) 99   


 


11/7/20 04:00        55


 


11/7/20 04:00 102.6 93 16 111/65 (80) 98   


 


11/7/20 04:00   16   Mechanical Ventilator  50


 


11/7/20 04:00  93      


 


11/7/20 04:00      Mechanical Ventilator  


 


11/7/20 03:30  97 16 106/61 (76) 98   


 


11/7/20 03:00   16   Mechanical Ventilator  50


 


11/7/20 03:00 101.5 97 16 114/66 (82) 98   


 


11/7/20 02:50  97 16     55


 


11/7/20 02:30  97 16 108/65 (79) 98   


 


11/7/20 02:00  98 16 110/63 (79) 98   


 


11/7/20 02:00   16   Mechanical Ventilator  50


 


11/7/20 01:30  97 16 105/64 (78) 98   


 


11/7/20 01:05  98 16     55


 


11/7/20 01:00  97 16 113/63 (80) 98   


 


11/7/20 01:00   16   Mechanical Ventilator  50


 


11/7/20 00:30  96 16 108/65 (79) 98   


 


11/7/20 00:00   16   Mechanical Ventilator  50


 


11/7/20 00:00 99.9 98 16 108/68 (81) 98   


 


11/7/20 00:00      Mechanical Ventilator  


 


11/6/20 23:30  98 16 104/61 (75) 98   


 


11/6/20 23:00  98 16 109/65 (80) 98   


 


11/6/20 23:00   16   Mechanical Ventilator  50


 


11/6/20 22:42  96 16     55


 


11/6/20 22:30  96 16 102/63 (76) 98   


 


11/6/20 22:00   16   Mechanical Ventilator  50


 


11/6/20 22:00  94 16 102/62 (75) 98   


 


11/6/20 21:30  94 16 100/60 (73) 97   


 


11/6/20 21:00  98 16 120/67 (84) 97   


 


11/6/20 21:00   16   Mechanical Ventilator  50


 


11/6/20 20:45  100 16 92/54 (67) 95   


 


11/6/20 20:35  96 16     55


 


11/6/20 20:30  91 18 128/62 (84) 98   


 


11/6/20 20:15  108 16 90/57 (68) 95   


 


11/6/20 20:01    86/60    


 


11/6/20 20:01   16   Mechanical Ventilator  50


 


11/6/20 20:00      Mechanical Ventilator  


 


11/6/20 20:00  108      


 


11/6/20 20:00        55


 


11/6/20 20:00 99.7 108 16 86/60 (69) 95   


 


11/6/20 19:30  109 16 90/56 (67) 96   


 


11/6/20 19:05  107 16     55


 


11/6/20 19:00  107 16 90/62 (71) 97   


 


11/6/20 19:00   16   Mechanical Ventilator  50


 


11/6/20 18:36   16   Mechanical Ventilator  


 


11/6/20 17:36   16   Mechanical Ventilator  


 


11/6/20 17:26  107 16     55


 


11/6/20 16:39   13   Mechanical Ventilator  


 


11/6/20 16:30  111 19 86/61 (69) 98   


 


11/6/20 16:15  107 16 86/54 (65) 99   


 


11/6/20 16:00 97.5 109 17 83/55 (64) 98   


 


11/6/20 16:00  112      


 


11/6/20 16:00      Mechanical Ventilator  


 


11/6/20 15:39   18   Mechanical Ventilator  


 


11/6/20 14:39   21   Mechanical Ventilator  


 


11/6/20 14:30  110 17     60


 


11/6/20 13:49   25   Mechanical Ventilator  


 


11/6/20 13:04  114 15     100


 


11/6/20 12:59  114 15  100 Mechanical Ventilator  100


 


11/6/20 12:00       15.0 55


 


11/6/20 12:00 98.4 96 22 123/88 (100) 96   


 


11/6/20 12:00      Non-Rebreather 15.0 


 


11/6/20 12:00  112      








Objective:


General Appearance:  no apparent distress,  sedated, intubated 


Lines, tubes and drains: R femoral CL TL intact 


HEENT:  normocephalic, atraumatic, anicteric, NGT ,   OP with ET in place, 

intact 


Respiratory/Chest:   few scattered  rhonchi


Cardiovascular/Chest:  normal peripheral pulses, low tachy


Abdomen:  normal bowel sounds, non tender, soft 


: Cummings 


Extremities:  no calf tenderness, normal capillary refill


Neurologic:  abnormal gait /bedridden


Musculoskeletal:  atrophy - BLE


Accucheck:  131





Critical Care - Subjective


ROS Limited/Unobtainable:  Yes


Interval Events:


intubated 11/6


on pressors 


sedated 


fevers, leukocytosis


Condition:  critical


IV Access:  central - R femoral CL intact


EKG Rhythm:  Sinus Tachycardia


FI02:  50


Vent Support Breath Rate:  16


Vent Support Mode:  AC


Vent Tidal Volume:  600


Sputum Amount:  Large


PEEP:  0.0


PIP:  43


Fluids:  1/2 NS at 50


Drips:  versed  3 mg/hr, levophed 6 mcg/min


Tube Feeding Amount:  60


I&O:











Intake and Output  


 


 11/6/20 11/7/20





 19:00 07:00


 


Intake Total 206.4 ml 851.000 ml


 


Output Total 165 ml 330 ml


 


Balance 41.4 ml 521.000 ml


 


  


 


IV Total 206.4 ml 851.000 ml


 


Output Urine Total 165 ml 330 ml


 


# Bowel Movements 3 








CXR:


CXR 11/6 - Satisfactory endotracheal intubation


Bilateral infiltrates, stable on the left and slightly worse on the right


ET-Tube:  7.5


ET Position:  25











Ely Ni NP                 Nov 7, 2020 09:07

## 2020-11-07 NOTE — NEPHROLOGY PROGRESS NOTE
Assessment/Plan


Problem List:  


(1) Dehydration


(2) JANES (acute kidney injury)


(3) Renal failure (ARF), acute on chronic


(4) Hypoxia


(5) Acute encephalopathy


(6) Electrolyte imbalance


Assessment





75-year-old male is admitted with acute hypoxic respiratory failure most likely 

secondary to pneumonia and sepsis, UTI.


Acute on chronic renal failure


Dehydration


Electrolyte imbalances, hypernatremia


Hypoalbuminemia


Diabetes type 2


History of congestive heart failure


Hypertension


Hyperlipemia


Alzheimer's


Previous COVID-19 infection in September 2020


Plan


November 7: Patient in ICU.  Intubated on ventilator.  On 6 mics of Levophed.  

Will give 100 cc albumin 25%.  K-Phos IV ordered.  Continue per consultants.  

Continue monitor renal parameters and electrolytes.


November 6: Status unchanged.  Transfer to [facility] for seizure.  Stable from renal 

standpoint to view.  Continue per consultants.


November 5: Status quo.  Labs reviewed.  Renal parameters stable.  Continue per 

consultants.


November 4: On nonrebreather mask.  Labs reviewed.  Renal parameters stabl

e.Continue per pulmonologist.  Clinically unchanged.


November 3: On nonrebreather mask.  Inflammatory markers gradually declining.  

Renal parameters stable.  Continue per pulmonary.


November 2: Remains on nonrebreather mask.  Inflammatory markers remain el

evated.  Renal parameters somewhat stable.  Continue per pulmonary and ID.  

Remains full code.


November 1: Patient on nonrebreather mask.  Labs noted.  Serum creatinine down 

to 1.3.  Continue per consultants.


October 31: Patient on nonrebreather mask.  Transfer to telemetry when seen this

morning.  Labs noted.  Continue per consultants.  Serum creatinine dylan to 1.7. 

Continue to monitor renal parameters.  Continue to monitor vancomycin level


October 30: Patient is not doing well clinically.  Mild respiratory distress.  

ABG noted.  Somewhat hypoxic.  CBC and chemistry panel ordered.  Discussed with 

LITA Garcia.  Will defer to pulmonary management to specialist.  Continue per ID.

 Renal parameters remained stable as of October 29.


October 29: Labs reviewed.  Renal parameters stable.  Continue per consultants.


October 28: Labs reviewed.  Potassium via NG tube ordered.  IV fluids stopped.  

Continue per consultants.


October 27: Labs reviewed.  Low potassium and low phosphorus replaced.  Continue

per consultants.  Remains stable from renal standpoint of view.


October 26: Patient remains n.p.o.  IV fluid down to 50 cc an hour.  Potassium 

supplement intravenously ordered.  Continue per consultants.





Previously:


Patient is n.p.o., will continue on IV fluid of D5W 75 cc an hour


We will monitor electrolytes and renal parameters


Avoid nephrotoxic's


Start p.o. when he clears by speech therapist, meanwhile aspiration precautions


Keep the blood pressure and blood sugar in check


Per orders





Subjective


ROS Limited/Unobtainable:  Yes





Objective


Objective





Last 24 Hour Vital Signs








  Date Time  Temp Pulse Resp B/P (MAP) Pulse Ox O2 Delivery O2 Flow Rate FiO2


 


11/7/20 08:58  97 20     50


 


11/7/20 08:00  112      


 


11/7/20 07:36   16   Mechanical Ventilator  55


 


11/7/20 07:30 100.1 106 24 137/74 (95) 99   


 


11/7/20 07:11  100 17     50


 


11/7/20 07:00  97 18 129/73 (91) 92   


 


11/7/20 07:00   16   Mechanical Ventilator  55


 


11/7/20 06:34    112/62    


 


11/7/20 06:30  88 16     


 


11/7/20 06:30  88 20 112/62 (79) 99   


 


11/7/20 06:00 101.2 88 16 120/68 (85) 99   


 


11/7/20 06:00   16   Mechanical Ventilator  50


 


11/7/20 05:30  86 16 116/65 (82) 99   


 


11/7/20 05:07  81 16     55


 


11/7/20 05:00   16   Mechanical Ventilator  50


 


11/7/20 05:00 101.4 85 16 109/62 (78) 99   


 


11/7/20 04:30  93 16 109/63 (78) 99   


 


11/7/20 04:00        55


 


11/7/20 04:00 102.6 93 16 111/65 (80) 98   


 


11/7/20 04:00   16   Mechanical Ventilator  50


 


11/7/20 04:00  93      


 


11/7/20 04:00      Mechanical Ventilator  


 


11/7/20 03:30  97 16 106/61 (76) 98   


 


11/7/20 03:00   16   Mechanical Ventilator  50


 


11/7/20 03:00 101.5 97 16 114/66 (82) 98   


 


11/7/20 02:50  97 16     55


 


11/7/20 02:30  97 16 108/65 (79) 98   


 


11/7/20 02:00  98 16 110/63 (79) 98   


 


11/7/20 02:00   16   Mechanical Ventilator  50


 


11/7/20 01:30  97 16 105/64 (78) 98   


 


11/7/20 01:05  98 16     55


 


11/7/20 01:00  97 16 113/63 (80) 98   


 


11/7/20 01:00   16   Mechanical Ventilator  50


 


11/7/20 00:30  96 16 108/65 (79) 98   


 


11/7/20 00:00   16   Mechanical Ventilator  50


 


11/7/20 00:00 99.9 98 16 108/68 (81) 98   


 


11/7/20 00:00      Mechanical Ventilator  


 


11/6/20 23:30  98 16 104/61 (75) 98   


 


11/6/20 23:00  98 16 109/65 (80) 98   


 


11/6/20 23:00   16   Mechanical Ventilator  50


 


11/6/20 22:42  96 16     55


 


11/6/20 22:30  96 16 102/63 (76) 98   


 


11/6/20 22:00   16   Mechanical Ventilator  50


 


11/6/20 22:00  94 16 102/62 (75) 98   


 


11/6/20 21:30  94 16 100/60 (73) 97   


 


11/6/20 21:00  98 16 120/67 (84) 97   


 


11/6/20 21:00   16   Mechanical Ventilator  50


 


11/6/20 20:45  100 16 92/54 (67) 95   


 


11/6/20 20:35  96 16     55


 


11/6/20 20:30  91 18 128/62 (84) 98   


 


11/6/20 20:15  108 16 90/57 (68) 95   


 


11/6/20 20:01    86/60    


 


11/6/20 20:01   16   Mechanical Ventilator  50


 


11/6/20 20:00      Mechanical Ventilator  


 


11/6/20 20:00  108      


 


11/6/20 20:00        55


 


11/6/20 20:00 99.7 108 16 86/60 (69) 95   


 


11/6/20 19:30  109 16 90/56 (67) 96   


 


11/6/20 19:05  107 16     55


 


11/6/20 19:00  107 16 90/62 (71) 97   


 


11/6/20 19:00   16   Mechanical Ventilator  50


 


11/6/20 18:36   16   Mechanical Ventilator  


 


11/6/20 17:36   16   Mechanical Ventilator  


 


11/6/20 17:26  107 16     55


 


11/6/20 16:39   13   Mechanical Ventilator  


 


11/6/20 16:30  111 19 86/61 (69) 98   


 


11/6/20 16:15  107 16 86/54 (65) 99   


 


11/6/20 16:00 97.5 109 17 83/55 (64) 98   


 


11/6/20 16:00  112      


 


11/6/20 16:00      Mechanical Ventilator  


 


11/6/20 15:39   18   Mechanical Ventilator  


 


11/6/20 14:39   21   Mechanical Ventilator  


 


11/6/20 14:30  110 17     60


 


11/6/20 13:49   25   Mechanical Ventilator  


 


11/6/20 13:04  114 15     100


 


11/6/20 12:59  114 15  100 Mechanical Ventilator  100


 


11/6/20 12:00       15.0 55


 


11/6/20 12:00 98.4 96 22 123/88 (100) 96   


 


11/6/20 12:00      Non-Rebreather 15.0 


 


11/6/20 12:00  112      

















Intake and Output  


 


 11/6/20 11/7/20





 19:00 07:00


 


Intake Total 206.4 ml 851.000 ml


 


Output Total 165 ml 330 ml


 


Balance 41.4 ml 521.000 ml


 


  


 


IV Total 206.4 ml 851.000 ml


 


Output Urine Total 165 ml 330 ml


 


# Bowel Movements 3 











Current Medications








 Medications


  (Trade)  Dose


 Ordered  Sig/Miky


 Route


 PRN Reason  Start Time


 Stop Time Status Last Admin


Dose Admin


 


 Acetaminophen


  (Tylenol)  650 mg  Q4H  PRN


 ORAL


 Temp >100.5  10/23/20 20:30


 11/22/20 20:29  11/5/20 22:39





 


 Chlorhexidine


 Gluconate


  (Jess-Hex 2%)  1 applic  DAILY@2000


 TOPIC


   11/6/20 20:00


 2/4/21 19:59  11/6/20 19:59





 


 Dextrose


  (Dextrose 50%)  50 ml  Q30M  PRN


 IV


 Hypoglycemia  10/23/20 22:45


 1/21/21 22:44   





 


 Heparin Sodium


  (Porcine)


  (Heparin 5000


 units/ml)  5,000 units  EVERY 12  HOURS


 SUBQ


   10/23/20 21:00


 12/7/20 20:59  11/7/20 08:40





 


 Insulin Aspart


  (NovoLOG)    EVERY 6  HOURS


 SUBQ


   11/2/20 06:00


 1/22/21 06:29  11/6/20 01:18





 


 Lorazepam


  (Ativan 2mg/ml


 1ml)  2 mg  Q4H  PRN


 IV


 For Anxiety  11/6/20 12:45


 11/13/20 12:44   





 


 Meropenem 1 gm/


 Sodium Chloride  55 ml @ 


 110 mls/hr  Q12HR@0400,1600


 IVPB


   11/2/20 16:00


 11/11/20 23:59  11/7/20 03:12





 


 Midazolam HCl 50


 mg/Sodium Chloride  100 ml @ 0


 mls/hr  Q24H  PRN


 IV


 Restlessness  11/6/20 13:20


 11/8/20 13:19  11/7/20 07:36





 


 Morphine Sulfate


  (Morphine


 Sulfate)  4 mg  Q4H  PRN


 IVP


 For Pain  11/6/20 12:45


 11/13/20 12:44   





 


 Nitroglycerin


  (Ntg)  0.4 mg  Q5M  PRN


 SL


 Prn Chest Pain  10/23/20 20:30


 11/22/20 20:29   





 


 Norepinephrine


 Bitartrate  250 ml @ 0


 mls/hr  Q24H


 IV


   11/6/20 15:50


 11/9/20 15:49  11/7/20 06:34





 


 Ondansetron HCl


  (Zofran)  4 mg  Q6H  PRN


 IVP


 Nausea & Vomiting  10/23/20 20:30


 11/22/20 20:29   





 


 Pantoprazole


  (Protonix)  40 mg  DAILY


 IV


   11/7/20 09:00


 12/7/20 08:59  11/7/20 08:38





 


 Polyethylene


 Glycol


  (Miralax)  17 gm  DAILYPRN  PRN


 ORAL


 Constipation  10/23/20 20:30


 11/22/20 20:29   





 


 Potassium


 Phosphate  250 ml @ 


 62.5 mls/hr  Q4H


 IVPB


   11/7/20 11:00


 11/7/20 18:59 UNV  





 


 Promethazine HCl/


 Codeine


  (Phenergan with


 Codeine)  5 ml  Q4H  PRN


 ORAL


 For Cough  10/23/20 20:30


 11/22/20 20:29   





 


 Sodium Chloride  1,000 ml @ 


 50 mls/hr  Q20H


 IV


   11/6/20 16:00


 12/6/20 15:59  11/6/20 17:36





 


 Tamsulosin HCl


  (Flomax)  0.4 mg  BID


 ORAL


   10/31/20 19:45


 11/23/20 08:59  11/7/20 08:38








Laboratory Tests


11/6/20 14:44: 


Arterial Blood pH 7.472H, Arterial Blood Partial Pressure CO2 40.9, Arterial 

Blood Partial Pressure O2 224.0H, Arterial Blood HCO3 29.2H, Arterial Blood 

Oxygen Saturation 98.8, Arterial Blood Base Excess 5.2H, Jerod Test Positive


11/7/20 03:45: 


White Blood Count 14.9H, Red Blood Count 2.94L, Hemoglobin 8.7L, Hematocrit 

28.4L, Mean Corpuscular Volume 97, Mean Corpuscular Hemoglobin 29.6, Mean 

Corpuscular Hemoglobin Concent 30.6L, Red Cell Distribution Width 15.0H, 

Platelet Count 398, Mean Platelet Volume 8.6, Neutrophils (%) (Auto) 76.4H, 

Lymphocytes (%) (Auto) 16.7L, Monocytes (%) (Auto) 5.3, Eosinophils (%) (Auto) 

1.0, Basophils (%) (Auto) 0.6, Sodium Level 149H, Potassium Level 3.3L, Chloride

Level 112H, Carbon Dioxide Level 26, Anion Gap 11, Blood Urea Nitrogen 24H, 

Creatinine 1.2, Estimat Glomerular Filtration Rate 58.7, Glucose Level 123H, 

Calcium Level 7.9L, Phosphorus Level 1.2L, Magnesium Level 2.5H, Total Bilirubin

0.4, Aspartate Amino Transf (AST/SGOT) 30, Alanine Aminotransferase (ALT/SGPT) 

42, Alkaline Phosphatase 285H, Total Protein 6.9, Albumin 1.2L, Globulin 5.7, 

Albumin/Globulin Ratio 0.2L


11/7/20 07:39: 


Arterial Blood pH 7.471H, Arterial Blood Partial Pressure CO2 35.0, Arterial 

Blood Partial Pressure O2 52.9L, Arterial Blood HCO3 25.0, Arterial Blood Oxygen

Saturation 86.8*L, Arterial Blood Base Excess 1.5, Jerod Test N/a


Height (Feet):  5


Height (Inches):  4.00


Weight (Pounds):  130


EENT:  other - Patient in ICU.  Intubated.


Cardiovascular:  tachycardia


Respiratory/Chest:  decreased breath sounds


Abdomen:  distended











Seven Tobar MD             Nov 7, 2020 11:24

## 2020-11-07 NOTE — NUR
NURSE NOTES:

Received report from LITA Mroris. Pt is resting on the bed and Sedated and RASS score -2. 
Pt has ETT and orally intubated and Vent dependent and setting with AC: 16, T: 600, P:0, 
FiO2 45% and SaO2 % noted. Given suction and oral care. on cardiac monitor with SR. Pt 
has NGT and on running with Glucerna 1.2 @ 20cc/hr and goal is 60cc/hr. No residual noted. 
Pt has Cummings cath and patent and drainage well. Pt has bilateral soft restraint. checked 
comfort and circulation. Trying to release restraint when during care but Pt trying to touch 
ETT. No fever noted. wound dressing is clean and dry. Pt has Femoral TLC and dressing is 
clean and dry. On running with Versed @ 3mg/hr(6cc/hr), Levophed drip @ 4mcg/min and 1/2NS @ 
50cc/hr. Placed fall precaution. Proper isolation for COVID-19. Will continue to care plan.

## 2020-11-07 NOTE — NUR
NURSE NOTES:

LATE ENTRY:

MD. KINCAID HERE TO SEE PT . WAS INFORMED OF PT HR. YESTERDAY PT 20+ BEATS OF V TACH. TODAY 
PVC'S.  INTUBATED AC 16, , FI02 50%. SATING 95%. LOTS OF SECRETIONS. ON LEVO AT 
6MCG/HR, VERSED AT 3MG.

## 2020-11-07 NOTE — NUR
NURSE NOTES:

Noted BP : 140/68mmHg.  Levophed drip @ 3mcg/min as titrate. Will continue to monitor 
any change of condition.

## 2020-11-07 NOTE — NUR
NURSE NOTES:

AM care provided

Turned and repositioned

Oral care provided

R femoral TLC, CDI; Levophed 6mcgs/min, Versed 3mg/hr, and 1/2 NS 50mL/hr are running, 
intact.

## 2020-11-07 NOTE — NUR
NURSE NOTES:

Inserted p200, cooling blanket and rectal probe. 

Temp showed 102.6 Rectal. CHG bath provided with cold towels and wipes. 

Changed wet gowns and linens and sliders.

Changed Mepilex.

Safety measures observed and no acute distress noted. will continue to monitor.

## 2020-11-07 NOTE — NUR
NURSE NOTES:

p200 arrived. getting ready to put it onto the bed. Pt was hot to touch. Temp was 101.5 
axiliary. Cooling measures provided. Meanwhile, Cooling machine and rectal probe ordered.

## 2020-11-07 NOTE — NUR
NURSE HAND-OFF REPORT: 



Latest Vital Signs: Temperature 95.9 , Pulse 70 , B/P 101 /44 , Respiratory Rate 16 , O2 SAT 
100 , Mechanical Ventilator, O2 Flow Rate  .  

Vital Sign Comment: 



EKG Rhythm: Sinus Rhythm

Rhythm change?: N 

MD Notified?: DIDI Edmond MD Response: 



Latest Mejia Fall Score: 70  

Fall Risk: High Risk 

Safety Measures: Call light Within Reach, Bed Alarm Zone 2, Side Rails Side Rails x3, Bed 
position Low and Locked.

Fall Precautions: 

Yellow Socks

Yellow Gown

Door Sign

Patient Fall Education



Report given to NAZANIN HALE ENDORSED SPUTUM COLLECTION

## 2020-11-08 VITALS — DIASTOLIC BLOOD PRESSURE: 63 MMHG | SYSTOLIC BLOOD PRESSURE: 113 MMHG

## 2020-11-08 VITALS — SYSTOLIC BLOOD PRESSURE: 123 MMHG | DIASTOLIC BLOOD PRESSURE: 70 MMHG

## 2020-11-08 VITALS — DIASTOLIC BLOOD PRESSURE: 71 MMHG | SYSTOLIC BLOOD PRESSURE: 126 MMHG

## 2020-11-08 VITALS — SYSTOLIC BLOOD PRESSURE: 110 MMHG | DIASTOLIC BLOOD PRESSURE: 66 MMHG

## 2020-11-08 VITALS — SYSTOLIC BLOOD PRESSURE: 98 MMHG | DIASTOLIC BLOOD PRESSURE: 57 MMHG

## 2020-11-08 VITALS — DIASTOLIC BLOOD PRESSURE: 69 MMHG | SYSTOLIC BLOOD PRESSURE: 129 MMHG

## 2020-11-08 VITALS — SYSTOLIC BLOOD PRESSURE: 106 MMHG | DIASTOLIC BLOOD PRESSURE: 46 MMHG

## 2020-11-08 VITALS — DIASTOLIC BLOOD PRESSURE: 49 MMHG | SYSTOLIC BLOOD PRESSURE: 90 MMHG

## 2020-11-08 VITALS — DIASTOLIC BLOOD PRESSURE: 53 MMHG | SYSTOLIC BLOOD PRESSURE: 89 MMHG

## 2020-11-08 VITALS — SYSTOLIC BLOOD PRESSURE: 108 MMHG | DIASTOLIC BLOOD PRESSURE: 63 MMHG

## 2020-11-08 VITALS — SYSTOLIC BLOOD PRESSURE: 90 MMHG | DIASTOLIC BLOOD PRESSURE: 52 MMHG

## 2020-11-08 VITALS — SYSTOLIC BLOOD PRESSURE: 106 MMHG | DIASTOLIC BLOOD PRESSURE: 62 MMHG

## 2020-11-08 VITALS — DIASTOLIC BLOOD PRESSURE: 52 MMHG | SYSTOLIC BLOOD PRESSURE: 103 MMHG

## 2020-11-08 VITALS — SYSTOLIC BLOOD PRESSURE: 84 MMHG | DIASTOLIC BLOOD PRESSURE: 48 MMHG

## 2020-11-08 VITALS — DIASTOLIC BLOOD PRESSURE: 71 MMHG | SYSTOLIC BLOOD PRESSURE: 137 MMHG

## 2020-11-08 VITALS — DIASTOLIC BLOOD PRESSURE: 63 MMHG | SYSTOLIC BLOOD PRESSURE: 129 MMHG

## 2020-11-08 VITALS — DIASTOLIC BLOOD PRESSURE: 56 MMHG | SYSTOLIC BLOOD PRESSURE: 109 MMHG

## 2020-11-08 VITALS — SYSTOLIC BLOOD PRESSURE: 109 MMHG | DIASTOLIC BLOOD PRESSURE: 56 MMHG

## 2020-11-08 VITALS — DIASTOLIC BLOOD PRESSURE: 55 MMHG | SYSTOLIC BLOOD PRESSURE: 113 MMHG

## 2020-11-08 VITALS — DIASTOLIC BLOOD PRESSURE: 54 MMHG | SYSTOLIC BLOOD PRESSURE: 101 MMHG

## 2020-11-08 VITALS — DIASTOLIC BLOOD PRESSURE: 67 MMHG | SYSTOLIC BLOOD PRESSURE: 117 MMHG

## 2020-11-08 VITALS — DIASTOLIC BLOOD PRESSURE: 79 MMHG | SYSTOLIC BLOOD PRESSURE: 112 MMHG

## 2020-11-08 VITALS — DIASTOLIC BLOOD PRESSURE: 52 MMHG | SYSTOLIC BLOOD PRESSURE: 91 MMHG

## 2020-11-08 VITALS — SYSTOLIC BLOOD PRESSURE: 112 MMHG | DIASTOLIC BLOOD PRESSURE: 57 MMHG

## 2020-11-08 VITALS — SYSTOLIC BLOOD PRESSURE: 119 MMHG | DIASTOLIC BLOOD PRESSURE: 65 MMHG

## 2020-11-08 VITALS — SYSTOLIC BLOOD PRESSURE: 153 MMHG | DIASTOLIC BLOOD PRESSURE: 76 MMHG

## 2020-11-08 VITALS — SYSTOLIC BLOOD PRESSURE: 93 MMHG | DIASTOLIC BLOOD PRESSURE: 55 MMHG

## 2020-11-08 VITALS — SYSTOLIC BLOOD PRESSURE: 96 MMHG | DIASTOLIC BLOOD PRESSURE: 49 MMHG

## 2020-11-08 VITALS — DIASTOLIC BLOOD PRESSURE: 49 MMHG | SYSTOLIC BLOOD PRESSURE: 94 MMHG

## 2020-11-08 VITALS — SYSTOLIC BLOOD PRESSURE: 107 MMHG | DIASTOLIC BLOOD PRESSURE: 59 MMHG

## 2020-11-08 VITALS — DIASTOLIC BLOOD PRESSURE: 53 MMHG | SYSTOLIC BLOOD PRESSURE: 95 MMHG

## 2020-11-08 VITALS — DIASTOLIC BLOOD PRESSURE: 59 MMHG | SYSTOLIC BLOOD PRESSURE: 111 MMHG

## 2020-11-08 VITALS — DIASTOLIC BLOOD PRESSURE: 58 MMHG | SYSTOLIC BLOOD PRESSURE: 102 MMHG

## 2020-11-08 VITALS — SYSTOLIC BLOOD PRESSURE: 122 MMHG | DIASTOLIC BLOOD PRESSURE: 68 MMHG

## 2020-11-08 VITALS — SYSTOLIC BLOOD PRESSURE: 97 MMHG | DIASTOLIC BLOOD PRESSURE: 51 MMHG

## 2020-11-08 VITALS — SYSTOLIC BLOOD PRESSURE: 120 MMHG | DIASTOLIC BLOOD PRESSURE: 54 MMHG

## 2020-11-08 VITALS — SYSTOLIC BLOOD PRESSURE: 133 MMHG | DIASTOLIC BLOOD PRESSURE: 69 MMHG

## 2020-11-08 VITALS — SYSTOLIC BLOOD PRESSURE: 94 MMHG | DIASTOLIC BLOOD PRESSURE: 52 MMHG

## 2020-11-08 VITALS — SYSTOLIC BLOOD PRESSURE: 130 MMHG | DIASTOLIC BLOOD PRESSURE: 70 MMHG

## 2020-11-08 VITALS — SYSTOLIC BLOOD PRESSURE: 88 MMHG | DIASTOLIC BLOOD PRESSURE: 56 MMHG

## 2020-11-08 VITALS — DIASTOLIC BLOOD PRESSURE: 50 MMHG | SYSTOLIC BLOOD PRESSURE: 102 MMHG

## 2020-11-08 VITALS — SYSTOLIC BLOOD PRESSURE: 113 MMHG | DIASTOLIC BLOOD PRESSURE: 66 MMHG

## 2020-11-08 VITALS — SYSTOLIC BLOOD PRESSURE: 126 MMHG | DIASTOLIC BLOOD PRESSURE: 63 MMHG

## 2020-11-08 VITALS — DIASTOLIC BLOOD PRESSURE: 60 MMHG | SYSTOLIC BLOOD PRESSURE: 115 MMHG

## 2020-11-08 VITALS — DIASTOLIC BLOOD PRESSURE: 62 MMHG | SYSTOLIC BLOOD PRESSURE: 104 MMHG

## 2020-11-08 VITALS — SYSTOLIC BLOOD PRESSURE: 107 MMHG | DIASTOLIC BLOOD PRESSURE: 60 MMHG

## 2020-11-08 VITALS — DIASTOLIC BLOOD PRESSURE: 59 MMHG | SYSTOLIC BLOOD PRESSURE: 94 MMHG

## 2020-11-08 VITALS — DIASTOLIC BLOOD PRESSURE: 59 MMHG | SYSTOLIC BLOOD PRESSURE: 98 MMHG

## 2020-11-08 VITALS — SYSTOLIC BLOOD PRESSURE: 121 MMHG | DIASTOLIC BLOOD PRESSURE: 58 MMHG

## 2020-11-08 LAB
ADD MANUAL DIFF: NO
ALBUMIN SERPL-MCNC: 1.5 G/DL (ref 3.4–5)
ALBUMIN/GLOB SERPL: 0.3 {RATIO} (ref 1–2.7)
ALP SERPL-CCNC: 230 U/L (ref 46–116)
ALT SERPL-CCNC: 27 U/L (ref 12–78)
AST SERPL-CCNC: 32 U/L (ref 15–37)
BASOPHILS NFR BLD AUTO: 2 % (ref 0–2)
BILIRUB SERPL-MCNC: 0.4 MG/DL (ref 0.2–1)
BUN SERPL-MCNC: 18 MG/DL (ref 7–18)
CALCIUM SERPL-MCNC: 7.6 MG/DL (ref 8.5–10.1)
CHLORIDE SERPL-SCNC: 111 MMOL/L (ref 98–107)
CO2 SERPL-SCNC: 25 MMOL/L (ref 21–32)
CREAT SERPL-MCNC: 1.1 MG/DL (ref 0.55–1.3)
EOSINOPHIL NFR BLD AUTO: 1.6 % (ref 0–3)
ERYTHROCYTE [DISTWIDTH] IN BLOOD BY AUTOMATED COUNT: 15.4 % (ref 11.6–14.8)
GLOBULIN SER-MCNC: 4.9 G/DL
HCT VFR BLD CALC: 28.8 % (ref 42–52)
HGB BLD-MCNC: 8.9 G/DL (ref 14.2–18)
LYMPHOCYTES NFR BLD AUTO: 14.1 % (ref 20–45)
MCV RBC AUTO: 97 FL (ref 80–99)
MONOCYTES NFR BLD AUTO: 8.7 % (ref 1–10)
NEUTROPHILS NFR BLD AUTO: 73.6 % (ref 45–75)
PHOSPHATE SERPL-MCNC: 2.9 MG/DL (ref 2.5–4.9)
PLATELET # BLD: 370 K/UL (ref 150–450)
POTASSIUM SERPL-SCNC: 3.3 MMOL/L (ref 3.5–5.1)
RBC # BLD AUTO: 2.97 M/UL (ref 4.7–6.1)
SODIUM SERPL-SCNC: 146 MMOL/L (ref 136–145)
WBC # BLD AUTO: 9.4 K/UL (ref 4.8–10.8)

## 2020-11-08 RX ADMIN — HEPARIN SODIUM SCH UNITS: 5000 INJECTION INTRAVENOUS; SUBCUTANEOUS at 08:24

## 2020-11-08 RX ADMIN — CHLORHEXIDINE GLUCONATE SCH APPLIC: 213 SOLUTION TOPICAL at 20:10

## 2020-11-08 RX ADMIN — MEROPENEM SCH MLS/HR: 1 INJECTION INTRAVENOUS at 16:23

## 2020-11-08 RX ADMIN — MIDAZOLAM HYDROCHLORIDE PRN MLS/HR: 5 INJECTION INTRAMUSCULAR; INTRAVENOUS at 01:02

## 2020-11-08 RX ADMIN — DEXTROSE MONOHYDRATE SCH MLS/HR: 50 INJECTION, SOLUTION INTRAVENOUS at 16:23

## 2020-11-08 RX ADMIN — Medication SCH MLS/HR: at 14:16

## 2020-11-08 RX ADMIN — TAMSULOSIN HYDROCHLORIDE SCH MG: 0.4 CAPSULE ORAL at 17:51

## 2020-11-08 RX ADMIN — TAMSULOSIN HYDROCHLORIDE SCH MG: 0.4 CAPSULE ORAL at 08:21

## 2020-11-08 RX ADMIN — MEROPENEM SCH MLS/HR: 1 INJECTION INTRAVENOUS at 03:19

## 2020-11-08 RX ADMIN — INSULIN ASPART SCH UNITS: 100 INJECTION, SOLUTION INTRAVENOUS; SUBCUTANEOUS at 12:35

## 2020-11-08 RX ADMIN — PANTOPRAZOLE SODIUM SCH MG: 40 INJECTION, POWDER, FOR SOLUTION INTRAVENOUS at 08:21

## 2020-11-08 RX ADMIN — INSULIN ASPART SCH UNITS: 100 INJECTION, SOLUTION INTRAVENOUS; SUBCUTANEOUS at 18:00

## 2020-11-08 RX ADMIN — MIDAZOLAM HYDROCHLORIDE PRN MLS/HR: 5 INJECTION INTRAMUSCULAR; INTRAVENOUS at 14:17

## 2020-11-08 RX ADMIN — INSULIN ASPART SCH UNITS: 100 INJECTION, SOLUTION INTRAVENOUS; SUBCUTANEOUS at 06:00

## 2020-11-08 RX ADMIN — HEPARIN SODIUM SCH UNITS: 5000 INJECTION INTRAVENOUS; SUBCUTANEOUS at 20:58

## 2020-11-08 RX ADMIN — INSULIN ASPART SCH UNITS: 100 INJECTION, SOLUTION INTRAVENOUS; SUBCUTANEOUS at 00:00

## 2020-11-08 NOTE — PULMONOLGY CRITICAL CARE NOTE
Critical Care - Asmt/Plan


Assessment/Plan:


ASSESSMENT


Acute hypoxemic respiratory failure , requiring  intubation ( initially 100% 

NRM) 


Sepsis


Pneumonia , possible aspiration


Probable UTI


Hx of COVID 19 9/9/20Dysphagia


JANES-resolved 


Hypernatremia


History of CVA


Diabetes mellitus


History of hypertension


Alzheimer dementia





PLAN OF CARE


ICU


vent support pulm toilet


fup with CXR and ABG 


ABG this am noted, decrease AC rate to 14. repeat ABG in am 


sedation 


pressor, titrate to keep mean arterial BP > 65





rapid COVID-19 and influenza swab both NGT 10/23


rapid COVID 10/26 +, likely old infection   as per ID


SCX  10/30-> MRSA, E coli ESBL 


UCX  10/23 -> NGT


BCX 11/2 NGTD


abx  as per ID recs, added Micafungin 11/7


leuk resolved this am  


  


DVT and GI  prophylaxis 


a/tussive prn 


NG tube for meds


strict aspiration precaution


gentle IVF 


monitor renal parameters,  lytes , correct electrolytes as needed ,avoid 

nephrotoxic


K replaced this am already as per nephro 


creatinine down to normal   





ECHO with pEF 55%, 


monitor volumes 


BS management with SSI


supportive care   


remains FC








case discussed and evaluated by supervising physician





Critical Care - Objective





Last 24 Hour Vital Signs








  Date Time  Temp Pulse Resp B/P (MAP) Pulse Ox O2 Delivery O2 Flow Rate FiO2


 


11/8/20 09:15  80 16 153/76 (101) 99   


 


11/8/20 09:00  79 16 84/48 (60) 98   


 


11/8/20 08:30  70 16 113/55 (74) 99   


 


11/8/20 08:00  70      


 


11/8/20 08:00        45


 


11/8/20 08:00 98.3 69 16 119/65 (83) 98   


 


11/8/20 07:30  75 16 113/63 (80) 99   


 


11/8/20 07:00  71 16 112/79 (90) 99   


 


11/8/20 07:00   16   Mechanical Ventilator  45


 


11/8/20 06:45  69 16     45


 


11/8/20 06:30  71 16 123/70 (87) 99   


 


11/8/20 06:30  71 16     


 


11/8/20 06:00   15   Mechanical Ventilator  45


 


11/8/20 06:00  69 15 108/63 (78) 99   


 


11/8/20 05:30  73 16 113/66 (82) 99   


 


11/8/20 05:00  74 17 133/69 (90) 100   


 


11/8/20 05:00   16   Mechanical Ventilator  45


 


11/8/20 04:30  75 16 137/71 (93) 100   


 


11/8/20 04:00        45


 


11/8/20 04:00      Mechanical Ventilator  


 


11/8/20 04:00 99.2 73 16 126/63 (84) 98   


 


11/8/20 04:00   16   Mechanical Ventilator  45


 


11/8/20 04:00  84      


 


11/8/20 03:30  83 16 102/50 (67) 96   


 


11/8/20 03:00   16   Mechanical Ventilator  45


 


11/8/20 03:00  84 16 103/52 (69) 97   


 


11/8/20 02:30  76 16 98/59 (72) 99   


 


11/8/20 02:30  80 17     45


 


11/8/20 02:00  72 16 107/59 (75) 99   


 


11/8/20 02:00   16   Mechanical Ventilator  45


 


11/8/20 01:30  73 17 129/63 (85) 100   


 


11/8/20 01:02   16   Mechanical Ventilator  45


 


11/8/20 01:00   19   Mechanical Ventilator  45


 


11/8/20 01:00  79 19 121/58 (79) 100   


 


11/8/20 00:30  75 15 120/54 (76) 100   


 


11/8/20 00:00 99.1 69 16 97/51 (66) 100   


 


11/8/20 00:00        45


 


11/8/20 00:00   16   Mechanical Ventilator  45


 


11/8/20 00:00      Mechanical Ventilator  


 


11/8/20 00:00  73      


 


11/7/20 23:30  73 16 112/52 (72) 100   


 


11/7/20 23:00  74 16 107/49 (68) 100   


 


11/7/20 23:00   16   Mechanical Ventilator  45


 


11/7/20 22:30  71 16 99/53 (68) 99   


 


11/7/20 22:30  74 16     45


 


11/7/20 22:00   16   Mechanical Ventilator  45


 


11/7/20 22:00  71 16 100/52 (68) 100   


 


11/7/20 21:30  70 16 115/54 (74) 100   


 


11/7/20 21:00  66 16 123/57 (79) 100   


 


11/7/20 21:00   16   Mechanical Ventilator  45


 


11/7/20 20:30 97.9 72 16 92/57 (69) 100   


 


11/7/20 20:00  67 16 140/68 (92) 100   


 


11/7/20 20:00   16   Mechanical Ventilator  45


 


11/7/20 20:00      Mechanical Ventilator  


 


11/7/20 20:00        45


 


11/7/20 20:00  66      


 


11/7/20 19:30  61 16 134/63 (86) 100   


 


11/7/20 19:00  67 16 119/68 (85) 100   


 


11/7/20 19:00   16   Mechanical Ventilator  45


 


11/7/20 18:30  70 16     45


 


11/7/20 17:38    101/44    


 


11/7/20 17:14  72 21     45


 


11/7/20 17:00  65 16 132/78 (96) 100   


 


11/7/20 16:45  70 17 132/69 (90) 95   


 


11/7/20 16:30  76 20 118/50 (72) 100   


 


11/7/20 16:15  75 22 159/79 (105) 100   


 


11/7/20 16:00        45


 


11/7/20 16:00      Mechanical Ventilator  


 


11/7/20 16:00  78      


 


11/7/20 16:00 95.9 69 19  100   


 


11/7/20 15:19  73 21     45


 


11/7/20 13:27  86 16     50


 


11/7/20 12:30  83 16 90/53 (65) 98   


 


11/7/20 12:15  78 16 103/77 (86) 100   


 


11/7/20 12:00      Mechanical Ventilator  


 


11/7/20 12:00  89      


 


11/7/20 12:00  83 16 101/60 (74) 97   


 


11/7/20 12:00        45


 


11/7/20 11:45 99.0 93 16 84/53 (63) 98   


 


11/7/20 11:30  85 17 97/65 (76) 100   


 


11/7/20 11:28  82 16     50


 


11/7/20 11:15  82 16 101/60 (74) 98   


 


11/7/20 11:00  82 18 102/64 (77) 98   


 


11/7/20 10:45  83 17 97/61 (73) 98   


 


11/7/20 10:30  83 18 100/60 (73) 97   


 


11/7/20 10:15  86 20 110/66 (81) 99   


 


11/7/20 10:00 98.7 85 19 111/70 (84) 99   








Objective:


General Appearance:  no apparent distress,  sedated, intubated 


Lines, tubes and drains: R femoral CL TL intact 


HEENT:  normocephalic, atraumatic, anicteric, NGT ,   OP with ET in place, 

intact 


Respiratory/Chest:   few scattered  rhonchi


Cardiovascular/Chest:  normal peripheral pulses, SR


Abdomen:  normal bowel sounds, non tender, soft 


: Cummings 


Extremities:  no calf tenderness, normal capillary refill


Neurologic:  abnormal gait /bedridden


Musculoskeletal:  atrophy - BLE


Micro:





Microbiology








 Date/Time


Source Procedure


Growth Status





 


 11/7/20 22:56


Sputum Expectorated Gram Stain - Final Resulted


 


 11/7/20 22:56


Sputum Expectorated Sputum Culture


Pending Resulted








Accucheck:  124





Critical Care - Subjective


ROS Limited/Unobtainable:  Yes


Interval Events:


leuk resolved today


ABG this am noted


remains on Levo, titrated down  


sedated


Micafungin added to existing abx regimen  as per ID 11/7


K-3.3


IV Access:  central - R femoral CL intact


FI02:  45


Vent Support Breath Rate:  16


Vent Support Mode:  AC


Vent Tidal Volume:  600


Sputum Amount:  Moderate


PEEP:  0.0


PIP:  16


Fluids:  1/2 NS at 50


Drips:  Levophed 2 ncg/min, Versed 3 mg/hr


Tube Feeding Amount:  40


I&O:











Intake and Output  


 


 11/7/20 11/8/20





 19:00 07:00


 


Intake Total 1462.5 ml 1108.05 ml


 


Output Total 65 ml 390 ml


 


Balance 1397.5 ml 718.05 ml


 


  


 


IV Total 1432.5 ml 798.05 ml


 


Tube Feeding 30 ml 310 ml


 


Output Urine Total 65 ml 390 ml








CXR:


CXR 11/8


 Lungs:  Persistent bilateral patchy pulmonary opacities, most prominent 


  in the left lower lung.


  Pleural space:  Possible tiny bilateral pleural effusions, unchanged.


  Heart:  Unremarkable.  No cardiomegaly.


  Mediastinum:  Unremarkable.


  Bones/joints:  Patient is status post right shoulder replacement.


  Tubes, lines and devices:  Endotracheal tube tip 2.9 cm above the 


  sd.  Telemetry leads overlie the thorax.


ET-Tube:  7.5


ET Position:  25











Ely Ni NP                 Nov 8, 2020 10:00

## 2020-11-08 NOTE — NUR
NURSE NOTES:



levophed titrated to 4mcg/min at rate of 15ml/hr to achieve a map of greater than 65. 

new order placed for versed which is at rate of 2mg/hr at rate of 4ml/hr. 

residual checked through G-tube with approximately 80mls while running Glucerna 1.2 at 
50ml/hr. 

ventilator setting remains at AC 14, TV: 600 and fio2 of 55%. thick yellow secretions noted 
while suctioning sputum through et-tube.

## 2020-11-08 NOTE — NUR
NURSE NOTES:



Sponge bath provided and cleaned skin with soap and placed lotion on feet hands and hands.

oral care and repositioned patient. 

tube feeding residual checked and approximately 60mls of tube feeding with in syringe and 
resumed tube feeding at 50ml/hr. bed mattress level adjusted to 5 per patient weight. 

right femoral line dressing remains dry and intact with no oozing or leaking noted. 

remains on Levophed at 4mcg/min at rate of 15ml/hr and versed at 2mg/hr at rate of 4 ml/hr. 

Cumm[place] remains draining with clear straw urine. 

patient remains on ventilator setting of AC 14, TV: 600, FIO2 55% with satruations at 
% and RR of 17.

## 2020-11-08 NOTE — NUR
NURSE NOTES:



report received from [facility] 

patient is noted to be sedated to rass scale of -2 using versed at 3mg/hr at rate of 6ml/hr. 


he is intubated with ventilator setting of ac 16, tv: 600, fio2 of 45% and is currently 
saturating at %. 

there is tube feeding running through the right ng-tube at rate of 40ml/hr. NG-tube is at 
approximately 65-66cm at the opening of the nares. the patient has a bed mattress to prevent 
further skin deterioration of the sacral region. the right femoral TLC is clean and dry with 
all lumens patent and flushing well. the dressing is dry with no oozing noted. 

heppa filter is installed in the room and circulating the air. Levophed is running at 
2mcg/min to support blood pressure. 



X-ray being taken and patient tolerating the entire procedure with no sob or symptoms of 
desaturating.

## 2020-11-08 NOTE — INTERNAL MED PROGRESS NOTE
Subjective


Date of Service:  Nov 8, 2020


Physician Name


Benito Hearn


Attending Physician


Andrei Cancino MD





Current Medications








 Medications


  (Trade)  Dose


 Ordered  Sig/Miky


 Route


 PRN Reason  Start Time


 Stop Time Status Last Admin


Dose Admin


 


 Acetaminophen


  (Tylenol)  650 mg  Q4H  PRN


 ORAL


 Temp >100.5  10/23/20 20:30


 11/22/20 20:29  11/5/20 22:39





 


 Chlorhexidine


 Gluconate


  (Jess-Hex 2%)  1 applic  DAILY@2000


 TOPIC


   11/6/20 20:00


 2/4/21 19:59  11/7/20 19:55





 


 Dextrose


  (Dextrose 50%)  50 ml  Q30M  PRN


 IV


 Hypoglycemia  10/23/20 22:45


 1/21/21 22:44   





 


 Heparin Sodium


  (Porcine)


  (Heparin 5000


 units/ml)  5,000 units  EVERY 12  HOURS


 SUBQ


   10/23/20 21:00


 12/7/20 20:59  11/8/20 08:24





 


 Insulin Aspart


  (NovoLOG)    EVERY 6  HOURS


 SUBQ


   11/2/20 06:00


 1/22/21 06:29  11/8/20 12:35





 


 Lorazepam


  (Ativan 2mg/ml


 1ml)  2 mg  Q4H  PRN


 IV


 For Anxiety  11/6/20 12:45


 11/13/20 12:44   





 


 Meropenem 1 gm/


 Sodium Chloride  55 ml @ 


 110 mls/hr  Q12HR@0400,1600


 IVPB


   11/2/20 16:00


 11/11/20 23:59  11/8/20 03:19





 


 Micafungin Sodium


 100 mg/Sodium


 Chloride  110 ml @ 


 110 mls/hr  Q24H


 IVPB


   11/7/20 16:00


 11/14/20 15:59  11/7/20 16:40





 


 Midazolam HCl 50


 mg/Sodium Chloride  100 ml @ 0


 mls/hr  Q24H  PRN


 IV


 Restlessness  11/8/20 14:00


 11/10/20 13:46  11/8/20 14:17





 


 Morphine Sulfate


  (Morphine


 Sulfate)  4 mg  Q4H  PRN


 IVP


 For Pain  11/6/20 12:45


 11/13/20 12:44   





 


 Nitroglycerin


  (Ntg)  0.4 mg  Q5M  PRN


 SL


 Prn Chest Pain  10/23/20 20:30


 11/22/20 20:29   





 


 Norepinephrine


 Bitartrate  250 ml @ 0


 mls/hr  Q24H


 IV


   11/6/20 15:50


 11/9/20 15:49  11/8/20 14:16





 


 Ondansetron HCl


  (Zofran)  4 mg  Q6H  PRN


 IVP


 Nausea & Vomiting  10/23/20 20:30


 11/22/20 20:29   





 


 Pantoprazole


  (Protonix)  40 mg  DAILY


 IV


   11/7/20 09:00


 12/7/20 08:59  11/8/20 08:21





 


 Polyethylene


 Glycol


  (Miralax)  17 gm  DAILYPRN  PRN


 ORAL


 Constipation  10/23/20 20:30


 11/22/20 20:29   





 


 Promethazine HCl/


 Codeine


  (Phenergan with


 Codeine)  5 ml  Q4H  PRN


 ORAL


 For Cough  10/23/20 20:30


 11/22/20 20:29   





 


 Sodium Chloride  1,000 ml @ 


 50 mls/hr  Q20H


 IV


   11/6/20 16:00


 12/6/20 15:59  11/8/20 05:12





 


 Tamsulosin HCl


  (Flomax)  0.4 mg  BID


 ORAL


   10/31/20 19:45


 11/23/20 08:59  11/8/20 08:21











Allergies:  


Coded Allergies:  


     No Known Allergies (Unverified , 8/20/12)


ROS Limited/Unobtainable:  Yes


Subjective


76 YO M admitted with shortness of breath.  Now pneumonia; previously COVID 

positive.  Cover for Int kierra-Dr Cancino.  ICU.  Intubated and sedated





Objective





Last Vital Signs








  Date Time  Temp Pulse Resp B/P (MAP) Pulse Ox O2 Delivery O2 Flow Rate FiO2


 


11/8/20 15:00  75 14 95/53 (67) 99   


 


11/8/20 14:18      Mechanical Ventilator  55


 


11/8/20 12:00 97.1       


 


11/6/20 12:00       15.0 











Laboratory Tests








Test


 11/7/20


17:20 11/7/20


18:00 11/8/20


00:14 11/8/20


04:15


 


POC Whole Blood Glucose


 123 MG/DL


()  H 


 109 MG/DL


()  H 





 


Urine Color  Pale yellow    


 


Urine Appearance  Clear    


 


Urine pH  8 (4.5-8.0)    


 


Urine Specific Gravity


 


 1.010


(1.005-1.035) 


 





 


Urine Protein


 


 2+ (NEGATIVE)


H 


 





 


Urine Glucose (UA)


 


 Negative


(NEGATIVE) 


 





 


Urine Ketones


 


 2+ (NEGATIVE)


H 


 





 


Urine Blood


 


 1+ (NEGATIVE)


H 


 





 


Urine Nitrite


 


 Negative


(NEGATIVE) 


 





 


Urine Bilirubin


 


 Negative


(NEGATIVE) 


 





 


Urine Urobilinogen


 


 Normal MG/DL


(0.0-1.0) 


 





 


Urine Leukocyte Esterase


 


 Negative


(NEGATIVE) 


 





 


Urine RBC


 


 0-2 /HPF (0 -


0)  H 


 





 


Urine WBC


 


 2-4 /HPF (0 -


0) 


 





 


Urine Squamous Epithelial


Cells 


 Occasional


/LPF 


 





 


Urine Bacteria


 


 Few /HPF


(NONE) 


 





 


White Blood Count


 


 


 


 9.4 K/UL


(4.8-10.8)


 


Red Blood Count


 


 


 


 2.97 M/UL


(4.70-6.10)  L


 


Hemoglobin


 


 


 


 8.9 G/DL


(14.2-18.0)  L


 


Hematocrit


 


 


 


 28.8 %


(42.0-52.0)  L


 


Mean Corpuscular Volume    97 FL (80-99)  


 


Mean Corpuscular Hemoglobin


 


 


 


 30.1 PG


(27.0-31.0)


 


Mean Corpuscular Hemoglobin


Concent 


 


 


 31.0 G/DL


(32.0-36.0)  L


 


Red Cell Distribution Width


 


 


 


 15.4 %


(11.6-14.8)  H


 


Platelet Count


 


 


 


 370 K/UL


(150-450)


 


Mean Platelet Volume


 


 


 


 8.5 FL


(6.5-10.1)


 


Neutrophils (%) (Auto)


 


 


 


 73.6 %


(45.0-75.0)


 


Lymphocytes (%) (Auto)


 


 


 


 14.1 %


(20.0-45.0)  L


 


Monocytes (%) (Auto)


 


 


 


 8.7 %


(1.0-10.0)


 


Eosinophils (%) (Auto)


 


 


 


 1.6 %


(0.0-3.0)


 


Basophils (%) (Auto)


 


 


 


 2.0 %


(0.0-2.0)


 


Sodium Level


 


 


 


 146 MMOL/L


(136-145)  H


 


Potassium Level


 


 


 


 3.3 MMOL/L


(3.5-5.1)  L


 


Chloride Level


 


 


 


 111 MMOL/L


()  H


 


Carbon Dioxide Level


 


 


 


 25 MMOL/L


(21-32)


 


Blood Urea Nitrogen


 


 


 


 18 mg/dL


(7-18)


 


Creatinine


 


 


 


 1.1 MG/DL


(0.55-1.30)


 


Estimat Glomerular Filtration


Rate 


 


 


 > 60 mL/min


(>60)


 


Glucose Level


 


 


 


 126 MG/DL


()  H


 


Uric Acid


 


 


 


 4.5 MG/DL


(2.6-7.2)


 


Calcium Level


 


 


 


 7.6 MG/DL


(8.5-10.1)  L


 


Phosphorus Level


 


 


 


 2.9 MG/DL


(2.5-4.9)


 


Magnesium Level


 


 


 


 2.3 MG/DL


(1.8-2.4)


 


Total Bilirubin


 


 


 


 0.4 MG/DL


(0.2-1.0)


 


Aspartate Amino Transf


(AST/SGOT) 


 


 


 32 U/L (15-37)





 


Alanine Aminotransferase


(ALT/SGPT) 


 


 


 27 U/L (12-78)





 


Alkaline Phosphatase


 


 


 


 230 U/L


()  H


 


C-Reactive Protein,


Quantitative 


 


 


 14.6 mg/dL


(0.00-0.90)  H


 


Total Protein


 


 


 


 6.4 G/DL


(6.4-8.2)


 


Albumin


 


 


 


 1.5 G/DL


(3.4-5.0)  L


 


Globulin    4.9 g/dL  


 


Albumin/Globulin Ratio


 


 


 


 0.3 (1.0-2.7)


L


 


Test


 11/8/20


05:43 11/8/20


07:56 11/8/20


12:13 





 


POC Whole Blood Glucose


 124 MG/DL


()  H 


 141 MG/DL


()  H 





 


Arterial Blood pH


 


 7.527


(7.350-7.450) 


 





 


Arterial Blood Partial


Pressure CO2 


 26.5 mmHg


(35.0-45.0)  L 


 





 


Arterial Blood Partial


Pressure O2 


 66.5 mmHg


(75.0-100.0)  L 


 





 


Arterial Blood HCO3


 


 21.5 mmol/L


(22.0-26.0)  L 


 





 


Arterial Blood Oxygen


Saturation 


 93.3 %


()  L 


 





 


Arterial Blood Base Excess  -0.5 (-2-2)    


 


Jerod Test  Positive    











Microbiology








 Date/Time


Source Procedure


Growth Status





 


 11/7/20 22:56


Sputum Expectorated Gram Stain - Final Resulted


 


 11/7/20 22:56


Sputum Expectorated Sputum Culture


Pending Resulted

















Intake and Output  


 


 11/7/20 11/8/20





 19:00 07:00


 


Intake Total 1462.5 ml 1108.05 ml


 


Output Total 65 ml 390 ml


 


Balance 1397.5 ml 718.05 ml


 


  


 


IV Total 1432.5 ml 798.05 ml


 


Tube Feeding 30 ml 310 ml


 


Output Urine Total 65 ml 390 ml








Objective


PHYSICAL EXAMINATION:


GENERAL:  The patient awake with deep stimuli, open his eyes, however,


cannot follow commands.  The patient is on a Ventimask at this time,


chronically ill-appearing.


HEAD AND NECK:  Pupils are equal and reactive to light.  Anicteric.


NECK:  Supple.  No JVD.


LUNGS:  Mech vent;  wheezing, rhonchi, and decreased air in bases.


HEART:  S1, S2.  Regular rhythm.  Distant heart sounds.  No murmur or


gallop.


ABDOMEN:  Soft, nondistended, nontender.  Positive bowel sounds.


EXTREMITIES:  No cyanosis, clubbing, or edema.


NEUROLOGIC:  Very limited secondary to the patient's status, cannot follow


commands.  Opens his eyes with deep stimuli and moving extremities


spontaneously.





Assessment/Plan


Assessment/Plan


ASSESSMENT:


1. Acute hypoxemic respiratory failure, most likely secondary to


pneumonia and sepsis.


2. Sepsis secondary to urinary tract infection and pneumonia.


3. pneumonia=MRSA; ESBL E. coli


4. Acute kidney injury on chronic renal insufficiency.


5. Dehydration.


6. History of chronic congestive heart failure.


7. Diabetes type 2.


8. Dyslipidemia.


9. Hypertension.


10. Alzheimer's disease.


11. COVID 19 previous positive





PLAN:


1.  ICU


2.  Dr. Sandoval,=Pulmonary Critical Care; follow mech vent recs


3.  Dr. Garcia = Inf Dis.


4.  IV= D5W due to the hypernatremia and dehydration.


5.  antibiotics = micafungin and meropenem


6.  Code status is full code.


7.    DVT prophylaxis is heparin subcutaneous.











Benito Hearn MD                Nov 8, 2020 15:32

## 2020-11-08 NOTE — NUR
NURSE NOTES:



Dr. Edmond conducting rounds and updated patient remains on Levophed at 4mcg/min at rate of 
15ml/hr with bp of 113/57 with heart rate of 77-78 in sinus rhythm. ventilator settings are 
ac 14, TV: 600, and FIO2 of 55%. 

ordered to midodrine 5mg NG TID.

## 2020-11-08 NOTE — CARDIAC ELECTROPHYSIOLOGY PN
Assessment/Plan


Assessment/Plan


1. Accelerated junctional rhythm. Not bradycardic. Ruled out for MI


      Echo showed ejection fraction of 55%.





2. Long run of 31 beats of Nonsustained VT on 11/3/2020. Will need cardiac cath 

after stabilization.





3. Respiratory failure, on antibiotic and intubated on the vent


4. Septic shock. on LEvophed.


5. Acute renal failure.


6. Electrolyte imbalance.


7. History of CHF, but echocardiogram showed normal left ventricular systolic 

function.


8. History of previous COVID infection in September 2020 and active Covid now in

isolation


9. Dementia.





JEANNINE RN





Subjective


Subjective


In SR in NAD in Covid isolation. In ICU on the Vent with 55% Fio2 and 4 mcg of 

Levo.  Had 31 beats of VT  on 11/3/20 at 16:46 and 5 beats yesterday





Objective





Last 24 Hour Vital Signs








  Date Time  Temp Pulse Resp B/P (MAP) Pulse Ox O2 Delivery O2 Flow Rate FiO2


 


11/8/20 17:00  84 18 90/52 (65) 100   


 


11/8/20 17:00  83 20 90/52 (65) 100   


 


11/8/20 16:30  83 17 86/46 (59) 100   


 


11/8/20 16:30  79 17 106/55 (72) 99   


 


11/8/20 16:00      Mechanical Ventilator  


 


11/8/20 16:00        55


 


11/8/20 16:00 99.2 77 15 111/59 (76) 100   


 


11/8/20 16:00  70      


 


11/8/20 15:36  71 14     55


 


11/8/20 15:30  71 14 112/57 (75) 100   


 


11/8/20 15:00  75 14 95/53 (67) 99   


 


11/8/20 14:30  80 20 93/55 (68) 98   


 


11/8/20 14:18   15   Mechanical Ventilator  55


 


11/8/20 14:17   15   Mechanical Ventilator  55


 


11/8/20 14:16    92/51    


 


11/8/20 14:00  83 16 89/53 (65) 100   


 


11/8/20 14:00   14   Mechanical Ventilator  55


 


11/8/20 13:30  81 17 90/49 (63) 100   


 


11/8/20 13:00   15   Mechanical Ventilator  55


 


11/8/20 13:00  78 17 94/52 (66) 100   


 


11/8/20 12:30  76 15 106/62 (77) 100   


 


11/8/20 12:15  75 14 94/59 (71) 100   


 


11/8/20 12:00      Mechanical Ventilator  


 


11/8/20 12:00   15   Mechanical Ventilator  55


 


11/8/20 12:00 97.1 75 14 88/56 (67) 100   


 


11/8/20 12:00        55


 


11/8/20 12:00  76      


 


11/8/20 11:30  80 18 117/67 (84) 100   


 


11/8/20 11:05  85 20     55


 


11/8/20 11:00   14   Mechanical Ventilator  55


 


11/8/20 11:00  79 20 101/54 (70) 99   


 


11/8/20 10:30  83 20 109/56 (73) 100   


 


11/8/20 10:01        55


 


11/8/20 10:00   17   Mechanical Ventilator  45


 


11/8/20 10:00  83 17 96/49 (65) 99   


 


11/8/20 09:30  79 15 90/49 (63) 98   


 


11/8/20 09:15  80 16 153/76 (101) 99   


 


11/8/20 09:00  79 16 84/48 (60) 98   


 


11/8/20 09:00   16   Mechanical Ventilator  45


 


11/8/20 08:30  70 16 113/55 (74) 99   


 


11/8/20 08:00  70      


 


11/8/20 08:00   16   Mechanical Ventilator  45


 


11/8/20 08:00        45


 


11/8/20 08:00 98.3 69 16 119/65 (83) 98   


 


11/8/20 08:00      Mechanical Ventilator  


 


11/8/20 07:30  75 16 113/63 (80) 99   


 


11/8/20 07:00  71 16 112/79 (90) 99   


 


11/8/20 07:00   16   Mechanical Ventilator  45


 


11/8/20 06:45  69 16     45


 


11/8/20 06:30  71 16 123/70 (87) 99   


 


11/8/20 06:30  71 16     


 


11/8/20 06:00   15   Mechanical Ventilator  45


 


11/8/20 06:00  69 15 108/63 (78) 99   


 


11/8/20 05:30  73 16 113/66 (82) 99   


 


11/8/20 05:00  74 17 133/69 (90) 100   


 


11/8/20 05:00   16   Mechanical Ventilator  45


 


11/8/20 04:30  75 16 137/71 (93) 100   


 


11/8/20 04:00        45


 


11/8/20 04:00      Mechanical Ventilator  


 


11/8/20 04:00 99.2 73 16 126/63 (84) 98   


 


11/8/20 04:00   16   Mechanical Ventilator  45


 


11/8/20 04:00  84      


 


11/8/20 03:30  83 16 102/50 (67) 96   


 


11/8/20 03:00   16   Mechanical Ventilator  45


 


11/8/20 03:00  84 16 103/52 (69) 97   


 


11/8/20 02:30  76 16 98/59 (72) 99   


 


11/8/20 02:30  80 17     45


 


11/8/20 02:00  72 16 107/59 (75) 99   


 


11/8/20 02:00   16   Mechanical Ventilator  45


 


11/8/20 01:30  73 17 129/63 (85) 100   


 


11/8/20 01:02   16   Mechanical Ventilator  45


 


11/8/20 01:00   19   Mechanical Ventilator  45


 


11/8/20 01:00  79 19 121/58 (79) 100   


 


11/8/20 00:30  75 15 120/54 (76) 100   


 


11/8/20 00:00 99.1 69 16 97/51 (66) 100   


 


11/8/20 00:00        45


 


11/8/20 00:00   16   Mechanical Ventilator  45


 


11/8/20 00:00      Mechanical Ventilator  


 


11/8/20 00:00  73      


 


11/7/20 23:30  73 16 112/52 (72) 100   


 


11/7/20 23:00  74 16 107/49 (68) 100   


 


11/7/20 23:00   16   Mechanical Ventilator  45


 


11/7/20 22:30  71 16 99/53 (68) 99   


 


11/7/20 22:30  74 16     45


 


11/7/20 22:00   16   Mechanical Ventilator  45


 


11/7/20 22:00  71 16 100/52 (68) 100   


 


11/7/20 21:30  70 16 115/54 (74) 100   


 


11/7/20 21:00  66 16 123/57 (79) 100   


 


11/7/20 21:00   16   Mechanical Ventilator  45


 


11/7/20 20:30 97.9 72 16 92/57 (69) 100   


 


11/7/20 20:00  67 16 140/68 (92) 100   


 


11/7/20 20:00   16   Mechanical Ventilator  45


 


11/7/20 20:00      Mechanical Ventilator  


 


11/7/20 20:00        45


 


11/7/20 20:00  66      


 


11/7/20 19:30  61 16 134/63 (86) 100   


 


11/7/20 19:00  67 16 119/68 (85) 100   


 


11/7/20 19:00   16   Mechanical Ventilator  45


 


11/7/20 18:30  70 16     45


 


11/7/20 17:38    101/44    

















Intake and Output  


 


 11/7/20 11/8/20





 19:00 07:00


 


Intake Total 1462.5 ml 1108.05 ml


 


Output Total 65 ml 390 ml


 


Balance 1397.5 ml 718.05 ml


 


  


 


IV Total 1432.5 ml 798.05 ml


 


Tube Feeding 30 ml 310 ml


 


Output Urine Total 65 ml 390 ml











Laboratory Tests








Test


 11/7/20


17:20 11/7/20


18:00 11/8/20


00:14 11/8/20


04:15


 


POC Whole Blood Glucose


 123 MG/DL


()  H 


 109 MG/DL


()  H 





 


Urine Color  Pale yellow    


 


Urine Appearance  Clear    


 


Urine pH  8 (4.5-8.0)    


 


Urine Specific Gravity


 


 1.010


(1.005-1.035) 


 





 


Urine Protein


 


 2+ (NEGATIVE)


H 


 





 


Urine Glucose (UA)


 


 Negative


(NEGATIVE) 


 





 


Urine Ketones


 


 2+ (NEGATIVE)


H 


 





 


Urine Blood


 


 1+ (NEGATIVE)


H 


 





 


Urine Nitrite


 


 Negative


(NEGATIVE) 


 





 


Urine Bilirubin


 


 Negative


(NEGATIVE) 


 





 


Urine Urobilinogen


 


 Normal MG/DL


(0.0-1.0) 


 





 


Urine Leukocyte Esterase


 


 Negative


(NEGATIVE) 


 





 


Urine RBC


 


 0-2 /HPF (0 -


0)  H 


 





 


Urine WBC


 


 2-4 /HPF (0 -


0) 


 





 


Urine Squamous Epithelial


Cells 


 Occasional


/LPF 


 





 


Urine Bacteria


 


 Few /HPF


(NONE) 


 





 


White Blood Count


 


 


 


 9.4 K/UL


(4.8-10.8)


 


Red Blood Count


 


 


 


 2.97 M/UL


(4.70-6.10)  L


 


Hemoglobin


 


 


 


 8.9 G/DL


(14.2-18.0)  L


 


Hematocrit


 


 


 


 28.8 %


(42.0-52.0)  L


 


Mean Corpuscular Volume    97 FL (80-99)  


 


Mean Corpuscular Hemoglobin


 


 


 


 30.1 PG


(27.0-31.0)


 


Mean Corpuscular Hemoglobin


Concent 


 


 


 31.0 G/DL


(32.0-36.0)  L


 


Red Cell Distribution Width


 


 


 


 15.4 %


(11.6-14.8)  H


 


Platelet Count


 


 


 


 370 K/UL


(150-450)


 


Mean Platelet Volume


 


 


 


 8.5 FL


(6.5-10.1)


 


Neutrophils (%) (Auto)


 


 


 


 73.6 %


(45.0-75.0)


 


Lymphocytes (%) (Auto)


 


 


 


 14.1 %


(20.0-45.0)  L


 


Monocytes (%) (Auto)


 


 


 


 8.7 %


(1.0-10.0)


 


Eosinophils (%) (Auto)


 


 


 


 1.6 %


(0.0-3.0)


 


Basophils (%) (Auto)


 


 


 


 2.0 %


(0.0-2.0)


 


Sodium Level


 


 


 


 146 MMOL/L


(136-145)  H


 


Potassium Level


 


 


 


 3.3 MMOL/L


(3.5-5.1)  L


 


Chloride Level


 


 


 


 111 MMOL/L


()  H


 


Carbon Dioxide Level


 


 


 


 25 MMOL/L


(21-32)


 


Blood Urea Nitrogen


 


 


 


 18 mg/dL


(7-18)


 


Creatinine


 


 


 


 1.1 MG/DL


(0.55-1.30)


 


Estimat Glomerular Filtration


Rate 


 


 


 > 60 mL/min


(>60)


 


Glucose Level


 


 


 


 126 MG/DL


()  H


 


Uric Acid


 


 


 


 4.5 MG/DL


(2.6-7.2)


 


Calcium Level


 


 


 


 7.6 MG/DL


(8.5-10.1)  L


 


Phosphorus Level


 


 


 


 2.9 MG/DL


(2.5-4.9)


 


Magnesium Level


 


 


 


 2.3 MG/DL


(1.8-2.4)


 


Total Bilirubin


 


 


 


 0.4 MG/DL


(0.2-1.0)


 


Aspartate Amino Transf


(AST/SGOT) 


 


 


 32 U/L (15-37)





 


Alanine Aminotransferase


(ALT/SGPT) 


 


 


 27 U/L (12-78)





 


Alkaline Phosphatase


 


 


 


 230 U/L


()  H


 


C-Reactive Protein,


Quantitative 


 


 


 14.6 mg/dL


(0.00-0.90)  H


 


Total Protein


 


 


 


 6.4 G/DL


(6.4-8.2)


 


Albumin


 


 


 


 1.5 G/DL


(3.4-5.0)  L


 


Globulin    4.9 g/dL  


 


Albumin/Globulin Ratio


 


 


 


 0.3 (1.0-2.7)


L


 


Test


 11/8/20


05:43 11/8/20


07:56 11/8/20


12:13 





 


POC Whole Blood Glucose


 124 MG/DL


()  H 


 141 MG/DL


()  H 





 


Arterial Blood pH


 


 7.527


(7.350-7.450) 


 





 


Arterial Blood Partial


Pressure CO2 


 26.5 mmHg


(35.0-45.0)  L 


 





 


Arterial Blood Partial


Pressure O2 


 66.5 mmHg


(75.0-100.0)  L 


 





 


Arterial Blood HCO3


 


 21.5 mmol/L


(22.0-26.0)  L 


 





 


Arterial Blood Oxygen


Saturation 


 93.3 %


()  L 


 





 


Arterial Blood Base Excess  -0.5 (-2-2)    


 


Jerod Test  Positive    











Microbiology








 Date/Time


Source Procedure


Growth Status





 


 11/7/20 22:56


Sputum Expectorated Gram Stain - Final Resulted


 


 11/7/20 22:56


Sputum Expectorated Sputum Culture


Pending Resulted








Objective





HEAD AND NECK:  No JVD. Orally intubated


LUNGS:  Coarse rhonchi.


CARDIOVASCULAR:   Irregular S1 and S2 with no gallop.


ABDOMEN:  Soft.


EXTREMITIES:  No pitting edema.











Kvng Edmond MD                Nov 8, 2020 17:21

## 2020-11-08 NOTE — NUR
NURSE NOTES:



Ventilator setting changed by respiratory therapist to ac 14, Tv: 600 and FIo2 of 55% after 
the abg results were reviewed. 

patient remains with saturations of 100% and RR rate of 19 indicated by the ventilator. 

the et tube was repositioned and oral care was provided. 

patient repositioned flat in supine position. 

tube feeding remains at 40ml/hr.

## 2020-11-08 NOTE — NUR
NURSE NOTES:

Given oral care and suction. Repositioned Pt. on cardiac monitor with SR and SAO2 % 
with Vent. Will continue to monitor any change of condition.

## 2020-11-08 NOTE — NUR
NURSE NOTES:

Morning care was done. Given bed bath. Cleaned Pt and applied lotion and cream. BT checked 
99.2F . Keep cooing measure with cooling blanket. Tolerated well with NGT feeding and SaO2 
% with Vent setting. Given suction and oral care. Pt is sedated and RASS -2 and on 
running with Versed @ 3mg/hr(6cc/hr) and Levophed drip @ 3mcg/min as titrate. Collected 
blood sample. Placed fall precaution. Will continue to monitor any change of condition.

## 2020-11-08 NOTE — NUR
NURSE NOTES:

Received report from MiYeon, RN. Patient is sedated in bed. Orally intubated. ETT 7.5/25cm 
at lip line. AC 14, , FiO2 55%. O2 sat 100%. Given suction and oral care. On cardiac 
monitor with SR. Right NGT in place receiving Glucerna 1.2 at 50cc/hr. RN. Cummings in place 
draining to gravity. Right femoral TLC dressing is clean and dry and on running with 
Levophed @  4mcg/min and Versed at 2mg/hr with RASS -2. 1/2NS is running at 50cc/hr. No sign 
of pain by FLACC scale. Dressing is clean and dry on wound area. On P200 mattress for wound 
management. No fever. Pt has bilateral soft restraint and checked comfort and circulation. 
Placed fall precaution. Proper airborne isolation for COVID-19. Will continue plan of care.

## 2020-11-08 NOTE — NUR
NURSE HAND-OFF REPORT: 



Latest Vital Signs: Temperature 98.5 , Pulse 72 , B/P 129 /69 , Respiratory Rate 16 , O2 SAT 
100 , Mechanical Ventilator, O2 Flow Rate  .  

Vital Sign Comment: 



EKG Rhythm: Sinus Rhythm

Rhythm change?: N 

MD Notified?: 

MD Response: 



Latest Mejia Fall Score: 70  

Fall Risk: High Risk 

Safety Measures: Call light Within Reach, Bed Alarm Zone 1, Side Rails Side Rails x3, Bed 
position Low and Locked.

Fall Precautions: 

Yellow Socks

Yellow Gown

Door Sign

Patient Fall Education



Report given to LITA St.

## 2020-11-08 NOTE — NUR
NURSE NOTES:

Noted /69mmHg. on cardiac monitor with SR. Decrease Levophed drip @ 2mcg/min. Will 
continue to monitor any change of condition.

## 2020-11-08 NOTE — NUR
NURSE NOTES:



Report received from LITA Joe. Patient is sedated in bed. Patient will wake up and try to 
reach ETT at times. Continued bilateral soft wrist restraints. Rectal temp 98.5. SR on 
cardiac monitor. Orally intubated. ETT 7.5/25cm at lip line. AC 14, , FiO2 55%. O2 sat 
100%. Moderate amount of clear tan secretion from bite block and mouth noted and suctioned. 
Right NGT in place receiving Glucerna 1.2 at 50cc/hr. Residual 60cc as per LITA Joe. Cummings 
in place draining to gravity. Right femoral TLC patent and asymptomatic. Levo is running at 
4mcg/min and Versed at 2mg/hr with RASS -2. 1/2NS is running at 50cc/hr. Bed in lowest 
position. Side rails up x3. Will continue plan of care.

## 2020-11-08 NOTE — NUR
NURSE HAND-OFF REPORT: 



Latest Vital Signs: Temperature 99.2 , Pulse 71 , B/P 112 /79 , Respiratory Rate 16 , O2 SAT 
99 , Mechanical Ventilator, 



EKG Rhythm: Sinus Rhythm

Rhythm change?: N 



Latest Mejia Fall Score: 70  

Fall Risk: High Risk 

Safety Measures: Call light Within Reach, Bed Alarm Zone 2, Side Rails Side Rails x3, Bed 
position Low and Locked.

Fall Precautions: 

Yellow Socks

Yellow Gown

Door Sign

Patient Fall Education



Report given to LITA Joe. Pt is sedated on the bed and RASS -2 and on running Versed @ 
3mg/hr and Levophed @ 2mcg/min. Tolerated well NGT feeding and running with Glucerna 1.2@ 
40cc/hr.

## 2020-11-08 NOTE — NUR
NURSE HAND-OFF REPORT: 



Latest Vital Signs: Temperature 99.2 , Pulse 84 , B/P 90 /52 , Respiratory Rate 18 , O2 SAT 
100 , Mechanical Ventilator, O2 Flow Rate  .  

Vital Sign Comment: 



EKG Rhythm: Sinus Rhythm

Rhythm change?: N 

MD Notified?: DIDI Edmond MD Response: 



Latest Mejia Fall Score: 70  

Fall Risk: High Risk 

Safety Measures: Call light Within Reach, Bed Alarm Zone 1, Side Rails Side Rails x3, Bed 
position Low and Locked.

Fall Precautions: 

Yellow Socks

Yellow Gown

Door Sign

Patient Fall Education



Report given to Miyeon, RN. 

jose remains on levophed at 4mcg/min and versed at 2mg/hr.

## 2020-11-08 NOTE — NUR
NURSE NOTES:

Pt is sedated and sleeping on the bed. Trying to release bilateral soft restraint and 
successful. D/c's restraint. SaO2 100% with Current Vent setting. Turn and reposition. Will 
continue to monitor any change of condition.

## 2020-11-08 NOTE — NUR
NURSE NOTES:



Dr. Tobar updated of patient urinary status with bun and creatinine levels are 
decreasing from the previous day.

potassium chloride 20meq one dose hung is running through right femoral line. no additional 
orders given at this time.

## 2020-11-08 NOTE — DIAGNOSTIC IMAGING REPORT
EXAM:

  XR Chest, 1 View

 

CLINICAL HISTORY:

  Shortness of breath

 

TECHNIQUE:

  Frontal view of the chest.

 

COMPARISON:

  Chest x-rays dated 11/7/20, 11/06/20, 11/05/20.

 

FINDINGS:

  Lungs:  Persistent bilateral patchy pulmonary opacities, most prominent 

in the left lower lung.

  Pleural space:  Possible tiny bilateral pleural effusions, unchanged.

  Heart:  Unremarkable.  No cardiomegaly.

  Mediastinum:  Unremarkable.

  Bones/joints:  Patient is status post right shoulder replacement.

  Tubes, lines and devices:  Endotracheal tube tip 2.9 cm above the 

sd.  Telemetry leads overlie the thorax.

 

IMPRESSION:     

  No significant interval change from the prior day's chest x-ray.

## 2020-11-08 NOTE — NUR
NURSE NOTES:

Pt is sedated on the bed and RASS -2 and on running with Versed @ 3mg/hr(6cc/hr). BT checked 
99.1F . Keep cooing measure. SaO2 99% with Current Vent setting. Given suction and oral 
care. Tolerated well with NGT feeding and no residual noted. Increase NGT feeding @ 30cc/hr. 
Changed position. On running with Levophed drip @ 3mcg/min and BP is stable. Placed fall 
precaution. Will continue to monitor any change of condition.

## 2020-11-08 NOTE — NEPHROLOGY PROGRESS NOTE
Assessment/Plan


Problem List:  


(1) Dehydration


(2) JANES (acute kidney injury)


(3) Renal failure (ARF), acute on chronic


(4) Hypoxia


(5) Acute encephalopathy


(6) Electrolyte imbalance


Assessment





75-year-old male is admitted with acute hypoxic respiratory failure most likely 

secondary to pneumonia and sepsis, UTI.


Acute on chronic renal failure


Dehydration


Electrolyte imbalances, hypernatremia


Hypoalbuminemia


Diabetes type 2


History of congestive heart failure


Hypertension


Hyperlipemia


Alzheimer's


Previous COVID-19 infection in September 2020


Plan


November 8: In ICU.  Remains intubated on ventilator.  Remains full code.  Low 

potassium addressed.  Blood pressure fluctuating.  Continue per consultants.


November 7: Patient in ICU.  Intubated on ventilator.  On 6 mics of Levophed.  

Will give 100 cc albumin 25%.  K-Phos IV ordered.  Continue per consultants.  

Continue monitor renal parameters and electrolytes.


November 6: Status unchanged.  Transfer to [facility] for seizure.  Stable from renal 

standpoint to view.  Continue per consultants.


November 5: Status quo.  Labs reviewed.  Renal parameters stable.  Continue per 

consultants.


November 4: On nonrebreather mask.  Labs reviewed.  Renal parameters 

stable.Continue per pulmonologist.  Clinically unchanged.


November 3: On nonrebreather mask.  Inflammatory markers gradually declining.  

Renal parameters stable.  Continue per pulmonary.


November 2: Remains on nonrebreather mask.  Inflammatory markers remain 

elevated.  Renal parameters somewhat stable.  Continue per pulmonary and ID.  

Remains full code.


November 1: Patient on nonrebreather mask.  Labs noted.  Serum creatinine down 

to 1.3.  Continue per consultants.


October 31: Patient on nonrebreather mask.  Transfer to telemetry when seen this

morning.  Labs noted.  Continue per consultants.  Serum creatinine dylan to 1.7. 

Continue to monitor renal parameters.  Continue to monitor vancomycin level


October 30: Patient is not doing well clinically.  Mild respiratory distress.  

ABG noted.  Somewhat hypoxic.  CBC and chemistry panel ordered.  Discussed with 

LITA Garcia.  Will defer to pulmonary management to specialist.  Continue per ID.

 Renal parameters remained stable as of October 29.


October 29: Labs reviewed.  Renal parameters stable.  Continue per consultants.


October 28: Labs reviewed.  Potassium via NG tube ordered.  IV fluids stopped.  

Continue per consultants.


October 27: Labs reviewed.  Low potassium and low phosphorus replaced.  Continue

per consultants.  Remains stable from renal standpoint of view.


October 26: Patient remains n.p.o.  IV fluid down to 50 cc an hour.  Potassium 

supplement intravenously ordered.  Continue per consultants.





Previously:


Patient is n.p.o., will continue on IV fluid of D5W 75 cc an hour


We will monitor electrolytes and renal parameters


Avoid nephrotoxic's


Start p.o. when he clears by speech therapist, meanwhile aspiration precautions


Keep the blood pressure and blood sugar in check


Per orders





Subjective


ROS Limited/Unobtainable:  Yes





Objective


Objective





Last 24 Hour Vital Signs








  Date Time  Temp Pulse Resp B/P (MAP) Pulse Ox O2 Delivery O2 Flow Rate FiO2


 


11/8/20 09:15  80 16 153/76 (101) 99   


 


11/8/20 09:00  79 16 84/48 (60) 98   


 


11/8/20 08:30  70 16 113/55 (74) 99   


 


11/8/20 08:00  70      


 


11/8/20 08:00        45


 


11/8/20 08:00 98.3 69 16 119/65 (83) 98   


 


11/8/20 07:30  75 16 113/63 (80) 99   


 


11/8/20 07:00  71 16 112/79 (90) 99   


 


11/8/20 07:00   16   Mechanical Ventilator  45


 


11/8/20 06:45  69 16     45


 


11/8/20 06:30  71 16 123/70 (87) 99   


 


11/8/20 06:30  71 16     


 


11/8/20 06:00   15   Mechanical Ventilator  45


 


11/8/20 06:00  69 15 108/63 (78) 99   


 


11/8/20 05:30  73 16 113/66 (82) 99   


 


11/8/20 05:00  74 17 133/69 (90) 100   


 


11/8/20 05:00   16   Mechanical Ventilator  45


 


11/8/20 04:30  75 16 137/71 (93) 100   


 


11/8/20 04:00        45


 


11/8/20 04:00      Mechanical Ventilator  


 


11/8/20 04:00 99.2 73 16 126/63 (84) 98   


 


11/8/20 04:00   16   Mechanical Ventilator  45


 


11/8/20 04:00  84      


 


11/8/20 03:30  83 16 102/50 (67) 96   


 


11/8/20 03:00   16   Mechanical Ventilator  45


 


11/8/20 03:00  84 16 103/52 (69) 97   


 


11/8/20 02:30  76 16 98/59 (72) 99   


 


11/8/20 02:30  80 17     45


 


11/8/20 02:00  72 16 107/59 (75) 99   


 


11/8/20 02:00   16   Mechanical Ventilator  45


 


11/8/20 01:30  73 17 129/63 (85) 100   


 


11/8/20 01:02   16   Mechanical Ventilator  45


 


11/8/20 01:00   19   Mechanical Ventilator  45


 


11/8/20 01:00  79 19 121/58 (79) 100   


 


11/8/20 00:30  75 15 120/54 (76) 100   


 


11/8/20 00:00 99.1 69 16 97/51 (66) 100   


 


11/8/20 00:00        45


 


11/8/20 00:00   16   Mechanical Ventilator  45


 


11/8/20 00:00      Mechanical Ventilator  


 


11/8/20 00:00  73      


 


11/7/20 23:30  73 16 112/52 (72) 100   


 


11/7/20 23:00  74 16 107/49 (68) 100   


 


11/7/20 23:00   16   Mechanical Ventilator  45


 


11/7/20 22:30  71 16 99/53 (68) 99   


 


11/7/20 22:30  74 16     45


 


11/7/20 22:00   16   Mechanical Ventilator  45


 


11/7/20 22:00  71 16 100/52 (68) 100   


 


11/7/20 21:30  70 16 115/54 (74) 100   


 


11/7/20 21:00  66 16 123/57 (79) 100   


 


11/7/20 21:00   16   Mechanical Ventilator  45


 


11/7/20 20:30 97.9 72 16 92/57 (69) 100   


 


11/7/20 20:00  67 16 140/68 (92) 100   


 


11/7/20 20:00   16   Mechanical Ventilator  45


 


11/7/20 20:00      Mechanical Ventilator  


 


11/7/20 20:00        45


 


11/7/20 20:00  66      


 


11/7/20 19:30  61 16 134/63 (86) 100   


 


11/7/20 19:00  67 16 119/68 (85) 100   


 


11/7/20 19:00   16   Mechanical Ventilator  45


 


11/7/20 18:30  70 16     45


 


11/7/20 17:38    101/44    


 


11/7/20 17:14  72 21     45


 


11/7/20 17:00  65 16 132/78 (96) 100   


 


11/7/20 16:45  70 17 132/69 (90) 95   


 


11/7/20 16:30  76 20 118/50 (72) 100   


 


11/7/20 16:15  75 22 159/79 (105) 100   


 


11/7/20 16:00        45


 


11/7/20 16:00      Mechanical Ventilator  


 


11/7/20 16:00  78      


 


11/7/20 16:00 95.9 69 19  100   


 


11/7/20 15:19  73 21     45


 


11/7/20 13:27  86 16     50


 


11/7/20 12:30  83 16 90/53 (65) 98   


 


11/7/20 12:15  78 16 103/77 (86) 100   


 


11/7/20 12:00      Mechanical Ventilator  


 


11/7/20 12:00  89      


 


11/7/20 12:00  83 16 101/60 (74) 97   


 


11/7/20 12:00        45


 


11/7/20 11:45 99.0 93 16 84/53 (63) 98   


 


11/7/20 11:30  85 17 97/65 (76) 100   


 


11/7/20 11:28  82 16     50


 


11/7/20 11:15  82 16 101/60 (74) 98   


 


11/7/20 11:00  82 18 102/64 (77) 98   


 


11/7/20 10:45  83 17 97/61 (73) 98   


 


11/7/20 10:30  83 18 100/60 (73) 97   

















Intake and Output  


 


 11/7/20 11/8/20





 19:00 07:00


 


Intake Total 1462.5 ml 1108.05 ml


 


Output Total 65 ml 390 ml


 


Balance 1397.5 ml 718.05 ml


 


  


 


IV Total 1432.5 ml 798.05 ml


 


Tube Feeding 30 ml 310 ml


 


Output Urine Total 65 ml 390 ml











Current Medications








 Medications


  (Trade)  Dose


 Ordered  Sig/Miky


 Route


 PRN Reason  Start Time


 Stop Time Status Last Admin


Dose Admin


 


 Acetaminophen


  (Tylenol)  650 mg  Q4H  PRN


 ORAL


 Temp >100.5  10/23/20 20:30


 11/22/20 20:29  11/5/20 22:39





 


 Chlorhexidine


 Gluconate


  (Jess-Hex 2%)  1 applic  DAILY@2000


 TOPIC


   11/6/20 20:00


 2/4/21 19:59  11/7/20 19:55





 


 Dextrose


  (Dextrose 50%)  50 ml  Q30M  PRN


 IV


 Hypoglycemia  10/23/20 22:45


 1/21/21 22:44   





 


 Heparin Sodium


  (Porcine)


  (Heparin 5000


 units/ml)  5,000 units  EVERY 12  HOURS


 SUBQ


   10/23/20 21:00


 12/7/20 20:59  11/8/20 08:24





 


 Insulin Aspart


  (NovoLOG)    EVERY 6  HOURS


 SUBQ


   11/2/20 06:00


 1/22/21 06:29  11/6/20 01:18





 


 Lorazepam


  (Ativan 2mg/ml


 1ml)  2 mg  Q4H  PRN


 IV


 For Anxiety  11/6/20 12:45


 11/13/20 12:44   





 


 Meropenem 1 gm/


 Sodium Chloride  55 ml @ 


 110 mls/hr  Q12HR@0400,1600


 IVPB


   11/2/20 16:00


 11/11/20 23:59  11/8/20 03:19





 


 Micafungin Sodium


 100 mg/Sodium


 Chloride  110 ml @ 


 110 mls/hr  Q24H


 IVPB


   11/7/20 16:00


 11/14/20 15:59  11/7/20 16:40





 


 Midazolam HCl 50


 mg/Sodium Chloride  100 ml @ 0


 mls/hr  Q24H  PRN


 IV


 Restlessness  11/6/20 13:20


 11/8/20 13:19  11/8/20 01:02





 


 Morphine Sulfate


  (Morphine


 Sulfate)  4 mg  Q4H  PRN


 IVP


 For Pain  11/6/20 12:45


 11/13/20 12:44   





 


 Nitroglycerin


  (Ntg)  0.4 mg  Q5M  PRN


 SL


 Prn Chest Pain  10/23/20 20:30


 11/22/20 20:29   





 


 Norepinephrine


 Bitartrate  250 ml @ 0


 mls/hr  Q24H


 IV


   11/6/20 15:50


 11/9/20 15:49  11/7/20 17:38





 


 Ondansetron HCl


  (Zofran)  4 mg  Q6H  PRN


 IVP


 Nausea & Vomiting  10/23/20 20:30


 11/22/20 20:29   





 


 Pantoprazole


  (Protonix)  40 mg  DAILY


 IV


   11/7/20 09:00


 12/7/20 08:59  11/8/20 08:21





 


 Polyethylene


 Glycol


  (Miralax)  17 gm  DAILYPRN  PRN


 ORAL


 Constipation  10/23/20 20:30


 11/22/20 20:29   





 


 Promethazine HCl/


 Codeine


  (Phenergan with


 Codeine)  5 ml  Q4H  PRN


 ORAL


 For Cough  10/23/20 20:30


 11/22/20 20:29   





 


 Sodium Chloride  1,000 ml @ 


 50 mls/hr  Q20H


 IV


   11/6/20 16:00


 12/6/20 15:59  11/8/20 05:12





 


 Tamsulosin HCl


  (Flomax)  0.4 mg  BID


 ORAL


   10/31/20 19:45


 11/23/20 08:59  11/8/20 08:21








Laboratory Tests


11/7/20 12:35: POC Whole Blood Glucose 139H


11/7/20 17:20: POC Whole Blood Glucose 123H


11/7/20 18:00: 


Urine Color Pale yellow, Urine Appearance Clear, Urine pH 8, Urine Specific 

Gravity 1.010, Urine Protein 2+H, Urine Glucose (UA) Negative, Urine Ketones 2+H

, Urine Blood 1+H, Urine Nitrite Negative, Urine Bilirubin Negative, Urine 

Urobilinogen Normal, Urine Leukocyte Esterase Negative, Urine RBC 0-2H, Urine 

WBC 2-4, Urine Squamous Epithelial Cells Occasional, Urine Bacteria Few


11/8/20 00:14: POC Whole Blood Glucose 109H


11/8/20 04:15: 


White Blood Count 9.4, Red Blood Count 2.97L, Hemoglobin 8.9L, Hematocrit 28.8L,

Mean Corpuscular Volume 97, Mean Corpuscular Hemoglobin 30.1, Mean Corpuscular 

Hemoglobin Concent 31.0L, Red Cell Distribution Width 15.4H, Platelet Count 370,

Mean Platelet Volume 8.5, Neutrophils (%) (Auto) 73.6, Lymphocytes (%) (Auto) 

14.1L, Monocytes (%) (Auto) 8.7, Eosinophils (%) (Auto) 1.6, Basophils (%) 

(Auto) 2.0, Sodium Level 146H, Potassium Level 3.3L, Chloride Level 111H, Carbon

Dioxide Level 25, Blood Urea Nitrogen 18, Creatinine 1.1, Estimat Glomerular 

Filtration Rate > 60, Glucose Level 126H, Uric Acid 4.5, Calcium Level 7.6L, 

Phosphorus Level 2.9, Magnesium Level 2.3, Total Bilirubin 0.4, Aspartate Amino 

Transf (AST/SGOT) 32, Alanine Aminotransferase (ALT/SGPT) 27, Alkaline 

Phosphatase 230H, C-Reactive Protein, Quantitative 14.6H, Total Protein 6.4, 

Albumin 1.5L, Globulin 4.9, Albumin/Globulin Ratio 0.3L


11/8/20 07:56: 


Arterial Blood pH 7.527H, Arterial Blood Partial Pressure CO2 26.5L, Arterial 

Blood Partial Pressure O2 66.5L, Arterial Blood HCO3 21.5L, Arterial Blood 

Oxygen Saturation 93.3L, Arterial Blood Base Excess -0.5, Jerod Test Positive


Height (Feet):  5


Height (Inches):  4.00


Weight (Pounds):  130


General Appearance:  no apparent distress


EENT:  other - Intubated in ICU


Cardiovascular:  normal rate


Respiratory/Chest:  decreased breath sounds


Abdomen:  distended











Seven Tobar MD             Nov 8, 2020 10:21

## 2020-11-08 NOTE — NUR
NURSE NOTES:



Levophed titrated up to 4mcg/min due to bp being 84/48. 

heart rate remains at 74-85 in sinus rhythm. 



James mejia is conducting rounds and updated on patient progress at the bedside.

also notified the ABG sample has been obtained and sent for analysis. 

he is intubated using a 7.5 et-tube at 25cm with ventilator setting of Ac16, Tv: 600, and 
FIO2 of 45%

tube feeding remains at 40ml/hr with 10mls of residual. 

he is sedated using versed at 3mg/hr at rate of 6ml/hr. he is at RASS score of -2, he 
responds with light movement when providing tactile stimulus, Cummings remains draining clear 
straw urine.

## 2020-11-09 VITALS — DIASTOLIC BLOOD PRESSURE: 53 MMHG | SYSTOLIC BLOOD PRESSURE: 89 MMHG

## 2020-11-09 VITALS — DIASTOLIC BLOOD PRESSURE: 65 MMHG | SYSTOLIC BLOOD PRESSURE: 128 MMHG

## 2020-11-09 VITALS — SYSTOLIC BLOOD PRESSURE: 93 MMHG | DIASTOLIC BLOOD PRESSURE: 57 MMHG

## 2020-11-09 VITALS — SYSTOLIC BLOOD PRESSURE: 111 MMHG | DIASTOLIC BLOOD PRESSURE: 58 MMHG

## 2020-11-09 VITALS — DIASTOLIC BLOOD PRESSURE: 67 MMHG | SYSTOLIC BLOOD PRESSURE: 127 MMHG

## 2020-11-09 VITALS — DIASTOLIC BLOOD PRESSURE: 58 MMHG | SYSTOLIC BLOOD PRESSURE: 107 MMHG

## 2020-11-09 VITALS — DIASTOLIC BLOOD PRESSURE: 57 MMHG | SYSTOLIC BLOOD PRESSURE: 94 MMHG

## 2020-11-09 VITALS — SYSTOLIC BLOOD PRESSURE: 130 MMHG | DIASTOLIC BLOOD PRESSURE: 76 MMHG

## 2020-11-09 VITALS — SYSTOLIC BLOOD PRESSURE: 120 MMHG | DIASTOLIC BLOOD PRESSURE: 63 MMHG

## 2020-11-09 VITALS — SYSTOLIC BLOOD PRESSURE: 134 MMHG | DIASTOLIC BLOOD PRESSURE: 72 MMHG

## 2020-11-09 VITALS — DIASTOLIC BLOOD PRESSURE: 59 MMHG | SYSTOLIC BLOOD PRESSURE: 102 MMHG

## 2020-11-09 VITALS — SYSTOLIC BLOOD PRESSURE: 96 MMHG | DIASTOLIC BLOOD PRESSURE: 57 MMHG

## 2020-11-09 VITALS — SYSTOLIC BLOOD PRESSURE: 93 MMHG | DIASTOLIC BLOOD PRESSURE: 52 MMHG

## 2020-11-09 VITALS — SYSTOLIC BLOOD PRESSURE: 149 MMHG | DIASTOLIC BLOOD PRESSURE: 76 MMHG

## 2020-11-09 VITALS — DIASTOLIC BLOOD PRESSURE: 54 MMHG | SYSTOLIC BLOOD PRESSURE: 90 MMHG

## 2020-11-09 VITALS — SYSTOLIC BLOOD PRESSURE: 92 MMHG | DIASTOLIC BLOOD PRESSURE: 56 MMHG

## 2020-11-09 VITALS — DIASTOLIC BLOOD PRESSURE: 69 MMHG | SYSTOLIC BLOOD PRESSURE: 120 MMHG

## 2020-11-09 VITALS — SYSTOLIC BLOOD PRESSURE: 107 MMHG | DIASTOLIC BLOOD PRESSURE: 58 MMHG

## 2020-11-09 VITALS — DIASTOLIC BLOOD PRESSURE: 57 MMHG | SYSTOLIC BLOOD PRESSURE: 115 MMHG

## 2020-11-09 VITALS — DIASTOLIC BLOOD PRESSURE: 54 MMHG | SYSTOLIC BLOOD PRESSURE: 91 MMHG

## 2020-11-09 VITALS — DIASTOLIC BLOOD PRESSURE: 55 MMHG | SYSTOLIC BLOOD PRESSURE: 97 MMHG

## 2020-11-09 VITALS — DIASTOLIC BLOOD PRESSURE: 65 MMHG | SYSTOLIC BLOOD PRESSURE: 131 MMHG

## 2020-11-09 VITALS — DIASTOLIC BLOOD PRESSURE: 67 MMHG | SYSTOLIC BLOOD PRESSURE: 125 MMHG

## 2020-11-09 VITALS — SYSTOLIC BLOOD PRESSURE: 89 MMHG | DIASTOLIC BLOOD PRESSURE: 56 MMHG

## 2020-11-09 VITALS — DIASTOLIC BLOOD PRESSURE: 62 MMHG | SYSTOLIC BLOOD PRESSURE: 120 MMHG

## 2020-11-09 VITALS — SYSTOLIC BLOOD PRESSURE: 116 MMHG | DIASTOLIC BLOOD PRESSURE: 63 MMHG

## 2020-11-09 VITALS — DIASTOLIC BLOOD PRESSURE: 62 MMHG | SYSTOLIC BLOOD PRESSURE: 124 MMHG

## 2020-11-09 VITALS — SYSTOLIC BLOOD PRESSURE: 113 MMHG | DIASTOLIC BLOOD PRESSURE: 63 MMHG

## 2020-11-09 VITALS — DIASTOLIC BLOOD PRESSURE: 54 MMHG | SYSTOLIC BLOOD PRESSURE: 85 MMHG

## 2020-11-09 VITALS — SYSTOLIC BLOOD PRESSURE: 105 MMHG | DIASTOLIC BLOOD PRESSURE: 60 MMHG

## 2020-11-09 VITALS — SYSTOLIC BLOOD PRESSURE: 116 MMHG | DIASTOLIC BLOOD PRESSURE: 71 MMHG

## 2020-11-09 VITALS — DIASTOLIC BLOOD PRESSURE: 64 MMHG | SYSTOLIC BLOOD PRESSURE: 117 MMHG

## 2020-11-09 VITALS — DIASTOLIC BLOOD PRESSURE: 66 MMHG | SYSTOLIC BLOOD PRESSURE: 112 MMHG

## 2020-11-09 VITALS — DIASTOLIC BLOOD PRESSURE: 57 MMHG | SYSTOLIC BLOOD PRESSURE: 91 MMHG

## 2020-11-09 VITALS — SYSTOLIC BLOOD PRESSURE: 95 MMHG | DIASTOLIC BLOOD PRESSURE: 55 MMHG

## 2020-11-09 VITALS — DIASTOLIC BLOOD PRESSURE: 56 MMHG | SYSTOLIC BLOOD PRESSURE: 100 MMHG

## 2020-11-09 VITALS — SYSTOLIC BLOOD PRESSURE: 96 MMHG | DIASTOLIC BLOOD PRESSURE: 53 MMHG

## 2020-11-09 VITALS — DIASTOLIC BLOOD PRESSURE: 60 MMHG | SYSTOLIC BLOOD PRESSURE: 105 MMHG

## 2020-11-09 VITALS — DIASTOLIC BLOOD PRESSURE: 63 MMHG | SYSTOLIC BLOOD PRESSURE: 111 MMHG

## 2020-11-09 VITALS — SYSTOLIC BLOOD PRESSURE: 100 MMHG | DIASTOLIC BLOOD PRESSURE: 55 MMHG

## 2020-11-09 VITALS — DIASTOLIC BLOOD PRESSURE: 63 MMHG | SYSTOLIC BLOOD PRESSURE: 117 MMHG

## 2020-11-09 VITALS — SYSTOLIC BLOOD PRESSURE: 116 MMHG | DIASTOLIC BLOOD PRESSURE: 62 MMHG

## 2020-11-09 VITALS — DIASTOLIC BLOOD PRESSURE: 66 MMHG | SYSTOLIC BLOOD PRESSURE: 118 MMHG

## 2020-11-09 LAB
ADD MANUAL DIFF: NO
ALBUMIN SERPL-MCNC: 1.4 G/DL (ref 3.4–5)
ALP SERPL-CCNC: 212 U/L (ref 46–116)
ALT SERPL-CCNC: 28 U/L (ref 12–78)
ANION GAP SERPL CALC-SCNC: 7 MMOL/L (ref 5–15)
AST SERPL-CCNC: 24 U/L (ref 15–37)
BASOPHILS NFR BLD AUTO: 1.2 % (ref 0–2)
BILIRUB DIRECT SERPL-MCNC: 0.1 MG/DL (ref 0–0.3)
BILIRUB SERPL-MCNC: 0.2 MG/DL (ref 0.2–1)
BUN SERPL-MCNC: 17 MG/DL (ref 7–18)
CALCIUM SERPL-MCNC: 8.1 MG/DL (ref 8.5–10.1)
CHLORIDE SERPL-SCNC: 112 MMOL/L (ref 98–107)
CO2 SERPL-SCNC: 26 MMOL/L (ref 21–32)
CREAT SERPL-MCNC: 1.1 MG/DL (ref 0.55–1.3)
EOSINOPHIL NFR BLD AUTO: 1.5 % (ref 0–3)
ERYTHROCYTE [DISTWIDTH] IN BLOOD BY AUTOMATED COUNT: 15.5 % (ref 11.6–14.8)
HCT VFR BLD CALC: 31.1 % (ref 42–52)
HGB BLD-MCNC: 9.5 G/DL (ref 14.2–18)
LYMPHOCYTES NFR BLD AUTO: 12.6 % (ref 20–45)
MCV RBC AUTO: 98 FL (ref 80–99)
MONOCYTES NFR BLD AUTO: 6.6 % (ref 1–10)
NEUTROPHILS NFR BLD AUTO: 78.2 % (ref 45–75)
PHOSPHATE SERPL-MCNC: 3.3 MG/DL (ref 2.5–4.9)
PLATELET # BLD: 406 K/UL (ref 150–450)
POTASSIUM SERPL-SCNC: 3.9 MMOL/L (ref 3.5–5.1)
RBC # BLD AUTO: 3.18 M/UL (ref 4.7–6.1)
SODIUM SERPL-SCNC: 145 MMOL/L (ref 136–145)
WBC # BLD AUTO: 9.7 K/UL (ref 4.8–10.8)

## 2020-11-09 PROCEDURE — B548ZZA ULTRASONOGRAPHY OF SUPERIOR VENA CAVA, GUIDANCE: ICD-10-PCS

## 2020-11-09 PROCEDURE — 02HV33Z INSERTION OF INFUSION DEVICE INTO SUPERIOR VENA CAVA, PERCUTANEOUS APPROACH: ICD-10-PCS

## 2020-11-09 RX ADMIN — INSULIN ASPART SCH UNITS: 100 INJECTION, SOLUTION INTRAVENOUS; SUBCUTANEOUS at 17:58

## 2020-11-09 RX ADMIN — HEPARIN SODIUM SCH UNITS: 5000 INJECTION INTRAVENOUS; SUBCUTANEOUS at 20:19

## 2020-11-09 RX ADMIN — TAMSULOSIN HYDROCHLORIDE SCH MG: 0.4 CAPSULE ORAL at 08:02

## 2020-11-09 RX ADMIN — CHLORHEXIDINE GLUCONATE SCH APPLIC: 213 SOLUTION TOPICAL at 19:54

## 2020-11-09 RX ADMIN — Medication SCH MLS/HR: at 05:28

## 2020-11-09 RX ADMIN — TAMSULOSIN HYDROCHLORIDE SCH MG: 0.4 CAPSULE ORAL at 17:07

## 2020-11-09 RX ADMIN — Medication SCH MLS/HR: at 17:07

## 2020-11-09 RX ADMIN — INSULIN ASPART SCH UNITS: 100 INJECTION, SOLUTION INTRAVENOUS; SUBCUTANEOUS at 06:09

## 2020-11-09 RX ADMIN — INSULIN ASPART SCH UNITS: 100 INJECTION, SOLUTION INTRAVENOUS; SUBCUTANEOUS at 12:00

## 2020-11-09 RX ADMIN — INSULIN ASPART SCH UNITS: 100 INJECTION, SOLUTION INTRAVENOUS; SUBCUTANEOUS at 00:06

## 2020-11-09 RX ADMIN — PANTOPRAZOLE SODIUM SCH MG: 40 INJECTION, POWDER, FOR SOLUTION INTRAVENOUS at 08:02

## 2020-11-09 RX ADMIN — DEXTROSE MONOHYDRATE SCH MLS/HR: 50 INJECTION, SOLUTION INTRAVENOUS at 15:22

## 2020-11-09 RX ADMIN — MEROPENEM SCH MLS/HR: 1 INJECTION INTRAVENOUS at 03:23

## 2020-11-09 RX ADMIN — HEPARIN SODIUM SCH UNITS: 5000 INJECTION INTRAVENOUS; SUBCUTANEOUS at 08:03

## 2020-11-09 RX ADMIN — MEROPENEM SCH MLS/HR: 1 INJECTION INTRAVENOUS at 15:22

## 2020-11-09 RX ADMIN — MIDAZOLAM HYDROCHLORIDE PRN MLS/HR: 5 INJECTION INTRAMUSCULAR; INTRAVENOUS at 17:09

## 2020-11-09 NOTE — NUR
NURSE NOTES:



Chest x-ray confirmation  shows placement of picc line is at proper place and okay to use, 

picc line dressing is dry and intact, bio-patch is present and dressing is placed.

## 2020-11-09 NOTE — INTERNAL MED PROGRESS NOTE
Subjective


Date of Service:  Nov 9, 2020


Physician Name


Benito Hearn


Attending Physician


Andrei Cancino MD





Current Medications








 Medications


  (Trade)  Dose


 Ordered  Sig/Miky


 Route


 PRN Reason  Start Time


 Stop Time Status Last Admin


Dose Admin


 


 Acetaminophen


  (Tylenol)  650 mg  Q4H  PRN


 ORAL


 Temp >100.5  10/23/20 20:30


 11/22/20 20:29  11/5/20 22:39





 


 Chlorhexidine


 Gluconate


  (Jess-Hex 2%)  1 applic  DAILY@2000


 TOPIC


   11/6/20 20:00


 2/4/21 19:59  11/8/20 20:10





 


 Dextrose


  (Dextrose 50%)  50 ml  Q30M  PRN


 IV


 Hypoglycemia  10/23/20 22:45


 1/21/21 22:44   





 


 Heparin Sodium


  (Porcine)


  (Heparin 5000


 units/ml)  5,000 units  EVERY 12  HOURS


 SUBQ


   10/23/20 21:00


 12/7/20 20:59  11/9/20 08:03





 


 Heparin Sodium/


 Sodium Chloride


  (Heparin 1000


 units/500ml


 Premix)  1,000 unit  ONCE  PRN


 IV


 PROCEDURE  11/9/20 12:47


 11/9/20 23:59   





 


 Insulin Aspart


  (NovoLOG)    EVERY 6  HOURS


 SUBQ


   11/2/20 06:00


 1/22/21 06:29  11/9/20 06:09





 


 Lidocaine HCl


  (Xylocaine 1%


 30ml)  30 ml  ONCE  PRN


 INJ


 PROCEDURE  11/9/20 12:48


 11/9/20 23:59   





 


 Lorazepam


  (Ativan 2mg/ml


 1ml)  2 mg  Q4H  PRN


 IV


 For Anxiety  11/6/20 12:45


 11/13/20 12:44   





 


 Meropenem 1 gm/


 Sodium Chloride  55 ml @ 


 110 mls/hr  Q12HR@0400,1600


 IVPB


   11/2/20 16:00


 11/11/20 23:59  11/9/20 15:22





 


 Micafungin Sodium


 100 mg/Sodium


 Chloride  110 ml @ 


 110 mls/hr  Q24H


 IVPB


   11/7/20 16:00


 11/14/20 15:59  11/9/20 15:22





 


 Midazolam HCl 50


 mg/Sodium Chloride  100 ml @ 0


 mls/hr  Q24H  PRN


 IV


 Restlessness  11/8/20 14:00


 11/10/20 13:46  11/9/20 17:09





 


 Midodrine


  (Pro-Amatine)  5 mg  TID


 ORAL


   11/8/20 18:00


 2/6/21 17:59  11/9/20 17:08





 


 Morphine Sulfate


  (Morphine


 Sulfate)  4 mg  Q4H  PRN


 IVP


 For Pain  11/6/20 12:45


 11/13/20 12:44   





 


 Nitroglycerin


  (Ntg)  0.4 mg  Q5M  PRN


 SL


 Prn Chest Pain  10/23/20 20:30


 11/22/20 20:29   





 


 Norepinephrine


 Bitartrate  250 ml @ 0


 mls/hr  Q24H


 IV


   11/9/20 15:45


 11/12/20 15:44  11/9/20 17:07





 


 Ondansetron HCl


  (Zofran)  4 mg  Q6H  PRN


 IVP


 Nausea & Vomiting  10/23/20 20:30


 11/22/20 20:29   





 


 Pantoprazole


  (Protonix)  40 mg  DAILY


 IV


   11/7/20 09:00


 12/7/20 08:59  11/9/20 08:02





 


 Polyethylene


 Glycol


  (Miralax)  17 gm  DAILYPRN  PRN


 ORAL


 Constipation  10/23/20 20:30


 11/22/20 20:29   





 


 Promethazine HCl/


 Codeine


  (Phenergan with


 Codeine)  5 ml  Q4H  PRN


 ORAL


 For Cough  10/23/20 20:30


 11/22/20 20:29   





 


 Sodium Chloride  1,000 ml @ 


 50 mls/hr  Q20H


 IV


   11/6/20 16:00


 12/6/20 15:59  11/9/20 01:58





 


 Tamsulosin HCl


  (Flomax)  0.4 mg  BID


 ORAL


   10/31/20 19:45


 11/23/20 08:59  11/9/20 17:07











Allergies:  


Coded Allergies:  


     No Known Allergies (Unverified , 8/20/12)


ROS Limited/Unobtainable:  Yes


Subjective


74 YO M admitted with shortness of breath.  Now pneumonia; previously COVID 

positive.  Cover for Int [facility]ierra-Dr Cancino.  ICU.  Intubated and sedated





Objective





Last Vital Signs








  Date Time  Temp Pulse Resp B/P (MAP) Pulse Ox O2 Delivery O2 Flow Rate FiO2


 


11/9/20 19:00   15   Mechanical Ventilator  35


 


11/9/20 19:00  78  107/58 (74) 96   


 


11/9/20 16:00 97.6       


 


11/6/20 12:00       15.0 











Laboratory Tests








Test


 11/8/20


23:50 11/9/20


05:27 11/9/20


05:33 11/9/20


05:54


 


POC Whole Blood Glucose


 Pending  


 


 149 MG/DL


()  H 





 


White Blood Count


 


 9.7 K/UL


(4.8-10.8) 


 





 


Red Blood Count


 


 3.18 M/UL


(4.70-6.10)  L 


 





 


Hemoglobin


 


 9.5 G/DL


(14.2-18.0)  L 


 





 


Hematocrit


 


 31.1 %


(42.0-52.0)  L 


 





 


Mean Corpuscular Volume  98 FL (80-99)    


 


Mean Corpuscular Hemoglobin


 


 29.9 PG


(27.0-31.0) 


 





 


Mean Corpuscular Hemoglobin


Concent 


 30.6 G/DL


(32.0-36.0)  L 


 





 


Red Cell Distribution Width


 


 15.5 %


(11.6-14.8)  H 


 





 


Platelet Count


 


 406 K/UL


(150-450) 


 





 


Mean Platelet Volume


 


 8.4 FL


(6.5-10.1) 


 





 


Neutrophils (%) (Auto)


 


 78.2 %


(45.0-75.0)  H 


 





 


Lymphocytes (%) (Auto)


 


 12.6 %


(20.0-45.0)  L 


 





 


Monocytes (%) (Auto)


 


 6.6 %


(1.0-10.0) 


 





 


Eosinophils (%) (Auto)


 


 1.5 %


(0.0-3.0) 


 





 


Basophils (%) (Auto)


 


 1.2 %


(0.0-2.0) 


 





 


Sodium Level


 


 145 MMOL/L


(136-145) 


 





 


Potassium Level


 


 3.9 MMOL/L


(3.5-5.1) 


 





 


Chloride Level


 


 112 MMOL/L


()  H 


 





 


Carbon Dioxide Level


 


 26 MMOL/L


(21-32) 


 





 


Anion Gap


 


 7 mmol/L


(5-15) 


 





 


Blood Urea Nitrogen


 


 17 mg/dL


(7-18) 


 





 


Creatinine


 


 1.1 MG/DL


(0.55-1.30) 


 





 


Estimat Glomerular Filtration


Rate 


 > 60 mL/min


(>60) 


 





 


Glucose Level


 


 151 MG/DL


()  H 


 





 


Calcium Level


 


 8.1 MG/DL


(8.5-10.1)  L 


 





 


Phosphorus Level


 


 


 


 3.3 MG/DL


(2.5-4.9)


 


Magnesium Level


 


 


 


 2.4 MG/DL


(1.8-2.4)


 


Total Bilirubin


 


 


 


 0.2 MG/DL


(0.2-1.0)


 


Direct Bilirubin


 


 


 


 0.1 MG/DL


(0.0-0.3)


 


Aspartate Amino Transf


(AST/SGOT) 


 


 


 24 U/L (15-37)





 


Alanine Aminotransferase


(ALT/SGPT) 


 


 


 28 U/L (12-78)





 


Alkaline Phosphatase


 


 


 


 212 U/L


()  H


 


Total Protein


 


 


 


 5.5 G/DL


(6.4-8.2)  L


 


Albumin


 


 


 


 1.4 G/DL


(3.4-5.0)  L


 


Test


 11/9/20


07:10 11/9/20


12:33 


 





 


Arterial Blood pH


 7.487


(7.350-7.450) 


 


 





 


Arterial Blood Partial


Pressure CO2 30.3 mmHg


(35.0-45.0)  L 


 


 





 


Arterial Blood Partial


Pressure O2 115.1 mmHg


(75.0-100.0)  H 


 


 





 


Arterial Blood HCO3


 22.4 mmol/L


(22.0-26.0) 


 


 





 


Arterial Blood Oxygen


Saturation 97.4 %


() 


 


 





 


Arterial Blood Base Excess -0.5 (-2-2)     


 


Jerod Test Positive     


 


POC Whole Blood Glucose


 


 134 MG/DL


()  H 


 














Microbiology








 Date/Time


Source Procedure


Growth Status





 


 11/7/20 22:56


Sputum Expectorated Gram Stain - Final Resulted


 


 11/7/20 22:56 Sputum Culture - Preliminary


Staphylococcus Aureus Resulted

















Intake and Output  


 


 11/8/20 11/9/20





 19:00 07:00


 


Intake Total 1755.875 ml 1496 ml


 


Output Total 525 ml 660 ml


 


Balance 1230.875 ml 836 ml


 


  


 


Free Water 110 ml 


 


IV Total 1055.875 ml 826 ml


 


Tube Feeding 550 ml 670 ml


 


Other 40 ml 


 


Output Urine Total 525 ml 660 ml








Objective


PHYSICAL EXAMINATION:


GENERAL:  The patient awake with deep stimuli, open his eyes, however,


cannot follow commands.  The patient is on a Ventimask at this time,


chronically ill-appearing.


HEAD AND NECK:  Pupils are equal and reactive to light.  Anicteric.


NECK:  Supple.  No JVD.


LUNGS:  Mech vent;  wheezing, rhonchi, and decreased air in bases.


HEART:  S1, S2.  Regular rhythm.  Distant heart sounds.  No murmur or


gallop.


ABDOMEN:  Soft, nondistended, nontender.  Positive bowel sounds.


EXTREMITIES:  No cyanosis, clubbing, or edema.


NEUROLOGIC:  Very limited secondary to the patient's status, cannot follow


commands.  Opens his eyes with deep stimuli and moving extremities


spontaneously.





Assessment/Plan


Assessment/Plan


ASSESSMENT:


1. Acute hypoxemic respiratory failure, most likely secondary to


pneumonia and sepsis.


2. Sepsis secondary to urinary tract infection and pneumonia.


3. pneumonia=MRSA; ESBL E. coli


4. Acute kidney injury on chronic renal insufficiency.


5. Dehydration.


6. History of chronic congestive heart failure.


7. Diabetes type 2.


8. Dyslipidemia.


9. Hypertension.


10. Alzheimer's disease.


11. COVID 19 previous positive





PLAN:


1.  ICU


2.  Dr. Sandoval,=Pulmonary Critical Care; follow [facility] vent recs


3.  Dr. Garcia = Inf Dis.


4.  IV= D5W due to the hypernatremia and dehydration.


5.  antibiotics = micafungin and meropenem


6.  Code status is full code.


7.    DVT prophylaxis is heparin subcutaneous.











Benito Hearn MD                Nov 9, 2020 19:19

## 2020-11-09 NOTE — DIAGNOSTIC IMAGING REPORT
Indication: Shortness of breath

 

Technique: One view of the chest

 

Comparison: 11/8/2020

 

Findings: Stable satisfactory positions of the orogastric and endotracheal tubes.

Bilateral infiltrates versus edema and small bilateral pleural effusions are

unchanged.

 

Impression:  Unchanged, over one day, findings as above.

## 2020-11-09 NOTE — NUR
NURSE NOTES:

Morning care was done. Cleaned Pt and applied lotion and cream. On running with Levophed 
drip @ 4mcg/min and /63mmHg. RASS-2 and on running with Versed @ 2mg/hr. RT  
to FiO2 50%. Noted SaO2 100%. Turn and reposition. Will continue to care plan.

## 2020-11-09 NOTE — INFECTIOUS DISEASES PROG NOTE
Assessment/Plan


76yo M with:


SEptic Shock


Fever,r ecurrent; SP


Leukocytosis ;recurrent ; increased; -SP


Acute hypoxic resp failure, on NRB mask> 4l NC; back on NRB, desaturation 10/29 

> intubated 11/6


Pneumonia- >HAP


hx of COVID19 PNA 9/9/20


         --11/8 CXR: Persistent bilateral patchy pulmonary opacities, most 

prominent  in the left lower lung.


         --11/7 ucx neg


                  sp cx S., aureus (colonizer at this point)


         --11/2 Bcx NTD


          10/30 Sp cx MRSA (Vancomycin ADRIANA 1), ESBL E.coli


   10/23 BCx NTD


   UA 15-20 WBC, UCx Neg


   10/23 COVID rapid neg; 10/26 rapid COVID PCR + (from prior infection)- not 

new infection


   Flu A/B neg


   CXR: L pna


   Resp cx MRSA





Neftali on CKD, improving





SNF resident ([facility])


Non-verbal


VRE and MRSA colonized





Plan:


Meropenem #8/10-14 for ESBL PNA


add empiric Micafungin #3/5


   -11/6 SP IV Vancomycin #15


   -11/2 SP Zosyn #4


   -10/26 SP Cefepime #4


   -10/24 SP Flagyl #








Monitor CBC/CMP


Monitor resp status


Monitor temp curve and hemodynamics


repeat cultures





D/w RN





Thank you for this consult. [facility] will continue to follow.





Subjective


Allergies:  


Coded Allergies:  


     No Known Allergies (Unverified , 8/20/12)


afebrile >48hrs


FIo2 down to 35%


levo down ton 3


leukocytosis resolved


Bcx NTD





Objective





Last 24 Hour Vital Signs








  Date Time  Temp Pulse Resp B/P (MAP) Pulse Ox O2 Delivery O2 Flow Rate FiO2


 


11/9/20 10:40  73 14     35


 


11/9/20 10:30  75 14 95/55 (68) 100   


 


11/9/20 10:00  73 14 131/65 (87) 100   


 


11/9/20 09:30  71 14 96/53 (67) 99   


 


11/9/20 09:00  72 14 92/56 (68) 99   


 


11/9/20 08:30  69 14 115/57 (76) 100   


 


11/9/20 08:00        40


 


11/9/20 08:00      Mechanical Ventilator  


 


11/9/20 08:00 97.6 81 14 94/57 (69) 100   


 


11/9/20 08:00  78      


 


11/9/20 07:30  76 14 94/57 (69) 100   


 


11/9/20 07:00   15   Mechanical Ventilator  50


 


11/9/20 07:00  72 15 116/71 (86) 100   


 


11/9/20 06:30  72 14     


 


11/9/20 06:30  62 18     50


 


11/9/20 06:30  74 14 113/63 (80) 100   


 


11/9/20 06:00  73 17 125/67 (86) 100   


 


11/9/20 06:00   17   Mechanical Ventilator  50


 


11/9/20 05:28    112/62    


 


11/9/20 05:00  72 16 117/63 (81) 100   


 


11/9/20 05:00   16   Mechanical Ventilator  50


 


11/9/20 04:30  74 18 120/69 (86) 100   


 


11/9/20 04:00  87      


 


11/9/20 04:00   18   Mechanical Ventilator  50


 


11/9/20 04:00        50


 


11/9/20 04:00 97.5 80 19 111/63 (79) 100   


 


11/9/20 04:00      Mechanical Ventilator  


 


11/9/20 03:31  84 21     50


 


11/9/20 03:30  73 19 120/63 (82) 100   


 


11/9/20 03:00  76 19 130/76 (94) 100   


 


11/9/20 03:00   19   Mechanical Ventilator  55


 


11/9/20 02:30  76 16 116/63 (80) 100   


 


11/9/20 02:00  79 16 97/55 (69) 99   


 


11/9/20 02:00   16   Mechanical Ventilator  55


 


11/9/20 01:30  80 17 91/54 (66) 99   


 


11/9/20 01:00   17   Mechanical Ventilator  55


 


11/9/20 01:00  80 17 89/56 (67) 99   


 


11/9/20 00:30  77 17 89/53 (65) 99   


 


11/9/20 00:00      Mechanical Ventilator  


 


11/9/20 00:00  71      


 


11/9/20 00:00   16   Mechanical Ventilator  55


 


11/9/20 00:00 98.2 73 16 91/57 (68) 99   


 


11/9/20 00:00        55


 


11/8/20 23:30  73 14 94/49 (64) 100   


 


11/8/20 23:03  78 18     55


 


11/8/20 23:00   15   Mechanical Ventilator  55


 


11/8/20 23:00  72 15 91/52 (65) 100   


 


11/8/20 22:30  72 15 107/60 (76) 100   


 


11/8/20 22:00   14   Mechanical Ventilator  55


 


11/8/20 22:00  68 14 104/62 (76) 100   


 


11/8/20 21:30  71 14 98/57 (71) 100   


 


11/8/20 21:00   14   Mechanical Ventilator  55


 


11/8/20 21:00  70 14 115/60 (78) 100   


 


11/8/20 20:30  68 14 110/66 (81) 100   


 


11/8/20 20:00  71      


 


11/8/20 20:00   14   Mechanical Ventilator  55


 


11/8/20 20:00 97.9 71 14 102/58 (73) 100   


 


11/8/20 20:00        55


 


11/8/20 20:00      Mechanical Ventilator  


 


11/8/20 19:30  72 17 126/71 (89) 100   


 


11/8/20 19:28  72 16     55


 


11/8/20 19:00   17   Mechanical Ventilator  55


 


11/8/20 19:00  72 17 129/69 (89) 100   


 


11/8/20 18:30  76 20 130/70 (90) 100   


 


11/8/20 18:00   18   Mechanical Ventilator  55


 


11/8/20 18:00  79 19 122/68 (86) 100   


 


11/8/20 17:45      Mechanical Ventilator  


 


11/8/20 17:30 98.5 82 22 109/56 (73) 100   


 


11/8/20 17:00  84 18 90/52 (65) 100   


 


11/8/20 17:00  83 20 90/52 (65) 100   


 


11/8/20 16:30  83 17 86/46 (59) 100   


 


11/8/20 16:30  79 17 106/55 (72) 99   


 


11/8/20 16:00      Mechanical Ventilator  


 


11/8/20 16:00        55


 


11/8/20 16:00 99.2 77 15 111/59 (76) 100   


 


11/8/20 16:00  70      


 


11/8/20 15:36  71 14     55


 


11/8/20 15:30  71 14 112/57 (75) 100   


 


11/8/20 15:00  75 14 95/53 (67) 99   


 


11/8/20 14:30  80 20 93/55 (68) 98   


 


11/8/20 14:18   15   Mechanical Ventilator  55


 


11/8/20 14:17   15   Mechanical Ventilator  55


 


11/8/20 14:16    92/51    


 


11/8/20 14:00  83 16 89/53 (65) 100   


 


11/8/20 14:00   14   Mechanical Ventilator  55


 


11/8/20 13:30  81 17 90/49 (63) 100   


 


11/8/20 13:00   15   Mechanical Ventilator  55


 


11/8/20 13:00  78 17 94/52 (66) 100   


 


11/8/20 12:30  76 15 106/62 (77) 100   


 


11/8/20 12:15  75 14 94/59 (71) 100   


 


11/8/20 12:00      Mechanical Ventilator  


 


11/8/20 12:00   15   Mechanical Ventilator  55


 


11/8/20 12:00 97.1 75 14 88/56 (67) 100   


 


11/8/20 12:00        55


 


11/8/20 12:00  76      


 


11/8/20 11:30  80 18 117/67 (84) 100   


 


11/8/20 11:05  85 20     55


 


11/8/20 11:00   14   Mechanical Ventilator  55


 


11/8/20 11:00  79 20 101/54 (70) 99   








Height (Feet):  5


Height (Inches):  4.00


Weight (Pounds):  130


Gen: sedated, intubated


CV: RRR


Pulm: Rhonchi anteriorly


Abd: Soft, NTND


Ext: No c/c/e





Microbiology








 Date/Time


Source Procedure


Growth Status





 


 11/7/20 22:56


Sputum Expectorated Gram Stain - Final Resulted


 


 11/7/20 22:56 Sputum Culture - Preliminary


Staphylococcus Aureus Resulted











Laboratory Tests








Test


 11/8/20


12:13 11/8/20


17:56 11/8/20


23:50 11/9/20


05:27


 


POC Whole Blood Glucose


 141 MG/DL


()  H Pending  


 Pending  


 





 


White Blood Count


 


 


 


 9.7 K/UL


(4.8-10.8)


 


Red Blood Count


 


 


 


 3.18 M/UL


(4.70-6.10)  L


 


Hemoglobin


 


 


 


 9.5 G/DL


(14.2-18.0)  L


 


Hematocrit


 


 


 


 31.1 %


(42.0-52.0)  L


 


Mean Corpuscular Volume    98 FL (80-99)  


 


Mean Corpuscular Hemoglobin


 


 


 


 29.9 PG


(27.0-31.0)


 


Mean Corpuscular Hemoglobin


Concent 


 


 


 30.6 G/DL


(32.0-36.0)  L


 


Red Cell Distribution Width


 


 


 


 15.5 %


(11.6-14.8)  H


 


Platelet Count


 


 


 


 406 K/UL


(150-450)


 


Mean Platelet Volume


 


 


 


 8.4 FL


(6.5-10.1)


 


Neutrophils (%) (Auto)


 


 


 


 78.2 %


(45.0-75.0)  H


 


Lymphocytes (%) (Auto)


 


 


 


 12.6 %


(20.0-45.0)  L


 


Monocytes (%) (Auto)


 


 


 


 6.6 %


(1.0-10.0)


 


Eosinophils (%) (Auto)


 


 


 


 1.5 %


(0.0-3.0)


 


Basophils (%) (Auto)


 


 


 


 1.2 %


(0.0-2.0)


 


Sodium Level


 


 


 


 145 MMOL/L


(136-145)


 


Potassium Level


 


 


 


 3.9 MMOL/L


(3.5-5.1)


 


Chloride Level


 


 


 


 112 MMOL/L


()  H


 


Carbon Dioxide Level


 


 


 


 26 MMOL/L


(21-32)


 


Anion Gap


 


 


 


 7 mmol/L


(5-15)


 


Blood Urea Nitrogen


 


 


 


 17 mg/dL


(7-18)


 


Creatinine


 


 


 


 1.1 MG/DL


(0.55-1.30)


 


Estimat Glomerular Filtration


Rate 


 


 


 > 60 mL/min


(>60)


 


Glucose Level


 


 


 


 151 MG/DL


()  H


 


Calcium Level


 


 


 


 8.1 MG/DL


(8.5-10.1)  L


 


Test


 11/9/20


05:33 11/9/20


05:54 11/9/20


07:10 





 


POC Whole Blood Glucose


 149 MG/DL


()  H 


 


 





 


Phosphorus Level


 


 3.3 MG/DL


(2.5-4.9) 


 





 


Magnesium Level


 


 2.4 MG/DL


(1.8-2.4) 


 





 


Total Bilirubin


 


 0.2 MG/DL


(0.2-1.0) 


 





 


Direct Bilirubin


 


 0.1 MG/DL


(0.0-0.3) 


 





 


Aspartate Amino Transf


(AST/SGOT) 


 24 U/L (15-37)


 


 





 


Alanine Aminotransferase


(ALT/SGPT) 


 28 U/L (12-78)


 


 





 


Alkaline Phosphatase


 


 212 U/L


()  H 


 





 


Total Protein


 


 5.5 G/DL


(6.4-8.2)  L 


 





 


Albumin


 


 1.4 G/DL


(3.4-5.0)  L 


 





 


Arterial Blood pH


 


 


 7.487


(7.350-7.450) 





 


Arterial Blood Partial


Pressure CO2 


 


 30.3 mmHg


(35.0-45.0)  L 





 


Arterial Blood Partial


Pressure O2 


 


 115.1 mmHg


(75.0-100.0)  H 





 


Arterial Blood HCO3


 


 


 22.4 mmol/L


(22.0-26.0) 





 


Arterial Blood Oxygen


Saturation 


 


 97.4 %


() 





 


Arterial Blood Base Excess   -0.5 (-2-2)   


 


Jerod Test   Positive   











Current Medications








 Medications


  (Trade)  Dose


 Ordered  Sig/Miky


 Route


 PRN Reason  Start Time


 Stop Time Status Last Admin


Dose Admin


 


 Acetaminophen


  (Tylenol)  650 mg  Q4H  PRN


 ORAL


 Temp >100.5  10/23/20 20:30


 11/22/20 20:29  11/5/20 22:39





 


 Chlorhexidine


 Gluconate


  (Jess-Hex 2%)  1 applic  DAILY@2000


 TOPIC


   11/6/20 20:00


 2/4/21 19:59  11/8/20 20:10





 


 Dextrose


  (Dextrose 50%)  50 ml  Q30M  PRN


 IV


 Hypoglycemia  10/23/20 22:45


 1/21/21 22:44   





 


 Heparin Sodium


  (Porcine)


  (Heparin 5000


 units/ml)  5,000 units  EVERY 12  HOURS


 SUBQ


   10/23/20 21:00


 12/7/20 20:59  11/9/20 08:03





 


 Insulin Aspart


  (NovoLOG)    EVERY 6  HOURS


 SUBQ


   11/2/20 06:00


 1/22/21 06:29  11/9/20 06:09





 


 Lorazepam


  (Ativan 2mg/ml


 1ml)  2 mg  Q4H  PRN


 IV


 For Anxiety  11/6/20 12:45


 11/13/20 12:44   





 


 Meropenem 1 gm/


 Sodium Chloride  55 ml @ 


 110 mls/hr  Q12HR@0400,1600


 IVPB


   11/2/20 16:00


 11/11/20 23:59  11/9/20 03:23





 


 Micafungin Sodium


 100 mg/Sodium


 Chloride  110 ml @ 


 110 mls/hr  Q24H


 IVPB


   11/7/20 16:00


 11/14/20 15:59  11/8/20 16:23





 


 Midazolam HCl 50


 mg/Sodium Chloride  100 ml @ 0


 mls/hr  Q24H  PRN


 IV


 Restlessness  11/8/20 14:00


 11/10/20 13:46  11/8/20 14:17





 


 Midodrine


  (Pro-Amatine)  5 mg  TID


 ORAL


   11/8/20 18:00


 2/6/21 17:59  11/9/20 08:02





 


 Morphine Sulfate


  (Morphine


 Sulfate)  4 mg  Q4H  PRN


 IVP


 For Pain  11/6/20 12:45


 11/13/20 12:44   





 


 Nitroglycerin


  (Ntg)  0.4 mg  Q5M  PRN


 SL


 Prn Chest Pain  10/23/20 20:30


 11/22/20 20:29   





 


 Norepinephrine


 Bitartrate  250 ml @ 0


 mls/hr  Q24H


 IV


   11/6/20 15:50


 11/9/20 15:49  11/9/20 05:28





 


 Ondansetron HCl


  (Zofran)  4 mg  Q6H  PRN


 IVP


 Nausea & Vomiting  10/23/20 20:30


 11/22/20 20:29   





 


 Pantoprazole


  (Protonix)  40 mg  DAILY


 IV


   11/7/20 09:00


 12/7/20 08:59  11/9/20 08:02





 


 Polyethylene


 Glycol


  (Miralax)  17 gm  DAILYPRN  PRN


 ORAL


 Constipation  10/23/20 20:30


 11/22/20 20:29   





 


 Promethazine HCl/


 Codeine


  (Phenergan with


 Codeine)  5 ml  Q4H  PRN


 ORAL


 For Cough  10/23/20 20:30


 11/22/20 20:29   





 


 Sodium Chloride  1,000 ml @ 


 50 mls/hr  Q20H


 IV


   11/6/20 16:00


 12/6/20 15:59  11/9/20 01:58





 


 Tamsulosin HCl


  (Flomax)  0.4 mg  BID


 ORAL


   10/31/20 19:45


 11/23/20 08:59  11/9/20 08:02




















Slime Garcia M.D.             Nov 9, 2020 10:59

## 2020-11-09 NOTE — NUR
Social Work

This SW followed up with patient, currently in the ICU who is intubated, sedated, while 
patient has a history of dementia. This SW spoke with daughter, Wendi () who is 
POA for patient (this SW requested POA documents from [facility] Medical Records, spoke 
with Audrey to request POA documents, who explains patient does not have POA documents, 
while daughter listed as next of kin/decision maker). Daughter explains she does not want 
patient to be on a long term ventilator, requesting DNR.  Pending progress; Medical team to 
assess medical plan and will contact daughter regarding recommendations/plan as needed.  
Daughter requesting comfort measures, if patient is not showing any improvements. This SW 
informed Dr Sandoval and ICU nursing.

## 2020-11-09 NOTE — NUR
NURSE NOTES:

Received report from LITA Joe. Patient is sedated in bed. Orally intubated. ETT 7.5/25cm at 
lip line. AC 14, , FiO2 35%. O2 sat 96%. Given suction and oral care. On cardiac 
monitor with SR. Pt has NGT and in placed and on running with Glucerna 1.2 at 60cc/hr and 
noted 100cc residual. Cummings in place draining to gravity. Right femoral TLC dressing is 
clean and dry. Pt has new PICC line on Rt. upper arm and dressing clean and dry with 
Biopatch. On running with Levophed @  4mcg/min and Versed at 2mg/hr with RASS -2. 1/2NS is 
running at 50cc/hr. No sign of pain by FLACC scale. Dressing is clean and dry on wound area. 
On P200 mattress for wound management. BT checked 98.9F. Keep cooling measure. Pt has 
bilateral soft restraint and checked comfort and circulation. Trying to release restraint 
but still trying to reach ETT. Placed fall precaution. Proper airborne isolation for 
COVID-19. Will continue plan of care.

## 2020-11-09 NOTE — DIAGNOSTIC IMAGING REPORT
Indications: Needs long-term IV access

 

Technique: Procedure performed at bedside. Procedural timeout performed. Ultrasound

confirms patent compressible right basilic vein. Total sterile technique, including

sterile probe cover and sterile gel, sterile gloves, hand hygiene, hat, mask,,

sterile gown, large sterile drape, and preparation with 2% chlorhexidine utilized.

Local anesthesia with 1% lidocaine. Under real-time ultrasound guidance, puncture

basilic vein using 21-gauge needle, passage 0.018 guidewire, exchange for 4 Amharic

peel-away sheath. 4 Amharic Bard dual-lumen power PICC cut to 43 cm. It was inserted

through the peel-away sheath. Peel-away sheath and guidewire removed. Catheter fixed

to the skin. Both catheter ports aspirated and flushed. Patient tolerated procedure

well, without immediate complication. Followup chest x-ray obtained, documents

catheter tip position at the cavoatrial junction

 

Impression: Successful bedside placement of right arm PICC under sonographic

guidance, as described above.

## 2020-11-09 NOTE — CARDIAC ELECTROPHYSIOLOGY PN
Assessment/Plan


Assessment/Plan


1. Accelerated junctional rhythm. Not bradycardic. Ruled out for MI


      Echo showed ejection fraction of 55%.





2. Long run of 31 beats of Nonsustained VT on 11/3/2020. Will need cardiac cath 

after stabilization.





3. Respiratory failure, on antibiotic and intubated on the vent


4. Septic shock. on LEvophed.


5. Acute renal failure.


6. Electrolyte imbalance.


7. History of CHF, but echocardiogram showed normal left ventricular systolic 

function.


8. History of previous COVID infection in September 2020 and active Covid now in

isolation


9. Dementia.





JEANNINE RN





Subjective


Subjective


In SR in NAD in Covid isolation. In ICU on the Vent with 55% Fio2 and 4 mcg of 

Levo.  Had 31 beats of VT  on 11/3/20 at 16:46 and 5 beats yesterday


No VT overnight. Getting PICC line





Objective





Last 24 Hour Vital Signs








  Date Time  Temp Pulse Resp B/P (MAP) Pulse Ox O2 Delivery O2 Flow Rate FiO2


 


11/9/20 14:00  81 16 134/72 (92) 100   


 


11/9/20 13:00  79 15 117/64 (81) 100   


 


11/9/20 12:30  76 14 116/62 (80) 100   


 


11/9/20 12:00  76      


 


11/9/20 12:00 99.1 74 14 93/52 (66) 98   


 


11/9/20 12:00      Mechanical Ventilator  


 


11/9/20 12:00        35


 


11/9/20 11:30  75 14 90/54 (66) 98   


 


11/9/20 11:00   15   Mechanical Ventilator  35


 


11/9/20 11:00  77 15 102/59 (73) 99   


 


11/9/20 10:40  73 14     35


 


11/9/20 10:30  75 14 95/55 (68) 100   


 


11/9/20 10:00  73 14 131/65 (87) 100   


 


11/9/20 10:00   14   Mechanical Ventilator  40


 


11/9/20 09:30  71 14 96/53 (67) 99   


 


11/9/20 09:00   14   Mechanical Ventilator  40


 


11/9/20 09:00  72 14 92/56 (68) 99   


 


11/9/20 08:30  69 14 115/57 (76) 100   


 


11/9/20 08:00        40


 


11/9/20 08:00      Mechanical Ventilator  


 


11/9/20 08:00 97.6 81 14 94/57 (69) 100   


 


11/9/20 08:00  78      


 


11/9/20 08:00   14   Mechanical Ventilator  40


 


11/9/20 07:30  76 14 94/57 (69) 100   


 


11/9/20 07:00   15   Mechanical Ventilator  50


 


11/9/20 07:00  72 15 116/71 (86) 100   


 


11/9/20 06:30  72 14     


 


11/9/20 06:30  62 18     50


 


11/9/20 06:30  74 14 113/63 (80) 100   


 


11/9/20 06:00  73 17 125/67 (86) 100   


 


11/9/20 06:00   17   Mechanical Ventilator  50


 


11/9/20 05:28    112/62    


 


11/9/20 05:00  72 16 117/63 (81) 100   


 


11/9/20 05:00   16   Mechanical Ventilator  50


 


11/9/20 04:30  74 18 120/69 (86) 100   


 


11/9/20 04:00  87      


 


11/9/20 04:00   18   Mechanical Ventilator  50


 


11/9/20 04:00        50


 


11/9/20 04:00 97.5 80 19 111/63 (79) 100   


 


11/9/20 04:00      Mechanical Ventilator  


 


11/9/20 03:31  84 21     50


 


11/9/20 03:30  73 19 120/63 (82) 100   


 


11/9/20 03:00  76 19 130/76 (94) 100   


 


11/9/20 03:00   19   Mechanical Ventilator  55


 


11/9/20 02:30  76 16 116/63 (80) 100   


 


11/9/20 02:00  79 16 97/55 (69) 99   


 


11/9/20 02:00   16   Mechanical Ventilator  55


 


11/9/20 01:30  80 17 91/54 (66) 99   


 


11/9/20 01:00   17   Mechanical Ventilator  55


 


11/9/20 01:00  80 17 89/56 (67) 99   


 


11/9/20 00:30  77 17 89/53 (65) 99   


 


11/9/20 00:00      Mechanical Ventilator  


 


11/9/20 00:00  71      


 


11/9/20 00:00   16   Mechanical Ventilator  55


 


11/9/20 00:00 98.2 73 16 91/57 (68) 99   


 


11/9/20 00:00        55


 


11/8/20 23:30  73 14 94/49 (64) 100   


 


11/8/20 23:03  78 18     55


 


11/8/20 23:00   15   Mechanical Ventilator  55


 


11/8/20 23:00  72 15 91/52 (65) 100   


 


11/8/20 22:30  72 15 107/60 (76) 100   


 


11/8/20 22:00   14   Mechanical Ventilator  55


 


11/8/20 22:00  68 14 104/62 (76) 100   


 


11/8/20 21:30  71 14 98/57 (71) 100   


 


11/8/20 21:00   14   Mechanical Ventilator  55


 


11/8/20 21:00  70 14 115/60 (78) 100   


 


11/8/20 20:30  68 14 110/66 (81) 100   


 


11/8/20 20:00  71      


 


11/8/20 20:00   14   Mechanical Ventilator  55


 


11/8/20 20:00 97.9 71 14 102/58 (73) 100   


 


11/8/20 20:00        55


 


11/8/20 20:00      Mechanical Ventilator  


 


11/8/20 19:30  72 17 126/71 (89) 100   


 


11/8/20 19:28  72 16     55


 


11/8/20 19:00   17   Mechanical Ventilator  55


 


11/8/20 19:00  72 17 129/69 (89) 100   


 


11/8/20 18:30  76 20 130/70 (90) 100   


 


11/8/20 18:00   18   Mechanical Ventilator  55


 


11/8/20 18:00  79 19 122/68 (86) 100   


 


11/8/20 17:45      Mechanical Ventilator  


 


11/8/20 17:30 98.5 82 22 109/56 (73) 100   


 


11/8/20 17:00  84 18 90/52 (65) 100   


 


11/8/20 17:00  83 20 90/52 (65) 100   


 


11/8/20 16:30  83 17 86/46 (59) 100   


 


11/8/20 16:30  79 17 106/55 (72) 99   


 


11/8/20 16:00      Mechanical Ventilator  


 


11/8/20 16:00        55


 


11/8/20 16:00 99.2 77 15 111/59 (76) 100   


 


11/8/20 16:00  70      


 


11/8/20 15:36  71 14     55


 


11/8/20 15:30  71 14 112/57 (75) 100   

















Intake and Output  


 


 11/8/20 11/9/20





 19:00 07:00


 


Intake Total 1755.875 ml 1496 ml


 


Output Total 525 ml 660 ml


 


Balance 1230.875 ml 836 ml


 


  


 


Free Water 110 ml 


 


IV Total 1055.875 ml 826 ml


 


Tube Feeding 550 ml 670 ml


 


Other 40 ml 


 


Output Urine Total 525 ml 660 ml











Laboratory Tests








Test


 11/8/20


17:56 11/8/20


23:50 11/9/20


05:27 11/9/20


05:33


 


POC Whole Blood Glucose


 Pending  


 Pending  


 


 149 MG/DL


()  H


 


White Blood Count


 


 


 9.7 K/UL


(4.8-10.8) 





 


Red Blood Count


 


 


 3.18 M/UL


(4.70-6.10)  L 





 


Hemoglobin


 


 


 9.5 G/DL


(14.2-18.0)  L 





 


Hematocrit


 


 


 31.1 %


(42.0-52.0)  L 





 


Mean Corpuscular Volume   98 FL (80-99)   


 


Mean Corpuscular Hemoglobin


 


 


 29.9 PG


(27.0-31.0) 





 


Mean Corpuscular Hemoglobin


Concent 


 


 30.6 G/DL


(32.0-36.0)  L 





 


Red Cell Distribution Width


 


 


 15.5 %


(11.6-14.8)  H 





 


Platelet Count


 


 


 406 K/UL


(150-450) 





 


Mean Platelet Volume


 


 


 8.4 FL


(6.5-10.1) 





 


Neutrophils (%) (Auto)


 


 


 78.2 %


(45.0-75.0)  H 





 


Lymphocytes (%) (Auto)


 


 


 12.6 %


(20.0-45.0)  L 





 


Monocytes (%) (Auto)


 


 


 6.6 %


(1.0-10.0) 





 


Eosinophils (%) (Auto)


 


 


 1.5 %


(0.0-3.0) 





 


Basophils (%) (Auto)


 


 


 1.2 %


(0.0-2.0) 





 


Sodium Level


 


 


 145 MMOL/L


(136-145) 





 


Potassium Level


 


 


 3.9 MMOL/L


(3.5-5.1) 





 


Chloride Level


 


 


 112 MMOL/L


()  H 





 


Carbon Dioxide Level


 


 


 26 MMOL/L


(21-32) 





 


Anion Gap


 


 


 7 mmol/L


(5-15) 





 


Blood Urea Nitrogen


 


 


 17 mg/dL


(7-18) 





 


Creatinine


 


 


 1.1 MG/DL


(0.55-1.30) 





 


Estimat Glomerular Filtration


Rate 


 


 > 60 mL/min


(>60) 





 


Glucose Level


 


 


 151 MG/DL


()  H 





 


Calcium Level


 


 


 8.1 MG/DL


(8.5-10.1)  L 





 


Test


 11/9/20


05:54 11/9/20


07:10 11/9/20


12:33 





 


Phosphorus Level


 3.3 MG/DL


(2.5-4.9) 


 


 





 


Magnesium Level


 2.4 MG/DL


(1.8-2.4) 


 


 





 


Total Bilirubin


 0.2 MG/DL


(0.2-1.0) 


 


 





 


Direct Bilirubin


 0.1 MG/DL


(0.0-0.3) 


 


 





 


Aspartate Amino Transf


(AST/SGOT) 24 U/L (15-37)


 


 


 





 


Alanine Aminotransferase


(ALT/SGPT) 28 U/L (12-78)


 


 


 





 


Alkaline Phosphatase


 212 U/L


()  H 


 


 





 


Total Protein


 5.5 G/DL


(6.4-8.2)  L 


 


 





 


Albumin


 1.4 G/DL


(3.4-5.0)  L 


 


 





 


Arterial Blood pH


 


 7.487


(7.350-7.450) 


 





 


Arterial Blood Partial


Pressure CO2 


 30.3 mmHg


(35.0-45.0)  L 


 





 


Arterial Blood Partial


Pressure O2 


 115.1 mmHg


(75.0-100.0)  H 


 





 


Arterial Blood HCO3


 


 22.4 mmol/L


(22.0-26.0) 


 





 


Arterial Blood Oxygen


Saturation 


 97.4 %


() 


 





 


Arterial Blood Base Excess  -0.5 (-2-2)    


 


Jerod Test  Positive    


 


POC Whole Blood Glucose


 


 


 134 MG/DL


()  H 














Microbiology








 Date/Time


Source Procedure


Growth Status





 


 11/7/20 22:56


Sputum Expectorated Gram Stain - Final Resulted


 


 11/7/20 22:56 Sputum Culture - Preliminary


Staphylococcus Aureus Resulted








Objective





HEAD AND NECK:  No JVD. Orally intubated


LUNGS:  Coarse rhonchi.


CARDIOVASCULAR:   Irregular S1 and S2 with no gallop.


ABDOMEN:  Soft.


EXTREMITIES:  No pitting edema.











Kvng Edmond MD                Nov 9, 2020 15:22

## 2020-11-09 NOTE — NUR
RD ASSESSMENT & RECOMMENDATIONS

SEE CARE ACTIVITY FOR COMPLETE ASSESSMENT



DAILY ESTIMATED NEEDS:

Needs based on DM, wound, critical care 59.5kg 

22-28  kcals/kg 

[id number]  total kcals

1.25-2  g protein/kg

  g total protein 

25-30  mL/kg

3161-5735  total fluid mLs



NUTRITION DIAGNOSIS:

* Swallowing difficulty R/T dysphagia as evidenced by SLP eval, recs for

NGT feeds, now s/p oral intubation (11/6), on pressor support and sedated,

cont on NGT feeds.

* Increased kcal and pro needs r/t wound healing as evidenced by sacral

pressure injury stage 2.



 





CURRENT TF:Glucerna 1.2 @ 60ml/hr x24 hrs  



 





ENTERAL NUTRITION RECOMMENDATIONS:

Glucerna 1.2 @ 55ml/hr x24 hrs   to provide 1320ml, 1584kcal, 79g prot, 1063ml free water 



- LOWER goal rate to 55ml/hr x 24 hrs

 : s/p intubation w/ decreased kcal needs

- HOB over 30 degrees/ H2O flush per MD



 



ADDITIONAL RECOMMENDATIONS:

* Calibrated bedscale wt for accurate CBW 

* Monitor hemodynamic stability: NE titrating down, @ 3mcg 

* Monitor lytes, replete as needed 

* Wound care: add Harvinder BID via PEG 

                     add Vit C 250mg QD 

. 

.

## 2020-11-09 NOTE — NUR
NURSE NOTES:



PICC line inserted on the right upper arm line, 

chest x-ray taken and awaiting for results to use the catheter

## 2020-11-09 NOTE — NUR
NURSE NOTES:

Noted /67mmHg. Change rate of Levophed drip @ 3mcg/min as titrated. Will continue to 
monitor any change of condition.

## 2020-11-09 NOTE — NUR
NURSE NOTES:



new order for Levophed obtained from Dr. Sandoval. 

will start running Levophed through picc line and new tubbing.

## 2020-11-09 NOTE — NEPHROLOGY PROGRESS NOTE
Assessment/Plan


Problem List:  


(1) Dehydration


(2) JANES (acute kidney injury)


(3) Renal failure (ARF), acute on chronic


(4) Hypoxia


(5) Acute encephalopathy


(6) Electrolyte imbalance


Assessment





75-year-old male is admitted with acute hypoxic respiratory failure most likely 

secondary to pneumonia and sepsis, UTI.


Acute on chronic renal failure


Dehydration


Electrolyte imbalances, hypernatremia


Hypoalbuminemia


Diabetes type 2


History of congestive heart failure


Hypertension


Hyperlipemia


Alzheimer's


Previous COVID-19 infection in September 2020


Plan


November 9: Remains intubated.  Full code.  Labs reviewed.  Electrolytes within 

normal limit.  Continue per consultants.


November 8: In ICU.  Remains intubated on ventilator.  Remains full code.  Low 

potassium addressed.  Blood pressure fluctuating.  Continue per consultants.


November 7: Patient in ICU.  Intubated on ventilator.  On 6 mics of Levophed.  

Will give 100 cc albumin 25%.  K-Phos IV ordered.  Continue per consultants.  

Continue monitor renal parameters and electrolytes.


November 6: Status unchanged.  Transfer to [facility] for seizure.  Stable from renal 

standpoint to view.  Continue per consultants.


November 5: Status quo.  Labs reviewed.  Renal parameters stable.  Continue per 

consultants.


November 4: On nonrebreather mask.  Labs reviewed.  Renal parameters 

stable.Continue per pulmonologist.  Clinically unchanged.


November 3: On nonrebreather mask.  Inflammatory markers gradually declining.  

Renal parameters stable.  Continue per pulmonary.


November 2: Remains on nonrebreather mask.  Inflammatory markers remain 

elevated.  Renal parameters somewhat stable.  Continue per pulmonary and ID.  

Remains full code.


November 1: Patient on nonrebreather mask.  Labs noted.  Serum creatinine down 

to 1.3.  Continue per consultants.


October 31: Patient on nonrebreather mask.  Transfer to telemetry when seen this

morning.  Labs noted.  Continue per consultants.  Serum creatinine dylan to 1.7. 

Continue to monitor renal parameters.  Continue to monitor vancomycin level


October 30: Patient is not doing well clinically.  Mild respiratory distress.  

ABG noted.  Somewhat hypoxic.  CBC and chemistry panel ordered.  Discussed with 

LITA Garcia.  Will defer to pulmonary management to specialist.  Continue per ID.

 Renal parameters remained stable as of October 29.


October 29: Labs reviewed.  Renal parameters stable.  Continue per consultants.


October 28: Labs reviewed.  Potassium via NG tube ordered.  IV fluids stopped.  

Continue per consultants.


October 27: Labs reviewed.  Low potassium and low phosphorus replaced.  Continue

per consultants.  Remains stable from renal standpoint of view.


October 26: Patient remains n.p.o.  IV fluid down to 50 cc an hour.  Potassium 

supplement intravenously ordered.  Continue per consultants.





Previously:


Patient is n.p.o., will continue on IV fluid of D5W 75 cc an hour


We will monitor electrolytes and renal parameters


Avoid nephrotoxic's


Start p.o. when he clears by speech therapist, meanwhile aspiration precautions


Keep the blood pressure and blood sugar in check


Per orders





Subjective


ROS Limited/Unobtainable:  Yes





Objective


Objective





Last 24 Hour Vital Signs








  Date Time  Temp Pulse Resp B/P (MAP) Pulse Ox O2 Delivery O2 Flow Rate FiO2


 


11/9/20 11:00   15   Mechanical Ventilator  35


 


11/9/20 11:00  77 15 102/59 (73) 99   


 


11/9/20 10:40  73 14     35


 


11/9/20 10:30  75 14 95/55 (68) 100   


 


11/9/20 10:00  73 14 131/65 (87) 100   


 


11/9/20 10:00   14   Mechanical Ventilator  40


 


11/9/20 09:30  71 14 96/53 (67) 99   


 


11/9/20 09:00   14   Mechanical Ventilator  40


 


11/9/20 09:00  72 14 92/56 (68) 99   


 


11/9/20 08:30  69 14 115/57 (76) 100   


 


11/9/20 08:00        40


 


11/9/20 08:00      Mechanical Ventilator  


 


11/9/20 08:00 97.6 81 14 94/57 (69) 100   


 


11/9/20 08:00  78      


 


11/9/20 08:00   14   Mechanical Ventilator  40


 


11/9/20 07:30  76 14 94/57 (69) 100   


 


11/9/20 07:00   15   Mechanical Ventilator  50


 


11/9/20 07:00  72 15 116/71 (86) 100   


 


11/9/20 06:30  72 14     


 


11/9/20 06:30  62 18     50


 


11/9/20 06:30  74 14 113/63 (80) 100   


 


11/9/20 06:00  73 17 125/67 (86) 100   


 


11/9/20 06:00   17   Mechanical Ventilator  50


 


11/9/20 05:28    112/62    


 


11/9/20 05:00  72 16 117/63 (81) 100   


 


11/9/20 05:00   16   Mechanical Ventilator  50


 


11/9/20 04:30  74 18 120/69 (86) 100   


 


11/9/20 04:00  87      


 


11/9/20 04:00   18   Mechanical Ventilator  50


 


11/9/20 04:00        50


 


11/9/20 04:00 97.5 80 19 111/63 (79) 100   


 


11/9/20 04:00      Mechanical Ventilator  


 


11/9/20 03:31  84 21     50


 


11/9/20 03:30  73 19 120/63 (82) 100   


 


11/9/20 03:00  76 19 130/76 (94) 100   


 


11/9/20 03:00   19   Mechanical Ventilator  55


 


11/9/20 02:30  76 16 116/63 (80) 100   


 


11/9/20 02:00  79 16 97/55 (69) 99   


 


11/9/20 02:00   16   Mechanical Ventilator  55


 


11/9/20 01:30  80 17 91/54 (66) 99   


 


11/9/20 01:00   17   Mechanical Ventilator  55


 


11/9/20 01:00  80 17 89/56 (67) 99   


 


11/9/20 00:30  77 17 89/53 (65) 99   


 


11/9/20 00:00      Mechanical Ventilator  


 


11/9/20 00:00  71      


 


11/9/20 00:00   16   Mechanical Ventilator  55


 


11/9/20 00:00 98.2 73 16 91/57 (68) 99   


 


11/9/20 00:00        55


 


11/8/20 23:30  73 14 94/49 (64) 100   


 


11/8/20 23:03  78 18     55


 


11/8/20 23:00   15   Mechanical Ventilator  55


 


11/8/20 23:00  72 15 91/52 (65) 100   


 


11/8/20 22:30  72 15 107/60 (76) 100   


 


11/8/20 22:00   14   Mechanical Ventilator  55


 


11/8/20 22:00  68 14 104/62 (76) 100   


 


11/8/20 21:30  71 14 98/57 (71) 100   


 


11/8/20 21:00   14   Mechanical Ventilator  55


 


11/8/20 21:00  70 14 115/60 (78) 100   


 


11/8/20 20:30  68 14 110/66 (81) 100   


 


11/8/20 20:00  71      


 


11/8/20 20:00   14   Mechanical Ventilator  55


 


11/8/20 20:00 97.9 71 14 102/58 (73) 100   


 


11/8/20 20:00        55


 


11/8/20 20:00      Mechanical Ventilator  


 


11/8/20 19:30  72 17 126/71 (89) 100   


 


11/8/20 19:28  72 16     55


 


11/8/20 19:00   17   Mechanical Ventilator  55


 


11/8/20 19:00  72 17 129/69 (89) 100   


 


11/8/20 18:30  76 20 130/70 (90) 100   


 


11/8/20 18:00   18   Mechanical Ventilator  55


 


11/8/20 18:00  79 19 122/68 (86) 100   


 


11/8/20 17:45      Mechanical Ventilator  


 


11/8/20 17:30 98.5 82 22 109/56 (73) 100   


 


11/8/20 17:00  84 18 90/52 (65) 100   


 


11/8/20 17:00  83 20 90/52 (65) 100   


 


11/8/20 16:30  83 17 86/46 (59) 100   


 


11/8/20 16:30  79 17 106/55 (72) 99   


 


11/8/20 16:00      Mechanical Ventilator  


 


11/8/20 16:00        55


 


11/8/20 16:00 99.2 77 15 111/59 (76) 100   


 


11/8/20 16:00  70      


 


11/8/20 15:36  71 14     55


 


11/8/20 15:30  71 14 112/57 (75) 100   


 


11/8/20 15:00  75 14 95/53 (67) 99   


 


11/8/20 14:30  80 20 93/55 (68) 98   


 


11/8/20 14:18   15   Mechanical Ventilator  55


 


11/8/20 14:17   15   Mechanical Ventilator  55


 


11/8/20 14:16    92/51    


 


11/8/20 14:00  83 16 89/53 (65) 100   


 


11/8/20 14:00   14   Mechanical Ventilator  55


 


11/8/20 13:30  81 17 90/49 (63) 100   


 


11/8/20 13:00   15   Mechanical Ventilator  55


 


11/8/20 13:00  78 17 94/52 (66) 100   


 


11/8/20 12:30  76 15 106/62 (77) 100   


 


11/8/20 12:15  75 14 94/59 (71) 100   


 


11/8/20 12:00      Mechanical Ventilator  


 


11/8/20 12:00   15   Mechanical Ventilator  55


 


11/8/20 12:00 97.1 75 14 88/56 (67) 100   


 


11/8/20 12:00        55


 


11/8/20 12:00  76      

















Intake and Output  


 


 11/8/20 11/9/20





 19:00 07:00


 


Intake Total 1755.875 ml 1496 ml


 


Output Total 525 ml 660 ml


 


Balance 1230.875 ml 836 ml


 


  


 


Free Water 110 ml 


 


IV Total 1055.875 ml 826 ml


 


Tube Feeding 550 ml 670 ml


 


Other 40 ml 


 


Output Urine Total 525 ml 660 ml








Laboratory Tests


11/8/20 12:13: POC Whole Blood Glucose 141H


11/8/20 17:56: POC Whole Blood Glucose [Pending]


11/8/20 23:50: POC Whole Blood Glucose [Pending]


11/9/20 05:27: 


White Blood Count 9.7, Red Blood Count 3.18L, Hemoglobin 9.5L, Hematocrit 31.1L,

 Mean Corpuscular Volume 98, Mean Corpuscular Hemoglobin 29.9, Mean Corpuscular 

Hemoglobin Concent 30.6L, Red Cell Distribution Width 15.5H, Platelet Count 406,

 Mean Platelet Volume 8.4, Neutrophils (%) (Auto) 78.2H, Lymphocytes (%) (Auto) 

12.6L, Monocytes (%) (Auto) 6.6, Eosinophils (%) (Auto) 1.5, Basophils (%) 

(Auto) 1.2, Sodium Level 145, Potassium Level 3.9, Chloride Level 112H, Carbon 

Dioxide Level 26, Anion Gap 7, Blood Urea Nitrogen 17, Creatinine 1.1, Estimat 

Glomerular Filtration Rate > 60, Glucose Level 151H, Calcium Level 8.1L


11/9/20 05:33: POC Whole Blood Glucose 149H


11/9/20 05:54: 


Phosphorus Level 3.3, Magnesium Level 2.4, Total Bilirubin 0.2, Direct Bilirubin

 0.1, Aspartate Amino Transf (AST/SGOT) 24, Alanine Aminotransferase (ALT/SGPT) 

28, Alkaline Phosphatase 212H, Total Protein 5.5L, Albumin 1.4L


11/9/20 07:10: 


Arterial Blood pH 7.487H, Arterial Blood Partial Pressure CO2 30.3L, Arterial 

Blood Partial Pressure O2 115.1H, Arterial Blood HCO3 22.4, Arterial Blood 

Oxygen Saturation 97.4, Arterial Blood Base Excess -0.5, Jerod Test Positive


Height (Feet):  5


Height (Inches):  4.00


Weight (Pounds):  130


General Appearance:  no apparent distress


EENT:  other - Remains intubated on mechanical ventilation


Cardiovascular:  normal rate, gallop/S3


Abdomen:  distended











Seven Tobar MD             Nov 9, 2020 11:59

## 2020-11-09 NOTE — NUR
RESPIRATORY NOTE:

Received pt on AC 14, 600VT, 35%, no PEEP. Pt intubated w/ ETT 7.5 @ 25cm lipline, secured 
by anchorfast. Pt sedated, OPA in place as pt tends to bite ETT. B/S wale. rales/rhonchi, sxn 
large amounts of thick/frothy, white to pale-yellow secretions. Vent plugged into red 
outlet, ambubag at bedside. Pt in no apparent distress at this time. Will continue to 
monitor pt.

## 2020-11-09 NOTE — PULMONOLGY CRITICAL CARE NOTE
Critical Care - Asmt/Plan


Problems:  


(1) Acute respiratory failure


(2) Multifocal pneumonia


(3) 2019 novel coronavirus disease (COVID-19)


(4) Acute encephalopathy


(5) Severe sepsis


(6) Alzheimer's dementia


(7) HTN (hypertension)


(8) History of CVA (cerebrovascular accident)


(9) BPH (benign prostatic hyperplasia)


Respiratory:  monitor respiratory rate, adjust FIO2, CXR


Cardiac:  continue to monitor HR/BP


Renal:  F/U I&O, keep IV fluid, check electrolytes


Infectious Disease:  check cultures


Gastrointestinal:  continue feedings/current rate


Endocrine:  monitor blood sugar, continue sliding scale insulin


Hematologic:  monitor H/H, transfuse if hgb<8.5


Neurologic:  PRN Ativan, keep patient comfortable


Prophylaxis:  Protonix


Time Spent (Minutes):  40


Notes Reviewed:  internist, cardio, ID





Critical Care - Objective





Last 24 Hour Vital Signs








  Date Time  Temp Pulse Resp B/P (MAP) Pulse Ox O2 Delivery O2 Flow Rate FiO2


 


11/9/20 08:00        40


 


11/9/20 08:00      Mechanical Ventilator  


 


11/9/20 08:00 97.6 81 14 94/57 (69) 100   


 


11/9/20 08:00  78      


 


11/9/20 07:30  76 14 94/57 (69) 100   


 


11/9/20 07:00   15   Mechanical Ventilator  50


 


11/9/20 07:00  72 15 116/71 (86) 100   


 


11/9/20 06:30  72 14     


 


11/9/20 06:30  62 18     50


 


11/9/20 06:30  74 14 113/63 (80) 100   


 


11/9/20 06:00  73 17 125/67 (86) 100   


 


11/9/20 06:00   17   Mechanical Ventilator  50


 


11/9/20 05:28    112/62    


 


11/9/20 05:00  72 16 117/63 (81) 100   


 


11/9/20 05:00   16   Mechanical Ventilator  50


 


11/9/20 04:30  74 18 120/69 (86) 100   


 


11/9/20 04:00  87      


 


11/9/20 04:00   18   Mechanical Ventilator  50


 


11/9/20 04:00        50


 


11/9/20 04:00 97.5 80 19 111/63 (79) 100   


 


11/9/20 04:00      Mechanical Ventilator  


 


11/9/20 03:31  84 21     50


 


11/9/20 03:30  73 19 120/63 (82) 100   


 


11/9/20 03:00  76 19 130/76 (94) 100   


 


11/9/20 03:00   19   Mechanical Ventilator  55


 


11/9/20 02:30  76 16 116/63 (80) 100   


 


11/9/20 02:00  79 16 97/55 (69) 99   


 


11/9/20 02:00   16   Mechanical Ventilator  55


 


11/9/20 01:30  80 17 91/54 (66) 99   


 


11/9/20 01:00   17   Mechanical Ventilator  55


 


11/9/20 01:00  80 17 89/56 (67) 99   


 


11/9/20 00:30  77 17 89/53 (65) 99   


 


11/9/20 00:00      Mechanical Ventilator  


 


11/9/20 00:00  71      


 


11/9/20 00:00   16   Mechanical Ventilator  55


 


11/9/20 00:00 98.2 73 16 91/57 (68) 99   


 


11/9/20 00:00        55


 


11/8/20 23:30  73 14 94/49 (64) 100   


 


11/8/20 23:03  78 18     55


 


11/8/20 23:00   15   Mechanical Ventilator  55


 


11/8/20 23:00  72 15 91/52 (65) 100   


 


11/8/20 22:30  72 15 107/60 (76) 100   


 


11/8/20 22:00   14   Mechanical Ventilator  55


 


11/8/20 22:00  68 14 104/62 (76) 100   


 


11/8/20 21:30  71 14 98/57 (71) 100   


 


11/8/20 21:00   14   Mechanical Ventilator  55


 


11/8/20 21:00  70 14 115/60 (78) 100   


 


11/8/20 20:30  68 14 110/66 (81) 100   


 


11/8/20 20:00  71      


 


11/8/20 20:00   14   Mechanical Ventilator  55


 


11/8/20 20:00 97.9 71 14 102/58 (73) 100   


 


11/8/20 20:00        55


 


11/8/20 20:00      Mechanical Ventilator  


 


11/8/20 19:30  72 17 126/71 (89) 100   


 


11/8/20 19:28  72 16     55


 


11/8/20 19:00   17   Mechanical Ventilator  55


 


11/8/20 19:00  72 17 129/69 (89) 100   


 


11/8/20 18:30  76 20 130/70 (90) 100   


 


11/8/20 18:00   18   Mechanical Ventilator  55


 


11/8/20 18:00  79 19 122/68 (86) 100   


 


11/8/20 17:45      Mechanical Ventilator  


 


11/8/20 17:30 98.5 82 22 109/56 (73) 100   


 


11/8/20 17:00  84 18 90/52 (65) 100   


 


11/8/20 17:00  83 20 90/52 (65) 100   


 


11/8/20 16:30  83 17 86/46 (59) 100   


 


11/8/20 16:30  79 17 106/55 (72) 99   


 


11/8/20 16:00      Mechanical Ventilator  


 


11/8/20 16:00        55


 


11/8/20 16:00 99.2 77 15 111/59 (76) 100   


 


11/8/20 16:00  70      


 


11/8/20 15:36  71 14     55


 


11/8/20 15:30  71 14 112/57 (75) 100   


 


11/8/20 15:00  75 14 95/53 (67) 99   


 


11/8/20 14:30  80 20 93/55 (68) 98   


 


11/8/20 14:18   15   Mechanical Ventilator  55


 


11/8/20 14:17   15   Mechanical Ventilator  55


 


11/8/20 14:16    92/51    


 


11/8/20 14:00  83 16 89/53 (65) 100   


 


11/8/20 14:00   14   Mechanical Ventilator  55


 


11/8/20 13:30  81 17 90/49 (63) 100   


 


11/8/20 13:00   15   Mechanical Ventilator  55


 


11/8/20 13:00  78 17 94/52 (66) 100   


 


11/8/20 12:30  76 15 106/62 (77) 100   


 


11/8/20 12:15  75 14 94/59 (71) 100   


 


11/8/20 12:00      Mechanical Ventilator  


 


11/8/20 12:00   15   Mechanical Ventilator  55


 


11/8/20 12:00 97.1 75 14 88/56 (67) 100   


 


11/8/20 12:00        55


 


11/8/20 12:00  76      


 


11/8/20 11:30  80 18 117/67 (84) 100   


 


11/8/20 11:05  85 20     55


 


11/8/20 11:00   14   Mechanical Ventilator  55


 


11/8/20 11:00  79 20 101/54 (70) 99   


 


11/8/20 10:30  83 20 109/56 (73) 100   


 


11/8/20 10:01        55


 


11/8/20 10:00   17   Mechanical Ventilator  45


 


11/8/20 10:00  83 17 96/49 (65) 99   








Status:  sedated


Condition:  critical


Lungs:  rales, rhonchi


Heart:  HR/BP stable


Abdomen:  soft, non-tender


Extremities:  no C/C/E


Micro:





Microbiology








 Date/Time


Source Procedure


Growth Status





 


 11/7/20 22:56


Sputum Expectorated Gram Stain - Final Resulted


 


 11/7/20 22:56 Sputum Culture - Preliminary


Staphylococcus Aureus Resulted








Accucheck:  149





Critical Care - Subjective


ROS Limited/Unobtainable:  Yes


Condition:  critical


EKG Rhythm:  Sinus Rhythm


FI02:  40


Vent Support Breath Rate:  14


Vent Support Mode:  AC


Vent Tidal Volume:  600


Sputum Amount:  Moderate


PEEP:  0.0


PIP:  16


Tube Feeding Amount:  60


I&O:











Intake and Output  


 


 11/8/20 11/9/20





 19:00 07:00


 


Intake Total 1755.875 ml 1496 ml


 


Output Total 525 ml 660 ml


 


Balance 1230.875 ml 836 ml


 


  


 


Free Water 110 ml 


 


IV Total 1055.875 ml 826 ml


 


Tube Feeding 550 ml 670 ml


 


Other 40 ml 


 


Output Urine Total 525 ml 660 ml








CXR:


ET tube above Cuca


infiltrates not changed.


ET-Tube:  7.5


ET Position:  25


Labs:





Laboratory Tests








Test


 11/8/20


12:13 11/8/20


17:56 11/8/20


23:50 11/9/20


05:27


 


POC Whole Blood Glucose


 141 MG/DL


()  H Pending  


 Pending  


 





 


White Blood Count


 


 


 


 9.7 K/UL


(4.8-10.8)


 


Red Blood Count


 


 


 


 3.18 M/UL


(4.70-6.10)  L


 


Hemoglobin


 


 


 


 9.5 G/DL


(14.2-18.0)  L


 


Hematocrit


 


 


 


 31.1 %


(42.0-52.0)  L


 


Mean Corpuscular Volume    98 FL (80-99)  


 


Mean Corpuscular Hemoglobin


 


 


 


 29.9 PG


(27.0-31.0)


 


Mean Corpuscular Hemoglobin


Concent 


 


 


 30.6 G/DL


(32.0-36.0)  L


 


Red Cell Distribution Width


 


 


 


 15.5 %


(11.6-14.8)  H


 


Platelet Count


 


 


 


 406 K/UL


(150-450)


 


Mean Platelet Volume


 


 


 


 8.4 FL


(6.5-10.1)


 


Neutrophils (%) (Auto)


 


 


 


 78.2 %


(45.0-75.0)  H


 


Lymphocytes (%) (Auto)


 


 


 


 12.6 %


(20.0-45.0)  L


 


Monocytes (%) (Auto)


 


 


 


 6.6 %


(1.0-10.0)


 


Eosinophils (%) (Auto)


 


 


 


 1.5 %


(0.0-3.0)


 


Basophils (%) (Auto)


 


 


 


 1.2 %


(0.0-2.0)


 


Sodium Level


 


 


 


 145 MMOL/L


(136-145)


 


Potassium Level


 


 


 


 3.9 MMOL/L


(3.5-5.1)


 


Chloride Level


 


 


 


 112 MMOL/L


()  H


 


Carbon Dioxide Level


 


 


 


 26 MMOL/L


(21-32)


 


Anion Gap


 


 


 


 7 mmol/L


(5-15)


 


Blood Urea Nitrogen


 


 


 


 17 mg/dL


(7-18)


 


Creatinine


 


 


 


 1.1 MG/DL


(0.55-1.30)


 


Estimat Glomerular Filtration


Rate 


 


 


 > 60 mL/min


(>60)


 


Glucose Level


 


 


 


 151 MG/DL


()  H


 


Calcium Level


 


 


 


 8.1 MG/DL


(8.5-10.1)  L


 


Test


 11/9/20


05:33 11/9/20


05:54 11/9/20


07:10 





 


POC Whole Blood Glucose


 149 MG/DL


()  H 


 


 





 


Phosphorus Level


 


 3.3 MG/DL


(2.5-4.9) 


 





 


Magnesium Level


 


 2.4 MG/DL


(1.8-2.4) 


 





 


Total Bilirubin


 


 0.2 MG/DL


(0.2-1.0) 


 





 


Direct Bilirubin


 


 0.1 MG/DL


(0.0-0.3) 


 





 


Aspartate Amino Transf


(AST/SGOT) 


 24 U/L (15-37)


 


 





 


Alanine Aminotransferase


(ALT/SGPT) 


 28 U/L (12-78)


 


 





 


Alkaline Phosphatase


 


 212 U/L


()  H 


 





 


Total Protein


 


 5.5 G/DL


(6.4-8.2)  L 


 





 


Albumin


 


 1.4 G/DL


(3.4-5.0)  L 


 





 


Arterial Blood pH


 


 


 7.487


(7.350-7.450) 





 


Arterial Blood Partial


Pressure CO2 


 


 30.3 mmHg


(35.0-45.0)  L 





 


Arterial Blood Partial


Pressure O2 


 


 115.1 mmHg


(75.0-100.0)  H 





 


Arterial Blood HCO3


 


 


 22.4 mmol/L


(22.0-26.0) 





 


Arterial Blood Oxygen


Saturation 


 


 97.4 %


() 





 


Arterial Blood Base Excess   -0.5 (-2-2)   


 


Jerod Test   Positive   

















Michael Sandoval MD               Nov 9, 2020 09:49

## 2020-11-09 NOTE — NUR
NURSE NOTES:



Levophed increased to 4mcg/min after due to bp of 86/56 with heart rate at 81 in sinus 
rhythm, 

tube feeding remains at 60ml/hr. 

urine remains at clear and straw colored with no sediment, 

also remains on versed at 2mg/hr at rate of 4ml/hr.

## 2020-11-09 NOTE — NUR
CASE MANAGEMENT:REVIEW





SI: COVID PNA

99.1   85   16   92/56   99% ETT/MECH VENT

AC 14      FIO2 35%

H/H 9.5/31.1   ALB 1.4



IS;LEVOPHED GTT

VERSED IV

MICAFUNGIN IV

IVF NS @ 50 ML/HR

MEROPENEM IV

HEPARIN SUBQ Q12



****ICU STATUS****

DCP;FROM [facility]

## 2020-11-09 NOTE — NUR
NURSE HAND-OFF REPORT: 



Latest Vital Signs: Temperature 97.6 , Pulse 78 , B/P 107 /58 , Respiratory Rate 15 , O2 SAT 
96 , Mechanical Ventilator, O2 Flow Rate  .  

Vital Sign Comment: 



EKG Rhythm: Sinus Rhythm

Rhythm change?: N 

MD Notified?: DIDI Edmond MD Response: 



Latest Mejia Fall Score: 70  

Fall Risk: High Risk 

Safety Measures: Call light Within Reach, Bed Alarm Zone 1, Side Rails Side Rails x3, Bed 
position Low and Locked.

Fall Precautions: 

Yellow Socks

Yellow Gown

Door Sign

Patient Fall Education



Report given to LITA St. reamins on versed at 2mg/hr at 4ml/hr and levophed at 4mcg/min. .

## 2020-11-09 NOTE — NUR
NURSE NOTES:



Dr. Sandoval conducting round at the bedside, 

updated of the patient ABG and chest x-ray has been taken and reviews the results. 

orders placed and ordered to have PICC line placed.

## 2020-11-09 NOTE — NUR
NURSE NOTES:

Pt is sedated on the bed and SaO2 99% with Vent FiO2 55%. Given suction and oral care. On 
cardiac monitor with SR. On NGT feeding with Glucerna 1.2 @ 60cc/hr and tolerated well. 
Changed position. Will continue to monitor any change of condition.

## 2020-11-09 NOTE — BRIEF OPERATIVE NOTE
Immediate Post Operative Note


Operative Note


Pre-op Diagnosis:


needs long term IV access


Procedure:


PICC RUE


Post-op Diagnosis:  same as pre-op


Surgeon:  WALDEMAR Bueno


Anesthesia:  local


Specimen:  none


Complications:  none


Fluids:  none


Implant(s) used?:  No











Wu Bueno MD                 Nov 9, 2020 14:34

## 2020-11-09 NOTE — NUR
NURSE NOTES:



Consent for PICC line placement obtained from Wendi Mckeon and verified by Carolina Colby RN. 

updated on fathers condition and answered her questions.

## 2020-11-09 NOTE — NUR
NURSE NOTES:



Dr. Edmond made rounds at the patient bedside, made aware there was no arrhythmias noted 
over the night or this morning so far. no new orders given at this time.

## 2020-11-09 NOTE — NUR
NURSE NOTES:

Pt is sedated on the bed. SaO2 99% with current Vent setting. Given suction and oral care. 
No fever. On running with Versed @ 2mg/hr(4cc/hr) and Levophed drip @ 4mcg/min. Pt is 
confused and trying to remove ETT and NGT. Given verbal cueing but he didn't understand. Get 
order and applied bilateral soft wrist restraint. Checked comfort and circulation. On 
running with NGT feeding with Glucerna 1.2 @ 50cc/hr and noted residual 10cc. Increase to 
60cc/hr as goal. Turn and reposition. Provided good sleep and calm environment. Proper 
isolation for COVID-19. Placed fall precaution. Will continue to monitor any change of 
condition.

## 2020-11-09 NOTE — NUR
NURSE HAND-OFF REPORT: 



Latest Vital Signs: Temperature 97.5 , Pulse 72 , B/P 116 /71 , Respiratory Rate 15 , O2 SAT 
100 , Mechanical Ventilator, 



EKG Rhythm: Sinus Rhythm

Rhythm change?: N 



Latest Meija Fall Score: 70  

Fall Risk: High Risk 

Safety Measures: Call light Within Reach, Bed Alarm Zone 1, Side Rails Side Rails x3, Bed 
position Low and Locked.

Fall Precautions: 

Yellow Socks

Yellow Gown

Door Sign

Patient Fall Education



Report given to LITA Joe. On running with Levophed drip @ 3mcg/min and Versed @ 2mg/hr and 
RASS -2.

## 2020-11-10 VITALS — SYSTOLIC BLOOD PRESSURE: 104 MMHG | DIASTOLIC BLOOD PRESSURE: 64 MMHG

## 2020-11-10 VITALS — DIASTOLIC BLOOD PRESSURE: 64 MMHG | SYSTOLIC BLOOD PRESSURE: 121 MMHG

## 2020-11-10 VITALS — DIASTOLIC BLOOD PRESSURE: 79 MMHG | SYSTOLIC BLOOD PRESSURE: 138 MMHG

## 2020-11-10 VITALS — DIASTOLIC BLOOD PRESSURE: 66 MMHG | SYSTOLIC BLOOD PRESSURE: 112 MMHG

## 2020-11-10 VITALS — SYSTOLIC BLOOD PRESSURE: 118 MMHG | DIASTOLIC BLOOD PRESSURE: 75 MMHG

## 2020-11-10 VITALS — SYSTOLIC BLOOD PRESSURE: 106 MMHG | DIASTOLIC BLOOD PRESSURE: 62 MMHG

## 2020-11-10 VITALS — DIASTOLIC BLOOD PRESSURE: 66 MMHG | SYSTOLIC BLOOD PRESSURE: 121 MMHG

## 2020-11-10 VITALS — SYSTOLIC BLOOD PRESSURE: 107 MMHG | DIASTOLIC BLOOD PRESSURE: 62 MMHG

## 2020-11-10 VITALS — SYSTOLIC BLOOD PRESSURE: 128 MMHG | DIASTOLIC BLOOD PRESSURE: 65 MMHG

## 2020-11-10 VITALS — DIASTOLIC BLOOD PRESSURE: 63 MMHG | SYSTOLIC BLOOD PRESSURE: 103 MMHG

## 2020-11-10 VITALS — SYSTOLIC BLOOD PRESSURE: 129 MMHG | DIASTOLIC BLOOD PRESSURE: 99 MMHG

## 2020-11-10 VITALS — SYSTOLIC BLOOD PRESSURE: 100 MMHG | DIASTOLIC BLOOD PRESSURE: 57 MMHG

## 2020-11-10 VITALS — DIASTOLIC BLOOD PRESSURE: 58 MMHG | SYSTOLIC BLOOD PRESSURE: 103 MMHG

## 2020-11-10 VITALS — DIASTOLIC BLOOD PRESSURE: 67 MMHG | SYSTOLIC BLOOD PRESSURE: 117 MMHG

## 2020-11-10 VITALS — DIASTOLIC BLOOD PRESSURE: 68 MMHG | SYSTOLIC BLOOD PRESSURE: 129 MMHG

## 2020-11-10 VITALS — SYSTOLIC BLOOD PRESSURE: 129 MMHG | DIASTOLIC BLOOD PRESSURE: 65 MMHG

## 2020-11-10 VITALS — SYSTOLIC BLOOD PRESSURE: 107 MMHG | DIASTOLIC BLOOD PRESSURE: 61 MMHG

## 2020-11-10 VITALS — DIASTOLIC BLOOD PRESSURE: 70 MMHG | SYSTOLIC BLOOD PRESSURE: 116 MMHG

## 2020-11-10 VITALS — SYSTOLIC BLOOD PRESSURE: 120 MMHG | DIASTOLIC BLOOD PRESSURE: 63 MMHG

## 2020-11-10 VITALS — DIASTOLIC BLOOD PRESSURE: 63 MMHG | SYSTOLIC BLOOD PRESSURE: 109 MMHG

## 2020-11-10 VITALS — DIASTOLIC BLOOD PRESSURE: 75 MMHG | SYSTOLIC BLOOD PRESSURE: 142 MMHG

## 2020-11-10 VITALS — SYSTOLIC BLOOD PRESSURE: 115 MMHG | DIASTOLIC BLOOD PRESSURE: 65 MMHG

## 2020-11-10 VITALS — SYSTOLIC BLOOD PRESSURE: 131 MMHG | DIASTOLIC BLOOD PRESSURE: 73 MMHG

## 2020-11-10 VITALS — DIASTOLIC BLOOD PRESSURE: 72 MMHG | SYSTOLIC BLOOD PRESSURE: 131 MMHG

## 2020-11-10 VITALS — SYSTOLIC BLOOD PRESSURE: 109 MMHG | DIASTOLIC BLOOD PRESSURE: 61 MMHG

## 2020-11-10 VITALS — SYSTOLIC BLOOD PRESSURE: 123 MMHG | DIASTOLIC BLOOD PRESSURE: 64 MMHG

## 2020-11-10 VITALS — SYSTOLIC BLOOD PRESSURE: 93 MMHG | DIASTOLIC BLOOD PRESSURE: 52 MMHG

## 2020-11-10 VITALS — SYSTOLIC BLOOD PRESSURE: 128 MMHG | DIASTOLIC BLOOD PRESSURE: 68 MMHG

## 2020-11-10 VITALS — SYSTOLIC BLOOD PRESSURE: 129 MMHG | DIASTOLIC BLOOD PRESSURE: 70 MMHG

## 2020-11-10 VITALS — SYSTOLIC BLOOD PRESSURE: 119 MMHG | DIASTOLIC BLOOD PRESSURE: 60 MMHG

## 2020-11-10 VITALS — DIASTOLIC BLOOD PRESSURE: 66 MMHG | SYSTOLIC BLOOD PRESSURE: 120 MMHG

## 2020-11-10 VITALS — SYSTOLIC BLOOD PRESSURE: 125 MMHG | DIASTOLIC BLOOD PRESSURE: 62 MMHG

## 2020-11-10 VITALS — DIASTOLIC BLOOD PRESSURE: 64 MMHG | SYSTOLIC BLOOD PRESSURE: 113 MMHG

## 2020-11-10 VITALS — DIASTOLIC BLOOD PRESSURE: 70 MMHG | SYSTOLIC BLOOD PRESSURE: 122 MMHG

## 2020-11-10 VITALS — DIASTOLIC BLOOD PRESSURE: 72 MMHG | SYSTOLIC BLOOD PRESSURE: 117 MMHG

## 2020-11-10 VITALS — DIASTOLIC BLOOD PRESSURE: 58 MMHG | SYSTOLIC BLOOD PRESSURE: 107 MMHG

## 2020-11-10 VITALS — SYSTOLIC BLOOD PRESSURE: 133 MMHG | DIASTOLIC BLOOD PRESSURE: 69 MMHG

## 2020-11-10 VITALS — SYSTOLIC BLOOD PRESSURE: 129 MMHG | DIASTOLIC BLOOD PRESSURE: 63 MMHG

## 2020-11-10 VITALS — SYSTOLIC BLOOD PRESSURE: 118 MMHG | DIASTOLIC BLOOD PRESSURE: 62 MMHG

## 2020-11-10 VITALS — SYSTOLIC BLOOD PRESSURE: 113 MMHG | DIASTOLIC BLOOD PRESSURE: 66 MMHG

## 2020-11-10 VITALS — SYSTOLIC BLOOD PRESSURE: 119 MMHG | DIASTOLIC BLOOD PRESSURE: 63 MMHG

## 2020-11-10 VITALS — SYSTOLIC BLOOD PRESSURE: 117 MMHG | DIASTOLIC BLOOD PRESSURE: 68 MMHG

## 2020-11-10 VITALS — SYSTOLIC BLOOD PRESSURE: 96 MMHG | DIASTOLIC BLOOD PRESSURE: 59 MMHG

## 2020-11-10 VITALS — DIASTOLIC BLOOD PRESSURE: 64 MMHG | SYSTOLIC BLOOD PRESSURE: 116 MMHG

## 2020-11-10 VITALS — DIASTOLIC BLOOD PRESSURE: 54 MMHG | SYSTOLIC BLOOD PRESSURE: 103 MMHG

## 2020-11-10 VITALS — SYSTOLIC BLOOD PRESSURE: 93 MMHG | DIASTOLIC BLOOD PRESSURE: 54 MMHG

## 2020-11-10 VITALS — SYSTOLIC BLOOD PRESSURE: 139 MMHG | DIASTOLIC BLOOD PRESSURE: 72 MMHG

## 2020-11-10 VITALS — SYSTOLIC BLOOD PRESSURE: 111 MMHG | DIASTOLIC BLOOD PRESSURE: 62 MMHG

## 2020-11-10 VITALS — SYSTOLIC BLOOD PRESSURE: 132 MMHG | DIASTOLIC BLOOD PRESSURE: 70 MMHG

## 2020-11-10 LAB
ADD MANUAL DIFF: NO
ALBUMIN SERPL-MCNC: 1.2 G/DL (ref 3.4–5)
ALBUMIN/GLOB SERPL: 0.3 {RATIO} (ref 1–2.7)
ALP SERPL-CCNC: 175 U/L (ref 46–116)
ALT SERPL-CCNC: 27 U/L (ref 12–78)
ANION GAP SERPL CALC-SCNC: 6 MMOL/L (ref 5–15)
AST SERPL-CCNC: 26 U/L (ref 15–37)
BASOPHILS NFR BLD AUTO: 0.7 % (ref 0–2)
BILIRUB SERPL-MCNC: 0.2 MG/DL (ref 0.2–1)
BUN SERPL-MCNC: 13 MG/DL (ref 7–18)
CALCIUM SERPL-MCNC: 7.5 MG/DL (ref 8.5–10.1)
CHLORIDE SERPL-SCNC: 111 MMOL/L (ref 98–107)
CO2 SERPL-SCNC: 25 MMOL/L (ref 21–32)
CREAT SERPL-MCNC: 0.9 MG/DL (ref 0.55–1.3)
EOSINOPHIL NFR BLD AUTO: 1.5 % (ref 0–3)
ERYTHROCYTE [DISTWIDTH] IN BLOOD BY AUTOMATED COUNT: 15.1 % (ref 11.6–14.8)
GLOBULIN SER-MCNC: 4.6 G/DL
HCT VFR BLD CALC: 28.4 % (ref 42–52)
HGB BLD-MCNC: 8.7 G/DL (ref 14.2–18)
LYMPHOCYTES NFR BLD AUTO: 19.3 % (ref 20–45)
MCV RBC AUTO: 98 FL (ref 80–99)
MONOCYTES NFR BLD AUTO: 5.6 % (ref 1–10)
NEUTROPHILS NFR BLD AUTO: 73 % (ref 45–75)
PHOSPHATE SERPL-MCNC: 2.7 MG/DL (ref 2.5–4.9)
PLATELET # BLD: 309 K/UL (ref 150–450)
POTASSIUM SERPL-SCNC: 4.1 MMOL/L (ref 3.5–5.1)
RBC # BLD AUTO: 2.91 M/UL (ref 4.7–6.1)
SODIUM SERPL-SCNC: 142 MMOL/L (ref 136–145)
WBC # BLD AUTO: 9.4 K/UL (ref 4.8–10.8)

## 2020-11-10 RX ADMIN — INSULIN ASPART SCH UNITS: 100 INJECTION, SOLUTION INTRAVENOUS; SUBCUTANEOUS at 12:00

## 2020-11-10 RX ADMIN — MEROPENEM SCH MLS/HR: 1 INJECTION INTRAVENOUS at 04:01

## 2020-11-10 RX ADMIN — Medication SCH MLS/HR: at 12:09

## 2020-11-10 RX ADMIN — INSULIN ASPART SCH UNITS: 100 INJECTION, SOLUTION INTRAVENOUS; SUBCUTANEOUS at 06:00

## 2020-11-10 RX ADMIN — PANTOPRAZOLE SODIUM SCH MG: 40 INJECTION, POWDER, FOR SOLUTION INTRAVENOUS at 08:56

## 2020-11-10 RX ADMIN — LORAZEPAM PRN MG: 2 INJECTION, SOLUTION INTRAMUSCULAR; INTRAVENOUS at 16:29

## 2020-11-10 RX ADMIN — INSULIN ASPART SCH UNITS: 100 INJECTION, SOLUTION INTRAVENOUS; SUBCUTANEOUS at 18:29

## 2020-11-10 RX ADMIN — HEPARIN SODIUM SCH UNITS: 5000 INJECTION INTRAVENOUS; SUBCUTANEOUS at 20:47

## 2020-11-10 RX ADMIN — TAMSULOSIN HYDROCHLORIDE SCH MG: 0.4 CAPSULE ORAL at 08:56

## 2020-11-10 RX ADMIN — TAMSULOSIN HYDROCHLORIDE SCH MG: 0.4 CAPSULE ORAL at 17:18

## 2020-11-10 RX ADMIN — HEPARIN SODIUM SCH UNITS: 5000 INJECTION INTRAVENOUS; SUBCUTANEOUS at 08:59

## 2020-11-10 RX ADMIN — CHLORHEXIDINE GLUCONATE SCH APPLIC: 213 SOLUTION TOPICAL at 20:15

## 2020-11-10 RX ADMIN — MEROPENEM SCH MLS/HR: 1 INJECTION INTRAVENOUS at 16:28

## 2020-11-10 RX ADMIN — INSULIN ASPART SCH UNITS: 100 INJECTION, SOLUTION INTRAVENOUS; SUBCUTANEOUS at 00:07

## 2020-11-10 NOTE — NUR
NURSE NOTES:

MD. Edmond here to see pt.was informed of pt status. pt decreased on Levophed drip. on 
versed drip. hr stable. no cardiac concerns  at this time.

## 2020-11-10 NOTE — NUR
NURSE NOTES:

RECEIVED REPORT FROM LITA ALDANA. PT IN BED, SEDATED. RASS-2. OPENS EYES TO SHAKING. PUPILS 
3MM SLUGG[place]. RESPONSIBLE TO DEEP PAIN. SR ON THE MONITOR. BP STABLE ON PRESSORS.  COOLING 
BLANKET ON, RECTAL TEMP. INTUBATED ETT 7.5, 25CM AT THE LIP.  VENT SETTINGS: AC 14, , 
PEEP 0, FI02 35%. LOTS OF THICK SECRETIONS. RHONCHI BILATERAL. RT NGT. TUBE FEEDING  
GLUCERNA 1.2, AT 60ML/HR. ABDOMEN SOFT. NO BM AT THIS TIME. GALLEGOS IN PLACE, URINE . ON P 200 
MATTRESS. SOFT WRIST RESTRAINTS IN PLACE. AIRBORNE ISOLATION IN PLACE. WILL CONTINUE TO 
MONITOR  PT.

## 2020-11-10 NOTE — CARDIAC ELECTROPHYSIOLOGY PN
Assessment/Plan


Assessment/Plan


1. Accelerated junctional rhythm. Not bradycardic. Ruled out for MI


      Echo showed ejection fraction of 55%.





2. Long run of 31 beats of Nonsustained VT on 11/3/2020. Will need cardiac cath 

after stabilization.





3. Respiratory failure, on antibiotic and intubated on the vent


4. Septic shock. on LEvophed.


5. Acute renal failure.


6. Electrolyte imbalance.


7. History of CHF, but echocardiogram showed normal left ventricular systolic 

function.


8. History of previous COVID infection in September 2020 and active Covid now in

isolation


9. Dementia.





JEANNINE RN





Subjective


Subjective


In SR in NAD in Covid isolation. In ICU on the Vent with 35% Fio2 and 3 mcg of 

Levo.  Had 31 beats of VT  on 11/3/20 at 16:46 and 5 beats 11/8/20


No VT overnight.S/P PICC line





Objective





Last 24 Hour Vital Signs








  Date Time  Temp Pulse Resp B/P (MAP) Pulse Ox O2 Delivery O2 Flow Rate FiO2


 


11/10/20 11:39  88      


 


11/10/20 11:38  84      


 


11/10/20 11:30  84 16 115/65 (82) 100   


 


11/10/20 11:15  86 19 138/79 (98) 99   


 


11/10/20 11:14  83 19     35


 


11/10/20 11:00  81 16 131/72 (91) 100   


 


11/10/20 10:30  83 16 129/68 (88) 100   


 


11/10/20 10:15  82 16 125/62 (83) 100   


 


11/10/20 10:00  83 15 133/69 (90) 100   


 


11/10/20 09:45  80 15 119/60 (79) 99   


 


11/10/20 09:30  85 16 120/63 (82) 98   


 


11/10/20 09:15  85 16 107/58 (74) 98   


 


11/10/20 09:00  86 15 100/57 (71) 98   


 


11/10/20 08:30  84 17 103/58 (73) 98   


 


11/10/20 08:15  82 15 123/64 (83) 99   


 


11/10/20 08:00 98.9 83 14 113/66 (82) 100   


 


11/10/20 08:00      Mechanical Ventilator  


 


11/10/20 08:00        35


 


11/10/20 08:00  88      


 


11/10/20 07:20  84 14     35


 


11/10/20 07:00  80 14 103/54 (70) 98   


 


11/10/20 07:00   14   Mechanical Ventilator  35


 


11/10/20 06:30  80 15 107/61 (76) 98   


 


11/10/20 06:30  81 15     


 


11/10/20 06:00  79 15 121/64 (83) 100   


 


11/10/20 06:00   15   Mechanical Ventilator  35


 


11/10/20 05:30  79 14 107/62 (77) 99   


 


11/10/20 05:00  79 14 109/61 (77) 100   


 


11/10/20 05:00   14   Mechanical Ventilator  35


 


11/10/20 04:30  78 14 104/64 (77) 100   


 


11/10/20 04:00        35


 


11/10/20 04:00      Mechanical Ventilator  


 


11/10/20 04:00 98.4 79 16 119/63 (81) 100   


 


11/10/20 04:00   16   Mechanical Ventilator  35


 


11/10/20 04:00  78      


 


11/10/20 03:30  80 15 117/67 (84) 98   


 


11/10/20 03:09  80 15     35


 


11/10/20 03:00  82 16 117/68 (84) 98   


 


11/10/20 03:00   16   Mechanical Ventilator  35


 


11/10/20 02:30  86 19 117/72 (87) 97   


 


11/10/20 02:00   18   Mechanical Ventilator  35


 


11/10/20 02:00  81 18 121/66 (84) 100   


 


11/10/20 01:30  81 19 103/63 (76) 100   


 


11/10/20 01:00   17   Mechanical Ventilator  35


 


11/10/20 01:00  86 19 129/99 (109) 100   


 


11/10/20 00:30  82 14 118/62 (80) 99   


 


11/10/20 00:00 99.0 85 16 109/63 (78) 97   


 


11/10/20 00:00  88      


 


11/10/20 00:00   16   Mechanical Ventilator  35


 


11/10/20 00:00      Mechanical Ventilator  


 


11/10/20 00:00        35


 


11/9/20 23:30  87 15 100/56 (71) 97   


 


11/9/20 23:00   16   Mechanical Ventilator  35


 


11/9/20 23:00  78 14 111/58 (75) 97   


 


11/9/20 22:56  78 14     35


 


11/9/20 22:30  79 14 105/60 (75) 96   


 


11/9/20 22:00   16   Mechanical Ventilator  35


 


11/9/20 22:00  81 16 118/66 (83) 100   


 


11/9/20 21:30  86 19 127/67 (87) 100   


 


11/9/20 21:25  86 21     35


 


11/9/20 21:00   15   Mechanical Ventilator  35


 


11/9/20 21:00   19   Mechanical Ventilator  35


 


11/9/20 21:00  82 19 124/62 (82) 99   


 


11/9/20 20:30  83 16 96/57 (70) 97   


 


11/9/20 20:00      Mechanical Ventilator  


 


11/9/20 20:00 98.9 83 15 100/55 (70) 96   


 


11/9/20 20:00   15   Mechanical Ventilator  35


 


11/9/20 20:00        35


 


11/9/20 20:00  82      


 


11/9/20 19:30  82 16 93/57 (69) 97   


 


11/9/20 19:23  82 16     35


 


11/9/20 19:00   15   Mechanical Ventilator  35


 


11/9/20 19:00  78 15 107/58 (74) 96   


 


11/9/20 18:30  85 20 85/54 (64) 94   


 


11/9/20 18:00  78 15 107/58 (74) 96   


 


11/9/20 18:00   15   Mechanical Ventilator  35


 


11/9/20 17:30  59 14 149/76 (100) 100   


 


11/9/20 17:09   14   Mechanical Ventilator  35


 


11/9/20 17:07    93/55    


 


11/9/20 17:00   15   Mechanical Ventilator  35


 


11/9/20 17:00  75 15 92/56 (68) 99   


 


11/9/20 16:30  85 16 105/60 (75) 99   


 


11/9/20 16:00        35


 


11/9/20 16:00 97.6 75 14 112/66 (81) 100   


 


11/9/20 16:00   14   Mechanical Ventilator  35


 


11/9/20 16:00      Mechanical Ventilator  


 


11/9/20 16:00  71      


 


11/9/20 15:30  75 15 120/62 (81) 100   


 


11/9/20 15:00  79 16 128/65 (86) 98   


 


11/9/20 15:00   15   Mechanical Ventilator  35


 


11/9/20 14:40  82 16     35


 


11/9/20 14:30  80 16 134/72 (92) 99   


 


11/9/20 14:00   14   Mechanical Ventilator  35


 


11/9/20 14:00  81 16 134/72 (92) 100   


 


11/9/20 13:00   15   Mechanical Ventilator  35


 


11/9/20 13:00  79 15 117/64 (81) 100   


 


11/9/20 12:30  76 14 116/62 (80) 100   

















Intake and Output  


 


 11/9/20 11/10/20





 19:00 07:00


 


Intake Total 1938.67138 ml 1580.30 ml


 


Output Total 1100 ml 1450 ml


 


Balance 838.66170 ml 130.30 ml


 


  


 


Free Water 210 ml 


 


IV Total 1008.85894 ml 860.30 ml


 


Tube Feeding 720 ml 720 ml


 


Output Urine Total 1100 ml 1450 ml











Laboratory Tests








Test


 11/9/20


12:33 11/9/20


17:37 11/9/20


23:52 11/10/20


05:39


 


POC Whole Blood Glucose


 134 MG/DL


()  H 129 MG/DL


()  H Pending  


 136 MG/DL


()  H


 


Test


 11/10/20


05:45 11/10/20


09:08 


 





 


White Blood Count


 9.4 K/UL


(4.8-10.8) 


 


 





 


Red Blood Count


 2.91 M/UL


(4.70-6.10)  L 


 


 





 


Hemoglobin


 8.7 G/DL


(14.2-18.0)  L 


 


 





 


Hematocrit


 28.4 %


(42.0-52.0)  L 


 


 





 


Mean Corpuscular Volume 98 FL (80-99)     


 


Mean Corpuscular Hemoglobin


 30.0 PG


(27.0-31.0) 


 


 





 


Mean Corpuscular Hemoglobin


Concent 30.7 G/DL


(32.0-36.0)  L 


 


 





 


Red Cell Distribution Width


 15.1 %


(11.6-14.8)  H 


 


 





 


Platelet Count


 309 K/UL


(150-450) 


 


 





 


Mean Platelet Volume


 8.7 FL


(6.5-10.1) 


 


 





 


Neutrophils (%) (Auto)


 73.0 %


(45.0-75.0) 


 


 





 


Lymphocytes (%) (Auto)


 19.3 %


(20.0-45.0)  L 


 


 





 


Monocytes (%) (Auto)


 5.6 %


(1.0-10.0) 


 


 





 


Eosinophils (%) (Auto)


 1.5 %


(0.0-3.0) 


 


 





 


Basophils (%) (Auto)


 0.7 %


(0.0-2.0) 


 


 





 


Sodium Level


 142 MMOL/L


(136-145) 


 


 





 


Potassium Level


 4.1 MMOL/L


(3.5-5.1) 


 


 





 


Chloride Level


 111 MMOL/L


()  H 


 


 





 


Carbon Dioxide Level


 25 MMOL/L


(21-32) 


 


 





 


Anion Gap


 6 mmol/L


(5-15) 


 


 





 


Blood Urea Nitrogen


 13 mg/dL


(7-18) 


 


 





 


Creatinine


 0.9 MG/DL


(0.55-1.30) 


 


 





 


Estimat Glomerular Filtration


Rate > 60 mL/min


(>60) 


 


 





 


Glucose Level


 146 MG/DL


()  H 


 


 





 


Calcium Level


 7.5 MG/DL


(8.5-10.1)  L 


 


 





 


Phosphorus Level


 2.7 MG/DL


(2.5-4.9) 


 


 





 


Magnesium Level


 2.3 MG/DL


(1.8-2.4) 


 


 





 


Total Bilirubin


 0.2 MG/DL


(0.2-1.0) 


 


 





 


Aspartate Amino Transf


(AST/SGOT) 26 U/L (15-37)


 


 


 





 


Alanine Aminotransferase


(ALT/SGPT) 27 U/L (12-78)


 


 


 





 


Alkaline Phosphatase


 175 U/L


()  H 


 


 





 


Total Protein


 5.8 G/DL


(6.4-8.2)  L 


 


 





 


Albumin


 1.2 G/DL


(3.4-5.0)  L 


 


 





 


Globulin 4.6 g/dL     


 


Albumin/Globulin Ratio


 0.3 (1.0-2.7)


L 


 


 





 


Arterial Blood pH


 


 7.487


(7.350-7.450) 


 





 


Arterial Blood Partial


Pressure CO2 


 32.9 mmHg


(35.0-45.0)  L 


 





 


Arterial Blood Partial


Pressure O2 


 77.1 mmHg


(75.0-100.0) 


 





 


Arterial Blood HCO3


 


 24.3 mmol/L


(22.0-26.0) 


 





 


Arterial Blood Oxygen


Saturation 


 95.7 %


() 


 





 


Arterial Blood Base Excess  1.2 (-2-2)    


 


Jerod Test  Positive    











Microbiology








 Date/Time


Source Procedure


Growth Status





 


 11/7/20 22:56


Sputum Expectorated Gram Stain - Final Complete


 


 11/7/20 22:56 Sputum Culture - Final


Staphylococcus Aureus - Mrsa


Usual Respiratory Sondra Complete








Objective





HEAD AND NECK:  No JVD. Orally intubated


LUNGS:  Coarse rhonchi.


CARDIOVASCULAR:   Irregular S1 and S2 with no gallop.


ABDOMEN:  Soft.


EXTREMITIES:  No pitting edema.











Kvng Edmond MD               Nov 10, 2020 12:01

## 2020-11-10 NOTE — DIAGNOSTIC IMAGING REPORT
Indication: Dyspnea

 

Technique: One view of the chest

 

Comparison: 11/9/2020

 

Findings: Stable satisfactory tube and line positions. Bilateral infiltrates in a

peribronchovascular distribution are unchanged. Left pleural effusion is unchanged.

Right shoulder prosthesis again demonstrated

 

Impression:  Unchanged, over one day, findings as above.

## 2020-11-10 NOTE — NUR
NURSE HAND-OFF REPORT: 



Latest Vital Signs: Temperature 98.4 , Pulse 80 , B/P 103 /54 , Respiratory Rate 14 , O2 SAT 
98 , Mechanical Ventilator, O2 Flow Rate  .  

Vital Sign Comment: 



EKG Rhythm: Sinus Rhythm

Rhythm change?: JOSE DAVID COLUNGA Notified?: DIDI Edmond MD Response: 



Latest Mejia Fall Score: 70  

Fall Risk: High Risk 

Safety Measures: Call light Within Reach, Bed Alarm Zone 1, Side Rails Side Rails x3, Bed 
position Low and Locked.

Fall Precautions: 

Yellow Socks

Yellow Gown

Door Sign

Patient Fall Education



Report given to LITA Morris. Pt is sedated on the bed and RASS -2 and on running with 
Levophed @ 3mcg/min and Versed @ 2mg/hr(4cc/hr) and 1/2NS @ 50cc/hr.

## 2020-11-10 NOTE — NUR
NURSE NOTES:

LATE ENTRY:

PT IN BED, SEDATED. RASS-2. OPENS EYES TO SHAKING. PUPILS 3MM SLUGGISH. RESPONSIBLE TO DEEP 
PAIN. SR ON THE MONITOR. BP STABLE ON PRESSORS. COOLING BLANKET ON, RECTAL TEMP. INTUBATED 
ETT 7.5, 25CM AT THE LIP.  VENT SETTINGS: AC 14, , PEEP 0, FI02 35%. LOTS OF THICK 
SECRETIONS. RHONCHI BILATERAL. RT NGT. TUBE FEEDING  GLUCERNA 1.2, AT 60ML/HR. ABDOMEN SOFT. 
NO BM AT THIS TIME. GALLEGOS IN PLACE, URINE. PICC GURPREET PATENT. RT FEMORAL TLC. RUNNING LEVOPHED 
AT 3MCG/MIN, VERSED WILL D/C AT 1340. 1/2 NS AT 50ML/HR.  ON P 200 MATTRESS. SOFT WRIST 
RESTRAINTS IN PLACE. AIRBORNE ISOLATION IN PLACE. WILL CONTINUE TO MONITOR  PT.

## 2020-11-10 NOTE — NUR
NURSE NOTES:

Morning care was done. Cleaned Pt and applied lotion and cream. On running with Levophed 
drip @ 3mcg/min. RASS-2 and on running with Versed @ 2mg/hr. Tolerated well with NGT 
feeding. Turn and reposition. Will continue to care plan.

## 2020-11-10 NOTE — NUR
NURSE NOTES:

called MD. Sandoval regarding versed drip and d/c today. recommend continuation orders. 
awaiting call back.

## 2020-11-10 NOTE — NUR
NURSE NOTES:

Pt is sedated on the bed RASS -2. On running with Levophed drip @ 4mcg/min, Versed @ 2mg/hr 
and 1/2NS @ 50cc/hr. Noted /63mmHg. BT checked 99.F by rectal. Keep cooling measure. 
SaO2 97% with current Vent setting. Tolerated well with NGT feeding. Repositioned Pt. Placed 
fall precaution. Will continue to monitor any change of condition.

## 2020-11-10 NOTE — NUR
NURSE NOTES:

Pt is sedated on the bed and SaO2 100% with Current Vent setting. Given suction and oral 
care. Changed position. Will continue to care plan.

## 2020-11-10 NOTE — INTERNAL MED PROGRESS NOTE
Subjective


Date of Service:  Nov 10, 2020


Physician Name


Benito Hearn


Attending Physician


Andrei Cancino MD





Current Medications








 Medications


  (Trade)  Dose


 Ordered  Sig/Miky


 Route


 PRN Reason  Start Time


 Stop Time Status Last Admin


Dose Admin


 


 Acetaminophen


  (Tylenol)  650 mg  Q4H  PRN


 ORAL


 Temp >100.5  10/23/20 20:30


 11/22/20 20:29  11/5/20 22:39





 


 Chlorhexidine


 Gluconate


  (Jess-Hex 2%)  1 applic  DAILY@2000


 TOPIC


   11/6/20 20:00


 2/4/21 19:59  11/9/20 19:54





 


 Dextrose


  (Dextrose 50%)  50 ml  Q30M  PRN


 IV


 Hypoglycemia  10/23/20 22:45


 1/21/21 22:44   





 


 Heparin Sodium


  (Porcine)


  (Heparin 5000


 units/ml)  5,000 units  EVERY 12  HOURS


 SUBQ


   10/23/20 21:00


 12/7/20 20:59  11/10/20 08:59





 


 Insulin Aspart


  (NovoLOG)    EVERY 6  HOURS


 SUBQ


   11/2/20 06:00


 1/22/21 06:29  11/10/20 00:07





 


 Lorazepam


  (Ativan 2mg/ml


 1ml)  2 mg  Q4H  PRN


 IV


 For Anxiety  11/10/20 13:45


 11/17/20 13:44  11/10/20 16:29





 


 Meropenem 1 gm/


 Sodium Chloride  55 ml @ 


 110 mls/hr  Q12HR@0400,1600


 IVPB


   11/2/20 16:00


 11/11/20 23:59  11/10/20 16:28





 


 Midodrine


  (Pro-Amatine)  5 mg  TID


 ORAL


   11/8/20 18:00


 2/6/21 17:59  11/10/20 17:18





 


 Morphine Sulfate


  (Morphine


 Sulfate)  2 mg  Q6H  PRN


 IVP


 moderate pain  11/10/20 13:45


 11/17/20 13:44   





 


 Morphine Sulfate


  (Morphine


 Sulfate)  4 mg  Q4H  PRN


 IVP


 severe pain  11/6/20 12:45


 11/13/20 12:44   





 


 Nitroglycerin


  (Ntg)  0.4 mg  Q5M  PRN


 SL


 Prn Chest Pain  10/23/20 20:30


 11/22/20 20:29   





 


 Norepinephrine


 Bitartrate  250 ml @ 0


 mls/hr  Q24H


 IV


   11/9/20 15:45


 11/12/20 15:44  11/10/20 12:09





 


 Ondansetron HCl


  (Zofran)  4 mg  Q6H  PRN


 IVP


 Nausea & Vomiting  10/23/20 20:30


 11/22/20 20:29   





 


 Pantoprazole


  (Protonix)  40 mg  DAILY


 IV


   11/7/20 09:00


 12/7/20 08:59  11/10/20 08:56





 


 Polyethylene


 Glycol


  (Miralax)  17 gm  DAILYPRN  PRN


 ORAL


 Constipation  10/23/20 20:30


 11/22/20 20:29   





 


 Promethazine HCl/


 Codeine


  (Phenergan with


 Codeine)  5 ml  Q4H  PRN


 ORAL


 For Cough  10/23/20 20:30


 11/22/20 20:29   





 


 Sodium Chloride  1,000 ml @ 


 50 mls/hr  Q20H


 IV


   11/6/20 16:00


 12/6/20 15:59  11/9/20 21:23





 


 Tamsulosin HCl


  (Flomax)  0.4 mg  BID


 ORAL


   10/31/20 19:45


 11/23/20 08:59  11/10/20 17:18











Allergies:  


Coded Allergies:  


     No Known Allergies (Unverified , 8/20/12)


ROS Limited/Unobtainable:  Yes


Subjective


76 YO M admitted with shortness of breath.  Now pneumonia; previously COVID 

positive.  Cover for Int kierra-Dr Cancino.  ICU.  Intubated and sedated





Objective





Last Vital Signs








  Date Time  Temp Pulse Resp B/P (MAP) Pulse Ox O2 Delivery O2 Flow Rate FiO2


 


11/10/20 17:45  88 18 129/70 (89) 100   


 


11/10/20 16:00        35


 


11/10/20 16:00      Mechanical Ventilator  


 


11/10/20 16:00 96.9       


 


11/6/20 12:00       15.0 











Laboratory Tests








Test


 11/9/20


23:52 11/10/20


05:39 11/10/20


05:45 11/10/20


09:08


 


POC Whole Blood Glucose


 Pending  


 136 MG/DL


()  H 


 





 


White Blood Count


 


 


 9.4 K/UL


(4.8-10.8) 





 


Red Blood Count


 


 


 2.91 M/UL


(4.70-6.10)  L 





 


Hemoglobin


 


 


 8.7 G/DL


(14.2-18.0)  L 





 


Hematocrit


 


 


 28.4 %


(42.0-52.0)  L 





 


Mean Corpuscular Volume   98 FL (80-99)   


 


Mean Corpuscular Hemoglobin


 


 


 30.0 PG


(27.0-31.0) 





 


Mean Corpuscular Hemoglobin


Concent 


 


 30.7 G/DL


(32.0-36.0)  L 





 


Red Cell Distribution Width


 


 


 15.1 %


(11.6-14.8)  H 





 


Platelet Count


 


 


 309 K/UL


(150-450) 





 


Mean Platelet Volume


 


 


 8.7 FL


(6.5-10.1) 





 


Neutrophils (%) (Auto)


 


 


 73.0 %


(45.0-75.0) 





 


Lymphocytes (%) (Auto)


 


 


 19.3 %


(20.0-45.0)  L 





 


Monocytes (%) (Auto)


 


 


 5.6 %


(1.0-10.0) 





 


Eosinophils (%) (Auto)


 


 


 1.5 %


(0.0-3.0) 





 


Basophils (%) (Auto)


 


 


 0.7 %


(0.0-2.0) 





 


Sodium Level


 


 


 142 MMOL/L


(136-145) 





 


Potassium Level


 


 


 4.1 MMOL/L


(3.5-5.1) 





 


Chloride Level


 


 


 111 MMOL/L


()  H 





 


Carbon Dioxide Level


 


 


 25 MMOL/L


(21-32) 





 


Anion Gap


 


 


 6 mmol/L


(5-15) 





 


Blood Urea Nitrogen


 


 


 13 mg/dL


(7-18) 





 


Creatinine


 


 


 0.9 MG/DL


(0.55-1.30) 





 


Estimat Glomerular Filtration


Rate 


 


 > 60 mL/min


(>60) 





 


Glucose Level


 


 


 146 MG/DL


()  H 





 


Calcium Level


 


 


 7.5 MG/DL


(8.5-10.1)  L 





 


Phosphorus Level


 


 


 2.7 MG/DL


(2.5-4.9) 





 


Magnesium Level


 


 


 2.3 MG/DL


(1.8-2.4) 





 


Total Bilirubin


 


 


 0.2 MG/DL


(0.2-1.0) 





 


Aspartate Amino Transf


(AST/SGOT) 


 


 26 U/L (15-37)


 





 


Alanine Aminotransferase


(ALT/SGPT) 


 


 27 U/L (12-78)


 





 


Alkaline Phosphatase


 


 


 175 U/L


()  H 





 


Total Protein


 


 


 5.8 G/DL


(6.4-8.2)  L 





 


Albumin


 


 


 1.2 G/DL


(3.4-5.0)  L 





 


Globulin   4.6 g/dL   


 


Albumin/Globulin Ratio


 


 


 0.3 (1.0-2.7)


L 





 


Arterial Blood pH


 


 


 


 7.487


(7.350-7.450)


 


Arterial Blood Partial


Pressure CO2 


 


 


 32.9 mmHg


(35.0-45.0)  L


 


Arterial Blood Partial


Pressure O2 


 


 


 77.1 mmHg


(75.0-100.0)


 


Arterial Blood HCO3


 


 


 


 24.3 mmol/L


(22.0-26.0)


 


Arterial Blood Oxygen


Saturation 


 


 


 95.7 %


()


 


Arterial Blood Base Excess    1.2 (-2-2)  


 


Jerod Test    Positive  











Microbiology








 Date/Time


Source Procedure


Growth Status





 


 11/7/20 22:56


Sputum Expectorated Gram Stain - Final Complete


 


 11/7/20 22:56 Sputum Culture - Final


Staphylococcus Aureus - Mrsa


Usual Respiratory Sondra Complete

















Intake and Output  


 


 11/9/20 11/10/20





 19:00 07:00


 


Intake Total 1938.00381 ml 1580.30 ml


 


Output Total 1100 ml 1450 ml


 


Balance 838.74039 ml 130.30 ml


 


  


 


Free Water 210 ml 


 


IV Total 1008.54321 ml 860.30 ml


 


Tube Feeding 720 ml 720 ml


 


Output Urine Total 1100 ml 1450 ml








Objective


PHYSICAL EXAMINATION:


GENERAL:  The patient awake with deep stimuli, open his eyes, however,


cannot follow commands.  The patient is on a Ventimask at this time,


chronically ill-appearing.


HEAD AND NECK:  Pupils are equal and reactive to light.  Anicteric.


NECK:  Supple.  No JVD.


LUNGS:  Mech vent;  wheezing, rhonchi, and decreased air in bases.


HEART:  S1, S2.  Regular rhythm.  Distant heart sounds.  No murmur or


gallop.


ABDOMEN:  Soft, nondistended, nontender.  Positive bowel sounds.


EXTREMITIES:  No cyanosis, clubbing, or edema.


NEUROLOGIC:  Very limited secondary to the patient's status, cannot follow


commands.  Opens his eyes with deep stimuli and moving extremities


spontaneously.





Assessment/Plan


Assessment/Plan


ASSESSMENT:


1. Acute hypoxemic respiratory failure, most likely secondary to


pneumonia and sepsis.


2. Sepsis secondary to urinary tract infection and pneumonia.


3. pneumonia=MRSA; ESBL E. coli


4. Acute kidney injury on chronic renal insufficiency.


5. Dehydration.


6. History of chronic congestive heart failure.


7. Diabetes type 2.


8. Dyslipidemia.


9. Hypertension.


10. Alzheimer's disease.


11. COVID 19 previous positive





PLAN:


1.  ICU


2.  Dr. Sandoval,=Pulmonary Critical Care; follow mech vent recs


3.  Dr. Garcia = Inf Dis.


4.  IV= D5W due to the hypernatremia and dehydration.


5.  antibiotics = micafungin and meropenem


6.  Code status is full code.


7.    DVT prophylaxis is heparin subcutaneous.











Benito Hearn MD               Nov 10, 2020 17:59

## 2020-11-10 NOTE — NEPHROLOGY PROGRESS NOTE
Assessment/Plan


Problem List:  


(1) Dehydration


(2) JANES (acute kidney injury)


(3) Renal failure (ARF), acute on chronic


(4) Hypoxia


(5) Acute encephalopathy


(6) Electrolyte imbalance


Assessment





75-year-old male is admitted with acute hypoxic respiratory failure most likely 

secondary to pneumonia and sepsis, UTI.


Acute on chronic renal failure


Dehydration


Electrolyte imbalances, hypernatremia


Hypoalbuminemia


Diabetes type 2


History of congestive heart failure


Hypertension


Hyperlipemia


Alzheimer's


Previous COVID-19 infection in September 2020


Plan


November 10: Intubated.  Full code.  Labs reviewed.  Stable from renal 

standpoint of view.  Continue per consultants.


November 9: Remains intubated.  Full code.  Labs reviewed.  Electrolytes within 

normal limit.  Continue per consultants.


November 8: In ICU.  Remains intubated on ventilator.  Remains full code.  Low 

potassium addressed.  Blood pressure fluctuating.  Continue per consultants.


November 7: Patient in ICU.  Intubated on ventilator.  On 6 mics of Levophed.  

Will give 100 cc albumin 25%.  K-Phos IV ordered.  Continue per consultants.  

Continue monitor renal parameters and electrolytes.


November 6: Status unchanged.  Transfer to [facility] for seizure.  Stable from renal 

standpoint to view.  Continue per consultants.


November 5: Status quo.  Labs reviewed.  Renal parameters stable.  Continue per 

consultants.


November 4: On nonrebreather mask.  Labs reviewed.  Renal parameters 

stable.Continue per pulmonologist.  Clinically unchanged.


November 3: On nonrebreather mask.  Inflammatory markers gradually declining.  

Renal parameters stable.  Continue per pulmonary.


November 2: Remains on nonrebreather mask.  Inflammatory markers remain 

elevated.  Renal parameters somewhat stable.  Continue per pulmonary and ID.  

Remains full code.


November 1: Patient on nonrebreather mask.  Labs noted.  Serum creatinine down 

to 1.3.  Continue per consultants.


October 31: Patient on nonrebreather mask.  Transfer to telemetry when seen this

morning.  Labs noted.  Continue per consultants.  Serum creatinine dylan to 1.7. 

Continue to monitor renal parameters.  Continue to monitor vancomycin level


October 30: Patient is not doing well clinically.  Mild respiratory distress.  

ABG noted.  Somewhat hypoxic.  CBC and chemistry panel ordered.  Discussed with 

LITA Garcia.  Will defer to pulmonary management to specialist.  Continue per ID.

 Renal parameters remained stable as of October 29.


October 29: Labs reviewed.  Renal parameters stable.  Continue per consultants.


October 28: Labs reviewed.  Potassium via NG tube ordered.  IV fluids stopped.  

Continue per consultants.


October 27: Labs reviewed.  Low potassium and low phosphorus replaced.  Continue

per consultants.  Remains stable from renal standpoint of view.


October 26: Patient remains n.p.o.  IV fluid down to 50 cc an hour.  Potassium 

supplement intravenously ordered.  Continue per consultants.





Previously:


Patient is n.p.o., will continue on IV fluid of D5W 75 cc an hour


We will monitor electrolytes and renal parameters


Avoid nephrotoxic's


Start p.o. when he clears by speech therapist, meanwhile aspiration precautions


Keep the blood pressure and blood sugar in check


Per orders





Subjective


ROS Limited/Unobtainable:  Yes





Objective


Objective





Last 24 Hour Vital Signs








  Date Time  Temp Pulse Resp B/P (MAP) Pulse Ox O2 Delivery O2 Flow Rate FiO2


 


11/10/20 08:30  84 17 103/58 (73) 98   


 


11/10/20 08:15  82 15 123/64 (83) 99   


 


11/10/20 08:00 98.9 83 14 113/66 (82) 100   


 


11/10/20 07:20  84 14     35


 


11/10/20 07:00  80 14 103/54 (70) 98   


 


11/10/20 07:00   14   Mechanical Ventilator  35


 


11/10/20 06:30  80 15 107/61 (76) 98   


 


11/10/20 06:30  81 15     


 


11/10/20 06:00  79 15 121/64 (83) 100   


 


11/10/20 06:00   15   Mechanical Ventilator  35


 


11/10/20 05:30  79 14 107/62 (77) 99   


 


11/10/20 05:00  79 14 109/61 (77) 100   


 


11/10/20 05:00   14   Mechanical Ventilator  35


 


11/10/20 04:30  78 14 104/64 (77) 100   


 


11/10/20 04:00        35


 


11/10/20 04:00      Mechanical Ventilator  


 


11/10/20 04:00 98.4 79 16 119/63 (81) 100   


 


11/10/20 04:00   16   Mechanical Ventilator  35


 


11/10/20 04:00  78      


 


11/10/20 03:30  80 15 117/67 (84) 98   


 


11/10/20 03:09  80 15     35


 


11/10/20 03:00  82 16 117/68 (84) 98   


 


11/10/20 03:00   16   Mechanical Ventilator  35


 


11/10/20 02:30  86 19 117/72 (87) 97   


 


11/10/20 02:00   18   Mechanical Ventilator  35


 


11/10/20 02:00  81 18 121/66 (84) 100   


 


11/10/20 01:30  81 19 103/63 (76) 100   


 


11/10/20 01:00   17   Mechanical Ventilator  35


 


11/10/20 01:00  86 19 129/99 (109) 100   


 


11/10/20 00:30  82 14 118/62 (80) 99   


 


11/10/20 00:00 99.0 85 16 109/63 (78) 97   


 


11/10/20 00:00  88      


 


11/10/20 00:00   16   Mechanical Ventilator  35


 


11/10/20 00:00      Mechanical Ventilator  


 


11/10/20 00:00        35


 


11/9/20 23:30  87 15 100/56 (71) 97   


 


11/9/20 23:00   16   Mechanical Ventilator  35


 


11/9/20 23:00  78 14 111/58 (75) 97   


 


11/9/20 22:56  78 14     35


 


11/9/20 22:30  79 14 105/60 (75) 96   


 


11/9/20 22:00   16   Mechanical Ventilator  35


 


11/9/20 22:00  81 16 118/66 (83) 100   


 


11/9/20 21:30  86 19 127/67 (87) 100   


 


11/9/20 21:25  86 21     35


 


11/9/20 21:00   15   Mechanical Ventilator  35


 


11/9/20 21:00   19   Mechanical Ventilator  35


 


11/9/20 21:00  82 19 124/62 (82) 99   


 


11/9/20 20:30  83 16 96/57 (70) 97   


 


11/9/20 20:00      Mechanical Ventilator  


 


11/9/20 20:00 98.9 83 15 100/55 (70) 96   


 


11/9/20 20:00   15   Mechanical Ventilator  35


 


11/9/20 20:00        35


 


11/9/20 20:00  82      


 


11/9/20 19:30  82 16 93/57 (69) 97   


 


11/9/20 19:23  82 16     35


 


11/9/20 19:00   15   Mechanical Ventilator  35


 


11/9/20 19:00  78 15 107/58 (74) 96   


 


11/9/20 18:30  85 20 85/54 (64) 94   


 


11/9/20 18:00  78 15 107/58 (74) 96   


 


11/9/20 18:00   15   Mechanical Ventilator  35


 


11/9/20 17:30  59 14 149/76 (100) 100   


 


11/9/20 17:09   14   Mechanical Ventilator  35


 


11/9/20 17:07    93/55    


 


11/9/20 17:00   15   Mechanical Ventilator  35


 


11/9/20 17:00  75 15 92/56 (68) 99   


 


11/9/20 16:30  85 16 105/60 (75) 99   


 


11/9/20 16:00        35


 


11/9/20 16:00 97.6 75 14 112/66 (81) 100   


 


11/9/20 16:00   14   Mechanical Ventilator  35


 


11/9/20 16:00      Mechanical Ventilator  


 


11/9/20 16:00  71      


 


11/9/20 15:30  75 15 120/62 (81) 100   


 


11/9/20 15:00  79 16 128/65 (86) 98   


 


11/9/20 15:00   15   Mechanical Ventilator  35


 


11/9/20 14:40  82 16     35


 


11/9/20 14:30  80 16 134/72 (92) 99   


 


11/9/20 14:00   14   Mechanical Ventilator  35


 


11/9/20 14:00  81 16 134/72 (92) 100   


 


11/9/20 13:00   15   Mechanical Ventilator  35


 


11/9/20 13:00  79 15 117/64 (81) 100   


 


11/9/20 12:30  76 14 116/62 (80) 100   


 


11/9/20 12:00  76      


 


11/9/20 12:00 99.1 74 14 93/52 (66) 98   


 


11/9/20 12:00      Mechanical Ventilator  


 


11/9/20 12:00   14   Mechanical Ventilator  35


 


11/9/20 12:00        35


 


11/9/20 11:30  75 14 90/54 (66) 98   


 


11/9/20 11:00   15   Mechanical Ventilator  40


 


11/9/20 11:00   15   Mechanical Ventilator  35


 


11/9/20 11:00  77 15 102/59 (73) 99   


 


11/9/20 10:40  73 14     35


 


11/9/20 10:30  75 14 95/55 (68) 100   


 


11/9/20 10:00  73 14 131/65 (87) 100   


 


11/9/20 10:00   14   Mechanical Ventilator  40


 


11/9/20 09:30  71 14 96/53 (67) 99   

















Intake and Output  


 


 11/9/20 11/10/20





 19:00 07:00


 


Intake Total 1938.94146 ml 1580.30 ml


 


Output Total 1100 ml 1450 ml


 


Balance 838.33407 ml 130.30 ml


 


  


 


Free Water 210 ml 


 


IV Total 1008.07224 ml 860.30 ml


 


Tube Feeding 720 ml 720 ml


 


Output Urine Total 1100 ml 1450 ml








Laboratory Tests


11/9/20 12:33: POC Whole Blood Glucose 134H


11/9/20 17:37: POC Whole Blood Glucose 129H


11/9/20 23:52: POC Whole Blood Glucose [Pending]


11/10/20 05:39: POC Whole Blood Glucose 136H


11/10/20 05:45: 


White Blood Count 9.4, Red Blood Count 2.91L, Hemoglobin 8.7L, Hematocrit 28.4L,

 Mean Corpuscular Volume 98, Mean Corpuscular Hemoglobin 30.0, Mean Corpuscular 

Hemoglobin Concent 30.7L, Red Cell Distribution Width 15.1H, Platelet Count 309,

 Mean Platelet Volume 8.7, Neutrophils (%) (Auto) 73.0, Lymphocytes (%) (Auto) 

19.3L, Monocytes (%) (Auto) 5.6, Eosinophils (%) (Auto) 1.5, Basophils (%) 

(Auto) 0.7, Sodium Level 142, Potassium Level 4.1, Chloride Level 111H, Carbon 

Dioxide Level 25, Anion Gap 6, Blood Urea Nitrogen 13, Creatinine 0.9, Estimat 

Glomerular Filtration Rate > 60, Glucose Level 146H, Calcium Level 7.5L, 

Phosphorus Level 2.7, Magnesium Level 2.3, Total Bilirubin 0.2, Aspartate Amino 

Transf (AST/SGOT) 26, Alanine Aminotransferase (ALT/SGPT) 27, Alkaline 

Phosphatase 175H, Total Protein 5.8L, Albumin 1.2L, Globulin 4.6, 

Albumin/Globulin Ratio 0.3L


Height (Feet):  5


Height (Inches):  4.00


Weight (Pounds):  130


General Appearance:  no apparent distress


EENT:  other - Remains intubated on ventilator


Cardiovascular:  tachycardia


Respiratory/Chest:  decreased breath sounds


Abdomen:  distended











Seven Tobar MD            Nov 10, 2020 09:17

## 2020-11-10 NOTE — NUR
NURSE NOTES:

LATE ENTRY:

PT HAD BM, SMALL, FIRM, FORMED AND BROWN. PT CLEANED AND REPOSITIONED. EXTREMITIES ELEVATED 
ON PILLOWS. COOLING BLANKET OFF, JUST MONITORING BODY TEMP . ORAL CARE AND SUCTION 
SECRETIONS, MODERATE, THICK. HOB>30, ASPIRATION PRECAUTION.

## 2020-11-10 NOTE — NUR
NURSE NOTES:

Pt is sedated on the bed and on running with Versed drip @ 2mg/hr and RASS -2. Levophed drip 
@ 3mcg/min as titrated. No sign of pain by FLACC scale. Given suction and oral care. Change 
position. Tolerated well with current NGT feeding and SaO2 100% noted. Will continue to 
monitor any change of condition.

## 2020-11-10 NOTE — PULMONOLGY CRITICAL CARE NOTE
Critical Care - Asmt/Plan


Problems:  


(1) Acute respiratory failure


(2) Multifocal pneumonia


(3) 2019 novel coronavirus disease (COVID-19)


(4) Acute encephalopathy


(5) Severe sepsis


(6) Alzheimer's dementia


(7) HTN (hypertension)


(8) History of CVA (cerebrovascular accident)


(9) BPH (benign prostatic hyperplasia)


Respiratory:  monitor respiratory rate, adjust FIO2


Cardiac:  continue pressors


Renal:  F/U I&O, keep IV fluid


Infectious Disease:  check cultures


Gastrointestinal:  continue feedings/current rate


Endocrine:  monitor blood sugar, continue sliding scale insulin


Hematologic:  transfuse if hgb<8.5


Neurologic:  keep patient comfortable


Affect:  PRN ativan


Prophylaxis:  Protonix


Time Spent (Minutes):  40


Notes Reviewed:  internist, cardio


Discussed with:  nurses, consultants, 





Critical Care - Objective





Last 24 Hour Vital Signs








  Date Time  Temp Pulse Resp B/P (MAP) Pulse Ox O2 Delivery O2 Flow Rate FiO2


 


11/10/20 10:30  83 16 129/68 (88) 100   


 


11/10/20 10:15  82 16 125/62 (83) 100   


 


11/10/20 10:00  83 15 133/69 (90) 100   


 


11/10/20 09:45  80 15 119/60 (79) 99   


 


11/10/20 09:30  85 16 120/63 (82) 98   


 


11/10/20 09:15  85 16 107/58 (74) 98   


 


11/10/20 09:00  86 15 100/57 (71) 98   


 


11/10/20 08:30  84 17 103/58 (73) 98   


 


11/10/20 08:15  82 15 123/64 (83) 99   


 


11/10/20 08:00 98.9 83 14 113/66 (82) 100   


 


11/10/20 07:20  84 14     35


 


11/10/20 07:00  80 14 103/54 (70) 98   


 


11/10/20 07:00   14   Mechanical Ventilator  35


 


11/10/20 06:30  80 15 107/61 (76) 98   


 


11/10/20 06:30  81 15     


 


11/10/20 06:00  79 15 121/64 (83) 100   


 


11/10/20 06:00   15   Mechanical Ventilator  35


 


11/10/20 05:30  79 14 107/62 (77) 99   


 


11/10/20 05:00  79 14 109/61 (77) 100   


 


11/10/20 05:00   14   Mechanical Ventilator  35


 


11/10/20 04:30  78 14 104/64 (77) 100   


 


11/10/20 04:00        35


 


11/10/20 04:00      Mechanical Ventilator  


 


11/10/20 04:00 98.4 79 16 119/63 (81) 100   


 


11/10/20 04:00   16   Mechanical Ventilator  35


 


11/10/20 04:00  78      


 


11/10/20 03:30  80 15 117/67 (84) 98   


 


11/10/20 03:09  80 15     35


 


11/10/20 03:00  82 16 117/68 (84) 98   


 


11/10/20 03:00   16   Mechanical Ventilator  35


 


11/10/20 02:30  86 19 117/72 (87) 97   


 


11/10/20 02:00   18   Mechanical Ventilator  35


 


11/10/20 02:00  81 18 121/66 (84) 100   


 


11/10/20 01:30  81 19 103/63 (76) 100   


 


11/10/20 01:00   17   Mechanical Ventilator  35


 


11/10/20 01:00  86 19 129/99 (109) 100   


 


11/10/20 00:30  82 14 118/62 (80) 99   


 


11/10/20 00:00 99.0 85 16 109/63 (78) 97   


 


11/10/20 00:00  88      


 


11/10/20 00:00   16   Mechanical Ventilator  35


 


11/10/20 00:00      Mechanical Ventilator  


 


11/10/20 00:00        35


 


11/9/20 23:30  87 15 100/56 (71) 97   


 


11/9/20 23:00   16   Mechanical Ventilator  35


 


11/9/20 23:00  78 14 111/58 (75) 97   


 


11/9/20 22:56  78 14     35


 


11/9/20 22:30  79 14 105/60 (75) 96   


 


11/9/20 22:00   16   Mechanical Ventilator  35


 


11/9/20 22:00  81 16 118/66 (83) 100   


 


11/9/20 21:30  86 19 127/67 (87) 100   


 


11/9/20 21:25  86 21     35


 


11/9/20 21:00   15   Mechanical Ventilator  35


 


11/9/20 21:00   19   Mechanical Ventilator  35


 


11/9/20 21:00  82 19 124/62 (82) 99   


 


11/9/20 20:30  83 16 96/57 (70) 97   


 


11/9/20 20:00      Mechanical Ventilator  


 


11/9/20 20:00 98.9 83 15 100/55 (70) 96   


 


11/9/20 20:00   15   Mechanical Ventilator  35


 


11/9/20 20:00        35


 


11/9/20 20:00  82      


 


11/9/20 19:30  82 16 93/57 (69) 97   


 


11/9/20 19:23  82 16     35


 


11/9/20 19:00   15   Mechanical Ventilator  35


 


11/9/20 19:00  78 15 107/58 (74) 96   


 


11/9/20 18:30  85 20 85/54 (64) 94   


 


11/9/20 18:00  78 15 107/58 (74) 96   


 


11/9/20 18:00   15   Mechanical Ventilator  35


 


11/9/20 17:30  59 14 149/76 (100) 100   


 


11/9/20 17:09   14   Mechanical Ventilator  35


 


11/9/20 17:07    93/55    


 


11/9/20 17:00   15   Mechanical Ventilator  35


 


11/9/20 17:00  75 15 92/56 (68) 99   


 


11/9/20 16:30  85 16 105/60 (75) 99   


 


11/9/20 16:00        35


 


11/9/20 16:00 97.6 75 14 112/66 (81) 100   


 


11/9/20 16:00   14   Mechanical Ventilator  35


 


11/9/20 16:00      Mechanical Ventilator  


 


11/9/20 16:00  71      


 


11/9/20 15:30  75 15 120/62 (81) 100   


 


11/9/20 15:00  79 16 128/65 (86) 98   


 


11/9/20 15:00   15   Mechanical Ventilator  35


 


11/9/20 14:40  82 16     35


 


11/9/20 14:30  80 16 134/72 (92) 99   


 


11/9/20 14:00   14   Mechanical Ventilator  35


 


11/9/20 14:00  81 16 134/72 (92) 100   


 


11/9/20 13:00   15   Mechanical Ventilator  35


 


11/9/20 13:00  79 15 117/64 (81) 100   


 


11/9/20 12:30  76 14 116/62 (80) 100   


 


11/9/20 12:00  76      


 


11/9/20 12:00 99.1 74 14 93/52 (66) 98   


 


11/9/20 12:00      Mechanical Ventilator  


 


11/9/20 12:00   14   Mechanical Ventilator  35


 


11/9/20 12:00        35


 


11/9/20 11:30  75 14 90/54 (66) 98   








Status:  sedated


Condition:  critical


HEENT:  atraumatic


Neck:  full ROM


Lungs:  rales, rhonchi


Heart:  HR/BP stable


Abdomen:  soft, non-tender, active bowel sounds


Micro:





Microbiology








 Date/Time


Source Procedure


Growth Status





 


 11/7/20 22:56


Sputum Expectorated Gram Stain - Final Complete


 


 11/7/20 22:56 Sputum Culture - Final


Staphylococcus Aureus - Mrsa


Usual Respiratory Sondra Complete








Accucheck:  136





Critical Care - Subjective


ROS Limited/Unobtainable:  Yes


Condition:  critical


EKG Rhythm:  Sinus Rhythm


FI02:  35


Vent Support Breath Rate:  14


Vent Support Mode:  AC


Vent Tidal Volume:  600


Sputum Amount:  Moderate


PEEP:  0.0


PIP:  27


Tube Feeding Amount:  60


I&O:











Intake and Output  


 


 11/9/20 11/10/20





 19:00 07:00


 


Intake Total 1938.86574 ml 1580.30 ml


 


Output Total 1100 ml 1450 ml


 


Balance 838.49466 ml 130.30 ml


 


  


 


Free Water 210 ml 


 


IV Total 1008.42785 ml 860.30 ml


 


Tube Feeding 720 ml 720 ml


 


Output Urine Total 1100 ml 1450 ml








ET-Tube:  7.5


ET Position:  25


Labs:





Laboratory Tests








Test


 11/9/20


12:33 11/9/20


17:37 11/9/20


23:52 11/10/20


05:39


 


POC Whole Blood Glucose


 134 MG/DL


()  H 129 MG/DL


()  H Pending  


 136 MG/DL


()  H


 


Test


 11/10/20


05:45 11/10/20


09:08 


 





 


White Blood Count


 9.4 K/UL


(4.8-10.8) 


 


 





 


Red Blood Count


 2.91 M/UL


(4.70-6.10)  L 


 


 





 


Hemoglobin


 8.7 G/DL


(14.2-18.0)  L 


 


 





 


Hematocrit


 28.4 %


(42.0-52.0)  L 


 


 





 


Mean Corpuscular Volume 98 FL (80-99)     


 


Mean Corpuscular Hemoglobin


 30.0 PG


(27.0-31.0) 


 


 





 


Mean Corpuscular Hemoglobin


Concent 30.7 G/DL


(32.0-36.0)  L 


 


 





 


Red Cell Distribution Width


 15.1 %


(11.6-14.8)  H 


 


 





 


Platelet Count


 309 K/UL


(150-450) 


 


 





 


Mean Platelet Volume


 8.7 FL


(6.5-10.1) 


 


 





 


Neutrophils (%) (Auto)


 73.0 %


(45.0-75.0) 


 


 





 


Lymphocytes (%) (Auto)


 19.3 %


(20.0-45.0)  L 


 


 





 


Monocytes (%) (Auto)


 5.6 %


(1.0-10.0) 


 


 





 


Eosinophils (%) (Auto)


 1.5 %


(0.0-3.0) 


 


 





 


Basophils (%) (Auto)


 0.7 %


(0.0-2.0) 


 


 





 


Sodium Level


 142 MMOL/L


(136-145) 


 


 





 


Potassium Level


 4.1 MMOL/L


(3.5-5.1) 


 


 





 


Chloride Level


 111 MMOL/L


()  H 


 


 





 


Carbon Dioxide Level


 25 MMOL/L


(21-32) 


 


 





 


Anion Gap


 6 mmol/L


(5-15) 


 


 





 


Blood Urea Nitrogen


 13 mg/dL


(7-18) 


 


 





 


Creatinine


 0.9 MG/DL


(0.55-1.30) 


 


 





 


Estimat Glomerular Filtration


Rate > 60 mL/min


(>60) 


 


 





 


Glucose Level


 146 MG/DL


()  H 


 


 





 


Calcium Level


 7.5 MG/DL


(8.5-10.1)  L 


 


 





 


Phosphorus Level


 2.7 MG/DL


(2.5-4.9) 


 


 





 


Magnesium Level


 2.3 MG/DL


(1.8-2.4) 


 


 





 


Total Bilirubin


 0.2 MG/DL


(0.2-1.0) 


 


 





 


Aspartate Amino Transf


(AST/SGOT) 26 U/L (15-37)


 


 


 





 


Alanine Aminotransferase


(ALT/SGPT) 27 U/L (12-78)


 


 


 





 


Alkaline Phosphatase


 175 U/L


()  H 


 


 





 


Total Protein


 5.8 G/DL


(6.4-8.2)  L 


 


 





 


Albumin


 1.2 G/DL


(3.4-5.0)  L 


 


 





 


Globulin 4.6 g/dL     


 


Albumin/Globulin Ratio


 0.3 (1.0-2.7)


L 


 


 





 


Arterial Blood pH


 


 7.487


(7.350-7.450) 


 





 


Arterial Blood Partial


Pressure CO2 


 32.9 mmHg


(35.0-45.0)  L 


 





 


Arterial Blood Partial


Pressure O2 


 77.1 mmHg


(75.0-100.0) 


 





 


Arterial Blood HCO3


 


 24.3 mmol/L


(22.0-26.0) 


 





 


Arterial Blood Oxygen


Saturation 


 95.7 %


() 


 





 


Arterial Blood Base Excess  1.2 (-2-2)    


 


Jerod Test  Positive    

















Michael Sandoval MD              Nov 10, 2020 11:13

## 2020-11-10 NOTE — NUR
NURSE HAND-OFF REPORT: 



Latest Vital Signs: Temperature 96.9 , Pulse 86 , B/P 129 /70 , Respiratory Rate 14 , O2 SAT 
100 , Mechanical Ventilator, O2 Flow Rate  .  

Vital Sign Comment: 



EKG Rhythm: Sinus Rhythm

Rhythm change?: N 

MD Notified?: DIDI Edmond MD Response: 



Latest Mejia Fall Score: 70  

Fall Risk: High Risk 

Safety Measures: Call light Within Reach, Bed Alarm Zone 1, Side Rails Side Rails x3, Bed 
position Low and Locked.

Fall Precautions: 

Yellow Socks

Yellow Gown

Door Sign

Patient Fall Education



Report given to KANDIS ARORA

## 2020-11-10 NOTE — INFECTIOUS DISEASES PROG NOTE
Assessment/Plan


76yo M with:


SEptic Shock- pressors requirements decreasing


Fever,r ecurrent; SP


Leukocytosis ;recurrent ; increased; -SP


Acute hypoxic resp failure, on NRB mask> 4l NC; back on NRB, desaturation 10/29 

> intubated 11/6


Pneumonia- >HAP


hx of COVID19 PNA 9/9/20


         --11/8 CXR: Persistent bilateral patchy pulmonary opacities, most 

prominent  in the left lower lung.


         --11/7 ucx neg


                  sp cx MRSA (colonizer at this point)


                  Bcx p


         --11/2 Bcx Neg


          10/30 Sp cx MRSA (Vancomycin ADRIANA 1), ESBL E.coli


   10/23 BCx NTD


   UA 15-20 WBC, UCx Neg


   10/23 COVID rapid neg; 10/26 rapid COVID PCR + (from prior infection)- not 

new infection


   Flu A/B neg


   CXR: L pna


   Resp cx MRSA





Neftali on CKD, improving





SNF resident ([facility])


Non-verbal


VRE and MRSA colonized





Plan:


Meropenem #9/10-14 for ESBL PNA


Dc empiric Micafungin #4


   -11/6 SP IV Vancomycin #15


   -11/2 SP Zosyn #4


   -10/26 SP Cefepime #4


   -10/24 SP Flagyl #








Monitor CBC/CMP


Monitor resp status


Monitor temp curve and hemodynamics


repeat cultures





D/w RN and pharmacy staff





Thank you for this consult. [facility] will continue to follow.





Subjective


Allergies:  


Coded Allergies:  


     No Known Allergies (Unverified , 8/20/12)


afebrile >72hrs


FIo2 down to 35%


levo at 3


no leukocytosis 


Bcx NTD





Objective





Last 24 Hour Vital Signs








  Date Time  Temp Pulse Resp B/P (MAP) Pulse Ox O2 Delivery O2 Flow Rate FiO2


 


11/10/20 08:30  84 17 103/58 (73) 98   


 


11/10/20 08:15  82 15 123/64 (83) 99   


 


11/10/20 08:00 98.9 83 14 113/66 (82) 100   


 


11/10/20 07:20  84 14     35


 


11/10/20 07:00  80 14 103/54 (70) 98   


 


11/10/20 07:00   14   Mechanical Ventilator  35


 


11/10/20 06:30  80 15 107/61 (76) 98   


 


11/10/20 06:30  81 15     


 


11/10/20 06:00  79 15 121/64 (83) 100   


 


11/10/20 06:00   15   Mechanical Ventilator  35


 


11/10/20 05:30  79 14 107/62 (77) 99   


 


11/10/20 05:00  79 14 109/61 (77) 100   


 


11/10/20 05:00   14   Mechanical Ventilator  35


 


11/10/20 04:30  78 14 104/64 (77) 100   


 


11/10/20 04:00        35


 


11/10/20 04:00      Mechanical Ventilator  


 


11/10/20 04:00 98.4 79 16 119/63 (81) 100   


 


11/10/20 04:00   16   Mechanical Ventilator  35


 


11/10/20 04:00  78      


 


11/10/20 03:30  80 15 117/67 (84) 98   


 


11/10/20 03:09  80 15     35


 


11/10/20 03:00  82 16 117/68 (84) 98   


 


11/10/20 03:00   16   Mechanical Ventilator  35


 


11/10/20 02:30  86 19 117/72 (87) 97   


 


11/10/20 02:00   18   Mechanical Ventilator  35


 


11/10/20 02:00  81 18 121/66 (84) 100   


 


11/10/20 01:30  81 19 103/63 (76) 100   


 


11/10/20 01:00   17   Mechanical Ventilator  35


 


11/10/20 01:00  86 19 129/99 (109) 100   


 


11/10/20 00:30  82 14 118/62 (80) 99   


 


11/10/20 00:00 99.0 85 16 109/63 (78) 97   


 


11/10/20 00:00  88      


 


11/10/20 00:00   16   Mechanical Ventilator  35


 


11/10/20 00:00      Mechanical Ventilator  


 


11/10/20 00:00        35


 


11/9/20 23:30  87 15 100/56 (71) 97   


 


11/9/20 23:00   16   Mechanical Ventilator  35


 


11/9/20 23:00  78 14 111/58 (75) 97   


 


11/9/20 22:56  78 14     35


 


11/9/20 22:30  79 14 105/60 (75) 96   


 


11/9/20 22:00   16   Mechanical Ventilator  35


 


11/9/20 22:00  81 16 118/66 (83) 100   


 


11/9/20 21:30  86 19 127/67 (87) 100   


 


11/9/20 21:25  86 21     35


 


11/9/20 21:00   15   Mechanical Ventilator  35


 


11/9/20 21:00   19   Mechanical Ventilator  35


 


11/9/20 21:00  82 19 124/62 (82) 99   


 


11/9/20 20:30  83 16 96/57 (70) 97   


 


11/9/20 20:00      Mechanical Ventilator  


 


11/9/20 20:00 98.9 83 15 100/55 (70) 96   


 


11/9/20 20:00   15   Mechanical Ventilator  35


 


11/9/20 20:00        35


 


11/9/20 20:00  82      


 


11/9/20 19:30  82 16 93/57 (69) 97   


 


11/9/20 19:23  82 16     35


 


11/9/20 19:00   15   Mechanical Ventilator  35


 


11/9/20 19:00  78 15 107/58 (74) 96   


 


11/9/20 18:30  85 20 85/54 (64) 94   


 


11/9/20 18:00  78 15 107/58 (74) 96   


 


11/9/20 18:00   15   Mechanical Ventilator  35


 


11/9/20 17:30  59 14 149/76 (100) 100   


 


11/9/20 17:09   14   Mechanical Ventilator  35


 


11/9/20 17:07    93/55    


 


11/9/20 17:00   15   Mechanical Ventilator  35


 


11/9/20 17:00  75 15 92/56 (68) 99   


 


11/9/20 16:30  85 16 105/60 (75) 99   


 


11/9/20 16:00        35


 


11/9/20 16:00 97.6 75 14 112/66 (81) 100   


 


11/9/20 16:00   14   Mechanical Ventilator  35


 


11/9/20 16:00      Mechanical Ventilator  


 


11/9/20 16:00  71      


 


11/9/20 15:30  75 15 120/62 (81) 100   


 


11/9/20 15:00  79 16 128/65 (86) 98   


 


11/9/20 15:00   15   Mechanical Ventilator  35


 


11/9/20 14:40  82 16     35


 


11/9/20 14:30  80 16 134/72 (92) 99   


 


11/9/20 14:00   14   Mechanical Ventilator  35


 


11/9/20 14:00  81 16 134/72 (92) 100   


 


11/9/20 13:00   15   Mechanical Ventilator  35


 


11/9/20 13:00  79 15 117/64 (81) 100   


 


11/9/20 12:30  76 14 116/62 (80) 100   


 


11/9/20 12:00  76      


 


11/9/20 12:00 99.1 74 14 93/52 (66) 98   


 


11/9/20 12:00      Mechanical Ventilator  


 


11/9/20 12:00   14   Mechanical Ventilator  35


 


11/9/20 12:00        35


 


11/9/20 11:30  75 14 90/54 (66) 98   


 


11/9/20 11:00   15   Mechanical Ventilator  40


 


11/9/20 11:00   15   Mechanical Ventilator  35


 


11/9/20 11:00  77 15 102/59 (73) 99   


 


11/9/20 10:40  73 14     35








Height (Feet):  5


Height (Inches):  4.00


Weight (Pounds):  130


Gen: sedated, intubated


CV: RRR


Pulm: Rhonchi anteriorly


Abd: Soft, NTND


Ext: No c/c/e





Microbiology








 Date/Time


Source Procedure


Growth Status





 


 11/7/20 22:56


Sputum Expectorated Gram Stain - Final Complete


 


 11/7/20 22:56 Sputum Culture - Final


Staphylococcus Aureus - Mrsa


Usual Respiratory Sondra Complete











Laboratory Tests








Test


 11/9/20


12:33 11/9/20


17:37 11/9/20


23:52 11/10/20


05:39


 


POC Whole Blood Glucose


 134 MG/DL


()  H 129 MG/DL


()  H Pending  


 136 MG/DL


()  H


 


Test


 11/10/20


05:45 11/10/20


09:08 


 





 


White Blood Count


 9.4 K/UL


(4.8-10.8) 


 


 





 


Red Blood Count


 2.91 M/UL


(4.70-6.10)  L 


 


 





 


Hemoglobin


 8.7 G/DL


(14.2-18.0)  L 


 


 





 


Hematocrit


 28.4 %


(42.0-52.0)  L 


 


 





 


Mean Corpuscular Volume 98 FL (80-99)     


 


Mean Corpuscular Hemoglobin


 30.0 PG


(27.0-31.0) 


 


 





 


Mean Corpuscular Hemoglobin


Concent 30.7 G/DL


(32.0-36.0)  L 


 


 





 


Red Cell Distribution Width


 15.1 %


(11.6-14.8)  H 


 


 





 


Platelet Count


 309 K/UL


(150-450) 


 


 





 


Mean Platelet Volume


 8.7 FL


(6.5-10.1) 


 


 





 


Neutrophils (%) (Auto)


 73.0 %


(45.0-75.0) 


 


 





 


Lymphocytes (%) (Auto)


 19.3 %


(20.0-45.0)  L 


 


 





 


Monocytes (%) (Auto)


 5.6 %


(1.0-10.0) 


 


 





 


Eosinophils (%) (Auto)


 1.5 %


(0.0-3.0) 


 


 





 


Basophils (%) (Auto)


 0.7 %


(0.0-2.0) 


 


 





 


Sodium Level


 142 MMOL/L


(136-145) 


 


 





 


Potassium Level


 4.1 MMOL/L


(3.5-5.1) 


 


 





 


Chloride Level


 111 MMOL/L


()  H 


 


 





 


Carbon Dioxide Level


 25 MMOL/L


(21-32) 


 


 





 


Anion Gap


 6 mmol/L


(5-15) 


 


 





 


Blood Urea Nitrogen


 13 mg/dL


(7-18) 


 


 





 


Creatinine


 0.9 MG/DL


(0.55-1.30) 


 


 





 


Estimat Glomerular Filtration


Rate > 60 mL/min


(>60) 


 


 





 


Glucose Level


 146 MG/DL


()  H 


 


 





 


Calcium Level


 7.5 MG/DL


(8.5-10.1)  L 


 


 





 


Phosphorus Level


 2.7 MG/DL


(2.5-4.9) 


 


 





 


Magnesium Level


 2.3 MG/DL


(1.8-2.4) 


 


 





 


Total Bilirubin


 0.2 MG/DL


(0.2-1.0) 


 


 





 


Aspartate Amino Transf


(AST/SGOT) 26 U/L (15-37)


 


 


 





 


Alanine Aminotransferase


(ALT/SGPT) 27 U/L (12-78)


 


 


 





 


Alkaline Phosphatase


 175 U/L


()  H 


 


 





 


Total Protein


 5.8 G/DL


(6.4-8.2)  L 


 


 





 


Albumin


 1.2 G/DL


(3.4-5.0)  L 


 


 





 


Globulin 4.6 g/dL     


 


Albumin/Globulin Ratio


 0.3 (1.0-2.7)


L 


 


 





 


Arterial Blood pH


 


 7.487


(7.350-7.450) 


 





 


Arterial Blood Partial


Pressure CO2 


 32.9 mmHg


(35.0-45.0)  L 


 





 


Arterial Blood Partial


Pressure O2 


 77.1 mmHg


(75.0-100.0) 


 





 


Arterial Blood HCO3


 


 24.3 mmol/L


(22.0-26.0) 


 





 


Arterial Blood Oxygen


Saturation 


 95.7 %


() 


 





 


Arterial Blood Base Excess  1.2 (-2-2)    


 


Jerod Test  Positive    











Current Medications








 Medications


  (Trade)  Dose


 Ordered  Sig/Miky


 Route


 PRN Reason  Start Time


 Stop Time Status Last Admin


Dose Admin


 


 Acetaminophen


  (Tylenol)  650 mg  Q4H  PRN


 ORAL


 Temp >100.5  10/23/20 20:30


 11/22/20 20:29  11/5/20 22:39





 


 Chlorhexidine


 Gluconate


  (Jess-Hex 2%)  1 applic  DAILY@2000


 TOPIC


   11/6/20 20:00


 2/4/21 19:59  11/9/20 19:54





 


 Dextrose


  (Dextrose 50%)  50 ml  Q30M  PRN


 IV


 Hypoglycemia  10/23/20 22:45


 1/21/21 22:44   





 


 Heparin Sodium


  (Porcine)


  (Heparin 5000


 units/ml)  5,000 units  EVERY 12  HOURS


 SUBQ


   10/23/20 21:00


 12/7/20 20:59  11/10/20 08:59





 


 Insulin Aspart


  (NovoLOG)    EVERY 6  HOURS


 SUBQ


   11/2/20 06:00


 1/22/21 06:29  11/10/20 00:07





 


 Lorazepam


  (Ativan 2mg/ml


 1ml)  2 mg  Q4H  PRN


 IV


 For Anxiety  11/6/20 12:45


 11/13/20 12:44   





 


 Meropenem 1 gm/


 Sodium Chloride  55 ml @ 


 110 mls/hr  Q12HR@0400,1600


 IVPB


   11/2/20 16:00


 11/11/20 23:59  11/10/20 04:01





 


 Micafungin Sodium


 100 mg/Sodium


 Chloride  110 ml @ 


 110 mls/hr  Q24H


 IVPB


   11/7/20 16:00


 11/14/20 15:59  11/9/20 15:22





 


 Midazolam HCl 50


 mg/Sodium Chloride  100 ml @ 0


 mls/hr  Q24H  PRN


 IV


 Restlessness  11/8/20 14:00


 11/10/20 13:46  11/9/20 17:09





 


 Midodrine


  (Pro-Amatine)  5 mg  TID


 ORAL


   11/8/20 18:00


 2/6/21 17:59  11/10/20 08:56





 


 Morphine Sulfate


  (Morphine


 Sulfate)  4 mg  Q4H  PRN


 IVP


 For Pain  11/6/20 12:45


 11/13/20 12:44   





 


 Nitroglycerin


  (Ntg)  0.4 mg  Q5M  PRN


 SL


 Prn Chest Pain  10/23/20 20:30


 11/22/20 20:29   





 


 Norepinephrine


 Bitartrate  250 ml @ 0


 mls/hr  Q24H


 IV


   11/9/20 15:45


 11/12/20 15:44  11/9/20 17:07





 


 Ondansetron HCl


  (Zofran)  4 mg  Q6H  PRN


 IVP


 Nausea & Vomiting  10/23/20 20:30


 11/22/20 20:29   





 


 Pantoprazole


  (Protonix)  40 mg  DAILY


 IV


   11/7/20 09:00


 12/7/20 08:59  11/10/20 08:56





 


 Polyethylene


 Glycol


  (Miralax)  17 gm  DAILYPRN  PRN


 ORAL


 Constipation  10/23/20 20:30


 11/22/20 20:29   





 


 Promethazine HCl/


 Codeine


  (Phenergan with


 Codeine)  5 ml  Q4H  PRN


 ORAL


 For Cough  10/23/20 20:30


 11/22/20 20:29   





 


 Sodium Chloride  1,000 ml @ 


 50 mls/hr  Q20H


 IV


   11/6/20 16:00


 12/6/20 15:59  11/9/20 21:23





 


 Tamsulosin HCl


  (Flomax)  0.4 mg  BID


 ORAL


   10/31/20 19:45


 11/23/20 08:59  11/10/20 08:56




















Slime Garcia M.D.            Nov 10, 2020 10:40

## 2020-11-10 NOTE — NUR
NURSE NOTES:

Received pt  appeared drowsy but arousable to tactile stimulation, orally intubated on ac 
mode, SR on the monitor, Bp been supported with Levophed drip at 3mcg/min, with maintainance 
IVF 1/2NS at 50ml/hr. Tolerating  NGT fdg Glucerna 1.2 at 60ml/hr, with acceptable residuals 
HOB kept elevated. On aspiration precaution. Cummings to gravity with  lg amt of yellowish 
urine. Pt with GURPREET PICC line with current drsg dry and intact. Pt with Pressure sores to 
sacral and bilateral heel pls see pictures, On P 200 mattress. Pt is a COVID + and on 
airborne isolation , Strict isolation and precaution technique were observed, Will continue 
to monitor..

## 2020-11-11 VITALS — SYSTOLIC BLOOD PRESSURE: 87 MMHG | DIASTOLIC BLOOD PRESSURE: 51 MMHG

## 2020-11-11 VITALS — DIASTOLIC BLOOD PRESSURE: 55 MMHG | SYSTOLIC BLOOD PRESSURE: 96 MMHG

## 2020-11-11 VITALS — DIASTOLIC BLOOD PRESSURE: 72 MMHG | SYSTOLIC BLOOD PRESSURE: 138 MMHG

## 2020-11-11 VITALS — SYSTOLIC BLOOD PRESSURE: 148 MMHG | DIASTOLIC BLOOD PRESSURE: 71 MMHG

## 2020-11-11 VITALS — SYSTOLIC BLOOD PRESSURE: 111 MMHG | DIASTOLIC BLOOD PRESSURE: 65 MMHG

## 2020-11-11 VITALS — SYSTOLIC BLOOD PRESSURE: 116 MMHG | DIASTOLIC BLOOD PRESSURE: 74 MMHG

## 2020-11-11 VITALS — DIASTOLIC BLOOD PRESSURE: 67 MMHG | SYSTOLIC BLOOD PRESSURE: 127 MMHG

## 2020-11-11 VITALS — SYSTOLIC BLOOD PRESSURE: 102 MMHG | DIASTOLIC BLOOD PRESSURE: 57 MMHG

## 2020-11-11 VITALS — SYSTOLIC BLOOD PRESSURE: 83 MMHG | DIASTOLIC BLOOD PRESSURE: 38 MMHG

## 2020-11-11 VITALS — DIASTOLIC BLOOD PRESSURE: 66 MMHG | SYSTOLIC BLOOD PRESSURE: 120 MMHG

## 2020-11-11 VITALS — SYSTOLIC BLOOD PRESSURE: 103 MMHG | DIASTOLIC BLOOD PRESSURE: 61 MMHG

## 2020-11-11 VITALS — SYSTOLIC BLOOD PRESSURE: 123 MMHG | DIASTOLIC BLOOD PRESSURE: 67 MMHG

## 2020-11-11 VITALS — DIASTOLIC BLOOD PRESSURE: 59 MMHG | SYSTOLIC BLOOD PRESSURE: 107 MMHG

## 2020-11-11 VITALS — SYSTOLIC BLOOD PRESSURE: 112 MMHG | DIASTOLIC BLOOD PRESSURE: 60 MMHG

## 2020-11-11 VITALS — SYSTOLIC BLOOD PRESSURE: 83 MMHG | DIASTOLIC BLOOD PRESSURE: 53 MMHG

## 2020-11-11 VITALS — SYSTOLIC BLOOD PRESSURE: 90 MMHG | DIASTOLIC BLOOD PRESSURE: 53 MMHG

## 2020-11-11 VITALS — SYSTOLIC BLOOD PRESSURE: 147 MMHG | DIASTOLIC BLOOD PRESSURE: 79 MMHG

## 2020-11-11 VITALS — SYSTOLIC BLOOD PRESSURE: 82 MMHG | DIASTOLIC BLOOD PRESSURE: 56 MMHG

## 2020-11-11 VITALS — SYSTOLIC BLOOD PRESSURE: 90 MMHG | DIASTOLIC BLOOD PRESSURE: 62 MMHG

## 2020-11-11 VITALS — DIASTOLIC BLOOD PRESSURE: 66 MMHG | SYSTOLIC BLOOD PRESSURE: 118 MMHG

## 2020-11-11 VITALS — DIASTOLIC BLOOD PRESSURE: 93 MMHG | SYSTOLIC BLOOD PRESSURE: 162 MMHG

## 2020-11-11 VITALS — DIASTOLIC BLOOD PRESSURE: 53 MMHG | SYSTOLIC BLOOD PRESSURE: 90 MMHG

## 2020-11-11 VITALS — DIASTOLIC BLOOD PRESSURE: 58 MMHG | SYSTOLIC BLOOD PRESSURE: 94 MMHG

## 2020-11-11 VITALS — SYSTOLIC BLOOD PRESSURE: 111 MMHG | DIASTOLIC BLOOD PRESSURE: 68 MMHG

## 2020-11-11 VITALS — SYSTOLIC BLOOD PRESSURE: 108 MMHG | DIASTOLIC BLOOD PRESSURE: 67 MMHG

## 2020-11-11 VITALS — DIASTOLIC BLOOD PRESSURE: 77 MMHG | SYSTOLIC BLOOD PRESSURE: 142 MMHG

## 2020-11-11 VITALS — DIASTOLIC BLOOD PRESSURE: 70 MMHG | SYSTOLIC BLOOD PRESSURE: 132 MMHG

## 2020-11-11 VITALS — DIASTOLIC BLOOD PRESSURE: 72 MMHG | SYSTOLIC BLOOD PRESSURE: 106 MMHG

## 2020-11-11 VITALS — SYSTOLIC BLOOD PRESSURE: 89 MMHG | DIASTOLIC BLOOD PRESSURE: 53 MMHG

## 2020-11-11 VITALS — DIASTOLIC BLOOD PRESSURE: 75 MMHG | SYSTOLIC BLOOD PRESSURE: 131 MMHG

## 2020-11-11 LAB
ADD MANUAL DIFF: NO
ALBUMIN SERPL-MCNC: 1.3 G/DL (ref 3.4–5)
ALBUMIN/GLOB SERPL: 0.3 {RATIO} (ref 1–2.7)
ALP SERPL-CCNC: 187 U/L (ref 46–116)
ALT SERPL-CCNC: 29 U/L (ref 12–78)
ANION GAP SERPL CALC-SCNC: 6 MMOL/L (ref 5–15)
AST SERPL-CCNC: 36 U/L (ref 15–37)
BASOPHILS NFR BLD AUTO: 0.5 % (ref 0–2)
BILIRUB SERPL-MCNC: 0.2 MG/DL (ref 0.2–1)
BUN SERPL-MCNC: 19 MG/DL (ref 7–18)
CALCIUM SERPL-MCNC: 8 MG/DL (ref 8.5–10.1)
CHLORIDE SERPL-SCNC: 106 MMOL/L (ref 98–107)
CO2 SERPL-SCNC: 28 MMOL/L (ref 21–32)
CREAT SERPL-MCNC: 0.9 MG/DL (ref 0.55–1.3)
EOSINOPHIL NFR BLD AUTO: 0.9 % (ref 0–3)
ERYTHROCYTE [DISTWIDTH] IN BLOOD BY AUTOMATED COUNT: 15.1 % (ref 11.6–14.8)
GLOBULIN SER-MCNC: 5 G/DL
HCT VFR BLD CALC: 28.2 % (ref 42–52)
HGB BLD-MCNC: 9 G/DL (ref 14.2–18)
LYMPHOCYTES NFR BLD AUTO: 13.7 % (ref 20–45)
MCV RBC AUTO: 96 FL (ref 80–99)
MONOCYTES NFR BLD AUTO: 5.7 % (ref 1–10)
NEUTROPHILS NFR BLD AUTO: 79.2 % (ref 45–75)
PHOSPHATE SERPL-MCNC: 3.1 MG/DL (ref 2.5–4.9)
PLATELET # BLD: 413 K/UL (ref 150–450)
POTASSIUM SERPL-SCNC: 4.2 MMOL/L (ref 3.5–5.1)
RBC # BLD AUTO: 2.93 M/UL (ref 4.7–6.1)
SODIUM SERPL-SCNC: 140 MMOL/L (ref 136–145)
WBC # BLD AUTO: 10.8 K/UL (ref 4.8–10.8)

## 2020-11-11 RX ADMIN — MEROPENEM SCH MLS/HR: 1 INJECTION INTRAVENOUS at 13:22

## 2020-11-11 RX ADMIN — INSULIN ASPART SCH UNITS: 100 INJECTION, SOLUTION INTRAVENOUS; SUBCUTANEOUS at 06:00

## 2020-11-11 RX ADMIN — INSULIN ASPART SCH UNITS: 100 INJECTION, SOLUTION INTRAVENOUS; SUBCUTANEOUS at 13:21

## 2020-11-11 RX ADMIN — MIDODRINE HYDROCHLORIDE SCH MG: 10 TABLET ORAL at 17:14

## 2020-11-11 RX ADMIN — LORAZEPAM PRN MG: 2 INJECTION, SOLUTION INTRAMUSCULAR; INTRAVENOUS at 19:43

## 2020-11-11 RX ADMIN — HEPARIN SODIUM SCH UNITS: 5000 INJECTION INTRAVENOUS; SUBCUTANEOUS at 21:06

## 2020-11-11 RX ADMIN — CHLORHEXIDINE GLUCONATE SCH APPLIC: 213 SOLUTION TOPICAL at 19:43

## 2020-11-11 RX ADMIN — MEROPENEM SCH MLS/HR: 1 INJECTION INTRAVENOUS at 19:44

## 2020-11-11 RX ADMIN — INSULIN ASPART SCH UNITS: 100 INJECTION, SOLUTION INTRAVENOUS; SUBCUTANEOUS at 00:00

## 2020-11-11 RX ADMIN — Medication SCH MLS/HR: at 12:50

## 2020-11-11 RX ADMIN — TAMSULOSIN HYDROCHLORIDE SCH MG: 0.4 CAPSULE ORAL at 17:14

## 2020-11-11 RX ADMIN — PANTOPRAZOLE SODIUM SCH MG: 40 INJECTION, POWDER, FOR SOLUTION INTRAVENOUS at 09:34

## 2020-11-11 RX ADMIN — HEPARIN SODIUM SCH UNITS: 5000 INJECTION INTRAVENOUS; SUBCUTANEOUS at 09:35

## 2020-11-11 RX ADMIN — MEROPENEM SCH MLS/HR: 1 INJECTION INTRAVENOUS at 04:21

## 2020-11-11 RX ADMIN — INSULIN ASPART SCH UNITS: 100 INJECTION, SOLUTION INTRAVENOUS; SUBCUTANEOUS at 18:00

## 2020-11-11 RX ADMIN — TAMSULOSIN HYDROCHLORIDE SCH MG: 0.4 CAPSULE ORAL at 09:34

## 2020-11-11 NOTE — NUR
NURSE NOTES:



VS REMAIN STABLE WHILE LEVOPHED CONTINUES TO BE ON HOLD. PT REMAINS ON COOLING BLANKET WITH 
CONTINUOUS RECTAL TEMP MONITORING. SAFETY PRECAUTIONS MAINTAINED. WILL CONTINUE TO MONITOR.

## 2020-11-11 NOTE — NUR
NURSE HAND-OFF REPORT: 



Latest Vital Signs: Temperature 98.3 , Pulse 91 , B/P 111 /65 , Respiratory Rate 16 , O2 SAT 
99 , Mechanical Ventilator, O2 Flow Rate  .  

Vital Sign Comment: 



EKG Rhythm: Sinus Rhythm

Rhythm change?: N 

MD Notified?: DIDI Edmond MD Response: 



Latest Mejia Fall Score: 70  

Fall Risk: High Risk 

Safety Measures: Call light Within Reach, Bed Alarm Zone 1, Side Rails Side Rails x3, Bed 
position Low and Locked.

Fall Precautions: 

Yellow Socks

Yellow Gown

Door Sign

Patient Fall Education



Report given to Debra LONDON].

## 2020-11-11 NOTE — NUR
NURSE NOTES:

Am labs drawn.discontinued Rt femoral central line. Apply pressure for few minutes and 
pressure drsg was applied.

## 2020-11-11 NOTE — NUR
NURSE NOTES:

Spoke to Rosaura singh regarding accidental transfer of orders to Tele, which was meant 
for another pt. Per Hyacinth singh, okay to just place another "process of transfer" back 
to ICU.

## 2020-11-11 NOTE — NUR
NURSE NOTES:



SCHEDULED MEDICATIONS GIVEN PER MD ORDER, PT TOLERATED WELL. VS REMAIN STABLE, AFEBRILE, NO 
SIGNS OF DISTRESS NOTED. SAFETY PRECAUTIONS MAINTAINED. WILL CONTINUE TO MONITOR.

## 2020-11-11 NOTE — NEPHROLOGY PROGRESS NOTE
Assessment/Plan


Problem List:  


(1) Dehydration


(2) JANES (acute kidney injury)


(3) Renal failure (ARF), acute on chronic


(4) Hypoxia


(5) Acute encephalopathy


(6) Electrolyte imbalance


Assessment





75-year-old male is admitted with acute hypoxic respiratory failure most likely 

secondary to pneumonia and sepsis, UTI.


Acute on chronic renal failure


Dehydration


Electrolyte imbalances, hypernatremia


Hypoalbuminemia


Diabetes type 2


History of congestive heart failure


Hypertension


Hyperlipemia


Alzheimer's


Previous COVID-19 infection in September 2020


Plan


November 11: Remains intubated.  Full code.  Blood pressure borderline low.  

Will increase midodrine dose.  Discussed with RN.  Continue to monitor renal 

parameters and electrolytes.


November 10: Intubated.  Full code.  Labs reviewed.  Stable from renal 

standpoint of view.  Continue per consultants.


November 9: Remains intubated.  Full code.  Labs reviewed.  Electrolytes within 

normal limit.  Continue per consultants.


November 8: In ICU.  Remains intubated on ventilator.  Remains full code.  Low p

otassium addressed.  Blood pressure fluctuating.  Continue per consultants.


November 7: Patient in ICU.  Intubated on ventilator.  On 6 mics of Levophed.  

Will give 100 cc albumin 25%.  K-Phos IV ordered.  Continue per consultants.  

Continue monitor renal parameters and electrolytes.


November 6: Status unchanged.  Transfer to [facility] for seizure.  Stable from renal 

standpoint to view.  Continue per consultants.


November 5: Status quo.  Labs reviewed.  Renal parameters stable.  Continue per 

consultants.


November 4: On nonrebreather mask.  Labs reviewed.  Renal parameters 

stable.Continue per pulmonologist.  Clinically unchanged.


November 3: On nonrebreather mask.  Inflammatory markers gradually declining.  

Renal parameters stable.  Continue per pulmonary.


November 2: Remains on nonrebreather mask.  Inflammatory markers remain 

elevated.  Renal parameters somewhat stable.  Continue per pulmonary and ID.  

Remains full code.


November 1: Patient on nonrebreather mask.  Labs noted.  Serum creatinine down 

to 1.3.  Continue per consultants.


October 31: Patient on nonrebreather mask.  Transfer to telemetry when seen this

morning.  Labs noted.  Continue per consultants.  Serum creatinine dylan to 1.7. 

Continue to monitor renal parameters.  Continue to monitor vancomycin level


October 30: Patient is not doing well clinically.  Mild respiratory distress.  

ABG noted.  Somewhat hypoxic.  CBC and chemistry panel ordered.  Discussed with 

LITA Garcia.  Will defer to pulmonary management to specialist.  Continue per ID.

 Renal parameters remained stable as of October 29.


October 29: Labs reviewed.  Renal parameters stable.  Continue per consultants.


October 28: Labs reviewed.  Potassium via NG tube ordered.  IV fluids stopped.  

Continue per consultants.


October 27: Labs reviewed.  Low potassium and low phosphorus replaced.  Continue

per consultants.  Remains stable from renal standpoint of view.


October 26: Patient remains n.p.o.  IV fluid down to 50 cc an hour.  Potassium 

supplement intravenously ordered.  Continue per consultants.





Previously:


Patient is n.p.o., will continue on IV fluid of D5W 75 cc an hour


We will monitor electrolytes and renal parameters


Avoid nephrotoxic's


Start p.o. when he clears by speech therapist, meanwhile aspiration precautions


Keep the blood pressure and blood sugar in check


Per orders





Subjective


ROS Limited/Unobtainable:  Yes





Objective


Objective





Last 24 Hour Vital Signs








  Date Time  Temp Pulse Resp B/P (MAP) Pulse Ox O2 Delivery O2 Flow Rate FiO2


 


11/11/20 09:00  88 15 90/53 (65) 98   


 


11/11/20 08:00 99.8 89 14 87/51 (63) 99   


 


11/11/20 07:00  88 16 83/53 (63) 99   


 


11/11/20 06:30  90 15     


 


11/11/20 06:00  91 16 111/65 (80) 99   


 


11/11/20 05:30  97 16 94/58 (70) 98   


 


11/11/20 05:00  93 20 83/38 (53) 99   


 


11/11/20 04:30  90 18 103/61 (75) 98   


 


11/11/20 04:00  88      


 


11/11/20 04:00      Mechanical Ventilator  


 


11/11/20 04:00 98.3 92 18 120/66 (84) 100   


 


11/11/20 04:00        35


 


11/11/20 03:30  92 21 111/68 (82) 99   


 


11/11/20 03:00  95 19 107/59 (75) 100   


 


11/11/20 02:51  96 20     35


 


11/11/20 02:30  96 20 118/66 (83) 99   


 


11/11/20 02:00 98.8 99 22 123/67 (85) 99   


 


11/11/20 01:00  93 19 112/60 (77) 99   


 


11/11/20 00:00 100.2 94 15 89/53 (65) 98   


 


11/11/20 00:00        35


 


11/11/20 00:00  93      


 


11/11/20 00:00      Mechanical Ventilator  


 


11/10/20 23:00  97 15 93/52 (66) 98   


 


11/10/20 22:54  93 16     35


 


11/10/20 22:00  93 15 93/54 (67) 98   


 


11/10/20 21:00  88 16 120/66 (84) 100   


 


11/10/20 20:45  88 16 129/63 (85) 100   


 


11/10/20 20:30  84 14 96/59 (71) 99   


 


11/10/20 20:15  87 14 106/62 (77) 99   


 


11/10/20 20:00        35


 


11/10/20 20:00 98.6 88 15 116/64 (81) 100   


 


11/10/20 20:00      Mechanical Ventilator  


 


11/10/20 19:30  90 15 111/62 (78) 100   


 


11/10/20 19:15  88 16 112/66 (81) 100   


 


11/10/20 19:00  86 14     35


 


11/10/20 19:00  86 18 128/68 (88) 100   


 


11/10/20 17:45  88 18 129/70 (89) 100   


 


11/10/20 17:30  86 19 129/65 (86) 100   


 


11/10/20 17:15  85 19 122/70 (87) 99   


 


11/10/20 17:00  84 19 113/64 (80) 98   


 


11/10/20 16:59  88 16 103/22    


 


11/10/20 16:29  98 18 112/35    


 


11/10/20 16:00        35


 


11/10/20 16:00      Mechanical Ventilator  


 


11/10/20 16:00  87      


 


11/10/20 16:00 96.9 86 20 131/73 (92) 100   


 


11/10/20 15:20  92 19     35


 


11/10/20 15:00  88 19 118/75 (89) 100   


 


11/10/20 14:00  85 20 142/75 (97) 100   


 


11/10/20 13:22      Mechanical Ventilator  


 


11/10/20 13:00      Mechanical Ventilator  


 


11/10/20 12:45  83 19 132/70 (90) 99   


 


11/10/20 12:30  84 18 139/72 (94) 100   


 


11/10/20 12:15  87 17 128/65 (86) 99   


 


11/10/20 12:09    113/44    


 


11/10/20 12:09      Mechanical Ventilator  


 


11/10/20 12:00 97.0 84 19 116/70 (85) 100   


 


11/10/20 12:00      Mechanical Ventilator  


 


11/10/20 12:00      Mechanical Ventilator  


 


11/10/20 12:00        35


 


11/10/20 11:38  84      


 


11/10/20 11:30  84 16 115/65 (82) 100   


 


11/10/20 11:15  86 19 138/79 (98) 99   


 


11/10/20 11:14  83 19     35


 


11/10/20 11:00      Mechanical Ventilator  


 


11/10/20 11:00  81 16 131/72 (91) 100   

















Intake and Output  


 


 11/10/20 11/11/20





 19:00 07:00


 


Intake Total 1479.75 ml 1471.00 ml


 


Output Total 1225 ml 1280 ml


 


Balance 254.75 ml 191.00 ml


 


  


 


Free Water  90 ml


 


IV Total 819.75 ml 661.00 ml


 


Tube Feeding 660 ml 720 ml


 


Output Urine Total 1225 ml 1280 ml


 


# Bowel Movements 1 3








Laboratory Tests


11/10/20 18:14: POC Whole Blood Glucose [Pending]


11/11/20 00:07: POC Whole Blood Glucose 126H


11/11/20 03:31: POC Whole Blood Glucose 137H


11/11/20 04:00: 


White Blood Count 10.8, Red Blood Count 2.93L, Hemoglobin 9.0L, Hematocrit 28.2L

, Mean Corpuscular Volume 96, Mean Corpuscular Hemoglobin 30.5, Mean Corpuscular

 Hemoglobin Concent 31.8L, Red Cell Distribution Width 15.1H, Platelet Count 

413, Mean Platelet Volume 7.8, Neutrophils (%) (Auto) 79.2H, Lymphocytes (%) 

(Auto) 13.7L, Monocytes (%) (Auto) 5.7, Eosinophils (%) (Auto) 0.9, Basophils 

(%) (Auto) 0.5, Sodium Level 140, Potassium Level 4.2, Chloride Level 106, 

Carbon Dioxide Level 28, Anion Gap 6, Blood Urea Nitrogen 19H, Creatinine 0.9, 

Estimat Glomerular Filtration Rate > 60, Glucose Level 127H, Calcium Level 8.0L,

 Phosphorus Level 3.1, Magnesium Level 2.5H, Total Bilirubin 0.2, Aspartate 

Amino Transf (AST/SGOT) 36, Alanine Aminotransferase (ALT/SGPT) 29, Alkaline Savage

sphatase 187H, Total Protein 6.3L, Albumin 1.3L, Globulin 5.0, Albumin/Globulin 

Ratio 0.3L


Height (Feet):  5


Height (Inches):  4.00


Weight (Pounds):  130


General Appearance:  no apparent distress


EENT:  other - Debated on ventilator


Cardiovascular:  normal rate


Respiratory/Chest:  decreased breath sounds


Abdomen:  distended











Seven Tobar MD            Nov 11, 2020 10:33

## 2020-11-11 NOTE — NUR
NURSE NOTES:



LEVOPHED NOW ON HOLD DUE TO SBP WITHIN PARAMETERS, PT'S VSS, NO SIGNS OF DISTRESS NOTED. PT 
HAD BM X1, SOFT FORMED BROWN STOOL NOTED. BED BATH AND ORAL CARE PROVIDED, LINENS CHANGED. 
PT REPOSITIONED AGAIN. SCHEDULED MEDICATIONS GIVEN, PT TOLERATED WELL. SKIN ASSESSMENT 
PERFORMED, WOUND DRESSINGS CHANGED. HOB AT 30 DEGREES. SAFETY PRECAUTIONS MAINTAINED. WILL 
CONTINUE TO MONITOR.

## 2020-11-11 NOTE — CARDIAC ELECTROPHYSIOLOGY PN
Assessment/Plan


Assessment/Plan


1. Accelerated junctional rhythm. Not bradycardic. Ruled out for MI


      Echo showed ejection fraction of 55%.





2. Long run of 31 beats of Nonsustained VT on 11/3/2020. 


     Will need cardiac cath after stabilization.





3. Respiratory failure, on antibiotic and intubated on the vent


4. Septic shock. Off Levophed since 11/10/20. Midodrine 10 id added.


5. Acute renal failure.


6. Electrolyte imbalance.


7. History of CHF, but echocardiogram showed normal left ventricular systolic 

function.


8. History of previous COVID infection in September 2020 and active Covid now in

isolation


9. Dementia.





DW RN





Subjective


Subjective


In SR in NAD in Covid isolation. In ICU on the Vent with 35% Fio2.





Now off Levo since 9 pm last night.  Had 31 beats of VT  on 11/3/20 at 16:46 and

5 beats 11/8/20


No VT overnight. S/P PICC line





Objective





Last 24 Hour Vital Signs








  Date Time  Temp Pulse Resp B/P (MAP) Pulse Ox O2 Delivery O2 Flow Rate FiO2


 


11/11/20 12:00  105      


 


11/11/20 12:00 96.8 103 21 108/67 (81) 96   


 


11/11/20 12:00        35


 


11/11/20 11:13  110 22     35


 


11/11/20 11:00  105 23 147/79 (101) 98   


 


11/11/20 10:00  96 23 132/70 (90) 96   


 


11/11/20 09:00  88 15 90/53 (65) 98   


 


11/11/20 08:00      Mechanical Ventilator  


 


11/11/20 08:00        35


 


11/11/20 08:00 99.8 89 14 87/51 (63) 99   


 


11/11/20 07:35  85      


 


11/11/20 07:15  88 24     35


 


11/11/20 07:00  88 16 83/53 (63) 99   


 


11/11/20 06:30  90 15     


 


11/11/20 06:00  91 16 111/65 (80) 99   


 


11/11/20 05:30  97 16 94/58 (70) 98   


 


11/11/20 05:00  93 20 83/38 (53) 99   


 


11/11/20 04:30  90 18 103/61 (75) 98   


 


11/11/20 04:00  88      


 


11/11/20 04:00      Mechanical Ventilator  


 


11/11/20 04:00 98.3 92 18 120/66 (84) 100   


 


11/11/20 04:00        35


 


11/11/20 03:30  92 21 111/68 (82) 99   


 


11/11/20 03:00  95 19 107/59 (75) 100   


 


11/11/20 02:51  96 20     35


 


11/11/20 02:30  96 20 118/66 (83) 99   


 


11/11/20 02:00 98.8 99 22 123/67 (85) 99   


 


11/11/20 01:00  93 19 112/60 (77) 99   


 


11/11/20 00:00 100.2 94 15 89/53 (65) 98   


 


11/11/20 00:00        35


 


11/11/20 00:00  93      


 


11/11/20 00:00      Mechanical Ventilator  


 


11/10/20 23:00  97 15 93/52 (66) 98   


 


11/10/20 22:54  93 16     35


 


11/10/20 22:00  93 15 93/54 (67) 98   


 


11/10/20 21:00  88 16 120/66 (84) 100   


 


11/10/20 20:45  88 16 129/63 (85) 100   


 


11/10/20 20:30  84 14 96/59 (71) 99   


 


11/10/20 20:15  87 14 106/62 (77) 99   


 


11/10/20 20:00        35


 


11/10/20 20:00 98.6 88 15 116/64 (81) 100   


 


11/10/20 20:00      Mechanical Ventilator  


 


11/10/20 19:30  90 15 111/62 (78) 100   


 


11/10/20 19:15  88 16 112/66 (81) 100   


 


11/10/20 19:00  86 14     35


 


11/10/20 19:00  86 18 128/68 (88) 100   


 


11/10/20 17:45  88 18 129/70 (89) 100   


 


11/10/20 17:30  86 19 129/65 (86) 100   


 


11/10/20 17:15  85 19 122/70 (87) 99   


 


11/10/20 17:00  84 19 113/64 (80) 98   


 


11/10/20 16:59  88 16 103/22    


 


11/10/20 16:29  98 18 112/35    


 


11/10/20 16:00        35


 


11/10/20 16:00      Mechanical Ventilator  


 


11/10/20 16:00  87      


 


11/10/20 16:00 96.9 86 20 131/73 (92) 100   


 


11/10/20 15:20  92 19     35


 


11/10/20 15:00  88 19 118/75 (89) 100   


 


11/10/20 14:00  85 20 142/75 (97) 100   


 


11/10/20 13:22      Mechanical Ventilator  

















Intake and Output  


 


 11/10/20 11/11/20





 19:00 07:00


 


Intake Total 1479.75 ml 1471.00 ml


 


Output Total 1225 ml 1280 ml


 


Balance 254.75 ml 191.00 ml


 


  


 


Free Water  90 ml


 


IV Total 819.75 ml 661.00 ml


 


Tube Feeding 660 ml 720 ml


 


Output Urine Total 1225 ml 1280 ml


 


# Bowel Movements 1 3











Laboratory Tests








Test


 11/10/20


18:14 11/11/20


00:07 11/11/20


03:31 11/11/20


04:00


 


POC Whole Blood Glucose


 Pending  


 126 MG/DL


()  H 137 MG/DL


()  H 





 


White Blood Count


 


 


 


 10.8 K/UL


(4.8-10.8)


 


Red Blood Count


 


 


 


 2.93 M/UL


(4.70-6.10)  L


 


Hemoglobin


 


 


 


 9.0 G/DL


(14.2-18.0)  L


 


Hematocrit


 


 


 


 28.2 %


(42.0-52.0)  L


 


Mean Corpuscular Volume    96 FL (80-99)  


 


Mean Corpuscular Hemoglobin


 


 


 


 30.5 PG


(27.0-31.0)


 


Mean Corpuscular Hemoglobin


Concent 


 


 


 31.8 G/DL


(32.0-36.0)  L


 


Red Cell Distribution Width


 


 


 


 15.1 %


(11.6-14.8)  H


 


Platelet Count


 


 


 


 413 K/UL


(150-450)


 


Mean Platelet Volume


 


 


 


 7.8 FL


(6.5-10.1)


 


Neutrophils (%) (Auto)


 


 


 


 79.2 %


(45.0-75.0)  H


 


Lymphocytes (%) (Auto)


 


 


 


 13.7 %


(20.0-45.0)  L


 


Monocytes (%) (Auto)


 


 


 


 5.7 %


(1.0-10.0)


 


Eosinophils (%) (Auto)


 


 


 


 0.9 %


(0.0-3.0)


 


Basophils (%) (Auto)


 


 


 


 0.5 %


(0.0-2.0)


 


Sodium Level


 


 


 


 140 MMOL/L


(136-145)


 


Potassium Level


 


 


 


 4.2 MMOL/L


(3.5-5.1)


 


Chloride Level


 


 


 


 106 MMOL/L


()


 


Carbon Dioxide Level


 


 


 


 28 MMOL/L


(21-32)


 


Anion Gap


 


 


 


 6 mmol/L


(5-15)


 


Blood Urea Nitrogen


 


 


 


 19 mg/dL


(7-18)  H


 


Creatinine


 


 


 


 0.9 MG/DL


(0.55-1.30)


 


Estimat Glomerular Filtration


Rate 


 


 


 > 60 mL/min


(>60)


 


Glucose Level


 


 


 


 127 MG/DL


()  H


 


Calcium Level


 


 


 


 8.0 MG/DL


(8.5-10.1)  L


 


Phosphorus Level


 


 


 


 3.1 MG/DL


(2.5-4.9)


 


Magnesium Level


 


 


 


 2.5 MG/DL


(1.8-2.4)  H


 


Total Bilirubin


 


 


 


 0.2 MG/DL


(0.2-1.0)


 


Aspartate Amino Transf


(AST/SGOT) 


 


 


 36 U/L (15-37)





 


Alanine Aminotransferase


(ALT/SGPT) 


 


 


 29 U/L (12-78)





 


Alkaline Phosphatase


 


 


 


 187 U/L


()  H


 


Total Protein


 


 


 


 6.3 G/DL


(6.4-8.2)  L


 


Albumin


 


 


 


 1.3 G/DL


(3.4-5.0)  L


 


Globulin    5.0 g/dL  


 


Albumin/Globulin Ratio


 


 


 


 0.3 (1.0-2.7)


L








Objective





HEAD AND NECK:  No JVD. Orally intubated


LUNGS:  Coarse rhonchi.


CARDIOVASCULAR:   Irregular S1 and S2 with no gallop.


ABDOMEN:  Soft.


EXTREMITIES:  No pitting edema.











Kvng Edmond MD               Nov 11, 2020 13:05

## 2020-11-11 NOTE — NUR
NURSE NOTES:



PT WAS ASSESSED AFTER RECEIVING CHANGE OF SHIFT REPORT FROM LITA BLACKBURN. PT'S VSS, NO SIGNS 
OF DISTRESS NOTED. PT OPENS EYES SPONTANEOUSLY BUT DOES NOT FOLLOW COMMANDS. ON CONTINUOUS 
CARDIAC MONITORING, NSR. RESPIRATIONS EVEN AND UNLABORED, INTUBATED, ON THE VENTILATOR WITH 
THE FOLLOWING SETTINGS: ETT 7.5 AT 25 CM LIP LINE, A/C RATE OF 14, T/V 600, FIO2 35%, NO 
PEEP, O2 SAT %. PT HAS GLUCERNA 1.2 RUNNING AT 60 ML/HR THROUGH NG TUBE, NO RESIDUAL 
NOTED, ABDOMEN SOFT AND NON-TENDER, ACTIVE BOWEL SOUNDS. PT HAS GALLEGOS CATHETER, DRAINING 
WELL, CLEAR YELLOW URINE NOTED. SKIN ALTERATIONS NOTED, SITES ARE COVERED WITH APPROPRIATE 
DRESSINGS, PT ON PRESSURE RELEASE MATTRESS. PT HAS GURPREET PICC, DRESSING CLEAN DRY AND INTACT, 
NO BLEEDING OR COMPLICATIONS NOTED FROM SITE. NO DRIPS RUNNING AT THIS TIME, LEVOPHED IS 
CURRENTLY ON HOLD WITH SBP ABOVE 90. BED RAILS UP, BED LOCKED AND IN LOW POSITION. SAFETY 
PRECAUTIONS MAINTAINED. WILL CONTINUE TO MONITOR.

## 2020-11-11 NOTE — INTERNAL MED PROGRESS NOTE
Subjective


Date of Service:  Nov 11, 2020


Physician Name


Benito Hearn


Attending Physician


Andrei Cancino MD





Current Medications








 Medications


  (Trade)  Dose


 Ordered  Sig/Miky


 Route


 PRN Reason  Start Time


 Stop Time Status Last Admin


Dose Admin


 


 Acetaminophen


  (Tylenol)  650 mg  Q4H  PRN


 ORAL


 Temp >100.5  10/23/20 20:30


 11/22/20 20:29  11/5/20 22:39





 


 Chlorhexidine


 Gluconate


  (Jess-Hex 2%)  1 applic  DAILY@2000


 TOPIC


   11/6/20 20:00


 2/4/21 19:59  11/10/20 20:15





 


 Dextrose


  (Dextrose 50%)  50 ml  Q30M  PRN


 IV


 Hypoglycemia  10/23/20 22:45


 1/21/21 22:44   





 


 Heparin Sodium


  (Porcine)


  (Heparin 5000


 units/ml)  5,000 units  EVERY 12  HOURS


 SUBQ


   10/23/20 21:00


 12/7/20 20:59  11/11/20 09:35





 


 Insulin Aspart


  (NovoLOG)    EVERY 6  HOURS


 SUBQ


   11/2/20 06:00


 1/22/21 06:29  11/10/20 18:29





 


 Lorazepam


  (Ativan 2mg/ml


 1ml)  2 mg  Q4H  PRN


 IV


 For Anxiety  11/10/20 13:45


 11/17/20 13:44  11/10/20 16:29





 


 Meropenem 1 gm/


 Sodium Chloride  55 ml @ 


 110 mls/hr  Q8HR@0400,1200,2000


 IVPB


   11/11/20 12:00


 11/16/20 11:59   





 


 Midodrine


  (Pro-Amatine)  10 mg  Q8HR


 ORAL


   11/11/20 09:00


 2/6/21 17:59  11/11/20 09:34





 


 Morphine Sulfate


  (Morphine


 Sulfate)  2 mg  Q6H  PRN


 IVP


 moderate pain  11/10/20 13:45


 11/17/20 13:44   





 


 Morphine Sulfate


  (Morphine


 Sulfate)  4 mg  Q4H  PRN


 IVP


 severe pain  11/6/20 12:45


 11/13/20 12:44   





 


 Nitroglycerin


  (Ntg)  0.4 mg  Q5M  PRN


 SL


 Prn Chest Pain  10/23/20 20:30


 11/22/20 20:29   





 


 Norepinephrine


 Bitartrate  250 ml @ 0


 mls/hr  Q24H


 IV


   11/9/20 15:45


 11/12/20 15:44  11/10/20 12:09





 


 Ondansetron HCl


  (Zofran)  4 mg  Q6H  PRN


 IVP


 Nausea & Vomiting  10/23/20 20:30


 11/22/20 20:29   





 


 Pantoprazole


  (Protonix)  40 mg  DAILY


 IV


   11/7/20 09:00


 12/7/20 08:59  11/11/20 09:34





 


 Polyethylene


 Glycol


  (Miralax)  17 gm  DAILYPRN  PRN


 ORAL


 Constipation  10/23/20 20:30


 11/22/20 20:29   





 


 Promethazine HCl/


 Codeine


  (Phenergan with


 Codeine)  5 ml  Q4H  PRN


 ORAL


 For Cough  10/23/20 20:30


 11/22/20 20:29   





 


 Sodium Chloride  1,000 ml @ 


 50 mls/hr  Q20H


 IV


   11/6/20 16:00


 12/6/20 15:59  11/10/20 18:00





 


 Tamsulosin HCl


  (Flomax)  0.4 mg  BID


 ORAL


   10/31/20 19:45


 11/23/20 08:59  11/11/20 09:34











Allergies:  


Coded Allergies:  


     No Known Allergies (Unverified , 8/20/12)


ROS Limited/Unobtainable:  Yes


Subjective


76 YO M admitted with shortness of breath.  Now pneumonia; previously COVID 

positive.  Cover for Int kierra-Dr Cancino.  ICU.  Intubated and sedated





Objective





Last Vital Signs








  Date Time  Temp Pulse Resp B/P (MAP) Pulse Ox O2 Delivery O2 Flow Rate FiO2


 


11/11/20 12:00  105      


 


11/11/20 12:00 96.8  21 108/67 (81) 96   


 


11/11/20 12:00        35


 


11/11/20 08:00      Mechanical Ventilator  


 


11/6/20 12:00       15.0 











Laboratory Tests








Test


 11/10/20


18:14 11/11/20


00:07 11/11/20


03:31 11/11/20


04:00


 


POC Whole Blood Glucose


 Pending  


 126 MG/DL


()  H 137 MG/DL


()  H 





 


White Blood Count


 


 


 


 10.8 K/UL


(4.8-10.8)


 


Red Blood Count


 


 


 


 2.93 M/UL


(4.70-6.10)  L


 


Hemoglobin


 


 


 


 9.0 G/DL


(14.2-18.0)  L


 


Hematocrit


 


 


 


 28.2 %


(42.0-52.0)  L


 


Mean Corpuscular Volume    96 FL (80-99)  


 


Mean Corpuscular Hemoglobin


 


 


 


 30.5 PG


(27.0-31.0)


 


Mean Corpuscular Hemoglobin


Concent 


 


 


 31.8 G/DL


(32.0-36.0)  L


 


Red Cell Distribution Width


 


 


 


 15.1 %


(11.6-14.8)  H


 


Platelet Count


 


 


 


 413 K/UL


(150-450)


 


Mean Platelet Volume


 


 


 


 7.8 FL


(6.5-10.1)


 


Neutrophils (%) (Auto)


 


 


 


 79.2 %


(45.0-75.0)  H


 


Lymphocytes (%) (Auto)


 


 


 


 13.7 %


(20.0-45.0)  L


 


Monocytes (%) (Auto)


 


 


 


 5.7 %


(1.0-10.0)


 


Eosinophils (%) (Auto)


 


 


 


 0.9 %


(0.0-3.0)


 


Basophils (%) (Auto)


 


 


 


 0.5 %


(0.0-2.0)


 


Sodium Level


 


 


 


 140 MMOL/L


(136-145)


 


Potassium Level


 


 


 


 4.2 MMOL/L


(3.5-5.1)


 


Chloride Level


 


 


 


 106 MMOL/L


()


 


Carbon Dioxide Level


 


 


 


 28 MMOL/L


(21-32)


 


Anion Gap


 


 


 


 6 mmol/L


(5-15)


 


Blood Urea Nitrogen


 


 


 


 19 mg/dL


(7-18)  H


 


Creatinine


 


 


 


 0.9 MG/DL


(0.55-1.30)


 


Estimat Glomerular Filtration


Rate 


 


 


 > 60 mL/min


(>60)


 


Glucose Level


 


 


 


 127 MG/DL


()  H


 


Calcium Level


 


 


 


 8.0 MG/DL


(8.5-10.1)  L


 


Phosphorus Level


 


 


 


 3.1 MG/DL


(2.5-4.9)


 


Magnesium Level


 


 


 


 2.5 MG/DL


(1.8-2.4)  H


 


Total Bilirubin


 


 


 


 0.2 MG/DL


(0.2-1.0)


 


Aspartate Amino Transf


(AST/SGOT) 


 


 


 36 U/L (15-37)





 


Alanine Aminotransferase


(ALT/SGPT) 


 


 


 29 U/L (12-78)





 


Alkaline Phosphatase


 


 


 


 187 U/L


()  H


 


Total Protein


 


 


 


 6.3 G/DL


(6.4-8.2)  L


 


Albumin


 


 


 


 1.3 G/DL


(3.4-5.0)  L


 


Globulin    5.0 g/dL  


 


Albumin/Globulin Ratio


 


 


 


 0.3 (1.0-2.7)


L

















Intake and Output  


 


 11/10/20 11/11/20





 19:00 07:00


 


Intake Total 1479.75 ml 1471.00 ml


 


Output Total 1225 ml 1280 ml


 


Balance 254.75 ml 191.00 ml


 


  


 


Free Water  90 ml


 


IV Total 819.75 ml 661.00 ml


 


Tube Feeding 660 ml 720 ml


 


Output Urine Total 1225 ml 1280 ml


 


# Bowel Movements 1 3








Objective


PHYSICAL EXAMINATION:


GENERAL:  The patient awake with deep stimuli, open his eyes, however,


cannot follow commands.  The patient is on a Ventimask at this time,


chronically ill-appearing.


HEAD AND NECK:  Pupils are equal and reactive to light.  Anicteric.


NECK:  Supple.  No JVD.


LUNGS:  Mech vent;  wheezing, rhonchi, and decreased air in bases.


HEART:  S1, S2.  Regular rhythm.  Distant heart sounds.  No murmur or


gallop.


ABDOMEN:  Soft, nondistended, nontender.  Positive bowel sounds.


EXTREMITIES:  No cyanosis, clubbing, or edema.


NEUROLOGIC:  Very limited secondary to the patient's status, cannot follow


commands.  Opens his eyes with deep stimuli and moving extremities


spontaneously.





Assessment/Plan


Assessment/Plan


ASSESSMENT:


1. Acute hypoxemic respiratory failure, most likely secondary to


pneumonia and sepsis.


2. Sepsis secondary to urinary tract infection and pneumonia.


3. pneumonia=MRSA; ESBL E. coli


4. Acute kidney injury on chronic renal insufficiency.


5. Dehydration.


6. History of chronic congestive heart failure.


7. Diabetes type 2.


8. Dyslipidemia.


9. Hypertension.


10. Alzheimer's disease.


11. COVID 19 previous positive





PLAN:


1.  ICU


2.  Dr. Sandoval,=Pulmonary Critical Care; follow mech vent recs


3.  Dr. Garcia = Inf Dis.


4.  IV= D5W due to the hypernatremia and dehydration.


5.  antibiotics =  meropenem; S/P micafungin


6.  Code status is full code.


7.    DVT prophylaxis is heparin subcutaneous.











Benito Hearn MD               Nov 11, 2020 12:35

## 2020-11-11 NOTE — NUR
NURSE NOTES:

Received patient intubated; ETT 7.5 @ 25cm to the lipline to vent with settings of AC:14, 
TV:60, FiO2:35%, O2sat:100%. Right nare NGT receiveing feeding of Glucerna 1.2 @ 60ml/hr. 
Cummings catheter in place and draining. Right upper arm PICC running TKO and  NS @ 50ml/hr. 
Ativan PRN given for agitation due to patient fighting the vent. Isolation precautions in 
place. On cooling blanket. Safety precautions in place; BSW restraints on for safety and 
prevent from pulling on tubes and lines. ROM and skin assessed. Bed low, locked and alarm is 
on. Will continue plan of care.

## 2020-11-11 NOTE — INFECTIOUS DISEASES PROG NOTE
Assessment/Plan


76yo M with:


SEptic Shock- SP


Fever,r ecurrent, low grade


Leukocytosis ;recurrent ; increased; -SP


Acute hypoxic resp failure, on NRB mask> 4l NC; back on NRB, desaturation 10/29 

> intubated 11/6


Pneumonia- >HAP


hx of COVID19 PNA 9/9/20


         --11/10 CXR:  Bilateral infiltrates in a peribronchovascular 

distribution are unchanged. Left pleural effusion is unchanged.


         --11/8 CXR: Persistent bilateral patchy pulmonary opacities, most 

prominent  in the left lower lung.


         --11/7 ucx neg


                  sp cx MRSA (colonizer at this point)


                  Bcx p


         --11/2 Bcx Neg


          10/30 Sp cx MRSA (Vancomycin ADRIANA 1), ESBL E.coli


   10/23 BCx NTD


   UA 15-20 WBC, UCx Neg


   10/23 COVID rapid neg; 10/26 rapid COVID PCR + (from prior infection)- not 

new infection


   Flu A/B neg


   CXR: L pna


   Resp cx MRSA





Neftali on CKD, improving





SNF resident ([facility])


Non-verbal


VRE and MRSA colonized





Plan:


Meropenem #10/14 for ESBL PNA


   -11/10 SP Micafungin #4


   -11/6 SP IV Vancomycin #15


   -11/2 SP Zosyn #4


   -10/26 SP Cefepime #4


   -10/24 SP Flagyl #








Monitor CBC/CMP


Monitor resp status


Monitor temp curve and hemodynamics


repeat cultures





D/w RN





Thank you for this consult. [facility] will continue to follow.





Subjective


Allergies:  


Coded Allergies:  


     No Known Allergies (Unverified , 8/20/12)


T, 100.2


FIo2  35%


off pressors





Objective





Last 24 Hour Vital Signs








  Date Time  Temp Pulse Resp B/P (MAP) Pulse Ox O2 Delivery O2 Flow Rate FiO2


 


11/11/20 07:00  88 16 83/53 (63) 99   


 


11/11/20 06:30  90 15     


 


11/11/20 06:00  91 16 111/65 (80) 99   


 


11/11/20 05:30  97 16 94/58 (70) 98   


 


11/11/20 05:00  93 20 83/38 (53) 99   


 


11/11/20 04:30  90 18 103/61 (75) 98   


 


11/11/20 04:00  88      


 


11/11/20 04:00      Mechanical Ventilator  


 


11/11/20 04:00 98.3 92 18 120/66 (84) 100   


 


11/11/20 04:00        35


 


11/11/20 03:30  92 21 111/68 (82) 99   


 


11/11/20 03:00  95 19 107/59 (75) 100   


 


11/11/20 02:51  96 20     35


 


11/11/20 02:30  96 20 118/66 (83) 99   


 


11/11/20 02:00 98.8 99 22 123/67 (85) 99   


 


11/11/20 01:00  93 19 112/60 (77) 99   


 


11/11/20 00:00 100.2 94 15 89/53 (65) 98   


 


11/11/20 00:00        35


 


11/11/20 00:00  93      


 


11/11/20 00:00      Mechanical Ventilator  


 


11/10/20 23:00  97 15 93/52 (66) 98   


 


11/10/20 22:54  93 16     35


 


11/10/20 22:00  93 15 93/54 (67) 98   


 


11/10/20 21:00  88 16 120/66 (84) 100   


 


11/10/20 20:45  88 16 129/63 (85) 100   


 


11/10/20 20:30  84 14 96/59 (71) 99   


 


11/10/20 20:15  87 14 106/62 (77) 99   


 


11/10/20 20:00        35


 


11/10/20 20:00 98.6 88 15 116/64 (81) 100   


 


11/10/20 20:00      Mechanical Ventilator  


 


11/10/20 19:30  90 15 111/62 (78) 100   


 


11/10/20 19:15  88 16 112/66 (81) 100   


 


11/10/20 19:00  86 14     35


 


11/10/20 19:00  86 18 128/68 (88) 100   


 


11/10/20 17:45  88 18 129/70 (89) 100   


 


11/10/20 17:30  86 19 129/65 (86) 100   


 


11/10/20 17:15  85 19 122/70 (87) 99   


 


11/10/20 17:00  84 19 113/64 (80) 98   


 


11/10/20 16:59  88 16 103/22    


 


11/10/20 16:29  98 18 112/35    


 


11/10/20 16:00        35


 


11/10/20 16:00      Mechanical Ventilator  


 


11/10/20 16:00  87      


 


11/10/20 16:00 96.9 86 20 131/73 (92) 100   


 


11/10/20 15:20  92 19     35


 


11/10/20 15:00  88 19 118/75 (89) 100   


 


11/10/20 14:00  85 20 142/75 (97) 100   


 


11/10/20 13:22      Mechanical Ventilator  


 


11/10/20 13:00      Mechanical Ventilator  


 


11/10/20 12:45  83 19 132/70 (90) 99   


 


11/10/20 12:30  84 18 139/72 (94) 100   


 


11/10/20 12:15  87 17 128/65 (86) 99   


 


11/10/20 12:09    113/44    


 


11/10/20 12:09      Mechanical Ventilator  


 


11/10/20 12:00 97.0 84 19 116/70 (85) 100   


 


11/10/20 12:00      Mechanical Ventilator  


 


11/10/20 12:00      Mechanical Ventilator  


 


11/10/20 12:00        35


 


11/10/20 11:38  84      


 


11/10/20 11:30  84 16 115/65 (82) 100   


 


11/10/20 11:15  86 19 138/79 (98) 99   


 


11/10/20 11:14  83 19     35


 


11/10/20 11:00      Mechanical Ventilator  


 


11/10/20 11:00  81 16 131/72 (91) 100   


 


11/10/20 10:30  83 16 129/68 (88) 100   


 


11/10/20 10:15  82 16 125/62 (83) 100   


 


11/10/20 10:00  83 15 133/69 (90) 100   


 


11/10/20 10:00      Mechanical Ventilator  


 


11/10/20 09:45  80 15 119/60 (79) 99   


 


11/10/20 09:30  85 16 120/63 (82) 98   


 


11/10/20 09:15  85 16 107/58 (74) 98   








Height (Feet):  5


Height (Inches):  4.00


Weight (Pounds):  130


Gen: sedated, intubated


CV: RRR


Pulm: Rhonchi anteriorly


Abd: Soft, NTND


Ext: No c/c/e





Laboratory Tests








Test


 11/10/20


09:08 11/10/20


18:14 11/11/20


00:07 11/11/20


03:31


 


Arterial Blood pH


 7.487


(7.350-7.450) 


 


 





 


Arterial Blood Partial


Pressure CO2 32.9 mmHg


(35.0-45.0)  L 


 


 





 


Arterial Blood Partial


Pressure O2 77.1 mmHg


(75.0-100.0) 


 


 





 


Arterial Blood HCO3


 24.3 mmol/L


(22.0-26.0) 


 


 





 


Arterial Blood Oxygen


Saturation 95.7 %


() 


 


 





 


Arterial Blood Base Excess 1.2 (-2-2)     


 


Jerod Test Positive     


 


POC Whole Blood Glucose


 


 Pending  


 126 MG/DL


()  H 137 MG/DL


()  H


 


Test


 11/11/20


04:00 


 


 





 


White Blood Count


 10.8 K/UL


(4.8-10.8) 


 


 





 


Red Blood Count


 2.93 M/UL


(4.70-6.10)  L 


 


 





 


Hemoglobin


 9.0 G/DL


(14.2-18.0)  L 


 


 





 


Hematocrit


 28.2 %


(42.0-52.0)  L 


 


 





 


Mean Corpuscular Volume 96 FL (80-99)     


 


Mean Corpuscular Hemoglobin


 30.5 PG


(27.0-31.0) 


 


 





 


Mean Corpuscular Hemoglobin


Concent 31.8 G/DL


(32.0-36.0)  L 


 


 





 


Red Cell Distribution Width


 15.1 %


(11.6-14.8)  H 


 


 





 


Platelet Count


 413 K/UL


(150-450) 


 


 





 


Mean Platelet Volume


 7.8 FL


(6.5-10.1) 


 


 





 


Neutrophils (%) (Auto)


 79.2 %


(45.0-75.0)  H 


 


 





 


Lymphocytes (%) (Auto)


 13.7 %


(20.0-45.0)  L 


 


 





 


Monocytes (%) (Auto)


 5.7 %


(1.0-10.0) 


 


 





 


Eosinophils (%) (Auto)


 0.9 %


(0.0-3.0) 


 


 





 


Basophils (%) (Auto)


 0.5 %


(0.0-2.0) 


 


 





 


Sodium Level


 140 MMOL/L


(136-145) 


 


 





 


Potassium Level


 4.2 MMOL/L


(3.5-5.1) 


 


 





 


Chloride Level


 106 MMOL/L


() 


 


 





 


Carbon Dioxide Level


 28 MMOL/L


(21-32) 


 


 





 


Anion Gap


 6 mmol/L


(5-15) 


 


 





 


Blood Urea Nitrogen


 19 mg/dL


(7-18)  H 


 


 





 


Creatinine


 0.9 MG/DL


(0.55-1.30) 


 


 





 


Estimat Glomerular Filtration


Rate > 60 mL/min


(>60) 


 


 





 


Glucose Level


 127 MG/DL


()  H 


 


 





 


Calcium Level


 8.0 MG/DL


(8.5-10.1)  L 


 


 





 


Phosphorus Level


 3.1 MG/DL


(2.5-4.9) 


 


 





 


Magnesium Level


 2.5 MG/DL


(1.8-2.4)  H 


 


 





 


Total Bilirubin


 0.2 MG/DL


(0.2-1.0) 


 


 





 


Aspartate Amino Transf


(AST/SGOT) 36 U/L (15-37)


 


 


 





 


Alanine Aminotransferase


(ALT/SGPT) 29 U/L (12-78)


 


 


 





 


Alkaline Phosphatase


 187 U/L


()  H 


 


 





 


Total Protein


 6.3 G/DL


(6.4-8.2)  L 


 


 





 


Albumin


 1.3 G/DL


(3.4-5.0)  L 


 


 





 


Globulin 5.0 g/dL     


 


Albumin/Globulin Ratio


 0.3 (1.0-2.7)


L 


 


 














Current Medications








 Medications


  (Trade)  Dose


 Ordered  Sig/Miky


 Route


 PRN Reason  Start Time


 Stop Time Status Last Admin


Dose Admin


 


 Acetaminophen


  (Tylenol)  650 mg  Q4H  PRN


 ORAL


 Temp >100.5  10/23/20 20:30


 11/22/20 20:29  11/5/20 22:39





 


 Chlorhexidine


 Gluconate


  (Jess-Hex 2%)  1 applic  DAILY@2000


 TOPIC


   11/6/20 20:00


 2/4/21 19:59  11/10/20 20:15





 


 Dextrose


  (Dextrose 50%)  50 ml  Q30M  PRN


 IV


 Hypoglycemia  10/23/20 22:45


 1/21/21 22:44   





 


 Heparin Sodium


  (Porcine)


  (Heparin 5000


 units/ml)  5,000 units  EVERY 12  HOURS


 SUBQ


   10/23/20 21:00


 12/7/20 20:59  11/10/20 20:47





 


 Insulin Aspart


  (NovoLOG)    EVERY 6  HOURS


 SUBQ


   11/2/20 06:00


 1/22/21 06:29  11/10/20 18:29





 


 Lorazepam


  (Ativan 2mg/ml


 1ml)  2 mg  Q4H  PRN


 IV


 For Anxiety  11/10/20 13:45


 11/17/20 13:44  11/10/20 16:29





 


 Meropenem 1 gm/


 Sodium Chloride  55 ml @ 


 110 mls/hr  Q12HR@0400,1600


 IVPB


   11/2/20 16:00


 11/11/20 23:59  11/11/20 04:21





 


 Midodrine


  (Pro-Amatine)  10 mg  Q8HR


 ORAL


   11/11/20 09:00


 2/6/21 17:59   





 


 Morphine Sulfate


  (Morphine


 Sulfate)  2 mg  Q6H  PRN


 IVP


 moderate pain  11/10/20 13:45


 11/17/20 13:44   





 


 Morphine Sulfate


  (Morphine


 Sulfate)  4 mg  Q4H  PRN


 IVP


 severe pain  11/6/20 12:45


 11/13/20 12:44   





 


 Nitroglycerin


  (Ntg)  0.4 mg  Q5M  PRN


 SL


 Prn Chest Pain  10/23/20 20:30


 11/22/20 20:29   





 


 Norepinephrine


 Bitartrate  250 ml @ 0


 mls/hr  Q24H


 IV


   11/9/20 15:45


 11/12/20 15:44  11/10/20 12:09





 


 Ondansetron HCl


  (Zofran)  4 mg  Q6H  PRN


 IVP


 Nausea & Vomiting  10/23/20 20:30


 11/22/20 20:29   





 


 Pantoprazole


  (Protonix)  40 mg  DAILY


 IV


   11/7/20 09:00


 12/7/20 08:59  11/10/20 08:56





 


 Polyethylene


 Glycol


  (Miralax)  17 gm  DAILYPRN  PRN


 ORAL


 Constipation  10/23/20 20:30


 11/22/20 20:29   





 


 Promethazine HCl/


 Codeine


  (Phenergan with


 Codeine)  5 ml  Q4H  PRN


 ORAL


 For Cough  10/23/20 20:30


 11/22/20 20:29   





 


 Sodium Chloride  1,000 ml @ 


 50 mls/hr  Q20H


 IV


   11/6/20 16:00


 12/6/20 15:59  11/10/20 18:00





 


 Tamsulosin HCl


  (Flomax)  0.4 mg  BID


 ORAL


   10/31/20 19:45


 11/23/20 08:59  11/10/20 17:18




















Slime Garcia M.D.            Nov 11, 2020 09:09

## 2020-11-11 NOTE — NUR
NURSE NOTES:

Patient is resting comfortably. No changes in condition. BP:106/72, HR:103, O2sat:97%.

## 2020-11-11 NOTE — NUR
NURSE HAND-OFF REPORT: 



Latest Vital Signs: Temperature 98.0 , Pulse 97 , B/P 127 /67 , Respiratory Rate 17 , O2 SAT 
99 , Mechanical Ventilator ETT 7.5 at 25cm/lipline with vent settings, AC14, , Peep 0. 
FIO2 35% at 100% O2Sat.  

Vital Sign Comment: SBP remains above 90 while Levophed is on hold.



EKG Rhythm: Sinus Rhythm

Rhythm change?: N 

MD Notified?: DIDI Edmond MD Response: 



Latest Mejia Fall Score: 70  

Fall Risk: High Risk 

Safety Measures: Call light Within Reach, Bed Alarm Zone 1, Side Rails Side Rails x3, Bed 
position Low and Locked.

Fall Precautions: 

Yellow Socks

Yellow Gown

Door Sign

Patient Fall Education



Report given to Maia LONDON; Endorsed plan of care.

## 2020-11-11 NOTE — PULMONOLGY CRITICAL CARE NOTE
Critical Care - Asmt/Plan


Problems:  


(1) Acute respiratory failure


(2) Multifocal pneumonia


(3) 2019 novel coronavirus disease (COVID-19)


(4) Acute encephalopathy


(5) Severe sepsis


(6) Alzheimer's dementia


(7) HTN (hypertension)


(8) History of CVA (cerebrovascular accident)


(9) BPH (benign prostatic hyperplasia)


Respiratory:  monitor respiratory rate, adjust FIO2, CXR


Cardiac:  continue pressors, continue to monitor HR/BP


Renal:  F/U I&O, keep IV fluid, check electrolytes


Infectious Disease:  check cultures


Gastrointestinal:  continue feedings/current rate


Endocrine:  continue sliding scale insulin


Hematologic:  monitor H/H, transfuse if hgb<8.5


Neurologic:  keep patient comfortable


Affect:  PRN ativan


Prophylaxis:  Protonix


Disposition:  keep in ICU


Time Spent (Minutes):  40


Notes Reviewed:  internist, cardio, renal


Discussed with:  nurses, consultants, 





Critical Care - Objective





Last 24 Hour Vital Signs








  Date Time  Temp Pulse Resp B/P (MAP) Pulse Ox O2 Delivery O2 Flow Rate FiO2


 


11/11/20 09:00  88 15 90/53 (65) 98   


 


11/11/20 08:00 99.8 89 14 87/51 (63) 99   


 


11/11/20 07:00  88 16 83/53 (63) 99   


 


11/11/20 06:30  90 15     


 


11/11/20 06:00  91 16 111/65 (80) 99   


 


11/11/20 05:30  97 16 94/58 (70) 98   


 


11/11/20 05:00  93 20 83/38 (53) 99   


 


11/11/20 04:30  90 18 103/61 (75) 98   


 


11/11/20 04:00  88      


 


11/11/20 04:00      Mechanical Ventilator  


 


11/11/20 04:00 98.3 92 18 120/66 (84) 100   


 


11/11/20 04:00        35


 


11/11/20 03:30  92 21 111/68 (82) 99   


 


11/11/20 03:00  95 19 107/59 (75) 100   


 


11/11/20 02:51  96 20     35


 


11/11/20 02:30  96 20 118/66 (83) 99   


 


11/11/20 02:00 98.8 99 22 123/67 (85) 99   


 


11/11/20 01:00  93 19 112/60 (77) 99   


 


11/11/20 00:00 100.2 94 15 89/53 (65) 98   


 


11/11/20 00:00        35


 


11/11/20 00:00  93      


 


11/11/20 00:00      Mechanical Ventilator  


 


11/10/20 23:00  97 15 93/52 (66) 98   


 


11/10/20 22:54  93 16     35


 


11/10/20 22:00  93 15 93/54 (67) 98   


 


11/10/20 21:00  88 16 120/66 (84) 100   


 


11/10/20 20:45  88 16 129/63 (85) 100   


 


11/10/20 20:30  84 14 96/59 (71) 99   


 


11/10/20 20:15  87 14 106/62 (77) 99   


 


11/10/20 20:00        35


 


11/10/20 20:00 98.6 88 15 116/64 (81) 100   


 


11/10/20 20:00      Mechanical Ventilator  


 


11/10/20 19:30  90 15 111/62 (78) 100   


 


11/10/20 19:15  88 16 112/66 (81) 100   


 


11/10/20 19:00  86 14     35


 


11/10/20 19:00  86 18 128/68 (88) 100   


 


11/10/20 17:45  88 18 129/70 (89) 100   


 


11/10/20 17:30  86 19 129/65 (86) 100   


 


11/10/20 17:15  85 19 122/70 (87) 99   


 


11/10/20 17:00  84 19 113/64 (80) 98   


 


11/10/20 16:59  88 16 103/22    


 


11/10/20 16:29  98 18 112/35    


 


11/10/20 16:00        35


 


11/10/20 16:00      Mechanical Ventilator  


 


11/10/20 16:00  87      


 


11/10/20 16:00 96.9 86 20 131/73 (92) 100   


 


11/10/20 15:20  92 19     35


 


11/10/20 15:00  88 19 118/75 (89) 100   


 


11/10/20 14:00  85 20 142/75 (97) 100   


 


11/10/20 13:22      Mechanical Ventilator  


 


11/10/20 13:00      Mechanical Ventilator  


 


11/10/20 12:45  83 19 132/70 (90) 99   


 


11/10/20 12:30  84 18 139/72 (94) 100   


 


11/10/20 12:15  87 17 128/65 (86) 99   


 


11/10/20 12:09    113/44    


 


11/10/20 12:09      Mechanical Ventilator  


 


11/10/20 12:00 97.0 84 19 116/70 (85) 100   


 


11/10/20 12:00      Mechanical Ventilator  


 


11/10/20 12:00      Mechanical Ventilator  


 


11/10/20 12:00        35


 


11/10/20 11:38  84      


 


11/10/20 11:30  84 16 115/65 (82) 100   


 


11/10/20 11:15  86 19 138/79 (98) 99   


 


11/10/20 11:14  83 19     35


 


11/10/20 11:00      Mechanical Ventilator  


 


11/10/20 11:00  81 16 131/72 (91) 100   


 


11/10/20 10:30  83 16 129/68 (88) 100   








Status:  sedated


Condition:  critical


HEENT:  atraumatic, normocephalic


Lungs:  chest wall tender


Heart:  HR/BP unstable


Abdomen:  soft, non-tender, active bowel sounds, feeding tube


Extremities:  no C/C/E


Accucheck:  137





Critical Care - Subjective


ROS Limited/Unobtainable:  Yes


Interval Events:


off levophed, lots of secretions


FI02:  35


Vent Support Breath Rate:  14


Vent Support Mode:  AC


Vent Tidal Volume:  600


Sputum Amount:  Moderate


PEEP:  0.0


PIP:  41


Tube Feeding Amount:  60


I&O:











Intake and Output 0 


 


 11/10/20 11/11/20





 19:00 07:00


 


Intake Total 1479.75 ml 1471.00 ml


 


Output Total 1225 ml 1280 ml


 


Balance 254.75 ml 191.00 ml


 


  


 


Free Water  90 ml


 


IV Total 819.75 ml 661.00 ml


 


Tube Feeding 660 ml 720 ml


 


Output Urine Total 1225 ml 1280 ml


 


# Bowel Movements 1 3








CXR:


Stable satisfactory tube and line positions. Bilateral infiltrates in a 

peribronchovascular distribution are unchanged. Left pleural effusion is 

unchanged.


ET-Tube:  7.5


ET Position:  25


Labs:





Laboratory Tests








Test


 11/10/20


18:14 11/11/20


00:07 11/11/20


03:31 11/11/20


04:00


 


POC Whole Blood Glucose


 Pending  


 126 MG/DL


()  H 137 MG/DL


()  H 





 


White Blood Count


 


 


 


 10.8 K/UL


(4.8-10.8)


 


Red Blood Count


 


 


 


 2.93 M/UL


(4.70-6.10)  L


 


Hemoglobin


 


 


 


 9.0 G/DL


(14.2-18.0)  L


 


Hematocrit


 


 


 


 28.2 %


(42.0-52.0)  L


 


Mean Corpuscular Volume    96 FL (80-99)  


 


Mean Corpuscular Hemoglobin


 


 


 


 30.5 PG


(27.0-31.0)


 


Mean Corpuscular Hemoglobin


Concent 


 


 


 31.8 G/DL


(32.0-36.0)  L


 


Red Cell Distribution Width


 


 


 


 15.1 %


(11.6-14.8)  H


 


Platelet Count


 


 


 


 413 K/UL


(150-450)


 


Mean Platelet Volume


 


 


 


 7.8 FL


(6.5-10.1)


 


Neutrophils (%) (Auto)


 


 


 


 79.2 %


(45.0-75.0)  H


 


Lymphocytes (%) (Auto)


 


 


 


 13.7 %


(20.0-45.0)  L


 


Monocytes (%) (Auto)


 


 


 


 5.7 %


(1.0-10.0)


 


Eosinophils (%) (Auto)


 


 


 


 0.9 %


(0.0-3.0)


 


Basophils (%) (Auto)


 


 


 


 0.5 %


(0.0-2.0)


 


Sodium Level


 


 


 


 140 MMOL/L


(136-145)


 


Potassium Level


 


 


 


 4.2 MMOL/L


(3.5-5.1)


 


Chloride Level


 


 


 


 106 MMOL/L


()


 


Carbon Dioxide Level


 


 


 


 28 MMOL/L


(21-32)


 


Anion Gap


 


 


 


 6 mmol/L


(5-15)


 


Blood Urea Nitrogen


 


 


 


 19 mg/dL


(7-18)  H


 


Creatinine


 


 


 


 0.9 MG/DL


(0.55-1.30)


 


Estimat Glomerular Filtration


Rate 


 


 


 > 60 mL/min


(>60)


 


Glucose Level


 


 


 


 127 MG/DL


()  H


 


Calcium Level


 


 


 


 8.0 MG/DL


(8.5-10.1)  L


 


Phosphorus Level


 


 


 


 3.1 MG/DL


(2.5-4.9)


 


Magnesium Level


 


 


 


 2.5 MG/DL


(1.8-2.4)  H


 


Total Bilirubin


 


 


 


 0.2 MG/DL


(0.2-1.0)


 


Aspartate Amino Transf


(AST/SGOT) 


 


 


 36 U/L (15-37)





 


Alanine Aminotransferase


(ALT/SGPT) 


 


 


 29 U/L (12-78)





 


Alkaline Phosphatase


 


 


 


 187 U/L


()  H


 


Total Protein


 


 


 


 6.3 G/DL


(6.4-8.2)  L


 


Albumin


 


 


 


 1.3 G/DL


(3.4-5.0)  L


 


Globulin    5.0 g/dL  


 


Albumin/Globulin Ratio


 


 


 


 0.3 (1.0-2.7)


L

















Michael Sandoval MD              Nov 11, 2020 10:29

## 2020-11-11 NOTE — NUR
CASE MANAGEMENT:REVIEW



SI;COVID PNA

100.2   110   22   86/57   95% ETT/MECH VENT

AC 14      PEEP 0    FIO2 35%

H/H 9.0/28.2   BUN 19      CA 8.0   MAG 2.5      ALB 1.3



IS;MEROPENEM IV Q8

LEVOPHED GTT 

PROTONIX IV QD

FLOMAX PO BID

MIDODRINE BG Q8

HEPARIN SUBQ Q12



*****ICU STATUS*****

DCP;[facility]

## 2020-11-11 NOTE — NUR
NURSE NOTES:



Pt was cleaned and repositioned, gown and bed linens changed, oral care done, pt suctioned, 
buck bag emptied, VS remain stable. No signs of distress noted. Safety precautions 
maintained.

## 2020-11-11 NOTE — NUR
NURSE NOTES:



PT'S SBP IN THE 80S, LEVOPHED DRIP RESTARTED AT 2 MCG/MIN TO MAINTAIN SBP ABOVE 90, MD SINCLAIR AWARE. NO OTHER SIGNS OF DISTRESS NOTED, PT STILL AFEBRILE. SAFETY PRECAUTIONS 
MAINTAINED. WILL CONTINUE TO MONITOR.

## 2020-11-12 VITALS — SYSTOLIC BLOOD PRESSURE: 112 MMHG | DIASTOLIC BLOOD PRESSURE: 64 MMHG

## 2020-11-12 VITALS — DIASTOLIC BLOOD PRESSURE: 88 MMHG | SYSTOLIC BLOOD PRESSURE: 144 MMHG

## 2020-11-12 VITALS — SYSTOLIC BLOOD PRESSURE: 76 MMHG | DIASTOLIC BLOOD PRESSURE: 49 MMHG

## 2020-11-12 VITALS — SYSTOLIC BLOOD PRESSURE: 132 MMHG | DIASTOLIC BLOOD PRESSURE: 78 MMHG

## 2020-11-12 VITALS — SYSTOLIC BLOOD PRESSURE: 117 MMHG | DIASTOLIC BLOOD PRESSURE: 69 MMHG

## 2020-11-12 VITALS — DIASTOLIC BLOOD PRESSURE: 48 MMHG | SYSTOLIC BLOOD PRESSURE: 64 MMHG

## 2020-11-12 VITALS — DIASTOLIC BLOOD PRESSURE: 50 MMHG | SYSTOLIC BLOOD PRESSURE: 75 MMHG

## 2020-11-12 VITALS — SYSTOLIC BLOOD PRESSURE: 113 MMHG | DIASTOLIC BLOOD PRESSURE: 69 MMHG

## 2020-11-12 VITALS — SYSTOLIC BLOOD PRESSURE: 118 MMHG | DIASTOLIC BLOOD PRESSURE: 68 MMHG

## 2020-11-12 VITALS — DIASTOLIC BLOOD PRESSURE: 71 MMHG | SYSTOLIC BLOOD PRESSURE: 141 MMHG

## 2020-11-12 VITALS — DIASTOLIC BLOOD PRESSURE: 42 MMHG | SYSTOLIC BLOOD PRESSURE: 57 MMHG

## 2020-11-12 VITALS — SYSTOLIC BLOOD PRESSURE: 104 MMHG | DIASTOLIC BLOOD PRESSURE: 61 MMHG

## 2020-11-12 VITALS — DIASTOLIC BLOOD PRESSURE: 63 MMHG | SYSTOLIC BLOOD PRESSURE: 127 MMHG

## 2020-11-12 VITALS — SYSTOLIC BLOOD PRESSURE: 117 MMHG | DIASTOLIC BLOOD PRESSURE: 68 MMHG

## 2020-11-12 VITALS — SYSTOLIC BLOOD PRESSURE: 100 MMHG | DIASTOLIC BLOOD PRESSURE: 62 MMHG

## 2020-11-12 VITALS — DIASTOLIC BLOOD PRESSURE: 82 MMHG | SYSTOLIC BLOOD PRESSURE: 136 MMHG

## 2020-11-12 VITALS — DIASTOLIC BLOOD PRESSURE: 65 MMHG | SYSTOLIC BLOOD PRESSURE: 102 MMHG

## 2020-11-12 VITALS — SYSTOLIC BLOOD PRESSURE: 148 MMHG | DIASTOLIC BLOOD PRESSURE: 57 MMHG

## 2020-11-12 VITALS — SYSTOLIC BLOOD PRESSURE: 135 MMHG | DIASTOLIC BLOOD PRESSURE: 85 MMHG

## 2020-11-12 VITALS — SYSTOLIC BLOOD PRESSURE: 117 MMHG | DIASTOLIC BLOOD PRESSURE: 72 MMHG

## 2020-11-12 VITALS — SYSTOLIC BLOOD PRESSURE: 96 MMHG | DIASTOLIC BLOOD PRESSURE: 65 MMHG

## 2020-11-12 VITALS — SYSTOLIC BLOOD PRESSURE: 108 MMHG | DIASTOLIC BLOOD PRESSURE: 65 MMHG

## 2020-11-12 VITALS — DIASTOLIC BLOOD PRESSURE: 61 MMHG | SYSTOLIC BLOOD PRESSURE: 91 MMHG

## 2020-11-12 VITALS — SYSTOLIC BLOOD PRESSURE: 123 MMHG | DIASTOLIC BLOOD PRESSURE: 66 MMHG

## 2020-11-12 VITALS — SYSTOLIC BLOOD PRESSURE: 136 MMHG | DIASTOLIC BLOOD PRESSURE: 78 MMHG

## 2020-11-12 VITALS — SYSTOLIC BLOOD PRESSURE: 115 MMHG | DIASTOLIC BLOOD PRESSURE: 71 MMHG

## 2020-11-12 VITALS — SYSTOLIC BLOOD PRESSURE: 164 MMHG | DIASTOLIC BLOOD PRESSURE: 84 MMHG

## 2020-11-12 VITALS — DIASTOLIC BLOOD PRESSURE: 86 MMHG | SYSTOLIC BLOOD PRESSURE: 157 MMHG

## 2020-11-12 VITALS — DIASTOLIC BLOOD PRESSURE: 61 MMHG | SYSTOLIC BLOOD PRESSURE: 115 MMHG

## 2020-11-12 VITALS — DIASTOLIC BLOOD PRESSURE: 76 MMHG | SYSTOLIC BLOOD PRESSURE: 116 MMHG

## 2020-11-12 VITALS — SYSTOLIC BLOOD PRESSURE: 115 MMHG | DIASTOLIC BLOOD PRESSURE: 68 MMHG

## 2020-11-12 VITALS — SYSTOLIC BLOOD PRESSURE: 94 MMHG | DIASTOLIC BLOOD PRESSURE: 64 MMHG

## 2020-11-12 VITALS — SYSTOLIC BLOOD PRESSURE: 129 MMHG | DIASTOLIC BLOOD PRESSURE: 77 MMHG

## 2020-11-12 VITALS — SYSTOLIC BLOOD PRESSURE: 108 MMHG | DIASTOLIC BLOOD PRESSURE: 71 MMHG

## 2020-11-12 VITALS — SYSTOLIC BLOOD PRESSURE: 134 MMHG | DIASTOLIC BLOOD PRESSURE: 79 MMHG

## 2020-11-12 VITALS — DIASTOLIC BLOOD PRESSURE: 69 MMHG | SYSTOLIC BLOOD PRESSURE: 121 MMHG

## 2020-11-12 VITALS — DIASTOLIC BLOOD PRESSURE: 57 MMHG | SYSTOLIC BLOOD PRESSURE: 93 MMHG

## 2020-11-12 VITALS — DIASTOLIC BLOOD PRESSURE: 47 MMHG | SYSTOLIC BLOOD PRESSURE: 72 MMHG

## 2020-11-12 VITALS — SYSTOLIC BLOOD PRESSURE: 63 MMHG | DIASTOLIC BLOOD PRESSURE: 41 MMHG

## 2020-11-12 VITALS — SYSTOLIC BLOOD PRESSURE: 124 MMHG | DIASTOLIC BLOOD PRESSURE: 83 MMHG

## 2020-11-12 VITALS — SYSTOLIC BLOOD PRESSURE: 77 MMHG | DIASTOLIC BLOOD PRESSURE: 52 MMHG

## 2020-11-12 VITALS — DIASTOLIC BLOOD PRESSURE: 60 MMHG | SYSTOLIC BLOOD PRESSURE: 89 MMHG

## 2020-11-12 VITALS — SYSTOLIC BLOOD PRESSURE: 147 MMHG | DIASTOLIC BLOOD PRESSURE: 91 MMHG

## 2020-11-12 VITALS — DIASTOLIC BLOOD PRESSURE: 74 MMHG | SYSTOLIC BLOOD PRESSURE: 135 MMHG

## 2020-11-12 VITALS — SYSTOLIC BLOOD PRESSURE: 139 MMHG | DIASTOLIC BLOOD PRESSURE: 72 MMHG

## 2020-11-12 VITALS — SYSTOLIC BLOOD PRESSURE: 112 MMHG | DIASTOLIC BLOOD PRESSURE: 74 MMHG

## 2020-11-12 VITALS — DIASTOLIC BLOOD PRESSURE: 75 MMHG | SYSTOLIC BLOOD PRESSURE: 140 MMHG

## 2020-11-12 LAB
ADD MANUAL DIFF: NO
ANION GAP SERPL CALC-SCNC: 7 MMOL/L (ref 5–15)
BASOPHILS NFR BLD AUTO: 0.8 % (ref 0–2)
BUN SERPL-MCNC: 19 MG/DL (ref 7–18)
CALCIUM SERPL-MCNC: 8.4 MG/DL (ref 8.5–10.1)
CHLORIDE SERPL-SCNC: 107 MMOL/L (ref 98–107)
CO2 SERPL-SCNC: 28 MMOL/L (ref 21–32)
CREAT SERPL-MCNC: 1 MG/DL (ref 0.55–1.3)
EOSINOPHIL NFR BLD AUTO: 0.7 % (ref 0–3)
ERYTHROCYTE [DISTWIDTH] IN BLOOD BY AUTOMATED COUNT: 16.2 % (ref 11.6–14.8)
HCT VFR BLD CALC: 29.2 % (ref 42–52)
HGB BLD-MCNC: 9 G/DL (ref 14.2–18)
LYMPHOCYTES NFR BLD AUTO: 16.9 % (ref 20–45)
MCV RBC AUTO: 98 FL (ref 80–99)
MONOCYTES NFR BLD AUTO: 5.7 % (ref 1–10)
NEUTROPHILS NFR BLD AUTO: 76 % (ref 45–75)
PLATELET # BLD: 462 K/UL (ref 150–450)
POTASSIUM SERPL-SCNC: 3.8 MMOL/L (ref 3.5–5.1)
RBC # BLD AUTO: 2.99 M/UL (ref 4.7–6.1)
SODIUM SERPL-SCNC: 141 MMOL/L (ref 136–145)
WBC # BLD AUTO: 10.8 K/UL (ref 4.8–10.8)

## 2020-11-12 RX ADMIN — MIDAZOLAM HYDROCHLORIDE PRN MLS/HR: 5 INJECTION INTRAMUSCULAR; INTRAVENOUS at 23:49

## 2020-11-12 RX ADMIN — MEROPENEM SCH MLS/HR: 1 INJECTION INTRAVENOUS at 13:28

## 2020-11-12 RX ADMIN — MIDODRINE HYDROCHLORIDE SCH MG: 10 TABLET ORAL at 18:20

## 2020-11-12 RX ADMIN — LORAZEPAM PRN MG: 2 INJECTION, SOLUTION INTRAMUSCULAR; INTRAVENOUS at 10:33

## 2020-11-12 RX ADMIN — PANTOPRAZOLE SODIUM SCH MG: 40 INJECTION, POWDER, FOR SOLUTION INTRAVENOUS at 08:33

## 2020-11-12 RX ADMIN — MIDAZOLAM HYDROCHLORIDE PRN MLS/HR: 5 INJECTION INTRAMUSCULAR; INTRAVENOUS at 19:49

## 2020-11-12 RX ADMIN — INSULIN ASPART SCH UNITS: 100 INJECTION, SOLUTION INTRAVENOUS; SUBCUTANEOUS at 18:00

## 2020-11-12 RX ADMIN — INSULIN ASPART SCH UNITS: 100 INJECTION, SOLUTION INTRAVENOUS; SUBCUTANEOUS at 00:00

## 2020-11-12 RX ADMIN — MIDODRINE HYDROCHLORIDE SCH MG: 10 TABLET ORAL at 08:32

## 2020-11-12 RX ADMIN — Medication SCH MLS/HR: at 13:44

## 2020-11-12 RX ADMIN — INSULIN ASPART SCH UNITS: 100 INJECTION, SOLUTION INTRAVENOUS; SUBCUTANEOUS at 05:29

## 2020-11-12 RX ADMIN — INSULIN ASPART SCH UNITS: 100 INJECTION, SOLUTION INTRAVENOUS; SUBCUTANEOUS at 12:00

## 2020-11-12 RX ADMIN — MEROPENEM SCH MLS/HR: 1 INJECTION INTRAVENOUS at 19:49

## 2020-11-12 RX ADMIN — Medication SCH MLS/HR: at 18:44

## 2020-11-12 RX ADMIN — HEPARIN SODIUM SCH UNITS: 5000 INJECTION INTRAVENOUS; SUBCUTANEOUS at 08:32

## 2020-11-12 RX ADMIN — TAMSULOSIN HYDROCHLORIDE SCH MG: 0.4 CAPSULE ORAL at 18:20

## 2020-11-12 RX ADMIN — TAMSULOSIN HYDROCHLORIDE SCH MG: 0.4 CAPSULE ORAL at 08:33

## 2020-11-12 RX ADMIN — CHLORHEXIDINE GLUCONATE SCH APPLIC: 213 SOLUTION TOPICAL at 19:48

## 2020-11-12 RX ADMIN — LORAZEPAM PRN MG: 2 INJECTION, SOLUTION INTRAMUSCULAR; INTRAVENOUS at 19:20

## 2020-11-12 RX ADMIN — HEPARIN SODIUM SCH UNITS: 5000 INJECTION INTRAVENOUS; SUBCUTANEOUS at 20:23

## 2020-11-12 RX ADMIN — LORAZEPAM PRN MG: 2 INJECTION, SOLUTION INTRAMUSCULAR; INTRAVENOUS at 01:21

## 2020-11-12 RX ADMIN — MIDODRINE HYDROCHLORIDE SCH MG: 10 TABLET ORAL at 00:25

## 2020-11-12 RX ADMIN — MEROPENEM SCH MLS/HR: 1 INJECTION INTRAVENOUS at 03:08

## 2020-11-12 NOTE — INTERNAL MED PROGRESS NOTE
Subjective


Physician Name


Andrei Cancino


Attending Physician


Andrei Cancino MD





Current Medications








 Medications


  (Trade)  Dose


 Ordered  Sig/Miky


 Route


 PRN Reason  Start Time


 Stop Time Status Last Admin


Dose Admin


 


 Acetaminophen


  (Tylenol)  650 mg  Q4H  PRN


 ORAL


 Temp >100.5  10/23/20 20:30


 11/22/20 20:29  11/5/20 22:39





 


 Chlorhexidine


 Gluconate


  (Jess-Hex 2%)  1 applic  DAILY@2000


 TOPIC


   11/6/20 20:00


 2/4/21 19:59  11/11/20 19:43





 


 Dextrose


  (Dextrose 50%)  50 ml  Q30M  PRN


 IV


 Hypoglycemia  10/23/20 22:45


 1/21/21 22:44   





 


 Heparin Sodium


  (Porcine)


  (Heparin 5000


 units/ml)  5,000 units  EVERY 12  HOURS


 SUBQ


   10/23/20 21:00


 12/7/20 20:59  11/12/20 08:32





 


 Insulin Aspart


  (NovoLOG)    EVERY 6  HOURS


 SUBQ


   11/2/20 06:00


 1/22/21 06:29  11/11/20 13:21





 


 Lorazepam


  (Ativan 2mg/ml


 1ml)  2 mg  Q4H  PRN


 IV


 For Anxiety  11/10/20 13:45


 11/17/20 13:44  11/12/20 10:33





 


 Meropenem 1 gm/


 Sodium Chloride  55 ml @ 


 110 mls/hr  Q8HR@0400,1200,2000


 IVPB


   11/11/20 12:00


 11/16/20 11:59  11/12/20 13:28





 


 Midodrine


  (Pro-Amatine)  10 mg  Q8H


 ORAL


   11/11/20 17:00


 2/9/21 16:59  11/12/20 08:32





 


 Morphine Sulfate


  (Morphine


 Sulfate)  2 mg  Q6H  PRN


 IVP


 moderate pain  11/10/20 13:45


 11/17/20 13:44   





 


 Morphine Sulfate


  (Morphine


 Sulfate)  4 mg  Q4H  PRN


 IVP


 severe pain  11/6/20 12:45


 11/13/20 12:44  11/12/20 10:33





 


 Nitroglycerin


  (Ntg)  0.4 mg  Q5M  PRN


 SL


 Prn Chest Pain  10/23/20 20:30


 11/22/20 20:29   





 


 Norepinephrine


 Bitartrate  250 ml @ 0


 mls/hr  Q24H


 IV


   11/12/20 13:30


 11/15/20 15:44  11/12/20 13:44





 


 Ondansetron HCl


  (Zofran)  4 mg  Q6H  PRN


 IVP


 Nausea & Vomiting  10/23/20 20:30


 11/22/20 20:29   





 


 Pantoprazole


  (Protonix)  40 mg  DAILY


 IV


   11/7/20 09:00


 12/7/20 08:59  11/12/20 08:33





 


 Polyethylene


 Glycol


  (Miralax)  17 gm  DAILYPRN  PRN


 ORAL


 Constipation  10/23/20 20:30


 11/22/20 20:29   





 


 Promethazine HCl/


 Codeine


  (Phenergan with


 Codeine)  5 ml  Q4H  PRN


 ORAL


 For Cough  10/23/20 20:30


 11/22/20 20:29   





 


 Sodium Chloride  1,000 ml @ 


 50 mls/hr  Q20H


 IV


   11/6/20 16:00


 12/6/20 15:59  11/12/20 12:50





 


 Tamsulosin HCl


  (Flomax)  0.4 mg  BID


 ORAL


   10/31/20 19:45


 11/23/20 08:59  11/12/20 08:33











Allergies:  


Coded Allergies:  


     No Known Allergies (Unverified , 8/20/12)


Subjective


In ICU Isolation room, unresponsive, cannot follow command, Intubated on 

Ventilation,  hypotensive, S/P code blue with chest compression, a lot of 

secretion, resume Levophed drip, WBC: 10.8, familyat bedside ,





Objective





Last Vital Signs








  Date Time  Temp Pulse Resp B/P (MAP) Pulse Ox O2 Delivery O2 Flow Rate FiO2


 


11/12/20 15:31  103      


 


11/12/20 15:00   14 132/78 (96) 100   


 


11/12/20 12:00      Mechanical Ventilator  


 


11/12/20 12:00 97.7       


 


11/12/20 12:00        35


 


11/6/20 12:00       15.0 











Laboratory Tests








Test


 11/12/20


00:21 11/12/20


05:00 11/12/20


05:24 11/12/20


12:09


 


POC Whole Blood Glucose Pending    Pending   


 


White Blood Count


 


 10.8 K/UL


(4.8-10.8) 


 





 


Red Blood Count


 


 2.99 M/UL


(4.70-6.10)  L 


 





 


Hemoglobin


 


 9.0 G/DL


(14.2-18.0)  L 


 





 


Hematocrit


 


 29.2 %


(42.0-52.0)  L 


 





 


Mean Corpuscular Volume  98 FL (80-99)    


 


Mean Corpuscular Hemoglobin


 


 30.1 PG


(27.0-31.0) 


 





 


Mean Corpuscular Hemoglobin


Concent 


 30.7 G/DL


(32.0-36.0)  L 


 





 


Red Cell Distribution Width


 


 16.2 %


(11.6-14.8)  H 


 





 


Platelet Count


 


 462 K/UL


(150-450)  H 


 





 


Mean Platelet Volume


 


 8.1 FL


(6.5-10.1) 


 





 


Neutrophils (%) (Auto)


 


 76.0 %


(45.0-75.0)  H 


 





 


Lymphocytes (%) (Auto)


 


 16.9 %


(20.0-45.0)  L 


 





 


Monocytes (%) (Auto)


 


 5.7 %


(1.0-10.0) 


 





 


Eosinophils (%) (Auto)


 


 0.7 %


(0.0-3.0) 


 





 


Basophils (%) (Auto)


 


 0.8 %


(0.0-2.0) 


 





 


Sodium Level


 


 141 MMOL/L


(136-145) 


 





 


Potassium Level


 


 3.8 MMOL/L


(3.5-5.1) 


 





 


Chloride Level


 


 107 MMOL/L


() 


 





 


Carbon Dioxide Level


 


 28 MMOL/L


(21-32) 


 





 


Anion Gap


 


 7 mmol/L


(5-15) 


 





 


Blood Urea Nitrogen


 


 19 mg/dL


(7-18)  H 


 





 


Creatinine


 


 1.0 MG/DL


(0.55-1.30) 


 





 


Estimat Glomerular Filtration


Rate 


 > 60 mL/min


(>60) 


 





 


Glucose Level


 


 131 MG/DL


()  H 


 





 


Calcium Level


 


 8.4 MG/DL


(8.5-10.1)  L 


 





 


Arterial Blood pH


 


 


 


 7.276


(7.350-7.450)


 


Arterial Blood Partial


Pressure CO2 


 


 


 51.1 mmHg


(35.0-45.0)  H


 


Arterial Blood Partial


Pressure O2 


 


 


 61.6 mmHg


(75.0-100.0)  L


 


Arterial Blood HCO3


 


 


 


 23.3 mmol/L


(22.0-26.0)


 


Arterial Blood Oxygen


Saturation 


 


 


 87.0 %


()  *L


 


Arterial Blood Base Excess    -3.6 (-2-2)  L


 


Jerod Test    Positive  

















Intake and Output  


 


 11/11/20 11/12/20





 19:00 07:00


 


Intake Total 1263.75 ml 1384.375 ml


 


Output Total 1935 ml 970 ml


 


Balance -671.25 ml 414.375 ml


 


  


 


Free Water 60 ml 


 


IV Total 483.75 ml 724.375 ml


 


Tube Feeding 720 ml 660 ml


 


Output Urine Total 1935 ml 970 ml








Objective


GENERAL:  unresponsive, intubated, chronically ill-appearing.


HEAD AND NECK:  Pupils are equal and reactive to light.  Anicteric. ET tube, 

NGT.


NECK:  Supple.  No JVD.


LUNGS:  Mechanical breath sound,  Decreased air in bases, Coarse breath sound.


HEART:  S1, S2.  Regular rhythm.  Distant heart sounds.  No murmur or gallop.


ABDOMEN:  Soft, nondistended, nontender.  Positive bowel sounds.


EXTREMITIES:  No cyanosis, clubbing, or edema.


NEUROLOGIC:  Very limited secondary to the patient's status, cannot follow 

commands.





Assessment/Plan


Assessment/Plan


1. Acute hypoxemic respiratory failure, most likely secondary to pneumonia and 

sepsis --> Intubated (11/6/2020).


2. Septic shock secondary to urinary tract infection and pneumonia.


3. pneumonia=MRSA


4. Acute kidney injury on chronic renal insufficiency.


5. Dehydration.


6. History of chronic congestive heart failure.


7. Diabetes type 2.


8. Dyslipidemia.


9. Hypertension.


10. Alzheimer's disease.


11. COVID 19 previous positive





PLAN:


1.  in ICU


2.  Dr. Sandoval,=Pulmonary Critical Care


3.  Dr. Garcia = infection disease


4.  Monitor labs and cultures


5.  antibiotics =  Meropenem


6.  Code status is full code.


7.  DVT prophylaxis is heparin subcutaneous.


8.  Tube feeding @ Hold


9.  On Levophed drip





Discuss with family regarding code status and prognosis, still Full code.











Andrei Cancino MD                 Nov 12, 2020 15:50

## 2020-11-12 NOTE — NUR
NURSE HAND-OFF REPORT: 



Latest Vital Signs: Temperature 99.4 , Pulse 103 , B/P 115 /68 , Respiratory Rate 18 , O2 
 , Mechanical Ventilator, O2 Flow Rate  .  

Vital Sign Comment: Stable



EKG Rhythm: Sinus Tachycardia

Rhythm change?: N 

MD Notified?: DIDI Edmond MD Response: 



Latest Mejia Fall Score: 70  

Fall Risk: High Risk 

Safety Measures: Call light Within Reach, Bed Alarm Zone 1, Side Rails Side Rails x3, Bed 
position Low and Locked.

Fall Precautions: 

Yellow Socks

Yellow Gown

Door Sign

Patient Fall Education



Report given to .

## 2020-11-12 NOTE — NUR
NURSE HAND-OFF REPORT: 



Latest Vital Signs: Temperature 98.0 , Pulse 103 , B/P 112 /74 , Respiratory Rate 20 , O2 
 , Mechanical Ventilator, ETT 14, , FIo2, Peep 0, O2Sat 100%. 

Vital Sign Comment: Pt remains on Levophed drip at 8mcg/min to maintain SBP above 90.



EKG Rhythm: Sinus Tachycardia

Rhythm change?: N 

MD Notified?: DIDI Laureano MD Response: 



Latest Mejia Fall Score: 70  

Fall Risk: High Risk 

Safety Measures: Call light Within Reach, Bed Alarm Zone 1, Side Rails Side Rails x3, Bed 
position Low and Locked.

Fall Precautions: 

Yellow Socks

Yellow Gown

Door Sign

Patient Fall Education



Report given to Maia LONDON. Endorsed plan of care.

## 2020-11-12 NOTE — NUR
NURSE NOTES:

Suctioning provided. Bite block placed. Versed and levophed continues to run. Repositioned.

## 2020-11-12 NOTE — NUR
NURSE NOTES:

Patient son and daughter was here for visitation ans updates. Dr. Cancino was here to see and 
assess patient and spoke with family members of patient's prognosis. COVID PCR ordered to 
re-test. Versed titrated to 6mg/hr currently to reach a RASS score of -2; patient being 
lightly sedated. Dr. Cancino also made aware that feeding has been held.

## 2020-11-12 NOTE — NUR
NURSE NOTES:

PRN ativan given for agitation. Bed bath given, linens changed, repositioned, suctioning and 
oral car done.

## 2020-11-12 NOTE — NUR
NURSE NOTES:

Pt was assessed after receiving change of shift report from Maia LONDON. Pt opens eyes to 
touch, unable to follow commands. Orally intubated, ETT 7.5 at 25cm right lipline, with vent 
settings AC14, , FIO2 35%, Peep 0, at 100% O2Sat, with bilateral rhonchi noted on 
auscultation. NSR on cardiac monitor, while Levophed drip currently remains on hold. Central 
line access present on right UA, double lumen PICC line, patent/intact with dry/clear 
dressing. 0.45%NS is infusing at 50ml/hour. Temp 98.9F rectal. Right nare NGT with feeding 
Glucerna 1.2 infusing at 60ml/hour, residual 5ml. Cummings catheter is noted, draining 
clear/yellow urine. Skin alterations, including bilateral buttocks stage II, bilateral heels 
redness noted. Pt is on pressure release mattress. HOB at 30degrees, bed locked, three side 
rails up, and call light is within reach. Will continue with plan of care.

## 2020-11-12 NOTE — NUR
NURSE NOTES:

Dr Sandoval was notified regarding Code Blue episode and reported chest xray results. 
Received order for repeat chest xray for AM. 

Pt remains stable while maintained on Levophed drip at 20mcg/min to keep SBP above 90. O2SAt 
at 100% while on previous vent settings.

## 2020-11-12 NOTE — NUR
NURSE NOTES:

Levophed turned off BP:121/69. Will monitor. Psychiatry Progress Note    Subjective: Interval History     Pt is about the same  He is attending groups  Medication and meal compliant without prompting  Continues to isolative  Minimal conversation  May be hallucinating        Current medications:    Current Facility-Administered Medications:     bisacodyl (DULCOLAX) EC tablet 10 mg, 10 mg, Oral, Daily PRN, Provider Cutover    buPROPion (WELLBUTRIN XL) 24 hr tablet 150 mg, 150 mg, Oral, Daily, Provider Cutover, 150 mg at 06/26/18 0804    buPROPion (WELLBUTRIN XL) 24 hr tablet 300 mg, 300 mg, Oral, Daily, Provider Cutover, 300 mg at 06/26/18 0804    cloZAPine (CLOZARIL) tablet 400 mg, 400 mg, Oral, HS, Provider Cutover, 400 mg at 06/25/18 2143    divalproex sodium (DEPAKOTE ER) 24 hr tablet 1,000 mg, 1,000 mg, Oral, HS, Provider Cutover, 1,000 mg at 06/25/18 2143    fluPHENAZine (PROLIXIN) tablet 10 mg, 10 mg, Oral, HS, Provider Cutover, 10 mg at 06/25/18 2143    levothyroxine tablet 75 mcg, 75 mcg, Oral, Early Morning, Provider Cutover, 75 mcg at 06/26/18 0621    midodrine (PROAMATINE) tablet 10 mg, 10 mg, Oral, TID, Wally Lynn MD, 10 mg at 06/26/18 0621    minocycline (MINOCIN) capsule 100 mg, 100 mg, Oral, Daily, Provider Cutover, 100 mg at 06/26/18 0806    polyethylene glycol (MIRALAX) packet 17 g, 17 g, Oral, Daily, Provider Cutover, 17 g at 06/26/18 0819      Objective:     Vital Signs:  Vitals:    06/25/18 1617 06/26/18 0630 06/26/18 0730 06/26/18 0735   BP: 120/87 99/62 101/70 102/70   BP Location: Left arm Left arm Left arm Left arm   Pulse: (!) 107  102 (!) 107   Resp:   20    Temp:   97 9 °F (36 6 °C)    TempSrc:   Temporal    Weight:       Height:             Appearance:  age appropriate and casually dressed   Behavior:  normal   Speech:  normal volume   Mood:  depressed   Affect:  constricted   Thought Process:  normal   Thought Content:  normal   Perceptual Disturbances: None   Risk Potential: none   Sensorium:  person, place, situation and time   Cognition:  intact   Consciousness:  alert and awake    Attention: attention span and concentration were age appropriate   Intellect: average   Insight:  good   Judgment: good      Motor Activity: no abnormal movements           Recent Labs:  Results Reviewed     None          I/O Past 24 hours:  No intake/output data recorded  No intake/output data recorded  Assessment / Plan:     [facility]    Recommended Treatment:      Medication changes:  1)  Continue same medications    Non-pharmacological treatments  1) Continue with group therapy, milieu therapy and occupational therapy  Safety  1) Safety/communication plan established targeting dynamic risk factors above  Counseling / Coordination of Care    Total floor / unit time spent today 20 minutes  Greater than 50% of total time was spent with the patient and / or family counseling and / or coordination of care  A description of the counseling / coordination of care  Patient's Rights, confidentiality and exceptions to confidentiality, use of automated medical record, [facility] willa staff access to medical record, and consent to treatment reviewed      Savannah Cee MD

## 2020-11-12 NOTE — CARDIAC ELECTROPHYSIOLOGY PN
Assessment/Plan


Assessment/Plan


1. Accelerated junctional rhythm. Not bradycardic. Ruled out for MI


      Echo showed ejection fraction of 55%.





2. Long run of 31 beats of Nonsustained VT on 11/3/2020. 


     Will need cardiac cath after stabilization.





3. Respiratory failure, on antibiotic and intubated on the vent


Being weaned





4. Septic shock. Off Levophed since this am again.On Midodrine 10 tid


5. Acute renal failure.Resolved Cr 0.9


6. History of previous COVID infection in September 2020 and active Covid now in

isolation


7. Dementia.





DW RN





Subjective


Subjective


In SR in NAD in Covid isolation. In ICU on the Vent with 35% Fio2.


  Had 31 beats of VT  on 11/3/20 at 16:46 and 5 beats 11/8/20


No VT overnight. S/P PICC line


Was hypotensive for 3 hours last night and was on Levophed.


Off Levo again this am





Objective





Last 24 Hour Vital Signs








  Date Time  Temp Pulse Resp B/P (MAP) Pulse Ox O2 Delivery O2 Flow Rate FiO2


 


11/12/20 10:33  92 14 127/63 100   


 


11/12/20 10:00  92 14 127/63 (84) 100   


 


11/12/20 09:00  95 17 123/66 (85) 100   


 


11/12/20 08:30  89 16 112/64 (80) 100   


 


11/12/20 08:07  101 16 93/57 (69) 99   


 


11/12/20 08:02  102 15 75/50 (58) 98   


 


11/12/20 08:01  102 15 64/48 (53) 98   


 


11/12/20 08:00        35


 


11/12/20 08:00 98.8 102 16 72/47 (55) 98   


 


11/12/20 08:00      Mechanical Ventilator  


 


11/12/20 08:00  100      


 


11/12/20 07:28  101 16     35


 


11/12/20 07:00  103 18 115/68 (84)    


 


11/12/20 06:30  106 19     


 


11/12/20 06:00  106 22 116/76 (89) 100   


 


11/12/20 05:00  94 19 121/69 (86) 100   


 


11/12/20 04:30  94 17 104/61 (75) 100   


 


11/12/20 04:00        35


 


11/12/20 04:00      Mechanical Ventilator  


 


11/12/20 04:00 99.4 95 14 89/60 (70) 99   


 


11/12/20 03:05  96 13 102/65 (77) 97   


 


11/12/20 03:00  98      


 


11/12/20 03:00  97 14 77/52 (60) 95   


 


11/12/20 02:42  99 15     35


 


11/12/20 02:00  108 17 91/61 (71) 97   


 


11/12/20 01:51  109 18 167/73 100   


 


11/12/20 01:21  114 20 164/84 99   


 


11/12/20 01:00  114 24 164/84 (110) 99   


 


11/12/20 00:00      Mechanical Ventilator  


 


11/12/20 00:00 99.3 105 22 136/78 (97) 100   


 


11/11/20 23:09  101      


 


11/11/20 23:08  102 15 96/55 (69) 98   


 


11/11/20 23:00  103 14 82/56 (65) 98   


 


11/11/20 22:45  103 14     35


 


11/11/20 22:00  105 17 106/72 (83) 98   


 


11/11/20 21:00  121 25 162/93 (116)    


 


11/11/20 20:13  106 18 176/89 99   


 


11/11/20 20:00 99.3 105 22 131/75 (93) 100   


 


11/11/20 20:00        35


 


11/11/20 20:00      Mechanical Ventilator  


 


11/11/20 19:43  105 20 153/78 98   


 


11/11/20 19:29  102      


 


11/11/20 19:00  97 17 127/67 (87) 99   


 


11/11/20 18:55  96 19     35


 


11/11/20 18:00  101 15 90/62 (71) 97   


 


11/11/20 17:00  110 24 142/77 (98) 98   


 


11/11/20 16:35  107 22  100 Mechanical Ventilator  35


 


11/11/20 16:00  97      


 


11/11/20 16:00 98.0 110 25 148/71 (96)    


 


11/11/20 16:00        35


 


11/11/20 16:00      Mechanical Ventilator  


 


11/11/20 15:29  107 22     35


 


11/11/20 15:00  102 22 138/72 (94) 100   


 


11/11/20 14:00  102 19 102/57 (72) 99   


 


11/11/20 13:30  97 16 116/74 (88) 98   


 


11/11/20 13:00  103 16 90/53 (65) 95   


 


11/11/20 12:50    86/57    


 


11/11/20 12:00  105      


 


11/11/20 12:00 96.8 103 21 108/67 (81) 96   


 


11/11/20 12:00      Mechanical Ventilator  


 


11/11/20 12:00        35


 


11/11/20 11:13  110 22     35


 


11/11/20 11:00  105 23 147/79 (101) 98   

















Intake and Output  


 


 11/11/20 11/12/20





 19:00 07:00


 


Intake Total 1263.75 ml 1384.375 ml


 


Output Total 1935 ml 970 ml


 


Balance -671.25 ml 414.375 ml


 


  


 


Free Water 60 ml 


 


IV Total 483.75 ml 724.375 ml


 


Tube Feeding 720 ml 660 ml


 


Output Urine Total 1935 ml 970 ml











Laboratory Tests








Test


 11/12/20


00:21 11/12/20


05:00 11/12/20


05:24


 


POC Whole Blood Glucose Pending    Pending  


 


White Blood Count


 


 10.8 K/UL


(4.8-10.8) 





 


Red Blood Count


 


 2.99 M/UL


(4.70-6.10)  L 





 


Hemoglobin


 


 9.0 G/DL


(14.2-18.0)  L 





 


Hematocrit


 


 29.2 %


(42.0-52.0)  L 





 


Mean Corpuscular Volume  98 FL (80-99)   


 


Mean Corpuscular Hemoglobin


 


 30.1 PG


(27.0-31.0) 





 


Mean Corpuscular Hemoglobin


Concent 


 30.7 G/DL


(32.0-36.0)  L 





 


Red Cell Distribution Width


 


 16.2 %


(11.6-14.8)  H 





 


Platelet Count


 


 462 K/UL


(150-450)  H 





 


Mean Platelet Volume


 


 8.1 FL


(6.5-10.1) 





 


Neutrophils (%) (Auto)


 


 76.0 %


(45.0-75.0)  H 





 


Lymphocytes (%) (Auto)


 


 16.9 %


(20.0-45.0)  L 





 


Monocytes (%) (Auto)


 


 5.7 %


(1.0-10.0) 





 


Eosinophils (%) (Auto)


 


 0.7 %


(0.0-3.0) 





 


Basophils (%) (Auto)


 


 0.8 %


(0.0-2.0) 





 


Sodium Level


 


 141 MMOL/L


(136-145) 





 


Potassium Level


 


 3.8 MMOL/L


(3.5-5.1) 





 


Chloride Level


 


 107 MMOL/L


() 





 


Carbon Dioxide Level


 


 28 MMOL/L


(21-32) 





 


Anion Gap


 


 7 mmol/L


(5-15) 





 


Blood Urea Nitrogen


 


 19 mg/dL


(7-18)  H 





 


Creatinine


 


 1.0 MG/DL


(0.55-1.30) 





 


Estimat Glomerular Filtration


Rate 


 > 60 mL/min


(>60) 





 


Glucose Level


 


 131 MG/DL


()  H 





 


Calcium Level


 


 8.4 MG/DL


(8.5-10.1)  L 











Objective





HEAD AND NECK:  No JVD. Orally intubated


LUNGS:  Coarse rhonchi.


CARDIOVASCULAR:   Irregular S1 and S2 with no gallop.


ABDOMEN:  Soft.


EXTREMITIES:  No pitting edema.











Kvng Edmond MD               Nov 12, 2020 10:47

## 2020-11-12 NOTE — NUR
NURSE NOTES:

Pt was administered Ativan 2mg IVP for anxiety per PRN order and Morphine 4mg IVP for severe 
pain per PRN order. Pt is noted to be very restless, biting on the ET tube, with facial 
grimacing, and attempting to pull out IV lines. Bilateral soft wrist restraints remain on pt 
with skin/vascular integrity at restraint with within normal limits.

## 2020-11-12 NOTE — NUR
NURSE NOTES:

ER MD at bedside. Achieved ROSC, ST on cardiac monitor. /42. STAT chest xray was 
ordered as per ER MD, Dr. Lal.



Charge nurse notified family.

## 2020-11-12 NOTE — NUR
NURSE NOTES:

Received patient intubated; ETT 7.5 @ 25cm to the lipline to vent with settings of AC:14, 
TV:60, FiO2:100%, O2sat:100%. Right nare NGT in place but feeding held. Patient is post-code 
blue from earlier in the afternoon. Cummings catheter in place and draining. Right upper arm 
PICC running TKO and  NS @ 50ml/hr and levophed @ 8mcgs/min. Ativan PRN given for agitation 
due to patient fighting the vent. Left message for Dr. Sandoval for sedation; Ativan is not 
working. Isolation precautions in place. On cooling blanket. Safety precautions in place; 
Bed low, locked and alarm is on. Will continue plan of care.

## 2020-11-12 NOTE — NUR
NURSE NOTES:

Pt remains stable while maintained on Levophed drip, rate titrated down to 14mcg/min to 
maintain SBP above 90. Pt is afebrile. O2Sat 100% with no respiratory distress.

## 2020-11-12 NOTE — NUR
NURSE NOTES:

Levophed drip was resumed at rate of 2mcg/min to maintain SBP above 90, since BP dropped to 
72/47.

## 2020-11-12 NOTE — NEPHROLOGY PROGRESS NOTE
Assessment/Plan


Problem List:  


(1) Dehydration


(2) JANES (acute kidney injury)


(3) Renal failure (ARF), acute on chronic


(4) Hypoxia


(5) Acute encephalopathy


(6) Electrolyte imbalance


Assessment





75-year-old male is admitted with acute hypoxic respiratory failure most likely 

secondary to pneumonia and sepsis, UTI.


Acute on chronic renal failure


Dehydration


Electrolyte imbalances, hypernatremia


Hypoalbuminemia


Diabetes type 2


History of congestive heart failure


Hypertension


Hyperlipemia


Alzheimer's


Previous COVID-19 infection in September 2020


Plan


November 12: Seen in ICU.  Discussed with RN Debra.  Blood pressure remains a 

little requiring pressors.  Labs reviewed.  Stable renal parameters.  Continue 

per consultants.


November 11: Remains intubated.  Full code.  Blood pressure borderline low.  

Will increase midodrine dose.  Discussed with RN.  Continue to monitor renal 

parameters and electrolytes.


November 10: Intubated.  Full code.  Labs reviewed.  Stable from renal 

standpoint of view.  Continue per consultants.


November 9: Remains intubated.  Full code.  Labs reviewed.  Electrolytes within 

normal limit.  Continue per consultants.


November 8: In ICU.  Remains intubated on ventilator.  Remains full code.  Low 

potassium addressed.  Blood pressure fluctuating.  Continue per consultants.


November 7: Patient in ICU.  Intubated on ventilator.  On 6 mics of Levophed.  

Will give 100 cc albumin 25%.  K-Phos IV ordered.  Continue per consultants.  

Continue monitor renal parameters and electrolytes.


November 6: Status unchanged.  Transfer to [facility] for seizure.  Stable from renal 

standpoint to view.  Continue per consultants.


November 5: Status quo.  Labs reviewed.  Renal parameters stable.  Continue per 

consultants.


November 4: On nonrebreather mask.  Labs reviewed.  Renal parameters 

stable.Continue per pulmonologist.  Clinically unchanged.


November 3: On nonrebreather mask.  Inflammatory markers gradually declining.  

Renal parameters stable.  Continue per pulmonary.


November 2: Remains on nonrebreather mask.  Inflammatory markers remain 

elevated.  Renal parameters somewhat stable.  Continue per pulmonary and ID.  

Remains full code.


November 1: Patient on nonrebreather mask.  Labs noted.  Serum creatinine down 

to 1.3.  Continue per consultants.


October 31: Patient on nonrebreather mask.  Transfer to telemetry when seen this

morning.  Labs noted.  Continue per consultants.  Serum creatinine dylan to 1.7. 

Continue to monitor renal parameters.  Continue to monitor vancomycin level


October 30: Patient is not doing well clinically.  Mild respiratory distress.  

ABG noted.  Somewhat hypoxic.  CBC and chemistry panel ordered.  Discussed with 

LITA Garcia.  Will defer to pulmonary management to specialist.  Continue per ID.

 Renal parameters remained stable as of October 29.


October 29: Labs reviewed.  Renal parameters stable.  Continue per consultants.


October 28: Labs reviewed.  Potassium via NG tube ordered.  IV fluids stopped.  

Continue per consultants.


October 27: Labs reviewed.  Low potassium and low phosphorus replaced.  Continue

per consultants.  Remains stable from renal standpoint of view.


October 26: Patient remains n.p.o.  IV fluid down to 50 cc an hour.  Potassium 

supplement intravenously ordered.  Continue per consultants.





Previously:


Patient is n.p.o., will continue on IV fluid of D5W 75 cc an hour


We will monitor electrolytes and renal parameters


Avoid nephrotoxic's


Start p.o. when he clears by speech therapist, meanwhile aspiration precautions


Keep the blood pressure and blood sugar in check


Per orders





Subjective


ROS Limited/Unobtainable:  Yes





Objective


Objective





Last 24 Hour Vital Signs








  Date Time  Temp Pulse Resp B/P (MAP) Pulse Ox O2 Delivery O2 Flow Rate FiO2


 


11/12/20 07:28  101 16     35


 


11/12/20 07:00  103 18 115/68 (84)    


 


11/12/20 06:30  106 19     


 


11/12/20 06:00  106 22 116/76 (89) 100   


 


11/12/20 05:00  94 19 121/69 (86) 100   


 


11/12/20 04:30  94 17 104/61 (75) 100   


 


11/12/20 04:00        35


 


11/12/20 04:00      Mechanical Ventilator  


 


11/12/20 04:00 99.4 95 14 89/60 (70) 99   


 


11/12/20 03:05  96 13 102/65 (77) 97   


 


11/12/20 03:00  98      


 


11/12/20 03:00  97 14 77/52 (60) 95   


 


11/12/20 02:42  99 15     35


 


11/12/20 02:00  108 17 91/61 (71) 97   


 


11/12/20 01:51  109 18 167/73 100   


 


11/12/20 01:21  114 20 164/84 99   


 


11/12/20 01:00  114 24 164/84 (110) 99   


 


11/12/20 00:00      Mechanical Ventilator  


 


11/12/20 00:00 99.3 105 22 136/78 (97) 100   


 


11/11/20 23:09  101      


 


11/11/20 23:08  102 15 96/55 (69) 98   


 


11/11/20 23:00  103 14 82/56 (65) 98   


 


11/11/20 22:45  103 14     35


 


11/11/20 22:00  105 17 106/72 (83) 98   


 


11/11/20 21:00  121 25 162/93 (116)    


 


11/11/20 20:13  106 18 176/89 99   


 


11/11/20 20:00 99.3 105 22 131/75 (93) 100   


 


11/11/20 20:00        35


 


11/11/20 20:00      Mechanical Ventilator  


 


11/11/20 19:43  105 20 153/78 98   


 


11/11/20 19:29  102      


 


11/11/20 19:00  97 17 127/67 (87) 99   


 


11/11/20 18:55  96 19     35


 


11/11/20 18:00  101 15 90/62 (71) 97   


 


11/11/20 17:00  110 24 142/77 (98) 98   


 


11/11/20 16:35  107 22  100 Mechanical Ventilator  35


 


11/11/20 16:00  97      


 


11/11/20 16:00 98.0 110 25 148/71 (96)    


 


11/11/20 16:00        35


 


11/11/20 16:00      Mechanical Ventilator  


 


11/11/20 15:29  107 22     35


 


11/11/20 15:00  102 22 138/72 (94) 100   


 


11/11/20 14:00  102 19 102/57 (72) 99   


 


11/11/20 13:30  97 16 116/74 (88) 98   


 


11/11/20 13:00  103 16 90/53 (65) 95   


 


11/11/20 12:50    86/57    


 


11/11/20 12:00  105      


 


11/11/20 12:00 96.8 103 21 108/67 (81) 96   


 


11/11/20 12:00      Mechanical Ventilator  


 


11/11/20 12:00        35


 


11/11/20 11:13  110 22     35


 


11/11/20 11:00  105 23 147/79 (101) 98   


 


11/11/20 10:00  96 23 132/70 (90) 96   


 


11/11/20 09:00  88 15 90/53 (65) 98   

















Intake and Output  


 


 11/11/20 11/12/20





 19:00 07:00


 


Intake Total 1263.75 ml 1384.375 ml


 


Output Total 1935 ml 970 ml


 


Balance -671.25 ml 414.375 ml


 


  


 


Free Water 60 ml 


 


IV Total 483.75 ml 724.375 ml


 


Tube Feeding 720 ml 660 ml


 


Output Urine Total 1935 ml 970 ml








Laboratory Tests


11/12/20 00:21: POC Whole Blood Glucose [Pending]


11/12/20 05:00: 


White Blood Count 10.8, Red Blood Count 2.99L, Hemoglobin 9.0L, Hematocrit 29.2L

, Mean Corpuscular Volume 98, Mean Corpuscular Hemoglobin 30.1, Mean Corpuscular

 Hemoglobin Concent 30.7L, Red Cell Distribution Width 16.2H, Platelet Count 

462H, Mean Platelet Volume 8.1, Neutrophils (%) (Auto) 76.0H, Lymphocytes (%) 

(Auto) 16.9L, Monocytes (%) (Auto) 5.7, Eosinophils (%) (Auto) 0.7, Basophils 

(%) (Auto) 0.8, Sodium Level 141, Potassium Level 3.8, Chloride Level 107, 

Carbon Dioxide Level 28, Anion Gap 7, Blood Urea Nitrogen 19H, Creatinine 1.0, 

Estimat Glomerular Filtration Rate > 60, Glucose Level 131H, Calcium Level 8.4L


11/12/20 05:24: POC Whole Blood Glucose [Pending]


Height (Feet):  5


Height (Inches):  4.00


Weight (Pounds):  130


General Appearance:  no apparent distress


EENT:  other - Intubated on ventilator


Cardiovascular:  tachycardia


Respiratory/Chest:  decreased breath sounds


Abdomen:  distended











Seven Tobar MD            Nov 12, 2020 08:33

## 2020-11-12 NOTE — NUR
NURSE NOTES:

Levophed drip is now placed back on hold, VS within normal range. SBP now 123.



AM meds were administered. Pt remains on cooling blanket to control body temps.

## 2020-11-12 NOTE — INFECTIOUS DISEASES PROG NOTE
Assessment/Plan


74yo M with:


SEptic Shock- SP


Fever,r ecurrent, low grade; improving


Leukocytosis ;recurrent ; increased; -SP


Acute hypoxic resp failure, on NRB mask> 4l NC; back on NRB, desaturation 10/29 

> intubated 11/6


Pneumonia- >HAP


hx of COVID19 PNA 9/9/20


         --11/10 CXR:  Bilateral infiltrates in a peribronchovascular distr

ibution are unchanged. Left pleural effusion is unchanged.


         --11/8 CXR: Persistent bilateral patchy pulmonary opacities, most 

prominent  in the left lower lung.


         --11/7 ucx neg


                  sp cx MRSA (colonizer at this point)


                  Bcx NTD


         --11/2 Bcx Neg


          10/30 Sp cx MRSA (Vancomycin ADRIANA 1), ESBL E.coli


   10/23 BCx NTD


   UA 15-20 WBC, UCx Neg


   10/23 COVID rapid neg; 10/26 rapid COVID PCR + (from prior infection)- not 

new infection


   Flu A/B neg


   CXR: L pna


   Resp cx MRSA





Neftali on CKD, improving





SNF resident ([facility])


Non-verbal


VRE and MRSA colonized





Plan:


Meropenem #11/14 for ESBL PNA


   -11/10 SP Micafungin #4


   -11/6 SP IV Vancomycin #15


   -11/2 SP Zosyn #4


   -10/26 SP Cefepime #4


   -10/24 SP Flagyl #








Monitor CBC/CMP


Monitor resp status


Monitor temp curve and hemodynamics


repeat cultures





D/w RN





Thank you for this consult. [facility] will continue to follow.





Subjective


Allergies:  


Coded Allergies:  


     No Known Allergies (Unverified , 8/20/12)


afebrile in ~36hrs


off pressors


no leukocytosis


Fio2 35%





Objective





Last 24 Hour Vital Signs








  Date Time  Temp Pulse Resp B/P (MAP) Pulse Ox O2 Delivery O2 Flow Rate FiO2


 


11/12/20 10:33  92 14 127/63 100   


 


11/12/20 10:00  92 14 127/63 (84) 100   


 


11/12/20 09:00  95 17 123/66 (85) 100   


 


11/12/20 08:30  89 16 112/64 (80) 100   


 


11/12/20 08:07  101 16 93/57 (69) 99   


 


11/12/20 08:02  102 15 75/50 (58) 98   


 


11/12/20 08:01  102 15 64/48 (53) 98   


 


11/12/20 08:00        35


 


11/12/20 08:00 98.8 102 16 72/47 (55) 98   


 


11/12/20 08:00      Mechanical Ventilator  


 


11/12/20 08:00  100      


 


11/12/20 07:28  101 16     35


 


11/12/20 07:00  103 18 115/68 (84)    


 


11/12/20 06:30  106 19     


 


11/12/20 06:00  106 22 116/76 (89) 100   


 


11/12/20 05:00  94 19 121/69 (86) 100   


 


11/12/20 04:30  94 17 104/61 (75) 100   


 


11/12/20 04:00        35


 


11/12/20 04:00      Mechanical Ventilator  


 


11/12/20 04:00 99.4 95 14 89/60 (70) 99   


 


11/12/20 03:05  96 13 102/65 (77) 97   


 


11/12/20 03:00  98      


 


11/12/20 03:00  97 14 77/52 (60) 95   


 


11/12/20 02:42  99 15     35


 


11/12/20 02:00  108 17 91/61 (71) 97   


 


11/12/20 01:51  109 18 167/73 100   


 


11/12/20 01:21  114 20 164/84 99   


 


11/12/20 01:00  114 24 164/84 (110) 99   


 


11/12/20 00:00      Mechanical Ventilator  


 


11/12/20 00:00 99.3 105 22 136/78 (97) 100   


 


11/11/20 23:09  101      


 


11/11/20 23:08  102 15 96/55 (69) 98   


 


11/11/20 23:00  103 14 82/56 (65) 98   


 


11/11/20 22:45  103 14     35


 


11/11/20 22:00  105 17 106/72 (83) 98   


 


11/11/20 21:00  121 25 162/93 (116)    


 


11/11/20 20:13  106 18 176/89 99   


 


11/11/20 20:00 99.3 105 22 131/75 (93) 100   


 


11/11/20 20:00        35


 


11/11/20 20:00      Mechanical Ventilator  


 


11/11/20 19:43  105 20 153/78 98   


 


11/11/20 19:29  102      


 


11/11/20 19:00  97 17 127/67 (87) 99   


 


11/11/20 18:55  96 19     35


 


11/11/20 18:00  101 15 90/62 (71) 97   


 


11/11/20 17:00  110 24 142/77 (98) 98   


 


11/11/20 16:35  107 22  100 Mechanical Ventilator  35


 


11/11/20 16:00  97      


 


11/11/20 16:00 98.0 110 25 148/71 (96)    


 


11/11/20 16:00        35


 


11/11/20 16:00      Mechanical Ventilator  


 


11/11/20 15:29  107 22     35


 


11/11/20 15:00  102 22 138/72 (94) 100   


 


11/11/20 14:00  102 19 102/57 (72) 99   


 


11/11/20 13:30  97 16 116/74 (88) 98   


 


11/11/20 13:00  103 16 90/53 (65) 95   


 


11/11/20 12:50    86/57    


 


11/11/20 12:00  105      


 


11/11/20 12:00 96.8 103 21 108/67 (81) 96   


 


11/11/20 12:00      Mechanical Ventilator  


 


11/11/20 12:00        35


 


11/11/20 11:13  110 22     35








Height (Feet):  5


Height (Inches):  4.00


Weight (Pounds):  130


Gen: sedated, intubated


CV: RRR


Pulm: Rhonchi anteriorly


Abd: Soft, NTND


Ext: No c/c/e





Laboratory Tests








Test


 11/12/20


00:21 11/12/20


05:00 11/12/20


05:24


 


POC Whole Blood Glucose Pending    Pending  


 


White Blood Count


 


 10.8 K/UL


(4.8-10.8) 





 


Red Blood Count


 


 2.99 M/UL


(4.70-6.10)  L 





 


Hemoglobin


 


 9.0 G/DL


(14.2-18.0)  L 





 


Hematocrit


 


 29.2 %


(42.0-52.0)  L 





 


Mean Corpuscular Volume  98 FL (80-99)   


 


Mean Corpuscular Hemoglobin


 


 30.1 PG


(27.0-31.0) 





 


Mean Corpuscular Hemoglobin


Concent 


 30.7 G/DL


(32.0-36.0)  L 





 


Red Cell Distribution Width


 


 16.2 %


(11.6-14.8)  H 





 


Platelet Count


 


 462 K/UL


(150-450)  H 





 


Mean Platelet Volume


 


 8.1 FL


(6.5-10.1) 





 


Neutrophils (%) (Auto)


 


 76.0 %


(45.0-75.0)  H 





 


Lymphocytes (%) (Auto)


 


 16.9 %


(20.0-45.0)  L 





 


Monocytes (%) (Auto)


 


 5.7 %


(1.0-10.0) 





 


Eosinophils (%) (Auto)


 


 0.7 %


(0.0-3.0) 





 


Basophils (%) (Auto)


 


 0.8 %


(0.0-2.0) 





 


Sodium Level


 


 141 MMOL/L


(136-145) 





 


Potassium Level


 


 3.8 MMOL/L


(3.5-5.1) 





 


Chloride Level


 


 107 MMOL/L


() 





 


Carbon Dioxide Level


 


 28 MMOL/L


(21-32) 





 


Anion Gap


 


 7 mmol/L


(5-15) 





 


Blood Urea Nitrogen


 


 19 mg/dL


(7-18)  H 





 


Creatinine


 


 1.0 MG/DL


(0.55-1.30) 





 


Estimat Glomerular Filtration


Rate 


 > 60 mL/min


(>60) 





 


Glucose Level


 


 131 MG/DL


()  H 





 


Calcium Level


 


 8.4 MG/DL


(8.5-10.1)  L 














Current Medications








 Medications


  (Trade)  Dose


 Ordered  Sig/Miky


 Route


 PRN Reason  Start Time


 Stop Time Status Last Admin


Dose Admin


 


 Acetaminophen


  (Tylenol)  650 mg  Q4H  PRN


 ORAL


 Temp >100.5  10/23/20 20:30


 11/22/20 20:29  11/5/20 22:39





 


 Chlorhexidine


 Gluconate


  (Jess-Hex 2%)  1 applic  DAILY@2000


 TOPIC


   11/6/20 20:00


 2/4/21 19:59  11/11/20 19:43





 


 Dextrose


  (Dextrose 50%)  50 ml  Q30M  PRN


 IV


 Hypoglycemia  10/23/20 22:45


 1/21/21 22:44   





 


 Heparin Sodium


  (Porcine)


  (Heparin 5000


 units/ml)  5,000 units  EVERY 12  HOURS


 SUBQ


   10/23/20 21:00


 12/7/20 20:59  11/12/20 08:32





 


 Insulin Aspart


  (NovoLOG)    EVERY 6  HOURS


 SUBQ


   11/2/20 06:00


 1/22/21 06:29  11/11/20 13:21





 


 Lorazepam


  (Ativan 2mg/ml


 1ml)  2 mg  Q4H  PRN


 IV


 For Anxiety  11/10/20 13:45


 11/17/20 13:44  11/12/20 10:33





 


 Meropenem 1 gm/


 Sodium Chloride  55 ml @ 


 110 mls/hr  Q8HR@0400,1200,2000


 IVPB


   11/11/20 12:00


 11/16/20 11:59  11/12/20 03:08





 


 Midodrine


  (Pro-Amatine)  10 mg  Q8H


 ORAL


   11/11/20 17:00


 2/9/21 16:59  11/12/20 08:32





 


 Morphine Sulfate


  (Morphine


 Sulfate)  2 mg  Q6H  PRN


 IVP


 moderate pain  11/10/20 13:45


 11/17/20 13:44   





 


 Morphine Sulfate


  (Morphine


 Sulfate)  4 mg  Q4H  PRN


 IVP


 severe pain  11/6/20 12:45


 11/13/20 12:44  11/12/20 10:33





 


 Nitroglycerin


  (Ntg)  0.4 mg  Q5M  PRN


 SL


 Prn Chest Pain  10/23/20 20:30


 11/22/20 20:29   





 


 Norepinephrine


 Bitartrate  250 ml @ 0


 mls/hr  Q24H


 IV


   11/9/20 15:45


 11/12/20 15:44  11/11/20 12:50





 


 Ondansetron HCl


  (Zofran)  4 mg  Q6H  PRN


 IVP


 Nausea & Vomiting  10/23/20 20:30


 11/22/20 20:29   





 


 Pantoprazole


  (Protonix)  40 mg  DAILY


 IV


   11/7/20 09:00


 12/7/20 08:59  11/12/20 08:33





 


 Polyethylene


 Glycol


  (Miralax)  17 gm  DAILYPRN  PRN


 ORAL


 Constipation  10/23/20 20:30


 11/22/20 20:29   





 


 Promethazine HCl/


 Codeine


  (Phenergan with


 Codeine)  5 ml  Q4H  PRN


 ORAL


 For Cough  10/23/20 20:30


 11/22/20 20:29   





 


 Sodium Chloride  1,000 ml @ 


 50 mls/hr  Q20H


 IV


   11/6/20 16:00


 12/6/20 15:59  11/11/20 16:33





 


 Tamsulosin HCl


  (Flomax)  0.4 mg  BID


 ORAL


   10/31/20 19:45


 11/23/20 08:59  11/12/20 08:33




















Radha,Slime M.D.            Nov 12, 2020 11:13

## 2020-11-12 NOTE — NUR
SPEECH PATHOLOGY/DISCHARGE SUMMARY NOTE:



UNFORTUNATELY, THIS PATIENT WAS ADMITTED 20 DAYS AGO AND IN THE COURSE OF 

HIS STAY HE TESTED POSITIVE FOR COVID (EARLIER DX OF COVID IN SEPTEMBER 2020), AND HAS SINCE UNDERGONE INTUBATION AND IS RECEIVING NON/ORAL 

FEEDING MANAGEMENT VIA NG TUBE.  CURRENTLY HE IS ON VENT SUPPORT AND WEANING IS 
CONTRAINDICATED IN PART AT THIS TIME DUE TO HYPOTENSION.  FURTHER SKILLED ST SERVICES ARE 
NOT INDICATED AT THIS TIME WHILE THE PATIENT IS IN PULMONARY DECLINE. HE IS HIGH RISK FOR 
RECURRING ASPIRATION PNEUMONIA, P.O. INTAKE/TRIALS ARE NOT ADVISABLE.  D/W DR. VALDERRAMA, RN 
GIOVANNA.



DATE OF ADMISSION:  10/23/2020

CHIEF COMPLAINT:  Shortness of breath.



HISTORY OF PRESENT ILLNESS:  This is a 75-year-old  gentleman with

past medical history significant for recent COVID-19 pneumonia on

09/10/2020, history of heart failure, diabetes type 2, chronic kidney

disease, stage 2, pneumonia, dyslipidemia, dysphagia, dementia,

hypocalcemia, Alzheimer, who presented to the hospital from nursing

facility after he was noted to have worsening of shortness of breath and

chest congestion.  The patient has diminished oxygen saturation at the

nursing facility noted to have a fever of 100.9 and required _____ oxygen.

Shortly after initial evaluation in the nursing facility, the patient was

transferred to the hospital.  Upon arrival to the emergency department,

the patient was noted to be altered than usual and severe shortness of

breath.  Subsequently, the patient was admitted to PCU with sepsis

secondary to urinary tract infection as well as multifocal pneumonia and

severe hypernatremia, acute kidney injury and chronic renal

insufficiency.







Critical Care - Asmt/Plan

Problems:  

(1) Acute respiratory failure

(2) Multifocal pneumonia

(3) 2019 novel coronavirus disease (COVID-19)

(4) Acute encephalopathy

(5) Severe sepsis

(6) Alzheimer's dementia

(7) HTN (hypertension)

(8) History of CVA (cerebrovascular accident)

(9) BPH (benign prostatic hyperplasia)

Respiratory:  monitor respiratory rate, adjust FIO2, CXR

ASSESSMENT:

1. Acute hypoxemic respiratory failure, most likely secondary to

pneumonia and sepsis.

2. Sepsis secondary to urinary tract infection and pneumonia.

3. Possible aspiration pneumonia.

4. Acute kidney injury on chronic renal insufficiency.

5. Dehydration.

6. History of chronic congestive heart failure.

7. Diabetes type 2.

8. Dyslipidemia.

9. Hypertension.

10. Alzheimer's disease.

11. History of COVID-19 infection in September 2020.

## 2020-11-12 NOTE — NUR
NURSE NOTES:

Versed drip started @ 1mg/hr. Will titrate per protocol to reach RASS - 2 lightly sedated.

## 2020-11-12 NOTE — DIAGNOSTIC IMAGING REPORT
Indication: Post intubation, status post CODE BLUE

 

Technique: One view of the chest

 

Comparison: 11/10/2020

 

Findings: Satisfactory positions of endotracheal tube, orogastric tube, right arm

PICC. There is subcutaneous emphysema of the left chest wall now present. There is

suggestion of a small apical pneumothorax. There may be some rib fractures at the

left lateral lower hemithorax. Bilateral infiltrates are unchanged.

 

Impression: Satisfactory tracheal intubation. Other tubes and lines stable.

 

Left chest wall subcutaneous emphysema. Suspect small apical pneumothorax.

 

Probable left lower rib fractures

## 2020-11-12 NOTE — NUR
NURSE NOTES:

NGT feeding is currently being held due to recent Code Blue from earlier today. Accucheck 
finger glucose resulted 160. Novolog was held since feeding is on hold.

Pt had BM, moderate, soft/pasty/brown. Pt was cleaned, gown/bed linens were changed after 
bed-bath was provided. Wound dressings were changed. Pt continues to have moderate to large 
amounts of thin/clearish/white secretions, and was suctioned. 

VS remain stable, while pt is maintained on Levophed drip, with current rate titrated down 
to 8 mcg/min.

## 2020-11-12 NOTE — PULMONOLGY CRITICAL CARE NOTE
Critical Care - Asmt/Plan


Problems:  


(1) Acute respiratory failure


(2) Multifocal pneumonia


(3) 2019 novel coronavirus disease (COVID-19)


(4) Acute encephalopathy


(5) Severe sepsis


(6) Alzheimer's dementia


(7) HTN (hypertension)


(8) History of CVA (cerebrovascular accident)


(9) BPH (benign prostatic hyperplasia)


Assessment/Plan:


on and off Levophed


Respiratory:  adjust FIO2, CXR


Cardiac:  continue pressors


Renal:  F/U I&O, keep IV fluid, check electrolytes


Infectious Disease:  check cultures


Gastrointestinal:  continue feedings/current rate


Endocrine:  monitor blood sugar, continue sliding scale insulin


Hematologic:  transfuse if hgb<8.5


Neurologic:  PRN Ativan, keep patient comfortable


Affect:  PRN ativan


Prophylaxis:  Protonix


Time Spent (Minutes):  40


Notes Reviewed:  internist, cardio, renal


Discussed with:  nurses, consultants, 





Critical Care - Objective





Last 24 Hour Vital Signs








  Date Time  Temp Pulse Resp B/P (MAP) Pulse Ox O2 Delivery O2 Flow Rate FiO2


 


11/12/20 10:00  92 14 127/63 (84) 100   


 


11/12/20 09:00  95 17 123/66 (85) 100   


 


11/12/20 08:30  89 16 112/64 (80) 100   


 


11/12/20 08:07  101 16 93/57 (69) 99   


 


11/12/20 08:02  102 15 75/50 (58) 98   


 


11/12/20 08:01  102 15 64/48 (53) 98   


 


11/12/20 08:00        35


 


11/12/20 08:00 98.8 102 16 72/47 (55) 98   


 


11/12/20 08:00      Mechanical Ventilator  


 


11/12/20 08:00  100      


 


11/12/20 07:28  101 16     35


 


11/12/20 07:00  103 18 115/68 (84)    


 


11/12/20 06:30  106 19     


 


11/12/20 06:00  106 22 116/76 (89) 100   


 


11/12/20 05:00  94 19 121/69 (86) 100   


 


11/12/20 04:30  94 17 104/61 (75) 100   


 


11/12/20 04:00        35


 


11/12/20 04:00      Mechanical Ventilator  


 


11/12/20 04:00 99.4 95 14 89/60 (70) 99   


 


11/12/20 03:05  96 13 102/65 (77) 97   


 


11/12/20 03:00  98      


 


11/12/20 03:00  97 14 77/52 (60) 95   


 


11/12/20 02:42  99 15     35


 


11/12/20 02:00  108 17 91/61 (71) 97   


 


11/12/20 01:51  109 18 167/73 100   


 


11/12/20 01:21  114 20 164/84 99   


 


11/12/20 01:00  114 24 164/84 (110) 99   


 


11/12/20 00:00      Mechanical Ventilator  


 


11/12/20 00:00 99.3 105 22 136/78 (97) 100   


 


11/11/20 23:09  101      


 


11/11/20 23:08  102 15 96/55 (69) 98   


 


11/11/20 23:00  103 14 82/56 (65) 98   


 


11/11/20 22:45  103 14     35


 


11/11/20 22:00  105 17 106/72 (83) 98   


 


11/11/20 21:00  121 25 162/93 (116)    


 


11/11/20 20:13  106 18 176/89 99   


 


11/11/20 20:00 99.3 105 22 131/75 (93) 100   


 


11/11/20 20:00        35


 


11/11/20 20:00      Mechanical Ventilator  


 


11/11/20 19:43  105 20 153/78 98   


 


11/11/20 19:29  102      


 


11/11/20 19:00  97 17 127/67 (87) 99   


 


11/11/20 18:55  96 19     35


 


11/11/20 18:00  101 15 90/62 (71) 97   


 


11/11/20 17:00  110 24 142/77 (98) 98   


 


11/11/20 16:35  107 22  100 Mechanical Ventilator  35


 


11/11/20 16:00  97      


 


11/11/20 16:00 98.0 110 25 148/71 (96)    


 


11/11/20 16:00        35


 


11/11/20 16:00      Mechanical Ventilator  


 


11/11/20 15:29  107 22     35


 


11/11/20 15:00  102 22 138/72 (94) 100   


 


11/11/20 14:00  102 19 102/57 (72) 99   


 


11/11/20 13:30  97 16 116/74 (88) 98   


 


11/11/20 13:00  103 16 90/53 (65) 95   


 


11/11/20 12:50    86/57    


 


11/11/20 12:00  105      


 


11/11/20 12:00 96.8 103 21 108/67 (81) 96   


 


11/11/20 12:00      Mechanical Ventilator  


 


11/11/20 12:00        35


 


11/11/20 11:13  110 22     35


 


11/11/20 11:00  105 23 147/79 (101) 98   








Status:  sedated


Condition:  critical


HEENT:  atraumatic


Neck:  full ROM


Lungs:  clear


Heart:  HR/BP stable


Abdomen:  soft


Extremities:  no C/C/E


Accucheck:  131





Critical Care - Subjective


ROS Limited/Unobtainable:  Yes


Condition:  critical


EKG Rhythm:  Sinus Rhythm


FI02:  35


Vent Support Breath Rate:  14


Vent Support Mode:  AC


Vent Tidal Volume:  600


Sputum Amount:  Moderate


PEEP:  0.0


PIP:  40


Tube Feeding Amount:  60


I&O:











Intake and Output  


 


 11/11/20 11/12/20





 19:00 07:00


 


Intake Total 1263.75 ml 1384.375 ml


 


Output Total 1935 ml 970 ml


 


Balance -671.25 ml 414.375 ml


 


  


 


Free Water 60 ml 


 


IV Total 483.75 ml 724.375 ml


 


Tube Feeding 720 ml 660 ml


 


Output Urine Total 1935 ml 970 ml








CXR:


no change


ET-Tube:  7.5


ET Position:  25


Labs:





Laboratory Tests








Test


 11/12/20


00:21 11/12/20


05:00 11/12/20


05:24


 


POC Whole Blood Glucose Pending    Pending  


 


White Blood Count


 


 10.8 K/UL


(4.8-10.8) 





 


Red Blood Count


 


 2.99 M/UL


(4.70-6.10)  L 





 


Hemoglobin


 


 9.0 G/DL


(14.2-18.0)  L 





 


Hematocrit


 


 29.2 %


(42.0-52.0)  L 





 


Mean Corpuscular Volume  98 FL (80-99)   


 


Mean Corpuscular Hemoglobin


 


 30.1 PG


(27.0-31.0) 





 


Mean Corpuscular Hemoglobin


Concent 


 30.7 G/DL


(32.0-36.0)  L 





 


Red Cell Distribution Width


 


 16.2 %


(11.6-14.8)  H 





 


Platelet Count


 


 462 K/UL


(150-450)  H 





 


Mean Platelet Volume


 


 8.1 FL


(6.5-10.1) 





 


Neutrophils (%) (Auto)


 


 76.0 %


(45.0-75.0)  H 





 


Lymphocytes (%) (Auto)


 


 16.9 %


(20.0-45.0)  L 





 


Monocytes (%) (Auto)


 


 5.7 %


(1.0-10.0) 





 


Eosinophils (%) (Auto)


 


 0.7 %


(0.0-3.0) 





 


Basophils (%) (Auto)


 


 0.8 %


(0.0-2.0) 





 


Sodium Level


 


 141 MMOL/L


(136-145) 





 


Potassium Level


 


 3.8 MMOL/L


(3.5-5.1) 





 


Chloride Level


 


 107 MMOL/L


() 





 


Carbon Dioxide Level


 


 28 MMOL/L


(21-32) 





 


Anion Gap


 


 7 mmol/L


(5-15) 





 


Blood Urea Nitrogen


 


 19 mg/dL


(7-18)  H 





 


Creatinine


 


 1.0 MG/DL


(0.55-1.30) 





 


Estimat Glomerular Filtration


Rate 


 > 60 mL/min


(>60) 





 


Glucose Level


 


 131 MG/DL


()  H 





 


Calcium Level


 


 8.4 MG/DL


(8.5-10.1)  L 




















Michael Sandoval MD              Nov 12, 2020 10:29

## 2020-11-12 NOTE — NUR
NURSE NOTES:

Pt was desaturating to 80%, ventilator kept alarming/ high peak pressure. RT at bedside, 
unable to suction via ET tube. Starting bagging the pt, however without success due to 
blockage and absence of chest rise. Pt desaturated to 20%, bardycardia to 20's, no pulse 
noted upon palpation and Doppler. Code blue was called. Performed CPR according to [facility] 
protocol/guided; please see code sheet.

## 2020-11-13 VITALS — DIASTOLIC BLOOD PRESSURE: 66 MMHG | SYSTOLIC BLOOD PRESSURE: 112 MMHG

## 2020-11-13 VITALS — SYSTOLIC BLOOD PRESSURE: 117 MMHG | DIASTOLIC BLOOD PRESSURE: 66 MMHG

## 2020-11-13 VITALS — DIASTOLIC BLOOD PRESSURE: 58 MMHG | SYSTOLIC BLOOD PRESSURE: 94 MMHG

## 2020-11-13 VITALS — SYSTOLIC BLOOD PRESSURE: 108 MMHG | DIASTOLIC BLOOD PRESSURE: 66 MMHG

## 2020-11-13 VITALS — DIASTOLIC BLOOD PRESSURE: 76 MMHG | SYSTOLIC BLOOD PRESSURE: 100 MMHG

## 2020-11-13 VITALS — SYSTOLIC BLOOD PRESSURE: 88 MMHG | DIASTOLIC BLOOD PRESSURE: 56 MMHG

## 2020-11-13 VITALS — DIASTOLIC BLOOD PRESSURE: 66 MMHG | SYSTOLIC BLOOD PRESSURE: 106 MMHG

## 2020-11-13 VITALS — SYSTOLIC BLOOD PRESSURE: 101 MMHG | DIASTOLIC BLOOD PRESSURE: 64 MMHG

## 2020-11-13 VITALS — DIASTOLIC BLOOD PRESSURE: 63 MMHG | SYSTOLIC BLOOD PRESSURE: 121 MMHG

## 2020-11-13 VITALS — SYSTOLIC BLOOD PRESSURE: 128 MMHG | DIASTOLIC BLOOD PRESSURE: 68 MMHG

## 2020-11-13 VITALS — SYSTOLIC BLOOD PRESSURE: 113 MMHG | DIASTOLIC BLOOD PRESSURE: 68 MMHG

## 2020-11-13 VITALS — SYSTOLIC BLOOD PRESSURE: 87 MMHG | DIASTOLIC BLOOD PRESSURE: 52 MMHG

## 2020-11-13 VITALS — DIASTOLIC BLOOD PRESSURE: 59 MMHG | SYSTOLIC BLOOD PRESSURE: 94 MMHG

## 2020-11-13 VITALS — DIASTOLIC BLOOD PRESSURE: 64 MMHG | SYSTOLIC BLOOD PRESSURE: 123 MMHG

## 2020-11-13 VITALS — DIASTOLIC BLOOD PRESSURE: 69 MMHG | SYSTOLIC BLOOD PRESSURE: 122 MMHG

## 2020-11-13 VITALS — DIASTOLIC BLOOD PRESSURE: 55 MMHG | SYSTOLIC BLOOD PRESSURE: 96 MMHG

## 2020-11-13 VITALS — SYSTOLIC BLOOD PRESSURE: 97 MMHG | DIASTOLIC BLOOD PRESSURE: 62 MMHG

## 2020-11-13 VITALS — SYSTOLIC BLOOD PRESSURE: 124 MMHG | DIASTOLIC BLOOD PRESSURE: 66 MMHG

## 2020-11-13 VITALS — SYSTOLIC BLOOD PRESSURE: 117 MMHG | DIASTOLIC BLOOD PRESSURE: 83 MMHG

## 2020-11-13 VITALS — DIASTOLIC BLOOD PRESSURE: 66 MMHG | SYSTOLIC BLOOD PRESSURE: 121 MMHG

## 2020-11-13 VITALS — DIASTOLIC BLOOD PRESSURE: 64 MMHG | SYSTOLIC BLOOD PRESSURE: 109 MMHG

## 2020-11-13 VITALS — SYSTOLIC BLOOD PRESSURE: 100 MMHG | DIASTOLIC BLOOD PRESSURE: 52 MMHG

## 2020-11-13 VITALS — SYSTOLIC BLOOD PRESSURE: 109 MMHG | DIASTOLIC BLOOD PRESSURE: 61 MMHG

## 2020-11-13 VITALS — SYSTOLIC BLOOD PRESSURE: 110 MMHG | DIASTOLIC BLOOD PRESSURE: 60 MMHG

## 2020-11-13 VITALS — SYSTOLIC BLOOD PRESSURE: 102 MMHG | DIASTOLIC BLOOD PRESSURE: 62 MMHG

## 2020-11-13 VITALS — DIASTOLIC BLOOD PRESSURE: 59 MMHG | SYSTOLIC BLOOD PRESSURE: 96 MMHG

## 2020-11-13 VITALS — SYSTOLIC BLOOD PRESSURE: 119 MMHG | DIASTOLIC BLOOD PRESSURE: 69 MMHG

## 2020-11-13 VITALS — SYSTOLIC BLOOD PRESSURE: 125 MMHG | DIASTOLIC BLOOD PRESSURE: 67 MMHG

## 2020-11-13 VITALS — DIASTOLIC BLOOD PRESSURE: 51 MMHG | SYSTOLIC BLOOD PRESSURE: 85 MMHG

## 2020-11-13 VITALS — DIASTOLIC BLOOD PRESSURE: 63 MMHG | SYSTOLIC BLOOD PRESSURE: 97 MMHG

## 2020-11-13 VITALS — SYSTOLIC BLOOD PRESSURE: 117 MMHG | DIASTOLIC BLOOD PRESSURE: 63 MMHG

## 2020-11-13 VITALS — SYSTOLIC BLOOD PRESSURE: 101 MMHG | DIASTOLIC BLOOD PRESSURE: 59 MMHG

## 2020-11-13 VITALS — SYSTOLIC BLOOD PRESSURE: 94 MMHG | DIASTOLIC BLOOD PRESSURE: 57 MMHG

## 2020-11-13 VITALS — DIASTOLIC BLOOD PRESSURE: 62 MMHG | SYSTOLIC BLOOD PRESSURE: 96 MMHG

## 2020-11-13 VITALS — SYSTOLIC BLOOD PRESSURE: 133 MMHG | DIASTOLIC BLOOD PRESSURE: 70 MMHG

## 2020-11-13 VITALS — DIASTOLIC BLOOD PRESSURE: 69 MMHG | SYSTOLIC BLOOD PRESSURE: 117 MMHG

## 2020-11-13 VITALS — SYSTOLIC BLOOD PRESSURE: 144 MMHG | DIASTOLIC BLOOD PRESSURE: 84 MMHG

## 2020-11-13 VITALS — DIASTOLIC BLOOD PRESSURE: 58 MMHG | SYSTOLIC BLOOD PRESSURE: 99 MMHG

## 2020-11-13 VITALS — DIASTOLIC BLOOD PRESSURE: 55 MMHG | SYSTOLIC BLOOD PRESSURE: 85 MMHG

## 2020-11-13 VITALS — DIASTOLIC BLOOD PRESSURE: 66 MMHG | SYSTOLIC BLOOD PRESSURE: 115 MMHG

## 2020-11-13 VITALS — SYSTOLIC BLOOD PRESSURE: 122 MMHG | DIASTOLIC BLOOD PRESSURE: 72 MMHG

## 2020-11-13 VITALS — SYSTOLIC BLOOD PRESSURE: 146 MMHG | DIASTOLIC BLOOD PRESSURE: 70 MMHG

## 2020-11-13 VITALS — SYSTOLIC BLOOD PRESSURE: 91 MMHG | DIASTOLIC BLOOD PRESSURE: 58 MMHG

## 2020-11-13 VITALS — DIASTOLIC BLOOD PRESSURE: 65 MMHG | SYSTOLIC BLOOD PRESSURE: 107 MMHG

## 2020-11-13 VITALS — SYSTOLIC BLOOD PRESSURE: 106 MMHG | DIASTOLIC BLOOD PRESSURE: 60 MMHG

## 2020-11-13 VITALS — DIASTOLIC BLOOD PRESSURE: 67 MMHG | SYSTOLIC BLOOD PRESSURE: 115 MMHG

## 2020-11-13 VITALS — DIASTOLIC BLOOD PRESSURE: 62 MMHG | SYSTOLIC BLOOD PRESSURE: 95 MMHG

## 2020-11-13 VITALS — DIASTOLIC BLOOD PRESSURE: 52 MMHG | SYSTOLIC BLOOD PRESSURE: 93 MMHG

## 2020-11-13 VITALS — DIASTOLIC BLOOD PRESSURE: 56 MMHG | SYSTOLIC BLOOD PRESSURE: 96 MMHG

## 2020-11-13 VITALS — SYSTOLIC BLOOD PRESSURE: 121 MMHG | DIASTOLIC BLOOD PRESSURE: 72 MMHG

## 2020-11-13 VITALS — DIASTOLIC BLOOD PRESSURE: 64 MMHG | SYSTOLIC BLOOD PRESSURE: 113 MMHG

## 2020-11-13 VITALS — SYSTOLIC BLOOD PRESSURE: 142 MMHG | DIASTOLIC BLOOD PRESSURE: 76 MMHG

## 2020-11-13 VITALS — SYSTOLIC BLOOD PRESSURE: 126 MMHG | DIASTOLIC BLOOD PRESSURE: 69 MMHG

## 2020-11-13 VITALS — DIASTOLIC BLOOD PRESSURE: 60 MMHG | SYSTOLIC BLOOD PRESSURE: 104 MMHG

## 2020-11-13 VITALS — DIASTOLIC BLOOD PRESSURE: 57 MMHG | SYSTOLIC BLOOD PRESSURE: 93 MMHG

## 2020-11-13 VITALS — DIASTOLIC BLOOD PRESSURE: 57 MMHG | SYSTOLIC BLOOD PRESSURE: 104 MMHG

## 2020-11-13 VITALS — SYSTOLIC BLOOD PRESSURE: 104 MMHG | DIASTOLIC BLOOD PRESSURE: 62 MMHG

## 2020-11-13 VITALS — DIASTOLIC BLOOD PRESSURE: 53 MMHG | SYSTOLIC BLOOD PRESSURE: 92 MMHG

## 2020-11-13 VITALS — DIASTOLIC BLOOD PRESSURE: 58 MMHG | SYSTOLIC BLOOD PRESSURE: 97 MMHG

## 2020-11-13 LAB
ADD MANUAL DIFF: NO
ALBUMIN SERPL-MCNC: 1.4 G/DL (ref 3.4–5)
ALBUMIN/GLOB SERPL: 0.3 {RATIO} (ref 1–2.7)
ALP SERPL-CCNC: 160 U/L (ref 46–116)
ALT SERPL-CCNC: 29 U/L (ref 12–78)
AST SERPL-CCNC: 29 U/L (ref 15–37)
BASOPHILS NFR BLD AUTO: 0.5 % (ref 0–2)
BILIRUB SERPL-MCNC: 0.4 MG/DL (ref 0.2–1)
BUN SERPL-MCNC: 21 MG/DL (ref 7–18)
CALCIUM SERPL-MCNC: 7.9 MG/DL (ref 8.5–10.1)
CHLORIDE SERPL-SCNC: 108 MMOL/L (ref 98–107)
CO2 SERPL-SCNC: 27 MMOL/L (ref 21–32)
CREAT SERPL-MCNC: 1 MG/DL (ref 0.55–1.3)
EOSINOPHIL NFR BLD AUTO: 0.5 % (ref 0–3)
ERYTHROCYTE [DISTWIDTH] IN BLOOD BY AUTOMATED COUNT: 16.9 % (ref 11.6–14.8)
GLOBULIN SER-MCNC: 4.8 G/DL
HCT VFR BLD CALC: 27.2 % (ref 42–52)
HGB BLD-MCNC: 8.3 G/DL (ref 14.2–18)
LYMPHOCYTES NFR BLD AUTO: 12.2 % (ref 20–45)
MCV RBC AUTO: 97 FL (ref 80–99)
MONOCYTES NFR BLD AUTO: 5.6 % (ref 1–10)
NEUTROPHILS NFR BLD AUTO: 81.3 % (ref 45–75)
PHOSPHATE SERPL-MCNC: 2.1 MG/DL (ref 2.5–4.9)
PLATELET # BLD: 443 K/UL (ref 150–450)
POTASSIUM SERPL-SCNC: 3.8 MMOL/L (ref 3.5–5.1)
RBC # BLD AUTO: 2.79 M/UL (ref 4.7–6.1)
SODIUM SERPL-SCNC: 142 MMOL/L (ref 136–145)
WBC # BLD AUTO: 16.2 K/UL (ref 4.8–10.8)

## 2020-11-13 RX ADMIN — MEROPENEM SCH MLS/HR: 1 INJECTION INTRAVENOUS at 03:21

## 2020-11-13 RX ADMIN — CHLORHEXIDINE GLUCONATE SCH APPLIC: 213 SOLUTION TOPICAL at 20:15

## 2020-11-13 RX ADMIN — MEROPENEM SCH MLS/HR: 1 INJECTION INTRAVENOUS at 11:25

## 2020-11-13 RX ADMIN — INSULIN ASPART SCH UNITS: 100 INJECTION, SOLUTION INTRAVENOUS; SUBCUTANEOUS at 11:44

## 2020-11-13 RX ADMIN — MIDODRINE HYDROCHLORIDE SCH MG: 10 TABLET ORAL at 17:19

## 2020-11-13 RX ADMIN — MIDODRINE HYDROCHLORIDE SCH MG: 10 TABLET ORAL at 01:45

## 2020-11-13 RX ADMIN — MIDAZOLAM HYDROCHLORIDE PRN MLS/HR: 5 INJECTION INTRAMUSCULAR; INTRAVENOUS at 05:01

## 2020-11-13 RX ADMIN — Medication SCH MLS/HR: at 02:31

## 2020-11-13 RX ADMIN — INSULIN ASPART SCH UNITS: 100 INJECTION, SOLUTION INTRAVENOUS; SUBCUTANEOUS at 05:53

## 2020-11-13 RX ADMIN — INSULIN ASPART SCH UNITS: 100 INJECTION, SOLUTION INTRAVENOUS; SUBCUTANEOUS at 17:50

## 2020-11-13 RX ADMIN — PANTOPRAZOLE SODIUM SCH MG: 40 INJECTION, POWDER, FOR SOLUTION INTRAVENOUS at 09:01

## 2020-11-13 RX ADMIN — HEPARIN SODIUM SCH UNITS: 5000 INJECTION INTRAVENOUS; SUBCUTANEOUS at 09:06

## 2020-11-13 RX ADMIN — TAMSULOSIN HYDROCHLORIDE SCH MG: 0.4 CAPSULE ORAL at 09:01

## 2020-11-13 RX ADMIN — MIDODRINE HYDROCHLORIDE SCH MG: 10 TABLET ORAL at 09:01

## 2020-11-13 RX ADMIN — TAMSULOSIN HYDROCHLORIDE SCH MG: 0.4 CAPSULE ORAL at 17:19

## 2020-11-13 RX ADMIN — MIDAZOLAM HYDROCHLORIDE PRN MLS/HR: 5 INJECTION INTRAMUSCULAR; INTRAVENOUS at 02:30

## 2020-11-13 RX ADMIN — MEROPENEM SCH MLS/HR: 1 INJECTION INTRAVENOUS at 20:15

## 2020-11-13 RX ADMIN — INSULIN ASPART SCH UNITS: 100 INJECTION, SOLUTION INTRAVENOUS; SUBCUTANEOUS at 00:00

## 2020-11-13 RX ADMIN — Medication SCH MLS/HR: at 18:03

## 2020-11-13 RX ADMIN — HEPARIN SODIUM SCH UNITS: 5000 INJECTION INTRAVENOUS; SUBCUTANEOUS at 20:16

## 2020-11-13 RX ADMIN — MIDAZOLAM HYDROCHLORIDE PRN MLS/HR: 5 INJECTION INTRAMUSCULAR; INTRAVENOUS at 09:02

## 2020-11-13 NOTE — NUR
NURSE NOTES:

Oral care provided.

FiO2 titrated down to 60% at this time. Pt continues to tolerate new vent settings.

## 2020-11-13 NOTE — NUR
NURSE HAND-OFF REPORT: 



Latest Vital Signs: Temperature 97.1 , Pulse 86 , B/P 124 /66 , Respiratory Rate 13 , O2 SAT 
100 , Mechanical Ventilator, O2 Flow Rate  .  





EKG Rhythm: Sinus Rhythm

Rhythm change?: N 

MD Notified?: pt seen by Dr Feli morfin MD Response: n/a



Latest Mejia Fall Score: 50  

Fall Risk: High Risk 

Safety Measures: Call light Within Reach, Bed Alarm Zone 1, Side Rails Side Rails x3, Bed 
position Low and Locked.

Fall Precautions: 

Yellow Socks

Yellow Gown

Door Sign

Patient Fall Education



Report given to LITA Ndiaye.

## 2020-11-13 NOTE — CARDIAC ELECTROPHYSIOLOGY PN
Assessment/Plan


Assessment/Plan


1. Accelerated junctional rhythm. Ruled out for MI


      Echo showed ejection fraction of 55%.





2. Long run of 31 beats of Nonsustained VT on 11/3/2020. 


     Will need cardiac cath after stabilization.





3. Brady30, Asystole due to resp failure. S/P Code





4. Respiratory failure, on antibiotic and intubated on the vent 





5. Septic shock. On Levophed and Midodrine 10 tid 


6. History of previous COVID infection in September 2020 and active Covid now in

isolation


7. Dementia.





JEANNINE RN





Subjective


Subjective


In SR in NAD in Covid isolation. In ICU on the Vent with 35% Fio2.


  Had 31 beats of VT  on 11/3/20 at 16:46 and 5 beats 11/8/20


No VT overnight. S/P PICC line 


Coded yesterday as sat dropped to 20% and got henry 30s and PEA and pulseless 


Got 3 Epi and 2 Bicarb. Now on Levophed 4 mcg





Objective





Last 24 Hour Vital Signs








  Date Time  Temp Pulse Resp B/P (MAP) Pulse Ox O2 Delivery O2 Flow Rate FiO2


 


11/13/20 12:00      Mechanical Ventilator  





      Mechanical Ventilator  


 


11/13/20 12:00 99.1 78 13 95/62 (73) 100   


 


11/13/20 12:00  79      


 


11/13/20 12:00   12   Mechanical Ventilator  60


 


11/13/20 12:00        60


 


11/13/20 11:30  77 14 97/63 (74) 99   


 


11/13/20 11:00  72 14 99/58 (72) 100   


 


11/13/20 11:00   14   Mechanical Ventilator  90


 


11/13/20 11:00  79 12     60


 


11/13/20 10:45  72 14 97/58 (71) 100   


 


11/13/20 10:30  69 14 93/57 (69) 100   


 


11/13/20 10:30   14   Mechanical Ventilator  90


 


11/13/20 10:15  72 14 94/58 (70) 100   


 


11/13/20 10:00  78 15 106/60 (75) 100   


 


11/13/20 10:00   14   Mechanical Ventilator  90


 


11/13/20 09:45  74 14 92/53 (66) 100   


 


11/13/20 09:30  84 14 96/56 (69) 100   


 


11/13/20 09:30   14   Mechanical Ventilator  90


 


11/13/20 09:25  71 14     90


 


11/13/20 09:15  83 15 121/66 (84) 100   


 


11/13/20 09:02   14   Mechanical Ventilator  90


 


11/13/20 09:00   14   Mechanical Ventilator  90


 


11/13/20 09:00  79 14 133/70 (91) 100   


 


11/13/20 08:30  75 14 88/56 (67) 100   


 


11/13/20 08:00  85 14 96/59 (71) 100   


 


11/13/20 08:00  82      


 


11/13/20 08:00      Mechanical Ventilator  





      Mechanical Ventilator  


 


11/13/20 08:00   14   Mechanical Ventilator  90


 


11/13/20 08:00        90


 


11/13/20 07:30 98.7 86 12 100/76 (84) 100   


 


11/13/20 07:16   14   Mechanical Ventilator  90


 


11/13/20 07:00  78 14 94/59 (71) 100   


 


11/13/20 07:00  73 14     90


 


11/13/20 06:30  72 14     


 


11/13/20 06:16   14   Mechanical Ventilator  90


 


11/13/20 06:00  71 14 117/83 (94) 100   


 


11/13/20 05:45  74 14 113/64 (80) 100   


 


11/13/20 05:30  77 14 109/61 (77) 100   


 


11/13/20 05:16   15   Mechanical Ventilator  90


 


11/13/20 05:15  81 14 117/66 (83) 100   


 


11/13/20 05:01   15   Mechanical Ventilator  90


 


11/13/20 05:00  83 14 146/70 (95) 100   


 


11/13/20 04:45  76 14 126/69 (88) 100   


 


11/13/20 04:30  81 14 122/72 (89) 100   


 


11/13/20 04:15  84 14 121/72 (88) 100   


 


11/13/20 04:09   14   Mechanical Ventilator  90


 


11/13/20 04:00      Mechanical Ventilator  


 


11/13/20 04:00        90


 


11/13/20 04:00 99.7 84 14 104/60 (75) 100   


 


11/13/20 03:30  84 14 97/62 (74) 100   


 


11/13/20 03:30  81 14     90


 


11/13/20 03:19  83      


 


11/13/20 03:15  86 14 102/62 (75) 100   


 


11/13/20 03:09   14   Mechanical Ventilator  100


 


11/13/20 03:00  85 14 106/66 (79) 100   


 


11/13/20 02:31    112/67    


 


11/13/20 02:30   15   Mechanical Ventilator  100


 


11/13/20 02:09   14   Mechanical Ventilator  100


 


11/13/20 02:00  82 14 115/67 (83) 100   


 


11/13/20 01:09   14   Mechanical Ventilator  100


 


11/13/20 01:00  81 14 115/66 (82) 100   


 


11/13/20 00:09   14   Mechanical Ventilator  100


 


11/13/20 00:00 98.1 86 14 142/76 (98) 100   


 


11/13/20 00:00      Mechanical Ventilator  


 


11/12/20 23:54   14   Mechanical Ventilator  100


 


11/12/20 23:49   14   Mechanical Ventilator  100


 


11/12/20 23:39   14   Non-Rebreather  100


 


11/12/20 23:30  86 14     90


 


11/12/20 23:28  82      


 


11/12/20 23:24   14   Mechanical Ventilator  100


 


11/12/20 23:09   23   Mechanical Ventilator  100


 


11/12/20 23:00  82 14 108/71 (83) 100   


 


11/12/20 22:54   25   Mechanical Ventilator  100


 


11/12/20 22:45  81 14 100/62 (75) 100   


 


11/12/20 22:39   25   Mechanical Ventilator  100


 


11/12/20 22:30  85 14 94/64 (74) 100   


 


11/12/20 22:24   25   Mechanical Ventilator  100


 


11/12/20 22:15  90 14 96/65 (75) 100   


 


11/12/20 22:09   25   Mechanical Ventilator  100


 


11/12/20 22:00  96 20 118/68 (85) 100   


 


11/12/20 21:54   25   Mechanical Ventilator  100


 


11/12/20 21:45  101 22 136/82 (100) 100   


 


11/12/20 21:39   25   Mechanical Ventilator  100


 


11/12/20 21:30  103 22 117/68 (84) 100   


 


11/12/20 21:24   25   Mechanical Ventilator  100


 


11/12/20 21:15  105 20 124/83 (97) 100   


 


11/12/20 21:09   25   Mechanical Ventilator  100


 


11/12/20 21:00  107 23 140/75 (96) 100   


 


11/12/20 20:54   25   Mechanical Ventilator  100


 


11/12/20 20:45  107 23 135/74 (94) 100   


 


11/12/20 20:39   21   Mechanical Ventilator  100


 


11/12/20 20:34   23   Mechanical Ventilator  100


 


11/12/20 20:30  110 25 141/71 (94) 100   


 


11/12/20 20:19   25   Mechanical Ventilator  100


 


11/12/20 20:15  110 23 135/85 (102) 100   


 


11/12/20 20:04   23   Mechanical Ventilator  100


 


11/12/20 20:00      Mechanical Ventilator  


 


11/12/20 20:00 98.2 110 23 139/72 (94) 100   


 


11/12/20 20:00        100


 


11/12/20 19:50  110 25 147/91 100   


 


11/12/20 19:49   25   Mechanical Ventilator  100


 


11/12/20 19:45  111 24 147/91 (109) 100   


 


11/12/20 19:31  106      


 


11/12/20 19:30  111 23 157/86 (109) 100   


 


11/12/20 19:20  107 26 175/95 100   


 


11/12/20 19:15  106 27 144/88 (106) 100   


 


11/12/20 19:00  103 20 112/74 (87) 100   


 


11/12/20 18:44    108/65    


 


11/12/20 18:44  102 17     100


 


11/12/20 18:00  102 21 108/65 (79) 100   


 


11/12/20 17:00  95 14 134/79 (97) 100   


 


11/12/20 16:00        35


 


11/12/20 16:00      Mechanical Ventilator  


 


11/12/20 16:00 98.0 102 15 115/71 (86) 100   


 


11/12/20 15:31  103      


 


11/12/20 15:08  104 14     100


 


11/12/20 15:00  100 14 132/78 (96) 100   


 


11/12/20 14:30  95 14 129/77 (94) 100   


 


11/12/20 14:00  94 14 117/72 (87) 100   


 


11/12/20 13:44    90/60    

















Intake and Output  


 


 11/12/20 11/13/20





 19:00 07:00


 


Intake Total 1340.83 ml 1323.125 ml


 


Output Total 960 ml 850 ml


 


Balance 380.83 ml 473.125 ml


 


  


 


Free Water 5 ml 30 ml


 


IV Total 1035.83 ml 1263.125 ml


 


Tube Feeding 300 ml 30 ml


 


Output Urine Total 960 ml 850 ml


 


# Bowel Movements 1 











Laboratory Tests








Test


 11/13/20


04:00 11/13/20


10:27


 


White Blood Count


 16.2 K/UL


(4.8-10.8)  H 





 


Red Blood Count


 2.79 M/UL


(4.70-6.10)  L 





 


Hemoglobin


 8.3 G/DL


(14.2-18.0)  L 





 


Hematocrit


 27.2 %


(42.0-52.0)  L 





 


Mean Corpuscular Volume 97 FL (80-99)   


 


Mean Corpuscular Hemoglobin


 29.8 PG


(27.0-31.0) 





 


Mean Corpuscular Hemoglobin


Concent 30.6 G/DL


(32.0-36.0)  L 





 


Red Cell Distribution Width


 16.9 %


(11.6-14.8)  H 





 


Platelet Count


 443 K/UL


(150-450) 





 


Mean Platelet Volume


 7.7 FL


(6.5-10.1) 





 


Neutrophils (%) (Auto)


 81.3 %


(45.0-75.0)  H 





 


Lymphocytes (%) (Auto)


 12.2 %


(20.0-45.0)  L 





 


Monocytes (%) (Auto)


 5.6 %


(1.0-10.0) 





 


Eosinophils (%) (Auto)


 0.5 %


(0.0-3.0) 





 


Basophils (%) (Auto)


 0.5 %


(0.0-2.0) 





 


Sodium Level


 142 MMOL/L


(136-145) 





 


Potassium Level


 3.8 MMOL/L


(3.5-5.1) 





 


Chloride Level


 108 MMOL/L


()  H 





 


Carbon Dioxide Level


 27 MMOL/L


(21-32) 





 


Blood Urea Nitrogen


 21 mg/dL


(7-18)  H 





 


Creatinine


 1.0 MG/DL


(0.55-1.30) 





 


Estimat Glomerular Filtration


Rate > 60 mL/min


(>60) 





 


Glucose Level


 123 MG/DL


()  H 





 


Calcium Level


 7.9 MG/DL


(8.5-10.1)  L 





 


Phosphorus Level


 2.1 MG/DL


(2.5-4.9)  L 





 


Magnesium Level


 2.2 MG/DL


(1.8-2.4) 





 


Total Bilirubin


 0.4 MG/DL


(0.2-1.0) 





 


Aspartate Amino Transf


(AST/SGOT) 29 U/L (15-37)


 





 


Alanine Aminotransferase


(ALT/SGPT) 29 U/L (12-78)


 





 


Alkaline Phosphatase


 160 U/L


()  H 





 


Total Protein


 6.2 G/DL


(6.4-8.2)  L 





 


Albumin


 1.4 G/DL


(3.4-5.0)  L 





 


Globulin 4.8 g/dL   


 


Albumin/Globulin Ratio


 0.3 (1.0-2.7)


L 





 


Arterial Blood pH


 


 7.506


(7.350-7.450)


 


Arterial Blood Partial


Pressure CO2 


 29.7 mmHg


(35.0-45.0)  L


 


Arterial Blood Partial


Pressure O2 


 182.9 mmHg


(75.0-100.0)  H


 


Arterial Blood HCO3


 


 23.0 mmol/L


(22.0-26.0)


 


Arterial Blood Oxygen


Saturation 


 99.1 %


()


 


Arterial Blood Base Excess  0.2 (-2-2)  


 


Jerod Test  Positive  








Objective





HEAD AND NECK:  No JVD. Orally intubated


LUNGS:  Coarse rhonchi.


CARDIOVASCULAR:   Irregular S1 and S2 with no gallop.


ABDOMEN:  Soft.


EXTREMITIES:  No pitting edema.











Kvng Edmond MD               Nov 13, 2020 13:24

## 2020-11-13 NOTE — NEPHROLOGY PROGRESS NOTE
Assessment/Plan


Problem List:  


(1) Dehydration


(2) JANES (acute kidney injury)


(3) Renal failure (ARF), acute on chronic


(4) Hypoxia


(5) Acute encephalopathy


(6) Electrolyte imbalance


Assessment





75-year-old male is admitted with acute hypoxic respiratory failure most likely 

secondary to pneumonia and sepsis, UTI.


Acute on chronic renal failure


Dehydration


Electrolyte imbalances, hypernatremia


Hypoalbuminemia


Diabetes type 2


History of congestive heart failure


Hypertension


Hyperlipemia


Alzheimer's


Previous COVID-19 infection in September 2020


Plan


November 13: Seen in ICU.  Discussed with LITA Cooper.  Remains on pressor.  

Electrolyte abnormalities noted and addressed.  Continue per consultants.  

Patient remains full code.


November 12: Seen in ICU.  Discussed with LITA Richardson.  Blood pressure remains a 

little requiring pressors.  Labs reviewed.  Stable renal parameters.  Continue 

per consultants.


November 11: Remains intubated.  Full code.  Blood pressure borderline low.  

Will increase midodrine dose.  Discussed with RN.  Continue to monitor renal 

parameters and electrolytes.


November 10: Intubated.  Full code.  Labs reviewed.  Stable from renal 

standpoint of view.  Continue per consultants.


November 9: Remains intubated.  Full code.  Labs reviewed.  Electrolytes within 

normal limit.  Continue per consultants.


November 8: In ICU.  Remains intubated on ventilator.  Remains full code.  Low 

potassium addressed.  Blood pressure fluctuating.  Continue per consultants.


November 7: Patient in ICU.  Intubated on ventilator.  On 6 mics of Levophed.  

Will give 100 cc albumin 25%.  K-Phos IV ordered.  Continue per consultants.  

Continue monitor renal parameters and electrolytes.


November 6: Status unchanged.  Transfer to [facility] for seizure.  Stable from renal 

standpoint to view.  Continue per consultants.


November 5: Status quo.  Labs reviewed.  Renal parameters stable.  Continue per 

consultants.


November 4: On nonrebreather mask.  Labs reviewed.  Renal parameters 

stable.Continue per pulmonologist.  Clinically unchanged.


November 3: On nonrebreather mask.  Inflammatory markers gradually declining.  

Renal parameters stable.  Continue per pulmonary.


November 2: Remains on nonrebreather mask.  Inflammatory markers remain 

elevated.  Renal parameters somewhat stable.  Continue per pulmonary and ID.  

Remains full code.


November 1: Patient on nonrebreather mask.  Labs noted.  Serum creatinine down 

to 1.3.  Continue per consultants.


October 31: Patient on nonrebreather mask.  Transfer to telemetry when seen this

morning.  Labs noted.  Continue per consultants.  Serum creatinine dylan to 1.7. 

Continue to monitor renal parameters.  Continue to monitor vancomycin level


October 30: Patient is not doing well clinically.  Mild respiratory distress.  

ABG noted.  Somewhat hypoxic.  CBC and chemistry panel ordered.  Discussed with 

LITA Garcia.  Will defer to pulmonary management to specialist.  Continue per ID.

 Renal parameters remained stable as of October 29.


October 29: Labs reviewed.  Renal parameters stable.  Continue per consultants.


October 28: Labs reviewed.  Potassium via NG tube ordered.  IV fluids stopped.  

Continue per consultants.


October 27: Labs reviewed.  Low potassium and low phosphorus replaced.  Continue

per consultants.  Remains stable from renal standpoint of view.


October 26: Patient remains n.p.o.  IV fluid down to 50 cc an hour.  Potassium 

supplement intravenously ordered.  Continue per consultants.





Previously:


Patient is n.p.o., will continue on IV fluid of D5W 75 cc an hour


We will monitor electrolytes and renal parameters


Avoid nephrotoxic's


Start p.o. when he clears by speech therapist, meanwhile aspiration precautions


Keep the blood pressure and blood sugar in check


Per orders





Subjective


ROS Limited/Unobtainable:  Yes





Objective


Objective





Last 24 Hour Vital Signs








  Date Time  Temp Pulse Resp B/P (MAP) Pulse Ox O2 Delivery O2 Flow Rate FiO2


 


11/13/20 08:00  85 14 96/59 (71) 100   


 


11/13/20 08:00        90


 


11/13/20 07:30 98.7 86 12 100/76 (84) 100   


 


11/13/20 07:16   14   Mechanical Ventilator  90 11/13/20 07:00  78 14 94/59 (71) 100   


 


11/13/20 06:30  72 14     


 


11/13/20 06:16   14   Mechanical Ventilator  90 11/13/20 06:00  71 14 117/83 (94) 100   


 


11/13/20 05:45  74 14 113/64 (80) 100   


 


11/13/20 05:30  77 14 109/61 (77) 100   


 


11/13/20 05:16   15   Mechanical Ventilator  90 11/13/20 05:15  81 14 117/66 (83) 100   


 


11/13/20 05:01   15   Mechanical Ventilator  90 11/13/20 05:00  83 14 146/70 (95) 100   


 


11/13/20 04:45  76 14 126/69 (88) 100   


 


11/13/20 04:30  81 14 122/72 (89) 100   


 


11/13/20 04:15  84 14 121/72 (88) 100   


 


11/13/20 04:09   14   Mechanical Ventilator  90


 


11/13/20 04:00      Mechanical Ventilator  


 


11/13/20 04:00        90


 


11/13/20 04:00 99.7 84 14 104/60 (75) 100   


 


11/13/20 03:30  84 14 97/62 (74) 100   


 


11/13/20 03:30  81 14     90


 


11/13/20 03:19  83      


 


11/13/20 03:15  86 14 102/62 (75) 100   


 


11/13/20 03:09   14   Mechanical Ventilator  100


 


11/13/20 03:00  85 14 106/66 (79) 100   


 


11/13/20 02:31    112/67    


 


11/13/20 02:30   15   Mechanical Ventilator  100


 


11/13/20 02:09   14   Mechanical Ventilator  100


 


11/13/20 02:00  82 14 115/67 (83) 100   


 


11/13/20 01:09   14   Mechanical Ventilator  100


 


11/13/20 01:00  81 14 115/66 (82) 100   


 


11/13/20 00:09   14   Mechanical Ventilator  100


 


11/13/20 00:00 98.1 86 14 142/76 (98) 100   


 


11/13/20 00:00      Mechanical Ventilator  


 


11/12/20 23:54   14   Mechanical Ventilator  100 11/12/20 23:49   14   Mechanical Ventilator  100 11/12/20 23:39   14   Non-Rebreather  100


 


11/12/20 23:30  86 14     90


 


11/12/20 23:28  82      


 


11/12/20 23:24   14   Mechanical Ventilator  100


 


11/12/20 23:09   23   Mechanical Ventilator  100


 


11/12/20 23:00  82 14 108/71 (83) 100   


 


11/12/20 22:54   25   Mechanical Ventilator  100


 


11/12/20 22:45  81 14 100/62 (75) 100   


 


11/12/20 22:39   25   Mechanical Ventilator  100


 


11/12/20 22:30  85 14 94/64 (74) 100   


 


11/12/20 22:24   25   Mechanical Ventilator  100


 


11/12/20 22:15  90 14 96/65 (75) 100   


 


11/12/20 22:09   25   Mechanical Ventilator  100


 


11/12/20 22:00  96 20 118/68 (85) 100   


 


11/12/20 21:54   25   Mechanical Ventilator  100


 


11/12/20 21:45  101 22 136/82 (100) 100   


 


11/12/20 21:39   25   Mechanical Ventilator  100


 


11/12/20 21:30  103 22 117/68 (84) 100   


 


11/12/20 21:24   25   Mechanical Ventilator  100


 


11/12/20 21:15  105 20 124/83 (97) 100   


 


11/12/20 21:09   25   Mechanical Ventilator  100


 


11/12/20 21:00  107 23 140/75 (96) 100   


 


11/12/20 20:54   25   Mechanical Ventilator  100


 


11/12/20 20:45  107 23 135/74 (94) 100   


 


11/12/20 20:39   21   Mechanical Ventilator  100


 


11/12/20 20:34   23   Mechanical Ventilator  100


 


11/12/20 20:30  110 25 141/71 (94) 100   


 


11/12/20 20:19   25   Mechanical Ventilator  100


 


11/12/20 20:15  110 23 135/85 (102) 100   


 


11/12/20 20:04   23   Mechanical Ventilator  100


 


11/12/20 20:00      Mechanical Ventilator  


 


11/12/20 20:00 98.2 110 23 139/72 (94) 100   


 


11/12/20 20:00        100


 


11/12/20 19:50  110 25 147/91 100   


 


11/12/20 19:49   25   Mechanical Ventilator  100


 


11/12/20 19:45  111 24 147/91 (109) 100   


 


11/12/20 19:31  106      


 


11/12/20 19:30  111 23 157/86 (109) 100   


 


11/12/20 19:20  107 26 175/95 100   


 


11/12/20 19:15  106 27 144/88 (106) 100   


 


11/12/20 19:00  103 20 112/74 (87) 100   


 


11/12/20 18:44    108/65    


 


11/12/20 18:44  102 17     100


 


11/12/20 18:00  102 21 108/65 (79) 100   


 


11/12/20 17:00  95 14 134/79 (97) 100   


 


11/12/20 16:00        35


 


11/12/20 16:00      Mechanical Ventilator  


 


11/12/20 16:00 98.0 102 15 115/71 (86) 100   


 


11/12/20 15:31  103      


 


11/12/20 15:08  104 14     100


 


11/12/20 15:00  100 14 132/78 (96) 100   


 


11/12/20 14:30  95 14 129/77 (94) 100   


 


11/12/20 14:00  94 14 117/72 (87) 100   


 


11/12/20 13:44    90/60    


 


11/12/20 13:10  97 15 117/69 (85) 99   


 


11/12/20 13:00  96 14 76/49 (58) 99   


 


11/12/20 12:30  97 14 63/41 (48) 97   


 


11/12/20 12:15  106 14 57/42 (47) 100   


 


11/12/20 12:00      Mechanical Ventilator  


 


11/12/20 12:00 97.7 131 16 115/61 (79) 100   


 


11/12/20 12:00        35


 


11/12/20 11:48  113      


 


11/12/20 11:30  99 52 148/57 (87) 75   


 


11/12/20 11:03  100 19 113/69 98   


 


11/12/20 11:03 98.8       


 


11/12/20 11:01  100 19     35


 


11/12/20 11:00  100 20 113/69 (84) 98   


 


11/12/20 10:33  92 14 127/63 100   


 


11/12/20 10:00  92 14 127/63 (84) 100   


 


11/12/20 09:00  95 17 123/66 (85) 100   


 


11/12/20 08:30  89 16 112/64 (80) 100   

















Intake and Output  


 


 11/12/20 11/13/20





 19:00 07:00


 


Intake Total 1340.83 ml 1323.125 ml


 


Output Total 960 ml 850 ml


 


Balance 380.83 ml 473.125 ml


 


  


 


Free Water 5 ml 30 ml


 


IV Total 1035.83 ml 1263.125 ml


 


Tube Feeding 300 ml 30 ml


 


Output Urine Total 960 ml 850 ml


 


# Bowel Movements 1 








Laboratory Tests


11/12/20 12:09: 


Arterial Blood pH 7.276L, Arterial Blood Partial Pressure CO2 51.1H, Arterial 

Blood Partial Pressure O2 61.6L, Arterial Blood HCO3 23.3, Arterial Blood Oxygen

Saturation 87.0*L, Arterial Blood Base Excess -3.6L, Jerod Test Positive


11/13/20 04:00: 


White Blood Count 16.2H, Red Blood Count 2.79L, Hemoglobin 8.3L, Hematocrit 

27.2L, Mean Corpuscular Volume 97, Mean Corpuscular Hemoglobin 29.8, Mean Cor

puscular Hemoglobin Concent 30.6L, Red Cell Distribution Width 16.9H, Platelet 

Count 443, Mean Platelet Volume 7.7, Neutrophils (%) (Auto) 81.3H, Lymphocytes 

(%) (Auto) 12.2L, Monocytes (%) (Auto) 5.6, Eosinophils (%) (Auto) 0.5, 

Basophils (%) (Auto) 0.5, Sodium Level 142, Potassium Level 3.8, Chloride Level 

108H, Carbon Dioxide Level 27, Blood Urea Nitrogen 21H, Creatinine 1.0, Estimat 

Glomerular Filtration Rate > 60, Glucose Level 123H, Calcium Level 7.9L, 

Phosphorus Level 2.1L, Magnesium Level 2.2, Total Bilirubin 0.4, Aspartate Amino

Transf (AST/SGOT) 29, Alanine Aminotransferase (ALT/SGPT) 29, Alkaline 

Phosphatase 160H, Total Protein 6.2L, Albumin 1.4L, Globulin 4.8, 

Albumin/Globulin Ratio 0.3L


Height (Feet):  5


Height (Inches):  4.00


Weight (Pounds):  130


General Appearance:  no apparent distress


EENT:  other - Intubated on ventilator


Cardiovascular:  normal rate


Respiratory/Chest:  decreased breath sounds


Abdomen:  distended











Seven Tobar MD            Nov 13, 2020 08:26

## 2020-11-13 NOTE — NUR
NURSE HAND-OFF REPORT: 



Latest Vital Signs: Temperature 99.7 , Pulse 78 , B/P 94 /59 , Respiratory Rate 14 , O2 SAT 
100 , Mechanical Ventilator, O2 Flow Rate  .  

Vital Sign Comment: on levophed, otherwise stable. 



EKG Rhythm: Sinus Rhythm

Rhythm change?: N 

MD Notified?: DIDI Laureano MD Response: 



Latest Mejia Fall Score: 70  

Fall Risk: High Risk 

Safety Measures: Call light Within Reach, Bed Alarm Zone 1, Side Rails Side Rails x3, Bed 
position Low and Locked.

Fall Precautions: 

Yellow Socks

Yellow Gown

Door Sign

Patient Fall Education



Report given to .

## 2020-11-13 NOTE — NUR
NURSE NOTES:

Pt provided with a CHG bed bath, oral care and linen change. Wound care performed per orders 
without incident. Bedside assessment performed, assessed pt for pain with a FLACC score of 0 
noted. VS obtained and SBP of 87 noted and Levophed subsequently increased to 5mcg/min and 
Versed remains on hold with a RASS score -2 still noted. VS now noted to be stable. Pt 
repositioned for comfort and safety. Fall, Aspiration and Skin precautions observed. Pt 
remains resting in bed; Bed remains in the lowest position with the safety wheels engaged, 
call light within reach, side rails up x3 and bed alarm activated. Will continue plan of 
care. Will continue to monitor.

## 2020-11-13 NOTE — NUR
CASE MANAGEMENT: REVIEW 



11/13/2020



SI:SEPSIS. PNA. 

VS: T 99.1 HR 81 RR 16 B/P 107/65 SATS 99% ON MECH VENT FIO2 60 

LABS: WBC 16.2  BUN 21  CA 7.9 PHOS 2.1  



SI;NS @ 50 ML/HR 

INSULIN ASPART SUBQ AC/HS 

FLOMAX PO BID 

LINEZOLID IV Q12H 

LEVOPHED PER PARAMETERS 

MEROPENEM IV Q8H 





***ICU***

## 2020-11-13 NOTE — PULMONOLGY CRITICAL CARE NOTE
Critical Care - Asmt/Plan


Problems:  


(1) Acute respiratory failure


(2) Multifocal pneumonia


(3) 2019 novel coronavirus disease (COVID-19)


(4) Acute encephalopathy


(5) Severe sepsis


(6) Alzheimer's dementia


(7) HTN (hypertension)


(8) History of CVA (cerebrovascular accident)


(9) BPH (benign prostatic hyperplasia)


Assessment/Plan:


on Levophed, FiO2 90% now


Respiratory:  monitor respiratory rate, adjust FIO2, CXR


Cardiac:  continue pressors, continue to monitor HR/BP


Renal:  F/U I&O, check electrolytes


Infectious Disease:  check cultures


Gastrointestinal:  continue feedings/current rate


Endocrine:  monitor blood sugar, continue sliding scale insulin


Hematologic:  monitor H/H, transfuse if hgb<8.5


Neurologic:  PRN Ativan, keep patient comfortable


Affect:  PRN ativan


Prophylaxis:  Heparin


Disposition:  keep in ICU


Notes Reviewed:  internist, cardio


Discussed with:  nurses, consultants, 





Critical Care - Objective





Last 24 Hour Vital Signs








  Date Time  Temp Pulse Resp B/P (MAP) Pulse Ox O2 Delivery O2 Flow Rate FiO2


 


11/13/20 11:00  72 14 99/58 (72) 100   


 


11/13/20 11:00   14   Mechanical Ventilator  90 11/13/20 10:45  72 14 97/58 (71) 100   


 


11/13/20 10:30  69 14 93/57 (69) 100   


 


11/13/20 10:30   14   Mechanical Ventilator  90 11/13/20 10:15  72 14 94/58 (70) 100   


 


11/13/20 10:00  78 15 106/60 (75) 100   


 


11/13/20 10:00   14   Mechanical Ventilator  90 11/13/20 09:45  74 14 92/53 (66) 100   


 


11/13/20 09:30  84 14 96/56 (69) 100   


 


11/13/20 09:30   14   Mechanical Ventilator  90 11/13/20 09:25  71 14     90


 


11/13/20 09:15  83 15 121/66 (84) 100   


 


11/13/20 09:02   14   Mechanical Ventilator  90 11/13/20 09:00   14   Mechanical Ventilator  90 11/13/20 09:00  79 14 133/70 (91) 100   


 


11/13/20 08:30  75 14 88/56 (67) 100   


 


11/13/20 08:00  85 14 96/59 (71) 100   


 


11/13/20 08:00  82      


 


11/13/20 08:00      Mechanical Ventilator  





      Mechanical Ventilator  


 


11/13/20 08:00   14   Mechanical Ventilator  90


 


11/13/20 08:00        90


 


11/13/20 07:30 98.7 86 12 100/76 (84) 100   


 


11/13/20 07:16   14   Mechanical Ventilator  90


 


11/13/20 07:00  78 14 94/59 (71) 100   


 


11/13/20 07:00  73 14     90


 


11/13/20 06:30  72 14     


 


11/13/20 06:16   14   Mechanical Ventilator  90


 


11/13/20 06:00  71 14 117/83 (94) 100   


 


11/13/20 05:45  74 14 113/64 (80) 100   


 


11/13/20 05:30  77 14 109/61 (77) 100   


 


11/13/20 05:16   15   Mechanical Ventilator  90


 


11/13/20 05:15  81 14 117/66 (83) 100   


 


11/13/20 05:01   15   Mechanical Ventilator  90 11/13/20 05:00  83 14 146/70 (95) 100   


 


11/13/20 04:45  76 14 126/69 (88) 100   


 


11/13/20 04:30  81 14 122/72 (89) 100   


 


11/13/20 04:15  84 14 121/72 (88) 100   


 


11/13/20 04:09   14   Mechanical Ventilator  90 11/13/20 04:00      Mechanical Ventilator  


 


11/13/20 04:00        90


 


11/13/20 04:00 99.7 84 14 104/60 (75) 100   


 


11/13/20 03:30  84 14 97/62 (74) 100   


 


11/13/20 03:30  81 14     90


 


11/13/20 03:19  83      


 


11/13/20 03:15  86 14 102/62 (75) 100   


 


11/13/20 03:09   14   Mechanical Ventilator  100


 


11/13/20 03:00  85 14 106/66 (79) 100   


 


11/13/20 02:31    112/67    


 


11/13/20 02:30   15   Mechanical Ventilator  100 11/13/20 02:09   14   Mechanical Ventilator  100 11/13/20 02:00  82 14 115/67 (83) 100   


 


11/13/20 01:09   14   Mechanical Ventilator  100 11/13/20 01:00  81 14 115/66 (82) 100   


 


11/13/20 00:09   14   Mechanical Ventilator  100


 


11/13/20 00:00 98.1 86 14 142/76 (98) 100   


 


11/13/20 00:00      Mechanical Ventilator  


 


11/12/20 23:54   14   Mechanical Ventilator  100


 


11/12/20 23:49   14   Mechanical Ventilator  100


 


11/12/20 23:39   14   Non-Rebreather  100


 


11/12/20 23:30  86 14     90


 


11/12/20 23:28  82      


 


11/12/20 23:24   14   Mechanical Ventilator  100


 


11/12/20 23:09   23   Mechanical Ventilator  100


 


11/12/20 23:00  82 14 108/71 (83) 100   


 


11/12/20 22:54   25   Mechanical Ventilator  100


 


11/12/20 22:45  81 14 100/62 (75) 100   


 


11/12/20 22:39   25   Mechanical Ventilator  100


 


11/12/20 22:30  85 14 94/64 (74) 100   


 


11/12/20 22:24   25   Mechanical Ventilator  100


 


11/12/20 22:15  90 14 96/65 (75) 100   


 


11/12/20 22:09   25   Mechanical Ventilator  100


 


11/12/20 22:00  96 20 118/68 (85) 100   


 


11/12/20 21:54   25   Mechanical Ventilator  100


 


11/12/20 21:45  101 22 136/82 (100) 100   


 


11/12/20 21:39   25   Mechanical Ventilator  100


 


11/12/20 21:30  103 22 117/68 (84) 100   


 


11/12/20 21:24   25   Mechanical Ventilator  100


 


11/12/20 21:15  105 20 124/83 (97) 100   


 


11/12/20 21:09   25   Mechanical Ventilator  100


 


11/12/20 21:00  107 23 140/75 (96) 100   


 


11/12/20 20:54   25   Mechanical Ventilator  100


 


11/12/20 20:45  107 23 135/74 (94) 100   


 


11/12/20 20:39   21   Mechanical Ventilator  100


 


11/12/20 20:34   23   Mechanical Ventilator  100


 


11/12/20 20:30  110 25 141/71 (94) 100   


 


11/12/20 20:19   25   Mechanical Ventilator  100


 


11/12/20 20:15  110 23 135/85 (102) 100   


 


11/12/20 20:04   23   Mechanical Ventilator  100


 


11/12/20 20:00      Mechanical Ventilator  


 


11/12/20 20:00 98.2 110 23 139/72 (94) 100   


 


11/12/20 20:00        100


 


11/12/20 19:50  110 25 147/91 100   


 


11/12/20 19:49   25   Mechanical Ventilator  100


 


11/12/20 19:45  111 24 147/91 (109) 100   


 


11/12/20 19:31  106      


 


11/12/20 19:30  111 23 157/86 (109) 100   


 


11/12/20 19:20  107 26 175/95 100   


 


11/12/20 19:15  106 27 144/88 (106) 100   


 


11/12/20 19:00  103 20 112/74 (87) 100   


 


11/12/20 18:44    108/65    


 


11/12/20 18:44  102 17     100


 


11/12/20 18:00  102 21 108/65 (79) 100   


 


11/12/20 17:00  95 14 134/79 (97) 100   


 


11/12/20 16:00        35


 


11/12/20 16:00      Mechanical Ventilator  


 


11/12/20 16:00 98.0 102 15 115/71 (86) 100   


 


11/12/20 15:31  103      


 


11/12/20 15:08  104 14     100


 


11/12/20 15:00  100 14 132/78 (96) 100   


 


11/12/20 14:30  95 14 129/77 (94) 100   


 


11/12/20 14:00  94 14 117/72 (87) 100   


 


11/12/20 13:44    90/60    


 


11/12/20 13:10  97 15 117/69 (85) 99   


 


11/12/20 13:00  96 14 76/49 (58) 99   


 


11/12/20 12:30  97 14 63/41 (48) 97   


 


11/12/20 12:15  106 14 57/42 (47) 100   


 


11/12/20 12:00      Mechanical Ventilator  


 


11/12/20 12:00 97.7 131 16 115/61 (79) 100   


 


11/12/20 12:00        35


 


11/12/20 11:48  113      


 


11/12/20 11:30  99 52 148/57 (87) 75   








Status:  awake, sedated, somnolent


Condition:  critical


HEENT:  atraumatic, normocephalic


Lungs:  rales, rhonchi


Heart:  HR/BP stable


Abdomen:  soft, non-tender, active bowel sounds


Accucheck:  128





Critical Care - Subjective


ROS Limited/Unobtainable:  Yes


Interval Events:


pt coded yesterday but was revived after 10 minutes.


FI02:  90


Vent Support Breath Rate:  14


Vent Support Mode:  AC


Vent Tidal Volume:  600


Sputum Amount:  Moderate


PEEP:  0.0


PIP:  27


Tube Feeding Amount:  10


I&O:











Intake and Output  


 


 11/12/20 11/13/20





 19:00 07:00


 


Intake Total 1340.83 ml 1323.125 ml


 


Output Total 960 ml 850 ml


 


Balance 380.83 ml 473.125 ml


 


  


 


Free Water 5 ml 30 ml


 


IV Total 1035.83 ml 1263.125 ml


 


Tube Feeding 300 ml 30 ml


 


Output Urine Total 960 ml 850 ml


 


# Bowel Movements 1 








CXR:


ET in good position


ET-Tube:  7.5


ET Position:  25


Labs:





Laboratory Tests








Test


 11/12/20


12:09 11/13/20


04:00 11/13/20


10:27


 


Arterial Blood pH


 7.276


(7.350-7.450) 


 7.506


(7.350-7.450)


 


Arterial Blood Partial


Pressure CO2 51.1 mmHg


(35.0-45.0)  H 


 29.7 mmHg


(35.0-45.0)  L


 


Arterial Blood Partial


Pressure O2 61.6 mmHg


(75.0-100.0)  L 


 182.9 mmHg


(75.0-100.0)  H


 


Arterial Blood HCO3


 23.3 mmol/L


(22.0-26.0) 


 23.0 mmol/L


(22.0-26.0)


 


Arterial Blood Oxygen


Saturation 87.0 %


()  *L 


 99.1 %


()


 


Arterial Blood Base Excess -3.6 (-2-2)  L  0.2 (-2-2)  


 


Jerod Test Positive    Positive  


 


White Blood Count


 


 16.2 K/UL


(4.8-10.8)  H 





 


Red Blood Count


 


 2.79 M/UL


(4.70-6.10)  L 





 


Hemoglobin


 


 8.3 G/DL


(14.2-18.0)  L 





 


Hematocrit


 


 27.2 %


(42.0-52.0)  L 





 


Mean Corpuscular Volume  97 FL (80-99)   


 


Mean Corpuscular Hemoglobin


 


 29.8 PG


(27.0-31.0) 





 


Mean Corpuscular Hemoglobin


Concent 


 30.6 G/DL


(32.0-36.0)  L 





 


Red Cell Distribution Width


 


 16.9 %


(11.6-14.8)  H 





 


Platelet Count


 


 443 K/UL


(150-450) 





 


Mean Platelet Volume


 


 7.7 FL


(6.5-10.1) 





 


Neutrophils (%) (Auto)


 


 81.3 %


(45.0-75.0)  H 





 


Lymphocytes (%) (Auto)


 


 12.2 %


(20.0-45.0)  L 





 


Monocytes (%) (Auto)


 


 5.6 %


(1.0-10.0) 





 


Eosinophils (%) (Auto)


 


 0.5 %


(0.0-3.0) 





 


Basophils (%) (Auto)


 


 0.5 %


(0.0-2.0) 





 


Sodium Level


 


 142 MMOL/L


(136-145) 





 


Potassium Level


 


 3.8 MMOL/L


(3.5-5.1) 





 


Chloride Level


 


 108 MMOL/L


()  H 





 


Carbon Dioxide Level


 


 27 MMOL/L


(21-32) 





 


Blood Urea Nitrogen


 


 21 mg/dL


(7-18)  H 





 


Creatinine


 


 1.0 MG/DL


(0.55-1.30) 





 


Estimat Glomerular Filtration


Rate 


 > 60 mL/min


(>60) 





 


Glucose Level


 


 123 MG/DL


()  H 





 


Calcium Level


 


 7.9 MG/DL


(8.5-10.1)  L 





 


Phosphorus Level


 


 2.1 MG/DL


(2.5-4.9)  L 





 


Magnesium Level


 


 2.2 MG/DL


(1.8-2.4) 





 


Total Bilirubin


 


 0.4 MG/DL


(0.2-1.0) 





 


Aspartate Amino Transf


(AST/SGOT) 


 29 U/L (15-37)


 





 


Alanine Aminotransferase


(ALT/SGPT) 


 29 U/L (12-78)


 





 


Alkaline Phosphatase


 


 160 U/L


()  H 





 


Total Protein


 


 6.2 G/DL


(6.4-8.2)  L 





 


Albumin


 


 1.4 G/DL


(3.4-5.0)  L 





 


Globulin  4.8 g/dL   


 


Albumin/Globulin Ratio


 


 0.3 (1.0-2.7)


L 




















Michael Sandoval MD              Nov 13, 2020 11:22

## 2020-11-13 NOTE — NUR
NURSE NOTES:

Bedside assessment performed, assessed pt for pain with a FLACC score of 0 noted. VS 
obtained and SBP> 120 noted and Levophed subsequently titrated per order to 4mcg/min and 
Versed now on hold due to a RASS score -3 noted. Pt repositioned for comfort and safety. 
Fall, Aspiration and Skin precautions observed. Pt remains resting in bed; Bed remains in 
the lowest position with the safety wheels engaged, call light within reach, side rails up 
x3 and bed alarm activated. Will continue plan of care. Will continue to monitor.

## 2020-11-13 NOTE — NUR
NURSE NOTES:

Dr Sandoval at bedside assessing pt. ABG results for today discussed. Order received for new 
vent settings: AC 12  Peep 0. Vent settings changed per Adalberto RT - FiO2 titrated 
down to 70% at this time. Pt tolerating new vent settings - SpO2 remains %. No 
distress noted.

-------------------------------------------------------------------------------

Addendum: 11/13/20 at 1240 by Jodi Dorsey RN

-------------------------------------------------------------------------------

Late entry: Lab results for today reviewed by Dr Sandoval. (Hgb noted) - no new orders 
received.

## 2020-11-13 NOTE — NUR
NURSE NOTES:

Pt seen by Dr Edmond.

12 Lead EKG performed at bedside.

Levophed titrated down to 2 mcg/min.

## 2020-11-13 NOTE — DIAGNOSTIC IMAGING REPORT
Indication: Dyspnea

 

Technique: One view of the chest

 

Comparison: 11/12/2020

 

Findings: Stable tube and line positions. Previously demonstrated left chest wall

subcutaneous emphysema has improved considerably. There is evidence of increased

bilateral pleural effusions. Interstitial and airspace parenchymal disease is

unchanged bilaterally.

 

Impression: Increased bilateral pleural effusions

 

Unchanged bilateral interstitial and airspace infiltrates versus edema

 

Decreased subcutaneous emphysema

## 2020-11-13 NOTE — NUR
NURSE NOTES:

Bedside assessment performed, assessed pt for pain with a FLACC score of 0 noted. VS 
obtained and SBP of 147 noted and Levophed subsequently titrated to 4mcg/min and Versed 
remains on hold with a RASS score -2 still noted. VS remain stable. Pt repositioned for 
comfort and safety. Fall, Aspiration and Skin precautions observed. Pt remains resting in 
bed; Bed remains in the lowest position with the safety wheels engaged, call light within 
reach, side rails up x3 and bed alarm activated. Will continue plan of care. Will continue 
to monitor.

## 2020-11-13 NOTE — NUR
RD ASSESSMENT & RECOMMENDATIONS

SEE CARE ACTIVITY FOR COMPLETE ASSESSMENT



DAILY ESTIMATED NEEDS:

Needs based on DM, wound, critical care 59.5kg 

22-28  kcals/kg 

9716-6408  total kcals

1.25-2  g protein/kg

  g total protein 

25-30  mL/kg

2621-4418  total fluid mLs





NUTRITION DIAGNOSIS:

* Swallowing difficulty R/T dysphagia as evidenced by SLP sylvie, recs for

NGT feeds, now s/p oral intubation (11/6), s/p code blue (11/12) and

remains intubated, on pressor support and sedated, cont on NGT feeds.

* Increased kcal and pro needs r/t wound healing as evidenced by sacral

pressure injury stage 2.









CURRENT TF:Glucerna 1.2 @ 60ml/hr x24 hrs  



 



ENTERAL NUTRITION RECOMMENDATIONS:

Glucerna 1.2 @ 55ml/hr x24 hrs   to provide 1320ml, 1584kcal, 79g prot, 1063ml free water 



- LOWER goal rate to 55ml/hr x 24 hrs

 : s/p intubation w/ decreased kcal needs

- HOB over 30 degrees/ H2O flush per MD



 



ADDITIONAL RECOMMENDATIONS:

* Calibrated bedscale wt for accurate CBW 

* Monitor hemodynamic stability: s/p code blue (11/12), NE @ 4mcg 

* Monitor lytes, replete as needed (low phos) 

* Wound care: add Harvinder BID via PEG 

                     add Vit C 250mg QD 

. 

.

## 2020-11-13 NOTE — NUR
NURSE NOTES:

Pt repositioned. Oral care provided. Afebrile. Levophed titrated up to 4 mcg/min for SBP 
75-85.

## 2020-11-13 NOTE — NUR
NURSE NOTES:

Pt received from LITA Carvalho. Pt is sedated, RASS -2 noted, opens eyes when called by name 
for approx 3 sec. Gag reflex is intact, pt withdraws to pain but unable to follow simple 
commands. Bilat pupils equal and round - 4mm with sluggish rxn to light. Pt is in SR to 
cardiac monitor. Radial and dorsalis pedis pulses 2+. Afebrile at this time with cooling 
blanket on monitor mode. Cap refill 3 sec. Pt is orally intubated with a 7.5 ETT noted 25 cm 
at the lip with the following settings: AC 14  FiO2 90% Peep 0 (ABGs to be drawn 
shortly- will f/u with RT). Right lung fields noted with rhonchi and left field noted 
diminished upon auscultation. Pt has a right naris NGT running Glucerna 1.2 at 10 cc/hr 
without gastric residuals noted. F/C noted draining yellow, clear urine. Skin alterations 
noted. Pt has a GURPREET PICC with dry and intact dressing running versed gtt at 14 mg/hr and 1/2 
NS at 50 cc/hr. Bed in lowest position, alarm on, side rails up x 2, call light within 
reach. Will continue to monitor. 

-------------------------------------------------------------------------------

Addendum: 11/13/20 at 1419 by Jodi Dorsey RN

-------------------------------------------------------------------------------

Amendment: Levophed gtt also running at 4 mcg/min

-------------------------------------------------------------------------------

Addendum: 11/14/20 at 1438 by Jodi Dorsey RN

-------------------------------------------------------------------------------

Late entry: Pt also noted with 2+ pitting edema to bilat hands and 1+ to right arm. 

Abd is soft, round, and non-distended with active bowel sounds to all quadrants.

## 2020-11-13 NOTE — NUR
NURSE NOTES:

NGT of Glucerna 1.2 feeding has resumed @ 10ml/hr and will increase to goal of 60ml/hr as 
tolerated. Levophed titrated to 4mcgs/min. BP:117/83 HR:69. Versed drip now at 14mg/hr with 
a RASS score of -2.

## 2020-11-13 NOTE — INFECTIOUS DISEASES PROG NOTE
Assessment/Plan


76yo M with:


Respiratory code 2ry to mucus plug 11/12


SEptic Shock- -recurrent


Fever,r ecurrent, low grade;SP


Leukocytosis ;recurrent ; increased


Acute hypoxic resp failure, on NRB mask> 4l NC; back on NRB, desaturation 10/29 

> intubated 11/6


Pneumonia- >HAP


hx of COVID19 PNA 9/9/20


         --11/10 CXR:  Bilateral infiltrates in a peribronchovascular 

distribution are unchanged. Left pleural effusion is unchanged.


         --11/8 CXR: Persistent bilateral patchy pulmonary opacities, most 

prominent  in the left lower lung.


         --11/7 ucx neg


                  sp cx MRSA (colonizer at this point)


                  Bcx Neg


         --11/2 Bcx Neg


          10/30 Sp cx MRSA (Vancomycin ADRIANA 1), ESBL E.coli


   10/23 BCx NTD


   UA 15-20 WBC, UCx Neg


   10/23 COVID rapid neg; 10/26 rapid COVID PCR + (from prior infection)- not 

new infection


   Flu A/B neg


   CXR: L pna


   Resp cx MRSA





Neftali on CKD, improving





SNF resident ([facility])


Non-verbal


VRE and MRSA colonized





Plan:


Meropenem #12/14 for ESBL PNA


start ZYvoz for MRSA coverage given mucus plug and worsening hemodynamics


   -11/10 SP Micafungin #4


   -11/6 SP IV Vancomycin #15


   -11/2 SP Zosyn #4


   -10/26 SP Cefepime #4


   -10/24 SP Flagyl #








Monitor CBC/CMP


Monitor resp status


Monitor temp curve and hemodynamics


repeat cultures





D/w RN





Thank you for this consult. [facility] will continue to follow.





Subjective


Allergies:  


Coded Allergies:  


     No Known Allergies (Unverified , 8/20/12)


afebrile in >48hrs


respiratory code yesterday 2ry to mucus plug


back on pressors; levophed at 4





Objective





Last 24 Hour Vital Signs








  Date Time  Temp Pulse Resp B/P (MAP) Pulse Ox O2 Delivery O2 Flow Rate FiO2


 


11/13/20 09:45  74 14 92/53 (66) 100   


 


11/13/20 09:30  84 14 96/56 (69) 100   


 


11/13/20 09:25  71 14     90


 


11/13/20 09:15  83 15 121/66 (84) 100   


 


11/13/20 09:02   14   Mechanical Ventilator  90


 


11/13/20 09:00  79 14 133/70 (91) 100   


 


11/13/20 08:30  75 14 88/56 (67) 100   


 


11/13/20 08:00  85 14 96/59 (71) 100   


 


11/13/20 08:00        90


 


11/13/20 07:30 98.7 86 12 100/76 (84) 100   


 


11/13/20 07:16   14   Mechanical Ventilator  90


 


11/13/20 07:00  78 14 94/59 (71) 100   


 


11/13/20 07:00  73 14     90


 


11/13/20 06:30  72 14     


 


11/13/20 06:16   14   Mechanical Ventilator  90


 


11/13/20 06:00  71 14 117/83 (94) 100   


 


11/13/20 05:45  74 14 113/64 (80) 100   


 


11/13/20 05:30  77 14 109/61 (77) 100   


 


11/13/20 05:16   15   Mechanical Ventilator  90


 


11/13/20 05:15  81 14 117/66 (83) 100   


 


11/13/20 05:01   15   Mechanical Ventilator  90 11/13/20 05:00  83 14 146/70 (95) 100   


 


11/13/20 04:45  76 14 126/69 (88) 100   


 


11/13/20 04:30  81 14 122/72 (89) 100   


 


11/13/20 04:15  84 14 121/72 (88) 100   


 


11/13/20 04:09   14   Mechanical Ventilator  90 11/13/20 04:00      Mechanical Ventilator  


 


11/13/20 04:00        90


 


11/13/20 04:00 99.7 84 14 104/60 (75) 100   


 


11/13/20 03:30  84 14 97/62 (74) 100   


 


11/13/20 03:30  81 14     90


 


11/13/20 03:19  83      


 


11/13/20 03:15  86 14 102/62 (75) 100   


 


11/13/20 03:09   14   Mechanical Ventilator  100


 


11/13/20 03:00  85 14 106/66 (79) 100   


 


11/13/20 02:31    112/67    


 


11/13/20 02:30   15   Mechanical Ventilator  100


 


11/13/20 02:09   14   Mechanical Ventilator  100 11/13/20 02:00  82 14 115/67 (83) 100   


 


11/13/20 01:09   14   Mechanical Ventilator  100


 


11/13/20 01:00  81 14 115/66 (82) 100   


 


11/13/20 00:09   14   Mechanical Ventilator  100


 


11/13/20 00:00 98.1 86 14 142/76 (98) 100   


 


11/13/20 00:00      Mechanical Ventilator  


 


11/12/20 23:54   14   Mechanical Ventilator  100


 


11/12/20 23:49   14   Mechanical Ventilator  100


 


11/12/20 23:39   14   Non-Rebreather  100


 


11/12/20 23:30  86 14     90


 


11/12/20 23:28  82      


 


11/12/20 23:24   14   Mechanical Ventilator  100


 


11/12/20 23:09   23   Mechanical Ventilator  100


 


11/12/20 23:00  82 14 108/71 (83) 100   


 


11/12/20 22:54   25   Mechanical Ventilator  100


 


11/12/20 22:45  81 14 100/62 (75) 100   


 


11/12/20 22:39   25   Mechanical Ventilator  100


 


11/12/20 22:30  85 14 94/64 (74) 100   


 


11/12/20 22:24   25   Mechanical Ventilator  100


 


11/12/20 22:15  90 14 96/65 (75) 100   


 


11/12/20 22:09   25   Mechanical Ventilator  100


 


11/12/20 22:00  96 20 118/68 (85) 100   


 


11/12/20 21:54   25   Mechanical Ventilator  100


 


11/12/20 21:45  101 22 136/82 (100) 100   


 


11/12/20 21:39   25   Mechanical Ventilator  100


 


11/12/20 21:30  103 22 117/68 (84) 100   


 


11/12/20 21:24   25   Mechanical Ventilator  100


 


11/12/20 21:15  105 20 124/83 (97) 100   


 


11/12/20 21:09   25   Mechanical Ventilator  100


 


11/12/20 21:00  107 23 140/75 (96) 100   


 


11/12/20 20:54   25   Mechanical Ventilator  100


 


11/12/20 20:45  107 23 135/74 (94) 100   


 


11/12/20 20:39   21   Mechanical Ventilator  100


 


11/12/20 20:34   23   Mechanical Ventilator  100


 


11/12/20 20:30  110 25 141/71 (94) 100   


 


11/12/20 20:19   25   Mechanical Ventilator  100


 


11/12/20 20:15  110 23 135/85 (102) 100   


 


11/12/20 20:04   23   Mechanical Ventilator  100


 


11/12/20 20:00      Mechanical Ventilator  


 


11/12/20 20:00 98.2 110 23 139/72 (94) 100   


 


11/12/20 20:00        100


 


11/12/20 19:50  110 25 147/91 100   


 


11/12/20 19:49   25   Mechanical Ventilator  100


 


11/12/20 19:45  111 24 147/91 (109) 100   


 


11/12/20 19:31  106      


 


11/12/20 19:30  111 23 157/86 (109) 100   


 


11/12/20 19:20  107 26 175/95 100   


 


11/12/20 19:15  106 27 144/88 (106) 100   


 


11/12/20 19:00  103 20 112/74 (87) 100   


 


11/12/20 18:44    108/65    


 


11/12/20 18:44  102 17     100


 


11/12/20 18:00  102 21 108/65 (79) 100   


 


11/12/20 17:00  95 14 134/79 (97) 100   


 


11/12/20 16:00        35


 


11/12/20 16:00      Mechanical Ventilator  


 


11/12/20 16:00 98.0 102 15 115/71 (86) 100   


 


11/12/20 15:31  103      


 


11/12/20 15:08  104 14     100


 


11/12/20 15:00  100 14 132/78 (96) 100   


 


11/12/20 14:30  95 14 129/77 (94) 100   


 


11/12/20 14:00  94 14 117/72 (87) 100   


 


11/12/20 13:44    90/60    


 


11/12/20 13:10  97 15 117/69 (85) 99   


 


11/12/20 13:00  96 14 76/49 (58) 99   


 


11/12/20 12:30  97 14 63/41 (48) 97   


 


11/12/20 12:15  106 14 57/42 (47) 100   


 


11/12/20 12:00      Mechanical Ventilator  


 


11/12/20 12:00 97.7 131 16 115/61 (79) 100   


 


11/12/20 12:00        35


 


11/12/20 11:48  113      


 


11/12/20 11:30  99 52 148/57 (87) 75   


 


11/12/20 11:03  100 19 113/69 98   


 


11/12/20 11:03 98.8       


 


11/12/20 11:01  100 19     35


 


11/12/20 11:00  100 20 113/69 (84) 98   


 


11/12/20 10:33  92 14 127/63 100   








Height (Feet):  5


Height (Inches):  4.00


Weight (Pounds):  130


Gen: sedated, intubated


CV: RRR


Pulm: Rhonchi anteriorly


Abd: Soft, NTND


Ext: No c/c/e





Laboratory Tests








Test


 11/12/20


12:09 11/13/20


04:00


 


Arterial Blood pH


 7.276


(7.350-7.450) 





 


Arterial Blood Partial


Pressure CO2 51.1 mmHg


(35.0-45.0)  H 





 


Arterial Blood Partial


Pressure O2 61.6 mmHg


(75.0-100.0)  L 





 


Arterial Blood HCO3


 23.3 mmol/L


(22.0-26.0) 





 


Arterial Blood Oxygen


Saturation 87.0 %


()  *L 





 


Arterial Blood Base Excess -3.6 (-2-2)  L 


 


Jerod Test Positive   


 


White Blood Count


 


 16.2 K/UL


(4.8-10.8)  H


 


Red Blood Count


 


 2.79 M/UL


(4.70-6.10)  L


 


Hemoglobin


 


 8.3 G/DL


(14.2-18.0)  L


 


Hematocrit


 


 27.2 %


(42.0-52.0)  L


 


Mean Corpuscular Volume  97 FL (80-99)  


 


Mean Corpuscular Hemoglobin


 


 29.8 PG


(27.0-31.0)


 


Mean Corpuscular Hemoglobin


Concent 


 30.6 G/DL


(32.0-36.0)  L


 


Red Cell Distribution Width


 


 16.9 %


(11.6-14.8)  H


 


Platelet Count


 


 443 K/UL


(150-450)


 


Mean Platelet Volume


 


 7.7 FL


(6.5-10.1)


 


Neutrophils (%) (Auto)


 


 81.3 %


(45.0-75.0)  H


 


Lymphocytes (%) (Auto)


 


 12.2 %


(20.0-45.0)  L


 


Monocytes (%) (Auto)


 


 5.6 %


(1.0-10.0)


 


Eosinophils (%) (Auto)


 


 0.5 %


(0.0-3.0)


 


Basophils (%) (Auto)


 


 0.5 %


(0.0-2.0)


 


Sodium Level


 


 142 MMOL/L


(136-145)


 


Potassium Level


 


 3.8 MMOL/L


(3.5-5.1)


 


Chloride Level


 


 108 MMOL/L


()  H


 


Carbon Dioxide Level


 


 27 MMOL/L


(21-32)


 


Blood Urea Nitrogen


 


 21 mg/dL


(7-18)  H


 


Creatinine


 


 1.0 MG/DL


(0.55-1.30)


 


Estimat Glomerular Filtration


Rate 


 > 60 mL/min


(>60)


 


Glucose Level


 


 123 MG/DL


()  H


 


Calcium Level


 


 7.9 MG/DL


(8.5-10.1)  L


 


Phosphorus Level


 


 2.1 MG/DL


(2.5-4.9)  L


 


Magnesium Level


 


 2.2 MG/DL


(1.8-2.4)


 


Total Bilirubin


 


 0.4 MG/DL


(0.2-1.0)


 


Aspartate Amino Transf


(AST/SGOT) 


 29 U/L (15-37)





 


Alanine Aminotransferase


(ALT/SGPT) 


 29 U/L (12-78)





 


Alkaline Phosphatase


 


 160 U/L


()  H


 


Total Protein


 


 6.2 G/DL


(6.4-8.2)  L


 


Albumin


 


 1.4 G/DL


(3.4-5.0)  L


 


Globulin  4.8 g/dL  


 


Albumin/Globulin Ratio


 


 0.3 (1.0-2.7)


L











Current Medications








 Medications


  (Trade)  Dose


 Ordered  Sig/Miky


 Route


 PRN Reason  Start Time


 Stop Time Status Last Admin


Dose Admin


 


 Acetaminophen


  (Tylenol)  650 mg  Q4H  PRN


 ORAL


 Temp >100.5  10/23/20 20:30


 11/22/20 20:29  11/5/20 22:39





 


 Chlorhexidine


 Gluconate


  (Jess-Hex 2%)  1 applic  DAILY@2000


 TOPIC


   11/6/20 20:00


 2/4/21 19:59  11/12/20 19:48





 


 Dextrose


  (Dextrose 50%)  50 ml  Q30M  PRN


 IV


 Hypoglycemia  10/23/20 22:45


 1/21/21 22:44   





 


 Heparin Sodium


  (Porcine)


  (Heparin 5000


 units/ml)  5,000 units  EVERY 12  HOURS


 SUBQ


   10/23/20 21:00


 12/7/20 20:59  11/13/20 09:06





 


 Insulin Aspart


  (NovoLOG)    EVERY 6  HOURS


 SUBQ


   11/2/20 06:00


 1/22/21 06:29  11/11/20 13:21





 


 Lorazepam


  (Ativan 2mg/ml


 1ml)  2 mg  Q4H  PRN


 IV


 For Anxiety  11/10/20 13:45


 11/17/20 13:44  11/12/20 19:20





 


 Meropenem 1 gm/


 Sodium Chloride  55 ml @ 


 110 mls/hr  Q8HR@0400,1200,2000


 IVPB


   11/11/20 12:00


 11/16/20 11:59  11/13/20 03:21





 


 Midazolam HCl 100


 mg/Sodium Chloride  200 ml @ 0


 mls/hr  Q24H  PRN


 IV


 Agitation  11/13/20 08:30


 11/15/20 08:29  11/13/20 09:02





 


 Midodrine


  (Pro-Amatine)  10 mg  Q8H


 ORAL


   11/11/20 17:00


 2/9/21 16:59  11/13/20 09:01





 


 Morphine Sulfate


  (Morphine


 Sulfate)  2 mg  Q6H  PRN


 IVP


 moderate pain  11/10/20 13:45


 11/17/20 13:44   





 


 Morphine Sulfate


  (Morphine


 Sulfate)  4 mg  Q4H  PRN


 IVP


 severe pain  11/6/20 12:45


 11/13/20 12:44  11/12/20 10:33





 


 Nitroglycerin


  (Ntg)  0.4 mg  Q5M  PRN


 SL


 Prn Chest Pain  10/23/20 20:30


 11/22/20 20:29   





 


 Norepinephrine


 Bitartrate  250 ml @ 0


 mls/hr  Q24H


 IV


   11/12/20 13:30


 11/15/20 15:44  11/13/20 02:31





 


 Ondansetron HCl


  (Zofran)  4 mg  Q6H  PRN


 IVP


 Nausea & Vomiting  10/23/20 20:30


 11/22/20 20:29   





 


 Pantoprazole


  (Protonix)  40 mg  DAILY


 IV


   11/7/20 09:00


 12/7/20 08:59  11/13/20 09:01





 


 Polyethylene


 Glycol


  (Miralax)  17 gm  DAILYPRN  PRN


 ORAL


 Constipation  10/23/20 20:30


 11/22/20 20:29   





 


 Potassium


 Phosphate 20 mm/


 Sodium Chloride  281.6667


 ml @ 


 46.944 m...  ONCE  ONCE


 IV


   11/13/20 09:00


 11/13/20 14:59  11/13/20 09:02





 


 Promethazine HCl/


 Codeine


  (Phenergan with


 Codeine)  5 ml  Q4H  PRN


 ORAL


 For Cough  10/23/20 20:30


 11/22/20 20:29   





 


 Sodium Chloride  1,000 ml @ 


 50 mls/hr  Q20H


 IV


   11/6/20 16:00


 12/6/20 15:59  11/13/20 04:12





 


 Tamsulosin HCl


  (Flomax)  0.4 mg  BID


 ORAL


   10/31/20 19:45


 11/23/20 08:59  11/13/20 09:01




















Slime Garcia M.D.            Nov 13, 2020 10:34

## 2020-11-13 NOTE — NUR
NURSE NOTES:

Received patient and report from LITA Zapata. Patient is observed resting in bed and remains 
sedated. No pain noted upon assessment. Pt is currently orally intubated ett size 7.5 noted 
to be 25 @ lip. Pt appears to be tolerating current vent settings well. Vent settings as 
follows: AC 12  FiO2 50% PEEP 0. Bilateral lower lobe breath sounds noted to be 
diminished upon auscultation, rhonchi noted in bilateral upper lobes. Pt noted to be SR on 
tele monitor with a HR of 84. R upper arm PICC line noted which remains asymptomatic, intact 
and patent. Central line dressing remains clean, dry and intact. Versed currently infusing 
at 2mg/hr, Levophed currently infusing at 6 mcg/min and 1/2 NS infusing at 50mL/hr. No s/sx 
of adverse effects noted. Active bowel sounds noted in all four quadrants; abdomen remains 
round, soft and non-tender. NGT noted to the R nare which remains intact and secured. 
Glucerna 1.2 continues to infuse at 10mL/hr as ordered, no residual at this time. Cummings 
catheter noted draining yellow urine to gravity. Diagnostics reviewed at bedside. Skin 
alterations noted. Fall, Aspiration and Skin precautions observed. Pt remains resting in 
bed; Bed remains in the lowest position with the safety wheels engaged, call light within 
reach, side rails up x3 and bed alarm activated. Will continue plan of care. Will continue 
to monitor.

## 2020-11-13 NOTE — NUR
NURSE NOTES:

No changes in condition. RASS -2 lightly sedated; easily awakens to touch. Versed continues 
at 18mg/hr.

## 2020-11-13 NOTE — INTERNAL MED PROGRESS NOTE
Subjective


Physician Name


Andrei Cancino


Attending Physician


Andrei Cancino MD





Current Medications








 Medications


  (Trade)  Dose


 Ordered  Sig/Miky


 Route


 PRN Reason  Start Time


 Stop Time Status Last Admin


Dose Admin


 


 Acetaminophen


  (Tylenol)  650 mg  Q4H  PRN


 ORAL


 Temp >100.5  10/23/20 20:30


 11/22/20 20:29  11/5/20 22:39





 


 Chlorhexidine


 Gluconate


  (Jess-Hex 2%)  1 applic  DAILY@2000


 TOPIC


   11/6/20 20:00


 2/4/21 19:59  11/12/20 19:48





 


 Dextrose


  (Dextrose 50%)  50 ml  Q30M  PRN


 IV


 Hypoglycemia  10/23/20 22:45


 1/21/21 22:44   





 


 Heparin Sodium


  (Porcine)


  (Heparin 5000


 units/ml)  5,000 units  EVERY 12  HOURS


 SUBQ


   10/23/20 21:00


 12/7/20 20:59  11/13/20 09:06





 


 Insulin Aspart


  (NovoLOG)    EVERY 6  HOURS


 SUBQ


   11/2/20 06:00


 1/22/21 06:29  11/11/20 13:21





 


 Linezolid  300 ml @ 


 300 mls/hr  Q12HR


 IVPB


   11/13/20 12:00


 11/20/20 11:59  11/13/20 12:28





 


 Lorazepam


  (Ativan 2mg/ml


 1ml)  2 mg  Q4H  PRN


 IV


 For Anxiety  11/10/20 13:45


 11/17/20 13:44  11/12/20 19:20





 


 Meropenem 1 gm/


 Sodium Chloride  55 ml @ 


 110 mls/hr  Q8HR@0400,1200,2000


 IVPB


   11/11/20 12:00


 11/16/20 11:59  11/13/20 11:25





 


 Midazolam HCl 100


 mg/Sodium Chloride  200 ml @ 0


 mls/hr  Q24H  PRN


 IV


 Agitation  11/13/20 08:30


 11/15/20 08:29  11/13/20 09:02





 


 Midodrine


  (Pro-Amatine)  10 mg  Q8H


 ORAL


   11/11/20 17:00


 2/9/21 16:59  11/13/20 09:01





 


 Morphine Sulfate


  (Morphine


 Sulfate)  2 mg  Q6H  PRN


 IVP


 moderate pain  11/10/20 13:45


 11/17/20 13:44   





 


 Nitroglycerin


  (Ntg)  0.4 mg  Q5M  PRN


 SL


 Prn Chest Pain  10/23/20 20:30


 11/22/20 20:29   





 


 Norepinephrine


 Bitartrate  250 ml @ 0


 mls/hr  Q24H


 IV


   11/12/20 13:30


 11/15/20 15:44  11/13/20 02:31





 


 Ondansetron HCl


  (Zofran)  4 mg  Q6H  PRN


 IVP


 Nausea & Vomiting  10/23/20 20:30


 11/22/20 20:29   





 


 Pantoprazole


  (Protonix)  40 mg  DAILY


 IV


   11/7/20 09:00


 12/7/20 08:59  11/13/20 09:01





 


 Polyethylene


 Glycol


  (Miralax)  17 gm  DAILYPRN  PRN


 ORAL


 Constipation  10/23/20 20:30


 11/22/20 20:29   





 


 Potassium


 Phosphate 20 mm/


 Sodium Chloride  281.6667


 ml @ 


 46.944 m...  ONCE  ONCE


 IV


   11/13/20 09:00


 11/13/20 14:59  11/13/20 09:02





 


 Promethazine HCl/


 Codeine


  (Phenergan with


 Codeine)  5 ml  Q4H  PRN


 ORAL


 For Cough  10/23/20 20:30


 11/22/20 20:29   





 


 Sodium Chloride  1,000 ml @ 


 50 mls/hr  Q20H


 IV


   11/6/20 16:00


 12/6/20 15:59  11/13/20 04:12





 


 Tamsulosin HCl


  (Flomax)  0.4 mg  BID


 ORAL


   10/31/20 19:45


 11/23/20 08:59  11/13/20 09:01











Allergies:  


Coded Allergies:  


     No Known Allergies (Unverified , 8/20/12)


Subjective


In ICU Isolation room, unresponsive, cannot follow command, Intubated on 

Ventilation,  less of secretion, on low dose Levophed drip, WBC:16.2 ,





Objective





Last Vital Signs








  Date Time  Temp Pulse Resp B/P (MAP) Pulse Ox O2 Delivery O2 Flow Rate FiO2


 


11/13/20 14:00   16   Mechanical Ventilator  60


 


11/13/20 14:00  81  107/65 (79) 99   


 


11/13/20 12:00 99.1       


 


11/6/20 12:00       15.0 











Laboratory Tests








Test


 11/13/20


04:00 11/13/20


10:27


 


White Blood Count


 16.2 K/UL


(4.8-10.8)  H 





 


Red Blood Count


 2.79 M/UL


(4.70-6.10)  L 





 


Hemoglobin


 8.3 G/DL


(14.2-18.0)  L 





 


Hematocrit


 27.2 %


(42.0-52.0)  L 





 


Mean Corpuscular Volume 97 FL (80-99)   


 


Mean Corpuscular Hemoglobin


 29.8 PG


(27.0-31.0) 





 


Mean Corpuscular Hemoglobin


Concent 30.6 G/DL


(32.0-36.0)  L 





 


Red Cell Distribution Width


 16.9 %


(11.6-14.8)  H 





 


Platelet Count


 443 K/UL


(150-450) 





 


Mean Platelet Volume


 7.7 FL


(6.5-10.1) 





 


Neutrophils (%) (Auto)


 81.3 %


(45.0-75.0)  H 





 


Lymphocytes (%) (Auto)


 12.2 %


(20.0-45.0)  L 





 


Monocytes (%) (Auto)


 5.6 %


(1.0-10.0) 





 


Eosinophils (%) (Auto)


 0.5 %


(0.0-3.0) 





 


Basophils (%) (Auto)


 0.5 %


(0.0-2.0) 





 


Sodium Level


 142 MMOL/L


(136-145) 





 


Potassium Level


 3.8 MMOL/L


(3.5-5.1) 





 


Chloride Level


 108 MMOL/L


()  H 





 


Carbon Dioxide Level


 27 MMOL/L


(21-32) 





 


Blood Urea Nitrogen


 21 mg/dL


(7-18)  H 





 


Creatinine


 1.0 MG/DL


(0.55-1.30) 





 


Estimat Glomerular Filtration


Rate > 60 mL/min


(>60) 





 


Glucose Level


 123 MG/DL


()  H 





 


Calcium Level


 7.9 MG/DL


(8.5-10.1)  L 





 


Phosphorus Level


 2.1 MG/DL


(2.5-4.9)  L 





 


Magnesium Level


 2.2 MG/DL


(1.8-2.4) 





 


Total Bilirubin


 0.4 MG/DL


(0.2-1.0) 





 


Aspartate Amino Transf


(AST/SGOT) 29 U/L (15-37)


 





 


Alanine Aminotransferase


(ALT/SGPT) 29 U/L (12-78)


 





 


Alkaline Phosphatase


 160 U/L


()  H 





 


Total Protein


 6.2 G/DL


(6.4-8.2)  L 





 


Albumin


 1.4 G/DL


(3.4-5.0)  L 





 


Globulin 4.8 g/dL   


 


Albumin/Globulin Ratio


 0.3 (1.0-2.7)


L 





 


Arterial Blood pH


 


 7.506


(7.350-7.450)


 


Arterial Blood Partial


Pressure CO2 


 29.7 mmHg


(35.0-45.0)  L


 


Arterial Blood Partial


Pressure O2 


 182.9 mmHg


(75.0-100.0)  H


 


Arterial Blood HCO3


 


 23.0 mmol/L


(22.0-26.0)


 


Arterial Blood Oxygen


Saturation 


 99.1 %


()


 


Arterial Blood Base Excess  0.2 (-2-2)  


 


Jerod Test  Positive  

















Intake and Output  


 


 11/12/20 11/13/20





 19:00 07:00


 


Intake Total 1340.83 ml 1323.125 ml


 


Output Total 960 ml 850 ml


 


Balance 380.83 ml 473.125 ml


 


  


 


Free Water 5 ml 30 ml


 


IV Total 1035.83 ml 1263.125 ml


 


Tube Feeding 300 ml 30 ml


 


Output Urine Total 960 ml 850 ml


 


# Bowel Movements 1 








Objective


GENERAL:  unresponsive, intubated, chronically ill-appearing.


HEAD AND NECK:  Pupils are equal and reactive to light.  Anicteric. ET tube, N

GT.


NECK:  Supple.  No JVD.


LUNGS:  Mechanical breath sound,  Decreased air in bases, Coarse breath sound.


HEART:  S1, S2.  Regular rhythm.  Distant heart sounds.  No murmur or gallop.


ABDOMEN:  Soft, nondistended, nontender.  Positive bowel sounds.


EXTREMITIES:  No cyanosis, clubbing, or edema.


NEUROLOGIC:  Very limited secondary to the patient's status, cannot follow 

commands.





Assessment/Plan


Assessment/Plan


1. Acute hypoxemic respiratory failure, most likely secondary to pneumonia and 

sepsis --> Intubated (11/6/2020).


2. Septic shock secondary to urinary tract infection and pneumonia.


3. pneumonia=MRSA


4. Acute kidney injury on chronic renal insufficiency.


5. Dehydration.


6. History of chronic congestive heart failure.


7. Diabetes type 2.


8. Dyslipidemia.


9. Hypertension.


10. Alzheimer's disease.


11. COVID 19 previous positive





PLAN:


1.  in ICU


2.  Dr. Sandoval,=Pulmonary Critical Care


3.  Dr. Garcia = infection disease


4.  Monitor labs and cultures


5.  antibiotics =  Meropenem and Zyvox


6.  Code status is full code.


7.  DVT prophylaxis is heparin subcutaneous.


8.  Resume Tube feeding @ 15 cc/hr, will increase


9.  On Levophed drip





CXR: Impression: Increased bilateral pleural effusions


Unchanged bilateral interstitial and airspace infiltrates versus edema


Decreased subcutaneous emphysema











Andrei Cancino MD                 Nov 13, 2020 14:16

## 2020-11-13 NOTE — NUR
NURSE NOTES:

Bed bath given, linens changed, oral care and suction provided. Covid swab taken and blood 
drawn peripherally and sent to the lab.

## 2020-11-14 VITALS — SYSTOLIC BLOOD PRESSURE: 118 MMHG | DIASTOLIC BLOOD PRESSURE: 61 MMHG

## 2020-11-14 VITALS — DIASTOLIC BLOOD PRESSURE: 66 MMHG | SYSTOLIC BLOOD PRESSURE: 136 MMHG

## 2020-11-14 VITALS — DIASTOLIC BLOOD PRESSURE: 70 MMHG | SYSTOLIC BLOOD PRESSURE: 120 MMHG

## 2020-11-14 VITALS — DIASTOLIC BLOOD PRESSURE: 52 MMHG | SYSTOLIC BLOOD PRESSURE: 95 MMHG

## 2020-11-14 VITALS — SYSTOLIC BLOOD PRESSURE: 81 MMHG | DIASTOLIC BLOOD PRESSURE: 53 MMHG

## 2020-11-14 VITALS — SYSTOLIC BLOOD PRESSURE: 97 MMHG | DIASTOLIC BLOOD PRESSURE: 50 MMHG

## 2020-11-14 VITALS — DIASTOLIC BLOOD PRESSURE: 76 MMHG | SYSTOLIC BLOOD PRESSURE: 128 MMHG

## 2020-11-14 VITALS — SYSTOLIC BLOOD PRESSURE: 80 MMHG | DIASTOLIC BLOOD PRESSURE: 52 MMHG

## 2020-11-14 VITALS — SYSTOLIC BLOOD PRESSURE: 132 MMHG | DIASTOLIC BLOOD PRESSURE: 72 MMHG

## 2020-11-14 VITALS — DIASTOLIC BLOOD PRESSURE: 59 MMHG | SYSTOLIC BLOOD PRESSURE: 107 MMHG

## 2020-11-14 VITALS — DIASTOLIC BLOOD PRESSURE: 55 MMHG | SYSTOLIC BLOOD PRESSURE: 101 MMHG

## 2020-11-14 VITALS — SYSTOLIC BLOOD PRESSURE: 105 MMHG | DIASTOLIC BLOOD PRESSURE: 90 MMHG

## 2020-11-14 VITALS — SYSTOLIC BLOOD PRESSURE: 88 MMHG | DIASTOLIC BLOOD PRESSURE: 54 MMHG

## 2020-11-14 VITALS — SYSTOLIC BLOOD PRESSURE: 91 MMHG | DIASTOLIC BLOOD PRESSURE: 53 MMHG

## 2020-11-14 VITALS — DIASTOLIC BLOOD PRESSURE: 60 MMHG | SYSTOLIC BLOOD PRESSURE: 103 MMHG

## 2020-11-14 VITALS — SYSTOLIC BLOOD PRESSURE: 114 MMHG | DIASTOLIC BLOOD PRESSURE: 52 MMHG

## 2020-11-14 VITALS — DIASTOLIC BLOOD PRESSURE: 76 MMHG | SYSTOLIC BLOOD PRESSURE: 141 MMHG

## 2020-11-14 VITALS — SYSTOLIC BLOOD PRESSURE: 121 MMHG | DIASTOLIC BLOOD PRESSURE: 67 MMHG

## 2020-11-14 VITALS — DIASTOLIC BLOOD PRESSURE: 50 MMHG | SYSTOLIC BLOOD PRESSURE: 92 MMHG

## 2020-11-14 VITALS — DIASTOLIC BLOOD PRESSURE: 49 MMHG | SYSTOLIC BLOOD PRESSURE: 86 MMHG

## 2020-11-14 VITALS — SYSTOLIC BLOOD PRESSURE: 127 MMHG | DIASTOLIC BLOOD PRESSURE: 68 MMHG

## 2020-11-14 VITALS — DIASTOLIC BLOOD PRESSURE: 63 MMHG | SYSTOLIC BLOOD PRESSURE: 127 MMHG

## 2020-11-14 VITALS — DIASTOLIC BLOOD PRESSURE: 52 MMHG | SYSTOLIC BLOOD PRESSURE: 100 MMHG

## 2020-11-14 VITALS — DIASTOLIC BLOOD PRESSURE: 66 MMHG | SYSTOLIC BLOOD PRESSURE: 134 MMHG

## 2020-11-14 VITALS — SYSTOLIC BLOOD PRESSURE: 108 MMHG | DIASTOLIC BLOOD PRESSURE: 55 MMHG

## 2020-11-14 VITALS — DIASTOLIC BLOOD PRESSURE: 56 MMHG | SYSTOLIC BLOOD PRESSURE: 106 MMHG

## 2020-11-14 VITALS — DIASTOLIC BLOOD PRESSURE: 57 MMHG | SYSTOLIC BLOOD PRESSURE: 114 MMHG

## 2020-11-14 VITALS — SYSTOLIC BLOOD PRESSURE: 95 MMHG | DIASTOLIC BLOOD PRESSURE: 49 MMHG

## 2020-11-14 VITALS — DIASTOLIC BLOOD PRESSURE: 52 MMHG | SYSTOLIC BLOOD PRESSURE: 90 MMHG

## 2020-11-14 VITALS — SYSTOLIC BLOOD PRESSURE: 100 MMHG | DIASTOLIC BLOOD PRESSURE: 53 MMHG

## 2020-11-14 VITALS — DIASTOLIC BLOOD PRESSURE: 50 MMHG | SYSTOLIC BLOOD PRESSURE: 94 MMHG

## 2020-11-14 VITALS — SYSTOLIC BLOOD PRESSURE: 92 MMHG | DIASTOLIC BLOOD PRESSURE: 50 MMHG

## 2020-11-14 VITALS — SYSTOLIC BLOOD PRESSURE: 127 MMHG | DIASTOLIC BLOOD PRESSURE: 63 MMHG

## 2020-11-14 VITALS — DIASTOLIC BLOOD PRESSURE: 60 MMHG | SYSTOLIC BLOOD PRESSURE: 102 MMHG

## 2020-11-14 VITALS — SYSTOLIC BLOOD PRESSURE: 97 MMHG | DIASTOLIC BLOOD PRESSURE: 53 MMHG

## 2020-11-14 VITALS — DIASTOLIC BLOOD PRESSURE: 62 MMHG | SYSTOLIC BLOOD PRESSURE: 126 MMHG

## 2020-11-14 VITALS — DIASTOLIC BLOOD PRESSURE: 72 MMHG | SYSTOLIC BLOOD PRESSURE: 132 MMHG

## 2020-11-14 VITALS — DIASTOLIC BLOOD PRESSURE: 53 MMHG | SYSTOLIC BLOOD PRESSURE: 97 MMHG

## 2020-11-14 VITALS — DIASTOLIC BLOOD PRESSURE: 53 MMHG | SYSTOLIC BLOOD PRESSURE: 108 MMHG

## 2020-11-14 VITALS — SYSTOLIC BLOOD PRESSURE: 91 MMHG | DIASTOLIC BLOOD PRESSURE: 54 MMHG

## 2020-11-14 VITALS — SYSTOLIC BLOOD PRESSURE: 93 MMHG | DIASTOLIC BLOOD PRESSURE: 55 MMHG

## 2020-11-14 VITALS — SYSTOLIC BLOOD PRESSURE: 145 MMHG | DIASTOLIC BLOOD PRESSURE: 67 MMHG

## 2020-11-14 VITALS — SYSTOLIC BLOOD PRESSURE: 98 MMHG | DIASTOLIC BLOOD PRESSURE: 56 MMHG

## 2020-11-14 VITALS — SYSTOLIC BLOOD PRESSURE: 110 MMHG | DIASTOLIC BLOOD PRESSURE: 55 MMHG

## 2020-11-14 VITALS — SYSTOLIC BLOOD PRESSURE: 87 MMHG | DIASTOLIC BLOOD PRESSURE: 56 MMHG

## 2020-11-14 VITALS — SYSTOLIC BLOOD PRESSURE: 102 MMHG | DIASTOLIC BLOOD PRESSURE: 56 MMHG

## 2020-11-14 VITALS — DIASTOLIC BLOOD PRESSURE: 69 MMHG | SYSTOLIC BLOOD PRESSURE: 144 MMHG

## 2020-11-14 VITALS — SYSTOLIC BLOOD PRESSURE: 98 MMHG | DIASTOLIC BLOOD PRESSURE: 59 MMHG

## 2020-11-14 VITALS — SYSTOLIC BLOOD PRESSURE: 132 MMHG | DIASTOLIC BLOOD PRESSURE: 81 MMHG

## 2020-11-14 VITALS — SYSTOLIC BLOOD PRESSURE: 109 MMHG | DIASTOLIC BLOOD PRESSURE: 60 MMHG

## 2020-11-14 LAB
ADD MANUAL DIFF: NO
ALBUMIN SERPL-MCNC: 1.4 G/DL (ref 3.4–5)
ALBUMIN/GLOB SERPL: 0.3 {RATIO} (ref 1–2.7)
ALP SERPL-CCNC: 144 U/L (ref 46–116)
ALT SERPL-CCNC: 28 U/L (ref 12–78)
ANION GAP SERPL CALC-SCNC: 8 MMOL/L (ref 5–15)
AST SERPL-CCNC: 21 U/L (ref 15–37)
BASOPHILS NFR BLD AUTO: 1.3 % (ref 0–2)
BILIRUB SERPL-MCNC: 0.3 MG/DL (ref 0.2–1)
BUN SERPL-MCNC: 13 MG/DL (ref 7–18)
CALCIUM SERPL-MCNC: 7.8 MG/DL (ref 8.5–10.1)
CHLORIDE SERPL-SCNC: 107 MMOL/L (ref 98–107)
CO2 SERPL-SCNC: 24 MMOL/L (ref 21–32)
CREAT SERPL-MCNC: 0.7 MG/DL (ref 0.55–1.3)
EOSINOPHIL NFR BLD AUTO: 1.4 % (ref 0–3)
ERYTHROCYTE [DISTWIDTH] IN BLOOD BY AUTOMATED COUNT: 16.9 % (ref 11.6–14.8)
GLOBULIN SER-MCNC: 4.9 G/DL
HCT VFR BLD CALC: 27.6 % (ref 42–52)
HGB BLD-MCNC: 8.4 G/DL (ref 14.2–18)
LYMPHOCYTES NFR BLD AUTO: 9.3 % (ref 20–45)
MCV RBC AUTO: 98 FL (ref 80–99)
MONOCYTES NFR BLD AUTO: 7.1 % (ref 1–10)
NEUTROPHILS NFR BLD AUTO: 80.9 % (ref 45–75)
PLATELET # BLD: 398 K/UL (ref 150–450)
POTASSIUM SERPL-SCNC: 4.2 MMOL/L (ref 3.5–5.1)
RBC # BLD AUTO: 2.82 M/UL (ref 4.7–6.1)
SODIUM SERPL-SCNC: 139 MMOL/L (ref 136–145)
WBC # BLD AUTO: 10.8 K/UL (ref 4.8–10.8)

## 2020-11-14 RX ADMIN — CHLORHEXIDINE GLUCONATE SCH APPLIC: 213 SOLUTION TOPICAL at 19:58

## 2020-11-14 RX ADMIN — MIDAZOLAM HYDROCHLORIDE PRN MLS/HR: 5 INJECTION INTRAMUSCULAR; INTRAVENOUS at 05:23

## 2020-11-14 RX ADMIN — INSULIN ASPART SCH UNITS: 100 INJECTION, SOLUTION INTRAVENOUS; SUBCUTANEOUS at 00:00

## 2020-11-14 RX ADMIN — PANTOPRAZOLE SODIUM SCH MG: 40 INJECTION, POWDER, FOR SOLUTION INTRAVENOUS at 08:24

## 2020-11-14 RX ADMIN — HEPARIN SODIUM SCH UNITS: 5000 INJECTION INTRAVENOUS; SUBCUTANEOUS at 08:24

## 2020-11-14 RX ADMIN — INSULIN ASPART SCH UNITS: 100 INJECTION, SOLUTION INTRAVENOUS; SUBCUTANEOUS at 05:35

## 2020-11-14 RX ADMIN — MIDODRINE HYDROCHLORIDE SCH MG: 10 TABLET ORAL at 17:32

## 2020-11-14 RX ADMIN — MIDODRINE HYDROCHLORIDE SCH MG: 10 TABLET ORAL at 08:24

## 2020-11-14 RX ADMIN — INSULIN ASPART SCH UNITS: 100 INJECTION, SOLUTION INTRAVENOUS; SUBCUTANEOUS at 12:00

## 2020-11-14 RX ADMIN — INSULIN ASPART SCH UNITS: 100 INJECTION, SOLUTION INTRAVENOUS; SUBCUTANEOUS at 18:00

## 2020-11-14 RX ADMIN — MIDODRINE HYDROCHLORIDE SCH MG: 10 TABLET ORAL at 00:05

## 2020-11-14 RX ADMIN — MEROPENEM SCH MLS/HR: 1 INJECTION INTRAVENOUS at 04:52

## 2020-11-14 RX ADMIN — MEROPENEM SCH MLS/HR: 1 INJECTION INTRAVENOUS at 12:33

## 2020-11-14 RX ADMIN — Medication SCH MLS/HR: at 12:34

## 2020-11-14 RX ADMIN — MEROPENEM SCH MLS/HR: 1 INJECTION INTRAVENOUS at 19:58

## 2020-11-14 RX ADMIN — HEPARIN SODIUM SCH UNITS: 5000 INJECTION INTRAVENOUS; SUBCUTANEOUS at 20:04

## 2020-11-14 RX ADMIN — TAMSULOSIN HYDROCHLORIDE SCH MG: 0.4 CAPSULE ORAL at 08:24

## 2020-11-14 NOTE — CARDIAC ELECTROPHYSIOLOGY PN
Assessment/Plan


Assessment/Plan


1. Accelerated junctional rhythm. Ruled out for MI


      Echo showed ejection fraction of 55%.





2. Long run of 31 beats of Nonsustained VT on 11/3/2020. 


     Will need cardiac cath after stabilization.





3. Henry 30, Asystole due to resp failure. S/P Code





4. Respiratory failure, on antibiotic and intubated on the vent 





5. Septic shock. On Levophed 4 mcg and Midodrine 10 tid 


6. History of previous COVID infection in September 2020 and active Covid now in

isolation


7. Dementia.





DW RN





Subjective


Subjective


In SR in NAD in Covid isolation. In ICU on the Vent with 40% Fio2.


  Had 31 beats of VT  on 11/3/20 at 16:46 and 5 beats 11/8/20 


Coded 11/12/20 as sat dropped to 20% and got henry 30s and PEA and pulseless 


Got 3 Epi and 2 Bicarb. Now on Levophed 4 mcg and Midodrine 10 tid





Objective





Last 24 Hour Vital Signs








  Date Time  Temp Pulse Resp B/P (MAP) Pulse Ox O2 Delivery O2 Flow Rate FiO2


 


11/14/20 13:00  66 12 144/69 (94) 100   


 


11/14/20 12:45  67 14 127/63 (84) 100   


 


11/14/20 12:39  73 12 91/53 (66) 100   


 


11/14/20 12:34    83/52    


 


11/14/20 12:30  67 12 90/52 (65) 100   


 


11/14/20 12:15 97.8 67 12 97/53 (68) 100   


 


11/14/20 12:00        40


 


11/14/20 12:00      Mechanical Ventilator  





      Mechanical Ventilator  


 


11/14/20 12:00  68      


 


11/14/20 12:00  67 12 97/53 (68) 100   


 


11/14/20 11:30  68 12 81/53 (62) 100   


 


11/14/20 11:00  67 12 109/60 (76) 100   


 


11/14/20 10:59  62 12     40


 


11/14/20 10:30  66 12 103/60 (74) 100   


 


11/14/20 10:15  66 12 88/54 (65) 100   


 


11/14/20 10:00  65 12 86/49 (61) 100   


 


11/14/20 09:30  65 12 100/53 (69) 100   


 


11/14/20 09:00  65 15 108/55 (72) 100   


 


11/14/20 08:30  60 12 91/54 (66) 100   


 


11/14/20 08:00  61      


 


11/14/20 08:00 97.2 63 12 95/52 (66) 100   


 


11/14/20 08:00      Mechanical Ventilator  





      Mechanical Ventilator  


 


11/14/20 08:00        40


 


11/14/20 07:30  60 12 94/50 (65) 100   


 


11/14/20 07:22  60 12     50


 


11/14/20 07:00  62 12 114/57 (76) 100   


 


11/14/20 06:30  60 14     


 


11/14/20 06:00  61 13 102/60 (74) 100   


 


11/14/20 05:45   14   Mechanical Ventilator  50


 


11/14/20 05:23   14   Mechanical Ventilator  50


 


11/14/20 05:00   14   Mechanical Ventilator  50


 


11/14/20 05:00  68 14 118/61 (80) 100   


 


11/14/20 04:45  72 17 145/67 (93) 100   


 


11/14/20 04:30  70 16 132/81 (98) 100   


 


11/14/20 04:12  71      


 


11/14/20 04:00   14   Mechanical Ventilator  50


 


11/14/20 04:00 97.8 69 15 136/66 (89) 100   


 


11/14/20 04:00      Mechanical Ventilator  





      Mechanical Ventilator  


 


11/14/20 04:00        50


 


11/14/20 03:45   16   Mechanical Ventilator  50


 


11/14/20 03:44  79 18     50


 


11/14/20 03:30   17     


 


11/14/20 03:00  81 18 141/76 (97) 99   


 


11/14/20 02:45   18   Mechanical Ventilator  50


 


11/14/20 02:00  83 17 132/72 (92) 98   


 


11/14/20 01:45  84 17 132/72 (92) 98   


 


11/14/20 01:45   14   Mechanical Ventilator  50


 


11/14/20 01:30  85 16 134/66 (88) 100   


 


11/14/20 01:30   16   Mechanical Ventilator  50


 


11/14/20 01:15   17   Mechanical Ventilator  50


 


11/14/20 01:15  85 17 127/63 (84) 100   


 


11/14/20 01:00   14   Mechanical Ventilator  50


 


11/14/20 01:00  86 17 121/67 (85) 100   


 


11/14/20 00:45  81 17 127/68 (87) 100   


 


11/14/20 00:45   20     50


 


11/14/20 00:00        50


 


11/14/20 00:00      Mechanical Ventilator  





      Mechanical Ventilator  


 


11/14/20 00:00 98.1 74 12 98/59 (72) 100   


 


11/13/20 23:10  85 14     50


 


11/13/20 23:07  82      


 


11/13/20 23:00  85 19 119/69 (86) 100   


 


11/13/20 22:47  83 19 144/84 (104) 100   


 


11/13/20 22:00  86 12 125/67 (86) 99   


 


11/13/20 21:15  83 12 100/52 (68) 99   


 


11/13/20 21:00  82 12 87/52 (64) 99   


 


11/13/20 20:15  81 18 112/66 (81) 100   


 


11/13/20 20:15   18     50


 


11/13/20 20:00 96.8 81 24 123/64 (83) 100   


 


11/13/20 20:00      Mechanical Ventilator  





      Mechanical Ventilator  


 


11/13/20 20:00        50


 


11/13/20 20:00   20     50


 


11/13/20 19:20  89      


 


11/13/20 19:09  86 13     50


 


11/13/20 19:00  84 17 124/66 (85) 100   


 


11/13/20 19:00   12   Mechanical Ventilator  50


 


11/13/20 18:45  85 18 121/66 (84) 99   


 


11/13/20 18:30  84 12 117/63 (81) 100   


 


11/13/20 18:15  84 12 110/60 (77) 98   


 


11/13/20 18:03    104/57    


 


11/13/20 18:00  82 12 104/57 (73) 98   


 


11/13/20 18:00   12   Mechanical Ventilator  50


 


11/13/20 17:45  81 12 94/57 (69) 98   


 


11/13/20 17:45   12   Mechanical Ventilator  50


 


11/13/20 17:30   12   Mechanical Ventilator  50


 


11/13/20 17:15   12   Mechanical Ventilator  50


 


11/13/20 17:10  80 12     50


 


11/13/20 17:00   12   Mechanical Ventilator  50


 


11/13/20 17:00  78 12 93/52 (66) 98   


 


11/13/20 16:45  78 12 96/55 (69) 98   


 


11/13/20 16:30  75 12 85/51 (62) 98   


 


11/13/20 16:15  72 13 117/69 (85) 100   


 


11/13/20 16:00      Mechanical Ventilator  





      Mechanical Ventilator  


 


11/13/20 16:00  70      


 


11/13/20 16:00   12   Mechanical Ventilator  50


 


11/13/20 16:00 97.1 69 16 108/66 (80) 100   


 


11/13/20 15:45  67 12 96/62 (73) 100   


 


11/13/20 15:45   16   Mechanical Ventilator  50


 


11/13/20 15:30  68 13 85/55 (65) 100   


 


11/13/20 15:15  69 18 91/58 (69) 100   


 


11/13/20 15:11  72 12     50


 


11/13/20 15:00   14   Mechanical Ventilator  50


 


11/13/20 15:00  72 19 101/64 (76) 99   


 


11/13/20 14:45  73 21 113/68 (83) 99   


 


11/13/20 14:45   12   Mechanical Ventilator  50


 


11/13/20 14:30  78 19 104/62 (76) 98   


 


11/13/20 14:23        50


 


11/13/20 14:15  80 20 109/64 (79) 99   


 


11/13/20 14:00   16   Mechanical Ventilator  60


 


11/13/20 14:00  81 17 107/65 (79) 99   

















Intake and Output  


 


 11/13/20 11/14/20





 19:00 07:00


 


Intake Total 1831.914 ml 1428.7500 ml


 


Output Total 1140 ml 645 ml


 


Balance 691.914 ml 783.7500 ml


 


  


 


Free Water 40 ml 70 ml


 


IV Total 1671.914 ml 1178.7500 ml


 


Tube Feeding 120 ml 180 ml


 


Output Urine Total 1140 ml 645 ml











Laboratory Tests








Test


 11/14/20


00:12 11/14/20


04:30 11/14/20


04:46 11/14/20


09:26


 


POC Whole Blood Glucose


 Pending  


 


 128 MG/DL


()  H 





 


White Blood Count


 


 10.8 K/UL


(4.8-10.8) 


 





 


Red Blood Count


 


 2.82 M/UL


(4.70-6.10)  L 


 





 


Hemoglobin


 


 8.4 G/DL


(14.2-18.0)  L 


 





 


Hematocrit


 


 27.6 %


(42.0-52.0)  L 


 





 


Mean Corpuscular Volume  98 FL (80-99)    


 


Mean Corpuscular Hemoglobin


 


 29.9 PG


(27.0-31.0) 


 





 


Mean Corpuscular Hemoglobin


Concent 


 30.5 G/DL


(32.0-36.0)  L 


 





 


Red Cell Distribution Width


 


 16.9 %


(11.6-14.8)  H 


 





 


Platelet Count


 


 398 K/UL


(150-450) 


 





 


Mean Platelet Volume


 


 7.7 FL


(6.5-10.1) 


 





 


Neutrophils (%) (Auto)


 


 80.9 %


(45.0-75.0)  H 


 





 


Lymphocytes (%) (Auto)


 


 9.3 %


(20.0-45.0)  L 


 





 


Monocytes (%) (Auto)


 


 7.1 %


(1.0-10.0) 


 





 


Eosinophils (%) (Auto)


 


 1.4 %


(0.0-3.0) 


 





 


Basophils (%) (Auto)


 


 1.3 %


(0.0-2.0) 


 





 


Sodium Level


 


 139 MMOL/L


(136-145) 


 





 


Potassium Level


 


 4.2 MMOL/L


(3.5-5.1) 


 





 


Chloride Level


 


 107 MMOL/L


() 


 





 


Carbon Dioxide Level


 


 24 MMOL/L


(21-32) 


 





 


Anion Gap


 


 8 mmol/L


(5-15) 


 





 


Blood Urea Nitrogen


 


 13 mg/dL


(7-18) 


 





 


Creatinine


 


 0.7 MG/DL


(0.55-1.30) 


 





 


Estimat Glomerular Filtration


Rate 


 > 60 mL/min


(>60) 


 





 


Glucose Level


 


 135 MG/DL


()  H 


 





 


Calcium Level


 


 7.8 MG/DL


(8.5-10.1)  L 


 





 


Total Bilirubin


 


 0.3 MG/DL


(0.2-1.0) 


 





 


Aspartate Amino Transf


(AST/SGOT) 


 21 U/L (15-37)


 


 





 


Alanine Aminotransferase


(ALT/SGPT) 


 28 U/L (12-78)


 


 





 


Alkaline Phosphatase


 


 144 U/L


()  H 


 





 


Total Protein


 


 6.3 G/DL


(6.4-8.2)  L 


 





 


Albumin


 


 1.4 G/DL


(3.4-5.0)  L 


 





 


Globulin  4.9 g/dL    


 


Albumin/Globulin Ratio


 


 0.3 (1.0-2.7)


L 


 





 


Arterial Blood pH


 


 


 


 7.422


(7.350-7.450)


 


Arterial Blood Partial


Pressure CO2 


 


 


 38.2 mmHg


(35.0-45.0)


 


Arterial Blood Partial


Pressure O2 


 


 


 119.2 mmHg


(75.0-100.0)  H


 


Arterial Blood HCO3


 


 


 


 24.3 mmol/L


(22.0-26.0)


 


Arterial Blood Oxygen


Saturation 


 


 


 97.9 %


()


 


Arterial Blood Base Excess    0 (-2-2)  


 


Jerod Test    Positive  








Objective





HEAD AND NECK:  No JVD. Orally intubated


LUNGS:  Coarse rhonchi.


CARDIOVASCULAR:   Irregular S1 and S2 with no gallop.


ABDOMEN:  Soft.


EXTREMITIES:  No pitting edema.











Kvng Edmond MD               Nov 14, 2020 14:00

## 2020-11-14 NOTE — NUR
NURSE HAND-OFF REPORT: 



Latest Vital Signs: Temperature 97.8 , Pulse 62 , B/P 114 /57 , Respiratory Rate 12 , O2 SAT 
100 , Mechanical Ventilator, O2 Flow Rate  .  

Vital Sign Comment: 



EKG Rhythm: Sinus Rhythm

Rhythm change?: N 

MD Notified?: N

MD Response: N/A 



Latest Mejia Fall Score: 50  

Fall Risk: High Risk 

Safety Measures: Call light Within Reach, Bed Alarm Zone 2, Side Rails Side Rails x3, Bed 
position Low and Locked.

Fall Precautions: 

Yellow Socks

Yellow Gown

Door Sign

Patient Fall Education



Report given to LITA Zapata.

## 2020-11-14 NOTE — NUR
NURSE NOTES:

Oral care provided.

Pt repositioned. 

-------------------------------------------------------------------------------

Addendum: 11/14/20 at 1340 by Jodi Dorsey RN

-------------------------------------------------------------------------------

Late entry: FiO2 titrated down to 40%

## 2020-11-14 NOTE — INTERNAL MED PROGRESS NOTE
Subjective


Date of Service:  Nov 14, 2020


Physician Name


Benito Hearn


Attending Physician


Andrei Cancino MD





Current Medications








 Medications


  (Trade)  Dose


 Ordered  Sig/Miky


 Route


 PRN Reason  Start Time


 Stop Time Status Last Admin


Dose Admin


 


 Acetaminophen


  (Tylenol)  650 mg  Q4H  PRN


 ORAL


 Temp >100.5  10/23/20 20:30


 11/22/20 20:29  11/5/20 22:39





 


 Chlorhexidine


 Gluconate


  (Jess-Hex 2%)  1 applic  DAILY@2000


 TOPIC


   11/6/20 20:00


 2/4/21 19:59  11/13/20 20:15





 


 Dextrose


  (Dextrose 50%)  50 ml  Q30M  PRN


 IV


 Hypoglycemia  10/23/20 22:45


 1/21/21 22:44   





 


 Heparin Sodium


  (Porcine)


  (Heparin 5000


 units/ml)  5,000 units  EVERY 12  HOURS


 SUBQ


   10/23/20 21:00


 12/7/20 20:59  11/14/20 08:24





 


 Insulin Aspart


  (NovoLOG)    EVERY 6  HOURS


 SUBQ


   11/2/20 06:00


 1/22/21 06:29  11/11/20 13:21





 


 Linezolid  300 ml @ 


 300 mls/hr  Q12HR


 IVPB


   11/13/20 12:00


 11/20/20 11:59  11/14/20 08:24





 


 Lorazepam


  (Ativan 2mg/ml


 1ml)  2 mg  Q4H  PRN


 IV


 For Anxiety  11/10/20 13:45


 11/17/20 13:44  11/12/20 19:20





 


 Meropenem 1 gm/


 Sodium Chloride  55 ml @ 


 110 mls/hr  Q8HR@0400,1200,2000


 IVPB


   11/11/20 12:00


 11/16/20 11:59  11/14/20 12:33





 


 Midazolam HCl 100


 mg/Sodium Chloride  200 ml @ 0


 mls/hr  Q24H  PRN


 IV


 Agitation  11/13/20 08:30


 11/15/20 08:29  11/14/20 05:23





 


 Midodrine


  (Pro-Amatine)  10 mg  Q8H


 ORAL


   11/11/20 17:00


 2/9/21 16:59  11/14/20 17:32





 


 Morphine Sulfate


  (Morphine


 Sulfate)  2 mg  Q6H  PRN


 IVP


 moderate pain  11/10/20 13:45


 11/17/20 13:44   





 


 Nitroglycerin


  (Ntg)  0.4 mg  Q5M  PRN


 SL


 Prn Chest Pain  10/23/20 20:30


 11/22/20 20:29   





 


 Norepinephrine


 Bitartrate  250 ml @ 0


 mls/hr  Q24H


 IV


   11/12/20 13:30


 11/15/20 15:44  11/14/20 12:34





 


 Ondansetron HCl


  (Zofran)  4 mg  Q6H  PRN


 IVP


 Nausea & Vomiting  10/23/20 20:30


 11/22/20 20:29   





 


 Pantoprazole


  (Protonix)  40 mg  DAILY


 IV


   11/7/20 09:00


 12/7/20 08:59  11/14/20 08:24





 


 Polyethylene


 Glycol


  (Miralax)  17 gm  DAILYPRN  PRN


 ORAL


 Constipation  10/23/20 20:30


 11/22/20 20:29   





 


 Promethazine HCl/


 Codeine


  (Phenergan with


 Codeine)  5 ml  Q4H  PRN


 ORAL


 For Cough  10/23/20 20:30


 11/22/20 20:29   





 


 Sodium Chloride  1,000 ml @ 


 50 mls/hr  Q20H


 IV


   11/6/20 16:00


 12/6/20 15:59  11/14/20 00:05











Allergies:  


Coded Allergies:  


     No Known Allergies (Unverified , 8/20/12)


ROS Limited/Unobtainable:  Yes


Subjective


76 YO M admitted with shortness of breath.  Now pneumonia; previously COVID 

positive.  Cover for Int [facility]-Dr Cancino.  ICU.  Intubated and sedated. On Levophed





Objective





Last Vital Signs








  Date Time  Temp Pulse Resp B/P (MAP) Pulse Ox O2 Delivery O2 Flow Rate FiO2


 


11/14/20 17:00  96 29 105/90 (95) 97   


 


11/14/20 16:01 98.1       


 


11/14/20 16:00        40


 


11/14/20 16:00      Mechanical Ventilator  





      Mechanical Ventilator  


 


11/6/20 12:00       15.0 











Laboratory Tests








Test


 11/14/20


00:12 11/14/20


04:30 11/14/20


04:46 11/14/20


09:26


 


POC Whole Blood Glucose


 Pending  


 


 128 MG/DL


()  H 





 


White Blood Count


 


 10.8 K/UL


(4.8-10.8) 


 





 


Red Blood Count


 


 2.82 M/UL


(4.70-6.10)  L 


 





 


Hemoglobin


 


 8.4 G/DL


(14.2-18.0)  L 


 





 


Hematocrit


 


 27.6 %


(42.0-52.0)  L 


 





 


Mean Corpuscular Volume  98 FL (80-99)    


 


Mean Corpuscular Hemoglobin


 


 29.9 PG


(27.0-31.0) 


 





 


Mean Corpuscular Hemoglobin


Concent 


 30.5 G/DL


(32.0-36.0)  L 


 





 


Red Cell Distribution Width


 


 16.9 %


(11.6-14.8)  H 


 





 


Platelet Count


 


 398 K/UL


(150-450) 


 





 


Mean Platelet Volume


 


 7.7 FL


(6.5-10.1) 


 





 


Neutrophils (%) (Auto)


 


 80.9 %


(45.0-75.0)  H 


 





 


Lymphocytes (%) (Auto)


 


 9.3 %


(20.0-45.0)  L 


 





 


Monocytes (%) (Auto)


 


 7.1 %


(1.0-10.0) 


 





 


Eosinophils (%) (Auto)


 


 1.4 %


(0.0-3.0) 


 





 


Basophils (%) (Auto)


 


 1.3 %


(0.0-2.0) 


 





 


Sodium Level


 


 139 MMOL/L


(136-145) 


 





 


Potassium Level


 


 4.2 MMOL/L


(3.5-5.1) 


 





 


Chloride Level


 


 107 MMOL/L


() 


 





 


Carbon Dioxide Level


 


 24 MMOL/L


(21-32) 


 





 


Anion Gap


 


 8 mmol/L


(5-15) 


 





 


Blood Urea Nitrogen


 


 13 mg/dL


(7-18) 


 





 


Creatinine


 


 0.7 MG/DL


(0.55-1.30) 


 





 


Estimat Glomerular Filtration


Rate 


 > 60 mL/min


(>60) 


 





 


Glucose Level


 


 135 MG/DL


()  H 


 





 


Calcium Level


 


 7.8 MG/DL


(8.5-10.1)  L 


 





 


Total Bilirubin


 


 0.3 MG/DL


(0.2-1.0) 


 





 


Aspartate Amino Transf


(AST/SGOT) 


 21 U/L (15-37)


 


 





 


Alanine Aminotransferase


(ALT/SGPT) 


 28 U/L (12-78)


 


 





 


Alkaline Phosphatase


 


 144 U/L


()  H 


 





 


Total Protein


 


 6.3 G/DL


(6.4-8.2)  L 


 





 


Albumin


 


 1.4 G/DL


(3.4-5.0)  L 


 





 


Globulin  4.9 g/dL    


 


Albumin/Globulin Ratio


 


 0.3 (1.0-2.7)


L 


 





 


Arterial Blood pH


 


 


 


 7.422


(7.350-7.450)


 


Arterial Blood Partial


Pressure CO2 


 


 


 38.2 mmHg


(35.0-45.0)


 


Arterial Blood Partial


Pressure O2 


 


 


 119.2 mmHg


(75.0-100.0)  H


 


Arterial Blood HCO3


 


 


 


 24.3 mmol/L


(22.0-26.0)


 


Arterial Blood Oxygen


Saturation 


 


 


 97.9 %


()


 


Arterial Blood Base Excess    0 (-2-2)  


 


Jerod Test    Positive  

















Intake and Output  


 


 11/13/20 11/14/20





 19:00 07:00


 


Intake Total 1831.914 ml 1428.7500 ml


 


Output Total 1140 ml 645 ml


 


Balance 691.914 ml 783.7500 ml


 


  


 


Free Water 40 ml 70 ml


 


IV Total 1671.914 ml 1178.7500 ml


 


Tube Feeding 120 ml 180 ml


 


Output Urine Total 1140 ml 645 ml








Objective


PHYSICAL EXAMINATION:


GENERAL:  The patient awake with deep stimuli, open his eyes, however,


cannot follow commands.  The patient is on a Ventimask at this time,


chronically ill-appearing.


HEAD AND NECK:  Pupils are equal and reactive to light.  Anicteric.


NECK:  Supple.  No JVD.


LUNGS:  Mech vent;  wheezing, rhonchi, and decreased air in bases.


HEART:  S1, S2.  Regular rhythm.  Distant heart sounds.  No murmur or


gallop.


ABDOMEN:  Soft, nondistended, nontender.  Positive bowel sounds.


EXTREMITIES:  No cyanosis, clubbing, or edema.


NEUROLOGIC:  Very limited secondary to the patient's status, cannot follow


commands.  Opens his eyes with deep stimuli and moving extremities


spontaneously.





Assessment/Plan


Assessment/Plan


ASSESSMENT:


1. Acute hypoxemic respiratory failure, most likely secondary to


pneumonia and sepsis.


2. Sepsis secondary to urinary tract infection and pneumonia.


3. pneumonia=MRSA; ESBL E. coli


4. Acute kidney injury on chronic renal insufficiency.


5. Dehydration.


6. History of chronic congestive heart failure.


7. Diabetes type 2.


8. Dyslipidemia.


9. Hypertension.


10. Alzheimer's disease.


11. COVID 19 previous positive





PLAN:


1.  ICU


2.  Dr. Sandoval,=Pulmonary Critical Care; follow mech vent recs


3.  Dr. Garcia = Inf Dis.


4.  IV= D5W due to the hypernatremia and dehydration.


5.  antibiotics =  meropenem; S/P micafungin


6.  Code status is full code.


7.    DVT prophylaxis is heparin subcutaneous.











Benito Hearn MD               Nov 14, 2020 17:50

## 2020-11-14 NOTE — NUR
RESPIRATORY NOTE:

Received pt on AC 12, 600VT, 40%, no PEEP. Pt intubated w/ ETT 7.5 @ 25cm lipline, secured 
by anchorfast. Pt sedated, OPA in place as pt tends to bite ETT. B/S wale. rales/rhonchi, sxn 
large amounts of thick/thin/frothy, white to pale-yellow secretions. Vent plugged into red 
outlet, ambubag at bedside. Pt in no apparent distress at this time. Will continue to 
monitor pt.

## 2020-11-14 NOTE — NUR
NURSE NOTES:

Pt received from [facility] Ndiaye. Pt opens eyes when called by name - makes eye contact for 15 
sec (RASS -1 without sedation). Gag reflex hypoactive. Pt withdraws to pain but unable to 
follow simple commands. Bilat pupils equal and round - 4mm with sluggish rxn to light. Pt is 
in SR to cardiac monitor. Radial and dorsalis pedis pulses 2+. Afebrile at this time with 
cooling blanket on monitor mode. Cap refill 2-3 sec. Pt is orally intubated with a 7.5 ETT 
noted 25 cm at the lip with the following settings: AC 14  FiO2 50% Peep 0 (ABGs to be 
drawn shortly- will f/u with RT). Upper lung fields noted with rhonchi and lower lungs 
diminished upon auscultation. Pt has a right naris NGT running Glucerna 1.2 at 20 cc/hr 
without gastric residuals noted. F/C noted draining yellow, clear urine. Skin alterations 
noted. Pt has a GURPREET PICC with dry and intact dressing running 1/2 NS at 50 cc/hr and 
levophed gtt at 4 mcg/min. Bed in lowest position, alarm on, side rails up x 2, call light 
within reach. Will continue to monitor. 

-------------------------------------------------------------------------------

Addendum: 11/14/20 at 1438 by Jodi Dorsey RN

-------------------------------------------------------------------------------

Late entry: Pt also noted with 2+ pitting edema to bilat hands and 1+ to right arm. 

Abd is soft, round, and non-distended with active bowel sounds to all quadrants.

## 2020-11-14 NOTE — PULMONOLGY CRITICAL CARE NOTE
Critical Care - Asmt/Plan


Problems:  


(1) Acute respiratory failure


(2) Multifocal pneumonia


(3) 2019 novel coronavirus disease (COVID-19)


(4) Acute encephalopathy


(5) Severe sepsis


(6) Alzheimer's dementia


(7) HTN (hypertension)


(8) History of CVA (cerebrovascular accident)


(9) BPH (benign prostatic hyperplasia)


Assessment/Plan:


on Levophed, FiO2 90% now


Respiratory:  monitor respiratory rate, adjust FIO2, CXR


Cardiac:  continue to monitor HR/BP


Renal:  F/U I&O, keep IV fluid, check electrolytes


Infectious Disease:  check cultures, continue antibiotics


Gastrointestinal:  continue feedings/current rate


Endocrine:  monitor blood sugar


Hematologic:  monitor H/H, transfuse if hgb<8.5


Neurologic:  PRN Ativan


Prophylaxis:  Protonix, Heparin


Time Spent (Minutes):  40


Notes Reviewed:  internist, cardio, renal


Discussed with:  nurses, consultants, 





Critical Care - Objective





Last 24 Hour Vital Signs








  Date Time  Temp Pulse Resp B/P (MAP) Pulse Ox O2 Delivery O2 Flow Rate FiO2


 


11/14/20 07:22  60 12     50


 


11/14/20 07:00  62 12 114/57 (76) 100   


 


11/14/20 06:30  60 14     


 


11/14/20 06:00  61 13 102/60 (74) 100   


 


11/14/20 05:45   14   Mechanical Ventilator  50


 


11/14/20 05:23   14   Mechanical Ventilator  50


 


11/14/20 05:00   14   Mechanical Ventilator  50


 


11/14/20 05:00  68 14 118/61 (80) 100   


 


11/14/20 04:45  72 17 145/67 (93) 100   


 


11/14/20 04:30  70 16 132/81 (98) 100   


 


11/14/20 04:12  71      


 


11/14/20 04:00   14   Mechanical Ventilator  50


 


11/14/20 04:00 97.8 69 15 136/66 (89) 100   


 


11/14/20 04:00      Mechanical Ventilator  





      Mechanical Ventilator  


 


11/14/20 04:00        50


 


11/14/20 03:45   16   Mechanical Ventilator  50


 


11/14/20 03:44  79 18     50


 


11/14/20 03:30   17     


 


11/14/20 03:00  81 18 141/76 (97) 99   


 


11/14/20 02:45   18   Mechanical Ventilator  50


 


11/14/20 02:00  83 17 132/72 (92) 98   


 


11/14/20 01:45  84 17 132/72 (92) 98   


 


11/14/20 01:45   14   Mechanical Ventilator  50


 


11/14/20 01:30  85 16 134/66 (88) 100   


 


11/14/20 01:30   16   Mechanical Ventilator  50


 


11/14/20 01:15   17   Mechanical Ventilator  50


 


11/14/20 01:15  85 17 127/63 (84) 100   


 


11/14/20 01:00   14   Mechanical Ventilator  50


 


11/14/20 01:00  86 17 121/67 (85) 100   


 


11/14/20 00:45  81 17 127/68 (87) 100   


 


11/14/20 00:45   20     50


 


11/14/20 00:00        50


 


11/14/20 00:00      Mechanical Ventilator  





      Mechanical Ventilator  


 


11/14/20 00:00 98.1 74 12 98/59 (72) 100   


 


11/13/20 23:10  85 14     50


 


11/13/20 23:07  82      


 


11/13/20 23:00  85 19 119/69 (86) 100   


 


11/13/20 22:47  83 19 144/84 (104) 100   


 


11/13/20 22:00  86 12 125/67 (86) 99   


 


11/13/20 21:15  83 12 100/52 (68) 99   


 


11/13/20 21:00  82 12 87/52 (64) 99   


 


11/13/20 20:15  81 18 112/66 (81) 100   


 


11/13/20 20:15   18     50


 


11/13/20 20:00 96.8 81 24 123/64 (83) 100   


 


11/13/20 20:00      Mechanical Ventilator  





      Mechanical Ventilator  


 


11/13/20 20:00        50


 


11/13/20 20:00   20     50


 


11/13/20 19:20  89      


 


11/13/20 19:09  86 13     50


 


11/13/20 19:00  84 17 124/66 (85) 100   


 


11/13/20 19:00   12   Mechanical Ventilator  50


 


11/13/20 18:45  85 18 121/66 (84) 99   


 


11/13/20 18:30  84 12 117/63 (81) 100   


 


11/13/20 18:15  84 12 110/60 (77) 98   


 


11/13/20 18:03    104/57    


 


11/13/20 18:00  82 12 104/57 (73) 98   


 


11/13/20 18:00   12   Mechanical Ventilator  50


 


11/13/20 17:45  81 12 94/57 (69) 98   


 


11/13/20 17:45   12   Mechanical Ventilator  50


 


11/13/20 17:30   12   Mechanical Ventilator  50


 


11/13/20 17:15   12   Mechanical Ventilator  50


 


11/13/20 17:10  80 12     50


 


11/13/20 17:00   12   Mechanical Ventilator  50


 


11/13/20 17:00  78 12 93/52 (66) 98   


 


11/13/20 16:45  78 12 96/55 (69) 98   


 


11/13/20 16:30  75 12 85/51 (62) 98   


 


11/13/20 16:15  72 13 117/69 (85) 100   


 


11/13/20 16:00      Mechanical Ventilator  





      Mechanical Ventilator  


 


11/13/20 16:00  70      


 


11/13/20 16:00   12   Mechanical Ventilator  50


 


11/13/20 16:00 97.1 69 16 108/66 (80) 100   


 


11/13/20 15:45  67 12 96/62 (73) 100   


 


11/13/20 15:45   16   Mechanical Ventilator  50


 


11/13/20 15:30  68 13 85/55 (65) 100   


 


11/13/20 15:15  69 18 91/58 (69) 100   


 


11/13/20 15:11  72 12     50


 


11/13/20 15:00   14   Mechanical Ventilator  50


 


11/13/20 15:00  72 19 101/64 (76) 99   


 


11/13/20 14:45  73 21 113/68 (83) 99   


 


11/13/20 14:45   12   Mechanical Ventilator  50


 


11/13/20 14:30  78 19 104/62 (76) 98   


 


11/13/20 14:23        50


 


11/13/20 14:15  80 20 109/64 (79) 99   


 


11/13/20 14:00   16   Mechanical Ventilator  60


 


11/13/20 14:00  81 17 107/65 (79) 99   


 


11/13/20 13:45  82 11 128/68 (88) 98   


 


11/13/20 13:45   14   Mechanical Ventilator  60


 


11/13/20 13:30  80 17 122/69 (86) 100   


 


11/13/20 13:10  80 16     60


 


11/13/20 13:00   16   Mechanical Ventilator  60


 


11/13/20 13:00  81 19 121/63 (82) 100   


 


11/13/20 12:30  78 12 101/59 (73) 100   


 


11/13/20 12:00      Mechanical Ventilator  





      Mechanical Ventilator  


 


11/13/20 12:00 99.1 78 13 95/62 (73) 100   


 


11/13/20 12:00  79      


 


11/13/20 12:00   12   Mechanical Ventilator  60


 


11/13/20 12:00        60


 


11/13/20 11:30  77 14 97/63 (74) 99   


 


11/13/20 11:00  72 14 99/58 (72) 100   


 


11/13/20 11:00   14   Mechanical Ventilator  90


 


11/13/20 11:00  79 12     60


 


11/13/20 10:45  72 14 97/58 (71) 100   


 


11/13/20 10:30  69 14 93/57 (69) 100   


 


11/13/20 10:30   14   Mechanical Ventilator  90


 


11/13/20 10:15  72 14 94/58 (70) 100   


 


11/13/20 10:00  78 15 106/60 (75) 100   


 


11/13/20 10:00   14   Mechanical Ventilator  90


 


11/13/20 09:45  74 14 92/53 (66) 100   


 


11/13/20 09:30  84 14 96/56 (69) 100   


 


11/13/20 09:30   14   Mechanical Ventilator  90


 


11/13/20 09:25  71 14     90


 


11/13/20 09:15  83 15 121/66 (84) 100   


 


11/13/20 09:02   14   Mechanical Ventilator  90


 


11/13/20 09:00   14   Mechanical Ventilator  90


 


11/13/20 09:00  79 14 133/70 (91) 100   








Status:  awake


Condition:  critical


HEENT:  atraumatic, normocephalic


Neck:  full ROM


Lungs:  rales, rhonchi


Heart:  HR/BP stable


Abdomen:  soft, non-tender


Extremities:  no C/C/E


Accucheck:  128





Critical Care - Subjective


ROS Limited/Unobtainable:  Yes


Condition:  critical


EKG Rhythm:  Sinus Rhythm


FI02:  50


Vent Support Breath Rate:  12


Vent Support Mode:  AC


Vent Tidal Volume:  600


Sputum Amount:  Moderate


PEEP:  0.0


PIP:  20


Tube Feeding Amount:  20


I&O:











Intake and Output  


 


 11/13/20 11/14/20





 19:00 07:00


 


Intake Total 1831.914 ml 1428.7500 ml


 


Output Total 1140 ml 645 ml


 


Balance 691.914 ml 783.7500 ml


 


  


 


Free Water 40 ml 70 ml


 


IV Total 1671.914 ml 1178.7500 ml


 


Tube Feeding 120 ml 180 ml


 


Output Urine Total 1140 ml 645 ml








CXR:


Increased bilateral pleural effusions


Unchanged bilateral interstitial and airspace infiltrates versus edema


Decreased subcutaneous emphysema


ET-Tube:  7.5


ET Position:  25


Labs:





Laboratory Tests








Test


 11/13/20


10:27 11/14/20


00:12 11/14/20


04:30 11/14/20


04:46


 


Arterial Blood pH


 7.506


(7.350-7.450) 


 


 





 


Arterial Blood Partial


Pressure CO2 29.7 mmHg


(35.0-45.0)  L 


 


 





 


Arterial Blood Partial


Pressure O2 182.9 mmHg


(75.0-100.0)  H 


 


 





 


Arterial Blood HCO3


 23.0 mmol/L


(22.0-26.0) 


 


 





 


Arterial Blood Oxygen


Saturation 99.1 %


() 


 


 





 


Arterial Blood Base Excess 0.2 (-2-2)     


 


Jerod Test Positive     


 


POC Whole Blood Glucose


 


 Pending  


 


 128 MG/DL


()  H


 


White Blood Count


 


 


 10.8 K/UL


(4.8-10.8) 





 


Red Blood Count


 


 


 2.82 M/UL


(4.70-6.10)  L 





 


Hemoglobin


 


 


 8.4 G/DL


(14.2-18.0)  L 





 


Hematocrit


 


 


 27.6 %


(42.0-52.0)  L 





 


Mean Corpuscular Volume   98 FL (80-99)   


 


Mean Corpuscular Hemoglobin


 


 


 29.9 PG


(27.0-31.0) 





 


Mean Corpuscular Hemoglobin


Concent 


 


 30.5 G/DL


(32.0-36.0)  L 





 


Red Cell Distribution Width


 


 


 16.9 %


(11.6-14.8)  H 





 


Platelet Count


 


 


 398 K/UL


(150-450) 





 


Mean Platelet Volume


 


 


 7.7 FL


(6.5-10.1) 





 


Neutrophils (%) (Auto)


 


 


 80.9 %


(45.0-75.0)  H 





 


Lymphocytes (%) (Auto)


 


 


 9.3 %


(20.0-45.0)  L 





 


Monocytes (%) (Auto)


 


 


 7.1 %


(1.0-10.0) 





 


Eosinophils (%) (Auto)


 


 


 1.4 %


(0.0-3.0) 





 


Basophils (%) (Auto)


 


 


 1.3 %


(0.0-2.0) 





 


Sodium Level


 


 


 139 MMOL/L


(136-145) 





 


Potassium Level


 


 


 4.2 MMOL/L


(3.5-5.1) 





 


Chloride Level


 


 


 107 MMOL/L


() 





 


Carbon Dioxide Level


 


 


 24 MMOL/L


(21-32) 





 


Anion Gap


 


 


 8 mmol/L


(5-15) 





 


Blood Urea Nitrogen


 


 


 13 mg/dL


(7-18) 





 


Creatinine


 


 


 0.7 MG/DL


(0.55-1.30) 





 


Estimat Glomerular Filtration


Rate 


 


 > 60 mL/min


(>60) 





 


Glucose Level


 


 


 135 MG/DL


()  H 





 


Calcium Level


 


 


 7.8 MG/DL


(8.5-10.1)  L 





 


Total Bilirubin


 


 


 0.3 MG/DL


(0.2-1.0) 





 


Aspartate Amino Transf


(AST/SGOT) 


 


 21 U/L (15-37)


 





 


Alanine Aminotransferase


(ALT/SGPT) 


 


 28 U/L (12-78)


 





 


Alkaline Phosphatase


 


 


 144 U/L


()  H 





 


Total Protein


 


 


 6.3 G/DL


(6.4-8.2)  L 





 


Albumin


 


 


 1.4 G/DL


(3.4-5.0)  L 





 


Globulin   4.9 g/dL   


 


Albumin/Globulin Ratio


 


 


 0.3 (1.0-2.7)


L 




















Michael Sandoval MD              Nov 14, 2020 09:01

## 2020-11-14 NOTE — NEPHROLOGY PROGRESS NOTE
Assessment/Plan


Problem List:  


(1) Dehydration


(2) JANES (acute kidney injury)


(3) Renal failure (ARF), acute on chronic


(4) Hypoxia


(5) Acute encephalopathy


(6) Electrolyte imbalance


Assessment





75-year-old male is admitted with acute hypoxic respiratory failure most likely 

secondary to pneumonia and sepsis, UTI.


Acute on chronic renal failure


Dehydration


Electrolyte imbalances, hypernatremia


Hypoalbuminemia


Diabetes type 2


History of congestive heart failure


Hypertension


Hyperlipemia


Alzheimer's


Previous COVID-19 infection in September 2020


Plan


November 14: Seen in ICU.  Discussed with LITA Cooper.  Flomax discontinued.  

Labs reviewed.  Stable from renal standpoint of view.


November 13: Seen in ICU.  Discussed with LITA Cooper.  Remains on pressor.  

Electrolyte abnormalities noted and addressed.  Continue per consultants.  

Patient remains full code.


November 12: Seen in ICU.  Discussed with LITA Richardson.  Blood pressure remains a 

little requiring pressors.  Labs reviewed.  Stable renal parameters.  Continue 

per consultants.


November 11: Remains intubated.  Full code.  Blood pressure borderline low.  

Will increase midodrine dose.  Discussed with RN.  Continue to monitor renal 

parameters and electrolytes.


November 10: Intubated.  Full code.  Labs reviewed.  Stable from renal 

standpoint of view.  Continue per consultants.


November 9: Remains intubated.  Full code.  Labs reviewed.  Electrolytes within 

normal limit.  Continue per consultants.


November 8: In ICU.  Remains intubated on ventilator.  Remains full code.  Low 

potassium addressed.  Blood pressure fluctuating.  Continue per consultants.


November 7: Patient in ICU.  Intubated on ventilator.  On 6 mics of Levophed.  

Will give 100 cc albumin 25%.  K-Phos IV ordered.  Continue per consultants.  

Continue monitor renal parameters and electrolytes.


November 6: Status unchanged.  Transfer to [facility] for seizure.  Stable from renal 

standpoint to view.  Continue per consultants.


November 5: Status quo.  Labs reviewed.  Renal parameters stable.  Continue per 

consultants.


November 4: On nonrebreather mask.  Labs reviewed.  Renal parameters 

stable.Continue per pulmonologist.  Clinically unchanged.


November 3: On nonrebreather mask.  Inflammatory markers gradually declining.  

Renal parameters stable.  Continue per pulmonary.


November 2: Remains on nonrebreather mask.  Inflammatory markers remain 

elevated.  Renal parameters somewhat stable.  Continue per pulmonary and ID.  

Remains full code.


November 1: Patient on nonrebreather mask.  Labs noted.  Serum creatinine down 

to 1.3.  Continue per consultants.


October 31: Patient on nonrebreather mask.  Transfer to telemetry when seen this

morning.  Labs noted.  Continue per consultants.  Serum creatinine dylan to 1.7. 

Continue to monitor renal parameters.  Continue to monitor vancomycin level


October 30: Patient is not doing well clinically.  Mild respiratory distress.  

ABG noted.  Somewhat hypoxic.  CBC and chemistry panel ordered.  Discussed with 

LITA Garcia.  Will defer to pulmonary management to specialist.  Continue per ID.

 Renal parameters remained stable as of October 29.


October 29: Labs reviewed.  Renal parameters stable.  Continue per consultants.


October 28: Labs reviewed.  Potassium via NG tube ordered.  IV fluids stopped.  

Continue per consultants.


October 27: Labs reviewed.  Low potassium and low phosphorus replaced.  Continue

per consultants.  Remains stable from renal standpoint of view.


October 26: Patient remains n.p.o.  IV fluid down to 50 cc an hour.  Potassium 

supplement intravenously ordered.  Continue per consultants.





Previously:


Patient is n.p.o., will continue on IV fluid of D5W 75 cc an hour


We will monitor electrolytes and renal parameters


Avoid nephrotoxic's


Start p.o. when he clears by speech therapist, meanwhile aspiration precautions


Keep the blood pressure and blood sugar in check


Per orders





Subjective


ROS Limited/Unobtainable:  Yes





Objective


Objective





Last 24 Hour Vital Signs








  Date Time  Temp Pulse Resp B/P (MAP) Pulse Ox O2 Delivery O2 Flow Rate FiO2


 


11/14/20 12:34    83/52    


 


11/14/20 12:00        40


 


11/14/20 12:00      Mechanical Ventilator  





      Mechanical Ventilator  


 


11/14/20 12:00  68      


 


11/14/20 12:00  67 12 97/53 (68) 100   


 


11/14/20 11:30  68 12 81/53 (62) 100   


 


11/14/20 11:00  67 12 109/60 (76) 100   


 


11/14/20 10:59  62 12     40


 


11/14/20 10:30  66 12 103/60 (74) 100   


 


11/14/20 10:15  66 12 88/54 (65) 100   


 


11/14/20 10:00  65 12 86/49 (61) 100   


 


11/14/20 09:30  65 12 100/53 (69) 100   


 


11/14/20 09:00  65 15 108/55 (72) 100   


 


11/14/20 08:30  60 12 91/54 (66) 100   


 


11/14/20 08:00  61      


 


11/14/20 08:00 97.2 63 12 95/52 (66) 100   


 


11/14/20 08:00      Mechanical Ventilator  





      Mechanical Ventilator  


 


11/14/20 08:00        40


 


11/14/20 07:30  60 12 94/50 (65) 100   


 


11/14/20 07:22  60 12     50


 


11/14/20 07:00  62 12 114/57 (76) 100   


 


11/14/20 06:30  60 14     


 


11/14/20 06:00  61 13 102/60 (74) 100   


 


11/14/20 05:45   14   Mechanical Ventilator  50


 


11/14/20 05:23   14   Mechanical Ventilator  50


 


11/14/20 05:00   14   Mechanical Ventilator  50


 


11/14/20 05:00  68 14 118/61 (80) 100   


 


11/14/20 04:45  72 17 145/67 (93) 100   


 


11/14/20 04:30  70 16 132/81 (98) 100   


 


11/14/20 04:12  71      


 


11/14/20 04:00   14   Mechanical Ventilator  50


 


11/14/20 04:00 97.8 69 15 136/66 (89) 100   


 


11/14/20 04:00      Mechanical Ventilator  





      Mechanical Ventilator  


 


11/14/20 04:00        50


 


11/14/20 03:45   16   Mechanical Ventilator  50


 


11/14/20 03:44  79 18     50


 


11/14/20 03:30   17     


 


11/14/20 03:00  81 18 141/76 (97) 99   


 


11/14/20 02:45   18   Mechanical Ventilator  50


 


11/14/20 02:00  83 17 132/72 (92) 98   


 


11/14/20 01:45  84 17 132/72 (92) 98   


 


11/14/20 01:45   14   Mechanical Ventilator  50


 


11/14/20 01:30  85 16 134/66 (88) 100   


 


11/14/20 01:30   16   Mechanical Ventilator  50


 


11/14/20 01:15   17   Mechanical Ventilator  50


 


11/14/20 01:15  85 17 127/63 (84) 100   


 


11/14/20 01:00   14   Mechanical Ventilator  50


 


11/14/20 01:00  86 17 121/67 (85) 100   


 


11/14/20 00:45  81 17 127/68 (87) 100   


 


11/14/20 00:45   20     50


 


11/14/20 00:00        50


 


11/14/20 00:00      Mechanical Ventilator  





      Mechanical Ventilator  


 


11/14/20 00:00 98.1 74 12 98/59 (72) 100   


 


11/13/20 23:10  85 14     50


 


11/13/20 23:07  82      


 


11/13/20 23:00  85 19 119/69 (86) 100   


 


11/13/20 22:47  83 19 144/84 (104) 100   


 


11/13/20 22:00  86 12 125/67 (86) 99   


 


11/13/20 21:15  83 12 100/52 (68) 99   


 


11/13/20 21:00  82 12 87/52 (64) 99   


 


11/13/20 20:15  81 18 112/66 (81) 100   


 


11/13/20 20:15   18     50


 


11/13/20 20:00 96.8 81 24 123/64 (83) 100   


 


11/13/20 20:00      Mechanical Ventilator  





      Mechanical Ventilator  


 


11/13/20 20:00        50


 


11/13/20 20:00   20     50


 


11/13/20 19:20  89      


 


11/13/20 19:09  86 13     50


 


11/13/20 19:00  84 17 124/66 (85) 100   


 


11/13/20 19:00   12   Mechanical Ventilator  50


 


11/13/20 18:45  85 18 121/66 (84) 99   


 


11/13/20 18:30  84 12 117/63 (81) 100   


 


11/13/20 18:15  84 12 110/60 (77) 98   


 


11/13/20 18:03    104/57    


 


11/13/20 18:00  82 12 104/57 (73) 98   


 


11/13/20 18:00   12   Mechanical Ventilator  50


 


11/13/20 17:45  81 12 94/57 (69) 98   


 


11/13/20 17:45   12   Mechanical Ventilator  50


 


11/13/20 17:30   12   Mechanical Ventilator  50


 


11/13/20 17:15   12   Mechanical Ventilator  50


 


11/13/20 17:10  80 12     50


 


11/13/20 17:00   12   Mechanical Ventilator  50


 


11/13/20 17:00  78 12 93/52 (66) 98   


 


11/13/20 16:45  78 12 96/55 (69) 98   


 


11/13/20 16:30  75 12 85/51 (62) 98   


 


11/13/20 16:15  72 13 117/69 (85) 100   


 


11/13/20 16:00      Mechanical Ventilator  





      Mechanical Ventilator  


 


11/13/20 16:00  70      


 


11/13/20 16:00   12   Mechanical Ventilator  50


 


11/13/20 16:00 97.1 69 16 108/66 (80) 100   


 


11/13/20 15:45  67 12 96/62 (73) 100   


 


11/13/20 15:45   16   Mechanical Ventilator  50


 


11/13/20 15:30  68 13 85/55 (65) 100   


 


11/13/20 15:15  69 18 91/58 (69) 100   


 


11/13/20 15:11  72 12     50


 


11/13/20 15:00   14   Mechanical Ventilator  50


 


11/13/20 15:00  72 19 101/64 (76) 99   


 


11/13/20 14:45  73 21 113/68 (83) 99   


 


11/13/20 14:45   12   Mechanical Ventilator  50


 


11/13/20 14:30  78 19 104/62 (76) 98   


 


11/13/20 14:23        50


 


11/13/20 14:15  80 20 109/64 (79) 99   


 


11/13/20 14:00   16   Mechanical Ventilator  60


 


11/13/20 14:00  81 17 107/65 (79) 99   


 


11/13/20 13:45  82 11 128/68 (88) 98   


 


11/13/20 13:45   14   Mechanical Ventilator  60


 


11/13/20 13:30  80 17 122/69 (86) 100   


 


11/13/20 13:10  80 16     60


 


11/13/20 13:00   16   Mechanical Ventilator  60


 


11/13/20 13:00  81 19 121/63 (82) 100   

















Intake and Output 0 


 


 11/13/20 11/14/20





 19:00 07:00


 


Intake Total 1831.914 ml 1428.7500 ml


 


Output Total 1140 ml 645 ml


 


Balance 691.914 ml 783.7500 ml


 


  


 


Free Water 40 ml 70 ml


 


IV Total 1671.914 ml 1178.7500 ml


 


Tube Feeding 120 ml 180 ml


 


Output Urine Total 1140 ml 645 ml








Laboratory Tests


11/14/20 00:12: POC Whole Blood Glucose [Pending]


11/14/20 04:30: 


White Blood Count 10.8, Red Blood Count 2.82L, Hemoglobin 8.4L, Hematocrit 27.6L

, Mean Corpuscular Volume 98, Mean Corpuscular Hemoglobin 29.9, Mean Corpuscular

 Hemoglobin Concent 30.5L, Red Cell Distribution Width 16.9H, Platelet Count 

398, Mean Platelet Volume 7.7, Neutrophils (%) (Auto) 80.9H, Lymphocytes (%) 

(Auto) 9.3L, Monocytes (%) (Auto) 7.1, Eosinophils (%) (Auto) 1.4, Basophils (%)

 (Auto) 1.3, Sodium Level 139, Potassium Level 4.2, Chloride Level 107, Carbon 

Dioxide Level 24, Anion Gap 8, Blood Urea Nitrogen 13, Creatinine 0.7, Estimat 

Glomerular Filtration Rate > 60, Glucose Level 135H, Calcium Level 7.8L, Total 

Bilirubin 0.3, Aspartate Amino Transf (AST/SGOT) 21, Alanine Aminotransferase 

(ALT/SGPT) 28, Alkaline Phosphatase 144H, Total Protein 6.3L, Albumin 1.4L, 

Globulin 4.9, Albumin/Globulin Ratio 0.3L


11/14/20 04:46: POC Whole Blood Glucose 128H


11/14/20 09:26: 


Arterial Blood pH 7.422, Arterial Blood Partial Pressure CO2 38.2, Arterial 

Blood Partial Pressure O2 119.2H, Arterial Blood HCO3 24.3, Arterial Blood 

Oxygen Saturation 97.9, Arterial Blood Base Excess 0, Jerod Test Positive


Height (Feet):  5


Height (Inches):  4.00


Weight (Pounds):  130


General Appearance:  no apparent distress


EENT:  other - Intubated on ventilator


Cardiovascular:  normal rate


Respiratory/Chest:  decreased breath sounds


Abdomen:  distended











Seven Tobar MD            Nov 14, 2020 13:01

## 2020-11-14 NOTE — NUR
NURSE NOTES:

Pt seen by Dr Sandoval.

Weaning deferred at this time until pt is more alert (per Dr Sandoval). 

-------------------------------------------------------------------------------

Addendum: 11/14/20 at 1340 by Jodi Dorsey RN

-------------------------------------------------------------------------------

Late entry: ABG results relayed to Dr Sandoval

## 2020-11-14 NOTE — INFECTIOUS DISEASES PROG NOTE
Assessment/Plan


76yo M with:


Respiratory code 2ry to mucus plug 11/12


SEptic Shock- -recurrent


Fever,r ecurrent, low grade;SP


Leukocytosis ;recurrent ; increased


Acute hypoxic resp failure, on NRB mask> 4l NC; back on NRB, desaturation 10/29 

> intubated 11/6


Pneumonia- >HAP


hx of COVID19 PNA 9/9/20


         --11/10 CXR:  Bilateral infiltrates in a peribronchovascular 

distribution are unchanged. Left pleural effusion is unchanged.


         --11/8 CXR: Persistent bilateral patchy pulmonary opacities, most 

prominent  in the left lower lung.


         --11/7 ucx neg


                  sp cx MRSA (colonizer at this point)


                  Bcx Neg


         --11/2 Bcx Neg


          10/30 Sp cx MRSA (Vancomycin ADRIANA 1), ESBL E.coli


   10/23 BCx NTD


   UA 15-20 WBC, UCx Neg


   10/23 COVID rapid neg; 10/26 rapid COVID PCR + (from prior infection)- not 

new infection


   Flu A/B neg


   CXR: L pna


   Resp cx MRSA





Neftali on CKD, improving





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


Non-verbal


VRE and MRSA colonized





Plan:


Meropenem #13/14 for ESBL PNA


contt ZYvoz # 2  for MRSA coverage given mucus plug and worsening hemodynamics


   -11/10 SP Micafungin #4


   -11/6 SP IV Vancomycin #15


   -11/2 SP Zosyn #4


   -10/26 SP Cefepime #4


   -10/24 SP Flagyl #








Monitor CBC/CMP


Monitor resp status


Monitor temp curve and hemodynamics


repeat jimenez cultures





D/w RN





Thank you for this consult. [facility] will continue to follow.





Subjective


Allergies:  


Coded Allergies:  


     No Known Allergies (Unverified , 8/20/12)


afebrile 


in ICU





Objective





Last 24 Hour Vital Signs








  Date Time  Temp Pulse Resp B/P (MAP) Pulse Ox O2 Delivery O2 Flow Rate FiO2


 


11/14/20 13:00  66 12 144/69 (94) 100   


 


11/14/20 12:45  67 14 127/63 (84) 100   


 


11/14/20 12:39  73 12 91/53 (66) 100   


 


11/14/20 12:34    83/52    


 


11/14/20 12:30  67 12 90/52 (65) 100   


 


11/14/20 12:15 97.8 67 12 97/53 (68) 100   


 


11/14/20 12:00        40


 


11/14/20 12:00      Mechanical Ventilator  





      Mechanical Ventilator  


 


11/14/20 12:00  68      


 


11/14/20 12:00  67 12 97/53 (68) 100   


 


11/14/20 11:30  68 12 81/53 (62) 100   


 


11/14/20 11:00  67 12 109/60 (76) 100   


 


11/14/20 10:59  62 12     40


 


11/14/20 10:30  66 12 103/60 (74) 100   


 


11/14/20 10:15  66 12 88/54 (65) 100   


 


11/14/20 10:00  65 12 86/49 (61) 100   


 


11/14/20 09:30  65 12 100/53 (69) 100   


 


11/14/20 09:00  65 15 108/55 (72) 100   


 


11/14/20 08:30  60 12 91/54 (66) 100   


 


11/14/20 08:00  61      


 


11/14/20 08:00 97.2 63 12 95/52 (66) 100   


 


11/14/20 08:00      Mechanical Ventilator  





      Mechanical Ventilator  


 


11/14/20 08:00        40


 


11/14/20 07:30  60 12 94/50 (65) 100   


 


11/14/20 07:22  60 12     50


 


11/14/20 07:00  62 12 114/57 (76) 100   


 


11/14/20 06:30  60 14     


 


11/14/20 06:00  61 13 102/60 (74) 100   


 


11/14/20 05:45   14   Mechanical Ventilator  50


 


11/14/20 05:23   14   Mechanical Ventilator  50


 


11/14/20 05:00   14   Mechanical Ventilator  50


 


11/14/20 05:00  68 14 118/61 (80) 100   


 


11/14/20 04:45  72 17 145/67 (93) 100   


 


11/14/20 04:30  70 16 132/81 (98) 100   


 


11/14/20 04:12  71      


 


11/14/20 04:00   14   Mechanical Ventilator  50


 


11/14/20 04:00 97.8 69 15 136/66 (89) 100   


 


11/14/20 04:00      Mechanical Ventilator  





      Mechanical Ventilator  


 


11/14/20 04:00        50


 


11/14/20 03:45   16   Mechanical Ventilator  50


 


11/14/20 03:44  79 18     50


 


11/14/20 03:30   17     


 


11/14/20 03:00  81 18 141/76 (97) 99   


 


11/14/20 02:45   18   Mechanical Ventilator  50


 


11/14/20 02:00  83 17 132/72 (92) 98   


 


11/14/20 01:45  84 17 132/72 (92) 98   


 


11/14/20 01:45   14   Mechanical Ventilator  50


 


11/14/20 01:30  85 16 134/66 (88) 100   


 


11/14/20 01:30   16   Mechanical Ventilator  50


 


11/14/20 01:15   17   Mechanical Ventilator  50


 


11/14/20 01:15  85 17 127/63 (84) 100   


 


11/14/20 01:00   14   Mechanical Ventilator  50


 


11/14/20 01:00  86 17 121/67 (85) 100   


 


11/14/20 00:45  81 17 127/68 (87) 100   


 


11/14/20 00:45   20     50


 


11/14/20 00:00        50


 


11/14/20 00:00      Mechanical Ventilator  





      Mechanical Ventilator  


 


11/14/20 00:00 98.1 74 12 98/59 (72) 100   


 


11/13/20 23:10  85 14     50


 


11/13/20 23:07  82      


 


11/13/20 23:00  85 19 119/69 (86) 100   


 


11/13/20 22:47  83 19 144/84 (104) 100   


 


11/13/20 22:00  86 12 125/67 (86) 99   


 


11/13/20 21:15  83 12 100/52 (68) 99   


 


11/13/20 21:00  82 12 87/52 (64) 99   


 


11/13/20 20:15  81 18 112/66 (81) 100   


 


11/13/20 20:15   18     50


 


11/13/20 20:00 96.8 81 24 123/64 (83) 100   


 


11/13/20 20:00      Mechanical Ventilator  





      Mechanical Ventilator  


 


11/13/20 20:00        50


 


11/13/20 20:00   20     50


 


11/13/20 19:20  89      


 


11/13/20 19:09  86 13     50


 


11/13/20 19:00  84 17 124/66 (85) 100   


 


11/13/20 19:00   12   Mechanical Ventilator  50


 


11/13/20 18:45  85 18 121/66 (84) 99   


 


11/13/20 18:30  84 12 117/63 (81) 100   


 


11/13/20 18:15  84 12 110/60 (77) 98   


 


11/13/20 18:03    104/57    


 


11/13/20 18:00  82 12 104/57 (73) 98   


 


11/13/20 18:00   12   Mechanical Ventilator  50


 


11/13/20 17:45  81 12 94/57 (69) 98   


 


11/13/20 17:45   12   Mechanical Ventilator  50


 


11/13/20 17:30   12   Mechanical Ventilator  50


 


11/13/20 17:15   12   Mechanical Ventilator  50


 


11/13/20 17:10  80 12     50


 


11/13/20 17:00   12   Mechanical Ventilator  50


 


11/13/20 17:00  78 12 93/52 (66) 98   


 


11/13/20 16:45  78 12 96/55 (69) 98   


 


11/13/20 16:30  75 12 85/51 (62) 98   


 


11/13/20 16:15  72 13 117/69 (85) 100   


 


11/13/20 16:00      Mechanical Ventilator  





      Mechanical Ventilator  


 


11/13/20 16:00  70      


 


11/13/20 16:00   12   Mechanical Ventilator  50


 


11/13/20 16:00 97.1 69 16 108/66 (80) 100   


 


11/13/20 15:45  67 12 96/62 (73) 100   


 


11/13/20 15:45   16   Mechanical Ventilator  50


 


11/13/20 15:30  68 13 85/55 (65) 100   


 


11/13/20 15:15  69 18 91/58 (69) 100   


 


11/13/20 15:11  72 12     50


 


11/13/20 15:00   14   Mechanical Ventilator  50


 


11/13/20 15:00  72 19 101/64 (76) 99   


 


11/13/20 14:45  73 21 113/68 (83) 99   


 


11/13/20 14:45   12   Mechanical Ventilator  50


 


11/13/20 14:30  78 19 104/62 (76) 98   


 


11/13/20 14:23        50


 


11/13/20 14:15  80 20 109/64 (79) 99   


 


11/13/20 14:00   16   Mechanical Ventilator  60


 


11/13/20 14:00  81 17 107/65 (79) 99   


 


11/13/20 13:45  82 11 128/68 (88) 98   


 


11/13/20 13:45   14   Mechanical Ventilator  60


 


11/13/20 13:30  80 17 122/69 (86) 100   








Height (Feet):  5


Height (Inches):  4.00


Weight (Pounds):  130


HEENT:  atraumatic


Respiratory/Chest:  no respiratory distress


Cardiovascular:  normal rate


Abdomen:  no organomegaly





Laboratory Tests








Test


 11/14/20


00:12 11/14/20


04:30 11/14/20


04:46 11/14/20


09:26


 


POC Whole Blood Glucose


 Pending  


 


 128 MG/DL


()  H 





 


White Blood Count


 


 10.8 K/UL


(4.8-10.8) 


 





 


Red Blood Count


 


 2.82 M/UL


(4.70-6.10)  L 


 





 


Hemoglobin


 


 8.4 G/DL


(14.2-18.0)  L 


 





 


Hematocrit


 


 27.6 %


(42.0-52.0)  L 


 





 


Mean Corpuscular Volume  98 FL (80-99)    


 


Mean Corpuscular Hemoglobin


 


 29.9 PG


(27.0-31.0) 


 





 


Mean Corpuscular Hemoglobin


Concent 


 30.5 G/DL


(32.0-36.0)  L 


 





 


Red Cell Distribution Width


 


 16.9 %


(11.6-14.8)  H 


 





 


Platelet Count


 


 398 K/UL


(150-450) 


 





 


Mean Platelet Volume


 


 7.7 FL


(6.5-10.1) 


 





 


Neutrophils (%) (Auto)


 


 80.9 %


(45.0-75.0)  H 


 





 


Lymphocytes (%) (Auto)


 


 9.3 %


(20.0-45.0)  L 


 





 


Monocytes (%) (Auto)


 


 7.1 %


(1.0-10.0) 


 





 


Eosinophils (%) (Auto)


 


 1.4 %


(0.0-3.0) 


 





 


Basophils (%) (Auto)


 


 1.3 %


(0.0-2.0) 


 





 


Sodium Level


 


 139 MMOL/L


(136-145) 


 





 


Potassium Level


 


 4.2 MMOL/L


(3.5-5.1) 


 





 


Chloride Level


 


 107 MMOL/L


() 


 





 


Carbon Dioxide Level


 


 24 MMOL/L


(21-32) 


 





 


Anion Gap


 


 8 mmol/L


(5-15) 


 





 


Blood Urea Nitrogen


 


 13 mg/dL


(7-18) 


 





 


Creatinine


 


 0.7 MG/DL


(0.55-1.30) 


 





 


Estimat Glomerular Filtration


Rate 


 > 60 mL/min


(>60) 


 





 


Glucose Level


 


 135 MG/DL


()  H 


 





 


Calcium Level


 


 7.8 MG/DL


(8.5-10.1)  L 


 





 


Total Bilirubin


 


 0.3 MG/DL


(0.2-1.0) 


 





 


Aspartate Amino Transf


(AST/SGOT) 


 21 U/L (15-37)


 


 





 


Alanine Aminotransferase


(ALT/SGPT) 


 28 U/L (12-78)


 


 





 


Alkaline Phosphatase


 


 144 U/L


()  H 


 





 


Total Protein


 


 6.3 G/DL


(6.4-8.2)  L 


 





 


Albumin


 


 1.4 G/DL


(3.4-5.0)  L 


 





 


Globulin  4.9 g/dL    


 


Albumin/Globulin Ratio


 


 0.3 (1.0-2.7)


L 


 





 


Arterial Blood pH


 


 


 


 7.422


(7.350-7.450)


 


Arterial Blood Partial


Pressure CO2 


 


 


 38.2 mmHg


(35.0-45.0)


 


Arterial Blood Partial


Pressure O2 


 


 


 119.2 mmHg


(75.0-100.0)  H


 


Arterial Blood HCO3


 


 


 


 24.3 mmol/L


(22.0-26.0)


 


Arterial Blood Oxygen


Saturation 


 


 


 97.9 %


()


 


Arterial Blood Base Excess    0 (-2-2)  


 


Jerod Test    Positive  











Current Medications








 Medications


  (Trade)  Dose


 Ordered  Sig/Miky


 Route


 PRN Reason  Start Time


 Stop Time Status Last Admin


Dose Admin


 


 Acetaminophen


  (Tylenol)  650 mg  Q4H  PRN


 ORAL


 Temp >100.5  10/23/20 20:30


 11/22/20 20:29  11/5/20 22:39





 


 Chlorhexidine


 Gluconate


  (Jess-Hex 2%)  1 applic  DAILY@2000


 TOPIC


   11/6/20 20:00


 2/4/21 19:59  11/13/20 20:15





 


 Dextrose


  (Dextrose 50%)  50 ml  Q30M  PRN


 IV


 Hypoglycemia  10/23/20 22:45


 1/21/21 22:44   





 


 Heparin Sodium


  (Porcine)


  (Heparin 5000


 units/ml)  5,000 units  EVERY 12  HOURS


 SUBQ


   10/23/20 21:00


 12/7/20 20:59  11/14/20 08:24





 


 Insulin Aspart


  (NovoLOG)    EVERY 6  HOURS


 SUBQ


   11/2/20 06:00


 1/22/21 06:29  11/11/20 13:21





 


 Linezolid  300 ml @ 


 300 mls/hr  Q12HR


 IVPB


   11/13/20 12:00


 11/20/20 11:59  11/14/20 08:24





 


 Lorazepam


  (Ativan 2mg/ml


 1ml)  2 mg  Q4H  PRN


 IV


 For Anxiety  11/10/20 13:45


 11/17/20 13:44  11/12/20 19:20





 


 Meropenem 1 gm/


 Sodium Chloride  55 ml @ 


 110 mls/hr  Q8HR@0400,1200,2000


 IVPB


   11/11/20 12:00


 11/16/20 11:59  11/14/20 12:33





 


 Midazolam HCl 100


 mg/Sodium Chloride  200 ml @ 0


 mls/hr  Q24H  PRN


 IV


 Agitation  11/13/20 08:30


 11/15/20 08:29  11/14/20 05:23





 


 Midodrine


  (Pro-Amatine)  10 mg  Q8H


 ORAL


   11/11/20 17:00


 2/9/21 16:59  11/14/20 08:24





 


 Morphine Sulfate


  (Morphine


 Sulfate)  2 mg  Q6H  PRN


 IVP


 moderate pain  11/10/20 13:45


 11/17/20 13:44   





 


 Nitroglycerin


  (Ntg)  0.4 mg  Q5M  PRN


 SL


 Prn Chest Pain  10/23/20 20:30


 11/22/20 20:29   





 


 Norepinephrine


 Bitartrate  250 ml @ 0


 mls/hr  Q24H


 IV


   11/12/20 13:30


 11/15/20 15:44  11/14/20 12:34





 


 Ondansetron HCl


  (Zofran)  4 mg  Q6H  PRN


 IVP


 Nausea & Vomiting  10/23/20 20:30


 11/22/20 20:29   





 


 Pantoprazole


  (Protonix)  40 mg  DAILY


 IV


   11/7/20 09:00


 12/7/20 08:59  11/14/20 08:24





 


 Polyethylene


 Glycol


  (Miralax)  17 gm  DAILYPRN  PRN


 ORAL


 Constipation  10/23/20 20:30


 11/22/20 20:29   





 


 Promethazine HCl/


 Codeine


  (Phenergan with


 Codeine)  5 ml  Q4H  PRN


 ORAL


 For Cough  10/23/20 20:30


 11/22/20 20:29   





 


 Sodium Chloride  1,000 ml @ 


 50 mls/hr  Q20H


 IV


   11/6/20 16:00


 12/6/20 15:59  11/14/20 00:05




















Tanner Lorenzana MD               Nov 14, 2020 13:42

## 2020-11-14 NOTE — NUR
NURSE NOTES:

Pt provided with a partial bed bath and oral care. ROM exercises performed per pt tolerance. 
Pt tolerated care well. Bedside assessment performed, assessed pt for pain with a FLACC 
score of 0 noted. VS obtained and remain stable. RASS score of -2 noted, see IV spreadsheet 
for titration hx. Pt repositioned for comfort and safety. Fall, Aspiration and Skin 
precautions observed. Pt remains resting in bed; Bed remains in the lowest position with the 
safety wheels engaged, call light within reach, side rails up x3 and bed alarm activated. 
Will continue plan of care. Will continue to monitor.

## 2020-11-14 NOTE — NUR
RESPIRATORY NOTE:

Pt received on AC vent settings: 12, 600, 50%, +0. HR and SpO2 within normal limits. B/S 
bilateral rhonchi, suctioned moderate amount of thick white to pale yellow secretions. Vent 
plugged into red outlet, alarms are on and audible. Ambu bag at bedside. No resp distress 
noted at time. Will continue to monitor.

## 2020-11-14 NOTE — NUR
NURSE NOTES:

PM meds given. 

Turned and repositioned.

Oral care provided.

-------------------------------------------------------------------------------

Addendum: 11/15/20 at 0117 by Arelis Cummings RN

-------------------------------------------------------------------------------

BP stable. Decreased Levo to 2mcgs

## 2020-11-14 NOTE — NUR
NURSE NOTES:

Bedside assessment performed, assessed pt for pain with a FLACC score of 0 noted. VS 
obtained and remain stable. RASS score of -1 noted and therefore Versed resumed and 
titrating. Pt repositioned for comfort and safety. Fall, Aspiration and Skin precautions 
observed. Pt remains resting in bed; Bed remains in the lowest position with the safety 
wheels engaged, call light within reach, side rails up x3 and bed alarm activated. Will 
continue plan of care. Will continue to monitor.

## 2020-11-14 NOTE — NUR
NURSE NOTES:

Received report from LITA Zapata. Pt opens eyes when called by name.

Pt is obtunded, withdraws to pain; however, unable to follow simple commands. 

Pt is in SR/ST to cardiac monitor. 2+ pitting edema to bilat hands and 1+ to right arm. 

Pt is orally intubated with ETT 7.5 /25cm from the lip line. Vent settings: AC 14  
FiO2 50% Peep 0 

Upper lung fields noted with rhonchi and lower lungs diminished upon auscultation. 

Pt has a right naris NGT running Glucerna 1.2 at 40 mL/hr without gastric residuals. 

Pt has Cummings cath draining yellow and clear urine noted. Skin alterations noted. 

Safety measures observed and no acute distress noted. will continue to monitor.

## 2020-11-14 NOTE — NUR
NURSE HAND-OFF REPORT: 



Latest Vital Signs: Temperature 98.1 , Pulse 102 , B/P 107 /59 , Respiratory Rate 22 , O2 
 , Mechanical Ventilator, FiO2 40%  .  

Vital Sign Comment: stable on 4 mcg/min of levophed



EKG Rhythm: Sinus Rhythm

Rhythm change?: N 

MD Notified?: seen by Dr Feli morfin MD Response: n/a



Latest Mejia Fall Score: 50  

Fall Risk: High Risk 

Safety Measures: Call light Within Reach, Bed Alarm Zone 2, Side Rails Side Rails x3, Bed 
position Low and Locked.

Fall Precautions: 

Yellow Socks

Yellow Gown

Door Sign

Patient Fall Education



Report given to Arelis Cummings RN.

## 2020-11-15 VITALS — DIASTOLIC BLOOD PRESSURE: 49 MMHG | SYSTOLIC BLOOD PRESSURE: 91 MMHG

## 2020-11-15 VITALS — SYSTOLIC BLOOD PRESSURE: 117 MMHG | DIASTOLIC BLOOD PRESSURE: 63 MMHG

## 2020-11-15 VITALS — DIASTOLIC BLOOD PRESSURE: 53 MMHG | SYSTOLIC BLOOD PRESSURE: 113 MMHG

## 2020-11-15 VITALS — DIASTOLIC BLOOD PRESSURE: 63 MMHG | SYSTOLIC BLOOD PRESSURE: 115 MMHG

## 2020-11-15 VITALS — DIASTOLIC BLOOD PRESSURE: 91 MMHG | SYSTOLIC BLOOD PRESSURE: 134 MMHG

## 2020-11-15 VITALS — DIASTOLIC BLOOD PRESSURE: 72 MMHG | SYSTOLIC BLOOD PRESSURE: 133 MMHG

## 2020-11-15 VITALS — SYSTOLIC BLOOD PRESSURE: 147 MMHG | DIASTOLIC BLOOD PRESSURE: 77 MMHG

## 2020-11-15 VITALS — DIASTOLIC BLOOD PRESSURE: 63 MMHG | SYSTOLIC BLOOD PRESSURE: 103 MMHG

## 2020-11-15 VITALS — DIASTOLIC BLOOD PRESSURE: 78 MMHG | SYSTOLIC BLOOD PRESSURE: 111 MMHG

## 2020-11-15 VITALS — DIASTOLIC BLOOD PRESSURE: 62 MMHG | SYSTOLIC BLOOD PRESSURE: 110 MMHG

## 2020-11-15 VITALS — SYSTOLIC BLOOD PRESSURE: 163 MMHG | DIASTOLIC BLOOD PRESSURE: 79 MMHG

## 2020-11-15 VITALS — DIASTOLIC BLOOD PRESSURE: 55 MMHG | SYSTOLIC BLOOD PRESSURE: 103 MMHG

## 2020-11-15 VITALS — DIASTOLIC BLOOD PRESSURE: 58 MMHG | SYSTOLIC BLOOD PRESSURE: 108 MMHG

## 2020-11-15 VITALS — DIASTOLIC BLOOD PRESSURE: 73 MMHG | SYSTOLIC BLOOD PRESSURE: 141 MMHG

## 2020-11-15 VITALS — DIASTOLIC BLOOD PRESSURE: 72 MMHG | SYSTOLIC BLOOD PRESSURE: 135 MMHG

## 2020-11-15 VITALS — SYSTOLIC BLOOD PRESSURE: 111 MMHG | DIASTOLIC BLOOD PRESSURE: 59 MMHG

## 2020-11-15 VITALS — SYSTOLIC BLOOD PRESSURE: 160 MMHG | DIASTOLIC BLOOD PRESSURE: 70 MMHG

## 2020-11-15 VITALS — DIASTOLIC BLOOD PRESSURE: 67 MMHG | SYSTOLIC BLOOD PRESSURE: 105 MMHG

## 2020-11-15 VITALS — DIASTOLIC BLOOD PRESSURE: 69 MMHG | SYSTOLIC BLOOD PRESSURE: 123 MMHG

## 2020-11-15 VITALS — SYSTOLIC BLOOD PRESSURE: 97 MMHG | DIASTOLIC BLOOD PRESSURE: 53 MMHG

## 2020-11-15 VITALS — DIASTOLIC BLOOD PRESSURE: 52 MMHG | SYSTOLIC BLOOD PRESSURE: 98 MMHG

## 2020-11-15 VITALS — DIASTOLIC BLOOD PRESSURE: 82 MMHG | SYSTOLIC BLOOD PRESSURE: 137 MMHG

## 2020-11-15 VITALS — SYSTOLIC BLOOD PRESSURE: 180 MMHG | DIASTOLIC BLOOD PRESSURE: 89 MMHG

## 2020-11-15 VITALS — DIASTOLIC BLOOD PRESSURE: 55 MMHG | SYSTOLIC BLOOD PRESSURE: 111 MMHG

## 2020-11-15 VITALS — SYSTOLIC BLOOD PRESSURE: 128 MMHG | DIASTOLIC BLOOD PRESSURE: 50 MMHG

## 2020-11-15 VITALS — DIASTOLIC BLOOD PRESSURE: 58 MMHG | SYSTOLIC BLOOD PRESSURE: 97 MMHG

## 2020-11-15 VITALS — DIASTOLIC BLOOD PRESSURE: 62 MMHG | SYSTOLIC BLOOD PRESSURE: 107 MMHG

## 2020-11-15 VITALS — DIASTOLIC BLOOD PRESSURE: 96 MMHG | SYSTOLIC BLOOD PRESSURE: 140 MMHG

## 2020-11-15 VITALS — SYSTOLIC BLOOD PRESSURE: 111 MMHG | DIASTOLIC BLOOD PRESSURE: 62 MMHG

## 2020-11-15 VITALS — DIASTOLIC BLOOD PRESSURE: 78 MMHG | SYSTOLIC BLOOD PRESSURE: 146 MMHG

## 2020-11-15 VITALS — SYSTOLIC BLOOD PRESSURE: 91 MMHG | DIASTOLIC BLOOD PRESSURE: 50 MMHG

## 2020-11-15 VITALS — SYSTOLIC BLOOD PRESSURE: 138 MMHG | DIASTOLIC BLOOD PRESSURE: 59 MMHG

## 2020-11-15 VITALS — DIASTOLIC BLOOD PRESSURE: 61 MMHG | SYSTOLIC BLOOD PRESSURE: 115 MMHG

## 2020-11-15 VITALS — SYSTOLIC BLOOD PRESSURE: 82 MMHG | DIASTOLIC BLOOD PRESSURE: 43 MMHG

## 2020-11-15 VITALS — SYSTOLIC BLOOD PRESSURE: 149 MMHG | DIASTOLIC BLOOD PRESSURE: 76 MMHG

## 2020-11-15 VITALS — SYSTOLIC BLOOD PRESSURE: 89 MMHG | DIASTOLIC BLOOD PRESSURE: 57 MMHG

## 2020-11-15 VITALS — SYSTOLIC BLOOD PRESSURE: 170 MMHG | DIASTOLIC BLOOD PRESSURE: 91 MMHG

## 2020-11-15 VITALS — DIASTOLIC BLOOD PRESSURE: 94 MMHG | SYSTOLIC BLOOD PRESSURE: 132 MMHG

## 2020-11-15 VITALS — DIASTOLIC BLOOD PRESSURE: 69 MMHG | SYSTOLIC BLOOD PRESSURE: 135 MMHG

## 2020-11-15 VITALS — DIASTOLIC BLOOD PRESSURE: 62 MMHG | SYSTOLIC BLOOD PRESSURE: 121 MMHG

## 2020-11-15 VITALS — SYSTOLIC BLOOD PRESSURE: 91 MMHG | DIASTOLIC BLOOD PRESSURE: 49 MMHG

## 2020-11-15 VITALS — DIASTOLIC BLOOD PRESSURE: 85 MMHG | SYSTOLIC BLOOD PRESSURE: 150 MMHG

## 2020-11-15 VITALS — SYSTOLIC BLOOD PRESSURE: 165 MMHG | DIASTOLIC BLOOD PRESSURE: 66 MMHG

## 2020-11-15 VITALS — DIASTOLIC BLOOD PRESSURE: 50 MMHG | SYSTOLIC BLOOD PRESSURE: 95 MMHG

## 2020-11-15 VITALS — SYSTOLIC BLOOD PRESSURE: 173 MMHG | DIASTOLIC BLOOD PRESSURE: 78 MMHG

## 2020-11-15 VITALS — SYSTOLIC BLOOD PRESSURE: 157 MMHG | DIASTOLIC BLOOD PRESSURE: 131 MMHG

## 2020-11-15 VITALS — SYSTOLIC BLOOD PRESSURE: 155 MMHG | DIASTOLIC BLOOD PRESSURE: 73 MMHG

## 2020-11-15 VITALS — DIASTOLIC BLOOD PRESSURE: 61 MMHG | SYSTOLIC BLOOD PRESSURE: 106 MMHG

## 2020-11-15 VITALS — DIASTOLIC BLOOD PRESSURE: 57 MMHG | SYSTOLIC BLOOD PRESSURE: 94 MMHG

## 2020-11-15 VITALS — SYSTOLIC BLOOD PRESSURE: 102 MMHG | DIASTOLIC BLOOD PRESSURE: 53 MMHG

## 2020-11-15 VITALS — DIASTOLIC BLOOD PRESSURE: 84 MMHG | SYSTOLIC BLOOD PRESSURE: 135 MMHG

## 2020-11-15 LAB
ADD MANUAL DIFF: NO
ALBUMIN SERPL-MCNC: 1.5 G/DL (ref 3.4–5)
ALBUMIN/GLOB SERPL: 0.3 {RATIO} (ref 1–2.7)
ALP SERPL-CCNC: 146 U/L (ref 46–116)
ALT SERPL-CCNC: 26 U/L (ref 12–78)
ANION GAP SERPL CALC-SCNC: 8 MMOL/L (ref 5–15)
AST SERPL-CCNC: 25 U/L (ref 15–37)
BASOPHILS NFR BLD AUTO: 7.7 % (ref 0–2)
BILIRUB SERPL-MCNC: 0.3 MG/DL (ref 0.2–1)
BUN SERPL-MCNC: 12 MG/DL (ref 7–18)
CALCIUM SERPL-MCNC: 8 MG/DL (ref 8.5–10.1)
CHLORIDE SERPL-SCNC: 107 MMOL/L (ref 98–107)
CO2 SERPL-SCNC: 25 MMOL/L (ref 21–32)
CREAT SERPL-MCNC: 1 MG/DL (ref 0.55–1.3)
EOSINOPHIL NFR BLD AUTO: 0.1 % (ref 0–3)
ERYTHROCYTE [DISTWIDTH] IN BLOOD BY AUTOMATED COUNT: 16.9 % (ref 11.6–14.8)
GLOBULIN SER-MCNC: 5.3 G/DL
HCT VFR BLD CALC: 27.6 % (ref 42–52)
HGB BLD-MCNC: 8.4 G/DL (ref 14.2–18)
LYMPHOCYTES NFR BLD AUTO: 4.4 % (ref 20–45)
MCV RBC AUTO: 100 FL (ref 80–99)
MONOCYTES NFR BLD AUTO: 5.5 % (ref 1–10)
NEUTROPHILS NFR BLD AUTO: 82.3 % (ref 45–75)
PHOSPHATE SERPL-MCNC: 2.9 MG/DL (ref 2.5–4.9)
PLATELET # BLD: 440 K/UL (ref 150–450)
POTASSIUM SERPL-SCNC: 4.6 MMOL/L (ref 3.5–5.1)
RBC # BLD AUTO: 2.77 M/UL (ref 4.7–6.1)
SODIUM SERPL-SCNC: 140 MMOL/L (ref 136–145)
WBC # BLD AUTO: 14.6 K/UL (ref 4.8–10.8)

## 2020-11-15 RX ADMIN — MIDODRINE HYDROCHLORIDE SCH MG: 10 TABLET ORAL at 08:06

## 2020-11-15 RX ADMIN — CHLORHEXIDINE GLUCONATE SCH APPLIC: 213 SOLUTION TOPICAL at 20:36

## 2020-11-15 RX ADMIN — INSULIN ASPART SCH UNITS: 100 INJECTION, SOLUTION INTRAVENOUS; SUBCUTANEOUS at 00:30

## 2020-11-15 RX ADMIN — INSULIN ASPART SCH UNITS: 100 INJECTION, SOLUTION INTRAVENOUS; SUBCUTANEOUS at 18:00

## 2020-11-15 RX ADMIN — LORAZEPAM PRN MG: 2 INJECTION, SOLUTION INTRAMUSCULAR; INTRAVENOUS at 03:13

## 2020-11-15 RX ADMIN — Medication SCH MLS/HR: at 13:01

## 2020-11-15 RX ADMIN — MIDODRINE HYDROCHLORIDE SCH MG: 10 TABLET ORAL at 16:59

## 2020-11-15 RX ADMIN — MIDODRINE HYDROCHLORIDE SCH MG: 10 TABLET ORAL at 00:31

## 2020-11-15 RX ADMIN — PANTOPRAZOLE SODIUM SCH MG: 40 INJECTION, POWDER, FOR SOLUTION INTRAVENOUS at 08:07

## 2020-11-15 RX ADMIN — HEPARIN SODIUM SCH UNITS: 5000 INJECTION INTRAVENOUS; SUBCUTANEOUS at 20:37

## 2020-11-15 RX ADMIN — INSULIN ASPART SCH UNITS: 100 INJECTION, SOLUTION INTRAVENOUS; SUBCUTANEOUS at 11:29

## 2020-11-15 RX ADMIN — MEROPENEM SCH MLS/HR: 1 INJECTION INTRAVENOUS at 20:35

## 2020-11-15 RX ADMIN — MEROPENEM SCH MLS/HR: 1 INJECTION INTRAVENOUS at 11:22

## 2020-11-15 RX ADMIN — LORAZEPAM PRN MG: 2 INJECTION, SOLUTION INTRAMUSCULAR; INTRAVENOUS at 17:11

## 2020-11-15 RX ADMIN — INSULIN ASPART SCH UNITS: 100 INJECTION, SOLUTION INTRAVENOUS; SUBCUTANEOUS at 05:16

## 2020-11-15 RX ADMIN — MEROPENEM SCH MLS/HR: 1 INJECTION INTRAVENOUS at 03:11

## 2020-11-15 RX ADMIN — HEPARIN SODIUM SCH UNITS: 5000 INJECTION INTRAVENOUS; SUBCUTANEOUS at 08:07

## 2020-11-15 NOTE — INTERNAL MED PROGRESS NOTE
Subjective


Date of Service:  Nov 15, 2020


Physician Name


Benito Hearn


Attending Physician


Andrei Cancino MD





Current Medications








 Medications


  (Trade)  Dose


 Ordered  Sig/Miky


 Route


 PRN Reason  Start Time


 Stop Time Status Last Admin


Dose Admin


 


 Acetaminophen


  (Tylenol)  650 mg  Q4H  PRN


 ORAL


 Temp >100.5  10/23/20 20:30


 11/22/20 20:29  11/5/20 22:39





 


 Chlorhexidine


 Gluconate


  (Jess-Hex 2%)  1 applic  DAILY@2000


 TOPIC


   11/6/20 20:00


 2/4/21 19:59  11/14/20 19:58





 


 Dextrose


  (Dextrose 50%)  50 ml  Q30M  PRN


 IV


 Hypoglycemia  10/23/20 22:45


 1/21/21 22:44   





 


 Heparin Sodium


  (Porcine)


  (Heparin 5000


 units/ml)  5,000 units  EVERY 12  HOURS


 SUBQ


   10/23/20 21:00


 12/7/20 20:59  11/15/20 08:07





 


 Insulin Aspart


  (NovoLOG)    EVERY 6  HOURS


 SUBQ


   11/2/20 06:00


 1/22/21 06:29  11/15/20 00:30





 


 Linezolid  300 ml @ 


 300 mls/hr  Q12HR


 IVPB


   11/13/20 12:00


 11/20/20 11:59  11/15/20 08:07





 


 Lorazepam


  (Ativan 2mg/ml


 1ml)  2 mg  Q4H  PRN


 IV


 For Anxiety  11/10/20 13:45


 11/17/20 13:44  11/15/20 03:13





 


 Meropenem 1 gm/


 Sodium Chloride  55 ml @ 


 110 mls/hr  Q8HR@0400,1200,2000


 IVPB


   11/11/20 12:00


 11/16/20 11:59  11/15/20 11:22





 


 Midodrine


  (Pro-Amatine)  10 mg  Q8H


 ORAL


   11/11/20 17:00


 2/9/21 16:59  11/15/20 08:06





 


 Morphine Sulfate


  (Morphine


 Sulfate)  2 mg  Q6H  PRN


 IVP


 moderate pain  11/10/20 13:45


 11/17/20 13:44   





 


 Nitroglycerin


  (Ntg)  0.4 mg  Q5M  PRN


 SL


 Prn Chest Pain  10/23/20 20:30


 11/22/20 20:29   





 


 Norepinephrine


 Bitartrate  250 ml @ 0


 mls/hr  Q24H


 IV


   11/15/20 13:00


 11/18/20 12:59  11/15/20 13:01





 


 Ondansetron HCl


  (Zofran)  4 mg  Q6H  PRN


 IVP


 Nausea & Vomiting  10/23/20 20:30


 11/22/20 20:29   





 


 Pantoprazole


  (Protonix)  40 mg  DAILY


 IV


   11/7/20 09:00


 12/7/20 08:59  11/15/20 08:07





 


 Polyethylene


 Glycol


  (Miralax)  17 gm  DAILYPRN  PRN


 ORAL


 Constipation  10/23/20 20:30


 11/22/20 20:29   





 


 Promethazine HCl/


 Codeine


  (Phenergan with


 Codeine)  5 ml  Q4H  PRN


 ORAL


 For Cough  10/23/20 20:30


 11/22/20 20:29   





 


 Sodium Chloride  1,000 ml @ 


 50 mls/hr  Q20H


 IV


   11/6/20 16:00


 12/6/20 15:59  11/14/20 21:00











Allergies:  


Coded Allergies:  


     No Known Allergies (Unverified , 8/20/12)


ROS Limited/Unobtainable:  Yes


Subjective


74 YO M admitted with shortness of breath.  Now pneumonia; previously COVID 

positive.  Cover for Int med-Dr Cancino.  ICU.  Intubated and sedated. On Levophed





Objective





Last Vital Signs








  Date Time  Temp Pulse Resp B/P (MAP) Pulse Ox O2 Delivery O2 Flow Rate FiO2


 


11/15/20 15:05  90 16     30


 


11/15/20 15:00    97/53 (68) 99   


 


11/15/20 12:00      Mechanical Ventilator  





      Mechanical Ventilator  


 


11/15/20 12:00 98.4       


 


11/6/20 12:00       15.0 











Laboratory Tests








Test


 11/15/20


04:47


 


White Blood Count


 14.6 K/UL


(4.8-10.8)  H


 


Red Blood Count


 2.77 M/UL


(4.70-6.10)  L


 


Hemoglobin


 8.4 G/DL


(14.2-18.0)  L


 


Hematocrit


 27.6 %


(42.0-52.0)  L


 


Mean Corpuscular Volume


 100 FL (80-99)


H


 


Mean Corpuscular Hemoglobin


 30.3 PG


(27.0-31.0)


 


Mean Corpuscular Hemoglobin


Concent 30.4 G/DL


(32.0-36.0)  L


 


Red Cell Distribution Width


 16.9 %


(11.6-14.8)  H


 


Platelet Count


 440 K/UL


(150-450)


 


Mean Platelet Volume


 6.4 FL


(6.5-10.1)  L


 


Neutrophils (%) (Auto)


 82.3 %


(45.0-75.0)  H


 


Lymphocytes (%) (Auto)


 4.4 %


(20.0-45.0)  L


 


Monocytes (%) (Auto)


 5.5 %


(1.0-10.0)


 


Eosinophils (%) (Auto)


 0.1 %


(0.0-3.0)


 


Basophils (%) (Auto)


 7.7 %


(0.0-2.0)  H


 


Erythrocyte Sedimentation Rate


 81 MM/HR


(0-20)  H


 


Sodium Level


 140 MMOL/L


(136-145)


 


Potassium Level


 4.6 MMOL/L


(3.5-5.1)


 


Chloride Level


 107 MMOL/L


()


 


Carbon Dioxide Level


 25 MMOL/L


(21-32)


 


Anion Gap


 8 mmol/L


(5-15)


 


Blood Urea Nitrogen


 12 mg/dL


(7-18)


 


Creatinine


 1.0 MG/DL


(0.55-1.30)


 


Estimat Glomerular Filtration


Rate > 60 mL/min


(>60)


 


Glucose Level


 118 MG/DL


()  H


 


Calcium Level


 8.0 MG/DL


(8.5-10.1)  L


 


Phosphorus Level


 2.9 MG/DL


(2.5-4.9)


 


Magnesium Level


 2.5 MG/DL


(1.8-2.4)  H


 


Total Bilirubin


 0.3 MG/DL


(0.2-1.0)


 


Aspartate Amino Transf


(AST/SGOT) 25 U/L (15-37)





 


Alanine Aminotransferase


(ALT/SGPT) 26 U/L (12-78)





 


Alkaline Phosphatase


 146 U/L


()  H


 


C-Reactive Protein,


Quantitative 6.6 mg/dL


(0.00-0.90)  H


 


Total Protein


 6.8 G/DL


(6.4-8.2)


 


Albumin


 1.5 G/DL


(3.4-5.0)  L


 


Globulin 5.3 g/dL  


 


Albumin/Globulin Ratio


 0.3 (1.0-2.7)


L











Microbiology








 Date/Time


Source Procedure


Growth Status





 


 11/14/20 14:30


Urine,Clean Catch Urine Culture - Preliminary


NO GROWTH Resulted





 11/14/20 14:30


Sputum Gram Stain - Final Resulted


 


 11/14/20 14:30


Sputum Sputum Culture


Pending Resulted

















Intake and Output  


 


 11/14/20 11/15/20





 19:00 07:00


 


Intake Total 1559.0 ml 1135.0 ml


 


Output Total 825 ml 1250 ml


 


Balance 734.0 ml -115.0 ml


 


  


 


Free Water 45 ml 


 


IV Total 1104.0 ml 655.0 ml


 


Tube Feeding 410 ml 480 ml


 


Output Urine Total 825 ml 1250 ml








Objective


PHYSICAL EXAMINATION:


GENERAL:  The patient awake with deep stimuli, open his eyes, however,


cannot follow commands.  The patient is on a Ventimask at this time,


chronically ill-appearing.


HEAD AND NECK:  Pupils are equal and reactive to light.  Anicteric.


NECK:  Supple.  No JVD.


LUNGS:  Mech vent;  wheezing, rhonchi, and decreased air in bases.


HEART:  S1, S2.  Regular rhythm.  Distant heart sounds.  No murmur or


gallop.


ABDOMEN:  Soft, nondistended, nontender.  Positive bowel sounds.


EXTREMITIES:  No cyanosis, clubbing, or edema.


NEUROLOGIC:  Very limited secondary to the patient's status, cannot follow


commands.  Opens his eyes with deep stimuli and moving extremities


spontaneously.





Assessment/Plan


Assessment/Plan


ASSESSMENT:


1. Acute hypoxemic respiratory failure, most likely secondary to


pneumonia and sepsis.


2. Sepsis secondary to urinary tract infection and pneumonia.


3. pneumonia=MRSA; ESBL E. coli


4. Acute kidney injury on chronic renal insufficiency.


5. Dehydration.


6. History of chronic congestive heart failure.


7. Diabetes type 2.


8. Dyslipidemia.


9. Hypertension.


10. Alzheimer's disease.


11. COVID 19 previous positive





PLAN:


1.  ICU


2.  Dr. Sandoval,=Pulmonary Critical Care; follow mech vent recs


3.  Dr. Garcia = Inf Dis.


4.  IV= D5W due to the hypernatremia and dehydration.


5.  antibiotics =  meropenem; S/P micafungin


6.  Code status is full code.


7.    DVT prophylaxis is heparin subcutaneous.











Benito Hearn MD               Nov 15, 2020 15:14

## 2020-11-15 NOTE — NEPHROLOGY PROGRESS NOTE
Assessment/Plan


Problem List:  


(1) Dehydration


(2) JANES (acute kidney injury)


(3) Renal failure (ARF), acute on chronic


(4) Hypoxia


(5) Acute encephalopathy


(6) Electrolyte imbalance


Assessment





75-year-old male is admitted with acute hypoxic respiratory failure most likely 

secondary to pneumonia and sepsis, UTI.


Acute on chronic renal failure


Dehydration


Electrolyte imbalances, hypernatremia


Hypoalbuminemia


Diabetes type 2


History of congestive heart failure


Hypertension


Hyperlipemia


Alzheimer's


Previous COVID-19 infection in September 2020


Plan


November 15: In ICU.  Full code.  Discussed with RN.  Stable renal parameters.


November 14: Seen in ICU.  Discussed with LITA Cooper.  Flomax discontinued.  

Labs reviewed.  Stable from renal standpoint of view.


November 13: Seen in ICU.  Discussed with LITA Cooper.  Remains on pressor.  

Electrolyte abnormalities noted and addressed.  Continue per consultants.  

Patient remains full code.


November 12: Seen in ICU.  Discussed with LITA Richardson.  Blood pressure remains a 

little requiring pressors.  Labs reviewed.  Stable renal parameters.  Continue 

per consultants.


November 11: Remains intubated.  Full code.  Blood pressure borderline low.  

Will increase midodrine dose.  Discussed with RN.  Continue to monitor renal 

parameters and electrolytes.


November 10: Intubated.  Full code.  Labs reviewed.  Stable from renal 

standpoint of view.  Continue per consultants.


November 9: Remains intubated.  Full code.  Labs reviewed.  Electrolytes within 

normal limit.  Continue per consultants.


November 8: In ICU.  Remains intubated on ventilator.  Remains full code.  Low 

potassium addressed.  Blood pressure fluctuating.  Continue per consultants.


November 7: Patient in ICU.  Intubated on ventilator.  On 6 mics of Levophed.  

Will give 100 cc albumin 25%.  K-Phos IV ordered.  Continue per consultants.  

Continue monitor renal parameters and electrolytes.


November 6: Status unchanged.  Transfer to [facility] for seizure.  Stable from renal 

standpoint to view.  Continue per consultants.


November 5: Status quo.  Labs reviewed.  Renal parameters stable.  Continue per 

consultants.


November 4: On nonrebreather mask.  Labs reviewed.  Renal parameters 

stable.Continue per pulmonologist.  Clinically unchanged.


November 3: On nonrebreather mask.  Inflammatory markers gradually declining.  

Renal parameters stable.  Continue per pulmonary.


November 2: Remains on nonrebreather mask.  Inflammatory markers remain 

elevated.  Renal parameters somewhat stable.  Continue per pulmonary and ID.  

Remains full code.


November 1: Patient on nonrebreather mask.  Labs noted.  Serum creatinine down 

to 1.3.  Continue per consultants.


October 31: Patient on nonrebreather mask.  Transfer to telemetry when seen this

morning.  Labs noted.  Continue per consultants.  Serum creatinine dylan to 1.7. 

Continue to monitor renal parameters.  Continue to monitor vancomycin level


October 30: Patient is not doing well clinically.  Mild respiratory distress.  

ABG noted.  Somewhat hypoxic.  CBC and chemistry panel ordered.  Discussed with 

LITA Garcia.  Will defer to pulmonary management to specialist.  Continue per ID.

 Renal parameters remained stable as of October 29.


October 29: Labs reviewed.  Renal parameters stable.  Continue per consultants.


October 28: Labs reviewed.  Potassium via NG tube ordered.  IV fluids stopped.  

Continue per consultants.


October 27: Labs reviewed.  Low potassium and low phosphorus replaced.  Continue

per consultants.  Remains stable from renal standpoint of view.


October 26: Patient remains n.p.o.  IV fluid down to 50 cc an hour.  Potassium 

supplement intravenously ordered.  Continue per consultants.





Previously:


Patient is n.p.o., will continue on IV fluid of D5W 75 cc an hour


We will monitor electrolytes and renal parameters


Avoid nephrotoxic's


Start p.o. when he clears by speech therapist, meanwhile aspiration precautions


Keep the blood pressure and blood sugar in check


Per orders





Subjective


ROS Limited/Unobtainable:  Yes





Objective


Objective





Last 24 Hour Vital Signs








  Date Time  Temp Pulse Resp B/P (MAP) Pulse Ox O2 Delivery O2 Flow Rate FiO2


 


11/15/20 13:30  108 21 135/84 (101) 98   


 


11/15/20 13:15  109 25 173/78 (109) 100   


 


11/15/20 13:01    128/50    


 


11/15/20 13:00  98 23 134/91 (105) 97   


 


11/15/20 12:45  97 20 128/50 (76) 100   


 


11/15/20 12:30  93 22 115/63 (80) 99   


 


11/15/20 12:15  93 22 121/62 (81) 99   


 


11/15/20 12:00        30


 


11/15/20 12:00      Mechanical Ventilator  





      Mechanical Ventilator  


 


11/15/20 12:00 98.4 99 14 98/52 (67) 98   


 


11/15/20 12:00  97      


 


11/15/20 11:45  96 13 95/50 (65) 98   


 


11/15/20 11:30  97 18 82/43 (56) 99   


 


11/15/20 11:15  98 14     30


 


11/15/20 11:00  94 21 102/53 (69) 99   


 


11/15/20 10:30  94 23 91/49 (63) 99   


 


11/15/20 10:00  111 24 135/72 (93) 97   


 


11/15/20 09:30  112 23 165/66 (99) 99   


 


11/15/20 09:00  108 20 170/91 (117) 100   


 


11/15/20 09:00        30


 


11/15/20 08:40        30


 


11/15/20 08:30  103 20 135/69 (91) 99   


 


11/15/20 08:00      Mechanical Ventilator  





      Mechanical Ventilator  


 


11/15/20 08:00 98.7 97 12 91/50 (64) 100   


 


11/15/20 08:00        40


 


11/15/20 08:00  92      


 


11/15/20 07:25  97 17     40


 


11/15/20 07:00 97.1 94 14 94/57 (69) 100   


 


11/15/20 06:30  109 14     


 


11/15/20 06:30  109 22 163/79 (107) 98   


 


11/15/20 06:00  111 22 155/73 (100) 98   


 


11/15/20 05:30  112 22 160/70 (100) 100   


 


11/15/20 05:00  110 21 137/82 (100) 97   


 


11/15/20 04:30  100 17 150/85 (106) 100   


 


11/15/20 04:00      Mechanical Ventilator  





      Mechanical Ventilator  


 


11/15/20 04:00  103      


 


11/15/20 04:00  103 19 146/78 (100) 100   


 


11/15/20 04:00        40


 


11/15/20 03:43  103 16 136/82 96   


 


11/15/20 03:30  105 17 123/69 (87) 100   


 


11/15/20 03:13  102 17 132/94 100   


 


11/15/20 03:08  101 17     40


 


11/15/20 03:00  109 19 132/94 (107) 100   


 


11/15/20 02:30  89 17 111/78 (89) 98   


 


11/15/20 02:00  90 12 89/57 (68) 100   


 


11/15/20 01:00 99.9 109 22 141/73 (95) 100   


 


11/15/20 00:30  106 22 149/76 (100) 100   


 


11/15/20 00:00      Mechanical Ventilator  





      Mechanical Ventilator  


 


11/15/20 00:00  102 21 140/96 (111) 100   


 


11/14/20 23:00  91 17 120/70 (87) 100   


 


11/14/20 22:56  91 16     40


 


11/14/20 22:30  91 12 102/56 (71) 100   


 


11/14/20 22:00  86 12 87/56 (66) 100   


 


11/14/20 21:30  88 12 80/52 (61) 100   


 


11/14/20 21:00  93 15 101/55 (70) 100   


 


11/14/20 20:30  106 22 114/52 (72) 99   


 


11/14/20 20:00  107      


 


11/14/20 20:00      Mechanical Ventilator  





      Mechanical Ventilator  


 


11/14/20 20:00        40


 


11/14/20 20:00 97.9 107 22 128/76 (93) 99   


 


11/14/20 19:30  101 20 126/62 (83) 97   


 


11/14/20 19:12  102 22     40


 


11/14/20 19:00  97 19 107/59 (75) 100   


 


11/14/20 18:30  93 14 106/56 (73) 100   


 


11/14/20 18:00  100 14 110/55 (73) 99   


 


11/14/20 17:30  98 14 95/49 (64) 98   


 


11/14/20 17:00  96 29 105/90 (95) 97   


 


11/14/20 16:30  90 15 92/50 (64) 99   


 


11/14/20 16:01 98.1       


 


11/14/20 16:00  90 14 93/55 (68) 13   


 


11/14/20 16:00        40


 


11/14/20 16:00  91      


 


11/14/20 16:00      Mechanical Ventilator  





      Mechanical Ventilator  


 


11/14/20 15:30  81 12 108/53 (71) 100   


 


11/14/20 15:25  84 13     40


 


11/14/20 15:04  79 12 92/50 (64) 100   


 


11/14/20 15:00  81 14 98/56 (70) 99   

















Intake and Output  


 


 11/14/20 11/15/20





 19:00 07:00


 


Intake Total 1559.0 ml 1135.0 ml


 


Output Total 825 ml 1250 ml


 


Balance 734.0 ml -115.0 ml


 


  


 


Free Water 45 ml 


 


IV Total 1104.0 ml 655.0 ml


 


Tube Feeding 410 ml 480 ml


 


Output Urine Total 825 ml 1250 ml








Laboratory Tests


11/15/20 04:47: 


White Blood Count 14.6H, Red Blood Count 2.77L, Hemoglobin 8.4L, Hematocrit 

27.6L, Mean Corpuscular Volume 100H, Mean Corpuscular Hemoglobin 30.3, Mean 

Corpuscular Hemoglobin Concent 30.4L, Red Cell Distribution Width 16.9H, 

Platelet Count 440, Mean Platelet Volume 6.4L, Neutrophils (%) (Auto) 82.3H, 

Lymphocytes (%) (Auto) 4.4L, Monocytes (%) (Auto) 5.5, Eosinophils (%) (Auto) 

0.1, Basophils (%) (Auto) 7.7H, Erythrocyte Sedimentation Rate 81H, Sodium Level

140, Potassium Level 4.6, Chloride Level 107, Carbon Dioxide Level 25, Anion Gap

8, Blood Urea Nitrogen 12, Creatinine 1.0, Estimat Glomerular Filtration Rate > 

60, Glucose Level 118H, Calcium Level 8.0L, Phosphorus Level 2.9, Magnesium 

Level 2.5H, Total Bilirubin 0.3, Aspartate Amino Transf (AST/SGOT) 25, Alanine 

Aminotransferase (ALT/SGPT) 26, Alkaline Phosphatase 146H, C-Reactive Protein, 

Quantitative 6.6H, Total Protein 6.8, Albumin 1.5L, Globulin 5.3, 

Albumin/Globulin Ratio 0.3L


Height (Feet):  5


Height (Inches):  4.00


Weight (Pounds):  130


General Appearance:  no apparent distress


EENT:  other - Intubated on ventilator


Cardiovascular:  tachycardia


Respiratory/Chest:  decreased breath sounds


Abdomen:  distended











Seven Tobar MD            Nov 15, 2020 14:45

## 2020-11-15 NOTE — NUR
NURSE HAND-OFF REPORT: 



Latest Vital Signs: Temperature 98.2 , Pulse 96 , B/P 103 /63 , Respiratory Rate 18 , O2 SAT 
97 , Mechanical Ventilator, FiO2 30%.  

Vital Sign Comment: stable - levo off at 1800.



EKG Rhythm: Sinus Rhythm

Rhythm change?: N 

MD Notified?: Pt seen by Dr Feli morfin MD Response: n/a



Latest Mejia Fall Score: 50  

Fall Risk: High Risk 

Safety Measures: Call light Within Reach, Bed Alarm Zone 2, Side Rails Side Rails x3, Bed 
position Low and Locked.

Fall Precautions: 

Yellow Socks

Yellow Gown

Door Sign

Patient Fall Education



Report given to LITA Rico.

## 2020-11-15 NOTE — NUR
NURSE NOTES:

Pt repositioned.

Oral care provided. 

Afebrile.

No acute distress noted.

SBP now in the 120s with 2 mcg/min of Levophed.

## 2020-11-15 NOTE — NUR
NURSE NOTES:

Pt repositioned. 

Dr Sandoval assessing pt at bedside - care plan discussed. weaning deferred at this time d/t 
tachycardia.

## 2020-11-15 NOTE — NUR
NURSE NOTES:

received report from irvin longoria pt awake but joshi not follows command  moving upper 
extremities but weak hand  o2 sat  no acute respiratory resp distress noted hr 
 sr-s tacy tolerating  tube feeding  no residual  urinary output  good  will cont 
monitor

## 2020-11-15 NOTE — PULMONOLGY CRITICAL CARE NOTE
Critical Care - Asmt/Plan


Problems:  


(1) Acute respiratory failure


(2) Multifocal pneumonia


(3) 2019 novel coronavirus disease (COVID-19)


(4) Acute encephalopathy


(5) Severe sepsis


(6) Alzheimer's dementia


(7) HTN (hypertension)


(8) History of CVA (cerebrovascular accident)


(9) BPH (benign prostatic hyperplasia)


Respiratory:  monitor respiratory rate, adjust FIO2, CXR


Cardiac:  continue to monitor HR/BP


Renal:  keep IV fluid, check electrolytes


Infectious Disease:  check cultures


Gastrointestinal:  continue feedings/current rate, start feedings


Hematologic:  monitor H/H


Neurologic:  PRN Ativan


Affect:  PRN ativan


Prophylaxis:  Protonix


Disposition:  keep in ICU


Notes Reviewed:  internist, cardio, renal


Discussed with:  nurses, consultants, 





Critical Care - Objective





Last 24 Hour Vital Signs








  Date Time  Temp Pulse Resp B/P (MAP) Pulse Ox O2 Delivery O2 Flow Rate FiO2


 


11/15/20 15:05  90 16     30


 


11/15/20 15:00  92 20 97/53 (68) 99   


 


11/15/20 14:45  93 21 103/55 (71) 99   


 


11/15/20 14:30  99 18 110/62 (78) 99   


 


11/15/20 14:15  104 23 117/63 (81) 99   


 


11/15/20 14:00  106 21 138/59 (85) 98   


 


11/15/20 13:45  113 21 147/77 (100) 95   


 


11/15/20 13:30  108 21 135/84 (101) 98   


 


11/15/20 13:15  109 25 173/78 (109) 100   


 


11/15/20 13:01    128/50    


 


11/15/20 13:00  98 23 134/91 (105) 97   


 


11/15/20 12:45  97 20 128/50 (76) 100   


 


11/15/20 12:30  93 22 115/63 (80) 99   


 


11/15/20 12:15  93 22 121/62 (81) 99   


 


11/15/20 12:00        30


 


11/15/20 12:00      Mechanical Ventilator  





      Mechanical Ventilator  


 


11/15/20 12:00 98.4 99 14 98/52 (67) 98   


 


11/15/20 12:00  97      


 


11/15/20 11:45  96 13 95/50 (65) 98   


 


11/15/20 11:30  97 18 82/43 (56) 99   


 


11/15/20 11:15  98 14     30


 


11/15/20 11:00  94 21 102/53 (69) 99   


 


11/15/20 10:30  94 23 91/49 (63) 99   


 


11/15/20 10:00  111 24 135/72 (93) 97   


 


11/15/20 09:30  112 23 165/66 (99) 99   


 


11/15/20 09:00  108 20 170/91 (117) 100   


 


11/15/20 09:00        30


 


11/15/20 08:40        30


 


11/15/20 08:30  103 20 135/69 (91) 99   


 


11/15/20 08:00      Mechanical Ventilator  





      Mechanical Ventilator  


 


11/15/20 08:00 98.7 97 12 91/50 (64) 100   


 


11/15/20 08:00        40


 


11/15/20 08:00  92      


 


11/15/20 07:25  97 17     40


 


11/15/20 07:00 97.1 94 14 94/57 (69) 100   


 


11/15/20 06:30  109 14     


 


11/15/20 06:30  109 22 163/79 (107) 98   


 


11/15/20 06:00  111 22 155/73 (100) 98   


 


11/15/20 05:30  112 22 160/70 (100) 100   


 


11/15/20 05:00  110 21 137/82 (100) 97   


 


11/15/20 04:30  100 17 150/85 (106) 100   


 


11/15/20 04:00      Mechanical Ventilator  





      Mechanical Ventilator  


 


11/15/20 04:00  103      


 


11/15/20 04:00  103 19 146/78 (100) 100   


 


11/15/20 04:00        40


 


11/15/20 03:43  103 16 136/82 96   


 


11/15/20 03:30  105 17 123/69 (87) 100   


 


11/15/20 03:13  102 17 132/94 100   


 


11/15/20 03:08  101 17     40


 


11/15/20 03:00  109 19 132/94 (107) 100   


 


11/15/20 02:30  89 17 111/78 (89) 98   


 


11/15/20 02:00  90 12 89/57 (68) 100   


 


11/15/20 01:00 99.9 109 22 141/73 (95) 100   


 


11/15/20 00:30  106 22 149/76 (100) 100   


 


11/15/20 00:00      Mechanical Ventilator  





      Mechanical Ventilator  


 


11/15/20 00:00  102 21 140/96 (111) 100   


 


11/14/20 23:00  91 17 120/70 (87) 100   


 


11/14/20 22:56  91 16     40


 


11/14/20 22:30  91 12 102/56 (71) 100   


 


11/14/20 22:00  86 12 87/56 (66) 100   


 


11/14/20 21:30  88 12 80/52 (61) 100   


 


11/14/20 21:00  93 15 101/55 (70) 100   


 


11/14/20 20:30  106 22 114/52 (72) 99   


 


11/14/20 20:00  107      


 


11/14/20 20:00      Mechanical Ventilator  





      Mechanical Ventilator  


 


11/14/20 20:00        40


 


11/14/20 20:00 97.9 107 22 128/76 (93) 99   


 


11/14/20 19:30  101 20 126/62 (83) 97   


 


11/14/20 19:12  102 22     40


 


11/14/20 19:00  97 19 107/59 (75) 100   


 


11/14/20 18:30  93 14 106/56 (73) 100   


 


11/14/20 18:00  100 14 110/55 (73) 99   


 


11/14/20 17:30  98 14 95/49 (64) 98   


 


11/14/20 17:00  96 29 105/90 (95) 97   


 


11/14/20 16:30  90 15 92/50 (64) 99   


 


11/14/20 16:01 98.1       


 


11/14/20 16:00  90 14 93/55 (68) 13   


 


11/14/20 16:00        40


 


11/14/20 16:00  91      


 


11/14/20 16:00      Mechanical Ventilator  





      Mechanical Ventilator  








Status:  sedated


Condition:  critical


HEENT:  atraumatic


Lungs:  rales, rhonchi


Heart:  HR/BP stable


Abdomen:  soft, non-tender


Extremities:  no C/C/E


Micro:





Microbiology








 Date/Time


Source Procedure


Growth Status





 


 11/14/20 14:30


Urine,Clean Catch Urine Culture - Preliminary


NO GROWTH Resulted





 11/14/20 14:30


Sputum Gram Stain - Final Resulted


 


 11/14/20 14:30


Sputum Sputum Culture


Pending Resulted








Accucheck:  112





Critical Care - Subjective


ROS Limited/Unobtainable:  Yes


Condition:  critical


EKG Rhythm:  Sinus Rhythm


FI02:  30


Vent Support Breath Rate:  12


Vent Support Mode:  AC


Vent Tidal Volume:  600


Sputum Amount:  Moderate


PEEP:  0.0


PIP:  27


Tube Feeding Amount:  50


I&O:











Intake and Output  


 


 11/14/20 11/15/20





 19:00 07:00


 


Intake Total 1559.0 ml 1135.0 ml


 


Output Total 825 ml 1250 ml


 


Balance 734.0 ml -115.0 ml


 


  


 


Free Water 45 ml 


 


IV Total 1104.0 ml 655.0 ml


 


Tube Feeding 410 ml 480 ml


 


Output Urine Total 825 ml 1250 ml








ET-Tube:  7.5


ET Position:  25


Labs:





Laboratory Tests








Test


 11/15/20


04:47


 


White Blood Count


 14.6 K/UL


(4.8-10.8)  H


 


Red Blood Count


 2.77 M/UL


(4.70-6.10)  L


 


Hemoglobin


 8.4 G/DL


(14.2-18.0)  L


 


Hematocrit


 27.6 %


(42.0-52.0)  L


 


Mean Corpuscular Volume


 100 FL (80-99)


H


 


Mean Corpuscular Hemoglobin


 30.3 PG


(27.0-31.0)


 


Mean Corpuscular Hemoglobin


Concent 30.4 G/DL


(32.0-36.0)  L


 


Red Cell Distribution Width


 16.9 %


(11.6-14.8)  H


 


Platelet Count


 440 K/UL


(150-450)


 


Mean Platelet Volume


 6.4 FL


(6.5-10.1)  L


 


Neutrophils (%) (Auto)


 82.3 %


(45.0-75.0)  H


 


Lymphocytes (%) (Auto)


 4.4 %


(20.0-45.0)  L


 


Monocytes (%) (Auto)


 5.5 %


(1.0-10.0)


 


Eosinophils (%) (Auto)


 0.1 %


(0.0-3.0)


 


Basophils (%) (Auto)


 7.7 %


(0.0-2.0)  H


 


Erythrocyte Sedimentation Rate


 81 MM/HR


(0-20)  H


 


Sodium Level


 140 MMOL/L


(136-145)


 


Potassium Level


 4.6 MMOL/L


(3.5-5.1)


 


Chloride Level


 107 MMOL/L


()


 


Carbon Dioxide Level


 25 MMOL/L


(21-32)


 


Anion Gap


 8 mmol/L


(5-15)


 


Blood Urea Nitrogen


 12 mg/dL


(7-18)


 


Creatinine


 1.0 MG/DL


(0.55-1.30)


 


Estimat Glomerular Filtration


Rate > 60 mL/min


(>60)


 


Glucose Level


 118 MG/DL


()  H


 


Calcium Level


 8.0 MG/DL


(8.5-10.1)  L


 


Phosphorus Level


 2.9 MG/DL


(2.5-4.9)


 


Magnesium Level


 2.5 MG/DL


(1.8-2.4)  H


 


Total Bilirubin


 0.3 MG/DL


(0.2-1.0)


 


Aspartate Amino Transf


(AST/SGOT) 25 U/L (15-37)





 


Alanine Aminotransferase


(ALT/SGPT) 26 U/L (12-78)





 


Alkaline Phosphatase


 146 U/L


()  H


 


C-Reactive Protein,


Quantitative 6.6 mg/dL


(0.00-0.90)  H


 


Total Protein


 6.8 G/DL


(6.4-8.2)


 


Albumin


 1.5 G/DL


(3.4-5.0)  L


 


Globulin 5.3 g/dL  


 


Albumin/Globulin Ratio


 0.3 (1.0-2.7)


L

















Michael Sandoval MD              Nov 15, 2020 15:48

## 2020-11-15 NOTE — CARDIOLOGY REPORT
--------------- APPROVED REPORT --------------





EKG Measurement

Heart [id number]

VT [id number]

[id number]

[id number]

[id number]



<Conclusion>

Normal sinus rhythm

Nonspecific ST and T wave abnormality

Abnormal ECG

## 2020-11-15 NOTE — DIAGNOSTIC IMAGING REPORT
EXAM:

  XR Chest, 1 View

 

CLINICAL HISTORY:

  DYSPNEA

 

TECHNIQUE:

  Frontal view of the chest.

 

COMPARISON:

Chest radiograph November 13, 2020 

 

FINDINGS/IMPRESSION:

Endotracheal tube terminates 2.6 cm above the sd.  Enteric feeding 

tube terminates in the stomach.  Right PICC line terminates in the 

superior vena cava.  EKG leads overlie the patient.

 

Small bilateral pleural effusions, left greater than right.  Mild 

vascular congestion.  Opacity within the right lung base, suspicious for 

atelectasis versus infiltrate.  This is improving when compared to the 

prior study.  Continued chest radiograph follow-up recommended.

 

Cardiomegaly.  Calcified aorta.

 

Right shoulder arthroplasty.

## 2020-11-15 NOTE — NUR
NURSE NOTES:

Pt received from Arelis Cummings RN. Pt opens eyes when called by name - able to maintain eye 
contact to voice. Gag reflex present. Pt withdraws to pain but unable to follow simple 
commands. Bilat pupils equal and round - 4mm with sluggish rxn to light. Pt is in SR to 
cardiac monitor. Radial and dorsalis pedis pulses 2+. 2+ pitting edema noted to RUE. 
Afebrile at this time with cooling blanket on monitor mode. Cap refill 3 sec. Pt is orally 
intubated with a 7.5 ETT noted 25 cm at the lip with the following settings: AC 14  
FiO2 40% Peep 0 with spO2 100% - will titrate down O2. Upper lung fields noted with rhonchi 
and lower lungs diminished upon auscultation. Pt has a right naris NGT running Glucerna 1.2 
at 40 cc/hr without gastric residuals noted. TF titrated up to 50 cc/hr at this time. Abd is 
soft, round, and non-distended with active bowel sounds to all quadrants. F/C noted draining 
yellow, clear urine. Skin alterations noted. Pt has a GURPREET PICC with dry and intact dressing 
running 1/2 NS at 50 cc/hr. Levophed and versed on standby. Bed in lowest position, alarm 
on, side rails up x 2, call light within reach. Will continue to monitor.

## 2020-11-15 NOTE — NUR
NURSE NOTES:

AM care provided. CHG bath given.

Changed soiled gowns, linens, and sliders.

Sacral pic was taken and uploaded.

Changed Optiform and Triad cream applied. 

Turned and changed position.

Oral care provided.

Suctioned with 14F. 

Safety measures observed and no acute distress noted. Will continue to monitor.

## 2020-11-15 NOTE — NUR
NURSE NOTES:

Pt repositioned and cleaned. 

Levophed turned off at this time for SBP in the 150s. 

No acute distress noted.

Will continue to monitor.

## 2020-11-15 NOTE — CARDIAC ELECTROPHYSIOLOGY PN
Assessment/Plan


Assessment/Plan


1. Accelerated junctional rhythm. Ruled out for MI


      Echo showed ejection fraction of 55%.





2. Long run of 31 beats of Nonsustained VT on 11/3/2020. 


     Will need cardiac cath after stabilization.





3. Henry 30, Asystole while on the Vent due to resp failure/ likely mucus plug .

S/P Code





4. Respiratory failure, on antibiotic and intubated on the vent 





5. Septic shock. On Levophed 2 mcg and Midodrine 10 tid 


6. History of previous COVID infection in September 2020 and active Covid  in 

isolation


7. Dementia.





JEANNINE RN





Subjective


Subjective


In SR in NAD in Covid isolation. In ICU on the Vent   Fio2 decreased to 30%


 Had 31 beats of VT  on 11/3/20 at 16:46 and 5 beats 11/8/20 


Coded on 11/12/20 as sat dropped to 20% and got henry 30s and PEA and pulseless 


Got 3 Epi and 2 Bicarb.  Levophed decreased to 2 mcg and is on Midodrine 10 tid





Objective





Last 24 Hour Vital Signs








  Date Time  Temp Pulse Resp B/P (MAP) Pulse Ox O2 Delivery O2 Flow Rate FiO2


 


11/15/20 16:00      Mechanical Ventilator  





      Mechanical Ventilator  


 


11/15/20 16:00        30


 


11/15/20 16:00  92 18 111/59 (76) 100   


 


11/15/20 16:00  90      


 


11/15/20 16:00 98.2 92 18 111/59 (76) 100   


 


11/15/20 15:30  91 17 111/55 (73) 100   


 


11/15/20 15:05  90 16     30


 


11/15/20 15:00  92 20 97/53 (68) 99   


 


11/15/20 14:45  93 21 103/55 (71) 99   


 


11/15/20 14:30  99 18 110/62 (78) 99   


 


11/15/20 14:15  104 23 117/63 (81) 99   


 


11/15/20 14:00  106 21 138/59 (85) 98   


 


11/15/20 13:45  113 21 147/77 (100) 95   


 


11/15/20 13:30  108 21 135/84 (101) 98   


 


11/15/20 13:15  109 25 173/78 (109) 100   


 


11/15/20 13:01    128/50    


 


11/15/20 13:00  98 23 134/91 (105) 97   


 


11/15/20 12:45  97 20 128/50 (76) 100   


 


11/15/20 12:30  93 22 115/63 (80) 99   


 


11/15/20 12:15  93 22 121/62 (81) 99   


 


11/15/20 12:00        30


 


11/15/20 12:00      Mechanical Ventilator  





      Mechanical Ventilator  


 


11/15/20 12:00 98.4 99 14 98/52 (67) 98   


 


11/15/20 12:00  97      


 


11/15/20 11:45  96 13 95/50 (65) 98   


 


11/15/20 11:30  97 18 82/43 (56) 99   


 


11/15/20 11:15  98 14     30


 


11/15/20 11:00  94 21 102/53 (69) 99   


 


11/15/20 10:30  94 23 91/49 (63) 99   


 


11/15/20 10:00  111 24 135/72 (93) 97   


 


11/15/20 09:30  112 23 165/66 (99) 99   


 


11/15/20 09:00  108 20 170/91 (117) 100   


 


11/15/20 09:00        30


 


11/15/20 08:40        30


 


11/15/20 08:30  103 20 135/69 (91) 99   


 


11/15/20 08:00      Mechanical Ventilator  





      Mechanical Ventilator  


 


11/15/20 08:00 98.7 97 12 91/50 (64) 100   


 


11/15/20 08:00        40


 


11/15/20 08:00  92      


 


11/15/20 07:25  97 17     40


 


11/15/20 07:00 97.1 94 14 94/57 (69) 100   


 


11/15/20 06:30  109 14     


 


11/15/20 06:30  109 22 163/79 (107) 98   


 


11/15/20 06:00  111 22 155/73 (100) 98   


 


11/15/20 05:30  112 22 160/70 (100) 100   


 


11/15/20 05:00  110 21 137/82 (100) 97   


 


11/15/20 04:30  100 17 150/85 (106) 100   


 


11/15/20 04:00      Mechanical Ventilator  





      Mechanical Ventilator  


 


11/15/20 04:00  103      


 


11/15/20 04:00  103 19 146/78 (100) 100   


 


11/15/20 04:00        40


 


11/15/20 03:43  103 16 136/82 96   


 


11/15/20 03:30  105 17 123/69 (87) 100   


 


11/15/20 03:13  102 17 132/94 100   


 


11/15/20 03:08  101 17     40


 


11/15/20 03:00  109 19 132/94 (107) 100   


 


11/15/20 02:30  89 17 111/78 (89) 98   


 


11/15/20 02:00  90 12 89/57 (68) 100   


 


11/15/20 01:00 99.9 109 22 141/73 (95) 100   


 


11/15/20 00:30  106 22 149/76 (100) 100   


 


11/15/20 00:00      Mechanical Ventilator  





      Mechanical Ventilator  


 


11/15/20 00:00  102 21 140/96 (111) 100   


 


11/14/20 23:00  91 17 120/70 (87) 100   


 


11/14/20 22:56  91 16     40


 


11/14/20 22:30  91 12 102/56 (71) 100   


 


11/14/20 22:00  86 12 87/56 (66) 100   


 


11/14/20 21:30  88 12 80/52 (61) 100   


 


11/14/20 21:00  93 15 101/55 (70) 100   


 


11/14/20 20:30  106 22 114/52 (72) 99   


 


11/14/20 20:00  107      


 


11/14/20 20:00      Mechanical Ventilator  





      Mechanical Ventilator  


 


11/14/20 20:00        40


 


11/14/20 20:00 97.9 107 22 128/76 (93) 99   


 


11/14/20 19:30  101 20 126/62 (83) 97   


 


11/14/20 19:12  102 22     40


 


11/14/20 19:00  97 19 107/59 (75) 100   


 


11/14/20 18:30  93 14 106/56 (73) 100   


 


11/14/20 18:00  100 14 110/55 (73) 99   


 


11/14/20 17:30  98 14 95/49 (64) 98   


 


11/14/20 17:00  96 29 105/90 (95) 97   

















Intake and Output  


 


 11/14/20 11/15/20





 19:00 07:00


 


Intake Total 1559.0 ml 1135.0 ml


 


Output Total 825 ml 1250 ml


 


Balance 734.0 ml -115.0 ml


 


  


 


Free Water 45 ml 


 


IV Total 1104.0 ml 655.0 ml


 


Tube Feeding 410 ml 480 ml


 


Output Urine Total 825 ml 1250 ml











Laboratory Tests








Test


 11/15/20


04:47


 


White Blood Count


 14.6 K/UL


(4.8-10.8)  H


 


Red Blood Count


 2.77 M/UL


(4.70-6.10)  L


 


Hemoglobin


 8.4 G/DL


(14.2-18.0)  L


 


Hematocrit


 27.6 %


(42.0-52.0)  L


 


Mean Corpuscular Volume


 100 FL (80-99)


H


 


Mean Corpuscular Hemoglobin


 30.3 PG


(27.0-31.0)


 


Mean Corpuscular Hemoglobin


Concent 30.4 G/DL


(32.0-36.0)  L


 


Red Cell Distribution Width


 16.9 %


(11.6-14.8)  H


 


Platelet Count


 440 K/UL


(150-450)


 


Mean Platelet Volume


 6.4 FL


(6.5-10.1)  L


 


Neutrophils (%) (Auto)


 82.3 %


(45.0-75.0)  H


 


Lymphocytes (%) (Auto)


 4.4 %


(20.0-45.0)  L


 


Monocytes (%) (Auto)


 5.5 %


(1.0-10.0)


 


Eosinophils (%) (Auto)


 0.1 %


(0.0-3.0)


 


Basophils (%) (Auto)


 7.7 %


(0.0-2.0)  H


 


Erythrocyte Sedimentation Rate


 81 MM/HR


(0-20)  H


 


Sodium Level


 140 MMOL/L


(136-145)


 


Potassium Level


 4.6 MMOL/L


(3.5-5.1)


 


Chloride Level


 107 MMOL/L


()


 


Carbon Dioxide Level


 25 MMOL/L


(21-32)


 


Anion Gap


 8 mmol/L


(5-15)


 


Blood Urea Nitrogen


 12 mg/dL


(7-18)


 


Creatinine


 1.0 MG/DL


(0.55-1.30)


 


Estimat Glomerular Filtration


Rate > 60 mL/min


(>60)


 


Glucose Level


 118 MG/DL


()  H


 


Calcium Level


 8.0 MG/DL


(8.5-10.1)  L


 


Phosphorus Level


 2.9 MG/DL


(2.5-4.9)


 


Magnesium Level


 2.5 MG/DL


(1.8-2.4)  H


 


Total Bilirubin


 0.3 MG/DL


(0.2-1.0)


 


Aspartate Amino Transf


(AST/SGOT) 25 U/L (15-37)





 


Alanine Aminotransferase


(ALT/SGPT) 26 U/L (12-78)





 


Alkaline Phosphatase


 146 U/L


()  H


 


C-Reactive Protein,


Quantitative 6.6 mg/dL


(0.00-0.90)  H


 


Total Protein


 6.8 G/DL


(6.4-8.2)


 


Albumin


 1.5 G/DL


(3.4-5.0)  L


 


Globulin 5.3 g/dL  


 


Albumin/Globulin Ratio


 0.3 (1.0-2.7)


L











Microbiology








 Date/Time


Source Procedure


Growth Status





 


 11/14/20 14:30


Urine,Clean Catch Urine Culture - Preliminary


NO GROWTH Resulted





 11/14/20 14:30


Sputum Gram Stain - Final Resulted


 


 11/14/20 14:30


Sputum Sputum Culture


Pending Resulted








Objective





HEAD AND NECK:  No JVD. Orally intubated


LUNGS:  Coarse rhonchi.


CARDIOVASCULAR:   Irregular S1 and S2 with no gallop.


ABDOMEN:  Soft.


EXTREMITIES:  No pitting edema.











Kvng Edmond MD               Nov 15, 2020 16:41

## 2020-11-16 VITALS — SYSTOLIC BLOOD PRESSURE: 95 MMHG | DIASTOLIC BLOOD PRESSURE: 47 MMHG

## 2020-11-16 VITALS — DIASTOLIC BLOOD PRESSURE: 77 MMHG | SYSTOLIC BLOOD PRESSURE: 136 MMHG

## 2020-11-16 VITALS — SYSTOLIC BLOOD PRESSURE: 100 MMHG | DIASTOLIC BLOOD PRESSURE: 55 MMHG

## 2020-11-16 VITALS — SYSTOLIC BLOOD PRESSURE: 106 MMHG | DIASTOLIC BLOOD PRESSURE: 53 MMHG

## 2020-11-16 VITALS — SYSTOLIC BLOOD PRESSURE: 120 MMHG | DIASTOLIC BLOOD PRESSURE: 62 MMHG

## 2020-11-16 VITALS — DIASTOLIC BLOOD PRESSURE: 58 MMHG | SYSTOLIC BLOOD PRESSURE: 107 MMHG

## 2020-11-16 VITALS — DIASTOLIC BLOOD PRESSURE: 49 MMHG | SYSTOLIC BLOOD PRESSURE: 90 MMHG

## 2020-11-16 VITALS — DIASTOLIC BLOOD PRESSURE: 48 MMHG | SYSTOLIC BLOOD PRESSURE: 96 MMHG

## 2020-11-16 VITALS — SYSTOLIC BLOOD PRESSURE: 110 MMHG | DIASTOLIC BLOOD PRESSURE: 62 MMHG

## 2020-11-16 VITALS — SYSTOLIC BLOOD PRESSURE: 94 MMHG | DIASTOLIC BLOOD PRESSURE: 59 MMHG

## 2020-11-16 VITALS — DIASTOLIC BLOOD PRESSURE: 58 MMHG | SYSTOLIC BLOOD PRESSURE: 104 MMHG

## 2020-11-16 VITALS — DIASTOLIC BLOOD PRESSURE: 64 MMHG | SYSTOLIC BLOOD PRESSURE: 115 MMHG

## 2020-11-16 VITALS — SYSTOLIC BLOOD PRESSURE: 124 MMHG | DIASTOLIC BLOOD PRESSURE: 59 MMHG

## 2020-11-16 VITALS — DIASTOLIC BLOOD PRESSURE: 50 MMHG | SYSTOLIC BLOOD PRESSURE: 103 MMHG

## 2020-11-16 VITALS — DIASTOLIC BLOOD PRESSURE: 62 MMHG | SYSTOLIC BLOOD PRESSURE: 117 MMHG

## 2020-11-16 VITALS — SYSTOLIC BLOOD PRESSURE: 100 MMHG | DIASTOLIC BLOOD PRESSURE: 54 MMHG

## 2020-11-16 VITALS — SYSTOLIC BLOOD PRESSURE: 93 MMHG | DIASTOLIC BLOOD PRESSURE: 63 MMHG

## 2020-11-16 VITALS — DIASTOLIC BLOOD PRESSURE: 55 MMHG | SYSTOLIC BLOOD PRESSURE: 124 MMHG

## 2020-11-16 VITALS — SYSTOLIC BLOOD PRESSURE: 114 MMHG | DIASTOLIC BLOOD PRESSURE: 62 MMHG

## 2020-11-16 VITALS — SYSTOLIC BLOOD PRESSURE: 116 MMHG | DIASTOLIC BLOOD PRESSURE: 52 MMHG

## 2020-11-16 VITALS — SYSTOLIC BLOOD PRESSURE: 97 MMHG | DIASTOLIC BLOOD PRESSURE: 52 MMHG

## 2020-11-16 VITALS — DIASTOLIC BLOOD PRESSURE: 60 MMHG | SYSTOLIC BLOOD PRESSURE: 112 MMHG

## 2020-11-16 VITALS — DIASTOLIC BLOOD PRESSURE: 70 MMHG | SYSTOLIC BLOOD PRESSURE: 130 MMHG

## 2020-11-16 VITALS — DIASTOLIC BLOOD PRESSURE: 68 MMHG | SYSTOLIC BLOOD PRESSURE: 120 MMHG

## 2020-11-16 LAB
ADD MANUAL DIFF: NO
ALBUMIN SERPL-MCNC: 1.4 G/DL (ref 3.4–5)
ALBUMIN/GLOB SERPL: 0.3 {RATIO} (ref 1–2.7)
ALP SERPL-CCNC: 123 U/L (ref 46–116)
ALT SERPL-CCNC: 24 U/L (ref 12–78)
ANION GAP SERPL CALC-SCNC: 7 MMOL/L (ref 5–15)
AST SERPL-CCNC: 21 U/L (ref 15–37)
BILIRUB SERPL-MCNC: 0.3 MG/DL (ref 0.2–1)
BUN SERPL-MCNC: 15 MG/DL (ref 7–18)
CALCIUM SERPL-MCNC: 8 MG/DL (ref 8.5–10.1)
CHLORIDE SERPL-SCNC: 107 MMOL/L (ref 98–107)
CO2 SERPL-SCNC: 27 MMOL/L (ref 21–32)
CREAT SERPL-MCNC: 1 MG/DL (ref 0.55–1.3)
ERYTHROCYTE [DISTWIDTH] IN BLOOD BY AUTOMATED COUNT: 17.7 % (ref 11.6–14.8)
GLOBULIN SER-MCNC: 4.9 G/DL
HCT VFR BLD CALC: 24.8 % (ref 42–52)
HGB BLD-MCNC: 7.5 G/DL (ref 14.2–18)
MCV RBC AUTO: 99 FL (ref 80–99)
PHOSPHATE SERPL-MCNC: 2.7 MG/DL (ref 2.5–4.9)
PLATELET # BLD: 417 K/UL (ref 150–450)
POTASSIUM SERPL-SCNC: 3.7 MMOL/L (ref 3.5–5.1)
RBC # BLD AUTO: 2.51 M/UL (ref 4.7–6.1)
SODIUM SERPL-SCNC: 141 MMOL/L (ref 136–145)
WBC # BLD AUTO: 9.1 K/UL (ref 4.8–10.8)

## 2020-11-16 PROCEDURE — 02HV33Z INSERTION OF INFUSION DEVICE INTO SUPERIOR VENA CAVA, PERCUTANEOUS APPROACH: ICD-10-PCS

## 2020-11-16 RX ADMIN — MIDODRINE HYDROCHLORIDE SCH MG: 10 TABLET ORAL at 11:02

## 2020-11-16 RX ADMIN — Medication SCH MLS/HR: at 13:00

## 2020-11-16 RX ADMIN — MEROPENEM SCH MLS/HR: 1 INJECTION INTRAVENOUS at 03:32

## 2020-11-16 RX ADMIN — HEPARIN SODIUM SCH UNITS: 5000 INJECTION INTRAVENOUS; SUBCUTANEOUS at 09:00

## 2020-11-16 RX ADMIN — HEPARIN SODIUM SCH UNITS: 5000 INJECTION INTRAVENOUS; SUBCUTANEOUS at 20:39

## 2020-11-16 RX ADMIN — INSULIN ASPART SCH UNITS: 100 INJECTION, SOLUTION INTRAVENOUS; SUBCUTANEOUS at 17:07

## 2020-11-16 RX ADMIN — INSULIN ASPART SCH UNITS: 100 INJECTION, SOLUTION INTRAVENOUS; SUBCUTANEOUS at 00:00

## 2020-11-16 RX ADMIN — MIDODRINE HYDROCHLORIDE SCH MG: 10 TABLET ORAL at 16:53

## 2020-11-16 RX ADMIN — CHLORHEXIDINE GLUCONATE SCH APPLIC: 213 SOLUTION TOPICAL at 20:36

## 2020-11-16 RX ADMIN — INSULIN ASPART SCH UNITS: 100 INJECTION, SOLUTION INTRAVENOUS; SUBCUTANEOUS at 11:25

## 2020-11-16 RX ADMIN — INSULIN ASPART SCH UNITS: 100 INJECTION, SOLUTION INTRAVENOUS; SUBCUTANEOUS at 06:00

## 2020-11-16 RX ADMIN — MIDODRINE HYDROCHLORIDE SCH MG: 10 TABLET ORAL at 01:15

## 2020-11-16 RX ADMIN — PANTOPRAZOLE SODIUM SCH MG: 40 INJECTION, POWDER, FOR SOLUTION INTRAVENOUS at 09:14

## 2020-11-16 NOTE — NUR
NURSE NOTES:

NGT medication administered per order. Resumed NGT feeding Glucerna 1.2 @ 15mL/hr due to 
large amount of secretions and high risk of aspiration. Still suctioning the patient via 
oral and ETT q 15-30minutes. Will closely monitor the patient. Will continue plan of care.

## 2020-11-16 NOTE — NUR
NURSE HAND-OFF REPORT: 



Latest Vital Signs: Temperature 98.8 , Pulse 96 , B/P 90 /49 , Respiratory Rate 12 , O2 SAT 
98 , Mechanical Ventilator, O2 Flow Rate  .  

Vital Sign Comment: 



EKG Rhythm: Sinus Rhythm

Rhythm change?: N 

MD Notified?: DIDI Laureano MD Response: 



Latest Mejia Fall Score: 50  

Fall Risk: High Risk 

Safety Measures: Call light Within Reach, Bed Alarm Zone 2, Side Rails Side Rails x3, Bed 
position Low and Locked.

Fall Precautions: 

Yellow Socks

Yellow Gown

Door Sign

Patient Fall Education



Report given to .

## 2020-11-16 NOTE — DIAGNOSTIC IMAGING REPORT
Indications: Needs long-term IV access

 

Technique: Procedure performed at bedside. Procedural timeout performed. 

 

Physical examination confirmed retraction of indwelling right-sided PICC. Total

sterile technique, including sterile probe cover and sterile gel, sterile gloves,

hand hygiene, hat, mask,, sterile gown, large sterile drape, and preparation with 2%

chlorhexidine utilized. Local anesthesia with 1% lidocaine. An 018 guidewire was

placed through the existing right-sided PICC, over which a peel-away sheath was

placed. A new 4 Arabic dual-lumen power PICC was cut to the same length as prior

PICC. It was inserted through the peel-away sheath. Peel-away sheath and guidewire

removed. Catheter fixed to the skin. Both catheter ports aspirated and flushed.

Patient tolerated procedure well, without immediate complication. 

 

Subsequent chest radiograph demonstrates catheter tip in the expected location of the

right subclavian and brachiocephalic junction. Despite catheter length being the same

as prior PICC which terminated in the SVC, the catheter tip is approximately 4 to 5

cm shallow from prior position, which may be due to patient arm positioning and

venous collapsibility.

 

Additional chest radiograph findings include bilateral airspace disease with

interstitial edema as well as unchanged position of enteric tube and endotracheal

tube.

 

Impression: 

 

Successful bedside over-the-wire exchange for new right-sided PICC, in appropriate

position on subsequently performed chest radiograph.

## 2020-11-16 NOTE — NUR
NURSE NOTES:

Dr. Sandoval at the bedside assessed the patient. Notified the patient's condition as 
mentioned on the previous note. Dr. Sandoval ordered bilateral soft wrist restraints for the 
patient's safety and trial of pulling out medical device. Will initiate bilateral soft wrist 
restraints per order. Will closely monitor the patient. Will continue plan of care.

## 2020-11-16 NOTE — NUR
RESPIRATORY THERAPY NOTES:

Weaning started at 0745. Placed on PS +8 PEEP +0 FIO2 30%. Patient currently tolerating well 
RSBI 70, SPONT RR 24, SPONT , , SPO2 98%. Will continue to monitor and draw ABG 
in 1 hour.

## 2020-11-16 NOTE — NUR
NURSE NOTES:

Safely completed 1 unit pRBC transfusion per Dr. Sandoval's order. The patient tolerated 
well. Stable vital signs noted. No fever noted. Will closely monitor the patient. Will 
continue plan of care.

## 2020-11-16 NOTE — NUR
NURSE NOTES:

 noted. No coverage per protocol. Tolerating vent setting, tube feeding, IVF, and 
blood transfusion well. Will closely monitor the patient. Will continue plan of care.

## 2020-11-16 NOTE — NUR
NURSE HAND-OFF REPORT: 



Important Events on Shift: New PICC line placement, NGT placement confirmation, SBT but 
failed, Bilateral soft wrist restraints per Dr. Sandoval's order for the patient's safety, 
s/p 1 unit pRBC transfusion

Patient Status: Stable, Full code

Diet: Glucerna 1.2 @ 15mL/hr with goal of 60mL/hr 



Pending Orders:  N

Pending Results/Labs: N

Pending MD notification: N



Latest Vital Signs: Temperature 98.6 , Pulse 77 , B/P 115 /64 , Respiratory Rate 12 , O2 SAT 
100 , Mechanical Ventilator, O2 Flow Rate  .  

Vital Sign Comment: Stable 



EKG Rhythm: Sinus Rhythm

Rhythm change?: N 

MD Notified?: DIDI Laureano MD Response: 



Latest Mejia Fall Score: 50  

Fall Risk: High Risk 

Safety Measures: Call light Within Reach, Bed Alarm Zone 2, Side Rails Side Rails x3, Bed 
position Low and Locked.

Fall Precautions: 

Yellow Socks

Yellow Gown

Door Sign

Patient Fall Education



Report given to LITA Rico. The patient is stable at this time. Endorsed plan of care.

## 2020-11-16 NOTE — PULMONOLGY CRITICAL CARE NOTE
Critical Care - Asmt/Plan


Problems:  


(1) Acute respiratory failure


(2) Multifocal pneumonia


(3) 2019 novel coronavirus disease (COVID-19)


(4) Acute encephalopathy


(5) Severe sepsis


(6) Alzheimer's dementia


(7) HTN (hypertension)


(8) History of CVA (cerebrovascular accident)


(9) BPH (benign prostatic hyperplasia)


Assessment/Plan:


on Levophed, FiO2 90% now


Cardiac:  continue to monitor HR/BP


Renal:  F/U I&O, check electrolytes


Infectious Disease:  check cultures, continue antibiotics


Gastrointestinal:  continue feedings/current rate


Endocrine:  monitor blood sugar


Neurologic:  PRN Ativan


Affect:  PRN ativan


Prophylaxis:  Heparin


Time Spent (Minutes):  40


Discussed with:  nurses, consultants, 





Critical Care - Objective





Last 24 Hour Vital Signs








  Date Time  Temp Pulse Resp B/P (MAP) Pulse Ox O2 Delivery O2 Flow Rate FiO2


 


11/16/20 18:00  85 12 110/62 (78) 100   


 


11/16/20 17:00  93 12 130/70 (90) 100   


 


11/16/20 16:41  80 16  100 Mechanical Ventilator  30


 


11/16/20 16:00 98.6 90 12 136/77 (96) 100   


 


11/16/20 15:29  80 16     30


 


11/16/20 15:00  81 12 112/60 (77) 100   


 


11/16/20 14:00  84 12 103/50 (67) 99   


 


11/16/20 13:31 99.5       


 


11/16/20 13:00    110/61    


 


11/16/20 13:00  84 12 100/54 (69) 99   


 


11/16/20 12:00 100.5 86 12 114/62 (79) 100   


 


11/16/20 11:00  94 12 117/62 (80) 100   


 


11/16/20 10:54  93 14     30


 


11/16/20 10:00  92 12 107/58 (74) 96   


 


11/16/20 09:00  100 12 100/55 (70) 96   


 


11/16/20 08:12     99   


 


11/16/20 08:00 99.5 101 27 106/53 (70) 97   


 


11/16/20 07:29  98 24     30





        30


 


11/16/20 07:00  96 12 90/49 (63) 98   


 


11/16/20 06:00  97 12 97/52 (67) 96   


 


11/16/20 06:00  100 56     


 


11/16/20 05:00  108 13 96/48 (64) 93   


 


11/16/20 04:00  101 18 124/55 (78) 98   


 


11/16/20 04:00      Mechanical Ventilator  





      Mechanical Ventilator  


 


11/16/20 04:00        30


 


11/16/20 04:00  100      


 


11/16/20 03:56  116 22     30


 


11/16/20 03:00  97 13 95/47 (63) 97   


 


11/16/20 02:00  101 22 124/59 (80) 100   


 


11/16/20 01:00    94/59 (71)    


 


11/16/20 00:00        30


 


11/16/20 00:00      Mechanical Ventilator  





      Mechanical Ventilator  


 


11/16/20 00:00  100      


 


11/16/20 00:00  95      


 


11/16/20 00:00 98.8 90 22 93/63 (73) 98   


 


11/16/20 00:00        30


 


11/15/20 23:15  96 13     30


 


11/15/20 23:00  92 15 97/58 (71) 98   


 


11/15/20 22:00  95 12 91/49 (63) 98   


 


11/15/20 21:00  94 13 108/58 (75) 99   


 


11/15/20 20:00      Mechanical Ventilator  





      Mechanical Ventilator  


 


11/15/20 20:00        30


 


11/15/20 20:00 98.5 93 21 105/67 (80) 99   


 


11/15/20 20:00  98      


 


11/15/20 19:37  95 15     30








Status:  sedated


Condition:  critical


HEENT:  atraumatic


Lungs:  rales, rhonchi


Heart:  HR/BP stable


Abdomen:  non-tender


Extremities:  no C/C/E


Micro:





Microbiology








 Date/Time


Source Procedure


Growth Status





 


 11/14/20 14:30


Urine,Clean Catch Urine Culture - Final


NO GROWTH AFTER 48 HOURS Complete





 11/14/20 14:30


Sputum Gram Stain - Final Resulted


 


 11/14/20 14:30 Sputum Culture - Preliminary


Staphylococcus Aureus


Usual Respiratory Sondra Resulted








Accucheck:  117





Critical Care - Subjective


ROS Limited/Unobtainable:  Yes


Condition:  critical


EKG Rhythm:  Sinus Rhythm


FI02:  30


Vent Support Breath Rate:  12


Vent Support Mode:  AC


Vent Tidal Volume:  600


Sputum Amount:  Moderate


PEEP:  0.0


PIP:  51


Tube Feeding Amount:  15


I&O:











Intake and Output  


 


 11/15/20 11/16/20





 19:00 07:00


 


Intake Total 1573.75 ml 1155 ml


 


Output Total 1500 ml 690 ml


 


Balance 73.75 ml 465 ml


 


  


 


Free Water 30 ml 60 ml


 


IV Total 903.75 ml 555 ml


 


Tube Feeding 640 ml 540 ml


 


Output Urine Total 1500 ml 690 ml








ET-Tube:  7.5


ET Position:  25


Labs:





Laboratory Tests








Test


 11/16/20


04:24 11/16/20


05:01 11/16/20


08:50 11/16/20


17:06


 


POC Whole Blood Glucose Pending     Pending  


 


White Blood Count


 


 9.1 K/UL


(4.8-10.8) 


 





 


Red Blood Count


 


 2.51 M/UL


(4.70-6.10)  L 


 





 


Hemoglobin


 


 7.5 G/DL


(14.2-18.0)  L 


 





 


Hematocrit


 


 24.8 %


(42.0-52.0)  L 


 





 


Mean Corpuscular Volume  99 FL (80-99)    


 


Mean Corpuscular Hemoglobin


 


 30.1 PG


(27.0-31.0) 


 





 


Mean Corpuscular Hemoglobin


Concent 


 30.4 G/DL


(32.0-36.0)  L 


 





 


Red Cell Distribution Width


 


 17.7 %


(11.6-14.8)  H 


 





 


Platelet Count


 


 417 K/UL


(150-450) 


 





 


Mean Platelet Volume


 


 7.3 FL


(6.5-10.1) 


 





 


Neutrophils (%) (Auto)


 


 % (45.0-75.0)


 


 





 


Lymphocytes (%) (Auto)


 


 % (20.0-45.0)


 


 





 


Monocytes (%) (Auto)   % (1.0-10.0)    


 


Eosinophils (%) (Auto)   % (0.0-3.0)    


 


Basophils (%) (Auto)   % (0.0-2.0)    


 


Sodium Level


 


 141 MMOL/L


(136-145) 


 





 


Potassium Level


 


 3.7 MMOL/L


(3.5-5.1) 


 





 


Chloride Level


 


 107 MMOL/L


() 


 





 


Carbon Dioxide Level


 


 27 MMOL/L


(21-32) 


 





 


Anion Gap


 


 7 mmol/L


(5-15) 


 





 


Blood Urea Nitrogen


 


 15 mg/dL


(7-18) 


 





 


Creatinine


 


 1.0 MG/DL


(0.55-1.30) 


 





 


Estimat Glomerular Filtration


Rate 


 > 60 mL/min


(>60) 


 





 


Glucose Level


 


 125 MG/DL


()  H 


 





 


Calcium Level


 


 8.0 MG/DL


(8.5-10.1)  L 


 





 


Phosphorus Level


 


 2.7 MG/DL


(2.5-4.9) 


 





 


Magnesium Level


 


 2.5 MG/DL


(1.8-2.4)  H 


 





 


Total Bilirubin


 


 0.3 MG/DL


(0.2-1.0) 


 





 


Aspartate Amino Transf


(AST/SGOT) 


 21 U/L (15-37)


 


 





 


Alanine Aminotransferase


(ALT/SGPT) 


 24 U/L (12-78)


 


 





 


Alkaline Phosphatase


 


 123 U/L


()  H 


 





 


Total Protein


 


 6.3 G/DL


(6.4-8.2)  L 


 





 


Albumin


 


 1.4 G/DL


(3.4-5.0)  L 


 





 


Globulin  4.9 g/dL    


 


Albumin/Globulin Ratio


 


 0.3 (1.0-2.7)


L 


 





 


Arterial Blood pH


 


 


 7.489


(7.350-7.450) 





 


Arterial Blood Partial


Pressure CO2 


 


 31.9 mmHg


(35.0-45.0)  L 





 


Arterial Blood Partial


Pressure O2 


 


 66.8 mmHg


(75.0-100.0)  L 





 


Arterial Blood HCO3


 


 


 23.7 mmol/L


(22.0-26.0) 





 


Arterial Blood Oxygen


Saturation 


 


 93.7 %


()  L 





 


Arterial Blood Base Excess   0.6 (-2-2)   


 


Jerod Test   Positive   

















Michael Sandoval MD              Nov 16, 2020 19:20

## 2020-11-16 NOTE — NUR
NURSE NOTES:

NGT placement confirmed by KUB. Per riddhi Otto to use NGT. Will administer late dose 
of Midodrine NGT meds and will resume feeding. Will closely monitor the patient. Will 
continue plan of care.

## 2020-11-16 NOTE — NUR
RD ASSESSMENT & RECOMMENDATIONS

SEE CARE ACTIVITY FOR COMPLETE ASSESSMENT



DAILY ESTIMATED NEEDS:

Needs based on DM, wound, critical care 59.5kg 

22-28  kcals/kg 

8551-7477  total kcals

1.25-2  g protein/kg

  g total protein 

25-30  mL/kg

3257-7466  total fluid mLs



NUTRITION DIAGNOSIS:

* Swallowing difficulty R/T dysphagia as evidenced by SLP sylvie, recs for

NGT feeds, now s/p oral intubation (11/6), s/p code blue (11/12) and

remains intubated, on pressor support, currently held, cont on NGT feeds.

* Increased kcal and pro needs r/t wound healing as evidenced by sacral

pressure injury stage 2.



 

CURRENT TF:Glucerna 1.2 @ 60ml/hr x24 hrs  



 



ENTERAL NUTRITION RECOMMENDATIONS:

Glucerna 1.2 @ 55ml/hr x24 hrs   to provide 1320ml, 1584kcal, 79g prot, 1063ml free water 



- LOWER goal rate to 55ml/hr x 24 hrs

 : s/p intubation w/ decreased kcal needs

- HOB over 30 degrees/ H2O flush per MD



 



ADDITIONAL RECOMMENDATIONS:

* Calibrated bedscale wt for accurate CBW 

* Monitor hemodynamic stability: s/p code blue (11/12), NE currently held 

* Monitor lytes, replete as needed  

* Wound care: add Harvinder BID via PEG 

                    add Vit C 250mg QD 

. 

.

## 2020-11-16 NOTE — PRE-PROCEDURE NOTE/ATTESTATION
Pre-Procedure Note/Attestation


Complete Prior to Procedure


Planned Procedure:  right


Procedure Narrative:


Right PICC over-the-wire replacement





Indications for Procedure


Pre-Operative Diagnosis:


Requires long term IV access





Attestation


I attest that I discussed the nature of the procedure; its benefits; risks and 

complications; and alternatives (and the risks and benefits of such alternativ

es), prior to the procedure, with the patient (or the patient's legal 

representative).





I attest that, if there was a reasonable possibility of needing a blood 

transfusion, the patient (or the patient's legal representative) was given the 

[facility] standardized written summary, pursuant 

to the Abdiel Hoa Blood Safety Act ([place] Health and Safety Code # 1645, as 

amended).





I attest that I re-evaluated the patient just prior to the surgery and that 

there has been no change in the patient's H&P, except as documented below:











Dino Patiño MD                 Nov 16, 2020 14:49

## 2020-11-16 NOTE — NUR
NURSE NOTES:

Vent weaning trial started per Dr. Sandoval's order. The patient is tolerating well. Will 
closely monitor the patient. Will continue plan of care.

## 2020-11-16 NOTE — NUR
NURSE NOTES:

The patient started to become tachycardic and tachypnic with high RR. ABG drawn by RT and 
put the patient back on AC mode. Notified Dr. Sandoval regarding weaning trial and ABG result 
after SBT. Per Dr. Sandoval, no more weaning today. Will closely monitor the patient. Will 
continue plan of care.

## 2020-11-16 NOTE — NUR
NURSE NOTES:

Dr. Patiño at the patient's bedside for old PICC line removal and new PICC line placement. 
The patient is stable at this time. Will closely monitor the patient. Will continue plan of 
care.

## 2020-11-16 NOTE — DIAGNOSTIC IMAGING REPORT
Indication: Reason For Exam: [id number]

 

Technique:  Single AP view of the chest.

 

Comparison: Chest radiograph dated 11/15/2020

 

Findings:

 

The cardiomediastinal silhouette is is unchanged in appearance. Again demonstrated is

bilateral airspace disease characterized by bibasilar streaky airspace opacities and

diffuse interstitial opacities. Increasing left basilar consolidation. Unchanged

small bilateral pleural effusions. No pneumothorax.

 

Unchanged enteric tube, endotracheal tube. Right PICC has been retracted, now

terminates in the expected location of the axillary vein.

 

IMPRESSION:

 

1.  Slight increase in left basilar airspace consolidation.

2.  Persistent bilateral pleural effusions with associated compressive atelectasis.

3.  Interval retraction of right PICC, now terminating in the expected location of

the right axillary vein.

## 2020-11-16 NOTE — NUR
NURSE NOTES:

Dr. Patiño verified the PICC line placement. Dr. Patiño ordered as follows: Okay to use PICC 
line and Okay to use for one pressor. CRN was notified. 

The patient is tolerating 1 unit pRBC transfusion well. No fever noted. Stable vital signs 
noted. Will closely monitor the patient. Will continue plan of care.

## 2020-11-16 NOTE — NUR
NURSE NOTES:

 noted. No coverage. The patient is asymptomatic. Will closely monitor the patient. 
Will continue plan of care.

## 2020-11-16 NOTE — INTERNAL MED PROGRESS NOTE
Subjective


Date of Service:  Nov 16, 2020


Physician Name


Benito Hearn


Attending Physician


Andrei Cancino MD





Current Medications








 Medications


  (Trade)  Dose


 Ordered  Sig/Miky


 Route


 PRN Reason  Start Time


 Stop Time Status Last Admin


Dose Admin


 


 Acetaminophen


  (Tylenol)  650 mg  Q4H  PRN


 ORAL


 Temp >100.5  10/23/20 20:30


 11/22/20 20:29  11/16/20 13:01





 


 Chlorhexidine


 Gluconate


  (Jess-Hex 2%)  1 applic  DAILY@2000


 TOPIC


   11/6/20 20:00


 2/4/21 19:59  11/15/20 20:36





 


 Dextrose


  (Dextrose 50%)  50 ml  Q30M  PRN


 IV


 Hypoglycemia  10/23/20 22:45


 1/21/21 22:44   





 


 Heparin Sodium


  (Porcine)


  (Heparin 5000


 units/ml)  5,000 units  EVERY 12  HOURS


 SUBQ


   10/23/20 21:00


 12/7/20 20:59  11/15/20 20:37





 


 Insulin Aspart


  (NovoLOG)    EVERY 6  HOURS


 SUBQ


   11/2/20 06:00


 1/22/21 06:29  11/15/20 00:30





 


 Linezolid  300 ml @ 


 300 mls/hr  Q12HR


 IVPB


   11/13/20 12:00


 11/20/20 11:59  11/16/20 09:14





 


 Lorazepam


  (Ativan 2mg/ml


 1ml)  2 mg  Q4H  PRN


 IV


 For Anxiety  11/10/20 13:45


 11/17/20 13:44  11/15/20 17:11





 


 Midodrine


  (Pro-Amatine)  10 mg  Q8H


 ORAL


   11/11/20 17:00


 2/9/21 16:59  11/16/20 16:53





 


 Morphine Sulfate


  (Morphine


 Sulfate)  2 mg  Q6H  PRN


 IVP


 moderate pain  11/10/20 13:45


 11/17/20 13:44   





 


 Nitroglycerin


  (Ntg)  0.4 mg  Q5M  PRN


 SL


 Prn Chest Pain  10/23/20 20:30


 11/22/20 20:29   





 


 Norepinephrine


 Bitartrate  250 ml @ 0


 mls/hr  Q24H


 IV


   11/15/20 13:00


 11/18/20 12:59  11/15/20 13:01





 


 Ondansetron HCl


  (Zofran)  4 mg  Q6H  PRN


 IVP


 Nausea & Vomiting  10/23/20 20:30


 11/22/20 20:29   





 


 Pantoprazole


  (Protonix)  40 mg  DAILY


 IV


   11/7/20 09:00


 12/7/20 08:59  11/16/20 09:14





 


 Polyethylene


 Glycol


  (Miralax)  17 gm  DAILYPRN  PRN


 ORAL


 Constipation  10/23/20 20:30


 11/22/20 20:29   





 


 Promethazine HCl/


 Codeine


  (Phenergan with


 Codeine)  5 ml  Q4H  PRN


 ORAL


 For Cough  10/23/20 20:30


 11/22/20 20:29   





 


 Sodium Chloride  1,000 ml @ 


 50 mls/hr  Q20H


 IV


   11/6/20 16:00


 12/6/20 15:59  11/16/20 11:02











Allergies:  


Coded Allergies:  


     No Known Allergies (Unverified , 8/20/12)


ROS Limited/Unobtainable:  Yes


Subjective


74 YO M admitted with shortness of breath.  Now pneumonia; previously COVID 

positive.  Cover for Int med-Dr Cancino.  ICU.  Intubated and sedated. On Levophed





Objective





Last Vital Signs








  Date Time  Temp Pulse Resp B/P (MAP) Pulse Ox O2 Delivery O2 Flow Rate FiO2


 


11/16/20 18:00  85 12 110/62 (78) 100   


 


11/16/20 16:41      Mechanical Ventilator  30


 


11/16/20 16:00 98.6       











Laboratory Tests








Test


 11/16/20


04:24 11/16/20


05:01 11/16/20


08:50 11/16/20


17:06


 


POC Whole Blood Glucose Pending     Pending  


 


White Blood Count


 


 9.1 K/UL


(4.8-10.8) 


 





 


Red Blood Count


 


 2.51 M/UL


(4.70-6.10)  L 


 





 


Hemoglobin


 


 7.5 G/DL


(14.2-18.0)  L 


 





 


Hematocrit


 


 24.8 %


(42.0-52.0)  L 


 





 


Mean Corpuscular Volume  99 FL (80-99)    


 


Mean Corpuscular Hemoglobin


 


 30.1 PG


(27.0-31.0) 


 





 


Mean Corpuscular Hemoglobin


Concent 


 30.4 G/DL


(32.0-36.0)  L 


 





 


Red Cell Distribution Width


 


 17.7 %


(11.6-14.8)  H 


 





 


Platelet Count


 


 417 K/UL


(150-450) 


 





 


Mean Platelet Volume


 


 7.3 FL


(6.5-10.1) 


 





 


Neutrophils (%) (Auto)


 


 % (45.0-75.0)


 


 





 


Lymphocytes (%) (Auto)


 


 % (20.0-45.0)


 


 





 


Monocytes (%) (Auto)   % (1.0-10.0)    


 


Eosinophils (%) (Auto)   % (0.0-3.0)    


 


Basophils (%) (Auto)   % (0.0-2.0)    


 


Sodium Level


 


 141 MMOL/L


(136-145) 


 





 


Potassium Level


 


 3.7 MMOL/L


(3.5-5.1) 


 





 


Chloride Level


 


 107 MMOL/L


() 


 





 


Carbon Dioxide Level


 


 27 MMOL/L


(21-32) 


 





 


Anion Gap


 


 7 mmol/L


(5-15) 


 





 


Blood Urea Nitrogen


 


 15 mg/dL


(7-18) 


 





 


Creatinine


 


 1.0 MG/DL


(0.55-1.30) 


 





 


Estimat Glomerular Filtration


Rate 


 > 60 mL/min


(>60) 


 





 


Glucose Level


 


 125 MG/DL


()  H 


 





 


Calcium Level


 


 8.0 MG/DL


(8.5-10.1)  L 


 





 


Phosphorus Level


 


 2.7 MG/DL


(2.5-4.9) 


 





 


Magnesium Level


 


 2.5 MG/DL


(1.8-2.4)  H 


 





 


Total Bilirubin


 


 0.3 MG/DL


(0.2-1.0) 


 





 


Aspartate Amino Transf


(AST/SGOT) 


 21 U/L (15-37)


 


 





 


Alanine Aminotransferase


(ALT/SGPT) 


 24 U/L (12-78)


 


 





 


Alkaline Phosphatase


 


 123 U/L


()  H 


 





 


Total Protein


 


 6.3 G/DL


(6.4-8.2)  L 


 





 


Albumin


 


 1.4 G/DL


(3.4-5.0)  L 


 





 


Globulin  4.9 g/dL    


 


Albumin/Globulin Ratio


 


 0.3 (1.0-2.7)


L 


 





 


Arterial Blood pH


 


 


 7.489


(7.350-7.450) 





 


Arterial Blood Partial


Pressure CO2 


 


 31.9 mmHg


(35.0-45.0)  L 





 


Arterial Blood Partial


Pressure O2 


 


 66.8 mmHg


(75.0-100.0)  L 





 


Arterial Blood HCO3


 


 


 23.7 mmol/L


(22.0-26.0) 





 


Arterial Blood Oxygen


Saturation 


 


 93.7 %


()  L 





 


Arterial Blood Base Excess   0.6 (-2-2)   


 


Jerod Test   Positive   











Microbiology








 Date/Time


Source Procedure


Growth Status





 


 11/14/20 14:30


Urine,Clean Catch Urine Culture - Preliminary


NO GROWTH Resulted





 11/14/20 14:30


Sputum Gram Stain - Final Resulted


 


 11/14/20 14:30 Sputum Culture - Preliminary


Staphylococcus Aureus


Usual Respiratory Sondra Resulted

















Intake and Output  


 


 11/15/20 11/16/20





 19:00 07:00


 


Intake Total 1573.75 ml 1155 ml


 


Output Total 1500 ml 690 ml


 


Balance 73.75 ml 465 ml


 


  


 


Free Water 30 ml 60 ml


 


IV Total 903.75 ml 555 ml


 


Tube Feeding 640 ml 540 ml


 


Output Urine Total 1500 ml 690 ml








Objective


PHYSICAL EXAMINATION:


GENERAL:  The patient awake with deep stimuli, open his eyes, however,


cannot follow commands.  The patient is on a Ventimask at this time,


chronically ill-appearing.


HEAD AND NECK:  Pupils are equal and reactive to light.  Anicteric.


NECK:  Supple.  No JVD.


LUNGS:  Mech vent;  wheezing, rhonchi, and decreased air in bases.


HEART:  S1, S2.  Regular rhythm.  Distant heart sounds.  No murmur or


gallop.


ABDOMEN:  Soft, nondistended, nontender.  Positive bowel sounds.


EXTREMITIES:  No cyanosis, clubbing, or edema.


NEUROLOGIC:  Very limited secondary to the patient's status, cannot follow


commands.  Opens his eyes with deep stimuli and moving extremities


spontaneously.





Assessment/Plan


Assessment/Plan


ASSESSMENT:


1. Acute hypoxemic respiratory failure, most likely secondary to


pneumonia and sepsis.


2. Sepsis secondary to urinary tract infection and pneumonia.


3. pneumonia=MRSA; ESBL E. coli


4. Acute kidney injury on chronic renal insufficiency.


5. Dehydration.


6. History of chronic congestive heart failure.


7. Diabetes type 2.


8. Dyslipidemia.


9. Hypertension.


10. Alzheimer's disease.


11. COVID 19 previous positive





PLAN:


1.  ICU


2.  Dr. Sandoval,=Pulmonary Critical Care; follow mech vent recs


3.  Dr. Garcia = Inf Dis.


4.  IV= D5W due to the hypernatremia and dehydration.


5.  antibiotics =  linezolid; S/P micafungin and meropenem


6.  Code status is full code.


7.    DVT prophylaxis is heparin subcutaneous.











Benito Hearn MD               Nov 16, 2020 19:15

## 2020-11-16 NOTE — NUR
NURSE NOTES:

received report from debi longoria  intubated -vent o2sat 98 % suction and reposition tolerating 
tube feeding no residual  iv infusing well  cont monitor pt

## 2020-11-16 NOTE — NUR
NURSE NOTES:

Initial vital signs taken. Initial nursing assessment done. NGT will not be used until 
confirmation of the placement. GURPREET PICC line will not be used until placement confirms. L 
hand and L FA PIVs are intact and patent. Tolerating weaning well. Large amount of 
secretions and thick plug noted. q15-30 minutes suction being done to clear the secretions. 
Will closely monitor the patient. Will continue plan of care.

## 2020-11-16 NOTE — NUR
NURSE NOTES:

Medications administered per order. The patient tolerated well. Unable to administer NGT 
medication since placement not confirmed yet. Heparin on hold for PICC line placement. Will 
closely monitor the patient. Will continue plan of care.

## 2020-11-16 NOTE — BRIEF OPERATIVE NOTE
Immediate Post Operative Note


Operative Note


Pre-op Diagnosis:


Requires long term IV access


Post-op Diagnosis:  same as pre-op


Findings:  other - Partially retracted right PICC still intravascular, replaced 

over the wire


Surgeon:  Dino Patiño MD, MA


Anesthesia:  local


Specimen:  none


Complications:  none


Condition:  stable


Fluids:  0


Implant(s) used?:  Yes - 4Fr dual lumen PICC











Dino Patiño MD                 Nov 16, 2020 14:50

## 2020-11-16 NOTE — CARDIAC ELECTROPHYSIOLOGY PN
Assessment/Plan


Assessment/Plan


1. Accelerated junctional rhythm. Ruled out for MI


      Echo showed ejection fraction of 55%.





2. Long run of 31 beats of Nonsustained VT on 11/3/2020. 


    Cardiac cath after stabilization.





3. Nicola 30, Asystole while on the Vent due to resp failure/ likely mucus plug .

S/P Code





4. Respiratory failure, on antibiotic and intubated on the vent 





5. Septic shock. Off Levophed  and on Midodrine 10 tid 


6. History of previous COVID infection in September 2020 and active Covid  in 

isolation


7. Dementia.





JEANNINE RN





Subjective


Subjective


In SR in NAD in Covid isolation. In ICU on the Vent   Fio2 decreased to 30%


 Had 31 beats of VT  on 11/3/20 at 16:46 and 5 beats 11/8/20 


Coded on 11/12/20 as sat dropped to 20% and got nicola 30s and PEA and pulseless 


Got 3 Epi and 2 Bicarb.  Off Levophed  on Midodrine 10 tid


PICC line came out  and NGT is misplaced





Objective





Last 24 Hour Vital Signs








  Date Time  Temp Pulse Resp B/P (MAP) Pulse Ox O2 Delivery O2 Flow Rate FiO2


 


11/16/20 10:54  93 14     30


 


11/16/20 08:12     99   


 


11/16/20 07:29  98 24     30





        30


 


11/16/20 07:00  96 12 90/49 (63) 98   


 


11/16/20 06:00  97 12 97/52 (67) 96   


 


11/16/20 06:00  100 56     


 


11/16/20 05:00  108 13 96/48 (64) 93   


 


11/16/20 04:00  101 18 124/55 (78) 98   


 


11/16/20 04:00      Mechanical Ventilator  





      Mechanical Ventilator  


 


11/16/20 04:00        30


 


11/16/20 04:00  100      


 


11/16/20 03:56  116 22     30


 


11/16/20 03:00  97 13 95/47 (63) 97   


 


11/16/20 02:00  101 22 124/59 (80) 100   


 


11/16/20 01:00    94/59 (71)    


 


11/16/20 00:00        30


 


11/16/20 00:00      Mechanical Ventilator  





      Mechanical Ventilator  


 


11/16/20 00:00  100      


 


11/16/20 00:00  95      


 


11/16/20 00:00 98.8 90 22 93/63 (73) 98   


 


11/16/20 00:00        30


 


11/15/20 23:15  96 13     30


 


11/15/20 23:00  92 15 97/58 (71) 98   


 


11/15/20 22:00  95 12 91/49 (63) 98   


 


11/15/20 21:00  94 13 108/58 (75) 99   


 


11/15/20 20:00      Mechanical Ventilator  





      Mechanical Ventilator  


 


11/15/20 20:00        30


 


11/15/20 20:00 98.5 93 21 105/67 (80) 99   


 


11/15/20 20:00  98      


 


11/15/20 19:37  95 15     30


 


11/15/20 19:00  96 18 103/63 (76) 97   


 


11/15/20 18:45  100 18 107/62 (77) 95   


 


11/15/20 18:30  104 25 115/61 (79) 96   


 


11/15/20 18:15  111 27 111/62 (78) 99   


 


11/15/20 18:00  116 23 157/131 (140) 96   


 


11/15/20 17:41  100 18 115/61 99   


 


11/15/20 17:30  120 25 180/89 (119) 96   


 


11/15/20 17:11  105 23 113/53 99   


 


11/15/20 17:00  95 19 113/53 (73) 100   


 


11/15/20 16:30  91 18 106/61 (76) 99   


 


11/15/20 16:00      Mechanical Ventilator  





      Mechanical Ventilator  


 


11/15/20 16:00        30


 


11/15/20 16:00  90      


 


11/15/20 16:00 98.2 92 18 111/59 (76) 100   


 


11/15/20 15:30  91 17 111/55 (73) 100   


 


11/15/20 15:05  90 16     30


 


11/15/20 15:00  92 20 97/53 (68) 99   


 


11/15/20 14:45  93 21 103/55 (71) 99   


 


11/15/20 14:30  99 18 110/62 (78) 99   


 


11/15/20 14:15  104 23 117/63 (81) 99   


 


11/15/20 14:00  106 21 138/59 (85) 98   


 


11/15/20 13:45  113 21 147/77 (100) 95   


 


11/15/20 13:30  108 21 135/84 (101) 98   


 


11/15/20 13:15  109 25 173/78 (109) 100   


 


11/15/20 13:01    128/50    


 


11/15/20 13:00  98 23 134/91 (105) 97   


 


11/15/20 12:45  97 20 128/50 (76) 100   


 


11/15/20 12:30  93 22 115/63 (80) 99   


 


11/15/20 12:15  93 22 121/62 (81) 99   


 


11/15/20 12:00        30


 


11/15/20 12:00      Mechanical Ventilator  





      Mechanical Ventilator  


 


11/15/20 12:00 98.4 99 14 98/52 (67) 98   


 


11/15/20 12:00  97      

















Intake and Output  


 


 11/15/20 11/16/20





 18:59 06:59


 


Intake Total 1603.75 ml 1215 ml


 


Output Total 1650 ml 690 ml


 


Balance -46.25 ml 525 ml


 


  


 


Free Water 30 ml 60 ml


 


IV Total 953.75 ml 555 ml


 


Tube Feeding 620 ml 600 ml


 


Output Urine Total 1650 ml 690 ml











Laboratory Tests








Test


 11/16/20


04:24 11/16/20


05:01 11/16/20


08:50


 


POC Whole Blood Glucose Pending    


 


White Blood Count


 


 9.1 K/UL


(4.8-10.8) 





 


Red Blood Count


 


 2.51 M/UL


(4.70-6.10)  L 





 


Hemoglobin


 


 7.5 G/DL


(14.2-18.0)  L 





 


Hematocrit


 


 24.8 %


(42.0-52.0)  L 





 


Mean Corpuscular Volume  99 FL (80-99)   


 


Mean Corpuscular Hemoglobin


 


 30.1 PG


(27.0-31.0) 





 


Mean Corpuscular Hemoglobin


Concent 


 30.4 G/DL


(32.0-36.0)  L 





 


Red Cell Distribution Width


 


 17.7 %


(11.6-14.8)  H 





 


Platelet Count


 


 417 K/UL


(150-450) 





 


Mean Platelet Volume


 


 7.3 FL


(6.5-10.1) 





 


Neutrophils (%) (Auto)


 


 % (45.0-75.0)


 





 


Lymphocytes (%) (Auto)


 


 % (20.0-45.0)


 





 


Monocytes (%) (Auto)   % (1.0-10.0)   


 


Eosinophils (%) (Auto)   % (0.0-3.0)   


 


Basophils (%) (Auto)   % (0.0-2.0)   


 


Sodium Level


 


 141 MMOL/L


(136-145) 





 


Potassium Level


 


 3.7 MMOL/L


(3.5-5.1) 





 


Chloride Level


 


 107 MMOL/L


() 





 


Carbon Dioxide Level


 


 27 MMOL/L


(21-32) 





 


Anion Gap


 


 7 mmol/L


(5-15) 





 


Blood Urea Nitrogen


 


 15 mg/dL


(7-18) 





 


Creatinine


 


 1.0 MG/DL


(0.55-1.30) 





 


Estimat Glomerular Filtration


Rate 


 > 60 mL/min


(>60) 





 


Glucose Level


 


 125 MG/DL


()  H 





 


Calcium Level


 


 8.0 MG/DL


(8.5-10.1)  L 





 


Phosphorus Level


 


 2.7 MG/DL


(2.5-4.9) 





 


Magnesium Level


 


 2.5 MG/DL


(1.8-2.4)  H 





 


Total Bilirubin


 


 0.3 MG/DL


(0.2-1.0) 





 


Aspartate Amino Transf


(AST/SGOT) 


 21 U/L (15-37)


 





 


Alanine Aminotransferase


(ALT/SGPT) 


 24 U/L (12-78)


 





 


Alkaline Phosphatase


 


 123 U/L


()  H 





 


Total Protein


 


 6.3 G/DL


(6.4-8.2)  L 





 


Albumin


 


 1.4 G/DL


(3.4-5.0)  L 





 


Globulin  4.9 g/dL   


 


Albumin/Globulin Ratio


 


 0.3 (1.0-2.7)


L 





 


Arterial Blood pH


 


 


 7.489


(7.350-7.450)


 


Arterial Blood Partial


Pressure CO2 


 


 31.9 mmHg


(35.0-45.0)  L


 


Arterial Blood Partial


Pressure O2 


 


 66.8 mmHg


(75.0-100.0)  L


 


Arterial Blood HCO3


 


 


 23.7 mmol/L


(22.0-26.0)


 


Arterial Blood Oxygen


Saturation 


 


 93.7 %


()  L


 


Arterial Blood Base Excess   0.6 (-2-2)  


 


Jerod Test   Positive  











Microbiology








 Date/Time


Source Procedure


Growth Status





 


 11/14/20 14:30


Urine,Clean Catch Urine Culture - Preliminary


NO GROWTH Resulted





 11/14/20 14:30


Sputum Gram Stain - Final Resulted


 


 11/14/20 14:30


Sputum Sputum Culture


Pending Resulted








Objective





HEAD AND NECK:  No JVD. Orally intubated


LUNGS:  Coarse rhonchi.


CARDIOVASCULAR:   Irregular S1 and S2 with no gallop.


ABDOMEN:  Soft.


EXTREMITIES:  No pitting edema.











Kvng Edmond MD               Nov 16, 2020 11:52

## 2020-11-16 NOTE — DIAGNOSTIC IMAGING REPORT
Indications: Needs long-term IV access

 

Technique: Procedure performed at bedside. Procedural timeout performed. 

 

Physical examination confirmed retraction of indwelling right-sided PICC. Total

sterile technique, including sterile probe cover and sterile gel, sterile gloves,

hand hygiene, hat, mask,, sterile gown, large sterile drape, and preparation with 2%

chlorhexidine utilized. Local anesthesia with 1% lidocaine. An 018 guidewire was

placed through the existing right-sided PICC, over which a peel-away sheath was

placed. A new 4 [place] dual-lumen power PICC was cut to the same length as prior

PICC. It was inserted through the peel-away sheath. Peel-away sheath and guidewire

removed. Catheter fixed to the skin. Both catheter ports aspirated and flushed.

Patient tolerated procedure well, without immediate complication. 

 

Subsequent chest radiograph demonstrates catheter tip in the expected location of the

right subclavian and brachiocephalic junction. Despite catheter length being the same

as prior PICC which terminated in the SVC, the catheter tip is approximately 4 to 5

cm shallow from prior position, which may be due to patient arm positioning and

venous collapsibility.

 

Additional chest radiograph findings include bilateral airspace disease with

interstitial edema as well as unchanged position of enteric tube and endotracheal

tube.

 

Impression: 

 

Successful bedside over-the-wire exchange for new right-sided PICC, in appropriate

position on subsequently performed chest radiograph.

## 2020-11-16 NOTE — NUR
NURSE NOTES:

The patient is resting on the bed without acute distress or shortness of breath. Tolerating 
vent setting and tube feeding well. Still has large amount of oral and ETT secretions that 
need frequent suctioning. Will closely monitor the patient. Will continue plan of care.

## 2020-11-16 NOTE — NUR
NURSE NOTES:

Received report from LITA Rico. The patient is resting on the bed without acute distress 
or shortness of breath, but restless, agitated, and uncooperative in medical care. The 
patient is opening eyes spontaneously, eyes are tracking, but not following commands. 
Communication made by facial expression and body movement. SR to ST w/ HR 80-100s on the 
cardiac monitor. The patient is orally intubated at following setting and oxygen saturation 
is 100%: ETT 7.5, Lipline @ 25, AC 12, , PEEP 0, and FiO2 30%. The patient has R nare 
NGT @ 55cm but is not in use until placement confirmation. Per LITA Rico, the patient had 
episode of vomiting last night and not clear about NGT placement so pending KUB for 
reconfirmation of NGT placement. TF and NGT meds will be on hold until placement confirms. 
Diet order is Glucerna 1.2 @ 60mL/hr and will be resumed as soon as placement confirms. The 
patient has Cummings that is intact and draining by gravity and 50-100mL/hr UOP noted. Skin 
issue noted and dressing intact. The patient is on low airloss matress for pressure ulcer. 
The patient has GURPREET double lumen PICC line but not clear about the placement per LITA Rico since catheter is 10cm out from the hub while the previous nurses has been documenting 
that it has been 0.5cm out from the hub. LITA White changed the dressing and secured the 
site but will not be used until placement confirmation. New IV was inserted just now. L hand 
20G and L FA 20G PIVs those are intact and patent and running 1/2 NS @ 50mL/hr per order. 
The patient's bed in the lowest position, call light in reach, and fall and aspiration 
precaution reinforced. PIV sites intact and patent. Will follow up the lab and order. Will 
closely monitor the patient. Will continue plan of care. 

-------------------------------------------------------------------------------

Addendum: 11/16/20 at 2221 by Alexsander Shaffer RN

-------------------------------------------------------------------------------

Standby Levophed on eMAR to keep SBP>90.

## 2020-11-16 NOTE — NUR
NURSE NOTES:

The patient is stable at this time. No fever noted. 1 unit pRBC transfusion started per Dr. Sandoval's order via PIV. Will closely monitor the patient. Will continue plan of care.

## 2020-11-16 NOTE — NEPHROLOGY PROGRESS NOTE
Assessment/Plan


Problem List:  


(1) Dehydration


(2) JANES (acute kidney injury)


(3) Renal failure (ARF), acute on chronic


(4) Hypoxia


(5) Acute encephalopathy


(6) Electrolyte imbalance


Assessment





75-year-old male is admitted with acute hypoxic respiratory failure most likely 

secondary to pneumonia and sepsis, UTI.


Acute on chronic renal failure


Dehydration


Electrolyte imbalances, hypernatremia


Hypoalbuminemia


Diabetes type 2


History of congestive heart failure


Hypertension


Hyperlipemia


Alzheimer's


Previous COVID-19 infection in September 2020


Plan


November 16: Remains in ICU.  Full code.  Labs reviewed.  Remains intubated.  

Stable renal parameters.


November 15: In ICU.  Full code.  Discussed with RN.  Stable renal parameters.


November 14: Seen in ICU.  Discussed with LITA Cooper.  Flomax discontinued.  

Labs reviewed.  Stable from renal standpoint of view.


November 13: Seen in ICU.  Discussed with LITA Cooper.  Remains on pressor.  

Electrolyte abnormalities noted and addressed.  Continue per consultants.  

Patient remains full code.


November 12: Seen in ICU.  Discussed with LITA Richardson.  Blood pressure remains a 

little requiring pressors.  Labs reviewed.  Stable renal parameters.  Continue 

per consultants.


November 11: Remains intubated.  Full code.  Blood pressure borderline low.  

Will increase midodrine dose.  Discussed with RN.  Continue to monitor renal 

parameters and electrolytes.


November 10: Intubated.  Full code.  Labs reviewed.  Stable from renal 

standpoint of view.  Continue per consultants.


November 9: Remains intubated.  Full code.  Labs reviewed.  Electrolytes within 

normal limit.  Continue per consultants.


November 8: In ICU.  Remains intubated on ventilator.  Remains full code.  Low 

potassium addressed.  Blood pressure fluctuating.  Continue per consultants.


November 7: Patient in ICU.  Intubated on ventilator.  On 6 mics of Levophed.  

Will give 100 cc albumin 25%.  K-Phos IV ordered.  Continue per consultants.  

Continue monitor renal parameters and electrolytes.


November 6: Status unchanged.  Transfer to [facility] for seizure.  Stable from renal 

standpoint to view.  Continue per consultants.


November 5: Status quo.  Labs reviewed.  Renal parameters stable.  Continue per 

consultants.


November 4: On nonrebreather mask.  Labs reviewed.  Renal parameters 

stable.Continue per pulmonologist.  Clinically unchanged.


November 3: On nonrebreather mask.  Inflammatory markers gradually declining.  

Renal parameters stable.  Continue per pulmonary.


November 2: Remains on nonrebreather mask.  Inflammatory markers remain 

elevated.  Renal parameters somewhat stable.  Continue per pulmonary and ID.  

Remains full code.


November 1: Patient on nonrebreather mask.  Labs noted.  Serum creatinine down 

to 1.3.  Continue per consultants.


October 31: Patient on nonrebreather mask.  Transfer to telemetry when seen this

morning.  Labs noted.  Continue per consultants.  Serum creatinine dylan to 1.7. 

Continue to monitor renal parameters.  Continue to monitor vancomycin level


October 30: Patient is not doing well clinically.  Mild respiratory distress.  

ABG noted.  Somewhat hypoxic.  CBC and chemistry panel ordered.  Discussed with 

LITA Garcia.  Will defer to pulmonary management to specialist.  Continue per ID.

 Renal parameters remained stable as of October 29.


October 29: Labs reviewed.  Renal parameters stable.  Continue per consultants.


October 28: Labs reviewed.  Potassium via NG tube ordered.  IV fluids stopped.  

Continue per consultants.


October 27: Labs reviewed.  Low potassium and low phosphorus replaced.  Continue

per consultants.  Remains stable from renal standpoint of view.


October 26: Patient remains n.p.o.  IV fluid down to 50 cc an hour.  Potassium 

supplement intravenously ordered.  Continue per consultants.





Previously:


Patient is n.p.o., will continue on IV fluid of D5W 75 cc an hour


We will monitor electrolytes and renal parameters


Avoid nephrotoxic's


Start p.o. when he clears by speech therapist, meanwhile aspiration precautions


Keep the blood pressure and blood sugar in check


Per orders





Subjective


ROS Limited/Unobtainable:  Yes





Objective


Objective





Last 24 Hour Vital Signs








  Date Time  Temp Pulse Resp B/P (MAP) Pulse Ox O2 Delivery O2 Flow Rate FiO2


 


11/16/20 10:54  93 14     30


 


11/16/20 08:12     99   


 


11/16/20 07:29  98 24     30





        30


 


11/16/20 07:00  96 12 90/49 (63) 98   


 


11/16/20 06:00  97 12 97/52 (67) 96   


 


11/16/20 06:00  100 56     


 


11/16/20 05:00  108 13 96/48 (64) 93   


 


11/16/20 04:00  101 18 124/55 (78) 98   


 


11/16/20 04:00      Mechanical Ventilator  





      Mechanical Ventilator  


 


11/16/20 04:00        30


 


11/16/20 04:00  100      


 


11/16/20 03:56  116 22     30


 


11/16/20 03:00  97 13 95/47 (63) 97   


 


11/16/20 02:00  101 22 124/59 (80) 100   


 


11/16/20 01:00    94/59 (71)    


 


11/16/20 00:00        30


 


11/16/20 00:00      Mechanical Ventilator  





      Mechanical Ventilator  


 


11/16/20 00:00  100      


 


11/16/20 00:00  95      


 


11/16/20 00:00 98.8 90 22 93/63 (73) 98   


 


11/16/20 00:00        30


 


11/15/20 23:15  96 13     30


 


11/15/20 23:00  92 15 97/58 (71) 98   


 


11/15/20 22:00  95 12 91/49 (63) 98   


 


11/15/20 21:00  94 13 108/58 (75) 99   


 


11/15/20 20:00      Mechanical Ventilator  





      Mechanical Ventilator  


 


11/15/20 20:00        30


 


11/15/20 20:00 98.5 93 21 105/67 (80) 99   


 


11/15/20 20:00  98      


 


11/15/20 19:37  95 15     30


 


11/15/20 19:00  96 18 103/63 (76) 97   


 


11/15/20 18:45  100 18 107/62 (77) 95   


 


11/15/20 18:30  104 25 115/61 (79) 96   


 


11/15/20 18:15  111 27 111/62 (78) 99   


 


11/15/20 18:00  116 23 157/131 (140) 96   


 


11/15/20 17:41  100 18 115/61 99   


 


11/15/20 17:30  120 25 180/89 (119) 96   


 


11/15/20 17:11  105 23 113/53 99   


 


11/15/20 17:00  95 19 113/53 (73) 100   


 


11/15/20 16:30  91 18 106/61 (76) 99   


 


11/15/20 16:00      Mechanical Ventilator  





      Mechanical Ventilator  


 


11/15/20 16:00        30


 


11/15/20 16:00  90      


 


11/15/20 16:00 98.2 92 18 111/59 (76) 100   


 


11/15/20 15:30  91 17 111/55 (73) 100   


 


11/15/20 15:05  90 16     30


 


11/15/20 15:00  92 20 97/53 (68) 99   


 


11/15/20 14:45  93 21 103/55 (71) 99   


 


11/15/20 14:30  99 18 110/62 (78) 99   


 


11/15/20 14:15  104 23 117/63 (81) 99   


 


11/15/20 14:00  106 21 138/59 (85) 98   


 


11/15/20 13:45  113 21 147/77 (100) 95   

















Intake and Output  


 


 11/15/20 11/16/20





 19:00 07:00


 


Intake Total 1573.75 ml 1155 ml


 


Output Total 1500 ml 690 ml


 


Balance 73.75 ml 465 ml


 


  


 


Free Water 30 ml 60 ml


 


IV Total 903.75 ml 555 ml


 


Tube Feeding 640 ml 540 ml


 


Output Urine Total 1500 ml 690 ml








Laboratory Tests


11/16/20 04:24: POC Whole Blood Glucose [Pending]


11/16/20 05:01: 


White Blood Count 9.1, Red Blood Count 2.51L, Hemoglobin 7.5L, Hematocrit 24.8L,

 Mean Corpuscular Volume 99, Mean Corpuscular Hemoglobin 30.1, Mean Corpuscular 

Hemoglobin Concent 30.4L, Red Cell Distribution Width 17.7H, Platelet Count 417,

 Mean Platelet Volume 7.3, Neutrophils (%) (Auto) , Lymphocytes (%) (Auto) , 

Monocytes (%) (Auto) , Eosinophils (%) (Auto) , Basophils (%) (Auto) , Sodium 

Level 141, Potassium Level 3.7, Chloride Level 107, Carbon Dioxide Level 27, 

Anion Gap 7, Blood Urea Nitrogen 15, Creatinine 1.0, Estimat Glomerular 

Filtration Rate > 60, Glucose Level 125H, Calcium Level 8.0L, Phosphorus Level 

2.7, Magnesium Level 2.5H, Total Bilirubin 0.3, Aspartate Amino Transf (AST/SG

OT) 21, Alanine Aminotransferase (ALT/SGPT) 24, Alkaline Phosphatase 123H, Total

 Protein 6.3L, Albumin 1.4L, Globulin 4.9, Albumin/Globulin Ratio 0.3L


11/16/20 08:50: 


Arterial Blood pH 7.489H, Arterial Blood Partial Pressure CO2 31.9L, Arterial 

Blood Partial Pressure O2 66.8L, Arterial Blood HCO3 23.7, Arterial Blood Oxygen

 Saturation 93.7L, Arterial Blood Base Excess 0.6, Jerod Test Positive


Height (Feet):  5


Height (Inches):  4.00


Weight (Pounds):  130


General Appearance:  no apparent distress


EENT:  other - Remains intubated on ventilator


Cardiovascular:  tachycardia


Respiratory/Chest:  decreased breath sounds


Abdomen:  distended











Seven Tobar MD            Nov 16, 2020 13:36

## 2020-11-16 NOTE — INFECTIOUS DISEASES PROG NOTE
Assessment/Plan





76yo M with:





Respiratory code 2ry to mucus plug 11/12


Septic Shock- -recurrent


Fever, recurrent, low grade;SP


Leukocytosis; recurrent; increased


Acute hypoxic resp failure, on NRB mask> 4l NC; back on NRB, desaturation 10/29 

> intubated 11/6


Pneumonia- >HAP


Hx of COVID19 PNA 9/9/20 11/14 Resp cx +SA, nl resp hayden


   UCx neg


   11/13 COVID PCR neg


         --11/10 CXR: Bilateral infiltrates in a peribronchovascular 

distribution are unchanged. Left pleural effusion is unchanged.


         --11/8 CXR: Persistent bilateral patchy pulmonary opacities, most 

prominent  in the left lower lung.


         --11/7 ucx neg


                  sp cx MRSA (colonizer at this point)


                  Bcx Neg


         --11/2 Bcx Neg


          10/30 Sp cx MRSA (Vancomycin ADRIANA 1), ESBL E.coli


   10/23 BCx NTD


   UA 15-20 WBC, UCx Neg


   10/23 COVID rapid neg; 10/26 rapid COVID PCR + (from prior infection)- not 

new infection


   Flu A/B neg


   CXR: L pna


   Resp cx MRSA





JANES on CKD, improving





SNF resident ([facility])


Non-verbal


VRE and MRSA colonized








Plan:


Stop meropenem #14/14 for ESBL pna





Cont Zyvoz #4 for MRSA coverage given mucus plug and worsening hemodynamics, 

will likely stop soon





   -11/10 SP Micafungin #4


   -11/6 SP IV Vancomycin #15


   -11/2 SP Zosyn #4


   -10/26 SP Cefepime #4


   -10/24 SP Flagyl #





Monitor CBC/CMP


Monitor resp status


Monitor temp curve and hemodynamics





D/w RN





Thank you for this consult. [facility] will continue to follow.





Subjective


Allergies:  


Coded Allergies:  


     No Known Allergies (Unverified , 8/20/12)


AF


WBC improved to 9.1


NAD on vent


Levophed off


Still w/ thick mucus plugs


Failed weaning yesterday





Objective





Last 24 Hour Vital Signs








  Date Time  Temp Pulse Resp B/P (MAP) Pulse Ox O2 Delivery O2 Flow Rate FiO2


 


11/16/20 08:12     99   


 


11/16/20 07:29  98 24     30





        30


 


11/16/20 07:00  96 12 90/49 (63) 98   


 


11/16/20 06:00  97 12 97/52 (67) 96   


 


11/16/20 06:00  100 56     


 


11/16/20 05:00  108 13 96/48 (64) 93   


 


11/16/20 04:00  101 18 124/55 (78) 98   


 


11/16/20 04:00      Mechanical Ventilator  





      Mechanical Ventilator  


 


11/16/20 04:00        30


 


11/16/20 04:00  100      


 


11/16/20 03:56  116 22     30


 


11/16/20 03:00  97 13 95/47 (63) 97   


 


11/16/20 02:00  101 22 124/59 (80) 100   


 


11/16/20 01:00    94/59 (71)    


 


11/16/20 00:00        30


 


11/16/20 00:00      Mechanical Ventilator  





      Mechanical Ventilator  


 


11/16/20 00:00  100      


 


11/16/20 00:00  95      


 


11/16/20 00:00 98.8 90 22 93/63 (73) 98   


 


11/16/20 00:00        30


 


11/15/20 23:15  96 13     30


 


11/15/20 23:00  92 15 97/58 (71) 98   


 


11/15/20 22:00  95 12 91/49 (63) 98   


 


11/15/20 21:00  94 13 108/58 (75) 99   


 


11/15/20 20:00      Mechanical Ventilator  





      Mechanical Ventilator  


 


11/15/20 20:00        30


 


11/15/20 20:00 98.5 93 21 105/67 (80) 99   


 


11/15/20 20:00  98      


 


11/15/20 19:37  95 15     30


 


11/15/20 19:00  96 18 103/63 (76) 97   


 


11/15/20 18:45  100 18 107/62 (77) 95   


 


11/15/20 18:30  104 25 115/61 (79) 96   


 


11/15/20 18:15  111 27 111/62 (78) 99   


 


11/15/20 18:00  116 23 157/131 (140) 96   


 


11/15/20 17:41  100 18 115/61 99   


 


11/15/20 17:30  120 25 180/89 (119) 96   


 


11/15/20 17:11  105 23 113/53 99   


 


11/15/20 17:00  95 19 113/53 (73) 100   


 


11/15/20 16:30  91 18 106/61 (76) 99   


 


11/15/20 16:00      Mechanical Ventilator  





      Mechanical Ventilator  


 


11/15/20 16:00        30


 


11/15/20 16:00  90      


 


11/15/20 16:00 98.2 92 18 111/59 (76) 100   


 


11/15/20 15:30  91 17 111/55 (73) 100   


 


11/15/20 15:05  90 16     30


 


11/15/20 15:00  92 20 97/53 (68) 99   


 


11/15/20 14:45  93 21 103/55 (71) 99   


 


11/15/20 14:30  99 18 110/62 (78) 99   


 


11/15/20 14:15  104 23 117/63 (81) 99   


 


11/15/20 14:00  106 21 138/59 (85) 98   


 


11/15/20 13:45  113 21 147/77 (100) 95   


 


11/15/20 13:30  108 21 135/84 (101) 98   


 


11/15/20 13:15  109 25 173/78 (109) 100   


 


11/15/20 13:01    128/50    


 


11/15/20 13:00  98 23 134/91 (105) 97   


 


11/15/20 12:45  97 20 128/50 (76) 100   


 


11/15/20 12:30  93 22 115/63 (80) 99   


 


11/15/20 12:15  93 22 121/62 (81) 99   


 


11/15/20 12:00        30


 


11/15/20 12:00      Mechanical Ventilator  





      Mechanical Ventilator  


 


11/15/20 12:00 98.4 99 14 98/52 (67) 98   


 


11/15/20 12:00  97      


 


11/15/20 11:45  96 13 95/50 (65) 98   


 


11/15/20 11:30  97 18 82/43 (56) 99   


 


11/15/20 11:15  98 14     30


 


11/15/20 11:00  94 21 102/53 (69) 99   


 


11/15/20 10:30  94 23 91/49 (63) 99   


 


11/15/20 10:00  111 24 135/72 (93) 97   


 


11/15/20 09:30  112 23 165/66 (99) 99   








Height (Feet):  5


Height (Inches):  4.00


Weight (Pounds):  130


Gen: NAD in bed


HEENT: NCAT


CV: RRR


Pulm: BL chest rise on vent


Abd: Soft, NTND


Ext: No c/c/e


Neuro: Eyes closed, not interactive





Microbiology








 Date/Time


Source Procedure


Growth Status





 


 11/14/20 14:30


Urine,Clean Catch Urine Culture - Preliminary


NO GROWTH Resulted





 11/14/20 14:30


Sputum Gram Stain - Final Resulted


 


 11/14/20 14:30


Sputum Sputum Culture


Pending Resulted











Laboratory Tests








Test


 11/16/20


04:24 11/16/20


05:01 11/16/20


08:50


 


POC Whole Blood Glucose Pending    


 


White Blood Count


 


 9.1 K/UL


(4.8-10.8) 





 


Red Blood Count


 


 2.51 M/UL


(4.70-6.10)  L 





 


Hemoglobin


 


 7.5 G/DL


(14.2-18.0)  L 





 


Hematocrit


 


 24.8 %


(42.0-52.0)  L 





 


Mean Corpuscular Volume  99 FL (80-99)   


 


Mean Corpuscular Hemoglobin


 


 30.1 PG


(27.0-31.0) 





 


Mean Corpuscular Hemoglobin


Concent 


 30.4 G/DL


(32.0-36.0)  L 





 


Red Cell Distribution Width


 


 17.7 %


(11.6-14.8)  H 





 


Platelet Count


 


 417 K/UL


(150-450) 





 


Mean Platelet Volume


 


 7.3 FL


(6.5-10.1) 





 


Neutrophils (%) (Auto)


 


 % (45.0-75.0)


 





 


Lymphocytes (%) (Auto)


 


 % (20.0-45.0)


 





 


Monocytes (%) (Auto)   % (1.0-10.0)   


 


Eosinophils (%) (Auto)   % (0.0-3.0)   


 


Basophils (%) (Auto)   % (0.0-2.0)   


 


Sodium Level


 


 141 MMOL/L


(136-145) 





 


Potassium Level


 


 3.7 MMOL/L


(3.5-5.1) 





 


Chloride Level


 


 107 MMOL/L


() 





 


Carbon Dioxide Level


 


 27 MMOL/L


(21-32) 





 


Anion Gap


 


 7 mmol/L


(5-15) 





 


Blood Urea Nitrogen


 


 15 mg/dL


(7-18) 





 


Creatinine


 


 1.0 MG/DL


(0.55-1.30) 





 


Estimat Glomerular Filtration


Rate 


 > 60 mL/min


(>60) 





 


Glucose Level


 


 125 MG/DL


()  H 





 


Calcium Level


 


 8.0 MG/DL


(8.5-10.1)  L 





 


Phosphorus Level


 


 2.7 MG/DL


(2.5-4.9) 





 


Magnesium Level


 


 2.5 MG/DL


(1.8-2.4)  H 





 


Total Bilirubin


 


 0.3 MG/DL


(0.2-1.0) 





 


Aspartate Amino Transf


(AST/SGOT) 


 21 U/L (15-37)


 





 


Alanine Aminotransferase


(ALT/SGPT) 


 24 U/L (12-78)


 





 


Alkaline Phosphatase


 


 123 U/L


()  H 





 


Total Protein


 


 6.3 G/DL


(6.4-8.2)  L 





 


Albumin


 


 1.4 G/DL


(3.4-5.0)  L 





 


Globulin  4.9 g/dL   


 


Albumin/Globulin Ratio


 


 0.3 (1.0-2.7)


L 





 


Arterial Blood pH


 


 


 7.489


(7.350-7.450)


 


Arterial Blood Partial


Pressure CO2 


 


 31.9 mmHg


(35.0-45.0)  L


 


Arterial Blood Partial


Pressure O2 


 


 66.8 mmHg


(75.0-100.0)  L


 


Arterial Blood HCO3


 


 


 23.7 mmol/L


(22.0-26.0)


 


Arterial Blood Oxygen


Saturation 


 


 93.7 %


()  L


 


Arterial Blood Base Excess   0.6 (-2-2)  


 


Jerod Test   Positive  











Current Medications








 Medications


  (Trade)  Dose


 Ordered  Sig/Miky


 Route


 PRN Reason  Start Time


 Stop Time Status Last Admin


Dose Admin


 


 Acetaminophen


  (Tylenol)  650 mg  Q4H  PRN


 ORAL


 Temp >100.5  10/23/20 20:30


 11/22/20 20:29  11/5/20 22:39





 


 Chlorhexidine


 Gluconate


  (Jess-Hex 2%)  1 applic  DAILY@2000


 TOPIC


   11/6/20 20:00


 2/4/21 19:59  11/15/20 20:36





 


 Dextrose


  (Dextrose 50%)  50 ml  Q30M  PRN


 IV


 Hypoglycemia  10/23/20 22:45


 1/21/21 22:44   





 


 Heparin Sodium


  (Porcine)


  (Heparin 5000


 units/ml)  5,000 units  EVERY 12  HOURS


 SUBQ


   10/23/20 21:00


 12/7/20 20:59  11/15/20 20:37





 


 Insulin Aspart


  (NovoLOG)    EVERY 6  HOURS


 SUBQ


   11/2/20 06:00


 1/22/21 06:29  11/15/20 00:30





 


 Linezolid  300 ml @ 


 300 mls/hr  Q12HR


 IVPB


   11/13/20 12:00


 11/20/20 11:59  11/15/20 20:36





 


 Lorazepam


  (Ativan 2mg/ml


 1ml)  2 mg  Q4H  PRN


 IV


 For Anxiety  11/10/20 13:45


 11/17/20 13:44  11/15/20 17:11





 


 Meropenem 1 gm/


 Sodium Chloride  55 ml @ 


 110 mls/hr  Q8HR@0400,1200,2000


 IVPB


   11/11/20 12:00


 11/16/20 11:59  11/16/20 03:32





 


 Midodrine


  (Pro-Amatine)  10 mg  Q8H


 ORAL


   11/11/20 17:00


 2/9/21 16:59  11/16/20 01:15





 


 Morphine Sulfate


  (Morphine


 Sulfate)  2 mg  Q6H  PRN


 IVP


 moderate pain  11/10/20 13:45


 11/17/20 13:44   





 


 Nitroglycerin


  (Ntg)  0.4 mg  Q5M  PRN


 SL


 Prn Chest Pain  10/23/20 20:30


 11/22/20 20:29   





 


 Norepinephrine


 Bitartrate  250 ml @ 0


 mls/hr  Q24H


 IV


   11/15/20 13:00


 11/18/20 12:59  11/15/20 13:01





 


 Ondansetron HCl


  (Zofran)  4 mg  Q6H  PRN


 IVP


 Nausea & Vomiting  10/23/20 20:30


 11/22/20 20:29   





 


 Pantoprazole


  (Protonix)  40 mg  DAILY


 IV


   11/7/20 09:00


 12/7/20 08:59  11/15/20 08:07





 


 Polyethylene


 Glycol


  (Miralax)  17 gm  DAILYPRN  PRN


 ORAL


 Constipation  10/23/20 20:30


 11/22/20 20:29   





 


 Promethazine HCl/


 Codeine


  (Phenergan with


 Codeine)  5 ml  Q4H  PRN


 ORAL


 For Cough  10/23/20 20:30


 11/22/20 20:29   





 


 Sodium Chloride  1,000 ml @ 


 50 mls/hr  Q20H


 IV


   11/6/20 16:00


 12/6/20 15:59  11/15/20 20:00




















Bianca Casillas M.D. 16, 2020 09:13

## 2020-11-16 NOTE — NUR
NURSE NOTES:

Bed bath given to the patient. The patient tolerated well. Moderate BM noted. Still doing 
q15-30 minutes airway suction for large secretions. Will closely monitor the patient. Will 
continue plan of care.

## 2020-11-16 NOTE — NUR
NURSE NOTES:

Dr. Sandoval was notified regarding following patient's conditions: [id number] ETT and 
oral suction due to large amount of secretions, unclear placement of NGT and PICC line, SBT 
from 0745 with good toleration, abnormal labs including Hgb from 8.4 to 7.5.

Dr. Sandoval ordered new PICC line placement and old PICC line removal by radiology, KUB for 
NGT placement, and 1 unit pRBC transfusion for Hgb drop. Will carry out the order as soon as 
possible. Will closely monitor the patient. Will continue plan of care.

## 2020-11-16 NOTE — DIAGNOSTIC IMAGING REPORT
Indication: Reason For Exam: Enteric tube placement 

 

Technique: Serial frontal views of the abdomen

 

Comparison: Abdominal radiograph dated 10/20/2020

 

Findings:

 

Bowel gas pattern is nonspecific, with scattered loops of small bowel demonstrating

mild gaseous distention. Gas is seen throughout the colon. Initial radiograph

demonstrates enteric tube placement with tip terminating in expected location of the

gastric bubble, below the diaphragm. Subsequent radiograph demonstrates advancement,

with tip of enteric tube in the expected location of the gastric antrum or proximal

duodenum.

 

Lung bases demonstrate small bilateral pleural effusions with associated airspace

opacities.

 

IMPRESSION:

 

1.  Nonspecific bowel gas pattern.

2.  Enteric tube in appropriate position.

## 2020-11-16 NOTE — NUR
NURSE NOTES:

Tylenol administered per PRN order for mild grade fever. Cooling measure applied. Will 
recheck body temperature after intervention. Will closely monitor the patient. Will continue 
plan of care.

## 2020-11-17 VITALS — SYSTOLIC BLOOD PRESSURE: 100 MMHG | DIASTOLIC BLOOD PRESSURE: 55 MMHG

## 2020-11-17 VITALS — SYSTOLIC BLOOD PRESSURE: 97 MMHG | DIASTOLIC BLOOD PRESSURE: 46 MMHG

## 2020-11-17 VITALS — DIASTOLIC BLOOD PRESSURE: 55 MMHG | SYSTOLIC BLOOD PRESSURE: 119 MMHG

## 2020-11-17 VITALS — SYSTOLIC BLOOD PRESSURE: 103 MMHG | DIASTOLIC BLOOD PRESSURE: 56 MMHG

## 2020-11-17 VITALS — DIASTOLIC BLOOD PRESSURE: 55 MMHG | SYSTOLIC BLOOD PRESSURE: 105 MMHG

## 2020-11-17 VITALS — DIASTOLIC BLOOD PRESSURE: 53 MMHG | SYSTOLIC BLOOD PRESSURE: 103 MMHG

## 2020-11-17 VITALS — SYSTOLIC BLOOD PRESSURE: 107 MMHG | DIASTOLIC BLOOD PRESSURE: 60 MMHG

## 2020-11-17 VITALS — SYSTOLIC BLOOD PRESSURE: 100 MMHG | DIASTOLIC BLOOD PRESSURE: 60 MMHG

## 2020-11-17 VITALS — DIASTOLIC BLOOD PRESSURE: 47 MMHG | SYSTOLIC BLOOD PRESSURE: 102 MMHG

## 2020-11-17 VITALS — DIASTOLIC BLOOD PRESSURE: 47 MMHG | SYSTOLIC BLOOD PRESSURE: 96 MMHG

## 2020-11-17 VITALS — SYSTOLIC BLOOD PRESSURE: 181 MMHG | DIASTOLIC BLOOD PRESSURE: 96 MMHG

## 2020-11-17 VITALS — DIASTOLIC BLOOD PRESSURE: 44 MMHG | SYSTOLIC BLOOD PRESSURE: 97 MMHG

## 2020-11-17 VITALS — DIASTOLIC BLOOD PRESSURE: 44 MMHG | SYSTOLIC BLOOD PRESSURE: 93 MMHG

## 2020-11-17 VITALS — DIASTOLIC BLOOD PRESSURE: 87 MMHG | SYSTOLIC BLOOD PRESSURE: 104 MMHG

## 2020-11-17 VITALS — SYSTOLIC BLOOD PRESSURE: 101 MMHG | DIASTOLIC BLOOD PRESSURE: 58 MMHG

## 2020-11-17 VITALS — SYSTOLIC BLOOD PRESSURE: 98 MMHG | DIASTOLIC BLOOD PRESSURE: 58 MMHG

## 2020-11-17 VITALS — DIASTOLIC BLOOD PRESSURE: 59 MMHG | SYSTOLIC BLOOD PRESSURE: 131 MMHG

## 2020-11-17 VITALS — DIASTOLIC BLOOD PRESSURE: 51 MMHG | SYSTOLIC BLOOD PRESSURE: 106 MMHG

## 2020-11-17 VITALS — DIASTOLIC BLOOD PRESSURE: 56 MMHG | SYSTOLIC BLOOD PRESSURE: 109 MMHG

## 2020-11-17 VITALS — SYSTOLIC BLOOD PRESSURE: 111 MMHG | DIASTOLIC BLOOD PRESSURE: 63 MMHG

## 2020-11-17 VITALS — DIASTOLIC BLOOD PRESSURE: 53 MMHG | SYSTOLIC BLOOD PRESSURE: 105 MMHG

## 2020-11-17 VITALS — DIASTOLIC BLOOD PRESSURE: 59 MMHG | SYSTOLIC BLOOD PRESSURE: 104 MMHG

## 2020-11-17 LAB
ADD MANUAL DIFF: NO
ALBUMIN SERPL-MCNC: 1.7 G/DL (ref 3.4–5)
ALBUMIN/GLOB SERPL: 0.3 {RATIO} (ref 1–2.7)
ALP SERPL-CCNC: 128 U/L (ref 46–116)
ALT SERPL-CCNC: 24 U/L (ref 12–78)
ANION GAP SERPL CALC-SCNC: 9 MMOL/L (ref 5–15)
AST SERPL-CCNC: 38 U/L (ref 15–37)
BASOPHILS NFR BLD AUTO: 0.8 % (ref 0–2)
BILIRUB SERPL-MCNC: 0.5 MG/DL (ref 0.2–1)
BUN SERPL-MCNC: 13 MG/DL (ref 7–18)
CALCIUM SERPL-MCNC: 8.2 MG/DL (ref 8.5–10.1)
CHLORIDE SERPL-SCNC: 105 MMOL/L (ref 98–107)
CO2 SERPL-SCNC: 25 MMOL/L (ref 21–32)
CREAT SERPL-MCNC: 1 MG/DL (ref 0.55–1.3)
EOSINOPHIL NFR BLD AUTO: 1.2 % (ref 0–3)
ERYTHROCYTE [DISTWIDTH] IN BLOOD BY AUTOMATED COUNT: 16.7 % (ref 11.6–14.8)
GLOBULIN SER-MCNC: 5.1 G/DL
HCT VFR BLD CALC: 32.5 % (ref 42–52)
HGB BLD-MCNC: 10.1 G/DL (ref 14.2–18)
LYMPHOCYTES NFR BLD AUTO: 28.7 % (ref 20–45)
MCV RBC AUTO: 97 FL (ref 80–99)
MONOCYTES NFR BLD AUTO: 6.2 % (ref 1–10)
NEUTROPHILS NFR BLD AUTO: 63.1 % (ref 45–75)
PHOSPHATE SERPL-MCNC: 2.4 MG/DL (ref 2.5–4.9)
PLATELET # BLD: 410 K/UL (ref 150–450)
POTASSIUM SERPL-SCNC: 3.5 MMOL/L (ref 3.5–5.1)
RBC # BLD AUTO: 3.34 M/UL (ref 4.7–6.1)
SODIUM SERPL-SCNC: 139 MMOL/L (ref 136–145)
WBC # BLD AUTO: 9.2 K/UL (ref 4.8–10.8)

## 2020-11-17 RX ADMIN — Medication SCH MLS/HR: at 11:13

## 2020-11-17 RX ADMIN — HEPARIN SODIUM SCH UNITS: 5000 INJECTION INTRAVENOUS; SUBCUTANEOUS at 20:39

## 2020-11-17 RX ADMIN — LORAZEPAM PRN MG: 2 INJECTION, SOLUTION INTRAMUSCULAR; INTRAVENOUS at 05:48

## 2020-11-17 RX ADMIN — INSULIN ASPART SCH UNITS: 100 INJECTION, SOLUTION INTRAVENOUS; SUBCUTANEOUS at 12:00

## 2020-11-17 RX ADMIN — LORAZEPAM PRN MG: 2 INJECTION, SOLUTION INTRAMUSCULAR; INTRAVENOUS at 21:37

## 2020-11-17 RX ADMIN — MIDODRINE HYDROCHLORIDE SCH MG: 10 TABLET ORAL at 01:42

## 2020-11-17 RX ADMIN — LORAZEPAM PRN MG: 2 INJECTION, SOLUTION INTRAMUSCULAR; INTRAVENOUS at 12:42

## 2020-11-17 RX ADMIN — PANTOPRAZOLE SODIUM SCH MG: 40 INJECTION, POWDER, FOR SOLUTION INTRAVENOUS at 09:08

## 2020-11-17 RX ADMIN — MIDODRINE HYDROCHLORIDE SCH MG: 10 TABLET ORAL at 09:07

## 2020-11-17 RX ADMIN — MIDODRINE HYDROCHLORIDE SCH MG: 10 TABLET ORAL at 17:32

## 2020-11-17 RX ADMIN — HEPARIN SODIUM SCH UNITS: 5000 INJECTION INTRAVENOUS; SUBCUTANEOUS at 09:09

## 2020-11-17 RX ADMIN — INSULIN ASPART SCH UNITS: 100 INJECTION, SOLUTION INTRAVENOUS; SUBCUTANEOUS at 18:00

## 2020-11-17 RX ADMIN — INSULIN ASPART SCH UNITS: 100 INJECTION, SOLUTION INTRAVENOUS; SUBCUTANEOUS at 05:48

## 2020-11-17 RX ADMIN — CHLORHEXIDINE GLUCONATE SCH APPLIC: 213 SOLUTION TOPICAL at 20:02

## 2020-11-17 RX ADMIN — INSULIN ASPART SCH UNITS: 100 INJECTION, SOLUTION INTRAVENOUS; SUBCUTANEOUS at 00:00

## 2020-11-17 NOTE — CARDIAC ELECTROPHYSIOLOGY PN
Assessment/Plan


Assessment/Plan


1. Accelerated junctional rhythm. Ruled out for MI


      Echo showed ejection fraction of 55%.





2. Long run of 31 beats of Nonsustained VT on 11/3/2020. 


    Cardiac cath after stabilization.





3. Nicola 30, Asystole while on the Vent due to resp failure/ likely mucus plug .

S/P Code





4. Respiratory failure, on antibiotic and intubated on the vent 





5. Septic shock. Off Levophed  and on Midodrine 10 tid 


6. History of previous COVID infection in September 2020 and active Covid  in 

isolation


7. Dementia.





DW RN





Subjective


Subjective


In SR in NAD in Covid isolation. In ICU on the Vent   Fio2 30%, PEEP 5


 Had 31 beats of VT  on 11/3/20 at 16:46 and 5 beats 11/8/20 


 Coded on 11/12/20 as sat dropped to 20% and got nicola 30s and PEA and pulseless




  Got 3 Epi and 2 Bicarb.  Off Levophed  on Midodrine 10 tid


   PICC line replaced and NGT replaced





Objective





Last 24 Hour Vital Signs








  Date Time  Temp Pulse Resp B/P (MAP) Pulse Ox O2 Delivery O2 Flow Rate FiO2


 


11/17/20 08:00        30


 


11/17/20 06:31  100 56     


 


11/17/20 06:25  118 30 80/50 100   


 


11/17/20 06:19  113 17     30


 


11/17/20 06:00  127 29 181/96 (124) 98   


 


11/17/20 05:48  108 17 164/80 98   


 


11/17/20 05:00  114 28  100   


 


11/17/20 04:00 98.8 77 16 103/56 (72) 93   


 


11/17/20 04:00        30


 


11/17/20 04:00  130      


 


11/17/20 04:00      Mechanical Ventilator  





      Mechanical Ventilator  


 


11/17/20 03:28  108 17     30


 


11/17/20 03:00  82 13 105/55 (72) 100   


 


11/17/20 02:00  78 16 111/63 (79) 98   


 


11/17/20 01:00  78 12 100/60 (73) 100   


 


11/17/20 00:00  75      


 


11/17/20 00:00        30


 


11/17/20 00:00      Mechanical Ventilator  





      Mechanical Ventilator  


 


11/17/20 00:00 99.0 75 14 104/59 (74) 100   


 


11/16/20 23:02  71 12     30


 


11/16/20 23:00  85 17 120/68 (85) 100   


 


11/16/20 22:00  76 12 116/52 (73) 100   


 


11/16/20 21:00  83 14 120/62 (81) 99   


 


11/16/20 20:05  77 12     30


 


11/16/20 20:00      Mechanical Ventilator  





      Mechanical Ventilator  


 


11/16/20 20:00 99.0 77 12 104/58 (73) 100   


 


11/16/20 20:00  90      


 


11/16/20 19:00  70 12 115/64 (81) 100   


 


11/16/20 18:00  85 12 110/62 (78) 100   


 


11/16/20 17:00  93 12 130/70 (90) 100   


 


11/16/20 16:41  80 16  100 Mechanical Ventilator  30


 


11/16/20 16:00      Mechanical Ventilator  





      Mechanical Ventilator  


 


11/16/20 16:00        30


 


11/16/20 16:00 98.6 90 12 136/77 (96) 100   


 


11/16/20 16:00  85      


 


11/16/20 15:29  80 16     30


 


11/16/20 15:00  81 12 112/60 (77) 100   


 


11/16/20 14:00  84 12 103/50 (67) 99   


 


11/16/20 13:31 99.5       


 


11/16/20 13:00    110/61    


 


11/16/20 13:00  84 12 100/54 (69) 99   


 


11/16/20 12:00 100.5 86 12 114/62 (79) 100   


 


11/16/20 12:00      Mechanical Ventilator  





      Mechanical Ventilator  


 


11/16/20 12:00        30


 


11/16/20 12:00  88      


 


11/16/20 11:00  94 12 117/62 (80) 100   


 


11/16/20 10:54  93 14     30


 


11/16/20 10:00  92 12 107/58 (74) 96   


 


11/16/20 09:00  100 12 100/55 (70) 96   

















Intake and Output  


 


 11/16/20 11/17/20





 19:00 07:00


 


Intake Total 270 ml 1165 ml


 


Output Total 810 ml 820 ml


 


Balance -540 ml 345 ml


 


  


 


Free Water 150 ml 80 ml


 


IV Total  800 ml


 


Tube Feeding 120 ml 285 ml


 


Output Urine Total 810 ml 820 ml


 


# Bowel Movements 2 3











Laboratory Tests








Test


 11/16/20


08:50 11/16/20


11:25 11/16/20


17:06 11/17/20


05:00


 


Arterial Blood pH


 7.489


(7.350-7.450) 


 


 





 


Arterial Blood Partial


Pressure CO2 31.9 mmHg


(35.0-45.0)  L 


 


 





 


Arterial Blood Partial


Pressure O2 66.8 mmHg


(75.0-100.0)  L 


 


 





 


Arterial Blood HCO3


 23.7 mmol/L


(22.0-26.0) 


 


 





 


Arterial Blood Oxygen


Saturation 93.7 %


()  L 


 


 





 


Arterial Blood Base Excess 0.6 (-2-2)     


 


Jerod Test Positive     


 


POC Whole Blood Glucose


 


 115 MG/DL


()  H Pending  


 





 


White Blood Count


 


 


 


 9.2 K/UL


(4.8-10.8)


 


Red Blood Count


 


 


 


 3.34 M/UL


(4.70-6.10)  L


 


Hemoglobin


 


 


 


 10.1 G/DL


(14.2-18.0)  #L


 


Hematocrit


 


 


 


 32.5 %


(42.0-52.0)  #L


 


Mean Corpuscular Volume    97 FL (80-99)  


 


Mean Corpuscular Hemoglobin


 


 


 


 30.4 PG


(27.0-31.0)


 


Mean Corpuscular Hemoglobin


Concent 


 


 


 31.2 G/DL


(32.0-36.0)  L


 


Red Cell Distribution Width


 


 


 


 16.7 %


(11.6-14.8)  H


 


Platelet Count


 


 


 


 410 K/UL


(150-450)


 


Mean Platelet Volume


 


 


 


 7.0 FL


(6.5-10.1)


 


Neutrophils (%) (Auto)


 


 


 


 63.1 %


(45.0-75.0)


 


Lymphocytes (%) (Auto)


 


 


 


 28.7 %


(20.0-45.0)


 


Monocytes (%) (Auto)


 


 


 


 6.2 %


(1.0-10.0)


 


Eosinophils (%) (Auto)


 


 


 


 1.2 %


(0.0-3.0)


 


Basophils (%) (Auto)


 


 


 


 0.8 %


(0.0-2.0)


 


Sodium Level


 


 


 


 139 MMOL/L


(136-145)


 


Potassium Level


 


 


 


 3.5 MMOL/L


(3.5-5.1)


 


Chloride Level


 


 


 


 105 MMOL/L


()


 


Carbon Dioxide Level


 


 


 


 25 MMOL/L


(21-32)


 


Anion Gap


 


 


 


 9 mmol/L


(5-15)


 


Blood Urea Nitrogen


 


 


 


 13 mg/dL


(7-18)


 


Creatinine


 


 


 


 1.0 MG/DL


(0.55-1.30)


 


Estimat Glomerular Filtration


Rate 


 


 


 > 60 mL/min


(>60)


 


Glucose Level


 


 


 


 110 MG/DL


()  H


 


Calcium Level


 


 


 


 8.2 MG/DL


(8.5-10.1)  L


 


Phosphorus Level


 


 


 


 2.4 MG/DL


(2.5-4.9)  L


 


Magnesium Level


 


 


 


 2.3 MG/DL


(1.8-2.4)


 


Total Bilirubin


 


 


 


 0.5 MG/DL


(0.2-1.0)


 


Aspartate Amino Transf


(AST/SGOT) 


 


 


 38 U/L (15-37)


H


 


Alanine Aminotransferase


(ALT/SGPT) 


 


 


 24 U/L (12-78)





 


Alkaline Phosphatase


 


 


 


 128 U/L


()  H


 


Total Protein


 


 


 


 6.8 G/DL


(6.4-8.2)


 


Albumin


 


 


 


 1.7 G/DL


(3.4-5.0)  L


 


Globulin    5.1 g/dL  


 


Albumin/Globulin Ratio


 


 


 


 0.3 (1.0-2.7)


L


 


Test


 11/17/20


07:47 


 


 





 


Arterial Blood pH


 7.478


(7.350-7.450) 


 


 





 


Arterial Blood Partial


Pressure CO2 31.2 mmHg


(35.0-45.0)  L 


 


 





 


Arterial Blood Partial


Pressure O2 78.5 mmHg


(75.0-100.0) 


 


 





 


Arterial Blood HCO3


 22.6 mmol/L


(22.0-26.0) 


 


 





 


Arterial Blood Oxygen


Saturation 95.7 %


() 


 


 





 


Arterial Blood Base Excess -0.4 (-2-2)     


 


Jerod Test Positive     











Microbiology








 Date/Time


Source Procedure


Growth Status





 


 11/14/20 14:30


Urine,Clean Catch Urine Culture - Final


NO GROWTH AFTER 48 HOURS Complete





 11/14/20 14:30


Sputum Gram Stain - Final Resulted


 


 11/14/20 14:30 Sputum Culture - Preliminary


Staphylococcus Aureus


Usual Respiratory Sondra Resulted


 


 11/14/20 14:30


Blood Blood Culture - Preliminary


NO GROWTH AFTER 48 HOURS Resulted


 


 11/14/20 14:30


Blood Blood Culture - Preliminary


NO GROWTH AFTER 48 HOURS Resulted








Objective





HEAD AND NECK:  No JVD. Orally intubated


LUNGS:  Coarse rhonchi.


CARDIOVASCULAR:   Irregular S1 and S2 with no gallop.


ABDOMEN:  Soft.


EXTREMITIES:  No pitting edema.











Kvng Edmond MD               Nov 17, 2020 08:32

## 2020-11-17 NOTE — NUR
NURSE NOTES:



Clear white oral secretion noted from mouth. Suctioned and oral care done. Patient is 
tolerating the current vent setting. O2 sat 100%. Will continue to monitor.

## 2020-11-17 NOTE — NUR
NURSE NOTES:



Report received from LITA Stewart. Patient is resting in bed. Able to wake up to tactile 
stimuli. Able to open eyes and track. SR on cardiac monitor. Afebrile. ETT 7.5/25cm at lip 
line. AC 12, , FiO2 30%. O2 sat 100%. Right NGT in place receiving Glucerna 1.5 at 
40cc/hr. No residual noted. Cummings in place draining to gravity. GURPREET PICC line patent and 
asymptomatic. 1/2NS is running at 50cc/hr. Bed in lowest position. Side rails up x3. Will 
resume plan of care.

## 2020-11-17 NOTE — NUR
NURSE NOTES: spoke with Rosaura in pharmacy regarding potassium phosphate. She said it will 
be up here as soon as its ready.

## 2020-11-17 NOTE — INTERNAL MED PROGRESS NOTE
Subjective


Date of Service:  Nov 17, 2020


Physician Name


NadiyaBenito


Attending Physician


Andrei Cancino MD





Current Medications








 Medications


  (Trade)  Dose


 Ordered  Sig/Miky


 Route


 PRN Reason  Start Time


 Stop Time Status Last Admin


Dose Admin


 


 Acetaminophen


  (Tylenol)  650 mg  Q4H  PRN


 ORAL


 Temp >100.5  10/23/20 20:30


 11/22/20 20:29  11/16/20 13:01





 


 Chlorhexidine


 Gluconate


  (Jess-Hex 2%)  1 applic  DAILY@2000


 TOPIC


   11/6/20 20:00


 2/4/21 19:59  11/16/20 20:36





 


 Dextrose


  (Dextrose 50%)  50 ml  Q30M  PRN


 IV


 Hypoglycemia  10/23/20 22:45


 1/21/21 22:44   





 


 Heparin Sodium


  (Porcine)


  (Heparin 5000


 units/ml)  5,000 units  EVERY 12  HOURS


 SUBQ


   10/23/20 21:00


 12/7/20 20:59  11/17/20 09:09





 


 Insulin Aspart


  (NovoLOG)    EVERY 6  HOURS


 SUBQ


   11/2/20 06:00


 1/22/21 06:29  11/15/20 00:30





 


 Linezolid  300 ml @ 


 300 mls/hr  Q12HR


 IVPB


   11/13/20 12:00


 11/20/20 11:59  11/17/20 09:07





 


 Midodrine


  (Pro-Amatine)  10 mg  Q8H


 ORAL


   11/11/20 17:00


 2/9/21 16:59  11/17/20 09:07





 


 Nitroglycerin


  (Ntg)  0.4 mg  Q5M  PRN


 SL


 Prn Chest Pain  10/23/20 20:30


 11/22/20 20:29   





 


 Norepinephrine


 Bitartrate  250 ml @ 0


 mls/hr  Q24H


 IV


   11/15/20 13:00


 11/18/20 12:59  11/15/20 13:01





 


 Ondansetron HCl


  (Zofran)  4 mg  Q6H  PRN


 IVP


 Nausea & Vomiting  10/23/20 20:30


 11/22/20 20:29   





 


 Pantoprazole


  (Protonix)  40 mg  DAILY


 IV


   11/7/20 09:00


 12/7/20 08:59  11/17/20 09:08





 


 Polyethylene


 Glycol


  (Miralax)  17 gm  DAILYPRN  PRN


 ORAL


 Constipation  10/23/20 20:30


 11/22/20 20:29   





 


 Promethazine HCl/


 Codeine


  (Phenergan with


 Codeine)  5 ml  Q4H  PRN


 ORAL


 For Cough  10/23/20 20:30


 11/22/20 20:29   





 


 Sodium Chloride  1,000 ml @ 


 50 mls/hr  Q20H


 IV


   11/6/20 16:00


 12/6/20 15:59  11/17/20 05:59











Allergies:  


Coded Allergies:  


     No Known Allergies (Unverified , 8/20/12)


ROS Limited/Unobtainable:  Yes


Subjective


74 YO M admitted with shortness of breath.  Now pneumonia; previously COVID 

positive.  Cover for Int med-Dr Cancino.  ICU.  Intubated and sedated. On Levophed





Objective





Last Vital Signs








  Date Time  Temp Pulse Resp B/P (MAP) Pulse Ox O2 Delivery O2 Flow Rate FiO2


 


11/17/20 17:00  71 12 98/58 (71) 100   


 


11/17/20 16:00        30


 


11/17/20 16:00 98.8       


 


11/17/20 16:00      Mechanical Ventilator  





      Mechanical Ventilator  











Laboratory Tests








Test


 11/17/20


05:00 11/17/20


07:47


 


White Blood Count


 9.2 K/UL


(4.8-10.8) 





 


Red Blood Count


 3.34 M/UL


(4.70-6.10)  L 





 


Hemoglobin


 10.1 G/DL


(14.2-18.0)  #L 





 


Hematocrit


 32.5 %


(42.0-52.0)  #L 





 


Mean Corpuscular Volume 97 FL (80-99)   


 


Mean Corpuscular Hemoglobin


 30.4 PG


(27.0-31.0) 





 


Mean Corpuscular Hemoglobin


Concent 31.2 G/DL


(32.0-36.0)  L 





 


Red Cell Distribution Width


 16.7 %


(11.6-14.8)  H 





 


Platelet Count


 410 K/UL


(150-450) 





 


Mean Platelet Volume


 7.0 FL


(6.5-10.1) 





 


Neutrophils (%) (Auto)


 63.1 %


(45.0-75.0) 





 


Lymphocytes (%) (Auto)


 28.7 %


(20.0-45.0) 





 


Monocytes (%) (Auto)


 6.2 %


(1.0-10.0) 





 


Eosinophils (%) (Auto)


 1.2 %


(0.0-3.0) 





 


Basophils (%) (Auto)


 0.8 %


(0.0-2.0) 





 


Sodium Level


 139 MMOL/L


(136-145) 





 


Potassium Level


 3.5 MMOL/L


(3.5-5.1) 





 


Chloride Level


 105 MMOL/L


() 





 


Carbon Dioxide Level


 25 MMOL/L


(21-32) 





 


Anion Gap


 9 mmol/L


(5-15) 





 


Blood Urea Nitrogen


 13 mg/dL


(7-18) 





 


Creatinine


 1.0 MG/DL


(0.55-1.30) 





 


Estimat Glomerular Filtration


Rate > 60 mL/min


(>60) 





 


Glucose Level


 110 MG/DL


()  H 





 


Calcium Level


 8.2 MG/DL


(8.5-10.1)  L 





 


Phosphorus Level


 2.4 MG/DL


(2.5-4.9)  L 





 


Magnesium Level


 2.3 MG/DL


(1.8-2.4) 





 


Total Bilirubin


 0.5 MG/DL


(0.2-1.0) 





 


Aspartate Amino Transf


(AST/SGOT) 38 U/L (15-37)


H 





 


Alanine Aminotransferase


(ALT/SGPT) 24 U/L (12-78)


 





 


Alkaline Phosphatase


 128 U/L


()  H 





 


Total Protein


 6.8 G/DL


(6.4-8.2) 





 


Albumin


 1.7 G/DL


(3.4-5.0)  L 





 


Globulin 5.1 g/dL   


 


Albumin/Globulin Ratio


 0.3 (1.0-2.7)


L 





 


Arterial Blood pH


 


 7.478


(7.350-7.450)


 


Arterial Blood Partial


Pressure CO2 


 31.2 mmHg


(35.0-45.0)  L


 


Arterial Blood Partial


Pressure O2 


 78.5 mmHg


(75.0-100.0)


 


Arterial Blood HCO3


 


 22.6 mmol/L


(22.0-26.0)


 


Arterial Blood Oxygen


Saturation 


 95.7 %


()


 


Arterial Blood Base Excess  -0.4 (-2-2)  


 


Jerod Test  Positive  

















Intake and Output  


 


 11/16/20 11/17/20





 19:00 07:00


 


Intake Total 270 ml 1250 ml


 


Output Total 810 ml 870 ml


 


Balance -540 ml 380 ml


 


  


 


Free Water 150 ml 80 ml


 


IV Total  850 ml


 


Tube Feeding 120 ml 320 ml


 


Output Urine Total 810 ml 870 ml


 


# Bowel Movements 2 3








Objective


PHYSICAL EXAMINATION:


GENERAL:  The patient awake with deep stimuli, open his eyes, however,


cannot follow commands.  The patient is on a Ventimask at this time,


chronically ill-appearing.


HEAD AND NECK:  Pupils are equal and reactive to light.  Anicteric.


NECK:  Supple.  No JVD.


LUNGS:  Mech vent;  wheezing, rhonchi, and decreased air in bases.


HEART:  S1, S2.  Regular rhythm.  Distant heart sounds.  No murmur or


gallop.


ABDOMEN:  Soft, nondistended, nontender.  Positive bowel sounds.


EXTREMITIES:  No cyanosis, clubbing, or edema.


NEUROLOGIC:  Very limited secondary to the patient's status, cannot follow


commands.  Opens his eyes with deep stimuli and moving extremities


spontaneously.





Assessment/Plan


Assessment/Plan


ASSESSMENT:


1. Acute hypoxemic respiratory failure, most likely secondary to


pneumonia and sepsis.


2. Sepsis secondary to urinary tract infection and pneumonia.


3. pneumonia=MRSA; ESBL E. coli


4. Acute kidney injury on chronic renal insufficiency.


5. Dehydration.


6. History of chronic congestive heart failure.


7. Diabetes type 2.


8. Dyslipidemia.


9. Hypertension.


10. Alzheimer's disease.


11. COVID 19 previous positive





PLAN:


1.  ICU


2.  Dr. Sandoval,=Pulmonary Critical Care; follow mech vent recs


3.  Dr. Garcia = Inf Dis.


4.  IV= D5W due to the hypernatremia and dehydration.


5.  antibiotics =  linezolid; S/P micafungin and meropenem


6.  Code status is full code.


7.    DVT prophylaxis is heparin subcutaneous.











Benito Hearn MD               Nov 17, 2020 17:20

## 2020-11-17 NOTE — NUR
NURSE HAND-OFF REPORT: 



Latest Vital Signs: Temperature 98.8 , Pulse 118 , B/P 80 /50 , Respiratory Rate 30 , O2 SAT 
100 , Mechanical Ventilator, O2 Flow Rate  .  

Vital Sign Comment: 



EKG Rhythm: Sinus Tachycardia

Rhythm change?: N 

MD Notified?: DIDI Laureano MD Response: 



Latest Mejia Fall Score: 50  

Fall Risk: High Risk 

Safety Measures: Call light Within Reach, Bed Alarm Zone 2, Side Rails Side Rails x3, Bed 
position Low and Locked.

Fall Precautions: 

Yellow Socks

Yellow Gown

Door Sign

Patient Fall Education



Report given to jareth longoria.

## 2020-11-17 NOTE — INFECTIOUS DISEASES PROG NOTE
Assessment/Plan





74yo M with:





Respiratory code 2ry to mucus plug 11/12


Septic Shock- -recurrent


Fever, recurrent, low grade;SP


Leukocytosis; recurrent; increased


Acute hypoxic resp failure, on NRB mask> 4l NC; back on NRB, desaturation 10/29 

> intubated 11/6


Pneumonia- >HAP


Hx of COVID19 PNA 9/9/20 11/14 Resp cx +MRSA, nl resp hayden


   UCx neg


   BCx NTD


   11/13 COVID PCR neg


         --11/10 CXR: Bilateral infiltrates in a peribronchovascular 

distribution are unchanged. Left pleural effusion is unchanged.


         --11/8 CXR: Persistent bilateral patchy pulmonary opacities, most 

prominent  in the left lower lung.


         --11/7 ucx neg


                  sp cx MRSA (colonizer at this point)


                  Bcx Neg


         --11/2 Bcx Neg


          10/30 Sp cx MRSA (Vancomycin ADRIANA 1), ESBL E.coli


   10/23 BCx NTD


   UA 15-20 WBC, UCx Neg


   10/23 COVID rapid neg; 10/26 rapid COVID PCR + (from prior infection)- not 

new infection


   Flu A/B neg


   CXR: L pna


   Resp cx MRSA





JANES on CKD, improving





SNF resident ([facility])


Non-verbal


VRE and MRSA colonized








Plan:


Cont Zyvoz #5 for MRSA coverage given mucus plug and worsening hemodynamics, 

will likely stop soon





   -11/16 SP meropenem #14 for ESBL pna 


   -11/10 SP Micafungin #4


   -11/6 SP IV Vancomycin #15


   -11/2 SP Zosyn #4


   -10/26 SP Cefepime #4


   -10/24 SP Flagyl #





Monitor CBC/CMP


Monitor resp status


Monitor temp curve and hemodynamics





D/w RN





Thank you for this consult. [facility] will continue to follow.





Subjective


Allergies:  


Coded Allergies:  


     No Known Allergies (Unverified , 8/20/12)


Febrile to 100.5


WBC down to 9


Resp cx +MRSA, on linezolid


Off meropenem now


NAD on vent


Improved secretions per RN





Objective





Last 24 Hour Vital Signs








  Date Time  Temp Pulse Resp B/P (MAP) Pulse Ox O2 Delivery O2 Flow Rate FiO2


 


11/17/20 06:31  100 56     


 


11/17/20 06:25  118 30 80/50 100   


 


11/17/20 06:19  113 17     30


 


11/17/20 06:00  127 29 181/96 (124) 98   


 


11/17/20 05:48  108 17 164/80 98   


 


11/17/20 05:00  114 28  100   


 


11/17/20 04:00 98.8 77 16 103/56 (72) 93   


 


11/17/20 04:00        30


 


11/17/20 04:00  130      


 


11/17/20 04:00      Mechanical Ventilator  





      Mechanical Ventilator  


 


11/17/20 03:28  108 17     30


 


11/17/20 03:00  82 13 105/55 (72) 100   


 


11/17/20 02:00  78 16 111/63 (79) 98   


 


11/17/20 01:00  78 12 100/60 (73) 100   


 


11/17/20 00:00  75      


 


11/17/20 00:00        30


 


11/17/20 00:00      Mechanical Ventilator  





      Mechanical Ventilator  


 


11/17/20 00:00 99.0 75 14 104/59 (74) 100   


 


11/16/20 23:02  71 12     30


 


11/16/20 23:00  85 17 120/68 (85) 100   


 


11/16/20 22:00  76 12 116/52 (73) 100   


 


11/16/20 21:00  83 14 120/62 (81) 99   


 


11/16/20 20:05  77 12     30


 


11/16/20 20:00      Mechanical Ventilator  





      Mechanical Ventilator  


 


11/16/20 20:00 99.0 77 12 104/58 (73) 100   


 


11/16/20 20:00  90      


 


11/16/20 19:00  70 12 115/64 (81) 100   


 


11/16/20 18:00  85 12 110/62 (78) 100   


 


11/16/20 17:00  93 12 130/70 (90) 100   


 


11/16/20 16:41  80 16  100 Mechanical Ventilator  30


 


11/16/20 16:00      Mechanical Ventilator  





      Mechanical Ventilator  


 


11/16/20 16:00        30


 


11/16/20 16:00 98.6 90 12 136/77 (96) 100   


 


11/16/20 16:00  85      


 


11/16/20 15:29  80 16     30


 


11/16/20 15:00  81 12 112/60 (77) 100   


 


11/16/20 14:00  84 12 103/50 (67) 99   


 


11/16/20 13:31 99.5       


 


11/16/20 13:00    110/61    


 


11/16/20 13:00  84 12 100/54 (69) 99   


 


11/16/20 12:00 100.5 86 12 114/62 (79) 100   


 


11/16/20 12:00      Mechanical Ventilator  





      Mechanical Ventilator  


 


11/16/20 12:00        30


 


11/16/20 12:00  88      


 


11/16/20 11:00  94 12 117/62 (80) 100   


 


11/16/20 10:54  93 14     30


 


11/16/20 10:00  92 12 107/58 (74) 96   


 


11/16/20 09:00  100 12 100/55 (70) 96   


 


11/16/20 08:12     99   


 


11/16/20 08:00        30


 


11/16/20 08:00 99.5 101 27 106/53 (70) 97   


 


11/16/20 08:00      Mechanical Ventilator  





      Mechanical Ventilator  


 


11/16/20 08:00  100      








Height (Feet):  5


Height (Inches):  4.00


Weight (Pounds):  130


Gen: NAD in bed


HEENT: NCAT


CV: RRR


Pulm: BL chest rise on vent


Abd: Soft, NTND


Ext: No c/c/e


Neuro: Eyes closed, not interactive





Microbiology








 Date/Time


Source Procedure


Growth Status





 


 11/14/20 14:30


Urine,Clean Catch Urine Culture - Final


NO GROWTH AFTER 48 HOURS Complete





 11/14/20 14:30


Sputum Gram Stain - Final Resulted


 


 11/14/20 14:30 Sputum Culture - Preliminary


Staphylococcus Aureus


Usual Respiratory Hayden Resulted


 


 11/14/20 14:30


Blood Blood Culture - Preliminary


NO GROWTH AFTER 48 HOURS Resulted


 


 11/14/20 14:30


Blood Blood Culture - Preliminary


NO GROWTH AFTER 48 HOURS Resulted











Laboratory Tests








Test


 11/16/20


08:50 11/16/20


11:25 11/16/20


17:06 11/17/20


05:00


 


Arterial Blood pH


 7.489


(7.350-7.450) 


 


 





 


Arterial Blood Partial


Pressure CO2 31.9 mmHg


(35.0-45.0)  L 


 


 





 


Arterial Blood Partial


Pressure O2 66.8 mmHg


(75.0-100.0)  L 


 


 





 


Arterial Blood HCO3


 23.7 mmol/L


(22.0-26.0) 


 


 





 


Arterial Blood Oxygen


Saturation 93.7 %


()  L 


 


 





 


Arterial Blood Base Excess 0.6 (-2-2)     


 


Jerod Test Positive     


 


POC Whole Blood Glucose


 


 115 MG/DL


()  H Pending  


 





 


White Blood Count


 


 


 


 9.2 K/UL


(4.8-10.8)


 


Red Blood Count


 


 


 


 3.34 M/UL


(4.70-6.10)  L


 


Hemoglobin


 


 


 


 10.1 G/DL


(14.2-18.0)  #L


 


Hematocrit


 


 


 


 32.5 %


(42.0-52.0)  #L


 


Mean Corpuscular Volume    97 FL (80-99)  


 


Mean Corpuscular Hemoglobin


 


 


 


 30.4 PG


(27.0-31.0)


 


Mean Corpuscular Hemoglobin


Concent 


 


 


 31.2 G/DL


(32.0-36.0)  L


 


Red Cell Distribution Width


 


 


 


 16.7 %


(11.6-14.8)  H


 


Platelet Count


 


 


 


 410 K/UL


(150-450)


 


Mean Platelet Volume


 


 


 


 7.0 FL


(6.5-10.1)


 


Neutrophils (%) (Auto)


 


 


 


 63.1 %


(45.0-75.0)


 


Lymphocytes (%) (Auto)


 


 


 


 28.7 %


(20.0-45.0)


 


Monocytes (%) (Auto)


 


 


 


 6.2 %


(1.0-10.0)


 


Eosinophils (%) (Auto)


 


 


 


 1.2 %


(0.0-3.0)


 


Basophils (%) (Auto)


 


 


 


 0.8 %


(0.0-2.0)


 


Sodium Level


 


 


 


 139 MMOL/L


(136-145)


 


Potassium Level


 


 


 


 3.5 MMOL/L


(3.5-5.1)


 


Chloride Level


 


 


 


 105 MMOL/L


()


 


Carbon Dioxide Level


 


 


 


 25 MMOL/L


(21-32)


 


Anion Gap


 


 


 


 9 mmol/L


(5-15)


 


Blood Urea Nitrogen


 


 


 


 13 mg/dL


(7-18)


 


Creatinine


 


 


 


 1.0 MG/DL


(0.55-1.30)


 


Estimat Glomerular Filtration


Rate 


 


 


 > 60 mL/min


(>60)


 


Glucose Level


 


 


 


 110 MG/DL


()  H


 


Calcium Level


 


 


 


 8.2 MG/DL


(8.5-10.1)  L


 


Phosphorus Level


 


 


 


 2.4 MG/DL


(2.5-4.9)  L


 


Magnesium Level


 


 


 


 2.3 MG/DL


(1.8-2.4)


 


Total Bilirubin


 


 


 


 0.5 MG/DL


(0.2-1.0)


 


Aspartate Amino Transf


(AST/SGOT) 


 


 


 38 U/L (15-37)


H


 


Alanine Aminotransferase


(ALT/SGPT) 


 


 


 24 U/L (12-78)





 


Alkaline Phosphatase


 


 


 


 128 U/L


()  H


 


Total Protein


 


 


 


 6.8 G/DL


(6.4-8.2)


 


Albumin


 


 


 


 1.7 G/DL


(3.4-5.0)  L


 


Globulin    5.1 g/dL  


 


Albumin/Globulin Ratio


 


 


 


 0.3 (1.0-2.7)


L











Current Medications








 Medications


  (Trade)  Dose


 Ordered  Sig/Miky


 Route


 PRN Reason  Start Time


 Stop Time Status Last Admin


Dose Admin


 


 Acetaminophen


  (Tylenol)  650 mg  Q4H  PRN


 ORAL


 Temp >100.5  10/23/20 20:30


 11/22/20 20:29  11/16/20 13:01





 


 Chlorhexidine


 Gluconate


  (Jess-Hex 2%)  1 applic  DAILY@2000


 TOPIC


   11/6/20 20:00


 2/4/21 19:59  11/16/20 20:36





 


 Dextrose


  (Dextrose 50%)  50 ml  Q30M  PRN


 IV


 Hypoglycemia  10/23/20 22:45


 1/21/21 22:44   





 


 Heparin Sodium


  (Porcine)


  (Heparin 5000


 units/ml)  5,000 units  EVERY 12  HOURS


 SUBQ


   10/23/20 21:00


 12/7/20 20:59  11/16/20 20:39





 


 Insulin Aspart


  (NovoLOG)    EVERY 6  HOURS


 SUBQ


   11/2/20 06:00


 1/22/21 06:29  11/15/20 00:30





 


 Linezolid  300 ml @ 


 300 mls/hr  Q12HR


 IVPB


   11/13/20 12:00


 11/20/20 11:59  11/16/20 20:37





 


 Lorazepam


  (Ativan 2mg/ml


 1ml)  2 mg  Q4H  PRN


 IV


 For Anxiety  11/10/20 13:45


 11/17/20 13:44  11/17/20 05:48





 


 Midodrine


  (Pro-Amatine)  10 mg  Q8H


 ORAL


   11/11/20 17:00


 2/9/21 16:59  11/17/20 01:42





 


 Morphine Sulfate


  (Morphine


 Sulfate)  2 mg  Q6H  PRN


 IVP


 moderate pain  11/10/20 13:45


 11/17/20 13:44   





 


 Nitroglycerin


  (Ntg)  0.4 mg  Q5M  PRN


 SL


 Prn Chest Pain  10/23/20 20:30


 11/22/20 20:29   





 


 Norepinephrine


 Bitartrate  250 ml @ 0


 mls/hr  Q24H


 IV


   11/15/20 13:00


 11/18/20 12:59  11/15/20 13:01





 


 Ondansetron HCl


  (Zofran)  4 mg  Q6H  PRN


 IVP


 Nausea & Vomiting  10/23/20 20:30


 11/22/20 20:29   





 


 Pantoprazole


  (Protonix)  40 mg  DAILY


 IV


   11/7/20 09:00


 12/7/20 08:59  11/16/20 09:14





 


 Polyethylene


 Glycol


  (Miralax)  17 gm  DAILYPRN  PRN


 ORAL


 Constipation  10/23/20 20:30


 11/22/20 20:29   





 


 Promethazine HCl/


 Codeine


  (Phenergan with


 Codeine)  5 ml  Q4H  PRN


 ORAL


 For Cough  10/23/20 20:30


 11/22/20 20:29   





 


 Sodium Chloride  1,000 ml @ 


 50 mls/hr  Q20H


 IV


   11/6/20 16:00


 12/6/20 15:59  11/17/20 05:59




















Bianca Casillas M.D.               Nov 17, 2020 07:56

## 2020-11-17 NOTE — NUR
NURSE NOTES: Report received from LITA Knight. Pt lying comfortably in semi-fowlers with no 
signs of distress. A+Ox1, has eyes open, tracks with eyes, but does not follow commands. 
Respirations even and unlabored on mech vent. ETT 7.5 @ 25 cm at the lip with settings of 
AC12, , and 30% Fio2. Sinus rhythm on the monitor 94 beats/min. NGT noted running tube 
feeding @ 30 ml/hr with goal of 60 ml/hr. Abdomen soft. No signs of pain at this time. PICC 
patent, running fluids @ Prescribed rate. Pt has multiple pressure injuries, covered in 
optfoam, dry and intact. Head of bed @ 30 degrees. Bed is in lowest position, brakes 
engaged, siderails x2, bed alarm on, and call light within reach. Pt in stable condition; 
will continue to monitor .

## 2020-11-17 NOTE — NUR
NURSE NOTES:

Pt is resting on the bed. SaO2 100% with current Vent setting. Given suction and oral care. 
On running with NGT feeding  and tolerated well. No residual noted. changed position. Will 
continue to monitor any change of condition.

## 2020-11-17 NOTE — NEPHROLOGY PROGRESS NOTE
Assessment/Plan


Problem List:  


(1) Dehydration


(2) JANES (acute kidney injury)


(3) Renal failure (ARF), acute on chronic


(4) Hypoxia


(5) Acute encephalopathy


(6) Electrolyte imbalance


Assessment





75-year-old male is admitted with acute hypoxic respiratory failure most likely 

secondary to pneumonia and sepsis, UTI.


Acute on chronic renal failure


Dehydration


Electrolyte imbalances, hypernatremia


Hypoalbuminemia


Diabetes type 2


History of congestive heart failure


Hypertension


Hyperlipemia


Alzheimer's


Previous COVID-19 infection in September 2020


Plan


November 17: Remains in ICU.  Remains full code.  Labs are reviewed.  Phosphorus

supplement given.  Continue per consultants.


November 16: Remains in ICU.  Full code.  Labs reviewed.  Remains intubated.  

Stable renal parameters.


November 15: In ICU.  Full code.  Discussed with RN.  Stable renal parameters.


November 14: Seen in ICU.  Discussed with LITA Cooper.  Flomax discontinued.  

Labs reviewed.  Stable from renal standpoint of view.


November 13: Seen in ICU.  Discussed with LITA Cooper.  Remains on pressor.  

Electrolyte abnormalities noted and addressed.  Continue per consultants.  

Patient remains full code.


November 12: Seen in ICU.  Discussed with LITA Richardson.  Blood pressure remains a 

little requiring pressors.  Labs reviewed.  Stable renal parameters.  Continue 

per consultants.


November 11: Remains intubated.  Full code.  Blood pressure borderline low.  

Will increase midodrine dose.  Discussed with RN.  Continue to monitor renal 

parameters and electrolytes.


November 10: Intubated.  Full code.  Labs reviewed.  Stable from renal 

standpoint of view.  Continue per consultants.


November 9: Remains intubated.  Full code.  Labs reviewed.  Electrolytes within 

normal limit.  Continue per consultants.


November 8: In ICU.  Remains intubated on ventilator.  Remains full code.  Low 

potassium addressed.  Blood pressure fluctuating.  Continue per consultants.


November 7: Patient in ICU.  Intubated on ventilator.  On 6 mics of Levophed.  

Will give 100 cc albumin 25%.  K-Phos IV ordered.  Continue per consultants.  

Continue monitor renal parameters and electrolytes.


November 6: Status unchanged.  Transfer to [facility] for seizure.  Stable from renal 

standpoint to view.  Continue per consultants.


November 5: Status quo.  Labs reviewed.  Renal parameters stable.  Continue per 

consultants.


November 4: On nonrebreather mask.  Labs reviewed.  Renal parameters 

stable.Continue per pulmonologist.  Clinically unchanged.


November 3: On nonrebreather mask.  Inflammatory markers gradually declining.  

Renal parameters stable.  Continue per pulmonary.


November 2: Remains on nonrebreather mask.  Inflammatory markers remain 

elevated.  Renal parameters somewhat stable.  Continue per pulmonary and ID.  

Remains full code.


November 1: Patient on nonrebreather mask.  Labs noted.  Serum creatinine down 

to 1.3.  Continue per consultants.


October 31: Patient on nonrebreather mask.  Transfer to telemetry when seen this

morning.  Labs noted.  Continue per consultants.  Serum creatinine dylan to 1.7. 

Continue to monitor renal parameters.  Continue to monitor vancomycin level


October 30: Patient is not doing well clinically.  Mild respiratory distress.  

ABG noted.  Somewhat hypoxic.  CBC and chemistry panel ordered.  Discussed with 

LITA Garcia.  Will defer to pulmonary management to specialist.  Continue per ID.

 Renal parameters remained stable as of October 29.


October 29: Labs reviewed.  Renal parameters stable.  Continue per consultants.


October 28: Labs reviewed.  Potassium via NG tube ordered.  IV fluids stopped.  

Continue per consultants.


October 27: Labs reviewed.  Low potassium and low phosphorus replaced.  Continue

per consultants.  Remains stable from renal standpoint of view.


October 26: Patient remains n.p.o.  IV fluid down to 50 cc an hour.  Potassium 

supplement intravenously ordered.  Continue per consultants.





Previously:


Patient is n.p.o., will continue on IV fluid of D5W 75 cc an hour


We will monitor electrolytes and renal parameters


Avoid nephrotoxic's


Start p.o. when he clears by speech therapist, meanwhile aspiration precautions


Keep the blood pressure and blood sugar in check


Per orders





Subjective


ROS Limited/Unobtainable:  Yes





Objective


Objective





Last 24 Hour Vital Signs








  Date Time  Temp Pulse Resp B/P (MAP) Pulse Ox O2 Delivery O2 Flow Rate FiO2


 


11/17/20 15:00  73 13 97/46 (63) 100   


 


11/17/20 14:51  82 15     30


 


11/17/20 14:00  81 13 97/44 (61) 100   


 


11/17/20 13:30      Mechanical Ventilator  





      Mechanical Ventilator  


 


11/17/20 13:12  84 25 102/48 99   


 


11/17/20 13:00  89 29 93/44 (60) 100   


 


11/17/20 12:42  89 20 111/59 98   


 


11/17/20 12:05        30


 


11/17/20 12:00 98.9 87 26 105/53 (70) 100   


 


11/17/20 12:00      Mechanical Ventilator  





      Mechanical Ventilator  


 


11/17/20 12:00        30


 


11/17/20 12:00  88      


 


11/17/20 11:02     95   


 


11/17/20 11:00  86 16 119/55 (76) 90   


 


11/17/20 10:57  85 12     30


 


11/17/20 10:00  80 15 106/51 (69) 93   


 


11/17/20 09:00  84 13 100/55 (70) 100   


 


11/17/20 08:00  91      


 


11/17/20 08:00 98.3 90 12 96/47 (63) 100   


 


11/17/20 08:00      Mechanical Ventilator  





      Mechanical Ventilator  


 


11/17/20 08:00        30


 


11/17/20 06:31  100 56     


 


11/17/20 06:25  118 30 80/50 100   


 


11/17/20 06:19  113 17     30


 


11/17/20 06:00  127 29 181/96 (124) 98   


 


11/17/20 05:48  108 17 164/80 98   


 


11/17/20 05:00  114 28  100   


 


11/17/20 04:00 98.8 77 16 103/56 (72) 93   


 


11/17/20 04:00        30


 


11/17/20 04:00  130      


 


11/17/20 04:00      Mechanical Ventilator  





      Mechanical Ventilator  


 


11/17/20 03:28  108 17     30


 


11/17/20 03:00  82 13 105/55 (72) 100   


 


11/17/20 02:00  78 16 111/63 (79) 98   


 


11/17/20 01:00  78 12 100/60 (73) 100   


 


11/17/20 00:00  75      


 


11/17/20 00:00        30


 


11/17/20 00:00      Mechanical Ventilator  





      Mechanical Ventilator  


 


11/17/20 00:00 99.0 75 14 104/59 (74) 100   


 


11/16/20 23:02  71 12     30


 


11/16/20 23:00  85 17 120/68 (85) 100   


 


11/16/20 22:00  76 12 116/52 (73) 100   


 


11/16/20 21:00  83 14 120/62 (81) 99   


 


11/16/20 20:05  77 12     30


 


11/16/20 20:00      Mechanical Ventilator  





      Mechanical Ventilator  


 


11/16/20 20:00 99.0 77 12 104/58 (73) 100   


 


11/16/20 20:00  90      


 


11/16/20 19:00  70 12 115/64 (81) 100   


 


11/16/20 18:00  85 12 110/62 (78) 100   


 


11/16/20 17:00  93 12 130/70 (90) 100   


 


11/16/20 16:41  80 16  100 Mechanical Ventilator  30

















Intake and Output  


 


 11/16/20 11/17/20





 19:00 07:00


 


Intake Total 270 ml 1250 ml


 


Output Total 810 ml 870 ml


 


Balance -540 ml 380 ml


 


  


 


Free Water 150 ml 80 ml


 


IV Total  850 ml


 


Tube Feeding 120 ml 320 ml


 


Output Urine Total 810 ml 870 ml


 


# Bowel Movements 2 3








Laboratory Tests


11/16/20 17:06: POC Whole Blood Glucose [Pending]


11/17/20 05:00: 


White Blood Count 9.2, Red Blood Count 3.34L, Hemoglobin 10.1#L, Hematocrit 

32.5#L, Mean Corpuscular Volume 97, Mean Corpuscular Hemoglobin 30.4, Mean 

Corpuscular Hemoglobin Concent 31.2L, Red Cell Distribution Width 16.7H, 

Platelet Count 410, Mean Platelet Volume 7.0, Neutrophils (%) (Auto) 63.1, 

Lymphocytes (%) (Auto) 28.7, Monocytes (%) (Auto) 6.2, Eosinophils (%) (Auto) 

1.2, Basophils (%) (Auto) 0.8, Sodium Level 139, Potassium Level 3.5, Chloride 

Level 105, Carbon Dioxide Level 25, Anion Gap 9, Blood Urea Nitrogen 13, 

Creatinine 1.0, Estimat Glomerular Filtration Rate > 60, Glucose Level 110H, 

Calcium Level 8.2L, Phosphorus Level 2.4L, Magnesium Level 2.3, Total Bilirubin 

0.5, Aspartate Amino Transf (AST/SGOT) 38H, Alanine Aminotransferase (ALT/SGPT) 

24, Alkaline Phosphatase 128H, Total Protein 6.8, Albumin 1.7L, Globulin 5.1, 

Albumin/Globulin Ratio 0.3L


11/17/20 07:47: 


Arterial Blood pH 7.478H, Arterial Blood Partial Pressure CO2 31.2L, Arterial 

Blood Partial Pressure O2 78.5, Arterial Blood HCO3 22.6, Arterial Blood Oxygen 

Saturation 95.7, Arterial Blood Base Excess -0.4, Jerod Test Positive


Height (Feet):  5


Height (Inches):  4.00


Weight (Pounds):  130


General Appearance:  no apparent distress


EENT:  other - On mechanical ventilator


Cardiovascular:  normal rate


Respiratory/Chest:  decreased breath sounds - Rate 80s


Abdomen:  distended











Seven Tobar MD            Nov 17, 2020 16:41

## 2020-11-17 NOTE — NUR
NURSE HAND-OFF REPORT: 



Latest Vital Signs: Temperature 98.8 , Pulse 90 , B/P 131 /59 , Respiratory Rate 13 , O2 SAT 
100 , Mechanical Ventilator, O2 Flow Rate  .  

Vital Sign Comment: Stable



EKG Rhythm: Sinus Rhythm

Rhythm change?: N 

MD Notified?:  

MD Response: 



Latest Mejia Fall Score: 50  

Fall Risk: High Risk 

Safety Measures: Call light Within Reach, Bed Alarm Zone 2, Side Rails Side Rails x3, Bed 
position Low and Locked.

Fall Precautions: 

Yellow Socks

Yellow Gown

Door Sign

Patient Fall Education



Report given to Alma Rosa Lane RN.

## 2020-11-17 NOTE — NUR
NURSE NOTES:



Cleaned patient for 1 moderate amount of brown BM. Turned and repositioned patient. Sacral 
dressing changed as per protocol.

## 2020-11-17 NOTE — NUR
NURSE NOTES:

Received report from Miyeon, RN. Pt is resting on the bed and confused. Pt has ETT and 
orally intubated. Vent dependent and setting with AC; 12, T: 600, P:0, FiO2 30% and SaO2 
100% noted. Given suction and oral care. Pt has NGT and on running with Glucerna 1.2 @ 
50cc/hr and goal is 60cc/hr. No residual noted. No fever. No sign of pain by FLACC scale. 
Dressing is clean and dry on wound area. On  mattress fro wound management. Pt has Rt. 
upper arm PICC lined and dressing is clean and dry and on running with 1/2NS @ 50cc/hr. On 
Cummings cath and patent and drainage well. Pt has bilateral soft wrist restraint and checked 
comfort and circulation. Trying to release bilateral soft restraint but Pt still trying to 
reach ETT. Placed fall precaution. Will continue to care plan.

## 2020-11-18 VITALS — DIASTOLIC BLOOD PRESSURE: 59 MMHG | SYSTOLIC BLOOD PRESSURE: 109 MMHG

## 2020-11-18 VITALS — DIASTOLIC BLOOD PRESSURE: 67 MMHG | SYSTOLIC BLOOD PRESSURE: 113 MMHG

## 2020-11-18 VITALS — SYSTOLIC BLOOD PRESSURE: 116 MMHG | DIASTOLIC BLOOD PRESSURE: 61 MMHG

## 2020-11-18 VITALS — DIASTOLIC BLOOD PRESSURE: 47 MMHG | SYSTOLIC BLOOD PRESSURE: 100 MMHG

## 2020-11-18 VITALS — SYSTOLIC BLOOD PRESSURE: 173 MMHG | DIASTOLIC BLOOD PRESSURE: 96 MMHG

## 2020-11-18 VITALS — DIASTOLIC BLOOD PRESSURE: 59 MMHG | SYSTOLIC BLOOD PRESSURE: 117 MMHG

## 2020-11-18 VITALS — SYSTOLIC BLOOD PRESSURE: 107 MMHG | DIASTOLIC BLOOD PRESSURE: 59 MMHG

## 2020-11-18 VITALS — DIASTOLIC BLOOD PRESSURE: 77 MMHG | SYSTOLIC BLOOD PRESSURE: 105 MMHG

## 2020-11-18 VITALS — SYSTOLIC BLOOD PRESSURE: 108 MMHG | DIASTOLIC BLOOD PRESSURE: 62 MMHG

## 2020-11-18 VITALS — DIASTOLIC BLOOD PRESSURE: 73 MMHG | SYSTOLIC BLOOD PRESSURE: 136 MMHG

## 2020-11-18 VITALS — SYSTOLIC BLOOD PRESSURE: 114 MMHG | DIASTOLIC BLOOD PRESSURE: 63 MMHG

## 2020-11-18 VITALS — SYSTOLIC BLOOD PRESSURE: 123 MMHG | DIASTOLIC BLOOD PRESSURE: 66 MMHG

## 2020-11-18 VITALS — DIASTOLIC BLOOD PRESSURE: 65 MMHG | SYSTOLIC BLOOD PRESSURE: 109 MMHG

## 2020-11-18 VITALS — DIASTOLIC BLOOD PRESSURE: 67 MMHG | SYSTOLIC BLOOD PRESSURE: 128 MMHG

## 2020-11-18 VITALS — SYSTOLIC BLOOD PRESSURE: 99 MMHG | DIASTOLIC BLOOD PRESSURE: 54 MMHG

## 2020-11-18 VITALS — DIASTOLIC BLOOD PRESSURE: 76 MMHG | SYSTOLIC BLOOD PRESSURE: 146 MMHG

## 2020-11-18 VITALS — DIASTOLIC BLOOD PRESSURE: 59 MMHG | SYSTOLIC BLOOD PRESSURE: 105 MMHG

## 2020-11-18 VITALS — DIASTOLIC BLOOD PRESSURE: 75 MMHG | SYSTOLIC BLOOD PRESSURE: 134 MMHG

## 2020-11-18 VITALS — SYSTOLIC BLOOD PRESSURE: 100 MMHG | DIASTOLIC BLOOD PRESSURE: 47 MMHG

## 2020-11-18 VITALS — DIASTOLIC BLOOD PRESSURE: 64 MMHG | SYSTOLIC BLOOD PRESSURE: 126 MMHG

## 2020-11-18 VITALS — DIASTOLIC BLOOD PRESSURE: 62 MMHG | SYSTOLIC BLOOD PRESSURE: 120 MMHG

## 2020-11-18 VITALS — SYSTOLIC BLOOD PRESSURE: 124 MMHG | DIASTOLIC BLOOD PRESSURE: 67 MMHG

## 2020-11-18 VITALS — DIASTOLIC BLOOD PRESSURE: 63 MMHG | SYSTOLIC BLOOD PRESSURE: 126 MMHG

## 2020-11-18 VITALS — DIASTOLIC BLOOD PRESSURE: 105 MMHG | SYSTOLIC BLOOD PRESSURE: 163 MMHG

## 2020-11-18 LAB
ADD MANUAL DIFF: NO
BASOPHILS NFR BLD AUTO: 1 % (ref 0–2)
BUN SERPL-MCNC: 14 MG/DL (ref 7–18)
CALCIUM SERPL-MCNC: 7.6 MG/DL (ref 8.5–10.1)
CHLORIDE SERPL-SCNC: 106 MMOL/L (ref 98–107)
CO2 SERPL-SCNC: 25 MMOL/L (ref 21–32)
CREAT SERPL-MCNC: 0.9 MG/DL (ref 0.55–1.3)
EOSINOPHIL NFR BLD AUTO: 1.8 % (ref 0–3)
ERYTHROCYTE [DISTWIDTH] IN BLOOD BY AUTOMATED COUNT: 16.2 % (ref 11.6–14.8)
HCT VFR BLD CALC: 28.7 % (ref 42–52)
HGB BLD-MCNC: 9 G/DL (ref 14.2–18)
LYMPHOCYTES NFR BLD AUTO: 25.1 % (ref 20–45)
MCV RBC AUTO: 97 FL (ref 80–99)
MONOCYTES NFR BLD AUTO: 6.1 % (ref 1–10)
NEUTROPHILS NFR BLD AUTO: 66.1 % (ref 45–75)
PLATELET # BLD: 340 K/UL (ref 150–450)
POTASSIUM SERPL-SCNC: 3.6 MMOL/L (ref 3.5–5.1)
RBC # BLD AUTO: 2.95 M/UL (ref 4.7–6.1)
SODIUM SERPL-SCNC: 138 MMOL/L (ref 136–145)
WBC # BLD AUTO: 7 K/UL (ref 4.8–10.8)

## 2020-11-18 RX ADMIN — INSULIN ASPART SCH UNITS: 100 INJECTION, SOLUTION INTRAVENOUS; SUBCUTANEOUS at 00:00

## 2020-11-18 RX ADMIN — MIDODRINE HYDROCHLORIDE SCH MG: 10 TABLET ORAL at 08:51

## 2020-11-18 RX ADMIN — HEPARIN SODIUM SCH UNITS: 5000 INJECTION INTRAVENOUS; SUBCUTANEOUS at 20:35

## 2020-11-18 RX ADMIN — MIDODRINE HYDROCHLORIDE SCH MG: 10 TABLET ORAL at 17:00

## 2020-11-18 RX ADMIN — INSULIN ASPART SCH UNITS: 100 INJECTION, SOLUTION INTRAVENOUS; SUBCUTANEOUS at 11:26

## 2020-11-18 RX ADMIN — LORAZEPAM PRN MG: 2 INJECTION, SOLUTION INTRAMUSCULAR; INTRAVENOUS at 11:14

## 2020-11-18 RX ADMIN — INSULIN ASPART SCH UNITS: 100 INJECTION, SOLUTION INTRAVENOUS; SUBCUTANEOUS at 17:51

## 2020-11-18 RX ADMIN — CHLORHEXIDINE GLUCONATE SCH APPLIC: 213 SOLUTION TOPICAL at 19:53

## 2020-11-18 RX ADMIN — INSULIN ASPART SCH UNITS: 100 INJECTION, SOLUTION INTRAVENOUS; SUBCUTANEOUS at 06:00

## 2020-11-18 RX ADMIN — MIDODRINE HYDROCHLORIDE SCH MG: 10 TABLET ORAL at 00:52

## 2020-11-18 RX ADMIN — PANTOPRAZOLE SODIUM SCH MG: 40 INJECTION, POWDER, FOR SOLUTION INTRAVENOUS at 08:50

## 2020-11-18 RX ADMIN — HEPARIN SODIUM SCH UNITS: 5000 INJECTION INTRAVENOUS; SUBCUTANEOUS at 08:51

## 2020-11-18 NOTE — NUR
NURSE NOTES:

Get ZENAIDA result and in placed NGT. Started NGT feeding @ 30cc/hr. No residual noted. will 
continue to monitor.

## 2020-11-18 NOTE — NUR
RESPIRATORY NOTES

PT placed on SBT, but unable to continue weaning due to elevated heart rate and high blood 
pressure.

PT immediately placed back onto previous vent settings. 

RN Miyeon aware. Will continue to monitor.

## 2020-11-18 NOTE — NUR
NURSE NOTES:



Pt's VS remain stable, afebrile. Pt repositioned, bed bath provided, linens changed. Safety 
precautions maintained. Will continue to monitor.

## 2020-11-18 NOTE — NUR
NURSE NOTES:

Pt is sleeping on the bed and no sign of acute distress noted. On cardiac monitor with SR. 
BP is stable. Afebrile. Reposition. No residual noted. Increase NGT feeding @ 60cc/hr. Keep 
HOB. Placed fall precaution. Will continue to plan of care.

## 2020-11-18 NOTE — INFECTIOUS DISEASES PROG NOTE
Assessment/Plan





74yo M with:





Respiratory code 2ry to mucus plug 11/12


Septic Shock- -recurrent


Fever, recurrent, low grade;SP


Leukocytosis; recurrent; increased


Acute hypoxic resp failure, on NRB mask> 4l NC; back on NRB, desaturation 10/29 

> intubated 11/6


Pneumonia- >HAP


Hx of COVID19 PNA 9/9/20 11/14 Resp cx +MRSA, nl resp hayden


   UCx neg


   BCx NTD


   11/13 COVID PCR neg


         --11/10 CXR: Bilateral infiltrates in a peribronchovascular 

distribution are unchanged. Left pleural effusion is unchanged.


         --11/8 CXR: Persistent bilateral patchy pulmonary opacities, most 

prominent  in the left lower lung.


         --11/7 ucx neg


                  sp cx MRSA (colonizer at this point)


                  Bcx Neg


         --11/2 Bcx Neg


          10/30 Sp cx MRSA (Vancomycin ADRIANA 1), ESBL E.coli


   10/23 BCx NTD


   UA 15-20 WBC, UCx Neg


   10/23 COVID rapid neg; 10/26 rapid COVID PCR + (from prior infection)- not 

new infection


   Flu A/B neg


   CXR: L pna


   Resp cx MRSA





JANES on CKD, improving





SNF resident ([facility])


Non-verbal


VRE and MRSA colonized








Plan:


Cont Zyvoz #6 for MRSA coverage given mucus plug and worsening hemodynamics


CXR to trend





   -11/16 SP meropenem #14 for ESBL pna 


   -11/10 SP Micafungin #4


   -11/6 SP IV Vancomycin #15


   -11/2 SP Zosyn #4


   -10/26 SP Cefepime #4


   -10/24 SP Flagyl #





Monitor CBC/CMP


Monitor resp status


Monitor temp curve and hemodynamics





D/w RN





Thank you for this consult. [facility] will continue to follow.





Subjective


Allergies:  


Coded Allergies:  


     No Known Allergies (Unverified , 8/20/12)


AF x36hrs


WBC 7.0


NAD 


Still w/ lots of resp secretions





Objective





Last 24 Hour Vital Signs








  Date Time  Temp Pulse Resp B/P (MAP) Pulse Ox O2 Delivery O2 Flow Rate FiO2


 


11/18/20 07:00  82 16 134/75 (94) 100   


 


11/18/20 06:30  73 14     


 


11/18/20 06:00  73 12 109/59 (76) 100   


 


11/18/20 05:00  75 12 120/62 (81) 100   


 


11/18/20 04:00      Mechanical Ventilator  





      Mechanical Ventilator  


 


11/18/20 04:00        30


 


11/18/20 04:00 98.8 73 12 123/66 (85) 100   


 


11/18/20 04:00  73      


 


11/18/20 03:00  75 16 114/63 (80) 100   


 


11/18/20 02:30  87 18     30


 


11/18/20 02:00  84 16 126/63 (84) 100   


 


11/18/20 01:00  83 13 105/59 (74) 100   


 


11/18/20 00:00 98.3 80 12 107/59 (75) 100   


 


11/18/20 00:00      Mechanical Ventilator  





      Mechanical Ventilator  


 


11/18/20 00:00        30


 


11/18/20 00:00  85      


 


11/17/20 23:00  84 12 104/87 (93) 100   


 


11/17/20 22:32  84 14     30


 


11/17/20 22:07  81 12 120/31 100   


 


11/17/20 22:00  82 12 103/53 (70) 100   


 


11/17/20 21:37  84 15 121/70 100   


 


11/17/20 21:00  84 16 107/60 (76) 100   


 


11/17/20 20:00        30


 


11/17/20 20:00  86      


 


11/17/20 20:00 98.8 86 14 109/56 (73) 100   


 


11/17/20 20:00      Mechanical Ventilator  





      Mechanical Ventilator  


 


11/17/20 19:10  88 18     30


 


11/17/20 19:00  84 14 101/58 (72) 100   


 


11/17/20 18:00  90 13 131/59 (83) 100   


 


11/17/20 17:00  71 12 98/58 (71) 100   


 


11/17/20 16:00        30


 


11/17/20 16:00 98.8 79 13 102/47 (65) 100   


 


11/17/20 16:00      Mechanical Ventilator  





      Mechanical Ventilator  


 


11/17/20 15:17  81      


 


11/17/20 15:00  73 13 97/46 (63) 100   


 


11/17/20 14:51  82 15     30


 


11/17/20 14:00  81 13 97/44 (61) 100   


 


11/17/20 13:30      Mechanical Ventilator  





      Mechanical Ventilator  


 


11/17/20 13:12  84 25 102/48 99   


 


11/17/20 13:00  89 29 93/44 (60) 100   


 


11/17/20 12:42  89 20 111/59 98   


 


11/17/20 12:05        30


 


11/17/20 12:00 98.9 87 26 105/53 (70) 100   


 


11/17/20 12:00      Mechanical Ventilator  





      Mechanical Ventilator  


 


11/17/20 12:00        30


 


11/17/20 12:00  88      


 


11/17/20 11:02     95   


 


11/17/20 11:00  86 16 119/55 (76) 90   


 


11/17/20 10:57  85 12     30


 


11/17/20 10:00  80 15 106/51 (69) 93   


 


11/17/20 09:00  84 13 100/55 (70) 100   








Height (Feet):  5


Height (Inches):  4.00


Weight (Pounds):  130


Gen: NAD in bed


HEENT: NCAT


CV: RRR


Pulm: BL chest rise on vent


Abd: Soft, NTND


Ext: No c/c/e


Neuro: Eyes closed, not interactive





Laboratory Tests








Test


 11/17/20


23:25 11/18/20


04:00 11/18/20


05:26


 


POC Whole Blood Glucose


 115 MG/DL


()  H 


 100 MG/DL


()


 


White Blood Count


 


 7.0 K/UL


(4.8-10.8) 





 


Red Blood Count


 


 2.95 M/UL


(4.70-6.10)  L 





 


Hemoglobin


 


 9.0 G/DL


(14.2-18.0)  L 





 


Hematocrit


 


 28.7 %


(42.0-52.0)  L 





 


Mean Corpuscular Volume  97 FL (80-99)   


 


Mean Corpuscular Hemoglobin


 


 30.4 PG


(27.0-31.0) 





 


Mean Corpuscular Hemoglobin


Concent 


 31.3 G/DL


(32.0-36.0)  L 





 


Red Cell Distribution Width


 


 16.2 %


(11.6-14.8)  H 





 


Platelet Count


 


 340 K/UL


(150-450) 





 


Mean Platelet Volume


 


 6.7 FL


(6.5-10.1) 





 


Neutrophils (%) (Auto)


 


 66.1 %


(45.0-75.0) 





 


Lymphocytes (%) (Auto)


 


 25.1 %


(20.0-45.0) 





 


Monocytes (%) (Auto)


 


 6.1 %


(1.0-10.0) 





 


Eosinophils (%) (Auto)


 


 1.8 %


(0.0-3.0) 





 


Basophils (%) (Auto)


 


 1.0 %


(0.0-2.0) 





 


Sodium Level


 


 138 MMOL/L


(136-145) 





 


Potassium Level


 


 3.6 MMOL/L


(3.5-5.1) 





 


Chloride Level


 


 106 MMOL/L


() 





 


Carbon Dioxide Level


 


 25 MMOL/L


(21-32) 





 


Blood Urea Nitrogen


 


 14 mg/dL


(7-18) 





 


Creatinine


 


 0.9 MG/DL


(0.55-1.30) 





 


Estimat Glomerular Filtration


Rate 


 > 60 mL/min


(>60) 





 


Glucose Level


 


 94 MG/DL


() 





 


Calcium Level


 


 7.6 MG/DL


(8.5-10.1)  L 














Current Medications








 Medications


  (Trade)  Dose


 Ordered  Sig/Miky


 Route


 PRN Reason  Start Time


 Stop Time Status Last Admin


Dose Admin


 


 Acetaminophen


  (Tylenol)  650 mg  Q4H  PRN


 ORAL


 Temp >100.5  10/23/20 20:30


 11/22/20 20:29  11/16/20 13:01





 


 Chlorhexidine


 Gluconate


  (Jess-Hex 2%)  1 applic  DAILY@2000


 TOPIC


   11/6/20 20:00


 2/4/21 19:59  11/17/20 20:02





 


 Dextrose


  (Dextrose 50%)  50 ml  Q30M  PRN


 IV


 Hypoglycemia  10/23/20 22:45


 1/21/21 22:44   





 


 Heparin Sodium


  (Porcine)


  (Heparin 5000


 units/ml)  5,000 units  EVERY 12  HOURS


 SUBQ


   10/23/20 21:00


 12/7/20 20:59  11/17/20 20:39





 


 Insulin Aspart


  (NovoLOG)    EVERY 6  HOURS


 SUBQ


   11/2/20 06:00


 1/22/21 06:29  11/15/20 00:30





 


 Linezolid  300 ml @ 


 300 mls/hr  Q12HR


 IVPB


   11/13/20 12:00


 11/20/20 11:59  11/17/20 20:38





 


 Lorazepam


  (Ativan 2mg/ml


 1ml)  2 mg  Q4H  PRN


 IV


 For Anxiety  11/17/20 19:15


 11/24/20 19:14  11/17/20 21:37





 


 Midodrine


  (Pro-Amatine)  10 mg  Q8H


 ORAL


   11/11/20 17:00


 2/9/21 16:59  11/18/20 00:52





 


 Nitroglycerin


  (Ntg)  0.4 mg  Q5M  PRN


 SL


 Prn Chest Pain  10/23/20 20:30


 11/22/20 20:29   





 


 Norepinephrine


 Bitartrate  250 ml @ 0


 mls/hr  Q24H


 IV


   11/15/20 13:00


 11/18/20 12:59  11/15/20 13:01





 


 Ondansetron HCl


  (Zofran)  4 mg  Q6H  PRN


 IVP


 Nausea & Vomiting  10/23/20 20:30


 11/22/20 20:29   





 


 Pantoprazole


  (Protonix)  40 mg  DAILY


 IV


   11/7/20 09:00


 12/7/20 08:59  11/17/20 09:08





 


 Polyethylene


 Glycol


  (Miralax)  17 gm  DAILYPRN  PRN


 ORAL


 Constipation  10/23/20 20:30


 11/22/20 20:29   





 


 Promethazine HCl/


 Codeine


  (Phenergan with


 Codeine)  5 ml  Q4H  PRN


 ORAL


 For Cough  10/23/20 20:30


 11/22/20 20:29   





 


 Sodium Chloride  1,000 ml @ 


 50 mls/hr  Q20H


 IV


   11/6/20 16:00


 12/6/20 15:59  11/18/20 04:00




















Bianca Casillas M.D.               Nov 18, 2020 08:22

## 2020-11-18 NOTE — NUR
NURSE NOTES:

Pt is sleeping on the bed and SaO2 100% with Vent. Given suction and oral care. BP is 
stable. Tolerated well with NGT feeding. Changed position. Will continue to monitor any 
change of condition.

## 2020-11-18 NOTE — INTERNAL MED PROGRESS NOTE
Subjective


Date of Service:  Nov 18, 2020


Physician Name


Benito Hearn


Attending Physician


Andrei Cancino MD





Current Medications








 Medications


  (Trade)  Dose


 Ordered  Sig/Miky


 Route


 PRN Reason  Start Time


 Stop Time Status Last Admin


Dose Admin


 


 Acetaminophen


  (Tylenol)  650 mg  Q4H  PRN


 ORAL


 Temp >100.5  10/23/20 20:30


 11/22/20 20:29  11/16/20 13:01





 


 Chlorhexidine


 Gluconate


  (Jess-Hex 2%)  1 applic  DAILY@2000


 TOPIC


   11/6/20 20:00


 2/4/21 19:59  11/17/20 20:02





 


 Dextrose


  (Dextrose 50%)  50 ml  Q30M  PRN


 IV


 Hypoglycemia  10/23/20 22:45


 1/21/21 22:44   





 


 Heparin Sodium


  (Porcine)


  (Heparin 5000


 units/ml)  5,000 units  EVERY 12  HOURS


 SUBQ


   10/23/20 21:00


 12/7/20 20:59  11/18/20 08:51





 


 Insulin Aspart


  (NovoLOG)    EVERY 6  HOURS


 SUBQ


   11/2/20 06:00


 1/22/21 06:29  11/18/20 11:26





 


 Linezolid  300 ml @ 


 300 mls/hr  Q12HR


 IVPB


   11/13/20 12:00


 11/20/20 11:59  11/18/20 08:53





 


 Lorazepam


  (Ativan 2mg/ml


 1ml)  2 mg  Q4H  PRN


 IV


 For Anxiety  11/17/20 19:15


 11/24/20 19:14  11/18/20 11:14





 


 Midodrine


  (Pro-Amatine)  10 mg  Q8H


 ORAL


   11/11/20 17:00


 2/9/21 16:59  11/18/20 08:51





 


 Nitroglycerin


  (Ntg)  0.4 mg  Q5M  PRN


 SL


 Prn Chest Pain  10/23/20 20:30


 11/22/20 20:29   





 


 Norepinephrine


 Bitartrate  250 ml @ 0


 mls/hr  Q24H


 IV


   11/15/20 13:00


 11/18/20 12:59  11/15/20 13:01





 


 Ondansetron HCl


  (Zofran)  4 mg  Q6H  PRN


 IVP


 Nausea & Vomiting  10/23/20 20:30


 11/22/20 20:29   





 


 Pantoprazole


  (Protonix)  40 mg  DAILY


 IV


   11/7/20 09:00


 12/7/20 08:59  11/18/20 08:50





 


 Polyethylene


 Glycol


  (Miralax)  17 gm  DAILYPRN  PRN


 ORAL


 Constipation  10/23/20 20:30


 11/22/20 20:29   





 


 Promethazine HCl/


 Codeine


  (Phenergan with


 Codeine)  5 ml  Q4H  PRN


 ORAL


 For Cough  10/23/20 20:30


 11/22/20 20:29   





 


 Sodium Chloride  1,000 ml @ 


 50 mls/hr  Q20H


 IV


   11/6/20 16:00


 12/6/20 15:59  11/18/20 04:00











Allergies:  


Coded Allergies:  


     No Known Allergies (Unverified , 8/20/12)


ROS Limited/Unobtainable:  Yes


Subjective


76 YO M admitted with shortness of breath.  Now pneumonia; previously COVID 

positive.  Cover for Int med-Dr Cancino.  ICU.  Intubated and sedated. On Levophed





Objective





Last Vital Signs








  Date Time  Temp Pulse Resp B/P (MAP) Pulse Ox O2 Delivery O2 Flow Rate FiO2


 


11/18/20 11:14  120 20 192/120 95   


 


11/18/20 09:20        30


 


11/18/20 08:00 97.9       


 


11/18/20 04:00      Mechanical Ventilator  





      Mechanical Ventilator  











Laboratory Tests








Test


 11/17/20


23:25 11/18/20


04:00 11/18/20


05:26


 


POC Whole Blood Glucose


 115 MG/DL


()  H 


 100 MG/DL


()


 


White Blood Count


 


 7.0 K/UL


(4.8-10.8) 





 


Red Blood Count


 


 2.95 M/UL


(4.70-6.10)  L 





 


Hemoglobin


 


 9.0 G/DL


(14.2-18.0)  L 





 


Hematocrit


 


 28.7 %


(42.0-52.0)  L 





 


Mean Corpuscular Volume  97 FL (80-99)   


 


Mean Corpuscular Hemoglobin


 


 30.4 PG


(27.0-31.0) 





 


Mean Corpuscular Hemoglobin


Concent 


 31.3 G/DL


(32.0-36.0)  L 





 


Red Cell Distribution Width


 


 16.2 %


(11.6-14.8)  H 





 


Platelet Count


 


 340 K/UL


(150-450) 





 


Mean Platelet Volume


 


 6.7 FL


(6.5-10.1) 





 


Neutrophils (%) (Auto)


 


 66.1 %


(45.0-75.0) 





 


Lymphocytes (%) (Auto)


 


 25.1 %


(20.0-45.0) 





 


Monocytes (%) (Auto)


 


 6.1 %


(1.0-10.0) 





 


Eosinophils (%) (Auto)


 


 1.8 %


(0.0-3.0) 





 


Basophils (%) (Auto)


 


 1.0 %


(0.0-2.0) 





 


Sodium Level


 


 138 MMOL/L


(136-145) 





 


Potassium Level


 


 3.6 MMOL/L


(3.5-5.1) 





 


Chloride Level


 


 106 MMOL/L


() 





 


Carbon Dioxide Level


 


 25 MMOL/L


(21-32) 





 


Blood Urea Nitrogen


 


 14 mg/dL


(7-18) 





 


Creatinine


 


 0.9 MG/DL


(0.55-1.30) 





 


Estimat Glomerular Filtration


Rate 


 > 60 mL/min


(>60) 





 


Glucose Level


 


 94 MG/DL


() 





 


Calcium Level


 


 7.6 MG/DL


(8.5-10.1)  L 




















Intake and Output  


 


 11/17/20 11/18/20





 19:00 07:00


 


Intake Total 1615.832 ml 1480 ml


 


Output Total 855 ml 1200 ml


 


Balance 760.832 ml 280 ml


 


  


 


Free Water 80 ml 


 


IV Total 1040.832 ml 800 ml


 


Tube Feeding 495 ml 680 ml


 


Output Urine Total 855 ml 1200 ml


 


# Bowel Movements 2 2








Objective


PHYSICAL EXAMINATION:


GENERAL:  The patient awake with deep stimuli, open his eyes, however,


cannot follow commands.  The patient is on a Ventimask at this time,


chronically ill-appearing.


HEAD AND NECK:  Pupils are equal and reactive to light.  Anicteric.


NECK:  Supple.  No JVD.


LUNGS:  Mech vent;  wheezing, rhonchi, and decreased air in bases.


HEART:  S1, S2.  Regular rhythm.  Distant heart sounds.  No murmur or


gallop.


ABDOMEN:  Soft, nondistended, nontender.  Positive bowel sounds.


EXTREMITIES:  No cyanosis, clubbing, or edema.


NEUROLOGIC:  Very limited secondary to the patient's status, cannot follow


commands.  Opens his eyes with deep stimuli and moving extremities


spontaneously.





Assessment/Plan


Assessment/Plan


ASSESSMENT:


1. Acute hypoxemic respiratory failure, most likely secondary to


pneumonia and sepsis.


2. Sepsis secondary to urinary tract infection and pneumonia.


3. pneumonia=MRSA; ESBL E. coli


4. Acute kidney injury on chronic renal insufficiency.


5. Dehydration.


6. History of chronic congestive heart failure.


7. Diabetes type 2.


8. Dyslipidemia.


9. Hypertension.


10. Alzheimer's disease.


11. COVID 19 previous positive





PLAN:


1.  ICU


2.  Dr. Sandoval,=Pulmonary Critical Care; follow [facility] recs


3.  Dr. Garcia = Inf Dis.


4.  IV= D5W due to the hypernatremia and dehydration.


5.  antibiotics =  linezolid; S/P micafungin and meropenem


6.  Code status is full code.


7.    DVT prophylaxis is heparin subcutaneous.











Benito Hearn MD               Nov 18, 2020 11:50

## 2020-11-18 NOTE — NUR
1900: Shift report received from [facility]: In to assess pt, she is in the bed, alert and oriented, talking on the phone and has a visitor at the bedside. 2130: Pt in bed, watching TV, no visitors present at the bedside anymore. Pts lungs are clear, bowel sounds hypoactive. Oxygen sat at 85% which concerns the pt, she requests to use the nasal cannula to receive PRN oxygen at 2 liters. HR is slightly elevated at 100 bpm and BP low at 90/50. Blood pressure meds are being held due to these readings. Dilaudid pump running at 4 mg basal rate and 2 mg bolus rate. Pump cleared at this time. Pt appears to be sad, because her mother had told friends and family not to visit so frequently \"to give pt time to rest\". She said she wishes someone would visit, because she feels lonely. Listened to pt and offered emotional support. Pt also voiced concerns about the VS that are not within normal limits. Someone called pt again and she voiced concerns about her VS and situation. Will continue to offer emotional support if pt wants it. Pt rates pain at 4/10 at this time. 0010: Pt asked to be repositioned. Meera Suresh CNA and RN moved pt with draw sheet, pt tolerated fairly well. Pt asked for PRN Dilaudid and PRN Toradol. Medicated at this time. Pt was given a back rub with powder as she had requested and lotion was applied to her abdomen. Skin dry. Pt states, she wants to continue to take care of her skin and \"get herself together\" since \"she is going home soon\". 0230: Pt asked for pain medicine, she requests PRN Dilaudid and Tylenol, as well as PRN Ativan 1 mg. Medicated at this time. 0345: Pt asked for some of her candy because she is hungry. Red bag with pt belongings/food in closet and pt was offered sips of pepsi and is eating a candy bar.   0620: Pt medicated with scheduled meds, Dilaudid PCA cleared. Jama emptied. Pt goes back to sleep.     NAME OF PATIENT:  Jyoti S Prakash    LEVEL OF CARE:  GIP    REASON FOR GIP: NURSE NOTES:

Received report from Miyeon, RN. Pt is resting on the bed and confused. Pt has ETT and 
orally intubated. Vent dependent and setting with AC; 12, T: 600, P:0, FiO2 30% and SaO2 
100% noted. Given suction and oral care. No fever. No sign of pain by FLACC scale. On 
cardiac monitor with SR. Dressing is clean and dry on wound area. On  mattress fro 
wound management. Pt has Rt. upper arm PICC lined and dressing is clean and dry and on 
running with 1/2NS @ 50cc/hr. On Cummings cath and patent and drainage well. Pt has bilateral 
soft wrist restraint and checked comfort and circulation. Reinserted NGT by previous nursed. 
KUB order fo placement. Held NGT feeding. Placed fall precaution. Will continue to care 
plan. Pain, despite numerous changes in medications and Stabilizing treatment that cannot take place at home    *PATIENT REMAINS ELIGIBLE FOR GIP LEVEL OF CARE AS EVIDENCED BY: (MUST BE ADDRESSED OF PATIENT GIP)  Pt contious to rate pain at 6/10. Rarely lower, sometimes higher and requires multiple PRN doses of Dilaudid and Toradol. Pt is completely dependant in all ADLs and multiple team members are required to reposition pt. REASON FOR RESPITE:  n/a    O2 SAFETY:  Concentrator positioning (6\" from furniture/drapes), No petroleum based products on face while oxygen in use and Oxygen sign on the door    FALL INTERVENTIONS PROVIDED:   Implemented/recommended resources for alarm system (personal alarm, bed alarm, call bell, etc.)     INTERDISPLINARY COMMUNICATION/COLLABORATION:  Physician, MSW, Leta and RN, CNA    NEW MEDICATION INITIATION DOCUMENTATION:  no changes at this time    COMFORTABLE DYING MEASURE:  Is Patient/family satisfied with symptom level?  yes    DISCHARGE PLAN:  Being discussed.

## 2020-11-18 NOTE — NUR
NURSE NOTES:

Pt is sleeping on the bed. SaO2 100% with current Vent setting. On running with NGT feeding 
@ 30cc/hr and no residual noted. On cardiac monitor with SR. BP is stable. Given suction. 
Changed position. Trying to release restraint and successful. d/c'd restraint. Will continue 
to monitor any change of condition.

## 2020-11-18 NOTE — DIAGNOSTIC IMAGING REPORT
Indication: Shortness of breath

 

Technique: XRAY Chest 1v

 

Comparison: 11/16/2020

 

Findings: Endotracheal and enteric tubes remain in place with satisfactory position.

Heart size and mediastinal contour is grossly stable. No significant interval change

in small left pleural effusion and patchy bilateral infiltrates. No pneumothorax.

Osseous structures are stable.

 

Impression: No significant interval change in the radiographic appearance the chest

compared to one day prior.

## 2020-11-18 NOTE — PULMONOLGY CRITICAL CARE NOTE
Critical Care - Asmt/Plan


Problems:  


(1) Acute respiratory failure


(2) Multifocal pneumonia


(3) 2019 novel coronavirus disease (COVID-19)


(4) Acute encephalopathy


(5) Severe sepsis


(6) Alzheimer's dementia


(7) HTN (hypertension)


(8) History of CVA (cerebrovascular accident)


(9) BPH (benign prostatic hyperplasia)


Respiratory:  monitor respiratory rate, adjust FIO2, CXR


Cardiac:  d/c cardiac monitor


Renal:  F/U I&O


Infectious Disease:  check cultures, continue antibiotics


Gastrointestinal:  hold feedings, adjust feedings


Hematologic:  monitor H/H


Neurologic:  PRN Ativan, PRN Morphine, keep patient comfortable


Notes Reviewed:  internist, cardio, renal


Discussed with:  nurses, consultants, 





Critical Care - Objective





Last 24 Hour Vital Signs








  Date Time  Temp Pulse Resp B/P (MAP) Pulse Ox O2 Delivery O2 Flow Rate FiO2


 


11/18/20 11:14  120 20 192/120 95   


 


11/18/20 10:00  129 29 163/105 (124) 98   


 


11/18/20 09:35     100   


 


11/18/20 09:20  126 16     30


 


11/18/20 09:00  72 35 100/47 (64) 95   


 


11/18/20 08:00 97.9       


 


11/18/20 08:00  83 33 100/47 (64) 93   


 


11/18/20 08:00        30


 


11/18/20 08:00  70      


 


11/18/20 07:40  98 17     30


 


11/18/20 07:00  82 16 134/75 (94) 100   


 


11/18/20 06:30  73 14     


 


11/18/20 06:00  73 12 109/59 (76) 100   


 


11/18/20 05:00  75 12 120/62 (81) 100   


 


11/18/20 04:00      Mechanical Ventilator  





      Mechanical Ventilator  


 


11/18/20 04:00        30


 


11/18/20 04:00 98.8 73 12 123/66 (85) 100   


 


11/18/20 04:00  73      


 


11/18/20 03:00  75 16 114/63 (80) 100   


 


11/18/20 02:30  87 18     30


 


11/18/20 02:00  84 16 126/63 (84) 100   


 


11/18/20 01:00  83 13 105/59 (74) 100   


 


11/18/20 00:00 98.3 80 12 107/59 (75) 100   


 


11/18/20 00:00      Mechanical Ventilator  





      Mechanical Ventilator  


 


11/18/20 00:00        30


 


11/18/20 00:00  85      


 


11/17/20 23:00  84 12 104/87 (93) 100   


 


11/17/20 22:32  84 14     30


 


11/17/20 22:07  81 12 120/31 100   


 


11/17/20 22:00  82 12 103/53 (70) 100   


 


11/17/20 21:37  84 15 121/70 100   


 


11/17/20 21:00  84 16 107/60 (76) 100   


 


11/17/20 20:00        30


 


11/17/20 20:00  86      


 


11/17/20 20:00 98.8 86 14 109/56 (73) 100   


 


11/17/20 20:00      Mechanical Ventilator  





      Mechanical Ventilator  


 


11/17/20 19:10  88 18     30


 


11/17/20 19:00  84 14 101/58 (72) 100   


 


11/17/20 18:00  90 13 131/59 (83) 100   


 


11/17/20 17:00  71 12 98/58 (71) 100   


 


11/17/20 16:00        30


 


11/17/20 16:00 98.8 79 13 102/47 (65) 100   


 


11/17/20 16:00      Mechanical Ventilator  





      Mechanical Ventilator  


 


11/17/20 15:17  81      


 


11/17/20 15:00  73 13 97/46 (63) 100   


 


11/17/20 14:51  82 15     30


 


11/17/20 14:00  81 13 97/44 (61) 100   


 


11/17/20 13:30      Mechanical Ventilator  





      Mechanical Ventilator  


 


11/17/20 13:12  84 25 102/48 99   


 


11/17/20 13:00  89 29 93/44 (60) 100   


 


11/17/20 12:42  89 20 111/59 98   


 


11/17/20 12:05        30


 


11/17/20 12:00 98.9 87 26 105/53 (70) 100   


 


11/17/20 12:00      Mechanical Ventilator  





      Mechanical Ventilator  


 


11/17/20 12:00        30


 


11/17/20 12:00  88      








Status:  obtunded


Condition:  critical


HEENT:  atraumatic


Neck:  full ROM, trach


Lungs:  rales, rhonchi


Heart:  HR/BP stable


Abdomen:  soft, active bowel sounds


Extremities:  no C/C/E


Accucheck:  197





Critical Care - Subjective


ROS Limited/Unobtainable:  Yes


Condition:  critical


EKG Rhythm:  Sinus Rhythm


FI02:  30


Vent Support Breath Rate:  12


Vent Support Mode:  AC


Vent Tidal Volume:  600


Sputum Amount:  Moderate


PEEP:  0.0


PIP:  49


Tube Feeding Amount:  60


I&O:











Intake and Output  


 


 11/17/20 11/18/20





 19:00 07:00


 


Intake Total 1615.832 ml 1480 ml


 


Output Total 855 ml 1200 ml


 


Balance 760.832 ml 280 ml


 


  


 


Free Water 80 ml 


 


IV Total 1040.832 ml 800 ml


 


Tube Feeding 495 ml 680 ml


 


Output Urine Total 855 ml 1200 ml


 


# Bowel Movements 2 2








CXR:


no changes


ET-Tube:  7.5


ET Position:  25


Labs:





Laboratory Tests








Test


 11/17/20


23:25 11/18/20


04:00 11/18/20


05:26


 


POC Whole Blood Glucose


 115 MG/DL


()  H 


 100 MG/DL


()


 


White Blood Count


 


 7.0 K/UL


(4.8-10.8) 





 


Red Blood Count


 


 2.95 M/UL


(4.70-6.10)  L 





 


Hemoglobin


 


 9.0 G/DL


(14.2-18.0)  L 





 


Hematocrit


 


 28.7 %


(42.0-52.0)  L 





 


Mean Corpuscular Volume  97 FL (80-99)   


 


Mean Corpuscular Hemoglobin


 


 30.4 PG


(27.0-31.0) 





 


Mean Corpuscular Hemoglobin


Concent 


 31.3 G/DL


(32.0-36.0)  L 





 


Red Cell Distribution Width


 


 16.2 %


(11.6-14.8)  H 





 


Platelet Count


 


 340 K/UL


(150-450) 





 


Mean Platelet Volume


 


 6.7 FL


(6.5-10.1) 





 


Neutrophils (%) (Auto)


 


 66.1 %


(45.0-75.0) 





 


Lymphocytes (%) (Auto)


 


 25.1 %


(20.0-45.0) 





 


Monocytes (%) (Auto)


 


 6.1 %


(1.0-10.0) 





 


Eosinophils (%) (Auto)


 


 1.8 %


(0.0-3.0) 





 


Basophils (%) (Auto)


 


 1.0 %


(0.0-2.0) 





 


Sodium Level


 


 138 MMOL/L


(136-145) 





 


Potassium Level


 


 3.6 MMOL/L


(3.5-5.1) 





 


Chloride Level


 


 106 MMOL/L


() 





 


Carbon Dioxide Level


 


 25 MMOL/L


(21-32) 





 


Blood Urea Nitrogen


 


 14 mg/dL


(7-18) 





 


Creatinine


 


 0.9 MG/DL


(0.55-1.30) 





 


Estimat Glomerular Filtration


Rate 


 > 60 mL/min


(>60) 





 


Glucose Level


 


 94 MG/DL


() 





 


Calcium Level


 


 7.6 MG/DL


(8.5-10.1)  Michael Love MD              Nov 18, 2020 11:35

## 2020-11-18 NOTE — NUR
NURSE NOTES:





PT failed weaning trial per RT. Pt was getting agitated and restless, MD Alcocer aware. Will 
continue to monitor.

## 2020-11-18 NOTE — NUR
NURSE NOTES:



Pt noted to have decreased restlessness and agitation. Pt's temp taken, 99.9. Colds packs 
given to decrease fever. Will re-assess temperature. Pt's other VS stable. Will continue to 
monitor.

## 2020-11-18 NOTE — NEPHROLOGY PROGRESS NOTE
Assessment/Plan


Problem List:  


(1) Dehydration


(2) JANES (acute kidney injury)


(3) Renal failure (ARF), acute on chronic


(4) Hypoxia


(5) Acute encephalopathy


(6) Electrolyte imbalance


Assessment





75-year-old male is admitted with acute hypoxic respiratory failure most likely 

secondary to pneumonia and sepsis, UTI.


Acute on chronic renal failure


Dehydration


Electrolyte imbalances, hypernatremia


Hypoalbuminemia


Diabetes type 2


History of congestive heart failure


Hypertension


Hyperlipemia


Alzheimer's


Previous COVID-19 infection in September 2020


Plan


November 18: Failed weaning.  Remains intubated.  Remains full closed.  Labs 

reviewed.  Stable from renal standpoint view.


November 17: Remains in ICU.  Remains full code.  Labs are reviewed.  Phosphorus

supplement given.  Continue per consultants.


November 16: Remains in ICU.  Full code.  Labs reviewed.  Remains intubated.  

Stable renal parameters.


November 15: In ICU.  Full code.  Discussed with RN.  Stable renal parameters.


November 14: Seen in ICU.  Discussed with LITA Cooper.  Flomax discontinued.  

Labs reviewed.  Stable from renal standpoint of view.


November 13: Seen in ICU.  Discussed with LITA Cooper.  Remains on pressor.  

Electrolyte abnormalities noted and addressed.  Continue per consultants.  

Patient remains full code.


November 12: Seen in ICU.  Discussed with LITA Richardson.  Blood pressure remains a 

little requiring pressors.  Labs reviewed.  Stable renal parameters.  Continue 

per consultants.


November 11: Remains intubated.  Full code.  Blood pressure borderline low.  

Will increase midodrine dose.  Discussed with RN.  Continue to monitor renal 

parameters and electrolytes.


November 10: Intubated.  Full code.  Labs reviewed.  Stable from renal 

standpoint of view.  Continue per consultants.


November 9: Remains intubated.  Full code.  Labs reviewed.  Electrolytes within 

normal limit.  Continue per consultants.


November 8: In ICU.  Remains intubated on ventilator.  Remains full code.  Low 

potassium addressed.  Blood pressure fluctuating.  Continue per consultants.


November 7: Patient in ICU.  Intubated on ventilator.  On 6 mics of Levophed.  

Will give 100 cc albumin 25%.  K-Phos IV ordered.  Continue per consultants.  

Continue monitor renal parameters and electrolytes.


November 6: Status unchanged.  Transfer to [facility] for seizure.  Stable from renal 

standpoint to view.  Continue per consultants.


November 5: Status quo.  Labs reviewed.  Renal parameters stable.  Continue per 

consultants.


November 4: On nonrebreather mask.  Labs reviewed.  Renal parameters 

stable.Continue per pulmonologist.  Clinically unchanged.


November 3: On nonrebreather mask.  Inflammatory markers gradually declining.  

Renal parameters stable.  Continue per pulmonary.


November 2: Remains on nonrebreather mask.  Inflammatory markers remain 

elevated.  Renal parameters somewhat stable.  Continue per pulmonary and ID.  

Remains full code.


November 1: Patient on nonrebreather mask.  Labs noted.  Serum creatinine down 

to 1.3.  Continue per consultants.


October 31: Patient on nonrebreather mask.  Transfer to telemetry when seen this

morning.  Labs noted.  Continue per consultants.  Serum creatinine dylan to 1.7. 

Continue to monitor renal parameters.  Continue to monitor vancomycin level


October 30: Patient is not doing well clinically.  Mild respiratory distress.  

ABG noted.  Somewhat hypoxic.  CBC and chemistry panel ordered.  Discussed with 

LITA Garcia.  Will defer to pulmonary management to specialist.  Continue per ID.

 Renal parameters remained stable as of October 29.


October 29: Labs reviewed.  Renal parameters stable.  Continue per consultants.


October 28: Labs reviewed.  Potassium via NG tube ordered.  IV fluids stopped.  

Continue per consultants.


October 27: Labs reviewed.  Low potassium and low phosphorus replaced.  Continue

per consultants.  Remains stable from renal standpoint of view.


October 26: Patient remains n.p.o.  IV fluid down to 50 cc an hour.  Potassium 

supplement intravenously ordered.  Continue per consultants.





Previously:


Patient is n.p.o., will continue on IV fluid of D5W 75 cc an hour


We will monitor electrolytes and renal parameters


Avoid nephrotoxic's


Start p.o. when he clears by speech therapist, meanwhile aspiration precautions


Keep the blood pressure and blood sugar in check


Per orders





Subjective


ROS Limited/Unobtainable:  Yes





Objective


Objective





Last 24 Hour Vital Signs








  Date Time  Temp Pulse Resp B/P (MAP) Pulse Ox O2 Delivery O2 Flow Rate FiO2


 


11/18/20 12:00        30


 


11/18/20 12:00      Mechanical Ventilator  





      Mechanical Ventilator  


 


11/18/20 12:00 99.9 135 30 105/77 (86) 99   


 


11/18/20 11:44  125 25 173/96 100   


 


11/18/20 11:20  115      


 


11/18/20 11:14  120 20 192/120 95   


 


11/18/20 11:00  128 24 173/96 (121) 95   


 


11/18/20 10:00  129 29 163/105 (124) 98   


 


11/18/20 09:35     100   


 


11/18/20 09:20  126 16     30


 


11/18/20 09:00  72 35 100/47 (64) 95   


 


11/18/20 08:00      Mechanical Ventilator  





      Mechanical Ventilator  


 


11/18/20 08:00 97.9       


 


11/18/20 08:00  83 33 100/47 (64) 93   


 


11/18/20 08:00        30


 


11/18/20 08:00  70      


 


11/18/20 07:40  98 17     30


 


11/18/20 07:00  82 16 134/75 (94) 100   


 


11/18/20 06:30  73 14     


 


11/18/20 06:00  73 12 109/59 (76) 100   


 


11/18/20 05:00  75 12 120/62 (81) 100   


 


11/18/20 04:00      Mechanical Ventilator  





      Mechanical Ventilator  


 


11/18/20 04:00        30


 


11/18/20 04:00 98.8 73 12 123/66 (85) 100   


 


11/18/20 04:00  73      


 


11/18/20 03:00  75 16 114/63 (80) 100   


 


11/18/20 02:30  87 18     30


 


11/18/20 02:00  84 16 126/63 (84) 100   


 


11/18/20 01:00  83 13 105/59 (74) 100   


 


11/18/20 00:00 98.3 80 12 107/59 (75) 100   


 


11/18/20 00:00      Mechanical Ventilator  





      Mechanical Ventilator  


 


11/18/20 00:00        30


 


11/18/20 00:00  85      


 


11/17/20 23:00  84 12 104/87 (93) 100   


 


11/17/20 22:32  84 14     30


 


11/17/20 22:07  81 12 120/31 100   


 


11/17/20 22:00  82 12 103/53 (70) 100   


 


11/17/20 21:37  84 15 121/70 100   


 


11/17/20 21:00  84 16 107/60 (76) 100   


 


11/17/20 20:00        30


 


11/17/20 20:00  86      


 


11/17/20 20:00 98.8 86 14 109/56 (73) 100   


 


11/17/20 20:00      Mechanical Ventilator  





      Mechanical Ventilator  


 


11/17/20 19:10  88 18     30


 


11/17/20 19:00  84 14 101/58 (72) 100   


 


11/17/20 18:00  90 13 131/59 (83) 100   


 


11/17/20 17:00  71 12 98/58 (71) 100   


 


11/17/20 16:00        30


 


11/17/20 16:00 98.8 79 13 102/47 (65) 100   


 


11/17/20 16:00      Mechanical Ventilator  





      Mechanical Ventilator  


 


11/17/20 15:17  81      


 


11/17/20 15:00  73 13 97/46 (63) 100   


 


11/17/20 14:51  82 15     30


 


11/17/20 14:00  81 13 97/44 (61) 100   


 


11/17/20 13:30      Mechanical Ventilator  





      Mechanical Ventilator  


 


11/17/20 13:12  84 25 102/48 99   


 


11/17/20 13:00  89 29 93/44 (60) 100   

















Intake and Output  


 


 11/17/20 11/18/20





 19:00 07:00


 


Intake Total 1615.832 ml 1480 ml


 


Output Total 855 ml 1200 ml


 


Balance 760.832 ml 280 ml


 


  


 


Free Water 80 ml 


 


IV Total 1040.832 ml 800 ml


 


Tube Feeding 495 ml 680 ml


 


Output Urine Total 855 ml 1200 ml


 


# Bowel Movements 2 2











Current Medications








 Medications


  (Trade)  Dose


 Ordered  Sig/Miky


 Route


 PRN Reason  Start Time


 Stop Time Status Last Admin


Dose Admin


 


 Acetaminophen


  (Tylenol)  650 mg  Q4H  PRN


 ORAL


 Temp >100.5  10/23/20 20:30


 11/22/20 20:29  11/16/20 13:01





 


 Chlorhexidine


 Gluconate


  (Jess-Hex 2%)  1 applic  DAILY@2000


 TOPIC


   11/6/20 20:00


 2/4/21 19:59  11/17/20 20:02





 


 Dextrose


  (Dextrose 50%)  50 ml  Q30M  PRN


 IV


 Hypoglycemia  10/23/20 22:45


 1/21/21 22:44   





 


 Heparin Sodium


  (Porcine)


  (Heparin 5000


 units/ml)  5,000 units  EVERY 12  HOURS


 SUBQ


   10/23/20 21:00


 12/7/20 20:59  11/18/20 08:51





 


 Insulin Aspart


  (NovoLOG)    EVERY 6  HOURS


 SUBQ


   11/2/20 06:00


 1/22/21 06:29  11/18/20 11:26





 


 Linezolid  300 ml @ 


 300 mls/hr  Q12HR


 IVPB


   11/13/20 12:00


 11/20/20 11:59  11/18/20 08:53





 


 Lorazepam


  (Ativan 2mg/ml


 1ml)  2 mg  Q4H  PRN


 IV


 For Anxiety  11/17/20 19:15


 11/24/20 19:14  11/18/20 11:14





 


 Midodrine


  (Pro-Amatine)  10 mg  Q8H


 ORAL


   11/11/20 17:00


 2/9/21 16:59  11/18/20 08:51





 


 Nitroglycerin


  (Ntg)  0.4 mg  Q5M  PRN


 SL


 Prn Chest Pain  10/23/20 20:30


 11/22/20 20:29   





 


 Norepinephrine


 Bitartrate  250 ml @ 0


 mls/hr  Q24H


 IV


   11/15/20 13:00


 11/18/20 12:59  11/15/20 13:01





 


 Ondansetron HCl


  (Zofran)  4 mg  Q6H  PRN


 IVP


 Nausea & Vomiting  10/23/20 20:30


 11/22/20 20:29   





 


 Pantoprazole


  (Protonix)  40 mg  DAILY


 IV


   11/7/20 09:00


 12/7/20 08:59  11/18/20 08:50





 


 Polyethylene


 Glycol


  (Miralax)  17 gm  DAILYPRN  PRN


 ORAL


 Constipation  10/23/20 20:30


 11/22/20 20:29   





 


 Promethazine HCl/


 Codeine


  (Phenergan with


 Codeine)  5 ml  Q4H  PRN


 ORAL


 For Cough  10/23/20 20:30


 11/22/20 20:29   





 


 Sodium Chloride  1,000 ml @ 


 50 mls/hr  Q20H


 IV


   11/6/20 16:00


 12/6/20 15:59  11/18/20 04:00








Laboratory Tests


11/17/20 23:25: POC Whole Blood Glucose 115H


11/18/20 04:00: 


White Blood Count 7.0, Red Blood Count 2.95L, Hemoglobin 9.0L, Hematocrit 28.7L,

Mean Corpuscular Volume 97, Mean Corpuscular Hemoglobin 30.4, Mean Corpuscular 

Hemoglobin Concent 31.3L, Red Cell Distribution Width 16.2H, Platelet Count 340,

Mean Platelet Volume 6.7, Neutrophils (%) (Auto) 66.1, Lymphocytes (%) (Auto) 

25.1, Monocytes (%) (Auto) 6.1, Eosinophils (%) (Auto) 1.8, Basophils (%) (Auto)

1.0, Sodium Level 138, Potassium Level 3.6, Chloride Level 106, Carbon Dioxide 

Level 25, Blood Urea Nitrogen 14, Creatinine 0.9, Estimat Glomerular Filtration 

Rate > 60, Glucose Level 94, Calcium Level 7.6L


11/18/20 05:26: POC Whole Blood Glucose 100


Height (Feet):  5


Height (Inches):  4.00


Weight (Pounds):  130


General Appearance:  no apparent distress


EENT:  other - Remains on mechanical ventilation


Cardiovascular:  tachycardia


Respiratory/Chest:  decreased breath sounds


Abdomen:  distended











Seven Tobar MD            Nov 18, 2020 12:57

## 2020-11-18 NOTE — CARDIAC ELECTROPHYSIOLOGY PN
Assessment/Plan


Assessment/Plan


1. Accelerated junctional rhythm. Ruled out for MI


      Echo showed ejection fraction of 55%.





2. Long run of 31 beats of Nonsustained VT on 11/3/2020. 


    Cardiac cath after stabilization.





3. Henry 30, Asystole while on the Vent due to resp failure/ likely mucus plug .

S/P Code





4. Respiratory failure, on antibiotic and intubated on the vent 


Failed weaning. No consent for trach yet





5. Septic shock. Off Levophed  and on Midodrine 10 tid 


6. History of previous COVID infection in September 2020 and active Covid  in 

isolation


7. Dementia.





JEANNINE RN





Subjective


Subjective


In SR in NAD in Covid isolation. In ICU on the Vent   Fio2 30%, PEEP 5


 Had 31 beats of VT  on 11/3/20 at 16:46 and 5 beats 11/8/20 


 Coded on 11/12/20 as sat dropped to 20% and got henry 30s and PEA and pulseless




 Off Levophed  on Midodrine 10 tid


Covid test yesterday was negative. Failed weaning. Family refusing trach





Objective





Last 24 Hour Vital Signs








  Date Time  Temp Pulse Resp B/P (MAP) Pulse Ox O2 Delivery O2 Flow Rate FiO2


 


11/18/20 12:00        30


 


11/18/20 12:00      Mechanical Ventilator  





      Mechanical Ventilator  


 


11/18/20 12:00 99.9 135 30 105/77 (86) 99   


 


11/18/20 11:44  125 25 173/96 100   


 


11/18/20 11:20  115      


 


11/18/20 11:14  120 20 192/120 95   


 


11/18/20 11:00  128 24 173/96 (121) 95   


 


11/18/20 10:00  129 29 163/105 (124) 98   


 


11/18/20 09:35     100   


 


11/18/20 09:20  126 16     30


 


11/18/20 09:00  72 35 100/47 (64) 95   


 


11/18/20 08:00      Mechanical Ventilator  





      Mechanical Ventilator  


 


11/18/20 08:00 97.9       


 


11/18/20 08:00  83 33 100/47 (64) 93   


 


11/18/20 08:00        30


 


11/18/20 08:00  70      


 


11/18/20 07:40  98 17     30


 


11/18/20 07:00  82 16 134/75 (94) 100   


 


11/18/20 06:30  73 14     


 


11/18/20 06:00  73 12 109/59 (76) 100   


 


11/18/20 05:00  75 12 120/62 (81) 100   


 


11/18/20 04:00      Mechanical Ventilator  





      Mechanical Ventilator  


 


11/18/20 04:00        30


 


11/18/20 04:00 98.8 73 12 123/66 (85) 100   


 


11/18/20 04:00  73      


 


11/18/20 03:00  75 16 114/63 (80) 100   


 


11/18/20 02:30  87 18     30


 


11/18/20 02:00  84 16 126/63 (84) 100   


 


11/18/20 01:00  83 13 105/59 (74) 100   


 


11/18/20 00:00 98.3 80 12 107/59 (75) 100   


 


11/18/20 00:00      Mechanical Ventilator  





      Mechanical Ventilator  


 


11/18/20 00:00        30


 


11/18/20 00:00  85      


 


11/17/20 23:00  84 12 104/87 (93) 100   


 


11/17/20 22:32  84 14     30


 


11/17/20 22:07  81 12 120/31 100   


 


11/17/20 22:00  82 12 103/53 (70) 100   


 


11/17/20 21:37  84 15 121/70 100   


 


11/17/20 21:00  84 16 107/60 (76) 100   


 


11/17/20 20:00        30


 


11/17/20 20:00  86      


 


11/17/20 20:00 98.8 86 14 109/56 (73) 100   


 


11/17/20 20:00      Mechanical Ventilator  





      Mechanical Ventilator  


 


11/17/20 19:10  88 18     30


 


11/17/20 19:00  84 14 101/58 (72) 100   


 


11/17/20 18:00  90 13 131/59 (83) 100   


 


11/17/20 17:00  71 12 98/58 (71) 100   


 


11/17/20 16:00        30


 


11/17/20 16:00 98.8 79 13 102/47 (65) 100   


 


11/17/20 16:00      Mechanical Ventilator  





      Mechanical Ventilator  


 


11/17/20 15:17  81      


 


11/17/20 15:00  73 13 97/46 (63) 100   


 


11/17/20 14:51  82 15     30


 


11/17/20 14:00  81 13 97/44 (61) 100   


 


11/17/20 13:30      Mechanical Ventilator  





      Mechanical Ventilator  


 


11/17/20 13:12  84 25 102/48 99   

















Intake and Output  


 


 11/17/20 11/18/20





 19:00 07:00


 


Intake Total 1615.832 ml 1480 ml


 


Output Total 855 ml 1200 ml


 


Balance 760.832 ml 280 ml


 


  


 


Free Water 80 ml 


 


IV Total 1040.832 ml 800 ml


 


Tube Feeding 495 ml 680 ml


 


Output Urine Total 855 ml 1200 ml


 


# Bowel Movements 2 2











Laboratory Tests








Test


 11/17/20


23:25 11/18/20


04:00 11/18/20


05:26


 


POC Whole Blood Glucose


 115 MG/DL


()  H 


 100 MG/DL


()


 


White Blood Count


 


 7.0 K/UL


(4.8-10.8) 





 


Red Blood Count


 


 2.95 M/UL


(4.70-6.10)  L 





 


Hemoglobin


 


 9.0 G/DL


(14.2-18.0)  L 





 


Hematocrit


 


 28.7 %


(42.0-52.0)  L 





 


Mean Corpuscular Volume  97 FL (80-99)   


 


Mean Corpuscular Hemoglobin


 


 30.4 PG


(27.0-31.0) 





 


Mean Corpuscular Hemoglobin


Concent 


 31.3 G/DL


(32.0-36.0)  L 





 


Red Cell Distribution Width


 


 16.2 %


(11.6-14.8)  H 





 


Platelet Count


 


 340 K/UL


(150-450) 





 


Mean Platelet Volume


 


 6.7 FL


(6.5-10.1) 





 


Neutrophils (%) (Auto)


 


 66.1 %


(45.0-75.0) 





 


Lymphocytes (%) (Auto)


 


 25.1 %


(20.0-45.0) 





 


Monocytes (%) (Auto)


 


 6.1 %


(1.0-10.0) 





 


Eosinophils (%) (Auto)


 


 1.8 %


(0.0-3.0) 





 


Basophils (%) (Auto)


 


 1.0 %


(0.0-2.0) 





 


Sodium Level


 


 138 MMOL/L


(136-145) 





 


Potassium Level


 


 3.6 MMOL/L


(3.5-5.1) 





 


Chloride Level


 


 106 MMOL/L


() 





 


Carbon Dioxide Level


 


 25 MMOL/L


(21-32) 





 


Blood Urea Nitrogen


 


 14 mg/dL


(7-18) 





 


Creatinine


 


 0.9 MG/DL


(0.55-1.30) 





 


Estimat Glomerular Filtration


Rate 


 > 60 mL/min


(>60) 





 


Glucose Level


 


 94 MG/DL


() 





 


Calcium Level


 


 7.6 MG/DL


(8.5-10.1)  L 











Objective





HEAD AND NECK:  No JVD. Orally intubated


LUNGS:  Coarse rhonchi.


CARDIOVASCULAR:   Irregular S1 and S2 with no gallop.


ABDOMEN:  Soft.


EXTREMITIES:  No pitting edema.











Kvng Edmond MD               Nov 18, 2020 13:09

## 2020-11-18 NOTE — NUR
NURSE NOTES:

Turn and reposition. On cardiac monitor with SR. tolerated well with NGT feeding. SaO2 100% 
with Vent. Given suction and oral care. BP is stable. Will continue to monitor any change of 
condition.

## 2020-11-18 NOTE — DIAGNOSTIC IMAGING REPORT
Indication: Nasogastric intubation. Check NG tube placement.

 

Technique: XRAY Abdomen 1v

 

Comparison: 11/16/2020

 

Findings: NG tube has been retracted. The tip is at the gastroesophageal junction and

the side-port is in the distal esophagus. Recommend advancement (approximately 7 cm).

Bilateral airspace disease is noted. No acute osseous abnormalities appreciated.

 

IMPRESSION: 

NG tube tip at the gastroesophageal junction, side port in the distal esophagus.

Advancement of November approximately 7 cm) recommended. This was communicated to the

ICU staff.

## 2020-11-18 NOTE — NUR
NURSE NOTES:



Pt's NGT re-placed, will endorse KUB order to night shift RN. NGT feeding still on hold 
until placement is verified. Pt's VS remain stable, no signs of distress noted, afebrile. Pt 
repositioned, oral care and bed bath provided, linens changed. Safety precautions 
maintained. Will continue to monitor.

## 2020-11-18 NOTE — NUR
NURSE HAND-OFF REPORT: 



Latest Vital Signs: Temperature 98.3 , Pulse 87 , B/P 109 /62 , Respiratory Rate 12 , O2 SAT 
100 , Mechanical Ventilator, O2 Flow Rate  .  

Vital Sign Comment: 



EKG Rhythm: Sinus Rhythm

Rhythm change?: N 

MD Notified?: 

MD Response: 



Latest Mejia Fall Score: 50  

Fall Risk: High Risk 

Safety Measures: Call light Within Reach, Bed Alarm Zone 2, Side Rails Side Rails x3, Bed 
position Low and Locked.

Fall Precautions: 

Yellow Socks

Yellow Gown

Door Sign

Patient Fall Education



Report given to LITA St for continuity of care.

## 2020-11-18 NOTE — DIAGNOSTIC IMAGING REPORT
EXAM:

  XR Abdomen, 2 Views

 

CLINICAL HISTORY:

  NGT

 

TECHNIQUE:

  Frontal view of the abdomen/pelvis with upright view of the abdomen.

 

COMPARISON:

  10/28/2020.

 

FINDINGS:

  Lower thorax:  There is mild to moderate interstitial lung disease 

diffusely.  There is cardiomegaly.  1.5 cm nodular density of the right 

lung base.  Neoplasm cannot be excluded.  Dedicated CT imaging of the 

chest is advised to follow.  Probable tiny left pleural effusion.

  Intraperitoneal space:  No free air.

  Gastrointestinal tract:  Unremarkable.  No dilation.

  Bones/joints:  Osteopenia.  Fracture of the anterior lateral aspect of 

the right eighth rib of indeterminate age.  All fracture of the posterior 

lateral aspect of the left fourth rib.

  Tubes, lines and devices:  Endotracheal tube is noted in place with its 

tip beneath the thoracic inlet.  NG tube is noted in place with its tip 

just below the gastroesophageal junction.  The side port is above the 

gastroesophageal junction.  The NG tube should be advanced by 

approximately 4 cm forward.

 

IMPRESSION:     

1.  Nodule at the right lung base.  Neoplasm cannot be excluded.  CT 

imaging of the chest is advised to follow.

2.  NG tube should be advanced by proximally 4 cm forward.

3.  Endotracheal tube is in good position.

4.  Diffuse interstitial lung disease.

5.  Probable tiny left pleural effusion.

 

<MYCVCSECTION>

 

Communications:

 

11/18/20 17:39 Call Nurse LITA SANABRIA on 11/18 17:39 (-08:00)

## 2020-11-18 NOTE — NUR
NURSE NOTES:



Pt given PRN ativan due to agitation and restlessness, SBP in the 170s, HR in the 120s. MD Alcocer at bedside to assess pt and made aware of the situation. Will continue to monitor pt.

## 2020-11-18 NOTE — NUR
NURSE HAND-OFF REPORT: 



Latest Vital Signs: Temperature 98.8 , Pulse 82 , B/P 134 /75 , Respiratory Rate 16 , O2 SAT 
100 , Mechanical Ventilator, O2 Flow Rate  .  

Vital Sign Comment: 



EKG Rhythm: Sinus Rhythm

Rhythm change?: N 



Latest Mejia Fall Score: 50  

Fall Risk: High Risk 

Safety Measures: Call light Within Reach, Bed Alarm Zone 2, Side Rails Side Rails x3, Bed 
position Low and Locked.

Fall Precautions: 

Yellow Socks

Yellow Gown

Door Sign

Patient Fall Education



Report given to Miyeon, Rn. Pt is resting on the bed and awake and slight agitated. No sign 
of acute distress noted.

## 2020-11-18 NOTE — NUR
NURSE NOTES:



Received bedside report from LITA St. Pt's VSS, no signs of distress noted. Pt awake, 
opens eyes and tracks but does not follow commands. Pt NSR on the cardiac monitor, HR in the 
80-90. Pt intubated, ETT 7.5 at 25cm lip line with the following vent settings: A/C rate of 
12, T/V 600, 30% FiO2, no peep, O2 sat %. Pt has NGT on right nare, placement checked, 
no residual noted, Glucerna 1.2 running at 60 cc/hr. Cummings catheter draining well, clear 
yellow urine noted. Skin alterations noted and documented in chart, covered with optifoam. 
Pt has GURPREET PICC, no complications or bleeding noted from site. Pt on bilateral soft wrist 
restraints due to trying to pull lines and ETT, no injury noted from extremities. HOB at 30 
degrees. Bed locked and in lowest position. Safety precautions in place. Will continue to 
monitor.

## 2020-11-18 NOTE — NUR
NURSE NOTES:



Pt's VSS, no signs of distress noted. Pt awake and alert, opens eyes but does not follow 
commands. Safety precautions maintained. Will continue to monitor.

## 2020-11-18 NOTE — NUR
NURSE NOTES:

Morning care was done. Noted moderated amount soft BM. Cleaned Pt and applied lotion and 
cream. Changed wound dressing. Given suction and oral care. changed position. collected 
blood sample. SaO2 100% with current Vent setting. Cummings patent and drainage well and noted 
100cc/hr urine output. No fever. Placed fall precaution. Will continue to monitor any change 
of condition.

## 2020-11-18 NOTE — DIAGNOSTIC IMAGING REPORT
Abdomen one view

 

History: NG tube placement

 

Comparison: 11/18/2020, 1654 hrs.

 

Findings:

NG tube extends into the mid/distal stomach.  Course increased lung 

markings.

Lumbar spine degenerative changes.  Bones are unremarkable.

 

Impression:

1.  NG tube in the mid/distal stomach in satisfactory position.

## 2020-11-19 VITALS — DIASTOLIC BLOOD PRESSURE: 55 MMHG | SYSTOLIC BLOOD PRESSURE: 103 MMHG

## 2020-11-19 VITALS — SYSTOLIC BLOOD PRESSURE: 99 MMHG | DIASTOLIC BLOOD PRESSURE: 54 MMHG

## 2020-11-19 VITALS — SYSTOLIC BLOOD PRESSURE: 113 MMHG | DIASTOLIC BLOOD PRESSURE: 70 MMHG

## 2020-11-19 VITALS — SYSTOLIC BLOOD PRESSURE: 121 MMHG | DIASTOLIC BLOOD PRESSURE: 69 MMHG

## 2020-11-19 VITALS — SYSTOLIC BLOOD PRESSURE: 171 MMHG | DIASTOLIC BLOOD PRESSURE: 91 MMHG

## 2020-11-19 VITALS — DIASTOLIC BLOOD PRESSURE: 52 MMHG | SYSTOLIC BLOOD PRESSURE: 99 MMHG

## 2020-11-19 VITALS — SYSTOLIC BLOOD PRESSURE: 114 MMHG | DIASTOLIC BLOOD PRESSURE: 61 MMHG

## 2020-11-19 VITALS — SYSTOLIC BLOOD PRESSURE: 174 MMHG | DIASTOLIC BLOOD PRESSURE: 96 MMHG

## 2020-11-19 VITALS — DIASTOLIC BLOOD PRESSURE: 51 MMHG | SYSTOLIC BLOOD PRESSURE: 95 MMHG

## 2020-11-19 VITALS — DIASTOLIC BLOOD PRESSURE: 54 MMHG | SYSTOLIC BLOOD PRESSURE: 100 MMHG

## 2020-11-19 VITALS — DIASTOLIC BLOOD PRESSURE: 65 MMHG | SYSTOLIC BLOOD PRESSURE: 131 MMHG

## 2020-11-19 VITALS — SYSTOLIC BLOOD PRESSURE: 94 MMHG | DIASTOLIC BLOOD PRESSURE: 50 MMHG

## 2020-11-19 VITALS — DIASTOLIC BLOOD PRESSURE: 63 MMHG | SYSTOLIC BLOOD PRESSURE: 106 MMHG

## 2020-11-19 VITALS — SYSTOLIC BLOOD PRESSURE: 104 MMHG | DIASTOLIC BLOOD PRESSURE: 56 MMHG

## 2020-11-19 VITALS — DIASTOLIC BLOOD PRESSURE: 63 MMHG | SYSTOLIC BLOOD PRESSURE: 101 MMHG

## 2020-11-19 VITALS — DIASTOLIC BLOOD PRESSURE: 54 MMHG | SYSTOLIC BLOOD PRESSURE: 112 MMHG

## 2020-11-19 VITALS — DIASTOLIC BLOOD PRESSURE: 60 MMHG | SYSTOLIC BLOOD PRESSURE: 120 MMHG

## 2020-11-19 VITALS — SYSTOLIC BLOOD PRESSURE: 110 MMHG | DIASTOLIC BLOOD PRESSURE: 58 MMHG

## 2020-11-19 VITALS — SYSTOLIC BLOOD PRESSURE: 95 MMHG | DIASTOLIC BLOOD PRESSURE: 56 MMHG

## 2020-11-19 VITALS — SYSTOLIC BLOOD PRESSURE: 96 MMHG | DIASTOLIC BLOOD PRESSURE: 52 MMHG

## 2020-11-19 VITALS — DIASTOLIC BLOOD PRESSURE: 56 MMHG | SYSTOLIC BLOOD PRESSURE: 105 MMHG

## 2020-11-19 VITALS — DIASTOLIC BLOOD PRESSURE: 59 MMHG | SYSTOLIC BLOOD PRESSURE: 98 MMHG

## 2020-11-19 VITALS — DIASTOLIC BLOOD PRESSURE: 48 MMHG | SYSTOLIC BLOOD PRESSURE: 97 MMHG

## 2020-11-19 VITALS — DIASTOLIC BLOOD PRESSURE: 61 MMHG | SYSTOLIC BLOOD PRESSURE: 116 MMHG

## 2020-11-19 VITALS — DIASTOLIC BLOOD PRESSURE: 52 MMHG | SYSTOLIC BLOOD PRESSURE: 93 MMHG

## 2020-11-19 VITALS — SYSTOLIC BLOOD PRESSURE: 111 MMHG | DIASTOLIC BLOOD PRESSURE: 58 MMHG

## 2020-11-19 LAB
ADD MANUAL DIFF: NO
ALBUMIN SERPL-MCNC: 1.5 G/DL (ref 3.4–5)
ALBUMIN/GLOB SERPL: 0.3 {RATIO} (ref 1–2.7)
ALP SERPL-CCNC: 105 U/L (ref 46–116)
ALT SERPL-CCNC: 22 U/L (ref 12–78)
ANION GAP SERPL CALC-SCNC: 8 MMOL/L (ref 5–15)
AST SERPL-CCNC: 19 U/L (ref 15–37)
BASOPHILS NFR BLD AUTO: 2.2 % (ref 0–2)
BILIRUB SERPL-MCNC: 0.4 MG/DL (ref 0.2–1)
BUN SERPL-MCNC: 10 MG/DL (ref 7–18)
CALCIUM SERPL-MCNC: 7.7 MG/DL (ref 8.5–10.1)
CHLORIDE SERPL-SCNC: 108 MMOL/L (ref 98–107)
CO2 SERPL-SCNC: 23 MMOL/L (ref 21–32)
CREAT SERPL-MCNC: 0.8 MG/DL (ref 0.55–1.3)
EOSINOPHIL NFR BLD AUTO: 1 % (ref 0–3)
ERYTHROCYTE [DISTWIDTH] IN BLOOD BY AUTOMATED COUNT: 16.7 % (ref 11.6–14.8)
GLOBULIN SER-MCNC: 4.5 G/DL
HCT VFR BLD CALC: 29.1 % (ref 42–52)
HGB BLD-MCNC: 9 G/DL (ref 14.2–18)
LYMPHOCYTES NFR BLD AUTO: 21.7 % (ref 20–45)
MCV RBC AUTO: 98 FL (ref 80–99)
MONOCYTES NFR BLD AUTO: 11.1 % (ref 1–10)
NEUTROPHILS NFR BLD AUTO: 64 % (ref 45–75)
PHOSPHATE SERPL-MCNC: 2.1 MG/DL (ref 2.5–4.9)
PLATELET # BLD: 350 K/UL (ref 150–450)
POTASSIUM SERPL-SCNC: 3.5 MMOL/L (ref 3.5–5.1)
RBC # BLD AUTO: 2.96 M/UL (ref 4.7–6.1)
SODIUM SERPL-SCNC: 139 MMOL/L (ref 136–145)
WBC # BLD AUTO: 7.3 K/UL (ref 4.8–10.8)

## 2020-11-19 RX ADMIN — MIDODRINE HYDROCHLORIDE SCH MG: 10 TABLET ORAL at 00:45

## 2020-11-19 RX ADMIN — LORAZEPAM PRN MG: 2 INJECTION, SOLUTION INTRAMUSCULAR; INTRAVENOUS at 01:31

## 2020-11-19 RX ADMIN — CHLORHEXIDINE GLUCONATE SCH APPLIC: 213 SOLUTION TOPICAL at 20:13

## 2020-11-19 RX ADMIN — HEPARIN SODIUM SCH UNITS: 5000 INJECTION INTRAVENOUS; SUBCUTANEOUS at 20:13

## 2020-11-19 RX ADMIN — INSULIN ASPART SCH UNITS: 100 INJECTION, SOLUTION INTRAVENOUS; SUBCUTANEOUS at 12:00

## 2020-11-19 RX ADMIN — LORAZEPAM PRN MG: 2 INJECTION, SOLUTION INTRAMUSCULAR; INTRAVENOUS at 23:57

## 2020-11-19 RX ADMIN — MIDODRINE HYDROCHLORIDE SCH MG: 10 TABLET ORAL at 08:38

## 2020-11-19 RX ADMIN — INSULIN ASPART SCH UNITS: 100 INJECTION, SOLUTION INTRAVENOUS; SUBCUTANEOUS at 18:00

## 2020-11-19 RX ADMIN — INSULIN ASPART SCH UNITS: 100 INJECTION, SOLUTION INTRAVENOUS; SUBCUTANEOUS at 00:00

## 2020-11-19 RX ADMIN — MIDODRINE HYDROCHLORIDE SCH MG: 10 TABLET ORAL at 17:28

## 2020-11-19 RX ADMIN — HEPARIN SODIUM SCH UNITS: 5000 INJECTION INTRAVENOUS; SUBCUTANEOUS at 08:40

## 2020-11-19 RX ADMIN — INSULIN ASPART SCH UNITS: 100 INJECTION, SOLUTION INTRAVENOUS; SUBCUTANEOUS at 06:00

## 2020-11-19 RX ADMIN — PANTOPRAZOLE SODIUM SCH MG: 40 INJECTION, POWDER, FOR SOLUTION INTRAVENOUS at 08:38

## 2020-11-19 NOTE — CARDIAC ELECTROPHYSIOLOGY PN
Assessment/Plan


Assessment/Plan


1. Accelerated junctional rhythm. Ruled out for MI


      Echo showed ejection fraction of 55%.





2. Long run of 31 beats of Nonsustained VT on 11/3/2020. 


    Cardiac cath after stabilization.





3. Nicola 30, Asystole while on the Vent due to resp failure/ likely mucus plug .

S/P Code





4. Respiratory failure, on antibiotic and intubated on the vent 


   Failed weaning. Family refused trach 





5. Septic shock. Off Levophed  and on Midodrine 10 tid 


6. History of previous COVID infection in September 2020 and active Covid  in 

isolation


Now negative again


7. Dementia.





JEANNINE RN





Subjective


Subjective


  In ICU on the Vent   Fio2 30%, PEEP 5


 Had 31 beats of VT  on 11/3/20 at 16:46 and 5 beats 11/8/20 


 Coded on 11/12/20 as sat dropped to 20% and got nicola 30s and PEA and pulseless




 Off Levophed  on Midodrine 10 tid


Covid test was negative. Failed weaning. Family refusing trach





Objective





Last 24 Hour Vital Signs








  Date Time  Temp Pulse Resp B/P (MAP) Pulse Ox O2 Delivery O2 Flow Rate FiO2


 


11/19/20 17:00  67 12 93/52 (66) 100   


 


11/19/20 16:00  65 12 111/58 (75) 100   


 


11/19/20 16:00      Mechanical Ventilator  





      Mechanical Ventilator  


 


11/19/20 15:36  67      


 


11/19/20 15:15  71 16     30


 


11/19/20 15:00  71 21 96/52 (67) 100   


 


11/19/20 14:15        30


 


11/19/20 14:00  71 13 105/56 (72) 100   


 


11/19/20 13:15        30


 


11/19/20 13:05     100   


 


11/19/20 13:00  74 22 97/48 (64) 100   


 


11/19/20 12:00        30


 


11/19/20 12:00        30


 


11/19/20 12:00 96.8 71 25 95/56 (69) 100   


 


11/19/20 12:00  72      


 


11/19/20 12:00      Mechanical Ventilator  





      Mechanical Ventilator  


 


11/19/20 11:00  74 12 99/52 (68) 100   


 


11/19/20 10:40  70 15     30


 


11/19/20 10:00  68 12 103/55 (71) 100   


 


11/19/20 09:00  65 13 104/56 (72) 100   


 


11/19/20 08:00 98.2       


 


11/19/20 08:00  72 12 98/59 (72) 100   


 


11/19/20 08:00        30


 


11/19/20 08:00      Mechanical Ventilator  





      Mechanical Ventilator  


 


11/19/20 07:45  73 12     30


 


11/19/20 07:43  69      


 


11/19/20 07:00  73 12 99/54 (69) 100   


 


11/19/20 06:30  74 13     


 


11/19/20 06:30  73 13 100/54 (69) 100   


 


11/19/20 06:00  74 13 101/63 (76) 100   


 


11/19/20 05:00  77 16 112/54 (73) 100   


 


11/19/20 04:00 98.2 76 14 120/60 (80) 100   


 


11/19/20 04:00      Mechanical Ventilator  





      Mechanical Ventilator  


 


11/19/20 04:00        30


 


11/19/20 04:00  77      


 


11/19/20 03:35  70 12     30


 


11/19/20 03:00  73 13 95/51 (66) 100   


 


11/19/20 02:01  71 12 106/63 100   


 


11/19/20 02:00  71 13 106/63 (77) 100   


 


11/19/20 01:31  71 12 108/59 100   


 


11/19/20 01:00  78 15 113/70 (84) 100   


 


11/19/20 00:00 99.0 79 14 94/50 (65) 99   


 


11/19/20 00:00      Mechanical Ventilator  





      Mechanical Ventilator  


 


11/19/20 00:00  78      


 


11/19/20 00:00        30


 


11/18/20 23:00  79 15 99/54 (69) 100   


 


11/18/20 22:56  82 12     30


 


11/18/20 22:00  83 19 116/61 (79) 100   


 


11/18/20 21:00  77 12 117/59 (78) 100   


 


11/18/20 20:00 98.7 76 12 108/62 (77) 100   


 


11/18/20 20:00  77      


 


11/18/20 20:00        30


 


11/18/20 20:00      Mechanical Ventilator  





      Mechanical Ventilator  


 


11/18/20 19:01  87 12     30


 


11/18/20 19:00  82 12 107/65 (79) 100   


 


11/18/20 19:00  79 15 109/62 (78) 100   


 


11/18/20 18:00  92 17 124/67 (86) 100   

















Intake and Output  


 


 11/18/20 11/19/20





 19:00 07:00


 


Intake Total 1140 ml 1255 ml


 


Output Total 1350 ml 875 ml


 


Balance -210 ml 380 ml


 


  


 


IV Total 900 ml 875 ml


 


Tube Feeding 240 ml 380 ml


 


Output Urine Total 1350 ml 875 ml


 


# Bowel Movements 4 2











Laboratory Tests








Test


 11/19/20


03:40 11/19/20


08:00 11/19/20


14:04


 


White Blood Count


 7.3 K/UL


(4.8-10.8) 


 





 


Red Blood Count


 2.96 M/UL


(4.70-6.10)  L 


 





 


Hemoglobin


 9.0 G/DL


(14.2-18.0)  L 


 





 


Hematocrit


 29.1 %


(42.0-52.0)  L 


 





 


Mean Corpuscular Volume 98 FL (80-99)    


 


Mean Corpuscular Hemoglobin


 30.2 PG


(27.0-31.0) 


 





 


Mean Corpuscular Hemoglobin


Concent 30.8 G/DL


(32.0-36.0)  L 


 





 


Red Cell Distribution Width


 16.7 %


(11.6-14.8)  H 


 





 


Platelet Count


 350 K/UL


(150-450) 


 





 


Mean Platelet Volume


 6.7 FL


(6.5-10.1) 


 





 


Neutrophils (%) (Auto)


 64.0 %


(45.0-75.0) 


 





 


Lymphocytes (%) (Auto)


 21.7 %


(20.0-45.0) 


 





 


Monocytes (%) (Auto)


 11.1 %


(1.0-10.0)  H 


 





 


Eosinophils (%) (Auto)


 1.0 %


(0.0-3.0) 


 





 


Basophils (%) (Auto)


 2.2 %


(0.0-2.0)  H 


 





 


Sodium Level


 139 MMOL/L


(136-145) 


 





 


Potassium Level


 3.5 MMOL/L


(3.5-5.1) 


 





 


Chloride Level


 108 MMOL/L


()  H 


 





 


Carbon Dioxide Level


 23 MMOL/L


(21-32) 


 





 


Anion Gap


 8 mmol/L


(5-15) 


 





 


Blood Urea Nitrogen


 10 mg/dL


(7-18) 


 





 


Creatinine


 0.8 MG/DL


(0.55-1.30) 


 





 


Estimat Glomerular Filtration


Rate > 60 mL/min


(>60) 


 





 


Glucose Level


 104 MG/DL


() 


 





 


Calcium Level


 7.7 MG/DL


(8.5-10.1)  L 


 





 


Phosphorus Level


 2.1 MG/DL


(2.5-4.9)  L 


 





 


Magnesium Level


 2.2 MG/DL


(1.8-2.4) 


 





 


Total Bilirubin


 0.4 MG/DL


(0.2-1.0) 


 





 


Aspartate Amino Transf


(AST/SGOT) 19 U/L (15-37)


 


 





 


Alanine Aminotransferase


(ALT/SGPT) 22 U/L (12-78)


 


 





 


Alkaline Phosphatase


 105 U/L


() 


 





 


Total Protein


 6.0 G/DL


(6.4-8.2)  L 


 





 


Albumin


 1.5 G/DL


(3.4-5.0)  L 


 





 


Globulin 4.5 g/dL    


 


Albumin/Globulin Ratio


 0.3 (1.0-2.7)


L 


 





 


Arterial Blood pH


 


 7.508


(7.350-7.450) 7.465


(7.350-7.450)


 


Arterial Blood Partial


Pressure CO2 


 24.8 mmHg


(35.0-45.0)  *L 31.4 mmHg


(35.0-45.0)  L


 


Arterial Blood Partial


Pressure O2 


 76.7 mmHg


(75.0-100.0) 80.1 mmHg


(75.0-100.0)


 


Arterial Blood HCO3


 


 19.3 mmol/L


(22.0-26.0)  L 22.1 mmol/L


(22.0-26.0)


 


Arterial Blood Oxygen


Saturation 


 95.7 %


() 95.7 %


()


 


Arterial Blood Base Excess  -2.7 (-2-2)  L -1.1 (-2-2)  


 


Jerod Test  Positive   Positive  








Objective





HEAD AND NECK:  No JVD. Orally intubated


LUNGS:  Coarse rhonchi.


CARDIOVASCULAR:   Irregular S1 and S2 with no gallop.


ABDOMEN:  Soft.


EXTREMITIES:  No pitting edema.











Kvng Edmond MD               Nov 19, 2020 17:18

## 2020-11-19 NOTE — NUR
NURSE NOTES:



Received bedside report from LITA St. Pt's VSS, no signs of distress noted. Pt sleeping 
but easily arousable, opens eyes and tracks but does not follow commands. Pt NSR on the 
cardiac monitor, HR in the 65-80. Pt intubated, ETT 7.5 at 25cm lip line with the following 
vent settings: A/C rate of 12, T/V 600, 30% FiO2, no peep, O2 sat %. Pt has NGT on 
right nare, placement checked, no residual noted, Glucerna 1.2 running at 50 cc/hr. Cummings 
catheter draining well, clear yellow urine noted. Skin alterations noted and documented in 
chart, covered with optifoam. Pt has GURPREET PICC, no complications or bleeding noted from site. 
Pt on bilateral soft wrist restraints due to trying to pull lines and ETT, no injury noted 
from extremities. HOB at 30 degrees. Bed locked and in lowest position. Safety precautions 
in place. Will continue to monitor.

## 2020-11-19 NOTE — NUR
NURSE NOTES:



MD Sandoval and Feli at bedside to assess pt, MDs updated regarding pt status. Pt's VS 
remain stable, will continue to monitor.

## 2020-11-19 NOTE — NUR
NURSE NOTES:



Scheduled medications given per MD order, pt tolerated well. VSS, no signs of distress 
noted. Will continue to monitor.

## 2020-11-19 NOTE — NUR
NURSE NOTES:



Scheduled medications given per MD order, pt tolerated well. Pt's VSS, no signs of distress 
noted. Pt repositioned, linens changed. Will continue to monitor pt.

## 2020-11-19 NOTE — NUR
NURSE HAND-OFF REPORT: 



Latest Vital Signs: Temperature 99.4 , Pulse 67 , B/P 121 /69 , Respiratory Rate 16 , O2 SAT 
100 , Mechanical Ventilator, O2 Flow Rate  .  

Vital Sign Comment: 



EKG Rhythm: Sinus Rhythm

Rhythm change?: N 

MD Notified?: 

MD Response: 



Latest Mejia Fall Score: 50  

Fall Risk: High Risk 

Safety Measures: Call light Within Reach, Bed Alarm Zone 2, Side Rails Side Rails x3, Bed 
position Low and Locked.

Fall Precautions: 

Yellow Socks

Yellow Gown

Door Sign

Patient Fall Education



Report given to LITA Carvalho for continuity of care. Pt stable.

## 2020-11-19 NOTE — NUR
NURSE NOTES:



Pt repositioned again, bed bath and oral care provided, linens changed. VS remain stable, no 
signs of distress noted. Will continue to monitor.

## 2020-11-19 NOTE — NEPHROLOGY PROGRESS NOTE
Assessment/Plan


Problem List:  


(1) Dehydration


(2) JANES (acute kidney injury)


(3) Renal failure (ARF), acute on chronic


(4) Hypoxia


(5) Acute encephalopathy


(6) Electrolyte imbalance


Assessment





75-year-old male is admitted with acute hypoxic respiratory failure most likely 

secondary to pneumonia and sepsis, UTI.


Acute on chronic renal failure


Dehydration


Electrolyte imbalances, hypernatremia


Hypoalbuminemia


Diabetes type 2


History of congestive heart failure


Hypertension


Hyperlipemia


Alzheimer's


Previous COVID-19 infection in September 2020


Plan


November 19: Remains intubated.  Remains full code.  Labs reviewed.  Low 

phosphorus replaced.  Continue to monitor renal parameters.  Continue per 

consultants.


November 18: Failed weaning.  Remains intubated.  Remains full closed.  Labs 

reviewed.  Stable from renal standpoint view.


November 17: Remains in ICU.  Remains full code.  Labs are reviewed.  Phosphorus

supplement given.  Continue per consultants.


November 16: Remains in ICU.  Full code.  Labs reviewed.  Remains intubated.  

Stable renal parameters.


November 15: In ICU.  Full code.  Discussed with RN.  Stable renal parameters.


November 14: Seen in ICU.  Discussed with LITA Cooper.  Flomax discontinued.  

Labs reviewed.  Stable from renal standpoint of view.


November 13: Seen in ICU.  Discussed with LITA Cooper.  Remains on pressor.  

Electrolyte abnormalities noted and addressed.  Continue per consultants.  

Patient remains full code.


November 12: Seen in ICU.  Discussed with LITA Richardson.  Blood pressure remains a 

little requiring pressors.  Labs reviewed.  Stable renal parameters.  Continue 

per consultants.


November 11: Remains intubated.  Full code.  Blood pressure borderline low.  

Will increase midodrine dose.  Discussed with RN.  Continue to monitor renal 

parameters and electrolytes.


November 10: Intubated.  Full code.  Labs reviewed.  Stable from renal 

standpoint of view.  Continue per consultants.


November 9: Remains intubated.  Full code.  Labs reviewed.  Electrolytes within 

normal limit.  Continue per consultants.


November 8: In ICU.  Remains intubated on ventilator.  Remains full code.  Low 

potassium addressed.  Blood pressure fluctuating.  Continue per consultants.


November 7: Patient in ICU.  Intubated on ventilator.  On 6 mics of Levophed.  

Will give 100 cc albumin 25%.  K-Phos IV ordered.  Continue per consultants.  

Continue monitor renal parameters and electrolytes.


November 6: Status unchanged.  Transfer to [facility] for seizure.  Stable from renal 

standpoint to view.  Continue per consultants.


November 5: Status quo.  Labs reviewed.  Renal parameters stable.  Continue per 

consultants.


November 4: On nonrebreather mask.  Labs reviewed.  Renal parameters 

stable.Continue per pulmonologist.  Clinically unchanged.


November 3: On nonrebreather mask.  Inflammatory markers gradually declining.  

Renal parameters stable.  Continue per pulmonary.


November 2: Remains on nonrebreather mask.  Inflammatory markers remain 

elevated.  Renal parameters somewhat stable.  Continue per pulmonary and ID.  

Remains full code.


November 1: Patient on nonrebreather mask.  Labs noted.  Serum creatinine down 

to 1.3.  Continue per consultants.


October 31: Patient on nonrebreather mask.  Transfer to telemetry when seen this

morning.  Labs noted.  Continue per consultants.  Serum creatinine dylan to 1.7. 

Continue to monitor renal parameters.  Continue to monitor vancomycin level


October 30: Patient is not doing well clinically.  Mild respiratory distress.  

ABG noted.  Somewhat hypoxic.  CBC and chemistry panel ordered.  Discussed with 

LITA Garcia.  Will defer to pulmonary management to specialist.  Continue per ID.

 Renal parameters remained stable as of October 29.


October 29: Labs reviewed.  Renal parameters stable.  Continue per consultants.


October 28: Labs reviewed.  Potassium via NG tube ordered.  IV fluids stopped.  

Continue per consultants.


October 27: Labs reviewed.  Low potassium and low phosphorus replaced.  Continue

per consultants.  Remains stable from renal standpoint of view.


October 26: Patient remains n.p.o.  IV fluid down to 50 cc an hour.  Potassium 

supplement intravenously ordered.  Continue per consultants.





Previously:


Patient is n.p.o., will continue on IV fluid of D5W 75 cc an hour


We will monitor electrolytes and renal parameters


Avoid nephrotoxic's


Start p.o. when he clears by speech therapist, meanwhile aspiration precautions


Keep the blood pressure and blood sugar in check


Per orders





Subjective


ROS Limited/Unobtainable:  Yes





Objective


Objective





Last 24 Hour Vital Signs








  Date Time  Temp Pulse Resp B/P (MAP) Pulse Ox O2 Delivery O2 Flow Rate FiO2


 


11/19/20 08:00  72 12 98/59 (72) 100   


 


11/19/20 07:45  73 12     30


 


11/19/20 07:00  73 12 99/54 (69) 100   


 


11/19/20 06:30  74 13     


 


11/19/20 06:30  73 13 100/54 (69) 100   


 


11/19/20 06:00  74 13 101/63 (76) 100   


 


11/19/20 05:00  77 16 112/54 (73) 100   


 


11/19/20 04:00 98.2 76 14 120/60 (80) 100   


 


11/19/20 04:00      Mechanical Ventilator  





      Mechanical Ventilator  


 


11/19/20 04:00        30


 


11/19/20 04:00  77      


 


11/19/20 03:35  70 12     30


 


11/19/20 03:00  73 13 95/51 (66) 100   


 


11/19/20 02:01  71 12 106/63 100   


 


11/19/20 02:00  71 13 106/63 (77) 100   


 


11/19/20 01:31  71 12 108/59 100   


 


11/19/20 01:00  78 15 113/70 (84) 100   


 


11/19/20 00:00 99.0 79 14 94/50 (65) 99   


 


11/19/20 00:00      Mechanical Ventilator  





      Mechanical Ventilator  


 


11/19/20 00:00  78      


 


11/19/20 00:00        30


 


11/18/20 23:00  79 15 99/54 (69) 100   


 


11/18/20 22:56  82 12     30


 


11/18/20 22:00  83 19 116/61 (79) 100   


 


11/18/20 21:00  77 12 117/59 (78) 100   


 


11/18/20 20:00 98.7 76 12 108/62 (77) 100   


 


11/18/20 20:00  77      


 


11/18/20 20:00        30


 


11/18/20 20:00      Mechanical Ventilator  





      Mechanical Ventilator  


 


11/18/20 19:01  87 12     30


 


11/18/20 19:00  82 12 107/65 (79) 100   


 


11/18/20 19:00  79 15 109/62 (78) 100   


 


11/18/20 18:00  92 17 124/67 (86) 100   


 


11/18/20 17:00  86 21 126/64 (84) 100   


 


11/18/20 16:00  86 12 128/67 (87) 100   


 


11/18/20 16:00 98.3       


 


11/18/20 16:00        30


 


11/18/20 16:00      Mechanical Ventilator  





      Mechanical Ventilator  


 


11/18/20 16:00  90      


 


11/18/20 15:15  87 16     30


 


11/18/20 15:00  90 16 146/76 (99) 100   


 


11/18/20 14:00  81 13 113/67 (82) 100   


 


11/18/20 13:00  98 22 136/73 (94) 100   


 


11/18/20 12:00        30


 


11/18/20 12:00      Mechanical Ventilator  





      Mechanical Ventilator  


 


11/18/20 12:00 99.9 135 30 105/77 (86) 99   


 


11/18/20 11:44  125 25 173/96 100   


 


11/18/20 11:20  115      


 


11/18/20 11:14  120 20 192/120 95   

















Intake and Output  


 


 11/18/20 11/19/20





 19:00 07:00


 


Intake Total 1140 ml 1255 ml


 


Output Total 1350 ml 875 ml


 


Balance -210 ml 380 ml


 


  


 


IV Total 900 ml 875 ml


 


Tube Feeding 240 ml 380 ml


 


Output Urine Total 1350 ml 875 ml


 


# Bowel Movements 4 2











Current Medications








 Medications


  (Trade)  Dose


 Ordered  Sig/Miky


 Route


 PRN Reason  Start Time


 Stop Time Status Last Admin


Dose Admin


 


 Acetaminophen


  (Tylenol)  650 mg  Q4H  PRN


 ORAL


 Temp >100.5  10/23/20 20:30


 11/22/20 20:29  11/16/20 13:01





 


 Chlorhexidine


 Gluconate


  (Jess-Hex 2%)  1 applic  DAILY@2000


 TOPIC


   11/6/20 20:00


 2/4/21 19:59  11/18/20 19:53





 


 Dextrose


  (Dextrose 50%)  50 ml  Q30M  PRN


 IV


 Hypoglycemia  10/23/20 22:45


 1/21/21 22:44   





 


 Heparin Sodium


  (Porcine)


  (Heparin 5000


 units/ml)  5,000 units  EVERY 12  HOURS


 SUBQ


   10/23/20 21:00


 12/7/20 20:59  11/19/20 08:40





 


 Insulin Aspart


  (NovoLOG)    EVERY 6  HOURS


 SUBQ


   11/2/20 06:00


 1/22/21 06:29  11/18/20 11:26





 


 Linezolid  300 ml @ 


 300 mls/hr  Q12HR


 IVPB


   11/13/20 12:00


 11/20/20 11:59  11/19/20 08:39





 


 Lorazepam


  (Ativan 2mg/ml


 1ml)  2 mg  Q4H  PRN


 IV


 For Anxiety  11/17/20 19:15


 11/24/20 19:14  11/19/20 01:31





 


 Midodrine


  (Pro-Amatine)  10 mg  Q8H


 ORAL


   11/11/20 17:00


 2/9/21 16:59  11/19/20 08:38





 


 Nitroglycerin


  (Ntg)  0.4 mg  Q5M  PRN


 SL


 Prn Chest Pain  10/23/20 20:30


 11/22/20 20:29   





 


 Ondansetron HCl


  (Zofran)  4 mg  Q6H  PRN


 IVP


 Nausea & Vomiting  10/23/20 20:30


 11/22/20 20:29   





 


 Pantoprazole


  (Protonix)  40 mg  DAILY


 IV


   11/7/20 09:00


 12/7/20 08:59  11/19/20 08:38





 


 Polyethylene


 Glycol


  (Miralax)  17 gm  DAILYPRN  PRN


 ORAL


 Constipation  10/23/20 20:30


 11/22/20 20:29   





 


 Potassium


 Phosphate 20 mm/


 Sodium Chloride  281.6667


 ml @ 


 46.944 m...  ONCE  ONCE


 IV


   11/19/20 09:00


 11/19/20 14:59  11/19/20 09:38





 


 Promethazine HCl/


 Codeine


  (Phenergan with


 Codeine)  5 ml  Q4H  PRN


 ORAL


 For Cough  10/23/20 20:30


 11/22/20 20:29   





 


 Sodium Chloride  1,000 ml @ 


 50 mls/hr  Q20H


 IV


   11/6/20 16:00


 12/6/20 15:59  11/18/20 23:21








Laboratory Tests


11/19/20 03:40: 


White Blood Count 7.3, Red Blood Count 2.96L, Hemoglobin 9.0L, Hematocrit 29.1L,

Mean Corpuscular Volume 98, Mean Corpuscular Hemoglobin 30.2, Mean Corpuscular 

Hemoglobin Concent 30.8L, Red Cell Distribution Width 16.7H, Platelet Count 350,

Mean Platelet Volume 6.7, Neutrophils (%) (Auto) 64.0, Lymphocytes (%) (Auto) 

21.7, Monocytes (%) (Auto) 11.1H, Eosinophils (%) (Auto) 1.0, Basophils (%) 

(Auto) 2.2H, Sodium Level 139, Potassium Level 3.5, Chloride Level 108H, Carbon 

Dioxide Level 23, Anion Gap 8, Blood Urea Nitrogen 10, Creatinine 0.8, Estimat 

Glomerular Filtration Rate > 60, Glucose Level 104, Calcium Level 7.7L, 

Phosphorus Level 2.1L, Magnesium Level 2.2, Total Bilirubin 0.4, Aspartate Amino

Transf (AST/SGOT) 19, Alanine Aminotransferase (ALT/SGPT) 22, Alkaline 

Phosphatase 105, Total Protein 6.0L, Albumin 1.5L, Globulin 4.5, 

Albumin/Globulin Ratio 0.3L


11/19/20 08:00: 


Arterial Blood pH 7.508H, Arterial Blood Partial Pressure CO2 24.8*L, Arterial 

Blood Partial Pressure O2 76.7, Arterial Blood HCO3 19.3L, Arterial Blood Oxygen

Saturation 95.7, Arterial Blood Base Excess -2.7L, Jerod Test Positive


Height (Feet):  5


Height (Inches):  4.00


Weight (Pounds):  130


General Appearance:  no apparent distress


Cardiovascular:  normal rate


Respiratory/Chest:  decreased breath sounds


Abdomen:  soft











Seven Tobar MD            Nov 19, 2020 11:05

## 2020-11-19 NOTE — NUR
NURSE NOTES:



Novolog not given due to pt's BG is 126, no insulin coverage needed per MD order. Pt 
repositioned again, VSS, afebrile, no signs of distress noted. Will continue to monitor.

## 2020-11-19 NOTE — NUR
NURSE NOTES:

No sign of acute distress noted. Given suction and oral care. turn and reposition. will 
continue to monitor any change of condition.

## 2020-11-19 NOTE — PULMONOLGY CRITICAL CARE NOTE
Critical Care - Asmt/Plan


Problems:  


(1) Acute respiratory failure


(2) Multifocal pneumonia


(3) 2019 novel coronavirus disease (COVID-19)


(4) Acute encephalopathy


(5) Severe sepsis


(6) Alzheimer's dementia


(7) HTN (hypertension)


(8) History of CVA (cerebrovascular accident)


(9) BPH (benign prostatic hyperplasia)


Respiratory:  monitor respiratory rate, adjust FIO2, CXR


Cardiac:  continue pressors, continue to monitor HR/BP


Renal:  F/U I&O, keep IV fluid


Infectious Disease:  check cultures, continue antibiotics


Gastrointestinal:  continue feedings/current rate


Endocrine:  monitor blood sugar


Hematologic:  monitor H/H


Neurologic:  PRN Ativan, PRN Morphine


Prophylaxis:  Protonix, Heparin


Time Spent (Minutes):  40


Notes Reviewed:  internist, cardio


Discussed with:  nurses, consultants, 





Critical Care - Objective





Last 24 Hour Vital Signs








  Date Time  Temp Pulse Resp B/P (MAP) Pulse Ox O2 Delivery O2 Flow Rate FiO2


 


11/19/20 08:00  72 12 98/59 (72) 100   


 


11/19/20 07:45  73 12     30


 


11/19/20 07:00  73 12 99/54 (69) 100   


 


11/19/20 06:30  74 13     


 


11/19/20 06:30  73 13 100/54 (69) 100   


 


11/19/20 06:00  74 13 101/63 (76) 100   


 


11/19/20 05:00  77 16 112/54 (73) 100   


 


11/19/20 04:00 98.2 76 14 120/60 (80) 100   


 


11/19/20 04:00      Mechanical Ventilator  





      Mechanical Ventilator  


 


11/19/20 04:00        30


 


11/19/20 04:00  77      


 


11/19/20 03:35  70 12     30


 


11/19/20 03:00  73 13 95/51 (66) 100   


 


11/19/20 02:01  71 12 106/63 100   


 


11/19/20 02:00  71 13 106/63 (77) 100   


 


11/19/20 01:31  71 12 108/59 100   


 


11/19/20 01:00  78 15 113/70 (84) 100   


 


11/19/20 00:00 99.0 79 14 94/50 (65) 99   


 


11/19/20 00:00      Mechanical Ventilator  





      Mechanical Ventilator  


 


11/19/20 00:00  78      


 


11/19/20 00:00        30


 


11/18/20 23:00  79 15 99/54 (69) 100   


 


11/18/20 22:56  82 12     30


 


11/18/20 22:00  83 19 116/61 (79) 100   


 


11/18/20 21:00  77 12 117/59 (78) 100   


 


11/18/20 20:00 98.7 76 12 108/62 (77) 100   


 


11/18/20 20:00  77      


 


11/18/20 20:00        30


 


11/18/20 20:00      Mechanical Ventilator  





      Mechanical Ventilator  


 


11/18/20 19:01  87 12     30


 


11/18/20 19:00  82 12 107/65 (79) 100   


 


11/18/20 19:00  79 15 109/62 (78) 100   


 


11/18/20 18:00  92 17 124/67 (86) 100   


 


11/18/20 17:00  86 21 126/64 (84) 100   


 


11/18/20 16:00  86 12 128/67 (87) 100   


 


11/18/20 16:00 98.3       


 


11/18/20 16:00        30


 


11/18/20 16:00      Mechanical Ventilator  





      Mechanical Ventilator  


 


11/18/20 16:00  90      


 


11/18/20 15:15  87 16     30


 


11/18/20 15:00  90 16 146/76 (99) 100   


 


11/18/20 14:00  81 13 113/67 (82) 100   


 


11/18/20 13:00  98 22 136/73 (94) 100   


 


11/18/20 12:00        30


 


11/18/20 12:00      Mechanical Ventilator  





      Mechanical Ventilator  


 


11/18/20 12:00 99.9 135 30 105/77 (86) 99   


 


11/18/20 11:44  125 25 173/96 100   


 


11/18/20 11:20  115      


 


11/18/20 11:14  120 20 192/120 95   


 


11/18/20 11:00  128 24 173/96 (121) 95   


 


11/18/20 10:55  131 15     30








Status:  sedated


Condition:  critical


Neck:  full ROM


Lungs:  rales, rhonchi


Objective:


still large secretions, failed weaning


Accucheck:  113





Critical Care - Subjective


Interval Events:


late note for 11/17


FI02:  30


Vent Support Breath Rate:  12


Vent Support Mode:  AC


Vent Tidal Volume:  600


Sputum Amount:  Moderate


PEEP:  0.0


PIP:  22


Tube Feeding Amount:  40


I&O:











Intake and Output  


 


 11/18/20 11/19/20





 19:00 07:00


 


Intake Total 1140 ml 1255 ml


 


Output Total 1350 ml 875 ml


 


Balance -210 ml 380 ml


 


  


 


IV Total 900 ml 875 ml


 


Tube Feeding 240 ml 380 ml


 


Output Urine Total 1350 ml 875 ml


 


# Bowel Movements 4 2








ET-Tube:  7.5


ET Position:  25











Michael Sandoval MD              Nov 19, 2020 10:25

## 2020-11-19 NOTE — NUR
NURSE NOTES:



Pt passed weaning trial, O2 sat remained %. Pt is now back on A/C rate of 12, T/V 600, 
30% FiO2. O2 sat still %. Resting well, chest rise and fall visibly noted, VSS, no 
signs of distress noted. Will continue to monitor.

## 2020-11-19 NOTE — NUR
RD ASSESSMENT & RECOMMENDATIONS

SEE CARE ACTIVITY FOR COMPLETE ASSESSMENT



DAILY ESTIMATED NEEDS:

Needs based on DM, wound, critical care 59.5kg 

22-28  kcals/kg 

2646-0684  total kcals

1.25-2  g protein/kg

  g total protein 

25-30  mL/kg

5660-4560  total fluid mLs



NUTRITION DIAGNOSIS:

* Swallowing difficulty R/T dysphagia as evidenced by SLP sylvie, recs for

NGT feeds, now s/p oral intubation (11/6), s/p code blue (11/12) and

remains intubated, on pressor support, currently held, cont on NGT feeds.

* Increased kcal and pro needs r/t wound healing as evidenced by sacral

pressure injury stage 2.



CURRENT TF: (goal of Glucerna 1.2 @ 60ml/hr x24 hrs) 





ENTERAL NUTRITION RECOMMENDATIONS:

Glucerna 1.2 @ 55ml/hr x24 hrs   

to provide 1320ml, 1584kcal, 79g prot, 1063ml free water 



- LOWER goal rate to 55ml/hr x 24 hrs

  s/p intubation w/ decreased kcal needs

- HOB over 30 degrees/ H2O flush per MD





ADDITIONAL RECOMMENDATIONS:

* Calibrated bedscale wt for accurate CBW 

* Monitor hemodynamic stability: s/p code blue (11/12), NE currently held 

* Monitor lytes, replete as needed (Low phos, on repletion) 

* Wound care: add Harvinder BID via PEG 

                     add Vit C 250mg QD 

. 

.

## 2020-11-19 NOTE — NUR
NURSE NOTES:

Suction and oral care was done. NGT feeding tolerated well. On cardiac monitor with SR. Turn 
and reposition. Placed fall precaution. Will continue to care plan.

## 2020-11-19 NOTE — NUR
RESPIRATORY NOTE:



Weaning started at 1305. Placed on PS +8 PEEP +5 FIO2 30%. Patient currently tolerating well 
RSBI 42, SPONT RR 24, SPONT VT , , SPO2 98%. Will continue to monitor and draw ABG in 
1 hour.

-------------------------------------------------------------------------------

Addendum: 11/19/20 at 1439 by DIANE SAUCEDO RT

-------------------------------------------------------------------------------

SPONT  ABG DRAWN @ 1405

## 2020-11-19 NOTE — NUR
NURSE NOTES:

Morning care was done. Cleaned Pt and applied lotion and cream. Noted BM. changed wound 
dressing. collected blood sample. No fever. Tolerated well with current Vent setting. V/S 
stable. Changed position. Will continue to monitor .

## 2020-11-19 NOTE — INFECTIOUS DISEASES PROG NOTE
Assessment/Plan





76yo M with:





Respiratory code 2ry to mucus plug 11/12


Septic Shock- -recurrent


Fever, recurrent, low grade;SP


Leukocytosis; recurrent; increased


Acute hypoxic resp failure, on NRB mask> 4l NC; back on NRB, desaturation 10/29 

> intubated 11/6


Pneumonia- >HAP


Hx of COVID19 PNA 9/9/20 11/17 CXR: No significant interval change in the radiographic appearance the 

chest compared to one day prior.


   11/14 Resp cx +MRSA, nl resp hayden


   UCx neg


   BCx NTD


   11/13 COVID PCR neg


         --11/10 CXR: Bilateral infiltrates in a peribronchovascular 

distribution are unchanged. Left pleural effusion is unchanged.


         --11/8 CXR: Persistent bilateral patchy pulmonary opacities, most 

prominent  in the left lower lung.


         --11/7 ucx neg


                  sp cx MRSA (colonizer at this point)


                  Bcx Neg


         --11/2 Bcx Neg


          10/30 Sp cx MRSA (Vancomycin ADRIANA 1), ESBL E.coli


   10/23 BCx NTD


   UA 15-20 WBC, UCx Neg


   10/23 COVID rapid neg; 10/26 rapid COVID PCR + (from prior infection)- not 

new infection


   Flu A/B neg


   CXR: L pna


   Resp cx MRSA





JANES on CKD, improving





SNF resident (gracielajefferson gustafson)


Non-verbal


VRE and MRSA colonized








Plan:


Cont Zyvoz #7/7 for MRSA coverage given mucus plug and worsening hemodynamics





   -11/16 SP meropenem #14 for ESBL pna 


   -11/10 SP Micafungin #4


   -11/6 SP IV Vancomycin #15


   -11/2 SP Zosyn #4


   -10/26 SP Cefepime #4


   -10/24 SP Flagyl #





Monitor CBC/CMP


Monitor resp status


Monitor temp curve and hemodynamics





D/w RN





Thank you for this consult. [facility] will continue to follow.





Subjective


Allergies:  


Coded Allergies:  


     No Known Allergies (Unverified , 8/20/12)


AF


WBC 7.3


NAD on vent





Objective





Last 24 Hour Vital Signs








  Date Time  Temp Pulse Resp B/P (MAP) Pulse Ox O2 Delivery O2 Flow Rate FiO2


 


11/19/20 07:45  73 12     30


 


11/19/20 07:00  73 12 99/54 (69) 100   


 


11/19/20 06:30  74 13     


 


11/19/20 06:30  73 13 100/54 (69) 100   


 


11/19/20 06:00  74 13 101/63 (76) 100   


 


11/19/20 05:00  77 16 112/54 (73) 100   


 


11/19/20 04:00 98.2 76 14 120/60 (80) 100   


 


11/19/20 04:00      Mechanical Ventilator  





      Mechanical Ventilator  


 


11/19/20 04:00        30


 


11/19/20 04:00  77      


 


11/19/20 03:35  70 12     30


 


11/19/20 03:00  73 13 95/51 (66) 100   


 


11/19/20 02:01  71 12 106/63 100   


 


11/19/20 02:00  71 13 106/63 (77) 100   


 


11/19/20 01:31  71 12 108/59 100   


 


11/19/20 01:00  78 15 113/70 (84) 100   


 


11/19/20 00:00 99.0 79 14 94/50 (65) 99   


 


11/19/20 00:00      Mechanical Ventilator  





      Mechanical Ventilator  


 


11/19/20 00:00  78      


 


11/19/20 00:00        30


 


11/18/20 23:00  79 15 99/54 (69) 100   


 


11/18/20 22:56  82 12     30


 


11/18/20 22:00  83 19 116/61 (79) 100   


 


11/18/20 21:00  77 12 117/59 (78) 100   


 


11/18/20 20:00 98.7 76 12 108/62 (77) 100   


 


11/18/20 20:00  77      


 


11/18/20 20:00        30


 


11/18/20 20:00      Mechanical Ventilator  





      Mechanical Ventilator  


 


11/18/20 19:01  87 12     30


 


11/18/20 19:00  82 12 107/65 (79) 100   


 


11/18/20 19:00  79 15 109/62 (78) 100   


 


11/18/20 18:00  92 17 124/67 (86) 100   


 


11/18/20 17:00  86 21 126/64 (84) 100   


 


11/18/20 16:00  86 12 128/67 (87) 100   


 


11/18/20 16:00 98.3       


 


11/18/20 16:00        30


 


11/18/20 16:00      Mechanical Ventilator  





      Mechanical Ventilator  


 


11/18/20 16:00  90      


 


11/18/20 15:15  87 16     30


 


11/18/20 15:00  90 16 146/76 (99) 100   


 


11/18/20 14:00  81 13 113/67 (82) 100   


 


11/18/20 13:00  98 22 136/73 (94) 100   


 


11/18/20 12:00        30


 


11/18/20 12:00      Mechanical Ventilator  





      Mechanical Ventilator  


 


11/18/20 12:00 99.9 135 30 105/77 (86) 99   


 


11/18/20 11:44  125 25 173/96 100   


 


11/18/20 11:20  115      


 


11/18/20 11:14  120 20 192/120 95   


 


11/18/20 11:00  128 24 173/96 (121) 95   


 


11/18/20 10:55  131 15     30


 


11/18/20 10:00  129 29 163/105 (124) 98   


 


11/18/20 09:35     100   


 


11/18/20 09:20  126 16     30


 


11/18/20 09:00  72 35 100/47 (64) 95   


 


11/18/20 08:00      Mechanical Ventilator  





      Mechanical Ventilator  


 


11/18/20 08:00 97.9       


 


11/18/20 08:00  83 33 100/47 (64) 93   


 


11/18/20 08:00        30


 


11/18/20 08:00  70      








Height (Feet):  5


Height (Inches):  4.00


Weight (Pounds):  130


Gen: NAD in bed


HEENT: NCAT


CV: RRR


Pulm: BL chest rise on vent


Abd: Soft, NTND


Ext: No c/c/e


Neuro: Eyes closed, not interactive





Laboratory Tests








Test


 11/19/20


03:40


 


White Blood Count


 7.3 K/UL


(4.8-10.8)


 


Red Blood Count


 2.96 M/UL


(4.70-6.10)  L


 


Hemoglobin


 9.0 G/DL


(14.2-18.0)  L


 


Hematocrit


 29.1 %


(42.0-52.0)  L


 


Mean Corpuscular Volume 98 FL (80-99)  


 


Mean Corpuscular Hemoglobin


 30.2 PG


(27.0-31.0)


 


Mean Corpuscular Hemoglobin


Concent 30.8 G/DL


(32.0-36.0)  L


 


Red Cell Distribution Width


 16.7 %


(11.6-14.8)  H


 


Platelet Count


 350 K/UL


(150-450)


 


Mean Platelet Volume


 6.7 FL


(6.5-10.1)


 


Neutrophils (%) (Auto)


 64.0 %


(45.0-75.0)


 


Lymphocytes (%) (Auto)


 21.7 %


(20.0-45.0)


 


Monocytes (%) (Auto)


 11.1 %


(1.0-10.0)  H


 


Eosinophils (%) (Auto)


 1.0 %


(0.0-3.0)


 


Basophils (%) (Auto)


 2.2 %


(0.0-2.0)  H


 


Sodium Level


 139 MMOL/L


(136-145)


 


Potassium Level


 3.5 MMOL/L


(3.5-5.1)


 


Chloride Level


 108 MMOL/L


()  H


 


Carbon Dioxide Level


 23 MMOL/L


(21-32)


 


Anion Gap


 8 mmol/L


(5-15)


 


Blood Urea Nitrogen


 10 mg/dL


(7-18)


 


Creatinine


 0.8 MG/DL


(0.55-1.30)


 


Estimat Glomerular Filtration


Rate > 60 mL/min


(>60)


 


Glucose Level


 104 MG/DL


()


 


Calcium Level


 7.7 MG/DL


(8.5-10.1)  L


 


Phosphorus Level


 2.1 MG/DL


(2.5-4.9)  L


 


Magnesium Level


 2.2 MG/DL


(1.8-2.4)


 


Total Bilirubin


 0.4 MG/DL


(0.2-1.0)


 


Aspartate Amino Transf


(AST/SGOT) 19 U/L (15-37)





 


Alanine Aminotransferase


(ALT/SGPT) 22 U/L (12-78)





 


Alkaline Phosphatase


 105 U/L


()


 


Total Protein


 6.0 G/DL


(6.4-8.2)  L


 


Albumin


 1.5 G/DL


(3.4-5.0)  L


 


Globulin 4.5 g/dL  


 


Albumin/Globulin Ratio


 0.3 (1.0-2.7)


L











Current Medications








 Medications


  (Trade)  Dose


 Ordered  Sig/Miky


 Route


 PRN Reason  Start Time


 Stop Time Status Last Admin


Dose Admin


 


 Acetaminophen


  (Tylenol)  650 mg  Q4H  PRN


 ORAL


 Temp >100.5  10/23/20 20:30


 11/22/20 20:29  11/16/20 13:01





 


 Chlorhexidine


 Gluconate


  (Jess-Hex 2%)  1 applic  DAILY@2000


 TOPIC


   11/6/20 20:00


 2/4/21 19:59  11/18/20 19:53





 


 Dextrose


  (Dextrose 50%)  50 ml  Q30M  PRN


 IV


 Hypoglycemia  10/23/20 22:45


 1/21/21 22:44   





 


 Heparin Sodium


  (Porcine)


  (Heparin 5000


 units/ml)  5,000 units  EVERY 12  HOURS


 SUBQ


   10/23/20 21:00


 12/7/20 20:59  11/18/20 20:35





 


 Insulin Aspart


  (NovoLOG)    EVERY 6  HOURS


 SUBQ


   11/2/20 06:00


 1/22/21 06:29  11/18/20 11:26





 


 Linezolid  300 ml @ 


 300 mls/hr  Q12HR


 IVPB


   11/13/20 12:00


 11/20/20 11:59  11/18/20 20:34





 


 Lorazepam


  (Ativan 2mg/ml


 1ml)  2 mg  Q4H  PRN


 IV


 For Anxiety  11/17/20 19:15


 11/24/20 19:14  11/19/20 01:31





 


 Midodrine


  (Pro-Amatine)  10 mg  Q8H


 ORAL


   11/11/20 17:00


 2/9/21 16:59  11/19/20 00:45





 


 Nitroglycerin


  (Ntg)  0.4 mg  Q5M  PRN


 SL


 Prn Chest Pain  10/23/20 20:30


 11/22/20 20:29   





 


 Ondansetron HCl


  (Zofran)  4 mg  Q6H  PRN


 IVP


 Nausea & Vomiting  10/23/20 20:30


 11/22/20 20:29   





 


 Pantoprazole


  (Protonix)  40 mg  DAILY


 IV


   11/7/20 09:00


 12/7/20 08:59  11/18/20 08:50





 


 Polyethylene


 Glycol


  (Miralax)  17 gm  DAILYPRN  PRN


 ORAL


 Constipation  10/23/20 20:30


 11/22/20 20:29   





 


 Potassium


 Phosphate 20 mm/


 Sodium Chloride  281.6667


 ml @ 


 46.944 m...  ONCE  ONCE


 IV


   11/19/20 08:00


 11/19/20 13:59 UNV  





 


 Promethazine HCl/


 Codeine


  (Phenergan with


 Codeine)  5 ml  Q4H  PRN


 ORAL


 For Cough  10/23/20 20:30


 11/22/20 20:29   





 


 Sodium Chloride  1,000 ml @ 


 50 mls/hr  Q20H


 IV


   11/6/20 16:00


 12/6/20 15:59  11/18/20 23:21




















Bianca Casillas M.D.               Nov 19, 2020 07:51

## 2020-11-19 NOTE — NUR
NURSE NOTES:

Pt is resting on the bed and awake and confused and agitated. SaO2 100% with Current Vent 
setting. Pt trying to remove ETT and NGT. Remind Pt do not touch medical device but Pt' 
didn't understand. Get order and applied bilateral soft restraint. checked comfort and 
circulation. On running with NGT feeding @ 30cc/hr and  no residual noted. No fever. Placed 
fall precaution. Will continue to monitor any change of condition.

## 2020-11-19 NOTE — NUR
NURSE HAND-OFF REPORT: 



Latest Vital Signs: Temperature 98.2 , Pulse 73 , B/P 99 /54 , Respiratory Rate 12 , O2 SAT 
100 , Mechanical Ventilator, O2 Flow Rate  .  



EKG Rhythm: Sinus Rhythm

Rhythm change?: N 

MD Notified?: DIDI Laureano MD Response: 



Latest Mejia Fall Score: 50  

Fall Risk: High Risk 

Safety Measures: Call light Within Reach, Bed Alarm Zone 2, Side Rails Side Rails x3, Bed 
position Low and Locked.

Fall Precautions: 

Yellow Socks

Yellow Gown

Door Sign

Patient Fall Education



Report given to LITA Dominguez and LITA Keita. Pt is resting on the bed and no sign of acute 
distress noted. Tolerated well with NGT feeding.

## 2020-11-19 NOTE — NUR
NURSE NOTES:

Patient is awake and alert. Intubated; ETT 7.5 @ 25cm to the left lipline to vent settings 
AC:12, TV:600, FiO2:30%, O2sat:100%. NGT in place and running feeding of Glucerna 1.2 @ 
60ml/hr. Right upper arm PICC running  NS @ 50ml/hr. BSW restraints in place; ROM and skin 
assessed. Safety measures in place; bed low, locked and alarm is on. Will continue plan of 
care.

## 2020-11-19 NOTE — INTERNAL MED PROGRESS NOTE
Subjective


Physician Name


Andrei Cancino


Attending Physician


Andrei Cancino MD





Current Medications








 Medications


  (Trade)  Dose


 Ordered  Sig/Miky


 Route


 PRN Reason  Start Time


 Stop Time Status Last Admin


Dose Admin


 


 Acetaminophen


  (Tylenol)  650 mg  Q4H  PRN


 ORAL


 Temp >100.5  10/23/20 20:30


 11/22/20 20:29  11/16/20 13:01





 


 Chlorhexidine


 Gluconate


  (Jess-Hex 2%)  1 applic  DAILY@2000


 TOPIC


   11/6/20 20:00


 2/4/21 19:59  11/18/20 19:53





 


 Dextrose


  (Dextrose 50%)  50 ml  Q30M  PRN


 IV


 Hypoglycemia  10/23/20 22:45


 1/21/21 22:44   





 


 Heparin Sodium


  (Porcine)


  (Heparin 5000


 units/ml)  5,000 units  EVERY 12  HOURS


 SUBQ


   10/23/20 21:00


 12/7/20 20:59  11/19/20 08:40





 


 Insulin Aspart


  (NovoLOG)    EVERY 6  HOURS


 SUBQ


   11/2/20 06:00


 1/22/21 06:29  11/18/20 11:26





 


 Linezolid  300 ml @ 


 300 mls/hr  Q12HR


 IVPB


   11/13/20 12:00


 11/20/20 11:59  11/19/20 08:39





 


 Lorazepam


  (Ativan 2mg/ml


 1ml)  2 mg  Q4H  PRN


 IV


 For Anxiety  11/17/20 19:15


 11/24/20 19:14  11/19/20 01:31





 


 Midodrine


  (Pro-Amatine)  10 mg  Q8H


 ORAL


   11/11/20 17:00


 2/9/21 16:59  11/19/20 08:38





 


 Nitroglycerin


  (Ntg)  0.4 mg  Q5M  PRN


 SL


 Prn Chest Pain  10/23/20 20:30


 11/22/20 20:29   





 


 Ondansetron HCl


  (Zofran)  4 mg  Q6H  PRN


 IVP


 Nausea & Vomiting  10/23/20 20:30


 11/22/20 20:29   





 


 Pantoprazole


  (Protonix)  40 mg  DAILY


 IV


   11/7/20 09:00


 12/7/20 08:59  11/19/20 08:38





 


 Polyethylene


 Glycol


  (Miralax)  17 gm  DAILYPRN  PRN


 ORAL


 Constipation  10/23/20 20:30


 11/22/20 20:29   





 


 Potassium


 Phosphate 20 mm/


 Sodium Chloride  281.6667


 ml @ 


 46.944 m...  ONCE  ONCE


 IV


   11/19/20 09:00


 11/19/20 14:59  11/19/20 09:38





 


 Promethazine HCl/


 Codeine


  (Phenergan with


 Codeine)  5 ml  Q4H  PRN


 ORAL


 For Cough  10/23/20 20:30


 11/22/20 20:29   





 


 Sodium Chloride  1,000 ml @ 


 50 mls/hr  Q20H


 IV


   11/6/20 16:00


 12/6/20 15:59  11/18/20 23:21











Allergies:  


Coded Allergies:  


     No Known Allergies (Unverified , 8/20/12)


Subjective


In ICU Isolation room, More responsive, cannot follow command, Intubated on 

Ventilation,  less of secretion, WBC:7.3 ,





Objective





Last Vital Signs








  Date Time  Temp Pulse Resp B/P (MAP) Pulse Ox O2 Delivery O2 Flow Rate FiO2


 


11/19/20 13:15        30


 


11/19/20 13:00  74 22 97/48 (64) 100   


 


11/19/20 12:00 96.8       


 


11/19/20 12:00      Mechanical Ventilator  





      Mechanical Ventilator  











Laboratory Tests








Test


 11/19/20


03:40 11/19/20


08:00 11/19/20


14:04


 


White Blood Count


 7.3 K/UL


(4.8-10.8) 


 





 


Red Blood Count


 2.96 M/UL


(4.70-6.10)  L 


 





 


Hemoglobin


 9.0 G/DL


(14.2-18.0)  L 


 





 


Hematocrit


 29.1 %


(42.0-52.0)  L 


 





 


Mean Corpuscular Volume 98 FL (80-99)    


 


Mean Corpuscular Hemoglobin


 30.2 PG


(27.0-31.0) 


 





 


Mean Corpuscular Hemoglobin


Concent 30.8 G/DL


(32.0-36.0)  L 


 





 


Red Cell Distribution Width


 16.7 %


(11.6-14.8)  H 


 





 


Platelet Count


 350 K/UL


(150-450) 


 





 


Mean Platelet Volume


 6.7 FL


(6.5-10.1) 


 





 


Neutrophils (%) (Auto)


 64.0 %


(45.0-75.0) 


 





 


Lymphocytes (%) (Auto)


 21.7 %


(20.0-45.0) 


 





 


Monocytes (%) (Auto)


 11.1 %


(1.0-10.0)  H 


 





 


Eosinophils (%) (Auto)


 1.0 %


(0.0-3.0) 


 





 


Basophils (%) (Auto)


 2.2 %


(0.0-2.0)  H 


 





 


Sodium Level


 139 MMOL/L


(136-145) 


 





 


Potassium Level


 3.5 MMOL/L


(3.5-5.1) 


 





 


Chloride Level


 108 MMOL/L


()  H 


 





 


Carbon Dioxide Level


 23 MMOL/L


(21-32) 


 





 


Anion Gap


 8 mmol/L


(5-15) 


 





 


Blood Urea Nitrogen


 10 mg/dL


(7-18) 


 





 


Creatinine


 0.8 MG/DL


(0.55-1.30) 


 





 


Estimat Glomerular Filtration


Rate > 60 mL/min


(>60) 


 





 


Glucose Level


 104 MG/DL


() 


 





 


Calcium Level


 7.7 MG/DL


(8.5-10.1)  L 


 





 


Phosphorus Level


 2.1 MG/DL


(2.5-4.9)  L 


 





 


Magnesium Level


 2.2 MG/DL


(1.8-2.4) 


 





 


Total Bilirubin


 0.4 MG/DL


(0.2-1.0) 


 





 


Aspartate Amino Transf


(AST/SGOT) 19 U/L (15-37)


 


 





 


Alanine Aminotransferase


(ALT/SGPT) 22 U/L (12-78)


 


 





 


Alkaline Phosphatase


 105 U/L


() 


 





 


Total Protein


 6.0 G/DL


(6.4-8.2)  L 


 





 


Albumin


 1.5 G/DL


(3.4-5.0)  L 


 





 


Globulin 4.5 g/dL    


 


Albumin/Globulin Ratio


 0.3 (1.0-2.7)


L 


 





 


Arterial Blood pH


 


 7.508


(7.350-7.450) Pending  





 


Arterial Blood Partial


Pressure CO2 


 24.8 mmHg


(35.0-45.0)  *L Pending  





 


Arterial Blood Partial


Pressure O2 


 76.7 mmHg


(75.0-100.0) Pending  





 


Arterial Blood HCO3


 


 19.3 mmol/L


(22.0-26.0)  L Pending  





 


Arterial Blood Oxygen


Saturation 


 95.7 %


() Pending  





 


Arterial Blood Base Excess  -2.7 (-2-2)  L Pending  


 


Jerod Test  Positive   Pending  

















Intake and Output  


 


 11/18/20 11/19/20





 19:00 07:00


 


Intake Total 1140 ml 1255 ml


 


Output Total 1350 ml 875 ml


 


Balance -210 ml 380 ml


 


  


 


IV Total 900 ml 875 ml


 


Tube Feeding 240 ml 380 ml


 


Output Urine Total 1350 ml 875 ml


 


# Bowel Movements 4 2








Objective


GENERAL:  More responsive with open eyes, intubated, chronically ill-appearing.


HEAD AND NECK:  Pupils are equal and reactive to light.  Anicteric. ET tube, 

NGT.


NECK:  Supple.  No JVD.


LUNGS:  Mechanical breath sound,  Decreased air in bases, Coarse breath sound.


HEART:  S1, S2.  Regular rhythm.  Distant heart sounds.  No murmur or gallop.


ABDOMEN:  Soft, nondistended, nontender.  Positive bowel sounds.


EXTREMITIES:  No cyanosis, clubbing, or edema.


NEUROLOGIC:  Very limited secondary to the patient's status, cannot follow 

commands.





Assessment/Plan


Assessment/Plan


1. Acute hypoxemic respiratory failure, most likely secondary to pneumonia and 

sepsis --> Intubated (11/6/2020).


2. Septic shock secondary to urinary tract infection and pneumonia.


3. pneumonia=MRSA


4. Acute kidney injury on chronic renal insufficiency.


5. Dehydration.


6. History of chronic congestive heart failure.


7. Diabetes type 2.


8. Dyslipidemia.


9. Hypertension.


10. Alzheimer's disease.


11. COVID 19 previous positive





PLAN:


1.  in ICU


2.  Dr. Sandoval,=Pulmonary Critical Care


3.  Dr. Casillas = infection disease


4.  Monitor labs and cultures


5.  antibiotics =  Zyvox


6.  Code status is full code.


7.  DVT prophylaxis is heparin subcutaneous.


8.  Resume Tube feeding @ 60 cc/hr,


9.  Off Levophed  and on Midodrine 10 tid 














Andrei Cancino MD                 Nov 19, 2020 14:10

## 2020-11-19 NOTE — PULMONOLGY CRITICAL CARE NOTE
Critical Care - Asmt/Plan


Problems:  


(1) Acute respiratory failure


(2) Multifocal pneumonia


(3) 2019 novel coronavirus disease (COVID-19)


(4) Acute encephalopathy


(5) Severe sepsis


(6) Alzheimer's dementia


(7) HTN (hypertension)


(8) History of CVA (cerebrovascular accident)


(9) BPH (benign prostatic hyperplasia)


Respiratory:  monitor respiratory rate, adjust FIO2, CXR


Cardiac:  stop pressors, continue to monitor HR/BP


Renal:  keep IV fluid


Infectious Disease:  check cultures, continue antibiotics


Gastrointestinal:  continue feedings/current rate


Endocrine:  monitor blood sugar


Affect:  PRN ativan


Prophylaxis:  Protonix


Notes Reviewed:  internist, cardio, renal


Discussed with:  nurses, consultants, 





Critical Care - Objective





Last 24 Hour Vital Signs








  Date Time  Temp Pulse Resp B/P (MAP) Pulse Ox O2 Delivery O2 Flow Rate FiO2


 


11/19/20 08:00  72 12 98/59 (72) 100   


 


11/19/20 07:45  73 12     30


 


11/19/20 07:00  73 12 99/54 (69) 100   


 


11/19/20 06:30  74 13     


 


11/19/20 06:30  73 13 100/54 (69) 100   


 


11/19/20 06:00  74 13 101/63 (76) 100   


 


11/19/20 05:00  77 16 112/54 (73) 100   


 


11/19/20 04:00 98.2 76 14 120/60 (80) 100   


 


11/19/20 04:00      Mechanical Ventilator  





      Mechanical Ventilator  


 


11/19/20 04:00        30


 


11/19/20 04:00  77      


 


11/19/20 03:35  70 12     30


 


11/19/20 03:00  73 13 95/51 (66) 100   


 


11/19/20 02:01  71 12 106/63 100   


 


11/19/20 02:00  71 13 106/63 (77) 100   


 


11/19/20 01:31  71 12 108/59 100   


 


11/19/20 01:00  78 15 113/70 (84) 100   


 


11/19/20 00:00 99.0 79 14 94/50 (65) 99   


 


11/19/20 00:00      Mechanical Ventilator  





      Mechanical Ventilator  


 


11/19/20 00:00  78      


 


11/19/20 00:00        30


 


11/18/20 23:00  79 15 99/54 (69) 100   


 


11/18/20 22:56  82 12     30


 


11/18/20 22:00  83 19 116/61 (79) 100   


 


11/18/20 21:00  77 12 117/59 (78) 100   


 


11/18/20 20:00 98.7 76 12 108/62 (77) 100   


 


11/18/20 20:00  77      


 


11/18/20 20:00        30


 


11/18/20 20:00      Mechanical Ventilator  





      Mechanical Ventilator  


 


11/18/20 19:01  87 12     30


 


11/18/20 19:00  82 12 107/65 (79) 100   


 


11/18/20 19:00  79 15 109/62 (78) 100   


 


11/18/20 18:00  92 17 124/67 (86) 100   


 


11/18/20 17:00  86 21 126/64 (84) 100   


 


11/18/20 16:00  86 12 128/67 (87) 100   


 


11/18/20 16:00 98.3       


 


11/18/20 16:00        30


 


11/18/20 16:00      Mechanical Ventilator  





      Mechanical Ventilator  


 


11/18/20 16:00  90      


 


11/18/20 15:15  87 16     30


 


11/18/20 15:00  90 16 146/76 (99) 100   


 


11/18/20 14:00  81 13 113/67 (82) 100   


 


11/18/20 13:00  98 22 136/73 (94) 100   


 


11/18/20 12:00        30


 


11/18/20 12:00      Mechanical Ventilator  





      Mechanical Ventilator  


 


11/18/20 12:00 99.9 135 30 105/77 (86) 99   


 


11/18/20 11:44  125 25 173/96 100   


 


11/18/20 11:20  115      


 


11/18/20 11:14  120 20 192/120 95   


 


11/18/20 11:00  128 24 173/96 (121) 95   


 


11/18/20 10:55  131 15     30








Status:  sedated


Condition:  improving


HEENT:  atraumatic


Neck:  full ROM


Lungs:  rales, rhonchi


Heart:  HR/BP stable


Abdomen:  soft


Extremities:  no C/C/E


Objective:


still large secretions, failed weaning


Accucheck:  113





Critical Care - Subjective


ROS Limited/Unobtainable:  Yes


Condition:  critical


EKG Rhythm:  Sinus Rhythm


FI02:  30


Vent Support Breath Rate:  12


Vent Support Mode:  AC


Vent Tidal Volume:  600


Sputum Amount:  Moderate


PEEP:  0.0


PIP:  22


Tube Feeding Amount:  40


I&O:











Intake and Output  


 


 11/18/20 11/19/20





 19:00 07:00


 


Intake Total 1140 ml 1255 ml


 


Output Total 1350 ml 875 ml


 


Balance -210 ml 380 ml


 


  


 


IV Total 900 ml 875 ml


 


Tube Feeding 240 ml 380 ml


 


Output Urine Total 1350 ml 875 ml


 


# Bowel Movements 4 2








CXR:


no new infiltrate


ET-Tube:  7.5


ET Position:  25


Labs:





Laboratory Tests








Test


 11/19/20


03:40 11/19/20


08:00


 


White Blood Count


 7.3 K/UL


(4.8-10.8) 





 


Red Blood Count


 2.96 M/UL


(4.70-6.10)  L 





 


Hemoglobin


 9.0 G/DL


(14.2-18.0)  L 





 


Hematocrit


 29.1 %


(42.0-52.0)  L 





 


Mean Corpuscular Volume 98 FL (80-99)   


 


Mean Corpuscular Hemoglobin


 30.2 PG


(27.0-31.0) 





 


Mean Corpuscular Hemoglobin


Concent 30.8 G/DL


(32.0-36.0)  L 





 


Red Cell Distribution Width


 16.7 %


(11.6-14.8)  H 





 


Platelet Count


 350 K/UL


(150-450) 





 


Mean Platelet Volume


 6.7 FL


(6.5-10.1) 





 


Neutrophils (%) (Auto)


 64.0 %


(45.0-75.0) 





 


Lymphocytes (%) (Auto)


 21.7 %


(20.0-45.0) 





 


Monocytes (%) (Auto)


 11.1 %


(1.0-10.0)  H 





 


Eosinophils (%) (Auto)


 1.0 %


(0.0-3.0) 





 


Basophils (%) (Auto)


 2.2 %


(0.0-2.0)  H 





 


Sodium Level


 139 MMOL/L


(136-145) 





 


Potassium Level


 3.5 MMOL/L


(3.5-5.1) 





 


Chloride Level


 108 MMOL/L


()  H 





 


Carbon Dioxide Level


 23 MMOL/L


(21-32) 





 


Anion Gap


 8 mmol/L


(5-15) 





 


Blood Urea Nitrogen


 10 mg/dL


(7-18) 





 


Creatinine


 0.8 MG/DL


(0.55-1.30) 





 


Estimat Glomerular Filtration


Rate > 60 mL/min


(>60) 





 


Glucose Level


 104 MG/DL


() 





 


Calcium Level


 7.7 MG/DL


(8.5-10.1)  L 





 


Phosphorus Level


 2.1 MG/DL


(2.5-4.9)  L 





 


Magnesium Level


 2.2 MG/DL


(1.8-2.4) 





 


Total Bilirubin


 0.4 MG/DL


(0.2-1.0) 





 


Aspartate Amino Transf


(AST/SGOT) 19 U/L (15-37)


 





 


Alanine Aminotransferase


(ALT/SGPT) 22 U/L (12-78)


 





 


Alkaline Phosphatase


 105 U/L


() 





 


Total Protein


 6.0 G/DL


(6.4-8.2)  L 





 


Albumin


 1.5 G/DL


(3.4-5.0)  L 





 


Globulin 4.5 g/dL   


 


Albumin/Globulin Ratio


 0.3 (1.0-2.7)


L 





 


Arterial Blood pH


 


 7.508


(7.350-7.450)


 


Arterial Blood Partial


Pressure CO2 


 24.8 mmHg


(35.0-45.0)  *L


 


Arterial Blood Partial


Pressure O2 


 76.7 mmHg


(75.0-100.0)


 


Arterial Blood HCO3


 


 19.3 mmol/L


(22.0-26.0)  L


 


Arterial Blood Oxygen


Saturation 


 95.7 %


()


 


Arterial Blood Base Excess  -2.7 (-2-2)  L


 


Jerod Test  Positive  

















Michael Sandoval MD              Nov 19, 2020 10:26

## 2020-11-20 VITALS — DIASTOLIC BLOOD PRESSURE: 62 MMHG | SYSTOLIC BLOOD PRESSURE: 138 MMHG

## 2020-11-20 VITALS — SYSTOLIC BLOOD PRESSURE: 138 MMHG | DIASTOLIC BLOOD PRESSURE: 86 MMHG

## 2020-11-20 VITALS — SYSTOLIC BLOOD PRESSURE: 132 MMHG | DIASTOLIC BLOOD PRESSURE: 63 MMHG

## 2020-11-20 VITALS — SYSTOLIC BLOOD PRESSURE: 123 MMHG | DIASTOLIC BLOOD PRESSURE: 59 MMHG

## 2020-11-20 VITALS — SYSTOLIC BLOOD PRESSURE: 139 MMHG | DIASTOLIC BLOOD PRESSURE: 70 MMHG

## 2020-11-20 VITALS — DIASTOLIC BLOOD PRESSURE: 60 MMHG | SYSTOLIC BLOOD PRESSURE: 122 MMHG

## 2020-11-20 VITALS — DIASTOLIC BLOOD PRESSURE: 71 MMHG | SYSTOLIC BLOOD PRESSURE: 127 MMHG

## 2020-11-20 VITALS — DIASTOLIC BLOOD PRESSURE: 57 MMHG | SYSTOLIC BLOOD PRESSURE: 122 MMHG

## 2020-11-20 VITALS — DIASTOLIC BLOOD PRESSURE: 69 MMHG | SYSTOLIC BLOOD PRESSURE: 129 MMHG

## 2020-11-20 VITALS — DIASTOLIC BLOOD PRESSURE: 70 MMHG | SYSTOLIC BLOOD PRESSURE: 150 MMHG

## 2020-11-20 VITALS — DIASTOLIC BLOOD PRESSURE: 65 MMHG | SYSTOLIC BLOOD PRESSURE: 119 MMHG

## 2020-11-20 VITALS — SYSTOLIC BLOOD PRESSURE: 103 MMHG | DIASTOLIC BLOOD PRESSURE: 49 MMHG

## 2020-11-20 VITALS — DIASTOLIC BLOOD PRESSURE: 70 MMHG | SYSTOLIC BLOOD PRESSURE: 140 MMHG

## 2020-11-20 VITALS — DIASTOLIC BLOOD PRESSURE: 57 MMHG | SYSTOLIC BLOOD PRESSURE: 99 MMHG

## 2020-11-20 VITALS — SYSTOLIC BLOOD PRESSURE: 121 MMHG | DIASTOLIC BLOOD PRESSURE: 61 MMHG

## 2020-11-20 VITALS — DIASTOLIC BLOOD PRESSURE: 90 MMHG | SYSTOLIC BLOOD PRESSURE: 116 MMHG

## 2020-11-20 VITALS — SYSTOLIC BLOOD PRESSURE: 115 MMHG | DIASTOLIC BLOOD PRESSURE: 60 MMHG

## 2020-11-20 VITALS — DIASTOLIC BLOOD PRESSURE: 64 MMHG | SYSTOLIC BLOOD PRESSURE: 126 MMHG

## 2020-11-20 VITALS — SYSTOLIC BLOOD PRESSURE: 121 MMHG | DIASTOLIC BLOOD PRESSURE: 59 MMHG

## 2020-11-20 VITALS — DIASTOLIC BLOOD PRESSURE: 66 MMHG | SYSTOLIC BLOOD PRESSURE: 138 MMHG

## 2020-11-20 VITALS — SYSTOLIC BLOOD PRESSURE: 118 MMHG | DIASTOLIC BLOOD PRESSURE: 68 MMHG

## 2020-11-20 VITALS — DIASTOLIC BLOOD PRESSURE: 68 MMHG | SYSTOLIC BLOOD PRESSURE: 149 MMHG

## 2020-11-20 VITALS — DIASTOLIC BLOOD PRESSURE: 67 MMHG | SYSTOLIC BLOOD PRESSURE: 142 MMHG

## 2020-11-20 VITALS — DIASTOLIC BLOOD PRESSURE: 73 MMHG | SYSTOLIC BLOOD PRESSURE: 135 MMHG

## 2020-11-20 LAB
ADD MANUAL DIFF: NO
ALBUMIN SERPL-MCNC: 1.6 G/DL (ref 3.4–5)
ALBUMIN/GLOB SERPL: 0.3 {RATIO} (ref 1–2.7)
ALP SERPL-CCNC: 109 U/L (ref 46–116)
ALT SERPL-CCNC: 19 U/L (ref 12–78)
ANION GAP SERPL CALC-SCNC: 8 MMOL/L (ref 5–15)
AST SERPL-CCNC: 16 U/L (ref 15–37)
BASOPHILS NFR BLD AUTO: 1.2 % (ref 0–2)
BILIRUB SERPL-MCNC: 0.3 MG/DL (ref 0.2–1)
BUN SERPL-MCNC: 14 MG/DL (ref 7–18)
CALCIUM SERPL-MCNC: 7.8 MG/DL (ref 8.5–10.1)
CHLORIDE SERPL-SCNC: 108 MMOL/L (ref 98–107)
CO2 SERPL-SCNC: 24 MMOL/L (ref 21–32)
CREAT SERPL-MCNC: 0.9 MG/DL (ref 0.55–1.3)
EOSINOPHIL NFR BLD AUTO: 0.7 % (ref 0–3)
ERYTHROCYTE [DISTWIDTH] IN BLOOD BY AUTOMATED COUNT: 17.3 % (ref 11.6–14.8)
GLOBULIN SER-MCNC: 4.7 G/DL
HCT VFR BLD CALC: 30.7 % (ref 42–52)
HGB BLD-MCNC: 9.5 G/DL (ref 14.2–18)
LYMPHOCYTES NFR BLD AUTO: 13.1 % (ref 20–45)
MCV RBC AUTO: 98 FL (ref 80–99)
MONOCYTES NFR BLD AUTO: 8.8 % (ref 1–10)
NEUTROPHILS NFR BLD AUTO: 76.2 % (ref 45–75)
PHOSPHATE SERPL-MCNC: 2.9 MG/DL (ref 2.5–4.9)
PLATELET # BLD: 365 K/UL (ref 150–450)
POTASSIUM SERPL-SCNC: 4 MMOL/L (ref 3.5–5.1)
RBC # BLD AUTO: 3.15 M/UL (ref 4.7–6.1)
SODIUM SERPL-SCNC: 140 MMOL/L (ref 136–145)
WBC # BLD AUTO: 10 K/UL (ref 4.8–10.8)

## 2020-11-20 RX ADMIN — HEPARIN SODIUM SCH UNITS: 5000 INJECTION INTRAVENOUS; SUBCUTANEOUS at 09:06

## 2020-11-20 RX ADMIN — MIDODRINE HYDROCHLORIDE SCH MG: 10 TABLET ORAL at 17:37

## 2020-11-20 RX ADMIN — HEPARIN SODIUM SCH UNITS: 5000 INJECTION INTRAVENOUS; SUBCUTANEOUS at 21:11

## 2020-11-20 RX ADMIN — MIDODRINE HYDROCHLORIDE SCH MG: 10 TABLET ORAL at 00:50

## 2020-11-20 RX ADMIN — MIDODRINE HYDROCHLORIDE SCH MG: 10 TABLET ORAL at 09:05

## 2020-11-20 RX ADMIN — INSULIN ASPART SCH UNITS: 100 INJECTION, SOLUTION INTRAVENOUS; SUBCUTANEOUS at 05:50

## 2020-11-20 RX ADMIN — INSULIN ASPART SCH UNITS: 100 INJECTION, SOLUTION INTRAVENOUS; SUBCUTANEOUS at 00:00

## 2020-11-20 RX ADMIN — INSULIN ASPART SCH UNITS: 100 INJECTION, SOLUTION INTRAVENOUS; SUBCUTANEOUS at 12:00

## 2020-11-20 RX ADMIN — INSULIN ASPART SCH UNITS: 100 INJECTION, SOLUTION INTRAVENOUS; SUBCUTANEOUS at 17:37

## 2020-11-20 RX ADMIN — PANTOPRAZOLE SODIUM SCH MG: 40 INJECTION, POWDER, FOR SOLUTION INTRAVENOUS at 09:05

## 2020-11-20 RX ADMIN — CHLORHEXIDINE GLUCONATE SCH APPLIC: 213 SOLUTION TOPICAL at 20:14

## 2020-11-20 NOTE — DIAGNOSTIC IMAGING REPORT
EXAM:

  XR Chest, 1 View

 

CLINICAL HISTORY:

  DYSPNEA

 

TECHNIQUE:

  Frontal view of the chest.

 

COMPARISON:

  11/15/2020

 

FINDINGS:

  Lungs:  Mildly improved, left worse than right bibasilar patchy 

airspace consolidations.  Unchanged retrocardiac atelectasis without or 

with consolidation.  Unchanged low lung volumes with bronchovascular 

crowding.

  Pleural space:  Unremarkable.  No pneumothorax.

  Heart:  Unremarkable.  No cardiomegaly.

  Mediastinum:  Unremarkable.

  Bones/joints:  Unchanged right humeral hemiarthroplasty

  Tubes, lines and devices:  Unchanged ETT 3.5 cm above sd.  

Unchanged enteric tube with tip and proximal sideport below the 

gastroesophageal junction.

 

IMPRESSION:     

1.  Unchanged ETT 3.5 cm above sd.

2.  Unchanged enteric tube with tip and proximal sideport below the 

gastroesophageal junction.

3.  Mildly improved, left worse than right bibasilar patchy airspace 

consolidations.

4.  Unchanged retrocardiac atelectasis without or with consolidation.

5.  Unchanged low lung volumes with bronchovascular crowding.

## 2020-11-20 NOTE — NUR
NURSE NOTES:

Patient became agitated and trying to move out of bed. . Ativan PRN given. Partial 
bed bath given due to BM. Repositioned.

## 2020-11-20 NOTE — NUR
NURSE NOTES:



Pt fully cleaned and linens changed after pt had a moderate brown soft BM. Pt awake and 
alert. Attempting to pull at tubing when taken off of restraints. Pulses present, skin 
intact, and no swelling present. BL soft wrist restraints reapplied and maintained.

## 2020-11-20 NOTE — NUR
NURSE NOTES:

received report from iza rn pt asleep open eyes to touch does not follows command  on wale 
soft restraint nan complaint orally intubated  o2 sat 100% no acute resp distres reposition 
and suction tolerating tube feeding no residual urinary retention

## 2020-11-20 NOTE — NUR
NURSE HAND-OFF REPORT: 



Latest Vital Signs: Temperature 99.4 , Pulse 77 , B/P 142 /67 , Respiratory Rate 11 , O2 SAT 
100 , Mechanical Ventilator, O2 Flow Rate  .  

Vital Sign Comment: Stable



EKG Rhythm: Sinus Rhythm

Rhythm change?: N 

MD Notified?: DIDI Laureano MD Response: 



Latest Mejia Fall Score: 50  

Fall Risk: High Risk 

Safety Measures: Call light Within Reach, Bed Alarm Zone 2, Side Rails Side Rails x3, Bed 
position Low and Locked.

Fall Precautions: 

Yellow Socks

Yellow Gown

Door Sign

Patient Fall Education



Report given to .

## 2020-11-20 NOTE — NUR
NURSE HAND-OFF REPORT: 



Latest Vital Signs: Temperature 99.6 , Pulse 67 , B/P 138 /66 , Respiratory Rate 16 , O2 SAT 
100 , Mechanical Ventilator, FiO2 30%  .  

Vital Sign Comment: 



EKG Rhythm: Sinus Rhythm

Rhythm change?: N 

MD Notified?:  

MD Response: 



Latest Mejia Fall Score: 50  

Fall Risk: High Risk 

Safety Measures: Call light Within Reach, Bed Alarm Zone 2, Side Rails Side Rails x3, Bed 
position Low and Locked.

Fall Precautions: 

Yellow Socks

Yellow Gown

Door Sign

Patient Fall Education



Report given to LITA Cabrera.

## 2020-11-20 NOTE — INFECTIOUS DISEASES PROG NOTE
Assessment/Plan





76yo M with:





Respiratory code 2ry to mucus plug 11/12


Septic Shock- -recurrent


Fever, recurrent, low grade;SP


Leukocytosis; recurrent; increased


Acute hypoxic resp failure, on NRB mask> 4l NC; back on NRB, desaturation 10/29 

> intubated 11/6


Pneumonia- >HAP


Hx of COVID19 PNA 9/9/20 11/17 CXR: No significant interval change in the radiographic appearance the 

chest compared to one day prior.


   11/14 Resp cx +MRSA, nl resp hayden


   UCx neg


   BCx NTD


   11/13 COVID PCR neg


         --11/10 CXR: Bilateral infiltrates in a peribronchovascular 

distribution are unchanged. Left pleural effusion is unchanged.


         --11/8 CXR: Persistent bilateral patchy pulmonary opacities, most 

prominent  in the left lower lung.


         --11/7 ucx neg


                  sp cx MRSA (colonizer at this point)


                  Bcx Neg


         --11/2 Bcx Neg


          10/30 Sp cx MRSA (Vancomycin ADRIANA 1), ESBL E.coli


   10/23 BCx NTD


   UA 15-20 WBC, UCx Neg


   10/23 COVID rapid neg; 10/26 rapid COVID PCR + (from prior infection)- not 

new infection


   Flu A/B neg


   CXR: L pna


   Resp cx MRSA





JANES on CKD, improving





SNF resident (gracielajefferson gustafson)


Non-verbal


VRE and MRSA colonized








Plan:


Stop Zyvoz #7/7 for MRSA coverage given mucus plug and worsening hemodynamics





   -11/16 SP meropenem #14 for ESBL pna 


   -11/10 SP Micafungin #4


   -11/6 SP IV Vancomycin #15


   -11/2 SP Zosyn #4


   -10/26 SP Cefepime #4


   -10/24 SP Flagyl #





Monitor CBC/CMP


Monitor resp status


Monitor temp curve and hemodynamics





D/w RN





Thank you for this consult. [facility] will continue to follow.





Subjective


Allergies:  


Coded Allergies:  


     No Known Allergies (Unverified , 8/20/12)


AF


WBC 10


NAD on vent 30% PEEP 5





Objective





Last 24 Hour Vital Signs








  Date Time  Temp Pulse Resp B/P (MAP) Pulse Ox O2 Delivery O2 Flow Rate FiO2


 


11/20/20 07:00  77 11 142/67 (92) 100   


 


11/20/20 06:30  70 12     


 


11/20/20 06:00  80 13 121/61 (81) 100   


 


11/20/20 05:13  79 13     30


 


11/20/20 05:00  80 12 116/90 (99) 100   


 


11/20/20 04:00        30


 


11/20/20 04:00      Mechanical Ventilator  





      Mechanical Ventilator  


 


11/20/20 04:00 99.4 80 18 118/68 (85) 100   


 


11/20/20 03:23  80 13     30


 


11/20/20 03:02  76      


 


11/20/20 03:00  94 18 149/68 (95) 100   


 


11/20/20 02:00  81 12 99/57 (71) 100   


 


11/20/20 01:21  81 15     30


 


11/20/20 01:00  90 12 127/71 (89) 100   


 


11/20/20 00:27  88 20 135/73 100   


 


11/20/20 00:00      Mechanical Ventilator  





      Mechanical Ventilator  


 


11/20/20 00:00        30


 


11/20/20 00:00 98.5 85 19 135/73 (93) 99   


 


11/19/20 23:57  97 20 171/91 100   


 


11/19/20 23:15  84 19 171/91 (117) 100   


 


11/19/20 23:13  87      


 


11/19/20 23:00  81 19 174/96 (122) 100   


 


11/19/20 22:59  78 17     30


 


11/19/20 22:00  70 17 131/65 (87) 100   


 


11/19/20 21:00  68 13 110/58 (75) 100   


 


11/19/20 20:39  64 12     30


 


11/19/20 20:00 98.8 72 15 114/61 (78) 100   


 


11/19/20 20:00        30


 


11/19/20 20:00      Mechanical Ventilator  





      Mechanical Ventilator  


 


11/19/20 19:18  66 12     30


 


11/19/20 19:16  65      


 


11/19/20 19:00  67 16 121/69 (86) 100   


 


11/19/20 18:00  63 12 116/61 (79) 100   


 


11/19/20 17:15  65 17     30


 


11/19/20 17:00  67 12 93/52 (66) 100   


 


11/19/20 16:00 99.4       


 


11/19/20 16:00  65 12 111/58 (75) 100   


 


11/19/20 16:00        30


 


11/19/20 16:00      Mechanical Ventilator  





      Mechanical Ventilator  


 


11/19/20 15:36  67      


 


11/19/20 15:15  71 16     30


 


11/19/20 15:00  71 21 96/52 (67) 100   


 


11/19/20 14:15        30


 


11/19/20 14:00  71 13 105/56 (72) 100   


 


11/19/20 13:15        30


 


11/19/20 13:05     100   


 


11/19/20 13:00  74 22 97/48 (64) 100   


 


11/19/20 12:00        30


 


11/19/20 12:00        30


 


11/19/20 12:00 96.8 71 25 95/56 (69) 100   


 


11/19/20 12:00  72      


 


11/19/20 12:00      Mechanical Ventilator  





      Mechanical Ventilator  


 


11/19/20 11:00  74 12 99/52 (68) 100   


 


11/19/20 10:40  70 15     30


 


11/19/20 10:00  68 12 103/55 (71) 100   


 


11/19/20 09:00  65 13 104/56 (72) 100   








Height (Feet):  5


Height (Inches):  4.00


Weight (Pounds):  130


Gen: NAD in bed


HEENT: NCAT


CV: RRR


Pulm: BL chest rise on vent


Abd: Soft, NTND


Ext: No c/c/e


Neuro: Eyes closed, not interactive





Laboratory Tests








Test


 11/19/20


14:04 11/20/20


00:16 11/20/20


05:00 11/20/20


05:44


 


Arterial Blood pH


 7.465


(7.350-7.450) 


 


 





 


Arterial Blood Partial


Pressure CO2 31.4 mmHg


(35.0-45.0)  L 


 


 





 


Arterial Blood Partial


Pressure O2 80.1 mmHg


(75.0-100.0) 


 


 





 


Arterial Blood HCO3


 22.1 mmol/L


(22.0-26.0) 


 


 





 


Arterial Blood Oxygen


Saturation 95.7 %


() 


 


 





 


Arterial Blood Base Excess -1.1 (-2-2)     


 


Jerod Test Positive     


 


POC Whole Blood Glucose  Pending    Pending  


 


White Blood Count


 


 


 10.0 K/UL


(4.8-10.8) 





 


Red Blood Count


 


 


 3.15 M/UL


(4.70-6.10)  L 





 


Hemoglobin


 


 


 9.5 G/DL


(14.2-18.0)  L 





 


Hematocrit


 


 


 30.7 %


(42.0-52.0)  L 





 


Mean Corpuscular Volume   98 FL (80-99)   


 


Mean Corpuscular Hemoglobin


 


 


 30.3 PG


(27.0-31.0) 





 


Mean Corpuscular Hemoglobin


Concent 


 


 31.0 G/DL


(32.0-36.0)  L 





 


Red Cell Distribution Width


 


 


 17.3 %


(11.6-14.8)  H 





 


Platelet Count


 


 


 365 K/UL


(150-450) 





 


Mean Platelet Volume


 


 


 6.5 FL


(6.5-10.1) 





 


Neutrophils (%) (Auto)


 


 


 76.2 %


(45.0-75.0)  H 





 


Lymphocytes (%) (Auto)


 


 


 13.1 %


(20.0-45.0)  L 





 


Monocytes (%) (Auto)


 


 


 8.8 %


(1.0-10.0) 





 


Eosinophils (%) (Auto)


 


 


 0.7 %


(0.0-3.0) 





 


Basophils (%) (Auto)


 


 


 1.2 %


(0.0-2.0) 





 


Sodium Level


 


 


 140 MMOL/L


(136-145) 





 


Potassium Level


 


 


 4.0 MMOL/L


(3.5-5.1) 





 


Chloride Level


 


 


 108 MMOL/L


()  H 





 


Carbon Dioxide Level


 


 


 24 MMOL/L


(21-32) 





 


Anion Gap


 


 


 8 mmol/L


(5-15) 





 


Blood Urea Nitrogen


 


 


 14 mg/dL


(7-18) 





 


Creatinine


 


 


 0.9 MG/DL


(0.55-1.30) 





 


Estimat Glomerular Filtration


Rate 


 


 > 60 mL/min


(>60) 





 


Glucose Level


 


 


 121 MG/DL


()  H 





 


Calcium Level


 


 


 7.8 MG/DL


(8.5-10.1)  L 





 


Phosphorus Level


 


 


 2.9 MG/DL


(2.5-4.9) 





 


Magnesium Level


 


 


 2.2 MG/DL


(1.8-2.4) 





 


Total Bilirubin


 


 


 0.3 MG/DL


(0.2-1.0) 





 


Aspartate Amino Transf


(AST/SGOT) 


 


 16 U/L (15-37)


 





 


Alanine Aminotransferase


(ALT/SGPT) 


 


 19 U/L (12-78)


 





 


Alkaline Phosphatase


 


 


 109 U/L


() 





 


Total Protein


 


 


 6.3 G/DL


(6.4-8.2)  L 





 


Albumin


 


 


 1.6 G/DL


(3.4-5.0)  L 





 


Globulin   4.7 g/dL   


 


Albumin/Globulin Ratio


 


 


 0.3 (1.0-2.7)


L 





 


Test


 11/20/20


07:08 


 


 





 


Arterial Blood pH


 7.466


(7.350-7.450) 


 


 





 


Arterial Blood Partial


Pressure CO2 31.8 mmHg


(35.0-45.0)  L 


 


 





 


Arterial Blood Partial


Pressure O2 92.9 mmHg


(75.0-100.0) 


 


 





 


Arterial Blood HCO3


 22.4 mmol/L


(22.0-26.0) 


 


 





 


Arterial Blood Oxygen


Saturation 97.0 %


() 


 


 





 


Arterial Blood Base Excess -0.8 (-2-2)     


 


Jerod Test Positive     











Current Medications








 Medications


  (Trade)  Dose


 Ordered  Sig/Miky


 Route


 PRN Reason  Start Time


 Stop Time Status Last Admin


Dose Admin


 


 Acetaminophen


  (Tylenol)  650 mg  Q4H  PRN


 ORAL


 Temp >100.5  10/23/20 20:30


 11/22/20 20:29  11/16/20 13:01





 


 Chlorhexidine


 Gluconate


  (Jess-Hex 2%)  1 applic  DAILY@2000


 TOPIC


   11/6/20 20:00


 2/4/21 19:59  11/19/20 20:13





 


 Dextrose


  (Dextrose 50%)  50 ml  Q30M  PRN


 IV


 Hypoglycemia  10/23/20 22:45


 1/21/21 22:44   





 


 Heparin Sodium


  (Porcine)


  (Heparin 5000


 units/ml)  5,000 units  EVERY 12  HOURS


 SUBQ


   10/23/20 21:00


 12/7/20 20:59  11/19/20 20:13





 


 Insulin Aspart


  (NovoLOG)    EVERY 6  HOURS


 SUBQ


   11/2/20 06:00


 1/22/21 06:29  11/18/20 11:26





 


 Linezolid  300 ml @ 


 300 mls/hr  Q12HR


 IVPB


   11/13/20 12:00


 11/20/20 11:59  11/19/20 20:13





 


 Lorazepam


  (Ativan 2mg/ml


 1ml)  2 mg  Q4H  PRN


 IV


 For Anxiety  11/17/20 19:15


 11/24/20 19:14  11/19/20 23:57





 


 Midodrine


  (Pro-Amatine)  10 mg  Q8H


 ORAL


   11/11/20 17:00


 2/9/21 16:59  11/20/20 00:50





 


 Nitroglycerin


  (Ntg)  0.4 mg  Q5M  PRN


 SL


 Prn Chest Pain  10/23/20 20:30


 11/22/20 20:29   





 


 Ondansetron HCl


  (Zofran)  4 mg  Q6H  PRN


 IVP


 Nausea & Vomiting  10/23/20 20:30


 11/22/20 20:29   





 


 Pantoprazole


  (Protonix)  40 mg  DAILY


 IV


   11/7/20 09:00


 12/7/20 08:59  11/19/20 08:38





 


 Polyethylene


 Glycol


  (Miralax)  17 gm  DAILYPRN  PRN


 ORAL


 Constipation  10/23/20 20:30


 11/22/20 20:29   





 


 Promethazine HCl/


 Codeine


  (Phenergan with


 Codeine)  5 ml  Q4H  PRN


 ORAL


 For Cough  10/23/20 20:30


 11/22/20 20:29   





 


 Sodium Chloride  1,000 ml @ 


 50 mls/hr  Q20H


 IV


   11/6/20 16:00


 12/6/20 15:59  11/20/20 04:00




















Bianca Casillas M.D.               Nov 20, 2020 08:06

## 2020-11-20 NOTE — NUR
NURSE NOTES:

Complete bed bath given, linens changes, turned and repositioned. IV lines updated. oral 
care and suctioning provided.

## 2020-11-20 NOTE — NUR
NURSE NOTES:



Dr Tobar at bedside assessing pt. Updated him on pt's current condition. No new orders 
given at this time.

## 2020-11-20 NOTE — NUR
NURSE NOTES:



Oral care done. . No Novolog given, per sliding scale. Pt resting comfortably in bed 
at this time. No distress noted.

## 2020-11-20 NOTE — NEPHROLOGY PROGRESS NOTE
Assessment/Plan


Problem List:  


(1) Dehydration


(2) JANES (acute kidney injury)


(3) Renal failure (ARF), acute on chronic


(4) Hypoxia


(5) Acute encephalopathy


(6) Electrolyte imbalance


Assessment





75-year-old male is admitted with acute hypoxic respiratory failure most likely 

secondary to pneumonia and sepsis, UTI.


Acute on chronic renal failure


Dehydration


Electrolyte imbalances, hypernatremia


Hypoalbuminemia


Diabetes type 2


History of congestive heart failure


Hypertension


Hyperlipemia


Alzheimer's


Previous COVID-19 infection in September 2020


Plan


November 20: Remains on mechanical ventilation.  Labs reviewed.  Abnormal 

electrolytes addressed.  Stable from renal standpoint of view.


November 19: Remains intubated.  Remains full code.  Labs reviewed.  Low 

phosphorus replaced.  Continue to monitor renal parameters.  Continue per 

consultants.


November 18: Failed weaning.  Remains intubated.  Remains full closed.  Labs 

reviewed.  Stable from renal standpoint view.


November 17: Remains in ICU.  Remains full code.  Labs are reviewed.  Phosphorus

supplement given.  Continue per consultants.


November 16: Remains in ICU.  Full code.  Labs reviewed.  Remains intubated.  

Stable renal parameters.


November 15: In ICU.  Full code.  Discussed with RN.  Stable renal parameters.


November 14: Seen in ICU.  Discussed with LITA Cooper.  Flomax discontinued.  

Labs reviewed.  Stable from renal standpoint of view.


November 13: Seen in ICU.  Discussed with LITA Cooper.  Remains on pressor.  

Electrolyte abnormalities noted and addressed.  Continue per consultants.  Gretchen

ent remains full code.


November 12: Seen in ICU.  Discussed with LITA Richardson.  Blood pressure remains a 

little requiring pressors.  Labs reviewed.  Stable renal parameters.  Continue 

per consultants.


November 11: Remains intubated.  Full code.  Blood pressure borderline low.  

Will increase midodrine dose.  Discussed with RN.  Continue to monitor renal 

parameters and electrolytes.


November 10: Intubated.  Full code.  Labs reviewed.  Stable from renal 

standpoint of view.  Continue per consultants.


November 9: Remains intubated.  Full code.  Labs reviewed.  Electrolytes within 

normal limit.  Continue per consultants.


November 8: In ICU.  Remains intubated on ventilator.  Remains full code.  Low 

potassium addressed.  Blood pressure fluctuating.  Continue per consultants.


November 7: Patient in ICU.  Intubated on ventilator.  On 6 mics of Levophed.  

Will give 100 cc albumin 25%.  K-Phos IV ordered.  Continue per consultants.  

Continue monitor renal parameters and electrolytes.


November 6: Status unchanged.  Transfer to [facility] for seizure.  Stable from renal 

standpoint to view.  Continue per consultants.


November 5: Status quo.  Labs reviewed.  Renal parameters stable.  Continue per 

consultants.


November 4: On nonrebreather mask.  Labs reviewed.  Renal parameters stable.C

ontinue per pulmonologist.  Clinically unchanged.


November 3: On nonrebreather mask.  Inflammatory markers gradually declining.  

Renal parameters stable.  Continue per pulmonary.


November 2: Remains on nonrebreather mask.  Inflammatory markers remain eleva

fiordaliza.  Renal parameters somewhat stable.  Continue per pulmonary and ID.  Remains

full code.


November 1: Patient on nonrebreather mask.  Labs noted.  Serum creatinine down 

to 1.3.  Continue per consultants.


October 31: Patient on nonrebreather mask.  Transfer to telemetry when seen this

morning.  Labs noted.  Continue per consultants.  Serum creatinine dylan to 1.7. 

Continue to monitor renal parameters.  Continue to monitor vancomycin level


October 30: Patient is not doing well clinically.  Mild respiratory distress.  

ABG noted.  Somewhat hypoxic.  CBC and chemistry panel ordered.  Discussed with 

LITA Garcia.  Will defer to pulmonary management to specialist.  Continue per ID.

 Renal parameters remained stable as of October 29.


October 29: Labs reviewed.  Renal parameters stable.  Continue per consultants.


October 28: Labs reviewed.  Potassium via NG tube ordered.  IV fluids stopped.  

Continue per consultants.


October 27: Labs reviewed.  Low potassium and low phosphorus replaced.  Continue

per consultants.  Remains stable from renal standpoint of view.


October 26: Patient remains n.p.o.  IV fluid down to 50 cc an hour.  Potassium 

supplement intravenously ordered.  Continue per consultants.





Previously:


Patient is n.p.o., will continue on IV fluid of D5W 75 cc an hour


We will monitor electrolytes and renal parameters


Avoid nephrotoxic's


Start p.o. when he clears by speech therapist, meanwhile aspiration precautions


Keep the blood pressure and blood sugar in check


Per orders





Subjective


ROS Limited/Unobtainable:  Yes





Objective


Objective





Last 24 Hour Vital Signs








  Date Time  Temp Pulse Resp B/P (MAP) Pulse Ox O2 Delivery O2 Flow Rate FiO2


 


11/20/20 13:00  68 24 139/70 (93) 100   


 


11/20/20 12:00      Mechanical Ventilator  





      Mechanical Ventilator  


 


11/20/20 12:00        30


 


11/20/20 12:00 99.4 67 26 132/63 (86) 100   


 


11/20/20 11:28  68 26     30


 


11/20/20 11:00  71 24 150/70 (96) 100   


 


11/20/20 10:00  73 25 140/70 (93) 100   


 


11/20/20 09:35        30


 


11/20/20 09:32     100   


 


11/20/20 09:29  72 21     30


 


11/20/20 09:00  74 16 119/65 (83) 100   


 


11/20/20 08:00 99.4 75 16 115/60 (78) 100   


 


11/20/20 08:00      Mechanical Ventilator  





      Mechanical Ventilator  


 


11/20/20 08:00        30


 


11/20/20 07:05  74 12     30


 


11/20/20 07:00  77 11 142/67 (92) 100   


 


11/20/20 06:30  70 12     


 


11/20/20 06:00  80 13 121/61 (81) 100   


 


11/20/20 05:13  79 13     30


 


11/20/20 05:00  80 12 116/90 (99) 100   


 


11/20/20 04:00        30


 


11/20/20 04:00      Mechanical Ventilator  





      Mechanical Ventilator  


 


11/20/20 04:00 99.4 80 18 118/68 (85) 100   


 


11/20/20 03:23  80 13     30


 


11/20/20 03:02  76      


 


11/20/20 03:00  94 18 149/68 (95) 100   


 


11/20/20 02:00  81 12 99/57 (71) 100   


 


11/20/20 01:21  81 15     30


 


11/20/20 01:00  90 12 127/71 (89) 100   


 


11/20/20 00:27  88 20 135/73 100   


 


11/20/20 00:00      Mechanical Ventilator  





      Mechanical Ventilator  


 


11/20/20 00:00        30


 


11/20/20 00:00 98.5 85 19 135/73 (93) 99   


 


11/19/20 23:57  97 20 171/91 100   


 


11/19/20 23:15  84 19 171/91 (117) 100   


 


11/19/20 23:13  87      


 


11/19/20 23:00  81 19 174/96 (122) 100   


 


11/19/20 22:59  78 17     30


 


11/19/20 22:00  70 17 131/65 (87) 100   


 


11/19/20 21:00  68 13 110/58 (75) 100   


 


11/19/20 20:39  64 12     30


 


11/19/20 20:00 98.8 72 15 114/61 (78) 100   


 


11/19/20 20:00        30


 


11/19/20 20:00      Mechanical Ventilator  





      Mechanical Ventilator  


 


11/19/20 19:18  66 12     30


 


11/19/20 19:16  65      


 


11/19/20 19:00  67 16 121/69 (86) 100   


 


11/19/20 18:00  63 12 116/61 (79) 100   


 


11/19/20 17:15  65 17     30


 


11/19/20 17:00  67 12 93/52 (66) 100   


 


11/19/20 16:00 99.4       


 


11/19/20 16:00  65 12 111/58 (75) 100   


 


11/19/20 16:00        30


 


11/19/20 16:00      Mechanical Ventilator  





      Mechanical Ventilator  


 


11/19/20 15:36  67      


 


11/19/20 15:15  71 16     30


 


11/19/20 15:00  71 21 96/52 (67) 100   


 


11/19/20 14:15        30


 


11/19/20 14:00  71 13 105/56 (72) 100   

















Intake and Output  


 


 11/19/20 11/20/20





 19:00 07:00


 


Intake Total 1720.832 ml 1220 ml


 


Output Total 950 ml 1320 ml


 


Balance 770.832 ml -100 ml


 


  


 


IV Total 1040.832 ml 500 ml


 


Tube Feeding 680 ml 720 ml


 


Output Urine Total 950 ml 1320 ml


 


# Bowel Movements 4 2











Current Medications








 Medications


  (Trade)  Dose


 Ordered  Sig/Miky


 Route


 PRN Reason  Start Time


 Stop Time Status Last Admin


Dose Admin


 


 Acetaminophen


  (Tylenol)  650 mg  Q4H  PRN


 ORAL


 Temp >100.5  10/23/20 20:30


 11/22/20 20:29  11/16/20 13:01





 


 Chlorhexidine


 Gluconate


  (Jess-Hex 2%)  1 applic  DAILY@2000


 TOPIC


   11/6/20 20:00


 2/4/21 19:59  11/19/20 20:13





 


 Dextrose


  (Dextrose 50%)  50 ml  Q30M  PRN


 IV


 Hypoglycemia  10/23/20 22:45


 1/21/21 22:44   





 


 Heparin Sodium


  (Porcine)


  (Heparin 5000


 units/ml)  5,000 units  EVERY 12  HOURS


 SUBQ


   10/23/20 21:00


 12/7/20 20:59  11/20/20 09:06





 


 Insulin Aspart


  (NovoLOG)    EVERY 6  HOURS


 SUBQ


   11/2/20 06:00


 1/22/21 06:29  11/18/20 11:26





 


 Linezolid  300 ml @ 


 300 mls/hr  Q12HR


 IVPB


   11/13/20 12:00


 11/20/20 23:59  11/20/20 09:05





 


 Lorazepam


  (Ativan 2mg/ml


 1ml)  2 mg  Q4H  PRN


 IV


 For Anxiety  11/17/20 19:15


 11/24/20 19:14  11/19/20 23:57





 


 Midodrine


  (Pro-Amatine)  10 mg  Q8H


 ORAL


   11/11/20 17:00


 2/9/21 16:59  11/20/20 17:37





 


 Nitroglycerin


  (Ntg)  0.4 mg  Q5M  PRN


 SL


 Prn Chest Pain  10/23/20 20:30


 11/22/20 20:29   





 


 Ondansetron HCl


  (Zofran)  4 mg  Q6H  PRN


 IVP


 Nausea & Vomiting  10/23/20 20:30


 11/22/20 20:29   





 


 Pantoprazole


  (Protonix)  40 mg  DAILY


 IV


   11/7/20 09:00


 12/7/20 08:59  11/20/20 09:05





 


 Polyethylene


 Glycol


  (Miralax)  17 gm  DAILYPRN  PRN


 ORAL


 Constipation  10/23/20 20:30


 11/22/20 20:29   





 


 Promethazine HCl/


 Codeine


  (Phenergan with


 Codeine)  5 ml  Q4H  PRN


 ORAL


 For Cough  10/23/20 20:30


 11/22/20 20:29   





 


 Sodium Chloride  1,000 ml @ 


 50 mls/hr  Q20H


 IV


   11/6/20 16:00


 12/6/20 15:59  11/20/20 04:00








Laboratory Tests


11/19/20 14:04: 


Arterial Blood pH 7.465H, Arterial Blood Partial Pressure CO2 31.4L, Arterial 

Blood Partial Pressure O2 80.1, Arterial Blood HCO3 22.1, Arterial Blood Oxygen 

Saturation 95.7, Arterial Blood Base Excess -1.1, Jerod Test Positive


11/20/20 00:16: POC Whole Blood Glucose [Pending]


11/20/20 05:00: 


White Blood Count 10.0, Red Blood Count 3.15L, Hemoglobin 9.5L, Hematocrit 30.7L

, Mean Corpuscular Volume 98, Mean Corpuscular Hemoglobin 30.3, Mean Corpuscular

 Hemoglobin Concent 31.0L, Red Cell Distribution Width 17.3H, Platelet Count 

365, Mean Platelet Volume 6.5, Neutrophils (%) (Auto) 76.2H, Lymphocytes (%) 

(Auto) 13.1L, Monocytes (%) (Auto) 8.8, Eosinophils (%) (Auto) 0.7, Basophils 

(%) (Auto) 1.2, Sodium Level 140, Potassium Level 4.0, Chloride Level 108H, 

Carbon Dioxide Level 24, Anion Gap 8, Blood Urea Nitrogen 14, Creatinine 0.9, 

Estimat Glomerular Filtration Rate > 60, Glucose Level 121H, Calcium Level 7.8L,

 Phosphorus Level 2.9, Magnesium Level 2.2, Total Bilirubin 0.3, Aspartate Amino

 Transf (AST/SGOT) 16, Alanine Aminotransferase (ALT/SGPT) 19, Alkaline Phosph

atase 109, Total Protein 6.3L, Albumin 1.6L, Globulin 4.7, Albumin/Globulin 

Ratio 0.3L


11/20/20 05:44: POC Whole Blood Glucose [Pending]


11/20/20 07:08: 


Arterial Blood pH 7.466H, Arterial Blood Partial Pressure CO2 31.8L, Arterial 

Blood Partial Pressure O2 92.9, Arterial Blood HCO3 22.4, Arterial Blood Oxygen 

Saturation 97.0, Arterial Blood Base Excess -0.8, Jerod Test Positive


11/20/20 10:48: 


Arterial Blood pH 7.470H, Arterial Blood Partial Pressure CO2 31.8L, Arterial 

Blood Partial Pressure O2 84.7, Arterial Blood HCO3 22.6, Arterial Blood Oxygen 

Saturation 96.3, Arterial Blood Base Excess -0.5, Jerod Test Positive


Height (Feet):  5


Height (Inches):  4.00


Weight (Pounds):  130


General Appearance:  no apparent distress


EENT:  other - Continues to be intubated on mechanical ventilation


Cardiovascular:  normal rate


Respiratory/Chest:  decreased breath sounds


Abdomen:  distended











Seven Tobar MD            Nov 20, 2020 13:47

## 2020-11-20 NOTE — PULMONOLGY CRITICAL CARE NOTE
Critical Care - Asmt/Plan


Problems:  


(1) Acute respiratory failure


(2) Multifocal pneumonia


(3) 2019 novel coronavirus disease (COVID-19)


(4) Acute encephalopathy


(5) Severe sepsis


(6) Alzheimer's dementia


(7) HTN (hypertension)


(8) History of CVA (cerebrovascular accident)


(9) BPH (benign prostatic hyperplasia)


Respiratory:  monitor respiratory rate, adjust FIO2, CXR


Cardiac:  continue to monitor HR/BP


Renal:  F/U I&O, keep IV fluid, check electrolytes


Infectious Disease:  check cultures, continue antibiotics


Gastrointestinal:  continue feedings/current rate


Endocrine:  monitor blood sugar


Hematologic:  monitor H/H


Neurologic:  PRN Ativan, PRN Morphine


Affect:  PRN ativan


Time Spent (Minutes):  40


Notes Reviewed:  internist, cardio, renal


Discussed with:  nurses, consultants, 





Critical Care - Objective





Last 24 Hour Vital Signs








  Date Time  Temp Pulse Resp B/P (MAP) Pulse Ox O2 Delivery O2 Flow Rate FiO2


 


11/20/20 09:35        30


 


11/20/20 09:00  74 16 119/65 (83) 100   


 


11/20/20 08:00 99.4 75 16 115/60 (78) 100   


 


11/20/20 08:00      Mechanical Ventilator  





      Mechanical Ventilator  


 


11/20/20 08:00        30


 


11/20/20 07:05  74 12     30


 


11/20/20 07:00  77 11 142/67 (92) 100   


 


11/20/20 06:30  70 12     


 


11/20/20 06:00  80 13 121/61 (81) 100   


 


11/20/20 05:13  79 13     30


 


11/20/20 05:00  80 12 116/90 (99) 100   


 


11/20/20 04:00        30


 


11/20/20 04:00      Mechanical Ventilator  





      Mechanical Ventilator  


 


11/20/20 04:00 99.4 80 18 118/68 (85) 100   


 


11/20/20 03:23  80 13     30


 


11/20/20 03:02  76      


 


11/20/20 03:00  94 18 149/68 (95) 100   


 


11/20/20 02:00  81 12 99/57 (71) 100   


 


11/20/20 01:21  81 15     30


 


11/20/20 01:00  90 12 127/71 (89) 100   


 


11/20/20 00:27  88 20 135/73 100   


 


11/20/20 00:00      Mechanical Ventilator  





      Mechanical Ventilator  


 


11/20/20 00:00        30


 


11/20/20 00:00 98.5 85 19 135/73 (93) 99   


 


11/19/20 23:57  97 20 171/91 100   


 


11/19/20 23:15  84 19 171/91 (117) 100   


 


11/19/20 23:13  87      


 


11/19/20 23:00  81 19 174/96 (122) 100   


 


11/19/20 22:59  78 17     30


 


11/19/20 22:00  70 17 131/65 (87) 100   


 


11/19/20 21:00  68 13 110/58 (75) 100   


 


11/19/20 20:39  64 12     30


 


11/19/20 20:00 98.8 72 15 114/61 (78) 100   


 


11/19/20 20:00        30


 


11/19/20 20:00      Mechanical Ventilator  





      Mechanical Ventilator  


 


11/19/20 19:18  66 12     30


 


11/19/20 19:16  65      


 


11/19/20 19:00  67 16 121/69 (86) 100   


 


11/19/20 18:00  63 12 116/61 (79) 100   


 


11/19/20 17:15  65 17     30


 


11/19/20 17:00  67 12 93/52 (66) 100   


 


11/19/20 16:00 99.4       


 


11/19/20 16:00  65 12 111/58 (75) 100   


 


11/19/20 16:00        30


 


11/19/20 16:00      Mechanical Ventilator  





      Mechanical Ventilator  


 


11/19/20 15:36  67      


 


11/19/20 15:15  71 16     30


 


11/19/20 15:00  71 21 96/52 (67) 100   


 


11/19/20 14:15        30


 


11/19/20 14:00  71 13 105/56 (72) 100   


 


11/19/20 13:15        30


 


11/19/20 13:05     100   


 


11/19/20 13:00  74 22 97/48 (64) 100   


 


11/19/20 12:00        30


 


11/19/20 12:00        30


 


11/19/20 12:00 96.8 71 25 95/56 (69) 100   


 


11/19/20 12:00  72      


 


11/19/20 12:00      Mechanical Ventilator  





      Mechanical Ventilator  


 


11/19/20 11:00  74 12 99/52 (68) 100   


 


11/19/20 10:40  70 15     30








Status:  awake, sedated


Condition:  critical


HEENT:  atraumatic


Neck:  full ROM


Lungs:  clear


Heart:  HR/BP stable


Abdomen:  soft, active bowel sounds


Extremities:  no C/C/E


Objective:


still large secretions, failed weaning


Accucheck:  118





Critical Care - Subjective


ROS Limited/Unobtainable:  Yes


Interval Events:


didn't tolerate weaning


Condition:  critical


FI02:  30


Vent Support Breath Rate:  12


Vent Support Mode:  CPAP


Vent Tidal Volume:  600


Sputum Amount:  Moderate


PEEP:  0.0


PIP:  18


Tube Feeding Amount:  60


I&O:











Intake and Output  


 


 11/19/20 11/20/20





 19:00 07:00


 


Intake Total 1720.832 ml 1220 ml


 


Output Total 950 ml 1320 ml


 


Balance 770.832 ml -100 ml


 


  


 


IV Total 1040.832 ml 500 ml


 


Tube Feeding 680 ml 720 ml


 


Output Urine Total 950 ml 1320 ml


 


# Bowel Movements 4 2








ET-Tube:  7.5


ET Position:  25


Labs:





Laboratory Tests








Test


 11/19/20


14:04 11/20/20


00:16 11/20/20


05:00 11/20/20


05:44


 


Arterial Blood pH


 7.465


(7.350-7.450) 


 


 





 


Arterial Blood Partial


Pressure CO2 31.4 mmHg


(35.0-45.0)  L 


 


 





 


Arterial Blood Partial


Pressure O2 80.1 mmHg


(75.0-100.0) 


 


 





 


Arterial Blood HCO3


 22.1 mmol/L


(22.0-26.0) 


 


 





 


Arterial Blood Oxygen


Saturation 95.7 %


() 


 


 





 


Arterial Blood Base Excess -1.1 (-2-2)     


 


Jerod Test Positive     


 


POC Whole Blood Glucose  Pending    Pending  


 


White Blood Count


 


 


 10.0 K/UL


(4.8-10.8) 





 


Red Blood Count


 


 


 3.15 M/UL


(4.70-6.10)  L 





 


Hemoglobin


 


 


 9.5 G/DL


(14.2-18.0)  L 





 


Hematocrit


 


 


 30.7 %


(42.0-52.0)  L 





 


Mean Corpuscular Volume   98 FL (80-99)   


 


Mean Corpuscular Hemoglobin


 


 


 30.3 PG


(27.0-31.0) 





 


Mean Corpuscular Hemoglobin


Concent 


 


 31.0 G/DL


(32.0-36.0)  L 





 


Red Cell Distribution Width


 


 


 17.3 %


(11.6-14.8)  H 





 


Platelet Count


 


 


 365 K/UL


(150-450) 





 


Mean Platelet Volume


 


 


 6.5 FL


(6.5-10.1) 





 


Neutrophils (%) (Auto)


 


 


 76.2 %


(45.0-75.0)  H 





 


Lymphocytes (%) (Auto)


 


 


 13.1 %


(20.0-45.0)  L 





 


Monocytes (%) (Auto)


 


 


 8.8 %


(1.0-10.0) 





 


Eosinophils (%) (Auto)


 


 


 0.7 %


(0.0-3.0) 





 


Basophils (%) (Auto)


 


 


 1.2 %


(0.0-2.0) 





 


Sodium Level


 


 


 140 MMOL/L


(136-145) 





 


Potassium Level


 


 


 4.0 MMOL/L


(3.5-5.1) 





 


Chloride Level


 


 


 108 MMOL/L


()  H 





 


Carbon Dioxide Level


 


 


 24 MMOL/L


(21-32) 





 


Anion Gap


 


 


 8 mmol/L


(5-15) 





 


Blood Urea Nitrogen


 


 


 14 mg/dL


(7-18) 





 


Creatinine


 


 


 0.9 MG/DL


(0.55-1.30) 





 


Estimat Glomerular Filtration


Rate 


 


 > 60 mL/min


(>60) 





 


Glucose Level


 


 


 121 MG/DL


()  H 





 


Calcium Level


 


 


 7.8 MG/DL


(8.5-10.1)  L 





 


Phosphorus Level


 


 


 2.9 MG/DL


(2.5-4.9) 





 


Magnesium Level


 


 


 2.2 MG/DL


(1.8-2.4) 





 


Total Bilirubin


 


 


 0.3 MG/DL


(0.2-1.0) 





 


Aspartate Amino Transf


(AST/SGOT) 


 


 16 U/L (15-37)


 





 


Alanine Aminotransferase


(ALT/SGPT) 


 


 19 U/L (12-78)


 





 


Alkaline Phosphatase


 


 


 109 U/L


() 





 


Total Protein


 


 


 6.3 G/DL


(6.4-8.2)  L 





 


Albumin


 


 


 1.6 G/DL


(3.4-5.0)  L 





 


Globulin   4.7 g/dL   


 


Albumin/Globulin Ratio


 


 


 0.3 (1.0-2.7)


L 





 


Test


 11/20/20


07:08 


 


 





 


Arterial Blood pH


 7.466


(7.350-7.450) 


 


 





 


Arterial Blood Partial


Pressure CO2 31.8 mmHg


(35.0-45.0)  L 


 


 





 


Arterial Blood Partial


Pressure O2 92.9 mmHg


(75.0-100.0) 


 


 





 


Arterial Blood HCO3


 22.4 mmol/L


(22.0-26.0) 


 


 





 


Arterial Blood Oxygen


Saturation 97.0 %


() 


 


 





 


Arterial Blood Base Excess -0.8 (-2-2)     


 


Jerod Test Positive     

















Michael Sandoval MD              Nov 20, 2020 10:16

## 2020-11-20 NOTE — NUR
NURSE NOTES:



Report received from LITA Carvalho. Patient observed laying in bed, opening eyes 
spontaneously, however, not tracking. Intubated; ETT 7.5, 25cm at the lipline to vent 
settings AC:12, TV:600, FiO2:30%, O2sat:100%. NGT in place and running feeding of Glucerna 
1.2 @ 60ml/hr. Right upper arm PICC running  NS @ 50ml/hr. Cummings intact and draining to 
urometer. BSW restraints in place; pulses, ROM and skin assessed. Skin issues acknowledged; 
dressings clean dry and intact. Safety measures in place; bed low, locked and alarm is on. 
Will continue plan of care.

## 2020-11-20 NOTE — NUR
NURSE NOTES:



. No Novolog given as per sliding scale. Pt turned and repositioned. Pt still on CPAP 
PS 8. Tolerating well. No distress noted

## 2020-11-20 NOTE — INTERNAL MED PROGRESS NOTE
Subjective


Physician Name


Andrei Cancino


Attending Physician


Andrei Cancino MD





Current Medications








 Medications


  (Trade)  Dose


 Ordered  Sig/Miky


 Route


 PRN Reason  Start Time


 Stop Time Status Last Admin


Dose Admin


 


 Acetaminophen


  (Tylenol)  650 mg  Q4H  PRN


 ORAL


 Temp >100.5  10/23/20 20:30


 11/22/20 20:29  11/16/20 13:01





 


 Chlorhexidine


 Gluconate


  (Jess-Hex 2%)  1 applic  DAILY@2000


 TOPIC


   11/6/20 20:00


 2/4/21 19:59  11/19/20 20:13





 


 Dextrose


  (Dextrose 50%)  50 ml  Q30M  PRN


 IV


 Hypoglycemia  10/23/20 22:45


 1/21/21 22:44   





 


 Heparin Sodium


  (Porcine)


  (Heparin 5000


 units/ml)  5,000 units  EVERY 12  HOURS


 SUBQ


   10/23/20 21:00


 12/7/20 20:59  11/20/20 09:06





 


 Insulin Aspart


  (NovoLOG)    EVERY 6  HOURS


 SUBQ


   11/2/20 06:00


 1/22/21 06:29  11/18/20 11:26





 


 Linezolid  300 ml @ 


 300 mls/hr  Q12HR


 IVPB


   11/13/20 12:00


 11/20/20 23:59  11/20/20 09:05





 


 Lorazepam


  (Ativan 2mg/ml


 1ml)  2 mg  Q4H  PRN


 IV


 For Anxiety  11/17/20 19:15


 11/24/20 19:14  11/19/20 23:57





 


 Midodrine


  (Pro-Amatine)  10 mg  Q8H


 ORAL


   11/11/20 17:00


 2/9/21 16:59  11/20/20 09:05





 


 Nitroglycerin


  (Ntg)  0.4 mg  Q5M  PRN


 SL


 Prn Chest Pain  10/23/20 20:30


 11/22/20 20:29   





 


 Ondansetron HCl


  (Zofran)  4 mg  Q6H  PRN


 IVP


 Nausea & Vomiting  10/23/20 20:30


 11/22/20 20:29   





 


 Pantoprazole


  (Protonix)  40 mg  DAILY


 IV


   11/7/20 09:00


 12/7/20 08:59  11/20/20 09:05





 


 Polyethylene


 Glycol


  (Miralax)  17 gm  DAILYPRN  PRN


 ORAL


 Constipation  10/23/20 20:30


 11/22/20 20:29   





 


 Promethazine HCl/


 Codeine


  (Phenergan with


 Codeine)  5 ml  Q4H  PRN


 ORAL


 For Cough  10/23/20 20:30


 11/22/20 20:29   





 


 Sodium Chloride  1,000 ml @ 


 50 mls/hr  Q20H


 IV


   11/6/20 16:00


 12/6/20 15:59  11/20/20 04:00











Allergies:  


Coded Allergies:  


     No Known Allergies (Unverified , 8/20/12)


Subjective


In ICU Isolation room, More responsive, cannot follow command, Intubated on 

Ventilation,  minimal secretion, WBC: 10.0, weaning trial ,





Objective





Last Vital Signs








  Date Time  Temp Pulse Resp B/P (MAP) Pulse Ox O2 Delivery O2 Flow Rate FiO2


 


11/20/20 12:00      Mechanical Ventilator  





      Mechanical Ventilator  


 


11/20/20 12:00        30


 


11/20/20 12:00 99.4 67 26 132/63 (86) 100   











Laboratory Tests








Test


 11/19/20


14:04 11/20/20


00:16 11/20/20


05:00 11/20/20


05:44


 


Arterial Blood pH


 7.465


(7.350-7.450) 


 


 





 


Arterial Blood Partial


Pressure CO2 31.4 mmHg


(35.0-45.0)  L 


 


 





 


Arterial Blood Partial


Pressure O2 80.1 mmHg


(75.0-100.0) 


 


 





 


Arterial Blood HCO3


 22.1 mmol/L


(22.0-26.0) 


 


 





 


Arterial Blood Oxygen


Saturation 95.7 %


() 


 


 





 


Arterial Blood Base Excess -1.1 (-2-2)     


 


Jerod Test Positive     


 


POC Whole Blood Glucose  Pending    Pending  


 


White Blood Count


 


 


 10.0 K/UL


(4.8-10.8) 





 


Red Blood Count


 


 


 3.15 M/UL


(4.70-6.10)  L 





 


Hemoglobin


 


 


 9.5 G/DL


(14.2-18.0)  L 





 


Hematocrit


 


 


 30.7 %


(42.0-52.0)  L 





 


Mean Corpuscular Volume   98 FL (80-99)   


 


Mean Corpuscular Hemoglobin


 


 


 30.3 PG


(27.0-31.0) 





 


Mean Corpuscular Hemoglobin


Concent 


 


 31.0 G/DL


(32.0-36.0)  L 





 


Red Cell Distribution Width


 


 


 17.3 %


(11.6-14.8)  H 





 


Platelet Count


 


 


 365 K/UL


(150-450) 





 


Mean Platelet Volume


 


 


 6.5 FL


(6.5-10.1) 





 


Neutrophils (%) (Auto)


 


 


 76.2 %


(45.0-75.0)  H 





 


Lymphocytes (%) (Auto)


 


 


 13.1 %


(20.0-45.0)  L 





 


Monocytes (%) (Auto)


 


 


 8.8 %


(1.0-10.0) 





 


Eosinophils (%) (Auto)


 


 


 0.7 %


(0.0-3.0) 





 


Basophils (%) (Auto)


 


 


 1.2 %


(0.0-2.0) 





 


Sodium Level


 


 


 140 MMOL/L


(136-145) 





 


Potassium Level


 


 


 4.0 MMOL/L


(3.5-5.1) 





 


Chloride Level


 


 


 108 MMOL/L


()  H 





 


Carbon Dioxide Level


 


 


 24 MMOL/L


(21-32) 





 


Anion Gap


 


 


 8 mmol/L


(5-15) 





 


Blood Urea Nitrogen


 


 


 14 mg/dL


(7-18) 





 


Creatinine


 


 


 0.9 MG/DL


(0.55-1.30) 





 


Estimat Glomerular Filtration


Rate 


 


 > 60 mL/min


(>60) 





 


Glucose Level


 


 


 121 MG/DL


()  H 





 


Calcium Level


 


 


 7.8 MG/DL


(8.5-10.1)  L 





 


Phosphorus Level


 


 


 2.9 MG/DL


(2.5-4.9) 





 


Magnesium Level


 


 


 2.2 MG/DL


(1.8-2.4) 





 


Total Bilirubin


 


 


 0.3 MG/DL


(0.2-1.0) 





 


Aspartate Amino Transf


(AST/SGOT) 


 


 16 U/L (15-37)


 





 


Alanine Aminotransferase


(ALT/SGPT) 


 


 19 U/L (12-78)


 





 


Alkaline Phosphatase


 


 


 109 U/L


() 





 


Total Protein


 


 


 6.3 G/DL


(6.4-8.2)  L 





 


Albumin


 


 


 1.6 G/DL


(3.4-5.0)  L 





 


Globulin   4.7 g/dL   


 


Albumin/Globulin Ratio


 


 


 0.3 (1.0-2.7)


L 





 


Test


 11/20/20


07:08 11/20/20


10:48 


 





 


Arterial Blood pH


 7.466


(7.350-7.450) 7.470


(7.350-7.450) 


 





 


Arterial Blood Partial


Pressure CO2 31.8 mmHg


(35.0-45.0)  L 31.8 mmHg


(35.0-45.0)  L 


 





 


Arterial Blood Partial


Pressure O2 92.9 mmHg


(75.0-100.0) 84.7 mmHg


(75.0-100.0) 


 





 


Arterial Blood HCO3


 22.4 mmol/L


(22.0-26.0) 22.6 mmol/L


(22.0-26.0) 


 





 


Arterial Blood Oxygen


Saturation 97.0 %


() 96.3 %


() 


 





 


Arterial Blood Base Excess -0.8 (-2-2)   -0.5 (-2-2)    


 


Jerod Test Positive   Positive    

















Intake and Output  


 


 11/19/20 11/20/20





 19:00 07:00


 


Intake Total 1720.832 ml 1220 ml


 


Output Total 950 ml 1320 ml


 


Balance 770.832 ml -100 ml


 


  


 


IV Total 1040.832 ml 500 ml


 


Tube Feeding 680 ml 720 ml


 


Output Urine Total 950 ml 1320 ml


 


# Bowel Movements 4 2








Objective


GENERAL:  More responsive with open eyes, intubated, chronically ill-appearing.


HEAD AND NECK:  Pupils are equal and reactive to light.  Anicteric. ET tube, 

NGT.


NECK:  Supple.  No JVD.


LUNGS:  Mechanical breath sound,  Decreased air in bases, Coarse breath sound.


HEART:  S1, S2.  Regular rhythm.  Distant heart sounds.  No murmur or gallop.


ABDOMEN:  Soft, nondistended, nontender.  Positive bowel sounds.


EXTREMITIES:  No cyanosis, clubbing, or edema.


NEUROLOGIC:  Very limited secondary to the patient's status, cannot follow 

commands.





Assessment/Plan


Assessment/Plan


1. Acute hypoxemic respiratory failure, most likely secondary to pneumonia and 

sepsis --> Intubated (11/6/2020).


2. Septic shock secondary to urinary tract infection and pneumonia.


3. pneumonia=MRSA


4. Acute kidney injury on chronic renal insufficiency.


5. Dehydration.


6. History of chronic congestive heart failure.


7. Diabetes type 2.


8. Dyslipidemia.


9. Hypertension.


10. Alzheimer's disease.


11. COVID 19 previous positive





PLAN:


1.  in ICU


2.  Dr. Sandoval,=Pulmonary Critical Care


3.  Dr. Casillas = infection disease


4.  Monitor labs and cultures


5.  antibiotics =  DC Zyvox today.


6.  Code status is full code.


7.  DVT prophylaxis is heparin subcutaneous.


8.  Resume Tube feeding @ 60 cc/hr,


9.  Off Levophed  and on Midodrine 10 tid 


10. Weaning Trial














Andrei Cancino MD                 Nov 20, 2020 13:09

## 2020-11-20 NOTE — DIAGNOSTIC IMAGING REPORT
EXAM:

  XR Chest, 1 View

 

CLINICAL HISTORY:

  DYSPNEA

 

TECHNIQUE:

  Frontal view of the chest.

 

COMPARISON:

  11/19/2020 and 11/18/2020

 

FINDINGS:

  Lungs:  Intervally mildly improved bibasilar pulmonary scattered patchy 

airspace opacities.  Unchanged retrocardiac atelectasis with or with 

consolidation.  Low lung volumes with bronchovascular crowding.

  Pleural space:  Unremarkable.  No pneumothorax.

  Heart:  Unremarkable.  No cardiomegaly.

  Mediastinum:  Unremarkable.

  Bones/joints:  Unchanged right humeral hemiarthroplasty.

  Tubes, lines and devices:  ETT 3.5 cm above sd.  Enteric tube with 

tip and proximal sideport below the gastroesophageal junction.  Unchanged 

right upper extremity PICC with tip in the right mid subclavian vein.

 

IMPRESSION:     

1.  ETT 3.5 cm above sd.

2.  Enteric tube with tip and proximal sideport below the 

gastroesophageal junction.

3.  Unchanged right upper extremity PICC with tip in the right mid 

subclavian vein.

4.  Intervally mildly improved bibasilar pulmonary scattered patchy 

airspace opacities.

5.  Unchanged retrocardiac atelectasis with or with consolidation.

6.  Low lung volumes with bronchovascular crowding.

## 2020-11-20 NOTE — NUR
NURSE NOTES:



Pt seen by Dr Edmond. Updated him on pt's current condition. No new orders given at this 
time.

## 2020-11-20 NOTE — DIAGNOSTIC IMAGING REPORT
EXAM:

  XR Chest, 1 View

 

CLINICAL HISTORY:

  INFECT

 

TECHNIQUE:

  Frontal view of the chest.

 

COMPARISON:

  11/17/2020 and subsequent study 11/20/2020

 

FINDINGS:

  Lungs:  Similar bibasilar patchy airspace opacities could represent 

multifocal infection, aspiration, or some component of atelectasis.  

Unchanged retrocardiac atelectasis and lateral consolidation.  Low lung 

volumes with bronchovascular crowding.

  Pleural space:  Unremarkable.  No pneumothorax.

  Heart:  Unremarkable.  No cardiomegaly.

  Mediastinum:  Unremarkable.

  Bones/joints:  Unchanged right pleural component hemiarthroplasty.

  Tubes, lines and devices:  ETT 2.4 cm above sd.  Enteric tube with 

tip and proximal sideport below the gastroesophageal junction.  Unchanged 

right upper extremity PICC with tip in the mid right subclavian vein.

  Other findings:  Please refer to report from 11/20/2020 study from most 

contemporary information.

 

IMPRESSION:     

1.  Please refer to report from 11/20/2020 study from [facility] 

information.

2.  ETT 2.4 cm above sd.

3.  Enteric tube with tip and proximal sideport below the 

gastroesophageal junction.

4.  Unchanged right upper extremity PICC with tip in the mid right 

subclavian vein.

5.  Similar bibasilar patchy airspace opacities could represent 

multifocal infection, aspiration, or some component of atelectasis.

6.  Unchanged retrocardiac atelectasis and lateral consolidation.

7.  Low lung volumes with bronchovascular crowding.

## 2020-11-20 NOTE — CARDIAC ELECTROPHYSIOLOGY PN
Assessment/Plan


Assessment/Plan


1. Accelerated junctional rhythm. Ruled out for MI


      Echo showed ejection fraction of 55%.





2. Long run of 31 beats of Nonsustained VT on 11/3/2020. 


    Cardiac cath after stabilization.





3. Nicola 30, Asystole while on the Vent due to resp failure/ likely mucus plug .

S/P Code





4. Respiratory failure, on antibiotic and intubated on the vent 


   Failed weaning. Family refused tracheostomy 





5. Septic shock. Off Levophed  and on Midodrine 10 tid 


6. History of previous COVID infection in September 2020 and active Covid  in 

isolation


Now negative again


7. Dementia.





JEANNINE RN and Dr luna





Subjective


Subjective


  In ICU on the Vent   Fio2 30%, PEEP 5


 Had 31 beats of VT  on 11/3/20 at 16:46 and 5 beats 11/8/20 


 Coded on 11/12/20 as sat dropped to 20% and got nicola 30s and PEA and pulseless




 Off Levophed  on Midodrine 10 tid


   Covid test was negative. Failed weaning again. Family refusing tracheostomy





Objective





Last 24 Hour Vital Signs








  Date Time  Temp Pulse Resp B/P (MAP) Pulse Ox O2 Delivery O2 Flow Rate FiO2


 


11/20/20 12:00      Mechanical Ventilator  





      Mechanical Ventilator  


 


11/20/20 12:00        30


 


11/20/20 12:00 99.4 67 26 132/63 (86) 100   


 


11/20/20 11:28  68 26     30


 


11/20/20 11:00  71 24 150/70 (96) 100   


 


11/20/20 10:00  73 25 140/70 (93) 100   


 


11/20/20 09:35        30


 


11/20/20 09:32     100   


 


11/20/20 09:29  72 21     30


 


11/20/20 09:00  74 16 119/65 (83) 100   


 


11/20/20 08:00 99.4 75 16 115/60 (78) 100   


 


11/20/20 08:00      Mechanical Ventilator  





      Mechanical Ventilator  


 


11/20/20 08:00        30


 


11/20/20 07:05  74 12     30


 


11/20/20 07:00  77 11 142/67 (92) 100   


 


11/20/20 06:30  70 12     


 


11/20/20 06:00  80 13 121/61 (81) 100   


 


11/20/20 05:13  79 13     30


 


11/20/20 05:00  80 12 116/90 (99) 100   


 


11/20/20 04:00        30


 


11/20/20 04:00      Mechanical Ventilator  





      Mechanical Ventilator  


 


11/20/20 04:00 99.4 80 18 118/68 (85) 100   


 


11/20/20 03:23  80 13     30


 


11/20/20 03:02  76      


 


11/20/20 03:00  94 18 149/68 (95) 100   


 


11/20/20 02:00  81 12 99/57 (71) 100   


 


11/20/20 01:21  81 15     30


 


11/20/20 01:00  90 12 127/71 (89) 100   


 


11/20/20 00:27  88 20 135/73 100   


 


11/20/20 00:00      Mechanical Ventilator  





      Mechanical Ventilator  


 


11/20/20 00:00        30


 


11/20/20 00:00 98.5 85 19 135/73 (93) 99   


 


11/19/20 23:57  97 20 171/91 100   


 


11/19/20 23:15  84 19 171/91 (117) 100   


 


11/19/20 23:13  87      


 


11/19/20 23:00  81 19 174/96 (122) 100   


 


11/19/20 22:59  78 17     30


 


11/19/20 22:00  70 17 131/65 (87) 100   


 


11/19/20 21:00  68 13 110/58 (75) 100   


 


11/19/20 20:39  64 12     30


 


11/19/20 20:00 98.8 72 15 114/61 (78) 100   


 


11/19/20 20:00        30


 


11/19/20 20:00      Mechanical Ventilator  





      Mechanical Ventilator  


 


11/19/20 19:18  66 12     30


 


11/19/20 19:16  65      


 


11/19/20 19:00  67 16 121/69 (86) 100   


 


11/19/20 18:00  63 12 116/61 (79) 100   


 


11/19/20 17:15  65 17     30


 


11/19/20 17:00  67 12 93/52 (66) 100   


 


11/19/20 16:00 99.4       


 


11/19/20 16:00  65 12 111/58 (75) 100   


 


11/19/20 16:00        30


 


11/19/20 16:00      Mechanical Ventilator  





      Mechanical Ventilator  


 


11/19/20 15:36  67      


 


11/19/20 15:15  71 16     30


 


11/19/20 15:00  71 21 96/52 (67) 100   


 


11/19/20 14:15        30


 


11/19/20 14:00  71 13 105/56 (72) 100   


 


11/19/20 13:15        30

















Intake and Output  


 


 11/19/20 11/20/20





 18:59 06:59


 


Intake Total 1700.832 ml 1220 ml


 


Output Total 950 ml 1260 ml


 


Balance 750.832 ml -40 ml


 


  


 


IV Total 1040.832 ml 500 ml


 


Tube Feeding 660 ml 720 ml


 


Output Urine Total 950 ml 1260 ml


 


# Bowel Movements 4 2











Laboratory Tests








Test


 11/19/20


14:04 11/20/20


00:16 11/20/20


05:00 11/20/20


05:44


 


Arterial Blood pH


 7.465


(7.350-7.450) 


 


 





 


Arterial Blood Partial


Pressure CO2 31.4 mmHg


(35.0-45.0)  L 


 


 





 


Arterial Blood Partial


Pressure O2 80.1 mmHg


(75.0-100.0) 


 


 





 


Arterial Blood HCO3


 22.1 mmol/L


(22.0-26.0) 


 


 





 


Arterial Blood Oxygen


Saturation 95.7 %


() 


 


 





 


Arterial Blood Base Excess -1.1 (-2-2)     


 


Jerod Test Positive     


 


POC Whole Blood Glucose  Pending    Pending  


 


White Blood Count


 


 


 10.0 K/UL


(4.8-10.8) 





 


Red Blood Count


 


 


 3.15 M/UL


(4.70-6.10)  L 





 


Hemoglobin


 


 


 9.5 G/DL


(14.2-18.0)  L 





 


Hematocrit


 


 


 30.7 %


(42.0-52.0)  L 





 


Mean Corpuscular Volume   98 FL (80-99)   


 


Mean Corpuscular Hemoglobin


 


 


 30.3 PG


(27.0-31.0) 





 


Mean Corpuscular Hemoglobin


Concent 


 


 31.0 G/DL


(32.0-36.0)  L 





 


Red Cell Distribution Width


 


 


 17.3 %


(11.6-14.8)  H 





 


Platelet Count


 


 


 365 K/UL


(150-450) 





 


Mean Platelet Volume


 


 


 6.5 FL


(6.5-10.1) 





 


Neutrophils (%) (Auto)


 


 


 76.2 %


(45.0-75.0)  H 





 


Lymphocytes (%) (Auto)


 


 


 13.1 %


(20.0-45.0)  L 





 


Monocytes (%) (Auto)


 


 


 8.8 %


(1.0-10.0) 





 


Eosinophils (%) (Auto)


 


 


 0.7 %


(0.0-3.0) 





 


Basophils (%) (Auto)


 


 


 1.2 %


(0.0-2.0) 





 


Sodium Level


 


 


 140 MMOL/L


(136-145) 





 


Potassium Level


 


 


 4.0 MMOL/L


(3.5-5.1) 





 


Chloride Level


 


 


 108 MMOL/L


()  H 





 


Carbon Dioxide Level


 


 


 24 MMOL/L


(21-32) 





 


Anion Gap


 


 


 8 mmol/L


(5-15) 





 


Blood Urea Nitrogen


 


 


 14 mg/dL


(7-18) 





 


Creatinine


 


 


 0.9 MG/DL


(0.55-1.30) 





 


Estimat Glomerular Filtration


Rate 


 


 > 60 mL/min


(>60) 





 


Glucose Level


 


 


 121 MG/DL


()  H 





 


Calcium Level


 


 


 7.8 MG/DL


(8.5-10.1)  L 





 


Phosphorus Level


 


 


 2.9 MG/DL


(2.5-4.9) 





 


Magnesium Level


 


 


 2.2 MG/DL


(1.8-2.4) 





 


Total Bilirubin


 


 


 0.3 MG/DL


(0.2-1.0) 





 


Aspartate Amino Transf


(AST/SGOT) 


 


 16 U/L (15-37)


 





 


Alanine Aminotransferase


(ALT/SGPT) 


 


 19 U/L (12-78)


 





 


Alkaline Phosphatase


 


 


 109 U/L


() 





 


Total Protein


 


 


 6.3 G/DL


(6.4-8.2)  L 





 


Albumin


 


 


 1.6 G/DL


(3.4-5.0)  L 





 


Globulin   4.7 g/dL   


 


Albumin/Globulin Ratio


 


 


 0.3 (1.0-2.7)


L 





 


Test


 11/20/20


07:08 11/20/20


10:48 


 





 


Arterial Blood pH


 7.466


(7.350-7.450) 7.470


(7.350-7.450) 


 





 


Arterial Blood Partial


Pressure CO2 31.8 mmHg


(35.0-45.0)  L 31.8 mmHg


(35.0-45.0)  L 


 





 


Arterial Blood Partial


Pressure O2 92.9 mmHg


(75.0-100.0) 84.7 mmHg


(75.0-100.0) 


 





 


Arterial Blood HCO3


 22.4 mmol/L


(22.0-26.0) 22.6 mmol/L


(22.0-26.0) 


 





 


Arterial Blood Oxygen


Saturation 97.0 %


() 96.3 %


() 


 





 


Arterial Blood Base Excess -0.8 (-2-2)   -0.5 (-2-2)    


 


Jerod Test Positive   Positive    








Objective





HEAD AND NECK:  No JVD. Orally intubated


LUNGS:  Coarse rhonchi.


CARDIOVASCULAR:   Irregular S1 and S2 with no gallop.


ABDOMEN:  Soft.


EXTREMITIES:  No pitting edema.











Kvng Edmond MD               Nov 20, 2020 13:12

## 2020-11-21 VITALS — SYSTOLIC BLOOD PRESSURE: 122 MMHG | DIASTOLIC BLOOD PRESSURE: 58 MMHG

## 2020-11-21 VITALS — DIASTOLIC BLOOD PRESSURE: 56 MMHG | SYSTOLIC BLOOD PRESSURE: 102 MMHG

## 2020-11-21 VITALS — SYSTOLIC BLOOD PRESSURE: 127 MMHG | DIASTOLIC BLOOD PRESSURE: 58 MMHG

## 2020-11-21 VITALS — SYSTOLIC BLOOD PRESSURE: 113 MMHG | DIASTOLIC BLOOD PRESSURE: 61 MMHG

## 2020-11-21 VITALS — DIASTOLIC BLOOD PRESSURE: 66 MMHG | SYSTOLIC BLOOD PRESSURE: 121 MMHG

## 2020-11-21 VITALS — DIASTOLIC BLOOD PRESSURE: 75 MMHG | SYSTOLIC BLOOD PRESSURE: 149 MMHG

## 2020-11-21 VITALS — SYSTOLIC BLOOD PRESSURE: 139 MMHG | DIASTOLIC BLOOD PRESSURE: 97 MMHG

## 2020-11-21 VITALS — SYSTOLIC BLOOD PRESSURE: 94 MMHG | DIASTOLIC BLOOD PRESSURE: 47 MMHG

## 2020-11-21 VITALS — SYSTOLIC BLOOD PRESSURE: 124 MMHG | DIASTOLIC BLOOD PRESSURE: 63 MMHG

## 2020-11-21 VITALS — DIASTOLIC BLOOD PRESSURE: 77 MMHG | SYSTOLIC BLOOD PRESSURE: 180 MMHG

## 2020-11-21 VITALS — DIASTOLIC BLOOD PRESSURE: 57 MMHG | SYSTOLIC BLOOD PRESSURE: 144 MMHG

## 2020-11-21 VITALS — DIASTOLIC BLOOD PRESSURE: 64 MMHG | SYSTOLIC BLOOD PRESSURE: 128 MMHG

## 2020-11-21 VITALS — DIASTOLIC BLOOD PRESSURE: 72 MMHG | SYSTOLIC BLOOD PRESSURE: 145 MMHG

## 2020-11-21 VITALS — SYSTOLIC BLOOD PRESSURE: 120 MMHG | DIASTOLIC BLOOD PRESSURE: 64 MMHG

## 2020-11-21 VITALS — SYSTOLIC BLOOD PRESSURE: 130 MMHG | DIASTOLIC BLOOD PRESSURE: 97 MMHG

## 2020-11-21 VITALS — DIASTOLIC BLOOD PRESSURE: 63 MMHG | SYSTOLIC BLOOD PRESSURE: 116 MMHG

## 2020-11-21 VITALS — DIASTOLIC BLOOD PRESSURE: 53 MMHG | SYSTOLIC BLOOD PRESSURE: 115 MMHG

## 2020-11-21 VITALS — DIASTOLIC BLOOD PRESSURE: 58 MMHG | SYSTOLIC BLOOD PRESSURE: 94 MMHG

## 2020-11-21 VITALS — SYSTOLIC BLOOD PRESSURE: 110 MMHG | DIASTOLIC BLOOD PRESSURE: 59 MMHG

## 2020-11-21 VITALS — SYSTOLIC BLOOD PRESSURE: 90 MMHG | DIASTOLIC BLOOD PRESSURE: 49 MMHG

## 2020-11-21 VITALS — DIASTOLIC BLOOD PRESSURE: 72 MMHG | SYSTOLIC BLOOD PRESSURE: 127 MMHG

## 2020-11-21 VITALS — SYSTOLIC BLOOD PRESSURE: 121 MMHG | DIASTOLIC BLOOD PRESSURE: 62 MMHG

## 2020-11-21 VITALS — DIASTOLIC BLOOD PRESSURE: 47 MMHG | SYSTOLIC BLOOD PRESSURE: 92 MMHG

## 2020-11-21 VITALS — SYSTOLIC BLOOD PRESSURE: 94 MMHG | DIASTOLIC BLOOD PRESSURE: 64 MMHG

## 2020-11-21 LAB
ADD MANUAL DIFF: YES
ALBUMIN SERPL-MCNC: 1.5 G/DL (ref 3.4–5)
ALBUMIN/GLOB SERPL: 0.3 {RATIO} (ref 1–2.7)
ALP SERPL-CCNC: 120 U/L (ref 46–116)
ALT SERPL-CCNC: 15 U/L (ref 12–78)
ANION GAP SERPL CALC-SCNC: 6 MMOL/L (ref 5–15)
AST SERPL-CCNC: 14 U/L (ref 15–37)
BILIRUB SERPL-MCNC: 0.4 MG/DL (ref 0.2–1)
BUN SERPL-MCNC: 12 MG/DL (ref 7–18)
CALCIUM SERPL-MCNC: 7.8 MG/DL (ref 8.5–10.1)
CHLORIDE SERPL-SCNC: 105 MMOL/L (ref 98–107)
CO2 SERPL-SCNC: 26 MMOL/L (ref 21–32)
CREAT SERPL-MCNC: 0.8 MG/DL (ref 0.55–1.3)
ERYTHROCYTE [DISTWIDTH] IN BLOOD BY AUTOMATED COUNT: 16.8 % (ref 11.6–14.8)
GLOBULIN SER-MCNC: 4.8 G/DL
HCT VFR BLD CALC: 30.8 % (ref 42–52)
HGB BLD-MCNC: 9.6 G/DL (ref 14.2–18)
MCV RBC AUTO: 98 FL (ref 80–99)
PHOSPHATE SERPL-MCNC: 2.7 MG/DL (ref 2.5–4.9)
PLATELET # BLD: 333 K/UL (ref 150–450)
POTASSIUM SERPL-SCNC: 4.1 MMOL/L (ref 3.5–5.1)
RBC # BLD AUTO: 3.15 M/UL (ref 4.7–6.1)
SODIUM SERPL-SCNC: 137 MMOL/L (ref 136–145)
WBC # BLD AUTO: 14.8 K/UL (ref 4.8–10.8)

## 2020-11-21 RX ADMIN — MIDODRINE HYDROCHLORIDE SCH MG: 10 TABLET ORAL at 00:44

## 2020-11-21 RX ADMIN — INSULIN ASPART SCH UNITS: 100 INJECTION, SOLUTION INTRAVENOUS; SUBCUTANEOUS at 00:00

## 2020-11-21 RX ADMIN — PANTOPRAZOLE SODIUM SCH MG: 40 INJECTION, POWDER, FOR SOLUTION INTRAVENOUS at 09:32

## 2020-11-21 RX ADMIN — HEPARIN SODIUM SCH UNITS: 5000 INJECTION INTRAVENOUS; SUBCUTANEOUS at 20:23

## 2020-11-21 RX ADMIN — INSULIN ASPART SCH UNITS: 100 INJECTION, SOLUTION INTRAVENOUS; SUBCUTANEOUS at 18:00

## 2020-11-21 RX ADMIN — ACETAMINOPHEN PRN MG: 160 SOLUTION ORAL at 12:29

## 2020-11-21 RX ADMIN — INSULIN ASPART SCH UNITS: 100 INJECTION, SOLUTION INTRAVENOUS; SUBCUTANEOUS at 12:00

## 2020-11-21 RX ADMIN — MIDODRINE HYDROCHLORIDE SCH MG: 10 TABLET ORAL at 18:19

## 2020-11-21 RX ADMIN — HEPARIN SODIUM SCH UNITS: 5000 INJECTION INTRAVENOUS; SUBCUTANEOUS at 09:31

## 2020-11-21 RX ADMIN — INSULIN ASPART SCH UNITS: 100 INJECTION, SOLUTION INTRAVENOUS; SUBCUTANEOUS at 06:00

## 2020-11-21 RX ADMIN — MEROPENEM SCH MLS/HR: 1 INJECTION INTRAVENOUS at 18:19

## 2020-11-21 RX ADMIN — CHLORHEXIDINE GLUCONATE SCH APPLIC: 213 SOLUTION TOPICAL at 20:21

## 2020-11-21 RX ADMIN — MIDODRINE HYDROCHLORIDE SCH MG: 10 TABLET ORAL at 09:32

## 2020-11-21 RX ADMIN — MEROPENEM SCH MLS/HR: 1 INJECTION INTRAVENOUS at 09:54

## 2020-11-21 RX ADMIN — INSULIN ASPART SCH UNITS: 100 INJECTION, SOLUTION INTRAVENOUS; SUBCUTANEOUS at 23:45

## 2020-11-21 RX ADMIN — LORAZEPAM PRN MG: 2 INJECTION, SOLUTION INTRAMUSCULAR; INTRAVENOUS at 02:47

## 2020-11-21 NOTE — NUR
NURSE NOTES:

AM meds were administered. Pt was repositioned for comfort. Pt remains on CPAP for weaning, 
tolerating well with stable VS.

## 2020-11-21 NOTE — NUR
NURSE NOTES:

Pt had BM x1, soft/pasty/light brown. Pt was cleaned, gown/bed linens were changed. Oral 
care was done and pt suctioned. Pt was repositioned for comfort with bilateral extremities 
elevated on pillows. Bilateral soft restraints remain in place for pt safety to prevent 
self-extubation; with skin/vascular extremity at restraint site within normal limits.

## 2020-11-21 NOTE — CARDIAC ELECTROPHYSIOLOGY PN
Assessment/Plan


Assessment/Plan


1. Accelerated junctional rhythm. Ruled out for MI


      Echo showed ejection fraction of 55%.





2. Long run of 31 beats of Nonsustained VT on 11/3/2020. 


    Cardiac cath after stabilization.





3. Nicola 30, Asystole while on the Vent due to resp failure/ likely mucus plug .

S/P Code





4. Respiratory failure, on antibiotic and intubated on the vent 


   Failed weaning. Family refused tracheostomy 





5. Septic shock. Off Levophed  and on Midodrine 10 tid 


6. History of previous COVID infection in September 2020 and active Covid  in 

isolation


Now negative again


7. Dementia.





JEANNINE RN and Dr luna





Subjective


Subjective


  In ICU on the Vent   Fio2 30%, PEEP 5


 Had 31 beats of VT  on 11/3/20 at 16:46 and 5 beats 11/8/20 


 Coded on 11/12/20 as sat dropped to 20% and got nicola 30s and PEA and pulseless




 Off Levophed  on Midodrine 10 tid


   Covid test was negative. Failed weaning again. Family refusing tracheostomy


In SR





Objective





Last 24 Hour Vital Signs








  Date Time  Temp Pulse Resp B/P (MAP) Pulse Ox O2 Delivery O2 Flow Rate FiO2


 


11/21/20 21:00  84 13 120/64 (82) 100   


 


11/21/20 20:00      Mechanical Ventilator  





      Mechanical Ventilator  





      Mechanical Ventilator  


 


11/21/20 20:00  78 12 121/62 (81) 100   


 


11/21/20 20:00  78      


 


11/21/20 20:00        30


 


11/21/20 19:00  75 13 127/72 (90) 100   


 


11/21/20 18:50  77 13     30


 


11/21/20 18:00  74 12 113/61 (78) 100   


 


11/21/20 17:34  77 12  100 Mechanical Ventilator  30


 


11/21/20 17:00 99.5 70 12 102/56 (71) 100   


 


11/21/20 16:00  73 12 110/59 (76) 100   


 


11/21/20 16:00  73      


 


11/21/20 16:00        30


 


11/21/20 16:00      Mechanical Ventilator  





      Mechanical Ventilator  


 


11/21/20 15:49  76 12     30


 


11/21/20 15:00  69 12 94/64 (74) 100   


 


11/21/20 14:00  78 13 94/58 (70) 100   


 


11/21/20 13:00 99.0 87 18 116/63 (80) 100   


 


11/21/20 12:59 99.0       


 


11/21/20 12:00 101.5 83 27 145/72 (96) 100   


 


11/21/20 12:00        30


 


11/21/20 12:00  86      


 


11/21/20 12:00        30


 


11/21/20 12:00      Mechanical Ventilator  





      Mechanical Ventilator  


 


11/21/20 11:00  78 28 127/58 (81) 100   


 


11/21/20 10:36  83 26     30


 


11/21/20 10:00  84 23 139/97 (111) 97   


 


11/21/20 09:00  86 29 122/58 (79) 100   


 


11/21/20 08:25        30


 


11/21/20 08:25     94   


 


11/21/20 08:00 99.9 86 16 115/53 (73) 95   


 


11/21/20 08:00  84      


 


11/21/20 08:00      Mechanical Ventilator  





      Mechanical Ventilator  


 


11/21/20 08:00        30


 


11/21/20 07:00  71 12 124/63 (83) 100   


 


11/21/20 06:54  75 12     30


 


11/21/20 06:00  70 12     


 


11/21/20 06:00 99.8 73 12 90/49 (63) 100   


 


11/21/20 05:00 100.0 75 15 94/47 (63) 100   


 


11/21/20 04:00      Mechanical Ventilator  





      Mechanical Ventilator  


 


11/21/20 04:00  77      


 


11/21/20 04:00  87 13 92/47 (62) 100   


 


11/21/20 04:00        30


 


11/21/20 04:00        30


 


11/21/20 03:49  120 17     30


 


11/21/20 03:37 100.0       


 


11/21/20 03:17  77 18 94/48 100   


 


11/21/20 03:00 101.5 129 26 130/97 (108) 100   


 


11/21/20 02:47  115 22 203/109 100   


 


11/21/20 02:00  76 13 180/77 (111) 100   


 


11/21/20 01:00  78 20 144/57 (86) 100   


 


11/21/20 00:00        30


 


11/21/20 00:00 98.6 79 14 128/64 (85) 100   


 


11/21/20 00:00      Mechanical Ventilator  





      Mechanical Ventilator  


 


11/21/20 00:00  100      


 


11/20/20 23:05  73 14     30


 


11/20/20 23:00  76 13 126/64 (84) 100   


 


11/20/20 22:00  72 13 122/60 (80) 100   

















Intake and Output  


 


 11/20/20 11/21/20





 19:00 07:00


 


Intake Total 1420 ml 1420 ml


 


Output Total 1220 ml 755 ml


 


Balance 200 ml 665 ml


 


  


 


Free Water  150 ml


 


IV Total 900 ml 550 ml


 


Tube Feeding 420 ml 720 ml


 


Other 100 ml 


 


Output Urine Total 1220 ml 755 ml


 


# Bowel Movements 1 3











Laboratory Tests








Test


 11/21/20


04:41 11/21/20


05:20 11/21/20


10:35


 


POC Whole Blood Glucose


 139 MG/DL


()  H 


 





 


White Blood Count


 


 14.8 K/UL


(4.8-10.8)  H 





 


Red Blood Count


 


 3.15 M/UL


(4.70-6.10)  L 





 


Hemoglobin


 


 9.6 G/DL


(14.2-18.0)  L 





 


Hematocrit


 


 30.8 %


(42.0-52.0)  L 





 


Mean Corpuscular Volume  98 FL (80-99)   


 


Mean Corpuscular Hemoglobin


 


 30.6 PG


(27.0-31.0) 





 


Mean Corpuscular Hemoglobin


Concent 


 31.3 G/DL


(32.0-36.0)  L 





 


Red Cell Distribution Width


 


 16.8 %


(11.6-14.8)  H 





 


Platelet Count


 


 333 K/UL


(150-450) 





 


Mean Platelet Volume


 


 6.6 FL


(6.5-10.1) 





 


Neutrophils (%) (Auto)


 


 % (45.0-75.0)


 





 


Lymphocytes (%) (Auto)


 


 % (20.0-45.0)


 





 


Monocytes (%) (Auto)   % (1.0-10.0)   


 


Eosinophils (%) (Auto)   % (0.0-3.0)   


 


Basophils (%) (Auto)   % (0.0-2.0)   


 


Differential Total Cells


Counted 


 100  


 





 


Neutrophils % (Manual)  87 % (45-75)  H 


 


Lymphocytes % (Manual)  10 % (20-45)  L 


 


Monocytes % (Manual)  3 % (1-10)   


 


Eosinophils % (Manual)  0 % (0-3)   


 


Basophils % (Manual)  0 % (0-2)   


 


Band Neutrophils  0 % (0-8)   


 


Platelet Estimate  Adequate   


 


Platelet Morphology  Normal   


 


Hypochromasia  1+   


 


Anisocytosis  1+   


 


Erythrocyte Sedimentation Rate


 


 109 MM/HR


(0-20)  H 





 


Sodium Level


 


 137 MMOL/L


(136-145) 





 


Potassium Level


 


 4.1 MMOL/L


(3.5-5.1) 





 


Chloride Level


 


 105 MMOL/L


() 





 


Carbon Dioxide Level


 


 26 MMOL/L


(21-32) 





 


Anion Gap


 


 6 mmol/L


(5-15) 





 


Blood Urea Nitrogen


 


 12 mg/dL


(7-18) 





 


Creatinine


 


 0.8 MG/DL


(0.55-1.30) 





 


Estimat Glomerular Filtration


Rate 


 > 60 mL/min


(>60) 





 


Glucose Level


 


 138 MG/DL


()  H 





 


Calcium Level


 


 7.8 MG/DL


(8.5-10.1)  L 





 


Phosphorus Level


 


 2.7 MG/DL


(2.5-4.9) 





 


Magnesium Level


 


 2.1 MG/DL


(1.8-2.4) 





 


Total Bilirubin


 


 0.4 MG/DL


(0.2-1.0) 





 


Aspartate Amino Transf


(AST/SGOT) 


 14 U/L (15-37)


L 





 


Alanine Aminotransferase


(ALT/SGPT) 


 15 U/L (12-78)


 





 


Alkaline Phosphatase


 


 120 U/L


()  H 





 


C-Reactive Protein,


Quantitative 


 7.7 mg/dL


(0.00-0.90)  H 





 


Total Protein


 


 6.3 G/DL


(6.4-8.2)  L 





 


Albumin


 


 1.5 G/DL


(3.4-5.0)  L 





 


Globulin  4.8 g/dL   


 


Albumin/Globulin Ratio


 


 0.3 (1.0-2.7)


L 





 


Arterial Blood pH


 


 


 7.472


(7.350-7.450)


 


Arterial Blood Partial


Pressure CO2 


 


 31.5 mmHg


(35.0-45.0)  L


 


Arterial Blood Partial


Pressure O2 


 


 65.2 mmHg


(75.0-100.0)  L


 


Arterial Blood HCO3


 


 


 22.5 mmol/L


(22.0-26.0)


 


Arterial Blood Oxygen


Saturation 


 


 93.7 %


()  L


 


Arterial Blood Base Excess   -0.6 (-2-2)  


 


Jerod Test   Positive  











Microbiology








 Date/Time


Source Procedure


Growth Status





 


 11/19/20 17:15


Sputum Gram Stain - Final Resulted


 


 11/19/20 17:15 Sputum Culture - Preliminary


Gram Negative Raman Resulted








Objective





HEAD AND NECK:  No JVD. Orally intubated


LUNGS:  Coarse rhonchi.


CARDIOVASCULAR:   Irregular S1 and S2 with no gallop.


ABDOMEN:  Soft.


EXTREMITIES:  No pitting edema.











Kvng Edmond MD               Nov 21, 2020 21:48

## 2020-11-21 NOTE — NUR
NURSE NOTES:

Received  report from LITA Richardson

Pt is awake, does not follow commands.

Orally intubated on 11/6: ETT 7.5 at 25cm lipline with vent settings AC12, , FIO2 30%, 
Peep 0, at 100% O2Sat. NSR on cardiac monitor. 

Left nare NGT with feeding Glucerna 1.2 at goal rate of 60mL/hour with zero residual. 

Cummings catheter is present, draining clear/yellow urine. 

Skin alterations present including bilateral buttocks and bilateral heels redness. 

Pt is on pressure release mattress, P200. 

Bilateral soft wrist restraints are present for pt safety.

Skin/vascular integrity at restraint sites remain within normal limits. 

Safety measure observed and no acute distress noted. Will continue to monitor.

## 2020-11-21 NOTE — NEPHROLOGY PROGRESS NOTE
Assessment/Plan


Problem List:  


(1) Dehydration


(2) JANES (acute kidney injury)


(3) Renal failure (ARF), acute on chronic


(4) Hypoxia


(5) Acute encephalopathy


(6) Electrolyte imbalance


Assessment





75-year-old male is admitted with acute hypoxic respiratory failure most likely 

secondary to pneumonia and sepsis, UTI.


Acute on chronic renal failure


Dehydration


Electrolyte imbalances, hypernatremia


Hypoalbuminemia


Diabetes type 2


History of congestive heart failure


Hypertension


Hyperlipemia


Alzheimer's


Previous COVID-19 infection in September 2020


Plan


November 21: Remains intubated.  Labs reviewed.  Renal parameters stable.  

Continue per consultants.


November 20: Remains on mechanical ventilation.  Labs reviewed.  Abnormal 

electrolytes addressed.  Stable from renal standpoint of view.


November 19: Remains intubated.  Remains full code.  Labs reviewed.  Low 

phosphorus replaced.  Continue to monitor renal parameters.  Continue per 

consultants.


November 18: Failed weaning.  Remains intubated.  Remains full closed.  Labs 

reviewed.  Stable from renal standpoint view.


November 17: Remains in ICU.  Remains full code.  Labs are reviewed.  Phosphorus

supplement given.  Continue per consultants.


November 16: Remains in ICU.  Full code.  Labs reviewed.  Remains intubated.  

Stable renal parameters.


November 15: In ICU.  Full code.  Discussed with RN.  Stable renal parameters.


November 14: Seen in ICU.  Discussed with LITA Cooper.  Flomax discontinued.  

Labs reviewed.  Stable from renal standpoint of view.


November 13: Seen in ICU.  Discussed with LITA Cooper.  Remains on pressor.  

Electrolyte abnormalities noted and addressed.  Continue per consultants.  

Patient remains full code.


November 12: Seen in ICU.  Discussed with LITA Richardson.  Blood pressure remains a 

little requiring pressors.  Labs reviewed.  Stable renal parameters.  Continue 

per consultants.


November 11: Remains intubated.  Full code.  Blood pressure borderline low.  

Will increase midodrine dose.  Discussed with RN.  Continue to monitor renal 

parameters and electrolytes.


November 10: Intubated.  Full code.  Labs reviewed.  Stable from renal 

standpoint of view.  Continue per consultants.


November 9: Remains intubated.  Full code.  Labs reviewed.  Electrolytes within 

normal limit.  Continue per consultants.


November 8: In ICU.  Remains intubated on ventilator.  Remains full code.  Low 

potassium addressed.  Blood pressure fluctuating.  Continue per consultants.


November 7: Patient in ICU.  Intubated on ventilator.  On 6 mics of Levophed.  

Will give 100 cc albumin 25%.  K-Phos IV ordered.  Continue per consultants.  

Continue monitor renal parameters and electrolytes.


November 6: Status unchanged.  Transfer to [facility] for seizure.  Stable from renal 

standpoint to view.  Continue per consultants.


November 5: Status quo.  Labs reviewed.  Renal parameters stable.  Continue per 

consultants.


November 4: On nonrebreather mask.  Labs reviewed.  Renal parameters 

stable.Continue per pulmonologist.  Clinically unchanged.


November 3: On nonrebreather mask.  Inflammatory markers gradually declining.  

Renal parameters stable.  Continue per pulmonary.


November 2: Remains on nonrebreather mask.  Inflammatory markers remain 

elevated.  Renal parameters somewhat stable.  Continue per pulmonary and ID.  

Remains full code.


November 1: Patient on nonrebreather mask.  Labs noted.  Serum creatinine down 

to 1.3.  Continue per consultants.


October 31: Patient on nonrebreather mask.  Transfer to telemetry when seen this

morning.  Labs noted.  Continue per consultants.  Serum creatinine dylan to 1.7. 

Continue to monitor renal parameters.  Continue to monitor vancomycin level


October 30: Patient is not doing well clinically.  Mild respiratory distress.  

ABG noted.  Somewhat hypoxic.  CBC and chemistry panel ordered.  Discussed with 

LITA Garcia.  Will defer to pulmonary management to specialist.  Continue per ID.

 Renal parameters remained stable as of October 29.


October 29: Labs reviewed.  Renal parameters stable.  Continue per consultants.


October 28: Labs reviewed.  Potassium via NG tube ordered.  IV fluids stopped.  

Continue per consultants.


October 27: Labs reviewed.  Low potassium and low phosphorus replaced.  Continue

per consultants.  Remains stable from renal standpoint of view.


October 26: Patient remains n.p.o.  IV fluid down to 50 cc an hour.  Potassium 

supplement intravenously ordered.  Continue per consultants.





Previously:


Patient is n.p.o., will continue on IV fluid of D5W 75 cc an hour


We will monitor electrolytes and renal parameters


Avoid nephrotoxic's


Start p.o. when he clears by speech therapist, meanwhile aspiration precautions


Keep the blood pressure and blood sugar in check


Per orders





Subjective


ROS Limited/Unobtainable:  Yes





Objective


Objective





Last 24 Hour Vital Signs








  Date Time  Temp Pulse Resp B/P (MAP) Pulse Ox O2 Delivery O2 Flow Rate FiO2


 


11/21/20 08:25        30


 


11/21/20 08:25     94   


 


11/21/20 06:54  75 12     30


 


11/21/20 06:00  70 12     


 


11/21/20 06:00 99.8 73 12 90/49 (63) 100   


 


11/21/20 05:00 100.0 75 15 94/47 (63) 100   


 


11/21/20 04:00      Mechanical Ventilator  





      Mechanical Ventilator  


 


11/21/20 04:00  77      


 


11/21/20 04:00  87 13 92/47 (62) 100   


 


11/21/20 04:00        30


 


11/21/20 04:00        30


 


11/21/20 03:49  120 17     30


 


11/21/20 03:37 100.0       


 


11/21/20 03:17  77 18 94/48 100   


 


11/21/20 03:00 101.5 129 26 130/97 (108) 100   


 


11/21/20 02:47  115 22 203/109 100   


 


11/21/20 02:00  76 13 180/77 (111) 100   


 


11/21/20 01:00  78 20 144/57 (86) 100   


 


11/21/20 00:00        30


 


11/21/20 00:00 98.6 79 14 128/64 (85) 100   


 


11/21/20 00:00      Mechanical Ventilator  





      Mechanical Ventilator  


 


11/21/20 00:00  100      


 


11/20/20 23:05  73 14     30


 


11/20/20 23:00  76 13 126/64 (84) 100   


 


11/20/20 22:00  72 13 122/60 (80) 100   


 


11/20/20 21:00  72 14 138/62 (87) 100   


 


11/20/20 20:00 99.0 72 13 123/59 (80) 100   


 


11/20/20 20:00        30


 


11/20/20 20:00      Mechanical Ventilator  





      Mechanical Ventilator  


 


11/20/20 20:00  77      


 


11/20/20 19:34  71 14     30


 


11/20/20 19:00  67 16 138/66 (90) 100   


 


11/20/20 18:00  70 13 129/69 (89) 100   


 


11/20/20 17:00  71 12 103/49 (67) 100   


 


11/20/20 16:30  76 12     30


 


11/20/20 16:00      Mechanical Ventilator  





      Mechanical Ventilator  


 


11/20/20 16:00 99.6 73 13 122/57 (78) 100   


 


11/20/20 16:00  82      


 


11/20/20 16:00        30


 


11/20/20 15:29  72 12     30


 


11/20/20 15:00  72 17 121/59 (79) 97   


 


11/20/20 14:15        30


 


11/20/20 14:00  79 20 138/86 (103) 100   


 


11/20/20 13:06  69 24     30


 


11/20/20 13:00  68 24 139/70 (93) 100   


 


11/20/20 12:00      Mechanical Ventilator  





      Mechanical Ventilator  


 


11/20/20 12:00        30


 


11/20/20 12:00 99.4 67 26 132/63 (86) 100   


 


11/20/20 12:00  68      


 


11/20/20 11:28  68 26     30


 


11/20/20 11:00  71 24 150/70 (96) 100   

















Intake and Output  


 


 11/20/20 11/21/20





 19:00 07:00


 


Intake Total 1420 ml 1360 ml


 


Output Total 1220 ml 715 ml


 


Balance 200 ml 645 ml


 


  


 


Free Water  150 ml


 


IV Total 900 ml 550 ml


 


Tube Feeding 420 ml 660 ml


 


Other 100 ml 


 


Output Urine Total 1220 ml 715 ml


 


# Bowel Movements 1 3








Laboratory Tests


11/21/20 04:41: POC Whole Blood Glucose 139H


11/21/20 05:20: 


White Blood Count 14.8H, Red Blood Count 3.15L, Hemoglobin 9.6L, Hematocrit 

30.8L, Mean Corpuscular Volume 98, Mean Corpuscular Hemoglobin 30.6, Mean 

Corpuscular Hemoglobin Concent 31.3L, Red Cell Distribution Width 16.8H, 

Platelet Count 333, Mean Platelet Volume 6.6, Neutrophils (%) (Auto) , 

Lymphocytes (%) (Auto) , Monocytes (%) (Auto) , Eosinophils (%) (Auto) , 

Basophils (%) (Auto) , Differential Total Cells Counted 100, Neutrophils % 

(Manual) 87H, Lymphocytes % (Manual) 10L, Monocytes % (Manual) 3, Eosinophils % 

(Manual) 0, Basophils % (Manual) 0, Band Neutrophils 0, Platelet Estimate 

Adequate, Platelet Morphology Normal, Hypochromasia 1+, Anisocytosis 1+, 

Erythrocyte Sedimentation Rate 109H, Sodium Level 137, Potassium Level 4.1, 

Chloride Level 105, Carbon Dioxide Level 26, Anion Gap 6, Blood Urea Nitrogen 

12, Creatinine 0.8, Estimat Glomerular Filtration Rate > 60, Glucose Level 138H,

Calcium Level 7.8L, Phosphorus Level 2.7, Magnesium Level 2.1, Total Bilirubin 

0.4, Aspartate Amino Transf (AST/SGOT) 14L, Alanine Aminotransferase (ALT/SGPT) 

15, Alkaline Phosphatase 120H, C-Reactive Protein, Quantitative 7.7H, Total 

Protein 6.3L, Albumin 1.5L, Globulin 4.8, Albumin/Globulin Ratio 0.3L


Height (Feet):  5


Height (Inches):  4.00


Weight (Pounds):  130


General Appearance:  no apparent distress


EENT:  other - Intubated on ventilator


Cardiovascular:  normal rate


Respiratory/Chest:  decreased breath sounds


Abdomen:  soft











Seven Tobar MD            Nov 21, 2020 10:58

## 2020-11-21 NOTE — NUR
NURSE NOTES:

Pt was placed back to previous AC vent settings from CPAP/weaning since pt became restless. 
O2Sat is now 100%, however pt is warm to touch with Temp 101.5F axillary. Will administer 
PRN Tylenol as ordered.

## 2020-11-21 NOTE — NUR
NURSE HAND-OFF REPORT: 



Latest Vital Signs: Temperature 99.5 , Pulse 75 , B/P 127 /72 , Respiratory Rate 13 , O2 SAT 
100 , Mechanical Ventilator ETT 7.5 at 25cm/lipline with vent settings AC12, , Peep 0, 
FIO2 30% at 100% O2Sat.  

Vital Sign Comment: Pt is on Midodrine; off pressors. 



EKG Rhythm: Sinus Rhythm

Rhythm change?: N 

MD Notified?: DIDI Laureano MD Response: 



Latest Mejia Fall Score: 50  

Fall Risk: High Risk 

Safety Measures: Call light Within Reach, Bed Alarm Zone 2, Side Rails Side Rails x3, Bed 
position Low and Locked.

Fall Precautions: 

Yellow Socks

Yellow Gown

Door Sign

Patient Fall Education



Report given to Arelis LONDON. Endorsed plan of care.

## 2020-11-21 NOTE — NUR
NURSE NOTES:

Pt was placed on CPAP with PS 8, FIO2 30% for weaning. Pt is tolerating well with O2Sat at 
100% and no respiratory distress, with stable VS.

## 2020-11-21 NOTE — NUR
NURSE NOTES:

Pt was administered Tylenol per PRN order for fever, with current Temp=101.5F axillary. 
Remaining VS remain stable.

## 2020-11-21 NOTE — NUR
NURSE NOTES:

Pt was assessed after receiving change of shift report from Deborah LONDON. Pt is awake, does not 
follow commands, orally intubated. ETT 7.5 at 25cm lipline with vent settings AC12, , 
FIO2 30%, Peep 0, at 100% O2Sat. NSR on cardiac monitor; HR 72. Temp 99.9F axillary. Left 
nare NGT with feeding Glucerna 1.2 at goal rate of 60ml/hour with zero residual. Abdomen is 
round, soft, nontender to touch with hypoactive bowel sounds. Cummings catheter is present, 
draining clear/yellow urine. Skin alterations present including bilateral buttocks and 
bilateral heels sites. Pt is on pressure release mattress. Bilateral soft wrist restraints 
are present for pt safety to prevent self extubation, since pt is observed attempting to 
reach ET tube. Skin/vascular integrity at restraint sites remain within normal limits. HOB 
at 30 degrees, bed locked, three side rails up.

## 2020-11-21 NOTE — PULMONOLGY CRITICAL CARE NOTE
Critical Care - Asmt/Plan


Assessment/Plan:





ASSESSMENT


Acute hypoxemic respiratory failure , requiring  intubation ( initially 100% 

NRM) 


Septic shock


Pneumonia , possible aspiration


Probable UTI


Hx of COVID 19 9/9/20 


Dysphagia


JANES-resolved 


Hypernatremia


History of CVA


Diabetes mellitus


History of hypertension


Alzheimer dementia





PLAN OF CARE


ICU


vent support pulm toilet


fup with CXR and ABG  


failing weaning, family declining trach


off pressors, on Midodrine


ECHO with pEF, r/o for MI


cardio on board 


abx  as per ID recs,  


DVT and GI  prophylaxis 


strict aspiration precaution


gentle IVF 


monitor renal parameters,  lytes , correct electrolytes as needed ,avoid 

nephrotoxic


BS management with SSI


supportive care   


remains FC   





case discussed and evaluated by supervising physician





Critical Care - Objective





Last 24 Hour Vital Signs








  Date Time  Temp Pulse Resp B/P (MAP) Pulse Ox O2 Delivery O2 Flow Rate FiO2


 


11/21/20 08:25        30


 


11/21/20 08:25     94   


 


11/21/20 06:54  75 12     30


 


11/21/20 06:00  70 12     


 


11/21/20 06:00 99.8 73 12 90/49 (63) 100   


 


11/21/20 05:00 100.0 75 15 94/47 (63) 100   


 


11/21/20 04:00      Mechanical Ventilator  





      Mechanical Ventilator  


 


11/21/20 04:00  77      


 


11/21/20 04:00  87 13 92/47 (62) 100   


 


11/21/20 04:00        30


 


11/21/20 04:00        30


 


11/21/20 03:49  120 17     30


 


11/21/20 03:37 100.0       


 


11/21/20 03:17  77 18 94/48 100   


 


11/21/20 03:00 101.5 129 26 130/97 (108) 100   


 


11/21/20 02:47  115 22 203/109 100   


 


11/21/20 02:00  76 13 180/77 (111) 100   


 


11/21/20 01:00  78 20 144/57 (86) 100   


 


11/21/20 00:00        30


 


11/21/20 00:00 98.6 79 14 128/64 (85) 100   


 


11/21/20 00:00      Mechanical Ventilator  





      Mechanical Ventilator  


 


11/21/20 00:00  100      


 


11/20/20 23:05  73 14     30


 


11/20/20 23:00  76 13 126/64 (84) 100   


 


11/20/20 22:00  72 13 122/60 (80) 100   


 


11/20/20 21:00  72 14 138/62 (87) 100   


 


11/20/20 20:00 99.0 72 13 123/59 (80) 100   


 


11/20/20 20:00        30


 


11/20/20 20:00      Mechanical Ventilator  





      Mechanical Ventilator  


 


11/20/20 20:00  77      


 


11/20/20 19:34  71 14     30


 


11/20/20 19:00  67 16 138/66 (90) 100   


 


11/20/20 18:00  70 13 129/69 (89) 100   


 


11/20/20 17:00  71 12 103/49 (67) 100   


 


11/20/20 16:30  76 12     30


 


11/20/20 16:00      Mechanical Ventilator  





      Mechanical Ventilator  


 


11/20/20 16:00 99.6 73 13 122/57 (78) 100   


 


11/20/20 16:00  82      


 


11/20/20 16:00        30


 


11/20/20 15:29  72 12     30


 


11/20/20 15:00  72 17 121/59 (79) 97   


 


11/20/20 14:15        30


 


11/20/20 14:00  79 20 138/86 (103) 100   


 


11/20/20 13:06  69 24     30


 


11/20/20 13:00  68 24 139/70 (93) 100   


 


11/20/20 12:00      Mechanical Ventilator  





      Mechanical Ventilator  


 


11/20/20 12:00        30


 


11/20/20 12:00 99.4 67 26 132/63 (86) 100   


 


11/20/20 12:00  68      


 


11/20/20 11:28  68 26     30


 


11/20/20 11:00  71 24 150/70 (96) 100   


 


11/20/20 10:00  73 25 140/70 (93) 100   








Objective:


General Appearance:  no apparent distress,   intubated  on vent -12-30 

currnetly on CPAP


Lines, tubes and drains: R PICC intact 


HEENT:  normocephalic, atraumatic, anicteric, NGT ,   OP with ET in place, 

intact 


Respiratory/Chest:   few scattered  rhonchi


Cardiovascular/Chest:  normal peripheral pulses, SR


Abdomen:  normal bowel sounds, non tender, soft 


: Cummings 


Extremities:  no calf tenderness, normal capillary refill


Neurologic:  abnormal gait /bedridden


Musculoskeletal:  atrophy - BLE


Micro:





Microbiology








 Date/Time


Source Procedure


Growth Status





 


 11/19/20 17:15


Sputum Gram Stain - Final Resulted


 


 11/19/20 17:15 Sputum Culture - Preliminary


Gram Negative Raman Resulted








Accucheck:  139





Critical Care - Subjective


ROS Limited/Unobtainable:  Yes


Interval Events:


leukocytosis and low grade fever this am


CXR 11/20 noted BP stable, off pressors, on Midodrine 


failing weaning, family declining trach  


currently on CPAP


Condition:  critical


IV Access:  PICC - RUE intact


EKG Rhythm:  Sinus Rhythm


FI02:  30


Vent Support Breath Rate:  12


Vent Support Mode:  AC


Vent Tidal Volume:  600


Sputum Amount:  Moderate


PEEP:  0.0


PIP:  16


Fluids:  1/2 NS at 50


Tube Feeding Amount:  60


I&O:











Intake and Output  


 


 11/20/20 11/21/20





 19:00 07:00


 


Intake Total 1420 ml 1360 ml


 


Output Total 1220 ml 715 ml


 


Balance 200 ml 645 ml


 


  


 


Free Water  150 ml


 


IV Total 900 ml 550 ml


 


Tube Feeding 420 ml 660 ml


 


Other 100 ml 


 


Output Urine Total 1220 ml 715 ml


 


# Bowel Movements 1 3








CXR:


11/20 


1.  Unchanged ETT 3.5 cm above sd.


2.  Unchanged enteric tube with tip and proximal sideport below the 


gastroesophageal junction.


3.  Mildly improved, left worse than right bibasilar patchy airspace 


consolidations.


4.  Unchanged retrocardiac atelectasis without or with consolidation.


5.  Unchanged low lung volumes with bronchovascular crowding.


ET-Tube:  7.5


ET Position:  25











Ely Ni NP                Nov 21, 2020 10:11

## 2020-11-21 NOTE — INFECTIOUS DISEASES PROG NOTE
Assessment/Plan





76yo M with:





Respiratory code 2ry to mucus plug 11/12


Septic Shock- -recurrent


Fever, recurrent, low grade;SP


Leukocytosis; recurrent; increased


Acute hypoxic resp failure, on NRB mask> 4l NC; back on NRB, desaturation 10/29 

> intubated 11/6


Pneumonia- >HAP


Hx of COVID19 PNA 9/9/20 11/17 CXR: No significant interval change in the radiographic appearance the 

chest compared to one day prior.


   11/14 Resp cx +MRSA, nl resp hayden


   UCx neg


   BCx NTD


   11/13 COVID PCR neg


         --11/10 CXR: Bilateral infiltrates in a peribronchovascular 

distribution are unchanged. Left pleural effusion is unchanged.


         --11/8 CXR: Persistent bilateral patchy pulmonary opacities, most 

prominent  in the left lower lung.


         --11/7 ucx neg


                  sp cx MRSA (colonizer at this point)


                  Bcx Neg


         --11/2 Bcx Neg


          10/30 Sp cx MRSA (Vancomycin ADRIANA 1), ESBL E.coli


   10/23 BCx NTD


   UA 15-20 WBC, UCx Neg


   10/23 COVID rapid neg; 10/26 rapid COVID PCR + (from prior infection)- not 

new infection


   Flu A/B neg


   CXR: L pna


   Resp cx MRSA


   11/19 Resp cx +GNR


   11/21 BCx ordered





JANES on CKD, improving





SNF resident (graciela gustafson)


Non-verbal


VRE and MRSA colonized








Plan:


Start meropenem #1 given high fever





BCx


Trend temp curve, WBC





   -11/20 SP linezolid #7


   -11/16 SP meropenem #14 for ESBL pna 


   -11/10 SP Micafungin #4


   -11/6 SP IV Vancomycin #15


   -11/2 SP Zosyn #4


   -10/26 SP Cefepime #4


   -10/24 SP Flagyl #





Monitor CBC/CMP


Monitor resp status


Monitor temp curve and hemodynamics





D/w RN





Thank you for this consult. [facility] will continue to follow.





Subjective


Allergies:  


Coded Allergies:  


     No Known Allergies (Unverified , 8/20/12)


Febrile to 101.5


Resp cx +GNR


WBC up to 14


NAD on vent 30% PEEP 5


HDS





Objective





Last 24 Hour Vital Signs








  Date Time  Temp Pulse Resp B/P (MAP) Pulse Ox O2 Delivery O2 Flow Rate FiO2


 


11/21/20 06:54  75 12     30


 


11/21/20 06:00  70 12     


 


11/21/20 06:00 99.8 73 12 90/49 (63) 100   


 


11/21/20 05:00 100.0 75 15 94/47 (63) 100   


 


11/21/20 04:00      Mechanical Ventilator  





      Mechanical Ventilator  


 


11/21/20 04:00  77      


 


11/21/20 04:00  87 13 92/47 (62) 100   


 


11/21/20 04:00        30


 


11/21/20 04:00        30


 


11/21/20 03:49  120 17     30


 


11/21/20 03:37 100.0       


 


11/21/20 03:17  77 18 94/48 100   


 


11/21/20 03:00 101.5 129 26 130/97 (108) 100   


 


11/21/20 02:47  115 22 203/109 100   


 


11/21/20 02:00  76 13 180/77 (111) 100   


 


11/21/20 01:00  78 20 144/57 (86) 100   


 


11/21/20 00:00        30


 


11/21/20 00:00 98.6 79 14 128/64 (85) 100   


 


11/21/20 00:00      Mechanical Ventilator  





      Mechanical Ventilator  


 


11/21/20 00:00  100      


 


11/20/20 23:05  73 14     30


 


11/20/20 23:00  76 13 126/64 (84) 100   


 


11/20/20 22:00  72 13 122/60 (80) 100   


 


11/20/20 21:00  72 14 138/62 (87) 100   


 


11/20/20 20:00 99.0 72 13 123/59 (80) 100   


 


11/20/20 20:00        30


 


11/20/20 20:00      Mechanical Ventilator  





      Mechanical Ventilator  


 


11/20/20 20:00  77      


 


11/20/20 19:34  71 14     30


 


11/20/20 19:00  67 16 138/66 (90) 100   


 


11/20/20 18:00  70 13 129/69 (89) 100   


 


11/20/20 17:00  71 12 103/49 (67) 100   


 


11/20/20 16:30  76 12     30


 


11/20/20 16:00      Mechanical Ventilator  





      Mechanical Ventilator  


 


11/20/20 16:00 99.6 73 13 122/57 (78) 100   


 


11/20/20 16:00  82      


 


11/20/20 16:00        30


 


11/20/20 15:29  72 12     30


 


11/20/20 15:00  72 17 121/59 (79) 97   


 


11/20/20 14:15        30


 


11/20/20 14:00  79 20 138/86 (103) 100   


 


11/20/20 13:06  69 24     30


 


11/20/20 13:00  68 24 139/70 (93) 100   


 


11/20/20 12:00      Mechanical Ventilator  





      Mechanical Ventilator  


 


11/20/20 12:00        30


 


11/20/20 12:00 99.4 67 26 132/63 (86) 100   


 


11/20/20 12:00  68      


 


11/20/20 11:28  68 26     30


 


11/20/20 11:00  71 24 150/70 (96) 100   


 


11/20/20 10:00  73 25 140/70 (93) 100   


 


11/20/20 09:35        30


 


11/20/20 09:32     100   


 


11/20/20 09:29  72 21     30


 


11/20/20 09:00  74 16 119/65 (83) 100   








Height (Feet):  5


Height (Inches):  4.00


Weight (Pounds):  130


Gen: NAD in bed


HEENT: NCAT


CV: RRR


Pulm: BL chest rise on vent


Abd: Soft, NTND


Ext: No c/c/e


Neuro: Eyes closed, not interactive





Microbiology








 Date/Time


Source Procedure


Growth Status





 


 11/19/20 17:15


Sputum Gram Stain - Final Resulted


 


 11/19/20 17:15 Sputum Culture - Preliminary


Gram Negative Raman Resulted











Laboratory Tests








Test


 11/20/20


10:48 11/21/20


04:41 11/21/20


05:20


 


Arterial Blood pH


 7.470


(7.350-7.450) 


 





 


Arterial Blood Partial


Pressure CO2 31.8 mmHg


(35.0-45.0)  L 


 





 


Arterial Blood Partial


Pressure O2 84.7 mmHg


(75.0-100.0) 


 





 


Arterial Blood HCO3


 22.6 mmol/L


(22.0-26.0) 


 





 


Arterial Blood Oxygen


Saturation 96.3 %


() 


 





 


Arterial Blood Base Excess -0.5 (-2-2)    


 


Jerod Test Positive    


 


POC Whole Blood Glucose


 


 139 MG/DL


()  H 





 


White Blood Count


 


 


 14.8 K/UL


(4.8-10.8)  H


 


Red Blood Count


 


 


 3.15 M/UL


(4.70-6.10)  L


 


Hemoglobin


 


 


 9.6 G/DL


(14.2-18.0)  L


 


Hematocrit


 


 


 30.8 %


(42.0-52.0)  L


 


Mean Corpuscular Volume   98 FL (80-99)  


 


Mean Corpuscular Hemoglobin


 


 


 30.6 PG


(27.0-31.0)


 


Mean Corpuscular Hemoglobin


Concent 


 


 31.3 G/DL


(32.0-36.0)  L


 


Red Cell Distribution Width


 


 


 16.8 %


(11.6-14.8)  H


 


Platelet Count


 


 


 333 K/UL


(150-450)


 


Mean Platelet Volume


 


 


 6.6 FL


(6.5-10.1)


 


Neutrophils (%) (Auto)


 


 


 % (45.0-75.0)





 


Lymphocytes (%) (Auto)


 


 


 % (20.0-45.0)





 


Monocytes (%) (Auto)    % (1.0-10.0)  


 


Eosinophils (%) (Auto)    % (0.0-3.0)  


 


Basophils (%) (Auto)    % (0.0-2.0)  


 


Neutrophils % (Manual)   Pending  


 


Lymphocytes % (Manual)   Pending  


 


Platelet Estimate   Pending  


 


Platelet Morphology   Pending  


 


Erythrocyte Sedimentation Rate


 


 


 109 MM/HR


(0-20)  H


 


Sodium Level


 


 


 137 MMOL/L


(136-145)


 


Potassium Level


 


 


 4.1 MMOL/L


(3.5-5.1)


 


Chloride Level


 


 


 105 MMOL/L


()


 


Carbon Dioxide Level


 


 


 26 MMOL/L


(21-32)


 


Anion Gap


 


 


 6 mmol/L


(5-15)


 


Blood Urea Nitrogen


 


 


 12 mg/dL


(7-18)


 


Creatinine


 


 


 0.8 MG/DL


(0.55-1.30)


 


Estimat Glomerular Filtration


Rate 


 


 > 60 mL/min


(>60)


 


Glucose Level


 


 


 138 MG/DL


()  H


 


Calcium Level


 


 


 7.8 MG/DL


(8.5-10.1)  L


 


Phosphorus Level


 


 


 2.7 MG/DL


(2.5-4.9)


 


Magnesium Level


 


 


 2.1 MG/DL


(1.8-2.4)


 


Total Bilirubin


 


 


 0.4 MG/DL


(0.2-1.0)


 


Aspartate Amino Transf


(AST/SGOT) 


 


 14 U/L (15-37)


L


 


Alanine Aminotransferase


(ALT/SGPT) 


 


 15 U/L (12-78)





 


Alkaline Phosphatase


 


 


 120 U/L


()  H


 


C-Reactive Protein,


Quantitative 


 


 7.7 mg/dL


(0.00-0.90)  H


 


Total Protein


 


 


 6.3 G/DL


(6.4-8.2)  L


 


Albumin


 


 


 1.5 G/DL


(3.4-5.0)  L


 


Globulin   4.8 g/dL  


 


Albumin/Globulin Ratio


 


 


 0.3 (1.0-2.7)


L











Current Medications








 Medications


  (Trade)  Dose


 Ordered  Sig/Miky


 Route


 PRN Reason  Start Time


 Stop Time Status Last Admin


Dose Admin


 


 Acetaminophen


  (Tylenol)  650 mg  Q4H  PRN


 ORAL


 Temp >100.5  10/23/20 20:30


 11/22/20 20:29  11/21/20 03:00





 


 Chlorhexidine


 Gluconate


  (Jess-Hex 2%)  1 applic  DAILY@2000 TOPIC 11/6/20 20:00


 2/4/21 19:59  11/20/20 20:14





 


 Dextrose


  (Dextrose 50%)  50 ml  Q30M  PRN


 IV


 Hypoglycemia  10/23/20 22:45


 1/21/21 22:44   





 


 Heparin Sodium


  (Porcine)


  (Heparin 5000


 units/ml)  5,000 units  EVERY 12  HOURS


 SUBQ


   10/23/20 21:00


 12/7/20 20:59  11/20/20 21:11





 


 Insulin Aspart


  (NovoLOG)    EVERY 6  HOURS


 SUBQ


   11/2/20 06:00


 1/22/21 06:29  11/18/20 11:26





 


 Lorazepam


  (Ativan 2mg/ml


 1ml)  2 mg  Q4H  PRN


 IV


 For Anxiety  11/17/20 19:15


 11/24/20 19:14  11/21/20 02:47





 


 Midodrine


  (Pro-Amatine)  10 mg  Q8H


 ORAL


   11/11/20 17:00


 2/9/21 16:59  11/21/20 00:44





 


 Nitroglycerin


  (Ntg)  0.4 mg  Q5M  PRN


 SL


 Prn Chest Pain  10/23/20 20:30


 11/22/20 20:29   





 


 Ondansetron HCl


  (Zofran)  4 mg  Q6H  PRN


 IVP


 Nausea & Vomiting  10/23/20 20:30


 11/22/20 20:29   





 


 Pantoprazole


  (Protonix)  40 mg  DAILY


 IV


   11/7/20 09:00


 12/7/20 08:59  11/20/20 09:05





 


 Polyethylene


 Glycol


  (Miralax)  17 gm  DAILYPRN  PRN


 ORAL


 Constipation  10/23/20 20:30


 11/22/20 20:29   





 


 Promethazine HCl/


 Codeine


  (Phenergan with


 Codeine)  5 ml  Q4H  PRN


 ORAL


 For Cough  10/23/20 20:30


 11/22/20 20:29   





 


 Sodium Chloride  1,000 ml @ 


 50 mls/hr  Q20H


 IV


   11/6/20 16:00


 12/6/20 15:59  11/20/20 21:27




















Bianca Casillas M.D.               Nov 21, 2020 08:45

## 2020-11-21 NOTE — INTERNAL MED PROGRESS NOTE
Subjective


Date of Service:  Nov 21, 2020


Physician Name


NadiyaBenito


Attending Physician


Andrei Cancino MD





Current Medications








 Medications


  (Trade)  Dose


 Ordered  Sig/Miky


 Route


 PRN Reason  Start Time


 Stop Time Status Last Admin


Dose Admin


 


 Acetaminophen


  (Tylenol)  650 mg  Q4H  PRN


 GT


 Temp >100.5  11/21/20 12:30


 12/21/20 12:29  11/21/20 12:29





 


 Chlorhexidine


 Gluconate


  (Jess-Hex 2%)  1 applic  DAILY@2000


 TOPIC


   11/6/20 20:00


 2/4/21 19:59  11/20/20 20:14





 


 Dextrose


  (Dextrose 50%)  50 ml  Q30M  PRN


 IV


 Hypoglycemia  10/23/20 22:45


 1/21/21 22:44   





 


 Heparin Sodium


  (Porcine)


  (Heparin 5000


 units/ml)  5,000 units  EVERY 12  HOURS


 SUBQ


   10/23/20 21:00


 12/7/20 20:59  11/21/20 09:31





 


 Insulin Aspart


  (NovoLOG)    EVERY 6  HOURS


 SUBQ


   11/2/20 06:00


 1/22/21 06:29  11/18/20 11:26





 


 Lorazepam


  (Ativan 2mg/ml


 1ml)  2 mg  Q4H  PRN


 IV


 For Anxiety  11/17/20 19:15


 11/24/20 19:14  11/21/20 02:47





 


 Meropenem 1 gm/


 Sodium Chloride  55 ml @ 


 110 mls/hr  Q8H


 IVPB


   11/21/20 10:00


 11/26/20 09:59  11/21/20 09:54





 


 Midodrine


  (Pro-Amatine)  10 mg  Q8H


 ORAL


   11/11/20 17:00


 2/9/21 16:59  11/21/20 09:32





 


 Nitroglycerin


  (Ntg)  0.4 mg  Q5M  PRN


 SL


 Prn Chest Pain  11/21/20 10:15


 12/21/20 10:09   





 


 Ondansetron HCl


  (Zofran)  4 mg  Q6H  PRN


 IVP


 Nausea & Vomiting  11/21/20 14:30


 12/21/20 14:29   





 


 Pantoprazole


  (Protonix)  40 mg  DAILY


 IV


   11/7/20 09:00


 12/7/20 08:59  11/21/20 09:32





 


 Polyethylene


 Glycol


  (Miralax)  17 gm  DAILYPRN  PRN


 GT


 Constipation  11/21/20 10:15


 12/21/20 10:14   





 


 Promethazine HCl/


 Codeine


  (Phenergan with


 Codeine)  5 ml  Q4H  PRN


 GT


 For Cough  11/21/20 10:14


 12/21/20 10:13   





 


 Sodium Chloride  1,000 ml @ 


 50 mls/hr  Q20H


 IV


   11/6/20 16:00


 12/6/20 15:59  11/21/20 07:00











Allergies:  


Coded Allergies:  


     No Known Allergies (Unverified , 8/20/12)


ROS Limited/Unobtainable:  Yes


Subjective


74 YO M admitted with shortness of breath.  Now pneumonia; previously COVID 

positive.  Cover for Int med-Dr Cancino.  ICU.  Intubated and sedated.





Objective





Last Vital Signs








  Date Time  Temp Pulse Resp B/P (MAP) Pulse Ox O2 Delivery O2 Flow Rate FiO2


 


11/21/20 16:00  73 12 110/59 (76) 100   


 


11/21/20 16:00        30


 


11/21/20 16:00      Mechanical Ventilator  





      Mechanical Ventilator  


 


11/21/20 12:59 99.0       











Laboratory Tests








Test


 11/21/20


04:41 11/21/20


05:20 11/21/20


10:35


 


POC Whole Blood Glucose


 139 MG/DL


()  H 


 





 


White Blood Count


 


 14.8 K/UL


(4.8-10.8)  H 





 


Red Blood Count


 


 3.15 M/UL


(4.70-6.10)  L 





 


Hemoglobin


 


 9.6 G/DL


(14.2-18.0)  L 





 


Hematocrit


 


 30.8 %


(42.0-52.0)  L 





 


Mean Corpuscular Volume  98 FL (80-99)   


 


Mean Corpuscular Hemoglobin


 


 30.6 PG


(27.0-31.0) 





 


Mean Corpuscular Hemoglobin


Concent 


 31.3 G/DL


(32.0-36.0)  L 





 


Red Cell Distribution Width


 


 16.8 %


(11.6-14.8)  H 





 


Platelet Count


 


 333 K/UL


(150-450) 





 


Mean Platelet Volume


 


 6.6 FL


(6.5-10.1) 





 


Neutrophils (%) (Auto)


 


 % (45.0-75.0)


 





 


Lymphocytes (%) (Auto)


 


 % (20.0-45.0)


 





 


Monocytes (%) (Auto)   % (1.0-10.0)   


 


Eosinophils (%) (Auto)   % (0.0-3.0)   


 


Basophils (%) (Auto)   % (0.0-2.0)   


 


Differential Total Cells


Counted 


 100  


 





 


Neutrophils % (Manual)  87 % (45-75)  H 


 


Lymphocytes % (Manual)  10 % (20-45)  L 


 


Monocytes % (Manual)  3 % (1-10)   


 


Eosinophils % (Manual)  0 % (0-3)   


 


Basophils % (Manual)  0 % (0-2)   


 


Band Neutrophils  0 % (0-8)   


 


Platelet Estimate  Adequate   


 


Platelet Morphology  Normal   


 


Hypochromasia  1+   


 


Anisocytosis  1+   


 


Erythrocyte Sedimentation Rate


 


 109 MM/HR


(0-20)  H 





 


Sodium Level


 


 137 MMOL/L


(136-145) 





 


Potassium Level


 


 4.1 MMOL/L


(3.5-5.1) 





 


Chloride Level


 


 105 MMOL/L


() 





 


Carbon Dioxide Level


 


 26 MMOL/L


(21-32) 





 


Anion Gap


 


 6 mmol/L


(5-15) 





 


Blood Urea Nitrogen


 


 12 mg/dL


(7-18) 





 


Creatinine


 


 0.8 MG/DL


(0.55-1.30) 





 


Estimat Glomerular Filtration


Rate 


 > 60 mL/min


(>60) 





 


Glucose Level


 


 138 MG/DL


()  H 





 


Calcium Level


 


 7.8 MG/DL


(8.5-10.1)  L 





 


Phosphorus Level


 


 2.7 MG/DL


(2.5-4.9) 





 


Magnesium Level


 


 2.1 MG/DL


(1.8-2.4) 





 


Total Bilirubin


 


 0.4 MG/DL


(0.2-1.0) 





 


Aspartate Amino Transf


(AST/SGOT) 


 14 U/L (15-37)


L 





 


Alanine Aminotransferase


(ALT/SGPT) 


 15 U/L (12-78)


 





 


Alkaline Phosphatase


 


 120 U/L


()  H 





 


C-Reactive Protein,


Quantitative 


 7.7 mg/dL


(0.00-0.90)  H 





 


Total Protein


 


 6.3 G/DL


(6.4-8.2)  L 





 


Albumin


 


 1.5 G/DL


(3.4-5.0)  L 





 


Globulin  4.8 g/dL   


 


Albumin/Globulin Ratio


 


 0.3 (1.0-2.7)


L 





 


Arterial Blood pH


 


 


 7.472


(7.350-7.450)


 


Arterial Blood Partial


Pressure CO2 


 


 31.5 mmHg


(35.0-45.0)  L


 


Arterial Blood Partial


Pressure O2 


 


 65.2 mmHg


(75.0-100.0)  L


 


Arterial Blood HCO3


 


 


 22.5 mmol/L


(22.0-26.0)


 


Arterial Blood Oxygen


Saturation 


 


 93.7 %


()  L


 


Arterial Blood Base Excess   -0.6 (-2-2)  


 


Jerod Test   Positive  











Microbiology








 Date/Time


Source Procedure


Growth Status





 


 11/19/20 17:15


Sputum Gram Stain - Final Resulted


 


 11/19/20 17:15 Sputum Culture - Preliminary


Gram Negative Raman Resulted

















Intake and Output  


 


 11/20/20 11/21/20





 19:00 07:00


 


Intake Total 1420 ml 1420 ml


 


Output Total 1220 ml 755 ml


 


Balance 200 ml 665 ml


 


  


 


Free Water  150 ml


 


IV Total 900 ml 550 ml


 


Tube Feeding 420 ml 720 ml


 


Other 100 ml 


 


Output Urine Total 1220 ml 755 ml


 


# Bowel Movements 1 3








Objective


PHYSICAL EXAMINATION:


GENERAL:  The patient awake with deep stimuli, open his eyes, however,


cannot follow commands.  The patient is on a Ventimask at this time,


chronically ill-appearing.


HEAD AND NECK:  Pupils are equal and reactive to light.  Anicteric.


NECK:  Supple.  No JVD.


LUNGS:  Mech vent;  wheezing, rhonchi, and decreased air in bases.


HEART:  S1, S2.  Regular rhythm.  Distant heart sounds.  No murmur or


gallop.


ABDOMEN:  Soft, nondistended, nontender.  Positive bowel sounds.


EXTREMITIES:  No cyanosis, clubbing, or edema.


NEUROLOGIC:  Very limited secondary to the patient's status, cannot follow


commands.  Opens his eyes with deep stimuli and moving extremities


spontaneously.





Assessment/Plan


Assessment/Plan


ASSESSMENT:


1. Acute hypoxemic respiratory failure, most likely secondary to


pneumonia and sepsis.


2. Sepsis secondary to urinary tract infection and pneumonia.


3. pneumonia=MRSA; ESBL E. coli


4. Acute kidney injury on chronic renal insufficiency.


5. Dehydration.


6. History of chronic congestive heart failure.


7. Diabetes type 2.


8. Dyslipidemia.


9. Hypertension.


10. Alzheimer's disease.


11. COVID 19 previous positive





PLAN:


1.  ICU


2.  Dr. Sandoval,=Pulmonary Critical Care; follow mech vent recs


3.  Dr. Garcia = Inf Dis.


4.  IV= D5W due to the hypernatremia and dehydration.


5.  antibiotics =  linezolid and meropenem; S/P micafungin


6.  Code status is full code.


7.    DVT prophylaxis is heparin subcutaneous.











Benito Hearn MD 21, 2020 17:26

## 2020-11-22 VITALS — DIASTOLIC BLOOD PRESSURE: 84 MMHG | SYSTOLIC BLOOD PRESSURE: 185 MMHG

## 2020-11-22 VITALS — SYSTOLIC BLOOD PRESSURE: 86 MMHG | DIASTOLIC BLOOD PRESSURE: 52 MMHG

## 2020-11-22 VITALS — SYSTOLIC BLOOD PRESSURE: 108 MMHG | DIASTOLIC BLOOD PRESSURE: 53 MMHG

## 2020-11-22 VITALS — SYSTOLIC BLOOD PRESSURE: 117 MMHG | DIASTOLIC BLOOD PRESSURE: 53 MMHG

## 2020-11-22 VITALS — SYSTOLIC BLOOD PRESSURE: 108 MMHG | DIASTOLIC BLOOD PRESSURE: 83 MMHG

## 2020-11-22 VITALS — DIASTOLIC BLOOD PRESSURE: 76 MMHG | SYSTOLIC BLOOD PRESSURE: 123 MMHG

## 2020-11-22 VITALS — DIASTOLIC BLOOD PRESSURE: 67 MMHG | SYSTOLIC BLOOD PRESSURE: 115 MMHG

## 2020-11-22 VITALS — DIASTOLIC BLOOD PRESSURE: 51 MMHG | SYSTOLIC BLOOD PRESSURE: 108 MMHG

## 2020-11-22 VITALS — SYSTOLIC BLOOD PRESSURE: 83 MMHG | DIASTOLIC BLOOD PRESSURE: 47 MMHG

## 2020-11-22 VITALS — DIASTOLIC BLOOD PRESSURE: 69 MMHG | SYSTOLIC BLOOD PRESSURE: 142 MMHG

## 2020-11-22 VITALS — DIASTOLIC BLOOD PRESSURE: 65 MMHG | SYSTOLIC BLOOD PRESSURE: 118 MMHG

## 2020-11-22 VITALS — DIASTOLIC BLOOD PRESSURE: 92 MMHG | SYSTOLIC BLOOD PRESSURE: 141 MMHG

## 2020-11-22 VITALS — SYSTOLIC BLOOD PRESSURE: 88 MMHG | DIASTOLIC BLOOD PRESSURE: 48 MMHG

## 2020-11-22 VITALS — DIASTOLIC BLOOD PRESSURE: 58 MMHG | SYSTOLIC BLOOD PRESSURE: 119 MMHG

## 2020-11-22 VITALS — DIASTOLIC BLOOD PRESSURE: 57 MMHG | SYSTOLIC BLOOD PRESSURE: 107 MMHG

## 2020-11-22 VITALS — DIASTOLIC BLOOD PRESSURE: 83 MMHG | SYSTOLIC BLOOD PRESSURE: 127 MMHG

## 2020-11-22 VITALS — DIASTOLIC BLOOD PRESSURE: 47 MMHG | SYSTOLIC BLOOD PRESSURE: 80 MMHG

## 2020-11-22 VITALS — SYSTOLIC BLOOD PRESSURE: 103 MMHG | DIASTOLIC BLOOD PRESSURE: 86 MMHG

## 2020-11-22 VITALS — SYSTOLIC BLOOD PRESSURE: 153 MMHG | DIASTOLIC BLOOD PRESSURE: 88 MMHG

## 2020-11-22 VITALS — SYSTOLIC BLOOD PRESSURE: 118 MMHG | DIASTOLIC BLOOD PRESSURE: 61 MMHG

## 2020-11-22 VITALS — DIASTOLIC BLOOD PRESSURE: 49 MMHG | SYSTOLIC BLOOD PRESSURE: 96 MMHG

## 2020-11-22 VITALS — SYSTOLIC BLOOD PRESSURE: 107 MMHG | DIASTOLIC BLOOD PRESSURE: 51 MMHG

## 2020-11-22 VITALS — DIASTOLIC BLOOD PRESSURE: 77 MMHG | SYSTOLIC BLOOD PRESSURE: 140 MMHG

## 2020-11-22 VITALS — DIASTOLIC BLOOD PRESSURE: 71 MMHG | SYSTOLIC BLOOD PRESSURE: 102 MMHG

## 2020-11-22 VITALS — DIASTOLIC BLOOD PRESSURE: 68 MMHG | SYSTOLIC BLOOD PRESSURE: 94 MMHG

## 2020-11-22 VITALS — DIASTOLIC BLOOD PRESSURE: 71 MMHG | SYSTOLIC BLOOD PRESSURE: 132 MMHG

## 2020-11-22 VITALS — DIASTOLIC BLOOD PRESSURE: 62 MMHG | SYSTOLIC BLOOD PRESSURE: 119 MMHG

## 2020-11-22 VITALS — DIASTOLIC BLOOD PRESSURE: 53 MMHG | SYSTOLIC BLOOD PRESSURE: 96 MMHG

## 2020-11-22 LAB
ADD MANUAL DIFF: NO
ANION GAP SERPL CALC-SCNC: 9 MMOL/L (ref 5–15)
BASOPHILS NFR BLD AUTO: 0.6 % (ref 0–2)
BUN SERPL-MCNC: 12 MG/DL (ref 7–18)
CALCIUM SERPL-MCNC: 7.7 MG/DL (ref 8.5–10.1)
CHLORIDE SERPL-SCNC: 104 MMOL/L (ref 98–107)
CO2 SERPL-SCNC: 24 MMOL/L (ref 21–32)
CREAT SERPL-MCNC: 0.9 MG/DL (ref 0.55–1.3)
EOSINOPHIL NFR BLD AUTO: 0.2 % (ref 0–3)
ERYTHROCYTE [DISTWIDTH] IN BLOOD BY AUTOMATED COUNT: 17.3 % (ref 11.6–14.8)
HCT VFR BLD CALC: 30.7 % (ref 42–52)
HGB BLD-MCNC: 9.5 G/DL (ref 14.2–18)
LYMPHOCYTES NFR BLD AUTO: 10.9 % (ref 20–45)
MCV RBC AUTO: 98 FL (ref 80–99)
MONOCYTES NFR BLD AUTO: 4.9 % (ref 1–10)
NEUTROPHILS NFR BLD AUTO: 83.3 % (ref 45–75)
PLATELET # BLD: 307 K/UL (ref 150–450)
POTASSIUM SERPL-SCNC: 3.7 MMOL/L (ref 3.5–5.1)
RBC # BLD AUTO: 3.14 M/UL (ref 4.7–6.1)
SODIUM SERPL-SCNC: 137 MMOL/L (ref 136–145)
WBC # BLD AUTO: 16.3 K/UL (ref 4.8–10.8)

## 2020-11-22 RX ADMIN — CHLORHEXIDINE GLUCONATE SCH APPLIC: 213 SOLUTION TOPICAL at 20:31

## 2020-11-22 RX ADMIN — ACETAMINOPHEN PRN MG: 160 SOLUTION ORAL at 20:54

## 2020-11-22 RX ADMIN — INSULIN ASPART SCH UNITS: 100 INJECTION, SOLUTION INTRAVENOUS; SUBCUTANEOUS at 05:04

## 2020-11-22 RX ADMIN — MEROPENEM SCH MLS/HR: 1 INJECTION INTRAVENOUS at 17:19

## 2020-11-22 RX ADMIN — MEROPENEM SCH MLS/HR: 1 INJECTION INTRAVENOUS at 01:37

## 2020-11-22 RX ADMIN — MIDODRINE HYDROCHLORIDE SCH MG: 10 TABLET ORAL at 17:19

## 2020-11-22 RX ADMIN — HEPARIN SODIUM SCH UNITS: 5000 INJECTION INTRAVENOUS; SUBCUTANEOUS at 20:33

## 2020-11-22 RX ADMIN — MEROPENEM SCH MLS/HR: 1 INJECTION INTRAVENOUS at 09:39

## 2020-11-22 RX ADMIN — PANTOPRAZOLE SODIUM SCH MG: 40 INJECTION, POWDER, FOR SOLUTION INTRAVENOUS at 08:44

## 2020-11-22 RX ADMIN — ACETAMINOPHEN PRN MG: 160 SOLUTION ORAL at 13:30

## 2020-11-22 RX ADMIN — MIDODRINE HYDROCHLORIDE SCH MG: 10 TABLET ORAL at 08:44

## 2020-11-22 RX ADMIN — MIDODRINE HYDROCHLORIDE SCH MG: 10 TABLET ORAL at 01:00

## 2020-11-22 RX ADMIN — INSULIN ASPART SCH UNITS: 100 INJECTION, SOLUTION INTRAVENOUS; SUBCUTANEOUS at 13:29

## 2020-11-22 RX ADMIN — INSULIN ASPART SCH UNITS: 100 INJECTION, SOLUTION INTRAVENOUS; SUBCUTANEOUS at 17:57

## 2020-11-22 RX ADMIN — HEPARIN SODIUM SCH UNITS: 5000 INJECTION INTRAVENOUS; SUBCUTANEOUS at 08:43

## 2020-11-22 RX ADMIN — ACETAMINOPHEN PRN MG: 160 SOLUTION ORAL at 03:16

## 2020-11-22 NOTE — NUR
RESPIRATORY NOTES

PT placed back onto previous settings.

PT's ABG drawn roughly 3.5 hours into weaning - all values within normal ranges.

PT shows no signs of current respiratory distress.

LITA devine. Will continue to monitor.

## 2020-11-22 NOTE — NUR
NURSE NOTES:

AM care given. Hot to touch. Temp 100.5. Tylenol given.

Soiled gown, linens, and sliders.

Large BM noted. 

Sacral healed. Optiform applied for prophylaxis. 

Safety measures observed and no acute distress noted. Will continue to monitor.

## 2020-11-22 NOTE — INTERNAL MED PROGRESS NOTE
Subjective


Date of Service:  Nov 22, 2020


Physician Name


NadiyaBenito


Attending Physician


Andrei Cancino MD





Current Medications








 Medications


  (Trade)  Dose


 Ordered  Sig/Miky


 Route


 PRN Reason  Start Time


 Stop Time Status Last Admin


Dose Admin


 


 Acetaminophen


  (Tylenol)  650 mg  Q4H  PRN


 GT


 Temp >100.5  11/21/20 12:30


 12/21/20 12:29  11/22/20 13:30





 


 Albuterol/


 Ipratropium


  (Albuterol/


 Ipratropium)  3 ml  Q4HRT  PRN


 HHN


 sob  11/22/20 10:45


 11/27/20 10:44   





 


 Chlorhexidine


 Gluconate


  (Jess-Hex 2%)  1 applic  DAILY@2000


 TOPIC


   11/6/20 20:00


 2/4/21 19:59  11/21/20 20:21





 


 Dextrose


  (Dextrose 50%)  50 ml  Q30M  PRN


 IV


 Hypoglycemia  10/23/20 22:45


 1/21/21 22:44   





 


 Heparin Sodium


  (Porcine)


  (Heparin 5000


 units/ml)  5,000 units  EVERY 12  HOURS


 SUBQ


   10/23/20 21:00


 12/7/20 20:59  11/22/20 08:43





 


 Insulin Aspart


  (NovoLOG)    EVERY 6  HOURS


 SUBQ


   11/2/20 06:00


 1/22/21 06:29  11/22/20 13:29





 


 Lorazepam


  (Ativan 2mg/ml


 1ml)  2 mg  Q4H  PRN


 IV


 For Anxiety  11/17/20 19:15


 11/24/20 19:14  11/21/20 02:47





 


 Meropenem 1 gm/


 Sodium Chloride  55 ml @ 


 110 mls/hr  Q8H


 IVPB


   11/21/20 10:00


 11/26/20 09:59  11/22/20 09:39





 


 Midodrine


  (Pro-Amatine)  10 mg  Q8H


 ORAL


   11/11/20 17:00


 2/9/21 16:59  11/22/20 08:44





 


 Nitroglycerin


  (Ntg)  0.4 mg  Q5M  PRN


 SL


 Prn Chest Pain  11/21/20 10:15


 12/21/20 10:09   





 


 Ondansetron HCl


  (Zofran)  4 mg  Q6H  PRN


 IVP


 Nausea & Vomiting  11/21/20 14:30


 12/21/20 14:29   





 


 Pantoprazole


  (Protonix)  40 mg  DAILY


 IV


   11/7/20 09:00


 12/7/20 08:59  11/22/20 08:44





 


 Polyethylene


 Glycol


  (Miralax)  17 gm  DAILYPRN  PRN


 GT


 Constipation  11/21/20 10:15


 12/21/20 10:14   





 


 Promethazine HCl/


 Codeine


  (Phenergan with


 Codeine)  5 ml  Q4H  PRN


 GT


 For Cough  11/21/20 10:14


 12/21/20 10:13   





 


 Sodium Chloride  1,000 ml @ 


 50 mls/hr  Q20H


 IV


   11/6/20 16:00


 12/6/20 15:59  11/22/20 03:00











Allergies:  


Coded Allergies:  


     No Known Allergies (Unverified , 8/20/12)


ROS Limited/Unobtainable:  Yes


Subjective


76 YO M admitted with shortness of breath.  Now pneumonia; previously COVID 

positive.  Cover for Int kierra-Dr Cancino.  ICU.  Intubated and sedated.





Objective





Last Vital Signs








  Date Time  Temp Pulse Resp B/P (MAP) Pulse Ox O2 Delivery O2 Flow Rate FiO2


 


11/22/20 15:30 100.0 67 12 94/68 (77) 100   


 


11/22/20 13:19        30


 


11/22/20 12:00      Mechanical Ventilator  





      Mechanical Ventilator  





      Mechanical Ventilator  











Laboratory Tests








Test


 11/22/20


05:35 11/22/20


07:56 11/22/20


12:35 11/22/20


12:52


 


White Blood Count


 16.3 K/UL


(4.8-10.8)  H 


 


 





 


Red Blood Count


 3.14 M/UL


(4.70-6.10)  L 


 


 





 


Hemoglobin


 9.5 G/DL


(14.2-18.0)  L 


 


 





 


Hematocrit


 30.7 %


(42.0-52.0)  L 


 


 





 


Mean Corpuscular Volume 98 FL (80-99)     


 


Mean Corpuscular Hemoglobin


 30.1 PG


(27.0-31.0) 


 


 





 


Mean Corpuscular Hemoglobin


Concent 30.8 G/DL


(32.0-36.0)  L 


 


 





 


Red Cell Distribution Width


 17.3 %


(11.6-14.8)  H 


 


 





 


Platelet Count


 307 K/UL


(150-450) 


 


 





 


Mean Platelet Volume


 6.7 FL


(6.5-10.1) 


 


 





 


Neutrophils (%) (Auto)


 83.3 %


(45.0-75.0)  H 


 


 





 


Lymphocytes (%) (Auto)


 10.9 %


(20.0-45.0)  L 


 


 





 


Monocytes (%) (Auto)


 4.9 %


(1.0-10.0) 


 


 





 


Eosinophils (%) (Auto)


 0.2 %


(0.0-3.0) 


 


 





 


Basophils (%) (Auto)


 0.6 %


(0.0-2.0) 


 


 





 


Sodium Level


 137 MMOL/L


(136-145) 


 


 





 


Potassium Level


 3.7 MMOL/L


(3.5-5.1) 


 


 





 


Chloride Level


 104 MMOL/L


() 


 


 





 


Carbon Dioxide Level


 24 MMOL/L


(21-32) 


 


 





 


Anion Gap


 9 mmol/L


(5-15) 


 


 





 


Blood Urea Nitrogen


 12 mg/dL


(7-18) 


 


 





 


Creatinine


 0.9 MG/DL


(0.55-1.30) 


 


 





 


Estimat Glomerular Filtration


Rate > 60 mL/min


(>60) 


 


 





 


Glucose Level


 136 MG/DL


()  H 


 


 





 


Calcium Level


 7.7 MG/DL


(8.5-10.1)  L 


 


 





 


Arterial Blood pH


 


 7.465


(7.350-7.450) 7.421


(7.350-7.450) 





 


Arterial Blood Partial


Pressure CO2 


 28.6 mmHg


(35.0-45.0)  L 37.3 mmHg


(35.0-45.0) 





 


Arterial Blood Partial


Pressure O2 


 95.7 mmHg


(75.0-100.0) 71.7 mmHg


(75.0-100.0)  L 





 


Arterial Blood HCO3


 


 20.1 mmol/L


(22.0-26.0)  L 23.7 mmol/L


(22.0-26.0) 





 


Arterial Blood Oxygen


Saturation 


 97.1 %


() 94.2 %


()  L 





 


Arterial Blood Base Excess  -2.8 (-2-2)  L -0.5 (-2-2)   


 


Jerod Test  Positive   Positive   


 


POC Whole Blood Glucose


 


 


 


 152 MG/DL


()  H











Microbiology








 Date/Time


Source Procedure


Growth Status





 


 11/19/20 17:15


Sputum Gram Stain - Final Complete


 


 11/19/20 17:15 Sputum Culture - Final


Pseudomonas Aeruginosa


Escherichia Coli - Esbl Complete

















Intake and Output  


 


 11/21/20 11/22/20





 19:00 07:00


 


Intake Total 1540 ml 1270 ml


 


Output Total 630 ml 600 ml


 


Balance 910 ml 670 ml


 


  


 


Free Water 180 ml 


 


IV Total 820 ml 550 ml


 


Tube Feeding 540 ml 720 ml


 


Output Urine Total 630 ml 600 ml


 


# Bowel Movements 3 3








Objective


PHYSICAL EXAMINATION:


GENERAL:  The patient awake with deep stimuli, open his eyes, however,


cannot follow commands.  The patient is on a Ventimask at this time,


chronically ill-appearing.


HEAD AND NECK:  Pupils are equal and reactive to light.  Anicteric.


NECK:  Supple.  No JVD.


LUNGS:  Mech vent;  wheezing, rhonchi, and decreased air in bases.


HEART:  S1, S2.  Regular rhythm.  Distant heart sounds.  No murmur or


gallop.


ABDOMEN:  Soft, nondistended, nontender.  Positive bowel sounds.


EXTREMITIES:  No cyanosis, clubbing, or edema.


NEUROLOGIC:  Very limited secondary to the patient's status, cannot follow


commands.  Opens his eyes with deep stimuli and moving extremities


spontaneously.





Assessment/Plan


Assessment/Plan


ASSESSMENT:


1. Acute hypoxemic respiratory failure, most likely secondary to


pneumonia and sepsis.


2. Sepsis secondary to urinary tract infection and pneumonia.


3. pneumonia=MRSA; ESBL E. coli


4. Acute kidney injury on chronic renal insufficiency.


5. Dehydration.


6. History of chronic congestive heart failure.


7. Diabetes type 2.


8. Dyslipidemia.


9. Hypertension.


10. Alzheimer's disease.


11. COVID 19 previous positive





PLAN:


1.  ICU


2.  Dr. Sandoval,=Pulmonary Critical Care; follow mech vent recs


3.  Dr. Garcia = Inf Dis.


4.  IV= D5W due to the hypernatremia and dehydration.


5.  antibiotics =   meropenem; S/P micafungin and zyvox


6.  Code status is full code.


7.    DVT prophylaxis is heparin subcutaneous.











Benito Hearn MD               Nov 22, 2020 15:51

## 2020-11-22 NOTE — NUR
RD ASSESSMENT & RECOMMENDATIONS

SEE CARE ACTIVITY FOR COMPLETE ASSESSMENT



DAILY ESTIMATED NEEDS:

Needs based on DM, wound, critical care 59.5kg 

22-28  kcals/kg 

2100-5202  total kcals

1.25-2  g protein/kg

  g total protein 

25-30  mL/kg

3558-1509  total fluid mLs



NUTRITION DIAGNOSIS:

* Swallowing difficulty R/T dysphagia as evidenced by SLP sylvie, recs for

NGT feeds, now s/p oral intubation (11/6), s/p code blue (11/12) and

remains intubated, on pressor support, currently held, cont on NGT feeds.

* Increased kcal and pro needs r/t wound healing as evidenced by sacral

pressure injury stage 2.



CURRENT DIET: NPO     



CURRENT TF: (goal of Glucerna 1.2 @ 60ml/hr x24 hrs) 

 



ENTERAL NUTRITION RECOMMENDATIONS:

Glucerna 1.2 @ 55ml/hr x24 hrs   

to provide 1320ml, 1584kcal, 79g prot, 1063ml free water 



- LOWER goal rate to 55ml/hr x 24 hrs

  s/p intubation w/ decreased kcal needs

- HOB over 30 degrees/ H2O flush per MD





ADDITIONAL RECOMMENDATIONS:

* Calibrated bedscale wt for accurate CBW 

* Monitor hemodynamic stability: s/p code blue (11/12), NE currently held 

* Monitor lytes, replete as needed  

* Wound care: add Harvinder BID via PEG 

                     add Vit C 250mg QD 

. 

.

## 2020-11-22 NOTE — NUR
NURSE NOTES:received report from rhea rn pt orally intubated -vent  o2 sat 100% no acute 
resp distress nan verbal awake but does not follows command reposition and suction  
tolerating tube feeding  no residual urinary output good on wale soft restraint nan complaint 
temp 100.5 medication given for >temp cont monitor pt

## 2020-11-22 NOTE — DIAGNOSTIC IMAGING REPORT
EXAM:

  XR Chest, 1 View

 

CLINICAL HISTORY:

  SOB

 

TECHNIQUE:

  Frontal view of the chest.

 

COMPARISON:

Chest radiograph November 20, 2020

 

FINDINGS/IMPRESSION:

Endotracheal tube terminates 3.8 cm above the sd.  Feeding tube 

terminates in the stomach.  Mild advancement is recommended at the site 

port is at the level of the GE junction.

 

Small bilateral pleural effusions with moderate vascular congestion.  

Airspace consolidation in the left lower lung field may represent 

atelectasis however, correlate for infiltrate.  This is improving when 

compared to the prior study.

 

Cardiomegaly.  Calcified aorta.

 

Right total shoulder old plasty.

## 2020-11-22 NOTE — NUR
RESPIRATORY NOTES

PT placed on SBT - CPAP 0, PS 8.

PT does not display any current signs of respiratory distress.

LITA devine. Will continue to monitor.

## 2020-11-22 NOTE — PULMONOLGY CRITICAL CARE NOTE
Critical Care - Asmt/Plan


Assessment/Plan:





ASSESSMENT


Acute hypoxemic respiratory failure , requiring  intubation ( initially 100% 

NRM) 


Septic shock


Pneumonia , possible aspiration


Probable UTI


Hx of COVID 19 9/9/20 


Dysphagia


JANES-resolved 


Hypernatremia


History of CVA


Diabetes mellitus


History of hypertension


Alzheimer dementia





PLAN OF CARE


ICU


vent support pulm toilet


fup with CXR and ABG  


failing weaning, family declining trach


off pressors, on Midodrine


ECHO with pEF, r/o for MI


cardio on board 


abx  as per ID recs,  


DVT and GI  prophylaxis 


strict aspiration precaution


gentle IVF 


monitor renal parameters,  lytes , correct electrolytes as needed ,avoid 

nephrotoxic


BS management with SSI


supportive care   


remains FC   





case discussed and evaluated by supervising physician





Critical Care - Objective





Last 24 Hour Vital Signs








  Date Time  Temp Pulse Resp B/P (MAP) Pulse Ox O2 Delivery O2 Flow Rate FiO2


 


11/22/20 07:25  84 12     30


 


11/22/20 07:00  79 13 123/76 (92) 100   


 


11/22/20 06:30  71 12     


 


11/22/20 06:00  71 12 108/83 (91) 100   


 


11/22/20 05:00 99.7 90 19 103/86 (92)    


 


11/22/20 04:00        30


 


11/22/20 04:00      Mechanical Ventilator  





      Mechanical Ventilator  





      Mechanical Ventilator  


 


11/22/20 04:00 100.3 105 18 127/83 (98)    


 


11/22/20 04:00  105      


 


11/22/20 03:46 100.3       


 


11/22/20 03:46 103.4       


 


11/22/20 03:00 100.5 108 33 141/92 (108) 100   


 


11/22/20 02:30  97 17     30


 


11/22/20 02:00  101 15 118/65 (82) 100   


 


11/22/20 01:00  98 15 140/77 (98) 100   


 


11/22/20 00:00      Mechanical Ventilator  





      Mechanical Ventilator  





      Mechanical Ventilator  


 


11/22/20 00:00  97      


 


11/22/20 00:00        30


 


11/22/20 00:00  97 16 115/67 (83) 100   


 


11/21/20 23:00  92 17 149/75 (99) 100   


 


11/21/20 22:30  92 17     30


 


11/21/20 22:00  88 15 121/66 (84) 100   


 


11/21/20 21:00  84 13 120/64 (82) 100   


 


11/21/20 20:00      Mechanical Ventilator  





      Mechanical Ventilator  





      Mechanical Ventilator  


 


11/21/20 20:00 99.6 78 12 121/62 (81) 100   


 


11/21/20 20:00  78      


 


11/21/20 20:00        30


 


11/21/20 19:00  75 13 127/72 (90) 100   


 


11/21/20 18:50  77 13     30


 


11/21/20 18:00  74 12 113/61 (78) 100   


 


11/21/20 17:34  77 12  100 Mechanical Ventilator  30


 


11/21/20 17:00 99.5 70 12 102/56 (71) 100   


 


11/21/20 16:00  73 12 110/59 (76) 100   


 


11/21/20 16:00  73      


 


11/21/20 16:00        30


 


11/21/20 16:00      Mechanical Ventilator  





      Mechanical Ventilator  


 


11/21/20 15:49  76 12     30


 


11/21/20 15:00  69 12 94/64 (74) 100   


 


11/21/20 14:00  78 13 94/58 (70) 100   


 


11/21/20 13:00 99.0 87 18 116/63 (80) 100   


 


11/21/20 12:59 99.0       


 


11/21/20 12:00 101.5 83 27 145/72 (96) 100   


 


11/21/20 12:00        30


 


11/21/20 12:00  86      


 


11/21/20 12:00        30


 


11/21/20 12:00      Mechanical Ventilator  





      Mechanical Ventilator  


 


11/21/20 11:00  78 28 127/58 (81) 100   


 


11/21/20 10:36  83 26     30








Objective:


General Appearance:  no apparent distress,   intubated  on vent -12-30 

currently on CPAP


Lines, tubes and drains: R PICC intact 


HEENT:  normocephalic, atraumatic, anicteric, NGT ,   OP with ET in place, 

intact 


Respiratory/Chest:   few scattered  rhonchi


Cardiovascular/Chest:  normal peripheral pulses, SR


Abdomen:  normal bowel sounds, non tender, soft 


: Cummings 


Extremities:  no calf tenderness, normal capillary refill


Neurologic:  abnormal gait /bedridden


Musculoskeletal:  atrophy - BLE


Micro:





Microbiology








 Date/Time


Source Procedure


Growth Status





 


 11/19/20 17:15


Sputum Gram Stain - Final Complete


 


 11/19/20 17:15 Sputum Culture - Final


Pseudomonas Aeruginosa


Escherichia Coli - Esbl Complete








Accucheck:  138





Critical Care - Subjective


ROS Limited/Unobtainable:  Yes


Interval Events:


leukocytosis  with trend up this am,  fever  


CXR 11/22 noted 


off pressors, on Midodrine 


failing weaning, family declining trach


Condition:  critical


IV Access:  PICC - RUE intact


EKG Rhythm:  Sinus Rhythm


FI02:  30


Vent Support Breath Rate:  12


Vent Support Mode:  AC


Vent Tidal Volume:  600


Sputum Amount:  Large


PEEP:  0.0


PIP:  36


Fluids:  1/2 NS at 50


Tube Feeding Amount:  60


I&O:











Intake and Output  


 


 11/21/20 11/22/20





 19:00 07:00


 


Intake Total 1540 ml 1270 ml


 


Output Total 630 ml 600 ml


 


Balance 910 ml 670 ml


 


  


 


Free Water 180 ml 


 


IV Total 820 ml 550 ml


 


Tube Feeding 540 ml 720 ml


 


Output Urine Total 630 ml 600 ml


 


# Bowel Movements 3 3








CXR:


CXR 11/22


Endotracheal tube terminates 3.8 cm above the sd.  Feeding tube 


terminates in the stomach.  Mild advancement is recommended at the site 


port is at the level of the GE junction.


 


Small bilateral pleural effusions with moderate vascular congestion.  


Airspace consolidation in the left lower lung field may represent 


atelectasis however, correlate for infiltrate.  This is improving when 


compared to the prior study.


 


Cardiomegaly.  Calcified aorta.


 


Right total shoulder old plasty.


ET-Tube:  7.5


ET Position:  25











Eyl Ni NP                Nov 22, 2020 10:38

## 2020-11-22 NOTE — NUR
NURSE NOTES:

LATE ENTRY:

ESTUARDO NOVAK HERE TO SEE PT. WAS INFORMED PT WEANING ON CPAP 0, PS 8. CLARIFICATION OF 
EXTUBATION FOR THIS PT, STATED PT NOTED EXTUBATED TODAY. FAMILY REFUSING TRACH, POSSIBLE 
BIOETHICS FOR DECISION.

## 2020-11-23 VITALS — DIASTOLIC BLOOD PRESSURE: 52 MMHG | SYSTOLIC BLOOD PRESSURE: 95 MMHG

## 2020-11-23 VITALS — SYSTOLIC BLOOD PRESSURE: 144 MMHG | DIASTOLIC BLOOD PRESSURE: 78 MMHG

## 2020-11-23 VITALS — DIASTOLIC BLOOD PRESSURE: 50 MMHG | SYSTOLIC BLOOD PRESSURE: 99 MMHG

## 2020-11-23 VITALS — DIASTOLIC BLOOD PRESSURE: 47 MMHG | SYSTOLIC BLOOD PRESSURE: 87 MMHG

## 2020-11-23 VITALS — SYSTOLIC BLOOD PRESSURE: 142 MMHG | DIASTOLIC BLOOD PRESSURE: 81 MMHG

## 2020-11-23 VITALS — DIASTOLIC BLOOD PRESSURE: 62 MMHG | SYSTOLIC BLOOD PRESSURE: 110 MMHG

## 2020-11-23 VITALS — SYSTOLIC BLOOD PRESSURE: 110 MMHG | DIASTOLIC BLOOD PRESSURE: 53 MMHG

## 2020-11-23 VITALS — SYSTOLIC BLOOD PRESSURE: 96 MMHG | DIASTOLIC BLOOD PRESSURE: 53 MMHG

## 2020-11-23 VITALS — DIASTOLIC BLOOD PRESSURE: 65 MMHG | SYSTOLIC BLOOD PRESSURE: 123 MMHG

## 2020-11-23 VITALS — DIASTOLIC BLOOD PRESSURE: 87 MMHG | SYSTOLIC BLOOD PRESSURE: 150 MMHG

## 2020-11-23 VITALS — DIASTOLIC BLOOD PRESSURE: 61 MMHG | SYSTOLIC BLOOD PRESSURE: 113 MMHG

## 2020-11-23 VITALS — SYSTOLIC BLOOD PRESSURE: 110 MMHG | DIASTOLIC BLOOD PRESSURE: 76 MMHG

## 2020-11-23 VITALS — SYSTOLIC BLOOD PRESSURE: 89 MMHG | DIASTOLIC BLOOD PRESSURE: 48 MMHG

## 2020-11-23 VITALS — SYSTOLIC BLOOD PRESSURE: 108 MMHG | DIASTOLIC BLOOD PRESSURE: 58 MMHG

## 2020-11-23 VITALS — SYSTOLIC BLOOD PRESSURE: 119 MMHG | DIASTOLIC BLOOD PRESSURE: 41 MMHG

## 2020-11-23 VITALS — SYSTOLIC BLOOD PRESSURE: 91 MMHG | DIASTOLIC BLOOD PRESSURE: 50 MMHG

## 2020-11-23 VITALS — SYSTOLIC BLOOD PRESSURE: 89 MMHG | DIASTOLIC BLOOD PRESSURE: 46 MMHG

## 2020-11-23 VITALS — DIASTOLIC BLOOD PRESSURE: 91 MMHG | SYSTOLIC BLOOD PRESSURE: 170 MMHG

## 2020-11-23 VITALS — SYSTOLIC BLOOD PRESSURE: 134 MMHG | DIASTOLIC BLOOD PRESSURE: 66 MMHG

## 2020-11-23 VITALS — SYSTOLIC BLOOD PRESSURE: 113 MMHG | DIASTOLIC BLOOD PRESSURE: 56 MMHG

## 2020-11-23 VITALS — DIASTOLIC BLOOD PRESSURE: 54 MMHG | SYSTOLIC BLOOD PRESSURE: 110 MMHG

## 2020-11-23 VITALS — SYSTOLIC BLOOD PRESSURE: 113 MMHG | DIASTOLIC BLOOD PRESSURE: 60 MMHG

## 2020-11-23 VITALS — DIASTOLIC BLOOD PRESSURE: 56 MMHG | SYSTOLIC BLOOD PRESSURE: 109 MMHG

## 2020-11-23 VITALS — SYSTOLIC BLOOD PRESSURE: 137 MMHG | DIASTOLIC BLOOD PRESSURE: 72 MMHG

## 2020-11-23 VITALS — SYSTOLIC BLOOD PRESSURE: 119 MMHG | DIASTOLIC BLOOD PRESSURE: 54 MMHG

## 2020-11-23 VITALS — DIASTOLIC BLOOD PRESSURE: 54 MMHG | SYSTOLIC BLOOD PRESSURE: 93 MMHG

## 2020-11-23 LAB
ADD MANUAL DIFF: NO
ANION GAP SERPL CALC-SCNC: 8 MMOL/L (ref 5–15)
BASOPHILS NFR BLD AUTO: 0.7 % (ref 0–2)
BUN SERPL-MCNC: 13 MG/DL (ref 7–18)
CALCIUM SERPL-MCNC: 7.8 MG/DL (ref 8.5–10.1)
CHLORIDE SERPL-SCNC: 107 MMOL/L (ref 98–107)
CO2 SERPL-SCNC: 25 MMOL/L (ref 21–32)
CREAT SERPL-MCNC: 0.7 MG/DL (ref 0.55–1.3)
EOSINOPHIL NFR BLD AUTO: 1.7 % (ref 0–3)
ERYTHROCYTE [DISTWIDTH] IN BLOOD BY AUTOMATED COUNT: 18.3 % (ref 11.6–14.8)
HCT VFR BLD CALC: 28.8 % (ref 42–52)
HGB BLD-MCNC: 8.8 G/DL (ref 14.2–18)
LYMPHOCYTES NFR BLD AUTO: 15.6 % (ref 20–45)
MCV RBC AUTO: 98 FL (ref 80–99)
MONOCYTES NFR BLD AUTO: 7.2 % (ref 1–10)
NEUTROPHILS NFR BLD AUTO: 74.7 % (ref 45–75)
PLATELET # BLD: 262 K/UL (ref 150–450)
POTASSIUM SERPL-SCNC: 3.9 MMOL/L (ref 3.5–5.1)
RBC # BLD AUTO: 2.93 M/UL (ref 4.7–6.1)
SODIUM SERPL-SCNC: 139 MMOL/L (ref 136–145)
WBC # BLD AUTO: 12 K/UL (ref 4.8–10.8)

## 2020-11-23 RX ADMIN — MIDODRINE HYDROCHLORIDE SCH MG: 10 TABLET ORAL at 00:51

## 2020-11-23 RX ADMIN — MEROPENEM SCH MLS/HR: 1 INJECTION INTRAVENOUS at 08:59

## 2020-11-23 RX ADMIN — CHLORHEXIDINE GLUCONATE SCH APPLIC: 213 SOLUTION TOPICAL at 19:46

## 2020-11-23 RX ADMIN — INSULIN ASPART SCH UNITS: 100 INJECTION, SOLUTION INTRAVENOUS; SUBCUTANEOUS at 13:36

## 2020-11-23 RX ADMIN — MEROPENEM SCH MLS/HR: 1 INJECTION INTRAVENOUS at 17:31

## 2020-11-23 RX ADMIN — HEPARIN SODIUM SCH UNITS: 5000 INJECTION INTRAVENOUS; SUBCUTANEOUS at 20:24

## 2020-11-23 RX ADMIN — INSULIN ASPART SCH UNITS: 100 INJECTION, SOLUTION INTRAVENOUS; SUBCUTANEOUS at 06:00

## 2020-11-23 RX ADMIN — INSULIN ASPART SCH UNITS: 100 INJECTION, SOLUTION INTRAVENOUS; SUBCUTANEOUS at 17:52

## 2020-11-23 RX ADMIN — MEROPENEM SCH MLS/HR: 1 INJECTION INTRAVENOUS at 00:52

## 2020-11-23 RX ADMIN — LORAZEPAM PRN MG: 2 INJECTION, SOLUTION INTRAMUSCULAR; INTRAVENOUS at 10:13

## 2020-11-23 RX ADMIN — MIDODRINE HYDROCHLORIDE SCH MG: 10 TABLET ORAL at 08:57

## 2020-11-23 RX ADMIN — ACETAMINOPHEN PRN MG: 160 SOLUTION ORAL at 10:14

## 2020-11-23 RX ADMIN — INSULIN ASPART SCH UNITS: 100 INJECTION, SOLUTION INTRAVENOUS; SUBCUTANEOUS at 00:00

## 2020-11-23 RX ADMIN — PANTOPRAZOLE SODIUM SCH MG: 40 INJECTION, POWDER, FOR SOLUTION INTRAVENOUS at 08:57

## 2020-11-23 RX ADMIN — MIDODRINE HYDROCHLORIDE SCH MG: 10 TABLET ORAL at 17:31

## 2020-11-23 RX ADMIN — HEPARIN SODIUM SCH UNITS: 5000 INJECTION INTRAVENOUS; SUBCUTANEOUS at 08:59

## 2020-11-23 NOTE — NEPHROLOGY PROGRESS NOTE
Assessment/Plan


Problem List:  


(1) Dehydration


(2) JANES (acute kidney injury)


(3) Renal failure (ARF), acute on chronic


(4) Hypoxia


(5) Acute encephalopathy


(6) Electrolyte imbalance


Assessment





75-year-old male is admitted with acute hypoxic respiratory failure most likely 

secondary to pneumonia and sepsis, UTI.


Acute on chronic renal failure


Dehydration


Electrolyte imbalances, hypernatremia


Hypoalbuminemia


Diabetes type 2


History of congestive heart failure


Hypertension


Hyperlipemia


Alzheimer's


Previous COVID-19 infection in September 2020


Plan


November 23: Labs reviewed.  Discussed with RN.  Stable from renal standpoint of

view.  Continue per consultants.


November 22: Patient seen and discussed with RN.  Labs reviewed.  Remains 

intubated.  Trial of weaning this being attempted.  Continue per consultants.


November 21: Remains intubated.  Labs reviewed.  Renal parameters stable.  

Continue per consultants.


November 20: Remains on mechanical ventilation.  Labs reviewed.  Abnormal 

electrolytes addressed.  Stable from renal standpoint of view.


November 19: Remains intubated.  Remains full code.  Labs reviewed.  Low 

phosphorus replaced.  Continue to monitor renal parameters.  Continue per 

consultants.


November 18: Failed weaning.  Remains intubated.  Remains full closed.  Labs 

reviewed.  Stable from renal standpoint view.


November 17: Remains in ICU.  Remains full code.  Labs are reviewed.  Phosphorus

supplement given.  Continue per consultants.


November 16: Remains in ICU.  Full code.  Labs reviewed.  Remains intubated.  

Stable renal parameters.


November 15: In ICU.  Full code.  Discussed with RN.  Stable renal parameters.


November 14: Seen in ICU.  Discussed with LITA Cooper.  Flomax discontinued.  

Labs reviewed.  Stable from renal standpoint of view.


November 13: Seen in ICU.  Discussed with LITA Cooper.  Remains on pressor.  

Electrolyte abnormalities noted and addressed.  Continue per consultants.  

Patient remains full code.


November 12: Seen in ICU.  Discussed with LITA Richardson.  Blood pressure remains a 

little requiring pressors.  Labs reviewed.  Stable renal parameters.  Continue 

per consultants.


November 11: Remains intubated.  Full code.  Blood pressure borderline low.  

Will increase midodrine dose.  Discussed with RN.  Continue to monitor renal 

parameters and electrolytes.


November 10: Intubated.  Full code.  Labs reviewed.  Stable from renal 

standpoint of view.  Continue per consultants.


November 9: Remains intubated.  Full code.  Labs reviewed.  Electrolytes within 

normal limit.  Continue per consultants.


November 8: In ICU.  Remains intubated on ventilator.  Remains full code.  Low 

potassium addressed.  Blood pressure fluctuating.  Continue per consultants.


November 7: Patient in ICU.  Intubated on ventilator.  On 6 mics of Levophed.  

Will give 100 cc albumin 25%.  K-Phos IV ordered.  Continue per consultants.  

Continue monitor renal parameters and electrolytes.


November 6: Status unchanged.  Transfer to [facility] for seizure.  Stable from renal 

standpoint to view.  Continue per consultants.


November 5: Status quo.  Labs reviewed.  Renal parameters stable.  Continue per 

consultants.


November 4: On nonrebreather mask.  Labs reviewed.  Renal parameters 

stable.Continue per pulmonologist.  Clinically unchanged.


November 3: On nonrebreather mask.  Inflammatory markers gradually declining.  

Renal parameters stable.  Continue per pulmonary.


November 2: Remains on nonrebreather mask.  Inflammatory markers remain 

elevated.  Renal parameters somewhat stable.  Continue per pulmonary and ID.  

Remains full code.


November 1: Patient on nonrebreather mask.  Labs noted.  Serum creatinine down 

to 1.3.  Continue per consultants.


October 31: Patient on nonrebreather mask.  Transfer to telemetry when seen this

morning.  Labs noted.  Continue per consultants.  Serum creatinine dylan to 1.7. 

Continue to monitor renal parameters.  Continue to monitor vancomycin level


October 30: Patient is not doing well clinically.  Mild respiratory distress.  

ABG noted.  Somewhat hypoxic.  CBC and chemistry panel ordered.  Discussed with 

LITA Garcia.  Will defer to pulmonary management to specialist.  Continue per ID.

 Renal parameters remained stable as of October 29.


October 29: Labs reviewed.  Renal parameters stable.  Continue per consultants.


October 28: Labs reviewed.  Potassium via NG tube ordered.  IV fluids stopped.  

Continue per consultants.


October 27: Labs reviewed.  Low potassium and low phosphorus replaced.  Continue

per consultants.  Remains stable from renal standpoint of view.


October 26: Patient remains n.p.o.  IV fluid down to 50 cc an hour.  Potassium 

supplement intravenously ordered.  Continue per consultants.





Previously:


Patient is n.p.o., will continue on IV fluid of D5W 75 cc an hour


We will monitor electrolytes and renal parameters


Avoid nephrotoxic's


Start p.o. when he clears by speech therapist, meanwhile aspiration precautions


Keep the blood pressure and blood sugar in check


Per orders





Subjective


ROS Limited/Unobtainable:  Yes





Objective


Objective





Last 24 Hour Vital Signs








  Date Time  Temp Pulse Resp B/P (MAP) Pulse Ox O2 Delivery O2 Flow Rate FiO2


 


11/23/20 12:00      Mechanical Ventilator  





      Mechanical Ventilator  





      Mechanical Ventilator  


 


11/23/20 12:00        30


 


11/23/20 12:00 99.8 75 12 89/48 (62) 95   


 


11/23/20 11:00  110 20 110/76 (87) 96   


 


11/23/20 10:44 100.0       


 


11/23/20 10:43  99 24 170/91 97   


 


11/23/20 10:13  121 22 170/90 98   


 


11/23/20 10:00  107 28 170/91 (117) 99   


 


11/23/20 09:30  95 28 134/66 (88) 97   


 


11/23/20 09:00 100.0 100 22 144/78 (100) 98   


 


11/23/20 08:30  98 28 142/81 (101) 99   


 


11/23/20 08:00 100.5 85 23 119/41 (67) 83   


 


11/23/20 08:00        30


 


11/23/20 08:00  85      


 


11/23/20 08:00      Mechanical Ventilator  





      Mechanical Ventilator  





      Mechanical Ventilator  


 


11/23/20 07:20  84 14     30


 


11/23/20 07:00  74 12 95/52 (66)    


 


11/23/20 06:30  78 12     


 


11/23/20 06:00  75 17 110/54 (72)    


 


11/23/20 05:00  75 12 108/58 (75)    


 


11/23/20 04:00      Mechanical Ventilator  





      Mechanical Ventilator  





      Mechanical Ventilator  


 


11/23/20 04:00 98.8 83 17 137/72 (93)    


 


11/23/20 04:00  78      


 


11/23/20 04:00        30


 


11/23/20 03:00  76 13 96/53 (67) 100   


 


11/23/20 02:40  79 14     30


 


11/23/20 02:00  78 15 113/61 (78) 100   


 


11/23/20 01:00  81 14 113/60 (77) 100   


 


11/23/20 00:00 98.5 81 13 123/65 (84) 100   


 


11/23/20 00:00        30


 


11/23/20 00:00      Mechanical Ventilator  





      Mechanical Ventilator  





      Mechanical Ventilator  


 


11/23/20 00:00  78      


 


11/22/20 23:00 99.0 80 12 119/62 (81) 100   


 


11/22/20 22:47  85 13     30


 


11/22/20 22:00  78 13 107/51 (69) 100   


 


11/22/20 21:24 99.0       


 


11/22/20 21:00  73      


 


11/22/20 21:00      Mechanical Ventilator  





      Mechanical Ventilator  





      Mechanical Ventilator  


 


11/22/20 21:00  76 14 118/61 (80) 100   


 


11/22/20 20:00        30


 


11/22/20 20:00 100.5 76 13 117/53 (74) 100   


 


11/22/20 19:03  82 14     30


 


11/22/20 19:00  74 14 108/53 (71) 100   


 


11/22/20 18:30  75 12 107/57 (74) 100   


 


11/22/20 18:00  73 12 108/51 (70) 100   


 


11/22/20 17:30  80 13 102/71 (81)    


 


11/22/20 17:00  75 12 96/53 (67) 100   


 


11/22/20 16:34  73 12 96/49 (65) 99   


 


11/22/20 16:30  73 12 88/48 (61) 99   


 


11/22/20 16:00  73      


 


11/22/20 16:00  72 12 80/47 (58) 100   


 


11/22/20 16:00        30


 


11/22/20 16:00      Mechanical Ventilator  





      Mechanical Ventilator  





      Mechanical Ventilator  


 


11/22/20 15:30 100.0 67 12 94/68 (77) 100   


 


11/22/20 15:05  73 12     30


 


11/22/20 15:00  76 12 83/47 (59) 100   


 


11/22/20 14:00 99.9 88 12 86/52 (63) 99   


 


11/22/20 14:00 99.9       


 


11/22/20 13:19  67 15     30


 


11/22/20 13:19     100   


 


11/22/20 13:00 100.8 106 27 153/88 (109) 99   


 


11/22/20 13:00        30

















Intake and Output  


 


 11/22/20 11/23/20





 19:00 07:00


 


Intake Total 1220 ml 1475 ml


 


Output Total 610 ml 1050 ml


 


Balance 610 ml 425 ml


 


  


 


Free Water  100 ml


 


IV Total 770 ml 655 ml


 


Tube Feeding 420 ml 720 ml


 


Other 30 ml 


 


Output Urine Total 610 ml 1050 ml


 


# Bowel Movements  1











Current Medications








 Medications


  (Trade)  Dose


 Ordered  Sig/Miky


 Route


 PRN Reason  Start Time


 Stop Time Status Last Admin


Dose Admin


 


 Acetaminophen


  (Tylenol)  650 mg  Q4H  PRN


 GT


 Temp >100.5  11/21/20 12:30


 12/21/20 12:29  11/23/20 10:14





 


 Albuterol/


 Ipratropium


  (Albuterol/


 Ipratropium)  3 ml  Q4HRT  PRN


 HHN


 sob  11/22/20 10:45


 11/27/20 10:44   





 


 Chlorhexidine


 Gluconate


  (Jess-Hex 2%)  1 applic  DAILY@2000


 TOPIC


   11/6/20 20:00


 2/4/21 19:59  11/22/20 20:31





 


 Dexamethasone


  (Decadron)  6 mg  Q24H


 ORAL


   11/22/20 18:00


 12/1/20 18:01  11/22/20 17:57





 


 Dextrose


  (Dextrose 50%)  50 ml  Q30M  PRN


 IV


 Hypoglycemia  10/23/20 22:45


 1/21/21 22:44   





 


 Heparin Sodium


  (Porcine)


  (Heparin 5000


 units/ml)  5,000 units  EVERY 12  HOURS


 SUBQ


   10/23/20 21:00


 12/7/20 20:59  11/23/20 08:59





 


 Insulin Aspart


  (NovoLOG)    EVERY 6  HOURS


 SUBQ


   11/2/20 06:00


 1/22/21 06:29  11/22/20 17:57





 


 Lorazepam


  (Ativan 2mg/ml


 1ml)  2 mg  Q4H  PRN


 IV


 For Anxiety  11/17/20 19:15


 11/24/20 19:14  11/23/20 10:13





 


 Meropenem 1 gm/


 Sodium Chloride  55 ml @ 


 110 mls/hr  Q8H


 IVPB


   11/21/20 10:00


 11/26/20 09:59  11/23/20 08:59





 


 Midodrine


  (Pro-Amatine)  10 mg  Q8H


 ORAL


   11/11/20 17:00


 2/9/21 16:59  11/23/20 08:57





 


 Nitroglycerin


  (Ntg)  0.4 mg  Q5M  PRN


 SL


 Prn Chest Pain  11/21/20 10:15


 12/21/20 10:09   





 


 Ondansetron HCl


  (Zofran)  4 mg  Q6H  PRN


 IVP


 Nausea & Vomiting  11/21/20 14:30


 12/21/20 14:29   





 


 Pantoprazole


  (Protonix)  40 mg  DAILY


 IV


   11/7/20 09:00


 12/7/20 08:59  11/23/20 08:57





 


 Polyethylene


 Glycol


  (Miralax)  17 gm  DAILYPRN  PRN


 GT


 Constipation  11/21/20 10:15


 12/21/20 10:14   





 


 Promethazine HCl/


 Codeine


  (Phenergan with


 Codeine)  5 ml  Q4H  PRN


 GT


 For Cough  11/21/20 10:14


 12/21/20 10:13   





 


 Sodium Chloride  1,000 ml @ 


 50 mls/hr  Q20H


 IV


   11/6/20 16:00


 12/6/20 15:59  11/23/20 01:27








Laboratory Tests


11/22/20 12:52: POC Whole Blood Glucose 152H


11/22/20 17:36: POC Whole Blood Glucose 153H


11/23/20 01:04: POC Whole Blood Glucose [Pending]


11/23/20 05:37: 


White Blood Count 12.0H, Red Blood Count 2.93L, Hemoglobin 8.8L, Hematocrit 

28.8L, Mean Corpuscular Volume 98, Mean Corpuscular Hemoglobin 30.2, Mean Co

rpuscular Hemoglobin Concent 30.7L, Red Cell Distribution Width 18.3H, Platelet 

Count 262, Mean Platelet Volume 6.7, Neutrophils (%) (Auto) 74.7, Lymphocytes 

(%) (Auto) 15.6L, Monocytes (%) (Auto) 7.2, Eosinophils (%) (Auto) 1.7, 

Basophils (%) (Auto) 0.7, Sodium Level 139, Potassium Level 3.9, Chloride Level 

107, Carbon Dioxide Level 25, Anion Gap 8, Blood Urea Nitrogen 13, Creatinine 

0.7, Estimat Glomerular Filtration Rate > 60, Glucose Level 111H, Calcium Level 

7.8L


11/23/20 05:59: POC Whole Blood Glucose 107H


11/23/20 06:12: POC Whole Blood Glucose [Pending]


11/23/20 07:59: 


Arterial Blood pH 7.466H, Arterial Blood Partial Pressure CO2 32.4L, Arterial 

Blood Partial Pressure O2 74.4L, Arterial Blood HCO3 22.8, Arterial Blood Oxygen

 Saturation 94.9L, Arterial Blood Base Excess -0.4, Jerod Test Positive


Height (Feet):  5


Height (Inches):  4.00


Weight (Pounds):  130


General Appearance:  no apparent distress


EENT:  other - Intubated on ventilator


Cardiovascular:  tachycardia


Respiratory/Chest:  decreased breath sounds











Seven Tobar MD            Nov 23, 2020 12:42

## 2020-11-23 NOTE — NUR
NURSE HAND-OFF REPORT: 



Latest Vital Signs: Temperature 98.9 , Pulse 63 , B/P 99 /50 , Respiratory Rate 12 , O2 SAT 
97 , Mechanical Ventilator, O2 Flow Rate  .  

Vital Sign Comment: 



EKG Rhythm: Sinus Rhythm

Rhythm change?: N 

MD Notified?: DIDI Laureano MD Response: 



Latest Mejia Fall Score: 50  

Fall Risk: High Risk 

Safety Measures: Call light Within Reach, Bed Alarm Zone 2, Side Rails Side Rails x2, Bed 
position Low and Locked.

Fall Precautions: 

Door Sign



Report given to Nolan PAT RN

## 2020-11-23 NOTE — INFECTIOUS DISEASES PROG NOTE
Assessment/Plan





74yo M with:





Respiratory code 2ry to mucus plug 11/12


Septic Shock- -recurrent


Fever, recurrent, low grade;SP


Leukocytosis; recurrent; increased


Acute hypoxic resp failure, on NRB mask> 4l NC; back on NRB, desaturation 10/29 

> intubated 11/6


Pneumonia- >HAP


Hx of COVID19 PNA 9/9/20 11/17 CXR: No significant interval change in the radiographic appearance the 

chest compared to one day prior.


   11/14 Resp cx +MRSA, nl resp hayden


   UCx neg


   BCx NTD


   11/13 COVID PCR neg


         --11/10 CXR: Bilateral infiltrates in a peribronchovascular 

distribution are unchanged. Left pleural effusion is unchanged.


         --11/8 CXR: Persistent bilateral patchy pulmonary opacities, most 

prominent  in the left lower lung.


         --11/7 ucx neg


                  sp cx MRSA (colonizer at this point)


                  Bcx Neg


         --11/2 Bcx Neg


          10/30 Sp cx MRSA (Vancomycin ADRIANA 1), ESBL E.coli


   10/23 BCx NTD


   UA 15-20 WBC, UCx Neg


   10/23 COVID rapid neg; 10/26 rapid COVID PCR + (from prior infection)- not 

new infection


   Flu A/B neg


   CXR: L pna


   Resp cx MRSA


   11/19 Resp cx +ESBL E.coli & PsA


   11/21 BCx NTD


   11/22 CXR: Small bilateral pleural effusions with moderate vascular co

ngestion. Airspace consolidation in the left lower lung field may represent 

atelectasis however, correlate for infiltrate.  This is improving when compared 

to the prior study.


   11/23 BCx ordered





JANES on CKD, improving





SNF resident ([facility])


Non-verbal


VRE and MRSA colonized








Plan:


Cont meropenem #3 given high fever, ESBL E.coli and PsA pna





C.dif if ongoing loose stools


Repeat BCx given ongoing fever (1 from periph, 1 from PICC)





F/u BCx 11/21, NTD


Trend temp curve, WBC





   -11/20 SP linezolid #7


   -11/16 SP meropenem #14 for ESBL pna 


   -11/10 SP Micafungin #4


   -11/6 SP IV Vancomycin #15


   -11/2 SP Zosyn #4


   -10/26 SP Cefepime #4


   -10/24 SP Flagyl #





Monitor CBC/CMP


Monitor resp status


Monitor temp curve and hemodynamics





D/w RN





Thank you for this consult. [facility] will continue to follow.





Subjective


Allergies:  


Coded Allergies:  


     No Known Allergies (Unverified , 8/20/12)


Febrile to 101.5


Resp cx +ESBL E.coli and PsA


WBC slightly improving to 12


NAD on vent 30% PEEP 5


HDS





Objective





Last 24 Hour Vital Signs








  Date Time  Temp Pulse Resp B/P (MAP) Pulse Ox O2 Delivery O2 Flow Rate FiO2


 


11/23/20 07:00  74 12 95/52 (66)    


 


11/23/20 06:30  78 12     


 


11/23/20 06:00  75 17 110/54 (72)    


 


11/23/20 05:00  75 12 108/58 (75)    


 


11/23/20 04:00      Mechanical Ventilator  





      Mechanical Ventilator  





      Mechanical Ventilator  


 


11/23/20 04:00 98.8 83 17 137/72 (93)    


 


11/23/20 04:00  78      


 


11/23/20 04:00        30


 


11/23/20 03:00  76 13 96/53 (67) 100   


 


11/23/20 02:40  79 14     30


 


11/23/20 02:00  78 15 113/61 (78) 100   


 


11/23/20 01:00  81 14 113/60 (77) 100   


 


11/23/20 00:00 98.5 81 13 123/65 (84) 100   


 


11/23/20 00:00        30


 


11/23/20 00:00      Mechanical Ventilator  





      Mechanical Ventilator  





      Mechanical Ventilator  


 


11/23/20 00:00  78      


 


11/22/20 23:00 99.0 80 12 119/62 (81) 100   


 


11/22/20 22:47  85 13     30


 


11/22/20 22:00  78 13 107/51 (69) 100   


 


11/22/20 21:24 99.0       


 


11/22/20 21:00  73      


 


11/22/20 21:00      Mechanical Ventilator  





      Mechanical Ventilator  





      Mechanical Ventilator  


 


11/22/20 21:00  76 14 118/61 (80) 100   


 


11/22/20 20:00        30


 


11/22/20 20:00 100.5 76 13 117/53 (74) 100   


 


11/22/20 19:03  82 14     30


 


11/22/20 19:00  74 14 108/53 (71) 100   


 


11/22/20 18:30  75 12 107/57 (74) 100   


 


11/22/20 18:00  73 12 108/51 (70) 100   


 


11/22/20 17:30  80 13 102/71 (81)    


 


11/22/20 17:00  75 12 96/53 (67) 100   


 


11/22/20 16:34  73 12 96/49 (65) 99   


 


11/22/20 16:30  73 12 88/48 (61) 99   


 


11/22/20 16:00  73      


 


11/22/20 16:00  72 12 80/47 (58) 100   


 


11/22/20 16:00        30


 


11/22/20 16:00      Mechanical Ventilator  





      Mechanical Ventilator  





      Mechanical Ventilator  


 


11/22/20 15:30 100.0 67 12 94/68 (77) 100   


 


11/22/20 15:05  73 12     30


 


11/22/20 15:00  76 12 83/47 (59) 100   


 


11/22/20 14:00 99.9 88 12 86/52 (63) 99   


 


11/22/20 14:00 99.9       


 


11/22/20 13:19  67 15     30


 


11/22/20 13:19     100   


 


11/22/20 13:00 100.8 106 27 153/88 (109) 99   


 


11/22/20 13:00        30


 


11/22/20 12:00      Mechanical Ventilator  





      Mechanical Ventilator  





      Mechanical Ventilator  


 


11/22/20 12:00  85      


 


11/22/20 12:00  104 27 185/84 (117) 100   


 


11/22/20 11:00  93 24 108/83 (91) 100   


 


11/22/20 10:00 100.5 89 24 142/69 (93) 100   


 


11/22/20 09:11  88 25     30


 


11/22/20 09:00  87 14 132/71 (91) 100   








Height (Feet):  5


Height (Inches):  4.00


Weight (Pounds):  130


Gen: NAD in bed


HEENT: NCAT


CV: RRR


Pulm: BL chest rise on vent


Abd: Soft, NTND


Ext: No c/c/e


Neuro: Eyes closed, not interactive





Microbiology








 Date/Time


Source Procedure


Growth Status





 


 11/21/20 16:30


Blood Blood Culture - Preliminary


NO GROWTH AFTER 24 HOURS Resulted


 


 11/21/20 16:20


Blood Blood Culture - Preliminary


NO GROWTH AFTER 24 HOURS Resulted











Laboratory Tests








Test


 11/22/20


12:35 11/22/20


12:52 11/22/20


17:36 11/23/20


01:04


 


Arterial Blood pH


 7.421


(7.350-7.450) 


 


 





 


Arterial Blood Partial


Pressure CO2 37.3 mmHg


(35.0-45.0) 


 


 





 


Arterial Blood Partial


Pressure O2 71.7 mmHg


(75.0-100.0)  L 


 


 





 


Arterial Blood HCO3


 23.7 mmol/L


(22.0-26.0) 


 


 





 


Arterial Blood Oxygen


Saturation 94.2 %


()  L 


 


 





 


Arterial Blood Base Excess -0.5 (-2-2)     


 


Jerod Test Positive     


 


POC Whole Blood Glucose


 


 152 MG/DL


()  H 153 MG/DL


()  H Pending  





 


Test


 11/23/20


05:37 11/23/20


05:59 11/23/20


06:12 





 


White Blood Count


 12.0 K/UL


(4.8-10.8)  H 


 


 





 


Red Blood Count


 2.93 M/UL


(4.70-6.10)  L 


 


 





 


Hemoglobin


 8.8 G/DL


(14.2-18.0)  L 


 


 





 


Hematocrit


 28.8 %


(42.0-52.0)  L 


 


 





 


Mean Corpuscular Volume 98 FL (80-99)     


 


Mean Corpuscular Hemoglobin


 30.2 PG


(27.0-31.0) 


 


 





 


Mean Corpuscular Hemoglobin


Concent 30.7 G/DL


(32.0-36.0)  L 


 


 





 


Red Cell Distribution Width


 18.3 %


(11.6-14.8)  H 


 


 





 


Platelet Count


 262 K/UL


(150-450) 


 


 





 


Mean Platelet Volume


 6.7 FL


(6.5-10.1) 


 


 





 


Neutrophils (%) (Auto)


 74.7 %


(45.0-75.0) 


 


 





 


Lymphocytes (%) (Auto)


 15.6 %


(20.0-45.0)  L 


 


 





 


Monocytes (%) (Auto)


 7.2 %


(1.0-10.0) 


 


 





 


Eosinophils (%) (Auto)


 1.7 %


(0.0-3.0) 


 


 





 


Basophils (%) (Auto)


 0.7 %


(0.0-2.0) 


 


 





 


Sodium Level


 139 MMOL/L


(136-145) 


 


 





 


Potassium Level


 3.9 MMOL/L


(3.5-5.1) 


 


 





 


Chloride Level


 107 MMOL/L


() 


 


 





 


Carbon Dioxide Level


 25 MMOL/L


(21-32) 


 


 





 


Anion Gap


 8 mmol/L


(5-15) 


 


 





 


Blood Urea Nitrogen


 13 mg/dL


(7-18) 


 


 





 


Creatinine


 0.7 MG/DL


(0.55-1.30) 


 


 





 


Estimat Glomerular Filtration


Rate > 60 mL/min


(>60) 


 


 





 


Glucose Level


 111 MG/DL


()  H 


 


 





 


Calcium Level


 7.8 MG/DL


(8.5-10.1)  L 


 


 





 


POC Whole Blood Glucose


 


 107 MG/DL


()  H Pending  


 














Current Medications








 Medications


  (Trade)  Dose


 Ordered  Sig/Miky


 Route


 PRN Reason  Start Time


 Stop Time Status Last Admin


Dose Admin


 


 Acetaminophen


  (Tylenol)  650 mg  Q4H  PRN


 GT


 Temp >100.5  11/21/20 12:30


 12/21/20 12:29  11/22/20 20:54





 


 Albuterol/


 Ipratropium


  (Albuterol/


 Ipratropium)  3 ml  Q4HRT  PRN


 HHN


 sob  11/22/20 10:45


 11/27/20 10:44   





 


 Chlorhexidine


 Gluconate


  (Jess-Hex 2%)  1 applic  DAILY@2000


 TOPIC


   11/6/20 20:00


 2/4/21 19:59  11/22/20 20:31





 


 Dexamethasone


  (Decadron)  6 mg  Q24H


 ORAL


   11/22/20 18:00


 12/1/20 18:01  11/22/20 17:57





 


 Dextrose


  (Dextrose 50%)  50 ml  Q30M  PRN


 IV


 Hypoglycemia  10/23/20 22:45


 1/21/21 22:44   





 


 Heparin Sodium


  (Porcine)


  (Heparin 5000


 units/ml)  5,000 units  EVERY 12  HOURS


 SUBQ


   10/23/20 21:00


 12/7/20 20:59  11/22/20 20:33





 


 Insulin Aspart


  (NovoLOG)    EVERY 6  HOURS


 SUBQ


   11/2/20 06:00


 1/22/21 06:29  11/22/20 17:57





 


 Lorazepam


  (Ativan 2mg/ml


 1ml)  2 mg  Q4H  PRN


 IV


 For Anxiety  11/17/20 19:15


 11/24/20 19:14  11/21/20 02:47





 


 Meropenem 1 gm/


 Sodium Chloride  55 ml @ 


 110 mls/hr  Q8H


 IVPB


   11/21/20 10:00


 11/26/20 09:59  11/23/20 00:52





 


 Midodrine


  (Pro-Amatine)  10 mg  Q8H


 ORAL


   11/11/20 17:00


 2/9/21 16:59  11/23/20 00:51





 


 Nitroglycerin


  (Ntg)  0.4 mg  Q5M  PRN


 SL


 Prn Chest Pain  11/21/20 10:15


 12/21/20 10:09   





 


 Ondansetron HCl


  (Zofran)  4 mg  Q6H  PRN


 IVP


 Nausea & Vomiting  11/21/20 14:30


 12/21/20 14:29   





 


 Pantoprazole


  (Protonix)  40 mg  DAILY


 IV


   11/7/20 09:00


 12/7/20 08:59  11/22/20 08:44





 


 Polyethylene


 Glycol


  (Miralax)  17 gm  DAILYPRN  PRN


 GT


 Constipation  11/21/20 10:15


 12/21/20 10:14   





 


 Promethazine HCl/


 Codeine


  (Phenergan with


 Codeine)  5 ml  Q4H  PRN


 GT


 For Cough  11/21/20 10:14


 12/21/20 10:13   





 


 Sodium Chloride  1,000 ml @ 


 50 mls/hr  Q20H


 IV


   11/6/20 16:00


 12/6/20 15:59  11/23/20 01:27




















Bianca Casillas M.D.               Nov 23, 2020 08:08

## 2020-11-23 NOTE — DIAGNOSTIC IMAGING REPORT
Indication: Shortness of breath

 

Technique: One view of the chest

 

Comparison: 11/22/2020

 

Findings: Stable satisfactory positions of endotracheal and orogastric tubes and

right arm PICC. Right shoulder prosthesis is again demonstrated. There are again

demonstrated bilateral pleural effusions. There is increasing opacity at the right

lung base. This may represent increasing layering pleural fluid that likely

represents infiltrate. Infiltrate at the left lung base is unchanged. There is some

left suprahilar atelectasis and/or infiltrate which is unchanged

 

Impression: Increasing right basilar opacity, likely infiltrate, may also reflect

increasing pleural fluid

 

Other stable findings as reported.

## 2020-11-23 NOTE — NUR
NURSE NOTES:

LATE ENTRY:

PT IN BED, NO DISTESS NOTED. PT REPOSITIONED. HOB >40. SECRETIONS SUCTIONED AND ORAL CARE 
PROVIDED. RESTRAINTS REPLACED, PT CONTINUES TO PULL AT TUBING. ROM PROVIDED AND CIRCULATION 
CHECKED.

## 2020-11-23 NOTE — NUR
NURSE NOTES:

LATE ENTRY:

PT IN BED. OPENS EYES. FLAT EFFECT. WITHDRAWALS TO PAIN. SR ON MONITOR. BP STABLE. COOLING 
MEASURES IN PLACE.  INTUBATED, ETT 7.5, AT 25CM. VENT SETTINGS AC 12, , FIO2 30%, NO 
PEEP. SECRETIONS MODERATE. ORAL CARE PROVIDED, PT RESISTANT TO CARE. NGT, TUBE FEEDING 
GLUCERNA 1.2 AT 60ML/HR. NO RESIDUALS. ABDOMEN ROUND, NON TENDER. NO BM. GALLEGOS DRAINING 
YELLOW URINE. GURPREET PICC DRESSING DRY AND INTACT. BILATERAL RADIAL PULSES STRONG. PT ON P200 
AIR MATTRESS. ISOLATION PRECAUTIONS IN PLACE. SOFT BILATERAL RESTRAINTS IN PLACE. 
CIRCULATION CHECK. BED ALARM ON. SIDE RAILS X3. LOCKED AND IN LOW POSITION. WILL CONTINUE TO 
MONITOR  PT.

## 2020-11-23 NOTE — NUR
NURSE NOTES:

RECEIVED REPORT FROM ANJELICA RN PT ORALLY INTUBATED AWAKE DOES NOTED FOLLOWS COMMAND ON BONY 
SOFT RESTRAINT NAN COMPLAINT   TOLERATING TUBE FEEDING   REPOSITION AND SUCTION

## 2020-11-23 NOTE — NUR
NURSE NOTES:

LATE ENTRY:

PT WAS GIVEN ATIVAN TODAY FOR INCREASED ANX LEVEL. RESPONSIVE TO MEDICATION. WILL CONTINUE 
TO MONITOR.

## 2020-11-23 NOTE — INTERNAL MED PROGRESS NOTE
Subjective


Date of Service:  Nov 23, 2020


Physician Name


NadiyaBenito


Attending Physician


Andrei Cancino MD





Current Medications








 Medications


  (Trade)  Dose


 Ordered  Sig/Miky


 Route


 PRN Reason  Start Time


 Stop Time Status Last Admin


Dose Admin


 


 Acetaminophen


  (Tylenol)  650 mg  Q4H  PRN


 GT


 Temp >100.5  11/21/20 12:30


 12/21/20 12:29  11/23/20 10:14





 


 Albuterol/


 Ipratropium


  (Albuterol/


 Ipratropium)  3 ml  Q4HRT  PRN


 HHN


 sob  11/22/20 10:45


 11/27/20 10:44   





 


 Chlorhexidine


 Gluconate


  (Jess-Hex 2%)  1 applic  DAILY@2000


 TOPIC


   11/6/20 20:00


 2/4/21 19:59  11/22/20 20:31





 


 Dexamethasone


  (Decadron)  6 mg  Q24H


 ORAL


   11/22/20 18:00


 12/1/20 18:01  11/23/20 17:30





 


 Dextrose


  (Dextrose 50%)  50 ml  Q30M  PRN


 IV


 Hypoglycemia  10/23/20 22:45


 1/21/21 22:44   





 


 Heparin Sodium


  (Porcine)


  (Heparin 5000


 units/ml)  5,000 units  EVERY 12  HOURS


 SUBQ


   10/23/20 21:00


 12/7/20 20:59  11/23/20 08:59





 


 Insulin Aspart


  (NovoLOG)    EVERY 6  HOURS


 SUBQ


   11/2/20 06:00


 1/22/21 06:29  11/23/20 13:36





 


 Lorazepam


  (Ativan 2mg/ml


 1ml)  2 mg  Q4H  PRN


 IV


 For Anxiety  11/17/20 19:15


 11/24/20 19:14  11/23/20 10:13





 


 Meropenem 1 gm/


 Sodium Chloride  55 ml @ 


 110 mls/hr  Q8H


 IVPB


   11/21/20 10:00


 11/26/20 09:59  11/23/20 17:31





 


 Midodrine


  (Pro-Amatine)  10 mg  Q8H


 ORAL


   11/11/20 17:00


 2/9/21 16:59  11/23/20 17:31





 


 Nitroglycerin


  (Ntg)  0.4 mg  Q5M  PRN


 SL


 Prn Chest Pain  11/21/20 10:15


 12/21/20 10:09   





 


 Ondansetron HCl


  (Zofran)  4 mg  Q6H  PRN


 IVP


 Nausea & Vomiting  11/21/20 14:30


 12/21/20 14:29   





 


 Pantoprazole


  (Protonix)  40 mg  DAILY


 IV


   11/7/20 09:00


 12/7/20 08:59  11/23/20 08:57





 


 Polyethylene


 Glycol


  (Miralax)  17 gm  DAILYPRN  PRN


 GT


 Constipation  11/21/20 10:15


 12/21/20 10:14   





 


 Promethazine HCl/


 Codeine


  (Phenergan with


 Codeine)  5 ml  Q4H  PRN


 GT


 For Cough  11/21/20 10:14


 12/21/20 10:13   





 


 Sodium Chloride  1,000 ml @ 


 50 mls/hr  Q20H


 IV


   11/6/20 16:00


 12/6/20 15:59  11/23/20 01:27











Allergies:  


Coded Allergies:  


     No Known Allergies (Unverified , 8/20/12)


ROS Limited/Unobtainable:  Yes


Subjective


74 YO M admitted with shortness of breath.  Now pneumonia; previously COVID 

positive.  Cover for Int med-Dr Cancino.  ICU.  Intubated and sedated.





Objective





Last Vital Signs








  Date Time  Temp Pulse Resp B/P (MAP) Pulse Ox O2 Delivery O2 Flow Rate FiO2


 


11/23/20 18:00 98.9 73 12 109/56 (73) 98   


 


11/23/20 16:00        30


 


11/23/20 16:00      Mechanical Ventilator  





      Mechanical Ventilator  





      Mechanical Ventilator  











Laboratory Tests








Test


 11/23/20


01:04 11/23/20


05:37 11/23/20


05:59 11/23/20


06:12


 


POC Whole Blood Glucose


 Pending  


 


 107 MG/DL


()  H Pending  





 


White Blood Count


 


 12.0 K/UL


(4.8-10.8)  H 


 





 


Red Blood Count


 


 2.93 M/UL


(4.70-6.10)  L 


 





 


Hemoglobin


 


 8.8 G/DL


(14.2-18.0)  L 


 





 


Hematocrit


 


 28.8 %


(42.0-52.0)  L 


 





 


Mean Corpuscular Volume  98 FL (80-99)    


 


Mean Corpuscular Hemoglobin


 


 30.2 PG


(27.0-31.0) 


 





 


Mean Corpuscular Hemoglobin


Concent 


 30.7 G/DL


(32.0-36.0)  L 


 





 


Red Cell Distribution Width


 


 18.3 %


(11.6-14.8)  H 


 





 


Platelet Count


 


 262 K/UL


(150-450) 


 





 


Mean Platelet Volume


 


 6.7 FL


(6.5-10.1) 


 





 


Neutrophils (%) (Auto)


 


 74.7 %


(45.0-75.0) 


 





 


Lymphocytes (%) (Auto)


 


 15.6 %


(20.0-45.0)  L 


 





 


Monocytes (%) (Auto)


 


 7.2 %


(1.0-10.0) 


 





 


Eosinophils (%) (Auto)


 


 1.7 %


(0.0-3.0) 


 





 


Basophils (%) (Auto)


 


 0.7 %


(0.0-2.0) 


 





 


Sodium Level


 


 139 MMOL/L


(136-145) 


 





 


Potassium Level


 


 3.9 MMOL/L


(3.5-5.1) 


 





 


Chloride Level


 


 107 MMOL/L


() 


 





 


Carbon Dioxide Level


 


 25 MMOL/L


(21-32) 


 





 


Anion Gap


 


 8 mmol/L


(5-15) 


 





 


Blood Urea Nitrogen


 


 13 mg/dL


(7-18) 


 





 


Creatinine


 


 0.7 MG/DL


(0.55-1.30) 


 





 


Estimat Glomerular Filtration


Rate 


 > 60 mL/min


(>60) 


 





 


Glucose Level


 


 111 MG/DL


()  H 


 





 


Calcium Level


 


 7.8 MG/DL


(8.5-10.1)  L 


 





 


Test


 11/23/20


07:59 11/23/20


13:06 11/23/20


17:51 





 


Arterial Blood pH


 7.466


(7.350-7.450) 


 


 





 


Arterial Blood Partial


Pressure CO2 32.4 mmHg


(35.0-45.0)  L 


 


 





 


Arterial Blood Partial


Pressure O2 74.4 mmHg


(75.0-100.0)  L 


 


 





 


Arterial Blood HCO3


 22.8 mmol/L


(22.0-26.0) 


 


 





 


Arterial Blood Oxygen


Saturation 94.9 %


()  L 


 


 





 


Arterial Blood Base Excess -0.4 (-2-2)     


 


Jerod Test Positive     


 


POC Whole Blood Glucose


 


 Pending  


 135 MG/DL


()  H 














Microbiology








 Date/Time


Source Procedure


Growth Status





 


 11/21/20 16:30


Blood Blood Culture - Preliminary


NO GROWTH AFTER 24 HOURS Resulted


 


 11/21/20 16:20


Blood Blood Culture - Preliminary


NO GROWTH AFTER 24 HOURS Resulted

















Intake and Output  


 


 11/22/20 11/23/20





 19:00 07:00


 


Intake Total 1220 ml 1475 ml


 


Output Total 610 ml 1050 ml


 


Balance 610 ml 425 ml


 


  


 


Free Water  100 ml


 


IV Total 770 ml 655 ml


 


Tube Feeding 420 ml 720 ml


 


Other 30 ml 


 


Output Urine Total 610 ml 1050 ml


 


# Bowel Movements  1








Objective


PHYSICAL EXAMINATION:


GENERAL:  The patient awake with deep stimuli, open his eyes, however,


cannot follow commands.  The patient is on a Ventimask at this time,


chronically ill-appearing.


HEAD AND NECK:  Pupils are equal and reactive to light.  Anicteric.


NECK:  Supple.  No JVD.


LUNGS:  Mech vent;  wheezing, rhonchi, and decreased air in bases.


HEART:  S1, S2.  Regular rhythm.  Distant heart sounds.  No murmur or


gallop.


ABDOMEN:  Soft, nondistended, nontender.  Positive bowel sounds.


EXTREMITIES:  No cyanosis, clubbing, or edema.


NEUROLOGIC:  Very limited secondary to the patient's status, cannot follow


commands.  Opens his eyes with deep stimuli and moving extremities


spontaneously.





Assessment/Plan


Assessment/Plan


ASSESSMENT:


1. Acute hypoxemic respiratory failure, most likely secondary to


pneumonia and sepsis.


2. Sepsis secondary to urinary tract infection and pneumonia.


3. pneumonia=MRSA; ESBL E. coli


4. Acute kidney injury on chronic renal insufficiency.


5. Dehydration.


6. History of chronic congestive heart failure.


7. Diabetes type 2.


8. Dyslipidemia.


9. Hypertension.


10. Alzheimer's disease.


11. COVID 19 previous positive





PLAN:


1.  ICU


2.  Dr. Sandoval,=Pulmonary Critical Care; follow mech vent recs


3.  Dr. Garcia = Inf Dis.


4.  IV= D5W due to the hypernatremia and dehydration.


5.  antibiotics =   meropenem; S/P micafungin and zyvox


6.  Code status is full code.


7.    DVT prophylaxis is heparin subcutaneous.











Benito Hearn MD               Nov 23, 2020 18:50

## 2020-11-23 NOTE — CARDIAC ELECTROPHYSIOLOGY PN
Assessment/Plan


Assessment/Plan


1. Accelerated junctional rhythm. Ruled out for MI


      Echo showed ejection fraction of 55%.





2. Long run of 31 beats of Nonsustained VT on 11/3/2020. 


    Cardiac cath after stabilization.





3. Nicola 30, Asystole while on the Vent due to resp failure/ likely mucus plug .

S/P Code





4. Respiratory failure, on antibiotic and intubated on the vent 


   Failed weaning. Family refused tracheostomy 





5. Septic shock. Off Levophed  and on Midodrine 10 tid 


6. History of previous COVID infection in September 2020 and active Covid  in 

isolation


Now negative again


7. Dementia.





JEANNINE RN and Dr luna





Subjective


Subjective


  In ICU on the Vent   Fio2 30%, PEEP 5


 Had 31 beats of VT  on 11/3/20 at 16:46 and 5 beats 11/8/20 


 Coded on 11/12/20 as sat dropped to 20% and got nicola 30s and PEA and pulseless




 Off Levophed  and on Midodrine 10 tid


   Covid test was negative. But still febrile.


    Failed weaning again. Family refusing tracheostomy. Was on CPAP for 3 hours 

yesterday


In SR





Objective





Last 24 Hour Vital Signs








  Date Time  Temp Pulse Resp B/P (MAP) Pulse Ox O2 Delivery O2 Flow Rate FiO2


 


11/23/20 10:13  121 22 170/90 98   


 


11/23/20 07:20  84 14     30


 


11/23/20 07:00  74 12 95/52 (66)    


 


11/23/20 06:30  78 12     


 


11/23/20 06:00  75 17 110/54 (72)    


 


11/23/20 05:00  75 12 108/58 (75)    


 


11/23/20 04:00      Mechanical Ventilator  





      Mechanical Ventilator  





      Mechanical Ventilator  


 


11/23/20 04:00 98.8 83 17 137/72 (93)    


 


11/23/20 04:00  78      


 


11/23/20 04:00        30


 


11/23/20 03:00  76 13 96/53 (67) 100   


 


11/23/20 02:40  79 14     30


 


11/23/20 02:00  78 15 113/61 (78) 100   


 


11/23/20 01:00  81 14 113/60 (77) 100   


 


11/23/20 00:00 98.5 81 13 123/65 (84) 100   


 


11/23/20 00:00        30


 


11/23/20 00:00      Mechanical Ventilator  





      Mechanical Ventilator  





      Mechanical Ventilator  


 


11/23/20 00:00  78      


 


11/22/20 23:00 99.0 80 12 119/62 (81) 100   


 


11/22/20 22:47  85 13     30


 


11/22/20 22:00  78 13 107/51 (69) 100   


 


11/22/20 21:24 99.0       


 


11/22/20 21:00  73      


 


11/22/20 21:00      Mechanical Ventilator  





      Mechanical Ventilator  





      Mechanical Ventilator  


 


11/22/20 21:00  76 14 118/61 (80) 100   


 


11/22/20 20:00        30


 


11/22/20 20:00 100.5 76 13 117/53 (74) 100   


 


11/22/20 19:03  82 14     30


 


11/22/20 19:00  74 14 108/53 (71) 100   


 


11/22/20 18:30  75 12 107/57 (74) 100   


 


11/22/20 18:00  73 12 108/51 (70) 100   


 


11/22/20 17:30  80 13 102/71 (81)    


 


11/22/20 17:00  75 12 96/53 (67) 100   


 


11/22/20 16:34  73 12 96/49 (65) 99   


 


11/22/20 16:30  73 12 88/48 (61) 99   


 


11/22/20 16:00  73      


 


11/22/20 16:00  72 12 80/47 (58) 100   


 


11/22/20 16:00        30


 


11/22/20 16:00      Mechanical Ventilator  





      Mechanical Ventilator  





      Mechanical Ventilator  


 


11/22/20 15:30 100.0 67 12 94/68 (77) 100   


 


11/22/20 15:05  73 12     30


 


11/22/20 15:00  76 12 83/47 (59) 100   


 


11/22/20 14:00 99.9 88 12 86/52 (63) 99   


 


11/22/20 14:00 99.9       


 


11/22/20 13:19  67 15     30


 


11/22/20 13:19     100   


 


11/22/20 13:00 100.8 106 27 153/88 (109) 99   


 


11/22/20 13:00        30


 


11/22/20 12:00      Mechanical Ventilator  





      Mechanical Ventilator  





      Mechanical Ventilator  


 


11/22/20 12:00  85      


 


11/22/20 12:00  104 27 185/84 (117) 100   


 


11/22/20 11:00  93 24 108/83 (91) 100   

















Intake and Output  


 


 11/22/20 11/23/20





 19:00 07:00


 


Intake Total 1220 ml 1425 ml


 


Output Total 610 ml 1050 ml


 


Balance 610 ml 375 ml


 


  


 


Free Water  100 ml


 


IV Total 770 ml 605 ml


 


Tube Feeding 420 ml 720 ml


 


Other 30 ml 


 


Output Urine Total 610 ml 1050 ml


 


# Bowel Movements  1











Laboratory Tests








Test


 11/22/20


12:35 11/22/20


12:52 11/22/20


17:36 11/23/20


01:04


 


Arterial Blood pH


 7.421


(7.350-7.450) 


 


 





 


Arterial Blood Partial


Pressure CO2 37.3 mmHg


(35.0-45.0) 


 


 





 


Arterial Blood Partial


Pressure O2 71.7 mmHg


(75.0-100.0)  L 


 


 





 


Arterial Blood HCO3


 23.7 mmol/L


(22.0-26.0) 


 


 





 


Arterial Blood Oxygen


Saturation 94.2 %


()  L 


 


 





 


Arterial Blood Base Excess -0.5 (-2-2)     


 


Jerod Test Positive     


 


POC Whole Blood Glucose


 


 152 MG/DL


()  H 153 MG/DL


()  H Pending  





 


Test


 11/23/20


05:37 11/23/20


05:59 11/23/20


06:12 11/23/20


07:59


 


White Blood Count


 12.0 K/UL


(4.8-10.8)  H 


 


 





 


Red Blood Count


 2.93 M/UL


(4.70-6.10)  L 


 


 





 


Hemoglobin


 8.8 G/DL


(14.2-18.0)  L 


 


 





 


Hematocrit


 28.8 %


(42.0-52.0)  L 


 


 





 


Mean Corpuscular Volume 98 FL (80-99)     


 


Mean Corpuscular Hemoglobin


 30.2 PG


(27.0-31.0) 


 


 





 


Mean Corpuscular Hemoglobin


Concent 30.7 G/DL


(32.0-36.0)  L 


 


 





 


Red Cell Distribution Width


 18.3 %


(11.6-14.8)  H 


 


 





 


Platelet Count


 262 K/UL


(150-450) 


 


 





 


Mean Platelet Volume


 6.7 FL


(6.5-10.1) 


 


 





 


Neutrophils (%) (Auto)


 74.7 %


(45.0-75.0) 


 


 





 


Lymphocytes (%) (Auto)


 15.6 %


(20.0-45.0)  L 


 


 





 


Monocytes (%) (Auto)


 7.2 %


(1.0-10.0) 


 


 





 


Eosinophils (%) (Auto)


 1.7 %


(0.0-3.0) 


 


 





 


Basophils (%) (Auto)


 0.7 %


(0.0-2.0) 


 


 





 


Sodium Level


 139 MMOL/L


(136-145) 


 


 





 


Potassium Level


 3.9 MMOL/L


(3.5-5.1) 


 


 





 


Chloride Level


 107 MMOL/L


() 


 


 





 


Carbon Dioxide Level


 25 MMOL/L


(21-32) 


 


 





 


Anion Gap


 8 mmol/L


(5-15) 


 


 





 


Blood Urea Nitrogen


 13 mg/dL


(7-18) 


 


 





 


Creatinine


 0.7 MG/DL


(0.55-1.30) 


 


 





 


Estimat Glomerular Filtration


Rate > 60 mL/min


(>60) 


 


 





 


Glucose Level


 111 MG/DL


()  H 


 


 





 


Calcium Level


 7.8 MG/DL


(8.5-10.1)  L 


 


 





 


POC Whole Blood Glucose


 


 107 MG/DL


()  H Pending  


 





 


Arterial Blood pH


 


 


 


 7.466


(7.350-7.450)


 


Arterial Blood Partial


Pressure CO2 


 


 


 32.4 mmHg


(35.0-45.0)  L


 


Arterial Blood Partial


Pressure O2 


 


 


 74.4 mmHg


(75.0-100.0)  L


 


Arterial Blood HCO3


 


 


 


 22.8 mmol/L


(22.0-26.0)


 


Arterial Blood Oxygen


Saturation 


 


 


 94.9 %


()  L


 


Arterial Blood Base Excess    -0.4 (-2-2)  


 


Jerod Test    Positive  











Microbiology








 Date/Time


Source Procedure


Growth Status





 


 11/21/20 16:30


Blood Blood Culture - Preliminary


NO GROWTH AFTER 24 HOURS Resulted


 


 11/21/20 16:20


Blood Blood Culture - Preliminary


NO GROWTH AFTER 24 HOURS Resulted








Objective





HEAD AND NECK:  No JVD. Orally intubated


LUNGS:  Coarse rhonchi.


CARDIOVASCULAR:   Irregular S1 and S2 with no gallop.


ABDOMEN:  Soft.


EXTREMITIES:  No pitting edema.











Kvng Edmond MD               Nov 23, 2020 10:45

## 2020-11-23 NOTE — NUR
NURSE HAND-OFF REPORT: 



Latest Vital Signs: Temperature 98.8 , Pulse 74 , B/P 95 /52 , Respiratory Rate 12 , O2 SAT 
100 , Mechanical Ventilator, O2 Flow Rate  .  

Vital Sign Comment: 



EKG Rhythm: Sinus Rhythm

Rhythm change?: N 

MD Notified?: DIDI Laureano MD Response: 



Latest Mejia Fall Score: 50  

Fall Risk: High Risk 

Safety Measures: Call light Within Reach, Bed Alarm Zone 2, Side Rails Side Rails x2, Bed 
position Low and Locked.

Fall Precautions: 

Door Sign



Report given to .

## 2020-11-23 NOTE — NUR
NURSE NOTES:

LATE ENTRY:

RECEIVED REPORT FROM LITA PAT. PT IN BED, LEFT LEG AT SIDE RAIL. OPENS EYES. MOVING 
EXTREMITIES. FLAT EFFECT AND  DOES NOT FOLLOW COMMANDS. WITHDRAWALS TO PAIN. SR ON MONITOR. 
BP STABLE. PT FEBRILE 100.5 AX.  INTUBATED, ETT 7.5, AT 25CM. VENT SETTINGS AC 12, , 
FIO2 30%, NO PEEP.  LUNG SOUNDS UPPER LOBES RHONCHI, DIMINISHED, LOWER LOBES BILATERAL. 
SECRETIONS MODERATE. CLEAR.  LEFT NGT, TUBE FEEDING GLUCERNA 1.2 AT 60ML/HR. ABDOMEN ROUND, 
NON TENDER. BOWEL SOUNDS IN ALL QUADRANTS. ONE BM, LARGE BROWN, LOOSE. BLADDER FLAT. GALLEGOS 
DRAINING YELLOW URINE. IMPROVED SKIN AREAS. CAP REFILL <3 SEC. NO JVD. NON PITTING EDEMA 
UPPER EXTREMITIES. PICC DRESSING DRY AND INTACT. BILATERAL RADIAL PULSES STRONG. PT ON P200 
AIR MATTRESS. ISOLATION PRECAUTIONS IN PLACE. SOFT BILATERAL RESTRAINTS IN PLACE. 
CIRCULATION CHECK COMPLETED. BED ALARM ON. SIDE RAILS X3. LOCKED AND IN LOW POSITION. WILL 
CONTINUE TO MONITOR  PT.

## 2020-11-23 NOTE — NUR
CASE MANAGEMENT:REVIEW



SI;PNA. RESPIRATORY FAILURE. SEPTIC SHOCK.

BILATERAL PLEURAL EFFUSION.

100.5   100   28   95/52   83% ETT/VENT SUPPORT

AC 12      PEEP 0   FIO2 30%

WBC 12.0   H/H 8.8/28.8   CA 7.8



IS;DECADRON NG Q24

TYLENOL NG Q4 PRN

MEROPENEM IV Q8

MIDODRINE NG Q8

IVF NS @ 50 ML/HR

ATIVAN IV Q4 PRN

PROTONIX IV QD



****ICU STATUS****

DCP;FROM [facility]

## 2020-11-23 NOTE — NUR
NURSE NOTES:

MD. HARTMANN HERE TO SEE PT. WAS INFORMED OF PT FEVERS. PT HAS PICC GURPREET. MILD EDEMA NOTED. LABS 
IMPROVING. CENTRAL DRESSING LINE TO BE CHANGED TODAY. ORDER FOR BLOOD CULTURES VIA PICC AND 
PERIPHERAL.

## 2020-11-24 VITALS — SYSTOLIC BLOOD PRESSURE: 145 MMHG | DIASTOLIC BLOOD PRESSURE: 70 MMHG

## 2020-11-24 VITALS — DIASTOLIC BLOOD PRESSURE: 62 MMHG | SYSTOLIC BLOOD PRESSURE: 116 MMHG

## 2020-11-24 VITALS — DIASTOLIC BLOOD PRESSURE: 59 MMHG | SYSTOLIC BLOOD PRESSURE: 119 MMHG

## 2020-11-24 VITALS — DIASTOLIC BLOOD PRESSURE: 64 MMHG | SYSTOLIC BLOOD PRESSURE: 106 MMHG

## 2020-11-24 VITALS — SYSTOLIC BLOOD PRESSURE: 118 MMHG | DIASTOLIC BLOOD PRESSURE: 57 MMHG

## 2020-11-24 VITALS — DIASTOLIC BLOOD PRESSURE: 64 MMHG | SYSTOLIC BLOOD PRESSURE: 125 MMHG

## 2020-11-24 VITALS — DIASTOLIC BLOOD PRESSURE: 63 MMHG | SYSTOLIC BLOOD PRESSURE: 132 MMHG

## 2020-11-24 VITALS — DIASTOLIC BLOOD PRESSURE: 68 MMHG | SYSTOLIC BLOOD PRESSURE: 127 MMHG

## 2020-11-24 VITALS — SYSTOLIC BLOOD PRESSURE: 50 MMHG

## 2020-11-24 VITALS — DIASTOLIC BLOOD PRESSURE: 69 MMHG | SYSTOLIC BLOOD PRESSURE: 124 MMHG

## 2020-11-24 VITALS — SYSTOLIC BLOOD PRESSURE: 119 MMHG | DIASTOLIC BLOOD PRESSURE: 60 MMHG

## 2020-11-24 VITALS — SYSTOLIC BLOOD PRESSURE: 143 MMHG | DIASTOLIC BLOOD PRESSURE: 85 MMHG

## 2020-11-24 VITALS — SYSTOLIC BLOOD PRESSURE: 113 MMHG | DIASTOLIC BLOOD PRESSURE: 57 MMHG

## 2020-11-24 VITALS — DIASTOLIC BLOOD PRESSURE: 61 MMHG | SYSTOLIC BLOOD PRESSURE: 116 MMHG

## 2020-11-24 VITALS — DIASTOLIC BLOOD PRESSURE: 54 MMHG | SYSTOLIC BLOOD PRESSURE: 101 MMHG

## 2020-11-24 VITALS — DIASTOLIC BLOOD PRESSURE: 68 MMHG | SYSTOLIC BLOOD PRESSURE: 134 MMHG

## 2020-11-24 VITALS — DIASTOLIC BLOOD PRESSURE: 64 MMHG | SYSTOLIC BLOOD PRESSURE: 129 MMHG

## 2020-11-24 VITALS — DIASTOLIC BLOOD PRESSURE: 56 MMHG | SYSTOLIC BLOOD PRESSURE: 100 MMHG

## 2020-11-24 VITALS — DIASTOLIC BLOOD PRESSURE: 59 MMHG | SYSTOLIC BLOOD PRESSURE: 111 MMHG

## 2020-11-24 VITALS — DIASTOLIC BLOOD PRESSURE: 54 MMHG | SYSTOLIC BLOOD PRESSURE: 105 MMHG

## 2020-11-24 VITALS — DIASTOLIC BLOOD PRESSURE: 54 MMHG | SYSTOLIC BLOOD PRESSURE: 110 MMHG

## 2020-11-24 VITALS — SYSTOLIC BLOOD PRESSURE: 108 MMHG | DIASTOLIC BLOOD PRESSURE: 54 MMHG

## 2020-11-24 VITALS — DIASTOLIC BLOOD PRESSURE: 63 MMHG | SYSTOLIC BLOOD PRESSURE: 124 MMHG

## 2020-11-24 LAB
ADD MANUAL DIFF: YES
ALBUMIN SERPL-MCNC: 1.5 G/DL (ref 3.4–5)
ALBUMIN/GLOB SERPL: 0.3 {RATIO} (ref 1–2.7)
ALP SERPL-CCNC: 193 U/L (ref 46–116)
ALT SERPL-CCNC: 15 U/L (ref 12–78)
AST SERPL-CCNC: 19 U/L (ref 15–37)
BILIRUB SERPL-MCNC: 0.2 MG/DL (ref 0.2–1)
BUN SERPL-MCNC: 18 MG/DL (ref 7–18)
CALCIUM SERPL-MCNC: 8.3 MG/DL (ref 8.5–10.1)
CHLORIDE SERPL-SCNC: 109 MMOL/L (ref 98–107)
CO2 SERPL-SCNC: 27 MMOL/L (ref 21–32)
CREAT SERPL-MCNC: 0.9 MG/DL (ref 0.55–1.3)
ERYTHROCYTE [DISTWIDTH] IN BLOOD BY AUTOMATED COUNT: 17.7 % (ref 11.6–14.8)
GLOBULIN SER-MCNC: 5.2 G/DL
HCT VFR BLD CALC: 30.6 % (ref 42–52)
HGB BLD-MCNC: 9.2 G/DL (ref 14.2–18)
MCV RBC AUTO: 99 FL (ref 80–99)
PHOSPHATE SERPL-MCNC: 2.6 MG/DL (ref 2.5–4.9)
PLATELET # BLD: 284 K/UL (ref 150–450)
POTASSIUM SERPL-SCNC: 4.6 MMOL/L (ref 3.5–5.1)
RBC # BLD AUTO: 3.09 M/UL (ref 4.7–6.1)
SODIUM SERPL-SCNC: 141 MMOL/L (ref 136–145)
WBC # BLD AUTO: 6.9 K/UL (ref 4.8–10.8)

## 2020-11-24 RX ADMIN — INSULIN ASPART SCH UNITS: 100 INJECTION, SOLUTION INTRAVENOUS; SUBCUTANEOUS at 05:41

## 2020-11-24 RX ADMIN — MEROPENEM SCH MLS/HR: 1 INJECTION INTRAVENOUS at 17:02

## 2020-11-24 RX ADMIN — INSULIN ASPART SCH UNITS: 100 INJECTION, SOLUTION INTRAVENOUS; SUBCUTANEOUS at 11:59

## 2020-11-24 RX ADMIN — MEROPENEM SCH MLS/HR: 1 INJECTION INTRAVENOUS at 01:15

## 2020-11-24 RX ADMIN — MIDODRINE HYDROCHLORIDE SCH MG: 10 TABLET ORAL at 08:55

## 2020-11-24 RX ADMIN — PANTOPRAZOLE SODIUM SCH MG: 40 INJECTION, POWDER, FOR SOLUTION INTRAVENOUS at 08:55

## 2020-11-24 RX ADMIN — INSULIN ASPART SCH UNITS: 100 INJECTION, SOLUTION INTRAVENOUS; SUBCUTANEOUS at 00:00

## 2020-11-24 RX ADMIN — MIDODRINE HYDROCHLORIDE SCH MG: 10 TABLET ORAL at 01:14

## 2020-11-24 RX ADMIN — HEPARIN SODIUM SCH UNITS: 5000 INJECTION INTRAVENOUS; SUBCUTANEOUS at 20:31

## 2020-11-24 RX ADMIN — MIDODRINE HYDROCHLORIDE SCH MG: 10 TABLET ORAL at 17:01

## 2020-11-24 RX ADMIN — HEPARIN SODIUM SCH UNITS: 5000 INJECTION INTRAVENOUS; SUBCUTANEOUS at 08:55

## 2020-11-24 RX ADMIN — INSULIN ASPART SCH UNITS: 100 INJECTION, SOLUTION INTRAVENOUS; SUBCUTANEOUS at 17:02

## 2020-11-24 RX ADMIN — INSULIN ASPART SCH UNITS: 100 INJECTION, SOLUTION INTRAVENOUS; SUBCUTANEOUS at 23:53

## 2020-11-24 RX ADMIN — MEROPENEM SCH MLS/HR: 1 INJECTION INTRAVENOUS at 09:04

## 2020-11-24 RX ADMIN — CHLORHEXIDINE GLUCONATE SCH APPLIC: 213 SOLUTION TOPICAL at 19:50

## 2020-11-24 NOTE — PULMONOLGY CRITICAL CARE NOTE
Critical Care - Asmt/Plan


Problems:  


(1) Acute respiratory failure


(2) Multifocal pneumonia


(3) 2019 novel coronavirus disease (COVID-19)


(4) Acute encephalopathy


(5) Severe sepsis


(6) Alzheimer's dementia


(7) HTN (hypertension)


(8) History of CVA (cerebrovascular accident)


(9) BPH (benign prostatic hyperplasia)


Respiratory:  monitor respiratory rate, adjust FIO2, ABG


Cardiac:  continue pressors, continue to monitor HR/BP


Renal:  F/U I&O, keep IV fluid, check electrolytes


Infectious Disease:  check cultures


Gastrointestinal:  continue feedings/current rate


Endocrine:  monitor blood sugar, check HgA1C


Affect:  PRN ativan


Prophylaxis:  Protonix


Disposition:  keep in ICU


Time Spent (Minutes):  40


Notes Reviewed:  internist


Discussed with:  nurses, consultants, 





Critical Care - Objective





Last 24 Hour Vital Signs








  Date Time  Temp Pulse Resp B/P (MAP) Pulse Ox O2 Delivery O2 Flow Rate FiO2


 


11/24/20 10:00  65 12 132/63 (86) 98   


 


11/24/20 09:30     100   


 


11/24/20 09:00  70 12 101/54 (70) 96   


 


11/24/20 08:00      Mechanical Ventilator  





      Mechanical Ventilator  





      Mechanical Ventilator  


 


11/24/20 08:00        30


 


11/24/20 08:00 97.9 76 12 100/56 (71) 95   


 


11/24/20 07:15  83 18     30


 


11/24/20 07:00  90 28 145/70 (95) 97   


 


11/24/20 06:06  78 12     


 


11/24/20 06:00  80 18 124/69 (87) 97   


 


11/24/20 05:00  81 16 143/85 (104) 99   


 


11/24/20 04:00  90      


 


11/24/20 04:00        30


 


11/24/20 04:00      Mechanical Ventilator  





      Mechanical Ventilator  





      Mechanical Ventilator  


 


11/24/20 04:00 98.6 71 13 119/60 (79) 99   


 


11/24/20 03:11  67 13     30


 


11/24/20 03:00  72 13 111/59 (76) 98   


 


11/24/20 02:00  72 13 124/63 (83) 99   


 


11/24/20 01:00  67 13 106/64 (78) 98   


 


11/24/20 00:00      Mechanical Ventilator  





      Mechanical Ventilator  





      Mechanical Ventilator  


 


11/24/20 00:00        30


 


11/24/20 00:00  74      


 


11/24/20 00:00 98.8 74 14 116/61 (79) 99   


 


11/23/20 23:03  61 12     30


 


11/23/20 23:00  65 12 110/62 (78) 99   


 


11/23/20 22:00  75 15 150/87 (108) 100   


 


11/23/20 21:00  64 12 119/54 (75) 98   


 


11/23/20 20:00        30


 


11/23/20 20:00  64      


 


11/23/20 20:00 98.6 66 12 113/56 (75) 97   


 


11/23/20 20:00      Mechanical Ventilator  





      Mechanical Ventilator  





      Mechanical Ventilator  


 


11/23/20 19:32  63 12     30


 


11/23/20 19:00  65 12 99/50 (66) 97   


 


11/23/20 18:00 98.9 73 12 109/56 (73) 98   


 


11/23/20 17:00  69 12 89/46 (60) 98   


 


11/23/20 16:00        30


 


11/23/20 16:00      Mechanical Ventilator  





      Mechanical Ventilator  





      Mechanical Ventilator  


 


11/23/20 16:00  72      


 


11/23/20 16:00  70 12 93/54 (67) 98   


 


11/23/20 15:30  77 12     30


 


11/23/20 15:00  70 12 91/50 (64) 100   


 


11/23/20 14:00  69 12 87/47 (60) 100   


 


11/23/20 13:00  73 12 110/53 (72) 100   


 


11/23/20 12:00  86      


 


11/23/20 12:00      Mechanical Ventilator  





      Mechanical Ventilator  





      Mechanical Ventilator  


 


11/23/20 12:00        30


 


11/23/20 12:00 99.8 75 12 89/48 (62) 95   


 


11/23/20 11:15  86 12     30


 


11/23/20 11:00  110 20 110/76 (87) 96   








Status:  awake


Condition:  critical, improving


HEENT:  atraumatic


Lungs:  clear


Heart:  HR/BP stable


Abdomen:  soft, non-tender


Extremities:  no C/C/E


Objective:


still large secretions, failed weaning


Micro:





Microbiology








 Date/Time


Source Procedure


Growth Status





 


 11/21/20 16:30


Blood Blood Culture - Preliminary


NO GROWTH AFTER 48 HOURS Resulted


 


 11/21/20 16:20


Blood Blood Culture - Preliminary


NO GROWTH AFTER 48 HOURS Resulted








Accucheck:  157





Critical Care - Subjective


ROS Limited/Unobtainable:  Yes


Condition:  critical, improving


EKG Rhythm:  Sinus Rhythm


FI02:  30


Vent Support Breath Rate:  12


Vent Support Mode:  AC


Vent Tidal Volume:  600


Sputum Amount:  Moderate


PEEP:  0.0


PIP:  40


Tube Feeding Amount:  60


I&O:











Intake and Output  


 


 11/23/20 11/24/20





 19:00 07:00


 


Intake Total 1540 ml 1375 ml


 


Output Total 1105 ml 1135 ml


 


Balance 435 ml 240 ml


 


  


 


IV Total 820 ml 655 ml


 


Tube Feeding 720 ml 720 ml


 


Output Urine Total 1105 ml 1135 ml


 


# Bowel Movements 2 








ET-Tube:  7.5


ET Position:  25


Labs:





Laboratory Tests








Test


 11/23/20


13:06 11/23/20


17:51 11/24/20


00:13 11/24/20


03:40


 


POC Whole Blood Glucose


 Pending  


 135 MG/DL


()  H 178 MG/DL


()  H 





 


White Blood Count


 


 


 


 6.9 K/UL


(4.8-10.8)


 


Red Blood Count


 


 


 


 3.09 M/UL


(4.70-6.10)  L


 


Hemoglobin


 


 


 


 9.2 G/DL


(14.2-18.0)  L


 


Hematocrit


 


 


 


 30.6 %


(42.0-52.0)  L


 


Mean Corpuscular Volume    99 FL (80-99)  


 


Mean Corpuscular Hemoglobin


 


 


 


 29.9 PG


(27.0-31.0)


 


Mean Corpuscular Hemoglobin


Concent 


 


 


 30.2 G/DL


(32.0-36.0)  L


 


Red Cell Distribution Width


 


 


 


 17.7 %


(11.6-14.8)  H


 


Platelet Count


 


 


 


 284 K/UL


(150-450)


 


Mean Platelet Volume


 


 


 


 6.7 FL


(6.5-10.1)


 


Neutrophils (%) (Auto)


 


 


 


 % (45.0-75.0)





 


Lymphocytes (%) (Auto)


 


 


 


 % (20.0-45.0)





 


Monocytes (%) (Auto)     % (1.0-10.0)  


 


Eosinophils (%) (Auto)     % (0.0-3.0)  


 


Basophils (%) (Auto)     % (0.0-2.0)  


 


Differential Total Cells


Counted 


 


 


 100  





 


Neutrophils % (Manual)    89 % (45-75)  H


 


Lymphocytes % (Manual)    8 % (20-45)  L


 


Monocytes % (Manual)    3 % (1-10)  


 


Eosinophils % (Manual)    0 % (0-3)  


 


Basophils % (Manual)    0 % (0-2)  


 


Band Neutrophils    0 % (0-8)  


 


Platelet Estimate    Adequate  


 


Platelet Morphology    Normal  


 


Hypochromasia    1+  


 


Anisocytosis    1+  


 


Macrocytosis    1+  


 


Erythrocyte Sedimentation Rate


 


 


 


 123 MM/HR


(0-20)  H


 


Sodium Level


 


 


 


 141 MMOL/L


(136-145)


 


Potassium Level


 


 


 


 4.6 MMOL/L


(3.5-5.1)


 


Chloride Level


 


 


 


 109 MMOL/L


()  H


 


Carbon Dioxide Level


 


 


 


 27 MMOL/L


(21-32)


 


Blood Urea Nitrogen


 


 


 


 18 mg/dL


(7-18)


 


Creatinine


 


 


 


 0.9 MG/DL


(0.55-1.30)


 


Estimat Glomerular Filtration


Rate 


 


 


 > 60 mL/min


(>60)


 


Glucose Level


 


 


 


 148 MG/DL


()  H


 


Calcium Level


 


 


 


 8.3 MG/DL


(8.5-10.1)  L


 


Phosphorus Level


 


 


 


 2.6 MG/DL


(2.5-4.9)


 


Magnesium Level


 


 


 


 2.2 MG/DL


(1.8-2.4)


 


Total Bilirubin


 


 


 


 0.2 MG/DL


(0.2-1.0)


 


Aspartate Amino Transf


(AST/SGOT) 


 


 


 19 U/L (15-37)





 


Alanine Aminotransferase


(ALT/SGPT) 


 


 


 15 U/L (12-78)





 


Alkaline Phosphatase


 


 


 


 193 U/L


()  H


 


C-Reactive Protein,


Quantitative 


 


 


 10.0 mg/dL


(0.00-0.90)  H


 


Total Protein


 


 


 


 6.7 G/DL


(6.4-8.2)


 


Albumin


 


 


 


 1.5 G/DL


(3.4-5.0)  L


 


Globulin    5.2 g/dL  


 


Albumin/Globulin Ratio


 


 


 


 0.3 (1.0-2.7)


L

















Michael Sandoval MD              Nov 24, 2020 11:00

## 2020-11-24 NOTE — NUR
NURSE HAND-OFF REPORT: 



Latest Vital Signs: Temperature 98.8 , Pulse 67 , B/P 50 /68 , Respiratory Rate 13 , O2 SAT 
97 , Mechanical Ventilator, FiO2 30% .  

Vital Sign Comment: stable



EKG Rhythm: Sinus Rhythm

Rhythm change?: N 

MD Notified?: Pt seen by Dr Feli COLUNGA Response: n/a



Latest Mejia Fall Score: 50  

Fall Risk: High Risk 

Safety Measures: Call light Within Reach, Bed Alarm Zone 2, Side Rails Side Rails x2, Bed 
position Low and Locked.

Fall Precautions: 

Door Sign



Report given to LITA Rico.

## 2020-11-24 NOTE — NUR
NURSE NOTES:

Oral care provided.

Pt repositioned and cleaned. 

Afebrile.

1 BM noted.

Will continue to monitor.

## 2020-11-24 NOTE — NUR
RESPIRATORY NOTE:

Received pt on AC VC 12, 600VT, 30%, no PEEP. Pt is intubated w/ ETT 7.5 @ 25cm lipline, 
secured by anchorfast. Pt awake, responds to stimuli. B/S wale. rales/rhonchi, sxn moderate 
to large amounts of thick/thin, clear-white to pale-yellow secretions. Vent plugged into red 
outlet, ambubag at bedside. Pt in no apparent distress at this time. Will continue to 
monitor pt.

## 2020-11-24 NOTE — DIAGNOSTIC IMAGING REPORT
Indication: Dyspnea

 

Technique: One view of the chest

 

Comparison: 11/23/2020

 

Findings: Bilateral infiltrates and bilateral left greater than right pleural

effusions are unchanged. Satisfactory stable position of endotracheal tube. The

proximal port of the orogastric tube is at or just above the gastroesophageal

junction. Again demonstrated is a right arm PICC, tip terminating at the level of the

subclavian vein. Right shoulder prosthesis is again demonstrated

 

Impression: Somewhat high position of orogastric tube. Advancement recommended. This

finding was discussed with patient's nurse Clara at the time of interpretation

 

Otherwise stable findings as described

## 2020-11-24 NOTE — NUR
NURSE NOTES:

Pt seen by Dr Casillas. Confirmed isolation status (airborne isolation discontinued - pt is 
covid negative).

## 2020-11-24 NOTE — DIAGNOSTIC IMAGING REPORT
Indication: Status post nasogastric tube advancement

 

Technique: Supine view of the abdomen

 

Comparison: Chest radiograph earlier the same day

 

Findings: Interval advancement of nasogastric tube, tip now positioned at the level

the gastric antrum, position is satisfactory. Bowel gas pattern is unremarkable.

 

Impression: Improved and now satisfactory position of previously malpositioned

nasogastric tube

## 2020-11-24 NOTE — NUR
RESPIRATORY NOTE:Received pt on current vent settings, No respiratory distress, vent is 
plugged into red outlet, BVM is located in the room, checked for skin breakdown around 
anchor fast, alarms are set and audible, will continue to monitor

## 2020-11-24 NOTE — NUR
NURSE NOTES:

received report from kenyatta rn pt orally intubated -vent o2 sat % awake but confuse on 
wale soft wrest restraint non complaint  reposition and suction  no acute resp distress

## 2020-11-24 NOTE — NEPHROLOGY PROGRESS NOTE
Assessment/Plan


Problem List:  


(1) Dehydration


(2) JANES (acute kidney injury)


(3) Renal failure (ARF), acute on chronic


(4) Hypoxia


(5) Acute encephalopathy


(6) Electrolyte imbalance


Assessment





75-year-old male is admitted with acute hypoxic respiratory failure most likely 

secondary to pneumonia and sepsis, UTI.


Acute on chronic renal failure


Dehydration


Electrolyte imbalances, hypernatremia


Hypoalbuminemia


Diabetes type 2


History of congestive heart failure


Hypertension


Hyperlipemia


Alzheimer's


Previous COVID-19 infection in September 2020


Plan


November 24: Labs reviewed.  Discussed with RN.  Stable from renal standpoint of

view.  Main issue is weaning from ventilator that keeps failing.  Continue per 

consultants.


November 23: Labs reviewed.  Discussed with RN.  Stable from renal standpoint of

view.  Continue per consultants.


November 22: Patient seen and discussed with RN.  Labs reviewed.  Remains 

intubated.  Trial of weaning this being attempted.  Continue per consultants.


November 21: Remains intubated.  Labs reviewed.  Renal parameters stable.  

Continue per consultants.


November 20: Remains on mechanical ventilation.  Labs reviewed.  Abnormal 

electrolytes addressed.  Stable from renal standpoint of view.


November 19: Remains intubated.  Remains full code.  Labs reviewed.  Low 

phosphorus replaced.  Continue to monitor renal parameters.  Continue per 

consultants.


November 18: Failed weaning.  Remains intubated.  Remains full closed.  Labs 

reviewed.  Stable from renal standpoint view.


November 17: Remains in ICU.  Remains full code.  Labs are reviewed.  Phosphorus

supplement given.  Continue per consultants.


November 16: Remains in ICU.  Full code.  Labs reviewed.  Remains intubated.  

Stable renal parameters.


November 15: In ICU.  Full code.  Discussed with RN.  Stable renal parameters.


November 14: Seen in ICU.  Discussed with LITA Cooper.  Flomax discontinued.  

Labs reviewed.  Stable from renal standpoint of view.


November 13: Seen in ICU.  Discussed with LITA Cooper.  Remains on pressor.  

Electrolyte abnormalities noted and addressed.  Continue per consultants.  

Patient remains full code.


November 12: Seen in ICU.  Discussed with LITA Richardson.  Blood pressure remains a 

little requiring pressors.  Labs reviewed.  Stable renal parameters.  Continue 

per consultants.


November 11: Remains intubated.  Full code.  Blood pressure borderline low.  

Will increase midodrine dose.  Discussed with RN.  Continue to monitor renal 

parameters and electrolytes.


November 10: Intubated.  Full code.  Labs reviewed.  Stable from renal 

standpoint of view.  Continue per consultants.


November 9: Remains intubated.  Full code.  Labs reviewed.  Electrolytes within 

normal limit.  Continue per consultants.


November 8: In ICU.  Remains intubated on ventilator.  Remains full code.  Low 

potassium addressed.  Blood pressure fluctuating.  Continue per consultants.


November 7: Patient in ICU.  Intubated on ventilator.  On 6 mics of Levophed.  

Will give 100 cc albumin 25%.  K-Phos IV ordered.  Continue per consultants.  

Continue monitor renal parameters and electrolytes.


November 6: Status unchanged.  Transfer to [facility] for seizure.  Stable from renal 

standpoint to view.  Continue per consultants.


November 5: Status quo.  Labs reviewed.  Renal parameters stable.  Continue per 

consultants.


November 4: On nonrebreather mask.  Labs reviewed.  Renal parameters 

stable.Continue per pulmonologist.  Clinically unchanged.


November 3: On nonrebreather mask.  Inflammatory markers gradually declining.  

Renal parameters stable.  Continue per pulmonary.


November 2: Remains on nonrebreather mask.  Inflammatory markers remain 

elevated.  Renal parameters somewhat stable.  Continue per pulmonary and ID.  

Remains full code.


November 1: Patient on nonrebreather mask.  Labs noted.  Serum creatinine down 

to 1.3.  Continue per consultants.


October 31: Patient on nonrebreather mask.  Transfer to telemetry when seen this

morning.  Labs noted.  Continue per consultants.  Serum creatinine dylan to 1.7. 

Continue to monitor renal parameters.  Continue to monitor vancomycin level


October 30: Patient is not doing well clinically.  Mild respiratory distress.  

ABG noted.  Somewhat hypoxic.  CBC and chemistry panel ordered.  Discussed with 

LITA Garcia.  Will defer to pulmonary management to specialist.  Continue per ID.

 Renal parameters remained stable as of October 29.


October 29: Labs reviewed.  Renal parameters stable.  Continue per consultants.


October 28: Labs reviewed.  Potassium via NG tube ordered.  IV fluids stopped.  

Continue per consultants.


October 27: Labs reviewed.  Low potassium and low phosphorus replaced.  Continue

per consultants.  Remains stable from renal standpoint of view.


October 26: Patient remains n.p.o.  IV fluid down to 50 cc an hour.  Potassium 

supplement intravenously ordered.  Continue per consultants.





Previously:


Patient is n.p.o., will continue on IV fluid of D5W 75 cc an hour


We will monitor electrolytes and renal parameters


Avoid nephrotoxic's


Start p.o. when he clears by speech therapist, meanwhile aspiration precautions


Keep the blood pressure and blood sugar in check


Per orders





Subjective


ROS Limited/Unobtainable:  Yes





Objective


Objective





Last 24 Hour Vital Signs








  Date Time  Temp Pulse Resp B/P (MAP) Pulse Ox O2 Delivery O2 Flow Rate FiO2


 


11/24/20 12:00        30


 


11/24/20 11:01  75 18     30


 


11/24/20 11:00  73 12 129/64 (85) 98   


 


11/24/20 10:00  65 12 132/63 (86) 98   


 


11/24/20 09:30     100   


 


11/24/20 09:00  70 12 101/54 (70) 96   


 


11/24/20 08:00  80      


 


11/24/20 08:00      Mechanical Ventilator  





      Mechanical Ventilator  





      Mechanical Ventilator  


 


11/24/20 08:00        30


 


11/24/20 08:00 97.9 76 12 100/56 (71) 95   


 


11/24/20 07:15  83 18     30


 


11/24/20 07:00  90 28 145/70 (95) 97   


 


11/24/20 06:06  78 12     


 


11/24/20 06:00  80 18 124/69 (87) 97   


 


11/24/20 05:00  81 16 143/85 (104) 99   


 


11/24/20 04:00  90      


 


11/24/20 04:00        30


 


11/24/20 04:00      Mechanical Ventilator  





      Mechanical Ventilator  





      Mechanical Ventilator  


 


11/24/20 04:00 98.6 71 13 119/60 (79) 99   


 


11/24/20 03:11  67 13     30


 


11/24/20 03:00  72 13 111/59 (76) 98   


 


11/24/20 02:00  72 13 124/63 (83) 99   


 


11/24/20 01:00  67 13 106/64 (78) 98   


 


11/24/20 00:00      Mechanical Ventilator  





      Mechanical Ventilator  





      Mechanical Ventilator  


 


11/24/20 00:00        30


 


11/24/20 00:00  74      


 


11/24/20 00:00 98.8 74 14 116/61 (79) 99   


 


11/23/20 23:03  61 12     30


 


11/23/20 23:00  65 12 110/62 (78) 99   


 


11/23/20 22:00  75 15 150/87 (108) 100   


 


11/23/20 21:00  64 12 119/54 (75) 98   


 


11/23/20 20:00        30


 


11/23/20 20:00  64      


 


11/23/20 20:00 98.6 66 12 113/56 (75) 97   


 


11/23/20 20:00      Mechanical Ventilator  





      Mechanical Ventilator  





      Mechanical Ventilator  


 


11/23/20 19:32  63 12     30


 


11/23/20 19:00  65 12 99/50 (66) 97   


 


11/23/20 18:00 98.9 73 12 109/56 (73) 98   


 


11/23/20 17:00  69 12 89/46 (60) 98   


 


11/23/20 16:00        30


 


11/23/20 16:00      Mechanical Ventilator  





      Mechanical Ventilator  





      Mechanical Ventilator  


 


11/23/20 16:00  72      


 


11/23/20 16:00  70 12 93/54 (67) 98   


 


11/23/20 15:30  77 12     30


 


11/23/20 15:00  70 12 91/50 (64) 100   


 


11/23/20 14:00  69 12 87/47 (60) 100   

















Intake and Output  


 


 11/23/20 11/24/20





 19:00 07:00


 


Intake Total 1540 ml 1375 ml


 


Output Total 1105 ml 1135 ml


 


Balance 435 ml 240 ml


 


  


 


IV Total 820 ml 655 ml


 


Tube Feeding 720 ml 720 ml


 


Output Urine Total 1105 ml 1135 ml


 


# Bowel Movements 2 











Current Medications








 Medications


  (Trade)  Dose


 Ordered  Sig/Miky


 Route


 PRN Reason  Start Time


 Stop Time Status Last Admin


Dose Admin


 


 Acetaminophen


  (Tylenol)  650 mg  Q4H  PRN


 GT


 Temp >100.5  11/21/20 12:30


 12/21/20 12:29  11/23/20 10:14





 


 Albuterol/


 Ipratropium


  (Albuterol/


 Ipratropium)  3 ml  Q4HRT  PRN


 HHN


 sob  11/22/20 10:45


 11/27/20 10:44   





 


 Chlorhexidine


 Gluconate


  (Jess-Hex 2%)  1 applic  DAILY@2000


 TOPIC


   11/6/20 20:00


 2/4/21 19:59  11/23/20 19:46





 


 Dexamethasone


  (Decadron)  6 mg  Q24H


 ORAL


   11/22/20 18:00


 12/1/20 18:01  11/23/20 17:30





 


 Dextrose


  (Dextrose 50%)  50 ml  Q30M  PRN


 IV


 Hypoglycemia  10/23/20 22:45


 1/21/21 22:44   





 


 Heparin Sodium


  (Porcine)


  (Heparin 5000


 units/ml)  5,000 units  EVERY 12  HOURS


 SUBQ


   10/23/20 21:00


 12/7/20 20:59  11/24/20 08:55





 


 Insulin Aspart


  (NovoLOG)    EVERY 6  HOURS


 SUBQ


   11/2/20 06:00


 1/22/21 06:29  11/24/20 11:59





 


 Lorazepam


  (Ativan 2mg/ml


 1ml)  2 mg  Q4H  PRN


 IV


 For Anxiety  11/17/20 19:15


 11/24/20 19:14  11/23/20 10:13





 


 Meropenem 1 gm/


 Sodium Chloride  55 ml @ 


 110 mls/hr  Q8H


 IVPB


   11/21/20 10:00


 11/26/20 09:59  11/24/20 09:04





 


 Midodrine


  (Pro-Amatine)  10 mg  Q8H


 ORAL


   11/11/20 17:00


 2/9/21 16:59  11/24/20 08:55





 


 Nitroglycerin


  (Ntg)  0.4 mg  Q5M  PRN


 SL


 Prn Chest Pain  11/21/20 10:15


 12/21/20 10:09   





 


 Ondansetron HCl


  (Zofran)  4 mg  Q6H  PRN


 IVP


 Nausea & Vomiting  11/21/20 14:30


 12/21/20 14:29   





 


 Pantoprazole


  (Protonix)  40 mg  DAILY


 IV


   11/7/20 09:00


 12/7/20 08:59  11/24/20 08:55





 


 Polyethylene


 Glycol


  (Miralax)  17 gm  DAILYPRN  PRN


 GT


 Constipation  11/21/20 10:15


 12/21/20 10:14   





 


 Promethazine HCl/


 Codeine


  (Phenergan with


 Codeine)  5 ml  Q4H  PRN


 GT


 For Cough  11/21/20 10:14


 12/21/20 10:13   





 


 Sodium Chloride  1,000 ml @ 


 50 mls/hr  Q20H


 IV


   11/6/20 16:00


 12/6/20 15:59  11/23/20 22:00








Laboratory Tests


11/23/20 17:51: POC Whole Blood Glucose 135H


11/24/20 00:13: POC Whole Blood Glucose 178H


11/24/20 03:40: 


White Blood Count 6.9, Red Blood Count 3.09L, Hemoglobin 9.2L, Hematocrit 30.6L,

Mean Corpuscular Volume 99, Mean Corpuscular Hemoglobin 29.9, Mean Corpuscular 

Hemoglobin Concent 30.2L, Red Cell Distribution Width 17.7H, Platelet Count 284,

Mean Platelet Volume 6.7, Neutrophils (%) (Auto) , Lymphocytes (%) (Auto) , 

Monocytes (%) (Auto) , Eosinophils (%) (Auto) , Basophils (%) (Auto) , 

Differential Total Cells Counted 100, Neutrophils % (Manual) 89H, Lymphocytes % 

(Manual) 8L, Monocytes % (Manual) 3, Eosinophils % (Manual) 0, Basophils % 

(Manual) 0, Band Neutrophils 0, Platelet Estimate Adequate, Platelet Morphology 

Normal, Hypochromasia 1+, Anisocytosis 1+, Macrocytosis 1+, Erythrocyte 

Sedimentation Rate 123H, Sodium Level 141, Potassium Level 4.6, Chloride Level 

109H, Carbon Dioxide Level 27, Blood Urea Nitrogen 18, Creatinine 0.9, Estimat 

Glomerular Filtration Rate > 60, Glucose Level 148H, Calcium Level 8.3L, 

Phosphorus Level 2.6, Magnesium Level 2.2, Total Bilirubin 0.2, Aspartate Amino 

Transf (AST/SGOT) 19, Alanine Aminotransferase (ALT/SGPT) 15, Alkaline 

Phosphatase 193H, C-Reactive Protein, Quantitative 10.0H, Total Protein 6.7, 

Albumin 1.5L, Globulin 5.2, Albumin/Globulin Ratio 0.3L


Height (Feet):  5


Height (Inches):  4.00


Weight (Pounds):  130


General Appearance:  no apparent distress


EENT:  other - Intubated on ventilator


Cardiovascular:  normal peripheral pulses


Abdomen:  distended











Seven Tobar MD            Nov 24, 2020 13:17

## 2020-11-24 NOTE — NUR
NURSE NOTES:

Pt repositioned. Oral care provided. remains afebrile.

Pt endotracheally suctioned per RT Abdiel.

## 2020-11-24 NOTE — NUR
NURSE HAND-OFF REPORT: 



Latest Vital Signs: Temperature 98.6 , Pulse 80 , B/P 124 /69 , Respiratory Rate 18 , O2 SAT 
97 , Mechanical Ventilator, O2 Flow Rate  .  

Vital Sign Comment: 



EKG Rhythm: Sinus Rhythm

Rhythm change?: N 

MD Notified?: DIDI Laureano MD Response: 



Latest Mejia Fall Score: 50  

Fall Risk: High Risk 

Safety Measures: Call light Within Reach, Bed Alarm Zone 2, Side Rails Side Rails x2, Bed 
position Low and Locked.

Fall Precautions: 

Door Sign



Report given to ADELAIDE KHALIL.

## 2020-11-24 NOTE — CARDIAC ELECTROPHYSIOLOGY PN
Assessment/Plan


Assessment/Plan


1. Accelerated junctional rhythm. Ruled out for MI


      Echo showed ejection fraction of 55%.





2. Long run of 31 beats of Nonsustained VT on 11/3/2020. 


    Cardiac cath after stabilization.





3. Henry 30, Asystole while on the Vent due to resp failure/ likely mucus plug .

S/P Code





4. Respiratory failure, on antibiotic and intubated on the vent 


   Failed weaning. Family refused tracheostomy 





5. Septic shock. Off Levophed  and on Midodrine 10 tid 


6. History of previous COVID infection in September 2020 and active Covid  in 

isolation


Now negative again


7. Dementia.





JEANNINE RN and Dr luna





Subjective


Subjective


  In ICU on the Vent   Fio2 30%, PEEP 5


 Had 31 beats of VT  on 11/3/20 at 16:46 and 5 beats 11/8/20 


 Coded on 11/12/20 as sat dropped to 20% and got henry 30s and PEA and pulseless




 Off Levophed  and on Midodrine 10 tid


   Covid test was negative. But still febrile.


    Failed weaning again. Family refusing tracheostomy.





Objective





Last 24 Hour Vital Signs








  Date Time  Temp Pulse Resp B/P (MAP) Pulse Ox O2 Delivery O2 Flow Rate FiO2


 


11/24/20 12:00        30


 


11/24/20 11:01  75 18     30


 


11/24/20 11:00  73 12 129/64 (85) 98   


 


11/24/20 10:00  65 12 132/63 (86) 98   


 


11/24/20 09:30     100   


 


11/24/20 09:00  70 12 101/54 (70) 96   


 


11/24/20 08:00  80      


 


11/24/20 08:00      Mechanical Ventilator  





      Mechanical Ventilator  





      Mechanical Ventilator  


 


11/24/20 08:00        30


 


11/24/20 08:00 97.9 76 12 100/56 (71) 95   


 


11/24/20 07:15  83 18     30


 


11/24/20 07:00  90 28 145/70 (95) 97   


 


11/24/20 06:06  78 12     


 


11/24/20 06:00  80 18 124/69 (87) 97   


 


11/24/20 05:00  81 16 143/85 (104) 99   


 


11/24/20 04:00  90      


 


11/24/20 04:00        30


 


11/24/20 04:00      Mechanical Ventilator  





      Mechanical Ventilator  





      Mechanical Ventilator  


 


11/24/20 04:00 98.6 71 13 119/60 (79) 99   


 


11/24/20 03:11  67 13     30


 


11/24/20 03:00  72 13 111/59 (76) 98   


 


11/24/20 02:00  72 13 124/63 (83) 99   


 


11/24/20 01:00  67 13 106/64 (78) 98   


 


11/24/20 00:00      Mechanical Ventilator  





      Mechanical Ventilator  





      Mechanical Ventilator  


 


11/24/20 00:00        30


 


11/24/20 00:00  74      


 


11/24/20 00:00 98.8 74 14 116/61 (79) 99   


 


11/23/20 23:03  61 12     30


 


11/23/20 23:00  65 12 110/62 (78) 99   


 


11/23/20 22:00  75 15 150/87 (108) 100   


 


11/23/20 21:00  64 12 119/54 (75) 98   


 


11/23/20 20:00        30


 


11/23/20 20:00  64      


 


11/23/20 20:00 98.6 66 12 113/56 (75) 97   


 


11/23/20 20:00      Mechanical Ventilator  





      Mechanical Ventilator  





      Mechanical Ventilator  


 


11/23/20 19:32  63 12     30


 


11/23/20 19:00  65 12 99/50 (66) 97   


 


11/23/20 18:00 98.9 73 12 109/56 (73) 98   


 


11/23/20 17:00  69 12 89/46 (60) 98   


 


11/23/20 16:00        30


 


11/23/20 16:00      Mechanical Ventilator  





      Mechanical Ventilator  





      Mechanical Ventilator  


 


11/23/20 16:00  72      


 


11/23/20 16:00  70 12 93/54 (67) 98   


 


11/23/20 15:30  77 12     30


 


11/23/20 15:00  70 12 91/50 (64) 100   


 


11/23/20 14:00  69 12 87/47 (60) 100   

















Intake and Output  


 


 11/23/20 11/24/20





 19:00 07:00


 


Intake Total 1540 ml 1375 ml


 


Output Total 1105 ml 1135 ml


 


Balance 435 ml 240 ml


 


  


 


IV Total 820 ml 655 ml


 


Tube Feeding 720 ml 720 ml


 


Output Urine Total 1105 ml 1135 ml


 


# Bowel Movements 2 











Laboratory Tests








Test


 11/23/20


13:06 11/23/20


17:51 11/24/20


00:13 11/24/20


03:40


 


POC Whole Blood Glucose


 Pending  


 135 MG/DL


()  H 178 MG/DL


()  H 





 


White Blood Count


 


 


 


 6.9 K/UL


(4.8-10.8)


 


Red Blood Count


 


 


 


 3.09 M/UL


(4.70-6.10)  L


 


Hemoglobin


 


 


 


 9.2 G/DL


(14.2-18.0)  L


 


Hematocrit


 


 


 


 30.6 %


(42.0-52.0)  L


 


Mean Corpuscular Volume    99 FL (80-99)  


 


Mean Corpuscular Hemoglobin


 


 


 


 29.9 PG


(27.0-31.0)


 


Mean Corpuscular Hemoglobin


Concent 


 


 


 30.2 G/DL


(32.0-36.0)  L


 


Red Cell Distribution Width


 


 


 


 17.7 %


(11.6-14.8)  H


 


Platelet Count


 


 


 


 284 K/UL


(150-450)


 


Mean Platelet Volume


 


 


 


 6.7 FL


(6.5-10.1)


 


Neutrophils (%) (Auto)


 


 


 


 % (45.0-75.0)





 


Lymphocytes (%) (Auto)


 


 


 


 % (20.0-45.0)





 


Monocytes (%) (Auto)     % (1.0-10.0)  


 


Eosinophils (%) (Auto)     % (0.0-3.0)  


 


Basophils (%) (Auto)     % (0.0-2.0)  


 


Differential Total Cells


Counted 


 


 


 100  





 


Neutrophils % (Manual)    89 % (45-75)  H


 


Lymphocytes % (Manual)    8 % (20-45)  L


 


Monocytes % (Manual)    3 % (1-10)  


 


Eosinophils % (Manual)    0 % (0-3)  


 


Basophils % (Manual)    0 % (0-2)  


 


Band Neutrophils    0 % (0-8)  


 


Platelet Estimate    Adequate  


 


Platelet Morphology    Normal  


 


Hypochromasia    1+  


 


Anisocytosis    1+  


 


Macrocytosis    1+  


 


Erythrocyte Sedimentation Rate


 


 


 


 123 MM/HR


(0-20)  H


 


Sodium Level


 


 


 


 141 MMOL/L


(136-145)


 


Potassium Level


 


 


 


 4.6 MMOL/L


(3.5-5.1)


 


Chloride Level


 


 


 


 109 MMOL/L


()  H


 


Carbon Dioxide Level


 


 


 


 27 MMOL/L


(21-32)


 


Blood Urea Nitrogen


 


 


 


 18 mg/dL


(7-18)


 


Creatinine


 


 


 


 0.9 MG/DL


(0.55-1.30)


 


Estimat Glomerular Filtration


Rate 


 


 


 > 60 mL/min


(>60)


 


Glucose Level


 


 


 


 148 MG/DL


()  H


 


Calcium Level


 


 


 


 8.3 MG/DL


(8.5-10.1)  L


 


Phosphorus Level


 


 


 


 2.6 MG/DL


(2.5-4.9)


 


Magnesium Level


 


 


 


 2.2 MG/DL


(1.8-2.4)


 


Total Bilirubin


 


 


 


 0.2 MG/DL


(0.2-1.0)


 


Aspartate Amino Transf


(AST/SGOT) 


 


 


 19 U/L (15-37)





 


Alanine Aminotransferase


(ALT/SGPT) 


 


 


 15 U/L (12-78)





 


Alkaline Phosphatase


 


 


 


 193 U/L


()  H


 


C-Reactive Protein,


Quantitative 


 


 


 10.0 mg/dL


(0.00-0.90)  H


 


Total Protein


 


 


 


 6.7 G/DL


(6.4-8.2)


 


Albumin


 


 


 


 1.5 G/DL


(3.4-5.0)  L


 


Globulin    5.2 g/dL  


 


Albumin/Globulin Ratio


 


 


 


 0.3 (1.0-2.7)


L











Microbiology








 Date/Time


Source Procedure


Growth Status





 


 11/21/20 16:30


Blood Blood Culture - Preliminary


NO GROWTH AFTER 48 HOURS Resulted


 


 11/21/20 16:20


Blood Blood Culture - Preliminary


NO GROWTH AFTER 48 HOURS Resulted








Objective





HEAD AND NECK:  No JVD. Orally intubated


LUNGS:  Coarse rhonchi.


CARDIOVASCULAR:   Irregular S1 and S2 with no gallop.


ABDOMEN:  Soft.


EXTREMITIES:  No pitting edema.











Kvng Edmond MD               Nov 24, 2020 13:04

## 2020-11-24 NOTE — INTERNAL MED PROGRESS NOTE
Subjective


Date of Service:  Nov 24, 2020


Physician Name


Benito Hearn


Attending Physician


Andrei Cancino MD





Current Medications








 Medications


  (Trade)  Dose


 Ordered  Sig/Miky


 Route


 PRN Reason  Start Time


 Stop Time Status Last Admin


Dose Admin


 


 Acetaminophen


  (Tylenol)  650 mg  Q4H  PRN


 GT


 Temp >100.5  11/21/20 12:30


 12/21/20 12:29  11/23/20 10:14





 


 Albuterol/


 Ipratropium


  (Albuterol/


 Ipratropium)  3 ml  Q4HRT  PRN


 HHN


 sob  11/22/20 10:45


 11/27/20 10:44   





 


 Chlorhexidine


 Gluconate


  (Jess-Hex 2%)  1 applic  DAILY@2000


 TOPIC


   11/6/20 20:00


 2/4/21 19:59  11/23/20 19:46





 


 Dexamethasone


  (Decadron)  6 mg  Q24H


 ORAL


   11/22/20 18:00


 12/1/20 18:01  11/24/20 17:01





 


 Dextrose


  (Dextrose 50%)  50 ml  Q30M  PRN


 IV


 Hypoglycemia  10/23/20 22:45


 1/21/21 22:44   





 


 Heparin Sodium


  (Porcine)


  (Heparin 5000


 units/ml)  5,000 units  EVERY 12  HOURS


 SUBQ


   10/23/20 21:00


 12/7/20 20:59  11/24/20 08:55





 


 Insulin Aspart


  (NovoLOG)    EVERY 6  HOURS


 SUBQ


   11/2/20 06:00


 1/22/21 06:29  11/24/20 11:59





 


 Lorazepam


  (Ativan 2mg/ml


 1ml)  2 mg  Q4H  PRN


 IV


 For Anxiety  11/17/20 19:15


 11/24/20 19:14  11/23/20 10:13





 


 Meropenem 1 gm/


 Sodium Chloride  55 ml @ 


 110 mls/hr  Q8H


 IVPB


   11/21/20 10:00


 11/26/20 09:59  11/24/20 17:02





 


 Midodrine


  (Pro-Amatine)  10 mg  Q8H


 ORAL


   11/11/20 17:00


 2/9/21 16:59  11/24/20 17:01





 


 Nitroglycerin


  (Ntg)  0.4 mg  Q5M  PRN


 SL


 Prn Chest Pain  11/21/20 10:15


 12/21/20 10:09   





 


 Ondansetron HCl


  (Zofran)  4 mg  Q6H  PRN


 IVP


 Nausea & Vomiting  11/21/20 14:30


 12/21/20 14:29   





 


 Pantoprazole


  (Protonix)  40 mg  DAILY


 IV


   11/7/20 09:00


 12/7/20 08:59  11/24/20 08:55





 


 Polyethylene


 Glycol


  (Miralax)  17 gm  DAILYPRN  PRN


 GT


 Constipation  11/21/20 10:15


 12/21/20 10:14   





 


 Promethazine HCl/


 Codeine


  (Phenergan with


 Codeine)  5 ml  Q4H  PRN


 GT


 For Cough  11/21/20 10:14


 12/21/20 10:13   





 


 Sodium Chloride  1,000 ml @ 


 50 mls/hr  Q20H


 IV


   11/6/20 16:00


 12/6/20 15:59  11/23/20 22:00











Allergies:  


Coded Allergies:  


     No Known Allergies (Unverified , 8/20/12)


ROS Limited/Unobtainable:  Yes


Subjective


74 YO M admitted with shortness of breath.  Now pneumonia; previously COVID 

positive.  Cover for Int med-Dr Cancino.  ICU.  Intubated and sedated.





Objective





Last Vital Signs








  Date Time  Temp Pulse Resp B/P (MAP) Pulse Ox O2 Delivery O2 Flow Rate FiO2


 


11/24/20 17:00  69 13 119/59 (79) 96   


 


11/24/20 16:00      Mechanical Ventilator  





      Mechanical Ventilator  





      Mechanical Ventilator  


 


11/24/20 16:00        30


 


11/24/20 12:00 98.9       











Laboratory Tests








Test


 11/24/20


00:13 11/24/20


03:40 11/24/20


04:29


 


POC Whole Blood Glucose


 178 MG/DL


()  H 


 157 MG/DL


()  H


 


White Blood Count


 


 6.9 K/UL


(4.8-10.8) 





 


Red Blood Count


 


 3.09 M/UL


(4.70-6.10)  L 





 


Hemoglobin


 


 9.2 G/DL


(14.2-18.0)  L 





 


Hematocrit


 


 30.6 %


(42.0-52.0)  L 





 


Mean Corpuscular Volume  99 FL (80-99)   


 


Mean Corpuscular Hemoglobin


 


 29.9 PG


(27.0-31.0) 





 


Mean Corpuscular Hemoglobin


Concent 


 30.2 G/DL


(32.0-36.0)  L 





 


Red Cell Distribution Width


 


 17.7 %


(11.6-14.8)  H 





 


Platelet Count


 


 284 K/UL


(150-450) 





 


Mean Platelet Volume


 


 6.7 FL


(6.5-10.1) 





 


Neutrophils (%) (Auto)


 


 % (45.0-75.0)


 





 


Lymphocytes (%) (Auto)


 


 % (20.0-45.0)


 





 


Monocytes (%) (Auto)   % (1.0-10.0)   


 


Eosinophils (%) (Auto)   % (0.0-3.0)   


 


Basophils (%) (Auto)   % (0.0-2.0)   


 


Differential Total Cells


Counted 


 100  


 





 


Neutrophils % (Manual)  89 % (45-75)  H 


 


Lymphocytes % (Manual)  8 % (20-45)  L 


 


Monocytes % (Manual)  3 % (1-10)   


 


Eosinophils % (Manual)  0 % (0-3)   


 


Basophils % (Manual)  0 % (0-2)   


 


Band Neutrophils  0 % (0-8)   


 


Platelet Estimate  Adequate   


 


Platelet Morphology  Normal   


 


Hypochromasia  1+   


 


Anisocytosis  1+   


 


Macrocytosis  1+   


 


Erythrocyte Sedimentation Rate


 


 123 MM/HR


(0-20)  H 





 


Sodium Level


 


 141 MMOL/L


(136-145) 





 


Potassium Level


 


 4.6 MMOL/L


(3.5-5.1) 





 


Chloride Level


 


 109 MMOL/L


()  H 





 


Carbon Dioxide Level


 


 27 MMOL/L


(21-32) 





 


Blood Urea Nitrogen


 


 18 mg/dL


(7-18) 





 


Creatinine


 


 0.9 MG/DL


(0.55-1.30) 





 


Estimat Glomerular Filtration


Rate 


 > 60 mL/min


(>60) 





 


Glucose Level


 


 148 MG/DL


()  H 





 


Calcium Level


 


 8.3 MG/DL


(8.5-10.1)  L 





 


Phosphorus Level


 


 2.6 MG/DL


(2.5-4.9) 





 


Magnesium Level


 


 2.2 MG/DL


(1.8-2.4) 





 


Total Bilirubin


 


 0.2 MG/DL


(0.2-1.0) 





 


Aspartate Amino Transf


(AST/SGOT) 


 19 U/L (15-37)


 





 


Alanine Aminotransferase


(ALT/SGPT) 


 15 U/L (12-78)


 





 


Alkaline Phosphatase


 


 193 U/L


()  H 





 


C-Reactive Protein,


Quantitative 


 10.0 mg/dL


(0.00-0.90)  H 





 


Total Protein


 


 6.7 G/DL


(6.4-8.2) 





 


Albumin


 


 1.5 G/DL


(3.4-5.0)  L 





 


Globulin  5.2 g/dL   


 


Albumin/Globulin Ratio


 


 0.3 (1.0-2.7)


L 




















Intake and Output  


 


 11/23/20 11/24/20





 19:00 07:00


 


Intake Total 1540 ml 1375 ml


 


Output Total 1105 ml 1135 ml


 


Balance 435 ml 240 ml


 


  


 


IV Total 820 ml 655 ml


 


Tube Feeding 720 ml 720 ml


 


Output Urine Total 1105 ml 1135 ml


 


# Bowel Movements 2 








Objective


PHYSICAL EXAMINATION:


GENERAL:  The patient awake with deep stimuli, open his eyes, however,


cannot follow commands.  The patient is on a Ventimask at this time,


chronically ill-appearing.


HEAD AND NECK:  Pupils are equal and reactive to light.  Anicteric.


NECK:  Supple.  No JVD.


LUNGS:  Mech vent;  wheezing, rhonchi, and decreased air in bases.


HEART:  S1, S2.  Regular rhythm.  Distant heart sounds.  No murmur or


gallop.


ABDOMEN:  Soft, nondistended, nontender.  Positive bowel sounds.


EXTREMITIES:  No cyanosis, clubbing, or edema.


NEUROLOGIC:  Very limited secondary to the patient's status, cannot follow


commands.  Opens his eyes with deep stimuli and moving extremities


spontaneously.





Assessment/Plan


Assessment/Plan


ASSESSMENT:


1. Acute hypoxemic respiratory failure, most likely secondary to


pneumonia and sepsis.


2. Sepsis secondary to urinary tract infection and pneumonia.


3. pneumonia=MRSA; ESBL E. coli


4. Acute kidney injury on chronic renal insufficiency.


5. Dehydration.


6. History of chronic congestive heart failure.


7. Diabetes type 2.


8. Dyslipidemia.


9. Hypertension.


10. Alzheimer's disease.


11. COVID 19 previous positive





PLAN:


1.  ICU


2.  Dr. Sandoval,=Pulmonary Critical Care; follow mech vent recs


3.  Dr. Garcia = Inf Dis.


4.  IV= D5W due to the hypernatremia and dehydration.


5.  antibiotics =   meropenem; S/P micafungin and zyvox


6.  Code status is full code.


7.    DVT prophylaxis is heparin subcutaneous.











Benito Hearn MD               Nov 24, 2020 17:59

## 2020-11-24 NOTE — INFECTIOUS DISEASES PROG NOTE
Assessment/Plan





74yo M with:





Respiratory code 2ry to mucus plug 11/12


Septic Shock- -recurrent


Fever, recurrent, low grade;SP


Leukocytosis; recurrent; increased


Acute hypoxic resp failure, on NRB mask> 4l NC; back on NRB, desaturation 10/29 

> intubated 11/6


Pneumonia- >HAP


Hx of COVID-19 PNA 9/9/20 11/17 CXR: No significant interval change in the radiographic appearance the 

chest compared to one day prior.


   11/14 Resp cx +MRSA, nl resp hayden


   UCx neg


   BCx NTD


   11/13 COVID PCR neg


         --11/10 CXR: Bilateral infiltrates in a peribronchovascular 

distribution are unchanged. Left pleural effusion is unchanged.


         --11/8 CXR: Persistent bilateral patchy pulmonary opacities, most 

prominent  in the left lower lung.


         --11/7 ucx neg


                  sp cx MRSA (colonizer at this point)


                  Bcx Neg


         --11/2 Bcx Neg


          10/30 Sp cx MRSA (Vancomycin ADRIANA 1), ESBL E.coli


   10/23 BCx NTD


   UA 15-20 WBC, UCx Neg


   10/23 COVID rapid neg; 10/26 rapid COVID PCR + (from prior infection)- not 

new infection


   Flu A/B neg


   CXR: L pna


   Resp cx MRSA


   11/19 Resp cx +ESBL E.coli & PsA


   11/21 BCx NTD


   11/22 CXR: Small bilateral pleural effusions with moderate vascular c

ongestion. Airspace consolidation in the left lower lung field may represent 

atelectasis however, correlate for infiltrate.  This is improving when compared 

to the prior study.


   11/23 BCx ordered


   11/23 CXR: Increasing right basilar opacity, likely infiltrate, may also 

reflect increasing pleural fluid





H/o COVID pna


   Tested positive 9/9/20


   10/23 Rapid Ag neg


   10/26 Rapid Ag positive (from prior disease?)


   11/13 COVID PCR neg





JANES on CKD, improving





[facility] resident ([facility])


Non-verbal


VRE and MRSA colonized








Plan:


Cont meropenem #4 / 10 given high fever, ESBL E.coli and PsA pna





C.dif if ongoing loose stools


F/u repeat BCx 11/23 given ongoing fever (1 from periph, 1 from PICC)





Trend temp curve, WBC





OK to d/c COVID airborne precautions, infection was now >2 mo ago, no longer 

infectious





   -11/20 SP linezolid #7


   -11/16 SP meropenem #14 for ESBL pna 


   -11/10 SP Micafungin #4


   -11/6 SP IV Vancomycin #15


   -11/2 SP Zosyn #4


   -10/26 SP Cefepime #4


   -10/24 SP Flagyl #





Monitor CBC/CMP


Monitor resp status


Monitor temp curve and hemodynamics





D/w RN





Thank you for this consult. [facility] will continue to follow.





Subjective


Allergies:  


Coded Allergies:  


     No Known Allergies (Unverified , 8/20/12)


Tmax 100.0


WBC improving to 6.9


NAD on vent 30% PEEP 0


HDS





Objective





Last 24 Hour Vital Signs








  Date Time  Temp Pulse Resp B/P (MAP) Pulse Ox O2 Delivery O2 Flow Rate FiO2


 


11/24/20 07:00  90 28 145/70 (95) 97   


 


11/24/20 06:06  78 12     


 


11/24/20 06:00  80 18 124/69 (87) 97   


 


11/24/20 05:00  81 16 143/85 (104) 99   


 


11/24/20 04:00  90      


 


11/24/20 04:00        30


 


11/24/20 04:00      Mechanical Ventilator  





      Mechanical Ventilator  





      Mechanical Ventilator  


 


11/24/20 04:00 98.6 71 13 119/60 (79) 99   


 


11/24/20 03:11  67 13     30


 


11/24/20 03:00  72 13 111/59 (76) 98   


 


11/24/20 02:00  72 13 124/63 (83) 99   


 


11/24/20 01:00  67 13 106/64 (78) 98   


 


11/24/20 00:00      Mechanical Ventilator  





      Mechanical Ventilator  





      Mechanical Ventilator  


 


11/24/20 00:00        30


 


11/24/20 00:00  74      


 


11/24/20 00:00 98.8 74 14 116/61 (79) 99   


 


11/23/20 23:03  61 12     30


 


11/23/20 23:00  65 12 110/62 (78) 99   


 


11/23/20 22:00  75 15 150/87 (108) 100   


 


11/23/20 21:00  64 12 119/54 (75) 98   


 


11/23/20 20:00        30


 


11/23/20 20:00  64      


 


11/23/20 20:00 98.6 66 12 113/56 (75) 97   


 


11/23/20 20:00      Mechanical Ventilator  





      Mechanical Ventilator  





      Mechanical Ventilator  


 


11/23/20 19:32  63 12     30


 


11/23/20 19:00  65 12 99/50 (66) 97   


 


11/23/20 18:00 98.9 73 12 109/56 (73) 98   


 


11/23/20 17:00  69 12 89/46 (60) 98   


 


11/23/20 16:00        30


 


11/23/20 16:00      Mechanical Ventilator  





      Mechanical Ventilator  





      Mechanical Ventilator  


 


11/23/20 16:00  72      


 


11/23/20 16:00  70 12 93/54 (67) 98   


 


11/23/20 15:30  77 12     30


 


11/23/20 15:00  70 12 91/50 (64) 100   


 


11/23/20 14:00  69 12 87/47 (60) 100   


 


11/23/20 13:00  73 12 110/53 (72) 100   


 


11/23/20 12:00  86      


 


11/23/20 12:00      Mechanical Ventilator  





      Mechanical Ventilator  





      Mechanical Ventilator  


 


11/23/20 12:00        30


 


11/23/20 12:00 99.8 75 12 89/48 (62) 95   


 


11/23/20 11:15  86 12     30


 


11/23/20 11:00  110 20 110/76 (87) 96   


 


11/23/20 10:44 100.0       


 


11/23/20 10:43  99 24 170/91 97   


 


11/23/20 10:13  121 22 170/90 98   


 


11/23/20 10:00  107 28 170/91 (117) 99   


 


11/23/20 09:30  95 28 134/66 (88) 97   


 


11/23/20 09:00 100.0 100 22 144/78 (100) 98   


 


11/23/20 08:30  98 28 142/81 (101) 99   








Height (Feet):  5


Height (Inches):  4.00


Weight (Pounds):  130


Gen: NAD in bed


HEENT: NCAT, ETT


CV: RRR


Pulm: CTAB on vent


Abd: Soft, NTND


Ext: No c/c/e


Neuro: Eyes closed, not interactive


Lines: RUE PICC





Microbiology








 Date/Time


Source Procedure


Growth Status





 


 11/21/20 16:30


Blood Blood Culture - Preliminary


NO GROWTH AFTER 48 HOURS Resulted


 


 11/21/20 16:20


Blood Blood Culture - Preliminary


NO GROWTH AFTER 48 HOURS Resulted











Laboratory Tests








Test


 11/23/20


13:06 11/23/20


17:51 11/24/20


00:13 11/24/20


03:40


 


POC Whole Blood Glucose


 Pending  


 135 MG/DL


()  H 178 MG/DL


()  H 





 


White Blood Count


 


 


 


 6.9 K/UL


(4.8-10.8)


 


Red Blood Count


 


 


 


 3.09 M/UL


(4.70-6.10)  L


 


Hemoglobin


 


 


 


 9.2 G/DL


(14.2-18.0)  L


 


Hematocrit


 


 


 


 30.6 %


(42.0-52.0)  L


 


Mean Corpuscular Volume    99 FL (80-99)  


 


Mean Corpuscular Hemoglobin


 


 


 


 29.9 PG


(27.0-31.0)


 


Mean Corpuscular Hemoglobin


Concent 


 


 


 30.2 G/DL


(32.0-36.0)  L


 


Red Cell Distribution Width


 


 


 


 17.7 %


(11.6-14.8)  H


 


Platelet Count


 


 


 


 284 K/UL


(150-450)


 


Mean Platelet Volume


 


 


 


 6.7 FL


(6.5-10.1)


 


Neutrophils (%) (Auto)


 


 


 


 % (45.0-75.0)





 


Lymphocytes (%) (Auto)


 


 


 


 % (20.0-45.0)





 


Monocytes (%) (Auto)     % (1.0-10.0)  


 


Eosinophils (%) (Auto)     % (0.0-3.0)  


 


Basophils (%) (Auto)     % (0.0-2.0)  


 


Neutrophils % (Manual)    Pending  


 


Lymphocytes % (Manual)    Pending  


 


Platelet Estimate    Pending  


 


Platelet Morphology    Pending  


 


Erythrocyte Sedimentation Rate


 


 


 


 123 MM/HR


(0-20)  H


 


Sodium Level


 


 


 


 141 MMOL/L


(136-145)


 


Potassium Level


 


 


 


 4.6 MMOL/L


(3.5-5.1)


 


Chloride Level


 


 


 


 109 MMOL/L


()  H


 


Carbon Dioxide Level


 


 


 


 27 MMOL/L


(21-32)


 


Blood Urea Nitrogen


 


 


 


 18 mg/dL


(7-18)


 


Creatinine


 


 


 


 0.9 MG/DL


(0.55-1.30)


 


Estimat Glomerular Filtration


Rate 


 


 


 > 60 mL/min


(>60)


 


Glucose Level


 


 


 


 148 MG/DL


()  H


 


Calcium Level


 


 


 


 8.3 MG/DL


(8.5-10.1)  L


 


Phosphorus Level


 


 


 


 2.6 MG/DL


(2.5-4.9)


 


Magnesium Level


 


 


 


 2.2 MG/DL


(1.8-2.4)


 


Total Bilirubin


 


 


 


 0.2 MG/DL


(0.2-1.0)


 


Aspartate Amino Transf


(AST/SGOT) 


 


 


 19 U/L (15-37)





 


Alanine Aminotransferase


(ALT/SGPT) 


 


 


 15 U/L (12-78)





 


Alkaline Phosphatase


 


 


 


 193 U/L


()  H


 


C-Reactive Protein,


Quantitative 


 


 


 10.0 mg/dL


(0.00-0.90)  H


 


Total Protein


 


 


 


 6.7 G/DL


(6.4-8.2)


 


Albumin


 


 


 


 1.5 G/DL


(3.4-5.0)  L


 


Globulin    5.2 g/dL  


 


Albumin/Globulin Ratio


 


 


 


 0.3 (1.0-2.7)


L











Current Medications








 Medications


  (Trade)  Dose


 Ordered  Sig/Miky


 Route


 PRN Reason  Start Time


 Stop Time Status Last Admin


Dose Admin


 


 Acetaminophen


  (Tylenol)  650 mg  Q4H  PRN


 GT


 Temp >100.5  11/21/20 12:30


 12/21/20 12:29  11/23/20 10:14





 


 Albuterol/


 Ipratropium


  (Albuterol/


 Ipratropium)  3 ml  Q4HRT  PRN


 HHN


 sob  11/22/20 10:45


 11/27/20 10:44   





 


 Chlorhexidine


 Gluconate


  (Jess-Hex 2%)  1 applic  DAILY@2000


 TOPIC


   11/6/20 20:00


 2/4/21 19:59  11/23/20 19:46





 


 Dexamethasone


  (Decadron)  6 mg  Q24H


 ORAL


   11/22/20 18:00


 12/1/20 18:01  11/23/20 17:30





 


 Dextrose


  (Dextrose 50%)  50 ml  Q30M  PRN


 IV


 Hypoglycemia  10/23/20 22:45


 1/21/21 22:44   





 


 Heparin Sodium


  (Porcine)


  (Heparin 5000


 units/ml)  5,000 units  EVERY 12  HOURS


 SUBQ


   10/23/20 21:00


 12/7/20 20:59  11/23/20 20:24





 


 Insulin Aspart


  (NovoLOG)    EVERY 6  HOURS


 SUBQ


   11/2/20 06:00


 1/22/21 06:29  11/24/20 05:41





 


 Lorazepam


  (Ativan 2mg/ml


 1ml)  2 mg  Q4H  PRN


 IV


 For Anxiety  11/17/20 19:15


 11/24/20 19:14  11/23/20 10:13





 


 Meropenem 1 gm/


 Sodium Chloride  55 ml @ 


 110 mls/hr  Q8H


 IVPB


   11/21/20 10:00


 11/26/20 09:59  11/24/20 01:15





 


 Midodrine


  (Pro-Amatine)  10 mg  Q8H


 ORAL


   11/11/20 17:00


 2/9/21 16:59  11/24/20 01:14





 


 Nitroglycerin


  (Ntg)  0.4 mg  Q5M  PRN


 SL


 Prn Chest Pain  11/21/20 10:15


 12/21/20 10:09   





 


 Ondansetron HCl


  (Zofran)  4 mg  Q6H  PRN


 IVP


 Nausea & Vomiting  11/21/20 14:30


 12/21/20 14:29   





 


 Pantoprazole


  (Protonix)  40 mg  DAILY


 IV


   11/7/20 09:00


 12/7/20 08:59  11/23/20 08:57





 


 Polyethylene


 Glycol


  (Miralax)  17 gm  DAILYPRN  PRN


 GT


 Constipation  11/21/20 10:15


 12/21/20 10:14   





 


 Promethazine HCl/


 Codeine


  (Phenergan with


 Codeine)  5 ml  Q4H  PRN


 GT


 For Cough  11/21/20 10:14


 12/21/20 10:13   





 


 Sodium Chloride  1,000 ml @ 


 50 mls/hr  Q20H


 IV


   11/6/20 16:00


 12/6/20 15:59  11/23/20 22:00




















Bianca Casillas M.D.               Nov 24, 2020 08:14

## 2020-11-24 NOTE — NUR
NURSE NOTES:

Pt received from [facility]. Pt observed opening eyes spontaneously; pt grimaces when 
orally and endotracheally suctioned; gag reflex is intact; pt is unable to follow commands; 
Bilat pupils equal and round - 3mm with brisk rxn to light. Pt is in NSR to cardiac monitor. 
Radial and dorsalis pedis pulses 3+. Non-pitting edema noted to right arm. Afebrile. Cap 
refill 2 sec. Pt is orally intubated with a 7.5 noted 25 cm at the lip with the following 
settings: AC 12  FiO2 30% Peep 0. All lung fields noted diminished. Left naris NGT 
noted a at approximately 75 cm. No gastric residuals noted. Glucerna 1.2 is running at 60 
cc/hr. Bowel sounds are active to all quadrants. Abdomen appears roud, soft, and non-tender. 
F/C noted draining yellow, clear urine. Skin alterations noted. Pt has a GURPREET PICC with dry 
and intact dressing running 1/2 NS at 50 cc/hr. BSW restraints noted - skin to both wrists 
intact without redness. Pt observed attempting to pull medical devices when restraints are 
temporarily removed and reapplied. Bed in lowest position, alarm on, side rails up x 2, call 
light within reach. Will continue to monitor.

## 2020-11-25 VITALS — SYSTOLIC BLOOD PRESSURE: 142 MMHG | DIASTOLIC BLOOD PRESSURE: 65 MMHG

## 2020-11-25 VITALS — SYSTOLIC BLOOD PRESSURE: 106 MMHG | DIASTOLIC BLOOD PRESSURE: 56 MMHG

## 2020-11-25 VITALS — SYSTOLIC BLOOD PRESSURE: 132 MMHG | DIASTOLIC BLOOD PRESSURE: 68 MMHG

## 2020-11-25 VITALS — SYSTOLIC BLOOD PRESSURE: 116 MMHG | DIASTOLIC BLOOD PRESSURE: 64 MMHG

## 2020-11-25 VITALS — SYSTOLIC BLOOD PRESSURE: 122 MMHG | DIASTOLIC BLOOD PRESSURE: 66 MMHG

## 2020-11-25 VITALS — DIASTOLIC BLOOD PRESSURE: 63 MMHG | SYSTOLIC BLOOD PRESSURE: 148 MMHG

## 2020-11-25 VITALS — SYSTOLIC BLOOD PRESSURE: 138 MMHG | DIASTOLIC BLOOD PRESSURE: 65 MMHG

## 2020-11-25 VITALS — SYSTOLIC BLOOD PRESSURE: 120 MMHG | DIASTOLIC BLOOD PRESSURE: 60 MMHG

## 2020-11-25 VITALS — SYSTOLIC BLOOD PRESSURE: 133 MMHG | DIASTOLIC BLOOD PRESSURE: 63 MMHG

## 2020-11-25 VITALS — DIASTOLIC BLOOD PRESSURE: 59 MMHG | SYSTOLIC BLOOD PRESSURE: 116 MMHG

## 2020-11-25 VITALS — DIASTOLIC BLOOD PRESSURE: 66 MMHG | SYSTOLIC BLOOD PRESSURE: 143 MMHG

## 2020-11-25 VITALS — DIASTOLIC BLOOD PRESSURE: 60 MMHG | SYSTOLIC BLOOD PRESSURE: 121 MMHG

## 2020-11-25 VITALS — DIASTOLIC BLOOD PRESSURE: 71 MMHG | SYSTOLIC BLOOD PRESSURE: 132 MMHG

## 2020-11-25 VITALS — SYSTOLIC BLOOD PRESSURE: 136 MMHG | DIASTOLIC BLOOD PRESSURE: 50 MMHG

## 2020-11-25 VITALS — SYSTOLIC BLOOD PRESSURE: 135 MMHG | DIASTOLIC BLOOD PRESSURE: 80 MMHG

## 2020-11-25 VITALS — SYSTOLIC BLOOD PRESSURE: 137 MMHG | DIASTOLIC BLOOD PRESSURE: 69 MMHG

## 2020-11-25 VITALS — SYSTOLIC BLOOD PRESSURE: 129 MMHG | DIASTOLIC BLOOD PRESSURE: 77 MMHG

## 2020-11-25 VITALS — DIASTOLIC BLOOD PRESSURE: 77 MMHG | SYSTOLIC BLOOD PRESSURE: 123 MMHG

## 2020-11-25 VITALS — SYSTOLIC BLOOD PRESSURE: 113 MMHG | DIASTOLIC BLOOD PRESSURE: 60 MMHG

## 2020-11-25 VITALS — DIASTOLIC BLOOD PRESSURE: 67 MMHG | SYSTOLIC BLOOD PRESSURE: 138 MMHG

## 2020-11-25 VITALS — DIASTOLIC BLOOD PRESSURE: 55 MMHG | SYSTOLIC BLOOD PRESSURE: 105 MMHG

## 2020-11-25 VITALS — SYSTOLIC BLOOD PRESSURE: 91 MMHG | DIASTOLIC BLOOD PRESSURE: 50 MMHG

## 2020-11-25 VITALS — DIASTOLIC BLOOD PRESSURE: 53 MMHG | SYSTOLIC BLOOD PRESSURE: 104 MMHG

## 2020-11-25 LAB
ADD MANUAL DIFF: NO
ALBUMIN SERPL-MCNC: 1.6 G/DL (ref 3.4–5)
ALBUMIN/GLOB SERPL: 0.3 {RATIO} (ref 1–2.7)
ALP SERPL-CCNC: 184 U/L (ref 46–116)
ALT SERPL-CCNC: 19 U/L (ref 12–78)
AST SERPL-CCNC: 23 U/L (ref 15–37)
BASOPHILS NFR BLD AUTO: 0.1 % (ref 0–2)
BILIRUB SERPL-MCNC: 0.2 MG/DL (ref 0.2–1)
BUN SERPL-MCNC: 23 MG/DL (ref 7–18)
CALCIUM SERPL-MCNC: 8.1 MG/DL (ref 8.5–10.1)
CHLORIDE SERPL-SCNC: 106 MMOL/L (ref 98–107)
CO2 SERPL-SCNC: 26 MMOL/L (ref 21–32)
CREAT SERPL-MCNC: 0.8 MG/DL (ref 0.55–1.3)
EOSINOPHIL NFR BLD AUTO: 0 % (ref 0–3)
ERYTHROCYTE [DISTWIDTH] IN BLOOD BY AUTOMATED COUNT: 17.7 % (ref 11.6–14.8)
GLOBULIN SER-MCNC: 5.1 G/DL
HCT VFR BLD CALC: 30.5 % (ref 42–52)
HGB BLD-MCNC: 9.3 G/DL (ref 14.2–18)
LYMPHOCYTES NFR BLD AUTO: 13.2 % (ref 20–45)
MCV RBC AUTO: 99 FL (ref 80–99)
MONOCYTES NFR BLD AUTO: 3.7 % (ref 1–10)
NEUTROPHILS NFR BLD AUTO: 83 % (ref 45–75)
PHOSPHATE SERPL-MCNC: 2.3 MG/DL (ref 2.5–4.9)
PLATELET # BLD: 320 K/UL (ref 150–450)
POTASSIUM SERPL-SCNC: 4.4 MMOL/L (ref 3.5–5.1)
RBC # BLD AUTO: 3.09 M/UL (ref 4.7–6.1)
SODIUM SERPL-SCNC: 139 MMOL/L (ref 136–145)
WBC # BLD AUTO: 8.8 K/UL (ref 4.8–10.8)

## 2020-11-25 RX ADMIN — INSULIN ASPART SCH UNITS: 100 INJECTION, SOLUTION INTRAVENOUS; SUBCUTANEOUS at 12:00

## 2020-11-25 RX ADMIN — INSULIN ASPART SCH UNITS: 100 INJECTION, SOLUTION INTRAVENOUS; SUBCUTANEOUS at 18:00

## 2020-11-25 RX ADMIN — HEPARIN SODIUM SCH UNITS: 5000 INJECTION INTRAVENOUS; SUBCUTANEOUS at 22:07

## 2020-11-25 RX ADMIN — INSULIN ASPART SCH UNITS: 100 INJECTION, SOLUTION INTRAVENOUS; SUBCUTANEOUS at 06:00

## 2020-11-25 RX ADMIN — MEROPENEM SCH MLS/HR: 1 INJECTION INTRAVENOUS at 11:10

## 2020-11-25 RX ADMIN — MEROPENEM SCH MLS/HR: 1 INJECTION INTRAVENOUS at 18:17

## 2020-11-25 RX ADMIN — MIDODRINE HYDROCHLORIDE SCH MG: 10 TABLET ORAL at 01:25

## 2020-11-25 RX ADMIN — MEROPENEM SCH MLS/HR: 1 INJECTION INTRAVENOUS at 01:25

## 2020-11-25 RX ADMIN — CHLORHEXIDINE GLUCONATE SCH APPLIC: 213 SOLUTION TOPICAL at 22:05

## 2020-11-25 RX ADMIN — PANTOPRAZOLE SODIUM SCH MG: 40 INJECTION, POWDER, FOR SOLUTION INTRAVENOUS at 09:19

## 2020-11-25 RX ADMIN — HEPARIN SODIUM SCH UNITS: 5000 INJECTION INTRAVENOUS; SUBCUTANEOUS at 09:20

## 2020-11-25 RX ADMIN — INSULIN ASPART SCH UNITS: 100 INJECTION, SOLUTION INTRAVENOUS; SUBCUTANEOUS at 23:40

## 2020-11-25 RX ADMIN — MIDODRINE HYDROCHLORIDE SCH MG: 10 TABLET ORAL at 09:00

## 2020-11-25 NOTE — NUR
NURSE NOTES:

ngt cloted  ngt reinserted  kub order for placement  complete bed bath reposition and 
suction

## 2020-11-25 NOTE — NEPHROLOGY PROGRESS NOTE
Assessment/Plan


Problem List:  


(1) Dehydration


(2) JANES (acute kidney injury)


(3) Renal failure (ARF), acute on chronic


(4) Hypoxia


(5) Acute encephalopathy


(6) Electrolyte imbalance


Assessment





75-year-old male is admitted with acute hypoxic respiratory failure most likely 

secondary to pneumonia and sepsis, UTI.


Acute on chronic renal failure


Dehydration


Electrolyte imbalances, hypernatremia


Hypoalbuminemia


Diabetes type 2


History of congestive heart failure


Hypertension


Hyperlipemia


Alzheimer's


Previous COVID-19 infection in September 2020


Plan


November 25: Labs reviewed.  Discussed with RN.  Abnormal electrolyte addressed.

 Remains intubated on ventilator.  Continue per consultants.


November 24: Labs reviewed.  Discussed with RN.  Stable from renal standpoint of

view.  Main issue is weaning from ventilator that keeps failing.  Continue per 

consultants.


November 23: Labs reviewed.  Discussed with RN.  Stable from renal standpoint of

view.  Continue per consultants.


November 22: Patient seen and discussed with RN.  Labs reviewed.  Remains 

intubated.  Trial of weaning this being attempted.  Continue per consultants.


November 21: Remains intubated.  Labs reviewed.  Renal parameters stable.  

Continue per consultants.


November 20: Remains on mechanical ventilation.  Labs reviewed.  Abnormal el

ectrolytes addressed.  Stable from renal standpoint of view.


November 19: Remains intubated.  Remains full code.  Labs reviewed.  Low 

phosphorus replaced.  Continue to monitor renal parameters.  Continue per 

consultants.


November 18: Failed weaning.  Remains intubated.  Remains full closed.  Labs 

reviewed.  Stable from renal standpoint view.


November 17: Remains in ICU.  Remains full code.  Labs are reviewed.  Phosphorus

supplement given.  Continue per consultants.


November 16: Remains in ICU.  Full code.  Labs reviewed.  Remains intubated.  

Stable renal parameters.


November 15: In ICU.  Full code.  Discussed with RN.  Stable renal parameters.


November 14: Seen in ICU.  Discussed with LITA Cooper.  Flomax discontinued.  

Labs reviewed.  Stable from renal standpoint of view.


November 13: Seen in ICU.  Discussed with LITA Cooper.  Remains on pressor.  

Electrolyte abnormalities noted and addressed.  Continue per consultants.  

Patient remains full code.


November 12: Seen in ICU.  Discussed with LITA Richardson.  Blood pressure remains a 

little requiring pressors.  Labs reviewed.  Stable renal parameters.  Continue 

per consultants.


November 11: Remains intubated.  Full code.  Blood pressure borderline low.  

Will increase midodrine dose.  Discussed with RN.  Continue to monitor renal 

parameters and electrolytes.


November 10: Intubated.  Full code.  Labs reviewed.  Stable from renal 

standpoint of view.  Continue per consultants.


November 9: Remains intubated.  Full code.  Labs reviewed.  Electrolytes within 

normal limit.  Continue per consultants.


November 8: In ICU.  Remains intubated on ventilator.  Remains full code.  Low 

potassium addressed.  Blood pressure fluctuating.  Continue per consultants.


November 7: Patient in ICU.  Intubated on ventilator.  On 6 mics of Levophed.  

Will give 100 cc albumin 25%.  K-Phos IV ordered.  Continue per consultants.  

Continue monitor renal parameters and electrolytes.


November 6: Status unchanged.  Transfer to [facility] for seizure.  Stable from renal 

standpoint to view.  Continue per consultants.


November 5: Status quo.  Labs reviewed.  Renal parameters stable.  Continue per 

consultants.


November 4: On nonrebreather mask.  Labs reviewed.  Renal parameters 

stable.Continue per pulmonologist.  Clinically unchanged.


November 3: On nonrebreather mask.  Inflammatory markers gradually declining.  

Renal parameters stable.  Continue per pulmonary.


November 2: Remains on nonrebreather mask.  Inflammatory markers remain 

elevated.  Renal parameters somewhat stable.  Continue per pulmonary and ID.  

Remains full code.


November 1: Patient on nonrebreather mask.  Labs noted.  Serum creatinine down 

to 1.3.  Continue per consultants.


October 31: Patient on nonrebreather mask.  Transfer to telemetry when seen this

morning.  Labs noted.  Continue per consultants.  Serum creatinine dylan to 1.7. 

Continue to monitor renal parameters.  Continue to monitor vancomycin level


October 30: Patient is not doing well clinically.  Mild respiratory distress.  

ABG noted.  Somewhat hypoxic.  CBC and chemistry panel ordered.  Discussed with 

LITA Garcia.  Will defer to pulmonary management to specialist.  Continue per ID.

 Renal parameters remained stable as of October 29.


October 29: Labs reviewed.  Renal parameters stable.  Continue per consultants.


October 28: Labs reviewed.  Potassium via NG tube ordered.  IV fluids stopped.  

Continue per consultants.


October 27: Labs reviewed.  Low potassium and low phosphorus replaced.  Continue

per consultants.  Remains stable from renal standpoint of view.


October 26: Patient remains n.p.o.  IV fluid down to 50 cc an hour.  Potassium 

supplement intravenously ordered.  Continue per consultants.





Previously:


Patient is n.p.o., will continue on IV fluid of D5W 75 cc an hour


We will monitor electrolytes and renal parameters


Avoid nephrotoxic's


Start p.o. when he clears by speech therapist, meanwhile aspiration precautions


Keep the blood pressure and blood sugar in check


Per orders





Subjective


ROS Limited/Unobtainable:  Yes





Objective


Objective





Last 24 Hour Vital Signs








  Date Time  Temp Pulse Resp B/P (MAP) Pulse Ox O2 Delivery O2 Flow Rate FiO2


 


11/25/20 13:00  56 13 120/60 (80) 100   


 


11/25/20 12:00      Mechanical Ventilator  





      Mechanical Ventilator  





      Mechanical Ventilator  


 


11/25/20 12:00  53      


 


11/25/20 12:00 98.3 54 13 138/67 (90) 98   


 


11/25/20 11:46        30


 


11/25/20 11:24  53 15     30


 


11/25/20 11:00  55 10 132/68 (89) 100   


 


11/25/20 10:00  58 12 132/71 (91) 98   


 


11/25/20 09:00 98.2 54 11 138/65 (89) 98   


 


11/25/20 08:00        30


 


11/25/20 08:00  62      


 


11/25/20 08:00  57 12 136/80 (98) 98   


 


11/25/20 08:00      Mechanical Ventilator  





      Mechanical Ventilator  





      Mechanical Ventilator  


 


11/25/20 07:58  60 12     30


 


11/25/20 07:00 98.5 56 14 146/77 (100) 96   


 


11/25/20 06:30  64 12     


 


11/25/20 06:00  60 13 133/63 (86) 91   


 


11/25/20 05:00  60 12 122/66 (84) 97   


 


11/25/20 04:00        30


 


11/25/20 04:00 98.6 63 13 137/69 (91) 98   


 


11/25/20 04:00      Mechanical Ventilator  





      Mechanical Ventilator  





      Mechanical Ventilator  


 


11/25/20 04:00  64      


 


11/25/20 03:00  59 16 142/65 (90) 99   


 


11/25/20 02:55  75 15     30


 


11/25/20 01:00  65 12 121/60 (80) 99   


 


11/25/20 00:00        30


 


11/25/20 00:00      Mechanical Ventilator  





      Mechanical Ventilator  





      Mechanical Ventilator  


 


11/25/20 00:00  84      


 


11/25/20 00:00 98.6 66 12 116/64 (81) 99   


 


11/24/20 23:10  69 14     30


 


11/24/20 23:00  68 12 116/62 (80) 97   


 


11/24/20 22:00  63 11 124/63 (83) 96   


 


11/24/20 21:00  63 12 105/54 (71) 94   


 


11/24/20 20:00        30


 


11/24/20 20:00 98.6 68 11 125/64 (84) 96   


 


11/24/20 20:00      Mechanical Ventilator  





      Mechanical Ventilator  





      Mechanical Ventilator  


 


11/24/20 20:00  68      


 


11/24/20 19:00  67 13 50/ 97   


 


11/24/20 18:45  66 12     30


 


11/24/20 18:00  64 13 134/68 (90) 95   


 


11/24/20 17:00  69 13 119/59 (79) 96   


 


11/24/20 16:00      Mechanical Ventilator  





      Mechanical Ventilator  





      Mechanical Ventilator  


 


11/24/20 16:00 98.8       


 


11/24/20 16:00  64      


 


11/24/20 16:00        30


 


11/24/20 16:00  64 12 113/57 (75) 96   


 


11/24/20 15:02  69 18     30


 


11/24/20 15:00  66 12 110/54 (72) 94   


 


11/24/20 14:00  70 13 108/54 (72) 96   

















Intake and Output  


 


 11/24/20 11/25/20





 19:00 07:00


 


Intake Total 1280 ml 1276 ml


 


Output Total 1040 ml 820 ml


 


Balance 240 ml 456 ml


 


  


 


Free Water 30 ml 60 ml


 


IV Total 710 ml 676 ml


 


Tube Feeding 540 ml 540 ml


 


Output Urine Total 1040 ml 820 ml


 


# Bowel Movements 4 3











Current Medications








 Medications


  (Trade)  Dose


 Ordered  Sig/Miky


 Route


 PRN Reason  Start Time


 Stop Time Status Last Admin


Dose Admin


 


 Acetaminophen


  (Tylenol)  650 mg  Q4H  PRN


 GT


 Temp >100.5  11/21/20 12:30


 12/21/20 12:29  11/23/20 10:14





 


 Albuterol/


 Ipratropium


  (Albuterol/


 Ipratropium)  3 ml  Q4HRT  PRN


 HHN


 sob  11/22/20 10:45


 11/27/20 10:44   





 


 Chlorhexidine


 Gluconate


  (Jess-Hex 2%)  1 applic  DAILY@2000


 TOPIC


   11/6/20 20:00


 2/4/21 19:59  11/24/20 19:50





 


 Dexamethasone


  (Decadron)  6 mg  Q24H


 ORAL


   11/22/20 18:00


 12/1/20 18:01  11/24/20 17:01





 


 Dextrose


  (Dextrose 50%)  50 ml  Q30M  PRN


 IV


 Hypoglycemia  10/23/20 22:45


 1/21/21 22:44   





 


 Heparin Sodium


  (Porcine)


  (Heparin 5000


 units/ml)  5,000 units  EVERY 12  HOURS


 SUBQ


   10/23/20 21:00


 12/7/20 20:59  11/25/20 09:20





 


 Insulin Aspart


  (NovoLOG)    EVERY 6  HOURS


 SUBQ


   11/2/20 06:00


 1/22/21 06:29  11/24/20 23:53





 


 Meropenem 1 gm/


 Sodium Chloride  55 ml @ 


 110 mls/hr  Q8H


 IVPB


   11/21/20 10:00


 11/30/20 09:59  11/25/20 11:10





 


 Nitroglycerin


  (Ntg)  0.4 mg  Q5M  PRN


 SL


 Prn Chest Pain  11/21/20 10:15


 12/21/20 10:09   





 


 Ondansetron HCl


  (Zofran)  4 mg  Q6H  PRN


 IVP


 Nausea & Vomiting  11/21/20 14:30


 12/21/20 14:29   





 


 Pantoprazole


  (Protonix)  40 mg  DAILY


 IV


   11/7/20 09:00


 12/7/20 08:59  11/25/20 09:19





 


 Polyethylene


 Glycol


  (Miralax)  17 gm  DAILYPRN  PRN


 GT


 Constipation  11/21/20 10:15


 12/21/20 10:14   





 


 Promethazine HCl/


 Codeine


  (Phenergan with


 Codeine)  5 ml  Q4H  PRN


 GT


 For Cough  11/21/20 10:14


 12/21/20 10:13   





 


 Sodium Chloride  1,000 ml @ 


 50 mls/hr  Q20H


 IV


   11/6/20 16:00


 12/6/20 15:59  11/24/20 20:37








Laboratory Tests


11/24/20 23:49: POC Whole Blood Glucose [Pending]


11/25/20 03:55: 


White Blood Count 8.8, Red Blood Count 3.09L, Hemoglobin 9.3L, Hematocrit 30.5L,

 Mean Corpuscular Volume 99, Mean Corpuscular Hemoglobin 30.2, Mean Corpuscular 

Hemoglobin Concent 30.6L, Red Cell Distribution Width 17.7H, Platelet Count 320,

 Mean Platelet Volume 7.4, Neutrophils (%) (Auto) 83.0H, Lymphocytes (%) (Auto) 

13.2L, Monocytes (%) (Auto) 3.7, Eosinophils (%) (Auto) 0.0, Basophils (%) 

(Auto) 0.1, Sodium Level 139, Potassium Level 4.4, Chloride Level 106, Carbon 

Dioxide Level 26, Blood Urea Nitrogen 23H, Creatinine 0.8, Estimat Glomerular 

Filtration Rate > 60, Glucose Level 138H, Calcium Level 8.1L, Phosphorus Level 

2.3L, Magnesium Level 2.5H, Total Bilirubin 0.2, Aspartate Amino Transf 

(AST/SGOT) 23, Alanine Aminotransferase (ALT/SGPT) 19, Alkaline Phosphatase 184H

, Pro-B-Type Natriuretic Peptide 2762H, Total Protein 6.7, Albumin 1.6L, 

Globulin 5.1, Albumin/Globulin Ratio 0.3L


11/25/20 05:29: POC Whole Blood Glucose 132H


11/25/20 11:42: POC Whole Blood Glucose 125H


Height (Feet):  5


Height (Inches):  4.00


Weight (Pounds):  130


General Appearance:  no apparent distress


EENT:  other - Intubated on ventilator


Cardiovascular:  bradycardia


Respiratory/Chest:  decreased breath sounds


Abdomen:  distended











Seven Tobar MD            Nov 25, 2020 13:39

## 2020-11-25 NOTE — INFECTIOUS DISEASES PROG NOTE
Assessment/Plan





74yo M with:





Respiratory code 2ry to mucus plug 11/12


Septic Shock- -recurrent


Fever, recurrent, low grade;SP


Leukocytosis; recurrent; increased


Acute hypoxic resp failure, on NRB mask> 4l NC; back on NRB, desaturation 10/29 

> intubated 11/6


Pneumonia- >HAP


Hx of COVID-19 PNA 9/9/20 11/17 CXR: No significant interval change in the radiographic appearance the 

chest compared to one day prior.


   11/14 Resp cx +MRSA, nl resp hayden


   UCx neg


   BCx NTD


   11/13 COVID PCR neg


         --11/10 CXR: Bilateral infiltrates in a peribronchovascular 

distribution are unchanged. Left pleural effusion is unchanged.


         --11/8 CXR: Persistent bilateral patchy pulmonary opacities, most 

prominent  in the left lower lung.


         --11/7 ucx neg


                  sp cx MRSA (colonizer at this point)


                  Bcx Neg


         --11/2 Bcx Neg


          10/30 Sp cx MRSA (Vancomycin ADRIANA 1), ESBL E.coli


   10/23 BCx NTD


   UA 15-20 WBC, UCx Neg


   10/23 COVID rapid neg; 10/26 rapid COVID PCR + (from prior infection)- not 

new infection


   Flu A/B neg


   CXR: L pna


   Resp cx MRSA


   11/19 Resp cx +ESBL E.coli & PsA


   11/21 BCx NTD


   11/22 CXR: Small bilateral pleural effusions with moderate vascular c

ongestion. Airspace consolidation in the left lower lung field may represent 

atelectasis however, correlate for infiltrate.  This is improving when compared 

to the prior study.


   11/23 BCx NTD


   11/23 CXR: Increasing right basilar opacity, likely infiltrate, may also 

reflect increasing pleural fluid





H/o COVID pna


   Tested positive 9/9/20


   10/23 Rapid Ag neg


   10/26 Rapid Ag positive (from prior disease?)


   11/13 COVID PCR neg





JANES on CKD, improving





[facility] resident ([facility])


Non-verbal


VRE and MRSA colonized








Plan:


Cont meropenem #5 / 10 given high fever, ESBL E.coli and PsA pna





C.dif if ongoing loose stools


F/u repeat BCx 11/23 given ongoing fever (1 from periph, 1 from PICC), currently

NTD





Trend temp curve, WBC





OK to d/c COVID airborne precautions, infection was now >2 mo ago, no longer 

infectious





   -11/20 SP linezolid #7


   -11/16 SP meropenem #14 for ESBL pna 


   -11/10 SP Micafungin #4


   -11/6 SP IV Vancomycin #15


   -11/2 SP Zosyn #4


   -10/26 SP Cefepime #4


   -10/24 SP Flagyl #





Monitor CBC/CMP


Monitor resp status


Monitor temp curve and hemodynamics





D/w RN





Thank you for this consult. [facility] will continue to follow.





Subjective


Allergies:  


Coded Allergies:  


     No Known Allergies (Unverified , 8/20/12)


AF


WBC 8.8


NAD on vent 30% PEEP 0


HDS


Cont's to fail weaning from vent, family now amenable to trach





Objective





Last 24 Hour Vital Signs








  Date Time  Temp Pulse Resp B/P (MAP) Pulse Ox O2 Delivery O2 Flow Rate FiO2


 


11/25/20 07:58  60 12     30


 


11/25/20 06:30  64 12     


 


11/25/20 06:00  60 13 133/63 (86) 91   


 


11/25/20 05:00  60 12 122/66 (84) 97   


 


11/25/20 04:00        30


 


11/25/20 04:00 98.6 63 13 137/69 (91) 98   


 


11/25/20 04:00      Mechanical Ventilator  





      Mechanical Ventilator  





      Mechanical Ventilator  


 


11/25/20 04:00  64      


 


11/25/20 03:00  59 16 142/65 (90) 99   


 


11/25/20 02:55  75 15     30


 


11/25/20 01:00  65 12 121/60 (80) 99   


 


11/25/20 00:00        30


 


11/25/20 00:00      Mechanical Ventilator  





      Mechanical Ventilator  





      Mechanical Ventilator  


 


11/25/20 00:00  84      


 


11/25/20 00:00 98.6 66 12 116/64 (81) 99   


 


11/24/20 23:10  69 14     30


 


11/24/20 23:00  68 12 116/62 (80) 97   


 


11/24/20 22:00  63 11 124/63 (83) 96   


 


11/24/20 21:00  63 12 105/54 (71) 94   


 


11/24/20 20:00        30


 


11/24/20 20:00 98.6 68 11 125/64 (84) 96   


 


11/24/20 20:00      Mechanical Ventilator  





      Mechanical Ventilator  





      Mechanical Ventilator  


 


11/24/20 20:00  68      


 


11/24/20 19:00  67 13 50/ 97   


 


11/24/20 18:45  66 12     30


 


11/24/20 18:00  64 13 134/68 (90) 95   


 


11/24/20 17:00  69 13 119/59 (79) 96   


 


11/24/20 16:00      Mechanical Ventilator  





      Mechanical Ventilator  





      Mechanical Ventilator  


 


11/24/20 16:00 98.8       


 


11/24/20 16:00  64      


 


11/24/20 16:00        30


 


11/24/20 16:00  64 12 113/57 (75) 96   


 


11/24/20 15:02  69 18     30


 


11/24/20 15:00  66 12 110/54 (72) 94   


 


11/24/20 14:00  70 13 108/54 (72) 96   


 


11/24/20 13:00  72 12 118/57 (77) 98   


 


11/24/20 12:00  74      


 


11/24/20 12:00 98.9 69 12 127/68 (87) 97   


 


11/24/20 12:00      Mechanical Ventilator  





      Mechanical Ventilator  





      Mechanical Ventilator  


 


11/24/20 12:00        30


 


11/24/20 11:01  75 18     30


 


11/24/20 11:00  73 12 129/64 (85) 98   


 


11/24/20 10:00  65 12 132/63 (86) 98   


 


11/24/20 09:30     100   


 


11/24/20 09:00  70 12 101/54 (70) 96   








Height (Feet):  5


Height (Inches):  4.00


Weight (Pounds):  130


Gen: NAD in bed


HEENT: NCAT, ETT


CV: RRR


Pulm: CTAB on vent


Abd: Soft, NTND


Ext: No c/c/e


Neuro: Awake, not interactive


Lines: RUE PICC





Microbiology








 Date/Time


Source Procedure


Growth Status





 


 11/23/20 14:00


Blood Picc Line Blood Culture - Preliminary


NO GROWTH AFTER 24 HOURS Resulted


 


 11/23/20 14:00


Blood Blood Culture - Preliminary


NO GROWTH AFTER 24 HOURS Resulted











Laboratory Tests








Test


 11/24/20


23:49 11/25/20


03:55 11/25/20


05:29


 


POC Whole Blood Glucose


 Pending  


 


 132 MG/DL


()  H


 


White Blood Count


 


 8.8 K/UL


(4.8-10.8) 





 


Red Blood Count


 


 3.09 M/UL


(4.70-6.10)  L 





 


Hemoglobin


 


 9.3 G/DL


(14.2-18.0)  L 





 


Hematocrit


 


 30.5 %


(42.0-52.0)  L 





 


Mean Corpuscular Volume  99 FL (80-99)   


 


Mean Corpuscular Hemoglobin


 


 30.2 PG


(27.0-31.0) 





 


Mean Corpuscular Hemoglobin


Concent 


 30.6 G/DL


(32.0-36.0)  L 





 


Red Cell Distribution Width


 


 17.7 %


(11.6-14.8)  H 





 


Platelet Count


 


 320 K/UL


(150-450) 





 


Mean Platelet Volume


 


 7.4 FL


(6.5-10.1) 





 


Neutrophils (%) (Auto)


 


 83.0 %


(45.0-75.0)  H 





 


Lymphocytes (%) (Auto)


 


 13.2 %


(20.0-45.0)  L 





 


Monocytes (%) (Auto)


 


 3.7 %


(1.0-10.0) 





 


Eosinophils (%) (Auto)


 


 0.0 %


(0.0-3.0) 





 


Basophils (%) (Auto)


 


 0.1 %


(0.0-2.0) 





 


Sodium Level


 


 139 MMOL/L


(136-145) 





 


Potassium Level


 


 4.4 MMOL/L


(3.5-5.1) 





 


Chloride Level


 


 106 MMOL/L


() 





 


Carbon Dioxide Level


 


 26 MMOL/L


(21-32) 





 


Blood Urea Nitrogen


 


 23 mg/dL


(7-18)  H 





 


Creatinine


 


 0.8 MG/DL


(0.55-1.30) 





 


Estimat Glomerular Filtration


Rate 


 > 60 mL/min


(>60) 





 


Glucose Level


 


 138 MG/DL


()  H 





 


Calcium Level


 


 8.1 MG/DL


(8.5-10.1)  L 





 


Total Bilirubin


 


 0.2 MG/DL


(0.2-1.0) 





 


Aspartate Amino Transf


(AST/SGOT) 


 23 U/L (15-37)


 





 


Alanine Aminotransferase


(ALT/SGPT) 


 19 U/L (12-78)


 





 


Alkaline Phosphatase


 


 184 U/L


()  H 





 


Pro-B-Type Natriuretic Peptide


 


 2762 pg/mL


(0-125)  H 





 


Total Protein


 


 6.7 G/DL


(6.4-8.2) 





 


Albumin


 


 1.6 G/DL


(3.4-5.0)  L 





 


Globulin  5.1 g/dL   


 


Albumin/Globulin Ratio


 


 0.3 (1.0-2.7)


L 














Current Medications








 Medications


  (Trade)  Dose


 Ordered  Sig/Miky


 Route


 PRN Reason  Start Time


 Stop Time Status Last Admin


Dose Admin


 


 Acetaminophen


  (Tylenol)  650 mg  Q4H  PRN


 GT


 Temp >100.5  11/21/20 12:30


 12/21/20 12:29  11/23/20 10:14





 


 Albuterol/


 Ipratropium


  (Albuterol/


 Ipratropium)  3 ml  Q4HRT  PRN


 HHN


 sob  11/22/20 10:45


 11/27/20 10:44   





 


 Chlorhexidine


 Gluconate


  (Jess-Hex 2%)  1 applic  DAILY@2000


 TOPIC


   11/6/20 20:00


 2/4/21 19:59  11/24/20 19:50





 


 Dexamethasone


  (Decadron)  6 mg  Q24H


 ORAL


   11/22/20 18:00


 12/1/20 18:01  11/24/20 17:01





 


 Dextrose


  (Dextrose 50%)  50 ml  Q30M  PRN


 IV


 Hypoglycemia  10/23/20 22:45


 1/21/21 22:44   





 


 Heparin Sodium


  (Porcine)


  (Heparin 5000


 units/ml)  5,000 units  EVERY 12  HOURS


 SUBQ


   10/23/20 21:00


 12/7/20 20:59  11/24/20 20:31





 


 Insulin Aspart


  (NovoLOG)    EVERY 6  HOURS


 SUBQ


   11/2/20 06:00


 1/22/21 06:29  11/24/20 23:53





 


 Meropenem 1 gm/


 Sodium Chloride  55 ml @ 


 110 mls/hr  Q8H


 IVPB


   11/21/20 10:00


 11/26/20 09:59  11/25/20 01:25





 


 Midodrine


  (Pro-Amatine)  10 mg  Q8H


 ORAL


   11/11/20 17:00


 2/9/21 16:59  11/25/20 01:25





 


 Nitroglycerin


  (Ntg)  0.4 mg  Q5M  PRN


 SL


 Prn Chest Pain  11/21/20 10:15


 12/21/20 10:09   





 


 Ondansetron HCl


  (Zofran)  4 mg  Q6H  PRN


 IVP


 Nausea & Vomiting  11/21/20 14:30


 12/21/20 14:29   





 


 Pantoprazole


  (Protonix)  40 mg  DAILY


 IV


   11/7/20 09:00


 12/7/20 08:59  11/24/20 08:55





 


 Polyethylene


 Glycol


  (Miralax)  17 gm  DAILYPRN  PRN


 GT


 Constipation  11/21/20 10:15


 12/21/20 10:14   





 


 Promethazine HCl/


 Codeine


  (Phenergan with


 Codeine)  5 ml  Q4H  PRN


 GT


 For Cough  11/21/20 10:14


 12/21/20 10:13   





 


 Sodium Chloride  1,000 ml @ 


 50 mls/hr  Q20H


 IV


   11/6/20 16:00


 12/6/20 15:59  11/24/20 20:37




















Bianca Casillas M.D.               Nov 25, 2020 08:02

## 2020-11-25 NOTE — NUR
NURSE NOTES:

Received patient from LITA Cabrera. Patient is asleep in bed. Orally intubated ventilator 
settings AC 12  FiO2 30%, tolerating well. Bilateral soft restraints are on. Tube 
feeding running glucerna 1.2 at 60ml/hr, tolerating well. Cummings catheter draining well to 
gravity. sinus rhythm on the monitor. Will continue plan of care.

## 2020-11-25 NOTE — NUR
RD ASSESSMENT & RECOMMENDATIONS

SEE CARE ACTIVITY FOR COMPLETE ASSESSMENT



DAILY ESTIMATED NEEDS:

Needs based on DM, wound, critical care 59.5kg 

22-28  kcals/kg 

5980-5397  total kcals

1.25-2  g protein/kg

  g total protein 

25-30  mL/kg

[id number]  total fluid mLs





NUTRITION DIAGNOSIS:

* Swallowing difficulty R/T dysphagia as evidenced by SLP sylvie, recs for

NGT feeds, now s/p oral intubation (11/6), s/p code blue (11/12) and

remains intubated, on pressor support, currently held, cont on NGT feeds.

* Increased kcal and pro needs r/t wound healing as evidenced by sacral pressure injury 
stage 2.



 

CURRENT TF: (goal of Glucerna 1.2 @ 60ml/hr x24 hrs) 





 

ENTERAL NUTRITION RECOMMENDATIONS:

Glucerna 1.2 @ 55ml/hr x24 hrs   to provide 1320ml, 1584kcal, 79g prot, 1063ml free water 



- LOWER goal rate to 55ml/hr x 24 hrs

  s/p intubation w/ decreased kcal needs

- HOB over 30 degrees/ H2O flush per MD



 



ADDITIONAL RECOMMENDATIONS:

* Calibrated bedscale wt for accurate CBW 

* Monitor hemodynamic stability: s/p code blue (11/12), now off pressors 

* Monitor lytes, replete as needed  

* Wound care: add Harvinder BID via PEG 

                     add Vit C 250mg QD 

* Monitor BGs closely w/ Decadron 

.

## 2020-11-25 NOTE — NUR
NURSE NOTES:

Received bedside report from Silvia Ramsey RN. Pt is intubated and saturating 100% In no 
apparent distress. Respirations even and unlabored. Opens eyes spontaneously but doesn't 
tract. Restraints in place. Pt's HR at 68 currently. SR on the cardiac monitor. Tolerating 
GT feeding well w/o residual noted. Cummings draining well of rosa maria urine noted. Pt w/PICC 
which is intact and patent. HOB at 30 degrees. Bed alarm engaged. Bed locked and in lowest 
position. Safety precautions in place. Will continue POC

## 2020-11-25 NOTE — DIAGNOSTIC IMAGING REPORT
Indication: Shortness of breath

 

Technique: One view of the chest

 

Comparison: 11/24/2020

 

Findings: Stable satisfactory position of endotracheal tube, orogastric tube, right

arm PICC. Bilateral left greater than right pleural effusions and bilateral

parenchymal opacities persist, unchanged. Normal heart size. Right shoulder

prosthesis again demonstrated.

 

Impression:  Unchanged, over one day, findings as above.

## 2020-11-25 NOTE — NUR
NURSE NOTES:

Large dark brown pasty BM. No distress noted. Respirations even and unlabored. VSS Afebrile.

## 2020-11-25 NOTE — NUR
CASE MANAGEMENT:REVIEW



SI;PNA. RESPIRATORY FAILURE. SEPTIC SHOCK.

BILATERAL PLEURAL EFFUSION.

98.3   51   13   148/63   98% ETT/MECH VENT

AC 12       PEEP 0   FIO2 30%

BUN 23      MAG 2.5   BNP 2762   ALB 1.6



IS;NA PHOSPHATE IV ONCE

DECADRON NG Q24

PROTONIX IV QD

IVF NS @ 50 ML/HR

MEROPENEM IV Q8

HEPARIN SQ Q12



****ICU STATUS*****

DCP;FROM [facility] [facility]

## 2020-11-25 NOTE — NUR
NURSE NOTES:

Noted patient is bradycardic heart rate 51. Dr. Edmond came at bedside and acknowledged. Per 
Dr. Edmond, discontinue midodrine.Will continue to monitor

## 2020-11-25 NOTE — INTERNAL MED PROGRESS NOTE
Subjective


Date of Service:  Nov 25, 2020


Physician Name


Benito Hearn


Attending Physician


Andrei Cancino MD





Current Medications








 Medications


  (Trade)  Dose


 Ordered  Sig/Miky


 Route


 PRN Reason  Start Time


 Stop Time Status Last Admin


Dose Admin


 


 Acetaminophen


  (Tylenol)  650 mg  Q4H  PRN


 GT


 Temp >100.5  11/21/20 12:30


 12/21/20 12:29  11/23/20 10:14





 


 Albuterol/


 Ipratropium


  (Albuterol/


 Ipratropium)  3 ml  Q4HRT  PRN


 HHN


 sob  11/22/20 10:45


 11/27/20 10:44   





 


 Chlorhexidine


 Gluconate


  (Jess-Hex 2%)  1 applic  DAILY@2000


 TOPIC


   11/6/20 20:00


 2/4/21 19:59  11/24/20 19:50





 


 Dexamethasone


  (Decadron)  6 mg  Q24H


 ORAL


   11/22/20 18:00


 12/1/20 18:01  11/24/20 17:01





 


 Dextrose


  (Dextrose 50%)  50 ml  Q30M  PRN


 IV


 Hypoglycemia  10/23/20 22:45


 1/21/21 22:44   





 


 Heparin Sodium


  (Porcine)


  (Heparin 5000


 units/ml)  5,000 units  EVERY 12  HOURS


 SUBQ


   10/23/20 21:00


 12/7/20 20:59  11/25/20 09:20





 


 Insulin Aspart


  (NovoLOG)    EVERY 6  HOURS


 SUBQ


   11/2/20 06:00


 1/22/21 06:29  11/24/20 23:53





 


 Meropenem 1 gm/


 Sodium Chloride  55 ml @ 


 110 mls/hr  Q8H


 IVPB


   11/21/20 10:00


 11/30/20 09:59  11/25/20 11:10





 


 Nitroglycerin


  (Ntg)  0.4 mg  Q5M  PRN


 SL


 Prn Chest Pain  11/21/20 10:15


 12/21/20 10:09   





 


 Ondansetron HCl


  (Zofran)  4 mg  Q6H  PRN


 IVP


 Nausea & Vomiting  11/21/20 14:30


 12/21/20 14:29   





 


 Pantoprazole


  (Protonix)  40 mg  DAILY


 IV


   11/7/20 09:00


 12/7/20 08:59  11/25/20 09:19





 


 Polyethylene


 Glycol


  (Miralax)  17 gm  DAILYPRN  PRN


 GT


 Constipation  11/21/20 10:15


 12/21/20 10:14   





 


 Promethazine HCl/


 Codeine


  (Phenergan with


 Codeine)  5 ml  Q4H  PRN


 GT


 For Cough  11/21/20 10:14


 12/21/20 10:13   





 


 Sodium Chloride  1,000 ml @ 


 50 mls/hr  Q20H


 IV


   11/6/20 16:00


 12/6/20 15:59  11/24/20 20:37











Allergies:  


Coded Allergies:  


     No Known Allergies (Unverified , 8/20/12)


ROS Limited/Unobtainable:  Yes


Subjective


76 YO M admitted with shortness of breath.  Now pneumonia; previously COVID 

positive.  Cover for Int med-Dr Cancino.  ICU.  Intubated and sedated.





Objective





Last Vital Signs








  Date Time  Temp Pulse Resp B/P (MAP) Pulse Ox O2 Delivery O2 Flow Rate FiO2


 


11/25/20 11:46        30


 


11/25/20 11:24  53 15     


 


11/25/20 11:00    132/68 (89) 100   


 


11/25/20 09:00 98.2       


 


11/25/20 08:00      Mechanical Ventilator  





      Mechanical Ventilator  





      Mechanical Ventilator  











Laboratory Tests








Test


 11/24/20


23:49 11/25/20


03:55 11/25/20


05:29 11/25/20


11:42


 


POC Whole Blood Glucose


 Pending  


 


 132 MG/DL


()  H 125 MG/DL


()  H


 


White Blood Count


 


 8.8 K/UL


(4.8-10.8) 


 





 


Red Blood Count


 


 3.09 M/UL


(4.70-6.10)  L 


 





 


Hemoglobin


 


 9.3 G/DL


(14.2-18.0)  L 


 





 


Hematocrit


 


 30.5 %


(42.0-52.0)  L 


 





 


Mean Corpuscular Volume  99 FL (80-99)    


 


Mean Corpuscular Hemoglobin


 


 30.2 PG


(27.0-31.0) 


 





 


Mean Corpuscular Hemoglobin


Concent 


 30.6 G/DL


(32.0-36.0)  L 


 





 


Red Cell Distribution Width


 


 17.7 %


(11.6-14.8)  H 


 





 


Platelet Count


 


 320 K/UL


(150-450) 


 





 


Mean Platelet Volume


 


 7.4 FL


(6.5-10.1) 


 





 


Neutrophils (%) (Auto)


 


 83.0 %


(45.0-75.0)  H 


 





 


Lymphocytes (%) (Auto)


 


 13.2 %


(20.0-45.0)  L 


 





 


Monocytes (%) (Auto)


 


 3.7 %


(1.0-10.0) 


 





 


Eosinophils (%) (Auto)


 


 0.0 %


(0.0-3.0) 


 





 


Basophils (%) (Auto)


 


 0.1 %


(0.0-2.0) 


 





 


Sodium Level


 


 139 MMOL/L


(136-145) 


 





 


Potassium Level


 


 4.4 MMOL/L


(3.5-5.1) 


 





 


Chloride Level


 


 106 MMOL/L


() 


 





 


Carbon Dioxide Level


 


 26 MMOL/L


(21-32) 


 





 


Blood Urea Nitrogen


 


 23 mg/dL


(7-18)  H 


 





 


Creatinine


 


 0.8 MG/DL


(0.55-1.30) 


 





 


Estimat Glomerular Filtration


Rate 


 > 60 mL/min


(>60) 


 





 


Glucose Level


 


 138 MG/DL


()  H 


 





 


Calcium Level


 


 8.1 MG/DL


(8.5-10.1)  L 


 





 


Phosphorus Level


 


 2.3 MG/DL


(2.5-4.9)  L 


 





 


Magnesium Level


 


 2.5 MG/DL


(1.8-2.4)  H 


 





 


Total Bilirubin


 


 0.2 MG/DL


(0.2-1.0) 


 





 


Aspartate Amino Transf


(AST/SGOT) 


 23 U/L (15-37)


 


 





 


Alanine Aminotransferase


(ALT/SGPT) 


 19 U/L (12-78)


 


 





 


Alkaline Phosphatase


 


 184 U/L


()  H 


 





 


Pro-B-Type Natriuretic Peptide


 


 2762 pg/mL


(0-125)  H 


 





 


Total Protein


 


 6.7 G/DL


(6.4-8.2) 


 





 


Albumin


 


 1.6 G/DL


(3.4-5.0)  L 


 





 


Globulin  5.1 g/dL    


 


Albumin/Globulin Ratio


 


 0.3 (1.0-2.7)


L 


 














Microbiology








 Date/Time


Source Procedure


Growth Status





 


 11/23/20 14:00


Blood Picc Line Blood Culture - Preliminary


NO GROWTH AFTER 24 HOURS Resulted


 


 11/23/20 14:00


Blood Blood Culture - Preliminary


NO GROWTH AFTER 24 HOURS Resulted

















Intake and Output  


 


 11/24/20 11/25/20





 19:00 07:00


 


Intake Total 1280 ml 1276 ml


 


Output Total 1040 ml 820 ml


 


Balance 240 ml 456 ml


 


  


 


Free Water 30 ml 60 ml


 


IV Total 710 ml 676 ml


 


Tube Feeding 540 ml 540 ml


 


Output Urine Total 1040 ml 820 ml


 


# Bowel Movements 4 3








Objective


PHYSICAL EXAMINATION:


GENERAL:  The patient awake with deep stimuli, open his eyes, however,


cannot follow commands.  The patient is on a Ventimask at this time,


chronically ill-appearing.


HEAD AND NECK:  Pupils are equal and reactive to light.  Anicteric.


NECK:  Supple.  No JVD.


LUNGS:  Mech vent;  wheezing, rhonchi, and decreased air in bases.


HEART:  S1, S2.  Regular rhythm.  Distant heart sounds.  No murmur or


gallop.


ABDOMEN:  Soft, nondistended, nontender.  Positive bowel sounds.


EXTREMITIES:  No cyanosis, clubbing, or edema.


NEUROLOGIC:  Very limited secondary to the patient's status, cannot follow


commands.  Opens his eyes with deep stimuli and moving extremities


spontaneously.





Assessment/Plan


Assessment/Plan


ASSESSMENT:


1. Acute hypoxemic respiratory failure, most likely secondary to


pneumonia and sepsis.


2. Sepsis secondary to urinary tract infection and pneumonia.


3. pneumonia=MRSA; ESBL E. coli


4. Acute kidney injury on chronic renal insufficiency.


5. Dehydration.


6. History of chronic congestive heart failure.


7. Diabetes type 2.


8. Dyslipidemia.


9. Hypertension.


10. Alzheimer's disease.


11. COVID 19 previous positive





PLAN:


1.  ICU


2.  Dr. Sandoval,=Pulmonary Critical Care; follow mech vent recs


3.  Dr. Garcia = Inf Dis.


4.  IV= D5W due to the hypernatremia and dehydration.


5.  antibiotics =   meropenem; S/P micafungin and zyvox


6.  Code status is full code.


7.    DVT prophylaxis is heparin subcutaneous.











Benito Hearn MD               Nov 25, 2020 12:23

## 2020-11-25 NOTE — NUR
NURSE HAND-OFF REPORT: 



Latest Vital Signs: Temperature 98.6 , Pulse 60 , B/P 133 /63 , Respiratory Rate 13 , O2 SAT 
91 , Mechanical Ventilator, O2 Flow Rate  .  

Vital Sign Comment: 



EKG Rhythm: Sinus Rhythm

Rhythm change?: N 

MD Notified?: DIDI Laureano MD Response: 



Latest Mejia Fall Score: 50  

Fall Risk: High Risk 

Safety Measures: Call light Within Reach, Bed Alarm Zone 2, Side Rails Side Rails x2, Bed 
position Low and Locked.

Fall Precautions: 

Door Sign



Report given to .

## 2020-11-25 NOTE — CARDIAC ELECTROPHYSIOLOGY PN
Assessment/Plan


Assessment/Plan


1. Accelerated junctional rhythm. Ruled out for MI


      Echo showed ejection fraction of 55%.





2. Long run of 31 beats of Nonsustained VT on 11/3/2020. 


    Cardiac cath after stabilization.





3. Henry 30, Asystole while on the Vent due to resp failure/ likely mucus plug .

S/P Code





4. Respiratory failure, on antibiotic and intubated on the vent 


   Failed weaning. Family refused tracheostomy 





5. Septic shock. Off Levophed  and on Midodrine 10 tid 





6. History of previous COVID infection in September 2020 and active Covid  in 

isolation


Now negative again and off isolation.





7. Dementia.





DW RN and Dr luna





Subjective


Subjective


  In ICU on the Venton  Fio2 30%, PEEP 5. Off isolation


 Had 31 beats of VT  on 11/3/20 at 16:46 and 5 beats 11/8/20 


 Coded on 11/12/20 as sat dropped to 20% and got henry 30s and PEA and pulseless




 Off Levophed  and on Midodrine 10 tid


   Covid test was negative. But still febrile.


    Failed weaning again. Family still refusing tracheostomy.





Objective





Last 24 Hour Vital Signs








  Date Time  Temp Pulse Resp B/P (MAP) Pulse Ox O2 Delivery O2 Flow Rate FiO2


 


11/25/20 13:00  56 13 120/60 (80) 100   


 


11/25/20 12:00      Mechanical Ventilator  





      Mechanical Ventilator  





      Mechanical Ventilator  


 


11/25/20 12:00  53      


 


11/25/20 12:00 98.3 54 13 138/67 (90) 98   


 


11/25/20 11:46        30


 


11/25/20 11:24  53 15     30


 


11/25/20 11:00  55 10 132/68 (89) 100   


 


11/25/20 10:00  58 12 132/71 (91) 98   


 


11/25/20 09:00 98.2 54 11 138/65 (89) 98   


 


11/25/20 08:00        30


 


11/25/20 08:00  62      


 


11/25/20 08:00  57 12 136/80 (98) 98   


 


11/25/20 08:00      Mechanical Ventilator  





      Mechanical Ventilator  





      Mechanical Ventilator  


 


11/25/20 07:58  60 12     30


 


11/25/20 07:00 98.5 56 14 146/77 (100) 96   


 


11/25/20 06:30  64 12     


 


11/25/20 06:00  60 13 133/63 (86) 91   


 


11/25/20 05:00  60 12 122/66 (84) 97   


 


11/25/20 04:00        30


 


11/25/20 04:00 98.6 63 13 137/69 (91) 98   


 


11/25/20 04:00      Mechanical Ventilator  





      Mechanical Ventilator  





      Mechanical Ventilator  


 


11/25/20 04:00  64      


 


11/25/20 03:00  59 16 142/65 (90) 99   


 


11/25/20 02:55  75 15     30


 


11/25/20 01:00  65 12 121/60 (80) 99   


 


11/25/20 00:00        30


 


11/25/20 00:00      Mechanical Ventilator  





      Mechanical Ventilator  





      Mechanical Ventilator  


 


11/25/20 00:00  84      


 


11/25/20 00:00 98.6 66 12 116/64 (81) 99   


 


11/24/20 23:10  69 14     30


 


11/24/20 23:00  68 12 116/62 (80) 97   


 


11/24/20 22:00  63 11 124/63 (83) 96   


 


11/24/20 21:00  63 12 105/54 (71) 94   


 


11/24/20 20:00        30


 


11/24/20 20:00 98.6 68 11 125/64 (84) 96   


 


11/24/20 20:00      Mechanical Ventilator  





      Mechanical Ventilator  





      Mechanical Ventilator  


 


11/24/20 20:00  68      


 


11/24/20 19:00  67 13 50/ 97   


 


11/24/20 18:45  66 12     30


 


11/24/20 18:00  64 13 134/68 (90) 95   


 


11/24/20 17:00  69 13 119/59 (79) 96   


 


11/24/20 16:00      Mechanical Ventilator  





      Mechanical Ventilator  





      Mechanical Ventilator  


 


11/24/20 16:00 98.8       


 


11/24/20 16:00  64      


 


11/24/20 16:00        30


 


11/24/20 16:00  64 12 113/57 (75) 96   


 


11/24/20 15:02  69 18     30


 


11/24/20 15:00  66 12 110/54 (72) 94   


 


11/24/20 14:00  70 13 108/54 (72) 96   

















Intake and Output  


 


 11/24/20 11/25/20





 19:00 07:00


 


Intake Total 1280 ml 1276 ml


 


Output Total 1040 ml 820 ml


 


Balance 240 ml 456 ml


 


  


 


Free Water 30 ml 60 ml


 


IV Total 710 ml 676 ml


 


Tube Feeding 540 ml 540 ml


 


Output Urine Total 1040 ml 820 ml


 


# Bowel Movements 4 3











Laboratory Tests








Test


 11/24/20


23:49 11/25/20


03:55 11/25/20


05:29 11/25/20


11:42


 


POC Whole Blood Glucose


 Pending  


 


 132 MG/DL


()  H 125 MG/DL


()  H


 


White Blood Count


 


 8.8 K/UL


(4.8-10.8) 


 





 


Red Blood Count


 


 3.09 M/UL


(4.70-6.10)  L 


 





 


Hemoglobin


 


 9.3 G/DL


(14.2-18.0)  L 


 





 


Hematocrit


 


 30.5 %


(42.0-52.0)  L 


 





 


Mean Corpuscular Volume  99 FL (80-99)    


 


Mean Corpuscular Hemoglobin


 


 30.2 PG


(27.0-31.0) 


 





 


Mean Corpuscular Hemoglobin


Concent 


 30.6 G/DL


(32.0-36.0)  L 


 





 


Red Cell Distribution Width


 


 17.7 %


(11.6-14.8)  H 


 





 


Platelet Count


 


 320 K/UL


(150-450) 


 





 


Mean Platelet Volume


 


 7.4 FL


(6.5-10.1) 


 





 


Neutrophils (%) (Auto)


 


 83.0 %


(45.0-75.0)  H 


 





 


Lymphocytes (%) (Auto)


 


 13.2 %


(20.0-45.0)  L 


 





 


Monocytes (%) (Auto)


 


 3.7 %


(1.0-10.0) 


 





 


Eosinophils (%) (Auto)


 


 0.0 %


(0.0-3.0) 


 





 


Basophils (%) (Auto)


 


 0.1 %


(0.0-2.0) 


 





 


Sodium Level


 


 139 MMOL/L


(136-145) 


 





 


Potassium Level


 


 4.4 MMOL/L


(3.5-5.1) 


 





 


Chloride Level


 


 106 MMOL/L


() 


 





 


Carbon Dioxide Level


 


 26 MMOL/L


(21-32) 


 





 


Blood Urea Nitrogen


 


 23 mg/dL


(7-18)  H 


 





 


Creatinine


 


 0.8 MG/DL


(0.55-1.30) 


 





 


Estimat Glomerular Filtration


Rate 


 > 60 mL/min


(>60) 


 





 


Glucose Level


 


 138 MG/DL


()  H 


 





 


Calcium Level


 


 8.1 MG/DL


(8.5-10.1)  L 


 





 


Phosphorus Level


 


 2.3 MG/DL


(2.5-4.9)  L 


 





 


Magnesium Level


 


 2.5 MG/DL


(1.8-2.4)  H 


 





 


Total Bilirubin


 


 0.2 MG/DL


(0.2-1.0) 


 





 


Aspartate Amino Transf


(AST/SGOT) 


 23 U/L (15-37)


 


 





 


Alanine Aminotransferase


(ALT/SGPT) 


 19 U/L (12-78)


 


 





 


Alkaline Phosphatase


 


 184 U/L


()  H 


 





 


Pro-B-Type Natriuretic Peptide


 


 2762 pg/mL


(0-125)  H 


 





 


Total Protein


 


 6.7 G/DL


(6.4-8.2) 


 





 


Albumin


 


 1.6 G/DL


(3.4-5.0)  L 


 





 


Globulin  5.1 g/dL    


 


Albumin/Globulin Ratio


 


 0.3 (1.0-2.7)


L 


 














Microbiology








 Date/Time


Source Procedure


Growth Status





 


 11/23/20 14:00


Blood Picc Line Blood Culture - Preliminary


NO GROWTH AFTER 24 HOURS Resulted


 


 11/23/20 14:00


Blood Blood Culture - Preliminary


NO GROWTH AFTER 24 HOURS Resulted








Objective


HEAD AND NECK:  No JVD. Orally intubated


LUNGS:  Coarse rhonchi.


CARDIOVASCULAR:   Irregular S1 and S2 with no gallop.


ABDOMEN:  Soft.


EXTREMITIES:  No pitting edema.











Kvng Edmond MD               Nov 25, 2020 13:40

## 2020-11-25 NOTE — PULMONOLGY CRITICAL CARE NOTE
Critical Care - Asmt/Plan


Problems:  


(1) Acute respiratory failure


(2) Multifocal pneumonia


(3) 2019 novel coronavirus disease (COVID-19)


(4) Acute encephalopathy


(5) Severe sepsis


(6) Alzheimer's dementia


(7) HTN (hypertension)


(8) History of CVA (cerebrovascular accident)


(9) BPH (benign prostatic hyperplasia)


Respiratory:  monitor respiratory rate, adjust FIO2, CXR


Cardiac:  continue to monitor HR/BP


Renal:  F/U I&O, keep IV fluid, check electrolytes


Infectious Disease:  check cultures, continue antibiotics


Endocrine:  monitor blood sugar


Hematologic:  monitor H/H


Neurologic:  PRN Ativan, PRN Morphine


Affect:  PRN ativan


Prophylaxis:  Protonix


Time Spent (Minutes):  40


Notes Reviewed:  internist, cardio, renal


Discussed with:  nurses, consultants, 





Critical Care - Objective





Last 24 Hour Vital Signs








  Date Time  Temp Pulse Resp B/P (MAP) Pulse Ox O2 Delivery O2 Flow Rate FiO2


 


11/25/20 10:00  58 12 132/71 (91) 98   


 


11/25/20 09:00 98.2 54 11 138/65 (89) 98   


 


11/25/20 08:00        30


 


11/25/20 08:00  57 12 136/80 (98) 98   


 


11/25/20 08:00      Mechanical Ventilator  





      Mechanical Ventilator  





      Mechanical Ventilator  


 


11/25/20 07:58  60 12     30


 


11/25/20 07:00 98.5 56 14 146/77 (100) 96   


 


11/25/20 06:30  64 12     


 


11/25/20 06:00  60 13 133/63 (86) 91   


 


11/25/20 05:00  60 12 122/66 (84) 97   


 


11/25/20 04:00        30


 


11/25/20 04:00 98.6 63 13 137/69 (91) 98   


 


11/25/20 04:00      Mechanical Ventilator  





      Mechanical Ventilator  





      Mechanical Ventilator  


 


11/25/20 04:00  64      


 


11/25/20 03:00  59 16 142/65 (90) 99   


 


11/25/20 02:55  75 15     30


 


11/25/20 01:00  65 12 121/60 (80) 99   


 


11/25/20 00:00        30


 


11/25/20 00:00      Mechanical Ventilator  





      Mechanical Ventilator  





      Mechanical Ventilator  


 


11/25/20 00:00  84      


 


11/25/20 00:00 98.6 66 12 116/64 (81) 99   


 


11/24/20 23:10  69 14     30


 


11/24/20 23:00  68 12 116/62 (80) 97   


 


11/24/20 22:00  63 11 124/63 (83) 96   


 


11/24/20 21:00  63 12 105/54 (71) 94   


 


11/24/20 20:00        30


 


11/24/20 20:00 98.6 68 11 125/64 (84) 96   


 


11/24/20 20:00      Mechanical Ventilator  





      Mechanical Ventilator  





      Mechanical Ventilator  


 


11/24/20 20:00  68      


 


11/24/20 19:00  67 13 50/ 97   


 


11/24/20 18:45  66 12     30


 


11/24/20 18:00  64 13 134/68 (90) 95   


 


11/24/20 17:00  69 13 119/59 (79) 96   


 


11/24/20 16:00      Mechanical Ventilator  





      Mechanical Ventilator  





      Mechanical Ventilator  


 


11/24/20 16:00 98.8       


 


11/24/20 16:00  64      


 


11/24/20 16:00        30


 


11/24/20 16:00  64 12 113/57 (75) 96   


 


11/24/20 15:02  69 18     30


 


11/24/20 15:00  66 12 110/54 (72) 94   


 


11/24/20 14:00  70 13 108/54 (72) 96   


 


11/24/20 13:00  72 12 118/57 (77) 98   


 


11/24/20 12:00  74      


 


11/24/20 12:00 98.9 69 12 127/68 (87) 97   


 


11/24/20 12:00      Mechanical Ventilator  





      Mechanical Ventilator  





      Mechanical Ventilator  


 


11/24/20 12:00        30


 


11/24/20 11:01  75 18     30


 


11/24/20 11:00  73 12 129/64 (85) 98   








Status:  awake


Condition:  critical


HEENT:  atraumatic


Lungs:  rales, rhonchi


Heart:  HR/BP stable


Abdomen:  soft, active bowel sounds


Extremities:  no C/C/E


Objective:


still large secretions, failed weaning


Micro:





Microbiology








 Date/Time


Source Procedure


Growth Status





 


 11/23/20 14:00


Blood Picc Line Blood Culture - Preliminary


NO GROWTH AFTER 24 HOURS Resulted


 


 11/23/20 14:00


Blood Blood Culture - Preliminary


NO GROWTH AFTER 24 HOURS Resulted








Accucheck:  133





Critical Care - Subjective


ROS Limited/Unobtainable:  Yes


Condition:  critical


EKG Rhythm:  Sinus Rhythm


FI02:  30


Vent Support Breath Rate:  12


Vent Support Mode:  AC


Vent Tidal Volume:  600


Sputum Amount:  Moderate


PEEP:  0.0


PIP:  36


Tube Feeding Amount:  0


I&O:











Intake and Output  


 


 11/24/20 11/25/20





 19:00 07:00


 


Intake Total 1280 ml 1226 ml


 


Output Total 1040 ml 820 ml


 


Balance 240 ml 406 ml


 


  


 


Free Water 30 ml 60 ml


 


IV Total 710 ml 626 ml


 


Tube Feeding 540 ml 540 ml


 


Output Urine Total 1040 ml 820 ml


 


# Bowel Movements 4 3








CXR:


 Bilateral infiltrates and bilateral left greater than right pleural effusions 

are unchanged. Satisfactory stable position of endotracheal tube.


ET-Tube:  7.5


ET Position:  25


Labs:





Laboratory Tests








Test


 11/24/20


23:49 11/25/20


03:55 11/25/20


05:29


 


POC Whole Blood Glucose


 Pending  


 


 132 MG/DL


()  H


 


White Blood Count


 


 8.8 K/UL


(4.8-10.8) 





 


Red Blood Count


 


 3.09 M/UL


(4.70-6.10)  L 





 


Hemoglobin


 


 9.3 G/DL


(14.2-18.0)  L 





 


Hematocrit


 


 30.5 %


(42.0-52.0)  L 





 


Mean Corpuscular Volume  99 FL (80-99)   


 


Mean Corpuscular Hemoglobin


 


 30.2 PG


(27.0-31.0) 





 


Mean Corpuscular Hemoglobin


Concent 


 30.6 G/DL


(32.0-36.0)  L 





 


Red Cell Distribution Width


 


 17.7 %


(11.6-14.8)  H 





 


Platelet Count


 


 320 K/UL


(150-450) 





 


Mean Platelet Volume


 


 7.4 FL


(6.5-10.1) 





 


Neutrophils (%) (Auto)


 


 83.0 %


(45.0-75.0)  H 





 


Lymphocytes (%) (Auto)


 


 13.2 %


(20.0-45.0)  L 





 


Monocytes (%) (Auto)


 


 3.7 %


(1.0-10.0) 





 


Eosinophils (%) (Auto)


 


 0.0 %


(0.0-3.0) 





 


Basophils (%) (Auto)


 


 0.1 %


(0.0-2.0) 





 


Sodium Level


 


 139 MMOL/L


(136-145) 





 


Potassium Level


 


 4.4 MMOL/L


(3.5-5.1) 





 


Chloride Level


 


 106 MMOL/L


() 





 


Carbon Dioxide Level


 


 26 MMOL/L


(21-32) 





 


Blood Urea Nitrogen


 


 23 mg/dL


(7-18)  H 





 


Creatinine


 


 0.8 MG/DL


(0.55-1.30) 





 


Estimat Glomerular Filtration


Rate 


 > 60 mL/min


(>60) 





 


Glucose Level


 


 138 MG/DL


()  H 





 


Calcium Level


 


 8.1 MG/DL


(8.5-10.1)  L 





 


Phosphorus Level


 


 2.3 MG/DL


(2.5-4.9)  L 





 


Magnesium Level


 


 2.5 MG/DL


(1.8-2.4)  H 





 


Total Bilirubin


 


 0.2 MG/DL


(0.2-1.0) 





 


Aspartate Amino Transf


(AST/SGOT) 


 23 U/L (15-37)


 





 


Alanine Aminotransferase


(ALT/SGPT) 


 19 U/L (12-78)


 





 


Alkaline Phosphatase


 


 184 U/L


()  H 





 


Pro-B-Type Natriuretic Peptide


 


 2762 pg/mL


(0-125)  H 





 


Total Protein


 


 6.7 G/DL


(6.4-8.2) 





 


Albumin


 


 1.6 G/DL


(3.4-5.0)  L 





 


Globulin  5.1 g/dL   


 


Albumin/Globulin Ratio


 


 0.3 (1.0-2.7)


L 




















Michael Sandoval MD              Nov 25, 2020 10:41

## 2020-11-26 VITALS — DIASTOLIC BLOOD PRESSURE: 56 MMHG | SYSTOLIC BLOOD PRESSURE: 128 MMHG

## 2020-11-26 VITALS — DIASTOLIC BLOOD PRESSURE: 64 MMHG | SYSTOLIC BLOOD PRESSURE: 112 MMHG

## 2020-11-26 VITALS — DIASTOLIC BLOOD PRESSURE: 64 MMHG | SYSTOLIC BLOOD PRESSURE: 125 MMHG

## 2020-11-26 VITALS — DIASTOLIC BLOOD PRESSURE: 66 MMHG | SYSTOLIC BLOOD PRESSURE: 133 MMHG

## 2020-11-26 VITALS — DIASTOLIC BLOOD PRESSURE: 57 MMHG | SYSTOLIC BLOOD PRESSURE: 120 MMHG

## 2020-11-26 VITALS — DIASTOLIC BLOOD PRESSURE: 55 MMHG | SYSTOLIC BLOOD PRESSURE: 119 MMHG

## 2020-11-26 VITALS — SYSTOLIC BLOOD PRESSURE: 123 MMHG | DIASTOLIC BLOOD PRESSURE: 60 MMHG

## 2020-11-26 VITALS — DIASTOLIC BLOOD PRESSURE: 81 MMHG | SYSTOLIC BLOOD PRESSURE: 150 MMHG

## 2020-11-26 VITALS — DIASTOLIC BLOOD PRESSURE: 73 MMHG | SYSTOLIC BLOOD PRESSURE: 131 MMHG

## 2020-11-26 VITALS — SYSTOLIC BLOOD PRESSURE: 129 MMHG | DIASTOLIC BLOOD PRESSURE: 68 MMHG

## 2020-11-26 VITALS — SYSTOLIC BLOOD PRESSURE: 141 MMHG | DIASTOLIC BLOOD PRESSURE: 76 MMHG

## 2020-11-26 VITALS — DIASTOLIC BLOOD PRESSURE: 58 MMHG | SYSTOLIC BLOOD PRESSURE: 116 MMHG

## 2020-11-26 VITALS — DIASTOLIC BLOOD PRESSURE: 67 MMHG | SYSTOLIC BLOOD PRESSURE: 115 MMHG

## 2020-11-26 VITALS — SYSTOLIC BLOOD PRESSURE: 128 MMHG | DIASTOLIC BLOOD PRESSURE: 57 MMHG

## 2020-11-26 VITALS — SYSTOLIC BLOOD PRESSURE: 119 MMHG | DIASTOLIC BLOOD PRESSURE: 64 MMHG

## 2020-11-26 VITALS — DIASTOLIC BLOOD PRESSURE: 60 MMHG | SYSTOLIC BLOOD PRESSURE: 119 MMHG

## 2020-11-26 VITALS — SYSTOLIC BLOOD PRESSURE: 133 MMHG | DIASTOLIC BLOOD PRESSURE: 68 MMHG

## 2020-11-26 VITALS — SYSTOLIC BLOOD PRESSURE: 121 MMHG | DIASTOLIC BLOOD PRESSURE: 66 MMHG

## 2020-11-26 VITALS — SYSTOLIC BLOOD PRESSURE: 105 MMHG | DIASTOLIC BLOOD PRESSURE: 60 MMHG

## 2020-11-26 VITALS — DIASTOLIC BLOOD PRESSURE: 62 MMHG | SYSTOLIC BLOOD PRESSURE: 123 MMHG

## 2020-11-26 VITALS — DIASTOLIC BLOOD PRESSURE: 71 MMHG | SYSTOLIC BLOOD PRESSURE: 116 MMHG

## 2020-11-26 LAB
ALBUMIN SERPL-MCNC: 1.7 G/DL (ref 3.4–5)
ALBUMIN/GLOB SERPL: 0.4 {RATIO} (ref 1–2.7)
ALP SERPL-CCNC: 182 U/L (ref 46–116)
ALT SERPL-CCNC: 32 U/L (ref 12–78)
ANION GAP SERPL CALC-SCNC: 7 MMOL/L (ref 5–15)
AST SERPL-CCNC: 23 U/L (ref 15–37)
BILIRUB SERPL-MCNC: 0.2 MG/DL (ref 0.2–1)
BUN SERPL-MCNC: 27 MG/DL (ref 7–18)
CALCIUM SERPL-MCNC: 7.9 MG/DL (ref 8.5–10.1)
CHLORIDE SERPL-SCNC: 108 MMOL/L (ref 98–107)
CO2 SERPL-SCNC: 26 MMOL/L (ref 21–32)
CREAT SERPL-MCNC: 0.9 MG/DL (ref 0.55–1.3)
GLOBULIN SER-MCNC: 4.7 G/DL
PHOSPHATE SERPL-MCNC: 3 MG/DL (ref 2.5–4.9)
POTASSIUM SERPL-SCNC: 4.1 MMOL/L (ref 3.5–5.1)
SODIUM SERPL-SCNC: 141 MMOL/L (ref 136–145)

## 2020-11-26 RX ADMIN — MEROPENEM SCH MLS/HR: 1 INJECTION INTRAVENOUS at 10:40

## 2020-11-26 RX ADMIN — HEPARIN SODIUM SCH UNITS: 5000 INJECTION INTRAVENOUS; SUBCUTANEOUS at 20:08

## 2020-11-26 RX ADMIN — MEROPENEM SCH MLS/HR: 1 INJECTION INTRAVENOUS at 03:09

## 2020-11-26 RX ADMIN — INSULIN ASPART SCH UNITS: 100 INJECTION, SOLUTION INTRAVENOUS; SUBCUTANEOUS at 18:00

## 2020-11-26 RX ADMIN — PANTOPRAZOLE SODIUM SCH MG: 40 INJECTION, POWDER, FOR SOLUTION INTRAVENOUS at 09:54

## 2020-11-26 RX ADMIN — INSULIN ASPART SCH UNITS: 100 INJECTION, SOLUTION INTRAVENOUS; SUBCUTANEOUS at 12:00

## 2020-11-26 RX ADMIN — CHLORHEXIDINE GLUCONATE SCH APPLIC: 213 SOLUTION TOPICAL at 20:08

## 2020-11-26 RX ADMIN — MEROPENEM SCH MLS/HR: 1 INJECTION INTRAVENOUS at 19:26

## 2020-11-26 RX ADMIN — INSULIN ASPART SCH UNITS: 100 INJECTION, SOLUTION INTRAVENOUS; SUBCUTANEOUS at 06:00

## 2020-11-26 RX ADMIN — HEPARIN SODIUM SCH UNITS: 5000 INJECTION INTRAVENOUS; SUBCUTANEOUS at 09:55

## 2020-11-26 NOTE — CARDIAC ELECTROPHYSIOLOGY PN
Assessment/Plan


Assessment/Plan


1. Accelerated junctional rhythm. Ruled out for MI


      Echo showed ejection fraction of 55%.





2. Long run of 31 beats of Nonsustained VT on 11/3/2020. 


    Cardiac cath after stabilization.





3. Henyr 30, Asystole while on the Vent due to resp failure/ likely mucus plug .

S/P Code


DCed midodrine





4. Respiratory failure, on antibiotic and intubated on the vent 


   Failed weaning. Family refused tracheostomy 





5. Septic shock. Off Levophed   





6. History of previous COVID infection in September 2020 and active Covid  in 

isolation


Now negative again and off isolation.





7. Dementia.





JEANNINE RN and Dr luna





Subjective


Subjective


  In ICU on the Venton  Fio2 30%, PEEP 5. Off isolation


 Had 31 beats of VT  on 11/3/20  and 5 beats 11/8/20 


 Coded on 11/12/20 as sat dropped to 20% and got henry 30s and PEA and pulseless




 Off Levophed 


   Covid test was negative.  


    Failed weaning again. Family still refusing tracheostomy. 


     Still henry in 50s. Now off Midodrine





Objective





Last 24 Hour Vital Signs








  Date Time  Temp Pulse Resp B/P (MAP) Pulse Ox O2 Delivery O2 Flow Rate FiO2


 


11/26/20 08:00      Mechanical Ventilator  





      Mechanical Ventilator  





      Mechanical Ventilator  


 


11/26/20 08:00        30


 


11/26/20 08:00  58 11 115/67 (83) 100   


 


11/26/20 07:18  54 15     30


 


11/26/20 07:00 98.4 57 12 119/55 (76) 99   


 


11/26/20 06:30  64 12     


 


11/26/20 06:00  62 13  100   


 


11/26/20 05:00  61 12 123/60 (81) 100   


 


11/26/20 04:00  55      


 


11/26/20 04:00      Mechanical Ventilator  





      Mechanical Ventilator  





      Mechanical Ventilator  


 


11/26/20 04:00  62 12 119/60 (79) 100   


 


11/26/20 04:00        30


 


11/26/20 03:00  58 12 133/66 (88) 100   


 


11/26/20 02:57  60 13     30


 


11/26/20 02:00  56 11 129/68 (88) 100   


 


11/26/20 01:00  60 14 119/64 (82) 100   


 


11/26/20 00:00  59 12 125/64 (84) 100   


 


11/26/20 00:00        30


 


11/26/20 00:00  62      


 


11/26/20 00:00      Mechanical Ventilator  





      Mechanical Ventilator  





      Mechanical Ventilator  


 


11/25/20 23:00  60 12 113/60 (77) 98   


 


11/25/20 23:00  67 12     30


 


11/25/20 22:00  66 22 104/53 (70) 97   


 


11/25/20 21:00  74 20 135/80 (98) 98   


 


11/25/20 20:00      Mechanical Ventilator  





      Mechanical Ventilator  





      Mechanical Ventilator  


 


11/25/20 20:00  64      


 


11/25/20 20:00        30


 


11/25/20 20:00  67 14 105/55 (72) 99   


 


11/25/20 19:15  69 14     30


 


11/25/20 19:00  60 18 106/56 (73) 100   


 


11/25/20 18:00  62 12 116/59 (78) 100   


 


11/25/20 17:00  59 12 91/50 (64) 99   


 


11/25/20 16:00  57      


 


11/25/20 16:00      Mechanical Ventilator  





      Mechanical Ventilator  





      Mechanical Ventilator  


 


11/25/20 16:00        30


 


11/25/20 16:00 98.3 57 13 123/77 (92) 98   


 


11/25/20 15:06  51 12     30


 


11/25/20 15:00  51 12 148/63 (91) 99   


 


11/25/20 14:00  55 12 143/66 (91) 99   


 


11/25/20 13:00  56 13 120/60 (80) 100   


 


11/25/20 12:00      Mechanical Ventilator  





      Mechanical Ventilator  





      Mechanical Ventilator  


 


11/25/20 12:00  53      


 


11/25/20 12:00 98.3 54 13 138/67 (90) 98   


 


11/25/20 11:46        30


 


11/25/20 11:24  53 15     30














l


Intake and Output  


 


 11/25/20 11/26/20





 19:00 07:00


 


Intake Total 760 ml 1120 ml


 


Output Total 1010 ml 1700 ml


 


Balance -250 ml -580 ml


 


  


 


IV Total 460 ml 580 ml


 


Tube Feeding 300 ml 540 ml


 


Output Urine Total 1010 ml 1700 ml


 


# Bowel Movements 4 3











Laboratory Tests








Test


 11/25/20


11:42 11/26/20


05:20


 


POC Whole Blood Glucose


 125 MG/DL


()  H 





 


Sodium Level


 


 141 MMOL/L


(136-145)


 


Potassium Level


 


 4.1 MMOL/L


(3.5-5.1)


 


Chloride Level


 


 108 MMOL/L


()  H


 


Carbon Dioxide Level


 


 26 MMOL/L


(21-32)


 


Anion Gap


 


 7 mmol/L


(5-15)


 


Blood Urea Nitrogen


 


 27 mg/dL


(7-18)  H


 


Creatinine


 


 0.9 MG/DL


(0.55-1.30)


 


Estimat Glomerular Filtration


Rate 


 > 60 mL/min


(>60)


 


Glucose Level


 


 137 MG/DL


()  H


 


Calcium Level


 


 7.9 MG/DL


(8.5-10.1)  L


 


Phosphorus Level


 


 3.0 MG/DL


(2.5-4.9)


 


Magnesium Level


 


 2.4 MG/DL


(1.8-2.4)


 


Total Bilirubin


 


 0.2 MG/DL


(0.2-1.0)


 


Aspartate Amino Transf


(AST/SGOT) 


 23 U/L (15-37)





 


Alanine Aminotransferase


(ALT/SGPT) 


 32 U/L (12-78)





 


Alkaline Phosphatase


 


 182 U/L


()  H


 


Total Protein


 


 6.4 G/DL


(6.4-8.2)


 


Albumin


 


 1.7 G/DL


(3.4-5.0)  L


 


Globulin  4.7 g/dL  


 


Albumin/Globulin Ratio


 


 0.4 (1.0-2.7)


L











Microbiology








 Date/Time


Source Procedure


Growth Status





 


 11/23/20 14:00


Blood Picc Line Blood Culture - Preliminary


NO GROWTH AFTER 48 HOURS Resulted


 


 11/23/20 14:00


Blood Blood Culture - Preliminary


NO GROWTH AFTER 48 HOURS Resulted








Objective


HEAD AND NECK:  No JVD. Orally intubated


LUNGS:  Coarse rhonchi.


CARDIOVASCULAR:   Irregular S1 and S2 with no gallop.


ABDOMEN:  Soft.


EXTREMITIES:  No pitting edema.











Kvng Edmond MD               Nov 26, 2020 11:12

## 2020-11-26 NOTE — PULMONOLGY CRITICAL CARE NOTE
Critical Care - Asmt/Plan


Problems:  


(1) Acute respiratory failure


(2) Multifocal pneumonia


(3) 2019 novel coronavirus disease (COVID-19)


(4) Acute encephalopathy


(5) Severe sepsis


(6) Alzheimer's dementia


(7) HTN (hypertension)


(8) History of CVA (cerebrovascular accident)


(9) BPH (benign prostatic hyperplasia)


Respiratory:  monitor respiratory rate, adjust FIO2, CXR


Cardiac:  continue pressors, continue to monitor HR/BP


Renal:  F/U I&O, check electrolytes


Infectious Disease:  check cultures, continue antibiotics


Gastrointestinal:  hold feedings


Endocrine:  monitor blood sugar


Hematologic:  monitor H/H, transfuse if hgb<8.5


Neurologic:  PRN Ativan, keep patient comfortable


Affect:  PRN ativan


Prophylaxis:  Protonix, Heparin


Disposition:  keep in ICU


Time Spent (Minutes):  40


Notes Reviewed:  internist, cardio, renal


Discussed with:  nurses, consultants, 





Critical Care - Objective





Last 24 Hour Vital Signs








  Date Time  Temp Pulse Resp B/P (MAP) Pulse Ox O2 Delivery O2 Flow Rate FiO2


 


11/26/20 08:00      Mechanical Ventilator  





      Mechanical Ventilator  





      Mechanical Ventilator  


 


11/26/20 08:00        30


 


11/26/20 08:00  58 11 115/67 (83) 100   


 


11/26/20 07:18  54 15     30


 


11/26/20 07:00 98.4 57 12 119/55 (76) 99   


 


11/26/20 06:30  64 12     


 


11/26/20 06:00  62 13  100   


 


11/26/20 05:00  61 12 123/60 (81) 100   


 


11/26/20 04:00  55      


 


11/26/20 04:00      Mechanical Ventilator  





      Mechanical Ventilator  





      Mechanical Ventilator  


 


11/26/20 04:00  62 12 119/60 (79) 100   


 


11/26/20 04:00        30


 


11/26/20 03:00  58 12 133/66 (88) 100   


 


11/26/20 02:57  60 13     30


 


11/26/20 02:00  56 11 129/68 (88) 100   


 


11/26/20 01:00  60 14 119/64 (82) 100   


 


11/26/20 00:00  59 12 125/64 (84) 100   


 


11/26/20 00:00        30


 


11/26/20 00:00  62      


 


11/26/20 00:00      Mechanical Ventilator  





      Mechanical Ventilator  





      Mechanical Ventilator  


 


11/25/20 23:00  60 12 113/60 (77) 98   


 


11/25/20 23:00  67 12     30


 


11/25/20 22:00  66 22 104/53 (70) 97   


 


11/25/20 21:00  74 20 135/80 (98) 98   


 


11/25/20 20:00      Mechanical Ventilator  





      Mechanical Ventilator  





      Mechanical Ventilator  


 


11/25/20 20:00  64      


 


11/25/20 20:00        30


 


11/25/20 20:00  67 14 105/55 (72) 99   


 


11/25/20 19:15  69 14     30


 


11/25/20 19:00  60 18 106/56 (73) 100   


 


11/25/20 18:00  62 12 116/59 (78) 100   


 


11/25/20 17:00  59 12 91/50 (64) 99   


 


11/25/20 16:00  57      


 


11/25/20 16:00      Mechanical Ventilator  





      Mechanical Ventilator  





      Mechanical Ventilator  


 


11/25/20 16:00        30


 


11/25/20 16:00 98.3 57 13 123/77 (92) 98   


 


11/25/20 15:06  51 12     30


 


11/25/20 15:00  51 12 148/63 (91) 99   


 


11/25/20 14:00  55 12 143/66 (91) 99   


 


11/25/20 13:00  56 13 120/60 (80) 100   


 


11/25/20 12:00      Mechanical Ventilator  





      Mechanical Ventilator  





      Mechanical Ventilator  


 


11/25/20 12:00  53      


 


11/25/20 12:00 98.3 54 13 138/67 (90) 98   


 


11/25/20 11:46        30


 


11/25/20 11:24  53 15     30








Status:  awake


Condition:  critical


HEENT:  atraumatic, normocephalic


Neck:  full ROM


Heart:  HR/BP stable


Abdomen:  soft, active bowel sounds


Extremities:  no C/C/E


Decubiti:  stage


Objective:


still large secretions, failed weaning


Micro:





Microbiology








 Date/Time


Source Procedure


Growth Status





 


 11/23/20 14:00


Blood Picc Line Blood Culture - Preliminary


NO GROWTH AFTER 48 HOURS Resulted


 


 11/23/20 14:00


Blood Blood Culture - Preliminary


NO GROWTH AFTER 48 HOURS Resulted








Accucheck:  139





Critical Care - Subjective


ROS Limited/Unobtainable:  Yes


Condition:  critical


EKG Rhythm:  Sinus Rhythm


FI02:  30


Vent Support Breath Rate:  12


Vent Support Mode:  AC


Vent Tidal Volume:  600


Sputum Amount:  Moderate


PEEP:  0.0


PIP:  21


Tube Feeding Amount:  60


I&O:











Intake and Output  


 


 11/25/20 11/26/20





 19:00 07:00


 


Intake Total 760 ml 1120 ml


 


Output Total 1010 ml 1700 ml


 


Balance -250 ml -580 ml


 


  


 


IV Total 460 ml 580 ml


 


Tube Feeding 300 ml 540 ml


 


Output Urine Total 1010 ml 1700 ml


 


# Bowel Movements 4 3








CXR:


Bilateral left greater than right pleural effusions and bilateral parenchymal 

opacities persist, unchanged.


ET-Tube:  7.5


ET Position:  25


Labs:





Laboratory Tests








Test


 11/25/20


11:42 11/26/20


05:20


 


POC Whole Blood Glucose


 125 MG/DL


()  H 





 


Sodium Level


 


 141 MMOL/L


(136-145)


 


Potassium Level


 


 4.1 MMOL/L


(3.5-5.1)


 


Chloride Level


 


 108 MMOL/L


()  H


 


Carbon Dioxide Level


 


 26 MMOL/L


(21-32)


 


Anion Gap


 


 7 mmol/L


(5-15)


 


Blood Urea Nitrogen


 


 27 mg/dL


(7-18)  H


 


Creatinine


 


 0.9 MG/DL


(0.55-1.30)


 


Estimat Glomerular Filtration


Rate 


 > 60 mL/min


(>60)


 


Glucose Level


 


 137 MG/DL


()  H


 


Calcium Level


 


 7.9 MG/DL


(8.5-10.1)  L


 


Phosphorus Level


 


 3.0 MG/DL


(2.5-4.9)


 


Magnesium Level


 


 2.4 MG/DL


(1.8-2.4)


 


Total Bilirubin


 


 0.2 MG/DL


(0.2-1.0)


 


Aspartate Amino Transf


(AST/SGOT) 


 23 U/L (15-37)





 


Alanine Aminotransferase


(ALT/SGPT) 


 32 U/L (12-78)





 


Alkaline Phosphatase


 


 182 U/L


()  H


 


Total Protein


 


 6.4 G/DL


(6.4-8.2)


 


Albumin


 


 1.7 G/DL


(3.4-5.0)  L


 


Globulin  4.7 g/dL  


 


Albumin/Globulin Ratio


 


 0.4 (1.0-2.7)


L

















Michael Sandoval MD              Nov 26, 2020 11:11

## 2020-11-26 NOTE — NUR
NURSE NOTES:

Pt provided with a CHG bed bath, oral care and linen change. Bedside assessment performed, 
assessed pt for pain with a FLACC score of 0 noted. Respiratory status remains stable. Pt 
repositioned for comfort and safety. ROM exercises performed per pt tolerance. Fall, 
Aspiration and Skin precautions observed. Pt remains resting in bed; Bed remains in the 
lowest position with the safety wheels engaged, call light within reach, side rails up x3 
and bed alarm activated. Will continue plan of care. Will continue to monitor.

## 2020-11-26 NOTE — INTERNAL MED PROGRESS NOTE
Subjective


Date of Service:  Nov 26, 2020


Physician Name


Hearn,Benito


Attending Physician


Andrei Cancino MD





Current Medications








 Medications


  (Trade)  Dose


 Ordered  Sig/Miky


 Route


 PRN Reason  Start Time


 Stop Time Status Last Admin


Dose Admin


 


 Acetaminophen


  (Tylenol)  650 mg  Q4H  PRN


 GT


 Temp >100.5  11/21/20 12:30


 12/21/20 12:29  11/23/20 10:14





 


 Albuterol/


 Ipratropium


  (Albuterol/


 Ipratropium)  3 ml  Q4HRT  PRN


 HHN


 sob  11/22/20 10:45


 11/27/20 10:44   





 


 Chlorhexidine


 Gluconate


  (Jess-Hex 2%)  1 applic  DAILY@2000


 TOPIC


   11/6/20 20:00


 2/4/21 19:59  11/25/20 22:05





 


 Dexamethasone


  (Decadron)  6 mg  Q24H


 ORAL


   11/22/20 18:00


 12/1/20 18:01  11/25/20 18:17





 


 Dextrose


  (Dextrose 50%)  50 ml  Q30M  PRN


 IV


 Hypoglycemia  10/23/20 22:45


 1/21/21 22:44   





 


 Heparin Sodium


  (Porcine)


  (Heparin 5000


 units/ml)  5,000 units  EVERY 12  HOURS


 SUBQ


   10/23/20 21:00


 12/7/20 20:59  11/26/20 09:55





 


 Insulin Aspart


  (NovoLOG)    EVERY 6  HOURS


 SUBQ


   11/2/20 06:00


 1/22/21 06:29  11/25/20 23:40





 


 Meropenem 1 gm/


 Sodium Chloride  55 ml @ 


 110 mls/hr  Q8H


 IVPB


   11/21/20 10:00


 11/30/20 09:59  11/26/20 10:40





 


 Nitroglycerin


  (Ntg)  0.4 mg  Q5M  PRN


 SL


 Prn Chest Pain  11/21/20 10:15


 12/21/20 10:09   





 


 Ondansetron HCl


  (Zofran)  4 mg  Q6H  PRN


 IVP


 Nausea & Vomiting  11/21/20 14:30


 12/21/20 14:29   





 


 Pantoprazole


  (Protonix)  40 mg  DAILY


 IV


   11/7/20 09:00


 12/7/20 08:59  11/26/20 09:54





 


 Polyethylene


 Glycol


  (Miralax)  17 gm  DAILYPRN  PRN


 GT


 Constipation  11/21/20 10:15


 12/21/20 10:14   





 


 Promethazine HCl/


 Codeine


  (Phenergan with


 Codeine)  5 ml  Q4H  PRN


 GT


 For Cough  11/21/20 10:14


 12/21/20 10:13  11/25/20 22:05





 


 Sodium Chloride  1,000 ml @ 


 50 mls/hr  Q20H


 IV


   11/6/20 16:00


 12/6/20 15:59  11/26/20 14:27











Allergies:  


Coded Allergies:  


     No Known Allergies (Unverified , 8/20/12)


ROS Limited/Unobtainable:  Yes


Subjective


76 YO M admitted with shortness of breath.  Now pneumonia; previously COVID p

ositive.  Cover for Int kierra-Dr Cancino.  ICU.  Intubated and sedated.





Objective





Last Vital Signs








  Date Time  Temp Pulse Resp B/P (MAP) Pulse Ox O2 Delivery O2 Flow Rate FiO2


 


11/26/20 12:00  54      


 


11/26/20 12:00 98.3  13 150/81 (104) 100   


 


11/26/20 10:30        30


 


11/26/20 08:00      Mechanical Ventilator  





      Mechanical Ventilator  





      Mechanical Ventilator  











Laboratory Tests








Test


 11/26/20


05:20


 


Sodium Level


 141 MMOL/L


(136-145)


 


Potassium Level


 4.1 MMOL/L


(3.5-5.1)


 


Chloride Level


 108 MMOL/L


()  H


 


Carbon Dioxide Level


 26 MMOL/L


(21-32)


 


Anion Gap


 7 mmol/L


(5-15)


 


Blood Urea Nitrogen


 27 mg/dL


(7-18)  H


 


Creatinine


 0.9 MG/DL


(0.55-1.30)


 


Estimat Glomerular Filtration


Rate > 60 mL/min


(>60)


 


Glucose Level


 137 MG/DL


()  H


 


Calcium Level


 7.9 MG/DL


(8.5-10.1)  L


 


Phosphorus Level


 3.0 MG/DL


(2.5-4.9)


 


Magnesium Level


 2.4 MG/DL


(1.8-2.4)


 


Total Bilirubin


 0.2 MG/DL


(0.2-1.0)


 


Aspartate Amino Transf


(AST/SGOT) 23 U/L (15-37)





 


Alanine Aminotransferase


(ALT/SGPT) 32 U/L (12-78)





 


Alkaline Phosphatase


 182 U/L


()  H


 


Total Protein


 6.4 G/DL


(6.4-8.2)


 


Albumin


 1.7 G/DL


(3.4-5.0)  L


 


Globulin 4.7 g/dL  


 


Albumin/Globulin Ratio


 0.4 (1.0-2.7)


L

















Intake and Output  


 


 11/25/20 11/26/20





 19:00 07:00


 


Intake Total 760 ml 1120 ml


 


Output Total 1010 ml 1700 ml


 


Balance -250 ml -580 ml


 


  


 


IV Total 460 ml 580 ml


 


Tube Feeding 300 ml 540 ml


 


Output Urine Total 1010 ml 1700 ml


 


# Bowel Movements 4 3








Objective


PHYSICAL EXAMINATION:


GENERAL:  The patient awake with deep stimuli, open his eyes, however,


cannot follow commands.  The patient is on a Ventimask at this time,


chronically ill-appearing.


HEAD AND NECK:  Pupils are equal and reactive to light.  Anicteric.


NECK:  Supple.  No JVD.


LUNGS:  Mech vent;  wheezing, rhonchi, and decreased air in bases.


HEART:  S1, S2.  Regular rhythm.  Distant heart sounds.  No murmur or


gallop.


ABDOMEN:  Soft, nondistended, nontender.  Positive bowel sounds.


EXTREMITIES:  No cyanosis, clubbing, or edema.


NEUROLOGIC:  Very limited secondary to the patient's status, cannot follow


commands.  Opens his eyes with deep stimuli and moving extremities


spontaneously.





Assessment/Plan


Assessment/Plan


ASSESSMENT:


1. Acute hypoxemic respiratory failure, most likely secondary to


pneumonia and sepsis.


2. Sepsis secondary to urinary tract infection and pneumonia.


3. pneumonia=MRSA; ESBL E. coli


4. Acute kidney injury on chronic renal insufficiency.


5. Dehydration.


6. History of chronic congestive heart failure.


7. Diabetes type 2.


8. Dyslipidemia.


9. Hypertension.


10. Alzheimer's disease.


11. COVID 19 previous positive





PLAN:


1.  ICU


2.  Dr. Sandoval,=Pulmonary Critical Care; follow mech vent recs


3.  Dr. Garcia = Inf Dis.


4.  IV= D5W due to the hypernatremia and dehydration.


5.  antibiotics =   meropenem; S/P micafungin and zyvox


6.  Code status is full code.


7.    DVT prophylaxis is heparin subcutaneous.











Benito Hearn MD               Nov 26, 2020 14:54

## 2020-11-26 NOTE — NEPHROLOGY PROGRESS NOTE
Assessment/Plan


Problem List:  


(1) Dehydration


(2) JANES (acute kidney injury)


(3) Renal failure (ARF), acute on chronic


(4) Hypoxia


(5) Acute encephalopathy


(6) Electrolyte imbalance


Assessment





75-year-old male is admitted with acute hypoxic respiratory failure most likely 

secondary to pneumonia and sepsis, UTI.


Acute on chronic renal failure


Dehydration


Electrolyte imbalances, hypernatremia


Hypoalbuminemia


Diabetes type 2


History of congestive heart failure


Hypertension


Hyperlipemia


Alzheimer's


Previous COVID-19 infection in September 2020


Plan


November 26: Patient remains in ICU.  Full code.  Intubated.  Fails weaning.  

Labs reviewed.  Stable from renal standpoint of view.  Discussed with RN.


November 25: Labs reviewed.  Discussed with RN.  Abnormal electrolyte addressed.

 Remains intubated on ventilator.  Continue per consultants.


November 24: Labs reviewed.  Discussed with RN.  Stable from renal standpoint of

view.  Main issue is weaning from ventilator that keeps failing.  Continue per 

consultants.


November 23: Labs reviewed.  Discussed with RN.  Stable from renal standpoint of

view.  Continue per consultants.


November 22: Patient seen and discussed with RN.  Labs reviewed.  Remains 

intubated.  Trial of weaning this being attempted.  Continue per consultants.


November 21: Remains intubated.  Labs reviewed.  Renal parameters stable.  

Continue per consultants.


November 20: Remains on mechanical ventilation.  Labs reviewed.  Abnormal 

electrolytes addressed.  Stable from renal standpoint of view.


November 19: Remains intubated.  Remains full code.  Labs reviewed.  Low 

phosphorus replaced.  Continue to monitor renal parameters.  Continue per 

consultants.


November 18: Failed weaning.  Remains intubated.  Remains full closed.  Labs 

reviewed.  Stable from renal standpoint view.


November 17: Remains in ICU.  Remains full code.  Labs are reviewed.  Phosphorus

supplement given.  Continue per consultants.


November 16: Remains in ICU.  Full code.  Labs reviewed.  Remains intubated.  

Stable renal parameters.


November 15: In ICU.  Full code.  Discussed with RN.  Stable renal parameters.


November 14: Seen in ICU.  Discussed with LITA Cooper.  Flomax discontinued.  

Labs reviewed.  Stable from renal standpoint of view.


November 13: Seen in ICU.  Discussed with LITA Cooper.  Remains on pressor.  

Electrolyte abnormalities noted and addressed.  Continue per consultants.  

Patient remains full code.


November 12: Seen in ICU.  Discussed with LITA Richardson.  Blood pressure remains a 

little requiring pressors.  Labs reviewed.  Stable renal parameters.  Continue 

per consultants.


November 11: Remains intubated.  Full code.  Blood pressure borderline low.  

Will increase midodrine dose.  Discussed with RN.  Continue to monitor renal 

parameters and electrolytes.


November 10: Intubated.  Full code.  Labs reviewed.  Stable from renal 

standpoint of view.  Continue per consultants.


November 9: Remains intubated.  Full code.  Labs reviewed.  Electrolytes within 

normal limit.  Continue per consultants.


November 8: In ICU.  Remains intubated on ventilator.  Remains full code.  Low 

potassium addressed.  Blood pressure fluctuating.  Continue per consultants.


November 7: Patient in ICU.  Intubated on ventilator.  On 6 mics of Levophed.  

Will give 100 cc albumin 25%.  K-Phos IV ordered.  Continue per consultants.  

Continue monitor renal parameters and electrolytes.


November 6: Status unchanged.  Transfer to [facility] for seizure.  Stable from renal 

standpoint to view.  Continue per consultants.


November 5: Status quo.  Labs reviewed.  Renal parameters stable.  Continue per 

consultants.


November 4: On nonrebreather mask.  Labs reviewed.  Renal parameters 

stable.Continue per pulmonologist.  Clinically unchanged.


November 3: On nonrebreather mask.  Inflammatory markers gradually declining.  

Renal parameters stable.  Continue per pulmonary.


November 2: Remains on nonrebreather mask.  Inflammatory markers remain 

elevated.  Renal parameters somewhat stable.  Continue per pulmonary and ID.  

Remains full code.


November 1: Patient on nonrebreather mask.  Labs noted.  Serum creatinine down 

to 1.3.  Continue per consultants.


October 31: Patient on nonrebreather mask.  Transfer to telemetry when seen this

morning.  Labs noted.  Continue per consultants.  Serum creatinine dylan to 1.7. 

Continue to monitor renal parameters.  Continue to monitor vancomycin level


October 30: Patient is not doing well clinically.  Mild respiratory distress.  

ABG noted.  Somewhat hypoxic.  CBC and chemistry panel ordered.  Discussed with 

LITA Garcia.  Will defer to pulmonary management to specialist.  Continue per ID.

 Renal parameters remained stable as of October 29.


October 29: Labs reviewed.  Renal parameters stable.  Continue per consultants.


October 28: Labs reviewed.  Potassium via NG tube ordered.  IV fluids stopped.  

Continue per consultants.


October 27: Labs reviewed.  Low potassium and low phosphorus replaced.  Continue

per consultants.  Remains stable from renal standpoint of view.


October 26: Patient remains n.p.o.  IV fluid down to 50 cc an hour.  Potassium 

supplement intravenously ordered.  Continue per consultants.





Previously:


Patient is n.p.o., will continue on IV fluid of D5W 75 cc an hour


We will monitor electrolytes and renal parameters


Avoid nephrotoxic's


Start p.o. when he clears by speech therapist, meanwhile aspiration precautions


Keep the blood pressure and blood sugar in check


Per orders





Subjective


ROS Limited/Unobtainable:  Yes





Objective


Objective





Last 24 Hour Vital Signs








  Date Time  Temp Pulse Resp B/P (MAP) Pulse Ox O2 Delivery O2 Flow Rate FiO2


 


11/26/20 08:00      Mechanical Ventilator  





      Mechanical Ventilator  





      Mechanical Ventilator  


 


11/26/20 08:00        30


 


11/26/20 08:00  58 11 115/67 (83) 100   


 


11/26/20 07:18  54 15     30


 


11/26/20 07:00 98.4 57 12 119/55 (76) 99   


 


11/26/20 06:30  64 12     


 


11/26/20 06:00  62 13  100   


 


11/26/20 05:00  61 12 123/60 (81) 100   


 


11/26/20 04:00  55      


 


11/26/20 04:00      Mechanical Ventilator  





      Mechanical Ventilator  





      Mechanical Ventilator  


 


11/26/20 04:00  62 12 119/60 (79) 100   


 


11/26/20 04:00        30


 


11/26/20 03:00  58 12 133/66 (88) 100   


 


11/26/20 02:57  60 13     30


 


11/26/20 02:00  56 11 129/68 (88) 100   


 


11/26/20 01:00  60 14 119/64 (82) 100   


 


11/26/20 00:00  59 12 125/64 (84) 100   


 


11/26/20 00:00        30


 


11/26/20 00:00  62      


 


11/26/20 00:00      Mechanical Ventilator  





      Mechanical Ventilator  





      Mechanical Ventilator  


 


11/25/20 23:00  60 12 113/60 (77) 98   


 


11/25/20 23:00  67 12     30


 


11/25/20 22:00  66 22 104/53 (70) 97   


 


11/25/20 21:00  74 20 135/80 (98) 98   


 


11/25/20 20:00      Mechanical Ventilator  





      Mechanical Ventilator  





      Mechanical Ventilator  


 


11/25/20 20:00  64      


 


11/25/20 20:00        30


 


11/25/20 20:00  67 14 105/55 (72) 99   


 


11/25/20 19:15  69 14     30


 


11/25/20 19:00  60 18 106/56 (73) 100   


 


11/25/20 18:00  62 12 116/59 (78) 100   


 


11/25/20 17:00  59 12 91/50 (64) 99   


 


11/25/20 16:00  57      


 


11/25/20 16:00      Mechanical Ventilator  





      Mechanical Ventilator  





      Mechanical Ventilator  


 


11/25/20 16:00        30


 


11/25/20 16:00 98.3 57 13 123/77 (92) 98   


 


11/25/20 15:06  51 12     30


 


11/25/20 15:00  51 12 148/63 (91) 99   


 


11/25/20 14:00  55 12 143/66 (91) 99   


 


11/25/20 13:00  56 13 120/60 (80) 100   


 


11/25/20 12:00      Mechanical Ventilator  





      Mechanical Ventilator  





      Mechanical Ventilator  


 


11/25/20 12:00  53      


 


11/25/20 12:00 98.3 54 13 138/67 (90) 98   


 


11/25/20 11:46        30


 


11/25/20 11:24  53 15     30


 


11/25/20 11:00  55 10 132/68 (89) 100   

















Intake and Output  


 


 11/25/20 11/26/20





 19:00 07:00


 


Intake Total 760 ml 1120 ml


 


Output Total 1010 ml 1700 ml


 


Balance -250 ml -580 ml


 


  


 


IV Total 460 ml 580 ml


 


Tube Feeding 300 ml 540 ml


 


Output Urine Total 1010 ml 1700 ml


 


# Bowel Movements 4 3











Current Medications








 Medications


  (Trade)  Dose


 Ordered  Sig/Miky


 Route


 PRN Reason  Start Time


 Stop Time Status Last Admin


Dose Admin


 


 Acetaminophen


  (Tylenol)  650 mg  Q4H  PRN


 GT


 Temp >100.5  11/21/20 12:30


 12/21/20 12:29  11/23/20 10:14





 


 Albuterol/


 Ipratropium


  (Albuterol/


 Ipratropium)  3 ml  Q4HRT  PRN


 HHN


 sob  11/22/20 10:45


 11/27/20 10:44   





 


 Chlorhexidine


 Gluconate


  (Jess-Hex 2%)  1 applic  DAILY@2000


 TOPIC


   11/6/20 20:00


 2/4/21 19:59  11/25/20 22:05





 


 Dexamethasone


  (Decadron)  6 mg  Q24H


 ORAL


   11/22/20 18:00


 12/1/20 18:01  11/25/20 18:17





 


 Dextrose


  (Dextrose 50%)  50 ml  Q30M  PRN


 IV


 Hypoglycemia  10/23/20 22:45


 1/21/21 22:44   





 


 Heparin Sodium


  (Porcine)


  (Heparin 5000


 units/ml)  5,000 units  EVERY 12  HOURS


 SUBQ


   10/23/20 21:00


 12/7/20 20:59  11/26/20 09:55





 


 Insulin Aspart


  (NovoLOG)    EVERY 6  HOURS


 SUBQ


   11/2/20 06:00


 1/22/21 06:29  11/25/20 23:40





 


 Meropenem 1 gm/


 Sodium Chloride  55 ml @ 


 110 mls/hr  Q8H


 IVPB


   11/21/20 10:00


 11/30/20 09:59  11/26/20 03:09





 


 Nitroglycerin


  (Ntg)  0.4 mg  Q5M  PRN


 SL


 Prn Chest Pain  11/21/20 10:15


 12/21/20 10:09   





 


 Ondansetron HCl


  (Zofran)  4 mg  Q6H  PRN


 IVP


 Nausea & Vomiting  11/21/20 14:30


 12/21/20 14:29   





 


 Pantoprazole


  (Protonix)  40 mg  DAILY


 IV


   11/7/20 09:00


 12/7/20 08:59  11/26/20 09:54





 


 Polyethylene


 Glycol


  (Miralax)  17 gm  DAILYPRN  PRN


 GT


 Constipation  11/21/20 10:15


 12/21/20 10:14   





 


 Promethazine HCl/


 Codeine


  (Phenergan with


 Codeine)  5 ml  Q4H  PRN


 GT


 For Cough  11/21/20 10:14


 12/21/20 10:13  11/25/20 22:05





 


 Sodium Chloride  1,000 ml @ 


 50 mls/hr  Q20H


 IV


   11/6/20 16:00


 12/6/20 15:59  11/24/20 20:37








Laboratory Tests


11/25/20 11:42: POC Whole Blood Glucose 125H


11/26/20 05:20: 


Sodium Level 141, Potassium Level 4.1, Chloride Level 108H, Carbon Dioxide Level

26, Anion Gap 7, Blood Urea Nitrogen 27H, Creatinine 0.9, Estimat Glomerular 

Filtration Rate > 60, Glucose Level 137H, Calcium Level 7.9L, Phosphorus Level 

3.0, Magnesium Level 2.4, Total Bilirubin 0.2, Aspartate Amino Transf (AST/SGOT)

23, Alanine Aminotransferase (ALT/SGPT) 32, Alkaline Phosphatase 182H, Total 

Protein 6.4, Albumin 1.7L, Globulin 4.7, Albumin/Globulin Ratio 0.4L


Height (Feet):  5


Height (Inches):  4.00


Weight (Pounds):  130


General Appearance:  no apparent distress


EENT:  other - Intubated on ventilator


Cardiovascular:  bradycardia


Respiratory/Chest:  decreased breath sounds


Abdomen:  distended











Seven Tobar MD            Nov 26, 2020 10:16

## 2020-11-26 NOTE — NUR
NURSE NOTES:

Sleeping. Respirations even and unlabored. In no apparent distress. Opens eyes 
spontaneously. Restraints in place. remain A-fib on the cardiac monitor. Pt NPO. Cummings 
draining well of rosa maria urine noted. Rt femoral TLC intact and patent Heparin drip infusing 
at 19.05 ml/hr dosing at 15 units/kg/hr. PTT scheduled for 0800. No signs of bleeding noted. 
HOB at 30 degrees. Bed alarm engaged. Bed locked and in lowest position. Safety precautions 
in place. Will continue POC

-------------------------------------------------------------------------------

Addendum: 11/26/20 at 0705 by Luciano Pineda RN

-------------------------------------------------------------------------------

Charting at 0600 above is the wrong patient

## 2020-11-26 NOTE — NUR
NURSE NOTES:

Bedside assessment performed, assessed pt for pain with a FLACC score of 0 noted. VS 
obtained and remain stable at this time. Respiratory status remains stable. Diversional 
activities noted to be successful at this time. Pt calm and cooperative and not pulling at 
medical devices currently. Bilateral soft wrist restraints d/c per order. Will continue to 
monitor patient.  Fall, Aspiration and Skin precautions observed. Pt remains resting in bed; 
Bed remains in the lowest position with the safety wheels engaged, call light within reach, 
side rails up x3 and bed alarm activated. Will continue plan of care. Will continue to 
monitor.

## 2020-11-26 NOTE — NUR
NURSE NOTES:

Received patient and report from LITA Billingsley. Patient is observed resting in bed and remains 
obtunded. No pain noted upon assessment. Pt is currently orally intubated ett size 7.5 noted 
to be 25 @ lip line. Pt appears to be tolerating current vent settings well. Vent settings 
as follows: AC 12  FiO2 30% PEEP 0. Bilateral lower lobe breath sounds noted to be 
mildly diminished upon auscultation. Pt noted to be SB on tele monitor with a HR of 56. R 
upper arm PICC line noted which remains asymptomatic, intact and patent. Central line 
dressing changed per protocol. Central line dressing remains clean, dry and intact. 1/2NS 
currently infusing at 50 mL/hr as ordered. VS remain stable at this time. Active bowel 
sounds noted in all four quadrants; abdomen remains round and soft. L nare NG Tube noted 
which remains intact and secured. Glucerna 1.2 continues to infuse at 60mL/hr with no 
residual noted. Cummings catheter noted draining yellow urine to gravity. Diagnostics reviewed 
at bedside. Skin alterations noted. Fall, Aspiration and Skin precautions observed. Pt 
remains resting in bed; Bed remains in the lowest position with the safety wheels engaged, 
call light within reach, side rails up x3 and bed alarm activated. Will continue plan of 
care. Will continue to monitor.

## 2020-11-26 NOTE — NUR
NURSE NOTES:RECEIVED REPORT FROM HUGO RN STAFF OF NIGHT SHIFT. RECEIVED PT WITH HOB 
ELEVATED 45 DEGREE OBTUNDED ORALLY INTUBATED ,ET SIZE 7.5 MID KINGS 23 CM.ET TUBE WELL SECURED 
AND CONNECTED TO A VENTILATOR.PT TOLERATING WELL CURRENTS VENT SETTINGS ,AC 12, FIO2 
30%, O2 %. PICC-LINE ON RT UA PATENT AND INTACT RECEIVING 1/2 NS 2 50cc/hrs infussing 
well. pt on bilat soft wrist restraints ,REMOVED AND PROVIDE PASSIVE ROM TO ALL EXTREMITIES. 
PT REPOSITIONED IN BED AND MADE COMFORTABLE AS POSSIBLE. RE-APPLIED BILAT SOFT WRIST 
RESTRAINTS TO PREVENT HIM FROM PULLING MEDICAL DEVICES.PT WITH NGT RECEIVING GLUCERNA 1.2 @ 
60CC/HRS ,NO RESIDUAL NOTED AT THIS TIME.NO ACUTE DISTRESS NOTED AT THIS TIME. WILL CONT TO 
MONITOR.

## 2020-11-26 NOTE — NUR
NURSE NOTES:

Sleeping. Respirations even and unlabored. In no apparent distress. Opens eyes 
spontaneously. Restraints in place. remain SR on the cardiac monitor. Cummings draining well of 
large amount of clear yellow urine. Tolerating GT feeding well. HOB at 40 degrees. Bed alarm 
engaged. Bed locked and in lowest position. Safety precautions in place. Will continue POC

## 2020-11-27 VITALS — DIASTOLIC BLOOD PRESSURE: 67 MMHG | SYSTOLIC BLOOD PRESSURE: 124 MMHG

## 2020-11-27 VITALS — SYSTOLIC BLOOD PRESSURE: 128 MMHG | DIASTOLIC BLOOD PRESSURE: 70 MMHG

## 2020-11-27 VITALS — DIASTOLIC BLOOD PRESSURE: 86 MMHG | SYSTOLIC BLOOD PRESSURE: 138 MMHG

## 2020-11-27 VITALS — DIASTOLIC BLOOD PRESSURE: 81 MMHG | SYSTOLIC BLOOD PRESSURE: 141 MMHG

## 2020-11-27 VITALS — DIASTOLIC BLOOD PRESSURE: 69 MMHG | SYSTOLIC BLOOD PRESSURE: 136 MMHG

## 2020-11-27 VITALS — SYSTOLIC BLOOD PRESSURE: 124 MMHG | DIASTOLIC BLOOD PRESSURE: 70 MMHG

## 2020-11-27 VITALS — DIASTOLIC BLOOD PRESSURE: 65 MMHG | SYSTOLIC BLOOD PRESSURE: 134 MMHG

## 2020-11-27 VITALS — DIASTOLIC BLOOD PRESSURE: 66 MMHG | SYSTOLIC BLOOD PRESSURE: 123 MMHG

## 2020-11-27 VITALS — DIASTOLIC BLOOD PRESSURE: 62 MMHG | SYSTOLIC BLOOD PRESSURE: 120 MMHG

## 2020-11-27 VITALS — DIASTOLIC BLOOD PRESSURE: 63 MMHG | SYSTOLIC BLOOD PRESSURE: 124 MMHG

## 2020-11-27 VITALS — SYSTOLIC BLOOD PRESSURE: 142 MMHG | DIASTOLIC BLOOD PRESSURE: 71 MMHG

## 2020-11-27 VITALS — SYSTOLIC BLOOD PRESSURE: 146 MMHG | DIASTOLIC BLOOD PRESSURE: 85 MMHG

## 2020-11-27 VITALS — SYSTOLIC BLOOD PRESSURE: 140 MMHG | DIASTOLIC BLOOD PRESSURE: 77 MMHG

## 2020-11-27 VITALS — DIASTOLIC BLOOD PRESSURE: 61 MMHG | SYSTOLIC BLOOD PRESSURE: 128 MMHG

## 2020-11-27 VITALS — SYSTOLIC BLOOD PRESSURE: 129 MMHG | DIASTOLIC BLOOD PRESSURE: 66 MMHG

## 2020-11-27 VITALS — SYSTOLIC BLOOD PRESSURE: 136 MMHG | DIASTOLIC BLOOD PRESSURE: 71 MMHG

## 2020-11-27 VITALS — SYSTOLIC BLOOD PRESSURE: 116 MMHG | DIASTOLIC BLOOD PRESSURE: 64 MMHG

## 2020-11-27 VITALS — SYSTOLIC BLOOD PRESSURE: 114 MMHG | DIASTOLIC BLOOD PRESSURE: 66 MMHG

## 2020-11-27 VITALS — DIASTOLIC BLOOD PRESSURE: 68 MMHG | SYSTOLIC BLOOD PRESSURE: 124 MMHG

## 2020-11-27 VITALS — DIASTOLIC BLOOD PRESSURE: 73 MMHG | SYSTOLIC BLOOD PRESSURE: 141 MMHG

## 2020-11-27 VITALS — SYSTOLIC BLOOD PRESSURE: 103 MMHG | DIASTOLIC BLOOD PRESSURE: 59 MMHG

## 2020-11-27 VITALS — DIASTOLIC BLOOD PRESSURE: 76 MMHG | SYSTOLIC BLOOD PRESSURE: 130 MMHG

## 2020-11-27 VITALS — DIASTOLIC BLOOD PRESSURE: 66 MMHG | SYSTOLIC BLOOD PRESSURE: 121 MMHG

## 2020-11-27 LAB
ADD MANUAL DIFF: NO
BASOPHILS NFR BLD AUTO: 0.2 % (ref 0–2)
BUN SERPL-MCNC: 28 MG/DL (ref 7–18)
CALCIUM SERPL-MCNC: 8.2 MG/DL (ref 8.5–10.1)
CHLORIDE SERPL-SCNC: 104 MMOL/L (ref 98–107)
CO2 SERPL-SCNC: 21 MMOL/L (ref 21–32)
CREAT SERPL-MCNC: 0.9 MG/DL (ref 0.55–1.3)
EOSINOPHIL NFR BLD AUTO: 0 % (ref 0–3)
ERYTHROCYTE [DISTWIDTH] IN BLOOD BY AUTOMATED COUNT: 18.6 % (ref 11.6–14.8)
HCT VFR BLD CALC: 31.9 % (ref 42–52)
HGB BLD-MCNC: 10.5 G/DL (ref 14.2–18)
LYMPHOCYTES NFR BLD AUTO: 15.1 % (ref 20–45)
MCV RBC AUTO: 95 FL (ref 80–99)
MONOCYTES NFR BLD AUTO: 2.6 % (ref 1–10)
NEUTROPHILS NFR BLD AUTO: 82.1 % (ref 45–75)
PLATELET # BLD: 318 K/UL (ref 150–450)
POTASSIUM SERPL-SCNC: 4.6 MMOL/L (ref 3.5–5.1)
RBC # BLD AUTO: 3.37 M/UL (ref 4.7–6.1)
SODIUM SERPL-SCNC: 136 MMOL/L (ref 136–145)
WBC # BLD AUTO: 6.7 K/UL (ref 4.8–10.8)

## 2020-11-27 RX ADMIN — CHLORHEXIDINE GLUCONATE SCH APPLIC: 213 SOLUTION TOPICAL at 20:25

## 2020-11-27 RX ADMIN — INSULIN ASPART SCH UNITS: 100 INJECTION, SOLUTION INTRAVENOUS; SUBCUTANEOUS at 05:09

## 2020-11-27 RX ADMIN — PANTOPRAZOLE SODIUM SCH MG: 40 INJECTION, POWDER, FOR SOLUTION INTRAVENOUS at 09:00

## 2020-11-27 RX ADMIN — INSULIN ASPART SCH UNITS: 100 INJECTION, SOLUTION INTRAVENOUS; SUBCUTANEOUS at 12:00

## 2020-11-27 RX ADMIN — MEROPENEM SCH MLS/HR: 1 INJECTION INTRAVENOUS at 17:59

## 2020-11-27 RX ADMIN — INSULIN ASPART SCH UNITS: 100 INJECTION, SOLUTION INTRAVENOUS; SUBCUTANEOUS at 00:09

## 2020-11-27 RX ADMIN — INSULIN ASPART SCH UNITS: 100 INJECTION, SOLUTION INTRAVENOUS; SUBCUTANEOUS at 18:00

## 2020-11-27 RX ADMIN — MEROPENEM SCH MLS/HR: 1 INJECTION INTRAVENOUS at 10:25

## 2020-11-27 RX ADMIN — HEPARIN SODIUM SCH UNITS: 5000 INJECTION INTRAVENOUS; SUBCUTANEOUS at 20:27

## 2020-11-27 RX ADMIN — INSULIN ASPART SCH UNITS: 100 INJECTION, SOLUTION INTRAVENOUS; SUBCUTANEOUS at 23:17

## 2020-11-27 RX ADMIN — HEPARIN SODIUM SCH UNITS: 5000 INJECTION INTRAVENOUS; SUBCUTANEOUS at 09:00

## 2020-11-27 RX ADMIN — MEROPENEM SCH MLS/HR: 1 INJECTION INTRAVENOUS at 02:17

## 2020-11-27 NOTE — NUR
NURSE NOTES:

Bedside assessment performed, assessed pt for pain with a FLACC score of 0 noted. VS 
obtained and remain stable at this time. Respiratory status remains stable. Bilateral soft 
wrist restraints remain in place for pt safety. Pt noted to attempt to pull medical devices 
and remains confused and unable to cooperate with plan of care. Diversional activities not 
noted to be successful at this time, will continue to attempt. ROM exercises performed per 
pt safety, CMS remains intact.  Fall, Aspiration and Skin precautions observed. Pt remains 
resting in bed; Bed remains in the lowest position with the safety wheels engaged, call 
light within reach, side rails up x3 and bed alarm activated. Will continue plan of care. 
Will continue to monitor.

## 2020-11-27 NOTE — NUR
NURSE NOTES:

Pt provided with a CHG bed bath, oral care and linen change. Bedside assessment performed, 
assessed pt for pain with a FLACC score of 0 noted. Respiratory status remains stable. Pt 
repositioned for comfort and safety. ROM exercises performed per pt tolerance. RT present at 
bedside performing patient care. Fall, Aspiration and Skin precautions observed. Pt remains 
resting in bed; Bed remains in the lowest position with the safety wheels engaged, call 
light within reach, side rails up x3 and bed alarm activated. Will continue plan of care. 
Will continue to monitor.

## 2020-11-27 NOTE — NUR
NURSE HAND-OFF REPORT: 



Latest Vital Signs: Temperature 98.4 , Pulse 55 , B/P 121 /66 , Respiratory Rate 14 , O2 SAT 
100 , Mechanical Ventilator, O2 Flow Rate    

Vital Sign Comment: VS remained stable for duration of shift 



EKG Rhythm: Sinus Rhythm

Rhythm change?: N 

MD Notified?: N

MD Response:N/A 



Latest Mejia Fall Score: 50  

Fall Risk: High Risk 

Safety Measures: Call light Within Reach, Bed Alarm Zone 2, Side Rails Side Rails x2, Bed 
position Low and Locked.

Fall Precautions: 

Door Sign



Report given to LITA Shaffer. Endorsed plan of care.

## 2020-11-27 NOTE — NUR
CASE MANAGEMENT:REVIEW



SI;RESPIRATORY FAILURE. SEPSIS. PNA. 

98.4   55   12   141/81   99% ETT/MECH VENT

AC 12      PEEP 0   FIO2 30%

BUN 28   CA 8.2



IS;DECADRON GT Q24

MEROPENEM IV Q8

PROTONIX IV QD

IVF NS @ 50  ML/HR

HEPARIN SUBQ Q12



*****ICU STATUS*****

DCP;FROM [email or web address]

## 2020-11-27 NOTE — NUR
NURSE NOTES:

Morning lab samples drawn per order without incident. Samples sent to laboratory for 
analysis, will await results. Bedside assessment performed, assessed pt for pain with a 
FLACC score of 0 noted. Pt remains clean and dry. VS obtained and remain stable at this 
time. No s/sx of cardiopulmonary distress noted at this time. Fall, Aspiration and Skin 
precautions observed. Pt remains resting in bed; Bed remains in the lowest position with the 
safety wheels engaged, call light within reach, side rails up x3 and bed alarm activated. 
Will continue plan of care. Will continue to monitor.

## 2020-11-27 NOTE — PULMONOLGY CRITICAL CARE NOTE
Critical Care - Asmt/Plan


Problems:  


(1) Acute respiratory failure


(2) Multifocal pneumonia


(3) 2019 novel coronavirus disease (COVID-19)


(4) Acute encephalopathy


(5) Severe sepsis


(6) Alzheimer's dementia


(7) HTN (hypertension)


(8) History of CVA (cerebrovascular accident)


(9) BPH (benign prostatic hyperplasia)


Respiratory:  monitor respiratory rate, adjust FIO2, CXR


Cardiac:  continue pressors, continue to monitor HR/BP


Renal:  F/U I&O, keep IV fluid


Infectious Disease:  check cultures


Gastrointestinal:  continue feedings/current rate


Endocrine:  continue sliding scale insulin


Hematologic:  transfuse if hgb<8.5


Neurologic:  PRN Ativan, PRN Morphine, keep patient comfortable


Affect:  PRN ativan


Prophylaxis:  Heparin


Time Spent (Minutes):  40


Notes Reviewed:  internist, cardio, renal


Discussed with:  nurses, consultants, 





Critical Care - Objective





Last 24 Hour Vital Signs








  Date Time  Temp Pulse Resp B/P (MAP) Pulse Ox O2 Delivery O2 Flow Rate FiO2


 


11/27/20 10:00  59 20 136/69 (91) 99   


 


11/27/20 09:30        30


 


11/27/20 09:00  55 14 140/77 (98) 100   


 


11/27/20 08:00        30


 


11/27/20 08:00  58      


 


11/27/20 08:00 97.8 61 13 142/71 (94) 100   


 


11/27/20 08:00      Mechanical Ventilator  





      Mechanical Ventilator  





      Mechanical Ventilator  


 


11/27/20 07:00  59 15     30


 


11/27/20 07:00  55 14 121/66 (84) 100   


 


11/27/20 06:30  56 12     


 


11/27/20 06:00  58 12 129/66 (87) 99   


 


11/27/20 05:00  59 14 134/65 (88) 99   


 


11/27/20 04:00        30


 


11/27/20 04:00      Mechanical Ventilator  





      Mechanical Ventilator  





      Mechanical Ventilator  


 


11/27/20 04:00 98.4 59 11 124/70 (88) 100   


 


11/27/20 03:50  60      


 


11/27/20 03:00  62 15 136/71 (92) 100   


 


11/27/20 03:00  73 15     30


 


11/27/20 02:00  61 12 116/64 (81) 100   


 


11/27/20 01:00  62 15 124/67 (86) 98   


 


11/27/20 00:00 98.3 62 13 128/61 (83) 100   


 


11/27/20 00:00        30


 


11/27/20 00:00      Mechanical Ventilator  





      Mechanical Ventilator  





      Mechanical Ventilator  


 


11/26/20 23:00  62 13 112/64 (80) 100   


 


11/26/20 22:48  61 12     30


 


11/26/20 22:00  62 13 121/66 (84) 100   


 


11/26/20 21:00  56 12 131/73 (92) 100   


 


11/26/20 20:21  69      


 


11/26/20 20:00        30


 


11/26/20 20:00 98.4 57 13 116/71 (86) 100   


 


11/26/20 20:00      Mechanical Ventilator  





      Mechanical Ventilator  





      Mechanical Ventilator  


 


11/26/20 19:15  56 14     30


 


11/26/20 19:00  55 14 141/76 (97) 100   


 


11/26/20 18:00  57 12 133/68 (89) 100   


 


11/26/20 17:00  68 12 120/57 (78) 100   


 


11/26/20 16:35  57 15  100 Mechanical Ventilator  30


 


11/26/20 16:00        30


 


11/26/20 16:00      Mechanical Ventilator  





      Mechanical Ventilator  





      Mechanical Ventilator  


 


11/26/20 16:00 98.4 57 12 105/60 (75) 100   


 


11/26/20 16:00  57      


 


11/26/20 15:12  55 14     30


 


11/26/20 15:00  55 11 123/60 (81) 100   


 


11/26/20 14:00  60 11 128/56 (80) 100   


 


11/26/20 13:00  56 12 123/62 (82) 100   


 


11/26/20 12:00        30


 


11/26/20 12:00  54      


 


11/26/20 12:00      Mechanical Ventilator  





      Mechanical Ventilator  





      Mechanical Ventilator  


 


11/26/20 12:00 98.3 54 13 150/81 (104) 100   








Status:  sedated


Condition:  critical


HEENT:  atraumatic


Neck:  full ROM


Lungs:  clear


Heart:  HR/BP stable


Abdomen:  soft, active bowel sounds


Extremities:  no C/C/E


Decubiti:  location


Objective:


still large secretions, failed weaning


Accucheck:  148





Critical Care - Subjective


ROS Limited/Unobtainable:  Yes


Condition:  critical


EKG Rhythm:  Sinus Rhythm


FI02:  30


Vent Support Breath Rate:  12


Vent Support Mode:  AC


Vent Tidal Volume:  600


Sputum Amount:  Moderate


PEEP:  0.0


PIP:  20


Tube Feeding Amount:  60


I&O:











Intake and Output  


 


 11/26/20 11/27/20





 19:00 07:00


 


Intake Total 1062.5 ml 1520 ml


 


Output Total 1270 ml 1650 ml


 


Balance -207.5 ml -130 ml


 


  


 


Free Water 60 ml 90 ml


 


IV Total 282.5 ml 710 ml


 


Tube Feeding 720 ml 720 ml


 


Output Urine Total 1270 ml 1650 ml


 


# Bowel Movements 3 








ET-Tube:  7.5


ET Position:  25


Labs:





Laboratory Tests








Test


 11/26/20


12:07 11/27/20


00:06 11/27/20


03:00 11/27/20


04:59


 


POC Whole Blood Glucose


 104 MG/DL


() 146 MG/DL


()  H 


 Pending  





 


White Blood Count


 


 


 6.7 K/UL


(4.8-10.8) 





 


Red Blood Count


 


 


 3.37 M/UL


(4.70-6.10)  L 





 


Hemoglobin


 


 


 10.5 G/DL


(14.2-18.0)  L 





 


Hematocrit


 


 


 31.9 %


(42.0-52.0)  L 





 


Mean Corpuscular Volume   95 FL (80-99)   


 


Mean Corpuscular Hemoglobin


 


 


 31.2 PG


(27.0-31.0)  H 





 


Mean Corpuscular Hemoglobin


Concent 


 


 32.9 G/DL


(32.0-36.0) 





 


Red Cell Distribution Width


 


 


 18.6 %


(11.6-14.8)  H 





 


Platelet Count


 


 


 318 K/UL


(150-450) 





 


Mean Platelet Volume


 


 


 7.0 FL


(6.5-10.1) 





 


Neutrophils (%) (Auto)


 


 


 82.1 %


(45.0-75.0)  H 





 


Lymphocytes (%) (Auto)


 


 


 15.1 %


(20.0-45.0)  L 





 


Monocytes (%) (Auto)


 


 


 2.6 %


(1.0-10.0) 





 


Eosinophils (%) (Auto)


 


 


 0.0 %


(0.0-3.0) 





 


Basophils (%) (Auto)


 


 


 0.2 %


(0.0-2.0) 





 


Sodium Level


 


 


 136 MMOL/L


(136-145) 





 


Potassium Level


 


 


 4.6 MMOL/L


(3.5-5.1) 





 


Chloride Level


 


 


 104 MMOL/L


() 





 


Carbon Dioxide Level


 


 


 21 MMOL/L


(21-32) 





 


Blood Urea Nitrogen


 


 


 28 mg/dL


(7-18)  H 





 


Creatinine


 


 


 0.9 MG/DL


(0.55-1.30) 





 


Estimat Glomerular Filtration


Rate 


 


 > 60 mL/min


(>60) 





 


Glucose Level


 


 


 131 MG/DL


()  H 





 


Calcium Level


 


 


 8.2 MG/DL


(8.5-10.1)  L 




















Michael Sandoval MD              Nov 27, 2020 10:59

## 2020-11-27 NOTE — NUR
NURSE NOTES:

Bedside assessment performed, assessed pt for pain with a FLACC score of 0 noted. Pt now 
noted to be restless and uncooperative with care. Pt continues to attempt to pull medical 
devices. Bilateral wrist restraints in place per MD order for pt safety. Diversional 
activities unsuccessful at this time. CMS remains intact and ROM exercises provided. Pt 
remains clean and dry. VS obtained and remain stable at this time. No s/sx of 
cardiopulmonary distress noted at this time. Fall, Aspiration and Skin precautions observed. 
Pt remains resting in bed; Bed remains in the lowest position with the safety wheels 
engaged, call light within reach, side rails up x3 and bed alarm activated. Will continue 
plan of care. Will continue to monitor.

## 2020-11-27 NOTE — NUR
PT,SEEN BY CODI JHAVERI ODERS V/S CONDITION STABLE NO ACUTE DISTRESS NOTED HOBUP OPENS 
EYES WITH GOOD DIRECT CONTACT

## 2020-11-27 NOTE — NUR
RESPIRATORY NOTE:



Patient placed on SBT (CPAP 0, PS 8). Patient does not display any signs of respiratory 
distress. LITA devine.

## 2020-11-27 NOTE — INFECTIOUS DISEASES PROG NOTE
Assessment/Plan





74yo M with:





Respiratory code 2ry to mucus plug 11/12


Septic Shock- -recurrent


Fever, recurrent, low grade;SP


Leukocytosis; recurrent; increased


Acute hypoxic resp failure, on NRB mask> 4l NC; back on NRB, desaturation 10/29 

> intubated 11/6


Pneumonia- >HAP


Hx of COVID-19 PNA 9/9/20 11/17 CXR: No significant interval change in the radiographic appearance the 

chest compared to one day prior.


   11/14 Resp cx +MRSA, nl resp hayden


   UCx neg


   BCx NTD


   11/13 COVID PCR neg


         --11/10 CXR: Bilateral infiltrates in a peribronchovascular 

distribution are unchanged. Left pleural effusion is unchanged.


         --11/8 CXR: Persistent bilateral patchy pulmonary opacities, most 

prominent  in the left lower lung.


         --11/7 ucx neg


                  sp cx MRSA (colonizer at this point)


                  Bcx Neg


         --11/2 Bcx Neg


          10/30 Sp cx MRSA (Vancomycin ADRIANA 1), ESBL E.coli


   10/23 BCx NTD


   UA 15-20 WBC, UCx Neg


   10/23 COVID rapid neg; 10/26 rapid COVID PCR + (from prior infection)- not 

new infection


   Flu A/B neg


   CXR: L pna


   Resp cx MRSA


   11/19 Resp cx +ESBL E.coli & PsA


   11/21 BCx NTD


   11/22 CXR: Small bilateral pleural effusions with moderate vascular c

ongestion. Airspace consolidation in the left lower lung field may represent 

atelectasis however, correlate for infiltrate.  This is improving when compared 

to the prior study.


   11/23 BCx NTD


   11/23 CXR: Increasing right basilar opacity, likely infiltrate, may also 

reflect increasing pleural fluid





H/o COVID pna


   Tested positive 9/9/20


   10/23 Rapid Ag neg


   10/26 Rapid Ag positive (from prior disease?)


   11/13 COVID PCR neg





JANES on CKD, improving





[facility] resident ([facility])


Non-verbal


VRE and MRSA colonized








Plan:


Cont meropenem #5 / 10 given high fever, ESBL E.coli and PsA pna





C.dif if ongoing loose stools


F/u repeat BCx 11/23 given ongoing fever (1 from periph, 1 from PICC), currently

NTD





Trend temp curve, WBC





OK to d/c COVID airborne precautions, infection was now >2 mo ago, no longer 

infectious





   -11/20 SP linezolid #7


   -11/16 SP meropenem #14 for ESBL pna 


   -11/10 SP Micafungin #4


   -11/6 SP IV Vancomycin #15


   -11/2 SP Zosyn #4


   -10/26 SP Cefepime #4


   -10/24 SP Flagyl #





Monitor CBC/CMP


Monitor resp status


Monitor temp curve and hemodynamics





D/w RN





Thank you for this consult. [facility] will continue to follow.





Subjective


Allergies:  


Coded Allergies:  


     No Known Allergies (Unverified , 8/20/12)


afebrile 


in ICU





Objective





Last 24 Hour Vital Signs








  Date Time  Temp Pulse Resp B/P (MAP) Pulse Ox O2 Delivery O2 Flow Rate FiO2


 


11/27/20 12:00      Mechanical Ventilator  





      Mechanical Ventilator  





      Mechanical Ventilator  


 


11/27/20 12:00  57      


 


11/27/20 12:00 98.2 55 16 103/59 (74) 100   


 


11/27/20 11:33        30


 


11/27/20 11:00  57 16 124/63 (83) 99   


 


11/27/20 10:00  59 20 136/69 (91) 99   


 


11/27/20 09:30        30


 


11/27/20 09:00  55 14 140/77 (98) 100   


 


11/27/20 08:00        30


 


11/27/20 08:00  58      


 


11/27/20 08:00 97.8 61 13 142/71 (94) 100   


 


11/27/20 08:00      Mechanical Ventilator  





      Mechanical Ventilator  





      Mechanical Ventilator  


 


11/27/20 07:00  59 15     30


 


11/27/20 07:00  55 14 121/66 (84) 100   


 


11/27/20 06:30  56 12     


 


11/27/20 06:00  58 12 129/66 (87) 99   


 


11/27/20 05:00  59 14 134/65 (88) 99   


 


11/27/20 04:00        30


 


11/27/20 04:00      Mechanical Ventilator  





      Mechanical Ventilator  





      Mechanical Ventilator  


 


11/27/20 04:00 98.4 59 11 124/70 (88) 100   


 


11/27/20 03:50  60      


 


11/27/20 03:00  62 15 136/71 (92) 100   


 


11/27/20 03:00  73 15     30


 


11/27/20 02:00  61 12 116/64 (81) 100   


 


11/27/20 01:00  62 15 124/67 (86) 98   


 


11/27/20 00:00 98.3 62 13 128/61 (83) 100   


 


11/27/20 00:00        30


 


11/27/20 00:00      Mechanical Ventilator  





      Mechanical Ventilator  





      Mechanical Ventilator  


 


11/26/20 23:00  62 13 112/64 (80) 100   


 


11/26/20 22:48  61 12     30


 


11/26/20 22:00  62 13 121/66 (84) 100   


 


11/26/20 21:00  56 12 131/73 (92) 100   


 


11/26/20 20:21  69      


 


11/26/20 20:00        30


 


11/26/20 20:00 98.4 57 13 116/71 (86) 100   


 


11/26/20 20:00      Mechanical Ventilator  





      Mechanical Ventilator  





      Mechanical Ventilator  


 


11/26/20 19:15  56 14     30


 


11/26/20 19:00  55 14 141/76 (97) 100   


 


11/26/20 18:00  57 12 133/68 (89) 100   


 


11/26/20 17:00  68 12 120/57 (78) 100   


 


11/26/20 16:35  57 15  100 Mechanical Ventilator  30


 


11/26/20 16:00        30


 


11/26/20 16:00      Mechanical Ventilator  





      Mechanical Ventilator  





      Mechanical Ventilator  


 


11/26/20 16:00 98.4 57 12 105/60 (75) 100   


 


11/26/20 16:00  57      


 


11/26/20 15:12  55 14     30


 


11/26/20 15:00  55 11 123/60 (81) 100   


 


11/26/20 14:00  60 11 128/56 (80) 100   


 


11/26/20 13:00  56 12 123/62 (82) 100   








Height (Feet):  5


Height (Inches):  4.00


Weight (Pounds):  130





Laboratory Tests








Test


 11/27/20


00:06 11/27/20


03:00 11/27/20


04:59


 


POC Whole Blood Glucose


 146 MG/DL


()  H 


 Pending  





 


White Blood Count


 


 6.7 K/UL


(4.8-10.8) 





 


Red Blood Count


 


 3.37 M/UL


(4.70-6.10)  L 





 


Hemoglobin


 


 10.5 G/DL


(14.2-18.0)  L 





 


Hematocrit


 


 31.9 %


(42.0-52.0)  L 





 


Mean Corpuscular Volume  95 FL (80-99)   


 


Mean Corpuscular Hemoglobin


 


 31.2 PG


(27.0-31.0)  H 





 


Mean Corpuscular Hemoglobin


Concent 


 32.9 G/DL


(32.0-36.0) 





 


Red Cell Distribution Width


 


 18.6 %


(11.6-14.8)  H 





 


Platelet Count


 


 318 K/UL


(150-450) 





 


Mean Platelet Volume


 


 7.0 FL


(6.5-10.1) 





 


Neutrophils (%) (Auto)


 


 82.1 %


(45.0-75.0)  H 





 


Lymphocytes (%) (Auto)


 


 15.1 %


(20.0-45.0)  L 





 


Monocytes (%) (Auto)


 


 2.6 %


(1.0-10.0) 





 


Eosinophils (%) (Auto)


 


 0.0 %


(0.0-3.0) 





 


Basophils (%) (Auto)


 


 0.2 %


(0.0-2.0) 





 


Sodium Level


 


 136 MMOL/L


(136-145) 





 


Potassium Level


 


 4.6 MMOL/L


(3.5-5.1) 





 


Chloride Level


 


 104 MMOL/L


() 





 


Carbon Dioxide Level


 


 21 MMOL/L


(21-32) 





 


Blood Urea Nitrogen


 


 28 mg/dL


(7-18)  H 





 


Creatinine


 


 0.9 MG/DL


(0.55-1.30) 





 


Estimat Glomerular Filtration


Rate 


 > 60 mL/min


(>60) 





 


Glucose Level


 


 131 MG/DL


()  H 





 


Calcium Level


 


 8.2 MG/DL


(8.5-10.1)  L 














Current Medications








 Medications


  (Trade)  Dose


 Ordered  Sig/Miky


 Route


 PRN Reason  Start Time


 Stop Time Status Last Admin


Dose Admin


 


 Acetaminophen


  (Tylenol)  650 mg  Q4H  PRN


 GT


 Temp >100.5  11/21/20 12:30


 12/21/20 12:29  11/23/20 10:14





 


 Chlorhexidine


 Gluconate


  (Jess-Hex 2%)  1 applic  DAILY@2000


 TOPIC


   11/6/20 20:00


 2/4/21 19:59  11/26/20 20:08





 


 Dexamethasone


  (Decadron)  6 mg  Q24H


 ORAL


   11/22/20 18:00


 12/1/20 18:01  11/26/20 19:26





 


 Dextrose


  (Dextrose 50%)  50 ml  Q30M  PRN


 IV


 Hypoglycemia  10/23/20 22:45


 1/21/21 22:44   





 


 Heparin Sodium


  (Porcine)


  (Heparin 5000


 units/ml)  5,000 units  EVERY 12  HOURS


 SUBQ


   10/23/20 21:00


 12/7/20 20:59  11/27/20 09:00





 


 Insulin Aspart


  (NovoLOG)    EVERY 6  HOURS


 SUBQ


   11/2/20 06:00


 1/22/21 06:29  11/27/20 05:09





 


 Meropenem 1 gm/


 Sodium Chloride  55 ml @ 


 110 mls/hr  Q8H


 IVPB


   11/21/20 10:00


 11/30/20 09:59  11/27/20 10:25





 


 Nitroglycerin


  (Ntg)  0.4 mg  Q5M  PRN


 SL


 Prn Chest Pain  11/21/20 10:15


 12/21/20 10:09   





 


 Ondansetron HCl


  (Zofran)  4 mg  Q6H  PRN


 IVP


 Nausea & Vomiting  11/21/20 14:30


 12/21/20 14:29   





 


 Pantoprazole


  (Protonix)  40 mg  DAILY


 IV


   11/7/20 09:00


 12/7/20 08:59  11/27/20 09:00





 


 Polyethylene


 Glycol


  (Miralax)  17 gm  DAILYPRN  PRN


 GT


 Constipation  11/21/20 10:15


 12/21/20 10:14   





 


 Promethazine HCl/


 Codeine


  (Phenergan with


 Codeine)  5 ml  Q4H  PRN


 GT


 For Cough  11/21/20 10:14


 12/21/20 10:13  11/25/20 22:05





 


 Sodium Chloride  1,000 ml @ 


 50 mls/hr  Q20H


 IV


   11/6/20 16:00


 12/6/20 15:59  11/27/20 12:11




















Tanner Lorenzana MD               Nov 27, 2020 12:42

## 2020-11-27 NOTE — NUR
0915 OPENS EYES GOOD DIRECTCONTACT DEOSE NOT FOLLOWS COMANDS V/S CONDITION STABLE  ON 
WEANING PROTOCOL WITH HOB UP

## 2020-11-27 NOTE — NUR
RESPIRATORY NOTES

Patient tidal volumes began to drop to 100mL, patient placed back on previous vent settings, 
patient now receiving adequate tidal volumes. LITA Dominguez aware, will continue to monitor.

## 2020-11-27 NOTE — NEPHROLOGY PROGRESS NOTE
Assessment/Plan


Problem List:  


(1) Dehydration


(2) JANES (acute kidney injury)


(3) Renal failure (ARF), acute on chronic


(4) Hypoxia


(5) Acute encephalopathy


(6) Electrolyte imbalance


Assessment





75-year-old male is admitted with acute hypoxic respiratory failure most likely 

secondary to pneumonia and sepsis, UTI.


Acute on chronic renal failure


Dehydration


Electrolyte imbalances, hypernatremia


Hypoalbuminemia


Diabetes type 2


History of congestive heart failure


Hypertension


Hyperlipemia


Alzheimer's


Previous COVID-19 infection in September 2020


Plan


November 27: Remains in ICU.  Remains intubated.  Remains full code.  Continues 

to be on weaning trials.  Renal parameters are stable.


November 26: Patient remains in ICU.  Full code.  Intubated.  Fails weaning.  

Labs reviewed.  Stable from renal standpoint of view.  Discussed with RN.


November 25: Labs reviewed.  Discussed with RN.  Abnormal electrolyte addressed.

 Remains intubated on ventilator.  Continue per consultants.


November 24: Labs reviewed.  Discussed with RN.  Stable from renal standpoint of

view.  Main issue is weaning from ventilator that keeps failing.  Continue per 

consultants.


November 23: Labs reviewed.  Discussed with RN.  Stable from renal standpoint of

view.  Continue per consultants.


November 22: Patient seen and discussed with RN.  Labs reviewed.  Remains 

intubated.  Trial of weaning this being attempted.  Continue per consultants.


November 21: Remains intubated.  Labs reviewed.  Renal parameters stable.  

Continue per consultants.


November 20: Remains on mechanical ventilation.  Labs reviewed.  Abnormal 

electrolytes addressed.  Stable from renal standpoint of view.


November 19: Remains intubated.  Remains full code.  Labs reviewed.  Low 

phosphorus replaced.  Continue to monitor renal parameters.  Continue per 

consultants.


November 18: Failed weaning.  Remains intubated.  Remains full closed.  Labs 

reviewed.  Stable from renal standpoint view.


November 17: Remains in ICU.  Remains full code.  Labs are reviewed.  Phosphorus

supplement given.  Continue per consultants.


November 16: Remains in ICU.  Full code.  Labs reviewed.  Remains intubated.  

Stable renal parameters.


November 15: In ICU.  Full code.  Discussed with RN.  Stable renal parameters.


November 14: Seen in ICU.  Discussed with LITA Cooper.  Flomax discontinued.  

Labs reviewed.  Stable from renal standpoint of view.


November 13: Seen in ICU.  Discussed with LITA Cooper.  Remains on pressor.  

Electrolyte abnormalities noted and addressed.  Continue per consultants.  

Patient remains full code.


November 12: Seen in ICU.  Discussed with LITA Richardson.  Blood pressure remains a 

little requiring pressors.  Labs reviewed.  Stable renal parameters.  Continue 

per consultants.


November 11: Remains intubated.  Full code.  Blood pressure borderline low.  

Will increase midodrine dose.  Discussed with RN.  Continue to monitor renal 

parameters and electrolytes.


November 10: Intubated.  Full code.  Labs reviewed.  Stable from renal 

standpoint of view.  Continue per consultants.


November 9: Remains intubated.  Full code.  Labs reviewed.  Electrolytes within 

normal limit.  Continue per consultants.


November 8: In ICU.  Remains intubated on ventilator.  Remains full code.  Low 

potassium addressed.  Blood pressure fluctuating.  Continue per consultants.


November 7: Patient in ICU.  Intubated on ventilator.  On 6 mics of Levophed.  

Will give 100 cc albumin 25%.  K-Phos IV ordered.  Continue per consultants.  

Continue monitor renal parameters and electrolytes.


November 6: Status unchanged.  Transfer to [facility] for seizure.  Stable from renal 

standpoint to view.  Continue per consultants.


November 5: Status quo.  Labs reviewed.  Renal parameters stable.  Continue per 

consultants.


November 4: On nonrebreather mask.  Labs reviewed.  Renal parameters 

stable.Continue per pulmonologist.  Clinically unchanged.


November 3: On nonrebreather mask.  Inflammatory markers gradually declining.  

Renal parameters stable.  Continue per pulmonary.


November 2: Remains on nonrebreather mask.  Inflammatory markers remain 

elevated.  Renal parameters somewhat stable.  Continue per pulmonary and ID.  

Remains full code.


November 1: Patient on nonrebreather mask.  Labs noted.  Serum creatinine down 

to 1.3.  Continue per consultants.


October 31: Patient on nonrebreather mask.  Transfer to telemetry when seen this

morning.  Labs noted.  Continue per consultants.  Serum creatinine dylan to 1.7. 

Continue to monitor renal parameters.  Continue to monitor vancomycin level


October 30: Patient is not doing well clinically.  Mild respiratory distress.  

ABG noted.  Somewhat hypoxic.  CBC and chemistry panel ordered.  Discussed with 

LITA Garcia.  Will defer to pulmonary management to specialist.  Continue per ID.

 Renal parameters remained stable as of October 29.


October 29: Labs reviewed.  Renal parameters stable.  Continue per consultants.


October 28: Labs reviewed.  Potassium via NG tube ordered.  IV fluids stopped.  

Continue per consultants.


October 27: Labs reviewed.  Low potassium and low phosphorus replaced.  Continue

per consultants.  Remains stable from renal standpoint of view.


October 26: Patient remains n.p.o.  IV fluid down to 50 cc an hour.  Potassium 

supplement intravenously ordered.  Continue per consultants.





Previously:


Patient is n.p.o., will continue on IV fluid of D5W 75 cc an hour


We will monitor electrolytes and renal parameters


Avoid nephrotoxic's


Start p.o. when he clears by speech therapist, meanwhile aspiration precautions


Keep the blood pressure and blood sugar in check


Per orders





Subjective


ROS Limited/Unobtainable:  Yes





Objective


Objective





Last 24 Hour Vital Signs








  Date Time  Temp Pulse Resp B/P (MAP) Pulse Ox O2 Delivery O2 Flow Rate FiO2


 


11/27/20 10:00  59 20 136/69 (91) 99   


 


11/27/20 09:30        30


 


11/27/20 09:00  55 14 140/77 (98) 100   


 


11/27/20 08:00        30


 


11/27/20 08:00  58      


 


11/27/20 08:00 97.8 61 13 142/71 (94) 100   


 


11/27/20 08:00      Mechanical Ventilator  





      Mechanical Ventilator  





      Mechanical Ventilator  


 


11/27/20 07:00  59 15     30


 


11/27/20 07:00  55 14 121/66 (84) 100   


 


11/27/20 06:30  56 12     


 


11/27/20 06:00  58 12 129/66 (87) 99   


 


11/27/20 05:00  59 14 134/65 (88) 99   


 


11/27/20 04:00        30


 


11/27/20 04:00      Mechanical Ventilator  





      Mechanical Ventilator  





      Mechanical Ventilator  


 


11/27/20 04:00 98.4 59 11 124/70 (88) 100   


 


11/27/20 03:50  60      


 


11/27/20 03:00  62 15 136/71 (92) 100   


 


11/27/20 03:00  73 15     30


 


11/27/20 02:00  61 12 116/64 (81) 100   


 


11/27/20 01:00  62 15 124/67 (86) 98   


 


11/27/20 00:00 98.3 62 13 128/61 (83) 100   


 


11/27/20 00:00        30


 


11/27/20 00:00      Mechanical Ventilator  





      Mechanical Ventilator  





      Mechanical Ventilator  


 


11/26/20 23:00  62 13 112/64 (80) 100   


 


11/26/20 22:48  61 12     30


 


11/26/20 22:00  62 13 121/66 (84) 100   


 


11/26/20 21:00  56 12 131/73 (92) 100   


 


11/26/20 20:21  69      


 


11/26/20 20:00        30


 


11/26/20 20:00 98.4 57 13 116/71 (86) 100   


 


11/26/20 20:00      Mechanical Ventilator  





      Mechanical Ventilator  





      Mechanical Ventilator  


 


11/26/20 19:15  56 14     30


 


11/26/20 19:00  55 14 141/76 (97) 100   


 


11/26/20 18:00  57 12 133/68 (89) 100   


 


11/26/20 17:00  68 12 120/57 (78) 100   


 


11/26/20 16:35  57 15  100 Mechanical Ventilator  30


 


11/26/20 16:00        30


 


11/26/20 16:00      Mechanical Ventilator  





      Mechanical Ventilator  





      Mechanical Ventilator  


 


11/26/20 16:00 98.4 57 12 105/60 (75) 100   


 


11/26/20 16:00  57      


 


11/26/20 15:12  55 14     30


 


11/26/20 15:00  55 11 123/60 (81) 100   


 


11/26/20 14:00  60 11 128/56 (80) 100   


 


11/26/20 13:00  56 12 123/62 (82) 100   


 


11/26/20 12:00        30


 


11/26/20 12:00  54      


 


11/26/20 12:00      Mechanical Ventilator  





      Mechanical Ventilator  





      Mechanical Ventilator  


 


11/26/20 12:00 98.3 54 13 150/81 (104) 100   

















Intake and Output  


 


 11/26/20 11/27/20





 19:00 07:00


 


Intake Total 1062.5 ml 1520 ml


 


Output Total 1270 ml 1650 ml


 


Balance -207.5 ml -130 ml


 


  


 


Free Water 60 ml 90 ml


 


IV Total 282.5 ml 710 ml


 


Tube Feeding 720 ml 720 ml


 


Output Urine Total 1270 ml 1650 ml


 


# Bowel Movements 3 











Current Medications








 Medications


  (Trade)  Dose


 Ordered  Sig/Miky


 Route


 PRN Reason  Start Time


 Stop Time Status Last Admin


Dose Admin


 


 Acetaminophen


  (Tylenol)  650 mg  Q4H  PRN


 GT


 Temp >100.5  11/21/20 12:30


 12/21/20 12:29  11/23/20 10:14





 


 Chlorhexidine


 Gluconate


  (Jess-Hex 2%)  1 applic  DAILY@2000


 TOPIC


   11/6/20 20:00


 2/4/21 19:59  11/26/20 20:08





 


 Dexamethasone


  (Decadron)  6 mg  Q24H


 ORAL


   11/22/20 18:00


 12/1/20 18:01  11/26/20 19:26





 


 Dextrose


  (Dextrose 50%)  50 ml  Q30M  PRN


 IV


 Hypoglycemia  10/23/20 22:45


 1/21/21 22:44   





 


 Heparin Sodium


  (Porcine)


  (Heparin 5000


 units/ml)  5,000 units  EVERY 12  HOURS


 SUBQ


   10/23/20 21:00


 12/7/20 20:59  11/27/20 09:00





 


 Insulin Aspart


  (NovoLOG)    EVERY 6  HOURS


 SUBQ


   11/2/20 06:00


 1/22/21 06:29  11/27/20 05:09





 


 Meropenem 1 gm/


 Sodium Chloride  55 ml @ 


 110 mls/hr  Q8H


 IVPB


   11/21/20 10:00


 11/30/20 09:59  11/27/20 10:25





 


 Nitroglycerin


  (Ntg)  0.4 mg  Q5M  PRN


 SL


 Prn Chest Pain  11/21/20 10:15


 12/21/20 10:09   





 


 Ondansetron HCl


  (Zofran)  4 mg  Q6H  PRN


 IVP


 Nausea & Vomiting  11/21/20 14:30


 12/21/20 14:29   





 


 Pantoprazole


  (Protonix)  40 mg  DAILY


 IV


   11/7/20 09:00


 12/7/20 08:59  11/27/20 09:00





 


 Polyethylene


 Glycol


  (Miralax)  17 gm  DAILYPRN  PRN


 GT


 Constipation  11/21/20 10:15


 12/21/20 10:14   





 


 Promethazine HCl/


 Codeine


  (Phenergan with


 Codeine)  5 ml  Q4H  PRN


 GT


 For Cough  11/21/20 10:14


 12/21/20 10:13  11/25/20 22:05





 


 Sodium Chloride  1,000 ml @ 


 50 mls/hr  Q20H


 IV


   11/6/20 16:00


 12/6/20 15:59  11/26/20 14:27








Laboratory Tests


11/26/20 12:07: POC Whole Blood Glucose 104


11/27/20 00:06: POC Whole Blood Glucose 146H


11/27/20 03:00: 


White Blood Count 6.7, Red Blood Count 3.37L, Hemoglobin 10.5L, Hematocrit 31.9L

, Mean Corpuscular Volume 95, Mean Corpuscular Hemoglobin 31.2H, Mean 

Corpuscular Hemoglobin Concent 32.9, Red Cell Distribution Width 18.6H, Platelet

Count 318, Mean Platelet Volume 7.0, Neutrophils (%) (Auto) 82.1H, Lymphocytes 

(%) (Auto) 15.1L, Monocytes (%) (Auto) 2.6, Eosinophils (%) (Auto) 0.0, 

Basophils (%) (Auto) 0.2, Sodium Level 136, Potassium Level 4.6, Chloride Level 

104, Carbon Dioxide Level 21, Blood Urea Nitrogen 28H, Creatinine 0.9, Estimat 

Glomerular Filtration Rate > 60, Glucose Level 131H, Calcium Level 8.2L


11/27/20 04:59: POC Whole Blood Glucose [Pending]


Height (Feet):  5


Height (Inches):  4.00


Weight (Pounds):  130


General Appearance:  no apparent distress


EENT:  other - Intubated on ventilator


Respiratory/Chest:  decreased breath sounds


Abdomen:  distended











Seven Tobar MD            Nov 27, 2020 11:12

## 2020-11-27 NOTE — CARDIAC ELECTROPHYSIOLOGY PN
Assessment/Plan


Assessment/Plan


1. Accelerated junctional rhythm. Ruled out for MI


      Echo showed ejection fraction of 55%.





2. Long run of 31 beats of Nonsustained VT on 11/3/2020. 


     Cardiac cath after stabilization.





3. Henry 30, Asystole while on the Vent due to resp failure/ likely mucus plug .

S/P Code


 Off midodrine





4. Respiratory failure, on antibiotic and intubated on the vent 


   Failed weaning. Family refused tracheostomy 





5. Septic shock. Off Levophed   





6. History of previous COVID infection in September 2020 and active Covid  in 

isolation


Now negative again and off isolation.





7. Dementia.





JEANNINE RN and Dr luna





Subjective


Subjective


  In ICU on the Venton  Fio2 30%, PEEP 5. Off isolation


 Had 31 beats of VT  on 11/3/20  and 5 beats 11/8/20 


 Coded on 11/12/20 as sat dropped to 20% and got henry 30s and PEA and pulseless




 Off Levophed 


   Covid test was negative.  


    Failed weaning again. Family still refusing tracheostomy. 


     Still henry in 50s and off Midodrine





Objective





Last 24 Hour Vital Signs








  Date Time  Temp Pulse Resp B/P (MAP) Pulse Ox O2 Delivery O2 Flow Rate FiO2


 


11/27/20 11:33        30


 


11/27/20 11:00  57 16 124/63 (83) 99   


 


11/27/20 10:00  59 20 136/69 (91) 99   


 


11/27/20 09:30        30


 


11/27/20 09:00  55 14 140/77 (98) 100   


 


11/27/20 08:00        30


 


11/27/20 08:00  58      


 


11/27/20 08:00 97.8 61 13 142/71 (94) 100   


 


11/27/20 08:00      Mechanical Ventilator  





      Mechanical Ventilator  





      Mechanical Ventilator  


 


11/27/20 07:00  59 15     30


 


11/27/20 07:00  55 14 121/66 (84) 100   


 


11/27/20 06:30  56 12     


 


11/27/20 06:00  58 12 129/66 (87) 99   


 


11/27/20 05:00  59 14 134/65 (88) 99   


 


11/27/20 04:00        30


 


11/27/20 04:00      Mechanical Ventilator  





      Mechanical Ventilator  





      Mechanical Ventilator  


 


11/27/20 04:00 98.4 59 11 124/70 (88) 100   


 


11/27/20 03:50  60      


 


11/27/20 03:00  62 15 136/71 (92) 100   


 


11/27/20 03:00  73 15     30


 


11/27/20 02:00  61 12 116/64 (81) 100   


 


11/27/20 01:00  62 15 124/67 (86) 98   


 


11/27/20 00:00 98.3 62 13 128/61 (83) 100   


 


11/27/20 00:00        30


 


11/27/20 00:00      Mechanical Ventilator  





      Mechanical Ventilator  





      Mechanical Ventilator  


 


11/26/20 23:00  62 13 112/64 (80) 100   


 


11/26/20 22:48  61 12     30


 


11/26/20 22:00  62 13 121/66 (84) 100   


 


11/26/20 21:00  56 12 131/73 (92) 100   


 


11/26/20 20:21  69      


 


11/26/20 20:00        30


 


11/26/20 20:00 98.4 57 13 116/71 (86) 100   


 


11/26/20 20:00      Mechanical Ventilator  





      Mechanical Ventilator  





      Mechanical Ventilator  


 


11/26/20 19:15  56 14     30


 


11/26/20 19:00  55 14 141/76 (97) 100   


 


11/26/20 18:00  57 12 133/68 (89) 100   


 


11/26/20 17:00  68 12 120/57 (78) 100   


 


11/26/20 16:35  57 15  100 Mechanical Ventilator  30


 


11/26/20 16:00        30


 


11/26/20 16:00      Mechanical Ventilator  





      Mechanical Ventilator  





      Mechanical Ventilator  


 


11/26/20 16:00 98.4 57 12 105/60 (75) 100   


 


11/26/20 16:00  57      


 


11/26/20 15:12  55 14     30


 


11/26/20 15:00  55 11 123/60 (81) 100   


 


11/26/20 14:00  60 11 128/56 (80) 100   


 


11/26/20 13:00  56 12 123/62 (82) 100   


 


11/26/20 12:00        30


 


11/26/20 12:00  54      


 


11/26/20 12:00      Mechanical Ventilator  





      Mechanical Ventilator  





      Mechanical Ventilator  


 


11/26/20 12:00 98.3 54 13 150/81 (104) 100   

















Intake and Output  


 


 11/26/20 11/27/20





 19:00 07:00


 


Intake Total 1062.5 ml 1520 ml


 


Output Total 1270 ml 1650 ml


 


Balance -207.5 ml -130 ml


 


  


 


Free Water 60 ml 90 ml


 


IV Total 282.5 ml 710 ml


 


Tube Feeding 720 ml 720 ml


 


Output Urine Total 1270 ml 1650 ml


 


# Bowel Movements 3 











Laboratory Tests








Test


 11/26/20


12:07 11/27/20


00:06 11/27/20


03:00 11/27/20


04:59


 


POC Whole Blood Glucose


 104 MG/DL


() 146 MG/DL


()  H 


 Pending  





 


White Blood Count


 


 


 6.7 K/UL


(4.8-10.8) 





 


Red Blood Count


 


 


 3.37 M/UL


(4.70-6.10)  L 





 


Hemoglobin


 


 


 10.5 G/DL


(14.2-18.0)  L 





 


Hematocrit


 


 


 31.9 %


(42.0-52.0)  L 





 


Mean Corpuscular Volume   95 FL (80-99)   


 


Mean Corpuscular Hemoglobin


 


 


 31.2 PG


(27.0-31.0)  H 





 


Mean Corpuscular Hemoglobin


Concent 


 


 32.9 G/DL


(32.0-36.0) 





 


Red Cell Distribution Width


 


 


 18.6 %


(11.6-14.8)  H 





 


Platelet Count


 


 


 318 K/UL


(150-450) 





 


Mean Platelet Volume


 


 


 7.0 FL


(6.5-10.1) 





 


Neutrophils (%) (Auto)


 


 


 82.1 %


(45.0-75.0)  H 





 


Lymphocytes (%) (Auto)


 


 


 15.1 %


(20.0-45.0)  L 





 


Monocytes (%) (Auto)


 


 


 2.6 %


(1.0-10.0) 





 


Eosinophils (%) (Auto)


 


 


 0.0 %


(0.0-3.0) 





 


Basophils (%) (Auto)


 


 


 0.2 %


(0.0-2.0) 





 


Sodium Level


 


 


 136 MMOL/L


(136-145) 





 


Potassium Level


 


 


 4.6 MMOL/L


(3.5-5.1) 





 


Chloride Level


 


 


 104 MMOL/L


() 





 


Carbon Dioxide Level


 


 


 21 MMOL/L


(21-32) 





 


Blood Urea Nitrogen


 


 


 28 mg/dL


(7-18)  H 





 


Creatinine


 


 


 0.9 MG/DL


(0.55-1.30) 





 


Estimat Glomerular Filtration


Rate 


 


 > 60 mL/min


(>60) 





 


Glucose Level


 


 


 131 MG/DL


()  H 





 


Calcium Level


 


 


 8.2 MG/DL


(8.5-10.1)  L 











Objective


HEAD AND NECK:  No JVD. Orally intubated


LUNGS:  Coarse rhonchi.


CARDIOVASCULAR:   Irregular S1 and S2 with no gallop.


ABDOMEN:  Soft.


EXTREMITIES:  No pitting edema.











Kvng Edmond MD               Nov 27, 2020 11:53

## 2020-11-27 NOTE — INFECTIOUS DISEASES PROG NOTE
Assessment/Plan





74yo M with:





Respiratory code 2ry to mucus plug 11/12


Septic Shock- -recurrent


Fever, recurrent, low grade;SP


Leukocytosis; recurrent; increased


Acute hypoxic resp failure, on NRB mask> 4l NC; back on NRB, desaturation 10/29 

> intubated 11/6


Pneumonia- >HAP


Hx of COVID-19 PNA 9/9/20 11/17 CXR: No significant interval change in the radiographic appearance the 

chest compared to one day prior.


   11/14 Resp cx +MRSA, nl resp hayden


   UCx neg


   BCx NTD


   11/13 COVID PCR neg


         --11/10 CXR: Bilateral infiltrates in a peribronchovascular 

distribution are unchanged. Left pleural effusion is unchanged.


         --11/8 CXR: Persistent bilateral patchy pulmonary opacities, most 

prominent  in the left lower lung.


         --11/7 ucx neg


                  sp cx MRSA (colonizer at this point)


                  Bcx Neg


         --11/2 Bcx Neg


          10/30 Sp cx MRSA (Vancomycin ADRIANA 1), ESBL E.coli


   10/23 BCx NTD


   UA 15-20 WBC, UCx Neg


   10/23 COVID rapid neg; 10/26 rapid COVID PCR + (from prior infection)- not 

new infection


   Flu A/B neg


   CXR: L pna


   Resp cx MRSA


   11/19 Resp cx +ESBL E.coli & PsA


   11/21 BCx NTD


   11/22 CXR: Small bilateral pleural effusions with moderate vascular c

ongestion. Airspace consolidation in the left lower lung field may represent 

atelectasis however, correlate for infiltrate.  This is improving when compared 

to the prior study.


   11/23 BCx NTD


   11/23 CXR: Increasing right basilar opacity, likely infiltrate, may also 

reflect increasing pleural fluid





H/o COVID pna


   Tested positive 9/9/20


   10/23 Rapid Ag neg


   10/26 Rapid Ag positive (from prior disease?)


   11/13 COVID PCR neg





JANES on CKD, improving





[facility] resident ([facility])


Non-verbal


VRE and MRSA colonized








Plan:


Cont meropenem # 7 / 10 given high fever, ESBL E.coli and PsA pna














   -11/20 SP linezolid #7


   -11/16 SP meropenem #14 for ESBL pna 


   -11/10 SP Micafungin #4


   -11/6 SP IV Vancomycin #15


   -11/2 SP Zosyn #4


   -10/26 SP Cefepime #4


   -10/24 SP Flagyl #





Monitor CBC/CMP


Monitor resp status


Monitor temp curve and hemodynamics


C.dif if ongoing loose stools


F/u repeat BCx 11/23 given ongoing fever (1 from periph, 1 from PICC), currently

NTD


D/w RN





Thank you for this consult. [facility] will continue to follow.





Subjective


Allergies:  


Coded Allergies:  


     No Known Allergies (Unverified , 8/20/12)


no acute event


on vent 


family refusing trach





Objective





Last 24 Hour Vital Signs








  Date Time  Temp Pulse Resp B/P (MAP) Pulse Ox O2 Delivery O2 Flow Rate FiO2


 


11/27/20 13:00  57 12 114/66 (82) 99   


 


11/27/20 12:00      Mechanical Ventilator  





      Mechanical Ventilator  





      Mechanical Ventilator  


 


11/27/20 12:00  57      


 


11/27/20 12:00 98.2 55 16 103/59 (74) 100   


 


11/27/20 11:33        30


 


11/27/20 11:21     100   


 


11/27/20 11:21  58 12     30


 


11/27/20 11:00  57 16 124/63 (83) 99   


 


11/27/20 10:00  59 20 136/69 (91) 99   


 


11/27/20 09:30        30


 


11/27/20 09:22  53 14     30


 


11/27/20 09:00  55 14 140/77 (98) 100   


 


11/27/20 08:00        30


 


11/27/20 08:00  58      


 


11/27/20 08:00 97.8 61 13 142/71 (94) 100   


 


11/27/20 08:00      Mechanical Ventilator  





      Mechanical Ventilator  





      Mechanical Ventilator  


 


11/27/20 07:00  59 15     30


 


11/27/20 07:00  55 14 121/66 (84) 100   


 


11/27/20 06:30  56 12     


 


11/27/20 06:00  58 12 129/66 (87) 99   


 


11/27/20 05:00  59 14 134/65 (88) 99   


 


11/27/20 04:00        30


 


11/27/20 04:00      Mechanical Ventilator  





      Mechanical Ventilator  





      Mechanical Ventilator  


 


11/27/20 04:00 98.4 59 11 124/70 (88) 100   


 


11/27/20 03:50  60      


 


11/27/20 03:00  62 15 136/71 (92) 100   


 


11/27/20 03:00  73 15     30


 


11/27/20 02:00  61 12 116/64 (81) 100   


 


11/27/20 01:00  62 15 124/67 (86) 98   


 


11/27/20 00:00 98.3 62 13 128/61 (83) 100   


 


11/27/20 00:00        30


 


11/27/20 00:00      Mechanical Ventilator  





      Mechanical Ventilator  





      Mechanical Ventilator  


 


11/26/20 23:00  62 13 112/64 (80) 100   


 


11/26/20 22:48  61 12     30


 


11/26/20 22:00  62 13 121/66 (84) 100   


 


11/26/20 21:00  56 12 131/73 (92) 100   


 


11/26/20 20:21  69      


 


11/26/20 20:00        30


 


11/26/20 20:00 98.4 57 13 116/71 (86) 100   


 


11/26/20 20:00      Mechanical Ventilator  





      Mechanical Ventilator  





      Mechanical Ventilator  


 


11/26/20 19:15  56 14     30


 


11/26/20 19:00  55 14 141/76 (97) 100   


 


11/26/20 18:00  57 12 133/68 (89) 100   


 


11/26/20 17:00  68 12 120/57 (78) 100   


 


11/26/20 16:35  57 15  100 Mechanical Ventilator  30


 


11/26/20 16:00        30


 


11/26/20 16:00      Mechanical Ventilator  





      Mechanical Ventilator  





      Mechanical Ventilator  


 


11/26/20 16:00 98.4 57 12 105/60 (75) 100   


 


11/26/20 16:00  57      


 


11/26/20 15:12  55 14     30


 


11/26/20 15:00  55 11 123/60 (81) 100   


 


11/26/20 14:00  60 11 128/56 (80) 100   








Height (Feet):  5


Height (Inches):  4.00


Weight (Pounds):  130


Respiratory/Chest:  no accessory muscle use


Cardiovascular:  regular rhythm


Abdomen:  soft, non tender, no organomegaly





Laboratory Tests








Test


 11/27/20


00:06 11/27/20


03:00 11/27/20


04:59


 


POC Whole Blood Glucose


 146 MG/DL


()  H 


 Pending  





 


White Blood Count


 


 6.7 K/UL


(4.8-10.8) 





 


Red Blood Count


 


 3.37 M/UL


(4.70-6.10)  L 





 


Hemoglobin


 


 10.5 G/DL


(14.2-18.0)  L 





 


Hematocrit


 


 31.9 %


(42.0-52.0)  L 





 


Mean Corpuscular Volume  95 FL (80-99)   


 


Mean Corpuscular Hemoglobin


 


 31.2 PG


(27.0-31.0)  H 





 


Mean Corpuscular Hemoglobin


Concent 


 32.9 G/DL


(32.0-36.0) 





 


Red Cell Distribution Width


 


 18.6 %


(11.6-14.8)  H 





 


Platelet Count


 


 318 K/UL


(150-450) 





 


Mean Platelet Volume


 


 7.0 FL


(6.5-10.1) 





 


Neutrophils (%) (Auto)


 


 82.1 %


(45.0-75.0)  H 





 


Lymphocytes (%) (Auto)


 


 15.1 %


(20.0-45.0)  L 





 


Monocytes (%) (Auto)


 


 2.6 %


(1.0-10.0) 





 


Eosinophils (%) (Auto)


 


 0.0 %


(0.0-3.0) 





 


Basophils (%) (Auto)


 


 0.2 %


(0.0-2.0) 





 


Sodium Level


 


 136 MMOL/L


(136-145) 





 


Potassium Level


 


 4.6 MMOL/L


(3.5-5.1) 





 


Chloride Level


 


 104 MMOL/L


() 





 


Carbon Dioxide Level


 


 21 MMOL/L


(21-32) 





 


Blood Urea Nitrogen


 


 28 mg/dL


(7-18)  H 





 


Creatinine


 


 0.9 MG/DL


(0.55-1.30) 





 


Estimat Glomerular Filtration


Rate 


 > 60 mL/min


(>60) 





 


Glucose Level


 


 131 MG/DL


()  H 





 


Calcium Level


 


 8.2 MG/DL


(8.5-10.1)  L 














Current Medications








 Medications


  (Trade)  Dose


 Ordered  Sig/Miky


 Route


 PRN Reason  Start Time


 Stop Time Status Last Admin


Dose Admin


 


 Acetaminophen


  (Tylenol)  650 mg  Q4H  PRN


 GT


 Temp >100.5  11/21/20 12:30


 12/21/20 12:29  11/23/20 10:14





 


 Chlorhexidine


 Gluconate


  (Jess-Hex 2%)  1 applic  DAILY@2000


 TOPIC


   11/6/20 20:00


 2/4/21 19:59  11/26/20 20:08





 


 Dexamethasone


  (Decadron)  6 mg  Q24H


 ORAL


   11/22/20 18:00


 12/1/20 18:01  11/26/20 19:26





 


 Dextrose


  (Dextrose 50%)  50 ml  Q30M  PRN


 IV


 Hypoglycemia  10/23/20 22:45


 1/21/21 22:44   





 


 Heparin Sodium


  (Porcine)


  (Heparin 5000


 units/ml)  5,000 units  EVERY 12  HOURS


 SUBQ


   10/23/20 21:00


 12/7/20 20:59  11/27/20 09:00





 


 Insulin Aspart


  (NovoLOG)    EVERY 6  HOURS


 SUBQ


   11/2/20 06:00


 1/22/21 06:29  11/27/20 05:09





 


 Meropenem 1 gm/


 Sodium Chloride  55 ml @ 


 110 mls/hr  Q8H


 IVPB


   11/21/20 10:00


 11/30/20 09:59  11/27/20 10:25





 


 Nitroglycerin


  (Ntg)  0.4 mg  Q5M  PRN


 SL


 Prn Chest Pain  11/21/20 10:15


 12/21/20 10:09   





 


 Ondansetron HCl


  (Zofran)  4 mg  Q6H  PRN


 IVP


 Nausea & Vomiting  11/21/20 14:30


 12/21/20 14:29   





 


 Pantoprazole


  (Protonix)  40 mg  DAILY


 IV


   11/7/20 09:00


 12/7/20 08:59  11/27/20 09:00





 


 Polyethylene


 Glycol


  (Miralax)  17 gm  DAILYPRN  PRN


 GT


 Constipation  11/21/20 10:15


 12/21/20 10:14   





 


 Promethazine HCl/


 Codeine


  (Phenergan with


 Codeine)  5 ml  Q4H  PRN


 GT


 For Cough  11/21/20 10:14


 12/21/20 10:13  11/25/20 22:05





 


 Sodium Chloride  1,000 ml @ 


 50 mls/hr  Q20H


 IV


   11/6/20 16:00


 12/6/20 15:59  11/27/20 12:11




















Tanner Lorenzana MD               Nov 27, 2020 13:41

## 2020-11-27 NOTE — NUR
NURSE NOTES:

Received patient and report from LITA Shaffer. Patient is observed resting in bed and remains 
obtunded. No pain noted upon assessment. Pt is currently orally intubated ett size 7.5 noted 
to be 25 @ lip line. Pt appears to be tolerating current vent settings well. Vent settings 
as follows: AC 12  FiO2 30% PEEP 0. Bilateral lower lobe breath sounds noted to be 
mildly diminished upon auscultation. Pt noted to be SR on tele monitor with a HR of 60. R 
upper arm PICC line noted which remains asymptomatic, intact and patent. Central line 
dressing changed per protocol. Central line dressing remains clean, dry and intact. 1/2NS 
currently infusing at 50 mL/hr as ordered. VS remain stable at this time. Active bowel 
sounds noted in all four quadrants; abdomen remains round and soft. L nare NG Tube noted 
which remains intact and secured. Glucerna 1.2 continues to infuse at 60mL/hr, pt tolerating 
feeding well. Cummings catheter noted draining pale yellow urine to gravity. Diagnostics 
reviewed at bedside. Skin alterations noted. Fall, Aspiration and Skin precautions observed. 
Pt remains resting in bed; Bed remains in the lowest position with the safety wheels 
engaged, call light within reach, side rails up x3 and bed alarm activated. Will continue 
plan of care. Will continue to monitor.

## 2020-11-27 NOTE — INTERNAL MED PROGRESS NOTE
Subjective


Date of Service:  Nov 27, 2020


Physician Name


NadiyaBenito


Attending Physician


Andrei Cancino MD





Current Medications








 Medications


  (Trade)  Dose


 Ordered  Sig/Miky


 Route


 PRN Reason  Start Time


 Stop Time Status Last Admin


Dose Admin


 


 Acetaminophen


  (Tylenol)  650 mg  Q4H  PRN


 GT


 Temp >100.5  11/21/20 12:30


 12/21/20 12:29  11/23/20 10:14





 


 Chlorhexidine


 Gluconate


  (Jess-Hex 2%)  1 applic  DAILY@2000


 TOPIC


   11/6/20 20:00


 2/4/21 19:59  11/26/20 20:08





 


 Dexamethasone


  (Decadron)  6 mg  Q24H


 ORAL


   11/22/20 18:00


 12/1/20 18:01  11/26/20 19:26





 


 Dextrose


  (Dextrose 50%)  50 ml  Q30M  PRN


 IV


 Hypoglycemia  10/23/20 22:45


 1/21/21 22:44   





 


 Heparin Sodium


  (Porcine)


  (Heparin 5000


 units/ml)  5,000 units  EVERY 12  HOURS


 SUBQ


   10/23/20 21:00


 12/7/20 20:59  11/27/20 09:00





 


 Insulin Aspart


  (NovoLOG)    EVERY 6  HOURS


 SUBQ


   11/2/20 06:00


 1/22/21 06:29  11/27/20 05:09





 


 Meropenem 1 gm/


 Sodium Chloride  55 ml @ 


 110 mls/hr  Q8H


 IVPB


   11/21/20 10:00


 11/30/20 09:59  11/27/20 10:25





 


 Nitroglycerin


  (Ntg)  0.4 mg  Q5M  PRN


 SL


 Prn Chest Pain  11/21/20 10:15


 12/21/20 10:09   





 


 Ondansetron HCl


  (Zofran)  4 mg  Q6H  PRN


 IVP


 Nausea & Vomiting  11/21/20 14:30


 12/21/20 14:29   





 


 Pantoprazole


  (Protonix)  40 mg  DAILY


 IV


   11/7/20 09:00


 12/7/20 08:59  11/27/20 09:00





 


 Polyethylene


 Glycol


  (Miralax)  17 gm  DAILYPRN  PRN


 GT


 Constipation  11/21/20 10:15


 12/21/20 10:14   





 


 Promethazine HCl/


 Codeine


  (Phenergan with


 Codeine)  5 ml  Q4H  PRN


 GT


 For Cough  11/21/20 10:14


 12/21/20 10:13  11/25/20 22:05





 


 Sodium Chloride  1,000 ml @ 


 50 mls/hr  Q20H


 IV


   11/6/20 16:00


 12/6/20 15:59  11/27/20 12:11











Allergies:  


Coded Allergies:  


     No Known Allergies (Unverified , 8/20/12)


ROS Limited/Unobtainable:  Yes


Subjective


76 YO M admitted with shortness of breath.  Now pneumonia; previously COVID 

positive.  Cover for Int kierra-Dr Cancino.  ICU.  Intubated and sedated.





Objective





Last Vital Signs








  Date Time  Temp Pulse Resp B/P (MAP) Pulse Ox O2 Delivery O2 Flow Rate FiO2


 


11/27/20 14:00  58 12 141/81 (101) 100   


 


11/27/20 12:00      Mechanical Ventilator  





      Mechanical Ventilator  





      Mechanical Ventilator  


 


11/27/20 12:00 98.2       


 


11/27/20 11:33        30











Laboratory Tests








Test


 11/27/20


00:06 11/27/20


03:00 11/27/20


04:59


 


POC Whole Blood Glucose


 146 MG/DL


()  H 


 Pending  





 


White Blood Count


 


 6.7 K/UL


(4.8-10.8) 





 


Red Blood Count


 


 3.37 M/UL


(4.70-6.10)  L 





 


Hemoglobin


 


 10.5 G/DL


(14.2-18.0)  L 





 


Hematocrit


 


 31.9 %


(42.0-52.0)  L 





 


Mean Corpuscular Volume  95 FL (80-99)   


 


Mean Corpuscular Hemoglobin


 


 31.2 PG


(27.0-31.0)  H 





 


Mean Corpuscular Hemoglobin


Concent 


 32.9 G/DL


(32.0-36.0) 





 


Red Cell Distribution Width


 


 18.6 %


(11.6-14.8)  H 





 


Platelet Count


 


 318 K/UL


(150-450) 





 


Mean Platelet Volume


 


 7.0 FL


(6.5-10.1) 





 


Neutrophils (%) (Auto)


 


 82.1 %


(45.0-75.0)  H 





 


Lymphocytes (%) (Auto)


 


 15.1 %


(20.0-45.0)  L 





 


Monocytes (%) (Auto)


 


 2.6 %


(1.0-10.0) 





 


Eosinophils (%) (Auto)


 


 0.0 %


(0.0-3.0) 





 


Basophils (%) (Auto)


 


 0.2 %


(0.0-2.0) 





 


Sodium Level


 


 136 MMOL/L


(136-145) 





 


Potassium Level


 


 4.6 MMOL/L


(3.5-5.1) 





 


Chloride Level


 


 104 MMOL/L


() 





 


Carbon Dioxide Level


 


 21 MMOL/L


(21-32) 





 


Blood Urea Nitrogen


 


 28 mg/dL


(7-18)  H 





 


Creatinine


 


 0.9 MG/DL


(0.55-1.30) 





 


Estimat Glomerular Filtration


Rate 


 > 60 mL/min


(>60) 





 


Glucose Level


 


 131 MG/DL


()  H 





 


Calcium Level


 


 8.2 MG/DL


(8.5-10.1)  L 




















Intake and Output  


 


 11/26/20 11/27/20





 19:00 07:00


 


Intake Total 1062.5 ml 1520 ml


 


Output Total 1270 ml 1650 ml


 


Balance -207.5 ml -130 ml


 


  


 


Free Water 60 ml 90 ml


 


IV Total 282.5 ml 710 ml


 


Tube Feeding 720 ml 720 ml


 


Output Urine Total 1270 ml 1650 ml


 


# Bowel Movements 3 








Objective


PHYSICAL EXAMINATION:


GENERAL:  The patient awake with deep stimuli, open his eyes, however,


cannot follow commands.  The patient is on a Ventimask at this time,


chronically ill-appearing.


HEAD AND NECK:  Pupils are equal and reactive to light.  Anicteric.


NECK:  Supple.  No JVD.


LUNGS:  Mech vent;  wheezing, rhonchi, and decreased air in bases.


HEART:  S1, S2.  Regular rhythm.  Distant heart sounds.  No murmur or


gallop.


ABDOMEN:  Soft, nondistended, nontender.  Positive bowel sounds.


EXTREMITIES:  No cyanosis, clubbing, or edema.


NEUROLOGIC:  Very limited secondary to the patient's status, cannot follow


commands.  Opens his eyes with deep stimuli and moving extremities


spontaneously.





Assessment/Plan


Assessment/Plan


ASSESSMENT:


1. Acute hypoxemic respiratory failure, most likely secondary to


pneumonia and sepsis.


2. Sepsis secondary to urinary tract infection and pneumonia.


3. pneumonia=MRSA; ESBL E. coli


4. Acute kidney injury on chronic renal insufficiency.


5. Dehydration.


6. History of chronic congestive heart failure.


7. Diabetes type 2.


8. Dyslipidemia.


9. Hypertension.


10. Alzheimer's disease.


11. COVID 19 previous positive





PLAN:


1.  ICU


2.  Dr. Sandoval,=Pulmonary Critical Care; follow mech vent recs


3.  Dr. Garcia = Inf Dis.


4.  IV= D5W due to the hypernatremia and dehydration.


5.  antibiotics =   meropenem; S/P micafungin and zyvox


6.  Code status is full code.


7.    DVT prophylaxis is heparin subcutaneous.











Benito Hearn MD               Nov 27, 2020 14:27

## 2020-11-28 VITALS — SYSTOLIC BLOOD PRESSURE: 167 MMHG | DIASTOLIC BLOOD PRESSURE: 98 MMHG

## 2020-11-28 VITALS — DIASTOLIC BLOOD PRESSURE: 73 MMHG | SYSTOLIC BLOOD PRESSURE: 139 MMHG

## 2020-11-28 VITALS — DIASTOLIC BLOOD PRESSURE: 62 MMHG | SYSTOLIC BLOOD PRESSURE: 95 MMHG

## 2020-11-28 VITALS — DIASTOLIC BLOOD PRESSURE: 80 MMHG | SYSTOLIC BLOOD PRESSURE: 137 MMHG

## 2020-11-28 VITALS — SYSTOLIC BLOOD PRESSURE: 139 MMHG | DIASTOLIC BLOOD PRESSURE: 91 MMHG

## 2020-11-28 VITALS — SYSTOLIC BLOOD PRESSURE: 95 MMHG | DIASTOLIC BLOOD PRESSURE: 59 MMHG

## 2020-11-28 VITALS — DIASTOLIC BLOOD PRESSURE: 82 MMHG | SYSTOLIC BLOOD PRESSURE: 147 MMHG

## 2020-11-28 VITALS — SYSTOLIC BLOOD PRESSURE: 118 MMHG | DIASTOLIC BLOOD PRESSURE: 67 MMHG

## 2020-11-28 VITALS — SYSTOLIC BLOOD PRESSURE: 115 MMHG | DIASTOLIC BLOOD PRESSURE: 65 MMHG

## 2020-11-28 VITALS — SYSTOLIC BLOOD PRESSURE: 143 MMHG | DIASTOLIC BLOOD PRESSURE: 66 MMHG

## 2020-11-28 VITALS — DIASTOLIC BLOOD PRESSURE: 60 MMHG | SYSTOLIC BLOOD PRESSURE: 104 MMHG

## 2020-11-28 VITALS — SYSTOLIC BLOOD PRESSURE: 95 MMHG | DIASTOLIC BLOOD PRESSURE: 63 MMHG

## 2020-11-28 VITALS — SYSTOLIC BLOOD PRESSURE: 119 MMHG | DIASTOLIC BLOOD PRESSURE: 72 MMHG

## 2020-11-28 VITALS — DIASTOLIC BLOOD PRESSURE: 67 MMHG | SYSTOLIC BLOOD PRESSURE: 130 MMHG

## 2020-11-28 VITALS — SYSTOLIC BLOOD PRESSURE: 143 MMHG | DIASTOLIC BLOOD PRESSURE: 71 MMHG

## 2020-11-28 VITALS — DIASTOLIC BLOOD PRESSURE: 56 MMHG | SYSTOLIC BLOOD PRESSURE: 93 MMHG

## 2020-11-28 VITALS — DIASTOLIC BLOOD PRESSURE: 58 MMHG | SYSTOLIC BLOOD PRESSURE: 102 MMHG

## 2020-11-28 VITALS — DIASTOLIC BLOOD PRESSURE: 60 MMHG | SYSTOLIC BLOOD PRESSURE: 110 MMHG

## 2020-11-28 VITALS — DIASTOLIC BLOOD PRESSURE: 65 MMHG | SYSTOLIC BLOOD PRESSURE: 97 MMHG

## 2020-11-28 VITALS — DIASTOLIC BLOOD PRESSURE: 89 MMHG | SYSTOLIC BLOOD PRESSURE: 144 MMHG

## 2020-11-28 VITALS — DIASTOLIC BLOOD PRESSURE: 74 MMHG | SYSTOLIC BLOOD PRESSURE: 112 MMHG

## 2020-11-28 VITALS — SYSTOLIC BLOOD PRESSURE: 112 MMHG | DIASTOLIC BLOOD PRESSURE: 69 MMHG

## 2020-11-28 VITALS — SYSTOLIC BLOOD PRESSURE: 124 MMHG | DIASTOLIC BLOOD PRESSURE: 67 MMHG

## 2020-11-28 VITALS — SYSTOLIC BLOOD PRESSURE: 106 MMHG | DIASTOLIC BLOOD PRESSURE: 58 MMHG

## 2020-11-28 LAB
ADD MANUAL DIFF: NO
ALBUMIN SERPL-MCNC: 1.9 G/DL (ref 3.4–5)
ALBUMIN/GLOB SERPL: 0.4 {RATIO} (ref 1–2.7)
ALP SERPL-CCNC: 222 U/L (ref 46–116)
ALT SERPL-CCNC: 70 U/L (ref 12–78)
ANION GAP SERPL CALC-SCNC: 8 MMOL/L (ref 5–15)
AST SERPL-CCNC: 34 U/L (ref 15–37)
BASOPHILS NFR BLD AUTO: 1.8 % (ref 0–2)
BILIRUB SERPL-MCNC: 0.3 MG/DL (ref 0.2–1)
BUN SERPL-MCNC: 24 MG/DL (ref 7–18)
CALCIUM SERPL-MCNC: 8.1 MG/DL (ref 8.5–10.1)
CHLORIDE SERPL-SCNC: 106 MMOL/L (ref 98–107)
CO2 SERPL-SCNC: 26 MMOL/L (ref 21–32)
CREAT SERPL-MCNC: 0.8 MG/DL (ref 0.55–1.3)
EOSINOPHIL NFR BLD AUTO: 0.6 % (ref 0–3)
ERYTHROCYTE [DISTWIDTH] IN BLOOD BY AUTOMATED COUNT: 18 % (ref 11.6–14.8)
GLOBULIN SER-MCNC: 4.9 G/DL
HCT VFR BLD CALC: 32.4 % (ref 42–52)
HGB BLD-MCNC: 11.1 G/DL (ref 14.2–18)
LYMPHOCYTES NFR BLD AUTO: 16.9 % (ref 20–45)
MCV RBC AUTO: 91 FL (ref 80–99)
MONOCYTES NFR BLD AUTO: 6.8 % (ref 1–10)
NEUTROPHILS NFR BLD AUTO: 73.9 % (ref 45–75)
PHOSPHATE SERPL-MCNC: 3.6 MG/DL (ref 2.5–4.9)
PLATELET # BLD: 399 K/UL (ref 150–450)
POTASSIUM SERPL-SCNC: 4.3 MMOL/L (ref 3.5–5.1)
RBC # BLD AUTO: 3.56 M/UL (ref 4.7–6.1)
SODIUM SERPL-SCNC: 139 MMOL/L (ref 136–145)
WBC # BLD AUTO: 11.2 K/UL (ref 4.8–10.8)

## 2020-11-28 RX ADMIN — INSULIN ASPART SCH UNITS: 100 INJECTION, SOLUTION INTRAVENOUS; SUBCUTANEOUS at 05:09

## 2020-11-28 RX ADMIN — MEROPENEM SCH MLS/HR: 1 INJECTION INTRAVENOUS at 09:24

## 2020-11-28 RX ADMIN — INSULIN ASPART SCH UNITS: 100 INJECTION, SOLUTION INTRAVENOUS; SUBCUTANEOUS at 23:25

## 2020-11-28 RX ADMIN — HEPARIN SODIUM SCH UNITS: 5000 INJECTION INTRAVENOUS; SUBCUTANEOUS at 20:40

## 2020-11-28 RX ADMIN — CHLORHEXIDINE GLUCONATE SCH APPLIC: 213 SOLUTION TOPICAL at 20:00

## 2020-11-28 RX ADMIN — INSULIN ASPART SCH UNITS: 100 INJECTION, SOLUTION INTRAVENOUS; SUBCUTANEOUS at 12:00

## 2020-11-28 RX ADMIN — INSULIN ASPART SCH UNITS: 100 INJECTION, SOLUTION INTRAVENOUS; SUBCUTANEOUS at 17:41

## 2020-11-28 RX ADMIN — HEPARIN SODIUM SCH UNITS: 5000 INJECTION INTRAVENOUS; SUBCUTANEOUS at 09:25

## 2020-11-28 RX ADMIN — MEROPENEM SCH MLS/HR: 1 INJECTION INTRAVENOUS at 17:28

## 2020-11-28 RX ADMIN — PANTOPRAZOLE SODIUM SCH MG: 40 INJECTION, POWDER, FOR SOLUTION INTRAVENOUS at 09:23

## 2020-11-28 RX ADMIN — MEROPENEM SCH MLS/HR: 1 INJECTION INTRAVENOUS at 01:15

## 2020-11-28 NOTE — NUR
NURSE NOTES:

LATE ENTRY:

PT NOT TOLERATING WEANING. WILL TRY AGAIN TOMORROW. PT STABLE AT THIS TIME.

## 2020-11-28 NOTE — INFECTIOUS DISEASES PROG NOTE
Assessment/Plan





74yo M with:





Respiratory code 2ry to mucus plug 11/12


Septic Shock- -recurrent


Fever, recurrent, low grade;SP


Leukocytosis; recurrent; increased


Acute hypoxic resp failure, on NRB mask> 4l NC; back on NRB, desaturation 10/29 

> intubated 11/6


Pneumonia- >HAP


Hx of COVID-19 PNA 9/9/20 11/17 CXR: No significant interval change in the radiographic appearance the 

chest compared to one day prior.


   11/14 Resp cx +MRSA, nl resp hayden


   UCx neg


   BCx NTD


   11/13 COVID PCR neg


         --11/10 CXR: Bilateral infiltrates in a peribronchovascular 

distribution are unchanged. Left pleural effusion is unchanged.


         --11/8 CXR: Persistent bilateral patchy pulmonary opacities, most 

prominent  in the left lower lung.


         --11/7 ucx neg


                  sp cx MRSA (colonizer at this point)


                  Bcx Neg


         --11/2 Bcx Neg


          10/30 Sp cx MRSA (Vancomycin ADRIANA 1), ESBL E.coli


   10/23 BCx NTD


   UA 15-20 WBC, UCx Neg


   10/23 COVID rapid neg; 10/26 rapid COVID PCR + (from prior infection)- not 

new infection


   Flu A/B neg


   CXR: L pna


   Resp cx MRSA


   11/19 Resp cx +ESBL E.coli & PsA


   11/21 BCx NTD


   11/22 CXR: Small bilateral pleural effusions with moderate vascular c

ongestion. Airspace consolidation in the left lower lung field may represent 

atelectasis however, correlate for infiltrate.  This is improving when compared 

to the prior study.


   11/23 BCx NTD


   11/23 CXR: Increasing right basilar opacity, likely infiltrate, may also 

reflect increasing pleural fluid





H/o COVID pna


   Tested positive 9/9/20


   10/23 Rapid Ag neg


   10/26 Rapid Ag positive (from prior disease?)


   11/13 COVID PCR neg





JANES on CKD, improving





[facility]NF resident ([facility])


Non-verbal


VRE and MRSA colonized








Plan:


Cont meropenem # 8 / 10 given high fever, ESBL E.coli and PsA pna














   -11/20 SP linezolid #7


   -11/16 SP meropenem #14 for ESBL pna 


   -11/10 SP Micafungin #4


   -11/6 SP IV Vancomycin #15


   -11/2 SP Zosyn #4


   -10/26 SP Cefepime #4


   -10/24 SP Flagyl #





Monitor CBC/CMP


Monitor resp status


Monitor temp curve and hemodynamics


C.dif if ongoing loose stools


F/u repeat BCx 11/23 given ongoing fever (1 from periph, 1 from PICC), currently

NTD


D/w RN





Thank you for this consult. [facility] will continue to follow.





Subjective


Allergies:  


Coded Allergies:  


     No Known Allergies (Unverified , 8/20/12)


Afebrile


WBCs 11 on steroid


On Vent 30% O2





Objective





Last 24 Hour Vital Signs








  Date Time  Temp Pulse Resp B/P (MAP) Pulse Ox O2 Delivery O2 Flow Rate FiO2


 


11/28/20 07:22  70 12     30


 


11/28/20 07:00  79 13 119/72 (88) 99   


 


11/28/20 06:30  74 12     


 


11/28/20 06:00  78 15 143/71 (95) 99   


 


11/28/20 05:00  80 16 112/74 (87) 98   


 


11/28/20 04:00        30


 


11/28/20 04:00      Mechanical Ventilator  





      Mechanical Ventilator  





      Mechanical Ventilator  


 


11/28/20 04:00 97.4 89 16 147/82 (103) 98   


 


11/28/20 03:35  60      


 


11/28/20 03:14  89 16     30


 


11/28/20 03:00  77 25 137/80 (99) 99   


 


11/28/20 02:00  78 23 139/91 (107) 99   


 


11/28/20 01:00  86 22 139/73 (95) 98   


 


11/28/20 00:00      Mechanical Ventilator  





      Mechanical Ventilator  





      Mechanical Ventilator  


 


11/28/20 00:00 97.6 72 16 130/67 (88) 98   


 


11/28/20 00:00        30


 


11/27/20 23:17  60      


 


11/27/20 23:00  66 16 146/85 (105) 99   


 


11/27/20 22:58  66 13     30


 


11/27/20 22:00  62 18 123/66 (85) 99   


 


11/27/20 21:00  64 16 141/73 (95) 100   


 


11/27/20 20:00      Mechanical Ventilator  





      Mechanical Ventilator  





      Mechanical Ventilator  


 


11/27/20 20:00 97.3 60 11 130/76 (94) 100   


 


11/27/20 20:00        30


 


11/27/20 19:47  60      


 


11/27/20 19:08  58 12     30


 


11/27/20 19:00  61 13 120/62 (81) 99   


 


11/27/20 18:01  60 12 124/68 (86) 98   


 


11/27/20 17:00  58 12 128/70 (89) 99   


 


11/27/20 16:00 97.2 59 12 138/86 (103) 100   


 


11/27/20 16:00  59      


 


11/27/20 16:00        30


 


11/27/20 16:00      Mechanical Ventilator  





      Mechanical Ventilator  





      Mechanical Ventilator  


 


11/27/20 15:08  69 16     30


 


11/27/20 15:00  59 12 124/67 (86) 99   


 


11/27/20 14:00  58 12 141/81 (101) 100   


 


11/27/20 13:00  57 12 114/66 (82) 99   


 


11/27/20 12:00      Mechanical Ventilator  





      Mechanical Ventilator  





      Mechanical Ventilator  


 


11/27/20 12:00  57      


 


11/27/20 12:00 98.2 55 16 103/59 (74) 100   


 


11/27/20 11:33        30


 


11/27/20 11:21     100   


 


11/27/20 11:21  58 12     30


 


11/27/20 11:00  57 16 124/63 (83) 99   


 


11/27/20 10:00  59 20 136/69 (91) 99   








Height (Feet):  5


Height (Inches):  4.00


Weight (Pounds):  130


Gen: NAD on Vent 


HEENT: NCAT, No scleral icterus


Chest: Equal rise and fall B/L, RRR


Abd: Soft, ND


\





Laboratory Tests








Test


 11/28/20


06:20 11/28/20


06:30


 


Sodium Level


 139 MMOL/L


(136-145) 





 


Potassium Level


 4.3 MMOL/L


(3.5-5.1) 





 


Chloride Level


 106 MMOL/L


() 





 


Carbon Dioxide Level


 26 MMOL/L


(21-32) 





 


Anion Gap


 8 mmol/L


(5-15) 





 


Blood Urea Nitrogen


 24 mg/dL


(7-18)  H 





 


Creatinine


 0.8 MG/DL


(0.55-1.30) 





 


Estimat Glomerular Filtration


Rate > 60 mL/min


(>60) 





 


Glucose Level


 126 MG/DL


()  H 





 


Calcium Level


 8.1 MG/DL


(8.5-10.1)  L 





 


Phosphorus Level


 3.6 MG/DL


(2.5-4.9) 





 


Magnesium Level


 2.4 MG/DL


(1.8-2.4) 





 


Total Bilirubin


 0.3 MG/DL


(0.2-1.0) 





 


Aspartate Amino Transf


(AST/SGOT) 34 U/L (15-37)


 





 


Alanine Aminotransferase


(ALT/SGPT) 70 U/L (12-78)


 





 


Alkaline Phosphatase


 222 U/L


()  H 





 


C-Reactive Protein,


Quantitative 0.9 mg/dL


(0.00-0.90) 





 


Total Protein


 6.8 G/DL


(6.4-8.2) 





 


Albumin


 1.9 G/DL


(3.4-5.0)  L 





 


Globulin 4.9 g/dL   


 


Albumin/Globulin Ratio


 0.4 (1.0-2.7)


L 





 


White Blood Count


 


 11.2 K/UL


(4.8-10.8)  #H


 


Red Blood Count


 


 3.56 M/UL


(4.70-6.10)  L


 


Hemoglobin


 


 11.1 G/DL


(14.2-18.0)  L


 


Hematocrit


 


 32.4 %


(42.0-52.0)  L


 


Mean Corpuscular Volume  91 FL (80-99)  


 


Mean Corpuscular Hemoglobin


 


 31.1 PG


(27.0-31.0)  H


 


Mean Corpuscular Hemoglobin


Concent 


 34.1 G/DL


(32.0-36.0)


 


Red Cell Distribution Width


 


 18.0 %


(11.6-14.8)  H


 


Platelet Count


 


 399 K/UL


(150-450)


 


Mean Platelet Volume


 


 8.0 FL


(6.5-10.1)


 


Neutrophils (%) (Auto)


 


 73.9 %


(45.0-75.0)


 


Lymphocytes (%) (Auto)


 


 16.9 %


(20.0-45.0)  L


 


Monocytes (%) (Auto)


 


 6.8 %


(1.0-10.0)


 


Eosinophils (%) (Auto)


 


 0.6 %


(0.0-3.0)


 


Basophils (%) (Auto)


 


 1.8 %


(0.0-2.0)


 


Erythrocyte Sedimentation Rate  Pending  











Current Medications








 Medications


  (Trade)  Dose


 Ordered  Sig/Miky


 Route


 PRN Reason  Start Time


 Stop Time Status Last Admin


Dose Admin


 


 Acetaminophen


  (Tylenol)  650 mg  Q4H  PRN


 GT


 Temp >100.5  11/21/20 12:30


 12/21/20 12:29  11/23/20 10:14





 


 Chlorhexidine


 Gluconate


  (Jess-Hex 2%)  1 applic  DAILY@2000


 TOPIC


   11/6/20 20:00


 2/4/21 19:59  11/27/20 20:25





 


 Dexamethasone


  (Decadron)  6 mg  Q24H


 ORAL


   11/22/20 18:00


 12/1/20 18:01  11/27/20 17:59





 


 Dextrose


  (Dextrose 50%)  50 ml  Q30M  PRN


 IV


 Hypoglycemia  10/23/20 22:45


 1/21/21 22:44   





 


 Heparin Sodium


  (Porcine)


  (Heparin 5000


 units/ml)  5,000 units  EVERY 12  HOURS


 SUBQ


   10/23/20 21:00


 12/7/20 20:59  11/28/20 09:25





 


 Insulin Aspart


  (NovoLOG)    EVERY 6  HOURS


 SUBQ


   11/2/20 06:00


 1/22/21 06:29  11/27/20 05:09





 


 Meropenem 1 gm/


 Sodium Chloride  55 ml @ 


 110 mls/hr  Q8H


 IVPB


   11/21/20 10:00


 11/30/20 09:59  11/28/20 09:24





 


 Nitroglycerin


  (Ntg)  0.4 mg  Q5M  PRN


 SL


 Prn Chest Pain  11/21/20 10:15


 12/21/20 10:09   





 


 Ondansetron HCl


  (Zofran)  4 mg  Q6H  PRN


 IVP


 Nausea & Vomiting  11/21/20 14:30


 12/21/20 14:29   





 


 Pantoprazole


  (Protonix)  40 mg  DAILY


 IV


   11/7/20 09:00


 12/7/20 08:59  11/28/20 09:23





 


 Polyethylene


 Glycol


  (Miralax)  17 gm  DAILYPRN  PRN


 GT


 Constipation  11/21/20 10:15


 12/21/20 10:14   





 


 Promethazine HCl/


 Codeine


  (Phenergan with


 Codeine)  5 ml  Q4H  PRN


 GT


 For Cough  11/21/20 10:14


 12/21/20 10:13  11/25/20 22:05





 


 Sodium Chloride  1,000 ml @ 


 50 mls/hr  Q20H


 IV


   11/6/20 16:00


 12/6/20 15:59  11/28/20 05:52




















Heath Merrill MD            Nov 28, 2020 09:34

## 2020-11-28 NOTE — NUR
NURSE NOTES:

Scheduled NovoLog held due to BG of 125mg/dL noted and therefore out of parameter ranges for 
sliding scale. Pt continues to tolerate feeding well and shows no s/sx of hyperglycemia. 
Bedside assessment performed, assessed pt for pain with a FLACC score of 0 noted. VS 
obtained and remain stable at this time and no s/sx fo cardiopulmonary distress noted at 
this time. Fall, Aspiration and Skin precautions observed. Pt remains resting in bed; Bed 
remains in the lowest position with the safety wheels engaged, call light within reach, side 
rails up x3 and bed alarm activated. Will continue plan of care. Will continue to monitor.

## 2020-11-28 NOTE — NUR
NURSE NOTES:

Received patient and report from LITA Clark. Patient is observed resting in bed and 
remains obtunded. No pain noted upon assessment. Pt is currently orally intubated ett size 
7.5 noted to be 25 @ lip line. Pt appears to be tolerating current vent settings well. Vent 
settings as follows: AC 12  FiO2 30% PEEP 0. Bilateral lower lobe breath sounds noted 
to be mildly diminished upon auscultation. Pt noted to be SR on tele monitor with a HR of 
60. R upper arm PICC line noted which remains asymptomatic, intact and patent. Central line 
dressing changed per protocol. Central line dressing remains clean, dry and intact. 1/2NS 
currently infusing at 50 mL/hr as ordered. VS remain stable at this time. Active bowel 
sounds noted in all four quadrants; abdomen remains round and soft. L nare NG Tube noted 
which remains intact and secured. Glucerna 1.2 continues to infuse at 60mL/hr, pt tolerating 
feeding well. Cummings catheter noted draining pale yellow urine to gravity. Diagnostics 
reviewed at bedside. Skin alterations noted. Fall, Aspiration and Skin precautions observed. 
Pt remains resting in bed; Bed remains in the lowest position with the safety wheels 
engaged, call light within reach, side rails up x3 and bed alarm activated. Will continue 
plan of care. Will continue to monitor.

## 2020-11-28 NOTE — NUR
NURSE NOTES:

Bedside assessment performed, assessed pt for pain with a FLACC score of 0 noted. VS 
obtained and remain stable at this time. No s/sx of cardiopulmonary distress noted at this 
time. Fall, Aspiration and Skin precautions observed. Pt remains resting in bed; Bed remains 
in the lowest position with the safety wheels engaged, call light within reach, side rails 
up x3 and bed alarm activated. Will continue plan of care. Will continue to monitor.

## 2020-11-28 NOTE — INTERNAL MED PROGRESS NOTE
Subjective


Date of Service:  Nov 28, 2020


Physician Name


NadiyaBenito


Attending Physician


Andrei Cancino MD





Current Medications








 Medications


  (Trade)  Dose


 Ordered  Sig/Miky


 Route


 PRN Reason  Start Time


 Stop Time Status Last Admin


Dose Admin


 


 Acetaminophen


  (Tylenol)  650 mg  Q4H  PRN


 GT


 Temp >100.5  11/21/20 12:30


 12/21/20 12:29  11/23/20 10:14





 


 Chlorhexidine


 Gluconate


  (Jess-Hex 2%)  1 applic  DAILY@2000


 TOPIC


   11/6/20 20:00


 2/4/21 19:59  11/27/20 20:25





 


 Dexamethasone


  (Decadron)  6 mg  Q24H


 ORAL


   11/22/20 18:00


 12/1/20 18:01  11/27/20 17:59





 


 Dextrose


  (Dextrose 50%)  50 ml  Q30M  PRN


 IV


 Hypoglycemia  10/23/20 22:45


 1/21/21 22:44   





 


 Heparin Sodium


  (Porcine)


  (Heparin 5000


 units/ml)  5,000 units  EVERY 12  HOURS


 SUBQ


   10/23/20 21:00


 12/7/20 20:59  11/28/20 09:25





 


 Insulin Aspart


  (NovoLOG)    EVERY 6  HOURS


 SUBQ


   11/2/20 06:00


 1/22/21 06:29  11/27/20 05:09





 


 Meropenem 1 gm/


 Sodium Chloride  55 ml @ 


 110 mls/hr  Q8H


 IVPB


   11/21/20 10:00


 11/30/20 09:59  11/28/20 09:24





 


 Nitroglycerin


  (Ntg)  0.4 mg  Q5M  PRN


 SL


 Prn Chest Pain  11/21/20 10:15


 12/21/20 10:09   





 


 Ondansetron HCl


  (Zofran)  4 mg  Q6H  PRN


 IVP


 Nausea & Vomiting  11/21/20 14:30


 12/21/20 14:29   





 


 Pantoprazole


  (Protonix)  40 mg  DAILY


 IV


   11/7/20 09:00


 12/7/20 08:59  11/28/20 09:23





 


 Polyethylene


 Glycol


  (Miralax)  17 gm  DAILYPRN  PRN


 GT


 Constipation  11/21/20 10:15


 12/21/20 10:14   





 


 Promethazine HCl/


 Codeine


  (Phenergan with


 Codeine)  5 ml  Q4H  PRN


 GT


 For Cough  11/21/20 10:14


 12/21/20 10:13  11/25/20 22:05





 


 Sodium Chloride  1,000 ml @ 


 50 mls/hr  Q20H


 IV


   11/6/20 16:00


 12/6/20 15:59  11/28/20 05:52











Allergies:  


Coded Allergies:  


     No Known Allergies (Unverified , 8/20/12)


ROS Limited/Unobtainable:  Yes


Subjective


74 YO M admitted with shortness of breath.  Now pneumonia; previously COVID 

positive.  Cover for Int kierra-Dr Cancino.  ICU.  Intubated and sedated.





Objective





Last Vital Signs








  Date Time  Temp Pulse Resp B/P (MAP) Pulse Ox O2 Delivery O2 Flow Rate FiO2


 


11/28/20 14:00  66 12 112/69 (83) 99   


 


11/28/20 12:14        30


 


11/28/20 12:00      Mechanical Ventilator  





      Mechanical Ventilator  





      Mechanical Ventilator  


 


11/28/20 12:00 98.3       











Laboratory Tests








Test


 11/28/20


06:20 11/28/20


06:30


 


Sodium Level


 139 MMOL/L


(136-145) 





 


Potassium Level


 4.3 MMOL/L


(3.5-5.1) 





 


Chloride Level


 106 MMOL/L


() 





 


Carbon Dioxide Level


 26 MMOL/L


(21-32) 





 


Anion Gap


 8 mmol/L


(5-15) 





 


Blood Urea Nitrogen


 24 mg/dL


(7-18)  H 





 


Creatinine


 0.8 MG/DL


(0.55-1.30) 





 


Estimat Glomerular Filtration


Rate > 60 mL/min


(>60) 





 


Glucose Level


 126 MG/DL


()  H 





 


Calcium Level


 8.1 MG/DL


(8.5-10.1)  L 





 


Phosphorus Level


 3.6 MG/DL


(2.5-4.9) 





 


Magnesium Level


 2.4 MG/DL


(1.8-2.4) 





 


Total Bilirubin


 0.3 MG/DL


(0.2-1.0) 





 


Aspartate Amino Transf


(AST/SGOT) 34 U/L (15-37)


 





 


Alanine Aminotransferase


(ALT/SGPT) 70 U/L (12-78)


 





 


Alkaline Phosphatase


 222 U/L


()  H 





 


C-Reactive Protein,


Quantitative 0.9 mg/dL


(0.00-0.90) 





 


Total Protein


 6.8 G/DL


(6.4-8.2) 





 


Albumin


 1.9 G/DL


(3.4-5.0)  L 





 


Globulin 4.9 g/dL   


 


Albumin/Globulin Ratio


 0.4 (1.0-2.7)


L 





 


White Blood Count


 


 11.2 K/UL


(4.8-10.8)  #H


 


Red Blood Count


 


 3.56 M/UL


(4.70-6.10)  L


 


Hemoglobin


 


 11.1 G/DL


(14.2-18.0)  L


 


Hematocrit


 


 32.4 %


(42.0-52.0)  L


 


Mean Corpuscular Volume  91 FL (80-99)  


 


Mean Corpuscular Hemoglobin


 


 31.1 PG


(27.0-31.0)  H


 


Mean Corpuscular Hemoglobin


Concent 


 34.1 G/DL


(32.0-36.0)


 


Red Cell Distribution Width


 


 18.0 %


(11.6-14.8)  H


 


Platelet Count


 


 399 K/UL


(150-450)


 


Mean Platelet Volume


 


 8.0 FL


(6.5-10.1)


 


Neutrophils (%) (Auto)


 


 73.9 %


(45.0-75.0)


 


Lymphocytes (%) (Auto)


 


 16.9 %


(20.0-45.0)  L


 


Monocytes (%) (Auto)


 


 6.8 %


(1.0-10.0)


 


Eosinophils (%) (Auto)


 


 0.6 %


(0.0-3.0)


 


Basophils (%) (Auto)


 


 1.8 %


(0.0-2.0)


 


Erythrocyte Sedimentation Rate


 


 77 MM/HR


(0-20)  H

















Intake and Output  


 


 11/27/20 11/28/20





 19:00 07:00


 


Intake Total 1375 ml 1434.167 ml


 


Output Total 1570 ml 1500 ml


 


Balance -195 ml -65.833 ml


 


  


 


Free Water 50 ml 60 ml


 


IV Total 605 ml 654.167 ml


 


Tube Feeding 720 ml 720 ml


 


Output Urine Total 1570 ml 1500 ml


 


# Bowel Movements  2








Objective


PHYSICAL EXAMINATION:


GENERAL:  The patient awake with deep stimuli, open his eyes, however,


cannot follow commands.  The patient is on a Ventimask at this time,


chronically ill-appearing.


HEAD AND NECK:  Pupils are equal and reactive to light.  Anicteric.


NECK:  Supple.  No JVD.


LUNGS:  Mech vent;  wheezing, rhonchi, and decreased air in bases.


HEART:  S1, S2.  Regular rhythm.  Distant heart sounds.  No murmur or


gallop.


ABDOMEN:  Soft, nondistended, nontender.  Positive bowel sounds.


EXTREMITIES:  No cyanosis, clubbing, or edema.


NEUROLOGIC:  Very limited secondary to the patient's status, cannot follow


commands.  Opens his eyes with deep stimuli and moving extremities


spontaneously.





Assessment/Plan


Assessment/Plan


ASSESSMENT:


1. Acute hypoxemic respiratory failure, most likely secondary to


pneumonia and sepsis.


2. Sepsis secondary to urinary tract infection and pneumonia.


3. pneumonia=MRSA; ESBL E. coli


4. Acute kidney injury on chronic renal insufficiency.


5. Dehydration.


6. History of chronic congestive heart failure.


7. Diabetes type 2.


8. Dyslipidemia.


9. Hypertension.


10. Alzheimer's disease.


11. COVID 19 previous positive





PLAN:


1.  ICU


2.  Dr. Sandoval,=Pulmonary Critical Care; follow [facility] recs


3.  Dr. Garcia = Inf Dis.


4.  IV= D5W due to the hypernatremia and dehydration.


5.  antibiotics =   meropenem; S/P micafungin and zyvox


6.  Code status is full code.


7.    DVT prophylaxis is heparin subcutaneous.











Benito Hearn MD               Nov 28, 2020 15:15

## 2020-11-28 NOTE — CARDIAC ELECTROPHYSIOLOGY PN
Assessment/Plan


Assessment/Plan


1. Accelerated junctional rhythm. Ruled out for MI


      Echo showed ejection fraction of 55%.





2. Long run of 31 beats of Nonsustained VT on 11/3/2020. 


     Cardiac cath after stabilization.





3. Henry 30, Asystole while on the Vent due to resp failure/ likely mucus plug .

S/P Code


 Off midodrine





4. Respiratory failure, on antibiotic and intubated on the vent 


   Failed weaning. Family refused tracheostomy. On 30% Fio2





5. Septic shock. Off Levophed   





6. History of previous COVID infection in September 2020 and active Covid  in 

isolation


Now negative again and off isolation.





7. Dementia.





DW RN and Dr luna





Subjective


Subjective


  In ICU on the Venton  Fio2 30%, PEEP 5. Off isolation


 Had 31 beats of VT  on 11/3/20  and 5 beats 11/8/20 


 Coded on 11/12/20 as sat dropped to 20% and got henry 30s and PEA and pulseless




 Off Levophed 


   Covid test was negative.  


    Failed weaning   Family still refusing tracheostomy.





Objective





Last 24 Hour Vital Signs








  Date Time  Temp Pulse Resp B/P (MAP) Pulse Ox O2 Delivery O2 Flow Rate FiO2


 


11/28/20 15:13  64 12     30


 


11/28/20 15:00  61 15 104/60 (75) 100   


 


11/28/20 14:00  66 12 112/69 (83) 99   


 


11/28/20 13:00  69 20 95/59 (71) 99   


 


11/28/20 12:29     100   


 


11/28/20 12:14  70 12     30


 


11/28/20 12:00      Mechanical Ventilator  





      Mechanical Ventilator  





      Mechanical Ventilator  


 


11/28/20 12:00  71      


 


11/28/20 12:00  78 12 95/62 (73) 99   


 


11/28/20 12:00 98.3 78 12 95/62 (73) 99   


 


11/28/20 12:00        30


 


11/28/20 11:00  68 15 118/67 (84) 99   


 


11/28/20 10:00  76 18 144/89 (107) 98   


 


11/28/20 09:00  88 21 167/98 (121) 98   


 


11/28/20 08:00  74      


 


11/28/20 08:00 97.8 72 15 124/67 (86) 100   


 


11/28/20 08:00        30


 


11/28/20 08:00      Mechanical Ventilator  





      Mechanical Ventilator  





      Mechanical Ventilator  


 


11/28/20 07:22  70 12     30


 


11/28/20 07:00  79 13 119/72 (88) 99   


 


11/28/20 06:30  74 12     


 


11/28/20 06:00  78 15 143/71 (95) 99   


 


11/28/20 05:00  80 16 112/74 (87) 98   


 


11/28/20 04:00        30


 


11/28/20 04:00      Mechanical Ventilator  





      Mechanical Ventilator  





      Mechanical Ventilator  


 


11/28/20 04:00 97.4 89 16 147/82 (103) 98   


 


11/28/20 03:35  60      


 


11/28/20 03:14  89 16     30


 


11/28/20 03:00  77 25 137/80 (99) 99   


 


11/28/20 02:00  78 23 139/91 (107) 99   


 


11/28/20 01:00  86 22 139/73 (95) 98   


 


11/28/20 00:00      Mechanical Ventilator  





      Mechanical Ventilator  





      Mechanical Ventilator  


 


11/28/20 00:00 97.6 72 16 130/67 (88) 98   


 


11/28/20 00:00        30


 


11/27/20 23:17  60      


 


11/27/20 23:00  66 16 146/85 (105) 99   


 


11/27/20 22:58  66 13     30


 


11/27/20 22:00  62 18 123/66 (85) 99   


 


11/27/20 21:00  64 16 141/73 (95) 100   


 


11/27/20 20:00      Mechanical Ventilator  





      Mechanical Ventilator  





      Mechanical Ventilator  


 


11/27/20 20:00 97.3 60 11 130/76 (94) 100   


 


11/27/20 20:00        30


 


11/27/20 19:47  60      


 


11/27/20 19:08  58 12     30


 


11/27/20 19:00  61 13 120/62 (81) 99   


 


11/27/20 18:01  60 12 124/68 (86) 98   


 


11/27/20 17:00  58 12 128/70 (89) 99   


 


11/27/20 16:00 97.2 59 12 138/86 (103) 100   


 


11/27/20 16:00  59      


 


11/27/20 16:00        30


 


11/27/20 16:00      Mechanical Ventilator  





      Mechanical Ventilator  





      Mechanical Ventilator  

















Intake and Output  


 


 11/27/20 11/28/20





 19:00 07:00


 


Intake Total 1375 ml 1434.167 ml


 


Output Total 1570 ml 1500 ml


 


Balance -195 ml -65.833 ml


 


  


 


Free Water 50 ml 60 ml


 


IV Total 605 ml 654.167 ml


 


Tube Feeding 720 ml 720 ml


 


Output Urine Total 1570 ml 1500 ml


 


# Bowel Movements  2











Laboratory Tests








Test


 11/28/20


06:20 11/28/20


06:30


 


Sodium Level


 139 MMOL/L


(136-145) 





 


Potassium Level


 4.3 MMOL/L


(3.5-5.1) 





 


Chloride Level


 106 MMOL/L


() 





 


Carbon Dioxide Level


 26 MMOL/L


(21-32) 





 


Anion Gap


 8 mmol/L


(5-15) 





 


Blood Urea Nitrogen


 24 mg/dL


(7-18)  H 





 


Creatinine


 0.8 MG/DL


(0.55-1.30) 





 


Estimat Glomerular Filtration


Rate > 60 mL/min


(>60) 





 


Glucose Level


 126 MG/DL


()  H 





 


Calcium Level


 8.1 MG/DL


(8.5-10.1)  L 





 


Phosphorus Level


 3.6 MG/DL


(2.5-4.9) 





 


Magnesium Level


 2.4 MG/DL


(1.8-2.4) 





 


Total Bilirubin


 0.3 MG/DL


(0.2-1.0) 





 


Aspartate Amino Transf


(AST/SGOT) 34 U/L (15-37)


 





 


Alanine Aminotransferase


(ALT/SGPT) 70 U/L (12-78)


 





 


Alkaline Phosphatase


 222 U/L


()  H 





 


C-Reactive Protein,


Quantitative 0.9 mg/dL


(0.00-0.90) 





 


Total Protein


 6.8 G/DL


(6.4-8.2) 





 


Albumin


 1.9 G/DL


(3.4-5.0)  L 





 


Globulin 4.9 g/dL   


 


Albumin/Globulin Ratio


 0.4 (1.0-2.7)


L 





 


White Blood Count


 


 11.2 K/UL


(4.8-10.8)  #H


 


Red Blood Count


 


 3.56 M/UL


(4.70-6.10)  L


 


Hemoglobin


 


 11.1 G/DL


(14.2-18.0)  L


 


Hematocrit


 


 32.4 %


(42.0-52.0)  L


 


Mean Corpuscular Volume  91 FL (80-99)  


 


Mean Corpuscular Hemoglobin


 


 31.1 PG


(27.0-31.0)  H


 


Mean Corpuscular Hemoglobin


Concent 


 34.1 G/DL


(32.0-36.0)


 


Red Cell Distribution Width


 


 18.0 %


(11.6-14.8)  H


 


Platelet Count


 


 399 K/UL


(150-450)


 


Mean Platelet Volume


 


 8.0 FL


(6.5-10.1)


 


Neutrophils (%) (Auto)


 


 73.9 %


(45.0-75.0)


 


Lymphocytes (%) (Auto)


 


 16.9 %


(20.0-45.0)  L


 


Monocytes (%) (Auto)


 


 6.8 %


(1.0-10.0)


 


Eosinophils (%) (Auto)


 


 0.6 %


(0.0-3.0)


 


Basophils (%) (Auto)


 


 1.8 %


(0.0-2.0)


 


Erythrocyte Sedimentation Rate


 


 77 MM/HR


(0-20)  H








Objective


HEAD AND NECK:  No JVD. Orally intubated


LUNGS:  Coarse rhonchi.


CARDIOVASCULAR:   Irregular S1 and S2 with no gallop.


ABDOMEN:  Soft.


EXTREMITIES:  No pitting edema.











Kvng Edmond MD               Nov 28, 2020 15:29

## 2020-11-28 NOTE — PULMONOLGY CRITICAL CARE NOTE
Critical Care - Asmt/Plan


Problems:  


(1) Acute respiratory failure


(2) Multifocal pneumonia


(3) 2019 novel coronavirus disease (COVID-19)


(4) Acute encephalopathy


(5) Severe sepsis


(6) Alzheimer's dementia


(7) HTN (hypertension)


(8) History of CVA (cerebrovascular accident)


(9) BPH (benign prostatic hyperplasia)


Assessment/Plan:


pt's daughter didn't agree with tracheostomy.   She is thinking about DNR


Respiratory:  adjust tidal volume, monitor respiratory rate, adjust FIO2


Cardiac:  continue to monitor HR/BP


Renal:  F/U I&O, check electrolytes


Infectious Disease:  check cultures


Gastrointestinal:  continue feedings/current rate


Endocrine:  monitor blood sugar


Hematologic:  transfuse if hgb<8.5


Neurologic:  PRN Ativan, keep patient comfortable


Affect:  PRN ativan


Prophylaxis:  Heparin


Time Spent (Minutes):  40





Critical Care - Objective





Last 24 Hour Vital Signs








  Date Time  Temp Pulse Resp B/P (MAP) Pulse Ox O2 Delivery O2 Flow Rate FiO2


 


11/28/20 18:00  70 14 95/63 (74) 100   


 


11/28/20 17:00  67 13 97/65 (76) 100   


 


11/28/20 16:00        30


 


11/28/20 16:00      Mechanical Ventilator  





      Mechanical Ventilator  





      Mechanical Ventilator  


 


11/28/20 16:00  65      


 


11/28/20 16:00 98.7 68 15 115/65 (82) 100   


 


11/28/20 15:13  64 12     30


 


11/28/20 15:00  61 15 104/60 (75) 100   


 


11/28/20 14:00  66 12 112/69 (83) 99   


 


11/28/20 13:00  69 20 95/59 (71) 99   


 


11/28/20 12:29     100   


 


11/28/20 12:14  70 12     30


 


11/28/20 12:00      Mechanical Ventilator  





      Mechanical Ventilator  





      Mechanical Ventilator  


 


11/28/20 12:00  71      


 


11/28/20 12:00  78 12 95/62 (73) 99   


 


11/28/20 12:00 98.3 78 12 95/62 (73) 99   


 


11/28/20 12:00        30


 


11/28/20 11:00  68 15 118/67 (84) 99   


 


11/28/20 10:00  76 18 144/89 (107) 98   


 


11/28/20 09:00  88 21 167/98 (121) 98   


 


11/28/20 08:00  74      


 


11/28/20 08:00 97.8 72 15 124/67 (86) 100   


 


11/28/20 08:00        30


 


11/28/20 08:00      Mechanical Ventilator  





      Mechanical Ventilator  





      Mechanical Ventilator  


 


11/28/20 07:22  70 12     30


 


11/28/20 07:00  79 13 119/72 (88) 99   


 


11/28/20 06:30  74 12     


 


11/28/20 06:00  78 15 143/71 (95) 99   


 


11/28/20 05:00  80 16 112/74 (87) 98   


 


11/28/20 04:00        30


 


11/28/20 04:00      Mechanical Ventilator  





      Mechanical Ventilator  





      Mechanical Ventilator  


 


11/28/20 04:00 97.4 89 16 147/82 (103) 98   


 


11/28/20 03:35  60      


 


11/28/20 03:14  89 16     30


 


11/28/20 03:00  77 25 137/80 (99) 99   


 


11/28/20 02:00  78 23 139/91 (107) 99   


 


11/28/20 01:00  86 22 139/73 (95) 98   


 


11/28/20 00:00      Mechanical Ventilator  





      Mechanical Ventilator  





      Mechanical Ventilator  


 


11/28/20 00:00 97.6 72 16 130/67 (88) 98   


 


11/28/20 00:00        30


 


11/27/20 23:17  60      


 


11/27/20 23:00  66 16 146/85 (105) 99   


 


11/27/20 22:58  66 13     30


 


11/27/20 22:00  62 18 123/66 (85) 99   


 


11/27/20 21:00  64 16 141/73 (95) 100   


 


11/27/20 20:00      Mechanical Ventilator  





      Mechanical Ventilator  





      Mechanical Ventilator  


 


11/27/20 20:00 97.3 60 11 130/76 (94) 100   


 


11/27/20 20:00        30


 


11/27/20 19:47  60      


 


11/27/20 19:08  58 12     30


 


11/27/20 19:00  61 13 120/62 (81) 99   








Status:  sedated


HEENT:  atraumatic, normocephalic


Heart:  HR/BP stable


Abdomen:  soft


Objective:


still large secretions, failed weaning


Accucheck:  124





Critical Care - Subjective


ROS Limited/Unobtainable:  Yes


Condition:  critical


FI02:  30


Vent Support Breath Rate:  12


Vent Support Mode:  AC


Vent Tidal Volume:  600


Sputum Amount:  Moderate


PEEP:  0.0


PIP:  26


Tube Feeding Amount:  60


I&O:











Intake and Output  


 


 11/27/20 11/28/20





 19:00 07:00


 


Intake Total 1375 ml 1434.167 ml


 


Output Total 1570 ml 1500 ml


 


Balance -195 ml -65.833 ml


 


  


 


Free Water 50 ml 60 ml


 


IV Total 605 ml 654.167 ml


 


Tube Feeding 720 ml 720 ml


 


Output Urine Total 1570 ml 1500 ml


 


# Bowel Movements  2








CXR:


1.  Tiny residual left pleural effusion is improved from prior study.   Maybe a 

tiny right pleural effusion.


2.  Left lung base opacities have slightly improved.  Mild interstitial  

thickening.  Atelectasis in the right lower lung.


ET-Tube:  7.2


ET Position:  25


Labs:





Laboratory Tests








Test


 11/28/20


06:20 11/28/20


06:30 11/28/20


17:40


 


Sodium Level


 139 MMOL/L


(136-145) 


 





 


Potassium Level


 4.3 MMOL/L


(3.5-5.1) 


 





 


Chloride Level


 106 MMOL/L


() 


 





 


Carbon Dioxide Level


 26 MMOL/L


(21-32) 


 





 


Anion Gap


 8 mmol/L


(5-15) 


 





 


Blood Urea Nitrogen


 24 mg/dL


(7-18)  H 


 





 


Creatinine


 0.8 MG/DL


(0.55-1.30) 


 





 


Estimat Glomerular Filtration


Rate > 60 mL/min


(>60) 


 





 


Glucose Level


 126 MG/DL


()  H 


 





 


Calcium Level


 8.1 MG/DL


(8.5-10.1)  L 


 





 


Phosphorus Level


 3.6 MG/DL


(2.5-4.9) 


 





 


Magnesium Level


 2.4 MG/DL


(1.8-2.4) 


 





 


Total Bilirubin


 0.3 MG/DL


(0.2-1.0) 


 





 


Aspartate Amino Transf


(AST/SGOT) 34 U/L (15-37)


 


 





 


Alanine Aminotransferase


(ALT/SGPT) 70 U/L (12-78)


 


 





 


Alkaline Phosphatase


 222 U/L


()  H 


 





 


C-Reactive Protein,


Quantitative 0.9 mg/dL


(0.00-0.90) 


 





 


Total Protein


 6.8 G/DL


(6.4-8.2) 


 





 


Albumin


 1.9 G/DL


(3.4-5.0)  L 


 





 


Globulin 4.9 g/dL    


 


Albumin/Globulin Ratio


 0.4 (1.0-2.7)


L 


 





 


White Blood Count


 


 11.2 K/UL


(4.8-10.8)  #H 





 


Red Blood Count


 


 3.56 M/UL


(4.70-6.10)  L 





 


Hemoglobin


 


 11.1 G/DL


(14.2-18.0)  L 





 


Hematocrit


 


 32.4 %


(42.0-52.0)  L 





 


Mean Corpuscular Volume  91 FL (80-99)   


 


Mean Corpuscular Hemoglobin


 


 31.1 PG


(27.0-31.0)  H 





 


Mean Corpuscular Hemoglobin


Concent 


 34.1 G/DL


(32.0-36.0) 





 


Red Cell Distribution Width


 


 18.0 %


(11.6-14.8)  H 





 


Platelet Count


 


 399 K/UL


(150-450) 





 


Mean Platelet Volume


 


 8.0 FL


(6.5-10.1) 





 


Neutrophils (%) (Auto)


 


 73.9 %


(45.0-75.0) 





 


Lymphocytes (%) (Auto)


 


 16.9 %


(20.0-45.0)  L 





 


Monocytes (%) (Auto)


 


 6.8 %


(1.0-10.0) 





 


Eosinophils (%) (Auto)


 


 0.6 %


(0.0-3.0) 





 


Basophils (%) (Auto)


 


 1.8 %


(0.0-2.0) 





 


Erythrocyte Sedimentation Rate


 


 77 MM/HR


(0-20)  H 





 


POC Whole Blood Glucose


 


 


 124 MG/DL


()  H

















Michael Sandoval MD              Nov 28, 2020 18:52

## 2020-11-28 NOTE — NUR
NURSE NOTES:

LATE ENTRY:

MD. CONSTABLE HERE TO SEE PT. WAS INFORMED OF WBC INCREASE 11.2, A FEBRILE. SECRETIONS 
MINIMAL. WILL WEAN TODAY. NO NEW ORDERS AT THIS TIME.

## 2020-11-28 NOTE — NUR
NURSE NOTES:

MD. ROBISON HERE TO SEE PT. NO NEW ORDERS AT THIS TIME. 

-------------------------------------------------------------------------------

Addendum: 11/28/20 at 1433 by Amber Morris RN

-------------------------------------------------------------------------------

NURSE NOTES:

WRONG PT.

## 2020-11-28 NOTE — NEPHROLOGY PROGRESS NOTE
Assessment/Plan


Problem List:  


(1) Dehydration


(2) JANES (acute kidney injury)


(3) Renal failure (ARF), acute on chronic


(4) Hypoxia


(5) Acute encephalopathy


(6) Electrolyte imbalance


Assessment





75-year-old male is admitted with acute hypoxic respiratory failure most likely 

secondary to pneumonia and sepsis, UTI.


Acute on chronic renal failure


Dehydration


Electrolyte imbalances, hypernatremia


Hypoalbuminemia


Diabetes type 2


History of congestive heart failure


Hypertension


Hyperlipemia


Alzheimer's


Previous COVID-19 infection in September 2020


Plan


November 28: Remains in ICU.  Status unchanged.  Labs and medication list 

reviewed.  Remains full code.  Weaning continues to fail.


November 27: Remains in ICU.  Remains intubated.  Remains full code.  Continues 

to be on weaning trials.  Renal parameters are stable.


November 26: Patient remains in ICU.  Full code.  Intubated.  Fails weaning.  

Labs reviewed.  Stable from renal standpoint of view.  Discussed with RN.


November 25: Labs reviewed.  Discussed with RN.  Abnormal electrolyte addressed.

 Remains intubated on ventilator.  Continue per consultants.


November 24: Labs reviewed.  Discussed with RN.  Stable from renal standpoint of

view.  Main issue is weaning from ventilator that keeps failing.  Continue per 

consultants.


November 23: Labs reviewed.  Discussed with RN.  Stable from renal standpoint of

view.  Continue per consultants.


November 22: Patient seen and discussed with RN.  Labs reviewed.  Remains 

intubated.  Trial of weaning this being attempted.  Continue per consultants.


November 21: Remains intubated.  Labs reviewed.  Renal parameters stable.  

Continue per consultants.


November 20: Remains on mechanical ventilation.  Labs reviewed.  Abnormal 

electrolytes addressed.  Stable from renal standpoint of view.


November 19: Remains intubated.  Remains full code.  Labs reviewed.  Low 

phosphorus replaced.  Continue to monitor renal parameters.  Continue per 

consultants.


November 18: Failed weaning.  Remains intubated.  Remains full closed.  Labs 

reviewed.  Stable from renal standpoint view.


November 17: Remains in ICU.  Remains full code.  Labs are reviewed.  Phosphorus

supplement given.  Continue per consultants.


November 16: Remains in ICU.  Full code.  Labs reviewed.  Remains intubated.  

Stable renal parameters.


November 15: In ICU.  Full code.  Discussed with RN.  Stable renal parameters.


November 14: Seen in ICU.  Discussed with LITA Cooper.  Flomax discontinued.  

Labs reviewed.  Stable from renal standpoint of view.


November 13: Seen in ICU.  Discussed with LITA Cooper.  Remains on pressor.  El

ectrolyte abnormalities noted and addressed.  Continue per consultants.  Patient

remains full code.


November 12: Seen in ICU.  Discussed with LITA Richardson.  Blood pressure remains a 

little requiring pressors.  Labs reviewed.  Stable renal parameters.  Continue 

per consultants.


November 11: Remains intubated.  Full code.  Blood pressure borderline low.  

Will increase midodrine dose.  Discussed with RN.  Continue to monitor renal 

parameters and electrolytes.


November 10: Intubated.  Full code.  Labs reviewed.  Stable from renal 

standpoint of view.  Continue per consultants.


November 9: Remains intubated.  Full code.  Labs reviewed.  Electrolytes within 

normal limit.  Continue per consultants.


November 8: In ICU.  Remains intubated on ventilator.  Remains full code.  Low 

potassium addressed.  Blood pressure fluctuating.  Continue per consultants.


November 7: Patient in ICU.  Intubated on ventilator.  On 6 mics of Levophed.  

Will give 100 cc albumin 25%.  K-Phos IV ordered.  Continue per consultants.  

Continue monitor renal parameters and electrolytes.


November 6: Status unchanged.  Transfer to [facility] for seizure.  Stable from renal 

standpoint to view.  Continue per consultants.


November 5: Status quo.  Labs reviewed.  Renal parameters stable.  Continue per 

consultants.


November 4: On nonrebreather mask.  Labs reviewed.  Renal parameters 

stable.Continue per pulmonologist.  Clinically unchanged.


November 3: On nonrebreather mask.  Inflammatory markers gradually declining.  

Renal parameters stable.  Continue per pulmonary.


November 2: Remains on nonrebreather mask.  Inflammatory markers remain 

elevated.  Renal parameters somewhat stable.  Continue per pulmonary and ID.  

Remains full code.


November 1: Patient on nonrebreather mask.  Labs noted.  Serum creatinine down 

to 1.3.  Continue per consultants.


October 31: Patient on nonrebreather mask.  Transfer to telemetry when seen this

morning.  Labs noted.  Continue per consultants.  Serum creatinine dylan to 1.7. 

Continue to monitor renal parameters.  Continue to monitor vancomycin level


October 30: Patient is not doing well clinically.  Mild respiratory distress.  

ABG noted.  Somewhat hypoxic.  CBC and chemistry panel ordered.  Discussed with 

LITA Garcia.  Will defer to pulmonary management to specialist.  Continue per ID.

 Renal parameters remained stable as of October 29.


October 29: Labs reviewed.  Renal parameters stable.  Continue per consultants.


October 28: Labs reviewed.  Potassium via NG tube ordered.  IV fluids stopped.  

Continue per consultants.


October 27: Labs reviewed.  Low potassium and low phosphorus replaced.  Continue

per consultants.  Remains stable from renal standpoint of view.


October 26: Patient remains n.p.o.  IV fluid down to 50 cc an hour.  Potassium 

supplement intravenously ordered.  Continue per consultants.





Previously:


Patient is n.p.o., will continue on IV fluid of D5W 75 cc an hour


We will monitor electrolytes and renal parameters


Avoid nephrotoxic's


Start p.o. when he clears by speech therapist, meanwhile aspiration precautions


Keep the blood pressure and blood sugar in check


Per orders





Subjective


ROS Limited/Unobtainable:  Yes





Objective


Objective





Last 24 Hour Vital Signs








  Date Time  Temp Pulse Resp B/P (MAP) Pulse Ox O2 Delivery O2 Flow Rate FiO2


 


11/28/20 15:13  64 12     30


 


11/28/20 15:00  61 15 104/60 (75) 100   


 


11/28/20 14:00  66 12 112/69 (83) 99   


 


11/28/20 13:00  69 20 95/59 (71) 99   


 


11/28/20 12:29     100   


 


11/28/20 12:14  70 12     30


 


11/28/20 12:00      Mechanical Ventilator  





      Mechanical Ventilator  





      Mechanical Ventilator  


 


11/28/20 12:00  71      


 


11/28/20 12:00  78 12 95/62 (73) 99   


 


11/28/20 12:00 98.3 78 12 95/62 (73) 99   


 


11/28/20 12:00        30


 


11/28/20 11:00  68 15 118/67 (84) 99   


 


11/28/20 10:00  76 18 144/89 (107) 98   


 


11/28/20 09:00  88 21 167/98 (121) 98   


 


11/28/20 08:00  74      


 


11/28/20 08:00 97.8 72 15 124/67 (86) 100   


 


11/28/20 08:00        30


 


11/28/20 08:00      Mechanical Ventilator  





      Mechanical Ventilator  





      Mechanical Ventilator  


 


11/28/20 07:22  70 12     30


 


11/28/20 07:00  79 13 119/72 (88) 99   


 


11/28/20 06:30  74 12     


 


11/28/20 06:00  78 15 143/71 (95) 99   


 


11/28/20 05:00  80 16 112/74 (87) 98   


 


11/28/20 04:00        30


 


11/28/20 04:00      Mechanical Ventilator  





      Mechanical Ventilator  





      Mechanical Ventilator  


 


11/28/20 04:00 97.4 89 16 147/82 (103) 98   


 


11/28/20 03:35  60      


 


11/28/20 03:14  89 16     30


 


11/28/20 03:00  77 25 137/80 (99) 99   


 


11/28/20 02:00  78 23 139/91 (107) 99   


 


11/28/20 01:00  86 22 139/73 (95) 98   


 


11/28/20 00:00      Mechanical Ventilator  





      Mechanical Ventilator  





      Mechanical Ventilator  


 


11/28/20 00:00 97.6 72 16 130/67 (88) 98   


 


11/28/20 00:00        30


 


11/27/20 23:17  60      


 


11/27/20 23:00  66 16 146/85 (105) 99   


 


11/27/20 22:58  66 13     30


 


11/27/20 22:00  62 18 123/66 (85) 99   


 


11/27/20 21:00  64 16 141/73 (95) 100   


 


11/27/20 20:00      Mechanical Ventilator  





      Mechanical Ventilator  





      Mechanical Ventilator  


 


11/27/20 20:00 97.3 60 11 130/76 (94) 100   


 


11/27/20 20:00        30


 


11/27/20 19:47  60      


 


11/27/20 19:08  58 12     30


 


11/27/20 19:00  61 13 120/62 (81) 99   


 


11/27/20 18:01  60 12 124/68 (86) 98   


 


11/27/20 17:00  58 12 128/70 (89) 99   

















Intake and Output  


 


 11/27/20 11/28/20





 19:00 07:00


 


Intake Total 1375 ml 1434.167 ml


 


Output Total 1570 ml 1500 ml


 


Balance -195 ml -65.833 ml


 


  


 


Free Water 50 ml 60 ml


 


IV Total 605 ml 654.167 ml


 


Tube Feeding 720 ml 720 ml


 


Output Urine Total 1570 ml 1500 ml


 


# Bowel Movements  2











Current Medications








 Medications


  (Trade)  Dose


 Ordered  Sig/Miky


 Route


 PRN Reason  Start Time


 Stop Time Status Last Admin


Dose Admin


 


 Acetaminophen


  (Tylenol)  650 mg  Q4H  PRN


 GT


 Temp >100.5  11/21/20 12:30


 12/21/20 12:29  11/23/20 10:14





 


 Chlorhexidine


 Gluconate


  (Jess-Hex 2%)  1 applic  DAILY@2000


 TOPIC


   11/6/20 20:00


 2/4/21 19:59  11/27/20 20:25





 


 Dexamethasone


  (Decadron)  6 mg  Q24H


 ORAL


   11/22/20 18:00


 12/1/20 18:01  11/27/20 17:59





 


 Dextrose


  (Dextrose 50%)  50 ml  Q30M  PRN


 IV


 Hypoglycemia  10/23/20 22:45


 1/21/21 22:44   





 


 Heparin Sodium


  (Porcine)


  (Heparin 5000


 units/ml)  5,000 units  EVERY 12  HOURS


 SUBQ


   10/23/20 21:00


 12/7/20 20:59  11/28/20 09:25





 


 Insulin Aspart


  (NovoLOG)    EVERY 6  HOURS


 SUBQ


   11/2/20 06:00


 1/22/21 06:29  11/27/20 05:09





 


 Meropenem 1 gm/


 Sodium Chloride  55 ml @ 


 110 mls/hr  Q8H


 IVPB


   11/21/20 10:00


 11/30/20 09:59  11/28/20 09:24





 


 Nitroglycerin


  (Ntg)  0.4 mg  Q5M  PRN


 SL


 Prn Chest Pain  11/21/20 10:15


 12/21/20 10:09   





 


 Ondansetron HCl


  (Zofran)  4 mg  Q6H  PRN


 IVP


 Nausea & Vomiting  11/21/20 14:30


 12/21/20 14:29   





 


 Pantoprazole


  (Protonix)  40 mg  DAILY


 IV


   11/7/20 09:00


 12/7/20 08:59  11/28/20 09:23





 


 Polyethylene


 Glycol


  (Miralax)  17 gm  DAILYPRN  PRN


 GT


 Constipation  11/21/20 10:15


 12/21/20 10:14   





 


 Promethazine HCl/


 Codeine


  (Phenergan with


 Codeine)  5 ml  Q4H  PRN


 GT


 For Cough  11/21/20 10:14


 12/21/20 10:13  11/25/20 22:05





 


 Sodium Chloride  1,000 ml @ 


 50 mls/hr  Q20H


 IV


   11/6/20 16:00


 12/6/20 15:59  11/28/20 05:52








Laboratory Tests


11/28/20 06:20: 


Sodium Level 139, Potassium Level 4.3, Chloride Level 106, Carbon Dioxide Level 

26, Anion Gap 8, Blood Urea Nitrogen 24H, Creatinine 0.8, Estimat Glomerular 

Filtration Rate > 60, Glucose Level 126H, Calcium Level 8.1L, Phosphorus Level 

3.6, Magnesium Level 2.4, Total Bilirubin 0.3, Aspartate Amino Transf (AST/SGOT)

34, Alanine Aminotransferase (ALT/SGPT) 70, Alkaline Phosphatase 222H, 

C-Reactive Protein, Quantitative 0.9, Total Protein 6.8, Albumin 1.9L, Globulin 

4.9, Albumin/Globulin Ratio 0.4L


11/28/20 06:30: 


White Blood Count 11.2#H, Red Blood Count 3.56L, Hemoglobin 11.1L, Hematocrit 

32.4L, Mean Corpuscular Volume 91, Mean Corpuscular Hemoglobin 31.1H, Mean 

Corpuscular Hemoglobin Concent 34.1, Red Cell Distribution Width 18.0H, Platelet

Count 399, Mean Platelet Volume 8.0, Neutrophils (%) (Auto) 73.9, Lymphocytes 

(%) (Auto) 16.9L, Monocytes (%) (Auto) 6.8, Eosinophils (%) (Auto) 0.6, 

Basophils (%) (Auto) 1.8, Erythrocyte Sedimentation Rate 77H


Height (Feet):  5


Height (Inches):  4.00


Weight (Pounds):  130


General Appearance:  no apparent distress


EENT:  other - Intubated on ventilator


Cardiovascular:  normal rate


Respiratory/Chest:  decreased breath sounds


Abdomen:  distended











Seven Tobar MD            Nov 28, 2020 16:04

## 2020-11-28 NOTE — NUR
NURSE NOTES:

Scheduled NovoLog held due to BG of 122mg/dL noted and therefore out of parameter ranges for 
sliding scale. Pt continues to tolerate feeding well and shows no s/sx of hyperglycemia. 
Bedside assessment performed, assessed pt for pain with a FLACC score of 0 noted. VS 
obtained and remain stable at this time. Respiratory status remains stable. Pt noted to be 
agitated and combative at this time. Bilateral soft wrist restraints remain in place for pt 
safety. Diversional activities not successful at this time. Fall, Aspiration and Skin 
precautions observed. Pt remains resting in bed; Bed remains in the lowest position with the 
safety wheels engaged, call light within reach, side rails up x3 and bed alarm activated. 
Will continue plan of care. Will continue to monitor.

## 2020-11-28 NOTE — NUR
RESPIRATORY NOTE:



Patient placed on SBT (CPAP 0, PS 8). Patient went apneic and was unable to tolerate the 
weaning. RN Amber devine. Will continue to monitor.

## 2020-11-28 NOTE — NUR
NURSE HAND-OFF REPORT: 



Latest Vital Signs: Temperature 97.4 , Pulse 70 , B/P 119 /72 , Respiratory Rate 12 , O2 SAT 
99 , Mechanical Ventilator, O2 Flow Rate  .  

Vital Sign Comment: 



EKG Rhythm: Sinus Rhythm

Rhythm change?: N 

MD Notified?: DIDI Edmond MD Response: 



Latest Mejia Fall Score: 50  

Fall Risk: High Risk 

Safety Measures: Call light Within Reach, Bed Alarm Zone 2, Side Rails Side Rails x3, Bed 
position Low and Locked.

Fall Precautions: 

Door Sign



Report given to LITA Clark.

## 2020-11-28 NOTE — DIAGNOSTIC IMAGING REPORT
EXAM:

  XR Chest, 1 View

 

CLINICAL HISTORY:

  F/U

 

TECHNIQUE:

  Frontal view of the chest.

 

COMPARISON:

  Chest x-ray 11/25/20

 

FINDINGS:

  Lungs:  Left lung base opacities have slightly improved.  Mild 

interstitial thickening.  Atelectasis in the right lower lung.

  Pleural space:  Tiny left pleural effusion is improved from prior study.

  Maybe a tiny right pleural effusion.  No pneumothorax.

  Heart:  Unremarkable.  No cardiomegaly.

  Mediastinum:  Unremarkable.

  Bones/joints:  Total right shoulder prosthesis.

  Tubes, lines and devices:  Endotracheal tube and NG tube are stable.

 

IMPRESSION:     

1.  Tiny residual left pleural effusion is improved from prior study.  

Maybe a tiny right pleural effusion.

2.  Left lung base opacities have slightly improved.  Mild interstitial 

thickening.  Atelectasis in the right lower lung.

## 2020-11-28 NOTE — NUR
NURSE NOTES:

LATE ENTRY:

RECEIVED REPORT FROM LITA CHRISTIANSEN. PT IN BED, FLAT EFFECT. OPENS EYES. PUPILS 3MM SLUGGISH. 
SR ON THE MONITOR. VS STABLE, AFEBRILE. INTUBATED ETT 7.5, 25CM AT THE LIP.  VENT SETTINGS: 
AC 12, , PEEP 0, FI02 30%. MINIMAL THICK SECRETIONS. BILATERAL DIMINISHED. LT NGT. 
TUBE FEEDING  GLUCERNA 1.2, AT 60ML/HR. ABDOMEN SOFT, ROUND. NO BM AT THIS TIME. GALLEGOS IN 
PLACE, DRAINING PALE URINE . ON P 200 MATTRESS. SOFT WRIST RESTRAINTS IN PLACE. SIDE 
RAILSX3. BED LOCKED AND IN LOW POSITION. CONTACT ISOLATION IN PLACE. WILL CONTINUE TO 
MONITOR  PT.

## 2020-11-28 NOTE — NUR
NURSE HAND-OFF REPORT: 



Latest Vital Signs: Temperature 98.7 , Pulse 66 , B/P 102 /58 , Respiratory Rate 12 , O2 SAT 
99 , Mechanical Ventilator, O2 Flow Rate  .  

Vital Sign Comment: 



EKG Rhythm: Sinus Rhythm

Rhythm change?: N 

MD Notified?: DIDI Edmond MD Response: 



Latest Mejia Fall Score: 50  

Fall Risk: High Risk 

Safety Measures: Call light Within Reach, Bed Alarm Zone 2, Side Rails Side Rails x2, Bed 
position Low and Locked.

Fall Precautions: 

Door Sign



Report given to LITA ERAZO.

## 2020-11-29 VITALS — SYSTOLIC BLOOD PRESSURE: 108 MMHG | DIASTOLIC BLOOD PRESSURE: 63 MMHG

## 2020-11-29 VITALS — DIASTOLIC BLOOD PRESSURE: 62 MMHG | SYSTOLIC BLOOD PRESSURE: 91 MMHG

## 2020-11-29 VITALS — DIASTOLIC BLOOD PRESSURE: 62 MMHG | SYSTOLIC BLOOD PRESSURE: 111 MMHG

## 2020-11-29 VITALS — SYSTOLIC BLOOD PRESSURE: 107 MMHG | DIASTOLIC BLOOD PRESSURE: 53 MMHG

## 2020-11-29 VITALS — SYSTOLIC BLOOD PRESSURE: 115 MMHG | DIASTOLIC BLOOD PRESSURE: 62 MMHG

## 2020-11-29 VITALS — SYSTOLIC BLOOD PRESSURE: 107 MMHG | DIASTOLIC BLOOD PRESSURE: 61 MMHG

## 2020-11-29 VITALS — SYSTOLIC BLOOD PRESSURE: 120 MMHG | DIASTOLIC BLOOD PRESSURE: 78 MMHG

## 2020-11-29 VITALS — SYSTOLIC BLOOD PRESSURE: 117 MMHG | DIASTOLIC BLOOD PRESSURE: 70 MMHG

## 2020-11-29 VITALS — DIASTOLIC BLOOD PRESSURE: 68 MMHG | SYSTOLIC BLOOD PRESSURE: 126 MMHG

## 2020-11-29 VITALS — SYSTOLIC BLOOD PRESSURE: 115 MMHG | DIASTOLIC BLOOD PRESSURE: 57 MMHG

## 2020-11-29 VITALS — DIASTOLIC BLOOD PRESSURE: 63 MMHG | SYSTOLIC BLOOD PRESSURE: 124 MMHG

## 2020-11-29 VITALS — DIASTOLIC BLOOD PRESSURE: 72 MMHG | SYSTOLIC BLOOD PRESSURE: 148 MMHG

## 2020-11-29 VITALS — SYSTOLIC BLOOD PRESSURE: 103 MMHG | DIASTOLIC BLOOD PRESSURE: 57 MMHG

## 2020-11-29 VITALS — SYSTOLIC BLOOD PRESSURE: 106 MMHG | DIASTOLIC BLOOD PRESSURE: 59 MMHG

## 2020-11-29 VITALS — DIASTOLIC BLOOD PRESSURE: 76 MMHG | SYSTOLIC BLOOD PRESSURE: 154 MMHG

## 2020-11-29 VITALS — DIASTOLIC BLOOD PRESSURE: 60 MMHG | SYSTOLIC BLOOD PRESSURE: 102 MMHG

## 2020-11-29 VITALS — SYSTOLIC BLOOD PRESSURE: 104 MMHG | DIASTOLIC BLOOD PRESSURE: 77 MMHG

## 2020-11-29 VITALS — DIASTOLIC BLOOD PRESSURE: 65 MMHG | SYSTOLIC BLOOD PRESSURE: 121 MMHG

## 2020-11-29 VITALS — SYSTOLIC BLOOD PRESSURE: 121 MMHG | DIASTOLIC BLOOD PRESSURE: 63 MMHG

## 2020-11-29 VITALS — DIASTOLIC BLOOD PRESSURE: 65 MMHG | SYSTOLIC BLOOD PRESSURE: 122 MMHG

## 2020-11-29 VITALS — DIASTOLIC BLOOD PRESSURE: 73 MMHG | SYSTOLIC BLOOD PRESSURE: 118 MMHG

## 2020-11-29 VITALS — SYSTOLIC BLOOD PRESSURE: 112 MMHG | DIASTOLIC BLOOD PRESSURE: 90 MMHG

## 2020-11-29 VITALS — DIASTOLIC BLOOD PRESSURE: 66 MMHG | SYSTOLIC BLOOD PRESSURE: 133 MMHG

## 2020-11-29 VITALS — SYSTOLIC BLOOD PRESSURE: 130 MMHG | DIASTOLIC BLOOD PRESSURE: 77 MMHG

## 2020-11-29 LAB
ADD MANUAL DIFF: NO
BASOPHILS NFR BLD AUTO: 0.1 % (ref 0–2)
BUN SERPL-MCNC: 25 MG/DL (ref 7–18)
CALCIUM SERPL-MCNC: 8.5 MG/DL (ref 8.5–10.1)
CHLORIDE SERPL-SCNC: 101 MMOL/L (ref 98–107)
CO2 SERPL-SCNC: 27 MMOL/L (ref 21–32)
CREAT SERPL-MCNC: 1 MG/DL (ref 0.55–1.3)
EOSINOPHIL NFR BLD AUTO: 0.1 % (ref 0–3)
ERYTHROCYTE [DISTWIDTH] IN BLOOD BY AUTOMATED COUNT: 19 % (ref 11.6–14.8)
HCT VFR BLD CALC: 33 % (ref 42–52)
HGB BLD-MCNC: 11.2 G/DL (ref 14.2–18)
LYMPHOCYTES NFR BLD AUTO: 16.5 % (ref 20–45)
MCV RBC AUTO: 92 FL (ref 80–99)
MONOCYTES NFR BLD AUTO: 3.3 % (ref 1–10)
NEUTROPHILS NFR BLD AUTO: 80 % (ref 45–75)
PLATELET # BLD: 421 K/UL (ref 150–450)
POTASSIUM SERPL-SCNC: 4.9 MMOL/L (ref 3.5–5.1)
RBC # BLD AUTO: 3.58 M/UL (ref 4.7–6.1)
SODIUM SERPL-SCNC: 135 MMOL/L (ref 136–145)
WBC # BLD AUTO: 8.7 K/UL (ref 4.8–10.8)

## 2020-11-29 RX ADMIN — PANTOPRAZOLE SODIUM SCH MG: 40 INJECTION, POWDER, FOR SOLUTION INTRAVENOUS at 08:06

## 2020-11-29 RX ADMIN — INSULIN ASPART SCH UNITS: 100 INJECTION, SOLUTION INTRAVENOUS; SUBCUTANEOUS at 17:32

## 2020-11-29 RX ADMIN — INSULIN ASPART SCH UNITS: 100 INJECTION, SOLUTION INTRAVENOUS; SUBCUTANEOUS at 11:35

## 2020-11-29 RX ADMIN — HUMAN INSULIN SCH MLS/HR: 100 INJECTION, SOLUTION SUBCUTANEOUS at 16:53

## 2020-11-29 RX ADMIN — MEROPENEM SCH MLS/HR: 1 INJECTION INTRAVENOUS at 02:08

## 2020-11-29 RX ADMIN — MEROPENEM SCH MLS/HR: 1 INJECTION INTRAVENOUS at 10:19

## 2020-11-29 RX ADMIN — HEPARIN SODIUM SCH UNITS: 5000 INJECTION INTRAVENOUS; SUBCUTANEOUS at 08:08

## 2020-11-29 RX ADMIN — HEPARIN SODIUM SCH UNITS: 5000 INJECTION INTRAVENOUS; SUBCUTANEOUS at 21:19

## 2020-11-29 RX ADMIN — MEROPENEM SCH MLS/HR: 1 INJECTION INTRAVENOUS at 17:17

## 2020-11-29 RX ADMIN — INSULIN ASPART SCH UNITS: 100 INJECTION, SOLUTION INTRAVENOUS; SUBCUTANEOUS at 06:00

## 2020-11-29 NOTE — NUR
NURSE NOTES:

Accucheck noted to be 173, insulin coverage given. Pt continues to tolerate feeding well and 
shows no s/sx of hyperglycemia. Bedside assessment performed, assessed pt for pain with a 
FLACC score of 0 noted. VS obtained and remain stable at this time. Respiratory status 
remains stable. Pt noted to be agitated and combative at this time. Bilateral soft wrist 
restraints remain in place for pt safety. Diversional activities not successful at this 
time. Fall, Aspiration and Skin precautions observed. Pt remains resting in bed; Bed remains 
in the lowest position with the safety wheels engaged, call light within reach, side rails 
up x3 and bed alarm activated. Will continue plan of care. Will continue to monitor.

## 2020-11-29 NOTE — NUR
Pt extubated at approximately 1430 per MD order. LITA Zapata at bedside. Pt on nasal cannula 
3LPM, SpO2 99%, HR 73. Pt in no distress at this time will continue to monitor.

## 2020-11-29 NOTE — NUR
NURSE HAND-OFF REPORT: 



Latest Vital Signs: Temperature 98.3 , Pulse 66 , B/P 121 /63 , Respiratory Rate 18 , O2 SAT 
100 , Simple Mask, O2 Flow Rate 3.0 .  

Vital Sign Comment: stable on 3L O2 via simple mask - spO2 100%



EKG Rhythm: Sinus Rhythm

Rhythm change?: N 

MD Notified?: n/a

MD Response: seen by Dr Edmond today



Latest Mejia Fall Score: 50  

Fall Risk: High Risk 

Safety Measures: Call light Within Reach, Bed Alarm Zone 2, Side Rails Side Rails x2, Bed 
position Low and Locked.

Fall Precautions: 

Door Sign

Yellow gown

Fall education



Report given to oskar Graves RN.

## 2020-11-29 NOTE — NUR
NURSE NOTES:

Pt continues to tolerate weaning trial without distress.

ABGs drawn - left message with Dr Jaime eaton/ results. Awaiting call back.

## 2020-11-29 NOTE — NUR
NURSE NOTES:

Pt continues to tolerate feeding well and shows no s/sx of hyperglycemia. Bedside assessment 
performed, assessed pt for pain with a FLACC score of 0 noted. VS obtained and remain stable 
at this time and no s/sx fo cardiopulmonary distress noted at this time. Fall, Aspiration 
and Skin precautions observed. Pt remains resting in bed; Bed remains in the lowest position 
with the safety wheels engaged, call light within reach, side rails up x3 and bed alarm 
activated. Will continue plan of care. Will continue to monitor.

## 2020-11-29 NOTE — NUR
NURSE NOTES:

coughing productively, suctioned nasotracheally with large amount of thick whitish secretion

## 2020-11-29 NOTE — NUR
NURSE NOTES:

Pt endotracheally suctioned and positioned up right - successfully extuabted at this time. 

successfully extubated.

VS stable. (RR 17 - NSR - SpO2 100% on 4L NC).

No distress noted.

NGT and restraints discontinued at this time. 

Dr Sandoval made aware. 

Will monitor closely.

## 2020-11-29 NOTE — NUR
NURSE NOTES:

Pt repositioned.

Oral care provided.

Continues to tolerates SBT. 

No distress noted.

Will continue to monitor.

## 2020-11-29 NOTE — NUR
NURSE NOTES:

Pt received from LITA Elena. Pt observed opening eyes spontaneously; pt grimaces when orally 
and endotracheally suctioned; gag reflex is hypoactive; pt is unable to follow commands; 
Bilat pupils equal and round 3mm with brisk rxn to light. Pt is in NSR to cardiac monitor. 
Radial and dorsalis pedis pulses 2+. No edema noted. Afebrile. Cap refill 2 sec. Pt is 
orally intubated with a 7.5 noted 25 cm at the lip with the following settings: AC 12  
FiO2 30% Peep 0. All lung fields noted diminished with rhonchi upon auscultation. Left naris 
NGT noted a at approximately 75 cm. No gastric residuals noted. Glucerna 1.2 is running at 
60 cc/hr. Bowel sounds are active to all quadrants. Abdomen appears roud, soft, and 
non-tender. F/C noted draining yellow, clear urine. Skin alterations noted. Pt has a GURPREET 
PICC with dry and intact dressing running 1/2 NS at 50 cc/hr. BSW restraints noted - skin to 
both wrists intact without redness. Pt observed attempting to pull medical devices when 
restraints are temporarily removed and reapplied. Bed in lowest position, alarm on, side 
rails up x 2, call light within reach. Will continue to monitor.

## 2020-11-29 NOTE — NUR
NURSE NOTES:

Pt noted with spO2 in the 80s - pt nasotracheally suctioned per RT and placed on venturi 
mask FiO2 30%.

-------------------------------------------------------------------------------

Addendum: 11/29/20 at 1525 by Jodi Dorsey RN

-------------------------------------------------------------------------------

Amendment: 

Pt noted with spO2 in the 80s - pt nasotracheally suctioned per RT and placed on venturi 
mask FiO2 50% 10 L.

## 2020-11-29 NOTE — PULMONOLGY CRITICAL CARE NOTE
Critical Care - Asmt/Plan


Problems:  


(1) Acute respiratory failure


(2) Multifocal pneumonia


(3) 2019 novel coronavirus disease (COVID-19)


(4) Acute encephalopathy


(5) Severe sepsis


(6) Alzheimer's dementia


(7) HTN (hypertension)


(8) History of CVA (cerebrovascular accident)


(9) BPH (benign prostatic hyperplasia)


Assessment/Plan:


pt's daughter didn't agree with tracheostomy.   She is thinking about DNR


Respiratory:  monitor respiratory rate, adjust FIO2, CXR


Cardiac:  continue pressors, continue to monitor HR/BP


Renal:  F/U I&O, keep IV fluid, check electrolytes


Infectious Disease:  check cultures


Gastrointestinal:  continue feedings/current rate


Endocrine:  monitor blood sugar


Hematologic:  monitor H/H


Neurologic:  PRN Ativan


Prophylaxis:  Protonix


Notes Reviewed:  internist, cardio


Discussed with:  nurses, consultants, 





Critical Care - Objective





Last 24 Hour Vital Signs








  Date Time  Temp Pulse Resp B/P (MAP) Pulse Ox O2 Delivery O2 Flow Rate FiO2


 


11/29/20 17:00  70 20 126/68 (87) 99   


 


11/29/20 16:00 98.3 80 20 154/76 (102) 98   


 


11/29/20 16:00      Venturi Mask 6.0 


 


11/29/20 15:26       10.0 50


 


11/29/20 15:19      Venturi Mask 10.0 


 


11/29/20 15:00  74 23 133/66 (88) 91   


 


11/29/20 14:50     98 Venturi Mask 10.0 50


 


11/29/20 14:45      Nasal Cannula 3.0 32


 


11/29/20 14:39     100   


 


11/29/20 14:30      Nasal Cannula 4.0 


 


11/29/20 14:00  66 18 124/63 (83) 99   


 


11/29/20 13:00  66 19 111/62 (78) 99   


 


11/29/20 12:00      Mechanical Ventilator  


 


11/29/20 12:00 98.1 66 18 122/65 (84) 99   


 


11/29/20 12:00        30


 


11/29/20 12:00  68      


 


11/29/20 11:00  61 18 107/53 (71) 99   


 


11/29/20 10:50  85 17     30


 


11/29/20 10:00  61 12 107/61 (76) 99   


 


11/29/20 09:00  64 13 115/57 (76) 100   


 


11/29/20 08:00  63      


 


11/29/20 08:00        30


 


11/29/20 08:00      Mechanical Ventilator  


 


11/29/20 08:00 98.4 65 14 108/63 (78) 99   


 


11/29/20 07:52  65 14     30


 


11/29/20 07:00  68 14 115/62 (79) 100   


 


11/29/20 06:05  70 12     


 


11/29/20 06:00  64 17 102/60 (74) 100   


 


11/29/20 05:00  63 15 104/77 (86) 100   


 


11/29/20 04:00 97.7 66 15 112/90 (97) 100   


 


11/29/20 04:00      Mechanical Ventilator  





      Mechanical Ventilator  





      Mechanical Ventilator  


 


11/29/20 04:00  64      


 


11/29/20 04:00        30


 


11/29/20 03:30  68 13     30


 


11/29/20 03:00  68 15 130/77 (94) 98   


 


11/29/20 02:00  70 16 106/59 (75) 99   


 


11/29/20 01:00  70 16 103/57 (72) 99   


 


11/29/20 00:00 97.0 72 18 91/62 (72) 98   


 


11/29/20 00:00      Mechanical Ventilator  





      Mechanical Ventilator  





      Mechanical Ventilator  


 


11/29/20 00:00        30


 


11/29/20 00:00  71      


 


11/28/20 23:00  70 13 110/60 (77) 98   


 


11/28/20 22:39  69 14     30


 


11/28/20 22:00  67 12 93/56 (68) 99   


 


11/28/20 21:00  70 16 106/58 (74) 96   


 


11/28/20 20:00      Mechanical Ventilator  





      Mechanical Ventilator  





      Mechanical Ventilator  


 


11/28/20 20:00  66      


 


11/28/20 20:00        30


 


11/28/20 20:00 98.6 69 15 143/66 (91) 100   


 


11/28/20 19:16  66 12     30


 


11/28/20 19:00  69 15 102/58 (73) 99   


 


11/28/20 18:00  70 14 95/63 (74) 100   








Status:  awake


Condition:  critical


HEENT:  normocephalic


Neck:  full ROM


Lungs:  clear


Heart:  HR/BP stable


Abdomen:  soft


Extremities:  no C/C/E


Objective:


still large secretions, failed weaning


Accucheck:  143





Critical Care - Subjective


ROS Limited/Unobtainable:  Yes


Condition:  critical


EKG Rhythm:  Sinus Rhythm


FI02:  50


Vent Support Breath Rate:  12


Vent Support Mode:  AC


Vent Tidal Volume:  600


Sputum Amount:  Moderate


PEEP:  0.0


PIP:  14


I&O:











Intake and Output  


 


 11/28/20 11/29/20





 19:00 07:00


 


Intake Total 1460 ml 1405 ml


 


Output Total 1110 ml 1225 ml


 


Balance 350 ml 180 ml


 


  


 


Free Water  130 ml


 


IV Total 710 ml 555 ml


 


Tube Feeding 720 ml 720 ml


 


Other 30 ml 


 


Output Urine Total 1110 ml 1225 ml








ET-Tube:  7.2


ET Position:  25


Labs:





Laboratory Tests








Test


 11/28/20


17:40 11/28/20


23:22 11/29/20


05:00 11/29/20


12:24


 


POC Whole Blood Glucose


 124 MG/DL


()  H Pending  


 


 





 


White Blood Count


 


 


 8.7 K/UL


(4.8-10.8) 





 


Red Blood Count


 


 


 3.58 M/UL


(4.70-6.10)  L 





 


Hemoglobin


 


 


 11.2 G/DL


(14.2-18.0)  L 





 


Hematocrit


 


 


 33.0 %


(42.0-52.0)  L 





 


Mean Corpuscular Volume   92 FL (80-99)   


 


Mean Corpuscular Hemoglobin


 


 


 31.3 PG


(27.0-31.0)  H 





 


Mean Corpuscular Hemoglobin


Concent 


 


 33.9 G/DL


(32.0-36.0) 





 


Red Cell Distribution Width


 


 


 19.0 %


(11.6-14.8)  H 





 


Platelet Count


 


 


 421 K/UL


(150-450) 





 


Mean Platelet Volume


 


 


 7.2 FL


(6.5-10.1) 





 


Neutrophils (%) (Auto)


 


 


 80.0 %


(45.0-75.0)  H 





 


Lymphocytes (%) (Auto)


 


 


 16.5 %


(20.0-45.0)  L 





 


Monocytes (%) (Auto)


 


 


 3.3 %


(1.0-10.0) 





 


Eosinophils (%) (Auto)


 


 


 0.1 %


(0.0-3.0) 





 


Basophils (%) (Auto)


 


 


 0.1 %


(0.0-2.0) 





 


Sodium Level


 


 


 135 MMOL/L


(136-145)  L 





 


Potassium Level


 


 


 4.9 MMOL/L


(3.5-5.1) 





 


Chloride Level


 


 


 101 MMOL/L


() 





 


Carbon Dioxide Level


 


 


 27 MMOL/L


(21-32) 





 


Blood Urea Nitrogen


 


 


 25 mg/dL


(7-18)  H 





 


Creatinine


 


 


 1.0 MG/DL


(0.55-1.30) 





 


Estimat Glomerular Filtration


Rate 


 


 > 60 mL/min


(>60) 





 


Glucose Level


 


 


 108 MG/DL


()  H 





 


Calcium Level


 


 


 8.5 MG/DL


(8.5-10.1) 





 


Arterial Blood pH


 


 


 


 7.446


(7.350-7.450)


 


Arterial Blood Partial


Pressure CO2 


 


 


 31.1 mmHg


(35.0-45.0)  L


 


Arterial Blood Partial


Pressure O2 


 


 


 96.9 mmHg


(75.0-100.0)


 


Arterial Blood HCO3


 


 


 


 20.9 mmol/L


(22.0-26.0)  L


 


Arterial Blood Oxygen


Saturation 


 


 


 97.2 %


()


 


Arterial Blood Base Excess    -2.4 (-2-2)  L


 


Jerod Test    Positive  


 


Test


 11/29/20


16:45 


 


 





 


Sodium Level Pending     


 


Potassium Level Pending     


 


Chloride Level Pending     


 


Carbon Dioxide Level Pending     


 


Blood Urea Nitrogen Pending     


 


Creatinine Pending     


 


Estimat Glomerular Filtration


Rate Pending  


 


 


 





 


Glucose Level Pending     


 


Calcium Level Pending     

















Michael Sandoval MD              Nov 29, 2020 17:20

## 2020-11-29 NOTE — NUR
NURSE

received in no distress, on venturimask with fio2 35%, o2 sat 100% with productive cough, 
suctioned orally with small to moderate thick whitish secretion,cardiac monitor nsr with hr 
70's/min bp 121/65, responds to verbal and tactile stimuli but does not follow commands, npo 
for now pending swallowing eval in am, iv sites good to right wrist and right hand, buck 
cath intact and patent with yellow urine draining adequate amount, sacral dressing dry and 
intact, repositioned, on P200 overlay bed, explained all procedures turning, suctioning, to 
keep o2 mask on at all times

## 2020-11-29 NOTE — NUR
RESPIRATORY NOTE:

Placed on SBT @1008, CPAP: 0 PEEP, 8 PS. RSBI 51, RR 19, Spont Vt 475, Minute ventilation 
7.56. RN Jodi aware.

No signs of respiratory distress at this time. Will continue to monitor.

## 2020-11-29 NOTE — NUR
RD ASSESSMENT & RECOMMENDATIONS

SEE CARE ACTIVITY FOR COMPLETE ASSESSMENT



DAILY ESTIMATED NEEDS:

Needs based on DM, wound, critical care 59.5kg 

22-28  kcals/kg 

9990-7322  total kcals

1.25-2  g protein/kg

  g total protein 

25-30  mL/kg

5831-0830  total fluid mLs



NUTRITION DIAGNOSIS:

* Swallowing difficulty R/T dysphagia as evidenced by SLP sylvie, recs for

NGT feeds, now s/p oral intubation (11/6), s/p code blue (11/12) and

remains intubated, on pressor support, currently held, cont on NGT feeds.

* Increased kcal and pro needs r/t wound healing as evidenced by sacral

pressure injury stage 2.

  



(CURRENT TF: goal of Glucerna 1.2 @ 60ml/hr x24 hrs) 



ENTERAL NUTRITION RECOMMENDATIONS:

Glucerna 1.2 @ 55ml/hr x24 hrs   

to provide 1320ml, 1584kcal, 79g prot, 1063ml free water 



- LOWER goal rate to 55ml/hr x 24 hrs

  s/p intubation w/ decreased kcal needs

- HOB over 30 degrees/ H2O flush per MD





ADDITIONAL RECOMMENDATIONS:

* Calibrated bedscale wt for accurate CBW 

* Monitor hemodynamic stability: s/p code blue (11/12), now off pressors 

* Monitor lytes, replete as needed  

* Wound care: add Harvinder BID via PEG 

                     add Vit C 250mg QD 

* Monitor BGs closely w/ Decadron 

.

## 2020-11-29 NOTE — NEPHROLOGY PROGRESS NOTE
Assessment/Plan


Problem List:  


(1) Dehydration


(2) JANES (acute kidney injury)


(3) Renal failure (ARF), acute on chronic


(4) Hypoxia


(5) Acute encephalopathy


(6) Electrolyte imbalance


Assessment





75-year-old male is admitted with acute hypoxic respiratory failure most likely 

secondary to pneumonia and sepsis, UTI.


Acute on chronic renal failure


Dehydration


Electrolyte imbalances, hypernatremia


Hypoalbuminemia


Diabetes type 2


History of congestive heart failure


Hypertension


Hyperlipemia


Alzheimer's


Previous COVID-19 infection in September 2020


Plan


November 29: Discussed with LITA Cooper.  Weaning in process.  It seems like 

patient is tolerating better.  Labs reviewed.  Continue per consultants.


November 28: Remains in ICU.  Status unchanged.  Labs and medication list 

reviewed.  Remains full code.  Weaning continues to fail.


November 27: Remains in ICU.  Remains intubated.  Remains full code.  Continues 

to be on weaning trials.  Renal parameters are stable.


November 26: Patient remains in ICU.  Full code.  Intubated.  Fails weaning.  

Labs reviewed.  Stable from renal standpoint of view.  Discussed with RN.


November 25: Labs reviewed.  Discussed with RN.  Abnormal electrolyte addressed.

 Remains intubated on ventilator.  Continue per consultants.


November 24: Labs reviewed.  Discussed with RN.  Stable from renal standpoint of

view.  Main issue is weaning from ventilator that keeps failing.  Continue per 

consultants.


November 23: Labs reviewed.  Discussed with RN.  Stable from renal standpoint of

view.  Continue per consultants.


November 22: Patient seen and discussed with RN.  Labs reviewed.  Remains 

intubated.  Trial of weaning this being attempted.  Continue per consultants.


November 21: Remains intubated.  Labs reviewed.  Renal parameters stable.  

Continue per consultants.


November 20: Remains on mechanical ventilation.  Labs reviewed.  Abnormal 

electrolytes addressed.  Stable from renal standpoint of view.


November 19: Remains intubated.  Remains full code.  Labs reviewed.  Low 

phosphorus replaced.  Continue to monitor renal parameters.  Continue per 

consultants.


November 18: Failed weaning.  Remains intubated.  Remains full closed.  Labs 

reviewed.  Stable from renal standpoint view.


November 17: Remains in ICU.  Remains full code.  Labs are reviewed.  Phosphorus

supplement given.  Continue per consultants.


November 16: Remains in ICU.  Full code.  Labs reviewed.  Remains intubated.  

Stable renal parameters.


November 15: In ICU.  Full code.  Discussed with RN.  Stable renal parameters.


November 14: Seen in ICU.  Discussed with RN Allison.  Flomax discontinued.  

Labs reviewed.  Stable from renal standpoint of view.


November 13: Seen in ICU.  Discussed with LITA Cooper.  Remains on pressor.  

Electrolyte abnormalities noted and addressed.  Continue per consultants.  

Patient remains full code.


November 12: Seen in ICU.  Discussed with LITA Richardson.  Blood pressure remains a 

little requiring pressors.  Labs reviewed.  Stable renal parameters.  Continue 

per consultants.


November 11: Remains intubated.  Full code.  Blood pressure borderline low.  

Will increase midodrine dose.  Discussed with RN.  Continue to monitor renal 

parameters and electrolytes.


November 10: Intubated.  Full code.  Labs reviewed.  Stable from renal 

standpoint of view.  Continue per consultants.


November 9: Remains intubated.  Full code.  Labs reviewed.  Electrolytes within 

normal limit.  Continue per consultants.


November 8: In ICU.  Remains intubated on ventilator.  Remains full code.  Low 

potassium addressed.  Blood pressure fluctuating.  Continue per consultants.


November 7: Patient in ICU.  Intubated on ventilator.  On 6 mics of Levophed.  

Will give 100 cc albumin 25%.  K-Phos IV ordered.  Continue per consultants.  

Continue monitor renal parameters and electrolytes.


November 6: Status unchanged.  Transfer to [facility] for seizure.  Stable from renal 

standpoint to view.  Continue per consultants.


November 5: Status quo.  Labs reviewed.  Renal parameters stable.  Continue per 

consultants.


November 4: On nonrebreather mask.  Labs reviewed.  Renal parameters 

stable.Continue per pulmonologist.  Clinically unchanged.


November 3: On nonrebreather mask.  Inflammatory markers gradually declining.  

Renal parameters stable.  Continue per pulmonary.


November 2: Remains on nonrebreather mask.  Inflammatory markers remain 

elevated.  Renal parameters somewhat stable.  Continue per pulmonary and ID.  

Remains full code.


November 1: Patient on nonrebreather mask.  Labs noted.  Serum creatinine down 

to 1.3.  Continue per consultants.


October 31: Patient on nonrebreather mask.  Transfer to telemetry when seen this

morning.  Labs noted.  Continue per consultants.  Serum creatinine dylan to 1.7. 

Continue to monitor renal parameters.  Continue to monitor vancomycin level


October 30: Patient is not doing well clinically.  Mild respiratory distress.  

ABG noted.  Somewhat hypoxic.  CBC and chemistry panel ordered.  Discussed with 

LITA Garcia.  Will defer to pulmonary management to specialist.  Continue per ID.

 Renal parameters remained stable as of October 29.


October 29: Labs reviewed.  Renal parameters stable.  Continue per consultants.


October 28: Labs reviewed.  Potassium via NG tube ordered.  IV fluids stopped.  

Continue per consultants.


October 27: Labs reviewed.  Low potassium and low phosphorus replaced.  Continue

per consultants.  Remains stable from renal standpoint of view.


October 26: Patient remains n.p.o.  IV fluid down to 50 cc an hour.  Potassium 

supplement intravenously ordered.  Continue per consultants.





Previously:


Patient is n.p.o., will continue on IV fluid of D5W 75 cc an hour


We will monitor electrolytes and renal parameters


Avoid nephrotoxic's


Start p.o. when he clears by speech therapist, meanwhile aspiration precautions


Keep the blood pressure and blood sugar in check


Per orders





Subjective


ROS Limited/Unobtainable:  Yes





Objective


Objective





Last 24 Hour Vital Signs








  Date Time  Temp Pulse Resp B/P (MAP) Pulse Ox O2 Delivery O2 Flow Rate FiO2


 


11/29/20 14:00  66 18 124/63 (83) 99   


 


11/29/20 13:00  66 19 111/62 (78) 99   


 


11/29/20 12:00      Mechanical Ventilator  


 


11/29/20 12:00 98.1 66 18 122/65 (84) 99   


 


11/29/20 12:00        30


 


11/29/20 12:00  68      


 


11/29/20 11:00  61 18 107/53 (71) 99   


 


11/29/20 10:50  85 17     30


 


11/29/20 10:00  61 12 107/61 (76) 99   


 


11/29/20 09:00  64 13 115/57 (76) 100   


 


11/29/20 08:00  63      


 


11/29/20 08:00        30


 


11/29/20 08:00      Mechanical Ventilator  


 


11/29/20 08:00 98.4 65 14 108/63 (78) 99   


 


11/29/20 07:52  65 14     30


 


11/29/20 07:00  68 14 115/62 (79) 100   


 


11/29/20 06:05  70 12     


 


11/29/20 06:00  64 17 102/60 (74) 100   


 


11/29/20 05:00  63 15 104/77 (86) 100   


 


11/29/20 04:00 97.7 66 15 112/90 (97) 100   


 


11/29/20 04:00      Mechanical Ventilator  





      Mechanical Ventilator  





      Mechanical Ventilator  


 


11/29/20 04:00  64      


 


11/29/20 04:00        30


 


11/29/20 03:30  68 13     30


 


11/29/20 03:00  68 15 130/77 (94) 98   


 


11/29/20 02:00  70 16 106/59 (75) 99   


 


11/29/20 01:00  70 16 103/57 (72) 99   


 


11/29/20 00:00 97.0 72 18 91/62 (72) 98   


 


11/29/20 00:00      Mechanical Ventilator  





      Mechanical Ventilator  





      Mechanical Ventilator  


 


11/29/20 00:00        30


 


11/29/20 00:00  71      


 


11/28/20 23:00  70 13 110/60 (77) 98   


 


11/28/20 22:39  69 14     30


 


11/28/20 22:00  67 12 93/56 (68) 99   


 


11/28/20 21:00  70 16 106/58 (74) 96   


 


11/28/20 20:00      Mechanical Ventilator  





      Mechanical Ventilator  





      Mechanical Ventilator  


 


11/28/20 20:00  66      


 


11/28/20 20:00        30


 


11/28/20 20:00 98.6 69 15 143/66 (91) 100   


 


11/28/20 19:16  66 12     30


 


11/28/20 19:00  69 15 102/58 (73) 99   


 


11/28/20 18:00  70 14 95/63 (74) 100   


 


11/28/20 17:00  67 13 97/65 (76) 100   


 


11/28/20 16:00        30


 


11/28/20 16:00      Mechanical Ventilator  





      Mechanical Ventilator  





      Mechanical Ventilator  


 


11/28/20 16:00  65      


 


11/28/20 16:00 98.7 68 15 115/65 (82) 100   


 


11/28/20 15:13  64 12     30


 


11/28/20 15:00  61 15 104/60 (75) 100   

















Intake and Output  


 


 11/28/20 11/29/20





 19:00 07:00


 


Intake Total 1460 ml 1405 ml


 


Output Total 1110 ml 1225 ml


 


Balance 350 ml 180 ml


 


  


 


Free Water  130 ml


 


IV Total 710 ml 555 ml


 


Tube Feeding 720 ml 720 ml


 


Other 30 ml 


 


Output Urine Total 1110 ml 1225 ml








Laboratory Tests


11/28/20 17:40: POC Whole Blood Glucose 124H


11/28/20 23:22: POC Whole Blood Glucose [Pending]


11/29/20 05:00: 


White Blood Count 8.7, Red Blood Count 3.58L, Hemoglobin 11.2L, Hematocrit 33.0L

, Mean Corpuscular Volume 92, Mean Corpuscular Hemoglobin 31.3H, Mean 

Corpuscular Hemoglobin Concent 33.9, Red Cell Distribution Width 19.0H, Platelet

 Count 421, Mean Platelet Volume 7.2, Neutrophils (%) (Auto) 80.0H, Lymphocytes 

(%) (Auto) 16.5L, Monocytes (%) (Auto) 3.3, Eosinophils (%) (Auto) 0.1, 

Basophils (%) (Auto) 0.1, Sodium Level 135L, Potassium Level 4.9, Chloride Level

 101, Carbon Dioxide Level 27, Blood Urea Nitrogen 25H, Creatinine 1.0, Estimat 

Glomerular Filtration Rate > 60, Glucose Level 108H, Calcium Level 8.5


11/29/20 12:24: 


Arterial Blood pH 7.446, Arterial Blood Partial Pressure CO2 31.1L, Arterial 

Blood Partial Pressure O2 96.9, Arterial Blood HCO3 20.9L, Arterial Blood Oxygen

 Saturation 97.2, Arterial Blood Base Excess -2.4L, Jerod Test Positive


Height (Feet):  5


Height (Inches):  4.00


Weight (Pounds):  130


General Appearance:  no apparent distress


Cardiovascular:  tachycardia


Respiratory/Chest:  decreased breath sounds


Abdomen:  distended











Seven Tobar MD            Nov 29, 2020 14:30

## 2020-11-29 NOTE — INTERNAL MED PROGRESS NOTE
Subjective


Date of Service:  Nov 29, 2020


Physician Name


Benito Hearn


Attending Physician


Andrei Cancino MD





Current Medications








 Medications


  (Trade)  Dose


 Ordered  Sig/Miky


 Route


 PRN Reason  Start Time


 Stop Time Status Last Admin


Dose Admin


 


 Acetaminophen


  (Tylenol)  650 mg  Q4H  PRN


 GT


 Temp >100.5  11/21/20 12:30


 12/21/20 12:29  11/23/20 10:14





 


 Chlorhexidine


 Gluconate


  (Jess-Hex 2%)  1 applic  DAILY@2000


 TOPIC


   11/6/20 20:00


 2/4/21 19:59  11/28/20 20:00





 


 Dexamethasone


  (Decadron)  6 mg  Q24H


 ORAL


   11/22/20 18:00


 12/1/20 18:01  11/28/20 17:28





 


 Dextrose


  (Dextrose 50%)  50 ml  Q30M  PRN


 IV


 Hypoglycemia  10/23/20 22:45


 1/21/21 22:44   





 


 Dextrose/Sodium


 Chloride  1,000 ml @ 


 50 mls/hr  Q20H


 IV


   11/29/20 16:15


 12/29/20 16:14   





 


 Heparin Sodium


  (Porcine)


  (Heparin 5000


 units/ml)  5,000 units  EVERY 12  HOURS


 SUBQ


   10/23/20 21:00


 12/7/20 20:59  11/29/20 08:08





 


 Insulin Aspart


  (NovoLOG)    EVERY 6  HOURS


 SUBQ


   11/2/20 06:00


 1/22/21 06:29  11/29/20 11:35





 


 Meropenem 1 gm/


 Sodium Chloride  55 ml @ 


 110 mls/hr  Q8H


 IVPB


   11/21/20 10:00


 11/30/20 09:59  11/29/20 10:19





 


 Nitroglycerin


  (Ntg)  0.4 mg  Q5M  PRN


 SL


 Prn Chest Pain  11/21/20 10:15


 12/21/20 10:09   





 


 Ondansetron HCl


  (Zofran)  4 mg  Q6H  PRN


 IVP


 Nausea & Vomiting  11/21/20 14:30


 12/21/20 14:29   





 


 Pantoprazole


  (Protonix)  40 mg  DAILY


 IV


   11/7/20 09:00


 12/7/20 08:59  11/29/20 08:06





 


 Polyethylene


 Glycol


  (Miralax)  17 gm  DAILYPRN  PRN


 GT


 Constipation  11/21/20 10:15


 12/21/20 10:14   





 


 Promethazine HCl/


 Codeine


  (Phenergan with


 Codeine)  5 ml  Q4H  PRN


 GT


 For Cough  11/21/20 10:14


 12/21/20 10:13  11/25/20 22:05











Allergies:  


Coded Allergies:  


     No Known Allergies (Unverified , 8/20/12)


ROS Limited/Unobtainable:  Yes


Subjective


76 YO M admitted with shortness of breath.  Now pneumonia; previously COVID 

positive.  Cover for Int med-Dr Cancino.  ICU.  Intubated and sedated.





Objective





Last Vital Signs








  Date Time  Temp Pulse Resp B/P (MAP) Pulse Ox O2 Delivery O2 Flow Rate FiO2


 


11/29/20 15:26       10.0 50


 


11/29/20 15:19      Venturi Mask  


 


11/29/20 15:00  74 23 133/66 (88) 91   


 


11/29/20 12:00 98.1       











Laboratory Tests








Test


 11/28/20


17:40 11/28/20


23:22 11/29/20


05:00 11/29/20


12:24


 


POC Whole Blood Glucose


 124 MG/DL


()  H Pending  


 


 





 


White Blood Count


 


 


 8.7 K/UL


(4.8-10.8) 





 


Red Blood Count


 


 


 3.58 M/UL


(4.70-6.10)  L 





 


Hemoglobin


 


 


 11.2 G/DL


(14.2-18.0)  L 





 


Hematocrit


 


 


 33.0 %


(42.0-52.0)  L 





 


Mean Corpuscular Volume   92 FL (80-99)   


 


Mean Corpuscular Hemoglobin


 


 


 31.3 PG


(27.0-31.0)  H 





 


Mean Corpuscular Hemoglobin


Concent 


 


 33.9 G/DL


(32.0-36.0) 





 


Red Cell Distribution Width


 


 


 19.0 %


(11.6-14.8)  H 





 


Platelet Count


 


 


 421 K/UL


(150-450) 





 


Mean Platelet Volume


 


 


 7.2 FL


(6.5-10.1) 





 


Neutrophils (%) (Auto)


 


 


 80.0 %


(45.0-75.0)  H 





 


Lymphocytes (%) (Auto)


 


 


 16.5 %


(20.0-45.0)  L 





 


Monocytes (%) (Auto)


 


 


 3.3 %


(1.0-10.0) 





 


Eosinophils (%) (Auto)


 


 


 0.1 %


(0.0-3.0) 





 


Basophils (%) (Auto)


 


 


 0.1 %


(0.0-2.0) 





 


Sodium Level


 


 


 135 MMOL/L


(136-145)  L 





 


Potassium Level


 


 


 4.9 MMOL/L


(3.5-5.1) 





 


Chloride Level


 


 


 101 MMOL/L


() 





 


Carbon Dioxide Level


 


 


 27 MMOL/L


(21-32) 





 


Blood Urea Nitrogen


 


 


 25 mg/dL


(7-18)  H 





 


Creatinine


 


 


 1.0 MG/DL


(0.55-1.30) 





 


Estimat Glomerular Filtration


Rate 


 


 > 60 mL/min


(>60) 





 


Glucose Level


 


 


 108 MG/DL


()  H 





 


Calcium Level


 


 


 8.5 MG/DL


(8.5-10.1) 





 


Arterial Blood pH


 


 


 


 7.446


(7.350-7.450)


 


Arterial Blood Partial


Pressure CO2 


 


 


 31.1 mmHg


(35.0-45.0)  L


 


Arterial Blood Partial


Pressure O2 


 


 


 96.9 mmHg


(75.0-100.0)


 


Arterial Blood HCO3


 


 


 


 20.9 mmol/L


(22.0-26.0)  L


 


Arterial Blood Oxygen


Saturation 


 


 


 97.2 %


()


 


Arterial Blood Base Excess    -2.4 (-2-2)  L


 


Jerod Test    Positive  

















Intake and Output  


 


 11/28/20 11/29/20





 19:00 07:00


 


Intake Total 1460 ml 1405 ml


 


Output Total 1110 ml 1225 ml


 


Balance 350 ml 180 ml


 


  


 


Free Water  130 ml


 


IV Total 710 ml 555 ml


 


Tube Feeding 720 ml 720 ml


 


Other 30 ml 


 


Output Urine Total 1110 ml 1225 ml








Objective


PHYSICAL EXAMINATION:


GENERAL:  The patient awake with deep stimuli, open his eyes, however,


cannot follow commands.  The patient is on a Ventimask at this time,


chronically ill-appearing.


HEAD AND NECK:  Pupils are equal and reactive to light.  Anicteric.


NECK:  Supple.  No JVD.


LUNGS:  Mech vent;  wheezing, rhonchi, and decreased air in bases.


HEART:  S1, S2.  Regular rhythm.  Distant heart sounds.  No murmur or


gallop.


ABDOMEN:  Soft, nondistended, nontender.  Positive bowel sounds.


EXTREMITIES:  No cyanosis, clubbing, or edema.


NEUROLOGIC:  Very limited secondary to the patient's status, cannot follow


commands.  Opens his eyes with deep stimuli and moving extremities


spontaneously.





Assessment/Plan


Assessment/Plan


ASSESSMENT:


1. Acute hypoxemic respiratory failure, most likely secondary to


pneumonia and sepsis.


2. Sepsis secondary to urinary tract infection and pneumonia.


3. pneumonia=MRSA; ESBL E. coli


4. Acute kidney injury on chronic renal insufficiency.


5. Dehydration.


6. History of chronic congestive heart failure.


7. Diabetes type 2.


8. Dyslipidemia.


9. Hypertension.


10. Alzheimer's disease.


11. COVID 19 previous positive





PLAN:


1.  ICU


2.  Dr. Sandoval,=Pulmonary Critical Care; follow [facility] recs


3.  Dr. Garcia = Inf Dis.


4.  IV= D5W due to the hypernatremia and dehydration.


5.  antibiotics =   meropenem; S/P micafungin and zyvox


6.  Code status is full code.


7.    DVT prophylaxis is heparin subcutaneous.











Benito Hearn MD               Nov 29, 2020 16:17

## 2020-11-29 NOTE — CARDIAC ELECTROPHYSIOLOGY PN
Assessment/Plan


Assessment/Plan


1. Accelerated junctional rhythm. Ruled out for MI


      Echo showed ejection fraction of 55%.





2. Long run of 31 beats of Nonsustained VT on 11/3/2020. 


     Cardiac cath after stabilization.





3. Nicola 30, Asystole while on the Vent due to resp failure/ likely mucus plug .

S/P Code


 Off midodrine





4. Respiratory failure, on antibiotic  


    Extubated today 11/29/20





5. Septic shock. Off Levophed   





6. History of previous COVID infection in September 2020 and active Covid  in 

isolation


Now negative again and off isolation.





7. Dementia.





JEANNINE RN and Dr luna





Subjective


Subjective


  In ICU extubated today


 Had 31 beats of VT  on 11/3/20  and 5 beats 11/8/20 


 Coded on 11/12/20 as sat dropped to 20% and got nicola 30s and PEA and pulseless




 Off Levophed





Objective





Last 24 Hour Vital Signs








  Date Time  Temp Pulse Resp B/P (MAP) Pulse Ox O2 Delivery O2 Flow Rate FiO2


 


11/29/20 15:26       10.0 50


 


11/29/20 15:19      Venturi Mask 10.0 


 


11/29/20 15:00  74 23 133/66 (88) 91   


 


11/29/20 14:45      Nasal Cannula 3.0 32


 


11/29/20 14:39     100   


 


11/29/20 14:30      Nasal Cannula 4.0 


 


11/29/20 14:00  66 18 124/63 (83) 99   


 


11/29/20 13:00  66 19 111/62 (78) 99   


 


11/29/20 12:00      Mechanical Ventilator  


 


11/29/20 12:00 98.1 66 18 122/65 (84) 99   


 


11/29/20 12:00        30


 


11/29/20 12:00  68      


 


11/29/20 11:00  61 18 107/53 (71) 99   


 


11/29/20 10:50  85 17     30


 


11/29/20 10:00  61 12 107/61 (76) 99   


 


11/29/20 09:00  64 13 115/57 (76) 100   


 


11/29/20 08:00  63      


 


11/29/20 08:00        30


 


11/29/20 08:00      Mechanical Ventilator  


 


11/29/20 08:00 98.4 65 14 108/63 (78) 99   


 


11/29/20 07:52  65 14     30


 


11/29/20 07:00  68 14 115/62 (79) 100   


 


11/29/20 06:05  70 12     


 


11/29/20 06:00  64 17 102/60 (74) 100   


 


11/29/20 05:00  63 15 104/77 (86) 100   


 


11/29/20 04:00 97.7 66 15 112/90 (97) 100   


 


11/29/20 04:00      Mechanical Ventilator  





      Mechanical Ventilator  





      Mechanical Ventilator  


 


11/29/20 04:00  64      


 


11/29/20 04:00        30


 


11/29/20 03:30  68 13     30


 


11/29/20 03:00  68 15 130/77 (94) 98   


 


11/29/20 02:00  70 16 106/59 (75) 99   


 


11/29/20 01:00  70 16 103/57 (72) 99   


 


11/29/20 00:00 97.0 72 18 91/62 (72) 98   


 


11/29/20 00:00      Mechanical Ventilator  





      Mechanical Ventilator  





      Mechanical Ventilator  


 


11/29/20 00:00        30


 


11/29/20 00:00  71      


 


11/28/20 23:00  70 13 110/60 (77) 98   


 


11/28/20 22:39  69 14     30


 


11/28/20 22:00  67 12 93/56 (68) 99   


 


11/28/20 21:00  70 16 106/58 (74) 96   


 


11/28/20 20:00      Mechanical Ventilator  





      Mechanical Ventilator  





      Mechanical Ventilator  


 


11/28/20 20:00  66      


 


11/28/20 20:00        30


 


11/28/20 20:00 98.6 69 15 143/66 (91) 100   


 


11/28/20 19:16  66 12     30


 


11/28/20 19:00  69 15 102/58 (73) 99   


 


11/28/20 18:00  70 14 95/63 (74) 100   


 


11/28/20 17:00  67 13 97/65 (76) 100   

















Intake and Output  


 


 11/28/20 11/29/20





 19:00 07:00


 


Intake Total 1460 ml 1405 ml


 


Output Total 1110 ml 1225 ml


 


Balance 350 ml 180 ml


 


  


 


Free Water  130 ml


 


IV Total 710 ml 555 ml


 


Tube Feeding 720 ml 720 ml


 


Other 30 ml 


 


Output Urine Total 1110 ml 1225 ml











Laboratory Tests








Test


 11/28/20


17:40 11/28/20


23:22 11/29/20


05:00 11/29/20


12:24


 


POC Whole Blood Glucose


 124 MG/DL


()  H Pending  


 


 





 


White Blood Count


 


 


 8.7 K/UL


(4.8-10.8) 





 


Red Blood Count


 


 


 3.58 M/UL


(4.70-6.10)  L 





 


Hemoglobin


 


 


 11.2 G/DL


(14.2-18.0)  L 





 


Hematocrit


 


 


 33.0 %


(42.0-52.0)  L 





 


Mean Corpuscular Volume   92 FL (80-99)   


 


Mean Corpuscular Hemoglobin


 


 


 31.3 PG


(27.0-31.0)  H 





 


Mean Corpuscular Hemoglobin


Concent 


 


 33.9 G/DL


(32.0-36.0) 





 


Red Cell Distribution Width


 


 


 19.0 %


(11.6-14.8)  H 





 


Platelet Count


 


 


 421 K/UL


(150-450) 





 


Mean Platelet Volume


 


 


 7.2 FL


(6.5-10.1) 





 


Neutrophils (%) (Auto)


 


 


 80.0 %


(45.0-75.0)  H 





 


Lymphocytes (%) (Auto)


 


 


 16.5 %


(20.0-45.0)  L 





 


Monocytes (%) (Auto)


 


 


 3.3 %


(1.0-10.0) 





 


Eosinophils (%) (Auto)


 


 


 0.1 %


(0.0-3.0) 





 


Basophils (%) (Auto)


 


 


 0.1 %


(0.0-2.0) 





 


Sodium Level


 


 


 135 MMOL/L


(136-145)  L 





 


Potassium Level


 


 


 4.9 MMOL/L


(3.5-5.1) 





 


Chloride Level


 


 


 101 MMOL/L


() 





 


Carbon Dioxide Level


 


 


 27 MMOL/L


(21-32) 





 


Blood Urea Nitrogen


 


 


 25 mg/dL


(7-18)  H 





 


Creatinine


 


 


 1.0 MG/DL


(0.55-1.30) 





 


Estimat Glomerular Filtration


Rate 


 


 > 60 mL/min


(>60) 





 


Glucose Level


 


 


 108 MG/DL


()  H 





 


Calcium Level


 


 


 8.5 MG/DL


(8.5-10.1) 





 


Arterial Blood pH


 


 


 


 7.446


(7.350-7.450)


 


Arterial Blood Partial


Pressure CO2 


 


 


 31.1 mmHg


(35.0-45.0)  L


 


Arterial Blood Partial


Pressure O2 


 


 


 96.9 mmHg


(75.0-100.0)


 


Arterial Blood HCO3


 


 


 


 20.9 mmol/L


(22.0-26.0)  L


 


Arterial Blood Oxygen


Saturation 


 


 


 97.2 %


()


 


Arterial Blood Base Excess    -2.4 (-2-2)  L


 


Jerod Test    Positive  








Objective


HEAD AND NECK:  No JVD.  


LUNGS:  Coarse rhonchi.


CARDIOVASCULAR:   Irregular S1 and S2 with no gallop.


ABDOMEN:  Soft.


EXTREMITIES:  No pitting edema.











Kvng Edmond MD               Nov 29, 2020 16:24

## 2020-11-29 NOTE — NUR
NURSE NOTES:

Dr Tobar assessing pt at bedside. 

Serum sodium level noted (135) - 1/2 NS IVF discontinued per Dr Tobar.

## 2020-11-29 NOTE — NUR
NURSE NOTES:

Pt provided with a CHG bed bath, blood drawn and taken to lab, care and linen change. 
Bedside assessment performed, assessed pt for pain with a FLACC score of 0 noted. 
Respiratory status remains stable. Pt repositioned for comfort and safety. ROM exercises 
performed per pt tolerance. RT present at bedside performing patient care. Fall, Aspiration 
and Skin precautions observed. Pt remains resting in bed; Bed remains in the lowest position 
with the safety wheels engaged, call light within reach, side rails up x3 and bed alarm 
activated. Will continue plan of care. Will continue to monitor.

## 2020-11-29 NOTE — NUR
NURSE NOTES:

Placed on SBT per RT with the following settings: CPAP: 0 PEEP, 8 PS. 

RSBI noted 51, RR 19, Spont Vt 475, Minute ventilation 7.56. No distress noted.

Will continue monitoring pt.

## 2020-11-29 NOTE — NUR
NURSE NOTES:

PICC line discontinued per order. 

22g IV started on RH and RW. 

Pt nasotracheally suctioned again.

FiO2 titrated down to 30% at this time. SpO2 100%. 

No distress noted.

Afebrile.

Repositioned. 

Will continue to monitor.

## 2020-11-29 NOTE — NUR
NURSE NOTES:

Pt repositioned.

SpO2 97-98% on FiO2 of 24% 4L via venturi mask.

No distress noted. 

Will continue to monitor.

## 2020-11-30 VITALS — DIASTOLIC BLOOD PRESSURE: 94 MMHG | SYSTOLIC BLOOD PRESSURE: 127 MMHG

## 2020-11-30 VITALS — SYSTOLIC BLOOD PRESSURE: 132 MMHG | DIASTOLIC BLOOD PRESSURE: 68 MMHG

## 2020-11-30 VITALS — SYSTOLIC BLOOD PRESSURE: 124 MMHG | DIASTOLIC BLOOD PRESSURE: 77 MMHG

## 2020-11-30 VITALS — SYSTOLIC BLOOD PRESSURE: 144 MMHG | DIASTOLIC BLOOD PRESSURE: 74 MMHG

## 2020-11-30 VITALS — DIASTOLIC BLOOD PRESSURE: 77 MMHG | SYSTOLIC BLOOD PRESSURE: 139 MMHG

## 2020-11-30 VITALS — DIASTOLIC BLOOD PRESSURE: 76 MMHG | SYSTOLIC BLOOD PRESSURE: 123 MMHG

## 2020-11-30 VITALS — DIASTOLIC BLOOD PRESSURE: 94 MMHG | SYSTOLIC BLOOD PRESSURE: 154 MMHG

## 2020-11-30 VITALS — SYSTOLIC BLOOD PRESSURE: 109 MMHG | DIASTOLIC BLOOD PRESSURE: 68 MMHG

## 2020-11-30 VITALS — SYSTOLIC BLOOD PRESSURE: 143 MMHG | DIASTOLIC BLOOD PRESSURE: 69 MMHG

## 2020-11-30 VITALS — SYSTOLIC BLOOD PRESSURE: 143 MMHG | DIASTOLIC BLOOD PRESSURE: 79 MMHG

## 2020-11-30 VITALS — DIASTOLIC BLOOD PRESSURE: 64 MMHG | SYSTOLIC BLOOD PRESSURE: 110 MMHG

## 2020-11-30 VITALS — DIASTOLIC BLOOD PRESSURE: 87 MMHG | SYSTOLIC BLOOD PRESSURE: 129 MMHG

## 2020-11-30 VITALS — SYSTOLIC BLOOD PRESSURE: 130 MMHG | DIASTOLIC BLOOD PRESSURE: 73 MMHG

## 2020-11-30 VITALS — DIASTOLIC BLOOD PRESSURE: 78 MMHG | SYSTOLIC BLOOD PRESSURE: 140 MMHG

## 2020-11-30 VITALS — DIASTOLIC BLOOD PRESSURE: 69 MMHG | SYSTOLIC BLOOD PRESSURE: 127 MMHG

## 2020-11-30 VITALS — SYSTOLIC BLOOD PRESSURE: 154 MMHG | DIASTOLIC BLOOD PRESSURE: 77 MMHG

## 2020-11-30 VITALS — SYSTOLIC BLOOD PRESSURE: 128 MMHG | DIASTOLIC BLOOD PRESSURE: 63 MMHG

## 2020-11-30 VITALS — SYSTOLIC BLOOD PRESSURE: 125 MMHG | DIASTOLIC BLOOD PRESSURE: 72 MMHG

## 2020-11-30 VITALS — SYSTOLIC BLOOD PRESSURE: 131 MMHG | DIASTOLIC BLOOD PRESSURE: 86 MMHG

## 2020-11-30 LAB
ADD MANUAL DIFF: NO
ALBUMIN SERPL-MCNC: 2.5 G/DL (ref 3.4–5)
ALP SERPL-CCNC: 249 U/L (ref 46–116)
ALT SERPL-CCNC: 54 U/L (ref 12–78)
ANION GAP SERPL CALC-SCNC: 9 MMOL/L (ref 5–15)
AST SERPL-CCNC: 35 U/L (ref 15–37)
BASOPHILS NFR BLD AUTO: 0.4 % (ref 0–2)
BILIRUB DIRECT SERPL-MCNC: < 0.1 MG/DL (ref 0–0.3)
BILIRUB SERPL-MCNC: 0.4 MG/DL (ref 0.2–1)
BUN SERPL-MCNC: 21 MG/DL (ref 7–18)
CALCIUM SERPL-MCNC: 8.8 MG/DL (ref 8.5–10.1)
CHLORIDE SERPL-SCNC: 103 MMOL/L (ref 98–107)
CO2 SERPL-SCNC: 24 MMOL/L (ref 21–32)
CREAT SERPL-MCNC: 0.7 MG/DL (ref 0.55–1.3)
EOSINOPHIL NFR BLD AUTO: 0.1 % (ref 0–3)
ERYTHROCYTE [DISTWIDTH] IN BLOOD BY AUTOMATED COUNT: 19.3 % (ref 11.6–14.8)
HCT VFR BLD CALC: 36 % (ref 42–52)
HGB BLD-MCNC: 12 G/DL (ref 14.2–18)
LYMPHOCYTES NFR BLD AUTO: 18.6 % (ref 20–45)
MCV RBC AUTO: 92 FL (ref 80–99)
MONOCYTES NFR BLD AUTO: 3.5 % (ref 1–10)
NEUTROPHILS NFR BLD AUTO: 77.4 % (ref 45–75)
PHOSPHATE SERPL-MCNC: 3 MG/DL (ref 2.5–4.9)
PLATELET # BLD: 443 K/UL (ref 150–450)
POTASSIUM SERPL-SCNC: 4.7 MMOL/L (ref 3.5–5.1)
RBC # BLD AUTO: 3.9 M/UL (ref 4.7–6.1)
SODIUM SERPL-SCNC: 136 MMOL/L (ref 136–145)
WBC # BLD AUTO: 8.7 K/UL (ref 4.8–10.8)

## 2020-11-30 RX ADMIN — HEPARIN SODIUM SCH UNITS: 5000 INJECTION INTRAVENOUS; SUBCUTANEOUS at 08:56

## 2020-11-30 RX ADMIN — PANTOPRAZOLE SODIUM SCH MG: 40 INJECTION, POWDER, FOR SOLUTION INTRAVENOUS at 08:55

## 2020-11-30 RX ADMIN — HUMAN INSULIN SCH MLS/HR: 100 INJECTION, SOLUTION SUBCUTANEOUS at 12:30

## 2020-11-30 RX ADMIN — INSULIN ASPART SCH UNITS: 100 INJECTION, SOLUTION INTRAVENOUS; SUBCUTANEOUS at 12:00

## 2020-11-30 RX ADMIN — INSULIN ASPART SCH UNITS: 100 INJECTION, SOLUTION INTRAVENOUS; SUBCUTANEOUS at 00:21

## 2020-11-30 RX ADMIN — DEXAMETHASONE SODIUM PHOSPHATE SCH MG: 10 INJECTION INTRAMUSCULAR; INTRAVENOUS at 18:22

## 2020-11-30 RX ADMIN — MEROPENEM SCH MLS/HR: 1 INJECTION INTRAVENOUS at 02:23

## 2020-11-30 RX ADMIN — INSULIN ASPART SCH UNITS: 100 INJECTION, SOLUTION INTRAVENOUS; SUBCUTANEOUS at 06:00

## 2020-11-30 RX ADMIN — INSULIN ASPART SCH UNITS: 100 INJECTION, SOLUTION INTRAVENOUS; SUBCUTANEOUS at 18:00

## 2020-11-30 RX ADMIN — HEPARIN SODIUM SCH UNITS: 5000 INJECTION INTRAVENOUS; SUBCUTANEOUS at 22:00

## 2020-11-30 NOTE — INTERNAL MED PROGRESS NOTE
Subjective


Date of Service:  Nov 30, 2020


Physician Name


Benito Hearn


Attending Physician


Andrei Cancino MD





Current Medications








 Medications


  (Trade)  Dose


 Ordered  Sig/Miky


 Route


 PRN Reason  Start Time


 Stop Time Status Last Admin


Dose Admin


 


 Acetaminophen


  (Tylenol)  650 mg  Q4H  PRN


 GT


 Temp >100.5  11/21/20 12:30


 12/21/20 12:29  11/23/20 10:14





 


 Barium Sulfate


  (Varibar Honey)  250 ml  NOW  PRN


 MC


 RAD  11/30/20 07:15


 12/3/20 07:08   





 


 Barium Sulfate


  (Varibar Nectar)  240 ml  NOW  PRN


 MC


 RAD  11/30/20 07:15


 12/3/20 07:08   





 


 Barium Sulfate


  (Varibar Pudding)  230 ml  NOW  PRN


 MC


 RAD  11/30/20 07:15


 12/3/20 07:08   





 


 Barium Sulfate


  (Varibar Thin


 Liquid powder)  148 gm  NOW  PRN


 MC


 RAD  11/30/20 07:15


 12/3/20 07:08   





 


 Dexamethasone


 Sodium Phosphate


  (Decadron 10mg/


 ml Inj)  6 mg  Q24H


 IV


   11/30/20 18:00


 12/1/20 18:01  11/30/20 18:22





 


 Dextrose


  (Dextrose 50%)  50 ml  Q30M  PRN


 IV


 Hypoglycemia  10/23/20 22:45


 1/21/21 22:44   





 


 Dextrose/Sodium


 Chloride  1,000 ml @ 


 50 mls/hr  Q20H


 IV


   11/29/20 16:15


 12/29/20 16:14  11/30/20 12:30





 


 Heparin Sodium


  (Porcine)


  (Heparin 5000


 units/ml)  5,000 units  EVERY 12  HOURS


 SUBQ


   10/23/20 21:00


 12/7/20 20:59  11/30/20 08:56





 


 Insulin Aspart


  (NovoLOG)    EVERY 6  HOURS


 SUBQ


   11/2/20 06:00


 1/22/21 06:29  11/30/20 00:21





 


 Nitroglycerin


  (Ntg)  0.4 mg  Q5M  PRN


 SL


 Prn Chest Pain  11/21/20 10:15


 12/21/20 10:09   





 


 Ondansetron HCl


  (Zofran)  4 mg  Q6H  PRN


 IVP


 Nausea & Vomiting  11/21/20 14:30


 12/21/20 14:29   





 


 Pantoprazole


  (Protonix)  40 mg  DAILY


 IV


   11/7/20 09:00


 12/7/20 08:59  11/30/20 08:55





 


 Polyethylene


 Glycol


  (Miralax)  17 gm  DAILYPRN  PRN


 GT


 Constipation  11/21/20 10:15


 12/21/20 10:14   





 


 Promethazine HCl/


 Codeine


  (Phenergan with


 Codeine)  5 ml  Q4H  PRN


 GT


 For Cough  11/21/20 10:14


 12/21/20 10:13  11/25/20 22:05











Allergies:  


Coded Allergies:  


     No Known Allergies (Unverified , 8/20/12)


ROS Limited/Unobtainable:  Yes


Subjective


74 YO M admitted with shortness of breath.  Now pneumonia; previously COVID 

positive.  Cover for Int med-Dr Cancino.  Step Down unit.  extubated 11/29/20. 

Tolerating nasal canula





Objective





Last Vital Signs








  Date Time  Temp Pulse Resp B/P (MAP) Pulse Ox O2 Delivery O2 Flow Rate FiO2


 


11/30/20 18:31 98.3 73 19 143/79 (100) 97   


 


11/30/20 16:00      Nasal Cannula 2.0 


 


11/29/20 17:29        24











Laboratory Tests








Test


 11/30/20


04:19 11/30/20


05:50


 


White Blood Count


 8.7 K/UL


(4.8-10.8) 





 


Red Blood Count


 3.90 M/UL


(4.70-6.10)  L 





 


Hemoglobin


 12.0 G/DL


(14.2-18.0)  L 





 


Hematocrit


 36.0 %


(42.0-52.0)  L 





 


Mean Corpuscular Volume 92 FL (80-99)   


 


Mean Corpuscular Hemoglobin


 30.8 PG


(27.0-31.0) 





 


Mean Corpuscular Hemoglobin


Concent 33.4 G/DL


(32.0-36.0) 





 


Red Cell Distribution Width


 19.3 %


(11.6-14.8)  H 





 


Platelet Count


 443 K/UL


(150-450) 





 


Mean Platelet Volume


 7.4 FL


(6.5-10.1) 





 


Neutrophils (%) (Auto)


 77.4 %


(45.0-75.0)  H 





 


Lymphocytes (%) (Auto)


 18.6 %


(20.0-45.0)  L 





 


Monocytes (%) (Auto)


 3.5 %


(1.0-10.0) 





 


Eosinophils (%) (Auto)


 0.1 %


(0.0-3.0) 





 


Basophils (%) (Auto)


 0.4 %


(0.0-2.0) 





 


Sodium Level


 136 MMOL/L


(136-145) 





 


Potassium Level


 4.7 MMOL/L


(3.5-5.1) 





 


Chloride Level


 103 MMOL/L


() 





 


Carbon Dioxide Level


 24 MMOL/L


(21-32) 





 


Anion Gap


 9 mmol/L


(5-15) 





 


Blood Urea Nitrogen


 21 mg/dL


(7-18)  H 





 


Creatinine


 0.7 MG/DL


(0.55-1.30) 





 


Estimat Glomerular Filtration


Rate > 60 mL/min


(>60) 





 


Glucose Level


 113 MG/DL


()  H 





 


Calcium Level


 8.8 MG/DL


(8.5-10.1) 





 


Phosphorus Level


 3.0 MG/DL


(2.5-4.9) 





 


Magnesium Level


 2.8 MG/DL


(1.8-2.4)  H 





 


Total Bilirubin


 0.4 MG/DL


(0.2-1.0) 





 


Direct Bilirubin


 < 0.1 MG/DL


(0.0-0.3) 





 


Aspartate Amino Transf


(AST/SGOT) 35 U/L (15-37)


 





 


Alanine Aminotransferase


(ALT/SGPT) 54 U/L (12-78)


 





 


Alkaline Phosphatase


 249 U/L


()  H 





 


Total Protein


 8.2 G/DL


(6.4-8.2) 





 


Albumin


 2.5 G/DL


(3.4-5.0)  L 





 


POC Whole Blood Glucose


 


 116 MG/DL


()  H

















Intake and Output  


 


 11/29/20 11/30/20





 19:00 07:00


 


Intake Total 995 ml 600 ml


 


Output Total 1425 ml 700 ml


 


Balance -430 ml -100 ml


 


  


 


Free Water 10 ml 


 


IV Total 565 ml 600 ml


 


Tube Feeding 420 ml 


 


Output Urine Total 1425 ml 700 ml








Objective


PHYSICAL EXAMINATION:


GENERAL:  The patient awake with deep stimuli, open his eyes, however,


cannot follow commands.  The patient is on a Ventimask at this time,


chronically ill-appearing.


HEAD AND NECK:  Pupils are equal and reactive to light.  Anicteric.


NECK:  Supple.  No JVD.


LUNGS:  Nasal cannula;  wheezing, rhonchi, and decreased air in bases.


HEART:  S1, S2.  Regular rhythm.  Distant heart sounds.  No murmur or


gallop.


ABDOMEN:  Soft, nondistended, nontender.  Positive bowel sounds.


EXTREMITIES:  No cyanosis, clubbing, or edema.


NEUROLOGIC:  Very limited secondary to the patient's status, cannot follow


commands.  Opens his eyes with deep stimuli and moving extremities


spontaneously.





Assessment/Plan


Assessment/Plan


ASSESSMENT:


1. Acute hypoxemic respiratory failure, most likely secondary to


pneumonia and sepsis.


2. Sepsis secondary to urinary tract infection and pneumonia.


3. pneumonia=MRSA; ESBL E. coli


4. Acute kidney injury on chronic renal insufficiency.


5. Dehydration.


6. History of chronic congestive heart failure.


7. Diabetes type 2.


8. Dyslipidemia.


9. Hypertension.


10. Alzheimer's disease.


11. COVID 19 previous positive





PLAN:


1.  ICU


2.  Dr. Sandoval,=Pulmonary Critical Care


3.  Dr. Garcia = Inf Dis.


4.  IV= D5W due to the hypernatremia and dehydration.


5.  antibiotics =   meropenem; S/P micafungin and zyvox


6.  Code status is full code.


7.    DVT prophylaxis is heparin subcutaneous.











Benito Hearn MD               Nov 30, 2020 19:15

## 2020-11-30 NOTE — INFECTIOUS DISEASES PROG NOTE
Assessment/Plan





76yo M with:





Respiratory code 2ry to mucus plug 11/12


Septic Shock- -recurrent


Fever, recurrent, low grade;SP


Leukocytosis; recurrent; increased


Acute hypoxic resp failure, on NRB mask> 4l NC; back on NRB, desaturation 10/29 

> intubated 11/6


Pneumonia- >HAP


Hx of COVID-19 PNA 9/9/20 11/17 CXR: No significant interval change in the radiographic appearance the 

chest compared to one day prior.


   11/14 Resp cx +MRSA, nl resp hayden


   UCx neg


   BCx NTD


   11/13 COVID PCR neg


         --11/10 CXR: Bilateral infiltrates in a peribronchovascular 

distribution are unchanged. Left pleural effusion is unchanged.


         --11/8 CXR: Persistent bilateral patchy pulmonary opacities, most 

prominent  in the left lower lung.


         --11/7 ucx neg


                  sp cx MRSA (colonizer at this point)


                  Bcx Neg


         --11/2 Bcx Neg


          10/30 Sp cx MRSA (Vancomycin ADRIANA 1), ESBL E.coli


   10/23 BCx NTD


   UA 15-20 WBC, UCx Neg


   10/23 COVID rapid neg; 10/26 rapid COVID PCR + (from prior infection)- not 

new infection


   Flu A/B neg


   CXR: L pna


   Resp cx MRSA


   11/19 Resp cx +ESBL E.coli & PsA


   11/21 BCx NTD


   11/22 CXR: Small bilateral pleural effusions with moderate vascular c

ongestion. Airspace consolidation in the left lower lung field may represent 

atelectasis however, correlate for infiltrate.  This is improving when compared 

to the prior study.


   11/23 BCx NTD


   11/23 CXR: Increasing right basilar opacity, likely infiltrate, may also 

reflect increasing pleural fluid





H/o COVID pna


   Tested positive 9/9/20


   10/23 Rapid Ag neg


   10/26 Rapid Ag positive (from prior disease?)


   11/13 COVID PCR neg





JANES on CKD, improving





SNF resident ([facility])


Non-verbal


VRE and MRSA colonized








Plan:


Stop meropenem #10 / 10 given high fever, ESBL E.coli and PsA pna


Monitor off abx





On dex 6mg per Primary





   -11/20 SP linezolid #7


   -11/16 SP meropenem #14 for ESBL pna 


   -11/10 SP Micafungin #4


   -11/6 SP IV Vancomycin #15


   -11/2 SP Zosyn #4


   -10/26 SP Cefepime #4


   -10/24 SP Flagyl #





Monitor CBC/CMP


Monitor resp status


Monitor temp curve and hemodynamics





D/w RN





Thank you for this consult. [facility] will continue to follow.





Subjective


Allergies:  


Coded Allergies:  


     No Known Allergies (Unverified , 8/20/12)





AF


WBC 8.7


2L NC


off abx


On dex 6


NAD





Objective





Last 24 Hour Vital Signs








  Date Time  Temp Pulse Resp B/P (MAP) Pulse Ox O2 Delivery O2 Flow Rate FiO2


 


11/30/20 12:00      Nasal Cannula 2.0 


 


11/30/20 12:00 98.3 65 19 130/73 (92) 98   


 


11/30/20 12:00  65      


 


11/30/20 12:00 98.3 65 19 130/73 (92) 98   


 


11/30/20 11:00  70 21 109/68 (82) 96   


 


11/30/20 10:00  69 20 127/94 (105) 98   


 


11/30/20 09:00  69 16 124/77 (93) 97   


 


11/30/20 08:00      Nasal Cannula 2.0 


 


11/30/20 08:00  71      


 


11/30/20 08:00 97.8 71 16 154/94 (114) 96   


 


11/30/20 07:01  70 22 129/87 (101) 96   


 


11/30/20 06:00  75 22 140/78 (98) 96   


 


11/30/20 05:00  72 20 144/74 (97) 98   


 


11/30/20 04:35      Nasal Cannula 2.0 


 


11/30/20 04:34  72      


 


11/30/20 04:00 98.2 68 20 143/69 (93) 100   


 


11/30/20 03:00  72 18 125/72 (89) 100   


 


11/30/20 02:00  70 18 128/63 (84) 100   


 


11/30/20 01:00  68 20 110/64 (79) 100   


 


11/30/20 00:00  66      


 


11/30/20 00:00      Venturi Mask 6.0 


 


11/30/20 00:00 98.5 72 20 127/69 (88) 100   


 


11/29/20 23:00  77 20 120/78 (92) 98   


 


11/29/20 22:00  70 18 148/72 (97) 98   


 


11/29/20 21:00  67 20 118/73 (88) 100   


 


11/29/20 20:00      Venturi Mask 6.0 


 


11/29/20 20:00  70      


 


11/29/20 20:00 98.3 71 22 121/65 (83) 100   


 


11/29/20 19:00  66 18 121/63 (82) 100   


 


11/29/20 18:28       3.0 


 


11/29/20 18:25      Simple Mask 3.0 


 


11/29/20 18:00  70 18 117/70 (86) 99   


 


11/29/20 17:31      Venturi Mask 4.0 


 


11/29/20 17:29       4.0 24


 


11/29/20 17:00  70 20 126/68 (87) 99   


 


11/29/20 16:00       6.0 30


 


11/29/20 16:00 98.3 80 20 154/76 (102) 98   


 


11/29/20 16:00  71      


 


11/29/20 16:00      Venturi Mask 6.0 


 


11/29/20 15:26       10.0 50


 


11/29/20 15:19      Venturi Mask 10.0 


 


11/29/20 15:00  74 23 133/66 (88) 91   


 


11/29/20 14:50     98 Venturi Mask 10.0 50


 


11/29/20 14:45      Nasal Cannula 3.0 32


 


11/29/20 14:39     100   


 


11/29/20 14:30      Nasal Cannula 4.0 


 


11/29/20 14:00  66 18 124/63 (83) 99   


 


11/29/20 13:00  66 19 111/62 (78) 99   








Height (Feet):  5


Height (Inches):  4.00


Weight (Pounds):  130


Gen: NAD in bed


HEENT: NCAT


CV: RRR


Pulm: CTAB


Abd: Soft, NTND


Ext: No c/c/e


Neuro: Awake, not interactive


Lines: RUE PICC





Laboratory Tests








Test


 11/30/20


04:19 11/30/20


05:50


 


White Blood Count


 8.7 K/UL


(4.8-10.8) 





 


Red Blood Count


 3.90 M/UL


(4.70-6.10)  L 





 


Hemoglobin


 12.0 G/DL


(14.2-18.0)  L 





 


Hematocrit


 36.0 %


(42.0-52.0)  L 





 


Mean Corpuscular Volume 92 FL (80-99)   


 


Mean Corpuscular Hemoglobin


 30.8 PG


(27.0-31.0) 





 


Mean Corpuscular Hemoglobin


Concent 33.4 G/DL


(32.0-36.0) 





 


Red Cell Distribution Width


 19.3 %


(11.6-14.8)  H 





 


Platelet Count


 443 K/UL


(150-450) 





 


Mean Platelet Volume


 7.4 FL


(6.5-10.1) 





 


Neutrophils (%) (Auto)


 77.4 %


(45.0-75.0)  H 





 


Lymphocytes (%) (Auto)


 18.6 %


(20.0-45.0)  L 





 


Monocytes (%) (Auto)


 3.5 %


(1.0-10.0) 





 


Eosinophils (%) (Auto)


 0.1 %


(0.0-3.0) 





 


Basophils (%) (Auto)


 0.4 %


(0.0-2.0) 





 


Sodium Level


 136 MMOL/L


(136-145) 





 


Potassium Level


 4.7 MMOL/L


(3.5-5.1) 





 


Chloride Level


 103 MMOL/L


() 





 


Carbon Dioxide Level


 24 MMOL/L


(21-32) 





 


Anion Gap


 9 mmol/L


(5-15) 





 


Blood Urea Nitrogen


 21 mg/dL


(7-18)  H 





 


Creatinine


 0.7 MG/DL


(0.55-1.30) 





 


Estimat Glomerular Filtration


Rate > 60 mL/min


(>60) 





 


Glucose Level


 113 MG/DL


()  H 





 


Calcium Level


 8.8 MG/DL


(8.5-10.1) 





 


Phosphorus Level


 3.0 MG/DL


(2.5-4.9) 





 


Magnesium Level


 2.8 MG/DL


(1.8-2.4)  H 





 


Total Bilirubin


 0.4 MG/DL


(0.2-1.0) 





 


Direct Bilirubin


 < 0.1 MG/DL


(0.0-0.3) 





 


Aspartate Amino Transf


(AST/SGOT) 35 U/L (15-37)


 





 


Alanine Aminotransferase


(ALT/SGPT) 54 U/L (12-78)


 





 


Alkaline Phosphatase


 249 U/L


()  H 





 


Total Protein


 8.2 G/DL


(6.4-8.2) 





 


Albumin


 2.5 G/DL


(3.4-5.0)  L 





 


POC Whole Blood Glucose


 


 116 MG/DL


()  H











Current Medications








 Medications


  (Trade)  Dose


 Ordered  Sig/Miky


 Route


 PRN Reason  Start Time


 Stop Time Status Last Admin


Dose Admin


 


 Acetaminophen


  (Tylenol)  650 mg  Q4H  PRN


 GT


 Temp >100.5  11/21/20 12:30


 12/21/20 12:29  11/23/20 10:14





 


 Barium Sulfate


  (Varibar Honey)  250 ml  NOW  PRN


 


 RAD  11/30/20 07:15


 12/3/20 07:08   





 


 Barium Sulfate


  (Varibar Nectar)  240 ml  NOW  PRN


 


 RAD  11/30/20 07:15


 12/3/20 07:08   





 


 Barium Sulfate


  (Varibar Pudding)  230 ml  NOW  PRN


 


 RAD  11/30/20 07:15


 12/3/20 07:08   





 


 Barium Sulfate


  (Varibar Thin


 Liquid powder)  148 gm  NOW  PRN


 


 RAD  11/30/20 07:15


 12/3/20 07:08   





 


 Dexamethasone


  (Decadron)  6 mg  Q24H


 ORAL


   11/22/20 18:00


 12/1/20 18:01  11/29/20 17:17





 


 Dextrose


  (Dextrose 50%)  50 ml  Q30M  PRN


 IV


 Hypoglycemia  10/23/20 22:45


 1/21/21 22:44   





 


 Dextrose/Sodium


 Chloride  1,000 ml @ 


 50 mls/hr  Q20H


 IV


   11/29/20 16:15


 12/29/20 16:14  11/30/20 12:30





 


 Heparin Sodium


  (Porcine)


  (Heparin 5000


 units/ml)  5,000 units  EVERY 12  HOURS


 SUBQ


   10/23/20 21:00


 12/7/20 20:59  11/30/20 08:56





 


 Insulin Aspart


  (NovoLOG)    EVERY 6  HOURS


 SUBQ


   11/2/20 06:00


 1/22/21 06:29  11/30/20 00:21





 


 Nitroglycerin


  (Ntg)  0.4 mg  Q5M  PRN


 SL


 Prn Chest Pain  11/21/20 10:15


 12/21/20 10:09   





 


 Ondansetron HCl


  (Zofran)  4 mg  Q6H  PRN


 IVP


 Nausea & Vomiting  11/21/20 14:30


 12/21/20 14:29   





 


 Pantoprazole


  (Protonix)  40 mg  DAILY


 IV


   11/7/20 09:00


 12/7/20 08:59  11/30/20 08:55





 


 Polyethylene


 Glycol


  (Miralax)  17 gm  DAILYPRN  PRN


 GT


 Constipation  11/21/20 10:15


 12/21/20 10:14   





 


 Promethazine HCl/


 Codeine


  (Phenergan with


 Codeine)  5 ml  Q4H  PRN


 GT


 For Cough  11/21/20 10:14


 12/21/20 10:13  11/25/20 22:05




















Bianca Casillas M.D.               Nov 30, 2020 12:54

## 2020-11-30 NOTE — NUR
NURSE NOTES:

Administered PM meds. Pt remains stable at this time with no signs of distress. Vital signs 
within normal range. Pt SpO2 97% on 2L NC.

## 2020-11-30 NOTE — NUR
NURSE NOTES:

Received report from LITA Holliday. Pt is awake, tracks with eyes, grunts/mumbles but unable to 
follow commands. Pt is calm. Vital signs are within normal range. Pt appears to be in no 
distress. SpO2 98% on 2L NC. HOB elevated, side rails x2, call light within reach, bed 
locked and in lowest position. Bed alarms on. R wrist 22g and R hand 22g asymptomatic and 
flushing well. Will continue plan of care.

## 2020-11-30 NOTE — PULMONOLGY CRITICAL CARE NOTE
Critical Care - Asmt/Plan


Problems:  


(1) Acute respiratory failure


(2) Multifocal pneumonia


(3) 2019 novel coronavirus disease (COVID-19)


(4) Acute encephalopathy


(5) Severe sepsis


(6) Alzheimer's dementia


(7) HTN (hypertension)


(8) History of CVA (cerebrovascular accident)


(9) BPH (benign prostatic hyperplasia)


Respiratory:  monitor respiratory rate, adjust FIO2


Cardiac:  continue to monitor HR/BP


Renal:  check electrolytes


Infectious Disease:  check cultures, continue antibiotics


Hematologic:  transfuse if hgb<8.5


Neurologic:  PRN Ativan, keep patient comfortable


Affect:  PRN ativan


Disposition:  keep in ICU


Notes Reviewed:  internist, cardio, renal


Discussed with:  nurses, 





Critical Care - Objective





Last 24 Hour Vital Signs








  Date Time  Temp Pulse Resp B/P (MAP) Pulse Ox O2 Delivery O2 Flow Rate FiO2


 


11/30/20 16:00  67      


 


11/30/20 16:00 97.3 67 20 139/77 (97) 99   


 


11/30/20 16:00      Nasal Cannula 2.0 


 


11/30/20 15:00  69 17 131/86 (101) 96   


 


11/30/20 14:00  69 17 132/68 (89) 98   


 


11/30/20 13:00  66 16 154/77 (102) 98   


 


11/30/20 12:00      Nasal Cannula 2.0 


 


11/30/20 12:00 98.3 65 19 130/73 (92) 98   


 


11/30/20 12:00  65      


 


11/30/20 12:00 98.3 65 19 130/73 (92) 98   


 


11/30/20 11:00  70 21 109/68 (82) 96   


 


11/30/20 10:00  69 20 127/94 (105) 98   


 


11/30/20 09:00  69 16 124/77 (93) 97   


 


11/30/20 08:00      Nasal Cannula 2.0 


 


11/30/20 08:00  71      


 


11/30/20 08:00 97.8 71 16 154/94 (114) 96   


 


11/30/20 07:01  70 22 129/87 (101) 96   


 


11/30/20 06:00  75 22 140/78 (98) 96   


 


11/30/20 05:00  72 20 144/74 (97) 98   


 


11/30/20 04:35      Nasal Cannula 2.0 


 


11/30/20 04:34  72      


 


11/30/20 04:00 98.2 68 20 143/69 (93) 100   


 


11/30/20 03:00  72 18 125/72 (89) 100   


 


11/30/20 02:00  70 18 128/63 (84) 100   


 


11/30/20 01:00  68 20 110/64 (79) 100   


 


11/30/20 00:00  66      


 


11/30/20 00:00      Venturi Mask 6.0 


 


11/30/20 00:00 98.5 72 20 127/69 (88) 100   


 


11/29/20 23:00  77 20 120/78 (92) 98   


 


11/29/20 22:00  70 18 148/72 (97) 98   


 


11/29/20 21:00  67 20 118/73 (88) 100   


 


11/29/20 20:00      Venturi Mask 6.0 


 


11/29/20 20:00  70      


 


11/29/20 20:00 98.3 71 22 121/65 (83) 100   


 


11/29/20 19:00  66 18 121/63 (82) 100   


 


11/29/20 18:28       3.0 


 


11/29/20 18:25      Simple Mask 3.0 


 


11/29/20 18:00  70 18 117/70 (86) 99   


 


11/29/20 17:31      Venturi Mask 4.0 


 


11/29/20 17:29       4.0 24


 


11/29/20 17:00  70 20 126/68 (87) 99   








Status:  awake


Condition:  critical


HEENT:  atraumatic, normocephalic


Lungs:  chest wall tender


Heart:  HR/BP stable


Abdomen:  soft, active bowel sounds


Extremities:  no C/C/E, edema


Objective:


still large secretions, failed weaning


Accucheck:  106





Critical Care - Subjective


ROS Limited/Unobtainable:  Yes


Condition:  critical


FI02:  24


Vent Support Breath Rate:  12


Vent Support Mode:  AC


Vent Tidal Volume:  600


Sputum Amount:  Moderate


PEEP:  0.0


PIP:  14


I&O:











Intake and Output  


 


 11/29/20 11/30/20





 19:00 07:00


 


Intake Total 995 ml 600 ml


 


Output Total 1425 ml 700 ml


 


Balance -430 ml -100 ml


 


  


 


Free Water 10 ml 


 


IV Total 565 ml 600 ml


 


Tube Feeding 420 ml 


 


Output Urine Total 1425 ml 700 ml








ET-Tube:  7.2


ET Position:  25


Labs:





Laboratory Tests








Test


 11/30/20


04:19 11/30/20


05:50


 


White Blood Count


 8.7 K/UL


(4.8-10.8) 





 


Red Blood Count


 3.90 M/UL


(4.70-6.10)  L 





 


Hemoglobin


 12.0 G/DL


(14.2-18.0)  L 





 


Hematocrit


 36.0 %


(42.0-52.0)  L 





 


Mean Corpuscular Volume 92 FL (80-99)   


 


Mean Corpuscular Hemoglobin


 30.8 PG


(27.0-31.0) 





 


Mean Corpuscular Hemoglobin


Concent 33.4 G/DL


(32.0-36.0) 





 


Red Cell Distribution Width


 19.3 %


(11.6-14.8)  H 





 


Platelet Count


 443 K/UL


(150-450) 





 


Mean Platelet Volume


 7.4 FL


(6.5-10.1) 





 


Neutrophils (%) (Auto)


 77.4 %


(45.0-75.0)  H 





 


Lymphocytes (%) (Auto)


 18.6 %


(20.0-45.0)  L 





 


Monocytes (%) (Auto)


 3.5 %


(1.0-10.0) 





 


Eosinophils (%) (Auto)


 0.1 %


(0.0-3.0) 





 


Basophils (%) (Auto)


 0.4 %


(0.0-2.0) 





 


Sodium Level


 136 MMOL/L


(136-145) 





 


Potassium Level


 4.7 MMOL/L


(3.5-5.1) 





 


Chloride Level


 103 MMOL/L


() 





 


Carbon Dioxide Level


 24 MMOL/L


(21-32) 





 


Anion Gap


 9 mmol/L


(5-15) 





 


Blood Urea Nitrogen


 21 mg/dL


(7-18)  H 





 


Creatinine


 0.7 MG/DL


(0.55-1.30) 





 


Estimat Glomerular Filtration


Rate > 60 mL/min


(>60) 





 


Glucose Level


 113 MG/DL


()  H 





 


Calcium Level


 8.8 MG/DL


(8.5-10.1) 





 


Phosphorus Level


 3.0 MG/DL


(2.5-4.9) 





 


Magnesium Level


 2.8 MG/DL


(1.8-2.4)  H 





 


Total Bilirubin


 0.4 MG/DL


(0.2-1.0) 





 


Direct Bilirubin


 < 0.1 MG/DL


(0.0-0.3) 





 


Aspartate Amino Transf


(AST/SGOT) 35 U/L (15-37)


 





 


Alanine Aminotransferase


(ALT/SGPT) 54 U/L (12-78)


 





 


Alkaline Phosphatase


 249 U/L


()  H 





 


Total Protein


 8.2 G/DL


(6.4-8.2) 





 


Albumin


 2.5 G/DL


(3.4-5.0)  L 





 


POC Whole Blood Glucose


 


 116 MG/DL


()  H

















Michael Sandoval MD              Nov 30, 2020 16:50

## 2020-11-30 NOTE — NUR
NURSE NOTES:

V/S stable - condition unchanged V/S stable. Suctioned small amt of thick light yellowish 
secretion via ana- pharynx. Repositioned for comfort

## 2020-11-30 NOTE — NUR
NURSE NOTES:

RECEIVED PATIENT FROM GARRETT MAS RN.ON 2L/NC WITH SPO2 95-99%.VITALS STABLE SINUS RHYTHM ON 
THE MONITOR.GALLEGOS TO GRAVITY WITH ADEQUATE URINE OUTPUT.REPOSITIONED FOR COMFORT,NO OPEN 
WOUNDS NOTED.

## 2020-11-30 NOTE — NUR
NURSE NOTES:

complete bath given, oral care done, suctioned prn, incontinent of soft brown 
stool,accucheck blood sugar done with blood sugar 116 with no coverage

## 2020-11-30 NOTE — NUR
Received patient awake but does not follow commands, moans only. Cardiac monitor shows SR no 
ectopy . Both IV sites patent #22 @ RH and #22 @Right wrist- no s/s infiltration noted. On 
O2 2L via NC- 97-98%- no signs of respiratory distress noted. F/C patent -draining 
adequately

## 2020-11-30 NOTE — NUR
NURSE NOTES:

fingerstick blood sugar done with blood sugar 164, covered with 4 units novolog subcut

## 2020-11-30 NOTE — NUR
NURSE NOTES:

Dr. Casillas came to see patient- updated with patient's condition- made aware patient for 
transfer to SDU today

## 2020-11-30 NOTE — NUR
Speech Pathology Note (Bedside Dysphagia Re-Evaluation)



Hospitalization:  10/23/2020~ 

Admission Dx: Community Acquired Pneumonia likely aspiration 

Intubation:  11/06/2020~11/29/2020 for hypoxemia, septic shock

 Relevant Objective Study:  07/13/2017(Videofluoroscopic Swallow Study at [facility])  Findings were remarkable for penetration to the level of true vocal folds with 
delay swallow triggering) 

The most swallow evaluation:  10/26/2020 positive for s.s of aspiration 

Code Status:  Full code 

Post extubation day #1

Length of intubation days 23



PMH:  HTN, DM2, BPH, CKD, Alzheimer's dementia, CVA, right shoulder surgery, COVID 19



Brief Note:  Mr. Chatman is a 75 year old male who is a resident of skilled nursing facility 
admitted for concerning for aspiration pneumonia in setting of leukocytosis, fever, 
hypoxemia with abnormal CXR.  Addition to, his BUN/Creatine was elevated to 53/2.4 insetting 
of underlying CKD.  He failed bedside dysphagia evaluation on 10/26/2020 and he remained 
NPO. Clinical condition had declined with septic shock and intubated on 10/29/2020 around 
12:24.Through this ICU course, clinically improved and successfully extubated on 11/29/2020. 




Current labs:  unremarkable 

Vital signs:  Temp 98.3, HR: 65~70, BP:  109/68~154/77, SPO2: 96~98 on 2 liter.



Findings:  Mr. Chatman is oriented x 0.  His spontaneous random speech is clear, and voice is 
intact.  His respiration is normal. 

Pt was unable to follow commands.  I attempted PO trial with ice chip, and apple sauce.  He 
sealed his lips and refused to take any PO with me at this time.  



Interpretation:

     1. Chronic oropharyngeal dysphagia with microaspiration 

     2. Respiratory failure with aspiration required 23 days Intubation 

     

Plan:

     1. Hold PO for now

     2. Consider GI consult for PEG 



Angella Parry

## 2020-11-30 NOTE — NUR
NURSE NOTES:

Transferred to SDU room 235-2 via bed- patient awake , on O2 at 2l/NC - no resp. distress 
noted. Both IVsite @ left arm patent with D5NS at 50cc/hr infusing via Left wrist . F/C 
patent with good UO. Report given to Jessica LONDON and Angelica LONDON. Endorsed

## 2020-11-30 NOTE — NUR
NURSE NOTES:

Dr. Sandoval came to see patient- updated with patient status- with order to transfer patient 
to SDU

## 2020-11-30 NOTE — NUR
NURSE HAND-OFF REPORT: 



Latest Vital Signs: Temperature 98.2 , Pulse 70 , B/P 129 /87 , Respiratory Rate 22 , O2 SAT 
96 , Nasal Cannula, O2 Flow Rate 2.0 .  

Vital Sign Comment: stable



EKG Rhythm: Sinus Rhythm

Rhythm change?: N 

MD Notified?:

MD Response: 



Latest Mejia Fall Score: 50  

Fall Risk: High Risk 

Safety Measures: Call light Within Reach, Bed Alarm Zone 1, Side Rails Side Rails x3, Bed 
position Low and Locked.

Fall Precautions: 

Yellow Socks

Yellow Gown

Door Sign

Patient Fall Education



Report given to jaz perez.

## 2020-11-30 NOTE — NUR
NURSE NOTES:

in no distress, with good urine output,suctioned nasotracheally but fights when being 
suctioned

## 2020-11-30 NOTE — CARDIAC ELECTROPHYSIOLOGY PN
Assessment/Plan


Assessment/Plan


1. Accelerated junctional rhythm. Ruled out for MI


      Echo showed ejection fraction of 55%.





2. Long run of 31 beats of Nonsustained VT on 11/3/2020. 


     Cardiac cath after stabilization.





3. Henry 30, Asystole while on the Vent due to resp failure/ likely mucus plug .

S/P Code


 Off midodrine





4. Respiratory failure, on antibiotic  


    Extubated 11/29/20





5. Septic shock. Off Levophed   





6. History of previous COVID infection in September 2020 and active Covid  in 

isolation


Now negative again and off isolation.





7. Dementia.





JEANNINE RN and Dr luna





Subjective


Subjective


  In ICU extubated 11/29/20


 Had 31 beats of VT  on 11/3/20  and 5 beats 11/8/20 


 Coded on 11/12/20 as sat dropped to 20% and got henry 30s and PEA and pulseless

 


Swallow eval is pending





Objective





Last 24 Hour Vital Signs








  Date Time  Temp Pulse Resp B/P (MAP) Pulse Ox O2 Delivery O2 Flow Rate FiO2


 


11/30/20 14:00  69 17 132/68 (89) 98   


 


11/30/20 13:00  66 16 154/77 (102) 98   


 


11/30/20 12:00      Nasal Cannula 2.0 


 


11/30/20 12:00 98.3 65 19 130/73 (92) 98   


 


11/30/20 12:00  65      


 


11/30/20 12:00 98.3 65 19 130/73 (92) 98   


 


11/30/20 11:00  70 21 109/68 (82) 96   


 


11/30/20 10:00  69 20 127/94 (105) 98   


 


11/30/20 09:00  69 16 124/77 (93) 97   


 


11/30/20 08:00      Nasal Cannula 2.0 


 


11/30/20 08:00  71      


 


11/30/20 08:00 97.8 71 16 154/94 (114) 96   


 


11/30/20 07:01  70 22 129/87 (101) 96   


 


11/30/20 06:00  75 22 140/78 (98) 96   


 


11/30/20 05:00  72 20 144/74 (97) 98   


 


11/30/20 04:35      Nasal Cannula 2.0 


 


11/30/20 04:34  72      


 


11/30/20 04:00 98.2 68 20 143/69 (93) 100   


 


11/30/20 03:00  72 18 125/72 (89) 100   


 


11/30/20 02:00  70 18 128/63 (84) 100   


 


11/30/20 01:00  68 20 110/64 (79) 100   


 


11/30/20 00:00  66      


 


11/30/20 00:00      Venturi Mask 6.0 


 


11/30/20 00:00 98.5 72 20 127/69 (88) 100   


 


11/29/20 23:00  77 20 120/78 (92) 98   


 


11/29/20 22:00  70 18 148/72 (97) 98   


 


11/29/20 21:00  67 20 118/73 (88) 100   


 


11/29/20 20:00      Venturi Mask 6.0 


 


11/29/20 20:00  70      


 


11/29/20 20:00 98.3 71 22 121/65 (83) 100   


 


11/29/20 19:00  66 18 121/63 (82) 100   


 


11/29/20 18:28       3.0 


 


11/29/20 18:25      Simple Mask 3.0 


 


11/29/20 18:00  70 18 117/70 (86) 99   


 


11/29/20 17:31      Venturi Mask 4.0 


 


11/29/20 17:29       4.0 24


 


11/29/20 17:00  70 20 126/68 (87) 99   


 


11/29/20 16:00       6.0 30


 


11/29/20 16:00 98.3 80 20 154/76 (102) 98   


 


11/29/20 16:00  71      


 


11/29/20 16:00      Venturi Mask 6.0 


 


11/29/20 15:26       10.0 50


 


11/29/20 15:19      Venturi Mask 10.0 


 


11/29/20 15:00  74 23 133/66 (88) 91   

















Intake and Output  


 


 11/29/20 11/30/20





 19:00 07:00


 


Intake Total 995 ml 600 ml


 


Output Total 1425 ml 700 ml


 


Balance -430 ml -100 ml


 


  


 


Free Water 10 ml 


 


IV Total 565 ml 600 ml


 


Tube Feeding 420 ml 


 


Output Urine Total 1425 ml 700 ml











Laboratory Tests








Test


 11/30/20


04:19 11/30/20


05:50


 


White Blood Count


 8.7 K/UL


(4.8-10.8) 





 


Red Blood Count


 3.90 M/UL


(4.70-6.10)  L 





 


Hemoglobin


 12.0 G/DL


(14.2-18.0)  L 





 


Hematocrit


 36.0 %


(42.0-52.0)  L 





 


Mean Corpuscular Volume 92 FL (80-99)   


 


Mean Corpuscular Hemoglobin


 30.8 PG


(27.0-31.0) 





 


Mean Corpuscular Hemoglobin


Concent 33.4 G/DL


(32.0-36.0) 





 


Red Cell Distribution Width


 19.3 %


(11.6-14.8)  H 





 


Platelet Count


 443 K/UL


(150-450) 





 


Mean Platelet Volume


 7.4 FL


(6.5-10.1) 





 


Neutrophils (%) (Auto)


 77.4 %


(45.0-75.0)  H 





 


Lymphocytes (%) (Auto)


 18.6 %


(20.0-45.0)  L 





 


Monocytes (%) (Auto)


 3.5 %


(1.0-10.0) 





 


Eosinophils (%) (Auto)


 0.1 %


(0.0-3.0) 





 


Basophils (%) (Auto)


 0.4 %


(0.0-2.0) 





 


Sodium Level


 136 MMOL/L


(136-145) 





 


Potassium Level


 4.7 MMOL/L


(3.5-5.1) 





 


Chloride Level


 103 MMOL/L


() 





 


Carbon Dioxide Level


 24 MMOL/L


(21-32) 





 


Anion Gap


 9 mmol/L


(5-15) 





 


Blood Urea Nitrogen


 21 mg/dL


(7-18)  H 





 


Creatinine


 0.7 MG/DL


(0.55-1.30) 





 


Estimat Glomerular Filtration


Rate > 60 mL/min


(>60) 





 


Glucose Level


 113 MG/DL


()  H 





 


Calcium Level


 8.8 MG/DL


(8.5-10.1) 





 


Phosphorus Level


 3.0 MG/DL


(2.5-4.9) 





 


Magnesium Level


 2.8 MG/DL


(1.8-2.4)  H 





 


Total Bilirubin


 0.4 MG/DL


(0.2-1.0) 





 


Direct Bilirubin


 < 0.1 MG/DL


(0.0-0.3) 





 


Aspartate Amino Transf


(AST/SGOT) 35 U/L (15-37)


 





 


Alanine Aminotransferase


(ALT/SGPT) 54 U/L (12-78)


 





 


Alkaline Phosphatase


 249 U/L


()  H 





 


Total Protein


 8.2 G/DL


(6.4-8.2) 





 


Albumin


 2.5 G/DL


(3.4-5.0)  L 





 


POC Whole Blood Glucose


 


 116 MG/DL


()  H








Objective


HEAD AND NECK:  No JVD.  


LUNGS:  Coarse rhonchi.


CARDIOVASCULAR:   Irregular S1 and S2 with no gallop.


ABDOMEN:  Soft.


EXTREMITIES:  No pitting edema.











Kvng Edmond MD               Nov 30, 2020 14:58

## 2020-11-30 NOTE — NEPHROLOGY PROGRESS NOTE
Assessment/Plan


Problem List:  


(1) Dehydration


(2) JANES (acute kidney injury)


(3) Renal failure (ARF), acute on chronic


(4) Hypoxia


(5) Acute encephalopathy


(6) Electrolyte imbalance


Assessment





75-year-old male is admitted with acute hypoxic respiratory failure most likely 

secondary to pneumonia and sepsis, UTI.


Acute on chronic renal failure


Dehydration


Electrolyte imbalances, hypernatremia


Hypoalbuminemia


Diabetes type 2


History of congestive heart failure


Hypertension


Hyperlipemia


Alzheimer's


Previous COVID-19 infection in September 2020


Plan


November 30: Discussed with RN.  Patient now extubated.  On nasal cannula.  

Renal parameters stable.  Continue per consultants.


November 29: Discussed with LITA Cooper.  Weaning in process.  It seems like 

patient is tolerating better.  Labs reviewed.  Continue per consultants.


November 28: Remains in ICU.  Status unchanged.  Labs and medication list 

reviewed.  Remains full code.  Weaning continues to fail.


November 27: Remains in ICU.  Remains intubated.  Remains full code.  Continues 

to be on weaning trials.  Renal parameters are stable.


November 26: Patient remains in ICU.  Full code.  Intubated.  Fails weaning.  

Labs reviewed.  Stable from renal standpoint of view.  Discussed with RN.


November 25: Labs reviewed.  Discussed with RN.  Abnormal electrolyte addressed.

 Remains intubated on ventilator.  Continue per consultants.


November 24: Labs reviewed.  Discussed with RN.  Stable from renal standpoint of

view.  Main issue is weaning from ventilator that keeps failing.  Continue per 

consultants.


November 23: Labs reviewed.  Discussed with RN.  Stable from renal standpoint of

view.  Continue per consultants.


November 22: Patient seen and discussed with RN.  Labs reviewed.  Remains 

intubated.  Trial of weaning this being attempted.  Continue per consultants.


November 21: Remains intubated.  Labs reviewed.  Renal parameters stable.  

Continue per consultants.


November 20: Remains on mechanical ventilation.  Labs reviewed.  Abnormal 

electrolytes addressed.  Stable from renal standpoint of view.


November 19: Remains intubated.  Remains full code.  Labs reviewed.  Low 

phosphorus replaced.  Continue to monitor renal parameters.  Continue per 

consultants.


November 18: Failed weaning.  Remains intubated.  Remains full closed.  Labs 

reviewed.  Stable from renal standpoint view.


November 17: Remains in ICU.  Remains full code.  Labs are reviewed.  Phosphorus

supplement given.  Continue per consultants.


November 16: Remains in ICU.  Full code.  Labs reviewed.  Remains intubated.  

Stable renal parameters.


November 15: In ICU.  Full code.  Discussed with RN.  Stable renal parameters.


November 14: Seen in ICU.  Discussed with LITA Cooper.  Flomax discontinued.  

Labs reviewed.  Stable from renal standpoint of view.


November 13: Seen in ICU.  Discussed with LITA Cooper.  Remains on pressor.  

Electrolyte abnormalities noted and addressed.  Continue per consultants.  

Patient remains full code.


November 12: Seen in ICU.  Discussed with LITA Richardson.  Blood pressure remains a 

little requiring pressors.  Labs reviewed.  Stable renal parameters.  Continue 

per consultants.


November 11: Remains intubated.  Full code.  Blood pressure borderline low.  

Will increase midodrine dose.  Discussed with RN.  Continue to monitor renal 

parameters and electrolytes.


November 10: Intubated.  Full code.  Labs reviewed.  Stable from renal 

standpoint of view.  Continue per consultants.


November 9: Remains intubated.  Full code.  Labs reviewed.  Electrolytes within 

normal limit.  Continue per consultants.


November 8: In ICU.  Remains intubated on ventilator.  Remains full code.  Low 

potassium addressed.  Blood pressure fluctuating.  Continue per consultants.


November 7: Patient in ICU.  Intubated on ventilator.  On 6 mics of Levophed.  

Will give 100 cc albumin 25%.  K-Phos IV ordered.  Continue per consultants.  

Continue monitor renal parameters and electrolytes.


November 6: Status unchanged.  Transfer to [facility] for seizure.  Stable from renal 

standpoint to view.  Continue per consultants.


November 5: Status quo.  Labs reviewed.  Renal parameters stable.  Continue per 

consultants.


November 4: On nonrebreather mask.  Labs reviewed.  Renal parameters 

stable.Continue per pulmonologist.  Clinically unchanged.


November 3: On nonrebreather mask.  Inflammatory markers gradually declining.  

Renal parameters stable.  Continue per pulmonary.


November 2: Remains on nonrebreather mask.  Inflammatory markers remain 

elevated.  Renal parameters somewhat stable.  Continue per pulmonary and ID.  

Remains full code.


November 1: Patient on nonrebreather mask.  Labs noted.  Serum creatinine down 

to 1.3.  Continue per consultants.


October 31: Patient on nonrebreather mask.  Transfer to telemetry when seen this

morning.  Labs noted.  Continue per consultants.  Serum creatinine dylan to 1.7. 

Continue to monitor renal parameters.  Continue to monitor vancomycin level


October 30: Patient is not doing well clinically.  Mild respiratory distress.  

ABG noted.  Somewhat hypoxic.  CBC and chemistry panel ordered.  Discussed with 

LITA Garcia.  Will defer to pulmonary management to specialist.  Continue per ID.

 Renal parameters remained stable as of October 29.


October 29: Labs reviewed.  Renal parameters stable.  Continue per consultants.


October 28: Labs reviewed.  Potassium via NG tube ordered.  IV fluids stopped.  

Continue per consultants.


October 27: Labs reviewed.  Low potassium and low phosphorus replaced.  Continue

per consultants.  Remains stable from renal standpoint of view.


October 26: Patient remains n.p.o.  IV fluid down to 50 cc an hour.  Potassium 

supplement intravenously ordered.  Continue per consultants.





Previously:


Patient is n.p.o., will continue on IV fluid of D5W 75 cc an hour


We will monitor electrolytes and renal parameters


Avoid nephrotoxic's


Start p.o. when he clears by speech therapist, meanwhile aspiration precautions


Keep the blood pressure and blood sugar in check


Per orders





Subjective


ROS Limited/Unobtainable:  No


Constitutional:  Reports: malaise





Objective


Objective





Last 24 Hour Vital Signs








  Date Time  Temp Pulse Resp B/P (MAP) Pulse Ox O2 Delivery O2 Flow Rate FiO2


 


11/30/20 07:01  70 22 129/87 (101) 96   


 


11/30/20 06:00  75 22 140/78 (98) 96   


 


11/30/20 05:00  72 20 144/74 (97) 98   


 


11/30/20 04:35      Nasal Cannula 2.0 


 


11/30/20 04:34  72      


 


11/30/20 04:00 98.2 68 20 143/69 (93) 100   


 


11/30/20 03:00  72 18 125/72 (89) 100   


 


11/30/20 02:00  70 18 128/63 (84) 100   


 


11/30/20 01:00  68 20 110/64 (79) 100   


 


11/30/20 00:00  66      


 


11/30/20 00:00      Venturi Mask 6.0 


 


11/30/20 00:00 98.5 72 20 127/69 (88) 100   


 


11/29/20 23:00  77 20 120/78 (92) 98   


 


11/29/20 22:00  70 18 148/72 (97) 98   


 


11/29/20 21:00  67 20 118/73 (88) 100   


 


11/29/20 20:00      Venturi Mask 6.0 


 


11/29/20 20:00  70      


 


11/29/20 20:00 98.3 71 22 121/65 (83) 100   


 


11/29/20 19:00  66 18 121/63 (82) 100   


 


11/29/20 18:28       3.0 


 


11/29/20 18:25      Simple Mask 3.0 


 


11/29/20 18:00  70 18 117/70 (86) 99   


 


11/29/20 17:31      Venturi Mask 4.0 


 


11/29/20 17:29       4.0 24


 


11/29/20 17:00  70 20 126/68 (87) 99   


 


11/29/20 16:00       6.0 30


 


11/29/20 16:00 98.3 80 20 154/76 (102) 98   


 


11/29/20 16:00  71      


 


11/29/20 16:00      Venturi Mask 6.0 


 


11/29/20 15:26       10.0 50


 


11/29/20 15:19      Venturi Mask 10.0 


 


11/29/20 15:00  74 23 133/66 (88) 91   


 


11/29/20 14:50     98 Venturi Mask 10.0 50


 


11/29/20 14:45      Nasal Cannula 3.0 32


 


11/29/20 14:39     100   


 


11/29/20 14:30      Nasal Cannula 4.0 


 


11/29/20 14:00  66 18 124/63 (83) 99   


 


11/29/20 13:00  66 19 111/62 (78) 99   


 


11/29/20 12:00      Mechanical Ventilator  


 


11/29/20 12:00 98.1 66 18 122/65 (84) 99   


 


11/29/20 12:00        30


 


11/29/20 12:00  68      


 


11/29/20 11:00  61 18 107/53 (71) 99   


 


11/29/20 10:50  85 17     30


 


11/29/20 10:00  61 12 107/61 (76) 99   


 


11/29/20 09:00  64 13 115/57 (76) 100   

















Intake and Output  


 


 11/29/20 11/30/20





 19:00 07:00


 


Intake Total 995 ml 600 ml


 


Output Total 1425 ml 700 ml


 


Balance -430 ml -100 ml


 


  


 


Free Water 10 ml 


 


IV Total 565 ml 600 ml


 


Tube Feeding 420 ml 


 


Output Urine Total 1425 ml 700 ml











Current Medications








 Medications


  (Trade)  Dose


 Ordered  Sig/Miky


 Route


 PRN Reason  Start Time


 Stop Time Status Last Admin


Dose Admin


 


 Acetaminophen


  (Tylenol)  650 mg  Q4H  PRN


 GT


 Temp >100.5  11/21/20 12:30


 12/21/20 12:29  11/23/20 10:14





 


 Barium Sulfate


  (Varibar Honey)  250 ml  NOW  PRN


 MC


 RAD  11/30/20 07:15


 12/3/20 07:08   





 


 Barium Sulfate


  (Varibar Nectar)  240 ml  NOW  PRN


 MC


 RAD  11/30/20 07:15


 12/3/20 07:08   





 


 Barium Sulfate


  (Varibar Pudding)  230 ml  NOW  PRN


 


 RAD  11/30/20 07:15


 12/3/20 07:08   





 


 Barium Sulfate


  (Varibar Thin


 Liquid powder)  148 gm  NOW  PRN


 


 RAD  11/30/20 07:15


 12/3/20 07:08   





 


 Dexamethasone


  (Decadron)  6 mg  Q24H


 ORAL


   11/22/20 18:00


 12/1/20 18:01  11/29/20 17:17





 


 Dextrose


  (Dextrose 50%)  50 ml  Q30M  PRN


 IV


 Hypoglycemia  10/23/20 22:45


 1/21/21 22:44   





 


 Dextrose/Sodium


 Chloride  1,000 ml @ 


 50 mls/hr  Q20H


 IV


   11/29/20 16:15


 12/29/20 16:14  11/29/20 16:53





 


 Heparin Sodium


  (Porcine)


  (Heparin 5000


 units/ml)  5,000 units  EVERY 12  HOURS


 SUBQ


   10/23/20 21:00


 12/7/20 20:59  11/29/20 21:19





 


 Insulin Aspart


  (NovoLOG)    EVERY 6  HOURS


 SUBQ


   11/2/20 06:00


 1/22/21 06:29  11/30/20 00:21





 


 Meropenem 1 gm/


 Sodium Chloride  55 ml @ 


 110 mls/hr  Q8H


 IVPB


   11/21/20 10:00


 11/30/20 09:59  11/30/20 02:23





 


 Nitroglycerin


  (Ntg)  0.4 mg  Q5M  PRN


 SL


 Prn Chest Pain  11/21/20 10:15


 12/21/20 10:09   





 


 Ondansetron HCl


  (Zofran)  4 mg  Q6H  PRN


 IVP


 Nausea & Vomiting  11/21/20 14:30


 12/21/20 14:29   





 


 Pantoprazole


  (Protonix)  40 mg  DAILY


 IV


   11/7/20 09:00


 12/7/20 08:59  11/29/20 08:06





 


 Polyethylene


 Glycol


  (Miralax)  17 gm  DAILYPRN  PRN


 GT


 Constipation  11/21/20 10:15


 12/21/20 10:14   





 


 Promethazine HCl/


 Codeine


  (Phenergan with


 Codeine)  5 ml  Q4H  PRN


 GT


 For Cough  11/21/20 10:14


 12/21/20 10:13  11/25/20 22:05








Laboratory Tests


11/29/20 12:24: 


Arterial Blood pH 7.446, Arterial Blood Partial Pressure CO2 31.1L, Arterial 

Blood Partial Pressure O2 96.9, Arterial Blood HCO3 20.9L, Arterial Blood Oxygen

Saturation 97.2, Arterial Blood Base Excess -2.4L, Jerod Test Positive


11/30/20 04:19: 


White Blood Count 8.7, Red Blood Count 3.90L, Hemoglobin 12.0L, Hematocrit 36.0L

, Mean Corpuscular Volume 92, Mean Corpuscular Hemoglobin 30.8, Mean Corpuscular

Hemoglobin Concent 33.4, Red Cell Distribution Width 19.3H, Platelet Count 443, 

Mean Platelet Volume 7.4, Neutrophils (%) (Auto) 77.4H, Lymphocytes (%) (Auto) 

18.6L, Monocytes (%) (Auto) 3.5, Eosinophils (%) (Auto) 0.1, Basophils (%) 

(Auto) 0.4, Sodium Level 136, Potassium Level 4.7, Chloride Level 103, Carbon 

Dioxide Level 24, Anion Gap 9, Blood Urea Nitrogen 21H, Creatinine 0.7, Estimat 

Glomerular Filtration Rate > 60, Glucose Level 113H, Calcium Level 8.8


11/30/20 05:50: POC Whole Blood Glucose 116H


Height (Feet):  5


Height (Inches):  4.00


Weight (Pounds):  130


General Appearance:  no apparent distress


EENT:  other - Now extubated on nasal cannula


Cardiovascular:  normal rate


Respiratory/Chest:  decreased breath sounds


Abdomen:  distended











Seven Tobar MD            Nov 30, 2020 08:45

## 2020-11-30 NOTE — NUR
NURSE HAND-OFF REPORT: 



Important Events on Shift:[Transfer from ICU to SDU]

Patient Status: [Stable]

Diet: [NPO]



Pending Orders: [NA]

Pending Results/Labs:[NA]

Pending MD notification:[NA]



Latest Vital Signs: Temperature 98.3 , Pulse 73 , B/P 143 /79 , Respiratory Rate 19 , O2 SAT 
97 , Nasal Cannula, O2 Flow Rate 2.0 .  

Vital Sign Comment: [Stable]



EKG Rhythm: Sinus Rhythm

Rhythm change?: N 

MD Notified?: DIDI Edmond MD Response: 



Latest Mejia Fall Score: 50  

Fall Risk: High Risk 

Safety Measures: Call light Within Reach, Bed Alarm Zone 1, Side Rails Side Rails x3, Bed 
position Low and Locked.

Fall Precautions: 

Yellow Socks

Yellow Gown

Door Sign

Patient Fall Education



Report given to [LITA De La Cruz].

## 2020-11-30 NOTE — NUR
NURSE NOTES:

Called Dr. Sandoval to get orders to convert Dexamethasone 6mg PO to IVP. Pt's swallow eval 
is pending and no NGT present. Orders received and will carry out.

## 2020-12-01 VITALS — DIASTOLIC BLOOD PRESSURE: 87 MMHG | SYSTOLIC BLOOD PRESSURE: 137 MMHG

## 2020-12-01 VITALS — SYSTOLIC BLOOD PRESSURE: 157 MMHG | DIASTOLIC BLOOD PRESSURE: 90 MMHG

## 2020-12-01 VITALS — SYSTOLIC BLOOD PRESSURE: 130 MMHG | DIASTOLIC BLOOD PRESSURE: 84 MMHG

## 2020-12-01 LAB
ADD MANUAL DIFF: NO
BASOPHILS NFR BLD AUTO: 0.1 % (ref 0–2)
BUN SERPL-MCNC: 20 MG/DL (ref 7–18)
CALCIUM SERPL-MCNC: 8.8 MG/DL (ref 8.5–10.1)
CHLORIDE SERPL-SCNC: 103 MMOL/L (ref 98–107)
CO2 SERPL-SCNC: 25 MMOL/L (ref 21–32)
CREAT SERPL-MCNC: 0.7 MG/DL (ref 0.55–1.3)
EOSINOPHIL NFR BLD AUTO: 0.1 % (ref 0–3)
ERYTHROCYTE [DISTWIDTH] IN BLOOD BY AUTOMATED COUNT: 18.6 % (ref 11.6–14.8)
HCT VFR BLD CALC: 35 % (ref 42–52)
HGB BLD-MCNC: 12 G/DL (ref 14.2–18)
LYMPHOCYTES NFR BLD AUTO: 19.7 % (ref 20–45)
MCV RBC AUTO: 89 FL (ref 80–99)
MONOCYTES NFR BLD AUTO: 3.2 % (ref 1–10)
NEUTROPHILS NFR BLD AUTO: 76.9 % (ref 45–75)
PLATELET # BLD: 531 K/UL (ref 150–450)
POTASSIUM SERPL-SCNC: 4.4 MMOL/L (ref 3.5–5.1)
RBC # BLD AUTO: 3.93 M/UL (ref 4.7–6.1)
SODIUM SERPL-SCNC: 136 MMOL/L (ref 136–145)
WBC # BLD AUTO: 6.5 K/UL (ref 4.8–10.8)

## 2020-12-01 RX ADMIN — INSULIN ASPART SCH UNITS: 100 INJECTION, SOLUTION INTRAVENOUS; SUBCUTANEOUS at 23:31

## 2020-12-01 RX ADMIN — HEPARIN SODIUM SCH UNITS: 5000 INJECTION INTRAVENOUS; SUBCUTANEOUS at 20:10

## 2020-12-01 RX ADMIN — INSULIN ASPART SCH UNITS: 100 INJECTION, SOLUTION INTRAVENOUS; SUBCUTANEOUS at 11:43

## 2020-12-01 RX ADMIN — HEPARIN SODIUM SCH UNITS: 5000 INJECTION INTRAVENOUS; SUBCUTANEOUS at 08:27

## 2020-12-01 RX ADMIN — DEXAMETHASONE SODIUM PHOSPHATE SCH MG: 10 INJECTION INTRAMUSCULAR; INTRAVENOUS at 18:52

## 2020-12-01 RX ADMIN — INSULIN ASPART SCH UNITS: 100 INJECTION, SOLUTION INTRAVENOUS; SUBCUTANEOUS at 00:30

## 2020-12-01 RX ADMIN — INSULIN ASPART SCH UNITS: 100 INJECTION, SOLUTION INTRAVENOUS; SUBCUTANEOUS at 17:41

## 2020-12-01 RX ADMIN — PANTOPRAZOLE SODIUM SCH MG: 40 INJECTION, POWDER, FOR SOLUTION INTRAVENOUS at 08:25

## 2020-12-01 RX ADMIN — INSULIN ASPART SCH UNITS: 100 INJECTION, SOLUTION INTRAVENOUS; SUBCUTANEOUS at 06:00

## 2020-12-01 RX ADMIN — HUMAN INSULIN SCH MLS/HR: 100 INJECTION, SOLUTION SUBCUTANEOUS at 08:25

## 2020-12-01 NOTE — CARDIAC ELECTROPHYSIOLOGY PN
Assessment/Plan


Assessment/Plan


1. Accelerated junctional rhythm. Ruled out for MI


      Echo showed ejection fraction of 55%.





2. Long run of 31 beats of Nonsustained VT on 11/3/2020. 


     Cardiac cath after stabilization.





3. Henry 30, Asystole while on the Vent due to resp failure/ likely mucus plug .

S/P Code


 Off midodrine





4. Respiratory failure, on antibiotic  


    Extubated 11/29/20





5. Septic shock. Off Levophed   





6. History of previous COVID infection in September 2020 and active Covid  


Now negative again and off isolation.





7. Dementia.





8. Dysphagia, Failed swallow eval





DW RN





Subjective


Subjective


   Had 31 beats of VT  on 11/3/20  and 5 beats 11/8/20 


 Coded on 11/12/20 as sat dropped to 20% and got henry 30s and PEA and pulseless

 


 Failed Swallow eval 


Transferred out of ICU after extubated 11/29/20





Objective





Last 24 Hour Vital Signs








  Date Time  Temp Pulse Resp B/P (MAP) Pulse Ox O2 Delivery O2 Flow Rate FiO2


 


12/1/20 08:00      Nasal Cannula 2.0 


 


12/1/20 07:52  60      


 


12/1/20 04:32 97.2 88 20 157/90 (112) 96   


 


12/1/20 04:25      Nasal Cannula 2.0 


 


12/1/20 04:00  72      


 


12/1/20 00:00  69      


 


12/1/20 00:00      Nasal Cannula 2.0 


 


11/30/20 20:15  85      


 


11/30/20 20:00 97.0 66 20 123/76 (92) 96   


 


11/30/20 20:00      Nasal Cannula 4.0 


 


11/30/20 18:31 98.3 73 19 143/79 (100) 97   


 


11/30/20 17:23  69      


 


11/30/20 16:00  67      


 


11/30/20 16:00 97.3 67 20 139/77 (97) 99   


 


11/30/20 16:00      Nasal Cannula 2.0 


 


11/30/20 15:00  69 17 131/86 (101) 96   


 


11/30/20 14:00  69 17 132/68 (89) 98   


 


11/30/20 13:00  66 16 154/77 (102) 98   


 


11/30/20 12:00      Nasal Cannula 2.0 


 


11/30/20 12:00 98.3 65 19 130/73 (92) 98   


 


11/30/20 12:00  65      


 


11/30/20 12:00 98.3 65 19 130/73 (92) 98   

















Intake and Output  


 


 11/30/20 12/1/20





 19:00 07:00


 


Intake Total 500 ml 450 ml


 


Output Total 540 ml 900 ml


 


Balance -40 ml -450 ml


 


  


 


IV Total 500 ml 450 ml


 


Output Urine Total 540 ml 900 ml











Laboratory Tests








Test


 12/1/20


00:30 12/1/20


03:20 12/1/20


05:54 12/1/20


11:41


 


POC Whole Blood Glucose


 173 MG/DL


()  H 


 104 MG/DL


() 104 MG/DL


()


 


White Blood Count


 


 6.5 K/UL


(4.8-10.8) 


 





 


Red Blood Count


 


 3.93 M/UL


(4.70-6.10)  L 


 





 


Hemoglobin


 


 12.0 G/DL


(14.2-18.0)  L 


 





 


Hematocrit


 


 35.0 %


(42.0-52.0)  L 


 





 


Mean Corpuscular Volume  89 FL (80-99)    


 


Mean Corpuscular Hemoglobin


 


 30.5 PG


(27.0-31.0) 


 





 


Mean Corpuscular Hemoglobin


Concent 


 34.2 G/DL


(32.0-36.0) 


 





 


Red Cell Distribution Width


 


 18.6 %


(11.6-14.8)  H 


 





 


Platelet Count


 


 531 K/UL


(150-450)  H 


 





 


Mean Platelet Volume


 


 7.6 FL


(6.5-10.1) 


 





 


Neutrophils (%) (Auto)


 


 76.9 %


(45.0-75.0)  H 


 





 


Lymphocytes (%) (Auto)


 


 19.7 %


(20.0-45.0)  L 


 





 


Monocytes (%) (Auto)


 


 3.2 %


(1.0-10.0) 


 





 


Eosinophils (%) (Auto)


 


 0.1 %


(0.0-3.0) 


 





 


Basophils (%) (Auto)


 


 0.1 %


(0.0-2.0) 


 





 


Sodium Level


 


 136 MMOL/L


(136-145) 


 





 


Potassium Level


 


 4.4 MMOL/L


(3.5-5.1) 


 





 


Chloride Level


 


 103 MMOL/L


() 


 





 


Carbon Dioxide Level


 


 25 MMOL/L


(21-32) 


 





 


Blood Urea Nitrogen


 


 20 mg/dL


(7-18)  H 


 





 


Creatinine


 


 0.7 MG/DL


(0.55-1.30) 


 





 


Estimat Glomerular Filtration


Rate 


 > 60 mL/min


(>60) 


 





 


Glucose Level


 


 107 MG/DL


()  H 


 





 


Calcium Level


 


 8.8 MG/DL


(8.5-10.1) 


 











Objective


HEAD AND NECK:  No JVD.  


LUNGS:  Coarse rhonchi.


CARDIOVASCULAR:   Irregular S1 and S2 with no gallop.


ABDOMEN:  Soft.


EXTREMITIES:  No pitting edema.











Kvng Edmond MD                Dec 1, 2020 11:48

## 2020-12-01 NOTE — NEPHROLOGY PROGRESS NOTE
Assessment/Plan


Problem List:  


(1) Dehydration


(2) JANES (acute kidney injury)


(3) Renal failure (ARF), acute on chronic


(4) Hypoxia


(5) Acute encephalopathy


(6) Electrolyte imbalance


Assessment





75-year-old male is admitted with acute hypoxic respiratory failure most likely 

secondary to pneumonia and sepsis, UTI.


Acute on chronic renal failure


Dehydration


Electrolyte imbalances, hypernatremia


Hypoalbuminemia


Diabetes type 2


History of congestive heart failure


Hypertension


Hyperlipemia


Alzheimer's


Previous COVID-19 infection in September 2020


Plan


December 1: Remain extubated on nasal cannula.  Labs reviewed.  Renal parameters

stable.


November 30: Discussed with RN.  Patient now extubated.  On nasal cannula.  

Renal parameters stable.  Continue per consultants.


November 29: Discussed with LITA Cooper.  Weaning in process.  It seems like 

patient is tolerating better.  Labs reviewed.  Continue per consultants.


November 28: Remains in ICU.  Status unchanged.  Labs and medication list 

reviewed.  Remains full code.  Weaning continues to fail.


November 27: Remains in ICU.  Remains intubated.  Remains full code.  Continues 

to be on weaning trials.  Renal parameters are stable.


November 26: Patient remains in ICU.  Full code.  Intubated.  Fails weaning.  

Labs reviewed.  Stable from renal standpoint of view.  Discussed with RN.


November 25: Labs reviewed.  Discussed with RN.  Abnormal electrolyte addressed.

 Remains intubated on ventilator.  Continue per consultants.


November 24: Labs reviewed.  Discussed with RN.  Stable from renal standpoint of

view.  Main issue is weaning from ventilator that keeps failing.  Continue per 

consultants.


November 23: Labs reviewed.  Discussed with RN.  Stable from renal standpoint of

view.  Continue per consultants.


November 22: Patient seen and discussed with RN.  Labs reviewed.  Remains 

intubated.  Trial of weaning this being attempted.  Continue per consultants.


November 21: Remains intubated.  Labs reviewed.  Renal parameters stable.  

Continue per consultants.


November 20: Remains on mechanical ventilation.  Labs reviewed.  Abnormal 

electrolytes addressed.  Stable from renal standpoint of view.


November 19: Remains intubated.  Remains full code.  Labs reviewed.  Low 

phosphorus replaced.  Continue to monitor renal parameters.  Continue per 

consultants.


November 18: Failed weaning.  Remains intubated.  Remains full closed.  Labs 

reviewed.  Stable from renal standpoint view.


November 17: Remains in ICU.  Remains full code.  Labs are reviewed.  Phosphorus

supplement given.  Continue per consultants.


November 16: Remains in ICU.  Full code.  Labs reviewed.  Remains intubated.  

Stable renal parameters.


November 15: In ICU.  Full code.  Discussed with RN.  Stable renal parameters.


November 14: Seen in ICU.  Discussed with LITA Cooper.  Flomax discontinued.  

Labs reviewed.  Stable from renal standpoint of view.


November 13: Seen in ICU.  Discussed with LITA Cooper.  Remains on pressor.  

Electrolyte abnormalities noted and addressed.  Continue per consultants.  

Patient remains full code.


November 12: Seen in ICU.  Discussed with LITA Richardson.  Blood pressure remains a 

little requiring pressors.  Labs reviewed.  Stable renal parameters.  Continue 

per consultants.


November 11: Remains intubated.  Full code.  Blood pressure borderline low.  

Will increase midodrine dose.  Discussed with RN.  Continue to monitor renal 

parameters and electrolytes.


November 10: Intubated.  Full code.  Labs reviewed.  Stable from renal st

andpoint of view.  Continue per consultants.


November 9: Remains intubated.  Full code.  Labs reviewed.  Electrolytes within 

normal limit.  Continue per consultants.


November 8: In ICU.  Remains intubated on ventilator.  Remains full code.  Low 

potassium addressed.  Blood pressure fluctuating.  Continue per consultants.


November 7: Patient in ICU.  Intubated on ventilator.  On 6 mics of Levophed.  

Will give 100 cc albumin 25%.  K-Phos IV ordered.  Continue per consultants.  

Continue monitor renal parameters and electrolytes.


November 6: Status unchanged.  Transfer to [facility] for seizure.  Stable from renal 

standpoint to view.  Continue per consultants.


November 5: Status quo.  Labs reviewed.  Renal parameters stable.  Continue per 

consultants.


November 4: On nonrebreather mask.  Labs reviewed.  Renal parameters 

stable.Continue per pulmonologist.  Clinically unchanged.


November 3: On nonrebreather mask.  Inflammatory markers gradually declining.  

Renal parameters stable.  Continue per pulmonary.


November 2: Remains on nonrebreather mask.  Inflammatory markers remain 

elevated.  Renal parameters somewhat stable.  Continue per pulmonary and ID.  

Remains full code.


November 1: Patient on nonrebreather mask.  Labs noted.  Serum creatinine down 

to 1.3.  Continue per consultants.


October 31: Patient on nonrebreather mask.  Transfer to telemetry when seen this

morning.  Labs noted.  Continue per consultants.  Serum creatinine dylan to 1.7. 

Continue to monitor renal parameters.  Continue to monitor vancomycin level


October 30: Patient is not doing well clinically.  Mild respiratory distress.  

ABG noted.  Somewhat hypoxic.  CBC and chemistry panel ordered.  Discussed with 

LITA Garcia.  Will defer to pulmonary management to specialist.  Continue per ID.

 Renal parameters remained stable as of October 29.


October 29: Labs reviewed.  Renal parameters stable.  Continue per consultants.


October 28: Labs reviewed.  Potassium via NG tube ordered.  IV fluids stopped.  

Continue per consultants.


October 27: Labs reviewed.  Low potassium and low phosphorus replaced.  Continue

per consultants.  Remains stable from renal standpoint of view.


October 26: Patient remains n.p.o.  IV fluid down to 50 cc an hour.  Potassium 

supplement intravenously ordered.  Continue per consultants.





Previously:


Patient is n.p.o., will continue on IV fluid of D5W 75 cc an hour


We will monitor electrolytes and renal parameters


Avoid nephrotoxic's


Start p.o. when he clears by speech therapist, meanwhile aspiration precautions


Keep the blood pressure and blood sugar in check


Per orders





Subjective


ROS Limited/Unobtainable:  No


Constitutional:  Reports: malaise





Objective


Objective





Last 24 Hour Vital Signs








  Date Time  Temp Pulse Resp B/P (MAP) Pulse Ox O2 Delivery O2 Flow Rate FiO2


 


12/1/20 08:00      Nasal Cannula 2.0 


 


12/1/20 07:52  60      


 


12/1/20 04:32 97.2 88 20 157/90 (112) 96   


 


12/1/20 04:25      Nasal Cannula 2.0 


 


12/1/20 04:00  72      


 


12/1/20 00:00  69      


 


12/1/20 00:00      Nasal Cannula 2.0 


 


11/30/20 20:15  85      


 


11/30/20 20:00 97.0 66 20 123/76 (92) 96   


 


11/30/20 20:00      Nasal Cannula 4.0 


 


11/30/20 18:31 98.3 73 19 143/79 (100) 97   


 


11/30/20 17:23  69      


 


11/30/20 16:00  67      


 


11/30/20 16:00 97.3 67 20 139/77 (97) 99   


 


11/30/20 16:00      Nasal Cannula 2.0 


 


11/30/20 15:00  69 17 131/86 (101) 96   


 


11/30/20 14:00  69 17 132/68 (89) 98   


 


11/30/20 13:00  66 16 154/77 (102) 98   

















Intake and Output  


 


 11/30/20 12/1/20





 19:00 07:00


 


Intake Total 500 ml 450 ml


 


Output Total 540 ml 900 ml


 


Balance -40 ml -450 ml


 


  


 


IV Total 500 ml 450 ml


 


Output Urine Total 540 ml 900 ml








Laboratory Tests


12/1/20 00:30: POC Whole Blood Glucose 173H


12/1/20 03:20: 


White Blood Count 6.5, Red Blood Count 3.93L, Hemoglobin 12.0L, Hematocrit 35.0L

, Mean Corpuscular Volume 89, Mean Corpuscular Hemoglobin 30.5, Mean Corpuscular

Hemoglobin Concent 34.2, Red Cell Distribution Width 18.6H, Platelet Count 531H,

Mean Platelet Volume 7.6, Neutrophils (%) (Auto) 76.9H, Lymphocytes (%) (Auto) 

19.7L, Monocytes (%) (Auto) 3.2, Eosinophils (%) (Auto) 0.1, Basophils (%) 

(Auto) 0.1, Sodium Level 136, Potassium Level 4.4, Chloride Level 103, Carbon 

Dioxide Level 25, Blood Urea Nitrogen 20H, Creatinine 0.7, Estimat Glomerular 

Filtration Rate > 60, Glucose Level 107H, Calcium Level 8.8


12/1/20 05:54: POC Whole Blood Glucose 104


12/1/20 11:41: POC Whole Blood Glucose 104


Height (Feet):  5


Height (Inches):  4.00


Weight (Pounds):  130


General Appearance:  no apparent distress


EENT:  other - On oxygen by nasal cannula


Cardiovascular:  normal rate


Respiratory/Chest:  decreased breath sounds


Abdomen:  distended











Seven Tobar MD             Dec 1, 2020 12:02

## 2020-12-01 NOTE — NUR
NURSE NOTES:

Received report from LITA Orellana. Pt seen lying in bed in semi- fountain's position. Pt is awake 
and verbal, follow simple commands but sometimes resisting to care. Pt open eyes 
spontaneously. No facial grimacing noted. Pt connected to NC at 2L o2 sat- 95%. Pt on NPO 
per previous shift pt failed ST eval. No facial grimace noted. Pt on buck draining yellow 
urine. With Right hand g22 running d5NS at 50ml/hr and has RFA g22 intact and patent. Bed in 
lowest position.Call light within reach. Continue to plan of care.

## 2020-12-01 NOTE — NUR
NURSE NOTES:

Report received from Dorothy LONDON. Patient is on bed, sleeping, no signs of grimacing and 
distress noted. Patient is on NC 2L, tolerating well. Patient is currently NPO. On Cummings 
catheter, patent, and draining yellow urine. Patient has a R wrist 22g patent, intact, and 
running D5NS 50cc/hr. HOB of the bed elevated, at lowest positions, side rails up, and 
lowest position. Call light within reach. Patient will continue to be monitored.

## 2020-12-01 NOTE — NUR
NURSE NOTES:

Seen pt sleeping comfortably in bed. no signs of respiratory distress, o2 sat- 100%. 
Continue to plan of care.

## 2020-12-01 NOTE — NUR
NURSE HAND-OFF REPORT: 



Important Events on Shift:NONE

Patient Status: STABLE

Diet: NPO



Pending Orders: ST EVAL

Pending Results/Labs:CBC,BMP

Pending MD notification:[]



Latest Vital Signs: Temperature 97.2 , Pulse 88 , B/P 157 /90 , Respiratory Rate 20 , O2 SAT 
96 , Nasal Cannula, O2 Flow Rate 2.0 .  

Vital Sign Comment: []



EKG Rhythm: Sinus Rhythm

Rhythm change?: N 

MD Notified?: N

MD Response: 



Latest Mejia Fall Score: 50  

Fall Risk: High Risk 

Safety Measures: Call light Within Reach, Bed Alarm Zone 1, Side Rails Side Rails x3, Bed 
position Low and Locked.

Fall Precautions: 

Yellow Socks

Yellow Gown

Door Sign

Patient Fall Education



Report given to LITA Orellana/JoniRN

## 2020-12-01 NOTE — INFECTIOUS DISEASES PROG NOTE
Assessment/Plan





76yo M with:





Respiratory code 2ry to mucus plug 11/12


Septic Shock- -recurrent


Fever, recurrent, low grade;SP


Leukocytosis; recurrent; increased


Acute hypoxic resp failure, on NRB mask> 4l NC; back on NRB, desaturation 10/29 

> intubated 11/6


Pneumonia- >HAP


Hx of COVID-19 PNA 9/9/20 11/17 CXR: No significant interval change in the radiographic appearance the 

chest compared to one day prior.


   11/14 Resp cx +MRSA, nl resp hayden


   UCx neg


   BCx NTD


   11/13 COVID PCR neg


         --11/10 CXR: Bilateral infiltrates in a peribronchovascular 

distribution are unchanged. Left pleural effusion is unchanged.


         --11/8 CXR: Persistent bilateral patchy pulmonary opacities, most 

prominent  in the left lower lung.


         --11/7 ucx neg


                  sp cx MRSA (colonizer at this point)


                  Bcx Neg


         --11/2 Bcx Neg


          10/30 Sp cx MRSA (Vancomycin ADRIANA 1), ESBL E.coli


   10/23 BCx NTD


   UA 15-20 WBC, UCx Neg


   10/23 COVID rapid neg; 10/26 rapid COVID PCR + (from prior infection)- not 

new infection


   Flu A/B neg


   CXR: L pna


   Resp cx MRSA


   11/19 Resp cx +ESBL E.coli & PsA


   11/21 BCx NTD


   11/22 CXR: Small bilateral pleural effusions with moderate vascular c

ongestion. Airspace consolidation in the left lower lung field may represent 

atelectasis however, correlate for infiltrate.  This is improving when compared 

to the prior study.


   11/23 BCx NTD


   11/23 CXR: Increasing right basilar opacity, likely infiltrate, may also 

reflect increasing pleural fluid





H/o COVID pna


   Tested positive 9/9/20


   10/23 Rapid Ag neg


   10/26 Rapid Ag positive (from prior disease?)


   11/13 COVID PCR neg





JANES on CKD, improving





[facility] resident ([facility])


Non-verbal


VRE and MRSA colonized








Plan:


Monitor off abx





On dex 6mg per Primary





   -11/30 SP josh #10


   -11/20 SP linezolid #7


   -11/16 SP meropenem #14 for ESBL pna 


   -11/10 SP Micafungin #4


   -11/6 SP IV Vancomycin #15


   -11/2 SP Zosyn #4


   -10/26 SP Cefepime #4


   -10/24 SP Flagyl #





Monitor CBC/CMP


Monitor resp status


Monitor temp curve and hemodynamics





D/w RN





Thank you for this consult. [facility] will continue to follow.





Subjective


Allergies:  


Coded Allergies:  


     No Known Allergies (Unverified , 8/20/12)





AF


WBC 6.4


2L NC


NAD


Moved to floor now





Objective





Last 24 Hour Vital Signs








  Date Time  Temp Pulse Resp B/P (MAP) Pulse Ox O2 Delivery O2 Flow Rate FiO2


 


12/1/20 04:32 97.2 88 20 157/90 (112) 96   


 


12/1/20 04:25      Nasal Cannula 2.0 


 


12/1/20 04:00  72      


 


12/1/20 00:00  69      


 


12/1/20 00:00      Nasal Cannula 2.0 


 


11/30/20 20:15  85      


 


11/30/20 20:00 97.0 66 20 123/76 (92) 96   


 


11/30/20 20:00      Nasal Cannula 4.0 


 


11/30/20 18:31 98.3 73 19 143/79 (100) 97   


 


11/30/20 17:23  69      


 


11/30/20 16:00  67      


 


11/30/20 16:00 97.3 67 20 139/77 (97) 99   


 


11/30/20 16:00      Nasal Cannula 2.0 


 


11/30/20 15:00  69 17 131/86 (101) 96   


 


11/30/20 14:00  69 17 132/68 (89) 98   


 


11/30/20 13:00  66 16 154/77 (102) 98   


 


11/30/20 12:00      Nasal Cannula 2.0 


 


11/30/20 12:00 98.3 65 19 130/73 (92) 98   


 


11/30/20 12:00  65      


 


11/30/20 12:00 98.3 65 19 130/73 (92) 98   


 


11/30/20 11:00  70 21 109/68 (82) 96   


 


11/30/20 10:00  69 20 127/94 (105) 98   


 


11/30/20 09:00  69 16 124/77 (93) 97   








Height (Feet):  5


Height (Inches):  4.00


Weight (Pounds):  130


Gen: NAD in bed


HEENT: NCAT


CV: RRR


Pulm: CTAB


Abd: Soft, NTND


Ext: No c/c/e


Neuro: Awake, not interactive


Lines: RUE PICC





Laboratory Tests








Test


 12/1/20


00:30 12/1/20


03:20 12/1/20


05:54


 


POC Whole Blood Glucose


 173 MG/DL


()  H 


 104 MG/DL


()


 


White Blood Count


 


 6.5 K/UL


(4.8-10.8) 





 


Red Blood Count


 


 3.93 M/UL


(4.70-6.10)  L 





 


Hemoglobin


 


 12.0 G/DL


(14.2-18.0)  L 





 


Hematocrit


 


 35.0 %


(42.0-52.0)  L 





 


Mean Corpuscular Volume  89 FL (80-99)   


 


Mean Corpuscular Hemoglobin


 


 30.5 PG


(27.0-31.0) 





 


Mean Corpuscular Hemoglobin


Concent 


 34.2 G/DL


(32.0-36.0) 





 


Red Cell Distribution Width


 


 18.6 %


(11.6-14.8)  H 





 


Platelet Count


 


 531 K/UL


(150-450)  H 





 


Mean Platelet Volume


 


 7.6 FL


(6.5-10.1) 





 


Neutrophils (%) (Auto)


 


 76.9 %


(45.0-75.0)  H 





 


Lymphocytes (%) (Auto)


 


 19.7 %


(20.0-45.0)  L 





 


Monocytes (%) (Auto)


 


 3.2 %


(1.0-10.0) 





 


Eosinophils (%) (Auto)


 


 0.1 %


(0.0-3.0) 





 


Basophils (%) (Auto)


 


 0.1 %


(0.0-2.0) 





 


Sodium Level


 


 136 MMOL/L


(136-145) 





 


Potassium Level


 


 4.4 MMOL/L


(3.5-5.1) 





 


Chloride Level


 


 103 MMOL/L


() 





 


Carbon Dioxide Level


 


 25 MMOL/L


(21-32) 





 


Blood Urea Nitrogen


 


 20 mg/dL


(7-18)  H 





 


Creatinine


 


 0.7 MG/DL


(0.55-1.30) 





 


Estimat Glomerular Filtration


Rate 


 > 60 mL/min


(>60) 





 


Glucose Level


 


 107 MG/DL


()  H 





 


Calcium Level


 


 8.8 MG/DL


(8.5-10.1) 














Current Medications








 Medications


  (Trade)  Dose


 Ordered  Sig/Miky


 Route


 PRN Reason  Start Time


 Stop Time Status Last Admin


Dose Admin


 


 Acetaminophen


  (Tylenol)  650 mg  Q4H  PRN


 GT


 Temp >100.5  11/21/20 12:30


 12/21/20 12:29  11/23/20 10:14





 


 Barium Sulfate


  (Varibar Honey)  250 ml  NOW  PRN


 MC


 RAD  11/30/20 07:15


 12/3/20 07:08   





 


 Barium Sulfate


  (Varibar Nectar)  240 ml  NOW  PRN


 MC


 RAD  11/30/20 07:15


 12/3/20 07:08   





 


 Barium Sulfate


  (Varibar Pudding)  230 ml  NOW  PRN


 MC


 RAD  11/30/20 07:15


 12/3/20 07:08   





 


 Barium Sulfate


  (Varibar Thin


 Liquid powder)  148 gm  NOW  PRN


 MC


 RAD  11/30/20 07:15


 12/3/20 07:08   





 


 Dexamethasone


 Sodium Phosphate


  (Decadron 10mg/


 ml Inj)  6 mg  Q24H


 IV


   11/30/20 18:00


 12/1/20 18:01  11/30/20 18:22





 


 Dextrose


  (Dextrose 50%)  50 ml  Q30M  PRN


 IV


 Hypoglycemia  10/23/20 22:45


 1/21/21 22:44   





 


 Dextrose/Sodium


 Chloride  1,000 ml @ 


 50 mls/hr  Q20H


 IV


   11/29/20 16:15


 12/29/20 16:14  11/30/20 12:30





 


 Heparin Sodium


  (Porcine)


  (Heparin 5000


 units/ml)  5,000 units  EVERY 12  HOURS


 SUBQ


   10/23/20 21:00


 12/7/20 20:59  11/30/20 22:00





 


 Insulin Aspart


  (NovoLOG)    EVERY 6  HOURS


 SUBQ


   11/2/20 06:00


 1/22/21 06:29  12/1/20 00:30





 


 Nitroglycerin


  (Ntg)  0.4 mg  Q5M  PRN


 SL


 Prn Chest Pain  11/21/20 10:15


 12/21/20 10:09   





 


 Ondansetron HCl


  (Zofran)  4 mg  Q6H  PRN


 IVP


 Nausea & Vomiting  11/21/20 14:30


 12/21/20 14:29   





 


 Pantoprazole


  (Protonix)  40 mg  DAILY


 IV


   11/7/20 09:00


 12/7/20 08:59  11/30/20 08:55





 


 Polyethylene


 Glycol


  (Miralax)  17 gm  DAILYPRN  PRN


 GT


 Constipation  11/21/20 10:15


 12/21/20 10:14   





 


 Promethazine HCl/


 Codeine


  (Phenergan with


 Codeine)  5 ml  Q4H  PRN


 GT


 For Cough  11/21/20 10:14


 12/21/20 10:13  11/25/20 22:05




















Bianca Casillas M.D.                Dec 1, 2020 08:11

## 2020-12-01 NOTE — PULMONOLGY CRITICAL CARE NOTE
Critical Care - Asmt/Plan


Problems:  


(1) Acute respiratory failure


(2) Multifocal pneumonia


(3) 2019 novel coronavirus disease (COVID-19)


(4) Acute encephalopathy


(5) Severe sepsis


(6) Alzheimer's dementia


(7) HTN (hypertension)


(8) History of CVA (cerebrovascular accident)


(9) BPH (benign prostatic hyperplasia)


Respiratory:  monitor respiratory rate, adjust FIO2, CXR


Cardiac:  continue to monitor HR/BP


Renal:  F/U I&O, keep IV fluid, check electrolytes


Infectious Disease:  check cultures, continue antibiotics


Gastrointestinal:  hold feedings


Endocrine:  monitor blood sugar


Hematologic:  monitor H/H, transfuse if hgb<8.5


Neurologic:  PRN Ativan, PRN Morphine, keep patient comfortable


Affect:  PRN ativan


Disposition:  keep in ICU


Time Spent (Minutes):  40


Notes Reviewed:  internist, cardio, renal


Discussed with:  nurses, consultants, 





Critical Care - Objective





Last 24 Hour Vital Signs








  Date Time  Temp Pulse Resp B/P (MAP) Pulse Ox O2 Delivery O2 Flow Rate FiO2


 


12/1/20 07:52  60      


 


12/1/20 04:32 97.2 88 20 157/90 (112) 96   


 


12/1/20 04:25      Nasal Cannula 2.0 


 


12/1/20 04:00  72      


 


12/1/20 00:00  69      


 


12/1/20 00:00      Nasal Cannula 2.0 


 


11/30/20 20:15  85      


 


11/30/20 20:00 97.0 66 20 123/76 (92) 96   


 


11/30/20 20:00      Nasal Cannula 4.0 


 


11/30/20 18:31 98.3 73 19 143/79 (100) 97   


 


11/30/20 17:23  69      


 


11/30/20 16:00  67      


 


11/30/20 16:00 97.3 67 20 139/77 (97) 99   


 


11/30/20 16:00      Nasal Cannula 2.0 


 


11/30/20 15:00  69 17 131/86 (101) 96   


 


11/30/20 14:00  69 17 132/68 (89) 98   


 


11/30/20 13:00  66 16 154/77 (102) 98   


 


11/30/20 12:00      Nasal Cannula 2.0 


 


11/30/20 12:00 98.3 65 19 130/73 (92) 98   


 


11/30/20 12:00  65      


 


11/30/20 12:00 98.3 65 19 130/73 (92) 98   


 


11/30/20 11:00  70 21 109/68 (82) 96   








Status:  awake


Condition:  critical


HEENT:  atraumatic, normocephalic


Neck:  full ROM


Lungs:  rales, rhonchi


Heart:  HR/BP stable


Abdomen:  soft, non-tender


Extremities:  no C/C/E


Objective:


still large secretions, failed weaning


Accucheck:  104





Critical Care - Subjective


ROS Limited/Unobtainable:  Yes


Condition:  critical


FI02:  24


Vent Support Breath Rate:  12


Vent Support Mode:  AC


Vent Tidal Volume:  600


Sputum Amount:  Moderate


PEEP:  0.0


PIP:  14


I&O:











Intake and Output  


 


 11/30/20 12/1/20





 19:00 07:00


 


Intake Total 500 ml 450 ml


 


Output Total 540 ml 900 ml


 


Balance -40 ml -450 ml


 


  


 


IV Total 500 ml 450 ml


 


Output Urine Total 540 ml 900 ml








CXR:


1.  Tiny residual left pleural effusion is improved from prior study.   Maybe a 

tiny right pleural effusion.


2.  Left lung base opacities have slightly improved.  Mild interstitial  

thickening.  Atelectasis in the right lower lung.


ET-Tube:  7.2


ET Position:  25


Labs:





Laboratory Tests








Test


 12/1/20


00:30 12/1/20


03:20 12/1/20


05:54


 


POC Whole Blood Glucose


 173 MG/DL


()  H 


 104 MG/DL


()


 


White Blood Count


 


 6.5 K/UL


(4.8-10.8) 





 


Red Blood Count


 


 3.93 M/UL


(4.70-6.10)  L 





 


Hemoglobin


 


 12.0 G/DL


(14.2-18.0)  L 





 


Hematocrit


 


 35.0 %


(42.0-52.0)  L 





 


Mean Corpuscular Volume  89 FL (80-99)   


 


Mean Corpuscular Hemoglobin


 


 30.5 PG


(27.0-31.0) 





 


Mean Corpuscular Hemoglobin


Concent 


 34.2 G/DL


(32.0-36.0) 





 


Red Cell Distribution Width


 


 18.6 %


(11.6-14.8)  H 





 


Platelet Count


 


 531 K/UL


(150-450)  H 





 


Mean Platelet Volume


 


 7.6 FL


(6.5-10.1) 





 


Neutrophils (%) (Auto)


 


 76.9 %


(45.0-75.0)  H 





 


Lymphocytes (%) (Auto)


 


 19.7 %


(20.0-45.0)  L 





 


Monocytes (%) (Auto)


 


 3.2 %


(1.0-10.0) 





 


Eosinophils (%) (Auto)


 


 0.1 %


(0.0-3.0) 





 


Basophils (%) (Auto)


 


 0.1 %


(0.0-2.0) 





 


Sodium Level


 


 136 MMOL/L


(136-145) 





 


Potassium Level


 


 4.4 MMOL/L


(3.5-5.1) 





 


Chloride Level


 


 103 MMOL/L


() 





 


Carbon Dioxide Level


 


 25 MMOL/L


(21-32) 





 


Blood Urea Nitrogen


 


 20 mg/dL


(7-18)  H 





 


Creatinine


 


 0.7 MG/DL


(0.55-1.30) 





 


Estimat Glomerular Filtration


Rate 


 > 60 mL/min


(>60) 





 


Glucose Level


 


 107 MG/DL


()  H 





 


Calcium Level


 


 8.8 MG/DL


(8.5-10.1) 




















Michael Sandoval MD               Dec 1, 2020 10:37

## 2020-12-01 NOTE — NUR
NURSE HAND-OFF REPORT: 



Important Events on Shift:

Patient Status: stable 

Diet: NPO



Pending Orders: 

Pending Results/Labs:

Pending MD notification:



Latest Vital Signs: Temperature 97.3 , Pulse 66 , B/P 130 /84 , Respiratory Rate 16 , O2 SAT 
95 , Nasal Cannula, O2 Flow Rate 2.0 .  

Vital Sign Comment: 



EKG Rhythm: Sinus Rhythm

Rhythm change?: N 

MD Notified?: DIDI Edmond MD Response: 



Latest Mejia Fall Score: 50  

Fall Risk: High Risk 

Safety Measures: Call light Within Reach, Bed Alarm Zone 1, Side Rails Side Rails x3, Bed 
position Low and Locked.

Fall Precautions: 

Yellow Socks

Yellow Gown

Door Sign

Patient Fall Education



Report given to LITA Gomez.

## 2020-12-01 NOTE — NUR
CASE MANAGEMENT:REVIEW



12/1/20

SI: ACUTE RESPIRATORY FAILURE. PNA

EXTUBATED 11/29

97.7   60  18  137/87  97% ON 2L/NC

H/H-12.0/35.0   



IS: IV DECADRON Q24

IVF@50/HR

IV PROTONIX QD

HEPARIN SQ Q12

**: STEP DOWN UNIT



PLAN:

SWALLOW EVAL

## 2020-12-01 NOTE — INTERNAL MED PROGRESS NOTE
Subjective


Date of Service:  Dec 1, 2020


Physician Name


Benito Hearn


Attending Physician


Andrei Cancino MD





Current Medications








 Medications


  (Trade)  Dose


 Ordered  Sig/Miky


 Route


 PRN Reason  Start Time


 Stop Time Status Last Admin


Dose Admin


 


 Acetaminophen


  (Tylenol)  650 mg  Q4H  PRN


 GT


 Temp >100.5  11/21/20 12:30


 12/21/20 12:29  11/23/20 10:14





 


 Barium Sulfate


  (Varibar Honey)  250 ml  NOW  PRN


 MC


 RAD  11/30/20 07:15


 12/3/20 07:08   





 


 Barium Sulfate


  (Varibar Nectar)  240 ml  NOW  PRN


 MC


 RAD  11/30/20 07:15


 12/3/20 07:08   





 


 Barium Sulfate


  (Varibar Pudding)  230 ml  NOW  PRN


 MC


 RAD  11/30/20 07:15


 12/3/20 07:08   





 


 Barium Sulfate


  (Varibar Thin


 Liquid powder)  148 gm  NOW  PRN


 MC


 RAD  11/30/20 07:15


 12/3/20 07:08   





 


 Dexamethasone


 Sodium Phosphate


  (Decadron 10mg/


 ml Inj)  6 mg  Q24H


 IV


   11/30/20 18:00


 12/1/20 18:01  11/30/20 18:22





 


 Dextrose


  (Dextrose 50%)  50 ml  Q30M  PRN


 IV


 Hypoglycemia  10/23/20 22:45


 1/21/21 22:44   





 


 Dextrose/Sodium


 Chloride  1,000 ml @ 


 50 mls/hr  Q20H


 IV


   11/29/20 16:15


 12/29/20 16:14  12/1/20 08:25





 


 Heparin Sodium


  (Porcine)


  (Heparin 5000


 units/ml)  5,000 units  EVERY 12  HOURS


 SUBQ


   10/23/20 21:00


 12/7/20 20:59  12/1/20 08:27





 


 Insulin Aspart


  (NovoLOG)    EVERY 6  HOURS


 SUBQ


   11/2/20 06:00


 1/22/21 06:29  12/1/20 00:30





 


 Nitroglycerin


  (Ntg)  0.4 mg  Q5M  PRN


 SL


 Prn Chest Pain  11/21/20 10:15


 12/21/20 10:09   





 


 Ondansetron HCl


  (Zofran)  4 mg  Q6H  PRN


 IVP


 Nausea & Vomiting  11/21/20 14:30


 12/21/20 14:29   





 


 Pantoprazole


  (Protonix)  40 mg  DAILY


 IV


   11/7/20 09:00


 12/7/20 08:59  12/1/20 08:25





 


 Polyethylene


 Glycol


  (Miralax)  17 gm  DAILYPRN  PRN


 GT


 Constipation  11/21/20 10:15


 12/21/20 10:14   





 


 Promethazine HCl/


 Codeine


  (Phenergan with


 Codeine)  5 ml  Q4H  PRN


 GT


 For Cough  11/21/20 10:14


 12/21/20 10:13  11/25/20 22:05











Allergies:  


Coded Allergies:  


     No Known Allergies (Unverified , 8/20/12)


ROS Limited/Unobtainable:  Yes


Subjective


74 YO M admitted with shortness of breath.  Now pneumonia; previously COVID 

positive.  Cover for Int med-Dr Cancino.  Step Down unit.  extubated 11/29/20. 

Tolerating nasal canula





Objective





Last Vital Signs








  Date Time  Temp Pulse Resp B/P (MAP) Pulse Ox O2 Delivery O2 Flow Rate FiO2


 


12/1/20 16:00      Nasal Cannula 2.0 


 


12/1/20 16:00 97.3 66 16 130/84 (99) 95   


 


11/29/20 17:29        24











Laboratory Tests








Test


 12/1/20


00:30 12/1/20


03:20 12/1/20


05:54 12/1/20


11:41


 


POC Whole Blood Glucose


 173 MG/DL


()  H 


 104 MG/DL


() 104 MG/DL


()


 


White Blood Count


 


 6.5 K/UL


(4.8-10.8) 


 





 


Red Blood Count


 


 3.93 M/UL


(4.70-6.10)  L 


 





 


Hemoglobin


 


 12.0 G/DL


(14.2-18.0)  L 


 





 


Hematocrit


 


 35.0 %


(42.0-52.0)  L 


 





 


Mean Corpuscular Volume  89 FL (80-99)    


 


Mean Corpuscular Hemoglobin


 


 30.5 PG


(27.0-31.0) 


 





 


Mean Corpuscular Hemoglobin


Concent 


 34.2 G/DL


(32.0-36.0) 


 





 


Red Cell Distribution Width


 


 18.6 %


(11.6-14.8)  H 


 





 


Platelet Count


 


 531 K/UL


(150-450)  H 


 





 


Mean Platelet Volume


 


 7.6 FL


(6.5-10.1) 


 





 


Neutrophils (%) (Auto)


 


 76.9 %


(45.0-75.0)  H 


 





 


Lymphocytes (%) (Auto)


 


 19.7 %


(20.0-45.0)  L 


 





 


Monocytes (%) (Auto)


 


 3.2 %


(1.0-10.0) 


 





 


Eosinophils (%) (Auto)


 


 0.1 %


(0.0-3.0) 


 





 


Basophils (%) (Auto)


 


 0.1 %


(0.0-2.0) 


 





 


Sodium Level


 


 136 MMOL/L


(136-145) 


 





 


Potassium Level


 


 4.4 MMOL/L


(3.5-5.1) 


 





 


Chloride Level


 


 103 MMOL/L


() 


 





 


Carbon Dioxide Level


 


 25 MMOL/L


(21-32) 


 





 


Blood Urea Nitrogen


 


 20 mg/dL


(7-18)  H 


 





 


Creatinine


 


 0.7 MG/DL


(0.55-1.30) 


 





 


Estimat Glomerular Filtration


Rate 


 > 60 mL/min


(>60) 


 





 


Glucose Level


 


 107 MG/DL


()  H 


 





 


Calcium Level


 


 8.8 MG/DL


(8.5-10.1) 


 




















Intake and Output  


 


 11/30/20 12/1/20





 19:00 07:00


 


Intake Total 500 ml 450 ml


 


Output Total 540 ml 900 ml


 


Balance -40 ml -450 ml


 


  


 


IV Total 500 ml 450 ml


 


Output Urine Total 540 ml 900 ml








Objective


PHYSICAL EXAMINATION:


GENERAL:  The patient awake with deep stimuli, open his eyes, however,


cannot follow commands.  The patient is on a Ventimask at this time,


chronically ill-appearing.


HEAD AND NECK:  Pupils are equal and reactive to light.  Anicteric.


NECK:  Supple.  No JVD.


LUNGS:  Nasal cannula;  wheezing, rhonchi, and decreased air in bases.


HEART:  S1, S2.  Regular rhythm.  Distant heart sounds.  No murmur or


gallop.


ABDOMEN:  Soft, nondistended, nontender.  Positive bowel sounds.


EXTREMITIES:  No cyanosis, clubbing, or edema.


NEUROLOGIC:  Very limited secondary to the patient's status, cannot follow


commands.  Opens his eyes with deep stimuli and moving extremities


spontaneously.





Assessment/Plan


Assessment/Plan


ASSESSMENT:


1. Acute hypoxemic respiratory failure, most likely secondary to


pneumonia and sepsis.


2. Sepsis secondary to urinary tract infection and pneumonia.


3. pneumonia=MRSA; ESBL E. coli


4. Acute kidney injury on chronic renal insufficiency.


5. Dehydration.


6. History of chronic congestive heart failure.


7. Diabetes type 2.


8. Dyslipidemia.


9. Hypertension.


10. Alzheimer's disease.


11. COVID 19 previous positive





PLAN:


1.  ICU


2.  Dr. Sandoval,=Pulmonary Critical Care


3.  Dr. Garcia = Inf Dis.


4.  IV= D5W due to the hypernatremia and dehydration.


5.  antibiotics =   meropenem; S/P micafungin and zyvox


6.  Code status is full code.


7.    DVT prophylaxis is heparin subcutaneous.











Benito Hearn MD                Dec 1, 2020 17:29

## 2020-12-02 VITALS — SYSTOLIC BLOOD PRESSURE: 157 MMHG | DIASTOLIC BLOOD PRESSURE: 87 MMHG

## 2020-12-02 VITALS — SYSTOLIC BLOOD PRESSURE: 145 MMHG | DIASTOLIC BLOOD PRESSURE: 77 MMHG

## 2020-12-02 VITALS — SYSTOLIC BLOOD PRESSURE: 131 MMHG | DIASTOLIC BLOOD PRESSURE: 82 MMHG

## 2020-12-02 VITALS — DIASTOLIC BLOOD PRESSURE: 70 MMHG | SYSTOLIC BLOOD PRESSURE: 153 MMHG

## 2020-12-02 LAB
ADD MANUAL DIFF: NO
ANION GAP SERPL CALC-SCNC: 7 MMOL/L (ref 5–15)
BASOPHILS NFR BLD AUTO: 0.3 % (ref 0–2)
BUN SERPL-MCNC: 20 MG/DL (ref 7–18)
CALCIUM SERPL-MCNC: 8.3 MG/DL (ref 8.5–10.1)
CHLORIDE SERPL-SCNC: 103 MMOL/L (ref 98–107)
CO2 SERPL-SCNC: 26 MMOL/L (ref 21–32)
CREAT SERPL-MCNC: 0.8 MG/DL (ref 0.55–1.3)
EOSINOPHIL NFR BLD AUTO: 0 % (ref 0–3)
ERYTHROCYTE [DISTWIDTH] IN BLOOD BY AUTOMATED COUNT: 17.6 % (ref 11.6–14.8)
HCT VFR BLD CALC: 35.9 % (ref 42–52)
HGB BLD-MCNC: 12 G/DL (ref 14.2–18)
LYMPHOCYTES NFR BLD AUTO: 24.9 % (ref 20–45)
MCV RBC AUTO: 91 FL (ref 80–99)
MONOCYTES NFR BLD AUTO: 3.8 % (ref 1–10)
NEUTROPHILS NFR BLD AUTO: 71.1 % (ref 45–75)
PLATELET # BLD: 517 K/UL (ref 150–450)
POTASSIUM SERPL-SCNC: 4.1 MMOL/L (ref 3.5–5.1)
RBC # BLD AUTO: 3.96 M/UL (ref 4.7–6.1)
SODIUM SERPL-SCNC: 136 MMOL/L (ref 136–145)
WBC # BLD AUTO: 6.2 K/UL (ref 4.8–10.8)

## 2020-12-02 RX ADMIN — HEPARIN SODIUM SCH UNITS: 5000 INJECTION INTRAVENOUS; SUBCUTANEOUS at 08:49

## 2020-12-02 RX ADMIN — INSULIN ASPART SCH UNITS: 100 INJECTION, SOLUTION INTRAVENOUS; SUBCUTANEOUS at 17:35

## 2020-12-02 RX ADMIN — HUMAN INSULIN SCH MLS/HR: 100 INJECTION, SOLUTION SUBCUTANEOUS at 04:15

## 2020-12-02 RX ADMIN — INSULIN ASPART SCH UNITS: 100 INJECTION, SOLUTION INTRAVENOUS; SUBCUTANEOUS at 05:29

## 2020-12-02 RX ADMIN — HEPARIN SODIUM SCH UNITS: 5000 INJECTION INTRAVENOUS; SUBCUTANEOUS at 21:00

## 2020-12-02 RX ADMIN — INSULIN ASPART SCH UNITS: 100 INJECTION, SOLUTION INTRAVENOUS; SUBCUTANEOUS at 11:56

## 2020-12-02 RX ADMIN — PANTOPRAZOLE SODIUM SCH MG: 40 INJECTION, POWDER, FOR SOLUTION INTRAVENOUS at 08:50

## 2020-12-02 NOTE — NUR
NURSE NOTES:

Sponge bath given, No bowel movement noted. Not in apparent distress. Pt resisting to care. 
Provide calm environment. Continue to plan of care.

## 2020-12-02 NOTE — CONSULTATION
DATE OF CONSULTATION:  12/02/2020

GASTROENTEROLOGY CONSULTATION



CONSULTING PHYSICIAN:  Marcos Ojeda MD.



REFERRING PHYSICIAN:  Andrei Cancino MD.



CHIEF COMPLAINT:  Dysphagia.



HISTORY OF PRESENT ILLNESS:  Most of the history per chart.  A 75-year-old

patient admitted on October 23, 2020 to the hospital for complaint of

shortness of breath.  He was admitted from nursing home.  He has numerous

medical problems, which I will dictate in a second.  He had a long stay in

the hospital over a month now, has been intubated and now extubated.  He

failed a swallow evaluation.  He has been NPO for three days, so GI

consult requested for evaluation for possible PEG placement.



PAST MEDICAL HISTORY:

1. COVID positive pneumonia.

2. Heart failure.

3. Diabetes type 2.

4. Hypercholesteremia.

5. Chronic kidney disease.

6. Dyslipidemia.

7. Dysphagia.

8. Dementia.

9. Hypercalcemia.

10. Encephalopathy.

11. UTI.

12. GERD.

13. BPH.

14. Right humerus fracture.



ALLERGIES:  No known drug allergies.



MEDICATIONS:  Please see medication reconciliation list.



SOCIAL HISTORY:  Currently, he lives in a nursing home.  No history of

tobacco, alcohol, or drug abuse.



FAMILY HISTORY:  Noncontributory.



REVIEW OF SYSTEMS:  Unable to obtain.



PHYSICAL EXAMINATION:

VITAL SIGNS:  Temperature is 97.5, pulse 78, respirations 19, blood

pressure __/87.

HEENT:  Normocephalic and atraumatic.  Sclerae are anicteric.

NECK:  Supple.  No evidence of obvious lymphadenopathy.

CARDIOVASCULAR:  Regular rate and rhythm.  Plus S1, S2.

LUNGS:  Decreased breath sounds bilaterally diffusely on the supine exam.

ABDOMEN:  Soft, nontender.  No rebound.  No guarding.  No peritoneal

sign.

EXTREMITIES:  No cyanosis.  No clubbing.



LABORATORY DATA:  White count 6.2, hemoglobin 12, hematocrit 35, platelet

count is 517,000.



ASSESSMENT AND PLAN:  The patient is a 75-year-old male with dysphagia.  I

spoke with the family, who agreed to PEG.  Plan is to make the patient NPO

after midnight tonight, hold any anticoagulation in the morning.  IV

fluids for hydration.  We will plan to do the PEG tomorrow.  We will give

him dose of antibiotics prior to the procedure.



I want to thank Dr. Andrei Cancino for this kind referral.









  ______________________________________________

  Marcos Ojeda M.D.





DR:  ESE

D:  12/02/2020 12:10

T:  12/02/2020 12:59

JOB#:  [id number]

CC:

## 2020-12-02 NOTE — NEPHROLOGY PROGRESS NOTE
Assessment/Plan


Problem List:  


(1) Dehydration


(2) JANES (acute kidney injury)


(3) Renal failure (ARF), acute on chronic


(4) Hypoxia


(5) Acute encephalopathy


(6) Electrolyte imbalance


Assessment





75-year-old male is admitted with acute hypoxic respiratory failure most likely 

secondary to pneumonia and sepsis, UTI.


Acute on chronic renal failure


Dehydration


Electrolyte imbalances, hypernatremia


Hypoalbuminemia


Diabetes type 2


History of congestive heart failure


Hypertension


Hyperlipemia


Alzheimer's


Previous COVID-19 infection in September 2020


Plan


December 2: Continue to do well post extubation.  Labs reviewed.  Renal 

parameters stable.  Continue per consultants.


December 1: Remain extubated on nasal cannula.  Labs reviewed.  Renal parameters

stable.


November 30: Discussed with RN.  Patient now extubated.  On nasal cannula.  

Renal parameters stable.  Continue per consultants.


November 29: Discussed with LITA Cooper.  Weaning in process.  It seems like 

patient is tolerating better.  Labs reviewed.  Continue per consultants.


November 28: Remains in ICU.  Status unchanged.  Labs and medication list 

reviewed.  Remains full code.  Weaning continues to fail.


November 27: Remains in ICU.  Remains intubated.  Remains full code.  Continues 

to be on weaning trials.  Renal parameters are stable.


November 26: Patient remains in ICU.  Full code.  Intubated.  Fails weaning.  

Labs reviewed.  Stable from renal standpoint of view.  Discussed with RN.


November 25: Labs reviewed.  Discussed with RN.  Abnormal electrolyte addressed.

 Remains intubated on ventilator.  Continue per consultants.


November 24: Labs reviewed.  Discussed with RN.  Stable from renal standpoint of

view.  Main issue is weaning from ventilator that keeps failing.  Continue per 

consultants.


November 23: Labs reviewed.  Discussed with RN.  Stable from renal standpoint of

view.  Continue per consultants.


November 22: Patient seen and discussed with RN.  Labs reviewed.  Remains 

intubated.  Trial of weaning this being attempted.  Continue per consultants.


November 21: Remains intubated.  Labs reviewed.  Renal parameters stable.  

Continue per consultants.


November 20: Remains on mechanical ventilation.  Labs reviewed.  Abnormal 

electrolytes addressed.  Stable from renal standpoint of view.


November 19: Remains intubated.  Remains full code.  Labs reviewed.  Low 

phosphorus replaced.  Continue to monitor renal parameters.  Continue per 

consultants.


November 18: Failed weaning.  Remains intubated.  Remains full closed.  Labs 

reviewed.  Stable from renal standpoint view.


November 17: Remains in ICU.  Remains full code.  Labs are reviewed.  Phosphorus

supplement given.  Continue per consultants.


November 16: Remains in ICU.  Full code.  Labs reviewed.  Remains intubated.  

Stable renal parameters.


November 15: In ICU.  Full code.  Discussed with RN.  Stable renal parameters.


November 14: Seen in ICU.  Discussed with LITA Cooper.  Flomax discontinued.  

Labs reviewed.  Stable from renal standpoint of view.


November 13: Seen in ICU.  Discussed with LITA Cooper.  Remains on pressor.  

Electrolyte abnormalities noted and addressed.  Continue per consultants.  

Patient remains full code.


November 12: Seen in ICU.  Discussed with LITA Richardson.  Blood pressure remains a 

little requiring pressors.  Labs reviewed.  Stable renal parameters.  Continue 

per consultants.


November 11: Remains intubated.  Full code.  Blood pressure borderline low.  

Will increase midodrine dose.  Discussed with RN.  Continue to monitor renal 

parameters and electrolytes.


November 10: Intubated.  Full code.  Labs reviewed.  Stable from renal 

standpoint of view.  Continue per consultants.


November 9: Remains intubated.  Full code.  Labs reviewed.  Electrolytes within 

normal limit.  Continue per consultants.


November 8: In ICU.  Remains intubated on ventilator.  Remains full code.  Low 

potassium addressed.  Blood pressure fluctuating.  Continue per consultants.


November 7: Patient in ICU.  Intubated on ventilator.  On 6 mics of Levophed.  

Will give 100 cc albumin 25%.  K-Phos IV ordered.  Continue per consultants.  

Continue monitor renal parameters and electrolytes.


November 6: Status unchanged.  Transfer to [facility] for seizure.  Stable from renal 

standpoint to view.  Continue per consultants.


November 5: Status quo.  Labs reviewed.  Renal parameters stable.  Continue per 

consultants.


November 4: On nonrebreather mask.  Labs reviewed.  Renal parameters 

stable.Continue per pulmonologist.  Clinically unchanged.


November 3: On nonrebreather mask.  Inflammatory markers gradually declining.  

Renal parameters stable.  Continue per pulmonary.


November 2: Remains on nonrebreather mask.  Inflammatory markers remain 

elevated.  Renal parameters somewhat stable.  Continue per pulmonary and ID.  

Remains full code.


November 1: Patient on nonrebreather mask.  Labs noted.  Serum creatinine down 

to 1.3.  Continue per consultants.


October 31: Patient on nonrebreather mask.  Transfer to telemetry when seen this

morning.  Labs noted.  Continue per consultants.  Serum creatinine dylan to 1.7. 

Continue to monitor renal parameters.  Continue to monitor vancomycin level


October 30: Patient is not doing well clinically.  Mild respiratory distress.  

ABG noted.  Somewhat hypoxic.  CBC and chemistry panel ordered.  Discussed with 

LITA Garcia.  Will defer to pulmonary management to specialist.  Continue per ID.

 Renal parameters remained stable as of October 29.


October 29: Labs reviewed.  Renal parameters stable.  Continue per consultants.


October 28: Labs reviewed.  Potassium via NG tube ordered.  IV fluids stopped.  

Continue per consultants.


October 27: Labs reviewed.  Low potassium and low phosphorus replaced.  Continue

per consultants.  Remains stable from renal standpoint of view.


October 26: Patient remains n.p.o.  IV fluid down to 50 cc an hour.  Potassium 

supplement intravenously ordered.  Continue per consultants.





Previously:


Patient is n.p.o., will continue on IV fluid of D5W 75 cc an hour


We will monitor electrolytes and renal parameters


Avoid nephrotoxic's


Start p.o. when he clears by speech therapist, meanwhile aspiration precautions


Keep the blood pressure and blood sugar in check


Per orders





Subjective


ROS Limited/Unobtainable:  No





Objective


Objective





Last 24 Hour Vital Signs








  Date Time  Temp Pulse Resp B/P (MAP) Pulse Ox O2 Delivery O2 Flow Rate FiO2


 


12/2/20 12:00  57      


 


12/2/20 11:54 97.5 78 19 157/87 (110) 100   


 


12/2/20 11:53      Nasal Cannula 2.0 





      Nasal Cannula 2.0 


 


12/2/20 08:00  79      


 


12/2/20 08:00 97.9 74 17 131/82 (98) 100   


 


12/2/20 08:00      Nasal Cannula 2.0 





      Nasal Cannula 2.0 


 


12/2/20 04:00      Nasal Cannula 2.0 


 


12/2/20 04:00  66      


 


12/2/20 00:07  72      


 


12/2/20 00:00      Nasal Cannula 2.0 


 


12/1/20 20:00      Nasal Cannula 2.0 


 


12/1/20 20:00  67      


 


12/1/20 16:00      Nasal Cannula 2.0 


 


12/1/20 16:00 97.3 66 16 130/84 (99) 95   


 


12/1/20 15:43  59      

















Intake and Output  


 


 12/1/20 12/2/20





 19:00 07:00


 


Intake Total 600 ml 400 ml


 


Output Total 450 ml 700 ml


 


Balance 150 ml -300 ml


 


  


 


IV Total 600 ml 400 ml


 


Output Urine Total 450 ml 700 ml











Current Medications








 Medications


  (Trade)  Dose


 Ordered  Sig/Miky


 Route


 PRN Reason  Start Time


 Stop Time Status Last Admin


Dose Admin


 


 Acetaminophen


  (Tylenol)  650 mg  Q4H  PRN


 GT


 Temp >100.5  11/21/20 12:30


 12/21/20 12:29  11/23/20 10:14





 


 Barium Sulfate


  (Varibar Honey)  250 ml  NOW  PRN


 MC


 RAD  11/30/20 07:15


 12/3/20 07:08   





 


 Barium Sulfate


  (Varibar Nectar)  240 ml  NOW  PRN


 MC


 RAD  11/30/20 07:15


 12/3/20 07:08   





 


 Barium Sulfate


  (Varibar Pudding)  230 ml  NOW  PRN


 MC


 RAD  11/30/20 07:15


 12/3/20 07:08   





 


 Barium Sulfate


  (Varibar Thin


 Liquid powder)  148 gm  NOW  PRN


 MC


 RAD  11/30/20 07:15


 12/3/20 07:08   





 


 Dextrose


  (Dextrose 50%)  50 ml  Q30M  PRN


 IV


 Hypoglycemia  10/23/20 22:45


 1/21/21 22:44   





 


 Dextrose/Sodium


 Chloride  1,000 ml @ 


 50 mls/hr  Q20H


 IV


   11/29/20 16:15


 12/29/20 16:14  12/1/20 08:25





 


 Heparin Sodium


  (Porcine)


  (Heparin 5000


 units/ml)  5,000 units  EVERY 12  HOURS


 SUBQ


   10/23/20 21:00


 12/7/20 20:59  12/2/20 08:49





 


 Insulin Aspart


  (NovoLOG)    EVERY 6  HOURS


 SUBQ


   11/2/20 06:00


 1/22/21 06:29  12/1/20 00:30





 


 Nitroglycerin


  (Ntg)  0.4 mg  Q5M  PRN


 SL


 Prn Chest Pain  11/21/20 10:15


 12/21/20 10:09   





 


 Ondansetron HCl


  (Zofran)  4 mg  Q6H  PRN


 IVP


 Nausea & Vomiting  11/21/20 14:30


 12/21/20 14:29   





 


 Pantoprazole


  (Protonix)  40 mg  DAILY


 IV


   11/7/20 09:00


 12/7/20 08:59  12/2/20 08:50





 


 Polyethylene


 Glycol


  (Miralax)  17 gm  DAILYPRN  PRN


 GT


 Constipation  11/21/20 10:15


 12/21/20 10:14   





 


 Promethazine HCl/


 Codeine


  (Phenergan with


 Codeine)  5 ml  Q4H  PRN


 GT


 For Cough  11/21/20 10:14


 12/21/20 10:13  11/25/20 22:05








Laboratory Tests


12/1/20 17:37: POC Whole Blood Glucose 114H


12/1/20 23:31: POC Whole Blood Glucose 131H


12/2/20 03:45: 


White Blood Count 6.2, Red Blood Count 3.96L, Hemoglobin 12.0L, Hematocrit 35.9L

, Mean Corpuscular Volume 91, Mean Corpuscular Hemoglobin 30.4, Mean Corpuscular

Hemoglobin Concent 33.5, Red Cell Distribution Width 17.6H, Platelet Count 517H,

Mean Platelet Volume 7.3, Neutrophils (%) (Auto) 71.1, Lymphocytes (%) (Auto) 

24.9, Monocytes (%) (Auto) 3.8, Eosinophils (%) (Auto) 0.0, Basophils (%) (Auto)

0.3, Sodium Level 136, Potassium Level 4.1, Chloride Level 103, Carbon Dioxide 

Level 26, Anion Gap 7, Blood Urea Nitrogen 20H, Creatinine 0.8, Estimat 

Glomerular Filtration Rate > 60, Glucose Level 129H, Calcium Level 8.3L


12/2/20 05:02: POC Whole Blood Glucose 121H


12/2/20 11:50: POC Whole Blood Glucose 107H


Height (Feet):  5


Height (Inches):  4.00


Weight (Pounds):  130


General Appearance:  no apparent distress


Cardiovascular:  normal rate


Respiratory/Chest:  decreased breath sounds


Abdomen:  distended











Seven Tobar MD             Dec 2, 2020 13:36

## 2020-12-02 NOTE — INFECTIOUS DISEASES PROG NOTE
Assessment/Plan





76yo M with:





Respiratory code 2ry to mucus plug 11/12


Septic Shock- -recurrent


Fever, recurrent, low grade;SP


Leukocytosis; recurrent; increased


Acute hypoxic resp failure, on NRB mask> 4l NC; back on NRB, desaturation 10/29 

> intubated 11/6


Pneumonia- >HAP


Hx of COVID-19 PNA 9/9/20 11/17 CXR: No significant interval change in the radiographic appearance the 

chest compared to one day prior.


   11/14 Resp cx +MRSA, nl resp hayden


   UCx neg


   BCx NTD


   11/13 COVID PCR neg


         --11/10 CXR: Bilateral infiltrates in a peribronchovascular 

distribution are unchanged. Left pleural effusion is unchanged.


         --11/8 CXR: Persistent bilateral patchy pulmonary opacities, most 

prominent  in the left lower lung.


         --11/7 ucx neg


                  sp cx MRSA (colonizer at this point)


                  Bcx Neg


         --11/2 Bcx Neg


          10/30 Sp cx MRSA (Vancomycin ADRIANA 1), ESBL E.coli


   10/23 BCx NTD


   UA 15-20 WBC, UCx Neg


   10/23 COVID rapid neg; 10/26 rapid COVID PCR + (from prior infection)- not 

new infection


   Flu A/B neg


   CXR: L pna


   Resp cx MRSA


   11/19 Resp cx +ESBL E.coli & PsA


   11/21 BCx NTD


   11/22 CXR: Small bilateral pleural effusions with moderate vascular c

ongestion. Airspace consolidation in the left lower lung field may represent 

atelectasis however, correlate for infiltrate.  This is improving when compared 

to the prior study.


   11/23 BCx NTD


   11/23 CXR: Increasing right basilar opacity, likely infiltrate, may also 

reflect increasing pleural fluid





H/o COVID pna


   Tested positive 9/9/20


   10/23 Rapid Ag neg


   10/26 Rapid Ag positive (from prior disease?)


   11/13 COVID PCR neg





JANES on CKD, improving





SNF resident ([facility])


Non-verbal


VRE and MRSA colonized








Plan:


Cont to monitor off abx





   -11/30 SP josh #10


   -11/20 SP linezolid #7


   -11/16 SP meropenem #14 for ESBL pna 


   -11/10 SP Micafungin #4


   -11/6 SP IV Vancomycin #15


   -11/2 SP Zosyn #4


   -10/26 SP Cefepime #4


   -10/24 SP Flagyl #





Monitor CBC/CMP


Monitor resp status


Monitor temp curve and hemodynamics





D/w RN





Thank you for this consult. [facility] will continue to follow.





Subjective


Allergies:  


Coded Allergies:  


     No Known Allergies (Unverified , 8/20/12)





AF


WBC 6.2


2L NC


NAD





Objective





Last 24 Hour Vital Signs








  Date Time  Temp Pulse Resp B/P (MAP) Pulse Ox O2 Delivery O2 Flow Rate FiO2


 


12/2/20 04:00      Nasal Cannula 2.0 


 


12/2/20 04:00  66      


 


12/2/20 00:07  72      


 


12/2/20 00:00      Nasal Cannula 2.0 


 


12/1/20 20:00      Nasal Cannula 2.0 


 


12/1/20 20:00  67      


 


12/1/20 16:00      Nasal Cannula 2.0 


 


12/1/20 16:00 97.3 66 16 130/84 (99) 95   


 


12/1/20 15:43  59      


 


12/1/20 12:00      Nasal Cannula 2.0 


 


12/1/20 12:00      Nasal Cannula 2.0 


 


12/1/20 12:00  64      


 


12/1/20 12:00 97.7 60 18 137/87 (104) 97   








Height (Feet):  5


Height (Inches):  4.00


Weight (Pounds):  130


Gen: NAD in bed


HEENT: NCAT


CV: RRR


Pulm: CTAB


Abd: Soft, NTND


Ext: No c/c/e


Neuro: Awake, not interactive


Lines: RUE PICC





Laboratory Tests








Test


 12/1/20


11:41 12/1/20


17:37 12/1/20


23:31 12/2/20


03:45


 


POC Whole Blood Glucose


 104 MG/DL


() 114 MG/DL


()  H 131 MG/DL


()  H 





 


White Blood Count


 


 


 


 6.2 K/UL


(4.8-10.8)


 


Red Blood Count


 


 


 


 3.96 M/UL


(4.70-6.10)  L


 


Hemoglobin


 


 


 


 12.0 G/DL


(14.2-18.0)  L


 


Hematocrit


 


 


 


 35.9 %


(42.0-52.0)  L


 


Mean Corpuscular Volume    91 FL (80-99)  


 


Mean Corpuscular Hemoglobin


 


 


 


 30.4 PG


(27.0-31.0)


 


Mean Corpuscular Hemoglobin


Concent 


 


 


 33.5 G/DL


(32.0-36.0)


 


Red Cell Distribution Width


 


 


 


 17.6 %


(11.6-14.8)  H


 


Platelet Count


 


 


 


 517 K/UL


(150-450)  H


 


Mean Platelet Volume


 


 


 


 7.3 FL


(6.5-10.1)


 


Neutrophils (%) (Auto)


 


 


 


 71.1 %


(45.0-75.0)


 


Lymphocytes (%) (Auto)


 


 


 


 24.9 %


(20.0-45.0)


 


Monocytes (%) (Auto)


 


 


 


 3.8 %


(1.0-10.0)


 


Eosinophils (%) (Auto)


 


 


 


 0.0 %


(0.0-3.0)


 


Basophils (%) (Auto)


 


 


 


 0.3 %


(0.0-2.0)


 


Sodium Level


 


 


 


 136 MMOL/L


(136-145)


 


Potassium Level


 


 


 


 4.1 MMOL/L


(3.5-5.1)


 


Chloride Level


 


 


 


 103 MMOL/L


()


 


Carbon Dioxide Level


 


 


 


 26 MMOL/L


(21-32)


 


Anion Gap


 


 


 


 7 mmol/L


(5-15)


 


Blood Urea Nitrogen


 


 


 


 20 mg/dL


(7-18)  H


 


Creatinine


 


 


 


 0.8 MG/DL


(0.55-1.30)


 


Estimat Glomerular Filtration


Rate 


 


 


 > 60 mL/min


(>60)


 


Glucose Level


 


 


 


 129 MG/DL


()  H


 


Calcium Level


 


 


 


 8.3 MG/DL


(8.5-10.1)  L


 


Test


 12/2/20


05:02 


 


 





 


POC Whole Blood Glucose


 121 MG/DL


()  H 


 


 














Current Medications








 Medications


  (Trade)  Dose


 Ordered  Sig/Miky


 Route


 PRN Reason  Start Time


 Stop Time Status Last Admin


Dose Admin


 


 Acetaminophen


  (Tylenol)  650 mg  Q4H  PRN


 GT


 Temp >100.5  11/21/20 12:30


 12/21/20 12:29  11/23/20 10:14





 


 Barium Sulfate


  (Varibar Honey)  250 ml  NOW  PRN


 


 RAD  11/30/20 07:15


 12/3/20 07:08   





 


 Barium Sulfate


  (Varibar Nectar)  240 ml  NOW  PRN


 


 RAD  11/30/20 07:15


 12/3/20 07:08   





 


 Barium Sulfate


  (Varibar Pudding)  230 ml  NOW  PRN


 


 RAD  11/30/20 07:15


 12/3/20 07:08   





 


 Barium Sulfate


  (Varibar Thin


 Liquid powder)  148 gm  NOW  PRN


 


 RAD  11/30/20 07:15


 12/3/20 07:08   





 


 Dextrose


  (Dextrose 50%)  50 ml  Q30M  PRN


 IV


 Hypoglycemia  10/23/20 22:45


 1/21/21 22:44   





 


 Dextrose/Sodium


 Chloride  1,000 ml @ 


 50 mls/hr  Q20H


 IV


   11/29/20 16:15


 12/29/20 16:14  12/1/20 08:25





 


 Heparin Sodium


  (Porcine)


  (Heparin 5000


 units/ml)  5,000 units  EVERY 12  HOURS


 SUBQ


   10/23/20 21:00


 12/7/20 20:59  12/1/20 20:10





 


 Insulin Aspart


  (NovoLOG)    EVERY 6  HOURS


 SUBQ


   11/2/20 06:00


 1/22/21 06:29  12/1/20 00:30





 


 Nitroglycerin


  (Ntg)  0.4 mg  Q5M  PRN


 SL


 Prn Chest Pain  11/21/20 10:15


 12/21/20 10:09   





 


 Ondansetron HCl


  (Zofran)  4 mg  Q6H  PRN


 IVP


 Nausea & Vomiting  11/21/20 14:30


 12/21/20 14:29   





 


 Pantoprazole


  (Protonix)  40 mg  DAILY


 IV


   11/7/20 09:00


 12/7/20 08:59  12/1/20 08:25





 


 Polyethylene


 Glycol


  (Miralax)  17 gm  DAILYPRN  PRN


 GT


 Constipation  11/21/20 10:15


 12/21/20 10:14   





 


 Promethazine HCl/


 Codeine


  (Phenergan with


 Codeine)  5 ml  Q4H  PRN


 GT


 For Cough  11/21/20 10:14


 12/21/20 10:13  11/25/20 22:05




















Bianca Casillas M.D.                Dec 2, 2020 08:11

## 2020-12-02 NOTE — PULMONOLOGY PROGRESS NOTE
Subjective


ROS Limited/Unobtainable:  Yes


Allergies:  


Coded Allergies:  


     No Known Allergies (Unverified , 8/20/12)





Objective





Last 24 Hour Vital Signs








  Date Time  Temp Pulse Resp B/P (MAP) Pulse Ox O2 Delivery O2 Flow Rate FiO2


 


12/2/20 11:54 97.5 78 19 157/87 (110) 100   


 


12/2/20 11:53      Nasal Cannula 2.0 





      Nasal Cannula 2.0 


 


12/2/20 08:00  79      


 


12/2/20 08:00 97.9 74 17 131/82 (98) 100   


 


12/2/20 08:00      Nasal Cannula 2.0 





      Nasal Cannula 2.0 


 


12/2/20 04:00      Nasal Cannula 2.0 


 


12/2/20 04:00  66      


 


12/2/20 00:07  72      


 


12/2/20 00:00      Nasal Cannula 2.0 


 


12/1/20 20:00      Nasal Cannula 2.0 


 


12/1/20 20:00  67      


 


12/1/20 16:00      Nasal Cannula 2.0 


 


12/1/20 16:00 97.3 66 16 130/84 (99) 95   


 


12/1/20 15:43  59      

















Intake and Output  


 


 12/1/20 12/2/20





 19:00 07:00


 


Intake Total 600 ml 400 ml


 


Output Total 450 ml 700 ml


 


Balance 150 ml -300 ml


 


  


 


IV Total 600 ml 400 ml


 


Output Urine Total 450 ml 700 ml








General Appearance:  WD/WN, no acute distress


HEENT:  normocephalic, atraumatic


Respiratory:  chest wall non-tender, respiratory distress, rhonchi - left, 

rhonchi - right


Cardiovascular:  normal rate


Abdomen:  normal bowel sounds, no organomegaly


Genitourinary:  normal external genitalia


Skin:  no rash


Laboratory Tests


12/1/20 17:37: POC Whole Blood Glucose 114H


12/1/20 23:31: POC Whole Blood Glucose 131H


12/2/20 03:45: 


White Blood Count 6.2, Red Blood Count 3.96L, Hemoglobin 12.0L, Hematocrit 35.9L

, Mean Corpuscular Volume 91, Mean Corpuscular Hemoglobin 30.4, Mean Corpuscular

Hemoglobin Concent 33.5, Red Cell Distribution Width 17.6H, Platelet Count 517H,

Mean Platelet Volume 7.3, Neutrophils (%) (Auto) 71.1, Lymphocytes (%) (Auto) 

24.9, Monocytes (%) (Auto) 3.8, Eosinophils (%) (Auto) 0.0, Basophils (%) (Auto)

0.3, Sodium Level 136, Potassium Level 4.1, Chloride Level 103, Carbon Dioxide 

Level 26, Anion Gap 7, Blood Urea Nitrogen 20H, Creatinine 0.8, Estimat 

Glomerular Filtration Rate > 60, Glucose Level 129H, Calcium Level 8.3L


12/2/20 05:02: POC Whole Blood Glucose 121H


12/2/20 11:50: POC Whole Blood Glucose 107H





Current Medications








 Medications


  (Trade)  Dose


 Ordered  Sig/Miky


 Route


 PRN Reason  Start Time


 Stop Time Status Last Admin


Dose Admin


 


 Acetaminophen


  (Tylenol)  650 mg  Q4H  PRN


 GT


 Temp >100.5  11/21/20 12:30


 12/21/20 12:29  11/23/20 10:14





 


 Barium Sulfate


  (Varibar Honey)  250 ml  NOW  PRN


 MC


 RAD  11/30/20 07:15


 12/3/20 07:08   





 


 Barium Sulfate


  (Varibar Nectar)  240 ml  NOW  PRN


 MC


 RAD  11/30/20 07:15


 12/3/20 07:08   





 


 Barium Sulfate


  (Varibar Pudding)  230 ml  NOW  PRN


 MC


 RAD  11/30/20 07:15


 12/3/20 07:08   





 


 Barium Sulfate


  (Varibar Thin


 Liquid powder)  148 gm  NOW  PRN


 MC


 RAD  11/30/20 07:15


 12/3/20 07:08   





 


 Dextrose


  (Dextrose 50%)  50 ml  Q30M  PRN


 IV


 Hypoglycemia  10/23/20 22:45


 1/21/21 22:44   





 


 Dextrose/Sodium


 Chloride  1,000 ml @ 


 50 mls/hr  Q20H


 IV


   11/29/20 16:15


 12/29/20 16:14  12/1/20 08:25





 


 Heparin Sodium


  (Porcine)


  (Heparin 5000


 units/ml)  5,000 units  EVERY 12  HOURS


 SUBQ


   10/23/20 21:00


 12/7/20 20:59  12/2/20 08:49





 


 Insulin Aspart


  (NovoLOG)    EVERY 6  HOURS


 SUBQ


   11/2/20 06:00


 1/22/21 06:29  12/1/20 00:30





 


 Nitroglycerin


  (Ntg)  0.4 mg  Q5M  PRN


 SL


 Prn Chest Pain  11/21/20 10:15


 12/21/20 10:09   





 


 Ondansetron HCl


  (Zofran)  4 mg  Q6H  PRN


 IVP


 Nausea & Vomiting  11/21/20 14:30


 12/21/20 14:29   





 


 Pantoprazole


  (Protonix)  40 mg  DAILY


 IV


   11/7/20 09:00


 12/7/20 08:59  12/2/20 08:50





 


 Polyethylene


 Glycol


  (Miralax)  17 gm  DAILYPRN  PRN


 GT


 Constipation  11/21/20 10:15


 12/21/20 10:14   





 


 Promethazine HCl/


 Codeine


  (Phenergan with


 Codeine)  5 ml  Q4H  PRN


 GT


 For Cough  11/21/20 10:14


 12/21/20 10:13  11/25/20 22:05














Assessment/Plan


Problems:  


(1) Acute encephalopathy


(2) 2019 novel coronavirus disease (COVID-19)


(3) Severe sepsis


(4) HTN (hypertension)


(5) Aphasia


(6) Alzheimer's dementia


(7) History of CVA (cerebrovascular accident)


(8) BPH (benign prostatic hyperplasia)


Assessment/Plan


all reviewed


tolerating extubation


swallow study 


titrate fio2 to sat of 92%


frequent suctioning





check electrolytes





aspiration precaution


dvt prophylaxis.











Michael Sandoval MD               Dec 2, 2020 12:18

## 2020-12-02 NOTE — CARDIAC ELECTROPHYSIOLOGY PN
Assessment/Plan


Assessment/Plan


1. Accelerated junctional rhythm. Ruled out for MI


      Echo showed ejection fraction of 55%.





2. Long run of 31 beats of Nonsustained VT on 11/3/2020. 


     Cardiac cath after stabilization.





3. Henry 30, Asystole while on the Vent due to resp failure/ likely mucus plug .

S/P Code


 Off midodrine





4. Respiratory failure, on antibiotic  


    Extubated 11/29/20





5. Septic shock. Off Levophed   





6. History of previous COVID infection in September 2020 and  November 2020 


Now negative again and off isolation.





7. Dementia.





8. Dysphagia, Failed swallow eval


PEG tomorrow by Dr Rusty PAEZ RN





Subjective


Subjective


   Had 31 beats of VT  on 11/3/20  and 5 beats 11/8/20 


 Coded on 11/12/20 as sat dropped to 20% and got henry 30s and PEA and pulseless

 


Transferred out of ICU after extubated 11/29/20


 Failed Swallow eval. Scheduled for PEG by Dr Ojeda tomorrow





Objective





Last 24 Hour Vital Signs








  Date Time  Temp Pulse Resp B/P (MAP) Pulse Ox O2 Delivery O2 Flow Rate FiO2


 


12/2/20 12:00  57      


 


12/2/20 11:54 97.5 78 19 157/87 (110) 100   


 


12/2/20 11:53      Nasal Cannula 2.0 





      Nasal Cannula 2.0 


 


12/2/20 08:00  79      


 


12/2/20 08:00 97.9 74 17 131/82 (98) 100   


 


12/2/20 08:00      Nasal Cannula 2.0 





      Nasal Cannula 2.0 


 


12/2/20 04:00      Nasal Cannula 2.0 


 


12/2/20 04:00  66      


 


12/2/20 00:07  72      


 


12/2/20 00:00      Nasal Cannula 2.0 


 


12/1/20 20:00      Nasal Cannula 2.0 


 


12/1/20 20:00  67      


 


12/1/20 16:00      Nasal Cannula 2.0 


 


12/1/20 16:00 97.3 66 16 130/84 (99) 95   


 


12/1/20 15:43  59      

















Intake and Output  


 


 12/1/20 12/2/20





 19:00 07:00


 


Intake Total 600 ml 400 ml


 


Output Total 450 ml 700 ml


 


Balance 150 ml -300 ml


 


  


 


IV Total 600 ml 400 ml


 


Output Urine Total 450 ml 700 ml











Laboratory Tests








Test


 12/1/20


17:37 12/1/20


23:31 12/2/20


03:45 12/2/20


05:02


 


POC Whole Blood Glucose


 114 MG/DL


()  H 131 MG/DL


()  H 


 121 MG/DL


()  H


 


White Blood Count


 


 


 6.2 K/UL


(4.8-10.8) 





 


Red Blood Count


 


 


 3.96 M/UL


(4.70-6.10)  L 





 


Hemoglobin


 


 


 12.0 G/DL


(14.2-18.0)  L 





 


Hematocrit


 


 


 35.9 %


(42.0-52.0)  L 





 


Mean Corpuscular Volume   91 FL (80-99)   


 


Mean Corpuscular Hemoglobin


 


 


 30.4 PG


(27.0-31.0) 





 


Mean Corpuscular Hemoglobin


Concent 


 


 33.5 G/DL


(32.0-36.0) 





 


Red Cell Distribution Width


 


 


 17.6 %


(11.6-14.8)  H 





 


Platelet Count


 


 


 517 K/UL


(150-450)  H 





 


Mean Platelet Volume


 


 


 7.3 FL


(6.5-10.1) 





 


Neutrophils (%) (Auto)


 


 


 71.1 %


(45.0-75.0) 





 


Lymphocytes (%) (Auto)


 


 


 24.9 %


(20.0-45.0) 





 


Monocytes (%) (Auto)


 


 


 3.8 %


(1.0-10.0) 





 


Eosinophils (%) (Auto)


 


 


 0.0 %


(0.0-3.0) 





 


Basophils (%) (Auto)


 


 


 0.3 %


(0.0-2.0) 





 


Sodium Level


 


 


 136 MMOL/L


(136-145) 





 


Potassium Level


 


 


 4.1 MMOL/L


(3.5-5.1) 





 


Chloride Level


 


 


 103 MMOL/L


() 





 


Carbon Dioxide Level


 


 


 26 MMOL/L


(21-32) 





 


Anion Gap


 


 


 7 mmol/L


(5-15) 





 


Blood Urea Nitrogen


 


 


 20 mg/dL


(7-18)  H 





 


Creatinine


 


 


 0.8 MG/DL


(0.55-1.30) 





 


Estimat Glomerular Filtration


Rate 


 


 > 60 mL/min


(>60) 





 


Glucose Level


 


 


 129 MG/DL


()  H 





 


Calcium Level


 


 


 8.3 MG/DL


(8.5-10.1)  L 





 


Test


 12/2/20


11:50 


 


 





 


POC Whole Blood Glucose


 107 MG/DL


()  H 


 


 











Objective


HEAD AND NECK:  No JVD.  


LUNGS:  Coarse rhonchi.


CARDIOVASCULAR:   Irregular S1 and S2 with no gallop.


ABDOMEN:  Soft.


EXTREMITIES:  No pitting edema.











Kvng Edmond MD                Dec 2, 2020 14:32

## 2020-12-02 NOTE — NUR
NURSE NOTES:Attempted to call Wendi Mckeon the daughter of the patient who is listed on the 
contact sheet to obtain consent. Wendi Mckeon did not answer the phone. Will call again later.

## 2020-12-02 NOTE — INTERNAL MED PROGRESS NOTE
Subjective


Date of Service:  Dec 2, 2020


Physician Name


Benito Hearn


Attending Physician


Andrei Cancino MD





Current Medications








 Medications


  (Trade)  Dose


 Ordered  Sig/Miky


 Route


 PRN Reason  Start Time


 Stop Time Status Last Admin


Dose Admin


 


 Acetaminophen


  (Tylenol)  650 mg  Q4H  PRN


 GT


 Temp >100.5  11/21/20 12:30


 12/21/20 12:29  11/23/20 10:14





 


 Barium Sulfate


  (Varibar Honey)  250 ml  NOW  PRN


 MC


 RAD  11/30/20 07:15


 12/3/20 07:08   





 


 Barium Sulfate


  (Varibar Nectar)  240 ml  NOW  PRN


 MC


 RAD  11/30/20 07:15


 12/3/20 07:08   





 


 Barium Sulfate


  (Varibar Pudding)  230 ml  NOW  PRN


 MC


 RAD  11/30/20 07:15


 12/3/20 07:08   





 


 Barium Sulfate


  (Varibar Thin


 Liquid powder)  148 gm  NOW  PRN


 MC


 RAD  11/30/20 07:15


 12/3/20 07:08   





 


 Dextrose


  (Dextrose 50%)  50 ml  Q30M  PRN


 IV


 Hypoglycemia  10/23/20 22:45


 1/21/21 22:44   





 


 Dextrose/Sodium


 Chloride  1,000 ml @ 


 50 mls/hr  Q20H


 IV


   11/29/20 16:15


 12/29/20 16:14  12/1/20 08:25





 


 Heparin Sodium


  (Porcine)


  (Heparin 5000


 units/ml)  5,000 units  EVERY 12  HOURS


 SUBQ


   10/23/20 21:00


 12/7/20 20:59  12/2/20 08:49





 


 Insulin Aspart


  (NovoLOG)    EVERY 6  HOURS


 SUBQ


   11/2/20 06:00


 1/22/21 06:29  12/1/20 00:30





 


 Nitroglycerin


  (Ntg)  0.4 mg  Q5M  PRN


 SL


 Prn Chest Pain  11/21/20 10:15


 12/21/20 10:09   





 


 Ondansetron HCl


  (Zofran)  4 mg  Q6H  PRN


 IVP


 Nausea & Vomiting  11/21/20 14:30


 12/21/20 14:29   





 


 Pantoprazole


  (Protonix)  40 mg  DAILY


 IV


   11/7/20 09:00


 12/7/20 08:59  12/2/20 08:50





 


 Polyethylene


 Glycol


  (Miralax)  17 gm  DAILYPRN  PRN


 GT


 Constipation  11/21/20 10:15


 12/21/20 10:14   





 


 Promethazine HCl/


 Codeine


  (Phenergan with


 Codeine)  5 ml  Q4H  PRN


 GT


 For Cough  11/21/20 10:14


 12/21/20 10:13  11/25/20 22:05











Allergies:  


Coded Allergies:  


     No Known Allergies (Unverified , 8/20/12)


ROS Limited/Unobtainable:  Yes


Subjective


76 YO M admitted with shortness of breath.  Now pneumonia; previously COVID 

positive.  Cover for Int [facility]-Dr Cancino.  Step Down unit.  extubated 11/29/20. 

Failed swallow eval





Objective





Last Vital Signs








  Date Time  Temp Pulse Resp B/P (MAP) Pulse Ox O2 Delivery O2 Flow Rate FiO2


 


12/2/20 08:00  79      


 


12/2/20 08:00      Nasal Cannula 2.0 





      Nasal Cannula 2.0 


 


12/1/20 16:00 97.3  16 130/84 (99) 95   


 


11/29/20 17:29        24











Laboratory Tests








Test


 12/1/20


11:41 12/1/20


17:37 12/1/20


23:31 12/2/20


03:45


 


POC Whole Blood Glucose


 104 MG/DL


() 114 MG/DL


()  H 131 MG/DL


()  H 





 


White Blood Count


 


 


 


 6.2 K/UL


(4.8-10.8)


 


Red Blood Count


 


 


 


 3.96 M/UL


(4.70-6.10)  L


 


Hemoglobin


 


 


 


 12.0 G/DL


(14.2-18.0)  L


 


Hematocrit


 


 


 


 35.9 %


(42.0-52.0)  L


 


Mean Corpuscular Volume    91 FL (80-99)  


 


Mean Corpuscular Hemoglobin


 


 


 


 30.4 PG


(27.0-31.0)


 


Mean Corpuscular Hemoglobin


Concent 


 


 


 33.5 G/DL


(32.0-36.0)


 


Red Cell Distribution Width


 


 


 


 17.6 %


(11.6-14.8)  H


 


Platelet Count


 


 


 


 517 K/UL


(150-450)  H


 


Mean Platelet Volume


 


 


 


 7.3 FL


(6.5-10.1)


 


Neutrophils (%) (Auto)


 


 


 


 71.1 %


(45.0-75.0)


 


Lymphocytes (%) (Auto)


 


 


 


 24.9 %


(20.0-45.0)


 


Monocytes (%) (Auto)


 


 


 


 3.8 %


(1.0-10.0)


 


Eosinophils (%) (Auto)


 


 


 


 0.0 %


(0.0-3.0)


 


Basophils (%) (Auto)


 


 


 


 0.3 %


(0.0-2.0)


 


Sodium Level


 


 


 


 136 MMOL/L


(136-145)


 


Potassium Level


 


 


 


 4.1 MMOL/L


(3.5-5.1)


 


Chloride Level


 


 


 


 103 MMOL/L


()


 


Carbon Dioxide Level


 


 


 


 26 MMOL/L


(21-32)


 


Anion Gap


 


 


 


 7 mmol/L


(5-15)


 


Blood Urea Nitrogen


 


 


 


 20 mg/dL


(7-18)  H


 


Creatinine


 


 


 


 0.8 MG/DL


(0.55-1.30)


 


Estimat Glomerular Filtration


Rate 


 


 


 > 60 mL/min


(>60)


 


Glucose Level


 


 


 


 129 MG/DL


()  H


 


Calcium Level


 


 


 


 8.3 MG/DL


(8.5-10.1)  L


 


Test


 12/2/20


05:02 


 


 





 


POC Whole Blood Glucose


 121 MG/DL


()  H 


 


 




















Intake and Output  


 


 12/1/20 12/2/20





 19:00 07:00


 


Intake Total 600 ml 400 ml


 


Output Total 450 ml 700 ml


 


Balance 150 ml -300 ml


 


  


 


IV Total 600 ml 400 ml


 


Output Urine Total 450 ml 700 ml








Objective


PHYSICAL EXAMINATION:


GENERAL:  The patient awake with deep stimuli, open his eyes, however,


cannot follow commands.  The patient is on a Ventimask at this time,


chronically ill-appearing.


HEAD AND NECK:  Pupils are equal and reactive to light.  Anicteric.


NECK:  Supple.  No JVD.


LUNGS:  Nasal cannula;  wheezing, rhonchi, and decreased air in bases.


HEART:  S1, S2.  Regular rhythm.  Distant heart sounds.  No murmur or


gallop.


ABDOMEN:  Soft, nondistended, nontender.  Positive bowel sounds.


EXTREMITIES:  No cyanosis, clubbing, or edema.


NEUROLOGIC:  Very limited secondary to the patient's status, cannot follow


commands.  Opens his eyes with deep stimuli and moving extremities


spontaneously.





Assessment/Plan


Assessment/Plan


ASSESSMENT:


1. Acute hypoxemic respiratory failure, most likely secondary to


pneumonia and sepsis.


2. Sepsis secondary to urinary tract infection and pneumonia.


3. pneumonia=MRSA; ESBL E. coli


4. Acute kidney injury on chronic renal insufficiency.


5. Dehydration.


6. History of chronic congestive heart failure.


7. Diabetes type 2.


8. Dyslipidemia.


9. Hypertension.


10. Alzheimer's disease.


11. COVID 19 previous positive


12.  Dysphagia





PLAN:


1.  ICU


2.  Dr. Sandoval,=Pulmonary Critical Care


3.  Dr. Garcia = Inf Dis.


4.  IV= D5W due to the hypernatremia and dehydration.


5.  antibiotics =   meropenem; S/P micafungin and zyvox


6.  Code status is full code.


7.    DVT prophylaxis is heparin subcutaneous.


8.  Failed swallow eval; PEG eval=Benito Dudley MD                Dec 2, 2020 11:29

## 2020-12-02 NOTE — NUR
CASE MANAGEMENT:REVIEW



12/2/20

SI: ACUTE RESPIRATORY FAILURE. PNA

EXTUBATED 11/29

97.5   78  19  157/87  100% on 2l/nc 

H/H-12.0/35.9     BUN+20



IS: IVF@50/HR

IV PROTONIX QD

HEPARIN SQ Q12

**: STEP DOWN UNIT



PLAN:

SWALLOW EVAL ~ PATIENT REFUSED TO OPEN HIS MOUTH

CONSENT FOR EGD/PEG

## 2020-12-02 NOTE — NUR
NURSE NOTES:handoff received from LITA Gomez. Patient received awake and alert and resting 
in bed, patient follows basic commands but is aphasic. No grimacing or signs of distress 
noted. Patient is on nasal cannula at 2 liters satting at 100% IV site is right hand 22g 
running D%NS@50ML/HR iv site clean dry and intact. Patient has buck catheter patent and 
draining to gravity. Patient is on p200 mattress. Isolation for contact are in place as are 
fall and aspiration precautions, patient is also NPO except ice chips and meds due to failed 
swallow eval. bed in the low and locked position with call light at patient bedside. Will 
follow plan of care.

## 2020-12-02 NOTE — NUR
RD ASSESSMENT & RECOMMENDATIONS

SEE CARE ACTIVITY FOR COMPLETE ASSESSMENT



DAILY ESTIMATED NEEDS:

Needs based on DM, wound, pulmonary 59.5kg 

25-35  kcals/kg 

5465-4297  total kcals

1.25-2  g protein/kg

  g total protein 

25-30  mL/kg

6868-0595  total fluid mLs



NUTRITION DIAGNOSIS:

* Swallowing difficulty R/T dysphagia as evidenced by SLP eval, recs for

NGT feeds, now s/p oral intubation (11/6), s/p code blue (11/12) now

extubated, NPO, did not pass SLP eval.

* Increased kcal and pro needs r/t wound healing as evidenced by sacral

pressure injury stage 2.





PO DIET RECOMMENDATIONS:

NPO per SLP  





ENTERAL NUTRITION RECOMMENDATIONS:

Glucerna 1.2 @ @60ml/hr x24 hrs   

to provide 1440ml, 1728 kcal, 86g pro, 1159ml free H2O  



- Pt did not pass SLP eval, rec non oral feeds to meet est kcal and pro needs.

- Obtain GI access, start Glucerna 1.2 @30ml/hr for 6 hrs. Advance as

tolerated 15ml/hr q4-6 hrs to goal.

- HOB over 30 degrees/ H2O flush per MD





ADDITIONAL RECOMMENDATIONS:

* Calibrated bedscale wt for accurate CBW 

* Monitor hemodynamic stability: s/p code blue (11/12), now extubated 

* Monitor lytes, replete as needed  

* Wound care: add Harvinder BID via PEG 

                     add Vit C 250mg QD 

* Monitor BGs closely -> Now NPO, on D5.  

* TF recs as above

## 2020-12-02 NOTE — NUR
NURSE NOTES:

Received report from LITA Orellana.

Pt appears awake, non verbal, smiling at nurse, and nonresponsive to verbal stimuli.

Right hand IV running D5 NS at 50 mL.

5-lead EKG shows SR at 68 BPM.

Vitals WNL. Afebrile.

Saturating 98% on 2L N/C.

Made aware of NPO status for EGD and PEG tube placement tomorrow.

[place] draining well to gravity.

Bed kept in lowest and locked position.

Bed alarm on. Side rails up x3. 

Will continue monitoring.

## 2020-12-02 NOTE — NUR
NURSE HAND-OFF REPORT: 



Important Events on Shift:

Patient Status: Stable 

Diet: NPO



Pending Orders: 

Pending Results/Labs:PEG placement tomorrow 12/3/20

Pending MD notification:



Latest Vital Signs: Temperature 98.2 , Pulse 60 , B/P 145 /77 , Respiratory Rate 18 , O2 SAT 
100 , Nasal Cannula, O2 Flow Rate 1.0 .  

Vital Sign Comment: 



EKG Rhythm: Sinus Rhythm

Rhythm change?: N 

MD Notified?: DDII Edmond MD Response: 



Latest Mejia Fall Score: 45  

Fall Risk: High Risk 

Safety Measures: Call light Within Reach, Bed Alarm Zone 1, Side Rails Side Rails x3, Bed 
position Low and Locked.

Fall Precautions: 

Yellow Socks

Yellow Gown

Door Sign

Patient Fall Education



Report given to LITA Meza.

## 2020-12-02 NOTE — NUR
NURSE HAND-OFF REPORT: 



Important Events on Shift: Pt is stable, No bowel movement last- 11/30. 

Patient Status: Stable

Diet: NPO



Pending Orders: None

Pending Results/Labs: None

Pending MD notification: None



Latest Vital Signs: Temperature 97.3 , Pulse 66 , B/P 130 /84 , Respiratory Rate 16 , O2 SAT 
95 , Nasal Cannula, O2 Flow Rate 2.0 .  

Vital Sign Comment: WNL



EKG Rhythm: Sinus Rhythm

Rhythm change?: N 

MD Notified?: DIDI Edmond MD Response: 



Latest Mejia Fall Score: 60  

Fall Risk: High Risk 

Safety Measures: Call light Within Reach, Bed Alarm Zone 1, Side Rails Side Rails x3, Bed 
position Low and Locked.

Fall Precautions: 

Yellow Socks

Yellow Gown

Door Sign

Patient Fall Education



Report given to [Bautista Orellana].

## 2020-12-03 VITALS — DIASTOLIC BLOOD PRESSURE: 89 MMHG | SYSTOLIC BLOOD PRESSURE: 145 MMHG

## 2020-12-03 VITALS — DIASTOLIC BLOOD PRESSURE: 96 MMHG | SYSTOLIC BLOOD PRESSURE: 140 MMHG

## 2020-12-03 VITALS — DIASTOLIC BLOOD PRESSURE: 76 MMHG | SYSTOLIC BLOOD PRESSURE: 142 MMHG

## 2020-12-03 VITALS — DIASTOLIC BLOOD PRESSURE: 81 MMHG | SYSTOLIC BLOOD PRESSURE: 143 MMHG

## 2020-12-03 VITALS — DIASTOLIC BLOOD PRESSURE: 77 MMHG | SYSTOLIC BLOOD PRESSURE: 154 MMHG

## 2020-12-03 VITALS — SYSTOLIC BLOOD PRESSURE: 141 MMHG | DIASTOLIC BLOOD PRESSURE: 95 MMHG

## 2020-12-03 VITALS — SYSTOLIC BLOOD PRESSURE: 142 MMHG | DIASTOLIC BLOOD PRESSURE: 60 MMHG

## 2020-12-03 VITALS — SYSTOLIC BLOOD PRESSURE: 149 MMHG | DIASTOLIC BLOOD PRESSURE: 78 MMHG

## 2020-12-03 VITALS — SYSTOLIC BLOOD PRESSURE: 163 MMHG | DIASTOLIC BLOOD PRESSURE: 72 MMHG

## 2020-12-03 LAB
ADD MANUAL DIFF: NO
ANION GAP SERPL CALC-SCNC: 10 MMOL/L (ref 5–15)
APTT BLD: 27 SEC (ref 23–33)
BASOPHILS NFR BLD AUTO: 0.9 % (ref 0–2)
BUN SERPL-MCNC: 16 MG/DL (ref 7–18)
CALCIUM SERPL-MCNC: 8.1 MG/DL (ref 8.5–10.1)
CHLORIDE SERPL-SCNC: 105 MMOL/L (ref 98–107)
CO2 SERPL-SCNC: 24 MMOL/L (ref 21–32)
CREAT SERPL-MCNC: 0.7 MG/DL (ref 0.55–1.3)
EOSINOPHIL NFR BLD AUTO: 0.9 % (ref 0–3)
ERYTHROCYTE [DISTWIDTH] IN BLOOD BY AUTOMATED COUNT: 17.4 % (ref 11.6–14.8)
HCT VFR BLD CALC: 36.8 % (ref 42–52)
HGB BLD-MCNC: 12.2 G/DL (ref 14.2–18)
INR PPP: 1 (ref 0.9–1.1)
LYMPHOCYTES NFR BLD AUTO: 28.1 % (ref 20–45)
MCV RBC AUTO: 91 FL (ref 80–99)
MONOCYTES NFR BLD AUTO: 8.4 % (ref 1–10)
NEUTROPHILS NFR BLD AUTO: 61.7 % (ref 45–75)
PLATELET # BLD: 499 K/UL (ref 150–450)
POTASSIUM SERPL-SCNC: 3.5 MMOL/L (ref 3.5–5.1)
RBC # BLD AUTO: 4.05 M/UL (ref 4.7–6.1)
SODIUM SERPL-SCNC: 139 MMOL/L (ref 136–145)
WBC # BLD AUTO: 7.6 K/UL (ref 4.8–10.8)

## 2020-12-03 PROCEDURE — 0DH63UZ INSERTION OF FEEDING DEVICE INTO STOMACH, PERCUTANEOUS APPROACH: ICD-10-PCS

## 2020-12-03 RX ADMIN — HUMAN INSULIN SCH MLS/HR: 100 INJECTION, SOLUTION SUBCUTANEOUS at 00:49

## 2020-12-03 RX ADMIN — HEPARIN SODIUM SCH UNITS: 5000 INJECTION INTRAVENOUS; SUBCUTANEOUS at 09:00

## 2020-12-03 RX ADMIN — PANTOPRAZOLE SODIUM SCH MG: 40 INJECTION, POWDER, FOR SOLUTION INTRAVENOUS at 09:03

## 2020-12-03 RX ADMIN — INSULIN ASPART SCH UNITS: 100 INJECTION, SOLUTION INTRAVENOUS; SUBCUTANEOUS at 05:23

## 2020-12-03 RX ADMIN — HEPARIN SODIUM SCH UNITS: 5000 INJECTION INTRAVENOUS; SUBCUTANEOUS at 20:29

## 2020-12-03 RX ADMIN — INSULIN ASPART SCH UNITS: 100 INJECTION, SOLUTION INTRAVENOUS; SUBCUTANEOUS at 11:38

## 2020-12-03 RX ADMIN — INSULIN ASPART SCH UNITS: 100 INJECTION, SOLUTION INTRAVENOUS; SUBCUTANEOUS at 00:00

## 2020-12-03 RX ADMIN — INSULIN ASPART SCH UNITS: 100 INJECTION, SOLUTION INTRAVENOUS; SUBCUTANEOUS at 23:14

## 2020-12-03 RX ADMIN — INSULIN ASPART SCH UNITS: 100 INJECTION, SOLUTION INTRAVENOUS; SUBCUTANEOUS at 18:00

## 2020-12-03 NOTE — NUR
NURSE HAND-OFF REPORT: 



Important Events on Shift:Gtube intact, feeding ongoing.

Patient Status: stable

Diet: Glucerna 1.2 x 60cc/hr goal, currently at 20cc/hr



Pending Orders: 

Pending Results/Labs:

Pending MD notification:



Latest Vital Signs: Temperature 97.3 , Pulse 75 , B/P 145 /89 , Respiratory Rate 18 , O2 SAT 
99 , Nasal Cannula, O2 Flow Rate 2.0 .  

Vital Sign Comment: 



EKG Rhythm: Sinus Rhythm

Rhythm change?: N 

MD Notified?: DIDI Edmond MD Response: 



Latest Mejia Fall Score: 45  

Fall Risk: High Risk 

Safety Measures: Call light Within Reach, Bed Alarm Zone 1, Side Rails Side Rails x3, Bed 
position Low and Locked.

Fall Precautions: 

Yellow Socks

Yellow Gown

Door Sign

Patient Fall Education



Report given to Amita LONDON.

## 2020-12-03 NOTE — NEPHROLOGY PROGRESS NOTE
Assessment/Plan


Problem List:  


(1) Dehydration


(2) JANES (acute kidney injury)


(3) Renal failure (ARF), acute on chronic


(4) Hypoxia


(5) Acute encephalopathy


(6) Electrolyte imbalance


Assessment





75-year-old male is admitted with acute hypoxic respiratory failure most likely 

secondary to pneumonia and sepsis, UTI.


Acute on chronic renal failure


Dehydration


Electrolyte imbalances, hypernatremia


Hypoalbuminemia


Diabetes type 2


History of congestive heart failure


Hypertension


Hyperlipemia


Alzheimer's


Previous COVID-19 infection in September 2020


Plan


December 3: Remains on nasal cannula.  Labs reviewed.  Stable from renal 

standpoint of view.


December 2: Continue to do well post extubation.  Labs reviewed.  Renal 

parameters stable.  Continue per consultants.


December 1: Remain extubated on nasal cannula.  Labs reviewed.  Renal parameters

stable.


November 30: Discussed with RN.  Patient now extubated.  On nasal cannula.  

Renal parameters stable.  Continue per consultants.


November 29: Discussed with LITA Cooper.  Weaning in process.  It seems like 

patient is tolerating better.  Labs reviewed.  Continue per consultants.


November 28: Remains in ICU.  Status unchanged.  Labs and medication list 

reviewed.  Remains full code.  Weaning continues to fail.


November 27: Remains in ICU.  Remains intubated.  Remains full code.  Continues 

to be on weaning trials.  Renal parameters are stable.


November 26: Patient remains in ICU.  Full code.  Intubated.  Fails weaning.  

Labs reviewed.  Stable from renal standpoint of view.  Discussed with RN.


November 25: Labs reviewed.  Discussed with RN.  Abnormal electrolyte addressed.

 Remains intubated on ventilator.  Continue per consultants.


November 24: Labs reviewed.  Discussed with RN.  Stable from renal standpoint of

view.  Main issue is weaning from ventilator that keeps failing.  Continue per 

consultants.


November 23: Labs reviewed.  Discussed with RN.  Stable from renal standpoint of

view.  Continue per consultants.


November 22: Patient seen and discussed with RN.  Labs reviewed.  Remains 

intubated.  Trial of weaning this being attempted.  Continue per consultants.


November 21: Remains intubated.  Labs reviewed.  Renal parameters stable.  Co

ntinue per consultants.


November 20: Remains on mechanical ventilation.  Labs reviewed.  Abnormal 

electrolytes addressed.  Stable from renal standpoint of view.


November 19: Remains intubated.  Remains full code.  Labs reviewed.  Low 

phosphorus replaced.  Continue to monitor renal parameters.  Continue per 

consultants.


November 18: Failed weaning.  Remains intubated.  Remains full closed.  Labs 

reviewed.  Stable from renal standpoint view.


November 17: Remains in ICU.  Remains full code.  Labs are reviewed.  Phosphorus

supplement given.  Continue per consultants.


November 16: Remains in ICU.  Full code.  Labs reviewed.  Remains intubated.  

Stable renal parameters.


November 15: In ICU.  Full code.  Discussed with RN.  Stable renal parameters.


November 14: Seen in ICU.  Discussed with LITA Cooper.  Flomax discontinued.  

Labs reviewed.  Stable from renal standpoint of view.


November 13: Seen in ICU.  Discussed with LITA Cooper.  Remains on pressor.  

Electrolyte abnormalities noted and addressed.  Continue per consultants.  

Patient remains full code.


November 12: Seen in ICU.  Discussed with LITA Richardson.  Blood pressure remains a 

little requiring pressors.  Labs reviewed.  Stable renal parameters.  Continue 

per consultants.


November 11: Remains intubated.  Full code.  Blood pressure borderline low.  

Will increase midodrine dose.  Discussed with RN.  Continue to monitor renal 

parameters and electrolytes.


November 10: Intubated.  Full code.  Labs reviewed.  Stable from renal 

standpoint of view.  Continue per consultants.


November 9: Remains intubated.  Full code.  Labs reviewed.  Electrolytes within 

normal limit.  Continue per consultants.


November 8: In ICU.  Remains intubated on ventilator.  Remains full code.  Low 

potassium addressed.  Blood pressure fluctuating.  Continue per consultants.


November 7: Patient in ICU.  Intubated on ventilator.  On 6 mics of Levophed.  

Will give 100 cc albumin 25%.  K-Phos IV ordered.  Continue per consultants.  

Continue monitor renal parameters and electrolytes.


November 6: Status unchanged.  Transfer to [facility] for seizure.  Stable from renal 

standpoint to view.  Continue per consultants.


November 5: Status quo.  Labs reviewed.  Renal parameters stable.  Continue per 

consultants.


November 4: On nonrebreather mask.  Labs reviewed.  Renal parameters 

stable.Continue per pulmonologist.  Clinically unchanged.


November 3: On nonrebreather mask.  Inflammatory markers gradually declining.  

Renal parameters stable.  Continue per pulmonary.


November 2: Remains on nonrebreather mask.  Inflammatory markers remain 

elevated.  Renal parameters somewhat stable.  Continue per pulmonary and ID.  

Remains full code.


November 1: Patient on nonrebreather mask.  Labs noted.  Serum creatinine down 

to 1.3.  Continue per consultants.


October 31: Patient on nonrebreather mask.  Transfer to telemetry when seen this

morning.  Labs noted.  Continue per consultants.  Serum creatinine dylan to 1.7. 

Continue to monitor renal parameters.  Continue to monitor vancomycin level


October 30: Patient is not doing well clinically.  Mild respiratory distress.  

ABG noted.  Somewhat hypoxic.  CBC and chemistry panel ordered.  Discussed with 

LITA Garcia.  Will defer to pulmonary management to specialist.  Continue per ID.

 Renal parameters remained stable as of October 29.


October 29: Labs reviewed.  Renal parameters stable.  Continue per consultants.


October 28: Labs reviewed.  Potassium via NG tube ordered.  IV fluids stopped.  

Continue per consultants.


October 27: Labs reviewed.  Low potassium and low phosphorus replaced.  Continue

per consultants.  Remains stable from renal standpoint of view.


October 26: Patient remains n.p.o.  IV fluid down to 50 cc an hour.  Potassium 

supplement intravenously ordered.  Continue per consultants.





Previously:


Patient is n.p.o., will continue on IV fluid of D5W 75 cc an hour


We will monitor electrolytes and renal parameters


Avoid nephrotoxic's


Start p.o. when he clears by speech therapist, meanwhile aspiration precautions


Keep the blood pressure and blood sugar in check


Per orders





Subjective


ROS Limited/Unobtainable:  No


Constitutional:  Reports: malaise





Objective


Objective





Last 24 Hour Vital Signs








  Date Time  Temp Pulse Resp B/P (MAP) Pulse Ox O2 Delivery O2 Flow Rate FiO2


 


12/3/20 08:00 97.9 67 18 141/95 (110) 98   


 


12/3/20 08:00      Nasal Cannula 2.0 





      Nasal Cannula 2.0 


 


12/3/20 08:00  64      


 


12/3/20 07:40     98 Nasal Cannula 2.0 28


 


12/3/20 04:00      Nasal Cannula 2.0 





      Nasal Cannula 2.0 


 


12/3/20 04:00  74      


 


12/3/20 00:00  64      


 


12/3/20 00:00      Nasal Cannula 2.0 





      Nasal Cannula 2.0 


 


12/2/20 20:00 97.3 63 22 153/70 (97) 100   


 


12/2/20 20:00      Nasal Cannula 2.0 





      Nasal Cannula 2.0 


 


12/2/20 20:00  64      


 


12/2/20 18:59     100 Nasal Cannula 1.0 24


 


12/2/20 16:36 98.2 60 18 145/77 (99) 100   


 


12/2/20 16:00  60      


 


12/2/20 15:52      Nasal Cannula 2.0 





      Nasal Cannula 2.0 


 


12/2/20 12:00  57      


 


12/2/20 11:54 97.5 78 19 157/87 (110) 100   


 


12/2/20 11:53      Nasal Cannula 2.0 





      Nasal Cannula 2.0 

















Intake and Output  


 


 12/2/20 12/3/20





 19:00 07:00


 


Intake Total  259.17 ml


 


Output Total 350 ml 400 ml


 


Balance -350 ml -140.83 ml


 


  


 


IV Total  259.17 ml


 


Output Urine Total 350 ml 400 ml











Current Medications








 Medications


  (Trade)  Dose


 Ordered  Sig/Miky


 Route


 PRN Reason  Start Time


 Stop Time Status Last Admin


Dose Admin


 


 Acetaminophen


  (Tylenol)  650 mg  Q4H  PRN


 GT


 Temp >100.5  11/21/20 12:30


 12/21/20 12:29  11/23/20 10:14





 


 Cefoxitin Sodium


 1 gm/Dextrose  55 ml @ 


 110 mls/hr  ONCE  ONCE


 IVPB


   12/3/20 11:30


 12/3/20 11:59  12/3/20 10:54





 


 Dextrose


  (Dextrose 50%)  50 ml  Q30M  PRN


 IV


 Hypoglycemia  10/23/20 22:45


 1/21/21 22:44   





 


 Dextrose/Sodium


 Chloride  1,000 ml @ 


 50 mls/hr  Q20H


 IV


   11/29/20 16:15


 12/29/20 16:14  12/3/20 00:49





 


 Heparin Sodium


  (Porcine)


  (Heparin 5000


 units/ml)  5,000 units  EVERY 12  HOURS


 SUBQ


   10/23/20 21:00


 12/7/20 20:59  12/2/20 08:49





 


 Insulin Aspart


  (NovoLOG)    EVERY 6  HOURS


 SUBQ


   11/2/20 06:00


 1/22/21 06:29  12/1/20 00:30





 


 Nitroglycerin


  (Ntg)  0.4 mg  Q5M  PRN


 SL


 Prn Chest Pain  11/21/20 10:15


 12/21/20 10:09   





 


 Ondansetron HCl


  (Zofran)  4 mg  Q6H  PRN


 IVP


 Nausea & Vomiting  11/21/20 14:30


 12/21/20 14:29   





 


 Pantoprazole


  (Protonix)  40 mg  DAILY


 IV


   11/7/20 09:00


 12/7/20 08:59  12/3/20 09:03





 


 Polyethylene


 Glycol


  (Miralax)  17 gm  DAILYPRN  PRN


 GT


 Constipation  11/21/20 10:15


 12/21/20 10:14   





 


 Promethazine HCl/


 Codeine


  (Phenergan with


 Codeine)  5 ml  Q4H  PRN


 GT


 For Cough  11/21/20 10:14


 12/21/20 10:13  11/25/20 22:05








Laboratory Tests


12/2/20 17:22: POC Whole Blood Glucose 123H


12/3/20 00:24: POC Whole Blood Glucose [Pending]


12/3/20 03:30: 


White Blood Count 7.6, Red Blood Count 4.05L, Hemoglobin 12.2L, Hematocrit 36.8L

, Mean Corpuscular Volume 91, Mean Corpuscular Hemoglobin 30.1, Mean Corpuscular

 Hemoglobin Concent 33.1, Red Cell Distribution Width 17.4H, Platelet Count 499H

, Mean Platelet Volume 6.9, Neutrophils (%) (Auto) 61.7, Lymphocytes (%) (Auto) 

28.1, Monocytes (%) (Auto) 8.4, Eosinophils (%) (Auto) 0.9, Basophils (%) (Auto)

 0.9, Prothrombin Time 11.1, Prothromb Time International Ratio 1.0, Activated 

Partial Thromboplast Time 27, Sodium Level 139, Potassium Level 3.5, Chloride 

Level 105, Carbon Dioxide Level 24, Anion Gap 10, Blood Urea Nitrogen 16, 

Creatinine 0.7, Estimat Glomerular Filtration Rate > 60, Glucose Level 107H, 

Calcium Level 8.1L


12/3/20 04:20: POC Whole Blood Glucose 104


12/3/20 11:27: POC Whole Blood Glucose 125H


Height (Feet):  5


Height (Inches):  4.00


Weight (Pounds):  130


General Appearance:  no apparent distress


EENT:  other - On nasal cannula


Cardiovascular:  normal rate


Respiratory/Chest:  decreased breath sounds


Abdomen:  soft, distended











Seven Tobar MD             Dec 3, 2020 11:52

## 2020-12-03 NOTE — ENDOSCOPY PROCEDURE NOTE
Endoscopy Procedure Note


General


Indication for Procedure:  dysphgaia


Procedures Performed:  EGD, PEG


Operative Findings/Diagnosis:  same


Specimen:  none


Pt Tolerated Procedure Well:  Yes





Anesthesia


Anesthesiologist:  deb


Anesthesia:  MAC





Inserted Devices


Implant(s) used?:  No





GI Core Measures


50 yrs or older w/o bx or poly:  Not Applicable


10yrs. F/U recommended:  Not Applicable











Marcos Ojeda MD              Dec 3, 2020 12:31

## 2020-12-03 NOTE — NUR
NURSE HAND-OFF REPORT: 



Important Events on Shift: Scheduled for PEG tube placement

Patient Status: Stable

Diet: NPO



Pending Orders: N

Pending Results/Labs:

Pending MD notification:



Latest Vital Signs: Temperature 97.3 , Pulse 74 , B/P 153 /70 , Respiratory Rate 22 , O2 SAT 
100 , Nasal Cannula, O2 Flow Rate 2.0 .  

Vital Sign Comment: WNL



EKG Rhythm: Sinus Rhythm

Rhythm change?: N 

MD Notified?: 

MD Response: 



Latest Mejia Fall Score: 45  

Fall Risk: High Risk 

Safety Measures: Call light Within Reach, Bed Alarm Zone 1, Side Rails Side Rails x3, Bed 
position Low and Locked.

Fall Precautions: 

Yellow Socks

Yellow Gown

Door Sign

Patient Fall Education



Report given to LITA Silveira.

## 2020-12-03 NOTE — NUR
NURSE NOTES:

Report received from LITA Silveira with update.

Pt awake, responsive to tactile stimuli. 

5 lead EKG shows SR. Vitals WNL. Saturating 95% O2 on 2L N/C.

New PEG tube in place enclosed by abdominal binder.

Feed running Glucerna 1.2 at 20 mL with 0 residual noted.

Right IV running D5W. Will request from MD to DC.

Cummings draining well to gravity.

Bed kept in lowest and locked position. 

Side rails up x3. Bed alarm on. Will continue monitoring.

## 2020-12-03 NOTE — CARDIAC ELECTROPHYSIOLOGY PN
Assessment/Plan


Assessment/Plan


1. Accelerated junctional rhythm. Ruled out for MI


      Echo showed ejection fraction of 55%.





2. Long run of 31 beats of Nonsustained VT on 11/3/2020. 


     Cardiac cath after stabilization.





3. Henry 30, Asystole while on the Vent due to resp failure/ likely mucus plug .

S/P Code


 Off midodrine





4. Respiratory failure, on antibiotic  


    Extubated 11/29/20





5. Septic shock. Off Levophed   





6. History of previous COVID infection in September 2020 and  November 2020 


Now negative again and off isolation.





7. Dementia.





8. Dysphagia, Failed swallow eval


   S/P PEG tomorrow day by Dr Rusty PAEZ RN





Subjective


Subjective


  Had 31 beats of VT  on 11/3/20  and 5 beats 11/8/20 


 Coded on 11/12/20 as sat dropped to 20% and got henry 30s and PEA and pulseless

 


Transferred out of ICU after extubated 11/29/20


 Failed Swallow eval. S/P PEG by Dr Ojeda today





Objective





Last 24 Hour Vital Signs








  Date Time  Temp Pulse Resp B/P (MAP) Pulse Ox O2 Delivery O2 Flow Rate FiO2


 


12/3/20 13:15 97.9 91 18 143/81 95 Nasal Cannula 3 


 


12/3/20 13:05  100 15 149/78 95 Nasal Cannula 3 


 


12/3/20 12:55  88 16 142/76 97 Nasal Cannula 3 


 


12/3/20 12:45  94 16 163/72 98 Nasal Cannula 3 


 


12/3/20 12:40  91 18 140/96 98 Nasal Cannula 3 


 


12/3/20 12:35 97.1 55 14 142/60 98 Nasal Cannula 3 


 


12/3/20 12:00  62      


 


12/3/20 08:00 97.9 67 18 141/95 (110) 98   


 


12/3/20 08:00      Nasal Cannula 2.0 





      Nasal Cannula 2.0 


 


12/3/20 08:00  64      


 


12/3/20 07:40     98 Nasal Cannula 2.0 28


 


12/3/20 04:00      Nasal Cannula 2.0 





      Nasal Cannula 2.0 


 


12/3/20 04:00  74      


 


12/3/20 00:00  64      


 


12/3/20 00:00      Nasal Cannula 2.0 





      Nasal Cannula 2.0 


 


12/2/20 20:00 97.3 63 22 153/70 (97) 100   


 


12/2/20 20:00      Nasal Cannula 2.0 





      Nasal Cannula 2.0 


 


12/2/20 20:00  64      


 


12/2/20 18:59     100 Nasal Cannula 1.0 24


 


12/2/20 16:36 98.2 60 18 145/77 (99) 100   


 


12/2/20 16:00  60      


 


12/2/20 15:52      Nasal Cannula 2.0 





      Nasal Cannula 2.0 

















Intake and Output  


 


 12/2/20 12/3/20





 19:00 07:00


 


Intake Total  259.17 ml


 


Output Total 350 ml 400 ml


 


Balance -350 ml -140.83 ml


 


  


 


IV Total  259.17 ml


 


Output Urine Total 350 ml 400 ml











Laboratory Tests








Test


 12/2/20


17:22 12/3/20


00:24 12/3/20


03:30 12/3/20


04:20


 


POC Whole Blood Glucose


 123 MG/DL


()  H Pending  


 


 104 MG/DL


()


 


White Blood Count


 


 


 7.6 K/UL


(4.8-10.8) 





 


Red Blood Count


 


 


 4.05 M/UL


(4.70-6.10)  L 





 


Hemoglobin


 


 


 12.2 G/DL


(14.2-18.0)  L 





 


Hematocrit


 


 


 36.8 %


(42.0-52.0)  L 





 


Mean Corpuscular Volume   91 FL (80-99)   


 


Mean Corpuscular Hemoglobin


 


 


 30.1 PG


(27.0-31.0) 





 


Mean Corpuscular Hemoglobin


Concent 


 


 33.1 G/DL


(32.0-36.0) 





 


Red Cell Distribution Width


 


 


 17.4 %


(11.6-14.8)  H 





 


Platelet Count


 


 


 499 K/UL


(150-450)  H 





 


Mean Platelet Volume


 


 


 6.9 FL


(6.5-10.1) 





 


Neutrophils (%) (Auto)


 


 


 61.7 %


(45.0-75.0) 





 


Lymphocytes (%) (Auto)


 


 


 28.1 %


(20.0-45.0) 





 


Monocytes (%) (Auto)


 


 


 8.4 %


(1.0-10.0) 





 


Eosinophils (%) (Auto)


 


 


 0.9 %


(0.0-3.0) 





 


Basophils (%) (Auto)


 


 


 0.9 %


(0.0-2.0) 





 


Prothrombin Time


 


 


 11.1 SEC


(9.30-11.50) 





 


Prothromb Time International


Ratio 


 


 1.0 (0.9-1.1)  


 





 


Activated Partial


Thromboplast Time 


 


 27 SEC (23-33)


 





 


Sodium Level


 


 


 139 MMOL/L


(136-145) 





 


Potassium Level


 


 


 3.5 MMOL/L


(3.5-5.1) 





 


Chloride Level


 


 


 105 MMOL/L


() 





 


Carbon Dioxide Level


 


 


 24 MMOL/L


(21-32) 





 


Anion Gap


 


 


 10 mmol/L


(5-15) 





 


Blood Urea Nitrogen


 


 


 16 mg/dL


(7-18) 





 


Creatinine


 


 


 0.7 MG/DL


(0.55-1.30) 





 


Estimat Glomerular Filtration


Rate 


 


 > 60 mL/min


(>60) 





 


Glucose Level


 


 


 107 MG/DL


()  H 





 


Calcium Level


 


 


 8.1 MG/DL


(8.5-10.1)  L 





 


Test


 12/3/20


11:27 


 


 





 


POC Whole Blood Glucose


 125 MG/DL


()  H 


 


 











Objective


HEAD AND NECK:  No JVD.  


LUNGS:  Coarse rhonchi.


CARDIOVASCULAR:   Irregular S1 and S2 with no gallop.


ABDOMEN:  Soft.PEG in place


EXTREMITIES:  No pitting edema.











Kvng Edmond MD                Dec 3, 2020 15:32

## 2020-12-03 NOTE — PULMONOLOGY PROGRESS NOTE
Subjective


ROS Limited/Unobtainable:  Yes


Hematologic:  Reports: no symptoms


Musculoskeletal:  Reports: no symptoms


Allergies:  


Coded Allergies:  


     No Known Allergies (Unverified , 8/20/12)





Objective





Last 24 Hour Vital Signs








  Date Time  Temp Pulse Resp B/P (MAP) Pulse Ox O2 Delivery O2 Flow Rate FiO2


 


12/3/20 13:15 97.9 91 18 143/81 95 Nasal Cannula 3 


 


12/3/20 13:05  100 15 149/78 95 Nasal Cannula 3 


 


12/3/20 12:55  88 16 142/76 97 Nasal Cannula 3 


 


12/3/20 12:45  94 16 163/72 98 Nasal Cannula 3 


 


12/3/20 12:40  91 18 140/96 98 Nasal Cannula 3 


 


12/3/20 12:35 97.1 55 14 142/60 98 Nasal Cannula 3 


 


12/3/20 12:00  62      


 


12/3/20 08:00 97.9 67 18 141/95 (110) 98   


 


12/3/20 08:00      Nasal Cannula 2.0 





      Nasal Cannula 2.0 


 


12/3/20 08:00  64      


 


12/3/20 07:40     98 Nasal Cannula 2.0 28


 


12/3/20 04:00      Nasal Cannula 2.0 





      Nasal Cannula 2.0 


 


12/3/20 04:00  74      


 


12/3/20 00:00  64      


 


12/3/20 00:00      Nasal Cannula 2.0 





      Nasal Cannula 2.0 


 


12/2/20 20:00 97.3 63 22 153/70 (97) 100   


 


12/2/20 20:00      Nasal Cannula 2.0 





      Nasal Cannula 2.0 


 


12/2/20 20:00  64      


 


12/2/20 18:59     100 Nasal Cannula 1.0 24


 


12/2/20 16:36 98.2 60 18 145/77 (99) 100   


 


12/2/20 16:00  60      


 


12/2/20 15:52      Nasal Cannula 2.0 





      Nasal Cannula 2.0 

















Intake and Output  


 


 12/2/20 12/3/20





 19:00 07:00


 


Intake Total  259.17 ml


 


Output Total 350 ml 400 ml


 


Balance -350 ml -140.83 ml


 


  


 


IV Total  259.17 ml


 


Output Urine Total 350 ml 400 ml








General Appearance:  WD/WN, no acute distress


HEENT:  normocephalic, atraumatic


Respiratory:  chest wall non-tender, respiratory distress, rhonchi - left, 

rhonchi - right


Cardiovascular:  normal rate


Abdomen:  normal bowel sounds, no organomegaly


Genitourinary:  normal external genitalia


Skin:  no rash


Laboratory Tests


12/2/20 17:22: POC Whole Blood Glucose 123H


12/3/20 00:24: POC Whole Blood Glucose [Pending]


12/3/20 03:30: 


White Blood Count 7.6, Red Blood Count 4.05L, Hemoglobin 12.2L, Hematocrit 36.8L

, Mean Corpuscular Volume 91, Mean Corpuscular Hemoglobin 30.1, Mean Corpuscular

 Hemoglobin Concent 33.1, Red Cell Distribution Width 17.4H, Platelet Count 499H

, Mean Platelet Volume 6.9, Neutrophils (%) (Auto) 61.7, Lymphocytes (%) (Auto) 

28.1, Monocytes (%) (Auto) 8.4, Eosinophils (%) (Auto) 0.9, Basophils (%) (Auto)

 0.9, Prothrombin Time 11.1, Prothromb Time International Ratio 1.0, Activated 

Partial Thromboplast Time 27, Sodium Level 139, Potassium Level 3.5, Chloride 

Level 105, Carbon Dioxide Level 24, Anion Gap 10, Blood Urea Nitrogen 16, 

Creatinine 0.7, Estimat Glomerular Filtration Rate > 60, Glucose Level 107H, 

Calcium Level 8.1L


12/3/20 04:20: POC Whole Blood Glucose 104


12/3/20 11:27: POC Whole Blood Glucose 125H





Current Medications








 Medications


  (Trade)  Dose


 Ordered  Sig/Miky


 Route


 PRN Reason  Start Time


 Stop Time Status Last Admin


Dose Admin


 


 Acetaminophen


  (Tylenol)  650 mg  Q4H  PRN


 GT


 Temp >100.5  11/21/20 12:30


 12/21/20 12:29  11/23/20 10:14





 


 Dextrose


  (Dextrose 50%)  50 ml  Q30M  PRN


 IV


 Hypoglycemia  10/23/20 22:45


 1/21/21 22:44   





 


 Dextrose/Sodium


 Chloride  1,000 ml @ 


 50 mls/hr  Q20H


 IV


   11/29/20 16:15


 12/29/20 16:14  12/3/20 00:49





 


 Heparin Sodium


  (Porcine)


  (Heparin 5000


 units/ml)  5,000 units  EVERY 12  HOURS


 SUBQ


   10/23/20 21:00


 12/7/20 20:59  12/2/20 08:49





 


 Insulin Aspart


  (NovoLOG)    EVERY 6  HOURS


 SUBQ


   11/2/20 06:00


 1/22/21 06:29  12/1/20 00:30





 


 Nitroglycerin


  (Ntg)  0.4 mg  Q5M  PRN


 SL


 Prn Chest Pain  11/21/20 10:15


 12/21/20 10:09   





 


 Ondansetron HCl


  (Zofran)  4 mg  Q6H  PRN


 IVP


 Nausea & Vomiting  11/21/20 14:30


 12/21/20 14:29   





 


 Pantoprazole


  (Protonix)  40 mg  DAILY


 IV


   11/7/20 09:00


 12/7/20 08:59  12/3/20 09:03





 


 Polyethylene


 Glycol


  (Miralax)  17 gm  DAILYPRN  PRN


 GT


 Constipation  11/21/20 10:15


 12/21/20 10:14   





 


 Promethazine HCl/


 Codeine


  (Phenergan with


 Codeine)  5 ml  Q4H  PRN


 GT


 For Cough  11/21/20 10:14


 12/21/20 10:13  11/25/20 22:05














Assessment/Plan


Problems:  


(1) Acute encephalopathy


(2) 2019 novel coronavirus disease (COVID-19)


(3) Severe sepsis


(4) HTN (hypertension)


(5) Aphasia


(6) Alzheimer's dementia


(7) History of CVA (cerebrovascular accident)


(8) BPH (benign prostatic hyperplasia)


Assessment/Plan


got Gtube today





titrate fio2 to sat of 92%


frequent suctioning





check electrolytes


aspiration precaution


dvt prophylaxis. 





med/surg











Michael Sandoval MD               Dec 3, 2020 13:42

## 2020-12-03 NOTE — NUR
NURSE NOTES:

Received patient in bed awake and responsive but non verbal. O2 via NC at 2LPM in place, no 
SOB or acute distress. Productive cough noted. FC intact and anchor secured, draining yellow 
colored urine. IV lines intact and patent. HOB elevated. Bed locked in lowest position. Call 
light within reach. Will continue plan of care.

## 2020-12-03 NOTE — PRE-PROCEDURE NOTE/ATTESTATION
Pre-Procedure Note/Attestation


Complete Prior to Procedure


Planned Procedure:  not applicable


Procedure Narrative:


peg





Indications for Procedure


Pre-Operative Diagnosis:


dysphagia





Attestation


I attest that I discussed the nature of the procedure; its benefits; risks and 

complications; and alternatives (and the risks and benefits of such 

alternatives), prior to the procedure, with the patient (or the patient's legal 

representative).





I attest that, if there was a reasonable possibility of needing a blood 

transfusion, the patient (or the patient's legal representative) was given the 

[facility] standardized written summary, pursuant 

to the Abdiel Rose Bud Blood Safety Act ([place] Health and Safety Code # 1645, as 

amended).





I attest that I re-evaluated the patient just prior to the surgery and that 

there has been no change in the patient's H&P, except as documented below:











Marcos Ojeda MD              Dec 3, 2020 12:16

## 2020-12-03 NOTE — NUR
NURSE NOTES:WOUND CARE NOTES:Pt presented with Resolved sacral Pressure Injury. 
Hyperpigmentation noted with surrounding dry brown borders that without erythema or 
induration.Bilat heels are boggy with non-blanchable erythema. No other skin concerns noted.



Tx.Plan:Apply Moisture Barrier Paste to Sacrum . Cover with Optifoam drsg. Change every 3 
days and prn.

           Apply Cavilon Skin Barrier to R and L Heels. Cover each heel with Optifoam drsg. 
Change every 7 ays and prn.

           Reposition at Least every 2hours or as tolerated.

           Off-load heels with Pillow.

## 2020-12-03 NOTE — NUR
NURSE NOTES:

Tube feeding started with Glucerna 1.2 at 20cc/hr, to goal 60cc/hr. Flushing done. Kept head 
of bed elevated.

## 2020-12-03 NOTE — NUR
SPEECH PATHOLOGY/DISCHARGE SUMMARY 



S:  PATIENT UNDERWENT PEG PLACEMENT TODAY



O:  DYSPHAGIA TX/FOLLOW UP/DISCHARGE



A:  PATIENT CONTINUES HIGH RISK FOR ASPIRATION (SEE VIDEO

    SWALLOW/[facility].)  DURING HIS LENGTH OF STAY, MULTIPLE ATTEMPTS WERE

    MADE TO EVALUATE PATIENTS SAFETY RELATIVE TO P.O. INTAKE.  

    WITH ALL TRIALS HE CONSISTENTLY DEMONSTRATED HIGH RISK FOR 

    ASPIRATION (CONFIRMED WITH VIDEO SWALLOW STUDY FINDINGS RE:

    DEEP LARYNGEAL PENETRATION/RISK FOR CHRONIC MICROASPIRATION

    AND RECURRING PNEUMONIA

    CONSISTENT WITH DEMENTIA, HE REFUSED 

    SUBSEQUENT ATTEMPTS TO RE/EVALUATE TOLERANCE FOR P.O.

    TREATMENT GOALS NOT MET FOR SAFE P,O. INTAKE TO SUPPORT

    NUTRITION/HYDRATION NEEDS. 

    P.O. FOR ORAL GRATIFICATION IS CONTRAINDICATED. 



P:  NPO

    NO FURTHER SKILLED ST SERVICES APPEAR TO BE NEEDED AT THIS TIME.

    NURSING STAFF TO PROVIDE ORAL CARE TID TO REDUCE ORAL PATHOGENS AND    

    PREVENT POTENTIAL DEVELOPMENT OF NOSOCOMIAL PNEUMONIA



DISCUSSED ST DISCHARGE WITH LITA LARKIN

## 2020-12-03 NOTE — INFECTIOUS DISEASES PROG NOTE
Assessment/Plan





76yo M with:





Respiratory code 2ry to mucus plug 11/12


Septic Shock- -recurrent


Fever, recurrent, low grade;SP


Leukocytosis; recurrent; increased


Acute hypoxic resp failure, on NRB mask> 4l NC; back on NRB, desaturation 10/29 

> intubated 11/6


Pneumonia- >HAP


Hx of COVID-19 PNA 9/9/20 11/17 CXR: No significant interval change in the radiographic appearance the 

chest compared to one day prior.


   11/14 Resp cx +MRSA, nl resp hayden


   UCx neg


   BCx NTD


   11/13 COVID PCR neg


         --11/10 CXR: Bilateral infiltrates in a peribronchovascular 

distribution are unchanged. Left pleural effusion is unchanged.


         --11/8 CXR: Persistent bilateral patchy pulmonary opacities, most 

prominent  in the left lower lung.


         --11/7 ucx neg


                  sp cx MRSA (colonizer at this point)


                  Bcx Neg


         --11/2 Bcx Neg


          10/30 Sp cx MRSA (Vancomycin ADRIANA 1), ESBL E.coli


   10/23 BCx NTD


   UA 15-20 WBC, UCx Neg


   10/23 COVID rapid neg; 10/26 rapid COVID PCR + (from prior infection)- not 

new infection


   Flu A/B neg


   CXR: L pna


   Resp cx MRSA


   11/19 Resp cx +ESBL E.coli & PsA


   11/21 BCx NTD


   11/22 CXR: Small bilateral pleural effusions with moderate vascular c

ongestion. Airspace consolidation in the left lower lung field may represent 

atelectasis however, correlate for infiltrate.  This is improving when compared 

to the prior study.


   11/23 BCx NTD


   11/23 CXR: Increasing right basilar opacity, likely infiltrate, may also 

reflect increasing pleural fluid





H/o COVID pna


   Tested positive 9/9/20


   10/23 Rapid Ag neg


   10/26 Rapid Ag positive (from prior disease?)


   11/13 COVID PCR neg





JANES on CKD, improving





SNF resident (gracielajefferson gustafson)


Non-verbal


VRE and MRSA colonized








Plan:


Cont to monitor off abx





   -11/30 SP josh #10


   -11/20 SP linezolid #7


   -11/16 SP meropenem #14 for ESBL pna 


   -11/10 SP Micafungin #4


   -11/6 SP IV Vancomycin #15


   -11/2 SP Zosyn #4


   -10/26 SP Cefepime #4


   -10/24 SP Flagyl #





Monitor CBC/CMP


Monitor resp status


Monitor temp curve and hemodynamics





D/w RN





Thank you for this consult. [facility] will continue to follow.





Subjective


Allergies:  


Coded Allergies:  


     No Known Allergies (Unverified , 8/20/12)





AF


WBC 7.6


2L NC


NAD





Objective





Last 24 Hour Vital Signs








  Date Time  Temp Pulse Resp B/P (MAP) Pulse Ox O2 Delivery O2 Flow Rate FiO2


 


12/3/20 04:00      Nasal Cannula 2.0 





      Nasal Cannula 2.0 


 


12/3/20 04:00  74      


 


12/3/20 00:00  64      


 


12/3/20 00:00      Nasal Cannula 2.0 





      Nasal Cannula 2.0 


 


12/2/20 20:00 97.3 63 22 153/70 (97) 100   


 


12/2/20 20:00      Nasal Cannula 2.0 





      Nasal Cannula 2.0 


 


12/2/20 20:00  64      


 


12/2/20 18:59     100 Nasal Cannula 1.0 24


 


12/2/20 16:36 98.2 60 18 145/77 (99) 100   


 


12/2/20 16:00  60      


 


12/2/20 15:52      Nasal Cannula 2.0 





      Nasal Cannula 2.0 


 


12/2/20 12:00  57      


 


12/2/20 11:54 97.5 78 19 157/87 (110) 100   


 


12/2/20 11:53      Nasal Cannula 2.0 





      Nasal Cannula 2.0 








Height (Feet):  5


Height (Inches):  4.00


Weight (Pounds):  130


Gen: NAD in bed


HEENT: NCAT


CV: RRR


Pulm: CTAB


Abd: Soft, NTND


Ext: No c/c/e


Neuro: Awake, not interactive


Lines: RUE PICC





Laboratory Tests








Test


 12/2/20


11:50 12/2/20


17:22 12/3/20


00:24 12/3/20


03:30


 


POC Whole Blood Glucose


 107 MG/DL


()  H 123 MG/DL


()  H Pending  


 





 


White Blood Count


 


 


 


 7.6 K/UL


(4.8-10.8)


 


Red Blood Count


 


 


 


 4.05 M/UL


(4.70-6.10)  L


 


Hemoglobin


 


 


 


 12.2 G/DL


(14.2-18.0)  L


 


Hematocrit


 


 


 


 36.8 %


(42.0-52.0)  L


 


Mean Corpuscular Volume    91 FL (80-99)  


 


Mean Corpuscular Hemoglobin


 


 


 


 30.1 PG


(27.0-31.0)


 


Mean Corpuscular Hemoglobin


Concent 


 


 


 33.1 G/DL


(32.0-36.0)


 


Red Cell Distribution Width


 


 


 


 17.4 %


(11.6-14.8)  H


 


Platelet Count


 


 


 


 499 K/UL


(150-450)  H


 


Mean Platelet Volume


 


 


 


 6.9 FL


(6.5-10.1)


 


Neutrophils (%) (Auto)


 


 


 


 61.7 %


(45.0-75.0)


 


Lymphocytes (%) (Auto)


 


 


 


 28.1 %


(20.0-45.0)


 


Monocytes (%) (Auto)


 


 


 


 8.4 %


(1.0-10.0)


 


Eosinophils (%) (Auto)


 


 


 


 0.9 %


(0.0-3.0)


 


Basophils (%) (Auto)


 


 


 


 0.9 %


(0.0-2.0)


 


Prothrombin Time


 


 


 


 11.1 SEC


(9.30-11.50)


 


Prothromb Time International


Ratio 


 


 


 1.0 (0.9-1.1)  





 


Activated Partial


Thromboplast Time 


 


 


 27 SEC (23-33)





 


Sodium Level


 


 


 


 139 MMOL/L


(136-145)


 


Potassium Level


 


 


 


 3.5 MMOL/L


(3.5-5.1)


 


Chloride Level


 


 


 


 105 MMOL/L


()


 


Carbon Dioxide Level


 


 


 


 24 MMOL/L


(21-32)


 


Anion Gap


 


 


 


 10 mmol/L


(5-15)


 


Blood Urea Nitrogen


 


 


 


 16 mg/dL


(7-18)


 


Creatinine


 


 


 


 0.7 MG/DL


(0.55-1.30)


 


Estimat Glomerular Filtration


Rate 


 


 


 > 60 mL/min


(>60)


 


Glucose Level


 


 


 


 107 MG/DL


()  H


 


Calcium Level


 


 


 


 8.1 MG/DL


(8.5-10.1)  L


 


Test


 12/3/20


04:20 


 


 





 


POC Whole Blood Glucose


 104 MG/DL


() 


 


 














Current Medications








 Medications


  (Trade)  Dose


 Ordered  Sig/Miky


 Route


 PRN Reason  Start Time


 Stop Time Status Last Admin


Dose Admin


 


 Acetaminophen


  (Tylenol)  650 mg  Q4H  PRN


 GT


 Temp >100.5  11/21/20 12:30


 12/21/20 12:29  11/23/20 10:14





 


 Dextrose


  (Dextrose 50%)  50 ml  Q30M  PRN


 IV


 Hypoglycemia  10/23/20 22:45


 1/21/21 22:44   





 


 Dextrose/Sodium


 Chloride  1,000 ml @ 


 50 mls/hr  Q20H


 IV


   11/29/20 16:15


 12/29/20 16:14  12/3/20 00:49





 


 Heparin Sodium


  (Porcine)


  (Heparin 5000


 units/ml)  5,000 units  EVERY 12  HOURS


 SUBQ


   10/23/20 21:00


 12/7/20 20:59  12/2/20 08:49





 


 Insulin Aspart


  (NovoLOG)    EVERY 6  HOURS


 SUBQ


   11/2/20 06:00


 1/22/21 06:29  12/1/20 00:30





 


 Nitroglycerin


  (Ntg)  0.4 mg  Q5M  PRN


 SL


 Prn Chest Pain  11/21/20 10:15


 12/21/20 10:09   





 


 Ondansetron HCl


  (Zofran)  4 mg  Q6H  PRN


 IVP


 Nausea & Vomiting  11/21/20 14:30


 12/21/20 14:29   





 


 Pantoprazole


  (Protonix)  40 mg  DAILY


 IV


   11/7/20 09:00


 12/7/20 08:59  12/2/20 08:50





 


 Polyethylene


 Glycol


  (Miralax)  17 gm  DAILYPRN  PRN


 GT


 Constipation  11/21/20 10:15


 12/21/20 10:14   





 


 Promethazine HCl/


 Codeine


  (Phenergan with


 Codeine)  5 ml  Q4H  PRN


 GT


 For Cough  11/21/20 10:14


 12/21/20 10:13  11/25/20 22:05




















Bianca Casillas M.D.                Dec 3, 2020 08:03

## 2020-12-03 NOTE — PROCEDURE NOTE
DATE OF PROCEDURE:  12/03/2020

SURGEON:  Marcos Ojeda MD.



PROCEDURE:  Upper endoscopy with PEG placement.



ANESTHESIA:  Per CRNA, Lore Tarrilldileep.



INSTRUMENT:  Olympus adult flexible upper endoscope.



INDICATION:  Dysphagia.



REASON FOR PROCEDURE:  The procedure, risks, benefits, and possible

consequences, including hemorrhage, aspiration, perforation and infection,

and alternative treatments, were explained to the patient/legal guardian

by Dr. Marcos Ojeda and the patient/legal guardian understood and

accepted these risks.



PROCEDURE IN DETAIL:  After informed consent was obtained and the patient

was adequately sedated, Olympus upper endoscope was advanced from mouth

into the second portion of the duodenum and retroflexion was performed in

the stomach.



Then, under endoscopic guidance and under sterile condition, a 20-[place]banian

pull type of G-tube was successfully placed in epigastric area.  The

distance from the tip of the tube to skin was about 2.5 cm in size.  The

patient tolerated the procedure very well without any complication.



SUMMARY OF FINDING:  Status post successful PEG placement.



RECOMMENDATIONS:

1. Abdominal binder.

2. Elevate the head of the bed.

3. G-tube flush.

4. G-tube care.

5. Start tube feeding later today.









  ______________________________________________

  Marcos Ojeda M.D.





DR:  ESE

D:  12/03/2020 12:32

T:  12/03/2020 14:42

JOB#:  [id number]

CC:

## 2020-12-04 VITALS — SYSTOLIC BLOOD PRESSURE: 153 MMHG | DIASTOLIC BLOOD PRESSURE: 107 MMHG

## 2020-12-04 VITALS — SYSTOLIC BLOOD PRESSURE: 113 MMHG | DIASTOLIC BLOOD PRESSURE: 71 MMHG

## 2020-12-04 VITALS — SYSTOLIC BLOOD PRESSURE: 147 MMHG | DIASTOLIC BLOOD PRESSURE: 88 MMHG

## 2020-12-04 VITALS — SYSTOLIC BLOOD PRESSURE: 143 MMHG | DIASTOLIC BLOOD PRESSURE: 91 MMHG

## 2020-12-04 VITALS — SYSTOLIC BLOOD PRESSURE: 148 MMHG | DIASTOLIC BLOOD PRESSURE: 81 MMHG

## 2020-12-04 VITALS — SYSTOLIC BLOOD PRESSURE: 113 MMHG | DIASTOLIC BLOOD PRESSURE: 59 MMHG

## 2020-12-04 RX ADMIN — HEPARIN SODIUM SCH UNITS: 5000 INJECTION INTRAVENOUS; SUBCUTANEOUS at 09:08

## 2020-12-04 RX ADMIN — PANTOPRAZOLE SODIUM SCH MG: 40 INJECTION, POWDER, FOR SOLUTION INTRAVENOUS at 09:05

## 2020-12-04 RX ADMIN — INSULIN ASPART SCH UNITS: 100 INJECTION, SOLUTION INTRAVENOUS; SUBCUTANEOUS at 05:01

## 2020-12-04 RX ADMIN — HEPARIN SODIUM SCH UNITS: 5000 INJECTION INTRAVENOUS; SUBCUTANEOUS at 22:06

## 2020-12-04 RX ADMIN — INSULIN ASPART SCH UNITS: 100 INJECTION, SOLUTION INTRAVENOUS; SUBCUTANEOUS at 18:00

## 2020-12-04 RX ADMIN — INSULIN ASPART SCH UNITS: 100 INJECTION, SOLUTION INTRAVENOUS; SUBCUTANEOUS at 12:00

## 2020-12-04 RX ADMIN — INSULIN ASPART SCH UNITS: 100 INJECTION, SOLUTION INTRAVENOUS; SUBCUTANEOUS at 23:32

## 2020-12-04 RX ADMIN — ACETAMINOPHEN PRN MG: 160 SOLUTION ORAL at 22:01

## 2020-12-04 NOTE — DIAGNOSTIC IMAGING REPORT
. Indication: Cough

 

Technique: One view of the chest

 

Comparison: 11/28/2020

 

Findings: Suboptimal inspiration currently. Endotracheal and orogastric tubes are no

longer present. Bilateral right perihilar and lung base, left infrahilar and basilar

infiltrates are again demonstrated, stable or slightly worse. Heart size normal.

Right shoulder prosthesis is again demonstrated.

 

Impression: Stable or slightly worse infiltrates bilaterally

 

Interim extubation

## 2020-12-04 NOTE — NUR
NURSE HAND-OFF REPORT: 



Important Events on Shift: no changes. new PEG tube.

Patient Status: stable

Diet: Glucerna 1.2



Pending Orders: 

Pending Results/Labs:

Pending MD notification:



Latest Vital Signs: Temperature 98.2 , Pulse 94 , B/P 143 /91 , Respiratory Rate 22 , O2 SAT 
97 , Nasal Cannula, O2 Flow Rate 2.0 .  

Vital Sign Comment: WNL



EKG Rhythm: Sinus Rhythm

Rhythm change?: N 

MD Notified?:

MD Response: 



Latest Mejia Fall Score: 45  

Fall Risk: High Risk 

Safety Measures: Call light Within Reach, Bed Alarm Zone 1, Side Rails Side Rails x3, Bed 
position Low and Locked.

Fall Precautions: 

Yellow Socks

Yellow Gown

Door Sign

Patient Fall Education



Report given to Luciano NICHOLSON RN.

## 2020-12-04 NOTE — PULMONOLOGY PROGRESS NOTE
Subjective


ROS Limited/Unobtainable:  No


Constitutional:  Reports: no symptoms


Hematologic:  Reports: no symptoms


Musculoskeletal:  Reports: no symptoms


Allergies:  


Coded Allergies:  


     No Known Allergies (Unverified , 8/20/12)





Objective





Last 24 Hour Vital Signs








  Date Time  Temp Pulse Resp B/P (MAP) Pulse Ox O2 Delivery O2 Flow Rate FiO2


 


12/4/20 08:00  90      


 


12/4/20 08:00      Nasal Cannula 2.0 





      Nasal Cannula 2.0 


 


12/4/20 04:00  91      


 


12/4/20 04:00 98.2 94 22 143/91 (108) 97   


 


12/4/20 04:00      Nasal Cannula 2.0 





      Nasal Cannula 2.0 


 


12/4/20 00:00      Nasal Cannula 2.0 





      Nasal Cannula 2.0 


 


12/4/20 00:00 98.1 99 22 148/81 (103) 99   


 


12/3/20 23:52  80      


 


12/3/20 20:00 98.7 91 22 154/77 (102) 100   


 


12/3/20 20:00      Nasal Cannula 2.0 





      Nasal Cannula 2.0 


 


12/3/20 19:33     98 Nasal Cannula 3.0 28


 


12/3/20 19:22  69      


 


12/3/20 16:00 97.3 64 18 145/89 (107) 99   


 


12/3/20 16:00      Nasal Cannula 2.0 





      Nasal Cannula 2.0 


 


12/3/20 16:00  75      


 


12/3/20 13:15 97.9 91 18 143/81 95 Nasal Cannula 3 


 


12/3/20 13:05  100 15 149/78 95 Nasal Cannula 3 


 


12/3/20 12:55  88 16 142/76 97 Nasal Cannula 3 


 


12/3/20 12:45  94 16 163/72 98 Nasal Cannula 3 


 


12/3/20 12:40  91 18 140/96 98 Nasal Cannula 3 


 


12/3/20 12:35 97.1 55 14 142/60 98 Nasal Cannula 3 


 


12/3/20 12:00  62      

















Intake and Output  


 


 12/3/20 12/4/20





 19:00 07:00


 


Intake Total 880 ml 840 ml


 


Output Total 400 ml 600 ml


 


Balance 480 ml 240 ml


 


  


 


Free Water 40 ml 340 ml


 


IV Total 800 ml 


 


Tube Feeding 40 ml 500 ml


 


Output Urine Total 400 ml 600 ml








General Appearance:  WD/WN, no acute distress


HEENT:  normocephalic, atraumatic


Respiratory:  chest wall non-tender, respiratory distress, rhonchi - left, 

rhonchi - right


Cardiovascular:  normal rate


Abdomen:  normal bowel sounds, no organomegaly


Genitourinary:  normal external genitalia


Skin:  no rash


Neurologic:  aphasia


Laboratory Tests


12/3/20 11:27: POC Whole Blood Glucose 125H


12/3/20 17:55: POC Whole Blood Glucose 113H


12/3/20 23:13: POC Whole Blood Glucose 120H


12/4/20 04:55: POC Whole Blood Glucose 121H





Current Medications








 Medications


  (Trade)  Dose


 Ordered  Sig/Miky


 Route


 PRN Reason  Start Time


 Stop Time Status Last Admin


Dose Admin


 


 Acetaminophen


  (Tylenol)  650 mg  Q4H  PRN


 GT


 Temp >100.5  11/21/20 12:30


 12/21/20 12:29  11/23/20 10:14





 


 Dextrose


  (Dextrose 50%)  50 ml  Q30M  PRN


 IV


 Hypoglycemia  10/23/20 22:45


 1/21/21 22:44   





 


 Heparin Sodium


  (Porcine)


  (Heparin 5000


 units/ml)  5,000 units  EVERY 12  HOURS


 SUBQ


   10/23/20 21:00


 12/7/20 20:59  12/4/20 09:08





 


 Insulin Aspart


  (NovoLOG)    EVERY 6  HOURS


 SUBQ


   11/2/20 06:00


 1/22/21 06:29  12/1/20 00:30





 


 Nitroglycerin


  (Ntg)  0.4 mg  Q5M  PRN


 SL


 Prn Chest Pain  11/21/20 10:15


 12/21/20 10:09   





 


 Ondansetron HCl


  (Zofran)  4 mg  Q6H  PRN


 IVP


 Nausea & Vomiting  11/21/20 14:30


 12/21/20 14:29   





 


 Pantoprazole


  (Protonix)  40 mg  DAILY


 IV


   11/7/20 09:00


 12/7/20 08:59  12/4/20 09:05





 


 Polyethylene


 Glycol


  (Miralax)  17 gm  DAILYPRN  PRN


 GT


 Constipation  11/21/20 10:15


 12/21/20 10:14  12/3/20 23:14





 


 Promethazine HCl/


 Codeine


  (Phenergan with


 Codeine)  5 ml  Q4H  PRN


 GT


 For Cough  11/21/20 10:14


 12/21/20 10:13  11/25/20 22:05














Assessment/Plan


Problems:  


(1) Acute encephalopathy


(2) 2019 novel coronavirus disease (COVID-19)


(3) Severe sepsis


(4) HTN (hypertension)


(5) Aphasia


(6) Alzheimer's dementia


(7) History of CVA (cerebrovascular accident)


(8) BPH (benign prostatic hyperplasia)


Assessment/Plan


got Gtube done, tolerating well





titrate fio2 to sat of 92%


frequent suctioning





check electrolytes


aspiration precaution


dvt prophylaxis. 





dc to nursing home











Michael Sandoval MD               Dec 4, 2020 10:55

## 2020-12-04 NOTE — NEPHROLOGY PROGRESS NOTE
Assessment/Plan


Problem List:  


(1) Dehydration


(2) JANES (acute kidney injury)


(3) Renal failure (ARF), acute on chronic


(4) Hypoxia


(5) Acute encephalopathy


(6) Electrolyte imbalance


Assessment





75-year-old male is admitted with acute hypoxic respiratory failure most likely 

secondary to pneumonia and sepsis, UTI.


Acute on chronic renal failure


Dehydration


Electrolyte imbalances, hypernatremia


Hypoalbuminemia


Diabetes type 2


History of congestive heart failure


Hypertension


Hyperlipemia


Alzheimer's


Previous COVID-19 infection in September 2020


Plan


December 4: Status quo.  Remains stable from renal standpoint of view.  Continue

on nasal cannula postextubation.  Will check lab tomorrow.


December 3: Remains on nasal cannula.  Labs reviewed.  Stable from renal 

standpoint of view.


December 2: Continue to do well post extubation.  Labs reviewed.  Renal 

parameters stable.  Continue per consultants.


December 1: Remain extubated on nasal cannula.  Labs reviewed.  Renal parameters

stable.


November 30: Discussed with RN.  Patient now extubated.  On nasal cannula.  

Renal parameters stable.  Continue per consultants.


November 29: Discussed with LITA Cooper.  Weaning in process.  It seems like 

patient is tolerating better.  Labs reviewed.  Continue per consultants.


November 28: Remains in ICU.  Status unchanged.  Labs and medication list 

reviewed.  Remains full code.  Weaning continues to fail.


November 27: Remains in ICU.  Remains intubated.  Remains full code.  Continues 

to be on weaning trials.  Renal parameters are stable.


November 26: Patient remains in ICU.  Full code.  Intubated.  Fails weaning.  

Labs reviewed.  Stable from renal standpoint of view.  Discussed with RN.


November 25: Labs reviewed.  Discussed with RN.  Abnormal electrolyte addressed.

 Remains intubated on ventilator.  Continue per consultants.


November 24: Labs reviewed.  Discussed with RN.  Stable from renal standpoint of

view.  Main issue is weaning from ventilator that keeps failing.  Continue per 

consultants.


November 23: Labs reviewed.  Discussed with RN.  Stable from renal standpoint of

view.  Continue per consultants.


November 22: Patient seen and discussed with RN.  Labs reviewed.  Remains 

intubated.  Trial of weaning this being attempted.  Continue per consultants.


November 21: Remains intubated.  Labs reviewed.  Renal parameters stable.  

Continue per consultants.


November 20: Remains on mechanical ventilation.  Labs reviewed.  Abnormal 

electrolytes addressed.  Stable from renal standpoint of view.


November 19: Remains intubated.  Remains full code.  Labs reviewed.  Low 

phosphorus replaced.  Continue to monitor renal parameters.  Continue per 

consultants.


November 18: Failed weaning.  Remains intubated.  Remains full closed.  Labs 

reviewed.  Stable from renal standpoint view.


November 17: Remains in ICU.  Remains full code.  Labs are reviewed.  Phosphorus

supplement given.  Continue per consultants.


November 16: Remains in ICU.  Full code.  Labs reviewed.  Remains intubated.  

Stable renal parameters.


November 15: In ICU.  Full code.  Discussed with RN.  Stable renal parameters.


November 14: Seen in ICU.  Discussed with LITA Cooper.  Flomax discontinued.  

Labs reviewed.  Stable from renal standpoint of view.


November 13: Seen in ICU.  Discussed with LITA Cooper.  Remains on pressor.  

Electrolyte abnormalities noted and addressed.  Continue per consultants.  

Patient remains full code.


November 12: Seen in ICU.  Discussed with LITA Richardson.  Blood pressure remains a l

ittle requiring pressors.  Labs reviewed.  Stable renal parameters.  Continue 

per consultants.


November 11: Remains intubated.  Full code.  Blood pressure borderline low.  

Will increase midodrine dose.  Discussed with RN.  Continue to monitor renal 

parameters and electrolytes.


November 10: Intubated.  Full code.  Labs reviewed.  Stable from renal 

standpoint of view.  Continue per consultants.


November 9: Remains intubated.  Full code.  Labs reviewed.  Electrolytes within 

normal limit.  Continue per consultants.


November 8: In ICU.  Remains intubated on ventilator.  Remains full code.  Low 

potassium addressed.  Blood pressure fluctuating.  Continue per consultants.


November 7: Patient in ICU.  Intubated on ventilator.  On 6 mics of Levophed.  

Will give 100 cc albumin 25%.  K-Phos IV ordered.  Continue per consultants.  

Continue monitor renal parameters and electrolytes.


November 6: Status unchanged.  Transfer to [facility] for seizure.  Stable from renal 

standpoint to view.  Continue per consultants.


November 5: Status quo.  Labs reviewed.  Renal parameters stable.  Continue per 

consultants.


November 4: On nonrebreather mask.  Labs reviewed.  Renal parameters 

stable.Continue per pulmonologist.  Clinically unchanged.


November 3: On nonrebreather mask.  Inflammatory markers gradually declining.  

Renal parameters stable.  Continue per pulmonary.


November 2: Remains on nonrebreather mask.  Inflammatory markers remain 

elevated.  Renal parameters somewhat stable.  Continue per pulmonary and ID.  

Remains full code.


November 1: Patient on nonrebreather mask.  Labs noted.  Serum creatinine down 

to 1.3.  Continue per consultants.


October 31: Patient on nonrebreather mask.  Transfer to telemetry when seen this

morning.  Labs noted.  Continue per consultants.  Serum creatinine dylan to 1.7. 

Continue to monitor renal parameters.  Continue to monitor vancomycin level


October 30: Patient is not doing well clinically.  Mild respiratory distress.  

ABG noted.  Somewhat hypoxic.  CBC and chemistry panel ordered.  Discussed with 

LITA Garcia.  Will defer to pulmonary management to specialist.  Continue per ID.

 Renal parameters remained stable as of October 29.


October 29: Labs reviewed.  Renal parameters stable.  Continue per consultants.


October 28: Labs reviewed.  Potassium via NG tube ordered.  IV fluids stopped.  

Continue per consultants.


October 27: Labs reviewed.  Low potassium and low phosphorus replaced.  Continue

per consultants.  Remains stable from renal standpoint of view.


October 26: Patient remains n.p.o.  IV fluid down to 50 cc an hour.  Potassium 

supplement intravenously ordered.  Continue per consultants.





Previously:


Patient is n.p.o., will continue on IV fluid of D5W 75 cc an hour


We will monitor electrolytes and renal parameters


Avoid nephrotoxic's


Start p.o. when he clears by speech therapist, meanwhile aspiration precautions


Keep the blood pressure and blood sugar in check


Per orders





Subjective


ROS Limited/Unobtainable:  No


Constitutional:  Reports: malaise





Objective


Objective





Last 24 Hour Vital Signs








  Date Time  Temp Pulse Resp B/P (MAP) Pulse Ox O2 Delivery O2 Flow Rate FiO2


 


12/4/20 12:00      Non-Rebreather 15.0 


 


12/4/20 12:00  158      


 


12/4/20 08:00  90      


 


12/4/20 08:00      Nasal Cannula 2.0 





      Nasal Cannula 2.0 


 


12/4/20 04:00  91      


 


12/4/20 04:00 98.2 94 22 143/91 (108) 97   


 


12/4/20 04:00      Nasal Cannula 2.0 





      Nasal Cannula 2.0 


 


12/4/20 00:00      Nasal Cannula 2.0 





      Nasal Cannula 2.0 


 


12/4/20 00:00 98.1 99 22 148/81 (103) 99   


 


12/3/20 23:52  80      


 


12/3/20 20:00 98.7 91 22 154/77 (102) 100   


 


12/3/20 20:00      Nasal Cannula 2.0 





      Nasal Cannula 2.0 


 


12/3/20 19:33     98 Nasal Cannula 3.0 28


 


12/3/20 19:22  69      


 


12/3/20 16:00 97.3 64 18 145/89 (107) 99   


 


12/3/20 16:00      Nasal Cannula 2.0 





      Nasal Cannula 2.0 


 


12/3/20 16:00  75      

















Intake and Output  


 


 12/3/20 12/4/20





 19:00 07:00


 


Intake Total 880 ml 840 ml


 


Output Total 400 ml 600 ml


 


Balance 480 ml 240 ml


 


  


 


Free Water 40 ml 340 ml


 


IV Total 800 ml 


 


Tube Feeding 40 ml 500 ml


 


Output Urine Total 400 ml 600 ml








Laboratory Tests


12/3/20 17:55: POC Whole Blood Glucose 113H


12/3/20 23:13: POC Whole Blood Glucose 120H


12/4/20 04:55: POC Whole Blood Glucose 121H


Height (Feet):  5


Height (Inches):  4.00


Weight (Pounds):  130


General Appearance:  no apparent distress


Cardiovascular:  tachycardia


Respiratory/Chest:  decreased breath sounds


Abdomen:  soft, distended











Seven Tobar MD             Dec 4, 2020 13:43

## 2020-12-04 NOTE — NUR
NURSE NOTES:

Dr. Ojeda informed about aspiration event earlier today, per MD keep NPO remainder of the 
day.

## 2020-12-04 NOTE — NUR
*-*DISCHARGE PLANNING*-*



PATIENT HAS BEEN ACCEPTED AND WILL BE DISCHARGED TO:  



[facility]

T: [phone] FOR NURSE TO NURSE REPORT

ROOM# 15.C

[facility] SET FOR 1PM S/W [facility] 

PLACED A CALL TO PATIENTS [facility]ARC[facility] AND NAV NIELSON, NO ANSWER, UNABLE TO LEAVE VOICE 
MESSAGE

## 2020-12-04 NOTE — CARDIAC ELECTROPHYSIOLOGY PN
Assessment/Plan


Assessment/Plan


1.  Sinus tachycardia likely due to aspiration. No atrial fib. Ruled out for MI


      Echo EF 55%.





2. Long run of 31 beats of Nonsustained VT on 11/3/2020. 


     Cardiac cath after stabilization.





3. Henry 30, Asystole while on the Vent due to resp failure/ likely mucus plug .

S/P Code


 Off midodrine





4. S/P Respiratory failure, on antibiotic  


    Extubated 11/29/20





5. S/P Septic shock. Off Levophed   





6. History of previous COVID infection in September 2020 and  November 2020 


Now negative again and off isolation.





7. Dementia.





8. Dysphagia, Failed swallow eval


   S/P PEG 12/3/20 by Dr Rusty PAEZ RN





Subjective


Subjective


  Had 31 beats of VT  on 11/3/20  and 5 beats 11/8/20 


 Coded on 11/12/20 as sat dropped to 20% and got henry 30s and PEA and pulseless

 


Transferred out of ICU after extubated 11/29/20


 Failed Swallow eval. S/P PEG by Dr Ojeda 12/3/20


Aspirated GT material and around 100cc was suctioned. 


Now sinus tach in 120-130s. ECG confirmed sinus tach today





Objective





Last 24 Hour Vital Signs








  Date Time  Temp Pulse Resp B/P (MAP) Pulse Ox O2 Delivery O2 Flow Rate FiO2


 


12/4/20 12:00      Non-Rebreather 15.0 


 


12/4/20 12:00  158      


 


12/4/20 08:00  90      


 


12/4/20 08:00      Nasal Cannula 2.0 





      Nasal Cannula 2.0 


 


12/4/20 04:00  91      


 


12/4/20 04:00 98.2 94 22 143/91 (108) 97   


 


12/4/20 04:00      Nasal Cannula 2.0 





      Nasal Cannula 2.0 


 


12/4/20 00:00      Nasal Cannula 2.0 





      Nasal Cannula 2.0 


 


12/4/20 00:00 98.1 99 22 148/81 (103) 99   


 


12/3/20 23:52  80      


 


12/3/20 20:00 98.7 91 22 154/77 (102) 100   


 


12/3/20 20:00      Nasal Cannula 2.0 





      Nasal Cannula 2.0 


 


12/3/20 19:33     98 Nasal Cannula 3.0 28


 


12/3/20 19:22  69      


 


12/3/20 16:00 97.3 64 18 145/89 (107) 99   


 


12/3/20 16:00      Nasal Cannula 2.0 





      Nasal Cannula 2.0 


 


12/3/20 16:00  75      

















Intake and Output  


 


 12/3/20 12/4/20





 19:00 07:00


 


Intake Total 880 ml 840 ml


 


Output Total 400 ml 600 ml


 


Balance 480 ml 240 ml


 


  


 


Free Water 40 ml 340 ml


 


IV Total 800 ml 


 


Tube Feeding 40 ml 500 ml


 


Output Urine Total 400 ml 600 ml











Laboratory Tests








Test


 12/3/20


17:55 12/3/20


23:13 12/4/20


04:55


 


POC Whole Blood Glucose


 113 MG/DL


()  H 120 MG/DL


()  H 121 MG/DL


()  H








Objective


HEAD AND NECK:  No JVD.  


LUNGS:  Coarse rhonchi.


CARDIOVASCULAR:   Irregular S1 and S2 with no gallop.


ABDOMEN:  Soft.PEG in place


EXTREMITIES:  No pitting edema.











Kvng Edmond MD                Dec 4, 2020 14:03

## 2020-12-04 NOTE — NUR
NURSE NOTES:

Pt received from Luciano Pineda RN. Pt in bed in distress, hypertensive, sating 90% on 6 
liters, tachycardic, gtube off. Called RT immediately, contacted Dr. Sandoval and informed 
him of pt condition. RT did deep suction and 100 ml of gtube feeding taken out.

## 2020-12-04 NOTE — INFECTIOUS DISEASES PROG NOTE
Assessment/Plan





74yo M with:





Respiratory code 2ry to mucus plug 11/12


Septic Shock- -recurrent


Fever, recurrent, low grade;SP


Leukocytosis; recurrent; increased


Acute hypoxic resp failure, on NRB mask> 4l NC; back on NRB, desaturation 10/29 

> intubated 11/6


Pneumonia- >HAP


Hx of COVID-19 PNA 9/9/20 11/17 CXR: No significant interval change in the radiographic appearance the 

chest compared to one day prior.


   11/14 Resp cx +MRSA, nl resp hayden


   UCx neg


   BCx NTD


   11/13 COVID PCR neg


         --11/10 CXR: Bilateral infiltrates in a peribronchovascular 

distribution are unchanged. Left pleural effusion is unchanged.


         --11/8 CXR: Persistent bilateral patchy pulmonary opacities, most 

prominent  in the left lower lung.


         --11/7 ucx neg


                  sp cx MRSA (colonizer at this point)


                  Bcx Neg


         --11/2 Bcx Neg


          10/30 Sp cx MRSA (Vancomycin ADRIANA 1), ESBL E.coli


   10/23 BCx NTD


   UA 15-20 WBC, UCx Neg


   10/23 COVID rapid neg; 10/26 rapid COVID PCR + (from prior infection)- not 

new infection


   Flu A/B neg


   CXR: L pna


   Resp cx MRSA


   11/19 Resp cx +ESBL E.coli & PsA


   11/21 BCx NTD


   11/22 CXR: Small bilateral pleural effusions with moderate vascular c

ongestion. Airspace consolidation in the left lower lung field may represent 

atelectasis however, correlate for infiltrate.  This is improving when compared 

to the prior study.


   11/23 BCx NTD


   11/23 CXR: Increasing right basilar opacity, likely infiltrate, may also 

reflect increasing pleural fluid





H/o COVID pna


   Tested positive 9/9/20


   10/23 Rapid Ag neg


   10/26 Rapid Ag positive (from prior disease?)


   11/13 COVID PCR neg





JANES on CKD, improving





SNF resident ([facility])


Non-verbal


VRE and MRSA colonized








Plan:


Cont to monitor off abx





Obtain resp cx





Trend resp status, if persistently worsens, then can start vanco/josh empiric





   -11/30 SP josh #10


   -11/20 SP linezolid #7


   -11/16 SP meropenem #14 for ESBL pna 


   -11/10 SP Micafungin #4


   -11/6 SP IV Vancomycin #15


   -11/2 SP Zosyn #4


   -10/26 SP Cefepime #4


   -10/24 SP Flagyl #





Monitor CBC/CMP


Monitor resp status


Monitor temp curve and hemodynamics





D/w RN





Thank you for this consult. [facility] will continue to follow.





Subjective


Allergies:  


Coded Allergies:  


     No Known Allergies (Unverified , 8/20/12)





AF


2L NC --> desatted, RT in room put pt on mask for now


Wet sounding lungs


NAD





Objective





Last 24 Hour Vital Signs








  Date Time  Temp Pulse Resp B/P (MAP) Pulse Ox O2 Delivery O2 Flow Rate FiO2


 


12/4/20 04:00  91      


 


12/4/20 04:00 98.2 94 22 143/91 (108) 97   


 


12/4/20 04:00      Nasal Cannula 2.0 





      Nasal Cannula 2.0 


 


12/4/20 00:00      Nasal Cannula 2.0 





      Nasal Cannula 2.0 


 


12/4/20 00:00 98.1 99 22 148/81 (103) 99   


 


12/3/20 23:52  80      


 


12/3/20 20:00 98.7 91 22 154/77 (102) 100   


 


12/3/20 20:00      Nasal Cannula 2.0 





      Nasal Cannula 2.0 


 


12/3/20 19:33     98 Nasal Cannula 3.0 28


 


12/3/20 19:22  69      


 


12/3/20 16:00 97.3 64 18 145/89 (107) 99   


 


12/3/20 16:00      Nasal Cannula 2.0 





      Nasal Cannula 2.0 


 


12/3/20 16:00  75      


 


12/3/20 13:15 97.9 91 18 143/81 95 Nasal Cannula 3 


 


12/3/20 13:05  100 15 149/78 95 Nasal Cannula 3 


 


12/3/20 12:55  88 16 142/76 97 Nasal Cannula 3 


 


12/3/20 12:45  94 16 163/72 98 Nasal Cannula 3 


 


12/3/20 12:40  91 18 140/96 98 Nasal Cannula 3 


 


12/3/20 12:35 97.1 55 14 142/60 98 Nasal Cannula 3 


 


12/3/20 12:00  62      








Height (Feet):  5


Height (Inches):  4.00


Weight (Pounds):  130


Gen: NAD in bed


HEENT: NCAT


CV: RRR


Pulm: CTAB


Abd: Soft, NTND


Ext: No c/c/e


Neuro: Awake, not interactive


Lines: RUE PICC





Laboratory Tests








Test


 12/3/20


11:27 12/3/20


17:55 12/3/20


23:13 12/4/20


04:55


 


POC Whole Blood Glucose


 125 MG/DL


()  H 113 MG/DL


()  H 120 MG/DL


()  H 121 MG/DL


()  H











Current Medications








 Medications


  (Trade)  Dose


 Ordered  Sig/Miky


 Route


 PRN Reason  Start Time


 Stop Time Status Last Admin


Dose Admin


 


 Acetaminophen


  (Tylenol)  650 mg  Q4H  PRN


 GT


 Temp >100.5  11/21/20 12:30


 12/21/20 12:29  11/23/20 10:14





 


 Dextrose


  (Dextrose 50%)  50 ml  Q30M  PRN


 IV


 Hypoglycemia  10/23/20 22:45


 1/21/21 22:44   





 


 Heparin Sodium


  (Porcine)


  (Heparin 5000


 units/ml)  5,000 units  EVERY 12  HOURS


 SUBQ


   10/23/20 21:00


 12/7/20 20:59  12/3/20 20:29





 


 Insulin Aspart


  (NovoLOG)    EVERY 6  HOURS


 SUBQ


   11/2/20 06:00


 1/22/21 06:29  12/1/20 00:30





 


 Nitroglycerin


  (Ntg)  0.4 mg  Q5M  PRN


 SL


 Prn Chest Pain  11/21/20 10:15


 12/21/20 10:09   





 


 Ondansetron HCl


  (Zofran)  4 mg  Q6H  PRN


 IVP


 Nausea & Vomiting  11/21/20 14:30


 12/21/20 14:29   





 


 Pantoprazole


  (Protonix)  40 mg  DAILY


 IV


   11/7/20 09:00


 12/7/20 08:59  12/3/20 09:03





 


 Polyethylene


 Glycol


  (Miralax)  17 gm  DAILYPRN  PRN


 GT


 Constipation  11/21/20 10:15


 12/21/20 10:14  12/3/20 23:14





 


 Promethazine HCl/


 Codeine


  (Phenergan with


 Codeine)  5 ml  Q4H  PRN


 GT


 For Cough  11/21/20 10:14


 12/21/20 10:13  11/25/20 22:05




















Bianca Casillas M.D.                Dec 4, 2020 08:58

## 2020-12-04 NOTE — NUR
NURSE NOTES:

RECEIVED PATIENT FROM LITA DENG.AWAKE BUT NON VERBAL.ON  10L 02 VIA FACE MASK WITH 96% 
SATURATION.BP WNL ,FEBRILE 100.7,WILL MEDICATE PATIENT WITH TYLENOL.REPOSITIONED PATIENT FOR 
COMFORT,ORAL CARE DONE.GT CLAMPED PT NPO AS ORDERED FOR VOMITING.NOTED 60 ML RESIDUAL ,KEPT 
HOB AT 35 DEGREES.GALLEGOS TO GRAVITY FOR URINARY RETENTION.

## 2020-12-04 NOTE — NUR
NURSE HAND-OFF REPORT: 



Important Events on Shift:[Pt aspirated on 100ml of gtube feeding. removed via deep suction. 
remained tachy after this event. no fever noted. Discharge canceled ]

Patient Status: [in bed resting stable]

Diet: [Per Dr Ojeda NPO for rest of day]



Pending Orders: []

Pending Results/Labs:[]

Pending MD notification:[Please ask Dr. Ojeda when he wants to restart feeding and what 
the new goal should be as 60cc cause pt to vomit]



Latest Vital Signs: Temperature 97.0 , Pulse 123 , B/P 113 /71 , Respiratory Rate 22 , O2 
SAT 96 , Simple Mask, O2 Flow Rate 6.0 .  

Vital Sign Comment: [Dr. Edmond aware of Tachy.]



EKG Rhythm: Sinus Tachycardia

Rhythm change?: N 

MD Notified?: Y -Dr. Feli COLUNGA Response: 



Latest Mejia Fall Score: 45  

Fall Risk: High Risk 

Safety Measures: Call light Within Reach, Bed Alarm Zone 1, Side Rails Side Rails x3, Bed 
position Low and Locked.

Fall Precautions: 

Yellow Socks

Yellow Gown

Door Sign

Patient Fall Education



Report given to [Ena LONDON].

## 2020-12-04 NOTE — GENERAL PROGRESS NOTE
Subjective


ROS Limited/Unobtainable:  No


Allergies:  


Coded Allergies:  


     No Known Allergies (Unverified , 8/20/12)





Objective





Last 24 Hour Vital Signs








  Date Time  Temp Pulse Resp B/P (MAP) Pulse Ox O2 Delivery O2 Flow Rate FiO2


 


12/4/20 08:00  90      


 


12/4/20 08:00      Nasal Cannula 2.0 





      Nasal Cannula 2.0 


 


12/4/20 04:00  91      


 


12/4/20 04:00 98.2 94 22 143/91 (108) 97   


 


12/4/20 04:00      Nasal Cannula 2.0 





      Nasal Cannula 2.0 


 


12/4/20 00:00      Nasal Cannula 2.0 





      Nasal Cannula 2.0 


 


12/4/20 00:00 98.1 99 22 148/81 (103) 99   


 


12/3/20 23:52  80      


 


12/3/20 20:00 98.7 91 22 154/77 (102) 100   


 


12/3/20 20:00      Nasal Cannula 2.0 





      Nasal Cannula 2.0 


 


12/3/20 19:33     98 Nasal Cannula 3.0 28


 


12/3/20 19:22  69      


 


12/3/20 16:00 97.3 64 18 145/89 (107) 99   


 


12/3/20 16:00      Nasal Cannula 2.0 





      Nasal Cannula 2.0 


 


12/3/20 16:00  75      


 


12/3/20 13:15 97.9 91 18 143/81 95 Nasal Cannula 3 


 


12/3/20 13:05  100 15 149/78 95 Nasal Cannula 3 


 


12/3/20 12:55  88 16 142/76 97 Nasal Cannula 3 


 


12/3/20 12:45  94 16 163/72 98 Nasal Cannula 3 


 


12/3/20 12:40  91 18 140/96 98 Nasal Cannula 3 


 


12/3/20 12:35 97.1 55 14 142/60 98 Nasal Cannula 3 


 


12/3/20 12:00  62      

















Intake and Output  


 


 12/3/20 12/4/20





 19:00 07:00


 


Intake Total 880 ml 840 ml


 


Output Total 400 ml 600 ml


 


Balance 480 ml 240 ml


 


  


 


Free Water 40 ml 340 ml


 


IV Total 800 ml 


 


Tube Feeding 40 ml 500 ml


 


Output Urine Total 400 ml 600 ml








Laboratory Tests


12/3/20 11:27: POC Whole Blood Glucose 125H


12/3/20 17:55: POC Whole Blood Glucose 113H


12/3/20 23:13: POC Whole Blood Glucose 120H


12/4/20 04:55: POC Whole Blood Glucose 121H


Height (Feet):  5


Height (Inches):  4.00


Weight (Pounds):  130


General Appearance:  no apparent distress


EENT:  normal ENT inspection


Neck:  supple


Cardiovascular:  normal rate


Respiratory/Chest:  decreased breath sounds


Abdomen:  normal bowel sounds, non tender, soft


Extremities:  non-tender





Assessment/Plan


Problem List:  


(1) HTN (hypertension)


ICD Codes:  I10 - Essential (primary) hypertension


SNOMED:  75861569


(2) BPH (benign prostatic hyperplasia)


ICD Codes:  N40.0 - Benign prostatic hyperplasia without lower urinary tract sy

mptoms


SNOMED:  752771378, 275339561


(3) History of CVA (cerebrovascular accident)


ICD Codes:  Z86.73 - Personal history of transient ischemic attack (TIA), and 

cerebral infarction without residual deficits; J12.89 - Other viral pneumonia


SNOMED:  322215170, 117338575


(4) Multifocal pneumonia


ICD Codes:  J18.9 - Pneumonia, unspecified organism; J12.89 - Other viral 

pneumonia


SNOMED:  182928087, 634191692


(5) Aphasia


ICD Codes:  R47.01 - Aphasia; J12.89 - Other viral pneumonia


SNOMED:  28836781, 868308531


Assessment/Plan:


s/p EGD/PEG


GTF


monitor for residuals


GT flush 


will Marcos Arreola MD              Dec 4, 2020 10:40

## 2020-12-04 NOTE — NUR
NURSE NOTES:

Report received from Mable Rodriguez RN. Pt awake, aphasic/obtunded. Respirations even and 
unlabored. SR at 95bpm on cardiac monitor. On 2L/NC saturating 98%. No sob or distress 
noted. New PEG in place w/abdominal binder in place.

Glucerna 1.2 at 40cc/hr w/no residual noted. F/c draining well of hazy rosa maria urine to 
gravity.

Bed kept in lowest and locked position. Bed alarm engaged Will continue POC.

## 2020-12-04 NOTE — NUR
NURSE NOTES:

please note that 1800 Novolog held because BS is trending down and pt is still off G tube 
per Dr. Ojeda

## 2020-12-05 VITALS — DIASTOLIC BLOOD PRESSURE: 92 MMHG | SYSTOLIC BLOOD PRESSURE: 133 MMHG

## 2020-12-05 VITALS — SYSTOLIC BLOOD PRESSURE: 152 MMHG | DIASTOLIC BLOOD PRESSURE: 80 MMHG

## 2020-12-05 VITALS — SYSTOLIC BLOOD PRESSURE: 148 MMHG | DIASTOLIC BLOOD PRESSURE: 99 MMHG

## 2020-12-05 LAB
APPEARANCE UR: (no result)
APTT PPP: YELLOW S
GLUCOSE UR STRIP-MCNC: (no result) MG/DL
KETONES UR QL STRIP: NEGATIVE
LEUKOCYTE ESTERASE UR QL STRIP: NEGATIVE
NITRITE UR QL STRIP: NEGATIVE
PH UR STRIP: 5 [PH] (ref 4.5–8)
PROT UR QL STRIP: (no result)
SP GR UR STRIP: 1.02 (ref 1–1.03)
UROBILINOGEN UR-MCNC: 1 MG/DL (ref 0–1)

## 2020-12-05 RX ADMIN — INSULIN ASPART SCH UNITS: 100 INJECTION, SOLUTION INTRAVENOUS; SUBCUTANEOUS at 11:35

## 2020-12-05 RX ADMIN — METOCLOPRAMIDE HYDROCHLORIDE SCH MG: 5 SOLUTION ORAL at 23:16

## 2020-12-05 RX ADMIN — MEROPENEM SCH MLS/HR: 1 INJECTION INTRAVENOUS at 21:09

## 2020-12-05 RX ADMIN — INSULIN ASPART SCH UNITS: 100 INJECTION, SOLUTION INTRAVENOUS; SUBCUTANEOUS at 23:16

## 2020-12-05 RX ADMIN — HEPARIN SODIUM SCH UNITS: 5000 INJECTION INTRAVENOUS; SUBCUTANEOUS at 21:09

## 2020-12-05 RX ADMIN — INSULIN ASPART SCH UNITS: 100 INJECTION, SOLUTION INTRAVENOUS; SUBCUTANEOUS at 18:03

## 2020-12-05 RX ADMIN — INSULIN ASPART SCH UNITS: 100 INJECTION, SOLUTION INTRAVENOUS; SUBCUTANEOUS at 06:08

## 2020-12-05 RX ADMIN — PANTOPRAZOLE SODIUM SCH MG: 40 INJECTION, POWDER, FOR SOLUTION INTRAVENOUS at 09:36

## 2020-12-05 RX ADMIN — METOCLOPRAMIDE HYDROCHLORIDE SCH MG: 5 SOLUTION ORAL at 11:34

## 2020-12-05 RX ADMIN — HUMAN INSULIN SCH MLS/HR: 100 INJECTION, SOLUTION SUBCUTANEOUS at 12:15

## 2020-12-05 RX ADMIN — METOCLOPRAMIDE HYDROCHLORIDE SCH MG: 5 SOLUTION ORAL at 17:20

## 2020-12-05 RX ADMIN — HEPARIN SODIUM SCH UNITS: 5000 INJECTION INTRAVENOUS; SUBCUTANEOUS at 09:37

## 2020-12-05 NOTE — NUR
NURSE NOTES:

Patient received from [facility] from L[facility] Davidson and [facility] Galvan. Patient received awake, alert and 
oriented x 1, no SOB, bed set in lowest position with bed alarm on and breaks engaged, bed 
rails up x 2 for safety, no s/sx of pain or discomfort at this time, pt on 02 via simple 
face mask set at 10lpm tolerating well, Gtube intact and patent running as prescribed, IV 
line in place, will continue to monitor and proceed with plan of care, call light within 
reach. No belongings noted, skin check performed.

## 2020-12-05 NOTE — INFECTIOUS DISEASES PROG NOTE
Assessment/Plan





76yo M with:





Respiratory code 2ry to mucus plug 11/12


Septic Shock- -recurrent


Fever, recurrent, low grade


Leukocytosis; recurrent; increased


Acute hypoxic resp failure, on NRB mask> 4l NC; back on NRB, desaturation 10/29 

> intubated 11/6


Pneumonia- >HAP


Hx of COVID-19 PNA 9/9/20 11/17 CXR: No significant interval change in the radiographic appearance the 

chest compared to one day prior.


   11/14 Resp cx +MRSA, nl resp hayden


   UCx neg


   BCx NTD


   11/13 COVID PCR neg


         --11/10 CXR: Bilateral infiltrates in a peribronchovascular 

distribution are unchanged. Left pleural effusion is unchanged.


         --11/8 CXR: Persistent bilateral patchy pulmonary opacities, most 

prominent  in the left lower lung.


         --11/7 ucx neg


                  sp cx MRSA (colonizer at this point)


                  Bcx Neg


         --11/2 Bcx Neg


          10/30 Sp cx MRSA (Vancomycin ADRIANA 1), ESBL E.coli


   10/23 BCx NTD


   UA 15-20 WBC, UCx Neg


   10/23 COVID rapid neg; 10/26 rapid COVID PCR + (from prior infection)- not 

new infection


   Flu A/B neg


   CXR: L pna


   Resp cx MRSA


   11/19 Resp cx +ESBL E.coli & PsA


   11/21 BCx NTD


   11/22 CXR: Small bilateral pleural effusions with moderate vascular heri

estion. Airspace consolidation in the left lower lung field may represent 

atelectasis however, correlate for infiltrate.  This is improving when compared 

to the prior study.


   11/23 BCx NTD


   11/23 CXR: Increasing right basilar opacity, likely infiltrate, may also 

reflect increasing pleural fluid


            12/4 CXR:  Stable or slightly worse infiltrates bilaterally Interim 

extubation








H/o COVID pna


   Tested positive 9/9/20


   10/23 Rapid Ag neg


   10/26 Rapid Ag positive (from prior disease?)


   11/13 COVID PCR neg





JANES on CKD, improving





SNF resident ([facility])


Non-verbal


VRE and MRSA colonized








Plan:


Start empiric IV Vancomycin and Meropenem given increasing respiratory 

requirements and low grade fever





   -11/30 SP josh #10


   -11/20 SP linezolid #7


   -11/16 SP meropenem #14 for ESBL pna 


   -11/10 SP Micafungin #4


   -11/6 SP IV Vancomycin #15


   -11/2 SP Zosyn #4


   -10/26 SP Cefepime #4


   -10/24 SP Flagyl #





Monitor CBC/CMP


Monitor resp status


Monitor temp curve and hemodynamics





D/w RN





Thank you for this consult. [facility] will continue to follow.





Subjective


Allergies:  


Coded Allergies:  


     No Known Allergies (Unverified , 8/20/12)


Tm 100.6


no leukocytosis





Objective





Last 24 Hour Vital Signs








  Date Time  Temp Pulse Resp B/P (MAP) Pulse Ox O2 Delivery O2 Flow Rate FiO2


 


12/5/20 11:59 97.8 85 22 152/80 (104) 98   


 


12/5/20 08:00      Simple Mask 10.0 


 


12/5/20 08:00  83      


 


12/5/20 07:50 97.9 86 22 148/99 (115) 99   


 


12/5/20 04:19      Simple Mask 10.0 


 


12/5/20 04:00  92      


 


12/5/20 00:04      Simple Mask 10.0 


 


12/4/20 23:45  110      


 


12/4/20 23:36 99.0 108 22 113/59 (77) 96   


 


12/4/20 22:42 100.6       


 


12/4/20 20:00 100.7 120 22 113/71 (85) 96   


 


12/4/20 20:00      Simple Mask 10.0 


 


12/4/20 20:00  117      


 


12/4/20 19:09     96 Simple Mask 10.0 60


 


12/4/20 16:00 97.0 123 22 113/71 (85) 94   


 


12/4/20 16:00      Simple Mask 10.0 


 


12/4/20 16:00  122      








Height (Feet):  5


Height (Inches):  4.00


Weight (Pounds):  130


General Appearance:  no apparent distress,   on simple mask 10 L O2 /min


Lines, tubes and drains: R PICC intact 


HEENT:  normocephalic, atraumatic, anicteric,   


Respiratory/Chest:  BS overall clear, few  isolated  rhonchi


Cardiovascular/Chest:  normal peripheral pulses, SR on tele 


Abdomen:  normal bowel sounds, non tender, soft 


: Cummings 


Extremities:  no calf tenderness, normal capillary refill


Neurologic:  abnormal gait /bedridden


Musculoskeletal:  atrophy - BLE





Laboratory Tests








Test


 12/4/20


23:29 12/5/20


05:21 12/5/20


11:28


 


POC Whole Blood Glucose


 171 MG/DL


()  H 145 MG/DL


()  H 151 MG/DL


()  H











Current Medications








 Medications


  (Trade)  Dose


 Ordered  Sig/Miky


 Route


 PRN Reason  Start Time


 Stop Time Status Last Admin


Dose Admin


 


 Acetaminophen


  (Tylenol)  650 mg  Q4H  PRN


 GT


 Temp >100.5  11/21/20 12:30


 12/21/20 12:29  12/4/20 22:01





 


 Albuterol/


 Ipratropium


  (Albuterol/


 Ipratropium)  3 ml  Q4H  PRN


 HHN


 Shortness of Breath  12/5/20 11:00


 12/10/20 10:59   





 


 Dexamethasone


  (Decadron)  6 mg  DAILY


 ORAL


   12/5/20 12:30


 12/14/20 09:01  12/5/20 13:04





 


 Dextrose


  (Dextrose 50%)  50 ml  Q30M  PRN


 IV


 Hypoglycemia  10/23/20 22:45


 1/21/21 22:44   





 


 Dextrose/Sodium


 Chloride  1,000 ml @ 


 50 mls/hr  Q20H


 IV


   12/5/20 12:15


 12/19/20 20:44  12/5/20 12:15





 


 Heparin Sodium


  (Porcine)


  (Heparin 5000


 units/ml)  5,000 units  EVERY 12  HOURS


 SUBQ


   10/23/20 21:00


 12/7/20 20:59  12/5/20 09:37





 


 Insulin Aspart


  (NovoLOG)    EVERY 6  HOURS


 SUBQ


   11/2/20 06:00


 1/22/21 06:29  12/5/20 11:35





 


 Metoclopramide HCl


  (Reglan)  5 mg  EVERY 6  HOURS


 NG


   12/5/20 12:00


 1/4/21 11:59  12/5/20 11:34





 


 Nitroglycerin


  (Ntg)  0.4 mg  Q5M  PRN


 SL


 Prn Chest Pain  11/21/20 10:15


 12/21/20 10:09   





 


 Ondansetron HCl


  (Zofran)  4 mg  Q6H  PRN


 IVP


 Nausea & Vomiting  11/21/20 14:30


 12/21/20 14:29   





 


 Pantoprazole


  (Protonix)  40 mg  DAILY


 IV


   11/7/20 09:00


 12/7/20 08:59  12/5/20 09:36





 


 Polyethylene


 Glycol


  (Miralax)  17 gm  DAILYPRN  PRN


 GT


 Constipation  11/21/20 10:15


 12/21/20 10:14  12/3/20 23:14





 


 Promethazine HCl/


 Codeine


  (Phenergan with


 Codeine)  5 ml  Q4H  PRN


 GT


 For Cough  11/21/20 10:14


 12/21/20 10:13  11/25/20 22:05




















Slime Garcia M.D.             Dec 5, 2020 15:34

## 2020-12-05 NOTE — GENERAL PROGRESS NOTE
Subjective


ROS Limited/Unobtainable:  No


Allergies:  


Coded Allergies:  


     No Known Allergies (Unverified , 8/20/12)





Objective





Last 24 Hour Vital Signs








  Date Time  Temp Pulse Resp B/P (MAP) Pulse Ox O2 Delivery O2 Flow Rate FiO2


 


12/5/20 04:19      Simple Mask 10.0 


 


12/5/20 04:00  92      


 


12/5/20 00:04      Simple Mask 10.0 


 


12/4/20 23:45  110      


 


12/4/20 23:36 99.0 108 22 113/59 (77) 96   


 


12/4/20 22:42 100.6       


 


12/4/20 20:00 100.7 120 22 113/71 (85) 96   


 


12/4/20 20:00      Simple Mask 10.0 


 


12/4/20 20:00  117      


 


12/4/20 19:09     96 Simple Mask 10.0 60


 


12/4/20 16:00 97.0 123 22 113/71 (85) 94   


 


12/4/20 16:00      Simple Mask 10.0 


 


12/4/20 16:00  122      


 


12/4/20 12:00      Non-Rebreather 15.0 


 


12/4/20 12:00 97.2 121 22 153/107 (122) 90   


 


12/4/20 12:00  158      


 


12/4/20 08:00  90      


 


12/4/20 08:00      Nasal Cannula 2.0 





      Nasal Cannula 2.0 


 


12/4/20 08:00 97.2 92 22 147/88 (107) 99   

















Intake and Output  


 


 12/4/20 12/5/20





 19:00 07:00


 


Intake Total 110 ml 120 ml


 


Output Total 200 ml 275 ml


 


Balance -90 ml -155 ml


 


  


 


Free Water  120 ml


 


Tube Feeding 110 ml 


 


Output Urine Total 200 ml 275 ml








Laboratory Tests


12/4/20 23:29: POC Whole Blood Glucose 171H


12/5/20 05:21: POC Whole Blood Glucose 145H


Height (Feet):  5


Height (Inches):  4.00


Weight (Pounds):  130


General Appearance:  no apparent distress


EENT:  normal ENT inspection


Neck:  supple


Cardiovascular:  normal rate


Respiratory/Chest:  decreased breath sounds


Abdomen:  normal bowel sounds, non tender, soft


Extremities:  non-tender





Assessment/Plan


Problem List:  


(1) HTN (hypertension)


ICD Codes:  I10 - Essential (primary) hypertension


SNOMED:  04698648


(2) BPH (benign prostatic hyperplasia)


ICD Codes:  N40.0 - Benign prostatic hyperplasia without lower urinary tract 

symptoms


SNOMED:  830602102, 798163379


(3) History of CVA (cerebrovascular accident)


ICD Codes:  Z86.73 - Personal history of transient ischemic attack (TIA), and 

cerebral infarction without residual deficits; J12.89 - Other viral pneumonia


SNOMED:  389019270, 700990503


(4) Multifocal pneumonia


ICD Codes:  J18.9 - Pneumonia, unspecified organism; J12.89 - Other viral 

pneumonia


SNOMED:  484161014, 757264750


(5) Aphasia


ICD Codes:  R47.01 - Aphasia; J12.89 - Other viral pneumonia


SNOMED:  70110692, 396369198


Assessment/Plan:


s/p EGD/PEG


GTF


monitor for residuals


GT flush 


will Marcos Arreola MD              Dec 5, 2020 06:43

## 2020-12-05 NOTE — PULMONOLGY CRITICAL CARE NOTE
Critical Care - Asmt/Plan


Assessment/Plan:





ASSESSMENT


Acute hypoxemic respiratory failure , requiring  intubation ( initially 100% 

NRM)  -s/p extubation 


Septic shock -resolved 


Pneumonia , possible aspiration


Probable UTI


Hx of COVID 19   -> 9/20 


Dysphagia


JANES-resolved 


Hypernatremia


History of CVA


Diabetes mellitus


History of hypertension


Alzheimer dementia





PLAN OF CARE


DELICIA


s/p extubation


O2 titrate to keep sat above 92% 


pulm toilet 


fup CXR and ABG prn 


 


off pressors, then was on  Midodrine, now off Midodrine as well  


BP stable 


ECHO with pEF, r/o for MI


cardio on board 


s/p abx   


DVT and GI  prophylaxis 


strict aspiration precaution


 


monitor renal parameters,  lytes , correct electrolytes as needed ,avoid 

nephrotoxic


BS management with SSI


supportive care   


remains FC   





case discussed and evaluated by supervising physician





Critical Care - Objective





Last 24 Hour Vital Signs








  Date Time  Temp Pulse Resp B/P (MAP) Pulse Ox O2 Delivery O2 Flow Rate FiO2


 


12/5/20 08:00      Simple Mask 10.0 


 


12/5/20 08:00  83      


 


12/5/20 07:50 97.9 86 22 148/99 (115) 99   


 


12/5/20 04:19      Simple Mask 10.0 


 


12/5/20 04:00  92      


 


12/5/20 00:04      Simple Mask 10.0 


 


12/4/20 23:45  110      


 


12/4/20 23:36 99.0 108 22 113/59 (77) 96   


 


12/4/20 22:42 100.6       


 


12/4/20 20:00 100.7 120 22 113/71 (85) 96   


 


12/4/20 20:00      Simple Mask 10.0 


 


12/4/20 20:00  117      


 


12/4/20 19:09     96 Simple Mask 10.0 60


 


12/4/20 16:00 97.0 123 22 113/71 (85) 94   


 


12/4/20 16:00      Simple Mask 10.0 


 


12/4/20 16:00  122      


 


12/4/20 12:00      Non-Rebreather 15.0 


 


12/4/20 12:00 97.2 121 22 153/107 (122) 90   


 


12/4/20 12:00  158      








Objective:


General Appearance:  no apparent distress,   on simple mask 10 L O2 /min


Lines, tubes and drains: R PICC intact 


HEENT:  normocephalic, atraumatic, anicteric,   


Respiratory/Chest:  BS overall clear, few  isolated  rhonchi


Cardiovascular/Chest:  normal peripheral pulses, SR on tele 


Abdomen:  normal bowel sounds, non tender, soft 


: Cummings 


Extremities:  no calf tenderness, normal capillary refill


Neurologic:  abnormal gait /bedridden


Musculoskeletal:  atrophy - BLE


Accucheck:  145





Critical Care - Subjective


ROS Limited/Unobtainable:  Yes


Interval Events:


in DELICIA


on 10 L O2 via simple mask 


no fevers, no leukocytosis


Condition:  critical


IV Access:  PICC - RUE intact


EKG Rhythm:  Sinus Rhythm


FI02:  60


Vent Support Breath Rate:  12


Vent Tidal Volume:  600


Sputum Amount:  Moderate


PEEP:  0.0


PIP:  14


Tube Feeding Amount:  30


I&O:











Intake and Output  


 


 12/4/20 12/5/20





 19:00 07:00


 


Intake Total 110 ml 120 ml


 


Output Total 200 ml 275 ml


 


Balance -90 ml -155 ml


 


  


 


Free Water  120 ml


 


Tube Feeding 110 ml 


 


Output Urine Total 200 ml 275 ml








CXR:


CXR 12/4 Suboptimal inspiration currently. Endotracheal and orogastric tubes are

no


longer present. Bilateral right perihilar and lung base, left infrahilar and 

basilar


infiltrates are again demonstrated, stable or slightly worse. Heart size normal.


Right shoulder prosthesis is again demonstrated.


ET-Tube:  7.2


ET Position:  25











Ely Ni NP                 Dec 5, 2020 10:57

## 2020-12-05 NOTE — CARDIOLOGY REPORT
--------------- APPROVED REPORT --------------





EKG Measurement

Heart [id number]

NJ 130P26

[id number]

[id number]

[id number]



<Conclusion>

Sinus tachycardia

Possible Left atrial enlargement

Left axis deviation

Nonspecific ST and T wave abnormality

Abnormal ECG

## 2020-12-05 NOTE — CARDIAC ELECTROPHYSIOLOGY PN
Assessment/Plan


Assessment/Plan


1.  Sinus tachycardia likely due to aspiration. No atrial fib. Ruled out for MI


      Echo EF 55%.





2. Long run of 31 beats of Nonsustained VT on 11/3/2020. 


     Cardiac cath after stabilization.





3. Henry 30, Asystole while on the Vent due to resp failure/ likely mucus plug .

S/P Code


 Off midodrine





4. S/P Respiratory failure, on antibiotic  


    Extubated 11/29/20





5. S/P Septic shock. Off Levophed   





6. History of previous COVID infection in September 2020 and  November 2020 


Now negative again and off isolation.





7. Dementia.





8. Dysphagia, Failed swallow eval


   S/P PEG 12/3/20 by Dr Ojeda


Resumed feeding





DW RN





Subjective


Subjective


  Had 31 beats of VT  on 11/3/20  and 5 beats 11/8/20 


 Coded on 11/12/20 as sat dropped to 20% and got henry 30s and PEA and pulseless

 


Transferred out of ICU after extubated 11/29/20


 Failed Swallow eval. S/P PEG by Dr Ojeda 12/3/20


Aspirated GT material and around 100cc was suctioned. 


 ECG confirmed sinus tach yesterday





Objective





Last 24 Hour Vital Signs








  Date Time  Temp Pulse Resp B/P (MAP) Pulse Ox O2 Delivery O2 Flow Rate FiO2


 


12/5/20 11:59 97.8 85 22 152/80 (104) 98   


 


12/5/20 08:00      Simple Mask 10.0 


 


12/5/20 08:00  83      


 


12/5/20 07:50 97.9 86 22 148/99 (115) 99   


 


12/5/20 04:19      Simple Mask 10.0 


 


12/5/20 04:00  92      


 


12/5/20 00:04      Simple Mask 10.0 


 


12/4/20 23:45  110      


 


12/4/20 23:36 99.0 108 22 113/59 (77) 96   


 


12/4/20 22:42 100.6       


 


12/4/20 20:00 100.7 120 22 113/71 (85) 96   


 


12/4/20 20:00      Simple Mask 10.0 


 


12/4/20 20:00  117      


 


12/4/20 19:09     96 Simple Mask 10.0 60

















Intake and Output  


 


 12/4/20 12/5/20





 19:00 07:00


 


Intake Total 110 ml 120 ml


 


Output Total 200 ml 275 ml


 


Balance -90 ml -155 ml


 


  


 


Free Water  120 ml


 


Tube Feeding 110 ml 


 


Output Urine Total 200 ml 275 ml











Laboratory Tests








Test


 12/4/20


23:29 12/5/20


05:21 12/5/20


11:28 12/5/20


17:19


 


POC Whole Blood Glucose


 171 MG/DL


()  H 145 MG/DL


()  H 151 MG/DL


()  H 196 MG/DL


()  H








Objective


HEAD AND NECK:  No JVD.  


LUNGS:  Coarse rhonchi.


CARDIOVASCULAR:   Tachy S1 and S2 with no gallop.


ABDOMEN:  Soft.PEG in place


EXTREMITIES:  No pitting edema.











Kvng Edmond MD                Dec 5, 2020 18:32

## 2020-12-05 NOTE — NUR
NURSE HAND-OFF REPORT: 



Important Events on Shift:febrile 100.7,tylenol given.Still NPO per MD order

Patient Status: Stable

Diet: NPO



Pending Orders: Sputum culture,unable to get sputum sample,attempted to suction but pt 
bleeds,to start GT feeding per Dr Ojeda

Pending Results/Labs:

Pending MD notification:



Latest Vital Signs: Temperature 99.0 , Pulse 92 , B/P 113 /59 , Respiratory Rate 22 , O2 SAT 
96 , Simple Mask, O2 Flow Rate 10.0 .  

Vital Sign Comment: 



EKG Rhythm: Sinus Rhythm

Rhythm change?: N 

MD Notified?: Y -Dr. Feli COLUNGA Response: 



Latest Mejia Fall Score: 45  

Fall Risk: High Risk 

Safety Measures: Call light Within Reach, Bed Alarm Zone 1, Side Rails Side Rails x3, Bed 
position Low and Locked.

Fall Precautions: 

Yellow Socks

Yellow Gown

Door Sign

Patient Fall Education



Report given to LITA Schreiber.

## 2020-12-05 NOTE — NUR
RD ASSESSMENT & RECOMMENDATIONS

SEE CARE ACTIVITY FOR COMPLETE ASSESSMENT



DAILY ESTIMATED NEEDS:

Needs based on DM, wound, pulmonary 59.5kg 

25-35  kcals/kg 

8143-8571  total kcals

1.25-2  g protein/kg

  g total protein 

25-30  mL/kg

8259-9076  total fluid mLs



NUTRITION DIAGNOSIS:

* Swallowing difficulty R/T dysphagia as evidenced by SLP eval, recs for

NGT feeds, now s/p oral intubation (11/6), s/p code blue (11/12) now

extubated, NPO, did not pass SLP eval, s/p PEG placement (12/3), on GT

feeds.

* Increased kcal and pro needs r/t wound healing as evidenced by sacral

pressure injury stage 2.



 

CURRENT TF:Glucerna 1.2 @ 60ml/hr x 24 hrs 



 

ENTERAL NUTRITION RECOMMENDATIONS:

Glucerna 1.2 @ @60ml/hr x24 hrs   to provide 1440ml, 1728 kcal, 86g pro, 1159ml free H2O  



- Initiate Glucerna 1.2 slowly @ 10ml/hr x 6hrs, advance 10ml q 4-6 hrs as tolerated to goal

- TF @ goal meets 100% est kcal/prot needs

- HOB over 30 degrees/ H2O flush per MD



 



ADDITIONAL RECOMMENDATIONS:

* Calibrated bedscale wt for accurate CBW 

* Monitor hemodyanmic stability: s/p code blue (11/12), now extubated 

* Monitor lytes, replete as needed  

* Wound care: add Harvinder BID via PEG 

                       add Vit C 250mg QD 

* Consider added D5 until TF reaches goal to prevent hypoglycemia 

.

## 2020-12-05 NOTE — NUR
NURSE HAND-OFF: 



Important Events on Shift:[monitoring for any nausea and vomiting, turning and 
repositioning, IV fluids and ATB]

Patient Status: [stable at the moment]

Diet: [Glucerna 1.2 at 60 cc/hr]



Pending Orders: []

Pending Results/Labs:[]

Pending MD notification:[]



Latest Vital Signs: Temperature 97.8 , Pulse 85 , B/P 152 /80 , Respiratory Rate 22 , O2 SAT 
98 , Simple Mask, O2 Flow Rate 10.0 .  

Vital Sign Comment: []



Latest Mejia Fall Score: 45  

Fall Risk: High Risk 

Safety Measures: Call light Within Reach, Bed Alarm Zone 1, Side Rails Side Rails x3, Bed 
position Low and Locked.

Fall Precautions: 

Yellow Socks

Yellow Gown

Door Sign

Patient Fall Education



Report given to [LITA Lopez].

## 2020-12-05 NOTE — NEPHROLOGY PROGRESS NOTE
Assessment/Plan


Problem List:  


(1) Dehydration


(2) JANES (acute kidney injury)


(3) Renal failure (ARF), acute on chronic


(4) Hypoxia


(5) Acute encephalopathy


(6) Electrolyte imbalance


Assessment





75-year-old male is admitted with acute hypoxic respiratory failure most likely 

secondary to pneumonia and sepsis, UTI.


Acute on chronic renal failure


Dehydration


Electrolyte imbalances, hypernatremia


Hypoalbuminemia


Diabetes type 2


History of congestive heart failure


Hypertension


Hyperlipemia


Alzheimer's


Previous COVID-19 infection in September 2020


Plan


December 5: Status quo.  Stable from renal standpoint of view.  No labs drawn 

today.  Will check chemistries in a.m.  Continue per consultants.


December 4: Status quo.  Remains stable from renal standpoint of view.  Continue

on nasal cannula post extubation.  Will check lab tomorrow.


December 3: Remains on nasal cannula.  Labs reviewed.  Stable from renal 

standpoint of view.


December 2: Continue to do well post extubation.  Labs reviewed.  Renal 

parameters stable.  Continue per consultants.


December 1: Remain extubated on nasal cannula.  Labs reviewed.  Renal parameters

stable.


November 30: Discussed with RN.  Patient now extubated.  On nasal cannula.  

Renal parameters stable.  Continue per consultants.


November 29: Discussed with LITA Cooper.  Weaning in process.  It seems like 

patient is tolerating better.  Labs reviewed.  Continue per consultants.


November 28: Remains in ICU.  Status unchanged.  Labs and medication list 

reviewed.  Remains full code.  Weaning continues to fail.


November 27: Remains in ICU.  Remains intubated.  Remains full code.  Continues 

to be on weaning trials.  Renal parameters are stable.


November 26: Patient remains in ICU.  Full code.  Intubated.  Fails weaning.  

Labs reviewed.  Stable from renal standpoint of view.  Discussed with RN.


November 25: Labs reviewed.  Discussed with RN.  Abnormal electrolyte addressed.

 Remains intubated on ventilator.  Continue per consultants.


November 24: Labs reviewed.  Discussed with RN.  Stable from renal standpoint of

view.  Main issue is weaning from ventilator that keeps failing.  Continue per 

consultants.


November 23: Labs reviewed.  Discussed with RN.  Stable from renal standpoint of

view.  Continue per consultants.


November 22: Patient seen and discussed with RN.  Labs reviewed.  Remains 

intubated.  Trial of weaning this being attempted.  Continue per consultants.


November 21: Remains intubated.  Labs reviewed.  Renal parameters stable.  

Continue per consultants.


November 20: Remains on mechanical ventilation.  Labs reviewed.  Abnormal e

lectrolytes addressed.  Stable from renal standpoint of view.


November 19: Remains intubated.  Remains full code.  Labs reviewed.  Low 

phosphorus replaced.  Continue to monitor renal parameters.  Continue per 

consultants.


November 18: Failed weaning.  Remains intubated.  Remains full closed.  Labs 

reviewed.  Stable from renal standpoint view.


November 17: Remains in ICU.  Remains full code.  Labs are reviewed.  Phosphorus

supplement given.  Continue per consultants.


November 16: Remains in ICU.  Full code.  Labs reviewed.  Remains intubated.  

Stable renal parameters.


November 15: In ICU.  Full code.  Discussed with RN.  Stable renal parameters.


November 14: Seen in ICU.  Discussed with LITA Cooper.  Flomax discontinued.  

Labs reviewed.  Stable from renal standpoint of view.


November 13: Seen in ICU.  Discussed with LITA Cooper.  Remains on pressor.  

Electrolyte abnormalities noted and addressed.  Continue per consultants.  

Patient remains full code.


November 12: Seen in ICU.  Discussed with LITA Richardson.  Blood pressure remains a 

little requiring pressors.  Labs reviewed.  Stable renal parameters.  Continue 

per consultants.


November 11: Remains intubated.  Full code.  Blood pressure borderline low.  

Will increase midodrine dose.  Discussed with RN.  Continue to monitor renal 

parameters and electrolytes.


November 10: Intubated.  Full code.  Labs reviewed.  Stable from renal 

standpoint of view.  Continue per consultants.


November 9: Remains intubated.  Full code.  Labs reviewed.  Electrolytes within 

normal limit.  Continue per consultants.


November 8: In ICU.  Remains intubated on ventilator.  Remains full code.  Low 

potassium addressed.  Blood pressure fluctuating.  Continue per consultants.


November 7: Patient in ICU.  Intubated on ventilator.  On 6 mics of Levophed.  

Will give 100 cc albumin 25%.  K-Phos IV ordered.  Continue per consultants.  

Continue monitor renal parameters and electrolytes.


November 6: Status unchanged.  Transfer to [facility] for seizure.  Stable from renal 

standpoint to view.  Continue per consultants.


November 5: Status quo.  Labs reviewed.  Renal parameters stable.  Continue per 

consultants.


November 4: On nonrebreather mask.  Labs reviewed.  Renal parameters 

stable.Continue per pulmonologist.  Clinically unchanged.


November 3: On nonrebreather mask.  Inflammatory markers gradually declining.  

Renal parameters stable.  Continue per pulmonary.


November 2: Remains on nonrebreather mask.  Inflammatory markers remain 

elevated.  Renal parameters somewhat stable.  Continue per pulmonary and ID.  

Remains full code.


November 1: Patient on nonrebreather mask.  Labs noted.  Serum creatinine down 

to 1.3.  Continue per consultants.


October 31: Patient on nonrebreather mask.  Transfer to telemetry when seen this

morning.  Labs noted.  Continue per consultants.  Serum creatinine dylan to 1.7. 

Continue to monitor renal parameters.  Continue to monitor vancomycin level


October 30: Patient is not doing well clinically.  Mild respiratory distress.  

ABG noted.  Somewhat hypoxic.  CBC and chemistry panel ordered.  Discussed with 

LITA Garcia.  Will defer to pulmonary management to specialist.  Continue per ID.

 Renal parameters remained stable as of October 29.


October 29: Labs reviewed.  Renal parameters stable.  Continue per consultants.


October 28: Labs reviewed.  Potassium via NG tube ordered.  IV fluids stopped.  

Continue per consultants.


October 27: Labs reviewed.  Low potassium and low phosphorus replaced.  Continue

per consultants.  Remains stable from renal standpoint of view.


October 26: Patient remains n.p.o.  IV fluid down to 50 cc an hour.  Potassium 

supplement intravenously ordered.  Continue per consultants.





Previously:


Patient is n.p.o., will continue on IV fluid of D5W 75 cc an hour


We will monitor electrolytes and renal parameters


Avoid nephrotoxic's


Start p.o. when he clears by speech therapist, meanwhile aspiration precautions


Keep the blood pressure and blood sugar in check


Per orders





Subjective


ROS Limited/Unobtainable:  No


Constitutional:  Reports: malaise





Objective


Objective





Last 24 Hour Vital Signs








  Date Time  Temp Pulse Resp B/P (MAP) Pulse Ox O2 Delivery O2 Flow Rate FiO2


 


12/5/20 08:00      Simple Mask 10.0 


 


12/5/20 08:00  83      


 


12/5/20 07:50 97.9 86 22 148/99 (115) 99   


 


12/5/20 04:19      Simple Mask 10.0 


 


12/5/20 04:00  92      


 


12/5/20 00:04      Simple Mask 10.0 


 


12/4/20 23:45  110      


 


12/4/20 23:36 99.0 108 22 113/59 (77) 96   


 


12/4/20 22:42 100.6       


 


12/4/20 20:00 100.7 120 22 113/71 (85) 96   


 


12/4/20 20:00      Simple Mask 10.0 


 


12/4/20 20:00  117      


 


12/4/20 19:09     96 Simple Mask 10.0 60


 


12/4/20 16:00 97.0 123 22 113/71 (85) 94   


 


12/4/20 16:00      Simple Mask 10.0 


 


12/4/20 16:00  122      


 


12/4/20 12:00      Non-Rebreather 15.0 


 


12/4/20 12:00 97.2 121 22 153/107 (122) 90   


 


12/4/20 12:00  158      

















Intake and Output  


 


 12/4/20 12/5/20





 19:00 07:00


 


Intake Total 110 ml 120 ml


 


Output Total 200 ml 275 ml


 


Balance -90 ml -155 ml


 


  


 


Free Water  120 ml


 


Tube Feeding 110 ml 


 


Output Urine Total 200 ml 275 ml








No labs drawn today for chemistries 





laboratory Tests


12/4/20 23:29: POC Whole Blood Glucose 171H


12/5/20 05:21: POC Whole Blood Glucose 145H


Height (Feet):  5


Height (Inches):  4.00


Weight (Pounds):  130


General Appearance:  no apparent distress


EENT:  other - On simple mask


Cardiovascular:  normal rate, other - Variable rate


Respiratory/Chest:  decreased breath sounds


Abdomen:  distended











Seven Tobar MD             Dec 5, 2020 10:39

## 2020-12-05 NOTE — NUR
NURSE NOTES:

Received report from molly,rn patient on bed, asleep.  on simple mask at 10 lpm. no sob. no 
facial grimacing and moaning noted at the moment. with iv line on the left wrist running ivf 
ordered by md. on GT feeding of glucerna 1.2 @ 60 mls/hr, residual of 10ml. kept head of bed 
elevated. flush gt as ordered. with buck catheter draining well. wound pictures taken by am 
nurse. call light and light button within easy reach.bed alarm on.  bed locked and in lowest 
position. fall risk and aspiration precautions.  will continue plan of care.

## 2020-12-05 NOTE — INTERNAL MED PROGRESS NOTE
Subjective


Physician Name


Benito Hearn


Attending Physician


Andrei Cancino MD





Current Medications








 Medications


  (Trade)  Dose


 Ordered  Sig/Miky


 Route


 PRN Reason  Start Time


 Stop Time Status Last Admin


Dose Admin


 


 Acetaminophen


  (Tylenol)  650 mg  Q4H  PRN


 GT


 Temp >100.5  11/21/20 12:30


 12/21/20 12:29  12/4/20 22:01





 


 Albuterol/


 Ipratropium


  (Albuterol/


 Ipratropium)  3 ml  Q4H  PRN


 HHN


 Shortness of Breath  12/5/20 11:00


 12/10/20 10:59   





 


 Dexamethasone


  (Decadron)  6 mg  DAILY


 ORAL


   12/5/20 12:30


 12/14/20 09:01  12/5/20 13:04





 


 Dextrose


  (Dextrose 50%)  50 ml  Q30M  PRN


 IV


 Hypoglycemia  10/23/20 22:45


 1/21/21 22:44   





 


 Dextrose/Sodium


 Chloride  1,000 ml @ 


 50 mls/hr  Q20H


 IV


   12/5/20 12:15


 12/19/20 20:44  12/5/20 12:15





 


 Heparin Sodium


  (Porcine)


  (Heparin 5000


 units/ml)  5,000 units  EVERY 12  HOURS


 SUBQ


   10/23/20 21:00


 12/7/20 20:59  12/5/20 09:37





 


 Insulin Aspart


  (NovoLOG)    EVERY 6  HOURS


 SUBQ


   11/2/20 06:00


 1/22/21 06:29  12/5/20 11:35





 


 Meropenem 1 gm/


 Sodium Chloride  55 ml @ 


 110 mls/hr  Q8HR


 IVPB


   12/5/20 22:00


 12/10/20 21:59   





 


 Metoclopramide HCl


  (Reglan)  5 mg  EVERY 6  HOURS


 NG


   12/5/20 12:00


 1/4/21 11:59  12/5/20 17:20





 


 Nitroglycerin


  (Ntg)  0.4 mg  Q5M  PRN


 SL


 Prn Chest Pain  11/21/20 10:15


 12/21/20 10:09   





 


 Ondansetron HCl


  (Zofran)  4 mg  Q6H  PRN


 IVP


 Nausea & Vomiting  11/21/20 14:30


 12/21/20 14:29   





 


 Pantoprazole


  (Protonix)  40 mg  DAILY


 IV


   11/7/20 09:00


 12/7/20 08:59  12/5/20 09:36





 


 Polyethylene


 Glycol


  (Miralax)  17 gm  DAILYPRN  PRN


 GT


 Constipation  11/21/20 10:15


 12/21/20 10:14  12/3/20 23:14





 


 Promethazine HCl/


 Codeine


  (Phenergan with


 Codeine)  5 ml  Q4H  PRN


 GT


 For Cough  11/21/20 10:14


 12/21/20 10:13  11/25/20 22:05





 


 Vancomycin HCl  250 ml @ 


 166.667


 mls/hr  Q24H


 IVPB


   12/5/20 18:00


 12/10/20 17:59  12/5/20 17:21





 


 Vancomycin HCl


  ([facility]


 to dose)  1 ea  DAILY  PRN


 MISC


 Per rx protocol  12/5/20 15:45


 1/4/21 15:44   











Allergies:  


Coded Allergies:  


     No Known Allergies (Unverified , 8/20/12)


Subjective


76 YO M admitted with shortness of breath.  Now pneumonia; previously COVID pos

itive.  Cover for Int med-Dr Cancino.   extubated 11/29/20. Failed swallow eval





Objective





Last Vital Signs








  Date Time  Temp Pulse Resp B/P (MAP) Pulse Ox O2 Delivery O2 Flow Rate FiO2


 


12/5/20 11:59 97.8 85 22 152/80 (104) 98   


 


12/5/20 08:00      Simple Mask 10.0 


 


12/4/20 19:09        60











Laboratory Tests








Test


 12/4/20


23:29 12/5/20


05:21 12/5/20


11:28 12/5/20


17:19


 


POC Whole Blood Glucose


 171 MG/DL


()  H 145 MG/DL


()  H 151 MG/DL


()  H 196 MG/DL


()  H

















Intake and Output  


 


 12/4/20 12/5/20





 19:00 07:00


 


Intake Total 110 ml 120 ml


 


Output Total 200 ml 275 ml


 


Balance -90 ml -155 ml


 


  


 


Free Water  120 ml


 


Tube Feeding 110 ml 


 


Output Urine Total 200 ml 275 ml








Objective


PHYSICAL EXAMINATION:


GENERAL:  The patient awake with deep stimuli, open his eyes, however,


cannot follow commands.  The patient is on a Ventimask at this time,


chronically ill-appearing.


HEAD AND NECK:  Pupils are equal and reactive to light.  Anicteric.


NECK:  Supple.  No JVD.


LUNGS:  Nasal cannula;  wheezing, rhonchi, and decreased air in bases.


HEART:  S1, S2.  Regular rhythm.  Distant heart sounds.  No murmur or


gallop.


ABDOMEN:  Soft, nondistended, nontender.  Positive bowel sounds.


EXTREMITIES:  No cyanosis, clubbing, or edema.


NEUROLOGIC:  Very limited secondary to the patient's status, cannot follow


commands.  Opens his eyes with deep stimuli and moving extremities


spontaneously.





Assessment/Plan


Assessment/Plan


ASSESSMENT:


1. Acute hypoxemic respiratory failure, most likely secondary to


pneumonia and sepsis.


2. Sepsis secondary to urinary tract infection and pneumonia.


3. pneumonia=MRSA; ESBL E. coli


4. Acute kidney injury on chronic renal insufficiency.


5. Dehydration.


6. History of chronic congestive heart failure.


7. Diabetes type 2.


8. Dyslipidemia.


9. Hypertension.


10. Alzheimer's disease.


11. COVID 19 previous positive


12.  Dysphagia





PLAN:


1.  ICU


2.  Dr. Sandoval,=Pulmonary Critical Care


3.  Dr. Garcia = Inf Dis.


4.  IV= D5W due to the hypernatremia and dehydration.


5.  antibiotics =   meropenem; S/P micafungin and zyvox


6.  Code status is full code.


7.    DVT prophylaxis is heparin subcutaneous.


8.  Failed swallow eval; PEG eval=Benito Dudley MD                Dec 5, 2020 17:28

## 2020-12-06 VITALS — DIASTOLIC BLOOD PRESSURE: 90 MMHG | SYSTOLIC BLOOD PRESSURE: 143 MMHG

## 2020-12-06 VITALS — SYSTOLIC BLOOD PRESSURE: 130 MMHG | DIASTOLIC BLOOD PRESSURE: 74 MMHG

## 2020-12-06 VITALS — DIASTOLIC BLOOD PRESSURE: 67 MMHG | SYSTOLIC BLOOD PRESSURE: 129 MMHG

## 2020-12-06 VITALS — SYSTOLIC BLOOD PRESSURE: 143 MMHG | DIASTOLIC BLOOD PRESSURE: 91 MMHG

## 2020-12-06 LAB
ADD MANUAL DIFF: YES
ALBUMIN SERPL-MCNC: 1.9 G/DL (ref 3.4–5)
ALBUMIN/GLOB SERPL: 0.4 {RATIO} (ref 1–2.7)
ALP SERPL-CCNC: 170 U/L (ref 46–116)
ALT SERPL-CCNC: 21 U/L (ref 12–78)
ANION GAP SERPL CALC-SCNC: 10 MMOL/L (ref 5–15)
AST SERPL-CCNC: 12 U/L (ref 15–37)
BILIRUB SERPL-MCNC: 0.6 MG/DL (ref 0.2–1)
BUN SERPL-MCNC: 26 MG/DL (ref 7–18)
CALCIUM SERPL-MCNC: 8.7 MG/DL (ref 8.5–10.1)
CHLORIDE SERPL-SCNC: 111 MMOL/L (ref 98–107)
CO2 SERPL-SCNC: 26 MMOL/L (ref 21–32)
CREAT SERPL-MCNC: 0.9 MG/DL (ref 0.55–1.3)
ERYTHROCYTE [DISTWIDTH] IN BLOOD BY AUTOMATED COUNT: 18.1 % (ref 11.6–14.8)
GLOBULIN SER-MCNC: 5 G/DL
HCT VFR BLD CALC: 35.1 % (ref 42–52)
HGB BLD-MCNC: 12 G/DL (ref 14.2–18)
MCV RBC AUTO: 91 FL (ref 80–99)
PHOSPHATE SERPL-MCNC: 2 MG/DL (ref 2.5–4.9)
PLATELET # BLD: 344 K/UL (ref 150–450)
POTASSIUM SERPL-SCNC: 3.7 MMOL/L (ref 3.5–5.1)
RBC # BLD AUTO: 3.87 M/UL (ref 4.7–6.1)
SODIUM SERPL-SCNC: 146 MMOL/L (ref 136–145)
WBC # BLD AUTO: 12.2 K/UL (ref 4.8–10.8)

## 2020-12-06 RX ADMIN — METOCLOPRAMIDE HYDROCHLORIDE SCH MG: 5 SOLUTION ORAL at 11:34

## 2020-12-06 RX ADMIN — MEROPENEM SCH MLS/HR: 1 INJECTION INTRAVENOUS at 13:19

## 2020-12-06 RX ADMIN — METOCLOPRAMIDE HYDROCHLORIDE SCH MG: 5 SOLUTION ORAL at 05:32

## 2020-12-06 RX ADMIN — INSULIN ASPART SCH UNITS: 100 INJECTION, SOLUTION INTRAVENOUS; SUBCUTANEOUS at 11:35

## 2020-12-06 RX ADMIN — HEPARIN SODIUM SCH UNITS: 5000 INJECTION INTRAVENOUS; SUBCUTANEOUS at 21:04

## 2020-12-06 RX ADMIN — MEROPENEM SCH MLS/HR: 1 INJECTION INTRAVENOUS at 21:03

## 2020-12-06 RX ADMIN — MEROPENEM SCH MLS/HR: 1 INJECTION INTRAVENOUS at 05:32

## 2020-12-06 RX ADMIN — INSULIN ASPART SCH UNITS: 100 INJECTION, SOLUTION INTRAVENOUS; SUBCUTANEOUS at 23:31

## 2020-12-06 RX ADMIN — INSULIN ASPART SCH UNITS: 100 INJECTION, SOLUTION INTRAVENOUS; SUBCUTANEOUS at 17:11

## 2020-12-06 RX ADMIN — HUMAN INSULIN SCH MLS/HR: 100 INJECTION, SOLUTION SUBCUTANEOUS at 08:35

## 2020-12-06 RX ADMIN — HEPARIN SODIUM SCH UNITS: 5000 INJECTION INTRAVENOUS; SUBCUTANEOUS at 08:38

## 2020-12-06 RX ADMIN — INSULIN ASPART SCH UNITS: 100 INJECTION, SOLUTION INTRAVENOUS; SUBCUTANEOUS at 05:33

## 2020-12-06 RX ADMIN — METOCLOPRAMIDE HYDROCHLORIDE SCH MG: 5 SOLUTION ORAL at 23:30

## 2020-12-06 RX ADMIN — METOCLOPRAMIDE HYDROCHLORIDE SCH MG: 5 SOLUTION ORAL at 17:09

## 2020-12-06 RX ADMIN — PANTOPRAZOLE SODIUM SCH MG: 40 INJECTION, POWDER, FOR SOLUTION INTRAVENOUS at 08:35

## 2020-12-06 NOTE — NUR
NURSE HAND-OFF: 



Important Events on Shift:[IV fluids, monitoring labs and VS, monitoring for nausea and 
vomiting]

Patient Status: [stable]

Diet: [Glucerna 1.5 at 50 cc/hr]



Pending Orders: []

Pending Results/Labs:[]

Pending MD notification:[]



Latest Vital Signs: Temperature 98.7 , Pulse 90 , B/P 143 /90 , Respiratory Rate 20 , O2 SAT 
99 , Simple Mask, O2 Flow Rate 10.0 .  

Vital Sign Comment: []



Latest Mejia Fall Score: 45  

Fall Risk: High Risk 

Safety Measures: Call light Within Reach, Bed Alarm Zone 1, Side Rails Side Rails x3, Bed 
position Low and Locked.

Fall Precautions: 

Yellow Socks

Yellow Gown

Door Sign

Patient Fall Education



Report given to [LITA Blake].

## 2020-12-06 NOTE — PULMONOLOGY PROGRESS NOTE
Subjective


ROS Limited/Unobtainable:  No


Constitutional:  Reports: no symptoms


Hematologic:  Reports: no symptoms


Musculoskeletal:  Reports: no symptoms


Allergies:  


Coded Allergies:  


     No Known Allergies (Unverified , 8/20/12)


Subjective


leukocytosis  this am


started 12/5 on abx given low grade fevers and increased resp requirement on 10 

L o2 via simple mask


transferred to [facility] floor


this am leucocytosis





Objective





Last 24 Hour Vital Signs








  Date Time  Temp Pulse Resp B/P (MAP) Pulse Ox O2 Delivery O2 Flow Rate FiO2


 


12/6/20 09:00      Simple Mask 10.0 


 


12/6/20 08:10 98.2 90 20 143/91 (108) 97   


 


12/6/20 00:00 98.1 80 18 130/74 (92) 98   


 


12/5/20 21:00      Simple Mask 10.0 


 


12/5/20 20:00 98.4 92 19 133/92 (106) 96   


 


12/5/20 19:18     98 Simple Mask 10.0 60


 


12/5/20 11:59 97.8 85 22 152/80 (104) 98   

















Intake and Output  


 


 12/5/20 12/6/20





 19:00 07:00


 


Intake Total 390 ml 


 


Output Total 400 ml 350 ml


 


Balance -10 ml -350 ml


 


  


 


Free Water 150 ml 


 


Tube Feeding 240 ml 


 


Output Urine Total 400 ml 350 ml








Objective


 


General Appearance:  no apparent distress,   on simple mask 10 L O2 /min


Lines, tubes and drains: R PICC intact 


HEENT:  normocephalic, atraumatic, anicteric,   


Respiratory/Chest:  BS overall clear, few  isolated  rhonchi


Cardiovascular/Chest:  normal peripheral pulses, SR on tele 


Abdomen:  normal bowel sounds, non tender, soft 


: Cummings 


Extremities:  no calf tenderness, normal capillary refill


Neurologic:  abnormal gait /bedridden


Musculoskeletal:  atrophy - BLE


Accucheck:  145


Laboratory Tests


12/5/20 11:28: POC Whole Blood Glucose 151H


12/5/20 17:19: POC Whole Blood Glucose 196H


12/5/20 18:00: 


Urine Color Yellow, Urine Appearance Slightly cloudy, Urine pH 5, Urine Specific

Gravity 1.025, Urine Protein 3+H, Urine Glucose (UA) 3+H, Urine Ketones 

Negative, Urine Blood 2+H, Urine Nitrite Negative, Urine Bilirubin Negative, 

Urine Urobilinogen 1H, Urine Leukocyte Esterase Negative, Urine RBC 5-10H, Urine

WBC 2-4, Urine Squamous Epithelial Cells None, Urine Bacteria Few


12/6/20 07:15: 


White Blood Count 12.2H, Red Blood Count 3.87L, Hemoglobin 12.0L, Hematocrit 

35.1L, Mean Corpuscular Volume 91, Mean Corpuscular Hemoglobin 30.9, Mean 

Corpuscular Hemoglobin Concent 34.1, Red Cell Distribution Width 18.1H, Platelet

Count 344, Mean Platelet Volume 7.7, Neutrophils (%) (Auto) , Lymphocytes (%) 

(Auto) , Monocytes (%) (Auto) , Eosinophils (%) (Auto) , Basophils (%) (Auto) , 

Differential Total Cells Counted 100, Neutrophils % (Manual) 89H, Lymphocytes % 

(Manual) 5L, Monocytes % (Manual) 6, Eosinophils % (Manual) 0, Basophils % 

(Manual) 0, Band Neutrophils 0, Platelet Estimate Adequate, Platelet Morphology 

Normal, Hypochromasia 1+, Anisocytosis 2+, Sodium Level 146H, Potassium Level 

3.7, Chloride Level 111H, Carbon Dioxide Level 26, Anion Gap 10, Blood Urea 

Nitrogen 26H, Creatinine 0.9, Estimat Glomerular Filtration Rate > 60, Glucose 

Level 150H, Calcium Level 8.7, Phosphorus Level 2.0L, Magnesium Level 1.9, Total

Bilirubin 0.6, Aspartate Amino Transf (AST/SGOT) 12L, Alanine Aminotransferase 

(ALT/SGPT) 21, Alkaline Phosphatase 170H, C-Reactive Protein, Quantitative 22.5H

, Total Protein 6.9, Albumin 1.9L, Globulin 5.0, Albumin/Globulin Ratio 0.4L





Current Medications








 Medications


  (Trade)  Dose


 Ordered  Sig/Miky


 Route


 PRN Reason  Start Time


 Stop Time Status Last Admin


Dose Admin


 


 Acetaminophen


  (Tylenol)  650 mg  Q4H  PRN


 GT


 Temp >100.5  11/21/20 12:30


 12/21/20 12:29  12/4/20 22:01





 


 Albuterol/


 Ipratropium


  (Albuterol/


 Ipratropium)  3 ml  Q4H  PRN


 HHN


 Shortness of Breath  12/5/20 11:00


 12/10/20 10:59   





 


 Dexamethasone


  (Decadron)  6 mg  DAILY


 ORAL


   12/5/20 12:30


 12/14/20 09:01  12/6/20 08:35





 


 Dextrose


  (Dextrose 50%)  50 ml  Q30M  PRN


 IV


 Hypoglycemia  10/23/20 22:45


 1/21/21 22:44   





 


 Dextrose/Sodium


 Chloride  1,000 ml @ 


 50 mls/hr  Q20H


 IV


   12/5/20 12:15


 12/19/20 20:44  12/6/20 08:35





 


 Heparin Sodium


  (Porcine)


  (Heparin 5000


 units/ml)  5,000 units  EVERY 12  HOURS


 SUBQ


   10/23/20 21:00


 12/7/20 20:59  12/6/20 08:38





 


 Insulin Aspart


  (NovoLOG)    EVERY 6  HOURS


 SUBQ


   11/2/20 06:00


 1/22/21 06:29  12/5/20 18:03





 


 Meropenem 1 gm/


 Sodium Chloride  55 ml @ 


 110 mls/hr  Q8HR


 IVPB


   12/5/20 22:00


 12/10/20 21:59  12/6/20 05:32





 


 Metoclopramide HCl


  (Reglan)  10 mg  EVERY 6  HOURS


 NG


   12/6/20 12:00


 1/5/21 11:59   





 


 Nitroglycerin


  (Ntg)  0.4 mg  Q5M  PRN


 SL


 Prn Chest Pain  11/21/20 10:15


 12/21/20 10:09   





 


 Pantoprazole


  (Protonix)  40 mg  DAILY


 IV


   11/7/20 09:00


 12/7/20 08:59  12/6/20 08:35





 


 Polyethylene


 Glycol


  (Miralax)  17 gm  DAILYPRN  PRN


 GT


 Constipation  11/21/20 10:15


 12/21/20 10:14  12/3/20 23:14





 


 Vancomycin HCl  250 ml @ 


 166.667


 mls/hr  Q24H


 IVPB


   12/5/20 18:00


 12/10/20 17:59  12/5/20 17:21





 


 Vancomycin HCl


  ([facility]


 to dose)  1 ea  DAILY  PRN


 MISC


 Per rx protocol  12/5/20 15:45


 1/4/21 15:44   














Assessment/Plan


Assessment/Plan


ASSESSMENT


Acute hypoxemic respiratory failure , requiring  intubation ( initially 100% 

NRM)  -s/p extubation 


Septic shock -resolved 


Pneumonia , possible aspiration


Probable UTI


Hx of COVID 19   -> 9/20 


Dysphagia


JANES-resolved 


Hypernatremia


History of CVA


Diabetes mellitus


History of hypertension


Alzheimer dementia





PLAN OF CARE


MS xochitl


s/p extubation


O2 titrate to keep sat above 92% 


pulm toilet 


abx restarted by ID 12/5 , given low grade fever and increased resp requirements




fup CXR  in am and ABG prn 


 


off pressors, then was on  Midodrine, now off Midodrine as well  


BP stable 


ECHO with pEF, r/o for MI


cardio on board 


s/p abx   


DVT and GI  prophylaxis 


strict aspiration precaution


 monitor renal parameters,  lytes , correct electrolytes as needed ,avoid 

nephrotoxic


BS management with SSI


supportive care   


remains FC   





case discussed and evaluated by supervising physician














Ely Ni NP                 Dec 6, 2020 10:00

## 2020-12-06 NOTE — GENERAL PROGRESS NOTE
Subjective


ROS Limited/Unobtainable:  No


Allergies:  


Coded Allergies:  


     No Known Allergies (Unverified , 8/20/12)





Objective





Last 24 Hour Vital Signs








  Date Time  Temp Pulse Resp B/P (MAP) Pulse Ox O2 Delivery O2 Flow Rate FiO2


 


12/6/20 08:10 98.2 90 20 143/91 (108) 97   


 


12/6/20 00:00 98.1 80 18 130/74 (92) 98   


 


12/5/20 21:00      Simple Mask 10.0 


 


12/5/20 20:00 98.4 92 19 133/92 (106) 96   


 


12/5/20 19:18     98 Simple Mask 10.0 60


 


12/5/20 11:59 97.8 85 22 152/80 (104) 98   

















Intake and Output  


 


 12/5/20 12/6/20





 19:00 07:00


 


Intake Total 390 ml 


 


Output Total 400 ml 350 ml


 


Balance -10 ml -350 ml


 


  


 


Free Water 150 ml 


 


Tube Feeding 240 ml 


 


Output Urine Total 400 ml 350 ml








Laboratory Tests


12/5/20 11:28: POC Whole Blood Glucose 151H


12/5/20 17:19: POC Whole Blood Glucose 196H


12/5/20 18:00: 


Urine Color Yellow, Urine Appearance Slightly cloudy, Urine pH 5, Urine Specific

Gravity 1.025, Urine Protein 3+H, Urine Glucose (UA) 3+H, Urine Ketones 

Negative, Urine Blood 2+H, Urine Nitrite Negative, Urine Bilirubin Negative, 

Urine Urobilinogen 1H, Urine Leukocyte Esterase Negative, Urine RBC 5-10H, Urine

WBC 2-4, Urine Squamous Epithelial Cells None, Urine Bacteria Few


12/6/20 07:15: 


White Blood Count 12.2H, Red Blood Count 3.87L, Hemoglobin 12.0L, Hematocrit 

35.1L, Mean Corpuscular Volume 91, Mean Corpuscular Hemoglobin 30.9, Mean 

Corpuscular Hemoglobin Concent 34.1, Red Cell Distribution Width 18.1H, Platelet

Count 344, Mean Platelet Volume 7.7, Neutrophils (%) (Auto) , Lymphocytes (%) 

(Auto) , Monocytes (%) (Auto) , Eosinophils (%) (Auto) , Basophils (%) (Auto) , 

Neutrophils % (Manual) [Pending], Lymphocytes % (Manual) [Pending], Platelet 

Estimate [Pending], Platelet Morphology [Pending], Sodium Level 146H, Potassium 

Level 3.7, Chloride Level 111H, Carbon Dioxide Level 26, Anion Gap 10, Blood 

Urea Nitrogen 26H, Creatinine 0.9, Estimat Glomerular Filtration Rate > 60, 

Glucose Level 150H, Calcium Level 8.7, Phosphorus Level 2.0L, Magnesium Level 

1.9, Total Bilirubin 0.6, Aspartate Amino Transf (AST/SGOT) 12L, Alanine 

Aminotransferase (ALT/SGPT) 21, Alkaline Phosphatase 170H, C-Reactive Protein, 

Quantitative 22.5H, Total Protein 6.9, Albumin 1.9L, Globulin 5.0, 

Albumin/Globulin Ratio 0.4L


Height (Feet):  5


Height (Inches):  4.00


Weight (Pounds):  130


General Appearance:  no apparent distress


EENT:  PERRL/EOMI


Cardiovascular:  normal rate


Abdomen:  normal bowel sounds, non tender, soft


Extremities:  non-tender





Assessment/Plan


Problem List:  


(1) HTN (hypertension)


ICD Codes:  I10 - Essential (primary) hypertension


SNOMED:  84108794


(2) BPH (benign prostatic hyperplasia)


ICD Codes:  N40.0 - Benign prostatic hyperplasia without lower urinary tract 

symptoms


SNOMED:  952126648, 889356000


(3) History of CVA (cerebrovascular accident)


ICD Codes:  Z86.73 - Personal history of transient ischemic attack (TIA), and 

cerebral infarction without residual deficits; J12.89 - Other viral pneumonia


SNOMED:  450220438, 359350195


(4) Multifocal pneumonia


ICD Codes:  J18.9 - Pneumonia, unspecified organism; J12.89 - Other viral 

pneumonia


SNOMED:  633422611, 150176998


(5) Aphasia


ICD Codes:  R47.01 - Aphasia; J12.89 - Other viral pneumonia


SNOMED:  60914794, 874432835


Assessment/Plan:


s/p EGD/PEG


GTF will start


add reglan IV


Change TF to Glucerna 1.5


monitor for residuals


GT flush 


will fu











Marcos Ojeda MD              Dec 6, 2020 09:26

## 2020-12-06 NOTE — NUR
NURSE HAND-OFF: 



Important Events on Shift: culture sputum collected by RT and sent to lab; o2 care, buck 
care, NPO

Patient Status: STABLE

Diet: NPO



Pending Orders: 

Pending Results/Labs:

Pending MD notification:



Latest Vital Signs: Temperature 98.1 , Pulse 80 , B/P 130 /74 , Respiratory Rate 18 , O2 SAT 
98 , Simple Mask, O2 Flow Rate 10.0 .  

Vital Sign Comment: 



Latest Mejia Fall Score: 45  

Fall Risk: High Risk 

Safety Measures: Call light Within Reach, Bed Alarm Zone 1, Side Rails Side Rails x3, Bed 
position Low and Locked.

Fall Precautions: 

Yellow Socks

Yellow Gown

Door Sign

Patient Fall Education


-------------------------------------------------------------------------------

Addendum: 12/06/20 at 0731 by Delilah Enciso RN

-------------------------------------------------------------------------------

HAND-OFF: 

Report given to swati barnes.

## 2020-12-06 NOTE — NEPHROLOGY PROGRESS NOTE
Assessment/Plan


Problem List:  


(1) Dehydration


(2) JANES (acute kidney injury)


(3) Renal failure (ARF), acute on chronic


(4) Hypoxia


(5) Acute encephalopathy


(6) Electrolyte imbalance


Assessment





75-year-old male is admitted with acute hypoxic respiratory failure most likely 

secondary to pneumonia and sepsis, UTI.


Acute on chronic renal failure


Dehydration


Electrolyte imbalances, hypernatremia


Hypoalbuminemia


Diabetes type 2


History of congestive heart failure


Hypertension


Hyperlipemia


Alzheimer's


Previous COVID-19 infection in September 2020


Plan


December 6: Labs reviewed.  Low phosphorus addressed.  Continue on simple mask. 

Remains stable from renal standpoint of view.  Continue per consultants.


December 5: Status quo.  Stable from renal standpoint of view.  No labs drawn 

today.  Will check chemistries in a.m.  Continue per consultants.


December 4: Status quo.  Remains stable from renal standpoint of view.  Continue

on nasal cannula post extubation.  Will check lab tomorrow.


December 3: Remains on nasal cannula.  Labs reviewed.  Stable from renal 

standpoint of view.


December 2: Continue to do well post extubation.  Labs reviewed.  Renal 

parameters stable.  Continue per consultants.


December 1: Remain extubated on nasal cannula.  Labs reviewed.  Renal parameters

stable.


November 30: Discussed with RN.  Patient now extubated.  On nasal cannula.  

Renal parameters stable.  Continue per consultants.


November 29: Discussed with LITA Cooper.  Weaning in process.  It seems like 

patient is tolerating better.  Labs reviewed.  Continue per consultants.


November 28: Remains in ICU.  Status unchanged.  Labs and medication list 

reviewed.  Remains full code.  Weaning continues to fail.


November 27: Remains in ICU.  Remains intubated.  Remains full code.  Continues 

to be on weaning trials.  Renal parameters are stable.


November 26: Patient remains in ICU.  Full code.  Intubated.  Fails weaning.  

Labs reviewed.  Stable from renal standpoint of view.  Discussed with RN.


November 25: Labs reviewed.  Discussed with RN.  Abnormal electrolyte addressed.

 Remains intubated on ventilator.  Continue per consultants.


November 24: Labs reviewed.  Discussed with RN.  Stable from renal standpoint of

view.  Main issue is weaning from ventilator that keeps failing.  Continue per 

consultants.


November 23: Labs reviewed.  Discussed with RN.  Stable from renal standpoint of

view.  Continue per consultants.


November 22: Patient seen and discussed with RN.  Labs reviewed.  Remains 

intubated.  Trial of weaning this being attempted.  Continue per consultants.


November 21: Remains intubated.  Labs reviewed.  Renal parameters stable.  

Continue per consultants.


November 20: Remains on mechanical ventilation.  Labs reviewed.  Abnormal 

electrolytes addressed.  Stable from renal standpoint of view.


November 19: Remains intubated.  Remains full code.  Labs reviewed.  Low 

phosphorus replaced.  Continue to monitor renal parameters.  Continue per 

consultants.


November 18: Failed weaning.  Remains intubated.  Remains full closed.  Labs 

reviewed.  Stable from renal standpoint view.


November 17: Remains in ICU.  Remains full code.  Labs are reviewed.  Phosphorus

supplement given.  Continue per consultants.


November 16: Remains in ICU.  Full code.  Labs reviewed.  Remains intubated.  

Stable renal parameters.


November 15: In ICU.  Full code.  Discussed with RN.  Stable renal parameters.


November 14: Seen in ICU.  Discussed with LITA Cooper.  Flomax discontinued.  

Labs reviewed.  Stable from renal standpoint of view.


November 13: Seen in ICU.  Discussed with LITA Cooper.  Remains on pressor.  

Electrolyte abnormalities noted and addressed.  Continue per consultants.  

Patient remains full code.


November 12: Seen in ICU.  Discussed with LITA Richardson.  Blood pressure remains a 

little requiring pressors.  Labs reviewed.  Stable renal parameters.  Continue 

per consultants.


November 11: Remains intubated.  Full code.  Blood pressure borderline low.  

Will increase midodrine dose.  Discussed with RN.  Continue to monitor renal 

parameters and electrolytes.


November 10: Intubated.  Full code.  Labs reviewed.  Stable from renal 

standpoint of view.  Continue per consultants.


November 9: Remains intubated.  Full code.  Labs reviewed.  Electrolytes within 

normal limit.  Continue per consultants.


November 8: In ICU.  Remains intubated on ventilator.  Remains full code.  Low p

otassium addressed.  Blood pressure fluctuating.  Continue per consultants.


November 7: Patient in ICU.  Intubated on ventilator.  On 6 mics of Levophed.  

Will give 100 cc albumin 25%.  K-Phos IV ordered.  Continue per consultants.  

Continue monitor renal parameters and electrolytes.


November 6: Status unchanged.  Transfer to [facility] for seizure.  Stable from renal 

standpoint to view.  Continue per consultants.


November 5: Status quo.  Labs reviewed.  Renal parameters stable.  Continue per 

consultants.


November 4: On nonrebreather mask.  Labs reviewed.  Renal parameters 

stable.Continue per pulmonologist.  Clinically unchanged.


November 3: On nonrebreather mask.  Inflammatory markers gradually declining.  

Renal parameters stable.  Continue per pulmonary.


November 2: Remains on nonrebreather mask.  Inflammatory markers remain 

elevated.  Renal parameters somewhat stable.  Continue per pulmonary and ID.  

Remains full code.


November 1: Patient on nonrebreather mask.  Labs noted.  Serum creatinine down 

to 1.3.  Continue per consultants.


October 31: Patient on nonrebreather mask.  Transfer to telemetry when seen this

morning.  Labs noted.  Continue per consultants.  Serum creatinine dylan to 1.7. 

Continue to monitor renal parameters.  Continue to monitor vancomycin level


October 30: Patient is not doing well clinically.  Mild respiratory distress.  

ABG noted.  Somewhat hypoxic.  CBC and chemistry panel ordered.  Discussed with 

LITA Garcia.  Will defer to pulmonary management to specialist.  Continue per ID.

 Renal parameters remained stable as of October 29.


October 29: Labs reviewed.  Renal parameters stable.  Continue per consultants.


October 28: Labs reviewed.  Potassium via NG tube ordered.  IV fluids stopped.  

Continue per consultants.


October 27: Labs reviewed.  Low potassium and low phosphorus replaced.  Continue

per consultants.  Remains stable from renal standpoint of view.


October 26: Patient remains n.p.o.  IV fluid down to 50 cc an hour.  Potassium 

supplement intravenously ordered.  Continue per consultants.





Previously:


Patient is n.p.o., will continue on IV fluid of D5W 75 cc an hour


We will monitor electrolytes and renal parameters


Avoid nephrotoxic's


Start p.o. when he clears by speech therapist, meanwhile aspiration precautions


Keep the blood pressure and blood sugar in check


Per orders





Subjective


ROS Limited/Unobtainable:  No


Constitutional:  Reports: malaise, weakness





Objective


Objective





Last 24 Hour Vital Signs








  Date Time  Temp Pulse Resp B/P (MAP) Pulse Ox O2 Delivery O2 Flow Rate FiO2


 


12/6/20 09:00      Simple Mask 10.0 


 


12/6/20 08:10 98.2 90 20 143/91 (108) 97   


 


12/6/20 00:00 98.1 80 18 130/74 (92) 98   


 


12/5/20 21:00      Simple Mask 10.0 


 


12/5/20 20:00 98.4 92 19 133/92 (106) 96   


 


12/5/20 19:18     98 Simple Mask 10.0 60


 


12/5/20 11:59 97.8 85 22 152/80 (104) 98   

















Intake and Output  


 


 12/5/20 12/6/20





 19:00 07:00


 


Intake Total 390 ml 


 


Output Total 400 ml 350 ml


 


Balance -10 ml -350 ml


 


  


 


Free Water 150 ml 


 


Tube Feeding 240 ml 


 


Output Urine Total 400 ml 350 ml











Current Medications








 Medications


  (Trade)  Dose


 Ordered  Sig/Miky


 Route


 PRN Reason  Start Time


 Stop Time Status Last Admin


Dose Admin


 


 Acetaminophen


  (Tylenol)  650 mg  Q4H  PRN


 GT


 Temp >100.5  11/21/20 12:30


 12/21/20 12:29  12/4/20 22:01





 


 Albuterol/


 Ipratropium


  (Albuterol/


 Ipratropium)  3 ml  Q4H  PRN


 HHN


 Shortness of Breath  12/5/20 11:00


 12/10/20 10:59   





 


 Dexamethasone


  (Decadron)  6 mg  DAILY


 ORAL


   12/5/20 12:30


 12/14/20 09:01  12/6/20 08:35





 


 Dextrose


  (Dextrose 50%)  50 ml  Q30M  PRN


 IV


 Hypoglycemia  10/23/20 22:45


 1/21/21 22:44   





 


 Dextrose/Sodium


 Chloride  1,000 ml @ 


 50 mls/hr  Q20H


 IV


   12/5/20 12:15


 12/19/20 20:44  12/6/20 08:35





 


 Heparin Sodium


  (Porcine)


  (Heparin 5000


 units/ml)  5,000 units  EVERY 12  HOURS


 SUBQ


   10/23/20 21:00


 12/7/20 20:59  12/6/20 08:38





 


 Insulin Aspart


  (NovoLOG)    EVERY 6  HOURS


 SUBQ


   11/2/20 06:00


 1/22/21 06:29  12/5/20 18:03





 


 Meropenem 1 gm/


 Sodium Chloride  55 ml @ 


 110 mls/hr  Q8HR


 IVPB


   12/5/20 22:00


 12/10/20 21:59  12/6/20 05:32





 


 Metoclopramide HCl


  (Reglan)  10 mg  EVERY 6  HOURS


 NG


   12/6/20 12:00


 1/5/21 11:59   





 


 Nitroglycerin


  (Ntg)  0.4 mg  Q5M  PRN


 SL


 Prn Chest Pain  11/21/20 10:15


 12/21/20 10:09   





 


 Pantoprazole


  (Protonix)  40 mg  DAILY


 IV


   11/7/20 09:00


 12/7/20 08:59  12/6/20 08:35





 


 Polyethylene


 Glycol


  (Miralax)  17 gm  DAILYPRN  PRN


 GT


 Constipation  11/21/20 10:15


 12/21/20 10:14  12/3/20 23:14





 


 Vancomycin HCl  250 ml @ 


 166.667


 mls/hr  Q24H


 IVPB


   12/5/20 18:00


 12/10/20 17:59  12/5/20 17:21





 


 Vancomycin HCl


  ([facility]


 to dose)  1 ea  DAILY  PRN


 MISC


 Per rx protocol  12/5/20 15:45


 1/4/21 15:44   








Laboratory Tests


12/5/20 11:28: POC Whole Blood Glucose 151H


12/5/20 17:19: POC Whole Blood Glucose 196H


12/5/20 18:00: 


Urine Color Yellow, Urine Appearance Slightly cloudy, Urine pH 5, Urine Specific

Gravity 1.025, Urine Protein 3+H, Urine Glucose (UA) 3+H, Urine Ketones 

Negative, Urine Blood 2+H, Urine Nitrite Negative, Urine Bilirubin Negative, 

Urine Urobilinogen 1H, Urine Leukocyte Esterase Negative, Urine RBC 5-10H, Urine

WBC 2-4, Urine Squamous Epithelial Cells None, Urine Bacteria Few


12/6/20 07:15: 


White Blood Count 12.2H, Red Blood Count 3.87L, Hemoglobin 12.0L, Hematocrit 

35.1L, Mean Corpuscular Volume 91, Mean Corpuscular Hemoglobin 30.9, Mean 

Corpuscular Hemoglobin Concent 34.1, Red Cell Distribution Width 18.1H, Platelet

Count 344, Mean Platelet Volume 7.7, Neutrophils (%) (Auto) , Lymphocytes (%) 

(Auto) , Monocytes (%) (Auto) , Eosinophils (%) (Auto) , Basophils (%) (Auto) , 

Differential Total Cells Counted 100, Neutrophils % (Manual) 89H, Lymphocytes % 

(Manual) 5L, Monocytes % (Manual) 6, Eosinophils % (Manual) 0, Basophils % 

(Manual) 0, Band Neutrophils 0, Platelet Estimate Adequate, Platelet Morphology 

Normal, Hypochromasia 1+, Anisocytosis 2+, Sodium Level 146H, Potassium Level 

3.7, Chloride Level 111H, Carbon Dioxide Level 26, Anion Gap 10, Blood Urea 

Nitrogen 26H, Creatinine 0.9, Estimat Glomerular Filtration Rate > 60, Glucose 

Level 150H, Calcium Level 8.7, Phosphorus Level 2.0L, Magnesium Level 1.9, Total

Bilirubin 0.6, Aspartate Amino Transf (AST/SGOT) 12L, Alanine Aminotransferase 

(ALT/SGPT) 21, Alkaline Phosphatase 170H, C-Reactive Protein, Quantitative 22.5H

, Total Protein 6.9, Albumin 1.9L, Globulin 5.0, Albumin/Globulin Ratio 0.4L


Height (Feet):  5


Height (Inches):  4.00


Weight (Pounds):  130


General Appearance:  no apparent distress, lethargic


EENT:  other - On simple mask


Cardiovascular:  tachycardia


Respiratory/Chest:  decreased breath sounds


Abdomen:  distended











Seven Tobar MD             Dec 6, 2020 11:07

## 2020-12-06 NOTE — INTERNAL MED PROGRESS NOTE
Subjective


Date of Service:  Dec 6, 2020


Physician Name


Benito Hearn


Attending Physician


Andrei Cancino MD





Current Medications








 Medications


  (Trade)  Dose


 Ordered  Sig/Miky


 Route


 PRN Reason  Start Time


 Stop Time Status Last Admin


Dose Admin


 


 Acetaminophen


  (Tylenol)  650 mg  Q4H  PRN


 GT


 Temp >100.5  11/21/20 12:30


 12/21/20 12:29  12/4/20 22:01





 


 Albuterol/


 Ipratropium


  (Albuterol/


 Ipratropium)  3 ml  Q4H  PRN


 HHN


 Shortness of Breath  12/5/20 11:00


 12/10/20 10:59   





 


 Dexamethasone


  (Decadron)  6 mg  DAILY


 ORAL


   12/5/20 12:30


 12/14/20 09:01  12/6/20 08:35





 


 Dextrose


  (Dextrose 50%)  50 ml  Q30M  PRN


 IV


 Hypoglycemia  10/23/20 22:45


 1/21/21 22:44   





 


 Dextrose/Sodium


 Chloride  1,000 ml @ 


 50 mls/hr  Q20H


 IV


   12/5/20 12:15


 12/19/20 20:44  12/6/20 08:35





 


 Heparin Sodium


  (Porcine)


  (Heparin 5000


 units/ml)  5,000 units  EVERY 12  HOURS


 SUBQ


   10/23/20 21:00


 12/7/20 20:59  12/6/20 08:38





 


 Insulin Aspart


  (NovoLOG)    EVERY 6  HOURS


 SUBQ


   11/2/20 06:00


 1/22/21 06:29  12/6/20 11:35





 


 Meropenem 1 gm/


 Sodium Chloride  55 ml @ 


 110 mls/hr  Q8HR


 IVPB


   12/5/20 22:00


 12/10/20 21:59  12/6/20 05:32





 


 Metoclopramide HCl


  (Reglan)  10 mg  EVERY 6  HOURS


 NG


   12/6/20 12:00


 1/5/21 11:59  12/6/20 11:34





 


 Nitroglycerin


  (Ntg)  0.4 mg  Q5M  PRN


 SL


 Prn Chest Pain  11/21/20 10:15


 12/21/20 10:09   





 


 Pantoprazole


  (Protonix)  40 mg  DAILY


 IV


   11/7/20 09:00


 12/7/20 08:59  12/6/20 08:35





 


 Polyethylene


 Glycol


  (Miralax)  17 gm  DAILYPRN  PRN


 GT


 Constipation  11/21/20 10:15


 12/21/20 10:14  12/3/20 23:14





 


 Potassium


 Phosphate 20 mm/


 Sodium Chloride  281.6667


 ml @ 


 46.944 m...  ONCE  ONCE


 IV


   12/6/20 13:00


 12/6/20 18:59   





 


 Vancomycin HCl  250 ml @ 


 166.667


 mls/hr  Q24H


 IVPB


   12/5/20 18:00


 12/10/20 17:59  12/5/20 17:21





 


 Vancomycin HCl


  ([facility]


 to dose)  1 ea  DAILY  PRN


 MISC


 Per rx protocol  12/5/20 15:45


 1/4/21 15:44   











Allergies:  


Coded Allergies:  


     No Known Allergies (Unverified , 8/20/12)


ROS Limited/Unobtainable:  Yes


Subjective


76 YO M admitted with shortness of breath.  Now pneumonia; previously COVID p

ositive.  Cover for Int med-Dr Cancino.   extubated 11/29/20.  S/P PEG 12/3/20





Objective





Last Vital Signs








  Date Time  Temp Pulse Resp B/P (MAP) Pulse Ox O2 Delivery O2 Flow Rate FiO2


 


12/6/20 09:00      Simple Mask 10.0 


 


12/6/20 08:10 98.2 90 20 143/91 (108) 97   


 


12/5/20 19:18        60











Laboratory Tests








Test


 12/5/20


17:19 12/5/20


18:00 12/6/20


07:15 12/6/20


11:28


 


POC Whole Blood Glucose


 196 MG/DL


()  H 


 


 157 MG/DL


()  H


 


Urine Color  Yellow    


 


Urine Appearance


 


 Slightly


cloudy 


 





 


Urine pH  5 (4.5-8.0)    


 


Urine Specific Gravity


 


 1.025


(1.005-1.035) 


 





 


Urine Protein


 


 3+ (NEGATIVE)


H 


 





 


Urine Glucose (UA)


 


 3+ (NEGATIVE)


H 


 





 


Urine Ketones


 


 Negative


(NEGATIVE) 


 





 


Urine Blood


 


 2+ (NEGATIVE)


H 


 





 


Urine Nitrite


 


 Negative


(NEGATIVE) 


 





 


Urine Bilirubin


 


 Negative


(NEGATIVE) 


 





 


Urine Urobilinogen


 


 1 MG/DL


(0.0-1.0)  H 


 





 


Urine Leukocyte Esterase


 


 Negative


(NEGATIVE) 


 





 


Urine RBC


 


 5-10 /HPF (0 -


0)  H 


 





 


Urine WBC


 


 2-4 /HPF (0 -


0) 


 





 


Urine Squamous Epithelial


Cells 


 None /LPF


(NONE/OCC) 


 





 


Urine Bacteria


 


 Few /HPF


(NONE) 


 





 


White Blood Count


 


 


 12.2 K/UL


(4.8-10.8)  H 





 


Red Blood Count


 


 


 3.87 M/UL


(4.70-6.10)  L 





 


Hemoglobin


 


 


 12.0 G/DL


(14.2-18.0)  L 





 


Hematocrit


 


 


 35.1 %


(42.0-52.0)  L 





 


Mean Corpuscular Volume   91 FL (80-99)   


 


Mean Corpuscular Hemoglobin


 


 


 30.9 PG


(27.0-31.0) 





 


Mean Corpuscular Hemoglobin


Concent 


 


 34.1 G/DL


(32.0-36.0) 





 


Red Cell Distribution Width


 


 


 18.1 %


(11.6-14.8)  H 





 


Platelet Count


 


 


 344 K/UL


(150-450) 





 


Mean Platelet Volume


 


 


 7.7 FL


(6.5-10.1) 





 


Neutrophils (%) (Auto)


 


 


 % (45.0-75.0)


 





 


Lymphocytes (%) (Auto)


 


 


 % (20.0-45.0)


 





 


Monocytes (%) (Auto)    % (1.0-10.0)   


 


Eosinophils (%) (Auto)    % (0.0-3.0)   


 


Basophils (%) (Auto)    % (0.0-2.0)   


 


Differential Total Cells


Counted 


 


 100  


 





 


Neutrophils % (Manual)   89 % (45-75)  H 


 


Lymphocytes % (Manual)   5 % (20-45)  L 


 


Monocytes % (Manual)   6 % (1-10)   


 


Eosinophils % (Manual)   0 % (0-3)   


 


Basophils % (Manual)   0 % (0-2)   


 


Band Neutrophils   0 % (0-8)   


 


Platelet Estimate   Adequate   


 


Platelet Morphology   Normal   


 


Hypochromasia   1+   


 


Anisocytosis   2+   


 


Sodium Level


 


 


 146 MMOL/L


(136-145)  H 





 


Potassium Level


 


 


 3.7 MMOL/L


(3.5-5.1) 





 


Chloride Level


 


 


 111 MMOL/L


()  H 





 


Carbon Dioxide Level


 


 


 26 MMOL/L


(21-32) 





 


Anion Gap


 


 


 10 mmol/L


(5-15) 





 


Blood Urea Nitrogen


 


 


 26 mg/dL


(7-18)  H 





 


Creatinine


 


 


 0.9 MG/DL


(0.55-1.30) 





 


Estimat Glomerular Filtration


Rate 


 


 > 60 mL/min


(>60) 





 


Glucose Level


 


 


 150 MG/DL


()  H 





 


Calcium Level


 


 


 8.7 MG/DL


(8.5-10.1) 





 


Phosphorus Level


 


 


 2.0 MG/DL


(2.5-4.9)  L 





 


Magnesium Level


 


 


 1.9 MG/DL


(1.8-2.4) 





 


Total Bilirubin


 


 


 0.6 MG/DL


(0.2-1.0) 





 


Aspartate Amino Transf


(AST/SGOT) 


 


 12 U/L (15-37)


L 





 


Alanine Aminotransferase


(ALT/SGPT) 


 


 21 U/L (12-78)


 





 


Alkaline Phosphatase


 


 


 170 U/L


()  H 





 


C-Reactive Protein,


Quantitative 


 


 22.5 mg/dL


(0.00-0.90)  H 





 


Total Protein


 


 


 6.9 G/DL


(6.4-8.2) 





 


Albumin


 


 


 1.9 G/DL


(3.4-5.0)  L 





 


Globulin   5.0 g/dL   


 


Albumin/Globulin Ratio


 


 


 0.4 (1.0-2.7)


L 




















Intake and Output  


 


 12/5/20 12/6/20





 19:00 07:00


 


Intake Total 390 ml 


 


Output Total 400 ml 350 ml


 


Balance -10 ml -350 ml


 


  


 


Free Water 150 ml 


 


Tube Feeding 240 ml 


 


Output Urine Total 400 ml 350 ml








Objective


PHYSICAL EXAMINATION:


GENERAL:  The patient awake with deep stimuli, open his eyes, however,


cannot follow commands.  The patient is on a Ventimask at this time,


chronically ill-appearing.


HEAD AND NECK:  Pupils are equal and reactive to light.  Anicteric.


NECK:  Supple.  No JVD.


LUNGS:  Nasal cannula;  wheezing, rhonchi, and decreased air in bases.


HEART:  S1, S2.  Regular rhythm.  Distant heart sounds.  No murmur or


gallop.


ABDOMEN:  Soft, nondistended, nontender.  Positive bowel sounds.


EXTREMITIES:  No cyanosis, clubbing, or edema.


NEUROLOGIC:  Very limited secondary to the patient's status, cannot follow


commands.  Opens his eyes with deep stimuli and moving extremities


spontaneously.





Assessment/Plan


Assessment/Plan


ASSESSMENT:


1. Acute hypoxemic respiratory failure, most likely secondary to


pneumonia and sepsis.


2. Sepsis secondary to urinary tract infection and pneumonia.


3. pneumonia=MRSA; ESBL E. coli


4. Acute kidney injury on chronic renal insufficiency.


5. Dehydration.


6. History of chronic congestive heart failure.


7. Diabetes type 2.


8. Dyslipidemia.


9. Hypertension.


10. Alzheimer's disease.


11. COVID 19 previous positive


12.  Dysphagia





PLAN:


1.  ICU


2.  Dr. Sandoval,=Pulmonary Critical Care


3.  Dr. Garcia = Inf Dis.


4.  IV= D5W due to the hypernatremia and dehydration.


5.  antibiotics =   meropenem; S/P micafungin and zyvox


6.  Code status is full code.


7.    DVT prophylaxis is heparin subcutaneous.


8.  S/P PEG 12/3/20; GI=Benito Dudley MD                Dec 6, 2020 12:35

## 2020-12-06 NOTE — NUR
NURSE NOTES:

CALLED RT TO COLLECT SPUTUM FOR CULTURE.  RT SUCTIONED THE PATIENT NOTED WITH SPUTUM AND 
FEEDING COLLECTED ON THE SPECIMEN CUP 35 ML. LAB CALLED AND CONFIRMED THAT NEEDS TO COLLECT 
A NEW SPUTUM.  CHARGE NURSE MADE AWARE.

## 2020-12-06 NOTE — CARDIAC ELECTROPHYSIOLOGY PN
Assessment/Plan


Assessment/Plan


1.  Sinus tachycardia likely due to aspiration. No atrial fib. Ruled out for MI.

EF 55%.





2. Long run of 31 beats of Nonsustained VT on 11/3/2020. 


     Cardiac cath after stabilization.





3. Henry 30, Asystole while on the Vent due to resp failure/ likely mucus plug .

S/P Code


 Off midodrine





4. S/P Respiratory failure, on antibiotic  


    Extubated 11/29/20





5. S/P Septic shock. Off Levophed   





6. History of previous COVID infection in September 2020 and  November 2020 


Now negative again and off isolation.





7. Dementia.





8. Dysphagia, Failed swallow eval


   S/P PEG 12/3/20 by Dr Rusty PAEZ RN





Subjective


Subjective


  Had 31 beats of VT  on 11/3/20  and 5 beats 11/8/20 


 Coded on 11/12/20 as sat dropped to 20% and got henry 30s and PEA and pulseless

 


Transferred out of ICU after extubated 11/29/20


 Failed Swallow eval. S/P PEG by Dr Ojeda 12/3/20


 ECG confirmed sinus tach





Objective





Last 24 Hour Vital Signs








  Date Time  Temp Pulse Resp B/P (MAP) Pulse Ox O2 Delivery O2 Flow Rate FiO2


 


12/6/20 20:31      Simple Mask 10.0 


 


12/6/20 16:00 98.7 90 20 143/90 (107) 99   


 


12/6/20 09:00      Simple Mask 10.0 


 


12/6/20 08:10 98.2 90 20 143/91 (108) 97   


 


12/6/20 00:00 98.1 80 18 130/74 (92) 98   


 


12/5/20 21:00      Simple Mask 10.0 

















Intake and Output  


 


 12/5/20 12/6/20





 19:00 07:00


 


Intake Total 390 ml 


 


Output Total 400 ml 350 ml


 


Balance -10 ml -350 ml


 


  


 


Free Water 150 ml 


 


Tube Feeding 240 ml 


 


Output Urine Total 400 ml 350 ml











Laboratory Tests








Test


 12/6/20


07:15 12/6/20


11:28 12/6/20


17:09


 


White Blood Count


 12.2 K/UL


(4.8-10.8)  H 


 





 


Red Blood Count


 3.87 M/UL


(4.70-6.10)  L 


 





 


Hemoglobin


 12.0 G/DL


(14.2-18.0)  L 


 





 


Hematocrit


 35.1 %


(42.0-52.0)  L 


 





 


Mean Corpuscular Volume 91 FL (80-99)    


 


Mean Corpuscular Hemoglobin


 30.9 PG


(27.0-31.0) 


 





 


Mean Corpuscular Hemoglobin


Concent 34.1 G/DL


(32.0-36.0) 


 





 


Red Cell Distribution Width


 18.1 %


(11.6-14.8)  H 


 





 


Platelet Count


 344 K/UL


(150-450) 


 





 


Mean Platelet Volume


 7.7 FL


(6.5-10.1) 


 





 


Neutrophils (%) (Auto)


 % (45.0-75.0)


 


 





 


Lymphocytes (%) (Auto)


 % (20.0-45.0)


 


 





 


Monocytes (%) (Auto)  % (1.0-10.0)    


 


Eosinophils (%) (Auto)  % (0.0-3.0)    


 


Basophils (%) (Auto)  % (0.0-2.0)    


 


Differential Total Cells


Counted 100  


 


 





 


Neutrophils % (Manual) 89 % (45-75)  H  


 


Lymphocytes % (Manual) 5 % (20-45)  L  


 


Monocytes % (Manual) 6 % (1-10)    


 


Eosinophils % (Manual) 0 % (0-3)    


 


Basophils % (Manual) 0 % (0-2)    


 


Band Neutrophils 0 % (0-8)    


 


Platelet Estimate Adequate    


 


Platelet Morphology Normal    


 


Hypochromasia 1+    


 


Anisocytosis 2+    


 


Sodium Level


 146 MMOL/L


(136-145)  H 


 





 


Potassium Level


 3.7 MMOL/L


(3.5-5.1) 


 





 


Chloride Level


 111 MMOL/L


()  H 


 





 


Carbon Dioxide Level


 26 MMOL/L


(21-32) 


 





 


Anion Gap


 10 mmol/L


(5-15) 


 





 


Blood Urea Nitrogen


 26 mg/dL


(7-18)  H 


 





 


Creatinine


 0.9 MG/DL


(0.55-1.30) 


 





 


Estimat Glomerular Filtration


Rate > 60 mL/min


(>60) 


 





 


Glucose Level


 150 MG/DL


()  H 


 





 


Calcium Level


 8.7 MG/DL


(8.5-10.1) 


 





 


Phosphorus Level


 2.0 MG/DL


(2.5-4.9)  L 


 





 


Magnesium Level


 1.9 MG/DL


(1.8-2.4) 


 





 


Total Bilirubin


 0.6 MG/DL


(0.2-1.0) 


 





 


Aspartate Amino Transf


(AST/SGOT) 12 U/L (15-37)


L 


 





 


Alanine Aminotransferase


(ALT/SGPT) 21 U/L (12-78)


 


 





 


Alkaline Phosphatase


 170 U/L


()  H 


 





 


C-Reactive Protein,


Quantitative 22.5 mg/dL


(0.00-0.90)  H 


 





 


Total Protein


 6.9 G/DL


(6.4-8.2) 


 





 


Albumin


 1.9 G/DL


(3.4-5.0)  L 


 





 


Globulin 5.0 g/dL    


 


Albumin/Globulin Ratio


 0.4 (1.0-2.7)


L 


 





 


POC Whole Blood Glucose


 


 157 MG/DL


()  H 150 MG/DL


()  H








Objective


HEAD AND NECK:  No JVD.  


LUNGS:  Coarse rhonchi.


CARDIOVASCULAR:   Tachy S1 and S2 with no gallop.


ABDOMEN:  Soft.PEG in place


EXTREMITIES:  No pitting edema.











Kvng Edmond MD                Dec 6, 2020 20:44

## 2020-12-07 VITALS — SYSTOLIC BLOOD PRESSURE: 127 MMHG | DIASTOLIC BLOOD PRESSURE: 79 MMHG

## 2020-12-07 VITALS — DIASTOLIC BLOOD PRESSURE: 73 MMHG | SYSTOLIC BLOOD PRESSURE: 153 MMHG

## 2020-12-07 VITALS — DIASTOLIC BLOOD PRESSURE: 60 MMHG | SYSTOLIC BLOOD PRESSURE: 152 MMHG

## 2020-12-07 RX ADMIN — METOCLOPRAMIDE HYDROCHLORIDE SCH MG: 5 SOLUTION ORAL at 05:29

## 2020-12-07 RX ADMIN — HEPARIN SODIUM SCH UNITS: 5000 INJECTION INTRAVENOUS; SUBCUTANEOUS at 08:52

## 2020-12-07 RX ADMIN — HUMAN INSULIN SCH MLS/HR: 100 INJECTION, SOLUTION SUBCUTANEOUS at 04:15

## 2020-12-07 RX ADMIN — INSULIN ASPART SCH UNITS: 100 INJECTION, SOLUTION INTRAVENOUS; SUBCUTANEOUS at 05:29

## 2020-12-07 RX ADMIN — MEROPENEM SCH MLS/HR: 1 INJECTION INTRAVENOUS at 05:29

## 2020-12-07 NOTE — GENERAL PROGRESS NOTE
Subjective


ROS Limited/Unobtainable:  No


Allergies:  


Coded Allergies:  


     No Known Allergies (Unverified , 8/20/12)





Objective





Last 24 Hour Vital Signs








  Date Time  Temp Pulse Resp B/P (MAP) Pulse Ox O2 Delivery O2 Flow Rate FiO2


 


12/7/20 03:58 98.4 76 20 153/73 (99) 94   


 


12/7/20 00:05 97.6 82 20 127/79 (95) 95   


 


12/6/20 20:31      Simple Mask 10.0 


 


12/6/20 20:00 97.9 85 22 129/67 (87) 95   


 


12/6/20 16:00 98.7 90 20 143/90 (107) 99   


 


12/6/20 09:00      Simple Mask 10.0 

















Intake and Output  


 


 12/6/20 12/7/20





 19:00 07:00


 


Intake Total 300 ml 1460 ml


 


Output Total 300 ml 400 ml


 


Balance 0 ml 1060 ml


 


  


 


Free Water  200 ml


 


IV Total 250 ml 660 ml


 


Tube Feeding 50 ml 600 ml


 


Output Urine Total 300 ml 400 ml


 


# Voids  1








Laboratory Tests


12/6/20 11:28: POC Whole Blood Glucose 157H


12/6/20 17:09: POC Whole Blood Glucose 150H


12/6/20 23:11: POC Whole Blood Glucose 163H


12/7/20 04:44: POC Whole Blood Glucose 118H


Height (Feet):  5


Height (Inches):  4.00


Weight (Pounds):  130


General Appearance:  no apparent distress


EENT:  normal ENT inspection


Neck:  supple


Cardiovascular:  normal rate


Respiratory/Chest:  decreased breath sounds


Abdomen:  normal bowel sounds, non tender, soft


Extremities:  non-tender





Assessment/Plan


Problem List:  


(1) HTN (hypertension)


ICD Codes:  I10 - Essential (primary) hypertension


SNOMED:  51860261


(2) BPH (benign prostatic hyperplasia)


ICD Codes:  N40.0 - Benign prostatic hyperplasia without lower urinary tract 

symptoms


SNOMED:  247474905, 549099457


(3) History of CVA (cerebrovascular accident)


ICD Codes:  Z86.73 - Personal history of transient ischemic attack (TIA), and 

cerebral infarction without residual deficits; J12.89 - Other viral pneumonia


SNOMED:  034589500, 278970915


(4) Multifocal pneumonia


ICD Codes:  J18.9 - Pneumonia, unspecified organism; J12.89 - Other viral 

pneumonia


SNOMED:  522870836, 271329214


(5) Aphasia


ICD Codes:  R47.01 - Aphasia; J12.89 - Other viral pneumonia


SNOMED:  92013462, 418768660


Assessment/Plan:


s/p EGD/PEG


GTF t


reglan IV


monitor for residuals


GT flush 


will fu











Marcos Ojeda MD              Dec 7, 2020 08:45

## 2020-12-07 NOTE — NUR
*-*DISCHARGE PLANNED*-*



PATIENT HAS BEEN ACCEPTED AND WILL BE DISCHARGED TO:



[facility]

 P: [phone] FOR NURSE TO NURSE REPORT

ROOM # 15.C

[facility] AMBULANCE TRANSPORTATION SET FOR 12PM

## 2020-12-07 NOTE — DIAGNOSTIC IMAGING REPORT
Procedure: XRAY Chest 1v

Reason for study: Reason For Exam: SOB

 

Comparison films:  12/04/2020.

 

FINDINGS:

 

A single one view chest is obtained. Vascularity is normal. Bilateral infiltrates

noted. There may be slightly improved aeration left lung base. Cardiac and

mediastinal silhouette are within normal limits. Small effusion unchanged. The bony

thorax appear unremarkable.

 

IMPRESSION:  

 

Slightly improved aeration left lung base.

## 2020-12-07 NOTE — NUR
NURSE NOTES:

Handoff received from Lou LONDON.  Patient is awake and alert, no acute signs of distress 
noted.  Patient is on 10 L O2 simple mask.  Right hand IV is intact and running IVF as 
ordered. Cummings catheter is patent and draining to gravity. Patient is on pressure relieving 
mattress.  Bed is low and locked, siderails up x2, call light is within reach.

## 2020-12-07 NOTE — CARDIAC ELECTROPHYSIOLOGY PN
Assessment/Plan


Assessment/Plan


1.  Sinus tachycardia likely due to aspiration. No atrial fib. Ruled out for MI.

EF 55%.





2. Long run of 31 beats of Nonsustained VT on 11/3/2020. 


     Cardiac cath after stabilization if able to get consent and after 

Respiratory stabilization.





3. Henry 30, Asystole while on the Vent due to resp failure/ likely mucus plug .

S/P Code


 Off midodrine





4. S/P Respiratory failure, on antibiotic  


    Extubated 11/29/20





5. S/P Septic shock. Off Levophed   





6. History of previous COVID infection in September 2020 and  November 2020 


Now negative again and off isolation.





7. Dementia.





8. Dysphagia, Failed swallow eval


   S/P PEG 12/3/20 by Dr Rusty PAEZ RN





Subjective


Subjective


  Had 31 beats of VT  on 11/3/20  and 5 beats 11/8/20 


 Coded on 11/12/20 as sat dropped to 20% and got henry 30s and PEA and pulseless

 


Transferred out of ICU after extubated 11/29/20


 Failed Swallow eval. S/P PEG by Dr Ojeda 12/3/20


 ECG confirmed sinus tach


On 10 liter of FM off restraints





Objective





Last 24 Hour Vital Signs








  Date Time  Temp Pulse Resp B/P (MAP) Pulse Ox O2 Delivery O2 Flow Rate FiO2


 


12/7/20 03:58 98.4 76 20 153/73 (99) 94   


 


12/7/20 00:05 97.6 82 20 127/79 (95) 95   


 


12/6/20 20:31      Simple Mask 10.0 


 


12/6/20 20:00 97.9 85 22 129/67 (87) 95   


 


12/6/20 16:00 98.7 90 20 143/90 (107) 99   

















Intake and Output  


 


 12/6/20 12/7/20





 18:59 06:59


 


Intake Total  1560 ml


 


Output Total 300 ml 400 ml


 


Balance -300 ml 1160 ml


 


  


 


Free Water  100 ml


 


IV Total  860 ml


 


Tube Feeding  600 ml


 


Output Urine Total 300 ml 400 ml


 


# Voids  1











Laboratory Tests








Test


 12/6/20


11:28 12/6/20


17:09 12/6/20


23:11 12/7/20


04:44


 


POC Whole Blood Glucose


 157 MG/DL


()  H 150 MG/DL


()  H 163 MG/DL


()  H 118 MG/DL


()  H











Microbiology








 Date/Time


Source Procedure


Growth Status





 


 12/6/20 06:10


Sputum Gram Stain - Final Resulted


 


 12/6/20 06:10


Sputum Sputum Culture


Pending Resulted


 


 12/5/20 17:30


Blood Blood Culture - Preliminary


NO GROWTH AFTER 24 HOURS Resulted


 


 12/5/20 17:15


Blood Blood Culture - Preliminary


NO GROWTH AFTER 24 HOURS Resulted








Objective


HEAD AND NECK:  No JVD.  


LUNGS:  Coarse rhonchi.


CARDIOVASCULAR:   Tachy S1 and S2 with no gallop.


ABDOMEN:  Soft.PEG in place


EXTREMITIES:  No pitting edema.











Kvng Edmond MD                Dec 7, 2020 09:53

## 2020-12-07 NOTE — NUR
NURSE NOTES:

Gave report to Ronel at [facility], she stated that she will need to contact case 
manager regarding patient on 10L simple mask.  Relayed update to PEYTON Joe.

## 2020-12-07 NOTE — NUR
NURSE NOTES:

PAtient left for [facility] with [facility] personnel in stable condition.  REport given 
to phylicia at [facility].  Belongings list verified, patient information packet given 
to ambulance personnel.

## 2020-12-07 NOTE — NEPHROLOGY PROGRESS NOTE
Assessment/Plan


Problem List:  


(1) Dehydration


(2) JANES (acute kidney injury)


(3) Renal failure (ARF), acute on chronic


(4) Hypoxia


(5) Acute encephalopathy


(6) Electrolyte imbalance


Assessment





75-year-old male is admitted with acute hypoxic respiratory failure most likely 

secondary to pneumonia and sepsis, UTI.


Acute on chronic renal failure


Dehydration


Electrolyte imbalances, hypernatremia


Hypoalbuminemia


Diabetes type 2


History of congestive heart failure


Hypertension


Hyperlipemia


Alzheimer's


Previous COVID-19 infection in September 2020


Plan


December 7: No labs done today.  Stable renal parameters overall.  Continue per 

consultants.


December 6: Labs reviewed.  Low phosphorus addressed.  Continue on simple mask. 

Remains stable from renal standpoint of view.  Continue per consultants.


December 5: Status quo.  Stable from renal standpoint of view.  No labs drawn 

today.  Will check chemistries in a.m.  Continue per consultants.


December 4: Status quo.  Remains stable from renal standpoint of view.  Continue

on nasal cannula post extubation.  Will check lab tomorrow.


December 3: Remains on nasal cannula.  Labs reviewed.  Stable from renal 

standpoint of view.


December 2: Continue to do well post extubation.  Labs reviewed.  Renal 

parameters stable.  Continue per consultants.


December 1: Remain extubated on nasal cannula.  Labs reviewed.  Renal parameters

stable.


November 30: Discussed with RN.  Patient now extubated.  On nasal cannula.  

Renal parameters stable.  Continue per consultants.


November 29: Discussed with LITA Cooper.  Weaning in process.  It seems like 

patient is tolerating better.  Labs reviewed.  Continue per consultants.


November 28: Remains in ICU.  Status unchanged.  Labs and medication list 

reviewed.  Remains full code.  Weaning continues to fail.


November 27: Remains in ICU.  Remains intubated.  Remains full code.  Continues 

to be on weaning trials.  Renal parameters are stable.


November 26: Patient remains in ICU.  Full code.  Intubated.  Fails weaning.  

Labs reviewed.  Stable from renal standpoint of view.  Discussed with RN.


November 25: Labs reviewed.  Discussed with RN.  Abnormal electrolyte addressed.

 Remains intubated on ventilator.  Continue per consultants.


November 24: Labs reviewed.  Discussed with RN.  Stable from renal standpoint of

view.  Main issue is weaning from ventilator that keeps failing.  Continue per 

consultants.


November 23: Labs reviewed.  Discussed with RN.  Stable from renal standpoint of

view.  Continue per consultants.


November 22: Patient seen and discussed with RN.  Labs reviewed.  Remains 

intubated.  Trial of weaning this being attempted.  Continue per consultants.


November 21: Remains intubated.  Labs reviewed.  Renal parameters stable.  

Continue per consultants.


November 20: Remains on mechanical ventilation.  Labs reviewed.  Abnormal 

electrolytes addressed.  Stable from renal standpoint of view.


November 19: Remains intubated.  Remains full code.  Labs reviewed.  Low ph

osphorus replaced.  Continue to monitor renal parameters.  Continue per 

consultants.


November 18: Failed weaning.  Remains intubated.  Remains full closed.  Labs 

reviewed.  Stable from renal standpoint view.


November 17: Remains in ICU.  Remains full code.  Labs are reviewed.  Phosphorus

supplement given.  Continue per consultants.


November 16: Remains in ICU.  Full code.  Labs reviewed.  Remains intubated.  

Stable renal parameters.


November 15: In ICU.  Full code.  Discussed with RN.  Stable renal parameters.


November 14: Seen in ICU.  Discussed with LITA Cooper.  Flomax discontinued.  

Labs reviewed.  Stable from renal standpoint of view.


November 13: Seen in ICU.  Discussed with LITA Cooper.  Remains on pressor.  

Electrolyte abnormalities noted and addressed.  Continue per consultants.  

Patient remains full code.


November 12: Seen in ICU.  Discussed with LITA Richardson.  Blood pressure remains a 

little requiring pressors.  Labs reviewed.  Stable renal parameters.  Continue 

per consultants.


November 11: Remains intubated.  Full code.  Blood pressure borderline low.  

Will increase midodrine dose.  Discussed with RN.  Continue to monitor renal 

parameters and electrolytes.


November 10: Intubated.  Full code.  Labs reviewed.  Stable from renal 

standpoint of view.  Continue per consultants.


November 9: Remains intubated.  Full code.  Labs reviewed.  Electrolytes within 

normal limit.  Continue per consultants.


November 8: In ICU.  Remains intubated on ventilator.  Remains full code.  Low 

potassium addressed.  Blood pressure fluctuating.  Continue per consultants.


November 7: Patient in ICU.  Intubated on ventilator.  On 6 mics of Levophed.  

Will give 100 cc albumin 25%.  K-Phos IV ordered.  Continue per consultants.  

Continue monitor renal parameters and electrolytes.


November 6: Status unchanged.  Transfer to [facility] for seizure.  Stable from renal 

standpoint to view.  Continue per consultants.


November 5: Status quo.  Labs reviewed.  Renal parameters stable.  Continue per 

consultants.


November 4: On nonrebreather mask.  Labs reviewed.  Renal parameters 

stable.Continue per pulmonologist.  Clinically unchanged.


November 3: On nonrebreather mask.  Inflammatory markers gradually declining.  

Renal parameters stable.  Continue per pulmonary.


November 2: Remains on nonrebreather mask.  Inflammatory markers remain 

elevated.  Renal parameters somewhat stable.  Continue per pulmonary and ID.  

Remains full code.


November 1: Patient on nonrebreather mask.  Labs noted.  Serum creatinine down 

to 1.3.  Continue per consultants.


October 31: Patient on nonrebreather mask.  Transfer to telemetry when seen this

morning.  Labs noted.  Continue per consultants.  Serum creatinine dylan to 1.7. 

Continue to monitor renal parameters.  Continue to monitor vancomycin level


October 30: Patient is not doing well clinically.  Mild respiratory distress.  

ABG noted.  Somewhat hypoxic.  CBC and chemistry panel ordered.  Discussed with 

LITA Garcia.  Will defer to pulmonary management to specialist.  Continue per ID.

 Renal parameters remained stable as of October 29.


October 29: Labs reviewed.  Renal parameters stable.  Continue per consultants.


October 28: Labs reviewed.  Potassium via NG tube ordered.  IV fluids stopped.  

Continue per consultants.


October 27: Labs reviewed.  Low potassium and low phosphorus replaced.  Continue

per consultants.  Remains stable from renal standpoint of view.


October 26: Patient remains n.p.o.  IV fluid down to 50 cc an hour.  Potassium 

supplement intravenously ordered.  Continue per consultants.





Previously:


Patient is n.p.o., will continue on IV fluid of D5W 75 cc an hour


We will monitor electrolytes and renal parameters


Avoid nephrotoxic's


Start p.o. when he clears by speech therapist, meanwhile aspiration precautions


Keep the blood pressure and blood sugar in check


Per orders





Subjective


ROS Limited/Unobtainable:  Yes





Objective


Objective





Last 24 Hour Vital Signs








  Date Time  Temp Pulse Resp B/P (MAP) Pulse Ox O2 Delivery O2 Flow Rate FiO2


 


12/7/20 08:00 97.2 76 20 152/60 (90) 94   


 


12/7/20 03:58 98.4 76 20 153/73 (99) 94   


 


12/7/20 00:05 97.6 82 20 127/79 (95) 95   


 


12/6/20 20:31      Simple Mask 10.0 


 


12/6/20 20:00 97.9 85 22 129/67 (87) 95   


 


12/6/20 16:00 98.7 90 20 143/90 (107) 99   

















Intake and Output  


 


 12/6/20 12/7/20





 19:00 07:00


 


Intake Total 300 ml 1460 ml


 


Output Total 300 ml 400 ml


 


Balance 0 ml 1060 ml


 


  


 


Free Water  200 ml


 


IV Total 250 ml 660 ml


 


Tube Feeding 50 ml 600 ml


 


Output Urine Total 300 ml 400 ml


 


# Voids  1











Current Medications








 Medications


  (Trade)  Dose


 Ordered  Sig/Miky


 Route


 PRN Reason  Start Time


 Stop Time Status Last Admin


Dose Admin


 


 Acetaminophen


  (Tylenol)  650 mg  Q4H  PRN


 GT


 Temp >100.5  11/21/20 12:30


 12/21/20 12:29  12/4/20 22:01





 


 Albuterol/


 Ipratropium


  (Albuterol/


 Ipratropium)  3 ml  Q4H  PRN


 HHN


 Shortness of Breath  12/5/20 11:00


 12/10/20 10:59   





 


 Dexamethasone


  (Decadron)  6 mg  DAILY


 ORAL


   12/5/20 12:30


 12/14/20 09:01  12/7/20 08:40





 


 Dextrose


  (Dextrose 50%)  50 ml  Q30M  PRN


 IV


 Hypoglycemia  10/23/20 22:45


 1/21/21 22:44   





 


 Dextrose/Sodium


 Chloride  1,000 ml @ 


 50 mls/hr  Q20H


 IV


   12/5/20 12:15


 12/19/20 20:44  12/6/20 08:35





 


 Heparin Sodium


  (Porcine)


  (Heparin 5000


 units/ml)  5,000 units  EVERY 12  HOURS


 SUBQ


   10/23/20 21:00


 12/7/20 20:59  12/7/20 08:52





 


 Insulin Aspart


  (NovoLOG)    EVERY 6  HOURS


 SUBQ


   11/2/20 06:00


 1/22/21 06:29  12/6/20 23:31





 


 Meropenem 1 gm/


 Sodium Chloride  55 ml @ 


 110 mls/hr  Q8HR


 IVPB


   12/5/20 22:00


 12/10/20 21:59  12/7/20 05:29





 


 Metoclopramide HCl


  (Reglan)  10 mg  EVERY 6  HOURS


 NG


   12/6/20 12:00


 1/5/21 11:59  12/7/20 05:29





 


 Nitroglycerin


  (Ntg)  0.4 mg  Q5M  PRN


 SL


 Prn Chest Pain  11/21/20 10:15


 12/21/20 10:09   





 


 Polyethylene


 Glycol


  (Miralax)  17 gm  DAILYPRN  PRN


 GT


 Constipation  11/21/20 10:15


 12/21/20 10:14  12/3/20 23:14





 


 Vancomycin HCl  250 ml @ 


 166.667


 mls/hr  Q24H


 IVPB


   12/5/20 18:00


 12/10/20 17:59  12/6/20 17:09





 


 Vancomycin HCl


  (Vanco pharmacy


 to dose)  1 ea  DAILY  PRN


 MISC


 Per rx protocol  12/5/20 15:45


 1/4/21 15:44   








Laboratory Tests


12/6/20 11:28: POC Whole Blood Glucose 157H


12/6/20 17:09: POC Whole Blood Glucose 150H


12/6/20 23:11: POC Whole Blood Glucose 163H


12/7/20 04:44: POC Whole Blood Glucose 118H


Height (Feet):  5


Height (Inches):  4.00


Weight (Pounds):  130


General Appearance:  no apparent distress, lethargic


EENT:  other - On simple mask


Cardiovascular:  normal rate


Respiratory/Chest:  decreased breath sounds


Abdomen:  distended











Seven Tobar MD             Dec 7, 2020 10:54

## 2020-12-07 NOTE — INFECTIOUS DISEASES PROG NOTE
Assessment/Plan





74yo M with:





Respiratory code 2ry to mucus plug 11/12


Septic Shock- -recurrent


Fever, recurrent, low grade


Leukocytosis; recurrent; increased


Acute hypoxic resp failure, on NRB mask> 4l NC; back on NRB, desaturation 10/29 

> intubated 11/6


Pneumonia- >HAP


Hx of COVID-19 PNA 9/9/20 11/17 CXR: No significant interval change in the radiographic appearance the 

chest compared to one day prior.


   11/14 Resp cx +MRSA, nl resp hayden


   UCx neg


   BCx NTD


   11/13 COVID PCR neg


         --11/10 CXR: Bilateral infiltrates in a peribronchovascular 

distribution are unchanged. Left pleural effusion is unchanged.


         --11/8 CXR: Persistent bilateral patchy pulmonary opacities, most 

prominent  in the left lower lung.


         --11/7 ucx neg


                  sp cx MRSA (colonizer at this point)


                  Bcx Neg


         --11/2 Bcx Neg


          10/30 Sp cx MRSA (Vancomycin ADRIANA 1), ESBL E.coli


   10/23 BCx NTD


   UA 15-20 WBC, UCx Neg


   10/23 COVID rapid neg; 10/26 rapid COVID PCR + (from prior infection)- not 

new infection


   Flu A/B neg


   CXR: L pna


   Resp cx MRSA


   11/19 Resp cx +ESBL E.coli & PsA


   11/21 BCx NTD


   11/22 CXR: Small bilateral pleural effusions with moderate vascular 

congestion. Airspace consolidation in the left lower lung field may represent 

atelectasis however, correlate for infiltrate.  This is improving when compared 

to the prior study.


   11/23 BCx NTD


   11/23 CXR: Increasing right basilar opacity, likely infiltrate, may also 

reflect increasing pleural fluid


            12/4 CXR:  Stable or slightly worse infiltrates bilaterally Interim 

extubation


   12/5 BCx neg, UA neg


   12/5 Resp cx p





H/o COVID pna


   Tested positive 9/9/20


   10/23 Rapid Ag neg


   10/26 Rapid Ag positive (from prior disease?)


   11/13 COVID PCR neg





JANES on CKD, improving





SNF resident ([facility])


Non-verbal


VRE and MRSA colonized








Plan:


Cont empiric IV Vancomycin and Meropenem #3 given increasing respiratory requ

irements and low grade fever, tentative course of 5-7 days then stop


On dexamethasone per Pulm/Primary, may be contributing to leukocytosis





F/u resp cx 12/5





   -11/30 SP josh #10


   -11/20 SP linezolid #7


   -11/16 SP meropenem #14 for ESBL pna 


   -11/10 SP Micafungin #4


   -11/6 SP IV Vancomycin #15


   -11/2 SP Zosyn #4


   -10/26 SP Cefepime #4


   -10/24 SP Flagyl #





Monitor CBC/CMP


Monitor resp status


Monitor temp curve and hemodynamics





D/w RN





Thank you for this consult. [facility] will continue to follow.





Subjective


Allergies:  


Coded Allergies:  


     No Known Allergies (Unverified , 8/20/12)





AF


Labs pending


NAD


Being discharged to SNF right now





Objective





Last 24 Hour Vital Signs








  Date Time  Temp Pulse Resp B/P (MAP) Pulse Ox O2 Delivery O2 Flow Rate FiO2


 


12/7/20 03:58 98.4 76 20 153/73 (99) 94   


 


12/7/20 00:05 97.6 82 20 127/79 (95) 95   


 


12/6/20 20:31      Simple Mask 10.0 


 


12/6/20 20:00 97.9 85 22 129/67 (87) 95   


 


12/6/20 16:00 98.7 90 20 143/90 (107) 99   








Height (Feet):  5


Height (Inches):  4.00


Weight (Pounds):  130


Gen: NAD in bed


HEENT: NCAT


CV: RRR


Pulm: CTAB


Abd: Soft, NTND


Ext: No c/c/e


Neuro: Awake, not interactive


Lines: RUE PICC





Microbiology








 Date/Time


Source Procedure


Growth Status





 


 12/6/20 06:10


Sputum Gram Stain - Final Resulted


 


 12/6/20 06:10


Sputum Sputum Culture


Pending Resulted


 


 12/5/20 17:30


Blood Blood Culture - Preliminary


NO GROWTH AFTER 24 HOURS Resulted


 


 12/5/20 17:15


Blood Blood Culture - Preliminary


NO GROWTH AFTER 24 HOURS Resulted











Laboratory Tests








Test


 12/6/20


11:28 12/6/20


17:09 12/6/20


23:11 12/7/20


04:44


 


POC Whole Blood Glucose


 157 MG/DL


()  H 150 MG/DL


()  H 163 MG/DL


()  H 118 MG/DL


()  H











Current Medications








 Medications


  (Trade)  Dose


 Ordered  Sig/Miky


 Route


 PRN Reason  Start Time


 Stop Time Status Last Admin


Dose Admin


 


 Acetaminophen


  (Tylenol)  650 mg  Q4H  PRN


 GT


 Temp >100.5  11/21/20 12:30


 12/21/20 12:29  12/4/20 22:01





 


 Albuterol/


 Ipratropium


  (Albuterol/


 Ipratropium)  3 ml  Q4H  PRN


 HHN


 Shortness of Breath  12/5/20 11:00


 12/10/20 10:59   





 


 Dexamethasone


  (Decadron)  6 mg  DAILY


 ORAL


   12/5/20 12:30


 12/14/20 09:01  12/7/20 08:40





 


 Dextrose


  (Dextrose 50%)  50 ml  Q30M  PRN


 IV


 Hypoglycemia  10/23/20 22:45


 1/21/21 22:44   





 


 Dextrose/Sodium


 Chloride  1,000 ml @ 


 50 mls/hr  Q20H


 IV


   12/5/20 12:15


 12/19/20 20:44  12/6/20 08:35





 


 Heparin Sodium


  (Porcine)


  (Heparin 5000


 units/ml)  5,000 units  EVERY 12  HOURS


 SUBQ


   10/23/20 21:00


 12/7/20 20:59  12/7/20 08:52





 


 Insulin Aspart


  (NovoLOG)    EVERY 6  HOURS


 SUBQ


   11/2/20 06:00


 1/22/21 06:29  12/6/20 23:31





 


 Meropenem 1 gm/


 Sodium Chloride  55 ml @ 


 110 mls/hr  Q8HR


 IVPB


   12/5/20 22:00


 12/10/20 21:59  12/7/20 05:29





 


 Metoclopramide HCl


  (Reglan)  10 mg  EVERY 6  HOURS


 NG


   12/6/20 12:00


 1/5/21 11:59  12/7/20 05:29





 


 Nitroglycerin


  (Ntg)  0.4 mg  Q5M  PRN


 SL


 Prn Chest Pain  11/21/20 10:15


 12/21/20 10:09   





 


 Polyethylene


 Glycol


  (Miralax)  17 gm  DAILYPRN  PRN


 GT


 Constipation  11/21/20 10:15


 12/21/20 10:14  12/3/20 23:14





 


 Vancomycin HCl  250 ml @ 


 166.667


 mls/hr  Q24H


 IVPB


   12/5/20 18:00


 12/10/20 17:59  12/6/20 17:09





 


 Vancomycin HCl


  ([facility] pharmacy


 to dose)  1 ea  DAILY  PRN


 MISC


 Per rx protocol  12/5/20 15:45


 1/4/21 15:44   




















Bianca Casillas M.D.                Dec 7, 2020 09:32

## 2020-12-07 NOTE — PULMONOLOGY PROGRESS NOTE
Subjective


ROS Limited/Unobtainable:  No


Constitutional:  Reports: no symptoms


Hematologic:  Reports: no symptoms


Musculoskeletal:  Reports: no symptoms


Allergies:  


Coded Allergies:  


     No Known Allergies (Unverified , 8/20/12)





Objective





Last 24 Hour Vital Signs








  Date Time  Temp Pulse Resp B/P (MAP) Pulse Ox O2 Delivery O2 Flow Rate FiO2


 


12/7/20 08:00 97.2 76 20 152/60 (90) 94   


 


12/7/20 03:58 98.4 76 20 153/73 (99) 94   


 


12/7/20 00:05 97.6 82 20 127/79 (95) 95   


 


12/6/20 20:31      Simple Mask 10.0 


 


12/6/20 20:00 97.9 85 22 129/67 (87) 95   


 


12/6/20 16:00 98.7 90 20 143/90 (107) 99   

















Intake and Output  


 


 12/6/20 12/7/20





 19:00 07:00


 


Intake Total 300 ml 1460 ml


 


Output Total 300 ml 400 ml


 


Balance 0 ml 1060 ml


 


  


 


Free Water  200 ml


 


IV Total 250 ml 660 ml


 


Tube Feeding 50 ml 600 ml


 


Output Urine Total 300 ml 400 ml


 


# Voids  1








General Appearance:  WD/WN


HEENT:  normocephalic, atraumatic


Respiratory:  chest wall non-tender, lungs clear


Cardiovascular:  normal peripheral pulses, normal rate


Abdomen:  normal bowel sounds, soft, non tender


Extremities:  no clubbing


Skin:  no lesions


Neurologic:  CNs II-XII grossly normal





Microbiology








 Date/Time


Source Procedure


Growth Status





 


 12/6/20 06:10


Sputum Gram Stain - Final Resulted


 


 12/6/20 06:10


Sputum Sputum Culture


Pending Resulted


 


 12/5/20 17:30


Blood Blood Culture - Preliminary


NO GROWTH AFTER 24 HOURS Resulted


 


 12/5/20 17:15


Blood Blood Culture - Preliminary


NO GROWTH AFTER 24 HOURS Resulted








Laboratory Tests


12/6/20 11:28: POC Whole Blood Glucose 157H


12/6/20 17:09: POC Whole Blood Glucose 150H


12/6/20 23:11: POC Whole Blood Glucose 163H


12/7/20 04:44: POC Whole Blood Glucose 118H





Current Medications








 Medications


  (Trade)  Dose


 Ordered  Sig/Miky


 Route


 PRN Reason  Start Time


 Stop Time Status Last Admin


Dose Admin


 


 Acetaminophen


  (Tylenol)  650 mg  Q4H  PRN


 GT


 Temp >100.5  11/21/20 12:30


 12/21/20 12:29  12/4/20 22:01





 


 Albuterol/


 Ipratropium


  (Albuterol/


 Ipratropium)  3 ml  Q4H  PRN


 HHN


 Shortness of Breath  12/5/20 11:00


 12/10/20 10:59   





 


 Dexamethasone


  (Decadron)  6 mg  DAILY


 ORAL


   12/5/20 12:30


 12/14/20 09:01  12/7/20 08:40





 


 Dextrose


  (Dextrose 50%)  50 ml  Q30M  PRN


 IV


 Hypoglycemia  10/23/20 22:45


 1/21/21 22:44   





 


 Dextrose/Sodium


 Chloride  1,000 ml @ 


 50 mls/hr  Q20H


 IV


   12/5/20 12:15


 12/19/20 20:44  12/6/20 08:35





 


 Heparin Sodium


  (Porcine)


  (Heparin 5000


 units/ml)  5,000 units  EVERY 12  HOURS


 SUBQ


   10/23/20 21:00


 12/7/20 20:59  12/7/20 08:52





 


 Insulin Aspart


  (NovoLOG)    EVERY 6  HOURS


 SUBQ


   11/2/20 06:00


 1/22/21 06:29  12/6/20 23:31





 


 Meropenem 1 gm/


 Sodium Chloride  55 ml @ 


 110 mls/hr  Q8HR


 IVPB


   12/5/20 22:00


 12/10/20 21:59  12/7/20 05:29





 


 Metoclopramide HCl


  (Reglan)  10 mg  EVERY 6  HOURS


 NG


   12/6/20 12:00


 1/5/21 11:59  12/7/20 05:29





 


 Nitroglycerin


  (Ntg)  0.4 mg  Q5M  PRN


 SL


 Prn Chest Pain  11/21/20 10:15


 12/21/20 10:09   





 


 Polyethylene


 Glycol


  (Miralax)  17 gm  DAILYPRN  PRN


 GT


 Constipation  11/21/20 10:15


 12/21/20 10:14  12/3/20 23:14





 


 Vancomycin HCl  250 ml @ 


 166.667


 mls/hr  Q24H


 IVPB


   12/5/20 18:00


 12/10/20 17:59  12/6/20 17:09





 


 Vancomycin HCl


  ([facility] pharmacy


 to dose)  1 ea  DAILY  PRN


 MISC


 Per rx protocol  12/5/20 15:45


 1/4/21 15:44   














Assessment/Plan


Problems:  


(1) Acute encephalopathy


(2) 2019 novel coronavirus disease (COVID-19)


(3) Severe sepsis


(4) HTN (hypertension)


(5) Aphasia


(6) Alzheimer's dementia


(7) History of CVA (cerebrovascular accident)


(8) BPH (benign prostatic hyperplasia)


Assessment/Plan


got Gtube done, tolerating well


titrate fio2 to sat of 92%


frequent suctioning





check electrolytes


aspiration precaution


dvt prophylaxis. 





dc to nursing home











Michael Sandoval MD               Dec 7, 2020 10:22

## 2020-12-07 NOTE — NUR
NURSE NOTES:

Notified Dr. Sandoval that patient has O2 saturation 93% on room air, asked if it was ok for 
patient to be on 2L NC for discharge.  awaiting callback.  

-------------------------------------------------------------------------------

Addendum: 12/07/20 at 1301 by Bharat Serna RN RN

-------------------------------------------------------------------------------

NURSE NOTES:

Per MD Sandoval, ok for patient to be on 2L NC for discharge.

## 2020-12-07 NOTE — INTERNAL MED PROGRESS NOTE
Subjective


Date of Service:  Dec 7, 2020


Physician Name


Benito Hearn


Attending Physician


Andrei Cancino MD


Allergies:  


Coded Allergies:  


     No Known Allergies (Unverified , 8/20/12)


ROS Limited/Unobtainable:  Yes


Subjective


74 YO M admitted with shortness of breath.  Now pneumonia; previously COVID 

positive.  Cover for Int med-Dr Cancino.   extubated 11/29/20.  S/P PEG 12/3/20





Objective





Last Vital Signs








  Date Time  Temp Pulse Resp B/P (MAP) Pulse Ox O2 Delivery O2 Flow Rate FiO2


 


12/7/20 09:00      Simple Mask 10.0 


 


12/7/20 08:00 97.2 76 20 152/60 (90) 94   


 


12/5/20 19:18        60











Laboratory Tests








Test


 12/6/20


17:09 12/6/20


23:11 12/7/20


04:44


 


POC Whole Blood Glucose


 150 MG/DL


()  H 163 MG/DL


()  H 118 MG/DL


()  H











Microbiology








 Date/Time


Source Procedure


Growth Status





 


 12/6/20 06:10


Sputum Gram Stain - Final Resulted


 


 12/6/20 06:10


Sputum Sputum Culture


Pending Resulted


 


 12/5/20 17:30


Blood Blood Culture - Preliminary


NO GROWTH AFTER 24 HOURS Resulted


 


 12/5/20 17:15


Blood Blood Culture - Preliminary


NO GROWTH AFTER 24 HOURS Resulted

















Intake and Output  


 


 12/6/20 12/7/20





 19:00 07:00


 


Intake Total 300 ml 1460 ml


 


Output Total 300 ml 400 ml


 


Balance 0 ml 1060 ml


 


  


 


Free Water  200 ml


 


IV Total 250 ml 660 ml


 


Tube Feeding 50 ml 600 ml


 


Output Urine Total 300 ml 400 ml


 


# Voids  1








Objective


PHYSICAL EXAMINATION:


GENERAL:  The patient awake with deep stimuli, open his eyes, however,


cannot follow commands.  The patient is on a Ventimask at this time,


chronically ill-appearing.


HEAD AND NECK:  Pupils are equal and reactive to light.  Anicteric.


NECK:  Supple.  No JVD.


LUNGS:  Nasal cannula;  wheezing, rhonchi, and decreased air in bases.


HEART:  S1, S2.  Regular rhythm.  Distant heart sounds.  No murmur or


gallop.


ABDOMEN:  Soft, nondistended, nontender.  Positive bowel sounds.


EXTREMITIES:  No cyanosis, clubbing, or edema.


NEUROLOGIC:  Very limited secondary to the patient's status, cannot follow


commands.  Opens his eyes with deep stimuli and moving extremities


spontaneously.





Assessment/Plan


Assessment/Plan


ASSESSMENT:


1. Acute hypoxemic respiratory failure, most likely secondary to


pneumonia and sepsis.


2. Sepsis secondary to urinary tract infection and pneumonia.


3. pneumonia=MRSA; ESBL E. coli


4. Acute kidney injury on chronic renal insufficiency.


5. Dehydration.


6. History of chronic congestive heart failure.


7. Diabetes type 2.


8. Dyslipidemia.


9. Hypertension.


10. Alzheimer's disease.


11. COVID 19 previous positive


12.  Dysphagia





PLAN:


1.  ICU


2.  Dr. Sandoval,=Pulmonary Critical Care


3.  Dr. Garcia = Inf Dis.


4.  IV= D5W due to the hypernatremia and dehydration.


5.  antibiotics =   meropenem; S/P micafungin and zyvox


6.  Code status is full code.


7.    DVT prophylaxis is heparin subcutaneous.


8.  S/P PEG 12/3/20; GI=Dr Ojeda


9.  Discharge to [facility] today














Benito Hearn MD                Dec 7, 2020 13:18

## 2020-12-09 NOTE — DISCHARGE SUMMARY
Discharge Summary


Discharge Summary


_


DATE OF ADMISSION: 10/23/2020





DATE OF DISCHARGE: 12/7/2020








DISCHARGED BY: Dr. Cancino





REASON FOR ADMISSION: 


75 years old male, resident of skilled nursing facility, with PMH of CVA, 

dysphagia, hypertension, diabetes mellitus,  BPH, CKD, Alzheimer dementia, was 

sent for evaluation due to congestion and difficulty breathing.  


Patient was recently diagnosed  with  Covid infection in September 2020.


In ER patient was found to be febrile, congested, tachycardic, and hypoxic.  


Patient required 100% nonrebreather mask.  


Laboratory work-up revealed elevated lactic acid 3.9, leukocytosis WBC 12.3 .


Chest x-ray demonstrated patchy opacity in the left greater than right lower 

lung , may represent atelectasis on the right side and atelectasis versus 

pneumonia on the left.  


Laboratory work-up in addition showed evidence of renal failure with   BUN 53, 

creatinine 2.4.  


Sodium 155.  


Glucose 178.  


Troponin was negative, pro BNP 1253.  


Albumin 2.6.  


Urinalysis revealed pyuria,  moderate bacteria.  


EKG revealed sinus tachycardia with left anterior fascicular block,  possibly 

motion artifact from tremors .


Septic work-up initiated in emergency department .


Patient subsequently admitted to direct observational unit for further 

management.  


 


CONSULTANTS:


cardiologist Dr. Edmond


neurologist


pulmonary Dr. Moses


ID specialist Dr. Casillas


GI specialist Dr. Weinstein


nephrologist Dr. Tobar


hematologist/oncologist


surgery


psychiatrist


 


HOSPITAL COURSE: 


[] Patient is admitted to [facility].  Supplemental oxygen provided and titrated to 

keep pulse oximetry above 92%.  Patient was on nonrebreather mask and was able 

to be weaned to 50% Ventimask.  Pulmonary toilet provided.  Rapid COVID-19 and 

influenza swab were both negative.  Patient was follow-up with a chest x-ray.  

Antibiotic provided per ID recommendation DVT prophylaxis provided.  NG tube 

inserted for medication.  Antitussive provided as needed.  Strict aspiration 

precaution maintained.


Patient developed respiratory distress and required intubation patient had 

difficulties to be extubated.  Patient was finally extubated on 12 extubated.  

Supplemental oxygen provided and titrated to keep pulse oximetry above 92%.  

Pulmonary toilet provided.  Patient is a history of COVID-19 in September 2020. 

 Patient was treated with antibiotics for pneumonia possible aspiration.  Pat

ient developed new new fever and increased respiratory requirements and started 

on and again antibiotic on December 5.  Patient initially required pressors 

hemodynamic status was closely monitor pressors titrated to keep mean arterial 

blood pressure above 65 patient was able to be weaned off pressor.  Patient was 

also on midodrine and eventually was able to be weaned off midodrine as well 

blood pressure remained stable.  Echocardiogram revealed preserved ejection 

fraction.  Patient was ruled out for acute myocardial infarction.


DVT and GI prophylaxis provided.  Aspiration precaution maintained.  Renal 

parameters electrolytes will be monitored electrolytes corrected as needed 

nephrotoxic's were avoided.  Blood sugar was managed with sliding scale of 

insulin.  Supportive care provided.  Sputum last urine culture blood cultures 

were negative.  Influenza screen test was negative.  Sputum culture initially 

revealed MRSA.  Patient was treated for pneumonia repeated sputum culture also 

revealed MRSA and E. coli ESBL blood culture remain negative and the last blood 

culture still revealed MRSA patient remained afebrile last chest x-ray revealed 

slightly improved aeration left lung base.  ID specialist recommended 

antibiotics complete antibiotic at the facility continue antibiotic at the 

facility to complete the course patient also was on the steroids which possibly 

contributing to mild leukocytosis.  Antitussive provided as needed.


BUN from 53 down to 26, creatinine from 2.4 down to 0.9.  Acute kidney injury 

resolved.  Sodium from 155 down to normal.  Acute kidney injury hyponatremia are

 most likely due to dehydration.  


Patient failed swallow evaluation.  Patient subsequently undergone PEG placement

 on 12 3.  Tube feeding formula and goal rate provided as per registered 

dietitian recommendation strict aspiration precaution maintained.





Cardiologist closely followed.  Patient noted to be in sinus tachycardia but no 

atrial fibrillation.  Patient was ruled out for myocardial infarction ejection 

fraction echocardiogram revealed preserved ejection fraction.  Sinus tachycardia

 was likely due to aspiration.  Patient also had a long chronic of 31 beats of 

nonsustained ventricular tachycardia on 11 3.  Cardiologist recommended cardiac 

catheterization after stabilization if able to get consent after family from 

family and the family agrees and respiratory stable.  Patient had bradycardia 

and asystole while on vent likely due to respiratory failure patient had a CODE 

BLUE midodrine stopped.


Emergency department was called physician was called on 11 6 2 electively 

intubate patient patient was orally intubated central line was placed to have 

adequate IV access.  Patient required pressors





FINAL DIAGNOSES: 


Acute hypoxemic respiratory failure requiring intubation, status post extubation


Septic shock- resolved


Pneumonia , possible aspiration


Probable UTI


Recent history of COVID-19 in September 2020


Acute kidney injury due to dehydration  -resolved


Acute on chronic renal failure


Alzheimer dementia


Hypernatremia


Electrolyte imbalance


Acute encephalopathy


Dysphagia,  status post PEG 12/3 


History of CVA


Sinus tachycardia , likely due to aspiration - resolved


Long run of nonsustained ventricular tachycardia


Diabetes mellitus


Hypertension


Alzheimer dementia





DISCHARGE MEDICATIONS:


See Medication Reconciliation list.





DISCHARGE INSTRUCTIONS:


Patient was discharged to the skilled nursing facility. 


Follow up with medical doctor at the facility.





I have been assigned to dictate discharge summary for this account.











Ely Ni NP                 Dec 9, 2020 12:45

## 2021-06-07 NOTE — HISTORY AND PHYSICAL REPORT
DATE OF ADMISSION:  10/23/2020

CHIEF COMPLAINT:  Shortness of breath.



HISTORY OF PRESENT ILLNESS:  This is a 75-year-old  gentleman with

past medical history significant for recent COVID-19 pneumonia on

09/10/2020, history of heart failure, diabetes type 2, chronic kidney

disease, stage 2, pneumonia, dyslipidemia, dysphagia, dementia,

hypocalcemia, Alzheimer, who presented to the hospital from nursing

facility after he was noted to have worsening of shortness of breath and

chest congestion.  The patient has diminished oxygen saturation at the

nursing facility noted to have a fever of 100.9 and required _____ oxygen.

Shortly after initial evaluation in the nursing facility, the patient was

transferred to the hospital.  Upon arrival to the emergency department,

the patient was noted to be altered than usual and severe shortness of

breath.  Subsequently, the patient was admitted to PCU with sepsis

secondary to urinary tract infection as well as multifocal pneumonia and

severe hypernatremia, acute kidney injury and chronic renal

insufficiency.



PAST MEDICAL HISTORY/PAST SURGICAL HISTORY:  As above.  History of COVID-19

infection, pneumonia, heart failure, diabetes type 2,

hypercholesterolemia, chronic kidney disease, stage 2, dyslipidemia,

dysphagia, dementia, hypercalcemia, Alzheimer's dementia, hypertension,

prior history of sepsis, encephalopathy, cognitive communication defect,

urinary tract infection, history of GERD, BPH, pathological fracture of

the right humerus.



MEDICATIONS:  At the nursing facility, please refer to medication

reconciliation.





ALLERGIES:  No known drug allergies.



SOCIAL HISTORY:  Nursing home resident.  No smoking, alcohol, or drugs.



FAMILY HISTORY:  Noncontributory.



REVIEW OF SYSTEMS:  Very limited secondary to the patient's status.  The

patient is nonverbal, cannot follow commands and mostly history taken from

the ER chart as well as nursing home documentation.  The patient was noted

to have fever and shortness of breath.  No nausea or vomiting was

reported.  No fall or head trauma.  No diarrhea.



PHYSICAL EXAMINATION:

VITAL SIGNS:  On admission from the emergency department, temperature 98.8,

pulse of 108, respirations 25, blood pressure 108/58, repeat was 125/94.

GENERAL:  The patient awake with deep stimuli, open his eyes, however,

cannot follow commands.  The patient is on a Ventimask at this time,

chronically ill-appearing.

HEAD AND NECK:  Pupils are equal and reactive to light.  Anicteric.

NECK:  Supple.  No JVD.

LUNGS:  Good air entry.  No wheezing or rhonchi, however the patient has a

coarse breath sounds.  Decreased air in bases.

HEART:  S1, S2.  Regular rhythm.  Distant heart sounds.  No murmur or

gallop.

ABDOMEN:  Soft, nondistended, nontender.  Positive bowel sounds.

EXTREMITIES:  No cyanosis, clubbing, or edema.

NEUROLOGIC:  Very limited secondary to the patient's status, cannot follow

commands.  Opens his eyes with deep stimuli and moving extremities

spontaneously.



LABORATORY DATA:  On admission from the emergency department, WBC of 12,

hemoglobin 16, hematocrit 51, platelet is 246.  ABG, pH of 7.38, pCO2 of

31, pO2 of 74, saturating 93%.  The patient's lactic acid is 4.4.  Sodium

is 154, potassium is 3.6, chloride is 121, bicarb is 24, BUN is 44,

creatinine is 1.6, GFR is 42, glucose is 151, calcium is 8.1, phosphorus

is 3.0, albumin is 2.1.  Urinalysis, +2 protein, +1 ketone, +1 leukocytes,

5 to 10 rbc, 15 to 20 wbc, moderate urine bacteria, moderate mucosa.  The

patient has rapid COVID-19 test is negative.  EKG, sinus tachy with

ventricular rate of 113, left axis deviation.  No ST elevation.  Poor EKG

quality due to the artifact.  Chest x-ray was noted to be a patchy opacity

in the left greater than right lower lung, which may represent atelectasis

on the right and atelectasis versus pneumonia on the left.



ASSESSMENT:

1. Acute hypoxemic respiratory failure, most likely secondary to

pneumonia and sepsis.

2. Sepsis secondary to urinary tract infection and pneumonia.

3. Possible aspiration pneumonia.

4. Acute kidney injury on chronic renal insufficiency.

5. Dehydration.

6. History of chronic congestive heart failure.

7. Diabetes type 2.

8. Dyslipidemia.

9. Hypertension.

10. Alzheimer's disease.

11. History of COVID-19 infection in September 2020.



PLAN:  Admit the patient to step-down.  We will follow up with Dr. Sandoval,

Pulmonary Critical Care and Dr. Garcia for ID.  We will start the patient

on D5W due to the hypernatremia and dehydration.  Monitor laboratory as

well as cultures.  Resume nursing home medication.  Start the patient on

broad-spectrum antibiotics with vancomycin, cefepime and Flagyl.  Code

status is full code.  DVT prophylaxis is heparin subcutaneous.









  ______________________________________________

  Andrei Cancino M.D.





DR:  DIAMOND

D:  10/24/2020 07:31

T:  10/25/2020 00:09

JOB#:  [id number]

CC:



TIFFANY Yes, record another set of vital signs

## 2023-02-22 NOTE — NUR
Called and spoke with patient to schedule follow up with HARI Neves on 3/16/2023 at 9:00 AM.    Recall updated for follow up with Dr. Wang. Also added patient to wait list for follow up with Dr. Wang.    Sending as SANYD.   NURSE NOTES:



Non-administered Heparin do to patient platelet levels of 102.

## 2023-05-21 NOTE — NEPHROLOGY PROGRESS NOTE
Assessment/Plan


Problem List:  


(1) Dehydration


(2) JANES (acute kidney injury)


(3) Renal failure (ARF), acute on chronic


(4) Hypoxia


(5) Acute encephalopathy


(6) Electrolyte imbalance


Assessment





75-year-old male is admitted with acute hypoxic respiratory failure most likely 

secondary to pneumonia and sepsis, UTI.


Acute on chronic renal failure


Dehydration


Electrolyte imbalances, hypernatremia


Hypoalbuminemia


Diabetes type 2


History of congestive heart failure


Hypertension


Hyperlipemia


Alzheimer's


Previous COVID-19 infection in September 2020


Plan


November 22: Patient seen and discussed with RN.  Labs reviewed.  Remains 

intubated.  Trial of weaning this being attempted.  Continue per consultants.


November 21: Remains intubated.  Labs reviewed.  Renal parameters stable.  

Continue per consultants.


November 20: Remains on mechanical ventilation.  Labs reviewed.  Abnormal 

electrolytes addressed.  Stable from renal standpoint of view.


November 19: Remains intubated.  Remains full code.  Labs reviewed.  Low 

phosphorus replaced.  Continue to monitor renal parameters.  Continue per 

consultants.


November 18: Failed weaning.  Remains intubated.  Remains full closed.  Labs rev

iewed.  Stable from renal standpoint view.


November 17: Remains in ICU.  Remains full code.  Labs are reviewed.  Phosphorus

supplement given.  Continue per consultants.


November 16: Remains in ICU.  Full code.  Labs reviewed.  Remains intubated.  

Stable renal parameters.


November 15: In ICU.  Full code.  Discussed with RN.  Stable renal parameters.


November 14: Seen in ICU.  Discussed with LITA Cooper.  Flomax discontinued.  

Labs reviewed.  Stable from renal standpoint of view.


November 13: Seen in ICU.  Discussed with LITA Cooper.  Remains on pressor.  

Electrolyte abnormalities noted and addressed.  Continue per consultants.  

Patient remains full code.


November 12: Seen in ICU.  Discussed with LITA Richardson.  Blood pressure remains a 

little requiring pressors.  Labs reviewed.  Stable renal parameters.  Continue 

per consultants.


November 11: Remains intubated.  Full code.  Blood pressure borderline low.  

Will increase midodrine dose.  Discussed with RN.  Continue to monitor renal 

parameters and electrolytes.


November 10: Intubated.  Full code.  Labs reviewed.  Stable from renal 

standpoint of view.  Continue per consultants.


November 9: Remains intubated.  Full code.  Labs reviewed.  Electrolytes within 

normal limit.  Continue per consultants.


November 8: In ICU.  Remains intubated on ventilator.  Remains full code.  Low 

potassium addressed.  Blood pressure fluctuating.  Continue per consultants.


November 7: Patient in ICU.  Intubated on ventilator.  On 6 mics of Levophed.  

Will give 100 cc albumin 25%.  K-Phos IV ordered.  Continue per consultants.  

Continue monitor renal parameters and electrolytes.


November 6: Status unchanged.  Transfer to [facility] for seizure.  Stable from renal 

standpoint to view.  Continue per consultants.


November 5: Status quo.  Labs reviewed.  Renal parameters stable.  Continue per 

consultants.


November 4: On nonrebreather mask.  Labs reviewed.  Renal parameters stable.Cont

inue per pulmonologist.  Clinically unchanged.


November 3: On nonrebreather mask.  Inflammatory markers gradually declining.  

Renal parameters stable.  Continue per pulmonary.


November 2: Remains on nonrebreather mask.  Inflammatory markers remain elevated

.  Renal parameters somewhat stable.  Continue per pulmonary and ID.  Remains 

full code.


November 1: Patient on nonrebreather mask.  Labs noted.  Serum creatinine down 

to 1.3.  Continue per consultants.


October 31: Patient on nonrebreather mask.  Transfer to telemetry when seen this

morning.  Labs noted.  Continue per consultants.  Serum creatinine dylan to 1.7. 

Continue to monitor renal parameters.  Continue to monitor vancomycin level


October 30: Patient is not doing well clinically.  Mild respiratory distress.  

ABG noted.  Somewhat hypoxic.  CBC and chemistry panel ordered.  Discussed with 

LITA Garcia.  Will defer to pulmonary management to specialist.  Continue per ID.

 Renal parameters remained stable as of October 29.


October 29: Labs reviewed.  Renal parameters stable.  Continue per consultants.


October 28: Labs reviewed.  Potassium via NG tube ordered.  IV fluids stopped.  

Continue per consultants.


October 27: Labs reviewed.  Low potassium and low phosphorus replaced.  Continue

per consultants.  Remains stable from renal standpoint of view.


October 26: Patient remains n.p.o.  IV fluid down to 50 cc an hour.  Potassium 

supplement intravenously ordered.  Continue per consultants.





Previously:


Patient is n.p.o., will continue on IV fluid of D5W 75 cc an hour


We will monitor electrolytes and renal parameters


Avoid nephrotoxic's


Start p.o. when he clears by speech therapist, meanwhile aspiration precautions


Keep the blood pressure and blood sugar in check


Per orders





Subjective


ROS Limited/Unobtainable:  Yes





Objective


Objective





Last 24 Hour Vital Signs








  Date Time  Temp Pulse Resp B/P (MAP) Pulse Ox O2 Delivery O2 Flow Rate FiO2


 


11/22/20 12:00  104 27 185/84 (117) 100   


 


11/22/20 11:00  93 24 108/83 (91) 100   


 


11/22/20 10:00 100.5 89 24 142/69 (93) 100   


 


11/22/20 09:00  87 14 132/71 (91) 100   


 


11/22/20 08:00  85      


 


11/22/20 08:00 101.5 88 17 119/58 (78) 100   


 


11/22/20 07:25  84 12     30


 


11/22/20 07:00  79 13 123/76 (92) 100   


 


11/22/20 06:30  71 12     


 


11/22/20 06:00  71 12 108/83 (91) 100   


 


11/22/20 05:00 99.7 90 19 103/86 (92)    


 


11/22/20 04:00        30


 


11/22/20 04:00      Mechanical Ventilator  





      Mechanical Ventilator  





      Mechanical Ventilator  


 


11/22/20 04:00 100.3 105 18 127/83 (98)    


 


11/22/20 04:00  105      


 


11/22/20 03:46 100.3       


 


11/22/20 03:46 103.4       


 


11/22/20 03:00 100.5 108 33 141/92 (108) 100   


 


11/22/20 02:30  97 17     30


 


11/22/20 02:00  101 15 118/65 (82) 100   


 


11/22/20 01:00  98 15 140/77 (98) 100   


 


11/22/20 00:00      Mechanical Ventilator  





      Mechanical Ventilator  





      Mechanical Ventilator  


 


11/22/20 00:00  97      


 


11/22/20 00:00        30


 


11/22/20 00:00  97 16 115/67 (83) 100   


 


11/21/20 23:00  92 17 149/75 (99) 100   


 


11/21/20 22:30  92 17     30


 


11/21/20 22:00  88 15 121/66 (84) 100   


 


11/21/20 21:00  84 13 120/64 (82) 100   


 


11/21/20 20:00      Mechanical Ventilator  





      Mechanical Ventilator  





      Mechanical Ventilator  


 


11/21/20 20:00 99.6 78 12 121/62 (81) 100   


 


11/21/20 20:00  78      


 


11/21/20 20:00        30


 


11/21/20 19:00  75 13 127/72 (90) 100   


 


11/21/20 18:50  77 13     30


 


11/21/20 18:00  74 12 113/61 (78) 100   


 


11/21/20 17:34  77 12  100 Mechanical Ventilator  30


 


11/21/20 17:00 99.5 70 12 102/56 (71) 100   


 


11/21/20 16:00  73 12 110/59 (76) 100   


 


11/21/20 16:00  73      


 


11/21/20 16:00        30


 


11/21/20 16:00      Mechanical Ventilator  





      Mechanical Ventilator  


 


11/21/20 15:49  76 12     30


 


11/21/20 15:00  69 12 94/64 (74) 100   


 


11/21/20 14:00  78 13 94/58 (70) 100   


 


11/21/20 13:00 99.0 87 18 116/63 (80) 100   


 


11/21/20 12:59 99.0       

















Intake and Output  


 


 11/21/20 11/22/20





 19:00 07:00


 


Intake Total 1540 ml 1270 ml


 


Output Total 630 ml 600 ml


 


Balance 910 ml 670 ml


 


  


 


Free Water 180 ml 


 


IV Total 820 ml 550 ml


 


Tube Feeding 540 ml 720 ml


 


Output Urine Total 630 ml 600 ml


 


# Bowel Movements 3 3











Current Medications








 Medications


  (Trade)  Dose


 Ordered  Sig/Miky


 Route


 PRN Reason  Start Time


 Stop Time Status Last Admin


Dose Admin


 


 Acetaminophen


  (Tylenol)  650 mg  Q4H  PRN


 GT


 Temp >100.5  11/21/20 12:30


 12/21/20 12:29  11/22/20 03:16





 


 Albuterol/


 Ipratropium


  (Albuterol/


 Ipratropium)  3 ml  Q4HRT  PRN


 HHN


 sob  11/22/20 10:45


 11/27/20 10:44   





 


 Chlorhexidine


 Gluconate


  (Jess-Hex 2%)  1 applic  DAILY@2000


 TOPIC


   11/6/20 20:00


 2/4/21 19:59  11/21/20 20:21





 


 Dextrose


  (Dextrose 50%)  50 ml  Q30M  PRN


 IV


 Hypoglycemia  10/23/20 22:45


 1/21/21 22:44   





 


 Heparin Sodium


  (Porcine)


  (Heparin 5000


 units/ml)  5,000 units  EVERY 12  HOURS


 SUBQ


   10/23/20 21:00


 12/7/20 20:59  11/22/20 08:43





 


 Insulin Aspart


  (NovoLOG)    EVERY 6  HOURS


 SUBQ


   11/2/20 06:00


 1/22/21 06:29  11/18/20 11:26





 


 Lorazepam


  (Ativan 2mg/ml


 1ml)  2 mg  Q4H  PRN


 IV


 For Anxiety  11/17/20 19:15


 11/24/20 19:14  11/21/20 02:47





 


 Meropenem 1 gm/


 Sodium Chloride  55 ml @ 


 110 mls/hr  Q8H


 IVPB


   11/21/20 10:00


 11/26/20 09:59  11/22/20 09:39





 


 Midodrine


  (Pro-Amatine)  10 mg  Q8H


 ORAL


   11/11/20 17:00


 2/9/21 16:59  11/22/20 08:44





 


 Nitroglycerin


  (Ntg)  0.4 mg  Q5M  PRN


 SL


 Prn Chest Pain  11/21/20 10:15


 12/21/20 10:09   





 


 Ondansetron HCl


  (Zofran)  4 mg  Q6H  PRN


 IVP


 Nausea & Vomiting  11/21/20 14:30


 12/21/20 14:29   





 


 Pantoprazole


  (Protonix)  40 mg  DAILY


 IV


   11/7/20 09:00


 12/7/20 08:59  11/22/20 08:44





 


 Polyethylene


 Glycol


  (Miralax)  17 gm  DAILYPRN  PRN


 GT


 Constipation  11/21/20 10:15


 12/21/20 10:14   





 


 Promethazine HCl/


 Codeine


  (Phenergan with


 Codeine)  5 ml  Q4H  PRN


 GT


 For Cough  11/21/20 10:14


 12/21/20 10:13   





 


 Sodium Chloride  1,000 ml @ 


 50 mls/hr  Q20H


 IV


   11/6/20 16:00


 12/6/20 15:59  11/22/20 03:00








Laboratory Tests


11/22/20 05:35: 


White Blood Count 16.3H, Red Blood Count 3.14L, Hemoglobin 9.5L, Hematocrit 

30.7L, Mean Corpuscular Volume 98, Mean Corpuscular Hemoglobin 30.1, Mean 

Corpuscular Hemoglobin Concent 30.8L, Red Cell Distribution Width 17.3H, 

Platelet Count 307, Mean Platelet Volume 6.7, Neutrophils (%) (Auto) 83.3H, 

Lymphocytes (%) (Auto) 10.9L, Monocytes (%) (Auto) 4.9, Eosinophils (%) (Auto) 

0.2, Basophils (%) (Auto) 0.6, Sodium Level 137, Potassium Level 3.7, Chloride 

Level 104, Carbon Dioxide Level 24, Anion Gap 9, Blood Urea Nitrogen 12, 

Creatinine 0.9, Estimat Glomerular Filtration Rate > 60, Glucose Level 136H, 

Calcium Level 7.7L


11/22/20 07:56: 


Arterial Blood pH 7.465H, Arterial Blood Partial Pressure CO2 28.6L, Arterial 

Blood Partial Pressure O2 95.7, Arterial Blood HCO3 20.1L, Arterial Blood Oxygen

Saturation 97.1, Arterial Blood Base Excess -2.8L, Jerod Test Positive


Height (Feet):  5


Height (Inches):  4.00


Weight (Pounds):  130


General Appearance:  no apparent distress


EENT:  other - Intubated on ventilator


Cardiovascular:  tachycardia


Respiratory/Chest:  decreased breath sounds


Abdomen:  distended











Seven Tobar MD            Nov 22, 2020 12:14 Unknown

## 2024-04-24 NOTE — NUR
NURSE NOTES:



report received from LITA Jhaveri. 

patient is noted to be sedated on versed drip 2mg/hr to achieve rass score of -2, '

he has a gag reflex when suctioning through the ET-tube and when orally suctioned. 

patient remains on ventilator setting at 40% with AC 14, TV: 600. 

right femoral line is patent and remains running levophed at 3mcg/min. 

tube feeding remains at 60ml/hr with 

-------------------------------------------------------------------------------

Addendum: 11/09/20 at 1355 by Heath Lechuga RN

-------------------------------------------------------------------------------

buck remains draining clear straw urine.

NG-ube is at 65-66cm on the right nare, 

sacral region is protected with Optifoam;. Paper fax received from [facility] requesting a refill on Atorvastatin, unable to refill per protocol, per Dr. Maradiaga's note on 05/02/2023 patient needed to [facility] In 6 months for a CPE, no pending appt

## 2025-01-12 NOTE — NUR
NURSE NOTES:

LATE ENTRY:

PT IN BED, FLAT EFFECT. OPENS EYES. PUPILS 3MM SLUGGISH. VSS, AFEBRILE. INTUBATED ETT 7.5, 
25CM AT THE LIP.  VENT SETTINGS: AC 12, , PEEP 0, FI02 30%. MODERATE THICK SECRETIONS, 
THICK, CREAMY COLOR. LT NGT.TUBE FEEDING  GLUCERNA 1.2, AT 60ML/HR.  NO RESIDUALS, 
TOLERATING TUBE FEEDINGS. ABDOMEN SOFT, ROUND. NO BM AT THIS TIME. GALLEGOS IN PLACE, DRAINING 
PALE URINE. SOFT WRIST RESTRAINTS IN PLACE. PASSIVE ROM, CIRCULATION CHECK. SIDE RAILSX3. 
BED LOCKED AND IN LOW POSITION. CONTACT ISOLATION IN PLACE. WILL CONTINUE TO IMPLEMENT PLAN 
OF CARE. no

## 2025-03-09 NOTE — NUR
NURSE HAND-OFF REPORT: 



Important Events on Shift:N

Patient Status: STABLE

Diet: NO ADDED SALT MECHANICAL SOFT CHOPPED, NECTAR LIQUID



Pending Orders: N

Pending Results/Labs: N

Pending MD notification: N



Latest Vital Signs: Temperature 97.3 , Pulse 60 , B/P 102 /64 , Respiratory Rate 18 , O2 SAT 
96 , Nasal Cannula, O2 Flow Rate 2.0 .  

Vital Sign Comment: 



EKG Rhythm: Sinus Bradycardia

Rhythm change?: N 

MD Notified?: N -

MD Response: 



Latest Mejia Fall Score: 50  

Fall Risk: High Risk 

Safety Measures: Call light Within Reach, Bed Alarm Zone 1, Side Rails Side Rails x3, Bed 
position Low and Locked.

Fall Precautions: 

Yellow Socks

Yellow Gown

Door Sign

Patient Fall Education



Report given to .

-------------------------------------------------------------------------------

Addendum: 09/16/20 at 1913 by INNA MORAES RN

-------------------------------------------------------------------------------

NURSE HAND-OFF REPORT GIVEN TO [place]. 95